# Patient Record
Sex: FEMALE | Race: BLACK OR AFRICAN AMERICAN | NOT HISPANIC OR LATINO | Employment: OTHER | URBAN - METROPOLITAN AREA
[De-identification: names, ages, dates, MRNs, and addresses within clinical notes are randomized per-mention and may not be internally consistent; named-entity substitution may affect disease eponyms.]

---

## 2017-01-04 ENCOUNTER — CLINICAL SUPPORT (OUTPATIENT)
Dept: ELECTROPHYSIOLOGY | Facility: CLINIC | Age: 77
End: 2017-01-04
Payer: MEDICARE

## 2017-01-04 DIAGNOSIS — Z95.9 UNSPECIFIED CARDIAC DEVICE IN SITU: ICD-10-CM

## 2017-01-04 DIAGNOSIS — Z95.818 STATUS POST PLACEMENT OF IMPLANTABLE LOOP RECORDER: Primary | ICD-10-CM

## 2017-01-04 DIAGNOSIS — I63.9 CRYPTOGENIC STROKE: ICD-10-CM

## 2017-01-04 PROCEDURE — 93299 LOOP RECORDER REMOTE: CPT | Mod: PBBFAC | Performed by: INTERNAL MEDICINE

## 2017-01-04 PROCEDURE — 93297 REM INTERROG DEV EVAL ICPMS: CPT | Mod: ,,, | Performed by: INTERNAL MEDICINE

## 2017-01-09 ENCOUNTER — TELEPHONE (OUTPATIENT)
Dept: NEUROLOGY | Facility: CLINIC | Age: 77
End: 2017-01-09

## 2017-01-09 NOTE — TELEPHONE ENCOUNTER
Left detailed message on pt's voicemail along with both numbers listed as next of kin, asking that they contact our office in reference to rescheduling Ms. Llamas appt scheduled 01/10/2017 with Jasmyne Oro

## 2017-01-15 ENCOUNTER — HOSPITAL ENCOUNTER (INPATIENT)
Facility: HOSPITAL | Age: 77
LOS: 2 days | Discharge: HOME HEALTH | DRG: 190 | End: 2017-01-17
Attending: EMERGENCY MEDICINE | Admitting: INTERNAL MEDICINE
Payer: MEDICARE

## 2017-01-15 DIAGNOSIS — J81.1 CHRONIC PULMONARY EDEMA: ICD-10-CM

## 2017-01-15 DIAGNOSIS — J44.1 COPD EXACERBATION: ICD-10-CM

## 2017-01-15 DIAGNOSIS — N18.6 ESRD (END STAGE RENAL DISEASE): Chronic | ICD-10-CM

## 2017-01-15 DIAGNOSIS — J45.901 ASTHMA EXACERBATION: Primary | ICD-10-CM

## 2017-01-15 DIAGNOSIS — I50.33 ACUTE ON CHRONIC DIASTOLIC HEART FAILURE: ICD-10-CM

## 2017-01-15 PROBLEM — I36.1 NON-RHEUMATIC TRICUSPID VALVE INSUFFICIENCY: Status: ACTIVE | Noted: 2017-01-15

## 2017-01-15 PROBLEM — I73.9 PERIPHERAL ARTERY DISEASE: Status: ACTIVE | Noted: 2017-01-15

## 2017-01-15 PROBLEM — R73.9 STEROID-INDUCED HYPERGLYCEMIA: Status: ACTIVE | Noted: 2017-01-15

## 2017-01-15 PROBLEM — I35.0 AORTIC STENOSIS: Status: ACTIVE | Noted: 2017-01-15

## 2017-01-15 PROBLEM — T38.0X5A STEROID-INDUCED HYPERGLYCEMIA: Status: ACTIVE | Noted: 2017-01-15

## 2017-01-15 PROBLEM — J81.0 ACUTE PULMONARY EDEMA: Status: ACTIVE | Noted: 2017-01-15

## 2017-01-15 PROBLEM — D69.6 THROMBOCYTOPENIA, UNSPECIFIED: Status: ACTIVE | Noted: 2017-01-15

## 2017-01-15 LAB
ALBUMIN SERPL BCP-MCNC: 3.3 G/DL
ALP SERPL-CCNC: 94 U/L
ALT SERPL W/O P-5'-P-CCNC: 8 U/L
ANION GAP SERPL CALC-SCNC: 12 MMOL/L
ANISOCYTOSIS BLD QL SMEAR: SLIGHT
AST SERPL-CCNC: 14 U/L
BASOPHILS # BLD AUTO: 0.01 K/UL
BASOPHILS NFR BLD: 0.2 %
BILIRUB SERPL-MCNC: 0.5 MG/DL
BNP SERPL-MCNC: 2576 PG/ML
BUN SERPL-MCNC: 26 MG/DL
CALCIUM SERPL-MCNC: 9.2 MG/DL
CHLORIDE SERPL-SCNC: 100 MMOL/L
CO2 SERPL-SCNC: 29 MMOL/L
CREAT SERPL-MCNC: 3.7 MG/DL
DIFFERENTIAL METHOD: ABNORMAL
EOSINOPHIL # BLD AUTO: 0 K/UL
EOSINOPHIL NFR BLD: 0.5 %
ERYTHROCYTE [DISTWIDTH] IN BLOOD BY AUTOMATED COUNT: 17.2 %
EST. GFR  (AFRICAN AMERICAN): 13.1 ML/MIN/1.73 M^2
EST. GFR  (NON AFRICAN AMERICAN): 11.4 ML/MIN/1.73 M^2
GLUCOSE SERPL-MCNC: 173 MG/DL
HCT VFR BLD AUTO: 37.7 %
HGB BLD-MCNC: 11.4 G/DL
HYPOCHROMIA BLD QL SMEAR: ABNORMAL
LYMPHOCYTES # BLD AUTO: 0.6 K/UL
LYMPHOCYTES NFR BLD: 11.3 %
MCH RBC QN AUTO: 29.2 PG
MCHC RBC AUTO-ENTMCNC: 30.2 %
MCV RBC AUTO: 97 FL
MONOCYTES # BLD AUTO: 0.6 K/UL
MONOCYTES NFR BLD: 9.9 %
NEUTROPHILS # BLD AUTO: 4.4 K/UL
NEUTROPHILS NFR BLD: 78.1 %
OVALOCYTES BLD QL SMEAR: ABNORMAL
PLATELET # BLD AUTO: 77 K/UL
PMV BLD AUTO: 11.3 FL
POCT GLUCOSE: 181 MG/DL (ref 70–110)
POCT GLUCOSE: 189 MG/DL (ref 70–110)
POCT GLUCOSE: 217 MG/DL (ref 70–110)
POCT GLUCOSE: 265 MG/DL (ref 70–110)
POIKILOCYTOSIS BLD QL SMEAR: SLIGHT
POLYCHROMASIA BLD QL SMEAR: ABNORMAL
POTASSIUM SERPL-SCNC: 4.1 MMOL/L
PROT SERPL-MCNC: 7 G/DL
RBC # BLD AUTO: 3.9 M/UL
SODIUM SERPL-SCNC: 141 MMOL/L
TROPONIN I SERPL DL<=0.01 NG/ML-MCNC: <0.006 NG/ML
WBC # BLD AUTO: 5.67 K/UL

## 2017-01-15 PROCEDURE — 99285 EMERGENCY DEPT VISIT HI MDM: CPT | Mod: GC,,, | Performed by: EMERGENCY MEDICINE

## 2017-01-15 PROCEDURE — 63600175 PHARM REV CODE 636 W HCPCS: Performed by: EMERGENCY MEDICINE

## 2017-01-15 PROCEDURE — 11000001 HC ACUTE MED/SURG PRIVATE ROOM

## 2017-01-15 PROCEDURE — 93010 ELECTROCARDIOGRAM REPORT: CPT | Mod: ,,, | Performed by: INTERNAL MEDICINE

## 2017-01-15 PROCEDURE — 99285 EMERGENCY DEPT VISIT HI MDM: CPT | Mod: 25

## 2017-01-15 PROCEDURE — 94760 N-INVAS EAR/PLS OXIMETRY 1: CPT

## 2017-01-15 PROCEDURE — 84484 ASSAY OF TROPONIN QUANT: CPT

## 2017-01-15 PROCEDURE — 96374 THER/PROPH/DIAG INJ IV PUSH: CPT

## 2017-01-15 PROCEDURE — 25000242 PHARM REV CODE 250 ALT 637 W/ HCPCS: Performed by: EMERGENCY MEDICINE

## 2017-01-15 PROCEDURE — 94640 AIRWAY INHALATION TREATMENT: CPT

## 2017-01-15 PROCEDURE — 25000003 PHARM REV CODE 250: Performed by: INTERNAL MEDICINE

## 2017-01-15 PROCEDURE — 63600175 PHARM REV CODE 636 W HCPCS: Performed by: INTERNAL MEDICINE

## 2017-01-15 PROCEDURE — 82962 GLUCOSE BLOOD TEST: CPT

## 2017-01-15 PROCEDURE — 99223 1ST HOSP IP/OBS HIGH 75: CPT | Mod: ,,, | Performed by: INTERNAL MEDICINE

## 2017-01-15 PROCEDURE — 83880 ASSAY OF NATRIURETIC PEPTIDE: CPT

## 2017-01-15 PROCEDURE — 85025 COMPLETE CBC W/AUTO DIFF WBC: CPT

## 2017-01-15 PROCEDURE — 93005 ELECTROCARDIOGRAM TRACING: CPT

## 2017-01-15 PROCEDURE — 25000003 PHARM REV CODE 250: Performed by: EMERGENCY MEDICINE

## 2017-01-15 PROCEDURE — 80053 COMPREHEN METABOLIC PANEL: CPT

## 2017-01-15 PROCEDURE — 25000242 PHARM REV CODE 250 ALT 637 W/ HCPCS: Performed by: INTERNAL MEDICINE

## 2017-01-15 RX ORDER — INSULIN ASPART 100 [IU]/ML
0-5 INJECTION, SOLUTION INTRAVENOUS; SUBCUTANEOUS
Status: DISCONTINUED | OUTPATIENT
Start: 2017-01-15 | End: 2017-01-17 | Stop reason: HOSPADM

## 2017-01-15 RX ORDER — PREDNISONE 20 MG/1
40 TABLET ORAL DAILY
Status: DISCONTINUED | OUTPATIENT
Start: 2017-01-15 | End: 2017-01-16

## 2017-01-15 RX ORDER — FLUTICASONE FUROATE AND VILANTEROL 200; 25 UG/1; UG/1
1 POWDER RESPIRATORY (INHALATION) DAILY
Status: DISCONTINUED | OUTPATIENT
Start: 2017-01-15 | End: 2017-01-17 | Stop reason: HOSPADM

## 2017-01-15 RX ORDER — ATORVASTATIN CALCIUM 20 MG/1
40 TABLET, FILM COATED ORAL DAILY
Status: DISCONTINUED | OUTPATIENT
Start: 2017-01-15 | End: 2017-01-17 | Stop reason: HOSPADM

## 2017-01-15 RX ORDER — ERGOCALCIFEROL 1.25 MG/1
50000 CAPSULE ORAL
Status: DISCONTINUED | OUTPATIENT
Start: 2017-01-15 | End: 2017-01-17 | Stop reason: HOSPADM

## 2017-01-15 RX ORDER — IBUPROFEN 200 MG
16 TABLET ORAL
Status: DISCONTINUED | OUTPATIENT
Start: 2017-01-15 | End: 2017-01-17 | Stop reason: HOSPADM

## 2017-01-15 RX ORDER — IPRATROPIUM BROMIDE AND ALBUTEROL SULFATE 2.5; .5 MG/3ML; MG/3ML
3 SOLUTION RESPIRATORY (INHALATION)
Status: COMPLETED | OUTPATIENT
Start: 2017-01-15 | End: 2017-01-15

## 2017-01-15 RX ORDER — GLUCAGON 1 MG
1 KIT INJECTION
Status: DISCONTINUED | OUTPATIENT
Start: 2017-01-15 | End: 2017-01-17 | Stop reason: HOSPADM

## 2017-01-15 RX ORDER — ALBUTEROL SULFATE 2.5 MG/.5ML
2.5 SOLUTION RESPIRATORY (INHALATION) EVERY 4 HOURS
Status: DISCONTINUED | OUTPATIENT
Start: 2017-01-15 | End: 2017-01-15

## 2017-01-15 RX ORDER — DOCUSATE SODIUM 100 MG/1
100 CAPSULE, LIQUID FILLED ORAL 2 TIMES DAILY
Status: DISCONTINUED | OUTPATIENT
Start: 2017-01-15 | End: 2017-01-17 | Stop reason: HOSPADM

## 2017-01-15 RX ORDER — AMLODIPINE BESYLATE 10 MG/1
10 TABLET ORAL DAILY
Status: DISCONTINUED | OUTPATIENT
Start: 2017-01-15 | End: 2017-01-17 | Stop reason: HOSPADM

## 2017-01-15 RX ORDER — NAPROXEN SODIUM 220 MG/1
81 TABLET, FILM COATED ORAL DAILY
Status: DISCONTINUED | OUTPATIENT
Start: 2017-01-15 | End: 2017-01-17 | Stop reason: HOSPADM

## 2017-01-15 RX ORDER — SODIUM CHLORIDE 9 MG/ML
INJECTION, SOLUTION INTRAVENOUS
Status: DISCONTINUED | OUTPATIENT
Start: 2017-01-16 | End: 2017-01-17 | Stop reason: HOSPADM

## 2017-01-15 RX ORDER — FUROSEMIDE 10 MG/ML
20 INJECTION INTRAMUSCULAR; INTRAVENOUS ONCE
Status: COMPLETED | OUTPATIENT
Start: 2017-01-15 | End: 2017-01-15

## 2017-01-15 RX ORDER — CLONIDINE 0.3 MG/24H
1 PATCH, EXTENDED RELEASE TRANSDERMAL
Status: DISCONTINUED | OUTPATIENT
Start: 2017-01-21 | End: 2017-01-17 | Stop reason: HOSPADM

## 2017-01-15 RX ORDER — CLOPIDOGREL BISULFATE 75 MG/1
75 TABLET ORAL DAILY
Status: DISCONTINUED | OUTPATIENT
Start: 2017-01-15 | End: 2017-01-17 | Stop reason: HOSPADM

## 2017-01-15 RX ORDER — HEPARIN SODIUM 5000 [USP'U]/ML
5000 INJECTION, SOLUTION INTRAVENOUS; SUBCUTANEOUS EVERY 12 HOURS
Status: DISCONTINUED | OUTPATIENT
Start: 2017-01-15 | End: 2017-01-17 | Stop reason: HOSPADM

## 2017-01-15 RX ORDER — IBUPROFEN 200 MG
24 TABLET ORAL
Status: DISCONTINUED | OUTPATIENT
Start: 2017-01-15 | End: 2017-01-17 | Stop reason: HOSPADM

## 2017-01-15 RX ORDER — CARVEDILOL 25 MG/1
25 TABLET ORAL 2 TIMES DAILY
Status: DISCONTINUED | OUTPATIENT
Start: 2017-01-15 | End: 2017-01-17 | Stop reason: HOSPADM

## 2017-01-15 RX ORDER — NIFEDIPINE 30 MG/1
90 TABLET, EXTENDED RELEASE ORAL DAILY
Status: DISCONTINUED | OUTPATIENT
Start: 2017-01-15 | End: 2017-01-17 | Stop reason: HOSPADM

## 2017-01-15 RX ORDER — METHYLPREDNISOLONE SOD SUCC 125 MG
125 VIAL (EA) INJECTION
Status: COMPLETED | OUTPATIENT
Start: 2017-01-15 | End: 2017-01-15

## 2017-01-15 RX ORDER — IPRATROPIUM BROMIDE AND ALBUTEROL SULFATE 2.5; .5 MG/3ML; MG/3ML
3 SOLUTION RESPIRATORY (INHALATION) EVERY 4 HOURS
Status: DISCONTINUED | OUTPATIENT
Start: 2017-01-15 | End: 2017-01-16

## 2017-01-15 RX ORDER — LOSARTAN POTASSIUM 50 MG/1
100 TABLET ORAL DAILY
Status: DISCONTINUED | OUTPATIENT
Start: 2017-01-15 | End: 2017-01-17 | Stop reason: HOSPADM

## 2017-01-15 RX ORDER — TIOTROPIUM BROMIDE 18 UG/1
1 CAPSULE ORAL; RESPIRATORY (INHALATION) DAILY
Status: DISCONTINUED | OUTPATIENT
Start: 2017-01-15 | End: 2017-01-16

## 2017-01-15 RX ADMIN — ISOSORBIDE DINITRATE 40 MG: 40 TABLET, EXTENDED RELEASE ORAL at 12:01

## 2017-01-15 RX ADMIN — DOCUSATE SODIUM 100 MG: 100 CAPSULE, LIQUID FILLED ORAL at 10:01

## 2017-01-15 RX ADMIN — CLOPIDOGREL 75 MG: 75 TABLET, FILM COATED ORAL at 10:01

## 2017-01-15 RX ADMIN — ISOSORBIDE DINITRATE 40 MG: 40 TABLET, EXTENDED RELEASE ORAL at 10:01

## 2017-01-15 RX ADMIN — AMLODIPINE BESYLATE 10 MG: 10 TABLET ORAL at 10:01

## 2017-01-15 RX ADMIN — CARVEDILOL 25 MG: 25 TABLET, FILM COATED ORAL at 10:01

## 2017-01-15 RX ADMIN — FLUTICASONE FUROATE AND VILANTEROL TRIFENATATE 1 PUFF: 200; 25 POWDER RESPIRATORY (INHALATION) at 12:01

## 2017-01-15 RX ADMIN — INSULIN ASPART 3 UNITS: 100 INJECTION, SOLUTION INTRAVENOUS; SUBCUTANEOUS at 06:01

## 2017-01-15 RX ADMIN — INSULIN ASPART 1 UNITS: 100 INJECTION, SOLUTION INTRAVENOUS; SUBCUTANEOUS at 10:01

## 2017-01-15 RX ADMIN — IPRATROPIUM BROMIDE AND ALBUTEROL SULFATE 3 ML: .5; 3 SOLUTION RESPIRATORY (INHALATION) at 06:01

## 2017-01-15 RX ADMIN — ASPIRIN 81 MG CHEWABLE TABLET 81 MG: 81 TABLET CHEWABLE at 10:01

## 2017-01-15 RX ADMIN — FUROSEMIDE 20 MG: 10 INJECTION, SOLUTION INTRAVENOUS at 10:01

## 2017-01-15 RX ADMIN — IPRATROPIUM BROMIDE AND ALBUTEROL SULFATE 3 ML: .5; 3 SOLUTION RESPIRATORY (INHALATION) at 09:01

## 2017-01-15 RX ADMIN — HYDRALAZINE HYDROCHLORIDE 75 MG: 50 TABLET ORAL at 06:01

## 2017-01-15 RX ADMIN — ERGOCALCIFEROL 50000 UNITS: 1.25 CAPSULE ORAL at 10:01

## 2017-01-15 RX ADMIN — CARVEDILOL 25 MG: 25 TABLET, FILM COATED ORAL at 09:01

## 2017-01-15 RX ADMIN — HEPARIN SODIUM 5000 UNITS: 5000 INJECTION, SOLUTION INTRAVENOUS; SUBCUTANEOUS at 09:01

## 2017-01-15 RX ADMIN — TIOTROPIUM BROMIDE 18 MCG: 18 CAPSULE ORAL; RESPIRATORY (INHALATION) at 12:01

## 2017-01-15 RX ADMIN — LOSARTAN POTASSIUM 100 MG: 50 TABLET, FILM COATED ORAL at 10:01

## 2017-01-15 RX ADMIN — IPRATROPIUM BROMIDE AND ALBUTEROL SULFATE 3 ML: .5; 3 SOLUTION RESPIRATORY (INHALATION) at 04:01

## 2017-01-15 RX ADMIN — HYDRALAZINE HYDROCHLORIDE 75 MG: 50 TABLET ORAL at 10:01

## 2017-01-15 RX ADMIN — HEPARIN SODIUM 5000 UNITS: 5000 INJECTION, SOLUTION INTRAVENOUS; SUBCUTANEOUS at 10:01

## 2017-01-15 RX ADMIN — HYDRALAZINE HYDROCHLORIDE 75 MG: 50 TABLET ORAL at 12:01

## 2017-01-15 RX ADMIN — ATORVASTATIN CALCIUM 40 MG: 20 TABLET, FILM COATED ORAL at 10:01

## 2017-01-15 RX ADMIN — PREDNISONE 40 MG: 20 TABLET ORAL at 10:01

## 2017-01-15 RX ADMIN — NIFEDIPINE 90 MG: 30 TABLET, FILM COATED, EXTENDED RELEASE ORAL at 10:01

## 2017-01-15 RX ADMIN — METHYLPREDNISOLONE SODIUM SUCCINATE 125 MG: 125 INJECTION, POWDER, FOR SOLUTION INTRAMUSCULAR; INTRAVENOUS at 04:01

## 2017-01-15 NOTE — IP AVS SNAPSHOT
[facility]  [address]  [facility] [address]  Phone: [phone]           Patient Discharge Instructions     Our goal is to set you up for success. This packet includes information on your condition, medications, and your home care. It will help you to care for yourself so you don't get sicker and need to go back to the hospital.     Please ask your nurse if you have any questions.        There are many details to remember when preparing to leave the hospital. Here is what you will need to do:    1. Take your medicine. If you are prescribed medications, review your Medication List in the following pages. You may have new medications to  at the pharmacy and others that you'll need to stop taking. Review the instructions for how and when to take your medications. Talk with your doctor or nurses if you are unsure of what to do.     2. Go to your follow-up appointments. Specific follow-up information is listed in the following pages. Your may be contacted by a transition nurse or clinical provider about future appointments. Be sure we have all of the phone numbers to reach you, if needed. Please contact your provider's office if you are unable to make an appointment.     3. Watch for warning signs. Your doctor or nurse will give you detailed warning signs to watch for and when to call for assistance. These instructions may also include educational information about your condition. If you experience any of warning signs to your health, call your doctor.               [facility]  Unless otherwise directed by your provider, please contact [facility], our nurse care line that is available for 24/7 assistance.     [phone] (toll-free)    Registered nurses in the [facility] provide clinical advisement, health education, appointment booking, and other advisory services.                    ** Verify the list of medication(s) below is accurate and up  to date. Carry this with you in case of emergency. If your medications have changed, please notify your healthcare provider.             Medication List      CHANGE how you take these medications        Additional Info                      albuterol 90 mcg/actuation inhaler   Quantity:  1 Inhaler   Refills:  0   Dose:  1-2 puff   What changed:  Another medication with the same name was removed. Continue taking this medication, and follow the directions you see here.    Instructions:  Inhale 1-2 puffs into the lungs every 6 (six) hours as needed for Wheezing.     Begin Date    AM    Noon    PM    Bedtime       cloNIDine 0.3 mg/24 hr td ptwk 0.3 mg/24 hr   Commonly known as:  CATAPRES   Quantity:  4 patch   Refills:  3   Dose:  1 patch   What changed:  when to take this    Instructions:  Place 1 patch onto the skin every 7 days.     Begin Date    AM    Noon    PM    Bedtime         CONTINUE taking these medications        Additional Info                      amlodipine 10 MG tablet   Commonly known as:  NORVASC   Quantity:  30 tablet   Refills:  3   Dose:  10 mg    Last time this was given:  10 mg on 1/16/2017  8:55 AM   Instructions:  Take 1 tablet (10 mg total) by mouth once daily.     Begin Date    AM    Noon    PM    Bedtime       artificial tears 0.5 % ophthalmic solution   Commonly known as:  ISOPTO TEARS   Refills:  0   Dose:  1 drop    Instructions:  Place 1 drop into both eyes 3 (three) times daily.     Begin Date    AM    Noon    PM    Bedtime       aspirin 81 MG Chew   Quantity:  30 tablet   Refills:  1   Dose:  81 mg    Last time this was given:  81 mg on 1/16/2017  8:54 AM   Instructions:  Take 1 tablet (81 mg total) by mouth once daily.     Begin Date    AM    Noon    PM    Bedtime       atorvastatin 40 MG tablet   Commonly known as:  LIPITOR   Quantity:  30 tablet   Refills:  3   Dose:  40 mg    Last time this was given:  40 mg on 1/16/2017  8:54 AM   Instructions:  Take 1 tablet (40 mg total) by mouth  once daily.     Begin Date    AM    Noon    PM    Bedtime       carvedilol 25 MG tablet   Commonly known as:  COREG   Quantity:  60 tablet   Refills:  3   Dose:  25 mg    Last time this was given:  25 mg on 1/16/2017  8:54 AM   Instructions:  Take 1 tablet (25 mg total) by mouth 2 (two) times daily.     Begin Date    AM    Noon    PM    Bedtime       clopidogrel 75 mg tablet   Commonly known as:  PLAVIX   Quantity:  30 tablet   Refills:  2   Dose:  75 mg    Last time this was given:  75 mg on 1/16/2017  8:55 AM   Instructions:  Take 1 tablet (75 mg total) by mouth once daily.     Begin Date    AM    Noon    PM    Bedtime       clotrimazole 1 % cream   Commonly known as:  LOTRIMIN   Quantity:  28 g   Refills:  0    Instructions:  Apply topically 2 (two) times daily.     Begin Date    AM    Noon    PM    Bedtime       docusate sodium 100 MG capsule   Commonly known as:  COLACE   Refills:  0   Dose:  100 mg    Last time this was given:  100 mg on 1/16/2017  8:55 AM   Instructions:  Take 1 capsule (100 mg total) by mouth 2 (two) times daily.     Begin Date    AM    Noon    PM    Bedtime       epoetin shon 10,000 unit/mL injection   Commonly known as:  PROCRIT   Refills:  0   Dose:  5000 Units   Indications:  Anemia due to Renal Failure    Instructions:  Inject 0.5 mLs (5,000 Units total) into the skin every Mon, Wed, Fri.     Begin Date    AM    Noon    PM    Bedtime       ergocalciferol 50,000 unit Cap   Commonly known as:  ERGOCALCIFEROL   Quantity:  5 capsule   Refills:  1   Dose:  73517 Units    Last time this was given:  50,000 Units on 1/15/2017 10:23 AM   Instructions:  Take 1 capsule (50,000 Units total) by mouth every 7 days.     Begin Date    AM    Noon    PM    Bedtime       fluticasone-vilanterol 200-25 mcg/dose Dsdv diskus inhaler   Commonly known as:  BREO ELLIPTA   Quantity:  28 each   Refills:  1   Dose:  1 puff    Last time this was given:  1 puff on 1/16/2017  8:55 AM   Instructions:  Inhale 1 puff  into the lungs 2 (two) times daily.     Begin Date    AM    Noon    PM    Bedtime       hydrALAZINE 50 MG tablet   Commonly known as:  APRESOLINE   Quantity:  180 tablet   Refills:  3   Dose:  75 mg    Last time this was given:  75 mg on 1/16/2017 11:51 AM   Instructions:  Take 1.5 tablets (75 mg total) by mouth every 6 (six) hours.     Begin Date    AM    Noon    PM    Bedtime       isosorbide dinitrate 40 mg Tbsr   Commonly known as:  ISOCHRON   Quantity:  60 tablet   Refills:  11   Dose:  40 mg    Last time this was given:  40 mg on 1/16/2017  8:55 AM   Instructions:  Take 1 tablet (40 mg total) by mouth every 12 (twelve) hours.     Begin Date    AM    Noon    PM    Bedtime       lidocaine-prilocaine cream   Commonly known as:  EMLA   Quantity:  25 g   Refills:  1    Instructions:  Apply topically as needed (to apply over access 1/2 over prior to dialysis).     Begin Date    AM    Noon    PM    Bedtime       losartan 100 MG tablet   Commonly known as:  COZAAR   Quantity:  30 tablet   Refills:  3   Dose:  100 mg    Last time this was given:  100 mg on 1/16/2017  8:55 AM   Instructions:  Take 1 tablet (100 mg total) by mouth once daily.     Begin Date    AM    Noon    PM    Bedtime       multivitamin per tablet   Commonly known as:  THERAGRAN   Refills:  0   Dose:  1 tablet    Instructions:  Take 1 tablet by mouth once daily.     Begin Date    AM    Noon    PM    Bedtime       nifedipine 90 MG (OSM) Tr24   Commonly known as:  PROCARDIA-XL   Refills:  0    Last time this was given:  90 mg on 1/16/2017  8:55 AM     Begin Date    AM    Noon    PM    Bedtime       sodium chloride 0.9% 0.9 % SolP 100 mL with ferric gluconate 62.5 mg/5 mL Soln 125 mg   Quantity:  1 Bottle   Refills:  7   Dose:  125 mg    Instructions:  Inject 125 mg into the vein daily as needed (on dialysis).     Begin Date    AM    Noon    PM    Bedtime         STOP taking these medications     guaifenesin 600 mg 12 hr tablet   Commonly known as:   "MUCINEX                  Please bring to all follow up appointments:    1. A copy of your discharge instructions.  2. All medicines you are currently taking in their original bottles.  3. Identification and insurance card.    Please arrive 15 minutes ahead of scheduled appointment time.    Please call 24 hours in advance if you must reschedule your appointment and/or time.        Your Scheduled Appointments     Jan 23, 2017  3:00 PM CST   Established Patient Visit with MD Carroll [facility] II [facility] - Internal Medicine ([facility])    [address]  [facility] [id number] [phone]            Feb 06, 2017  8:00 AM CST   Remote Interrogation with HOME MONITOR DEVICE CHECK, [facility]   [facility] ([facility] )    [address]  [place] [address] [phone]              Follow-up Information     Schedule an appointment as soon as possible for a visit with Parth Posadas Ii, MD.    Specialty:  Internal Medicine    Why:  blood pressure medication adjustment.    Contact information:    [address]  [facility] [id number] [phone]            Primary Diagnosis     Your primary diagnosis was:  Heart Failure      Admission Information     Date & Time Provider Department CSN    1/15/2017  3:34 AM Angi Mendez MD [facility] [id number]      Care Providers     Provider Role Specialty Primary office phone    Angi Mendez MD Attending Provider Hospitalist [phone]    Adrian Lebron MD Team Attending  Hospitalist [phone]    Angi Mendez MD Team Attending  Hospitalist [phone]    Marshall Almanza MD Consulting Physician  Nephrology [phone]      Your Vitals Were     BP Pulse Temp Resp Height Weight    156/47 66 98.1 °F (36.7 °C) (Oral) 20 5' 2" (1.575 m) 46.7 kg (103 lb)    Last Period SpO2 BMI          (LMP Unknown) 81% 18.84 kg/m2        Recent Lab Values        5/24/2012 7/4/2014 " 10/1/2015 2/18/2016 5/1/2016 6/28/2016 9/29/2016 1/16/2017      7:30 AM  2:45 AM  4:21 PM 10:58 AM  5:20 PM  4:05 AM  3:58 AM  4:19 AM    A1C 5.6 4.6 4.9 4.4 (L) 4.6 &lt;4.0 (L) 5.0 4.4 (L)    Comment for A1C at  3:58 AM on 9/29/2016:  According to ADA guidelines, hemoglobin A1C <7.0% represents  optimal control in non-pregnant diabetic patients.  Different  metrics may apply to specific populations.   Standards of Medical Care in Diabetes - 2016.  For the purpose of screening for the presence of diabetes:  <5.7%     Consistent with the absence of diabetes  5.7-6.4%  Consistent with increasing risk for diabetes   (prediabetes)  >or=6.5%  Consistent with diabetes  Currently no consensus exists for use of hemoglobin A1C  for diagnosis of diabetes for children.      Comment for A1C at  4:19 AM on 1/16/2017:  According to ADA guidelines, hemoglobin A1C <7.0% represents  optimal control in non-pregnant diabetic patients.  Different  metrics may apply to specific populations.   Standards of Medical Care in Diabetes - 2016.  For the purpose of screening for the presence of diabetes:  <5.7%     Consistent with the absence of diabetes  5.7-6.4%  Consistent with increasing risk for diabetes   (prediabetes)  >or=6.5%  Consistent with diabetes  Currently no consensus exists for use of hemoglobin A1C  for diagnosis of diabetes for children.        Allergies as of 1/16/2017     No Known Allergies      Advance Directives     An advance directive is a document which, in the event you are no longer able to make decisions for yourself, tells your healthcare team what kind of treatment you do or do not want to receive, or who you would like to make those decisions for you.  If you do not currently have an advance directive, [facility] encourages you to create one.  For more information call:  [phone]), [phone] ([phone]),  or log on to [email or web address].        Language Assistance Services      ATTENTION: Language assistance services are available, free of charge. Please call [phone].      ATENCIÓN: Si cristhian coker, tiene a barajas disposición servicios gratuitos de asistencia lingüística. Llame al [phone].     CHÚ Ý: N?u b?n nói Ti?ng Vi?t, có các d?ch v? h? tr? ngôn ng? mi?n phí dành cho b?n. G?i s? [phone].        Stroke Education              Chronic Kindey Disease Education             Diabetes Discharge Instructions                                   [facility] Sign-Up     Activating your [facility] account is as easy as 1-2-3!     1) Visit [email or web address], select Sign Up Now, enter this activation code and your date of birth, then select Next.  [email or web address]  Expires: 3/1/2017  8:00 AM      2) Create a username and password to use when you visit [facility] in the future and select a security question in case you lose your password and select Next.    3) Enter your e-mail address and click Sign Up!    Additional Information  If you have questions, please e-mail [email or web address] or call [phone] to talk to our [facility] staff. Remember, [facility] is NOT to be used for urgent needs. For medical emergencies, dial 911.          [facility] complies with applicable Federal civil rights laws and does not discriminate on the basis of race, color, national origin, age, disability, or sex.

## 2017-01-15 NOTE — ASSESSMENT & PLAN NOTE
Patient with hyperglycemia secondary to steroid therapy.  1. Diabetic diet as above.  2. Glucose AC and HS.  3. Low dose correction SSI.

## 2017-01-15 NOTE — SUBJECTIVE & OBJECTIVE
Past Medical History   Diagnosis Date    Anemia in ESRD (end-stage renal disease) 5/29/2016    Anticoagulant long-term use     Aortic atherosclerosis 11/22/2016    Aortic stenosis, moderate 2/18/2016    Asthma in adult without complication 1/8/2016    Bilateral low back pain without sciatica 11/17/2015    Blindness of right eye 11/12/2016    Cataract     Central retinal vein occlusion, right eye 6/3/2014    CHF (congestive heart failure)     Chronic diastolic heart failure 1/8/2016    Chronic respiratory failure with hypoxia 5/29/2016    Dependence on hemodialysis      Mon-Wed-Fri    Diverticulosis     Embolic stroke involving right middle cerebral artery     Enlarged LA (left atrium) 10/7/2016    Epiretinal membrane 7/17/2012    ESRD (end stage renal disease)     Essential hypertension 1/8/2016    History of GI diverticular bleed      5/22/16    Left flaccid hemiparesis 10/1/2016    Peripheral vascular disease, unspecified 11/22/2016    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 11/13/2016    Type 2 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, without long-term current use of insulin 3/26/2013    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, without long-term current use of insulin 3/26/2013    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       Past Surgical History   Procedure Laterality Date    Cataract extraction      Cholecystectomy      Colonoscopy N/A 5/23/2016     Procedure: COLONOSCOPY;  Surgeon: WILLIAM Colvin MD;  Location: [facility] ([address] [place] [address]);  Service: Endoscopy;  Laterality: N/A;    Colonoscopy N/A 5/30/2016     Procedure: COLONOSCOPY;  Surgeon: Sam Davis MD;  Location: [facility] ([address] [place] [address]);  Service: Endoscopy;  Laterality: N/A;    Upper gastrointestinal endoscopy      Breast surgery       tumor removal x 2       Review of patient's allergies indicates:  No Known  Allergies    No current facility-administered medications on file prior to encounter.      Current Outpatient Prescriptions on File Prior to Encounter   Medication Sig    albuterol (PROVENTIL) 2.5 mg /3 mL (0.083 %) nebulizer solution 3 mLs every 6 (six) hours as needed.    albuterol 90 mcg/actuation inhaler Inhale 1-2 puffs into the lungs every 6 (six) hours as needed for Wheezing.    amlodipine (NORVASC) 10 MG tablet Take 1 tablet (10 mg total) by mouth once daily.    artificial tears (ISOPTO TEARS) 0.5 % ophthalmic solution Place 1 drop into both eyes 3 (three) times daily.    aspirin 81 MG Chew Take 1 tablet (81 mg total) by mouth once daily.    atorvastatin (LIPITOR) 40 MG tablet Take 1 tablet (40 mg total) by mouth once daily.    carvedilol (COREG) 25 MG tablet Take 1 tablet (25 mg total) by mouth 2 (two) times daily.    cloNIDine 0.3 mg/24 hr td ptwk (CATAPRES) 0.3 mg/24 hr Place 1 patch onto the skin every 7 days. (Patient taking differently: Place 1 patch onto the skin every Saturday. )    clopidogrel (PLAVIX) 75 mg tablet Take 1 tablet (75 mg total) by mouth once daily.    clotrimazole (LOTRIMIN) 1 % cream Apply topically 2 (two) times daily.    docusate sodium (COLACE) 100 MG capsule Take 1 capsule (100 mg total) by mouth 2 (two) times daily.    epoetin shon (PROCRIT) 10,000 unit/mL injection Inject 0.5 mLs (5,000 Units total) into the skin every Mon, Wed, Fri. (Patient taking differently: Inject 5,000 Units into the skin every Mon, Wed, Fri. )    ergocalciferol (ERGOCALCIFEROL) 50,000 unit Cap Take 1 capsule (50,000 Units total) by mouth every 7 days.    fluticasone-vilanterol (BREO ELLIPTA) 200-25 mcg/dose DsDv diskus inhaler Inhale 1 puff into the lungs 2 (two) times daily.    guaifenesin (MUCINEX) 600 mg 12 hr tablet Take 1,200 mg by mouth 2 (two) times daily.    hydrALAZINE (APRESOLINE) 50 MG tablet Take 1.5 tablets (75 mg total) by mouth every 6 (six) hours.    isosorbide dinitrate  (ISOCHRON) 40 mg TbSR Take 1 tablet (40 mg total) by mouth every 12 (twelve) hours.    lidocaine-prilocaine (EMLA) cream Apply topically as needed (to apply over access 1/2 over prior to dialysis).    losartan (COZAAR) 100 MG tablet Take 1 tablet (100 mg total) by mouth once daily.    multivitamin (THERAGRAN) per tablet Take 1 tablet by mouth once daily.    nifedipine (PROCARDIA-XL) 90 MG (OSM) TR24     sodium chloride 0.9% 0.9 % SolP 100 mL with ferric gluconate 62.5 mg/5 mL Soln 125 mg Inject 125 mg into the vein daily as needed (on dialysis).     Family History     Problem Relation (Age of Onset)    Cancer Sister    Cataracts Mother    Esophageal cancer Sister    Glaucoma Brother, Maternal Aunt    Heart disease Brother    Heart failure Sister    Hypertension Mother, Sister, Brother, Father    No Known Problems Maternal Uncle, Paternal Aunt, Paternal Uncle, Maternal Grandmother, Maternal Grandfather, Paternal Grandmother, Paternal Grandfather    Stroke Mother        Social History Main Topics    Smoking status: Never Smoker    Smokeless tobacco: Never Used    Alcohol use No      Comment: Reports occasional 1-2 drinks     Drug use: No    Sexual activity: No     Review of Systems   Constitutional: Negative for chills, fatigue, fever and unexpected weight change.   HENT: Negative for ear pain, facial swelling, hearing loss, mouth sores, nosebleeds, rhinorrhea, sinus pressure, sore throat, tinnitus, trouble swallowing and voice change.    Eyes: Negative for photophobia, pain, redness and visual disturbance.   Respiratory: Positive for shortness of breath and wheezing. Negative for cough and chest tightness.    Cardiovascular: Negative for chest pain, palpitations and leg swelling.   Gastrointestinal: Negative for abdominal pain, blood in stool, constipation, diarrhea, nausea and vomiting.   Endocrine: Negative for cold intolerance, heat intolerance, polydipsia, polyphagia and polyuria.   Genitourinary:  Negative for decreased urine volume, dysuria, flank pain, frequency, hematuria, menstrual problem, urgency, vaginal bleeding, vaginal discharge and vaginal pain.   Musculoskeletal: Negative for arthralgias, back pain, joint swelling, myalgias and neck pain.   Skin: Negative for rash.   Allergic/Immunologic: Negative for environmental allergies, food allergies and immunocompromised state.   Neurological: Negative for dizziness, seizures, syncope, weakness, light-headedness, numbness and headaches.   Hematological: Negative for adenopathy. Does not bruise/bleed easily.   Psychiatric/Behavioral: Negative for confusion, hallucinations, self-injury, sleep disturbance and suicidal ideas. The patient is not nervous/anxious.      Objective:     Vital Signs (Most Recent):  Temp: 97.7 °F (36.5 °C) (01/15/17 1545)  Pulse: 68 (01/15/17 1700)  Resp: 12 (01/15/17 1657)  BP: (!) 181/79 (01/15/17 1545)  SpO2: 98 % (01/15/17 1657) Vital Signs (24h Range):  Temp:  [97.7 °F (36.5 °C)-98.5 °F (36.9 °C)] 97.7 °F (36.5 °C)  Pulse:  [65-80] 68  Resp:  [] 12  SpO2:  [96 %-100 %] 98 %  BP: (179-191)/(54-83) 181/79     Weight: 46.7 kg (103 lb)  Body mass index is 18.84 kg/(m^2).    Physical Exam   Constitutional: She is oriented to person, place, and time. She appears well-developed and well-nourished. No distress.   HENT:   Head: Normocephalic and atraumatic.   Right Ear: Hearing, tympanic membrane, external ear and ear canal normal.   Left Ear: Hearing, tympanic membrane, external ear and ear canal normal.   Nose: Nose normal.   Mouth/Throat: Uvula is midline, oropharynx is clear and moist and mucous membranes are normal. No oropharyngeal exudate.   Eyes: Conjunctivae and EOM are normal. Right eye exhibits no discharge. Left eye exhibits no discharge. No scleral icterus.   Right eye gray pupil.    Neck: Normal range of motion. Neck supple. No tracheal deviation present. No thyromegaly present.   Cardiovascular: Normal rate, regular  rhythm, normal heart sounds and intact distal pulses.  Exam reveals no gallop and no friction rub.    No murmur heard.  Pulmonary/Chest: Effort normal. No accessory muscle usage or stridor. Tachypnea noted. No respiratory distress. She has decreased breath sounds in the right lower field. She has no wheezes. She has rales in the right middle field, the right lower field, the left middle field and the left lower field. She exhibits no tenderness.   Abdominal: Soft. Bowel sounds are normal. She exhibits no distension. There is no tenderness. There is no rebound and no guarding.   Musculoskeletal: Normal range of motion. She exhibits no edema or tenderness.   Lymphadenopathy:     She has no cervical adenopathy.   Neurological: She is alert and oriented to person, place, and time. No cranial nerve deficit. Coordination normal.   Skin: Skin is warm and dry. No rash noted. She is not diaphoretic. No erythema. No pallor.   Psychiatric: She has a normal mood and affect. Her behavior is normal. Judgment and thought content normal.   Nursing note and vitals reviewed.       Significant Labs:   BMP:   Recent Labs  Lab 01/15/17  0358   *      K 4.1      CO2 29   BUN 26*   CREATININE 3.7*   CALCIUM 9.2     CBC:   Recent Labs  Lab 01/15/17  0358   WBC 5.67   HGB 11.4*   HCT 37.7   PLT 77*       Significant Imaging: I have reviewed all pertinent imaging results/findings within the past 24 hours.

## 2017-01-15 NOTE — ASSESSMENT & PLAN NOTE
Patient with malignant hypertension. Unclear as to last time her home medications were taken.  1. amlodipine 10 mg daily.  2. Coreg 25 mg BID.  3. Clonidine 0.3 mg patch every 7 days.  4. Hydralazine 75 mg every 6 hours.  5. Isosorbide dinitrate 40 mg daily.  6. Losartan 100 mg daily  7. Monitor blood pressure and titrate therapy as needed.

## 2017-01-15 NOTE — ED PROVIDER NOTES
Encounter Date: 1/15/2017       History     Chief Complaint   Patient presents with    Shortness of Breath     x1 hour. Hx CHF. Wears O2 2 l/min at home. On 4 l/min per EMS.      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  Pt states she just finished a course of steroids two days ago for her last asthma exacerbation.  She states this shortness of breath feels like her asthma more than her heart failure.  Pt completed dialysis yesterday with no difficulty.  She has had a runny nose for about a month, baseline cough that is unchanged.      The history is provided by the patient.     Past Medical History   Diagnosis Date    Anemia in ESRD (end-stage renal disease) 5/29/2016    Anticoagulant long-term use     Aortic atherosclerosis 11/22/2016    Aortic stenosis, moderate 2/18/2016    Asthma in adult without complication 1/8/2016    Bilateral low back pain without sciatica 11/17/2015    Blindness of right eye 11/12/2016    Cataract     Central retinal vein occlusion, right eye 6/3/2014    CHF (congestive heart failure)     Chronic diastolic heart failure 1/8/2016    Chronic respiratory failure with hypoxia 5/29/2016    Dependence on hemodialysis      Mon-Wed-Fri    Diverticulosis     Embolic stroke involving right middle cerebral artery     Enlarged LA (left atrium) 10/7/2016    Epiretinal membrane 7/17/2012    ESRD (end stage renal disease)     Essential hypertension 1/8/2016    History of GI diverticular bleed      5/22/16    Left flaccid hemiparesis 10/1/2016    Peripheral vascular disease, unspecified 11/22/2016    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 11/13/2016    Type 2 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,  without long-term current use of insulin 3/26/2013    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  Past Surgical History   Procedure Laterality Date    Cataract extraction      Cholecystectomy      Colonoscopy N/A 5/23/2016     Procedure: COLONOSCOPY;  Surgeon: WILLIAM Colvin MD;  Location: [facility] ENDO ([facility]);  Service: Endoscopy;  Laterality: N/A;    Colonoscopy N/A 5/30/2016     Procedure: COLONOSCOPY;  Surgeon: Sam Davis MD;  Location: [facility] ENDO ([facility]);  Service: Endoscopy;  Laterality: N/A;    Upper gastrointestinal endoscopy      Breast surgery       tumor removal x 2     Family History   Problem Relation Age of Onset    Cataracts Mother     Stroke Mother     Hypertension Mother     Cancer Sister     Hypertension Sister     Heart failure Sister     Glaucoma Brother     Hypertension Brother     Heart disease Brother     Hypertension Father     Glaucoma Maternal Aunt     Esophageal cancer Sister     No Known Problems Maternal Uncle     No Known Problems Paternal Aunt     No Known Problems Paternal Uncle     No Known Problems Maternal Grandmother     No Known Problems Maternal Grandfather     No Known Problems Paternal Grandmother     No Known Problems Paternal Grandfather     Heart attack Neg Hx     Colon cancer Neg Hx     Stomach cancer Neg Hx     Anemia Neg Hx     Arrhythmia Neg Hx     Asthma Neg Hx     Clotting disorder Neg Hx     Fainting Neg Hx     Hyperlipidemia Neg Hx     Atrial Septal Defect Neg Hx      Social History   Substance Use Topics    Smoking status: Never Smoker    Smokeless tobacco: Never Used    Alcohol use No      Comment: Reports occasional 1-2 drinks      Review of Systems   Constitutional: Negative for chills, diaphoresis and fever.   HENT: Positive for  rhinorrhea. Negative for nosebleeds and sore throat.    Eyes: Negative for photophobia, pain and discharge.   Respiratory: Positive for cough, shortness of breath and wheezing.    Cardiovascular: Negative for chest pain and leg swelling.   Gastrointestinal: Negative for abdominal pain, constipation, diarrhea, nausea and vomiting.   Genitourinary: Negative for dysuria and flank pain.   Musculoskeletal: Negative for back pain, gait problem and joint swelling.   Skin: Negative for color change, rash and wound.   Neurological: Negative for dizziness, syncope and weakness.   Hematological: Does not bruise/bleed easily.       Physical Exam   Initial Vitals   BP Pulse Resp Temp SpO2   01/15/17 0148 01/15/17 0148 01/15/17 0148 01/15/17 0148 01/15/17 0148   184/54 77 20 98.5 °F (36.9 °C) 97 %     Physical Exam    Nursing note and vitals reviewed.  Constitutional: She appears well-developed and well-nourished. She is not diaphoretic. No distress.   HENT:   Head: Normocephalic and atraumatic.   Right Ear: External ear normal.   Left Ear: External ear normal.   Mouth/Throat: Oropharynx is clear and moist.   Eyes: EOM are normal. Pupils are equal, round, and reactive to light.   Neck: Normal range of motion. Neck supple.   Cardiovascular: Normal rate, regular rhythm and intact distal pulses. Exam reveals no gallop and no friction rub.    Murmur (heard throughout the precordium) heard.   Systolic murmur is present with a grade of 3/6   Pulmonary/Chest: No respiratory distress. She has decreased breath sounds (throughout). She has wheezes (faint, throughout). She has no rales.   Abdominal: Soft. Bowel sounds are normal. She exhibits no distension. There is no tenderness.   Musculoskeletal: Normal range of motion. She exhibits no edema or tenderness.   Neurological: She is alert and oriented to person, place, and time. She has normal strength.   Skin: Skin is warm and dry.   Psychiatric: She has a normal mood and affect.         ED  Course   Procedures  Labs Reviewed   CBC W/ AUTO DIFFERENTIAL - Abnormal; Notable for the following:        Result Value    RBC 3.90 (*)     Hemoglobin 11.4 (*)     MCHC 30.2 (*)     RDW 17.2 (*)     Platelets 77 (*)     Lymph # 0.6 (*)     Gran% 78.1 (*)     Lymph% 11.3 (*)     All other components within normal limits   COMPREHENSIVE METABOLIC PANEL - Abnormal; Notable for the following:     Glucose 173 (*)     BUN, Bld 26 (*)     Creatinine 3.7 (*)     Albumin 3.3 (*)     ALT 8 (*)     eGFR if  13.1 (*)     eGFR if non  11.4 (*)     All other components within normal limits   B-TYPE NATRIURETIC PEPTIDE - Abnormal; Notable for the following:     BNP 2576 (*)     All other components within normal limits   POCT GLUCOSE - Abnormal; Notable for the following:     POCT Glucose 189 (*)     All other components within normal limits   TROPONIN I   PROCALCITONIN   POCT GLUCOSE MONITORING CONTINUOUS     EKG Readings: (Independently Interpreted)   Initial Reading: No STEMI.       X-Rays:   Independently Interpreted Readings:   Chest X-Ray: No infiltrates. Increased vascular markings consistent with CHF are present.     Medical Decision Making:   Initial Assessment:   Ho4: 77yoF with shortness of breath.  Clinical symptoms are more consistent with asthma than HF, but does have a very mild increase in fluid compared to last xray.  Pt not feeling much better after two rounds of nebs, still requiring 3L O2.  Will admit to medicine for asthma exacerbation and diuresis.               Attending Attestation:   Physician Attestation Statement for Resident:  As the supervising MD   Physician Attestation Statement: I have personally seen and examined this patient.   I agree with the above history. -: Pt states usually can tell if her dyspnea is asthma vs chf, this feels more like asthma to her.  Scant sputum, no fevers, no hemoptysis.  No cp, no increased edema.    As the supervising MD I agree with the  above PE.    As the supervising MD I agree with the above treatment, course, plan, and disposition.   -: R/o chf vs asthma vs pna, bronchitis, anemia, less likely acs, pe, ptx, effusion.  Ekg, cxr, labs, nebs, monitor, re-eval.    I have reviewed and agree with the residents interpretation of the following: lab data, x-rays and EKG.  I have reviewed the following: old records at this facility.                    ED Course     Clinical Impression:   The primary encounter diagnosis was Asthma exacerbation. Diagnoses of Chronic pulmonary edema, COPD exacerbation, and ESRD (end stage renal disease) were also pertinent to this visit.          Montserrat Mercer MD  Resident  01/15/17 1657       Juan Andrade MD  01/15/17 9031

## 2017-01-15 NOTE — ASSESSMENT & PLAN NOTE
Patient with a chronic small pleural effusion which I suspect is contributing to her SOB. Not amenable to drainage during prior hospitalization.   1. Monitor.

## 2017-01-15 NOTE — ASSESSMENT & PLAN NOTE
Patient with signs and symptoms concerning for acute COPD/asthma exacerbation. She is afebrile and without leukocytosis. Recently completed a course of oral steroid therapy. Suspect there may also be a component of acute pulmonary edema as below.  1. COPD pathway initiated.  2. Oral glucocorticoid, inhaled bronchodilator, inhaled steroid.   3. Oxygen supplementation for target O2 saturation of >92%.

## 2017-01-15 NOTE — IP AVS SNAPSHOT
[address]  [place] [address]  Phone: [phone]           I have received a copy of my After Visit Summary and discharge instructions from [facility].    INSTRUCTIONS RECEIVED AND UNDERSTOOD BY:                     Patient/Patient Representative: ________________________________________________________________     Date/Time: ________________________________________________________________                     Instructions Given By: ________________________________________________________________     Date/Time: ________________________________________________________________

## 2017-01-15 NOTE — ASSESSMENT & PLAN NOTE
Patient with acute pulmonary edema secondary to acute on chronic diastolic heart failure. Although patient is HD dependent she still produces urine output three times daily.   1. Furosemide 20 mg x 1 dose.  2. HD as per nephrology service.

## 2017-01-15 NOTE — H&P
[facility] Hospital Medicine  History & Physical    Patient Name: Tea Georges  MRN: [id number]  Admission Date: 1/15/2017  Attending Physician: Angi Mendez MD   Primary Care Provider: Parth Posadas Ii, MD    [facility] Medicine Team: [facility] MED L Angi Mendez MD     Patient information was obtained from patient, past medical records and ER records.     Subjective:     Principal Problem:COPD exacerbation    Chief Complaint:  SOB since 7 pm on the day prior to admission.     HPI: A 77-year-old woman with diet controlled DM type 2, hypertension, ESRD on HD (M,W,F), chronic diastolic heart failure, pulmonary hypertension, COPD due to asthma, macular degeneration with right eye blindness, prior stroke, chronic right pleural effusion, and with recurrent admissions for COPD exacerbations whom on the day prior to admission, at around 7 pm, she developed progressively worsening SOB without cough or increased sputum production. She denied fever, chills, sick contacts or recent travel. She completed steroid therapy 2 days prior to admission for a recent hospitalization due to COPD exacerbation.     Mrs. Georges's HD Unit is at [facility]. She had removal of 2 liters at her last session. She is unaware of her dry weight.         Past Medical History   Diagnosis Date    Anemia in ESRD (end-stage renal disease) 5/29/2016    Anticoagulant long-term use     Aortic atherosclerosis 11/22/2016    Aortic stenosis, moderate 2/18/2016    Asthma in adult without complication 1/8/2016    Bilateral low back pain without sciatica 11/17/2015    Blindness of right eye 11/12/2016    Cataract     Central retinal vein occlusion, right eye 6/3/2014    CHF (congestive heart failure)     Chronic diastolic heart failure 1/8/2016    Chronic respiratory failure with hypoxia 5/29/2016    Dependence on hemodialysis      Mon-Wed-Fri    Diverticulosis     Embolic stroke involving right middle cerebral artery      Enlarged LA (left atrium) 10/7/2016    Epiretinal membrane 7/17/2012    ESRD (end stage renal disease)     Essential hypertension 1/8/2016    History of GI diverticular bleed      5/22/16    Left flaccid hemiparesis 10/1/2016    Peripheral vascular disease, unspecified 11/22/2016    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 11/13/2016    Type 2 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, without long-term current use of insulin 3/26/2013    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, without long-term current use of insulin 3/26/2013    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       Past Surgical History   Procedure Laterality Date    Cataract extraction      Cholecystectomy      Colonoscopy N/A 5/23/2016     Procedure: COLONOSCOPY;  Surgeon: WILLIAM Colvin MD;  Location: [facility] ([address] [place] [address]);  Service: Endoscopy;  Laterality: N/A;    Colonoscopy N/A 5/30/2016     Procedure: COLONOSCOPY;  Surgeon: Sam Davis MD;  Location: [facility] ([address] [place] [address]);  Service: Endoscopy;  Laterality: N/A;    Upper gastrointestinal endoscopy      Breast surgery       tumor removal x 2       Review of patient's allergies indicates:  No Known Allergies    No current facility-administered medications on file prior to encounter.      Current Outpatient Prescriptions on File Prior to Encounter   Medication Sig    albuterol (PROVENTIL) 2.5 mg /3 mL (0.083 %) nebulizer solution 3 mLs every 6 (six) hours as needed.    albuterol 90 mcg/actuation inhaler Inhale 1-2 puffs into the lungs every 6 (six) hours as needed for Wheezing.    amlodipine (NORVASC) 10 MG tablet Take 1 tablet (10 mg total) by mouth once daily.    artificial tears (ISOPTO TEARS) 0.5 % ophthalmic solution Place 1 drop into both eyes 3 (three) times daily.    aspirin 81 MG Chew Take 1 tablet (81 mg total) by mouth once daily.    atorvastatin  (LIPITOR) 40 MG tablet Take 1 tablet (40 mg total) by mouth once daily.    carvedilol (COREG) 25 MG tablet Take 1 tablet (25 mg total) by mouth 2 (two) times daily.    cloNIDine 0.3 mg/24 hr td ptwk (CATAPRES) 0.3 mg/24 hr Place 1 patch onto the skin every 7 days. (Patient taking differently: Place 1 patch onto the skin every Saturday. )    clopidogrel (PLAVIX) 75 mg tablet Take 1 tablet (75 mg total) by mouth once daily.    clotrimazole (LOTRIMIN) 1 % cream Apply topically 2 (two) times daily.    docusate sodium (COLACE) 100 MG capsule Take 1 capsule (100 mg total) by mouth 2 (two) times daily.    epoetin shon (PROCRIT) 10,000 unit/mL injection Inject 0.5 mLs (5,000 Units total) into the skin every Mon, Wed, Fri. (Patient taking differently: Inject 5,000 Units into the skin every Mon, Wed, Fri. )    ergocalciferol (ERGOCALCIFEROL) 50,000 unit Cap Take 1 capsule (50,000 Units total) by mouth every 7 days.    fluticasone-vilanterol (BREO ELLIPTA) 200-25 mcg/dose DsDv diskus inhaler Inhale 1 puff into the lungs 2 (two) times daily.    guaifenesin (MUCINEX) 600 mg 12 hr tablet Take 1,200 mg by mouth 2 (two) times daily.    hydrALAZINE (APRESOLINE) 50 MG tablet Take 1.5 tablets (75 mg total) by mouth every 6 (six) hours.    isosorbide dinitrate (ISOCHRON) 40 mg TbSR Take 1 tablet (40 mg total) by mouth every 12 (twelve) hours.    lidocaine-prilocaine (EMLA) cream Apply topically as needed (to apply over access 1/2 over prior to dialysis).    losartan (COZAAR) 100 MG tablet Take 1 tablet (100 mg total) by mouth once daily.    multivitamin (THERAGRAN) per tablet Take 1 tablet by mouth once daily.    nifedipine (PROCARDIA-XL) 90 MG (OSM) TR24     sodium chloride 0.9% 0.9 % SolP 100 mL with ferric gluconate 62.5 mg/5 mL Soln 125 mg Inject 125 mg into the vein daily as needed (on dialysis).     Family History     Problem Relation (Age of Onset)    Cancer Sister    Cataracts Mother    Esophageal cancer Sister     Glaucoma Brother, Maternal Aunt    Heart disease Brother    Heart failure Sister    Hypertension Mother, Sister, Brother, Father    No Known Problems Maternal Uncle, Paternal Aunt, Paternal Uncle, Maternal Grandmother, Maternal Grandfather, Paternal Grandmother, Paternal Grandfather    Stroke Mother        Social History Main Topics    Smoking status: Never Smoker    Smokeless tobacco: Never Used    Alcohol use No      Comment: Reports occasional 1-2 drinks     Drug use: No    Sexual activity: No     Review of Systems   Constitutional: Negative for chills, fatigue, fever and unexpected weight change.   HENT: Negative for ear pain, facial swelling, hearing loss, mouth sores, nosebleeds, rhinorrhea, sinus pressure, sore throat, tinnitus, trouble swallowing and voice change.    Eyes: Negative for photophobia, pain, redness and visual disturbance.   Respiratory: Positive for shortness of breath and wheezing. Negative for cough and chest tightness.    Cardiovascular: Negative for chest pain, palpitations and leg swelling.   Gastrointestinal: Negative for abdominal pain, blood in stool, constipation, diarrhea, nausea and vomiting.   Endocrine: Negative for cold intolerance, heat intolerance, polydipsia, polyphagia and polyuria.   Genitourinary: Negative for decreased urine volume, dysuria, flank pain, frequency, hematuria, menstrual problem, urgency, vaginal bleeding, vaginal discharge and vaginal pain.   Musculoskeletal: Negative for arthralgias, back pain, joint swelling, myalgias and neck pain.   Skin: Negative for rash.   Allergic/Immunologic: Negative for environmental allergies, food allergies and immunocompromised state.   Neurological: Negative for dizziness, seizures, syncope, weakness, light-headedness, numbness and headaches.   Hematological: Negative for adenopathy. Does not bruise/bleed easily.   Psychiatric/Behavioral: Negative for confusion, hallucinations, self-injury, sleep disturbance and  suicidal ideas. The patient is not nervous/anxious.      Objective:     Vital Signs (Most Recent):  Temp: 97.7 °F (36.5 °C) (01/15/17 1545)  Pulse: 68 (01/15/17 1700)  Resp: 12 (01/15/17 1657)  BP: (!) 181/79 (01/15/17 1545)  SpO2: 98 % (01/15/17 1657) Vital Signs (24h Range):  Temp:  [97.7 °F (36.5 °C)-98.5 °F (36.9 °C)] 97.7 °F (36.5 °C)  Pulse:  [65-80] 68  Resp:  [] 12  SpO2:  [96 %-100 %] 98 %  BP: (179-191)/(54-83) 181/79     Weight: 46.7 kg (103 lb)  Body mass index is 18.84 kg/(m^2).    Physical Exam   Constitutional: She is oriented to person, place, and time. She appears well-developed and well-nourished. No distress.   HENT:   Head: Normocephalic and atraumatic.   Right Ear: Hearing, tympanic membrane, external ear and ear canal normal.   Left Ear: Hearing, tympanic membrane, external ear and ear canal normal.   Nose: Nose normal.   Mouth/Throat: Uvula is midline, oropharynx is clear and moist and mucous membranes are normal. No oropharyngeal exudate.   Eyes: Conjunctivae and EOM are normal. Right eye exhibits no discharge. Left eye exhibits no discharge. No scleral icterus.   Right eye gray pupil.    Neck: Normal range of motion. Neck supple. No tracheal deviation present. No thyromegaly present.   Cardiovascular: Normal rate, regular rhythm, normal heart sounds and intact distal pulses.  Exam reveals no gallop and no friction rub.    No murmur heard.  Pulmonary/Chest: Effort normal. No accessory muscle usage or stridor. Tachypnea noted. No respiratory distress. She has decreased breath sounds in the right lower field. She has no wheezes. She has rales in the right middle field, the right lower field, the left middle field and the left lower field. She exhibits no tenderness.   Abdominal: Soft. Bowel sounds are normal. She exhibits no distension. There is no tenderness. There is no rebound and no guarding.   Musculoskeletal: Normal range of motion. She exhibits no edema or tenderness.    Lymphadenopathy:     She has no cervical adenopathy.   Neurological: She is alert and oriented to person, place, and time. No cranial nerve deficit. Coordination normal.   Skin: Skin is warm and dry. No rash noted. She is not diaphoretic. No erythema. No pallor.   Psychiatric: She has a normal mood and affect. Her behavior is normal. Judgment and thought content normal.   Nursing note and vitals reviewed.       Significant Labs:   BMP:   Recent Labs  Lab 01/15/17  0358   *      K 4.1      CO2 29   BUN 26*   CREATININE 3.7*   CALCIUM 9.2     CBC:   Recent Labs  Lab 01/15/17  0358   WBC 5.67   HGB 11.4*   HCT 37.7   PLT 77*       Significant Imaging: I have reviewed all pertinent imaging results/findings within the past 24 hours.    Assessment/Plan:     * COPD exacerbation  Patient with signs and symptoms concerning for acute COPD/asthma exacerbation. She is afebrile and without leukocytosis. Recently completed a course of oral steroid therapy. Suspect there may also be a component of acute pulmonary edema as below.  1. COPD pathway initiated.  2. Oral glucocorticoid, inhaled bronchodilator, inhaled steroid.   3. Oxygen supplementation for target O2 saturation of >92%.      Asthma exacerbation  As above      Pleural effusion  Patient with a chronic small pleural effusion which I suspect is contributing to her SOB. Not amenable to drainage during prior hospitalization.   1. Monitor.      Acute pulmonary edema  Patient with acute pulmonary edema secondary to acute on chronic diastolic heart failure. Although patient is HD dependent she still produces urine output three times daily.   1. Furosemide 20 mg x 1 dose.  2. HD as per nephrology service.      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 Now uncontrolled due to below.  1. Diabetic renal cardiac diet.      Steroid-induced  hyperglycemia  Patient with hyperglycemia secondary to steroid therapy.  1. Diabetic diet as above.  2. Glucose AC and HS.  3. Low dose correction SSI.       Essential hypertension  As below      Malignant hypertension  Patient with malignant hypertension. Unclear as to last time her home medications were taken.  1. amlodipine 10 mg daily.  2. Coreg 25 mg BID.  3. Clonidine 0.3 mg patch every 7 days.  4. Hydralazine 75 mg every 6 hours.  5. Isosorbide dinitrate 40 mg daily.  6. Losartan 100 mg daily  7. Monitor blood pressure and titrate therapy as needed.       ESRD (end stage renal disease)  On HD M,W,F.  1. Inpatient consult to nephrology for HD.      Blindness of right eye  Stable.  1. Continue artificial tears.      Aortic stenosis, moderate  Stable valvular disease.  1. Outpatient management.      Anemia in ESRD (end-stage renal disease)  Stable and asymptomatic.  1. No indication for RBC transfusion.  2. Monitor.      CAD (coronary artery disease)  Stable and asymptomatic.  1.Continue aspirin and clopidogrel.      Thrombocytopenia, unspecified  Chronic in nature and without signs of overt bleeding.  1. Outpatient management.      Peripheral artery disease  Stable and asymptomatic. S/P stent.  1.Continue aspirin and clopidogrel.      Non-rheumatic tricuspid valve insufficiency  Stable valvular disease.  1. Outpatient management.        VTE Risk Mitigation         Ordered     heparin (porcine) injection 5,000 Units  Every 12 hours     Route:  Subcutaneous        01/15/17 0906     Medium Risk of VTE  Once      01/15/17 0906        Angi Mendez MD  Department of Hospital Medicine   [facility]

## 2017-01-16 PROBLEM — R09.02 HYPOXEMIA: Status: ACTIVE | Noted: 2017-01-16

## 2017-01-16 PROBLEM — R09.02 HYPOXEMIA: Status: RESOLVED | Noted: 2017-01-16 | Resolved: 2017-01-16

## 2017-01-16 PROBLEM — R73.9 STEROID-INDUCED HYPERGLYCEMIA: Status: RESOLVED | Noted: 2017-01-15 | Resolved: 2017-01-16

## 2017-01-16 PROBLEM — J44.9 COPD (CHRONIC OBSTRUCTIVE PULMONARY DISEASE): Status: ACTIVE | Noted: 2017-01-15

## 2017-01-16 PROBLEM — J41.0 SIMPLE CHRONIC BRONCHITIS: Status: ACTIVE | Noted: 2017-01-15

## 2017-01-16 PROBLEM — T38.0X5A STEROID-INDUCED HYPERGLYCEMIA: Status: RESOLVED | Noted: 2017-01-15 | Resolved: 2017-01-16

## 2017-01-16 LAB
ANION GAP SERPL CALC-SCNC: 10 MMOL/L
BUN SERPL-MCNC: 38 MG/DL
CALCIUM SERPL-MCNC: 9 MG/DL
CHLORIDE SERPL-SCNC: 98 MMOL/L
CO2 SERPL-SCNC: 30 MMOL/L
CREAT SERPL-MCNC: 4.8 MG/DL
EST. GFR  (AFRICAN AMERICAN): 9.6 ML/MIN/1.73 M^2
EST. GFR  (NON AFRICAN AMERICAN): 8.3 ML/MIN/1.73 M^2
ESTIMATED AVG GLUCOSE: 80 MG/DL
GLUCOSE SERPL-MCNC: 159 MG/DL
HBA1C MFR BLD HPLC: 4.4 %
PHOSPHATE SERPL-MCNC: 3.6 MG/DL
POCT GLUCOSE: 128 MG/DL (ref 70–110)
POCT GLUCOSE: 149 MG/DL (ref 70–110)
POCT GLUCOSE: 155 MG/DL (ref 70–110)
POCT GLUCOSE: 71 MG/DL (ref 70–110)
POTASSIUM SERPL-SCNC: 4.4 MMOL/L
SODIUM SERPL-SCNC: 138 MMOL/L

## 2017-01-16 PROCEDURE — 94640 AIRWAY INHALATION TREATMENT: CPT

## 2017-01-16 PROCEDURE — G8987 SELF CARE CURRENT STATUS: HCPCS | Mod: CJ

## 2017-01-16 PROCEDURE — 97165 OT EVAL LOW COMPLEX 30 MIN: CPT

## 2017-01-16 PROCEDURE — 97161 PT EVAL LOW COMPLEX 20 MIN: CPT

## 2017-01-16 PROCEDURE — 80048 BASIC METABOLIC PNL TOTAL CA: CPT

## 2017-01-16 PROCEDURE — 36415 COLL VENOUS BLD VENIPUNCTURE: CPT

## 2017-01-16 PROCEDURE — 63600175 PHARM REV CODE 636 W HCPCS: Performed by: INTERNAL MEDICINE

## 2017-01-16 PROCEDURE — 27000221 HC OXYGEN, UP TO 24 HOURS

## 2017-01-16 PROCEDURE — 90935 HEMODIALYSIS ONE EVALUATION: CPT | Mod: GC,,, | Performed by: INTERNAL MEDICINE

## 2017-01-16 PROCEDURE — 83036 HEMOGLOBIN GLYCOSYLATED A1C: CPT

## 2017-01-16 PROCEDURE — G8988 SELF CARE GOAL STATUS: HCPCS | Mod: CI

## 2017-01-16 PROCEDURE — 25000242 PHARM REV CODE 250 ALT 637 W/ HCPCS: Performed by: INTERNAL MEDICINE

## 2017-01-16 PROCEDURE — 99239 HOSP IP/OBS DSCHRG MGMT >30: CPT | Mod: ,,, | Performed by: INTERNAL MEDICINE

## 2017-01-16 PROCEDURE — 94760 N-INVAS EAR/PLS OXIMETRY 1: CPT

## 2017-01-16 PROCEDURE — 84100 ASSAY OF PHOSPHORUS: CPT

## 2017-01-16 PROCEDURE — 12000002 HC ACUTE/MED SURGE SEMI-PRIVATE ROOM

## 2017-01-16 PROCEDURE — 25000003 PHARM REV CODE 250: Performed by: EMERGENCY MEDICINE

## 2017-01-16 PROCEDURE — 25000003 PHARM REV CODE 250: Performed by: INTERNAL MEDICINE

## 2017-01-16 PROCEDURE — 80100016 HC MAINTENANCE HEMODIALYSIS

## 2017-01-16 RX ADMIN — LOSARTAN POTASSIUM 100 MG: 50 TABLET, FILM COATED ORAL at 08:01

## 2017-01-16 RX ADMIN — ATORVASTATIN CALCIUM 40 MG: 20 TABLET, FILM COATED ORAL at 08:01

## 2017-01-16 RX ADMIN — HYDRALAZINE HYDROCHLORIDE 75 MG: 50 TABLET ORAL at 11:01

## 2017-01-16 RX ADMIN — NIFEDIPINE 90 MG: 30 TABLET, FILM COATED, EXTENDED RELEASE ORAL at 08:01

## 2017-01-16 RX ADMIN — CARVEDILOL 25 MG: 25 TABLET, FILM COATED ORAL at 08:01

## 2017-01-16 RX ADMIN — IPRATROPIUM BROMIDE AND ALBUTEROL SULFATE 3 ML: .5; 3 SOLUTION RESPIRATORY (INHALATION) at 03:01

## 2017-01-16 RX ADMIN — DOCUSATE SODIUM 100 MG: 100 CAPSULE, LIQUID FILLED ORAL at 08:01

## 2017-01-16 RX ADMIN — CLOPIDOGREL 75 MG: 75 TABLET, FILM COATED ORAL at 08:01

## 2017-01-16 RX ADMIN — AMLODIPINE BESYLATE 10 MG: 10 TABLET ORAL at 08:01

## 2017-01-16 RX ADMIN — TIOTROPIUM BROMIDE 18 MCG: 18 CAPSULE ORAL; RESPIRATORY (INHALATION) at 08:01

## 2017-01-16 RX ADMIN — HEPARIN SODIUM 5000 UNITS: 5000 INJECTION, SOLUTION INTRAVENOUS; SUBCUTANEOUS at 08:01

## 2017-01-16 RX ADMIN — ISOSORBIDE DINITRATE 40 MG: 40 TABLET, EXTENDED RELEASE ORAL at 08:01

## 2017-01-16 RX ADMIN — IPRATROPIUM BROMIDE AND ALBUTEROL SULFATE 3 ML: .5; 3 SOLUTION RESPIRATORY (INHALATION) at 12:01

## 2017-01-16 RX ADMIN — PREDNISONE 40 MG: 20 TABLET ORAL at 08:01

## 2017-01-16 RX ADMIN — IPRATROPIUM BROMIDE AND ALBUTEROL SULFATE 3 ML: .5; 3 SOLUTION RESPIRATORY (INHALATION) at 07:01

## 2017-01-16 RX ADMIN — ASPIRIN 81 MG CHEWABLE TABLET 81 MG: 81 TABLET CHEWABLE at 08:01

## 2017-01-16 RX ADMIN — HYDRALAZINE HYDROCHLORIDE 75 MG: 50 TABLET ORAL at 05:01

## 2017-01-16 RX ADMIN — FLUTICASONE FUROATE AND VILANTEROL TRIFENATATE 1 PUFF: 200; 25 POWDER RESPIRATORY (INHALATION) at 08:01

## 2017-01-16 NOTE — PROGRESS NOTES
[place] with Dr. Mendez regarding discharge orders.  Pt will d/c pending dialysis here in the hospital.  MD stated she would touch base with nephrology as she has not been dialyzed yet

## 2017-01-16 NOTE — PLAN OF CARE
Problem: Occupational Therapy Goal  Goal: Occupational Therapy Goal  Goals to be met by: 1/26/2017     Patient will increase functional independence with ADLs by performing:    Grooming while standing at sink with Supervision.  Rolling to Bilateral with Modified [place].   Supine to sit with Modified [place].  Stand pivot transfers with Modified [place].  Toilet transfer to toilet with Modified [place].  Upper extremity exercise program x15 reps per handout, with independence.  Outcome: Ongoing (interventions implemented as appropriate)  OT evaluation completed. OT goals and POC established.     SHERI Gibbs  1/16/2017

## 2017-01-16 NOTE — ASSESSMENT & PLAN NOTE
Patient with a chronic small pleural effusion which I suspect is contributing to her SOB. Not amenable to drainage during prior hospitalization.   1. Monitor.  2. Outpatient follow up.

## 2017-01-16 NOTE — PT/OT/SLP EVAL
Physical Therapy  Evaluation    Tea Georges   MRN: [id number]   Admitting Diagnosis: Acute on chronic diastolic heart failure    PT Received On: 01/16/17  PT Start Time: 1046     PT Stop Time: 1058    PT Total Time (min): 12 min       Billable Minutes:  Evaluation 12    Diagnosis: Acute on chronic diastolic heart failure    Past Medical History   Diagnosis Date    Anemia in ESRD (end-stage renal disease) 5/29/2016    Anticoagulant long-term use     Aortic atherosclerosis 11/22/2016    Aortic stenosis, moderate 2/18/2016    Asthma in adult without complication 1/8/2016    Bilateral low back pain without sciatica 11/17/2015    Blindness of right eye 11/12/2016    Cataract     Central retinal vein occlusion, right eye 6/3/2014    CHF (congestive heart failure)     Chronic diastolic heart failure 1/8/2016    Chronic respiratory failure with hypoxia 5/29/2016    Dependence on hemodialysis      Mon-Wed-Fri    Diverticulosis     Embolic stroke involving right middle cerebral artery     Enlarged LA (left atrium) 10/7/2016    Epiretinal membrane 7/17/2012    ESRD (end stage renal disease)     Essential hypertension 1/8/2016    History of GI diverticular bleed      5/22/16    Left flaccid hemiparesis 10/1/2016    Peripheral vascular disease, unspecified 11/22/2016    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 11/13/2016    Type 2 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, without long-term current use of insulin 3/26/2013    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, without long-term current use of insulin 3/26/2013    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      Past Surgical History   Procedure Laterality Date    Cataract extraction      Cholecystectomy      Colonoscopy N/A 5/23/2016     Procedure: COLONOSCOPY;  Surgeon: WILLIAM Colvin MD;  Location: [facility] ([address] [place] [address]);  Service: Endoscopy;   Laterality: N/A;    Colonoscopy N/A 2016     Procedure: COLONOSCOPY;  Surgeon: Sam Davis MD;  Location: [facility] (2ND FLR);  Service: Endoscopy;  Laterality: N/A;    Upper gastrointestinal endoscopy      Breast surgery       tumor removal x 2       Referring physician: JAMES Oneill  Date referred to PT: 1/15/2017    General Precautions: Standard, respiratory  Orthopedic Precautions: N/A   Braces: N/A            Patient History:  Lives With: grandchild(li), child(li), dependent  Living Arrangements: house  Home Accessibility: stairs to enter home  Number of Stairs to Enter Home: 5  Stair Railings at Home: outside, present on right side  Living Environment Comment: Pt reportes living with daughter and two granddaughters in 1-story home with 5 MARY and HR on the R. Prior to arrival, pt performed all ADLs independently. She reports occasionally using a rollator for ambulation and a WC for long distances.  Equipment Currently Used at Home: bedside commode, rollator, wheelchair, oxygen, shower chair  DME owned (not currently used): none    Previous Level of Function:  Ambulation Skills: independent  Transfer Skills: independent  ADL Skills: needs assist    Subjective:  Communicated with RN prior to session.  Pt agreeable to PT eval.  Chief Complaint: none noted  Patient goals: to return home    Pain Ratin/10               Pain Rating Post-Intervention: 0/10    Objective:   Patient found with: oxygen     Cognitive Exam:  Oriented to: Person, Place, Time and Situation    Follows Commands/attention: Follows multistep  commands  Communication: clear/fluent  Safety awareness/insight to disability: intact    Physical Exam:  Postural examination/scapula alignment: Rounded shoulder and Head forward    Skin integrity: Visible skin intact  Edema: None noted BLE    Sensation:   Intact    Lower Extremity Range of Motion:  Right Lower Extremity: WFL  Left Lower Extremity: WFL    Lower Extremity  Strength:  Right Lower Extremity: WFL  Left Lower Extremity: WFL    Gross motor coordination: WFL    Functional Mobility:  Bed Mobility:  Supine to Sit: Supervision    Transfers:  Sit <> Stand Assistance: Supervision  Sit <> Stand Assistive Device: No Assistive Device    Gait:   Gait Distance: Pt ambulated 3 steps from bed to chair, and 3 steps from chair back to bed with supervision and no AD. Pt refused further ambulation.  Assistance 1: Supervision  Gait Assistive Device: No device  Gait Pattern: reciprocal  Gait Deviation(s): decreased velocity of limb motion, decreased stride length, increased stride width    Stairs:  Not assessed at this time 2* pt refusal.    Balance:   Static Sit: GOOD-: Takes MODERATE challenges from all directions but inconsistently  Dynamic Sit: GOOD-: Maintains balance through MODERATE excursions of active trunk movement,     Static Stand: GOOD-: Takes MODERATE challenges from all directions inconsistently  Dynamic stand: GOOD: Needs SUPERVISION only during gait and able to self right with moderate     Therapeutic Activities and Exercises:  Pt educated on role of PT/POC.  Pt educated on importance of OOB activities and energy conservation strategies.  Pt safe to ambulate in hallway with RN staff.    AM-PAC 6 CLICK MOBILITY  How much help from another person does this patient currently need?   1 = Unable, Total/Dependent Assistance  2 = A lot, Maximum/Moderate Assistance  3 = A little, Minimum/Contact Guard/Supervision  4 = None, Modified [place]/Independent    Turning over in bed (including adjusting bedclothes, sheets and blankets)?: 4  Sitting down on and standing up from a chair with arms (e.g., wheelchair, bedside commode, etc.): 4  Moving from lying on back to sitting on the side of the bed?: 4  Moving to and from a bed to a chair (including a wheelchair)?: 4  Need to walk in hospital room?: 4  Climbing 3-5 steps with a railing?: 4  Total Score: 24     AM-PAC Raw Score CMS  G-Code Modifier Level of Impairment Assistance   6 % Total / Unable   7 - 9 CM 80 - 100% Maximal Assist   10 - 14 CL 60 - 80% Moderate Assist   15 - 19 CK 40 - 60% Moderate Assist   20 - 22 CJ 20 - 40% Minimal Assist   23 CI 1-20% SBA / CGA   24 CH 0% Independent/ Mod I     Patient left supine with all lines intact, call button in reach and RN notified.    Assessment:   Tea Georges is a 77 y.o. female with a medical diagnosis of Acute on chronic diastolic heart failure. Pt presents with decreased endurance and decreased functional mobility. Pt performed bed mobility with supervision and required increased time. Pt performed sit to stand from EOB and chair with supervision. Pt ambulated 3 steps from bed to chair and 3 steps from chair to bed with supervision and increased time. Pt limited secondary to refusal to walk further. Stair negotiation not assessed due to pt refusal. Pt to benefit from skilled physical therapy to address these deficits and restore functional mobility.    Rehab identified problem list/impairments: Rehab identified problem list/impairments: impaired endurance, impaired functional mobilty, decreased lower extremity function    Rehab potential is good.    Activity tolerance: Good    Discharge recommendations: Discharge Facility/Level Of Care Needs: home with home health     Barriers to discharge: Barriers to Discharge: None    Equipment recommendations: Equipment Needed After Discharge: other (see comments) (TBD)     GOALS:   Physical Therapy Goals        Problem: Physical Therapy Goal    Goal Priority Disciplines Outcome Goal Variances Interventions   Physical Therapy Goal     PT/OT, PT Ongoing (interventions implemented as appropriate)     Description:  Goals to be met by: 2017     Patient will increase functional independence with mobility by performin. Supine to sit with Modified [place]  2. Sit to supine with Modified [place]  3. Sit to stand transfer with  Modified [place]  4. Gait  x 50 feet with Supervision with or without appropriate AD.   5. Ascend/descend 5 stair with right Handrails Supervision.                 PLAN:    Patient to be seen 3 x/week to address the above listed problems via gait training, therapeutic activities, therapeutic exercises  Plan of Care expires:  02/16/2017  Plan of Care reviewed with: patient          Lisa Rajput, SPT  01/16/2017

## 2017-01-16 NOTE — PLAN OF CARE
01/16/17 0745   Discharge Assessment   Assessment Type Discharge Planning Assessment   Confirmed/corrected address and phone number on facesheet? Yes   Assessment information obtained from? Patient   Prior to hospitilization cognitive status: Alert/Oriented   Prior to hospitalization functional status: Independent;Assistive Equipment   Current cognitive status: Alert/Oriented   Current Functional Status: Independent;Assistive Equipment   Arrived From home or self-care;home health   Lives With child(li), adult   Able to Return to Prior Arrangements yes   Is patient able to care for self after discharge? Yes   How many people do you have in your home that can help with your care after discharge? 1  (Cruz [phone])   Patient's perception of discharge disposition home health   Readmission Within The Last 30 Days unable to assess   Patient currently being followed by outpatient case management? No   Patient currently receives home health services? Yes   Patient previously received home health services and would like to resume services if necessary? Yes   If yes, name of home health provider: ([facility])   Does the patient currently use HME? Yes   Patient currently receives private duty nursing? No   Patient currently receives any other outside agency services? No   Equipment Currently Used at Home cane, straight;other (see comments);wheelchair;bedside commode;oxygen  (Rollator)   Do you have any problems affording any of your prescribed medications? No   Is the patient taking medications as prescribed? yes   Do you have any financial concerns preventing you from receiving the healthcare you need? No   Does the patient have transportation to healthcare appointments? Yes   Transportation Available family or friend will provide   On Dialysis? Yes   If yes, what is the name of the dialysis unit? ([facility] [place] [place] [facility] MWF [address] chair and her neighbor brings her)   Does the patient receive services at the Coumadin Clinic? (Pt is unsure  if she is on coumadin, no family at bedside.)   Are there any open cases? No   Discharge Plan A Home;Home with family;Home Health   Discharge Plan B Home;Home with family;Home Health   Patient/Family In Agreement With Plan yes

## 2017-01-16 NOTE — PLAN OF CARE
Problem: Physical Therapy Goal  Goal: Physical Therapy Goal  Goals to be met by: 2017     Patient will increase functional independence with mobility by performin. Supine to sit with Modified [place]  2. Sit to supine with Modified [place]  3. Sit to stand transfer with Modified [place]  4. Gait x 50 feet with Supervision with or without appropriate AD.   5. Ascend/descend 5 stair with right Handrails Supervision.   Outcome: Ongoing (interventions implemented as appropriate)  PT evaluation complete. POC in place.     Lisa Rajput, JOANN  2017

## 2017-01-16 NOTE — PROGRESS NOTES
Pt arrived VIA Pt. Escort and was transferred to bed without incident.AV Fistula was then prepped and cannulated per protocol.  Both lines aspirate and flush without resistance or infiltration.  Patient denies any pain.  Maintenance dialysis was then initiated.

## 2017-01-16 NOTE — PLAN OF CARE
01/16/17 1441   Final Note   Assessment Type Final Discharge Note   Discharge Disposition Home-[facility]   Hospital Follow Up  Appt(s) scheduled? Yes   Offered [facility] -- Bedside Delivery? n/a   Discharge/Hospital Encounter Summary to (non-[facility]) PCP n/a   Referral to Outpatient Case Management complete? n/a   Referral to / orders for Home Health Complete? Yes   30 day supply of medicines given at discharge, if documented non-compliance / non-adherence? n/a   Any social issues identified prior to discharge? n/a   Did you assess the readiness or willingness of the family or caregiver to support self management of care? n/a   Right Care Referral Info   Post Acute Recommendation Home-care   Facility Name ([facility])

## 2017-01-16 NOTE — PLAN OF CARE
[facility]    HOME HEALTH ORDERS  FACE TO FACE ENCOUNTER    Patient Name: Tea Georges  YOB: 1940    PCP: Parth Posadas Ii, MD   PCP Address: [address] / [place] [address]  PCP Phone Number: [phone]  PCP Fax: [phone]    Encounter Date: 01/16/2017    Admit to Home Health    Diagnoses:  Active Hospital Problems    Diagnosis  POA    Pleural effusion [J90]  Yes     Priority: 1 - High    Acute pulmonary edema [J81.0]  Yes     Priority: 2     COPD (chronic obstructive pulmonary disease) [J44.9]  Yes     Priority: 4     Steroid-induced hyperglycemia [R73.9]  Yes     Priority: 5     Type 2 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, without long-term current use of insulin [E11.3592]  Yes     Priority: 5     Malignant hypertension [I10]  Yes     Priority: 6     Essential hypertension [I10]  Yes     Priority: 6      Chronic    ESRD (end stage renal disease) [N18.6]  Yes     Priority: 7      Chronic    Blindness of right eye [H54.41]  Yes     Priority: 8      Chronic    Thrombocytopenia, unspecified [D69.6]  Yes    Peripheral artery disease [I73.9]  Yes    Non-rheumatic tricuspid valve insufficiency [I36.1]  Yes    CAD (coronary artery disease) [I25.10]  Yes    Anemia in ESRD (end-stage renal disease) [N18.6, D63.1]  Yes     Chronic    Aortic stenosis, moderate [I35.0]  Yes      Resolved Hospital Problems    Diagnosis Date Resolved POA    *Acute on chronic diastolic heart failure [I50.33] 12/27/2016 Yes     Priority: 1 - High    Shortness of breath [R06.02] 01/19/2016 Yes     Priority: 3     Diabetic macular edema of both eyes [E11.311] 08/31/2016 Yes     Priority: 8        Future Appointments  Date Time Provider Department Center   1/16/2017 4:00 PM Parth Posadas II, MD [facility] [place] [facility]   2/6/2017 8:00 AM HOME MONITOR DEVICE CHECK, [facility]     Follow-up Information      Follow up with Parth Posadas Ii, MD. Schedule an appointment as soon as possible for a visit in 1 week.    Specialty:  Internal Medicine    Contact information:    [address]  [facility] [id number] [phone]              I have seen and examined this patient face to face today. My clinical findings that support the need for the home health skilled services and home bound status are the following:  Weakness/numbness causing balance and gait disturbance due to Heart Failure and Weakness/Debility making it taxing to leave home.  Requiring assistive device to leave home due to unsteady gait caused by  Heart Failure and Weakness/Debility.  Medical restrictions requiring assistance of another human to leave home due to  Dyspnea on exertion (SOB) and Fluid/volume overload.    Allergies:Review of patient's allergies indicates:  No Known Allergies    Diet: cardiac diet, diabetic diet: 1800 calorie and renal diet    Activities: activity as tolerated    Nursing:   SN to complete comprehensive assessment including routine vital signs. Instruct on disease process and s/s of complications to report to MD. Review/verify medication list sent home with the patient at time of discharge  and instruct patient/caregiver as needed. Frequency may be adjusted depending on start of care date.    Notify MD if SBP > 160 or < 90; DBP > 90 or < 50; HR > 120 or < 50; Temp > 101; Other:   N/A      CONSULTS:    Physical Therapy to evaluate and treat. Evaluate for home safety and equipment needs; Establish/upgrade home exercise program. Perform / instruct on therapeutic exercises, gait training, transfer training, and Range of Motion.  Occupational Therapy to evaluate and treat. Evaluate home environment for safety and equipment needs. Perform/Instruct on transfers, ADL training, ROM, and therapeutic exercises.  Aide to provide assistance with personal care, ADLs, and vital signs.    MISCELLANEOUS CARE:  Heart Failure:      SN to  instruct on the following:    Instruct on the definition of CHF.   Instruct on the signs/sympoms of CHF to be reported.   Instruct on and monitor daily weights.   Instruct on factors that cause exacerbation.   Instruct on action, dose, schedule, and side effects of medications.   Instruct on diet as prescribed.   Instruct on activity allowed.   Instruct on life-style modifications for life long management of CHF   SN to assess compliance with daily weights, diet, medications, fluid retention,    safety precautions, activities permitted and life-style modifications.   Additional 1-2 SN visits per week as needed for signs and symptoms     of CHF exacerbation.      For Weight Gain > 2-3 lbs in 1 day or 4-6 lbs over 1 week notify PCP:      WOUND CARE ORDERS  n/a      Medications: Review discharge medications with patient and family and provide education.      Current Discharge Medication List      CONTINUE these medications which have NOT CHANGED    Details   albuterol 90 mcg/actuation inhaler Inhale 1-2 puffs into the lungs every 6 (six) hours as needed for Wheezing.  Qty: 1 Inhaler, Refills: 0      amlodipine (NORVASC) 10 MG tablet Take 1 tablet (10 mg total) by mouth once daily.  Qty: 30 tablet, Refills: 3      artificial tears (ISOPTO TEARS) 0.5 % ophthalmic solution Place 1 drop into both eyes 3 (three) times daily.      aspirin 81 MG Chew Take 1 tablet (81 mg total) by mouth once daily.  Qty: 30 tablet, Refills: 1      atorvastatin (LIPITOR) 40 MG tablet Take 1 tablet (40 mg total) by mouth once daily.  Qty: 30 tablet, Refills: 3      carvedilol (COREG) 25 MG tablet Take 1 tablet (25 mg total) by mouth 2 (two) times daily.  Qty: 60 tablet, Refills: 3      cloNIDine 0.3 mg/24 hr td ptwk (CATAPRES) 0.3 mg/24 hr Place 1 patch onto the skin every 7 days.  Qty: 4 patch, Refills: 3      clopidogrel (PLAVIX) 75 mg tablet Take 1 tablet (75 mg total) by mouth once daily.  Qty: 30 tablet, Refills: 2      clotrimazole  (LOTRIMIN) 1 % cream Apply topically 2 (two) times daily.  Qty: 28 g, Refills: 0    Associated Diagnoses: Tinea pedis of left foot      docusate sodium (COLACE) 100 MG capsule Take 1 capsule (100 mg total) by mouth 2 (two) times daily.  Refills: 0      epoetin shon (PROCRIT) 10,000 unit/mL injection Inject 0.5 mLs (5,000 Units total) into the skin every Mon, Wed, Fri.      ergocalciferol (ERGOCALCIFEROL) 50,000 unit Cap Take 1 capsule (50,000 Units total) by mouth every 7 days.  Qty: 5 capsule, Refills: 1      fluticasone-vilanterol (BREO ELLIPTA) 200-25 mcg/dose DsDv diskus inhaler Inhale 1 puff into the lungs 2 (two) times daily.  Qty: 28 each, Refills: 1      hydrALAZINE (APRESOLINE) 50 MG tablet Take 1.5 tablets (75 mg total) by mouth every 6 (six) hours.  Qty: 180 tablet, Refills: 3      isosorbide dinitrate (ISOCHRON) 40 mg TbSR Take 1 tablet (40 mg total) by mouth every 12 (twelve) hours.  Qty: 60 tablet, Refills: 11      lidocaine-prilocaine (EMLA) cream Apply topically as needed (to apply over access 1/2 over prior to dialysis).  Qty: 25 g, Refills: 1      losartan (COZAAR) 100 MG tablet Take 1 tablet (100 mg total) by mouth once daily.  Qty: 30 tablet, Refills: 3      multivitamin (THERAGRAN) per tablet Take 1 tablet by mouth once daily.      nifedipine (PROCARDIA-XL) 90 MG (OSM) TR24 Refills: 0      sodium chloride 0.9% 0.9 % SolP 100 mL with ferric gluconate 62.5 mg/5 mL Soln 125 mg Inject 125 mg into the vein daily as needed (on dialysis).  Qty: 1 Bottle, Refills: 7         STOP taking these medications       albuterol (PROVENTIL) 2.5 mg /3 mL (0.083 %) nebulizer solution Comments:   Reason for Stopping:         guaifenesin (MUCINEX) 600 mg 12 hr tablet Comments:   Reason for Stopping:               I certify that this patient is confined to her home and needs intermittent skilled nursing care, physical therapy and occupational therapy.

## 2017-01-16 NOTE — PROGRESS NOTES
Progress Note  Nephrology    Admit Date: 1/15/2017   LOS: 1 day     SUBJECTIVE:     Follow-up For:  ESRD    Interval Hx: no events overnight. Reports SOB is improved. Denies any complaints. Likely being discharged after HD today.     OBJECTIVE:     Vital Signs (Most Recent)  Temp: 98.1 °F (36.7 °C) (01/16/17 0847)  Pulse: 63 (01/16/17 1000)  Resp: 20 (01/16/17 0847)  BP: (!) 175/77 (01/16/17 0847)  SpO2: (!) 81 % (01/16/17 0847)    Vital Signs Range (Last 24H):  Temp:  [97.7 °F (36.5 °C)-98.1 °F (36.7 °C)]   Pulse:  [63-77]   Resp:  [12-22]   BP: (161-181)/(70-79)   SpO2:  [81 %-98 %]     No intake or output data in the 24 hours ending 01/16/17 1435      Physical Exam:   Gen: WDWN AAF in NAD  HENT: NC/AT. MMM  CV: RRR, no peripheral edema  Resp: normal effort on RA. Wheezing resolved. Mild crackles  Abd: Soft, NTND  Skin: warm, normal turgor    Laboratory:  CBC:   Recent Labs  Lab 01/15/17  0358   WBC 5.67   RBC 3.90*   HGB 11.4*   HCT 37.7   PLT 77*   MCV 97   MCH 29.2   MCHC 30.2*     CMP:   Recent Labs  Lab 01/15/17  0358 01/16/17  0419   * 159*   CALCIUM 9.2 9.0   ALBUMIN 3.3*  --    PROT 7.0  --     138   K 4.1 4.4   CO2 29 30*    98   BUN 26* 38*   CREATININE 3.7* 4.8*   ALKPHOS 94  --    ALT 8*  --    AST 14  --    BILITOT 0.5  --        Diagnostic Results:  Labs: Reviewed    ASSESSMENT/PLAN:     Active Hospital Problems    Diagnosis  POA    Acute pulmonary edema [J81.0]  Yes    Steroid-induced hyperglycemia [R73.9]  Yes    COPD (chronic obstructive pulmonary disease) [J44.9]  Yes    Thrombocytopenia, unspecified [D69.6]  Yes    Peripheral artery disease [I73.9]  Yes    Non-rheumatic tricuspid valve insufficiency [I36.1]  Yes    CAD (coronary artery disease) [I25.10]  Yes    Malignant hypertension [I10]  Yes    Blindness of right eye [H54.41]  Yes     Chronic    Anemia in ESRD (end-stage renal disease) [N18.6, D63.1]  Yes     Chronic    Aortic stenosis, moderate [I35.0]  Yes     ESRD (end stage renal disease) [N18.6]  Yes     Chronic    Essential hypertension [I10]  Yes     Chronic    Pleural effusion [J90]  Yes    Type 2 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, without long-term current use of insulin [E11.3592]  Yes      Resolved Hospital Problems    Diagnosis Date Resolved POA    *Acute on chronic diastolic heart failure [I50.33] 12/27/2016 Yes    Shortness of breath [R06.02] 01/19/2016 Yes    Diabetic macular edema of both eyes [E11.311] 08/31/2016 Yes       Ms. Georges is a 78 yo AAF [id number], HTN, CHF, chronic bronchitis, home O2, recent CVA, and ESRD MWF admitted with COPD exacerbation. Nephrology consulted for ESRD management and volume removal.      ESRD  - receives iHD MWF via LUE AVF at [facility]. Nephrologist: Dr. Coleman. Treatment duration: 4 hours. EDW 43kg. She continues to make urine.   - last HD on Friday; UF 2L  - iHD today x4 hours; UF goal 3-4L as tolerated by BP  - planning for discharge after HD today     Anemia of CKD  - hgb at goal for ESRD  - epo contraindicated with recent CVA     CKD-MBD  - , vitamin D 15  - corrected Ca 9.5  - continue home ergocalciferol   - phos at goal; no need for binders           Sophia Jarvis, PGY-4  Nephrology Fellow  Pager: [phone]   I have reviewed and concur with the fellow's history, physical, assessment, and plan. I have personally interviewed and examined the patient at bedside.

## 2017-01-16 NOTE — PLAN OF CARE
SW send HH orders to [facility] via [facility]. Evita with [facility] is informed of discharge today.    Geno Arellano LMSW  [phone]

## 2017-01-16 NOTE — ASSESSMENT & PLAN NOTE
Patient with malignant hypertension on admission. Blood pressure control improved with HD and antihypertensive therapy.   1. Amlodipine 10 mg daily.  2. Coreg 25 mg BID.  3. Clonidine 0.3 mg patch every 7 days.  4. Hydralazine 75 mg every 6 hours.  5. Isosorbide dinitrate 40 mg daily.  6. Losartan 100 mg daily  7. Monitor blood pressure and titrate therapy as needed in the outpatient clinic with her PCP.

## 2017-01-16 NOTE — ASSESSMENT & PLAN NOTE
Patient with COPD. Symptoms significantly improved with treatment for pulmonary edema and CHF. Less likely that this is a COPD exacerbation at this time.   1. Discontinue COPD pathway.  2. Continue inhaled bronchodilator and steroid therapy.

## 2017-01-16 NOTE — DISCHARGE SUMMARY
[facility] Hospital Medicine  Discharge Summary      Patient Name: Tea Georges  MRN: [id number]  Admission Date: 1/15/2017  Hospital Length of Stay: 1 days  Discharge Date and Time: 1/16/2017 4:43 PM  Attending Physician: Brittany Mendez MD   Discharging Provider: Brittany Mendez MD  Primary Care Provider: Parth Posadas Ii, MD    [facility] Medicine Team: [facility]  Brittany Mendez MD    HPI: A 77-year-old woman with diet controlled DM type 2, hypertension, ESRD on HD (M,W,F), chronic diastolic heart failure, pulmonary hypertension, COPD due to asthma, macular degeneration with right eye blindness, prior stroke, chronic right pleural effusion, and with recurrent admissions for COPD exacerbations whom on the day prior to admission, at around 7 pm, she developed progressively worsening SOB without cough or increased sputum production. She denied fever, chills, sick contacts or recent travel. She completed steroid therapy 2 days prior to admission for a recent hospitalization due to COPD exacerbation.      Mrs. Georges's HD Unit is at [facility]. She had removal of 2 liters at her last session. She is unaware of her dry weight.     * No surgery found *      Indwelling Lines/Drains at time of discharge:   Lines/Drains/Airways     Drain                 Hemodialysis AV Fistula 05/23/16 0700 Left forearm 238 days              Hospital Course: Patient received inhaled bronchodilators in the ED with mild improvement of her SOB. On hospital day one her SOB fully resolved with diuresis. Blood pressure remained elevated yet improved from admission. She requested discharge. She was medically stable for discharge with close follow up with her PCP.    Consults:   Consults         Status Ordering Provider     Inpatient consult to Nephrology  Once     Provider:  (Not yet assigned)    Final result BRITTANY FLORENTINO     Inpatient consult to Social Work/Case Management  Once     Provider:  (Not yet  assigned)    Acknowledged BRITTANY FLORENTINO          Significant Diagnostic Studies: Labs:   BMP:   Recent Labs  Lab 01/15/17  0358 01/16/17  0419   * 159*    138   K 4.1 4.4    98   CO2 29 30*   BUN 26* 38*   CREATININE 3.7* 4.8*   CALCIUM 9.2 9.0    and CMP   Recent Labs  Lab 01/15/17  0358 01/16/17  0419    138   K 4.1 4.4    98   CO2 29 30*   * 159*   BUN 26* 38*   CREATININE 3.7* 4.8*   CALCIUM 9.2 9.0   PROT 7.0  --    ALBUMIN 3.3*  --    BILITOT 0.5  --    ALKPHOS 94  --    AST 14  --    ALT 8*  --    ANIONGAP 12 10   ESTGFRAFRICA 13.1* 9.6*   EGFRNONAA 11.4* 8.3*       Pending Diagnostic Studies:     None        Final Active Diagnoses:    Diagnosis Date Noted POA    Pleural effusion [J90]  Yes    Hypoxemia [R09.02] 01/16/2017 Yes    Acute pulmonary edema [J81.0] 01/15/2017 Yes    COPD (chronic obstructive pulmonary disease) [J44.9] 01/15/2017 Yes    Steroid-induced hyperglycemia [R73.9] 01/15/2017 Yes    Type 2 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, without long-term current use of insulin [E11.3592] 03/26/2013 Yes    Malignant hypertension [I10] 11/14/2016 Yes    Essential hypertension [I10] 01/08/2016 Yes     Chronic    ESRD (end stage renal disease) [N18.6]  Yes     Chronic    Blindness of right eye [H54.41] 11/12/2016 Yes     Chronic    Thrombocytopenia, unspecified [D69.6] 01/15/2017 Yes    Peripheral artery disease [I73.9] 01/15/2017 Yes    Non-rheumatic tricuspid valve insufficiency [I36.1] 01/15/2017 Yes    CAD (coronary artery disease) [I25.10] 12/12/2016 Yes    Anemia in ESRD (end-stage renal disease) [N18.6, D63.1] 05/29/2016 Yes     Chronic    Aortic stenosis, moderate [I35.0] 02/18/2016 Yes      Problems Resolved During this Admission:    Diagnosis Date Noted Date Resolved POA    PRINCIPAL PROBLEM:  Acute on chronic diastolic heart failure [I50.33] 01/08/2016 12/27/2016 Yes     Shortness of breath [R06.02]  01/19/2016 Yes    Diabetic macular edema of both eyes [E11.311] 07/17/2012 08/31/2016 Yes      Discharged Condition: fair    Disposition: Home-Health Care [facility]    Follow Up:  Follow-up Information     Schedule an appointment as soon as possible for a visit with Parth Posadas Ii, MD.    Specialty:  Internal Medicine    Why:  blood pressure medication adjustment.    Contact information:    [address]  [facility] [id number] [phone]          Patient Instructions:   No discharge procedures on file.  Medications:  Reconciled Home Medications:   Current Discharge Medication List      CONTINUE these medications which have NOT CHANGED    Details   albuterol 90 mcg/actuation inhaler Inhale 1-2 puffs into the lungs every 6 (six) hours as needed for Wheezing.  Qty: 1 Inhaler, Refills: 0      amlodipine (NORVASC) 10 MG tablet Take 1 tablet (10 mg total) by mouth once daily.  Qty: 30 tablet, Refills: 3      artificial tears (ISOPTO TEARS) 0.5 % ophthalmic solution Place 1 drop into both eyes 3 (three) times daily.      aspirin 81 MG Chew Take 1 tablet (81 mg total) by mouth once daily.  Qty: 30 tablet, Refills: 1      atorvastatin (LIPITOR) 40 MG tablet Take 1 tablet (40 mg total) by mouth once daily.  Qty: 30 tablet, Refills: 3      carvedilol (COREG) 25 MG tablet Take 1 tablet (25 mg total) by mouth 2 (two) times daily.  Qty: 60 tablet, Refills: 3      cloNIDine 0.3 mg/24 hr td ptwk (CATAPRES) 0.3 mg/24 hr Place 1 patch onto the skin every 7 days.  Qty: 4 patch, Refills: 3      clopidogrel (PLAVIX) 75 mg tablet Take 1 tablet (75 mg total) by mouth once daily.  Qty: 30 tablet, Refills: 2      clotrimazole (LOTRIMIN) 1 % cream Apply topically 2 (two) times daily.  Qty: 28 g, Refills: 0    Associated Diagnoses: Tinea pedis of left foot      docusate sodium (COLACE) 100 MG capsule Take 1 capsule (100 mg total) by mouth 2 (two) times daily.  Refills: 0      epoetin shon (PROCRIT) 10,000  unit/mL injection Inject 0.5 mLs (5,000 Units total) into the skin every Mon, Wed, Fri.      ergocalciferol (ERGOCALCIFEROL) 50,000 unit Cap Take 1 capsule (50,000 Units total) by mouth every 7 days.  Qty: 5 capsule, Refills: 1      fluticasone-vilanterol (BREO ELLIPTA) 200-25 mcg/dose DsDv diskus inhaler Inhale 1 puff into the lungs 2 (two) times daily.  Qty: 28 each, Refills: 1      hydrALAZINE (APRESOLINE) 50 MG tablet Take 1.5 tablets (75 mg total) by mouth every 6 (six) hours.  Qty: 180 tablet, Refills: 3      isosorbide dinitrate (ISOCHRON) 40 mg TbSR Take 1 tablet (40 mg total) by mouth every 12 (twelve) hours.  Qty: 60 tablet, Refills: 11      lidocaine-prilocaine (EMLA) cream Apply topically as needed (to apply over access 1/2 over prior to dialysis).  Qty: 25 g, Refills: 1      losartan (COZAAR) 100 MG tablet Take 1 tablet (100 mg total) by mouth once daily.  Qty: 30 tablet, Refills: 3      multivitamin (THERAGRAN) per tablet Take 1 tablet by mouth once daily.      nifedipine (PROCARDIA-XL) 90 MG (OSM) TR24 Refills: 0      sodium chloride 0.9% 0.9 % SolP 100 mL with ferric gluconate 62.5 mg/5 mL Soln 125 mg Inject 125 mg into the vein daily as needed (on dialysis).  Qty: 1 Bottle, Refills: 7         STOP taking these medications       albuterol (PROVENTIL) 2.5 mg /3 mL (0.083 %) nebulizer solution Comments:   Reason for Stopping:         guaifenesin (MUCINEX) 600 mg 12 hr tablet Comments:   Reason for Stopping:             Time spent on the discharge of patient: 45 minutes    Angi Mendez MD  Department of Hospital Medicine  [facility]

## 2017-01-16 NOTE — SUBJECTIVE & OBJECTIVE
"Interval History: "I want to go home after dialysis today". No acute overnight events. SOB resolved with diuresis. She is approximately 3 kg above her estimated dry weight.    Review of Systems   Constitutional: Negative for chills, fatigue, fever and unexpected weight change.   HENT: Negative for ear pain, facial swelling, hearing loss, mouth sores, nosebleeds, rhinorrhea, sinus pressure, sore throat, tinnitus, trouble swallowing and voice change.    Eyes: Negative for photophobia, pain, redness and visual disturbance.   Respiratory: Negative for cough, chest tightness, shortness of breath and wheezing.    Cardiovascular: Negative for chest pain, palpitations and leg swelling.   Gastrointestinal: Negative for abdominal pain, blood in stool, constipation, diarrhea, nausea and vomiting.   Endocrine: Negative for cold intolerance, heat intolerance, polydipsia, polyphagia and polyuria.   Genitourinary: Negative for decreased urine volume, dysuria, flank pain, frequency, hematuria, menstrual problem, urgency, vaginal bleeding, vaginal discharge and vaginal pain.   Musculoskeletal: Negative for arthralgias, back pain, joint swelling, myalgias and neck pain.   Skin: Negative for rash.   Allergic/Immunologic: Negative for environmental allergies, food allergies and immunocompromised state.   Neurological: Negative for dizziness, seizures, syncope, weakness, light-headedness, numbness and headaches.   Hematological: Negative for adenopathy. Does not bruise/bleed easily.   Psychiatric/Behavioral: Negative for confusion, hallucinations, self-injury, sleep disturbance and suicidal ideas. The patient is not nervous/anxious.      Objective:     Vital Signs (Most Recent):  Temp: 98.1 °F (36.7 °C) (01/16/17 0847)  Pulse: 64 (01/16/17 1615)  Resp: 20 (01/16/17 1457)  BP: (!) 148/45 (01/16/17 1615)  SpO2: (!) 81 % (01/16/17 0847) Vital Signs (24h Range):  Temp:  [97.9 °F (36.6 °C)-98.1 °F (36.7 °C)] 98.1 °F (36.7 °C)  Pulse:  " [63-74] 64  Resp:  [12-22] 20  SpO2:  [81 %-98 %] 81 %  BP: (134-180)/(43-77) 148/45     Weight: 46.7 kg (103 lb)  Body mass index is 18.84 kg/(m^2).  No intake or output data in the 24 hours ending 01/16/17 1633   Physical Exam   Constitutional: She is oriented to person, place, and time. She appears well-developed and well-nourished. No distress.   HENT:   Head: Normocephalic and atraumatic.   Right Ear: Hearing, tympanic membrane, external ear and ear canal normal.   Left Ear: Hearing, tympanic membrane, external ear and ear canal normal.   Nose: Nose normal.   Mouth/Throat: Uvula is midline, oropharynx is clear and moist and mucous membranes are normal. No oropharyngeal exudate.   Eyes: Conjunctivae and EOM are normal. Right eye exhibits no discharge. Left eye exhibits no discharge. No scleral icterus.   Right eye gray pupil.    Neck: Normal range of motion. Neck supple. No tracheal deviation present. No thyromegaly present.   Cardiovascular: Normal rate, regular rhythm, normal heart sounds and intact distal pulses.  Exam reveals no gallop and no friction rub.    No murmur heard.  Pulmonary/Chest: Effort normal. No accessory muscle usage or stridor. No tachypnea. No respiratory distress. She has no wheezes. She has rales in the right middle field and the right lower field. She exhibits no tenderness.   Abdominal: Soft. Bowel sounds are normal. She exhibits no distension. There is no tenderness. There is no rebound and no guarding.   Musculoskeletal: Normal range of motion. She exhibits no edema or tenderness.   Lymphadenopathy:     She has no cervical adenopathy.   Neurological: She is alert and oriented to person, place, and time. No cranial nerve deficit. Coordination normal.   Skin: Skin is warm and dry. No rash noted. She is not diaphoretic. No erythema. No pallor.   Psychiatric: She has a normal mood and affect. Her behavior is normal. Judgment and thought content normal.   Nursing note and vitals  reviewed.      Significant Labs:   BMP:   Recent Labs  Lab 01/16/17  0419   *      K 4.4   CL 98   CO2 30*   BUN 38*   CREATININE 4.8*   CALCIUM 9.0     CBC:   Recent Labs  Lab 01/15/17  0358   WBC 5.67   HGB 11.4*   HCT 37.7   PLT 77*       Significant Imaging: I have reviewed all pertinent imaging results/findings within the past 24 hours.

## 2017-01-16 NOTE — PHYSICIAN QUERY
"PT Name: Tea Georges  MR #: [id number]    Physician Query Form -Respiratory Condition Clarification    Reviewer  Ext [id number] Kishore Walker RN CDS    This form is a permanent document in the medical record.    Query Date: January 16, 2017    By submitting this query, we are merely seeking further clarification of documentation. Please utilize your independent clinical judgment when addressing the question(s) below.  (The Medical record reflects the following:)   Indicators   Supporting Clinical Findings Location in Medical Record   x "Wheezing", "Productive cough", "SOB", "LOERA", "Use of accessory muscles" documented presents to ED c/o SOB x 2 days, more acutely worsening tonight about an hour prior  to discharge.  Pt states she just finished a course of steroids two days ago for her last asthma exacerbation.  She states this shortness of breath feels like her asthma more than her heart failure   Ed Note 1/15    Chest X-Ray =       "Hypoxia" documented      Respiratory Distress or Failure documented     x RR=        PaO2=      PaCO2=      O2 sat= Sat=81...94%   Flow sheet    Treatment:      BiPAP/Intubation     x Supplemental O2/Home O2: AAF [id number], HTN, CHF, chronic bronchitis, home O2, recent CVA, and ESRD MWF admitted with COPD exacerbation   consult 1/15    Oxygen dependence     x Other: Patient placed back on 2 lpm following treatment for sat of 83%. This is patient's normal oxygen requirement at home   Respiratory note 1/16   Provider, please specify diagnosis or diagnoses associated with above clinical findings.    [  ] Acute Respiratory Failure  [  ] Chronic Respiratory Failure  [  ] Acute on Chronic Respiratory Failure  [X] Other Respiratory Diagnosis (Specify)   hypoxemia     [  ] Clinically Undetermined    Please document in your progress notes daily for the duration of treatment, until resolved, and include in your discharge summary.                                                                            "

## 2017-01-16 NOTE — PLAN OF CARE
PATH[facility]AY - IP COPD Exacerbation - [facility]      COPD Step 1       RT: Education on rescue Albuterol vs. preventative Albuterol completed once at time of medication administration during the morning shift Met       RT: Oxygen Education completed with aerosol treatments or every shift if no therapy scheduled Met       RT: Education on COPD disease provided each shift Met       RT: Clinical Outcomes: Reduced wheezing with adequate air movement at time of aerosol treatment Met        Patient placed back on 2 lpm following treatment for sat of 83%. This is patient's normal oxygen requirement at home.

## 2017-01-16 NOTE — PROGRESS NOTES
"[facility] Hospital Medicine  Progress Note    Patient Name: Tea Georges  MRN: [id number]  Patient Class: IP- Inpatient   Admission Date: 1/15/2017  Length of Stay: 1 days  Attending Physician: Angi Mendez MD  Primary Care Provider: Parth Posadas Ii, MD    [facility] Medicine Team: [facility] [place] Angi Mendez MD    Subjective:     Principal Problem:Acute on chronic diastolic heart failure    HPI:  A 77-year-old woman with diet controlled DM type 2, hypertension, ESRD on HD (M,W,F), chronic diastolic heart failure, pulmonary hypertension, COPD due to asthma, macular degeneration with right eye blindness, prior stroke, chronic right pleural effusion, and with recurrent admissions for COPD exacerbations whom on the day prior to admission, at around 7 pm, she developed progressively worsening SOB without cough or increased sputum production. She denied fever, chills, sick contacts or recent travel. She completed steroid therapy 2 days prior to admission for a recent hospitalization due to COPD exacerbation.     Mrs. Georges's HD Unit is at [facility]. She had removal of 2 liters at her last session. She is unaware of her dry weight.         Hospital Course:  Patient received inhaled bronchodilators in the ED with mild improvement of her SOB. On hospital day one her SOB fully resolved with diuresis. Blood pressure remained elevated yet improved from admission. She requested discharge. She was medically stable for discharge with close follow up with her PCP.    Interval History: "I want to go home after dialysis today". No acute overnight events. SOB resolved with diuresis. She is approximately 3 kg above her estimated dry weight.    Review of Systems   Constitutional: Negative for chills, fatigue, fever and unexpected weight change.   HENT: Negative for ear pain, facial swelling, hearing loss, mouth sores, nosebleeds, rhinorrhea, sinus pressure, sore throat, tinnitus, trouble swallowing " and voice change.    Eyes: Negative for photophobia, pain, redness and visual disturbance.   Respiratory: Negative for cough, chest tightness, shortness of breath and wheezing.    Cardiovascular: Negative for chest pain, palpitations and leg swelling.   Gastrointestinal: Negative for abdominal pain, blood in stool, constipation, diarrhea, nausea and vomiting.   Endocrine: Negative for cold intolerance, heat intolerance, polydipsia, polyphagia and polyuria.   Genitourinary: Negative for decreased urine volume, dysuria, flank pain, frequency, hematuria, menstrual problem, urgency, vaginal bleeding, vaginal discharge and vaginal pain.   Musculoskeletal: Negative for arthralgias, back pain, joint swelling, myalgias and neck pain.   Skin: Negative for rash.   Allergic/Immunologic: Negative for environmental allergies, food allergies and immunocompromised state.   Neurological: Negative for dizziness, seizures, syncope, weakness, light-headedness, numbness and headaches.   Hematological: Negative for adenopathy. Does not bruise/bleed easily.   Psychiatric/Behavioral: Negative for confusion, hallucinations, self-injury, sleep disturbance and suicidal ideas. The patient is not nervous/anxious.      Objective:     Vital Signs (Most Recent):  Temp: 98.1 °F (36.7 °C) (01/16/17 0847)  Pulse: 64 (01/16/17 1615)  Resp: 20 (01/16/17 1457)  BP: (!) 148/45 (01/16/17 1615)  SpO2: (!) 81 % (01/16/17 0847) Vital Signs (24h Range):  Temp:  [97.9 °F (36.6 °C)-98.1 °F (36.7 °C)] 98.1 °F (36.7 °C)  Pulse:  [63-74] 64  Resp:  [12-22] 20  SpO2:  [81 %-98 %] 81 %  BP: (134-180)/(43-77) 148/45     Weight: 46.7 kg (103 lb)  Body mass index is 18.84 kg/(m^2).  No intake or output data in the 24 hours ending 01/16/17 1633   Physical Exam   Constitutional: She is oriented to person, place, and time. She appears well-developed and well-nourished. No distress.   HENT:   Head: Normocephalic and atraumatic.   Right Ear: Hearing, tympanic membrane,  external ear and ear canal normal.   Left Ear: Hearing, tympanic membrane, external ear and ear canal normal.   Nose: Nose normal.   Mouth/Throat: Uvula is midline, oropharynx is clear and moist and mucous membranes are normal. No oropharyngeal exudate.   Eyes: Conjunctivae and EOM are normal. Right eye exhibits no discharge. Left eye exhibits no discharge. No scleral icterus.   Right eye gray pupil.    Neck: Normal range of motion. Neck supple. No tracheal deviation present. No thyromegaly present.   Cardiovascular: Normal rate, regular rhythm, normal heart sounds and intact distal pulses.  Exam reveals no gallop and no friction rub.    No murmur heard.  Pulmonary/Chest: Effort normal. No accessory muscle usage or stridor. No tachypnea. No respiratory distress. She has no wheezes. She has rales in the right middle field and the right lower field. She exhibits no tenderness.   Abdominal: Soft. Bowel sounds are normal. She exhibits no distension. There is no tenderness. There is no rebound and no guarding.   Musculoskeletal: Normal range of motion. She exhibits no edema or tenderness.   Lymphadenopathy:     She has no cervical adenopathy.   Neurological: She is alert and oriented to person, place, and time. No cranial nerve deficit. Coordination normal.   Skin: Skin is warm and dry. No rash noted. She is not diaphoretic. No erythema. No pallor.   Psychiatric: She has a normal mood and affect. Her behavior is normal. Judgment and thought content normal.   Nursing note and vitals reviewed.      Significant Labs:   BMP:   Recent Labs  Lab 01/16/17  0419   *      K 4.4   CL 98   CO2 30*   BUN 38*   CREATININE 4.8*   CALCIUM 9.0     CBC:   Recent Labs  Lab 01/15/17  0358   WBC 5.67   HGB 11.4*   HCT 37.7   PLT 77*       Significant Imaging: I have reviewed all pertinent imaging results/findings within the past 24 hours.    Assessment/Plan:      Pleural effusion  Patient with a chronic small pleural effusion  which I suspect is contributing to her SOB. Not amenable to drainage during prior hospitalization.   1. Monitor.  2. Outpatient follow up.      Acute pulmonary edema  Resolving after dose of furosemide. Patient with an estimated 3 kg above estimated dry weight highly concerning for incomplete volume removal to be the underlying etiology. Is scheduled for inpatient HD session today.  1. HD today.  2. Outpatient follow up with PCP.    Hypoxemia  Secondary to pulmonary edema.  1. Wean oxygen to discontinue.      COPD (chronic obstructive pulmonary disease)  Patient with COPD. Symptoms significantly improved with treatment for pulmonary edema and CHF. Less likely that this is a COPD exacerbation at this time.   1. Discontinue COPD pathway.  2. Continue inhaled bronchodilator and steroid therapy.      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 Within target goals with SSI.  1. Diabetic renal cardiac diet.      Steroid-induced hyperglycemia  Now resolved.  1. Monitor.      Essential hypertension  As below      Malignant hypertension  Patient with malignant hypertension on admission. Blood pressure control improved with HD and antihypertensive therapy.   1. Amlodipine 10 mg daily.  2. Coreg 25 mg BID.  3. Clonidine 0.3 mg patch every 7 days.  4. Hydralazine 75 mg every 6 hours.  5. Isosorbide dinitrate 40 mg daily.  6. Losartan 100 mg daily  7. Monitor blood pressure and titrate therapy as needed in the outpatient clinic with her PCP.      ESRD (end stage renal disease)  On HD M,W,F.  1. Continue HD.      Blindness of right eye  Stable.  1. Continue artificial tears.      Aortic stenosis, moderate  Stable valvular disease.  1. Outpatient management.      Anemia in ESRD (end-stage renal disease)  Stable and asymptomatic.  1. No indication for RBC transfusion.  2. Monitor.      CAD (coronary artery  disease)  Stable and asymptomatic.  1.Continue aspirin and clopidogrel.      Thrombocytopenia, unspecified  Chronic in nature and without signs of overt bleeding.  1. Outpatient management.      Peripheral artery disease  Stable and asymptomatic. S/P stent.  1.Continue aspirin and clopidogrel.      Non-rheumatic tricuspid valve insufficiency  Stable valvular disease.  1. Outpatient management.        VTE Risk Mitigation         Ordered     Place MADDY hose  Until discontinued      01/16/17 1351     heparin (porcine) injection 5,000 Units  Every 12 hours     Route:  Subcutaneous        01/15/17 0906     Medium Risk of VTE  Once      01/15/17 0906          Angi Mendez MD  Department of Hospital Medicine   [facility]

## 2017-01-16 NOTE — CONSULTS
Consult Note  Nephrology    Consult Requested By: Angi Mendez MD  Reason for Consult: ESRD    SUBJECTIVE:     History of Present Illness:  Patient is a 77 y.o. female with T2DM, HTN, CHF, chronic bronchitis, home O2, recent CVA, and ESRD MWF admitted with COPD exacerbation. Nephrology consulted for ESRD management and volume removal. Ms. Georges receives iHD MWF via LUE AVF at [facility]. Nephrologist: Dr. Coleman. Treatment duration: 4 hours. EDW 43kg. She continues to make urine. She last received iHD on Wednesday. She reports UF of 2L. She currently reports her SOB is improved. Upset that her lunch is cold.     Past Medical History   Diagnosis Date    Anemia in ESRD (end-stage renal disease) 5/29/2016    Anticoagulant long-term use     Aortic atherosclerosis 11/22/2016    Aortic stenosis, moderate 2/18/2016    Asthma in adult without complication 1/8/2016    Bilateral low back pain without sciatica 11/17/2015    Blindness of right eye 11/12/2016    Cataract     Central retinal vein occlusion, right eye 6/3/2014    CHF (congestive heart failure)     Chronic diastolic heart failure 1/8/2016    Chronic respiratory failure with hypoxia 5/29/2016    Dependence on hemodialysis      Mon-Wed-Fri    Diverticulosis     Embolic stroke involving right middle cerebral artery     Enlarged LA (left atrium) 10/7/2016    Epiretinal membrane 7/17/2012    ESRD (end stage renal disease)     Essential hypertension 1/8/2016    History of GI diverticular bleed      5/22/16    Left flaccid hemiparesis 10/1/2016    Peripheral vascular disease, unspecified 11/22/2016    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 11/13/2016    Type 2 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, without long-term current use of insulin 3/26/2013    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, without long-term current use of  insulin 3/26/2013    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     Past Surgical History   Procedure Laterality Date    Cataract extraction      Cholecystectomy      Colonoscopy N/A 5/23/2016     Procedure: COLONOSCOPY;  Surgeon: WILLIAM Colvin MD;  Location: [facility] ENDO (2ND FLR);  Service: Endoscopy;  Laterality: N/A;    Colonoscopy N/A 5/30/2016     Procedure: COLONOSCOPY;  Surgeon: Sam Davis MD;  Location: [facility] ENDO (2ND FLR);  Service: Endoscopy;  Laterality: N/A;    Upper gastrointestinal endoscopy      Breast surgery       tumor removal x 2     Family History   Problem Relation Age of Onset    Cataracts Mother     Stroke Mother     Hypertension Mother     Cancer Sister     Hypertension Sister     Heart failure Sister     Glaucoma Brother     Hypertension Brother     Heart disease Brother     Hypertension Father     Glaucoma Maternal Aunt     Esophageal cancer Sister     No Known Problems Maternal Uncle     No Known Problems Paternal Aunt     No Known Problems Paternal Uncle     No Known Problems Maternal Grandmother     No Known Problems Maternal Grandfather     No Known Problems Paternal Grandmother     No Known Problems Paternal Grandfather     Heart attack Neg Hx     Colon cancer Neg Hx     Stomach cancer Neg Hx     Anemia Neg Hx     Arrhythmia Neg Hx     Asthma Neg Hx     Clotting disorder Neg Hx     Fainting Neg Hx     Hyperlipidemia Neg Hx     Atrial Septal Defect Neg Hx      Social History   Substance Use Topics    Smoking status: Never Smoker    Smokeless tobacco: Never Used    Alcohol use No      Comment: Reports occasional 1-2 drinks        Review of patient's allergies indicates:  No Known Allergies     Review of Systems:  Complete ROS non-contributory except as indicated above.       OBJECTIVE:     Vital Signs (Most Recent)  Temp: 97.7 °F (36.5 °C) (01/15/17 1545)  Pulse: 68 (01/15/17 1700)  Resp: 12 (01/15/17 1657)  BP: (!) 181/79 (01/15/17  1545)  SpO2: 98 % (01/15/17 1657)    Vital Signs Range (Last 24H):  Temp:  [97.7 °F (36.5 °C)-98.5 °F (36.9 °C)]   Pulse:  [65-80]   Resp:  []   BP: (179-191)/(54-83)   SpO2:  [96 %-100 %]     Physical Exam:  Gen: Elderly AAF in NAD  Eyes: R eye s/p macular degeneration. L eye clear conjunctivae.   HENT: NC/AT. MMM. NC in place  Neck: supple, no thyromegaly  Lymph: no cervical or supraclavicular LAD  CV: RRR, no peripheral edema  Resp: normal effort on NC. +wheeze. +crackles  Abd: soft, NTND  Skin: wamr, normal turgor    Laboratory:  CBC:   Recent Labs  Lab 01/15/17  0358   WBC 5.67   RBC 3.90*   HGB 11.4*   HCT 37.7   PLT 77*   MCV 97   MCH 29.2   MCHC 30.2*     CMP:   Recent Labs  Lab 01/15/17  0358   *   CALCIUM 9.2   ALBUMIN 3.3*   PROT 7.0      K 4.1   CO2 29      BUN 26*   CREATININE 3.7*   ALKPHOS 94   ALT 8*   AST 14   BILITOT 0.5       Diagnostic Results:  Labs: Reviewed  X-Ray: Reviewed    ASSESSMENT/PLAN:     Active Hospital Problems    Diagnosis  POA    *COPD exacerbation [J44.1]  Yes    Asthma exacerbation [J45.901]  Yes    Acute pulmonary edema [J81.0]  Yes    Steroid-induced hyperglycemia [R73.9]  Yes    Thrombocytopenia, unspecified [D69.6]  Yes    Peripheral artery disease [I73.9]  Yes    Non-rheumatic tricuspid valve insufficiency [I36.1]  Yes    CAD (coronary artery disease) [I25.10]  Yes    Malignant hypertension [I10]  Yes    Blindness of right eye [H54.41]  Yes     Chronic    Anemia in ESRD (end-stage renal disease) [N18.6, D63.1]  Yes     Chronic    Aortic stenosis, moderate [I35.0]  Yes    ESRD (end stage renal disease) [N18.6]  Yes     Chronic    Essential hypertension [I10]  Yes     Chronic    Pleural effusion [J90]  Yes    Type 2 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 Georges is a 78 yo AAF [id number], HTN, CHF, chronic bronchitis, home O2, recent CVA, and ESRD MWF admitted with COPD exacerbation. Nephrology consulted for ESRD management and volume removal.     ESRD  - receives iHD MWF via LUE AVF at [facility]. Nephrologist: Dr. Coleman. Treatment duration: 4 hours. EDW 43kg. She continues to make urine.   - last HD on Friday; UF 2L  - will plan for HD tomorrow. UF goal 3-4L as tolerated by BP    Anemia of CKD  - hgb at goal for ESRD  - epo contraindicated with recent CVA    CKD-MBD  - , vitamin D 15  - corrected Ca 9.7  - continue home ergocalciferol   - will check phos with AM labs      Sophia Jarvis, PGY-4  Nephrology Fellow  Pager: [phone]

## 2017-01-16 NOTE — PT/OT/SLP EVAL
Occupational Therapy  Evaluation    Tea Georges   MRN: [id number]   Admitting Diagnosis: Acute on chronic diastolic heart failure    OT Date of Treatment: 01/16/17   OT Start Time: 1045  OT Stop Time: 1057  OT Total Time (min): 12 min    Billable Minutes:  Evaluation 12    Diagnosis: Acute on chronic diastolic heart failure       Past Medical History   Diagnosis Date    Anemia in ESRD (end-stage renal disease) 5/29/2016    Anticoagulant long-term use     Aortic atherosclerosis 11/22/2016    Aortic stenosis, moderate 2/18/2016    Asthma in adult without complication 1/8/2016    Bilateral low back pain without sciatica 11/17/2015    Blindness of right eye 11/12/2016    Cataract     Central retinal vein occlusion, right eye 6/3/2014    CHF (congestive heart failure)     Chronic diastolic heart failure 1/8/2016    Chronic respiratory failure with hypoxia 5/29/2016    Dependence on hemodialysis      Mon-Wed-Fri    Diverticulosis     Embolic stroke involving right middle cerebral artery     Enlarged LA (left atrium) 10/7/2016    Epiretinal membrane 7/17/2012    ESRD (end stage renal disease)     Essential hypertension 1/8/2016    History of GI diverticular bleed      5/22/16    Left flaccid hemiparesis 10/1/2016    Peripheral vascular disease, unspecified 11/22/2016    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 11/13/2016    Type 2 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, without long-term current use of insulin 3/26/2013    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, without long-term current use of insulin 3/26/2013    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      Past Surgical History   Procedure Laterality Date    Cataract extraction      Cholecystectomy      Colonoscopy N/A 5/23/2016     Procedure: COLONOSCOPY;  Surgeon: WILLIAM Colvin MD;  Location: [facility] ([address] [place] [address]);  Service:  Endoscopy;  Laterality: N/A;    Colonoscopy N/A 5/30/2016     Procedure: COLONOSCOPY;  Surgeon: Sam Davis MD;  Location: [facility] ([address] [place] [address]);  Service: Endoscopy;  Laterality: N/A;    Upper gastrointestinal endoscopy      Breast surgery       tumor removal x 2     Comorbidities/personal factors that affect OT eval/treatment:  · Blind in R eye  · CHF  · Previous stroke  · Steps to enter home      Referring physician: Angi Mendez MD   Date referred to OT: 1/16/2017    General Precautions: Standard, fall (cardiac)  Orthopedic Precautions: N/A  Braces: N/A    Do you have any cultural, spiritual, Orthodoxy conflicts, given your current situation?: none stated     Patient History:  Living Environment  Lives With: child(li), adult, grandchild(li) (daughter and 2 granddaughters)  Living Arrangements: house  Home Accessibility: stairs to enter home  Home Layout: Able to live on 1st floor  Number of Stairs to Enter Home: 5  Stair Railings at Home: outside, present on right side  Transportation Available: family or friend will provide  Living Environment Comment: Pt reported living with daughter and 2 granddaughters in a H with 5 MARY; bathroom has a tub/shower combo. PTA, pt required assistance for bathing and used shower chair. She has assistance from daughter upon d/c.   Equipment Currently Used at Home: bedside commode, shower chair, oxygen, wheelchair, rollator    Prior level of function:   Bed Mobility/Transfers: needs device  Grooming: independent  Bathing: needs device and assist  Upper Body Dressing: independent  Lower Body Dressing: independent  Toileting: independent  Driving License: Yes  Mode of Transportation: Family, Friends     Dominant hand: right    Subjective:  Communicated with RN prior to session.    OT/PT attempted pt in the morning but she was eating breakfast and requested that therapy return later. Pt agreeable to OT/PT evaluation     Chief Complaint: pain  Patient/Family stated  "goals: return home    Pain Ratin/10  Pain Rating Post-Intervention: 0/10    Objective:  Patient found with: oxygen, telemetry    Cognitive Exam:  Oriented to: Person, Place, Time and Situation  Follows Commands/attention: Follows multistep  commands  Communication: clear/fluent  Memory:  No Deficits noted  Safety awareness/insight to disability: intact  Coping skills/emotional control: Appropriate to situation    Visual/perceptual:  Blind in R eye    Physical Exam:  Postural examination/scapula alignment: Rounded shoulder and Head forward  Skin integrity: Thin and Dry  Edema: None noted in BUE    Sensation:   Intact in BUE    Upper Extremity Range of Motion:  Right Upper Extremity: WFL  Left Upper Extremity: WFL    Upper Extremity Strength:  Right Upper Extremity: WFL  Left Upper Extremity: WFL   Strength: good    Fine motor coordination:   Intact    Gross motor coordination: WFL    Functional Mobility:  Bed Mobility: HOB slightly elevated  Rolling/Turning Right: Supervision  Scooting/Bridging: Supervision  Supine to Sit: Supervision  Sit to Supine: Supervision    Transfers:  Sit <> Stand Assistance: Supervision (1 trial from EOB)  Sit <> Stand Assistive Device: No Assistive Device  Bed <> Chair Technique: Stand Pivot  Bed <> Chair Transfer Assistance: Supervision  Bed <> Chair Assistive Device: No Assistive Device  Toilet Transfer Technique:  (refused toilet transfer)    Functional Ambulation: Ambulated ~3 steps to bed, sitting RB, then 3 steps towards bed with no AD. Refused further ambulation. After mobility, pt stated "I can walk you towards the door".    Activities of Daily Living:  UE Dressing Level of Assistance: Supervision (for donning gown like jacket from EOB)    LE Dressing Level of Assistance: Supervision (for donning/doffing socks from chair)      Balance:   Static Sit: FAIR+: Able to take MINIMAL challenges from all directions  Dynamic Sit: FAIR+: Maintains balance through MINIMAL excursions " "of active trunk motion  Static Stand: FAIR+: Takes MINIMAL challenges from all directions  Dynamic stand: FAIR+: Needs CLOSE SUPERVISION during gait and is able to right self with minor LOB    Therapeutic Activities and Exercises:  Pt refused to remain in chair, she was educated on the importance of OOB activity. Still refused and returned to bed.    Pt educated on OT role/POC, bathroom safety, safety in home, and fatigue management/energy conservation in COPD packet. Verbalized understanding.     AM-PAC 6 CLICK ADL  How much help from another person does this patient currently need?  1 = Unable, Total/Dependent Assistance  2 = A lot, Maximum/Moderate Assistance  3 = A little, Minimum/Contact Guard/Supervision  4 = None, Modified [place]/Independent    Putting on and taking off regular lower body clothing? : 3  Bathing (including washing, rinsing, drying)?: 2  Toileting, which includes using toilet, bedpan, or urinal? : 3  Putting on and taking off regular upper body clothing?: 4  Taking care of personal grooming such as brushing teeth?: 4  Eating meals?: 4  Total Score: 20    AM-PAC Raw Score CMS "G-Code Modifier Level of Impairment Assistance   6 % Total / Unable   7 - 9 CM 80 - 100% Maximal Assist   10 - 14 CL 60 - 80% Moderate Assist   15 - 19 CK 40 - 60% Moderate Assist   20 - 22 CJ 20 - 40% Minimal Assist   23 CI 1-20% SBA / CGA   24 CH 0% Independent/ Mod I       Patient left supine with all lines intact, call button in reach and RN notified     Clinical presentation: Stable from OT's standpoint. Currently, pt's COPD and shortness of breath  is not affecting her functional status. She is able to perform dressing ADLs and mobility with supervision. She is presenting close to her PLOF baseline reported during history.      Assessment:  Tea Georges is a 77 y.o. female with a medical diagnosis of Acute on chronic diastolic heart failure presents with impaired endurance and decreased safety awareness " impacting performance on ADLs. Session tolerated well but limited due to pt's refusal of bathroom ADLs. PTA, pt was (I) with ADLs but currently requires supervision with verbal cues for safety when performing dressing ADLs. Demo'd good functional strength overall evident by completing bed mobility with Supervision. OT is recommending HHOT for safe transition, increase endurance and strength in home environment, and address safety concerns in home. Pt will continue to benefit from skilled OT services to maximize safety and increase independence in ADLs.       Rehab identified problem list/impairments: Rehab identified problem list/impairments: weakness, impaired endurance, impaired self care skills, pain, decreased safety awareness, impaired balance    Rehab potential is good.    Activity tolerance: Fair    Discharge recommendations: Discharge Facility/Level Of Care Needs: home health OT, home with home health     Barriers to discharge: Barriers to Discharge: Inaccessible home environment    Equipment recommendations: none     GOALS:   Occupational Therapy Goals        Problem: Occupational Therapy Goal    Goal Priority Disciplines Outcome Interventions   Occupational Therapy Goal     OT, PT/OT Ongoing (interventions implemented as appropriate)    Description:  Goals to be met by: 1/26/2017     Patient will increase functional independence with ADLs by performing:    Grooming while standing at sink with Supervision.  Rolling to Bilateral with Modified [place].   Supine to sit with Modified [place].  Stand pivot transfers with Modified [place].  Toilet transfer to toilet with Modified [place].  Upper extremity exercise program x15 reps per handout, with independence.                PLAN:  Patient to be seen 3 x/week to address the above listed problems via self-care/home management, therapeutic activities, therapeutic exercises  Plan of Care expires: 02/15/17  Plan of Care reviewed with:  patient    OT G-codes  Functional Assessment Tool Used: [facility]  Score: 20  Functional Limitation: Self care  Self Care Current Status ():   Self Care Goal Status (): SHERI Vincent  01/16/2017

## 2017-01-16 NOTE — PROGRESS NOTES
Pt in ESRD, dialysis (M/W/F). Not appropriate for [facility] HF program.     Removed from suspect list.

## 2017-01-16 NOTE — ASSESSMENT & PLAN NOTE
Resolving after dose of furosemide. Patient with an estimated 3 kg above estimated dry weight highly concerning for incomplete volume removal to be the underlying etiology. Is scheduled for inpatient HD session today.  1. HD today.  2. Outpatient follow up with PCP.

## 2017-01-17 VITALS
SYSTOLIC BLOOD PRESSURE: 174 MMHG | DIASTOLIC BLOOD PRESSURE: 77 MMHG | BODY MASS INDEX: 18.95 KG/M2 | WEIGHT: 103 LBS | TEMPERATURE: 98 F | OXYGEN SATURATION: 98 % | RESPIRATION RATE: 18 BRPM | HEART RATE: 64 BPM | HEIGHT: 62 IN

## 2017-01-17 LAB — POCT GLUCOSE: 271 MG/DL (ref 70–110)

## 2017-01-17 PROCEDURE — 25000003 PHARM REV CODE 250: Performed by: INTERNAL MEDICINE

## 2017-01-17 RX ADMIN — HYDRALAZINE HYDROCHLORIDE 75 MG: 50 TABLET ORAL at 05:01

## 2017-01-17 RX ADMIN — HYPROMELLOSE 2910 1 DROP: 5 SOLUTION OPHTHALMIC at 05:01

## 2017-01-17 NOTE — PLAN OF CARE
Problem: Patient Care Overview  Goal: Plan of Care Review  Outcome: Outcome(s) achieved Date Met:  01/16/17  Patient remained in stable condition today.  Left unit for HD at 1415 and may be discharged after HD complete and she tolerated well. Patient aware that she can go home after HD.

## 2017-01-17 NOTE — PROGRESS NOTES
Hemodialysis is complete.  Blood returned.  AV Fistula to [place] was then de accessed and needles removed.  Hemostasis attained.  No bleed or hematoma.  Fistula has Bruit and Thrill.  HD time = 240 min.  UF = 2500 ml.    Patient was transferred to [facility] without complication.  The patient was then transported back to room VIA patient escort.  Status is unchanged.

## 2017-01-17 NOTE — PLAN OF CARE
Discharge Update: Pt is current with [facility]. Family would like to resumed services.    Geno Arellano LMSW  [id number]

## 2017-01-17 NOTE — PLAN OF CARE
Discharge teaching reviewed with Pt. All questions answers, tele box & IV's removed.  Pt to be discharged home by son via wheelchair.  All personal belongings returned to Pt, VSS, Pt NAD.

## 2017-01-17 NOTE — PLAN OF CARE
Problem: Patient Care Overview  Goal: Plan of Care Review  Outcome: Ongoing (interventions implemented as appropriate)  Plan of care was reviewed with the pt and verbalized understanding. VSS, side rails up x3, oriented to room and to call bell, asked to call nurse for anything. Neuro checks performed no neuro changes noted throughout shift. Pt denied pain throughout shift. Checked on pt at least every 2 hours throughout shift. Pt remained free from falls/injuries or skin breakdown throughout shift. Pt given discharge instruction and anticipated discharge but was unable to secure transportation. RN will continue to monitor pt.

## 2017-01-18 ENCOUNTER — PATIENT OUTREACH (OUTPATIENT)
Dept: ADMINISTRATIVE | Facility: CLINIC | Age: 77
End: 2017-01-18
Payer: MEDICARE

## 2017-01-18 ENCOUNTER — TELEPHONE (OUTPATIENT)
Dept: INTERNAL MEDICINE | Facility: CLINIC | Age: 77
End: 2017-01-18

## 2017-01-18 NOTE — TELEPHONE ENCOUNTER
----- Message from Jayleen Wolf sent at 1/17/2017 11:16 AM CST -----  Contact: Self/[phone]  RX request - refill or new RX.  Is this a refill or new RX:  Refill   RX name and strength: predniSONE tablet 40 mg     Directions: Daily  Is this a 30 day or 90 day RX:    Pharmacy name and phone #: [id number] [phone]   Comments:  Please call and advise      Thank you

## 2017-01-19 NOTE — PATIENT INSTRUCTIONS
Right-Sided Congestive Heart Failure    The heart is a large muscle that pumps blood throughout the body. Blood carries oxygen to all the organs (including the brain), muscles, and skin. After the body takes the oxygen out of the blood, the blood returns to the heart. The right side of the heart collects that blood and pumps it to the lungs to get fresh oxygen. This oxygen-rich blood from the lungs then returns to the left side of the heart, where it is pumped back out to the rest of the body and the brain, starting the process all over.  Heart failure (HF) occurs when the heart muscle is weakened. This can occur after a heart attack or with disease of the coronary arteries that affects the heart over time, and in turn affects the pumping action of the heart.  When the right side of the heart is weakened, it cant handle the blood it is getting from the rest of the body. This blood returns to the heart through veins. When too much pressure builds up in the veins, fluid leaks out into the tissues. Gravity then causes that fluid to spread to those parts of the body that are the lowest. So one of the first symptoms of heart failure is swelling in the feet and ankles. If the condition worsens, the swelling can even go up past the knees. In more severe cases, the liver may also become congested with extra fluid.  Causes of right-sided heart failure  · Coronary artery disease  · Heart attack in the past (heart attack is also known as acute myocardial infarction, or AMI)  · High blood pressure, particularly in the pulmonary circulation  · Damaged heart valve  · Diabetes  · Obesity  · Cigarette smoking  · Alcohol abuse  · Increased pressure of the lungs weakening the right side of the heart  Treatment  Heart failure is a chronic condition. There is no cure. The purpose of medical treatment is to improve the pumping action of the heart, and remove excess water and fluid from the body. A number of medicines can help reach  this goal, improve symptoms, and prevent the heart from becoming weaker. In some cases of severe heart failure, mechanical devices can be implanted to help with the heart's pumping function. Another major goal is to better treat the causes of heart failure, such as diabetes, high blood pressure, and your lifestyle.  Home care  · Check your weight every day. A sudden increase in weight gain could mean worsening heart failure.  ¨ Use the same scale every day.  ¨ Weigh yourself at the same time every day.  ¨ Make sure the scale is on the floor, not on a rug.  ¨ Keep a record of your weight every day so your healthcare provider can see it. If you are not given a log sheet for this, keep a separate journal for this purpose.   · Cut back on how much salt (sodium) you eat:  ¨ Your healthcare provider will tell you  what level of salt you can have daily, usually 2,000 mg or less.  ¨ Avoid high-salt foods. These include olives, pickles, smoked meats, processed foods, and salted potato chips.  ¨ Don't add salt to your food at the table. Use only small amounts of salt when cooking.  · Follow your healthcare provider's recommendations about how much fluid you should have.  · Stop smoking.  · Cut back on the amount of alcohol you drink.  · Lose weight if you are overweight. The excess weight adds a lot of stress on the workload of the heart.  · Stay active. Talk with your provider about an exercise program that is safe for your heart.  · Keep your feet elevated to reduce swelling. Ask your provider about support hose as a preventive treatment for daytime leg swelling.  · Follow your healthcare provider's instructions closely.  Besides taking your medicine as instructed, an important part of treatment is lifestyle changes. These include diet, physical activity, stopping smoking, and weight control.  Improve your diet. Often in the hospital, people are given a heart healthy diet. This includes more fresh foods, lower fat, less  processed foods, and lower salt.  Follow-up care  Follow up with your healthcare provider, or as advised. Make sure to keep any appointments that were made for you. This can help better control heart failure.  If an X-ray was done, you will be told of any new findings that may affect your care.  Call 911  Call 911 if you:  · Become severely short of breath  · Feel lightheaded, or feel like you might pass out or faint  · Have chest pain or discomfort that's different than usual, the medicines your provider told you to use for this don't help, or the pain lasts longer than 10 to 15 minutes  · You suddenly develop a rapid heart rate  When to seek medical advice  Call your healthcare provider right away if you have any of these signs. They may mean your heart failure is getting worse:  · Sudden weight gain. This means more than 2 or more pounds in 1 day or 5 pounds in 1 week, or whatever weight gain you were told to report by your provider.  · Trouble breathing not related to being active  · New or increased swelling of your legs or ankles  · Swelling or pain in your abdomen  · Breathing trouble at night. This means waking up short of breath or needing more pillows to elevate your upper body to breathe.  · Frequent coughing that doesnt go away  · Feeling much more tired than usual  © 4611-6165 [employer]. [address] [place] [address], [place] [address]. All rights reserved. This information is not intended as a substitute for professional medical care. Always follow your healthcare professional's instructions.

## 2017-02-02 ENCOUNTER — TELEPHONE (OUTPATIENT)
Dept: INTERNAL MEDICINE | Facility: CLINIC | Age: 77
End: 2017-02-02

## 2017-02-02 NOTE — TELEPHONE ENCOUNTER
----- Message from Marco Queen sent at 1/31/2017 11:39 AM CST -----  Contact: Karina/ [email or web address]/ [phone]   Type: Rx    Name of medication(s):  Diabetic testing supplies    Is this a refill? New rx? new    Who prescribed medication? Dr. Posadas    Pharmacy Name, Phone, & Location: [email or web address]/ [phone] fax    Comments: Company is calling to request diabetic testing supplies for the pt above.  Please call and advise.    Thank you

## 2017-02-03 RX ORDER — NIFEDIPINE 90 MG/1
TABLET, EXTENDED RELEASE ORAL
Qty: 60 TABLET | Refills: 6 | Status: SHIPPED | OUTPATIENT
Start: 2017-02-03 | End: 2017-06-20

## 2017-02-06 ENCOUNTER — CLINICAL SUPPORT (OUTPATIENT)
Dept: ELECTROPHYSIOLOGY | Facility: CLINIC | Age: 77
End: 2017-02-06
Payer: MEDICARE

## 2017-02-06 DIAGNOSIS — I63.9 CRYPTOGENIC STROKE: ICD-10-CM

## 2017-02-06 DIAGNOSIS — Z95.818 STATUS POST PLACEMENT OF IMPLANTABLE LOOP RECORDER: ICD-10-CM

## 2017-02-06 PROCEDURE — 93297 REM INTERROG DEV EVAL ICPMS: CPT | Mod: ,,, | Performed by: INTERNAL MEDICINE

## 2017-02-06 PROCEDURE — 93299 LOOP RECORDER REMOTE: CPT | Mod: PBBFAC | Performed by: INTERNAL MEDICINE

## 2017-02-15 ENCOUNTER — TELEPHONE (OUTPATIENT)
Dept: INTERNAL MEDICINE | Facility: CLINIC | Age: 77
End: 2017-02-15

## 2017-02-15 NOTE — TELEPHONE ENCOUNTER
----- Message from Gracie Campos sent at 2/15/2017  9:06 AM CST -----  Contact: [facility] did receive the fax back for the diabetic supplies, but they need to know who signed the form.  Please call [phone].  Reference #[id number].    Thanks!

## 2017-02-17 ENCOUNTER — TELEPHONE (OUTPATIENT)
Dept: INTERNAL MEDICINE | Facility: CLINIC | Age: 77
End: 2017-02-17

## 2017-02-17 NOTE — TELEPHONE ENCOUNTER
----- Message from Kelli Carpio sent at 2/16/2017  4:15 PM CST -----  Contact: [phone] home health nurse   Home health nurse would like a call back regarding miss shahriar thomson

## 2017-03-03 RX ORDER — NIFEDIPINE 90 MG/1
TABLET, EXTENDED RELEASE ORAL
Qty: 60 TABLET | Refills: 2 | Status: SHIPPED | OUTPATIENT
Start: 2017-03-03 | End: 2017-03-14

## 2017-03-08 ENCOUNTER — CLINICAL SUPPORT (OUTPATIENT)
Dept: ELECTROPHYSIOLOGY | Facility: CLINIC | Age: 77
End: 2017-03-08
Payer: MEDICARE

## 2017-03-08 DIAGNOSIS — Z95.818 STATUS POST PLACEMENT OF IMPLANTABLE LOOP RECORDER: ICD-10-CM

## 2017-03-08 DIAGNOSIS — I63.9 CRYPTOGENIC STROKE: ICD-10-CM

## 2017-03-08 PROCEDURE — 93299 LOOP RECORDER REMOTE: CPT | Mod: PBBFAC | Performed by: INTERNAL MEDICINE

## 2017-03-08 PROCEDURE — 93297 REM INTERROG DEV EVAL ICPMS: CPT | Mod: ,,, | Performed by: INTERNAL MEDICINE

## 2017-03-14 ENCOUNTER — OFFICE VISIT (OUTPATIENT)
Dept: INTERNAL MEDICINE | Facility: CLINIC | Age: 77
End: 2017-03-14
Payer: MEDICARE

## 2017-03-14 VITALS
SYSTOLIC BLOOD PRESSURE: 145 MMHG | DIASTOLIC BLOOD PRESSURE: 56 MMHG | HEART RATE: 59 BPM | TEMPERATURE: 98 F | WEIGHT: 101 LBS | BODY MASS INDEX: 18.58 KG/M2 | HEIGHT: 62 IN

## 2017-03-14 DIAGNOSIS — I10 ESSENTIAL HYPERTENSION: Chronic | ICD-10-CM

## 2017-03-14 DIAGNOSIS — N18.6 ESRD (END STAGE RENAL DISEASE): Chronic | ICD-10-CM

## 2017-03-14 DIAGNOSIS — E55.9 VITAMIN D DEFICIENCY: ICD-10-CM

## 2017-03-14 DIAGNOSIS — I70.0 AORTIC ATHEROSCLEROSIS: ICD-10-CM

## 2017-03-14 DIAGNOSIS — R63.6 UNDERWEIGHT: ICD-10-CM

## 2017-03-14 DIAGNOSIS — J44.9 CHRONIC OBSTRUCTIVE PULMONARY DISEASE, UNSPECIFIED COPD TYPE: Primary | ICD-10-CM

## 2017-03-14 DIAGNOSIS — Z78.0 ASYMPTOMATIC MENOPAUSAL STATE: ICD-10-CM

## 2017-03-14 PROBLEM — I73.9 PERIPHERAL ARTERY DISEASE: Status: RESOLVED | Noted: 2017-01-15 | Resolved: 2017-03-14

## 2017-03-14 PROBLEM — J81.0 ACUTE PULMONARY EDEMA: Status: RESOLVED | Noted: 2017-01-15 | Resolved: 2017-03-14

## 2017-03-14 PROCEDURE — 99999 PR PBB SHADOW E&M-EST. PATIENT-LVL III: CPT | Mod: PBBFAC,,, | Performed by: INTERNAL MEDICINE

## 2017-03-14 PROCEDURE — 99213 OFFICE O/P EST LOW 20 MIN: CPT | Mod: PBBFAC | Performed by: INTERNAL MEDICINE

## 2017-03-14 PROCEDURE — 99214 OFFICE O/P EST MOD 30 MIN: CPT | Mod: S$PBB,,, | Performed by: INTERNAL MEDICINE

## 2017-03-14 NOTE — PROGRESS NOTES
Name and  verified client was instructed to stay in clinic for 15 min and report any difficulties

## 2017-03-14 NOTE — PROGRESS NOTES
Subjective:       Patient ID: Tea Georges is a 77 y.o. female.    Chief Complaint: Follow-up    HPI - Since last visit, admitted 1/15 for dyspnea again; labeled COPD exacerbation.  She feels fine today, though.  Brought in 'all' her meds, but several missing that she says she's taking.  She says she's taking an ensure daily to help gain weight; daughter with her disagreed.  She goes to dialysis 3x per week.  Due for dexa scan.  Nonsmoker, nondrinker.  Family history unchanged.    PMH:  DM2, in remission d/t dialysis.  off meds with controlled a1c  Diabetic eye complications  ESRD on dialysis  HTN - multiple medications  HLD  Aortic Stenosis, moderate to severe on echo 2016  Recurrent ER visits for multifactorial dyspnea, nov/dec 2016     Meds:  Discussed with patient during visit today, but unsure what she's actually taking.    Review of Systems   Constitutional: Negative for fever.   HENT: Negative for congestion.    Respiratory: Negative for shortness of breath.    Cardiovascular: Negative for chest pain.   Gastrointestinal: Negative for abdominal pain.   Genitourinary: Negative for difficulty urinating.   Musculoskeletal: Negative for arthralgias.   Skin: Negative for rash.   Neurological: Negative for headaches.   Psychiatric/Behavioral: Negative for sleep disturbance.       Objective:      Physical Exam   Constitutional: She is oriented to person, place, and time. She appears well-developed and well-nourished.   Frail elderly BF examined in wheelchair d/t debility   HENT:   Head: Normocephalic and atraumatic.   Cardiovascular: Normal rate and regular rhythm.  Exam reveals no gallop and no friction rub.    Murmur heard.  Pulmonary/Chest: Effort normal and breath sounds normal. No respiratory distress. She has no wheezes. She has no rales. She exhibits no tenderness.   Neurological: She is alert and oriented to person, place, and time.   Skin: Skin is warm and dry. No erythema.   Psychiatric: She has a normal  mood and affect.   Nursing note and vitals reviewed.      Assessment:       1. Chronic obstructive pulmonary disease, unspecified COPD type    2. Essential hypertension    3. Underweight    4. ESRD (end stage renal disease)    5. Aortic atherosclerosis    6. Asymptomatic menopausal state     7. Vitamin D deficiency        Plan:       Tea was seen today for follow-up.    Diagnoses and all orders for this visit:    Chronic obstructive pulmonary disease, unspecified COPD type - stable, stay the course  -     Pneumococcal Polysaccharide Vaccine (23 Valent) (SQ/IM)    Essential hypertension - elevated today, but she's due for dialysis and is frail.  Will not accelerate regimen d/t risk of falls    Underweight - due for dexa scan  -     DXA Bone Density Spine And Hip; Future    ESRD (end stage renal disease)  -     DXA Bone Density Spine And Hip; Future    Aortic atherosclerosis - noted on imaging    Asymptomatic menopausal state   -     DXA Bone Density Spine And Hip; Future    Vitamin D deficiency  -     DXA Bone Density Spine And Hip; Future    rtc prn, or 3 months.  Please bring ALL medications, inhalers and creams to next visit.    ARCELIA Posadas MD MPH  Staff Internist

## 2017-03-14 NOTE — MR AVS SNAPSHOT
[facility] - Internal Medicine  [address]  [facility] [address]  Phone: [phone]  Fax: [phone]                  Tea Georges   3/14/2017 8:20 AM   Office Visit    Description:  Female : 1940   Provider:  Parth Posadas II, MD   Department:  [facility] - Internal Medicine           Reason for Visit     Follow-up           Diagnoses this Visit        Comments    Chronic obstructive pulmonary disease, unspecified COPD type    -  Primary     Essential hypertension         Underweight         ESRD (end stage renal disease)         Aortic atherosclerosis         Asymptomatic menopausal state         Vitamin D deficiency                To Do List           Goals (5 Years of Data)     None      Follow-Up and Disposition     Return in about 3 months (around 2017).      [facility]     [facility] -  Assistance  Registered nurses in the [facility] provide clinical advisement, health education, appointment booking, and other advisory services.  Call for this free service at [phone].             Medications           Message regarding Medications     Verify the changes and/or additions to your medication regime listed below are the same as discussed with your clinician today.  If any of these changes or additions are incorrect, please notify your healthcare provider.        STOP taking these medications     artificial tears (ISOPTO TEARS) 0.5 % ophthalmic solution Place 1 drop into both eyes 3 (three) times daily.    clotrimazole (LOTRIMIN) 1 % cream Apply topically 2 (two) times daily.           Verify that the below list of medications is an accurate representation of the medications you are currently taking.  If none reported, the list may be blank. If incorrect, please contact your healthcare provider. Carry this list with you in case of emergency.           Current Medications     albuterol 90 mcg/actuation inhaler Inhale 1-2 puffs into  "the lungs every 6 (six) hours as needed for Wheezing.    amlodipine (NORVASC) 10 MG tablet Take 1 tablet (10 mg total) by mouth once daily.    aspirin 81 MG Chew Take 1 tablet (81 mg total) by mouth once daily.    atorvastatin (LIPITOR) 40 MG tablet Take 1 tablet (40 mg total) by mouth once daily.    carvedilol (COREG) 25 MG tablet Take 1 tablet (25 mg total) by mouth 2 (two) times daily.    cloNIDine 0.3 mg/24 hr td ptwk (CATAPRES) 0.3 mg/24 hr Place 1 patch onto the skin every 7 days.    clopidogrel (PLAVIX) 75 mg tablet Take 1 tablet (75 mg total) by mouth once daily.    docusate sodium (COLACE) 100 MG capsule Take 1 capsule (100 mg total) by mouth 2 (two) times daily.    epoetin shon (PROCRIT) 10,000 unit/mL injection Inject 0.5 mLs (5,000 Units total) into the skin every Mon, Wed, Fri.    ergocalciferol (ERGOCALCIFEROL) 50,000 unit Cap Take 1 capsule (50,000 Units total) by mouth every 7 days.    fluticasone-vilanterol (BREO ELLIPTA) 200-25 mcg/dose DsDv diskus inhaler Inhale 1 puff into the lungs 2 (two) times daily.    hydrALAZINE (APRESOLINE) 50 MG tablet Take 1.5 tablets (75 mg total) by mouth every 6 (six) hours.    isosorbide dinitrate (ISOCHRON) 40 mg TbSR Take 1 tablet (40 mg total) by mouth every 12 (twelve) hours.    lidocaine-prilocaine (EMLA) cream Apply topically as needed (to apply over access 1/2 over prior to dialysis).    losartan (COZAAR) 100 MG tablet Take 1 tablet (100 mg total) by mouth once daily.    multivitamin (THERAGRAN) per tablet Take 1 tablet by mouth once daily.    nifedipine (PROCARDIA-XL) 90 MG (OSM) TR24 take 2 tablets by mouth every morning    sodium chloride 0.9% 0.9 % SolP 100 mL with ferric gluconate 62.5 mg/5 mL Soln 125 mg Inject 125 mg into the vein daily as needed (on dialysis).           Clinical Reference Information           Your Vitals Were     BP Pulse Temp Height Weight Last Period    145/56 59 97.6 °F (36.4 °C) 5' 2" (1.575 m) 45.8 kg (100 lb 15.5 oz) (LMP " Unknown)    BMI                18.47 kg/m2          Blood Pressure          Most Recent Value    BP  (!)  145/56      Allergies as of 3/14/2017     No Known Allergies      Immunizations Administered on Date of Encounter - 3/14/2017     Name Date Dose VIS Date Route    Pneumococcal Polysaccharide - 23 Valent  Incomplete 0.5 mL 4/24/2015 Intramuscular      Orders Placed During Today's Visit      Normal Orders This Visit    Pneumococcal Polysaccharide Vaccine (23 Valent) (SQ/IM)     Future Labs/Procedures Expected by Expires    DXA Bone Density Spine And Hip  3/14/2017 3/14/2018      Maintenance Dialysis History     Start End Type Comments Center    1/1/1888    [facility]            Current Dialysis Center Information     [facility] [address], [place] Phone #:  [phone]    Contact:  N/A JOJO DHALIWAL  [address] Fax #:  [phone]            [facility] Sign-Up     Activating your [facility] account is as easy as 1-2-3!     1) Visit [email or web address], select Sign Up Now, enter this activation code and your date of birth, then select Next.  [email or web address]  Expires: 4/28/2017  9:20 AM      2) Create a username and password to use when you visit [facility] in the future and select a security question in case you lose your password and select Next.    3) Enter your e-mail address and click Sign Up!    Additional Information  If you have questions, please e-mail [email or web address] or call [phone] to talk to our [facility] staff. Remember, [facility] is NOT to be used for urgent needs. For medical emergencies, dial 911.         Language Assistance Services     ATTENTION: Language assistance services are available, free of charge. Please call [phone].      ATENCIÓN: Si habla sheela, tiene a barajas disposición servicios gratuitos de asistencia lingüística. Llame al [phone].     CHÚ Ý: N?u b?n nói Ti?ng Vi?t, có các d?ch v? h? tr? ngôn ng? mi?n phí dành cho b?n. G?i s?  [phone].         [facility] complies with applicable Federal civil rights laws and does not discriminate on the basis of race, color, national origin, age, disability, or sex.

## 2017-03-20 DIAGNOSIS — I35.0 AORTIC STENOSIS, MODERATE: Primary | ICD-10-CM

## 2017-04-07 RX ORDER — DOXAZOSIN 4 MG/1
TABLET ORAL
Qty: 30 TABLET | Refills: 6 | OUTPATIENT
Start: 2017-04-07

## 2017-04-10 ENCOUNTER — CLINICAL SUPPORT (OUTPATIENT)
Dept: ELECTROPHYSIOLOGY | Facility: CLINIC | Age: 77
End: 2017-04-10
Payer: MEDICARE

## 2017-04-10 DIAGNOSIS — Z95.818 STATUS POST PLACEMENT OF IMPLANTABLE LOOP RECORDER: ICD-10-CM

## 2017-04-10 DIAGNOSIS — I63.9 CRYPTOGENIC STROKE: ICD-10-CM

## 2017-04-10 PROCEDURE — 93299 LOOP RECORDER REMOTE: CPT | Mod: PBBFAC | Performed by: INTERNAL MEDICINE

## 2017-05-01 ENCOUNTER — TELEPHONE (OUTPATIENT)
Dept: INTERNAL MEDICINE | Facility: CLINIC | Age: 77
End: 2017-05-01

## 2017-05-01 NOTE — TELEPHONE ENCOUNTER
----- Message from Bren Saini MA sent at 5/1/2017  3:56 PM CDT -----  Contact: Janee izaguirre/[facility] - [phone] Fax # [phone]   Requesting to have a currently medication list faxed to her so she can re-certify the patient

## 2017-05-02 RX ORDER — CLOPIDOGREL BISULFATE 75 MG/1
TABLET ORAL
Qty: 90 TABLET | Refills: 3 | Status: SHIPPED | OUTPATIENT
Start: 2017-05-02 | End: 2018-05-01 | Stop reason: SDUPTHER

## 2017-05-05 ENCOUNTER — TELEPHONE (OUTPATIENT)
Dept: INTERNAL MEDICINE | Facility: CLINIC | Age: 77
End: 2017-05-05

## 2017-05-05 NOTE — TELEPHONE ENCOUNTER
----- Message from Gracie Campos sent at 5/5/2017  1:09 PM CDT -----  Contact: [facility]   Type: Home Health (orders, updates, clarifications, etc.)    Home Health Agency/Nurse: [facility]     Phone number: [phone]    Reason for call: [facility] is calling to see if they can get an updated A1C test result on the patient.     Thanks!

## 2017-05-05 NOTE — TELEPHONE ENCOUNTER
No, I will not.  A1C done in January was 4.4.  Her diabetes is in remission d/t her dialysis status.  I will discuss this with the patient at her next clinic visit.    Please call and inform home health.  Dr. PEDRO

## 2017-05-10 PROCEDURE — 93297 REM INTERROG DEV EVAL ICPMS: CPT | Mod: ,,, | Performed by: INTERNAL MEDICINE

## 2017-05-11 NOTE — PT/OT/SLP DISCHARGE
Occupational Therapy Discharge Summary    Tea Georges  MRN: [id number]   Acute on chronic diastolic heart failure   Patient Discharged from acute Occupational Therapy on 1/17/2017.  Please refer to prior OT note dated on 1/16/2017 for functional status.     Assessment:   Patient appropriate for care in another setting. Patient has not met goals.  GOALS:   Occupational Therapy Goals     Not on file      Multidisciplinary Problems (Resolved)        Problem: Occupational Therapy Goal    Goal Priority Disciplines Outcome Interventions   Occupational Therapy Goal   (Resolved)     OT, PT/OT Outcome(s) achieved    Description:  Goals to be met by: 1/26/2017     Patient will increase functional independence with ADLs by performing:    Grooming while standing at sink with Supervision.  Rolling to Bilateral with Modified [place].   Supine to sit with Modified [place].  Stand pivot transfers with Modified [place].  Toilet transfer to toilet with Modified [place].  Upper extremity exercise program x15 reps per handout, with independence.              Reasons for Discontinuation of Therapy Services  Transfer to alternate level of care.      Plan:  Patient Discharged to: Home with Home Health Service.    SHERI Gibbs  5/11/2017

## 2017-05-22 ENCOUNTER — HOSPITAL ENCOUNTER (OUTPATIENT)
Facility: HOSPITAL | Age: 77
Discharge: HOME | End: 2017-05-22
Attending: INTERNAL MEDICINE | Admitting: INTERNAL MEDICINE
Payer: MEDICARE

## 2017-05-22 VITALS
BODY MASS INDEX: 17.36 KG/M2 | TEMPERATURE: 98 F | OXYGEN SATURATION: 100 % | HEIGHT: 63 IN | SYSTOLIC BLOOD PRESSURE: 160 MMHG | RESPIRATION RATE: 20 BRPM | WEIGHT: 98 LBS | HEART RATE: 62 BPM | DIASTOLIC BLOOD PRESSURE: 72 MMHG

## 2017-05-22 DIAGNOSIS — N18.6 ESRD (END STAGE RENAL DISEASE): Primary | Chronic | ICD-10-CM

## 2017-05-22 DIAGNOSIS — T82.858A STENOSIS OF ARTERIOVENOUS DIALYSIS FISTULA, INITIAL ENCOUNTER: ICD-10-CM

## 2017-05-22 DIAGNOSIS — T82.858D STENOSIS OF ARTERIOVENOUS DIALYSIS FISTULA, SUBSEQUENT ENCOUNTER: ICD-10-CM

## 2017-05-22 LAB — POCT GLUCOSE: 159 MG/DL (ref 70–110)

## 2017-05-22 PROCEDURE — 25000003 PHARM REV CODE 250

## 2017-05-22 PROCEDURE — 36902 INTRO CATH DIALYSIS CIRCUIT: CPT | Mod: ,,, | Performed by: INTERNAL MEDICINE

## 2017-05-22 PROCEDURE — C1769 GUIDE WIRE: HCPCS

## 2017-05-22 PROCEDURE — 63600175 PHARM REV CODE 636 W HCPCS

## 2017-05-22 NOTE — PROGRESS NOTES
Pt DC'd per MD order. Discharge instructions given including activity, wound care, S&S of infections, future appointments, and when to call MD. Medications reviewed including when to take next dose. PIV DC'd, catheter tip intact. Pt transported via wheelchair to front entrance to meet cab.

## 2017-05-22 NOTE — PLAN OF CARE
Problem: Patient Care Overview  Goal: Plan of Care Review  Outcome: Ongoing (interventions implemented as appropriate)  Received report from Sita. Patient s/p fistulogram, AAOx3. VSS, no c/o pain or discomfort at this time, resp even and unlabored. Gauze/tegaderm dressing to L upper arm is CDI. No active bleeding. No hematoma noted. Post procedure protocol reviewed with patient. Understanding verbalized. Nurse call bell within reach. Will continue to monitor per post procedure protocol.

## 2017-05-22 NOTE — BRIEF OP NOTE
[facility]  Brief Operative Note     SUMMARY     Surgery Date: 5/22/2017     Surgeon(s) and Role:     * Marshall Almanza MD - Primary    Assisting Surgeon: None    Pre-op Diagnosis:  Malfunction of arteriovenous dialysis fistula, subsequent encounter [T85.560D]    Post-op Diagnosis:  PTA of the cephalic arch stenosis   8 mm x 4 cm dorado balloon.    Procedure(s) (LRB):  FISTULOGRAM (Left)    Anesthesia: RN IV Sedation  Estimated Blood Loss: 5 mL         Specimens:   Specimen (12h ago through future)    None

## 2017-05-22 NOTE — DISCHARGE SUMMARY
[facility]  Discharge Note  Short Stay    Admit Date: 5/22/2017    Discharge Date and Time: No discharge date for patient encounter.     Attending Physician: Marshall Almanza MD     Discharge Provider: Marshall Almanza    Diagnoses:PTA of the cephalic arch stenosis   8 mm x 4 cm dorado balloon.  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       Discharged Condition: good    Hospital Course: Patient was admitted for an outpatient procedure and tolerated the procedure well with no complications.    Final Diagnoses: Same as principal problem.    Disposition: Home or Self Care    Follow up/Patient Instructions:    Medications:  Reconciled Home Medications:   Current Discharge Medication List      CONTINUE these medications which have NOT CHANGED    Details   albuterol 90 mcg/actuation inhaler Inhale 1-2 puffs into the lungs every 6 (six) hours as needed for Wheezing.  Qty: 1 Inhaler, Refills: 0      amlodipine (NORVASC) 10 MG tablet Take 1 tablet (10 mg total) by mouth once daily.  Qty: 30 tablet, Refills: 3      aspirin 81 MG Chew Take 1 tablet (81 mg total) by mouth once daily.  Qty: 30 tablet, Refills: 1      carvedilol (COREG) 25 MG tablet Take 1 tablet (25 mg total) by mouth 2 (two) times daily.  Qty: 60 tablet, Refills: 3      clopidogrel (PLAVIX) 75 mg tablet take 1 tablet by mouth once daily  Qty: 90 tablet, Refills: 3      docusate sodium (COLACE) 100 MG capsule Take 1 capsule (100 mg total) by mouth 2 (two) times daily.  Refills: 0      ergocalciferol (ERGOCALCIFEROL) 50,000 unit Cap Take 1 capsule (50,000 Units total) by mouth every 7 days.  Qty: 5 capsule, Refills: 1      fluticasone-vilanterol (BREO ELLIPTA) 200-25 mcg/dose DsDv diskus inhaler Inhale 1 puff into the lungs 2 (two) times daily.  Qty: 28 each, Refills: 1      hydrALAZINE  (APRESOLINE) 50 MG tablet Take 1.5 tablets (75 mg total) by mouth every 6 (six) hours.  Qty: 180 tablet, Refills: 3      isosorbide dinitrate (ISOCHRON) 40 mg TbSR Take 1 tablet (40 mg total) by mouth every 12 (twelve) hours.  Qty: 60 tablet, Refills: 11      losartan (COZAAR) 100 MG tablet Take 1 tablet (100 mg total) by mouth once daily.  Qty: 30 tablet, Refills: 3      multivitamin (THERAGRAN) per tablet Take 1 tablet by mouth once daily.      nifedipine (PROCARDIA-XL) 90 MG (OSM) TR24 take 2 tablets by mouth every morning  Qty: 60 tablet, Refills: 6      atorvastatin (LIPITOR) 40 MG tablet Take 1 tablet (40 mg total) by mouth once daily.  Qty: 30 tablet, Refills: 3      cloNIDine 0.3 mg/24 hr td ptwk (CATAPRES) 0.3 mg/24 hr Place 1 patch onto the skin every 7 days.  Qty: 4 patch, Refills: 3      epoetin shon (PROCRIT) 10,000 unit/mL injection Inject 0.5 mLs (5,000 Units total) into the skin every Mon, Wed, Fri.      lidocaine-prilocaine (EMLA) cream Apply topically as needed (to apply over access 1/2 over prior to dialysis).  Qty: 25 g, Refills: 1      sodium chloride 0.9% 0.9 % SolP 100 mL with ferric gluconate 62.5 mg/5 mL Soln 125 mg Inject 125 mg into the vein daily as needed (on dialysis).  Qty: 1 Bottle, Refills: 7           No discharge procedures on file.      Discharge Procedure Orders ; resume previous diet and activity.  Follow up in 3-4 months.

## 2017-05-22 NOTE — H&P
[facility]  History & Physical    Subjective:      Chief Complaint/Reason for Admission:  Prolonged bleeding after HD needle withdrawl    Tea Georges is a 77 y.o. female with left BC AVF presents here with Prolonged bleeding after HD needle withdrawl      Past Medical History:   Diagnosis Date    Anemia in ESRD (end-stage renal disease) 5/29/2016    Anticoagulant long-term use     Aortic atherosclerosis 11/22/2016    Aortic stenosis, moderate 2/18/2016    Asthma in adult without complication 1/8/2016    Bilateral low back pain without sciatica 11/17/2015    Blindness of right eye 11/12/2016    Cataract     Central retinal vein occlusion, right eye 6/3/2014    CHF (congestive heart failure)     Chronic diastolic heart failure 1/8/2016    Chronic respiratory failure with hypoxia 5/29/2016    Dependence on hemodialysis     Mon-Wed-Fri    Diverticulosis     Embolic stroke involving right middle cerebral artery     Enlarged LA (left atrium) 10/7/2016    Epiretinal membrane 7/17/2012    ESRD (end stage renal disease)     Essential hypertension 1/8/2016    History of GI diverticular bleed     5/22/16    Left flaccid hemiparesis 10/1/2016    Peripheral vascular disease, unspecified 11/22/2016    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 11/13/2016    Type 2 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, without long-term current use of insulin 3/26/2013    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, without long-term current use of insulin 3/26/2013    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     Past Surgical History:   Procedure Laterality Date    BREAST SURGERY      tumor removal x 2    CATARACT EXTRACTION      CHOLECYSTECTOMY      COLONOSCOPY N/A 5/23/2016    Procedure: COLONOSCOPY;  Surgeon: WILLIAM Colvin MD;  Location: [facility] ([address] [place] [address]);  Service: Endoscopy;   Laterality: N/A;    COLONOSCOPY N/A 5/30/2016    Procedure: COLONOSCOPY;  Surgeon: Sam Davis MD;  Location: [facility] ([address] [place] [address]);  Service: Endoscopy;  Laterality: N/A;    UPPER GASTROINTESTINAL ENDOSCOPY       Family History   Problem Relation Age of Onset    Cataracts Mother     Stroke Mother     Hypertension Mother     Cancer Sister     Hypertension Sister     Heart failure Sister     Glaucoma Brother     Hypertension Brother     Heart disease Brother     Hypertension Father     Glaucoma Maternal Aunt     Esophageal cancer Sister     No Known Problems Maternal Uncle     No Known Problems Paternal Aunt     No Known Problems Paternal Uncle     No Known Problems Maternal Grandmother     No Known Problems Maternal Grandfather     No Known Problems Paternal Grandmother     No Known Problems Paternal Grandfather     Heart attack Neg Hx     Colon cancer Neg Hx     Stomach cancer Neg Hx     Anemia Neg Hx     Arrhythmia Neg Hx     Asthma Neg Hx     Clotting disorder Neg Hx     Fainting Neg Hx     Hyperlipidemia Neg Hx     Atrial Septal Defect Neg Hx      Social History   Substance Use Topics    Smoking status: Never Smoker    Smokeless tobacco: Never Used    Alcohol use No      Comment: Reports occasional 1-2 drinks        PTA Medications   Medication Sig    albuterol 90 mcg/actuation inhaler Inhale 1-2 puffs into the lungs every 6 (six) hours as needed for Wheezing.    amlodipine (NORVASC) 10 MG tablet Take 1 tablet (10 mg total) by mouth once daily.    aspirin 81 MG Chew Take 1 tablet (81 mg total) by mouth once daily.    atorvastatin (LIPITOR) 40 MG tablet Take 1 tablet (40 mg total) by mouth once daily.    carvedilol (COREG) 25 MG tablet Take 1 tablet (25 mg total) by mouth 2 (two) times daily.    cloNIDine 0.3 mg/24 hr td ptwk (CATAPRES) 0.3 mg/24 hr Place 1 patch onto the skin every 7 days. (Patient taking differently: Place 1 patch onto the skin every Saturday. )     clopidogrel (PLAVIX) 75 mg tablet take 1 tablet by mouth once daily    docusate sodium (COLACE) 100 MG capsule Take 1 capsule (100 mg total) by mouth 2 (two) times daily.    epoetin shon (PROCRIT) 10,000 unit/mL injection Inject 0.5 mLs (5,000 Units total) into the skin every Mon, Wed, Fri. (Patient taking differently: Inject 5,000 Units into the skin every Mon, Wed, Fri. )    ergocalciferol (ERGOCALCIFEROL) 50,000 unit Cap Take 1 capsule (50,000 Units total) by mouth every 7 days.    fluticasone-vilanterol (BREO ELLIPTA) 200-25 mcg/dose DsDv diskus inhaler Inhale 1 puff into the lungs 2 (two) times daily.    hydrALAZINE (APRESOLINE) 50 MG tablet Take 1.5 tablets (75 mg total) by mouth every 6 (six) hours.    isosorbide dinitrate (ISOCHRON) 40 mg TbSR Take 1 tablet (40 mg total) by mouth every 12 (twelve) hours.    lidocaine-prilocaine (EMLA) cream Apply topically as needed (to apply over access 1/2 over prior to dialysis).    losartan (COZAAR) 100 MG tablet Take 1 tablet (100 mg total) by mouth once daily.    multivitamin (THERAGRAN) per tablet Take 1 tablet by mouth once daily.    nifedipine (PROCARDIA-XL) 90 MG (OSM) TR24 take 2 tablets by mouth every morning    sodium chloride 0.9% 0.9 % SolP 100 mL with ferric gluconate 62.5 mg/5 mL Soln 125 mg Inject 125 mg into the vein daily as needed (on dialysis).     Review of patient's allergies indicates:  No Known Allergies     ROS  Constitutional: No fever or chills, no weight changes.  Eyes: No visual changes or photophobia  HEENT: No nasal congestion or sore throat  Respiratory: No cough or shortness of breath  Cardiovascular: No chest pain or palpitations  Gastrointestinal: Good appetite, no nausea or vomiting, no change in bowel habits  Genitourinary: No hematuria or dysuria  Skin: No rash or pruritis  Hematologic/lymphatic: No easy bruising, bleeding or lymphadenopathy  Musculoskeletal: No arthralgias or myalgias  Neurological: No seizures or  tremors  Endocrine: No heat/cold intolerance.  No polyuria/polydipsia.  Psychiatric:  No depression or anxiety.  Objective:      Vital Signs (Most Recent)       Vital Signs Range (Last 24H):       Physical Exam   Constitutional: No fever or chills, no weight changes.  Eyes: No visual changes or photophobia  HEENT: No nasal congestion or sore throat  Respiratory: No cough or shortness of breath  Cardiovascular: No chest pain or palpitations  Gastrointestinal: Good appetite, no nausea or vomiting, no change in bowel habits  Genitourinary: No hematuria or dysuria  Skin: No rash or pruritis  Hematologic/lymphatic: No easy bruising, bleeding or lymphadenopathy  Musculoskeletal: No arthralgias or myalgias  Neurological: No seizures or tremors  Endocrine: No heat/cold intolerance.  No polyuria/polydipsia.  Psychiatric:  No depression or anxiety.  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       Plan:    1. Fistulagram with PTA.

## 2017-05-23 ENCOUNTER — TELEPHONE (OUTPATIENT)
Dept: INTERNAL MEDICINE | Facility: CLINIC | Age: 77
End: 2017-05-23

## 2017-05-23 NOTE — OP NOTE
DATE OF PROCEDURE:  05/22/2017.    PROCEDURE TYPE:  Fistulogram of left upper extremity brachiocephalic AV fistula.    INDICATION:  Prolonged bleeding after dialysis needle withdrawal.    :  MK Headley MD ( Assisting the case as there was no qualified resident)        POSTPROCEDURE DIAGNOSES:  1.  Superior venacavogram.  2.  Subclavian venogram.  3.  Axillary venogram, cephalic arch in-stent stenosis 70%, balloon   angioplastied with a 9 mm x 4 cm balloon, prior to that 8 mm x 4 cm balloon,   post-angioplasty angiogram revealed excellent results.  Followed by that there   was intrafistula stenosis, balloon angioplastied with a 9 mm x 4 cm Stratford   balloon, prior to that with 8 mm x 6 cm Stratford balloon.  Reflex arteriogram   revealed intrafistula stenosis.    PROCEDURE NOTE IN DETAIL:  After informed consent was obtained from Ms. Georges,   she was brought into the Access Suite and placed in a supine position.  The area   of her left brachiocephalic AV fistula was cleaned and draped in the usual   sterile fashion.  After achieving local anesthesia with lidocaine and   epinephrine, access was cannulated with a micropuncture needle in antegrade   direction  5 Tanzanian sheath was established.  Multiple angiographic runs revealed there was   patent superior vena cava, subclavian vein, axillary vein and the cephalic arch   in-stent stenosis of 70%, followed by intrafistula stenosis in the cephalic vein   70%.  Given significant bleeding, attention was turned towards intervening this   lesion.  Initially, an angled Glidewire was advanced under fluoroscopy guidance   into the SVC, followed by a 5-Czech, and exchanged for a 7-Czech sheath.    Initially, a cephalic arch in-stent stenosis was balloon angioplastied with 8 mm   x 4 cm Stratford balloon and intrafistula stenosis was balloon angioplastied with   8 mm x 4 cm Stratford balloon, and given residual stenosis a 9 mm x 4 cm Stratford   balloon  was used to treat the in-stent stenosis of the cephalic arch and also   the intrafistula stenosis.  Post-angioplasty angiogram revealed excellent   results with less than 10% residual stenosis.  The patient tolerated the   procedure well.  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  Given the significant   improvement, no stent was placed.  In any case, the balloon and wire was removed   after deflating it, and 7-Bruneian sheath was removed after securing with 3-0   Prolene suture.  No immediate complication.  Estimated blood loss was 5 mL.  The   patient was discharged from [facility] in a stable condition.            /jacky [id number] blank(s)        DRK/VINCE  dd: 05/22/2017 18:26:29 (CDT)  td: 05/22/2017 23:25:22 (CDT)  Doc ID   #[id number]  Job ID #[id number]    CC:       Certification of Assistant at Surgery       Surgery Date: 5/22/2017     Participating Surgeons:  Surgeon(s) and Role:     * Marshall Almanza MD - Primary    Procedures:  Procedure(s) (LRB):  FISTULOGRAM (Left)    Assistant Surgeon's Certification of Necessity:  I understand that section 1842 (b) (6) (d) of the Social Security Act generally prohibits Medicare Part B reasonable charge payment for the services of assistants at surgery in teaching hospitals when qualified residents are available to furnish such services. I certify that the services for which payment is claimed were medically necessary, and that no qualified resident was available to perform the services. I further understand that these services are subject to post-payment review by the Medicare carrier.      Maurilio Spears MD    05/25/2017  2:34 PM

## 2017-05-23 NOTE — TELEPHONE ENCOUNTER
Spoke to Edwina. Wanted to let Dr. Posadas know she is faxing some paperwork for him to fill out.

## 2017-05-23 NOTE — TELEPHONE ENCOUNTER
----- Message from Nakita Coreas sent at 5/23/2017 11:55 AM CDT -----  Contact: [facility]/[phone]  Would like information on pt's last visit.Please advise.

## 2017-06-08 ENCOUNTER — TELEPHONE (OUTPATIENT)
Dept: INTERNAL MEDICINE | Facility: CLINIC | Age: 77
End: 2017-06-08

## 2017-06-08 NOTE — TELEPHONE ENCOUNTER
----- Message from Gracie Campos sent at 6/8/2017 11:12 AM CDT -----  Contact: Iain with [employer], phone [phone]  Iain is faxing an order to renew Oxygen.  They will need the doctor to sign annual re-certification, send most recent visit note.  Please include in note that she still needs oxygen.  Fax [phone].    Thanks!

## 2017-06-12 ENCOUNTER — CLINICAL SUPPORT (OUTPATIENT)
Dept: ELECTROPHYSIOLOGY | Facility: CLINIC | Age: 77
End: 2017-06-12
Payer: MEDICARE

## 2017-06-12 DIAGNOSIS — Z95.818 STATUS POST PLACEMENT OF IMPLANTABLE LOOP RECORDER: ICD-10-CM

## 2017-06-12 DIAGNOSIS — I63.9 CRYPTOGENIC STROKE: ICD-10-CM

## 2017-06-12 PROCEDURE — 93299 LOOP RECORDER REMOTE: CPT | Mod: PBBFAC | Performed by: INTERNAL MEDICINE

## 2017-06-15 ENCOUNTER — TELEPHONE (OUTPATIENT)
Dept: INTERNAL MEDICINE | Facility: CLINIC | Age: 77
End: 2017-06-15

## 2017-06-15 NOTE — TELEPHONE ENCOUNTER
Pt will like something that can help with runny nose but will not raise her blood pressure please advise will call pt with advice

## 2017-06-15 NOTE — TELEPHONE ENCOUNTER
----- Message from Keke Payne sent at 6/15/2017  3:28 PM CDT -----  Contact: Janee/ [facility]/ [phone]   Janee wanted to know if the doctor can send some medication for the pt for Allergy. Pt has a running nose and eyes. Pt want to have something that want raise her blood pressure. Pt blood pressure was 187/62 and the pt did not have the patch on. Please call and advise     Thank you

## 2017-06-20 ENCOUNTER — OFFICE VISIT (OUTPATIENT)
Dept: INTERNAL MEDICINE | Facility: CLINIC | Age: 77
End: 2017-06-20
Payer: MEDICARE

## 2017-06-20 VITALS
BODY MASS INDEX: 17.93 KG/M2 | SYSTOLIC BLOOD PRESSURE: 182 MMHG | HEIGHT: 63 IN | HEART RATE: 69 BPM | WEIGHT: 101.19 LBS | TEMPERATURE: 98 F | DIASTOLIC BLOOD PRESSURE: 64 MMHG

## 2017-06-20 DIAGNOSIS — F32.1 MODERATE SINGLE CURRENT EPISODE OF MAJOR DEPRESSIVE DISORDER: ICD-10-CM

## 2017-06-20 DIAGNOSIS — N18.6 ANEMIA IN ESRD (END-STAGE RENAL DISEASE): Chronic | ICD-10-CM

## 2017-06-20 DIAGNOSIS — Z78.0 ASYMPTOMATIC MENOPAUSAL STATE: ICD-10-CM

## 2017-06-20 DIAGNOSIS — N18.6 ESRD (END STAGE RENAL DISEASE): Chronic | ICD-10-CM

## 2017-06-20 DIAGNOSIS — E55.9 VITAMIN D DEFICIENCY: ICD-10-CM

## 2017-06-20 DIAGNOSIS — D63.1 ANEMIA IN ESRD (END-STAGE RENAL DISEASE): Chronic | ICD-10-CM

## 2017-06-20 DIAGNOSIS — J44.9 CHRONIC OBSTRUCTIVE PULMONARY DISEASE, UNSPECIFIED COPD TYPE: ICD-10-CM

## 2017-06-20 DIAGNOSIS — I10 ESSENTIAL HYPERTENSION: Primary | Chronic | ICD-10-CM

## 2017-06-20 PROCEDURE — 99214 OFFICE O/P EST MOD 30 MIN: CPT | Mod: S$PBB,,, | Performed by: INTERNAL MEDICINE

## 2017-06-20 PROCEDURE — 1126F AMNT PAIN NOTED NONE PRSNT: CPT | Mod: ,,, | Performed by: INTERNAL MEDICINE

## 2017-06-20 PROCEDURE — 99999 PR PBB SHADOW E&M-EST. PATIENT-LVL III: CPT | Mod: PBBFAC,,, | Performed by: INTERNAL MEDICINE

## 2017-06-20 PROCEDURE — 99213 OFFICE O/P EST LOW 20 MIN: CPT | Mod: PBBFAC | Performed by: INTERNAL MEDICINE

## 2017-06-20 PROCEDURE — 1159F MED LIST DOCD IN RCRD: CPT | Mod: ,,, | Performed by: INTERNAL MEDICINE

## 2017-06-20 RX ORDER — B COMPLEX, C NO.20/FOLIC ACID 1 MG
CAPSULE ORAL
Refills: 0 | COMMUNITY
Start: 2017-04-20 | End: 2018-05-01

## 2017-06-20 RX ORDER — ALBUTEROL SULFATE 0.83 MG/ML
SOLUTION RESPIRATORY (INHALATION)
Refills: 0 | Status: ON HOLD | COMMUNITY
Start: 2017-04-24 | End: 2017-09-16 | Stop reason: HOSPADM

## 2017-06-20 RX ORDER — CITALOPRAM 10 MG/1
10 TABLET ORAL DAILY
Qty: 90 TABLET | Refills: 3 | Status: SHIPPED | OUTPATIENT
Start: 2017-06-20 | End: 2019-05-21 | Stop reason: CLARIF

## 2017-06-20 RX ORDER — FLUTICASONE FUROATE AND VILANTEROL 200; 25 UG/1; UG/1
1 POWDER RESPIRATORY (INHALATION) DAILY
Qty: 90 EACH | Refills: 3 | Status: SHIPPED | OUTPATIENT
Start: 2017-06-20 | End: 2018-02-22 | Stop reason: SDUPTHER

## 2017-06-20 RX ORDER — AMLODIPINE BESYLATE 10 MG/1
10 TABLET ORAL DAILY
Qty: 90 TABLET | Refills: 3 | Status: SHIPPED | OUTPATIENT
Start: 2017-06-20 | End: 2018-05-01 | Stop reason: SDUPTHER

## 2017-06-20 RX ORDER — ERGOCALCIFEROL 1.25 MG/1
50000 CAPSULE ORAL
Qty: 12 CAPSULE | Refills: 3 | Status: ON HOLD | OUTPATIENT
Start: 2017-06-20 | End: 2021-03-10 | Stop reason: HOSPADM

## 2017-06-20 RX ORDER — CALCIUM ACETATE 667 MG/1
CAPSULE ORAL
Refills: 6 | COMMUNITY
Start: 2017-06-01 | End: 2018-10-30

## 2017-06-20 RX ORDER — FLUTICASONE PROPIONATE 44 UG/1
1 AEROSOL, METERED RESPIRATORY (INHALATION) 2 TIMES DAILY
COMMUNITY
End: 2018-02-22

## 2017-06-20 RX ORDER — VALSARTAN 160 MG/1
160 TABLET ORAL DAILY
Qty: 90 TABLET | Refills: 3 | Status: SHIPPED | OUTPATIENT
Start: 2017-06-20 | End: 2017-09-21 | Stop reason: SDUPTHER

## 2017-06-20 NOTE — PROGRESS NOTES
"Subjective:       Patient ID: Tea Georges is a 77 y.o. female.    Chief Complaint: Hypertension; Depression; and Diabetes    HPI - Mrs. Georges was dysthymic today, talking about trouble with her children and grandchildren.  Unhappy that she can't get them to clean house and that she can no longer do things that she used to be able to do.  She stated she could just "kill them", but had no plan and promised me she would not do that.  No real hobbies other than talking on the phone and watching television.  No other options as to where to live.    Says her BP has been elevated for the past week.  Ran out of two antihypertensives a couple of days ago.  She brought her pill bottles in, and she's on two CAB's (nifedipine and amlodipine) and relatively weak losartan.  No dyspnea or chest pain today.    She declined pneumovax and shingles vaccine.  Requested a letter from me attesting that her power should not be shut off b/c of her home oxygen.  She's going to dialysis regularly as scheduled.    She needs refills on a few medications.  Due for a dexa scan, which she will complete.  She's a nonsmoker, nondrinker.    PMH:  DM2, in remission d/t dialysis.  off meds  Diabetic eye complications  ESRD on dialysis  HTN - poor control despite multiple medications  HLD  Aortic Stenosis, moderate to severe on echo 2016  Recurrent ER visits for multifactorial dyspnea, nov/dec 2016     Meds:  Discussed with patient during visit today, but unsure what she's actually taking.    Review of Systems   Constitutional: Negative for fever.   HENT: Negative for congestion.    Respiratory: Negative for shortness of breath.    Cardiovascular: Negative for chest pain.   Gastrointestinal: Negative for abdominal pain.   Genitourinary: Negative for difficulty urinating.   Musculoskeletal: Negative for arthralgias.   Skin: Negative for rash.   Neurological: Negative for headaches.   Psychiatric/Behavioral: Positive for dysphoric mood. Negative for " suicidal ideas.       Objective:      Physical Exam   Constitutional: She is oriented to person, place, and time. She appears well-developed and well-nourished.   HENT:   Head: Normocephalic and atraumatic.   Cardiovascular: Normal rate, regular rhythm and normal heart sounds.  Exam reveals no gallop and no friction rub.    No murmur heard.  Pulmonary/Chest: Effort normal and breath sounds normal. No respiratory distress. She has no wheezes. She has no rales. She exhibits no tenderness.   Neurological: She is alert and oriented to person, place, and time.   Skin: Skin is warm and dry. No erythema.   Psychiatric: She has a normal mood and affect.   Nursing note and vitals reviewed.      Assessment:       1. Essential hypertension    2. ESRD (end stage renal disease)    3. Chronic obstructive pulmonary disease, unspecified COPD type    4. Anemia in ESRD (end-stage renal disease)    5. Asymptomatic menopausal state     6. Vitamin D deficiency    7. Moderate single current episode of major depressive disorder        Plan:       Tea was seen today for hypertension, depression and diabetes.    Diagnoses and all orders for this visit:    Essential hypertension - not at goal.  Stopping duplicate CAB today; shifting from losartan to valsartan.  Refilling what she needs.  Will look again next time  -     amlodipine (NORVASC) 10 MG tablet; Take 1 tablet (10 mg total) by mouth once daily.  -     valsartan (DIOVAN) 160 MG tablet; Take 1 tablet (160 mg total) by mouth once daily.    ESRD (end stage renal disease) -s table, on dialysis    Chronic obstructive pulmonary disease, unspecified COPD type - stable, refilling medications  -     fluticasone-vilanterol (BREO ELLIPTA) 200-25 mcg/dose DsDv diskus inhaler; Inhale 1 puff into the lungs once daily.    Anemia in ESRD (end-stage renal disease) - stable    Asymptomatic menopausal state - she's well overdue for a dxa.  Will order this  -     DXA Bone Density Spine And Hip;  Future    Vitamin D deficiency - stable, reordering vitamin D  -     DXA Bone Density Spine And Hip; Future  -     ergocalciferol (ERGOCALCIFEROL) 50,000 unit Cap; Take 1 capsule (50,000 Units total) by mouth every 7 days.    Moderate single current episode of major depressive disorder - biggest issue, actually. Unstable, but she contracted for safety.  Offered psychotherapy vs meds vs both; she decided to use medication b/c of transportation difficulty.  -     citalopram (CELEXA) 10 MG tablet; Take 1 tablet (10 mg total) by mouth once daily.    rtc 3 months    ARCELIA Posadas MD MPH  Staff Internist

## 2017-06-26 ENCOUNTER — TELEPHONE (OUTPATIENT)
Dept: INTERNAL MEDICINE | Facility: CLINIC | Age: 77
End: 2017-06-26

## 2017-06-26 NOTE — TELEPHONE ENCOUNTER
----- Message from Zabrina Fung sent at 6/26/2017  9:21 AM CDT -----  Contact: Courtney with [facility] asked that the nurse please call the patient's daughter  to see if Dr Posadas can see the patient  tomorrow orThursday as her right  Eye is leaking.  Courtney did schedule an appt for 7/11/17 as she can only come Tuesday's, Thursdays or Saturday's  Daughter's # is [phone]

## 2017-06-26 NOTE — TELEPHONE ENCOUNTER
Called pt left Vm requesting return call in regards to scheduling sooner appt. Attempted to call nurse but phone message says no calls can be accepted at this time.

## 2017-08-10 PROCEDURE — 93297 REM INTERROG DEV EVAL ICPMS: CPT | Mod: ,,, | Performed by: INTERNAL MEDICINE

## 2017-09-11 ENCOUNTER — CLINICAL SUPPORT (OUTPATIENT)
Dept: ELECTROPHYSIOLOGY | Facility: CLINIC | Age: 77
End: 2017-09-11
Payer: MEDICARE

## 2017-09-11 ENCOUNTER — TELEPHONE (OUTPATIENT)
Dept: INTERNAL MEDICINE | Facility: CLINIC | Age: 77
End: 2017-09-11

## 2017-09-11 DIAGNOSIS — I63.9 CRYPTOGENIC STROKE: ICD-10-CM

## 2017-09-11 DIAGNOSIS — Z95.818 STATUS POST PLACEMENT OF IMPLANTABLE LOOP RECORDER: ICD-10-CM

## 2017-09-11 NOTE — TELEPHONE ENCOUNTER
----- Message from Mounika Nunez sent at 9/8/2017  2:51 PM CDT -----  Contact: Janee Bedolla with with [facility]  [phone]  She has requested a home health recertification since yesterday and she has not head form anyone. Please call her today. Call her at [phone].

## 2017-09-14 ENCOUNTER — HOSPITAL ENCOUNTER (OUTPATIENT)
Facility: HOSPITAL | Age: 77
Discharge: HOME | End: 2017-09-16
Attending: EMERGENCY MEDICINE | Admitting: EMERGENCY MEDICINE
Payer: MEDICARE

## 2017-09-14 DIAGNOSIS — R10.33 PERIUMBILICAL ABDOMINAL PAIN: ICD-10-CM

## 2017-09-14 DIAGNOSIS — R06.02 SHORTNESS OF BREATH: ICD-10-CM

## 2017-09-14 DIAGNOSIS — Z99.2 NEED FOR ACUTE HEMODIALYSIS: Primary | ICD-10-CM

## 2017-09-14 DIAGNOSIS — N18.6 ESRD (END STAGE RENAL DISEASE): Chronic | ICD-10-CM

## 2017-09-14 LAB
ALBUMIN SERPL BCP-MCNC: 3.3 G/DL
ALP SERPL-CCNC: 88 U/L
ALT SERPL W/O P-5'-P-CCNC: 7 U/L
ANION GAP SERPL CALC-SCNC: 12 MMOL/L
AST SERPL-CCNC: 10 U/L
BASOPHILS # BLD AUTO: 0.02 K/UL
BASOPHILS NFR BLD: 0.5 %
BILIRUB SERPL-MCNC: 0.7 MG/DL
BUN SERPL-MCNC: 60 MG/DL (ref 6–30)
BUN SERPL-MCNC: 68 MG/DL
CALCIUM SERPL-MCNC: 8.7 MG/DL
CHLORIDE SERPL-SCNC: 99 MMOL/L
CHLORIDE SERPL-SCNC: 99 MMOL/L (ref 95–110)
CO2 SERPL-SCNC: 27 MMOL/L
CREAT SERPL-MCNC: 7 MG/DL
CREAT SERPL-MCNC: 7.2 MG/DL (ref 0.5–1.4)
DIFFERENTIAL METHOD: ABNORMAL
EOSINOPHIL # BLD AUTO: 0.1 K/UL
EOSINOPHIL NFR BLD: 2.1 %
ERYTHROCYTE [DISTWIDTH] IN BLOOD BY AUTOMATED COUNT: 17.5 %
EST. GFR  (AFRICAN AMERICAN): 6.1 ML/MIN/1.73 M^2
EST. GFR  (NON AFRICAN AMERICAN): 5.3 ML/MIN/1.73 M^2
GLUCOSE SERPL-MCNC: 134 MG/DL (ref 70–110)
GLUCOSE SERPL-MCNC: 137 MG/DL
HCT VFR BLD AUTO: 34.7 %
HCT VFR BLD CALC: 38 %PCV (ref 36–54)
HGB BLD-MCNC: 11 G/DL
LACTATE SERPL-SCNC: 0.5 MMOL/L
LIPASE SERPL-CCNC: 31 U/L
LYMPHOCYTES # BLD AUTO: 0.6 K/UL
LYMPHOCYTES NFR BLD: 14.7 %
MAGNESIUM SERPL-MCNC: 2.3 MG/DL
MCH RBC QN AUTO: 27.8 PG
MCHC RBC AUTO-ENTMCNC: 31.7 G/DL
MCV RBC AUTO: 88 FL
MONOCYTES # BLD AUTO: 0.5 K/UL
MONOCYTES NFR BLD: 11 %
NEUTROPHILS # BLD AUTO: 3.1 K/UL
NEUTROPHILS NFR BLD: 71.7 %
PHOSPHATE SERPL-MCNC: 5.8 MG/DL
PLATELET # BLD AUTO: 108 K/UL
PMV BLD AUTO: ABNORMAL FL
POC IONIZED CALCIUM: 1.04 MMOL/L (ref 1.06–1.42)
POC TCO2 (MEASURED): 24 MMOL/L (ref 23–29)
POTASSIUM BLD-SCNC: 4.4 MMOL/L (ref 3.5–5.1)
POTASSIUM SERPL-SCNC: 4.4 MMOL/L
PROT SERPL-MCNC: 7.1 G/DL
RBC # BLD AUTO: 3.96 M/UL
SAMPLE: ABNORMAL
SODIUM BLD-SCNC: 136 MMOL/L (ref 136–145)
SODIUM SERPL-SCNC: 138 MMOL/L
WBC # BLD AUTO: 4.28 K/UL

## 2017-09-14 PROCEDURE — 86900 BLOOD TYPING SEROLOGIC ABO: CPT

## 2017-09-14 PROCEDURE — 36415 COLL VENOUS BLD VENIPUNCTURE: CPT

## 2017-09-14 PROCEDURE — 83690 ASSAY OF LIPASE: CPT

## 2017-09-14 PROCEDURE — 86920 COMPATIBILITY TEST SPIN: CPT

## 2017-09-14 PROCEDURE — 83735 ASSAY OF MAGNESIUM: CPT

## 2017-09-14 PROCEDURE — 84100 ASSAY OF PHOSPHORUS: CPT

## 2017-09-14 PROCEDURE — 25000003 PHARM REV CODE 250: Performed by: HOSPITALIST

## 2017-09-14 PROCEDURE — 83605 ASSAY OF LACTIC ACID: CPT

## 2017-09-14 PROCEDURE — G0378 HOSPITAL OBSERVATION PER HR: HCPCS

## 2017-09-14 PROCEDURE — 99285 EMERGENCY DEPT VISIT HI MDM: CPT | Mod: ,,,

## 2017-09-14 PROCEDURE — 99220 PR INITIAL OBSERVATION CARE,LEVL III: CPT | Mod: ,,, | Performed by: HOSPITALIST

## 2017-09-14 PROCEDURE — 85025 COMPLETE CBC W/AUTO DIFF WBC: CPT

## 2017-09-14 PROCEDURE — 80053 COMPREHEN METABOLIC PANEL: CPT

## 2017-09-14 PROCEDURE — 87209 SMEAR COMPLEX STAIN: CPT

## 2017-09-14 PROCEDURE — 99285 EMERGENCY DEPT VISIT HI MDM: CPT

## 2017-09-14 PROCEDURE — 25500020 PHARM REV CODE 255: Performed by: EMERGENCY MEDICINE

## 2017-09-14 PROCEDURE — 87449 NOS EACH ORGANISM AG IA: CPT

## 2017-09-14 PROCEDURE — 87045 FECES CULTURE AEROBIC BACT: CPT

## 2017-09-14 PROCEDURE — 87046 STOOL CULTR AEROBIC BACT EA: CPT | Mod: 59

## 2017-09-14 PROCEDURE — 87427 SHIGA-LIKE TOXIN AG IA: CPT

## 2017-09-14 PROCEDURE — 86850 RBC ANTIBODY SCREEN: CPT

## 2017-09-14 RX ORDER — HEPARIN SODIUM 5000 [USP'U]/ML
5000 INJECTION, SOLUTION INTRAVENOUS; SUBCUTANEOUS EVERY 8 HOURS
Status: DISCONTINUED | OUTPATIENT
Start: 2017-09-14 | End: 2017-09-16 | Stop reason: HOSPADM

## 2017-09-14 RX ORDER — CALCIUM ACETATE 667 MG/1
2001 CAPSULE ORAL
Status: DISCONTINUED | OUTPATIENT
Start: 2017-09-15 | End: 2017-09-16 | Stop reason: HOSPADM

## 2017-09-14 RX ORDER — ALBUTEROL SULFATE 90 UG/1
1 AEROSOL, METERED RESPIRATORY (INHALATION) EVERY 6 HOURS PRN
Status: DISCONTINUED | OUTPATIENT
Start: 2017-09-14 | End: 2017-09-16 | Stop reason: HOSPADM

## 2017-09-14 RX ORDER — NAPROXEN SODIUM 220 MG/1
81 TABLET, FILM COATED ORAL DAILY
Status: DISCONTINUED | OUTPATIENT
Start: 2017-09-15 | End: 2017-09-16 | Stop reason: HOSPADM

## 2017-09-14 RX ORDER — ATORVASTATIN CALCIUM 20 MG/1
40 TABLET, FILM COATED ORAL DAILY
Status: DISCONTINUED | OUTPATIENT
Start: 2017-09-15 | End: 2017-09-16 | Stop reason: HOSPADM

## 2017-09-14 RX ORDER — AMLODIPINE BESYLATE 10 MG/1
10 TABLET ORAL DAILY
Status: DISCONTINUED | OUTPATIENT
Start: 2017-09-15 | End: 2017-09-16 | Stop reason: HOSPADM

## 2017-09-14 RX ORDER — CLONIDINE 0.3 MG/24H
1 PATCH, EXTENDED RELEASE TRANSDERMAL
Status: DISCONTINUED | OUTPATIENT
Start: 2017-09-16 | End: 2017-09-16 | Stop reason: HOSPADM

## 2017-09-14 RX ORDER — DOCUSATE SODIUM 100 MG/1
100 CAPSULE, LIQUID FILLED ORAL 2 TIMES DAILY
Status: DISCONTINUED | OUTPATIENT
Start: 2017-09-14 | End: 2017-09-16 | Stop reason: HOSPADM

## 2017-09-14 RX ORDER — CITALOPRAM 10 MG/1
10 TABLET ORAL DAILY
Status: DISCONTINUED | OUTPATIENT
Start: 2017-09-15 | End: 2017-09-16 | Stop reason: HOSPADM

## 2017-09-14 RX ORDER — ONDANSETRON 2 MG/ML
4 INJECTION INTRAMUSCULAR; INTRAVENOUS
Status: ACTIVE | OUTPATIENT
Start: 2017-09-14 | End: 2017-09-15

## 2017-09-14 RX ORDER — ACETAMINOPHEN 325 MG/1
650 TABLET ORAL EVERY 8 HOURS PRN
Status: DISCONTINUED | OUTPATIENT
Start: 2017-09-14 | End: 2017-09-16 | Stop reason: HOSPADM

## 2017-09-14 RX ORDER — CARVEDILOL 25 MG/1
25 TABLET ORAL 2 TIMES DAILY
Status: DISCONTINUED | OUTPATIENT
Start: 2017-09-14 | End: 2017-09-16 | Stop reason: HOSPADM

## 2017-09-14 RX ORDER — FLUTICASONE FUROATE AND VILANTEROL 200; 25 UG/1; UG/1
1 POWDER RESPIRATORY (INHALATION) DAILY
Status: DISCONTINUED | OUTPATIENT
Start: 2017-09-15 | End: 2017-09-16 | Stop reason: HOSPADM

## 2017-09-14 RX ORDER — VALSARTAN 160 MG/1
160 TABLET ORAL DAILY
Status: DISCONTINUED | OUTPATIENT
Start: 2017-09-15 | End: 2017-09-16 | Stop reason: HOSPADM

## 2017-09-14 RX ORDER — CLOPIDOGREL BISULFATE 75 MG/1
75 TABLET ORAL DAILY
Status: DISCONTINUED | OUTPATIENT
Start: 2017-09-15 | End: 2017-09-16 | Stop reason: HOSPADM

## 2017-09-14 RX ORDER — HYDRALAZINE HYDROCHLORIDE 25 MG/1
75 TABLET, FILM COATED ORAL EVERY 6 HOURS
Status: DISCONTINUED | OUTPATIENT
Start: 2017-09-14 | End: 2017-09-16 | Stop reason: HOSPADM

## 2017-09-14 RX ADMIN — CARVEDILOL 25 MG: 25 TABLET, FILM COATED ORAL at 09:09

## 2017-09-14 RX ADMIN — IOHEXOL 75 ML: 350 INJECTION, SOLUTION INTRAVENOUS at 05:09

## 2017-09-14 RX ADMIN — HYDRALAZINE HYDROCHLORIDE 75 MG: 25 TABLET, FILM COATED ORAL at 09:09

## 2017-09-14 NOTE — ED NOTES
Pt back from ct continues to await admission and nephrology consult, pt denies abd pain, sob, cp,

## 2017-09-14 NOTE — ED NOTES
"Pt c/o abd pain and diarrhea since Monday also missed dialysis yesterday cause didn't feel good".   "

## 2017-09-14 NOTE — ED TRIAGE NOTES
Tea Georges, a 77 y.o. female presents to the ED c/o abdominal pain since Monday. Pt on dialysis but missed yesterday.       Chief Complaint   Patient presents with    Abdominal Pain     and shortness of breath     Review of patient's allergies indicates:  No Known Allergies  Past Medical History:   Diagnosis Date    Anemia in ESRD (end-stage renal disease) 5/29/2016    Anticoagulant long-term use     Aortic atherosclerosis 11/22/2016    Aortic stenosis, moderate 2/18/2016    Asthma in adult without complication 1/8/2016    Bilateral low back pain without sciatica 11/17/2015    Blindness of right eye 11/12/2016    Cataract     Central retinal vein occlusion, right eye 6/3/2014    CHF (congestive heart failure)     Chronic diastolic heart failure 1/8/2016    Chronic respiratory failure with hypoxia 5/29/2016    Dependence on hemodialysis     Mon-Wed-Fri    Diverticulosis     Embolic stroke involving right middle cerebral artery     Enlarged LA (left atrium) 10/7/2016    Epiretinal membrane 7/17/2012    ESRD (end stage renal disease)     Essential hypertension 1/8/2016    History of GI diverticular bleed     5/22/16    Left flaccid hemiparesis 10/1/2016    Peripheral vascular disease, unspecified 11/22/2016    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 11/13/2016    Type 2 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, without long-term current use of insulin 3/26/2013    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, without long-term current use of insulin 3/26/2013    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 The patient is awake, alert and aware of environment with an appropriate affect, the patient is oriented x 3 and speaking appropriately.   APPEARANCE: Patient resting comfortably, patient is clean and well groomed, patient's clothing is  properly fastened.  SKIN: The skin is warm and dry, color consistent with ethnicity, patient has normal skin turgor and moist mucus membranes, skin intact, no breakdown or bruising noted.  MUSCULOSKELETAL: Patient moving all extremities well, no obvious swelling or deformities noted.   RESPIRATORY: Respirations are spontaneous, patient has a normal effort and rate, no accessory muscle use noted.  CARDIAC: Patient has a normal rate and rhythm, no periphreal edema noted, capillary refill < 3 seconds.  ABDOMEN: Soft and tender to palpation, no distention noted. Bowel sounds present in all four quadrants. Diarrhea since Monday and abdominal pains worsened last night. Pt c/o Nausea but denies vomiting.   NEUROLOGIC: Eyes open spontaneously, behavior appropriate to situation, follows commands, facial expression symmetrical, bilateral hand grasp equal and even, purposeful motor response noted, normal sensation in all extremities when touched with a finger.

## 2017-09-14 NOTE — ED NOTES
Pt continues to rest with eyes closed in nad. Awaiting admission and nephrology consult for dialysis, wctm

## 2017-09-15 LAB
ABO + RH BLD: NORMAL
ANION GAP SERPL CALC-SCNC: 16 MMOL/L
ANISOCYTOSIS BLD QL SMEAR: SLIGHT
BASOPHILS # BLD AUTO: 0.02 K/UL
BASOPHILS NFR BLD: 0.5 %
BLD GP AB SCN CELLS X3 SERPL QL: NORMAL
BUN SERPL-MCNC: 70 MG/DL
C DIFF GDH STL QL: NEGATIVE
C DIFF TOX A+B STL QL IA: NEGATIVE
CALCIUM SERPL-MCNC: 8.5 MG/DL
CHLORIDE SERPL-SCNC: 101 MMOL/L
CO2 SERPL-SCNC: 21 MMOL/L
CREAT SERPL-MCNC: 7.5 MG/DL
DIFFERENTIAL METHOD: ABNORMAL
EOSINOPHIL # BLD AUTO: 0 K/UL
EOSINOPHIL NFR BLD: 1 %
ERYTHROCYTE [DISTWIDTH] IN BLOOD BY AUTOMATED COUNT: 17.8 %
EST. GFR  (AFRICAN AMERICAN): 5.6 ML/MIN/1.73 M^2
EST. GFR  (NON AFRICAN AMERICAN): 4.8 ML/MIN/1.73 M^2
GIANT PLATELETS BLD QL SMEAR: PRESENT
GLUCOSE SERPL-MCNC: 87 MG/DL
HCT VFR BLD AUTO: 34.2 %
HGB BLD-MCNC: 10.9 G/DL
HYPOCHROMIA BLD QL SMEAR: ABNORMAL
LYMPHOCYTES # BLD AUTO: 0.7 K/UL
LYMPHOCYTES NFR BLD: 17.8 %
MAGNESIUM SERPL-MCNC: 2.2 MG/DL
MCH RBC QN AUTO: 27.5 PG
MCHC RBC AUTO-ENTMCNC: 31.9 G/DL
MCV RBC AUTO: 86 FL
MONOCYTES # BLD AUTO: 0.3 K/UL
MONOCYTES NFR BLD: 7.8 %
NEUTROPHILS # BLD AUTO: 2.8 K/UL
NEUTROPHILS NFR BLD: 72.9 %
O+P STL TRI STN: NORMAL
OVALOCYTES BLD QL SMEAR: ABNORMAL
PHOSPHATE SERPL-MCNC: 6.5 MG/DL
PLATELET # BLD AUTO: 103 K/UL
PMV BLD AUTO: ABNORMAL FL
POCT GLUCOSE: 83 MG/DL (ref 70–110)
POIKILOCYTOSIS BLD QL SMEAR: SLIGHT
POLYCHROMASIA BLD QL SMEAR: ABNORMAL
POTASSIUM SERPL-SCNC: 4.6 MMOL/L
RBC # BLD AUTO: 3.96 M/UL
SODIUM SERPL-SCNC: 138 MMOL/L
WBC # BLD AUTO: 3.87 K/UL

## 2017-09-15 PROCEDURE — 36415 COLL VENOUS BLD VENIPUNCTURE: CPT

## 2017-09-15 PROCEDURE — 25000242 PHARM REV CODE 250 ALT 637 W/ HCPCS: Performed by: HOSPITALIST

## 2017-09-15 PROCEDURE — 85025 COMPLETE CBC W/AUTO DIFF WBC: CPT

## 2017-09-15 PROCEDURE — 80048 BASIC METABOLIC PNL TOTAL CA: CPT

## 2017-09-15 PROCEDURE — G0378 HOSPITAL OBSERVATION PER HR: HCPCS

## 2017-09-15 PROCEDURE — G0257 UNSCHED DIALYSIS ESRD PT HOS: HCPCS

## 2017-09-15 PROCEDURE — 25000003 PHARM REV CODE 250: Performed by: PHYSICIAN ASSISTANT

## 2017-09-15 PROCEDURE — 80100016 HC MAINTENANCE HEMODIALYSIS

## 2017-09-15 PROCEDURE — 99226 PR SUBSEQUENT OBSERVATION CARE,LEVEL III: CPT | Mod: ,,, | Performed by: PHYSICIAN ASSISTANT

## 2017-09-15 PROCEDURE — 84100 ASSAY OF PHOSPHORUS: CPT

## 2017-09-15 PROCEDURE — 83735 ASSAY OF MAGNESIUM: CPT

## 2017-09-15 PROCEDURE — 94761 N-INVAS EAR/PLS OXIMETRY MLT: CPT

## 2017-09-15 PROCEDURE — 25000003 PHARM REV CODE 250: Performed by: HOSPITALIST

## 2017-09-15 PROCEDURE — 63600175 PHARM REV CODE 636 W HCPCS: Performed by: HOSPITALIST

## 2017-09-15 PROCEDURE — 99214 OFFICE O/P EST MOD 30 MIN: CPT | Mod: ,,, | Performed by: INTERNAL MEDICINE

## 2017-09-15 RX ORDER — ISOSORBIDE DINITRATE 30 MG/1
30 TABLET ORAL 3 TIMES DAILY
Status: DISCONTINUED | OUTPATIENT
Start: 2017-09-15 | End: 2017-09-16 | Stop reason: HOSPADM

## 2017-09-15 RX ORDER — ONDANSETRON 4 MG/1
4 TABLET, ORALLY DISINTEGRATING ORAL ONCE
Status: COMPLETED | OUTPATIENT
Start: 2017-09-15 | End: 2017-09-15

## 2017-09-15 RX ORDER — ONDANSETRON 4 MG/1
4 TABLET, FILM COATED ORAL EVERY 6 HOURS PRN
Status: DISCONTINUED | OUTPATIENT
Start: 2017-09-15 | End: 2017-09-16 | Stop reason: HOSPADM

## 2017-09-15 RX ORDER — SODIUM CHLORIDE 9 MG/ML
INJECTION, SOLUTION INTRAVENOUS ONCE
Status: DISCONTINUED | OUTPATIENT
Start: 2017-09-15 | End: 2017-09-16 | Stop reason: HOSPADM

## 2017-09-15 RX ORDER — SODIUM CHLORIDE 9 MG/ML
INJECTION, SOLUTION INTRAVENOUS
Status: DISCONTINUED | OUTPATIENT
Start: 2017-09-15 | End: 2017-09-16 | Stop reason: HOSPADM

## 2017-09-15 RX ORDER — MORPHINE SULFATE 2 MG/ML
4 INJECTION, SOLUTION INTRAMUSCULAR; INTRAVENOUS EVERY 4 HOURS PRN
Status: DISCONTINUED | OUTPATIENT
Start: 2017-09-15 | End: 2017-09-16 | Stop reason: HOSPADM

## 2017-09-15 RX ORDER — HYDROCODONE BITARTRATE AND ACETAMINOPHEN 5; 325 MG/1; MG/1
1 TABLET ORAL EVERY 4 HOURS PRN
Status: DISCONTINUED | OUTPATIENT
Start: 2017-09-15 | End: 2017-09-16 | Stop reason: HOSPADM

## 2017-09-15 RX ORDER — ONDANSETRON 2 MG/ML
4 INJECTION INTRAMUSCULAR; INTRAVENOUS EVERY 12 HOURS PRN
Status: DISCONTINUED | OUTPATIENT
Start: 2017-09-15 | End: 2017-09-16 | Stop reason: HOSPADM

## 2017-09-15 RX ADMIN — FLUTICASONE FUROATE AND VILANTEROL TRIFENATATE 1 PUFF: 200; 25 POWDER RESPIRATORY (INHALATION) at 09:09

## 2017-09-15 RX ADMIN — AMLODIPINE BESYLATE 10 MG: 10 TABLET ORAL at 08:09

## 2017-09-15 RX ADMIN — ISOSORBIDE DINITRATE 30 MG: 30 TABLET ORAL at 10:09

## 2017-09-15 RX ADMIN — ALBUTEROL SULFATE 1 PUFF: 90 AEROSOL, METERED RESPIRATORY (INHALATION) at 08:09

## 2017-09-15 RX ADMIN — VALSARTAN 160 MG: 160 TABLET, FILM COATED ORAL at 08:09

## 2017-09-15 RX ADMIN — HYDRALAZINE HYDROCHLORIDE 75 MG: 25 TABLET, FILM COATED ORAL at 05:09

## 2017-09-15 RX ADMIN — ONDANSETRON 4 MG: 4 TABLET, ORALLY DISINTEGRATING ORAL at 09:09

## 2017-09-15 RX ADMIN — ATORVASTATIN CALCIUM 40 MG: 20 TABLET, FILM COATED ORAL at 08:09

## 2017-09-15 RX ADMIN — ISOSORBIDE DINITRATE 40 MG: 40 TABLET, EXTENDED RELEASE ORAL at 01:09

## 2017-09-15 RX ADMIN — HEPARIN SODIUM 5000 UNITS: 5000 INJECTION, SOLUTION INTRAVENOUS; SUBCUTANEOUS at 01:09

## 2017-09-15 RX ADMIN — CALCIUM ACETATE 2001 MG: 667 CAPSULE ORAL at 06:09

## 2017-09-15 RX ADMIN — CARVEDILOL 25 MG: 25 TABLET, FILM COATED ORAL at 08:09

## 2017-09-15 RX ADMIN — ASPIRIN 81 MG CHEWABLE TABLET 81 MG: 81 TABLET CHEWABLE at 08:09

## 2017-09-15 RX ADMIN — HYDRALAZINE HYDROCHLORIDE 75 MG: 25 TABLET, FILM COATED ORAL at 01:09

## 2017-09-15 RX ADMIN — HYDRALAZINE HYDROCHLORIDE 75 MG: 25 TABLET, FILM COATED ORAL at 06:09

## 2017-09-15 RX ADMIN — CALCIUM ACETATE 2001 MG: 667 CAPSULE ORAL at 08:09

## 2017-09-15 RX ADMIN — CLOPIDOGREL 75 MG: 75 TABLET, FILM COATED ORAL at 08:09

## 2017-09-15 RX ADMIN — CARVEDILOL 25 MG: 25 TABLET, FILM COATED ORAL at 10:09

## 2017-09-15 RX ADMIN — CITALOPRAM HYDROBROMIDE 10 MG: 10 TABLET ORAL at 08:09

## 2017-09-15 NOTE — PROGRESS NOTES
pt c/o increased SOB, RR 26, pulse ox 96% on 2L nc. IMF notified. called dialysis, states that pt will be dialyzed in the afternoon. nephrology came to see pt, placed on 1500 cc fluid restriction, pt given prn albuterol inhaler. will continue to monitor.

## 2017-09-15 NOTE — PLAN OF CARE
Problem: Patient Care Overview  Goal: Plan of Care Review  Outcome: Ongoing (interventions implemented as appropriate)  Plan of care reviewed with patient. No distress noted at this time. Cardiac monitoring maintained without ectopy. Peripheral IV saline locked. Perineum skin care provided. Fall precautions in place with yellow socks and arm bands. Will continue to monitor closely.

## 2017-09-15 NOTE — ASSESSMENT & PLAN NOTE
Stable. Continue albuterol prn, Advair.   Duo nebs PRN for wheezing.  CXR sig/f pulmonary edema; needs HD for volume removal.   9/15 will repeat CXR after HD

## 2017-09-15 NOTE — ASSESSMENT & PLAN NOTE
Nephrology consulted.   No emergent need currently, session scheduled for early afternoon 9/15

## 2017-09-15 NOTE — ASSESSMENT & PLAN NOTE
ESRD on IHD MWF   [facility] [place] [facility]/ Dr. Almanza  On HD for:  ~ 7 years  Duration of outpatient dialysis session - 3.5 hrs  EDW - 43 kg  Residual Renal Function - no  - Will provide dialysis for metabolic clearance and volume management x 3.5 hrs  - Seen and examined today during hemodialysis; tolerating treatment well without issues. Denied headaches, chest pain, abdominal pain, or muscle cramps   -  ml/hr  - Target ultrafiltration1-2 lts as tolerated keep MAP > 65   - Dialysate adjusted to current labs   Access: [facility]-AVF with good thrill

## 2017-09-15 NOTE — NURSING
Pt arrived to OBS 3 via stretcher with patient escort. Pt appeared to be tachypnic. O2 2L NC applied. Peripheral IV saline locked. Pt states that she had an accident will change. Pt states that she is feeling weak. Pt oriented to room and instructed to call for assistance before ambulating. Will continue to monitor closely.

## 2017-09-15 NOTE — PROGRESS NOTES
pt's scheduled isosorbide not available, pharmacy called and asked for med to be changed to something comparable and available. Nikia Alexander notified, instructed to change med per pharmacy request. will continue to  monitor.

## 2017-09-15 NOTE — [facility] SECONDARY REVIEW
Physician Advisor External    Level of Care Issue    Approved Observation- 09/15/2017 @ 0830- per Dr. Twan Roth, approves Outpatient.  LMD, RN

## 2017-09-15 NOTE — ASSESSMENT & PLAN NOTE
Infectious gastroenteritis (no evidence of colitis on CT) vs diverticulitis vs PUD vs early ischemic colitis  Etiology not clear. CT benign.  Lactic acid ordered. Serial abdo checks. Stool culture, OCP, CDiff ordered.   Typed and screened  CRS consulted  Keep NPO

## 2017-09-15 NOTE — ASSESSMENT & PLAN NOTE
·  BP, will continue to monitor. Goal for BP is <130 mmHg SBP and BDP <85 mmHg.   · Continue current regiment  · Volume overloaded and should improve with UF

## 2017-09-15 NOTE — PROGRESS NOTES
"[facility] Hospital Medicine  Progress Note    Patient Name: Tea Georges  MRN: [id number]  Patient Class: OP- Observation   Admission Date: 9/14/2017  Length of Stay: 0 days  Attending Physician: Maury Gonzales MD  Primary Care Provider: Parth Posadas Ii, MD    [facility] Medicine Team: [facility] MED F Nikia Alexander PA-C    Subjective:     Principal Problem:Need for acute hemodialysis    HPI:  Ms. Georges is a 76 yo AAF w/ COPD on home O2, ESRD on HD (MWF), HTN, MDD, DM2 who presents today after missing dialysis. She also complained of abdominal pain and diarrhea which has been going on for 3-4 days. She describes the abdominal pain as "crampy, all over". It is not exacerbated by food. It is not improved by defecation. Patient states pain has been present since Monday but worsened acutely yesterday. She denies nausea, vomiting, BRBPR. Diarrhea is watery, non-bloody. No fevers. No sick contacts.     Patient also reports shortness of breath after missing dialysis yesterday; "I didn't feel good". Last dialysis was Monday.     On evaluation in the ED, patient was found to have SBP >200. She was acutely tender to palpation with voluntary guarding in the periumbilical and R upper/lower quadrants. No rebound tenderness. No hernia. A CT of her abdomen was benign. On transfer to OBS unit, patient experienced 1 episode of fecal incontinence w/ BRBPR.     Hospital Course:  Pt admitted to observation for SOB after missed HD session, abdominal pain and diarrhea x 4 days.  CXR revealed findings consistent with CHF.  CT of her abdomen was benign. On transfer to OBS unit, patient experienced 1 episode of fecal incontinence w/ BRBPR.  Lactic acid WNL.  C Diff and Ecoli negative.  Stool OCP, rotavirus in process.  In review of previous hospitalizations, last EGD and colonoscopy 05/2016 (polyp biopsied showing chronic reactive changes, diverticulosis with no bleeding, tortuous colon).  Pt saw GI following " hospitalization and was to follow up 09/2016 but lost to follow up.  Nephrology consulted for HD completed 9/15.      Interval History: no acute events overnight; no new complaints.  Pt reports persistent watery diarrhea since Monday, only episode of blood in stool day of admission.  Pt c/o nausea and LLQ pain.    Review of Systems   Constitutional: Positive for appetite change. Negative for activity change, chills, diaphoresis, fatigue, fever and unexpected weight change.   Respiratory: Positive for shortness of breath. Negative for apnea, cough, choking, wheezing and stridor.    Cardiovascular: Positive for leg swelling. Negative for chest pain and palpitations.   Gastrointestinal: Positive for abdominal pain, blood in stool, diarrhea and nausea. Negative for constipation and vomiting.   Genitourinary: Negative for difficulty urinating, dysuria, flank pain and vaginal pain.   Musculoskeletal: Negative for neck pain and neck stiffness.   Skin: Negative for rash and wound.   Allergic/Immunologic: Negative for immunocompromised state.   Neurological: Negative for dizziness, seizures, syncope, weakness, light-headedness and headaches.   Psychiatric/Behavioral: Negative for agitation and confusion.     Objective:     Vital Signs (Most Recent):  Temp: 98.7 °F (37.1 °C) (09/15/17 0736)  Pulse: 75 (09/15/17 0953)  Resp: (!) 24 (09/15/17 0953)  BP: (!) 185/79 (09/15/17 0736)  SpO2: 96 % (09/15/17 0812) Vital Signs (24h Range):  Temp:  [97.9 °F (36.6 °C)-98.7 °F (37.1 °C)] 98.7 °F (37.1 °C)  Pulse:  [59-75] 75  Resp:  [16-26] 24  SpO2:  [96 %-100 %] 96 %  BP: (165-207)/(74-91) 185/79     Weight: 46.3 kg (102 lb)  Body mass index is 18.07 kg/m².  No intake or output data in the 24 hours ending 09/15/17 1055   Physical Exam   Constitutional: She is oriented to person, place, and time. She appears well-developed and well-nourished. No distress.   HENT:   Head: Normocephalic and atraumatic.   Eyes: EOM are normal. Pupils are  equal, round, and reactive to light.   Blind R eye   Neck: Normal range of motion. Neck supple.   Cardiovascular: Normal rate and regular rhythm.    Murmur (suspect hearing thrill from graft) heard.  Pulmonary/Chest: Effort normal. She has rales.   Abdominal: Normal appearance. Bowel sounds are absent. There is tenderness in the periumbilical area, left upper quadrant and left lower quadrant. There is rigidity and guarding. There is no tenderness at McBurney's point and negative Amaya's sign.   Musculoskeletal: She exhibits edema.   Lymphadenopathy:     She has no cervical adenopathy.   Neurological: She is alert and oriented to person, place, and time. No cranial nerve deficit.   Skin: Skin is warm and dry. She is not diaphoretic.   Psychiatric: She has a normal mood and affect. Her behavior is normal.       Significant Labs: All pertinent labs within the past 24 hours have been reviewed.    Significant Imaging: I have reviewed all pertinent imaging results/findings within the past 24 hours.    Assessment/Plan:      * Need for acute hemodialysis    Nephrology consulted.   No emergent need currently, session scheduled for early afternoon 9/15            Periumbilical abdominal pain    LLQ pain  Infectious gastroenteritis (no evidence of colitis on CT) vs diverticulitis vs PUD vs early ischemic colitis  Etiology not clear. CT benign.  Afebrile without leukocytosis.  Lactic acid WNL. Stool culture negative for E Coli, OCP in process, CDiff negative, rotavirus antigen ordered pending collection.  Typed and screened.    9/15 hemodynamically stable with stable H/H  -In review of chart, pt hospitalized for GI Bleed 05/2016 (BRBPR).  Initial EGD aborted due to decreasing sats.  Repeat EGD and C scope showed polyp (biopsied chronic reactive changes); diverticulosis no bleeding, incomplete due to tortuous and restricted mobility.  Pt followed up with GI 06/2016 (noted pt c/o LLQ pain) and occult stools x 3 to rule out  further bleeding since no active bleeding seen on endoscopies. Next step was to consider VCE for heme + stool, however pt has a tortuous colon (alternative imaging?); start fiber supplement.  Pt was to return on 09/2016 but lost to follow up.            ESRD (end stage renal disease)    Nephrology consulted for resumption of HD.   Continue Phoslo TID  Renal diet          COPD (chronic obstructive pulmonary disease)    Stable. Continue albuterol prn, Advair.   Duo nebs PRN for wheezing.  CXR sig/f pulmonary edema; needs HD for volume removal.   9/15 will repeat CXR after HD          Anemia in ESRD (end-stage renal disease)    Above baseline currently. Will trend given new incidence of bloody BM.   Typed and crossed.   Consented.        Essential hypertension    Patient is on multiple antihypertensives:  · Amlodipine 10  · Coreg 25 BID  · Hydralazine 50 q8h  · Isosorbide dinitrate 40 BID  · Valsartan 160   Resumed.  Will need HD for volume removal.          VTE Risk Mitigation         Ordered     heparin (porcine) injection 5,000 Units  Every 8 hours     Route:  Subcutaneous        09/14/17 2051     High Risk of VTE  Once      09/14/17 2051              Nikia Alexander PA-C  Department of Hospital Medicine   [facility]

## 2017-09-15 NOTE — SUBJECTIVE & OBJECTIVE
Past Medical History:   Diagnosis Date    Anemia in ESRD (end-stage renal disease) 5/29/2016    Anticoagulant long-term use     Aortic atherosclerosis 11/22/2016    Aortic stenosis, moderate 2/18/2016    Asthma in adult without complication 1/8/2016    Bilateral low back pain without sciatica 11/17/2015    Blindness of right eye 11/12/2016    Cataract     Central retinal vein occlusion, right eye 6/3/2014    CHF (congestive heart failure)     Chronic diastolic heart failure 1/8/2016    Chronic respiratory failure with hypoxia 5/29/2016    Dependence on hemodialysis     Mon-Wed-Fri    Diverticulosis     Embolic stroke involving right middle cerebral artery     Enlarged LA (left atrium) 10/7/2016    Epiretinal membrane 7/17/2012    ESRD (end stage renal disease)     Essential hypertension 1/8/2016    History of GI diverticular bleed     5/22/16    Left flaccid hemiparesis 10/1/2016    Peripheral vascular disease, unspecified 11/22/2016    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 11/13/2016    Type 2 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, without long-term current use of insulin 3/26/2013    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, without long-term current use of insulin 3/26/2013    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       Past Surgical History:   Procedure Laterality Date    BREAST SURGERY      tumor removal x 2    CATARACT EXTRACTION      CHOLECYSTECTOMY      COLONOSCOPY N/A 5/23/2016    Procedure: COLONOSCOPY;  Surgeon: WILLIAM Colvin MD;  Location: [facility] ([address] [place] [address]);  Service: Endoscopy;  Laterality: N/A;    COLONOSCOPY N/A 5/30/2016    Procedure: COLONOSCOPY;  Surgeon: Sam Davis MD;  Location: [facility] ([address] [place] [address]);  Service: Endoscopy;  Laterality: N/A;    UPPER GASTROINTESTINAL ENDOSCOPY         Review of patient's allergies indicates:  No Known  Allergies  Current Facility-Administered Medications   Medication Frequency    0.9%  NaCl infusion PRN    0.9%  NaCl infusion Once    acetaminophen tablet 650 mg Q8H PRN    albuterol inhaler 1 puff Q6H PRN    amlodipine tablet 10 mg Daily    aspirin chewable tablet 81 mg Daily    atorvastatin tablet 40 mg Daily    calcium acetate capsule 2,001 mg TID WM    carvedilol tablet 25 mg BID    citalopram tablet 10 mg Daily    [START ON 9/16/2017] cloNIDine 0.3 mg/24 hr td ptwk 1 patch Every Sat    clopidogrel tablet 75 mg Daily    docusate sodium capsule 100 mg BID    fluticasone-vilanterol 200-25 mcg/dose diskus inhaler 1 puff Daily    heparin (porcine) injection 5,000 Units Q8H    hydrALAZINE tablet 75 mg Q6H    isosorbide dinitrate tablet 30 mg TID    ondansetron tablet 4 mg Q6H PRN    valsartan tablet 160 mg Daily     Family History     Problem Relation (Age of Onset)    Cancer Sister    Cataracts Mother    Esophageal cancer Sister    Glaucoma Brother, Maternal Aunt    Heart disease Brother    Heart failure Sister    Hypertension Mother, Sister, Brother, Father    No Known Problems Maternal Uncle, Paternal Aunt, Paternal Uncle, Maternal Grandmother, Maternal Grandfather, Paternal Grandmother, Paternal Grandfather    Stroke Mother        Social History Main Topics    Smoking status: Never Smoker    Smokeless tobacco: Never Used    Alcohol use No      Comment: Reports occasional 1-2 drinks     Drug use: No    Sexual activity: No     Review of Systems   Constitutional: Positive for activity change, appetite change and fatigue. Negative for fever and unexpected weight change.   HENT: Negative for facial swelling, hearing loss and tinnitus.    Eyes: Negative for visual disturbance.   Respiratory: Positive for chest tightness and shortness of breath.    Cardiovascular: Negative for chest pain and leg swelling.   Gastrointestinal: Positive for abdominal pain and diarrhea. Negative for nausea.    Genitourinary: Negative for difficulty urinating, dysuria and flank pain.   Musculoskeletal: Negative for arthralgias and myalgias.   Skin: Negative for color change.   Neurological: Negative for dizziness, syncope, weakness, light-headedness and headaches.   Psychiatric/Behavioral: Negative for behavioral problems and confusion.     Objective:     Vital Signs (Most Recent):  Temp: 98.3 °F (36.8 °C) (09/15/17 1213)  Pulse: 61 (09/15/17 1545)  Resp: 20 (09/15/17 1445)  BP: (!) 168/66 (09/15/17 1545)  SpO2: 99 % (09/15/17 1213)  O2 Device (Oxygen Therapy): nasal cannula (09/15/17 1515) Vital Signs (24h Range):  Temp:  [97.9 °F (36.6 °C)-98.7 °F (37.1 °C)] 98.3 °F (36.8 °C)  Pulse:  [59-75] 61  Resp:  [16-26] 20  SpO2:  [96 %-100 %] 99 %  BP: (163-207)/(65-91) 168/66     Weight: 46.3 kg (102 lb) (09/14/17 2230)  Body mass index is 18.07 kg/m².  Body surface area is 1.43 meters squared.    No intake/output data recorded.    Physical Exam   Constitutional: She is oriented to person, place, and time. No distress.   Thin and emanciated   HENT:   Head: Normocephalic and atraumatic.   Eyes: Conjunctivae and EOM are normal. Pupils are equal, round, and reactive to light.   Neck: Trachea normal and normal range of motion. Neck supple. No JVD present.   Cardiovascular: Normal rate, regular rhythm, S1 normal, S2 normal, normal heart sounds, intact distal pulses and normal pulses.  Exam reveals no gallop and no friction rub.    No murmur heard.  Pulmonary/Chest: Effort normal. She has no wheezes. She has rales.   Abdominal: Soft. She exhibits no distension. There is tenderness. There is no guarding.   Hyperactive BS   Musculoskeletal: Normal range of motion.   Neurological: She is alert and oriented to person, place, and time.   Skin: Skin is warm and dry. Capillary refill takes 2 to 3 seconds.   Psychiatric: She has a normal mood and affect. Her behavior is normal.       Significant Labs:  BMP:   Recent Labs  Lab  09/15/17  0557   GLU 87      CO2 21*   BUN 70*   CREATININE 7.5*   CALCIUM 8.5*   MG 2.2     Cardiac Markers: No results for input(s): CKMB, TROPONINT, MYOGLOBIN in the last 168 hours.  CBC:   Recent Labs  Lab 09/15/17  0557   WBC 3.87*   RBC 3.96*   HGB 10.9*   HCT 34.2*   *   MCV 86   MCH 27.5   MCHC 31.9*     CMP:   Recent Labs  Lab 09/14/17  1553 09/15/17  0557   * 87   CALCIUM 8.7 8.5*   ALBUMIN 3.3*  --    PROT 7.1  --     138   K 4.4 4.6   CO2 27 21*   CL 99 101   BUN 68* 70*   CREATININE 7.0* 7.5*   ALKPHOS 88  --    ALT 7*  --    AST 10  --    BILITOT 0.7  --      All labs within the past 24 hours have been reviewed.    Significant Imaging:  Labs: Reviewed  ECG: Reviewed  X-Ray: Reviewed

## 2017-09-15 NOTE — HPI
"Ms. Georges is a 78 yo AAF w/ COPD on home O2, ESRD on HD (MWF), HTN, MDD, DM2 who presents today after missing dialysis. She also complained of abdominal pain and diarrhea which has been going on for 3-4 days. She describes the abdominal pain as "crampy, all over". It is not exacerbated by food. It is not improved by defecation. Patient states pain has been present since Monday but worsened acutely yesterday. She denies nausea, vomiting, BRBPR. Diarrhea is watery, non-bloody. No fevers. No sick contacts.     Patient also reports shortness of breath after missing dialysis yesterday; "I didn't feel good". Last dialysis was Monday.     On evaluation in the ED, patient was found to have SBP >200. She was acutely tender to palpation with voluntary guarding in the periumbilical and R upper/lower quadrants. No rebound tenderness. No hernia. A CT of her abdomen was benign. On transfer to OBS unit, patient experienced 1 episode of fecal incontinence w/ BRBPR.   "

## 2017-09-15 NOTE — ASSESSMENT & PLAN NOTE
Stable. Continue albuterol prn, Advair.   Duo nebs PRN for wheezing.  CXR sig/f pulmonary edema; needs HD for volume removal.

## 2017-09-15 NOTE — ASSESSMENT & PLAN NOTE
LLQ pain  Infectious gastroenteritis (no evidence of colitis on CT) vs diverticulitis vs PUD vs early ischemic colitis  Etiology not clear. CT benign.  Afebrile without leukocytosis.  Lactic acid WNL. Stool culture negative for E Coli, OCP in process, CDiff negative, rotavirus antigen ordered pending collection.  Typed and screened.    9/15 hemodynamically stable with stable H/H  -In review of chart, pt hospitalized for GI Bleed 05/2016 (BRBPR).  Initial EGD aborted due to decreasing sats.  Repeat EGD and C scope showed polyp (biopsied chronic reactive changes); diverticulosis no bleeding, incomplete due to tortuous and restricted mobility.  Pt followed up with GI 06/2016 (noted pt c/o LLQ pain) and occult stools x 3 to rule out further bleeding since no active bleeding seen on endoscopies. Next step was to consider VCE for heme + stool, however pt has a tortuous colon (alternative imaging?); start fiber supplement.  Pt was to return on 09/2016 but lost to follow up.

## 2017-09-15 NOTE — SUBJECTIVE & OBJECTIVE
Review of Systems   Constitutional: Positive for appetite change. Negative for activity change, chills, diaphoresis, fatigue, fever and unexpected weight change.   Eyes: Negative for photophobia and visual disturbance.   Respiratory: Positive for shortness of breath. Negative for apnea, cough, choking, wheezing and stridor.    Cardiovascular: Positive for leg swelling. Negative for chest pain and palpitations.   Gastrointestinal: Positive for abdominal pain, blood in stool and diarrhea. Negative for constipation, nausea and vomiting.   Genitourinary: Negative for difficulty urinating.   Musculoskeletal: Negative for neck pain and neck stiffness.   Skin: Negative for rash and wound.   Allergic/Immunologic: Negative for immunocompromised state.   Neurological: Negative for dizziness, seizures, syncope, weakness, light-headedness and headaches.   Psychiatric/Behavioral: Negative for agitation and confusion.     Objective:     Vital Signs (Most Recent):  Temp: 98.1 °F (36.7 °C) (09/14/17 1242)  Pulse: (!) 59 (09/14/17 2002)  Resp: 17 (09/14/17 2002)  BP: (!) 180/77 (09/14/17 2002)  SpO2: 99 % (09/14/17 2002) Vital Signs (24h Range):  Temp:  [98.1 °F (36.7 °C)] 98.1 °F (36.7 °C)  Pulse:  [59-69] 59  Resp:  [16-17] 17  SpO2:  [96 %-100 %] 99 %  BP: (165-207)/(74-91) 180/77     Weight: 44 kg (97 lb)  Body mass index is 17.18 kg/m².  No intake or output data in the 24 hours ending 09/14/17 4901   Physical Exam   Constitutional: She is oriented to person, place, and time. She appears well-developed and well-nourished. No distress.   HENT:   Head: Normocephalic and atraumatic.   Eyes: EOM are normal. Pupils are equal, round, and reactive to light.   Blind R eye   Neck: Normal range of motion. Neck supple.   Cardiovascular: Normal rate and regular rhythm.    Murmur heard.   Systolic murmur is present with a grade of 4/6   Pulmonary/Chest: Effort normal. She has rales.   Abdominal: Normal appearance. Bowel sounds are absent.  There is tenderness in the periumbilical area, left upper quadrant and left lower quadrant. There is rigidity and guarding. There is no rebound, no tenderness at McBurney's point and negative Amaya's sign. No hernia.   Musculoskeletal: She exhibits edema.   Lymphadenopathy:     She has no cervical adenopathy.   Neurological: She is alert and oriented to person, place, and time. No cranial nerve deficit.   Skin: Skin is warm and dry. She is not diaphoretic.   Psychiatric: She has a normal mood and affect. Her behavior is normal.       Significant Labs:   CBC:   Recent Labs  Lab 09/14/17  1553 09/14/17  1600   WBC 4.28  --    HGB 11.0*  --    HCT 34.7* 38   *  --      CMP:   Recent Labs  Lab 09/14/17  1553      K 4.4   CL 99   CO2 27   *   BUN 68*   CREATININE 7.0*   CALCIUM 8.7   PROT 7.1   ALBUMIN 3.3*   BILITOT 0.7   ALKPHOS 88   AST 10   ALT 7*   ANIONGAP 12   EGFRNONAA 5.3*       Significant Imaging: CT: I have reviewed all pertinent results/findings within the past 24 hours and my personal findings are:  no acute intraabdominal pathology identified

## 2017-09-15 NOTE — SUBJECTIVE & OBJECTIVE
Interval History: no acute events overnight; no new complaints.  Pt reports persistent watery diarrhea since Monday, only episode of blood in stool day of admission.  Pt c/o nausea and LLQ pain.    Review of Systems   Constitutional: Positive for appetite change. Negative for activity change, chills, diaphoresis, fatigue, fever and unexpected weight change.   Respiratory: Positive for shortness of breath. Negative for apnea, cough, choking, wheezing and stridor.    Cardiovascular: Positive for leg swelling. Negative for chest pain and palpitations.   Gastrointestinal: Positive for abdominal pain, blood in stool, diarrhea and nausea. Negative for constipation and vomiting.   Genitourinary: Negative for difficulty urinating, dysuria, flank pain and vaginal pain.   Musculoskeletal: Negative for neck pain and neck stiffness.   Skin: Negative for rash and wound.   Allergic/Immunologic: Negative for immunocompromised state.   Neurological: Negative for dizziness, seizures, syncope, weakness, light-headedness and headaches.   Psychiatric/Behavioral: Negative for agitation and confusion.     Objective:     Vital Signs (Most Recent):  Temp: 98.7 °F (37.1 °C) (09/15/17 0736)  Pulse: 75 (09/15/17 0953)  Resp: (!) 24 (09/15/17 0953)  BP: (!) 185/79 (09/15/17 0736)  SpO2: 96 % (09/15/17 0812) Vital Signs (24h Range):  Temp:  [97.9 °F (36.6 °C)-98.7 °F (37.1 °C)] 98.7 °F (37.1 °C)  Pulse:  [59-75] 75  Resp:  [16-26] 24  SpO2:  [96 %-100 %] 96 %  BP: (165-207)/(74-91) 185/79     Weight: 46.3 kg (102 lb)  Body mass index is 18.07 kg/m².  No intake or output data in the 24 hours ending 09/15/17 1055   Physical Exam   Constitutional: She is oriented to person, place, and time. She appears well-developed and well-nourished. No distress.   HENT:   Head: Normocephalic and atraumatic.   Eyes: EOM are normal. Pupils are equal, round, and reactive to light.   Blind R eye   Neck: Normal range of motion. Neck supple.   Cardiovascular: Normal  rate and regular rhythm.    Murmur (suspect hearing thrill from graft) heard.  Pulmonary/Chest: Effort normal. She has rales.   Abdominal: Normal appearance. Bowel sounds are absent. There is tenderness in the periumbilical area, left upper quadrant and left lower quadrant. There is rigidity and guarding. There is no tenderness at McBurney's point and negative Amaya's sign.   Musculoskeletal: She exhibits edema.   Lymphadenopathy:     She has no cervical adenopathy.   Neurological: She is alert and oriented to person, place, and time. No cranial nerve deficit.   Skin: Skin is warm and dry. She is not diaphoretic.   Psychiatric: She has a normal mood and affect. Her behavior is normal.       Significant Labs: All pertinent labs within the past 24 hours have been reviewed.    Significant Imaging: I have reviewed all pertinent imaging results/findings within the past 24 hours.

## 2017-09-15 NOTE — PROGRESS NOTES
Patient arrived VIA stretcher and was transferred to bed without complication.  AV Fistula to [place] was then prepped and cannulated with 15 gauge needles as per protocol.  Both lines aspirate and flush without resistance or infiltration.  Patient denies any pain.  Maintenance dialysis was then initiated.

## 2017-09-15 NOTE — PLAN OF CARE
09/15/17 1040   Discharge Assessment   Assessment Type Discharge Planning Assessment   Confirmed/corrected address and phone number on facesheet? Yes   Assessment information obtained from? Patient   Expected Length of Stay (days) 2   Communicated expected length of stay with patient/caregiver yes   Prior to hospitilization cognitive status: Alert/Oriented   Prior to hospitalization functional status: Assistive Equipment   Current cognitive status: Alert/Oriented   Current Functional Status: Needs Assistance   Lives With child(li), adult;grandchild(li)  (adult daughter, Cruz Azar ([phone]), & granddaugtger, Alesha Traore ([phone]))   Able to Return to Prior Arrangements yes   Is patient able to care for self after discharge? Yes   Patient's perception of discharge disposition home or selfcare   Readmission Within The Last 30 Days no previous admission in last 30 days   Patient currently being followed by outpatient case management? No   Patient currently receives any other outside agency services? No   Equipment Currently Used at Home bedside commode;rollator;wheelchair;oxygen   Do you have any problems affording any of your prescribed medications? No   Is the patient taking medications as prescribed? yes   Does the patient have transportation home? Yes   Transportation Available taxi   Does the patient receive services at the [facility]? No   Discharge Plan A Home with family   Discharge Plan B Home Health   Patient/Family In Agreement With Plan yes     Patient resting quietly in bed when CM rounded. No family at the bedside. Patient was admitted with abd pain. Patient receives HD treatments at [facility] ([facility]) via LUE fistula & missed HD due to abd pain. Neph consult noted. Patient scheduled to received her HD treatment today. Patient lives with her daughter, Cruz Azar ([phone]) & granddaughter, Alesha Traore ([phone]), & has equipment to assist with ambulation. Patient  uses 2L O2 prn. Plan to discharge patient home with support or home with home health when medically stable. Patient stated that she will take a taxi home when medically stable. Previously scheduled appointment with Dr. Parth Posadas (PCP) on 9/21/17 at 0820 noted. Will continue to follow.

## 2017-09-15 NOTE — HPI
Tea Georges is a 76 yo AA Female with a PMHx relevant for COPD on home O2, ESRD on iHD MWF, HTN, MDD, DMT2. She is admitted to hospital Medicine with c/o SOB and difficulty breathing.  She missed her Wednesday dialysis because she was not feeling well. At her HD unit she has been having significant diarrhea. Here she  complained of abdominal pain and watery diarrhea non bloody which has been going on for 3-4 days but she has a hx of intermittent diarrhea. She denies nausea, vomiting, No fevers. No sick contacts. She dialyses at [facility] under the care of Dr. Almanza on MWF via [facility].

## 2017-09-15 NOTE — CONSULTS
[facility]  Nephrology  Consult Note    Patient Name: Tea Georges  MRN: [id number]  Admission Date: 9/14/2017  Hospital Length of Stay: 0 days  Attending Provider: Maury Gonzales MD   Primary Care Physician: Parth Posadas Ii, MD  Principal Problem:Need for acute hemodialysis    Inpatient consult to Nephrology  Consult performed by: ZOILA JOHANSEN  Consult ordered by: CIERRA CONTRERAS  Reason for consult: ESRD        Subjective:     HPI: Tea Georges is a 76 yo AA Female with a PMHx relevant for COPD on home O2, ESRD on iHD MWF, HTN, MDD, DMT2. She is admitted to hospital Medicine with c/o SOB and difficulty breathing.  She missed her Wednesday dialysis because she was not feeling well. At her HD unit she has been having significant diarrhea. Here she  complained of abdominal pain and watery diarrhea non bloody which has been going on for 3-4 days but she has a hx of intermittent diarrhea. She denies nausea, vomiting, No fevers. No sick contacts. She dialyses at [facility] under the care of Dr. Almanza on MWF via [facility].      Past Medical History:   Diagnosis Date    Anemia in ESRD (end-stage renal disease) 5/29/2016    Anticoagulant long-term use     Aortic atherosclerosis 11/22/2016    Aortic stenosis, moderate 2/18/2016    Asthma in adult without complication 1/8/2016    Bilateral low back pain without sciatica 11/17/2015    Blindness of right eye 11/12/2016    Cataract     Central retinal vein occlusion, right eye 6/3/2014    CHF (congestive heart failure)     Chronic diastolic heart failure 1/8/2016    Chronic respiratory failure with hypoxia 5/29/2016    Dependence on hemodialysis     Mon-Wed-Fri    Diverticulosis     Embolic stroke involving right middle cerebral artery     Enlarged LA (left atrium) 10/7/2016    Epiretinal membrane 7/17/2012    ESRD (end stage renal disease)     Essential hypertension 1/8/2016    History of GI diverticular bleed      5/22/16    Left flaccid hemiparesis 10/1/2016    Peripheral vascular disease, unspecified 11/22/2016    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 11/13/2016    Type 2 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, without long-term current use of insulin 3/26/2013    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, without long-term current use of insulin 3/26/2013    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       Past Surgical History:   Procedure Laterality Date    BREAST SURGERY      tumor removal x 2    CATARACT EXTRACTION      CHOLECYSTECTOMY      COLONOSCOPY N/A 5/23/2016    Procedure: COLONOSCOPY;  Surgeon: WILLIAM Colvin MD;  Location: [facility] ENDO ([address] [place] [address]);  Service: Endoscopy;  Laterality: N/A;    COLONOSCOPY N/A 5/30/2016    Procedure: COLONOSCOPY;  Surgeon: Sam Davis MD;  Location: [facility] ENDO ([address] [place] [address]);  Service: Endoscopy;  Laterality: N/A;    UPPER GASTROINTESTINAL ENDOSCOPY         Review of patient's allergies indicates:  No Known Allergies  Current Facility-Administered Medications   Medication Frequency    0.9%  NaCl infusion PRN    0.9%  NaCl infusion Once    acetaminophen tablet 650 mg Q8H PRN    albuterol inhaler 1 puff Q6H PRN    amlodipine tablet 10 mg Daily    aspirin chewable tablet 81 mg Daily    atorvastatin tablet 40 mg Daily    calcium acetate capsule 2,001 mg TID WM    carvedilol tablet 25 mg BID    citalopram tablet 10 mg Daily    [START ON 9/16/2017] cloNIDine 0.3 mg/24 hr td ptwk 1 patch Every Sat    clopidogrel tablet 75 mg Daily    docusate sodium capsule 100 mg BID    fluticasone-vilanterol 200-25 mcg/dose diskus inhaler 1 puff Daily    heparin (porcine) injection 5,000 Units Q8H    hydrALAZINE tablet 75 mg Q6H    isosorbide dinitrate tablet 30 mg TID    ondansetron tablet 4 mg Q6H PRN    valsartan tablet 160 mg Daily     Family History     Problem  Relation (Age of Onset)    Cancer Sister    Cataracts Mother    Esophageal cancer Sister    Glaucoma Brother, Maternal Aunt    Heart disease Brother    Heart failure Sister    Hypertension Mother, Sister, Brother, Father    No Known Problems Maternal Uncle, Paternal Aunt, Paternal Uncle, Maternal Grandmother, Maternal Grandfather, Paternal Grandmother, Paternal Grandfather    Stroke Mother        Social History Main Topics    Smoking status: Never Smoker    Smokeless tobacco: Never Used    Alcohol use No      Comment: Reports occasional 1-2 drinks     Drug use: No    Sexual activity: No     Review of Systems   Constitutional: Positive for activity change, appetite change and fatigue. Negative for fever and unexpected weight change.   HENT: Negative for facial swelling, hearing loss and tinnitus.    Eyes: Negative for visual disturbance.   Respiratory: Positive for chest tightness and shortness of breath.    Cardiovascular: Negative for chest pain and leg swelling.   Gastrointestinal: Positive for abdominal pain and diarrhea. Negative for nausea.   Genitourinary: Negative for difficulty urinating, dysuria and flank pain.   Musculoskeletal: Negative for arthralgias and myalgias.   Skin: Negative for color change.   Neurological: Negative for dizziness, syncope, weakness, light-headedness and headaches.   Psychiatric/Behavioral: Negative for behavioral problems and confusion.     Objective:     Vital Signs (Most Recent):  Temp: 98.3 °F (36.8 °C) (09/15/17 1213)  Pulse: 61 (09/15/17 1545)  Resp: 20 (09/15/17 1445)  BP: (!) 168/66 (09/15/17 1545)  SpO2: 99 % (09/15/17 1213)  O2 Device (Oxygen Therapy): nasal cannula (09/15/17 1515) Vital Signs (24h Range):  Temp:  [97.9 °F (36.6 °C)-98.7 °F (37.1 °C)] 98.3 °F (36.8 °C)  Pulse:  [59-75] 61  Resp:  [16-26] 20  SpO2:  [96 %-100 %] 99 %  BP: (163-207)/(65-91) 168/66     Weight: 46.3 kg (102 lb) (09/14/17 2230)  Body mass index is 18.07 kg/m².  Body surface area is 1.43  meters squared.    No intake/output data recorded.    Physical Exam   Constitutional: She is oriented to person, place, and time. No distress.   Thin and emanciated   HENT:   Head: Normocephalic and atraumatic.   Eyes: Conjunctivae and EOM are normal. Pupils are equal, round, and reactive to light.   Neck: Trachea normal and normal range of motion. Neck supple. No JVD present.   Cardiovascular: Normal rate, regular rhythm, S1 normal, S2 normal, normal heart sounds, intact distal pulses and normal pulses.  Exam reveals no gallop and no friction rub.    No murmur heard.  Pulmonary/Chest: Effort normal. She has no wheezes. She has rales.   Abdominal: Soft. She exhibits no distension. There is tenderness. There is no guarding.   Hyperactive BS   Musculoskeletal: Normal range of motion.   Neurological: She is alert and oriented to person, place, and time.   Skin: Skin is warm and dry. Capillary refill takes 2 to 3 seconds.   Psychiatric: She has a normal mood and affect. Her behavior is normal.       Significant Labs:  BMP:   Recent Labs  Lab 09/15/17  0557   GLU 87      CO2 21*   BUN 70*   CREATININE 7.5*   CALCIUM 8.5*   MG 2.2     Cardiac Markers: No results for input(s): CKMB, TROPONINT, MYOGLOBIN in the last 168 hours.  CBC:   Recent Labs  Lab 09/15/17  0557   WBC 3.87*   RBC 3.96*   HGB 10.9*   HCT 34.2*   *   MCV 86   MCH 27.5   MCHC 31.9*     CMP:   Recent Labs  Lab 09/14/17  1553 09/15/17  0557   * 87   CALCIUM 8.7 8.5*   ALBUMIN 3.3*  --    PROT 7.1  --     138   K 4.4 4.6   CO2 27 21*   CL 99 101   BUN 68* 70*   CREATININE 7.0* 7.5*   ALKPHOS 88  --    ALT 7*  --    AST 10  --    BILITOT 0.7  --      All labs within the past 24 hours have been reviewed.    Significant Imaging:  Labs: Reviewed  ECG: Reviewed  X-Ray: Reviewed    Assessment/Plan:     ESRD (end stage renal disease)    ESRD on IHD [facility]   Out patient HD Center - [facility]/ Dr. Almanza  On HD for:  ~ 7 years  Duration of  outpatient dialysis session - 3.5 hrs  EDW - 43 kg  Residual Renal Function - no  - Will provide dialysis for metabolic clearance and volume management x 3.5 hrs  - Seen and examined today during hemodialysis; tolerating treatment well without issues. Denied headaches, chest pain, abdominal pain, or muscle cramps   -  ml/hr  - Target ultrafiltration1-2 lts as tolerated keep MAP > 65   - Dialysate adjusted to current labs   Access: [facility] with good thrill        Anemia in ESRD (end-stage renal disease)    - Will resume SATNAM as outpatient   - HG at goal   - Adequate iron stores as per most recent profile         Essential hypertension    ·  BP, will continue to monitor. Goal for BP is <130 mmHg SBP and BDP <85 mmHg.   · Continue current regiment  · Volume overloaded and should improve with UF           COPD (chronic obstructive pulmonary disease)    - on Home O2            Thank you for your consult. I will follow-up with patient. Please contact us if you have any additional questions.    De Leo MD  Nephrology  [facility]        I have reviewed and concur with the fellow's history, physical, assessment, and plan. I have personally interviewed and examined the patient at bedside.

## 2017-09-15 NOTE — PROGRESS NOTES
pt arrived to unit, AAOx4, VSS, no acute distress noted. pt eating dinner, instructed to call for assistance if needed, call light in reach. will continue to monitor.

## 2017-09-15 NOTE — ASSESSMENT & PLAN NOTE
Above baseline currently. Will trend given new incidence of bloody BM.   Typed and crossed.   Consented.

## 2017-09-15 NOTE — HOSPITAL COURSE
Pt admitted to observation for SOB after missed HD session, abdominal pain and diarrhea x 4 days.  CXR revealed findings consistent with CHF.  CT of her abdomen was benign. On transfer to OBS unit, patient experienced 1 episode of fecal incontinence w/ BRBPR with no further episodes throughout hospital course.  Lactic acid WNL.  C Diff negative.  Stool OCP negative.  Rotavirus antigen stool unable to be collected prior to discharge.  Diarrhea resolved at discharge.  In review of previous hospitalizations, last EGD and colonoscopy 05/2016 (polyp biopsied showing chronic reactive changes, diverticulosis with no bleeding, tortuous colon).  Pt saw GI following hospitalization and was to follow up 09/2016 but lost to follow up.  Nephrology consulted for HD completed 9/15.  Repeat CXR following HD showed better aeration of the right lung base with minimal pleural fluid.  Pt remained hemodynamically stable throughout hospital course.  Referral placed to follow up in GI due to long history of intermittent diarrhea and abdominal pain.

## 2017-09-15 NOTE — ASSESSMENT & PLAN NOTE
- Will resume SATNAM as outpatient   - HG at goal   - Adequate iron stores as per most recent profile

## 2017-09-15 NOTE — H&P
"[facility] Hospital Medicine  History & Physical    Patient Name: Tea Georges  MRN: [id number]  Admission Date: 9/14/2017  Attending Physician: Maury Gonzales MD   Primary Care Provider: Parth Posadas Ii, MD    [facility] Medicine Team: [facility] Iveth Mcneal MD     Patient information was obtained from patient and ER records.     Subjective:     Principal Problem:Need for acute hemodialysis    Chief Complaint:   Chief Complaint   Patient presents with    Abdominal Pain     and shortness of breath        HPI: Ms. Georges is a 78 yo AAF w/ COPD on home O2, ESRD on HD (MWF), HTN, MDD, DM2 who presents today after missing dialysis. She also complained of abdominal pain and diarrhea which has been going on for 3-4 days. She describes the abdominal pain as "crampy, all over". It is not exacerbated by food. It is not improved by defecation. Patient states pain has been present since Monday but worsened acutely yesterday. She denies nausea, vomiting, BRBPR. Diarrhea is watery, non-bloody. No fevers. No sick contacts.     Patient also reports shortness of breath after missing dialysis yesterday; "I didn't feel good". Last dialysis was Monday.     On evaluation in the ED, patient was found to have SBP >200. She was acutely tender to palpation with voluntary guarding in the periumbilical and R upper/lower quadrants. No rebound tenderness. No hernia. A CT of her abdomen was benign. On transfer to OBS unit, patient experienced 1 episode of fecal incontinence w/ BRBPR.       Review of Systems   Constitutional: Positive for appetite change. Negative for activity change, chills, diaphoresis, fatigue, fever and unexpected weight change.   Eyes: Negative for photophobia and visual disturbance.   Respiratory: Positive for shortness of breath. Negative for apnea, cough, choking, wheezing and stridor.    Cardiovascular: Positive for leg swelling. Negative for chest pain and palpitations. "   Gastrointestinal: Positive for abdominal pain, blood in stool and diarrhea. Negative for constipation, nausea and vomiting.   Genitourinary: Negative for difficulty urinating.   Musculoskeletal: Negative for neck pain and neck stiffness.   Skin: Negative for rash and wound.   Allergic/Immunologic: Negative for immunocompromised state.   Neurological: Negative for dizziness, seizures, syncope, weakness, light-headedness and headaches.   Psychiatric/Behavioral: Negative for agitation and confusion.     Objective:     Vital Signs (Most Recent):  Temp: 98.1 °F (36.7 °C) (09/14/17 1242)  Pulse: (!) 59 (09/14/17 2002)  Resp: 17 (09/14/17 2002)  BP: (!) 180/77 (09/14/17 2002)  SpO2: 99 % (09/14/17 2002) Vital Signs (24h Range):  Temp:  [98.1 °F (36.7 °C)] 98.1 °F (36.7 °C)  Pulse:  [59-69] 59  Resp:  [16-17] 17  SpO2:  [96 %-100 %] 99 %  BP: (165-207)/(74-91) 180/77     Weight: 44 kg (97 lb)  Body mass index is 17.18 kg/m².  No intake or output data in the 24 hours ending 09/14/17 2238   Physical Exam   Constitutional: She is oriented to person, place, and time. She appears well-developed and well-nourished. No distress.   HENT:   Head: Normocephalic and atraumatic.   Eyes: EOM are normal. Pupils are equal, round, and reactive to light.   Blind R eye   Neck: Normal range of motion. Neck supple.   Cardiovascular: Normal rate and regular rhythm.    Murmur heard.   Systolic murmur is present with a grade of 4/6   Pulmonary/Chest: Effort normal. She has rales.   Abdominal: Normal appearance. Bowel sounds are absent. There is tenderness in the periumbilical area, left upper quadrant and left lower quadrant. There is rigidity and guarding. There is no rebound, no tenderness at McBurney's point and negative Amaya's sign. No hernia.   Musculoskeletal: She exhibits edema.   Lymphadenopathy:     She has no cervical adenopathy.   Neurological: She is alert and oriented to person, place, and time. No cranial nerve deficit.   Skin:  Skin is warm and dry. She is not diaphoretic.   Psychiatric: She has a normal mood and affect. Her behavior is normal.       Significant Labs:   CBC:   Recent Labs  Lab 09/14/17  1553 09/14/17  1600   WBC 4.28  --    HGB 11.0*  --    HCT 34.7* 38   *  --      CMP:   Recent Labs  Lab 09/14/17  1553      K 4.4   CL 99   CO2 27   *   BUN 68*   CREATININE 7.0*   CALCIUM 8.7   PROT 7.1   ALBUMIN 3.3*   BILITOT 0.7   ALKPHOS 88   AST 10   ALT 7*   ANIONGAP 12   EGFRNONAA 5.3*       Significant Imaging: CT: I have reviewed all pertinent results/findings within the past 24 hours and my personal findings are:  no acute intraabdominal pathology identified    Assessment/Plan:     * Need for acute hemodialysis    Nephrology consulted.   No emergent need currently.           Periumbilical abdominal pain    Infectious gastroenteritis (no evidence of colitis on CT) vs diverticulitis vs PUD vs early ischemic colitis  Etiology not clear. CT benign.  Lactic acid ordered. Serial abdo checks. Stool culture, OCP, CDiff ordered.   Typed and screened  CRS consulted  Keep NPO          COPD (chronic obstructive pulmonary disease)    Stable. Continue albuterol prn, Advair.   Duo nebs PRN for wheezing.  CXR sig/f pulmonary edema; needs HD for volume removal.           Anemia in ESRD (end-stage renal disease)    Above baseline currently. Will trend given new incidence of bloody BM.   Typed and crossed.   Consented.        ESRD (end stage renal disease)    Nephrology consulted for resumption of HD.   Continue Phoslo TID  Renal diet          Essential hypertension    Patient is on multiple antihypertensives:  · Amlodipine 10  · Coreg 25 BID  · Hydralazine 50 q8h  · Isosorbide dinitrate 40 BID  · Valsartan 160   Resumed.  Will need HD for volume removal.          VTE Risk Mitigation         Ordered     heparin (porcine) injection 5,000 Units  Every 8 hours     Route:  Subcutaneous        09/14/17 2051     High Risk of VTE   Once      09/14/17 2051             Iveth Mcneal MD  Department of Hospital Medicine   [facility]

## 2017-09-15 NOTE — PROGRESS NOTES
Pt reports having an accident. After further investigation patient noted to have bright red blood and stool coming from rectum. A small sample was collected and MD paged. Will continue to monitor closely.

## 2017-09-15 NOTE — ASSESSMENT & PLAN NOTE
Patient is on multiple antihypertensives:  · Amlodipine 10  · Coreg 25 BID  · Hydralazine 50 q8h  · Isosorbide dinitrate 40 BID  · Valsartan 160   Resumed.  Will need HD for volume removal.

## 2017-09-16 VITALS
SYSTOLIC BLOOD PRESSURE: 184 MMHG | OXYGEN SATURATION: 99 % | DIASTOLIC BLOOD PRESSURE: 74 MMHG | TEMPERATURE: 98 F | RESPIRATION RATE: 16 BRPM | HEIGHT: 63 IN | WEIGHT: 102 LBS | HEART RATE: 59 BPM | BODY MASS INDEX: 18.07 KG/M2

## 2017-09-16 PROBLEM — R10.33 PERIUMBILICAL ABDOMINAL PAIN: Status: RESOLVED | Noted: 2017-09-14 | Resolved: 2017-09-16

## 2017-09-16 PROBLEM — Z99.2 NEED FOR ACUTE HEMODIALYSIS: Status: RESOLVED | Noted: 2017-09-14 | Resolved: 2017-09-16

## 2017-09-16 LAB
ALBUMIN SERPL BCP-MCNC: 3 G/DL
ANION GAP SERPL CALC-SCNC: 13 MMOL/L
ANISOCYTOSIS BLD QL SMEAR: SLIGHT
BASOPHILS # BLD AUTO: 0.02 K/UL
BASOPHILS NFR BLD: 0.4 %
BUN SERPL-MCNC: 27 MG/DL
CALCIUM SERPL-MCNC: 8.8 MG/DL
CHLORIDE SERPL-SCNC: 101 MMOL/L
CO2 SERPL-SCNC: 28 MMOL/L
CREAT SERPL-MCNC: 3.9 MG/DL
DIFFERENTIAL METHOD: ABNORMAL
EOSINOPHIL # BLD AUTO: 0.1 K/UL
EOSINOPHIL NFR BLD: 1.1 %
ERYTHROCYTE [DISTWIDTH] IN BLOOD BY AUTOMATED COUNT: 17.6 %
EST. GFR  (AFRICAN AMERICAN): 12.3 ML/MIN/1.73 M^2
EST. GFR  (NON AFRICAN AMERICAN): 10.7 ML/MIN/1.73 M^2
GLUCOSE SERPL-MCNC: 91 MG/DL
HCT VFR BLD AUTO: 33 %
HGB BLD-MCNC: 10.4 G/DL
LYMPHOCYTES # BLD AUTO: 0.7 K/UL
LYMPHOCYTES NFR BLD: 11.8 %
MAGNESIUM SERPL-MCNC: 2 MG/DL
MCH RBC QN AUTO: 27.7 PG
MCHC RBC AUTO-ENTMCNC: 31.5 G/DL
MCV RBC AUTO: 88 FL
MONOCYTES # BLD AUTO: 0.5 K/UL
MONOCYTES NFR BLD: 8.2 %
NEUTROPHILS # BLD AUTO: 4.3 K/UL
NEUTROPHILS NFR BLD: 78.5 %
PHOSPHATE SERPL-MCNC: 4.3 MG/DL
PLATELET # BLD AUTO: 102 K/UL
PLATELET BLD QL SMEAR: ABNORMAL
PMV BLD AUTO: ABNORMAL FL
POTASSIUM SERPL-SCNC: 4 MMOL/L
RBC # BLD AUTO: 3.76 M/UL
SODIUM SERPL-SCNC: 142 MMOL/L
WBC # BLD AUTO: 5.52 K/UL

## 2017-09-16 PROCEDURE — 85025 COMPLETE CBC W/AUTO DIFF WBC: CPT

## 2017-09-16 PROCEDURE — 25000003 PHARM REV CODE 250: Performed by: PHYSICIAN ASSISTANT

## 2017-09-16 PROCEDURE — 36415 COLL VENOUS BLD VENIPUNCTURE: CPT

## 2017-09-16 PROCEDURE — G0378 HOSPITAL OBSERVATION PER HR: HCPCS

## 2017-09-16 PROCEDURE — 99217 PR OBSERVATION CARE DISCHARGE: CPT | Mod: ,,, | Performed by: PHYSICIAN ASSISTANT

## 2017-09-16 PROCEDURE — 83735 ASSAY OF MAGNESIUM: CPT

## 2017-09-16 PROCEDURE — 80069 RENAL FUNCTION PANEL: CPT

## 2017-09-16 PROCEDURE — 25000003 PHARM REV CODE 250: Performed by: HOSPITALIST

## 2017-09-16 RX ADMIN — AMLODIPINE BESYLATE 10 MG: 10 TABLET ORAL at 08:09

## 2017-09-16 RX ADMIN — ATORVASTATIN CALCIUM 40 MG: 20 TABLET, FILM COATED ORAL at 08:09

## 2017-09-16 RX ADMIN — ISOSORBIDE DINITRATE 30 MG: 30 TABLET ORAL at 05:09

## 2017-09-16 RX ADMIN — HYDRALAZINE HYDROCHLORIDE 75 MG: 25 TABLET, FILM COATED ORAL at 12:09

## 2017-09-16 RX ADMIN — VALSARTAN 160 MG: 160 TABLET, FILM COATED ORAL at 08:09

## 2017-09-16 RX ADMIN — HYDRALAZINE HYDROCHLORIDE 75 MG: 25 TABLET, FILM COATED ORAL at 05:09

## 2017-09-16 RX ADMIN — FLUTICASONE FUROATE AND VILANTEROL TRIFENATATE 1 PUFF: 200; 25 POWDER RESPIRATORY (INHALATION) at 07:09

## 2017-09-16 RX ADMIN — ASPIRIN 81 MG CHEWABLE TABLET 81 MG: 81 TABLET CHEWABLE at 07:09

## 2017-09-16 RX ADMIN — CITALOPRAM HYDROBROMIDE 10 MG: 10 TABLET ORAL at 08:09

## 2017-09-16 RX ADMIN — CLOPIDOGREL 75 MG: 75 TABLET, FILM COATED ORAL at 08:09

## 2017-09-16 RX ADMIN — CALCIUM ACETATE 2001 MG: 667 CAPSULE ORAL at 08:09

## 2017-09-16 RX ADMIN — CARVEDILOL 25 MG: 25 TABLET, FILM COATED ORAL at 08:09

## 2017-09-16 RX ADMIN — DOCUSATE SODIUM 100 MG: 100 CAPSULE, LIQUID FILLED ORAL at 07:09

## 2017-09-16 NOTE — ASSESSMENT & PLAN NOTE
Patient is on multiple antihypertensives:  · Amlodipine 10  · Coreg 25 BID  · Hydralazine 50 q8h  · Isosorbide dinitrate 40 BID  · Valsartan 160   Resumed.

## 2017-09-16 NOTE — ASSESSMENT & PLAN NOTE
Above baseline currently. Will trend given new incidence of bloody BM.   Typed and crossed.   No need for PRBC during hospital course.

## 2017-09-16 NOTE — ASSESSMENT & PLAN NOTE
Stable. Continue albuterol prn, Advair.   Duo nebs PRN for wheezing.  CXR sig/f pulmonary edema; needs HD for volume removal.   9/15 will repeat CXR after HD-repeat improved

## 2017-09-16 NOTE — DISCHARGE SUMMARY
"[facility] Hospital Medicine  Discharge Summary      Patient Name: Tea Georges  MRN: [id number]  Admission Date: 9/14/2017  Hospital Length of Stay: 0 days  Discharge Date and Time:  09/16/2017 10:12 AM  Attending Physician: Maury Gonzales MD   Discharging Provider: Nikai Alexander PA-C  Primary Care Provider: Parth Posadas Ii, MD  [facility] Medicine Team: [facility] MED F Nikia Alexander PA-C    HPI:   Ms. Georges is a 76 yo AAF w/ COPD on home O2, ESRD on HD (MWF), HTN, MDD, DM2 who presents today after missing dialysis. She also complained of abdominal pain and diarrhea which has been going on for 3-4 days. She describes the abdominal pain as "crampy, all over". It is not exacerbated by food. It is not improved by defecation. Patient states pain has been present since Monday but worsened acutely yesterday. She denies nausea, vomiting, BRBPR. Diarrhea is watery, non-bloody. No fevers. No sick contacts.     Patient also reports shortness of breath after missing dialysis yesterday; "I didn't feel good". Last dialysis was Monday.     On evaluation in the ED, patient was found to have SBP >200. She was acutely tender to palpation with voluntary guarding in the periumbilical and R upper/lower quadrants. No rebound tenderness. No hernia. A CT of her abdomen was benign. On transfer to OBS unit, patient experienced 1 episode of fecal incontinence w/ BRBPR.     * No surgery found *      Indwelling Lines/Drains at time of discharge:   Lines/Drains/Airways     Drain                 Hemodialysis AV Fistula Left upper arm -- days         Hemodialysis AV Fistula 05/23/16 0700 Left forearm 481 days              Hospital Course:   Pt admitted to observation for SOB after missed HD session, abdominal pain and diarrhea x 4 days.  CXR revealed findings consistent with CHF.  CT of her abdomen was benign. On transfer to OBS unit, patient experienced 1 episode of fecal incontinence w/ BRBPR with no further " episodes throughout hospital course.  Lactic acid WNL.  C Diff negative.  Stool OCP negative.  Rotavirus antigen stool unable to be collected prior to discharge.  Diarrhea resolved at discharge.  In review of previous hospitalizations, last EGD and colonoscopy 05/2016 (polyp biopsied showing chronic reactive changes, diverticulosis with no bleeding, tortuous colon).  Pt saw GI following hospitalization and was to follow up 09/2016 but lost to follow up.  Nephrology consulted for HD completed 9/15.  Repeat CXR following HD showed better aeration of the right lung base with minimal pleural fluid.  Pt remained hemodynamically stable throughout hospital course.  Referral placed to follow up in GI due to long history of intermittent diarrhea and abdominal pain.     Consults:   Consults         Status Ordering Provider     Inpatient consult to Nephrology  Once     Provider:  (Not yet assigned)    Completed CIERRA CONTRERAS          Significant Diagnostic Studies: Labs: All labs within the past 24 hours have been reviewed    Pending Diagnostic Studies:     None        Final Active Diagnoses:    Diagnosis Date Noted POA    ESRD (end stage renal disease) [N18.6]  Yes     Chronic    COPD (chronic obstructive pulmonary disease) [J44.9] 01/15/2017 Yes    Anemia in ESRD (end-stage renal disease) [N18.6, D63.1] 05/29/2016 Yes     Chronic    Essential hypertension [I10] 01/08/2016 Yes     Chronic      Problems Resolved During this Admission:    Diagnosis Date Noted Date Resolved POA    PRINCIPAL PROBLEM:  Need for acute hemodialysis [Z99.2] 09/14/2017 09/16/2017 Not Applicable    Periumbilical abdominal pain [R10.33] 09/14/2017 09/16/2017 Yes      * Need for acute hemodialysis-resolved as of 9/16/2017    Nephrology consulted, HD completed 9/15  Pt to resume HD outpatient schedule            ESRD (end stage renal disease)    Nephrology consulted for resumption of HD.   Continue Phoslo TID  Renal diet           Periumbilical abdominal pain-resolved as of 9/16/2017    LLQ pain  Infectious gastroenteritis (no evidence of colitis on CT) vs diverticulitis vs PUD vs early ischemic colitis  Etiology not clear. CT benign.  Afebrile without leukocytosis.  Lactic acid WNL. Stool culture negative for E Coli, OCP in process, CDiff negative, rotavirus antigen ordered pending collection.  Typed and screened.    9/15 hemodynamically stable with stable H/H  -In review of chart, pt hospitalized for GI Bleed 05/2016 (BRBPR).  Initial EGD aborted due to decreasing sats.  Repeat EGD and C scope showed polyp (biopsied chronic reactive changes); diverticulosis no bleeding, incomplete due to tortuous and restricted mobility.  Pt followed up with GI 06/2016 (noted pt c/o LLQ pain) and occult stools x 3 to rule out further bleeding since no active bleeding seen on endoscopies. Next step was to consider VCE for heme + stool, however pt has a tortuous colon (alternative imaging?); start fiber supplement.  Pt was to return on 09/2016 but lost to follow up.    Referral placed to follow up with GI.  Diarrhea and pain resolved at discharge.          COPD (chronic obstructive pulmonary disease)    Stable. Continue albuterol prn, Advair.   Duo nebs PRN for wheezing.  CXR sig/f pulmonary edema; needs HD for volume removal.   9/15 will repeat CXR after HD-repeat improved          Anemia in ESRD (end-stage renal disease)    Above baseline currently. Will trend given new incidence of bloody BM.   Typed and crossed.   No need for PRBC during hospital course.        Essential hypertension    Patient is on multiple antihypertensives:  · Amlodipine 10  · Coreg 25 BID  · Hydralazine 50 q8h  · Isosorbide dinitrate 40 BID  · Valsartan 160   Resumed.              Discharged Condition: good    Disposition: Home or Self Care    Follow Up:  Follow-up Information     Parth Posadas Ii, MD On 9/21/2017.    Specialty:  Internal Medicine  Why:  at 8:20 AM  Contact  information:  [address]  [facility] [address]  [phone]             Call [facility] G[facility].    Specialty:  Gastroenterology  Contact information:  [address]  [facility] [address] [phone]  Additional information:  [facility] - 4th Floor               Patient Instructions:     Ambulatory referral to Gastroenterology   Referral Priority: Urgent Referral Type: Consultation   Referral Reason: Specialty Services Required    Requested Specialty: Gastroenterology    Number of Visits Requested: 1      Diet general   Order Specific Question Answer Comments   Additional restrictions: Renal      Activity as tolerated       Medications:  Reconciled Home Medications:   Current Discharge Medication List      CONTINUE these medications which have NOT CHANGED    Details   amlodipine (NORVASC) 10 MG tablet Take 1 tablet (10 mg total) by mouth once daily.  Qty: 90 tablet, Refills: 3      aspirin 81 MG Chew Take 1 tablet (81 mg total) by mouth once daily.  Qty: 30 tablet, Refills: 1      atorvastatin (LIPITOR) 40 MG tablet Take 1 tablet (40 mg total) by mouth once daily.  Qty: 30 tablet, Refills: 3      calcium acetate (PHOSLO) 667 mg capsule TAKE 3 CAPSULES BY MOUTH THREE TIMES A DAY WITH MEALS  Refills: 6      carvedilol (COREG) 25 MG tablet Take 1 tablet (25 mg total) by mouth 2 (two) times daily.  Qty: 60 tablet, Refills: 3      citalopram (CELEXA) 10 MG tablet Take 1 tablet (10 mg total) by mouth once daily.  Qty: 90 tablet, Refills: 3    Associated Diagnoses: Moderate single current episode of major depressive disorder      cloNIDine 0.3 mg/24 hr td ptwk (CATAPRES) 0.3 mg/24 hr Place 1 patch onto the skin every 7 days.  Qty: 4 patch, Refills: 3      clopidogrel (PLAVIX) 75 mg tablet take 1 tablet by mouth once daily  Qty: 90 tablet, Refills: 3      hydrALAZINE (APRESOLINE) 50 MG tablet Take 1.5 tablets (75 mg total) by mouth every 6 (six) hours.  Qty: 180 tablet, Refills: 3       isosorbide dinitrate (ISOCHRON) 40 mg TbSR Take 1 tablet (40 mg total) by mouth every 12 (twelve) hours.  Qty: 60 tablet, Refills: 11      multivitamin (THERAGRAN) per tablet Take 1 tablet by mouth once daily.      TRIPHROCAPS 1 mg Cap Refills: 0      valsartan (DIOVAN) 160 MG tablet Take 1 tablet (160 mg total) by mouth once daily.  Qty: 90 tablet, Refills: 3      albuterol 90 mcg/actuation inhaler Inhale 1-2 puffs into the lungs every 6 (six) hours as needed for Wheezing.  Qty: 1 Inhaler, Refills: 0      docusate sodium (COLACE) 100 MG capsule Take 1 capsule (100 mg total) by mouth 2 (two) times daily.  Refills: 0      epoetin shon (PROCRIT) 10,000 unit/mL injection Inject 0.5 mLs (5,000 Units total) into the skin every Mon, Wed, Fri.      ergocalciferol (ERGOCALCIFEROL) 50,000 unit Cap Take 1 capsule (50,000 Units total) by mouth every 7 days.  Qty: 12 capsule, Refills: 3    Associated Diagnoses: Vitamin D deficiency      fluticasone (FLOVENT HFA) 44 mcg/actuation inhaler Inhale 1 puff into the lungs 2 (two) times daily. Controller      fluticasone-vilanterol (BREO ELLIPTA) 200-25 mcg/dose DsDv diskus inhaler Inhale 1 puff into the lungs once daily.  Qty: 90 each, Refills: 3      lidocaine-prilocaine (EMLA) cream Apply topically as needed (to apply over access 1/2 over prior to dialysis).  Qty: 25 g, Refills: 1      sodium chloride 0.9% 0.9 % SolP 100 mL with ferric gluconate 62.5 mg/5 mL Soln 125 mg Inject 125 mg into the vein daily as needed (on dialysis).  Qty: 1 Bottle, Refills: 7         STOP taking these medications       albuterol (PROVENTIL) 2.5 mg /3 mL (0.083 %) nebulizer solution Comments:   Reason for Stopping:             Time spent on the discharge of patient: >30 minutes    Pt discharged home    HOS POC IP DISCHARGE SUMMARY    Nikia Alexander PA-C  Department of Hospital Medicine  [facility]

## 2017-09-16 NOTE — ASSESSMENT & PLAN NOTE
LLQ pain  Infectious gastroenteritis (no evidence of colitis on CT) vs diverticulitis vs PUD vs early ischemic colitis  Etiology not clear. CT benign.  Afebrile without leukocytosis.  Lactic acid WNL. Stool culture negative for E Coli, OCP in process, CDiff negative, rotavirus antigen ordered pending collection.  Typed and screened.    9/15 hemodynamically stable with stable H/H  -In review of chart, pt hospitalized for GI Bleed 05/2016 (BRBPR).  Initial EGD aborted due to decreasing sats.  Repeat EGD and C scope showed polyp (biopsied chronic reactive changes); diverticulosis no bleeding, incomplete due to tortuous and restricted mobility.  Pt followed up with GI 06/2016 (noted pt c/o LLQ pain) and occult stools x 3 to rule out further bleeding since no active bleeding seen on endoscopies. Next step was to consider VCE for heme + stool, however pt has a tortuous colon (alternative imaging?); start fiber supplement.  Pt was to return on 09/2016 but lost to follow up.    Referral placed to follow up with GI.  Diarrhea and pain resolved at discharge.

## 2017-09-16 NOTE — PLAN OF CARE
Problem: Patient Care Overview  Goal: Plan of Care Review  Outcome: Outcome(s) achieved Date Met: 09/16/17  Fall precaution maintained thru out hospital stay; pt denies any pain or discomfort; no sign of distress noted; skin dry and intact

## 2017-09-16 NOTE — PLAN OF CARE
Problem: Patient Care Overview  Goal: Plan of Care Review  Hemodialysis is complete.  Blood returned.  AV Fistula to [place] was then de accessed and needles removed.  Hemostasis attained.  No bleed or hematoma.  Fistula has Bruit and Thrill.  Hd time = 210 min.  UF = 3000 ml.

## 2017-09-18 LAB
BACTERIA STL CULT: NORMAL
BLD PROD TYP BPU: NORMAL
BLOOD UNIT EXPIRATION DATE: NORMAL
BLOOD UNIT TYPE CODE: 6200
BLOOD UNIT TYPE: NORMAL
CODING SYSTEM: NORMAL
DISPENSE STATUS: NORMAL
E COLI SXT1 STL QL IA: NEGATIVE
E COLI SXT2 STL QL IA: NEGATIVE
NUM UNITS TRANS PACKED RBC: NORMAL

## 2017-09-21 ENCOUNTER — OFFICE VISIT (OUTPATIENT)
Dept: INTERNAL MEDICINE | Facility: CLINIC | Age: 77
End: 2017-09-21
Payer: MEDICARE

## 2017-09-21 VITALS
HEIGHT: 63 IN | WEIGHT: 98.13 LBS | DIASTOLIC BLOOD PRESSURE: 57 MMHG | SYSTOLIC BLOOD PRESSURE: 159 MMHG | HEART RATE: 65 BPM | BODY MASS INDEX: 17.39 KG/M2

## 2017-09-21 DIAGNOSIS — R63.6 UNDERWEIGHT: ICD-10-CM

## 2017-09-21 DIAGNOSIS — Z78.0 ASYMPTOMATIC MENOPAUSAL STATE: ICD-10-CM
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
## 2017-09-21 DIAGNOSIS — I10 ESSENTIAL HYPERTENSION: Primary | Chronic | ICD-10-CM

## 2017-09-21 DIAGNOSIS — R19.7 DIARRHEA, UNSPECIFIED TYPE: ICD-10-CM

## 2017-09-21 DIAGNOSIS — R10.9 ABDOMINAL PAIN, UNSPECIFIED LOCATION: ICD-10-CM

## 2017-09-21 DIAGNOSIS — R53.81 PHYSICAL DEBILITY: ICD-10-CM

## 2017-09-21 PROCEDURE — 99215 OFFICE O/P EST HI 40 MIN: CPT | Mod: S$PBB,,, | Performed by: INTERNAL MEDICINE

## 2017-09-21 PROCEDURE — 1159F MED LIST DOCD IN RCRD: CPT | Mod: ,,, | Performed by: INTERNAL MEDICINE

## 2017-09-21 PROCEDURE — 99999 PR PBB SHADOW E&M-EST. PATIENT-LVL III: CPT | Mod: PBBFAC,,, | Performed by: INTERNAL MEDICINE

## 2017-09-21 PROCEDURE — 99213 OFFICE O/P EST LOW 20 MIN: CPT | Mod: PBBFAC | Performed by: INTERNAL MEDICINE

## 2017-09-21 PROCEDURE — 1125F AMNT PAIN NOTED PAIN PRSNT: CPT | Mod: ,,, | Performed by: INTERNAL MEDICINE

## 2017-09-21 RX ORDER — LOPERAMIDE HYDROCHLORIDE 2 MG/1
CAPSULE ORAL
Qty: 30 CAPSULE | Refills: 3 | Status: SHIPPED | OUTPATIENT
Start: 2017-09-21 | End: 2019-02-08 | Stop reason: SDUPTHER

## 2017-09-21 RX ORDER — VALSARTAN 160 MG/1
160 TABLET ORAL DAILY
Qty: 90 TABLET | Refills: 3 | Status: SHIPPED | OUTPATIENT
Start: 2017-09-21 | End: 2018-05-01 | Stop reason: SDUPTHER
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
## 2017-10-05 ENCOUNTER — TELEPHONE (OUTPATIENT)
Dept: ELECTROPHYSIOLOGY | Facility: CLINIC | Age: 77
End: 2017-10-05
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
## 2017-10-26 ENCOUNTER — TELEPHONE (OUTPATIENT)
Dept: INTERNAL MEDICINE | Facility: CLINIC | Age: 77
End: 2017-10-26

## 2017-10-26 DIAGNOSIS — R53.81 DEBILITY: Primary | ICD-10-CM

## 2017-10-26 NOTE — TELEPHONE ENCOUNTER
----- Message from Lucy Thomson sent at 10/26/2017  2:27 PM CDT -----  Contact: [facility]/Janee Bedolla [phone]  Needs permission to re-certify for home health faxed to [phone].    Please call and advise.    Thank You

## 2017-11-07 ENCOUNTER — TELEPHONE (OUTPATIENT)
Dept: ELECTROPHYSIOLOGY | Facility: CLINIC | Age: 77
End: 2017-11-07
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
## 2017-12-15 ENCOUNTER — CLINICAL SUPPORT (OUTPATIENT)
Dept: ELECTROPHYSIOLOGY | Facility: CLINIC | Age: 77
End: 2017-12-15
Attending: INTERNAL MEDICINE
Payer: MEDICARE

## 2017-12-15 DIAGNOSIS — Z95.818 STATUS POST PLACEMENT OF IMPLANTABLE LOOP RECORDER: ICD-10-CM

## 2017-12-15 DIAGNOSIS — I63.9 CRYPTOGENIC STROKE: ICD-10-CM

## 2018-01-05 ENCOUNTER — TELEPHONE (OUTPATIENT)
Dept: INTERNAL MEDICINE | Facility: CLINIC | Age: 78
End: 2018-01-05

## 2018-01-05 NOTE — TELEPHONE ENCOUNTER
----- Message from Liz Juarez sent at 1/4/2018  4:13 PM CST -----  Contact: [facility]   Type: Home Health (orders, updates, clarifications, etc.)    Home Health Agency/Nurse: [facility]e     Phone number: [phone]    Reason for call: requesting orders to continue home health     Comments:  Please advise , Thanks

## 2018-01-15 ENCOUNTER — TELEPHONE (OUTPATIENT)
Dept: INTERNAL MEDICINE | Facility: CLINIC | Age: 78
End: 2018-01-15

## 2018-01-15 DIAGNOSIS — Z95.818 STATUS POST PLACEMENT OF IMPLANTABLE LOOP RECORDER: Primary | ICD-10-CM

## 2018-01-15 DIAGNOSIS — I63.9 CRYPTOGENIC STROKE: ICD-10-CM

## 2018-01-15 NOTE — TELEPHONE ENCOUNTER
----- Message from Carlota Bone sent at 1/8/2018  4:48 PM CST -----  Contact: [facility] nurse [phone]  Pt requesting a call back to re certify the Pt. Please leave ok on message. Please advise

## 2018-01-24 ENCOUNTER — HOSPITAL ENCOUNTER (EMERGENCY)
Facility: HOSPITAL | Age: 78
Discharge: HOME | End: 2018-01-24
Attending: EMERGENCY MEDICINE
Payer: MEDICARE

## 2018-01-24 VITALS
DIASTOLIC BLOOD PRESSURE: 88 MMHG | RESPIRATION RATE: 17 BRPM | OXYGEN SATURATION: 98 % | HEART RATE: 68 BPM | SYSTOLIC BLOOD PRESSURE: 187 MMHG | TEMPERATURE: 100 F

## 2018-01-24 DIAGNOSIS — R31.9 URINARY TRACT INFECTION WITH HEMATURIA, SITE UNSPECIFIED: ICD-10-CM

## 2018-01-24 DIAGNOSIS — N18.6 ESRD (END STAGE RENAL DISEASE): Chronic | ICD-10-CM

## 2018-01-24 DIAGNOSIS — I73.9 PERIPHERAL VASCULAR DISEASE, UNSPECIFIED: ICD-10-CM

## 2018-01-24 DIAGNOSIS — R05.9 COUGH: ICD-10-CM

## 2018-01-24 DIAGNOSIS — R53.81 PHYSICAL DEBILITY: ICD-10-CM

## 2018-01-24 DIAGNOSIS — I10 ESSENTIAL HYPERTENSION: Chronic | ICD-10-CM

## 2018-01-24 DIAGNOSIS — J90 PLEURAL EFFUSION ON RIGHT: ICD-10-CM

## 2018-01-24 DIAGNOSIS — N39.0 URINARY TRACT INFECTION WITH HEMATURIA, SITE UNSPECIFIED: ICD-10-CM

## 2018-01-24 DIAGNOSIS — K52.9 GASTROENTERITIS: Primary | ICD-10-CM

## 2018-01-24 LAB
ALBUMIN SERPL BCP-MCNC: 3.1 G/DL
ALP SERPL-CCNC: 120 U/L
ALT SERPL W/O P-5'-P-CCNC: 13 U/L
ANION GAP SERPL CALC-SCNC: 12 MMOL/L
AST SERPL-CCNC: 15 U/L
BACTERIA #/AREA URNS AUTO: ABNORMAL /HPF
BASOPHILS # BLD AUTO: 0.03 K/UL
BASOPHILS NFR BLD: 0.6 %
BILIRUB SERPL-MCNC: 0.6 MG/DL
BILIRUB UR QL STRIP: NEGATIVE
BUN SERPL-MCNC: 50 MG/DL
CALCIUM SERPL-MCNC: 9 MG/DL
CHLORIDE SERPL-SCNC: 102 MMOL/L
CLARITY UR REFRACT.AUTO: ABNORMAL
CO2 SERPL-SCNC: 26 MMOL/L
COLOR UR AUTO: ABNORMAL
CREAT SERPL-MCNC: 6.1 MG/DL
DIFFERENTIAL METHOD: ABNORMAL
EOSINOPHIL # BLD AUTO: 0 K/UL
EOSINOPHIL NFR BLD: 0.8 %
ERYTHROCYTE [DISTWIDTH] IN BLOOD BY AUTOMATED COUNT: 17.1 %
EST. GFR  (AFRICAN AMERICAN): 7.1 ML/MIN/1.73 M^2
EST. GFR  (NON AFRICAN AMERICAN): 6.2 ML/MIN/1.73 M^2
FLUAV AG SPEC QL IA: NEGATIVE
FLUBV AG SPEC QL IA: NEGATIVE
GLUCOSE SERPL-MCNC: 135 MG/DL
GLUCOSE UR QL STRIP: NEGATIVE
HCT VFR BLD AUTO: 29.7 %
HGB BLD-MCNC: 9.2 G/DL
HGB UR QL STRIP: NEGATIVE
HYALINE CASTS UR QL AUTO: 0 /LPF
IMM GRANULOCYTES # BLD AUTO: 0.03 K/UL
IMM GRANULOCYTES NFR BLD AUTO: 0.6 %
KETONES UR QL STRIP: NEGATIVE
LEUKOCYTE ESTERASE UR QL STRIP: ABNORMAL
LIPASE SERPL-CCNC: 8 U/L
LYMPHOCYTES # BLD AUTO: 0.5 K/UL
LYMPHOCYTES NFR BLD: 9.7 %
MCH RBC QN AUTO: 30.1 PG
MCHC RBC AUTO-ENTMCNC: 31 G/DL
MCV RBC AUTO: 97 FL
MICROSCOPIC COMMENT: ABNORMAL
MONOCYTES # BLD AUTO: 0.5 K/UL
MONOCYTES NFR BLD: 9.3 %
NEUTROPHILS # BLD AUTO: 4.2 K/UL
NEUTROPHILS NFR BLD: 79 %
NITRITE UR QL STRIP: NEGATIVE
NRBC BLD-RTO: 0 /100 WBC
PH UR STRIP: 7 [PH] (ref 5–8)
PLATELET # BLD AUTO: 126 K/UL
PMV BLD AUTO: 13.2 FL
POTASSIUM SERPL-SCNC: 4.6 MMOL/L
PROT SERPL-MCNC: 7.1 G/DL
PROT UR QL STRIP: ABNORMAL
RBC # BLD AUTO: 3.06 M/UL
RBC #/AREA URNS AUTO: 9 /HPF (ref 0–4)
SODIUM SERPL-SCNC: 140 MMOL/L
SP GR UR STRIP: 1.01 (ref 1–1.03)
SPECIMEN SOURCE: NORMAL
SQUAMOUS #/AREA URNS AUTO: 30 /HPF
URN SPEC COLLECT METH UR: ABNORMAL
UROBILINOGEN UR STRIP-ACNC: NEGATIVE EU/DL
WBC # BLD AUTO: 5.25 K/UL
WBC #/AREA URNS AUTO: 53 /HPF (ref 0–5)

## 2018-01-24 PROCEDURE — 80053 COMPREHEN METABOLIC PANEL: CPT

## 2018-01-24 PROCEDURE — 96374 THER/PROPH/DIAG INJ IV PUSH: CPT

## 2018-01-24 PROCEDURE — 99285 EMERGENCY DEPT VISIT HI MDM: CPT | Mod: 25

## 2018-01-24 PROCEDURE — 81001 URINALYSIS AUTO W/SCOPE: CPT

## 2018-01-24 PROCEDURE — 83690 ASSAY OF LIPASE: CPT

## 2018-01-24 PROCEDURE — 96375 TX/PRO/DX INJ NEW DRUG ADDON: CPT

## 2018-01-24 PROCEDURE — 87400 INFLUENZA A/B EACH AG IA: CPT

## 2018-01-24 PROCEDURE — 63600175 PHARM REV CODE 636 W HCPCS: Performed by: EMERGENCY MEDICINE

## 2018-01-24 PROCEDURE — 85025 COMPLETE CBC W/AUTO DIFF WBC: CPT

## 2018-01-24 PROCEDURE — 25000003 PHARM REV CODE 250: Performed by: EMERGENCY MEDICINE

## 2018-01-24 RX ORDER — ONDANSETRON 2 MG/ML
4 INJECTION INTRAMUSCULAR; INTRAVENOUS
Status: COMPLETED | OUTPATIENT
Start: 2018-01-24 | End: 2018-01-24

## 2018-01-24 RX ORDER — CEPHALEXIN 500 MG/1
500 CAPSULE ORAL 4 TIMES DAILY
Qty: 40 CAPSULE | Refills: 0 | Status: SHIPPED | OUTPATIENT
Start: 2018-01-24 | End: 2018-02-03

## 2018-01-24 RX ORDER — CEFTRIAXONE 1 G/1
1 INJECTION, POWDER, FOR SOLUTION INTRAMUSCULAR; INTRAVENOUS
Status: DISCONTINUED | OUTPATIENT
Start: 2018-01-24 | End: 2018-01-24

## 2018-01-24 RX ORDER — ONDANSETRON 4 MG/1
8 TABLET, FILM COATED ORAL EVERY 6 HOURS
Qty: 12 TABLET | Refills: 0 | Status: SHIPPED | OUTPATIENT
Start: 2018-01-24 | End: 2019-05-21 | Stop reason: CLARIF

## 2018-01-24 RX ORDER — CLONIDINE HYDROCHLORIDE 0.1 MG/1
0.1 TABLET ORAL
Status: COMPLETED | OUTPATIENT
Start: 2018-01-24 | End: 2018-01-24

## 2018-01-24 RX ORDER — CEFTRIAXONE 1 G/1
1 INJECTION, POWDER, FOR SOLUTION INTRAMUSCULAR; INTRAVENOUS
Status: COMPLETED | OUTPATIENT
Start: 2018-01-24 | End: 2018-01-24

## 2018-01-24 RX ADMIN — CLONIDINE HYDROCHLORIDE 0.1 MG: 0.1 TABLET ORAL at 07:01

## 2018-01-24 RX ADMIN — ONDANSETRON 4 MG: 2 INJECTION INTRAMUSCULAR; INTRAVENOUS at 07:01

## 2018-01-24 RX ADMIN — CEFTRIAXONE SODIUM 1 G: 1 INJECTION, POWDER, FOR SOLUTION INTRAMUSCULAR; INTRAVENOUS at 07:01

## 2018-01-24 NOTE — ED PROVIDER NOTES
Encounter Date: 1/24/2018          History     Chief Complaint   Patient presents with    Influenza     Nausea/vomiting/diahhrea. SOB that started today. pt didnt go to dialysis today.          Review of patient's allergies indicates:  No Known Allergies  Past Medical History:   Diagnosis Date    Anemia in ESRD (end-stage renal disease) 5/29/2016    Anticoagulant long-term use     Aortic atherosclerosis 11/22/2016    Aortic stenosis, moderate 2/18/2016    Asthma in adult without complication 1/8/2016    Bilateral low back pain without sciatica 11/17/2015    Blindness of right eye 11/12/2016    Cataract     Central retinal vein occlusion, right eye 6/3/2014    CHF (congestive heart failure)     Chronic diastolic heart failure 1/8/2016    Chronic respiratory failure with hypoxia 5/29/2016    Dependence on hemodialysis     Mon-Wed-Fri    Diverticulosis     Embolic stroke involving right middle cerebral artery     Enlarged LA (left atrium) 10/7/2016    Epiretinal membrane 7/17/2012    ESRD (end stage renal disease)     Essential hypertension 1/8/2016    History of GI diverticular bleed     5/22/16    Left flaccid hemiparesis 10/1/2016    Peripheral vascular disease, unspecified 11/22/2016    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 11/13/2016    Type 2 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, without long-term current use of insulin 3/26/2013    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, without long-term current use of insulin 3/26/2013    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     Past Surgical History:   Procedure Laterality Date    BREAST SURGERY      tumor removal x 2    CATARACT EXTRACTION      CHOLECYSTECTOMY      COLONOSCOPY N/A 5/23/2016    Procedure: COLONOSCOPY;  Surgeon: WILLIAM Colvin MD;  Location: [facility] ([address] [place] [address]);  Service: Endoscopy;  Laterality: N/A;     COLONOSCOPY N/A 5/30/2016    Procedure: COLONOSCOPY;  Surgeon: Sam Davis MD;  Location: [facility] ([address] [place] [address]);  Service: Endoscopy;  Laterality: N/A;    UPPER GASTROINTESTINAL ENDOSCOPY       Family History   Problem Relation Age of Onset    Cataracts Mother     Stroke Mother     Hypertension Mother     Cancer Sister     Hypertension Sister     Heart failure Sister     Glaucoma Brother     Hypertension Brother     Heart disease Brother     Hypertension Father     Glaucoma Maternal Aunt     Esophageal cancer Sister     No Known Problems Maternal Uncle     No Known Problems Paternal Aunt     No Known Problems Paternal Uncle     No Known Problems Maternal Grandmother     No Known Problems Maternal Grandfather     No Known Problems Paternal Grandmother     No Known Problems Paternal Grandfather     Heart attack Neg Hx     Colon cancer Neg Hx     Stomach cancer Neg Hx     Anemia Neg Hx     Arrhythmia Neg Hx     Asthma Neg Hx     Clotting disorder Neg Hx     Fainting Neg Hx     Hyperlipidemia Neg Hx     Atrial Septal Defect Neg Hx      Social History   Substance Use Topics    Smoking status: Never Smoker    Smokeless tobacco: Never Used    Alcohol use No      Comment: Reports occasional 1-2 drinks      Review of Systems       Physical Exam     Initial Vitals [01/24/18 1618]   BP Pulse Resp Temp SpO2   (!) 230/77 65 17 99.7 °F (37.6 °C) 99 %      MAP       128         Physical Exam    ED Course   Procedures  Labs Reviewed - No data to display                               ED Course      Clinical Impression:   There were no encounter diagnoses.

## 2018-01-24 NOTE — ED PROVIDER NOTES
Encounter Date: 1/24/2018    SCRIBE #1 NOTE: I, Myles Gonzalez, am scribing for, and in the presence of,  Dr. Mena. I have scribed the entire note.       History     Chief Complaint   Patient presents with    Influenza     Nausea/vomiting/diahhrea. SOB that started today. pt didnt go to dialysis today.      Time patient was seen by the provider: 4:46 PM      The patient is a 78 y.o. female with co-morbidities including:CHF, ESRD, DM, and asthma who presents to the ED with a complaint of nausea, vomiting, diarrhea, which began this morning. She states she vomited three times at home and twice in the ED. Pt notes an associated cough productive of yellow sputum and left posterior chest pain. She endorses recent sick contact with her granddaughter. No fever, chills, sore throat, or ear pain. Pt last received dialysis on Monday. She was scheduled for dialysis today but did not receive dialysis due to current symptoms.      The history is provided by the patient and medical records.     Review of patient's allergies indicates:  No Known Allergies  Past Medical History:   Diagnosis Date    Anemia in ESRD (end-stage renal disease) 5/29/2016    Anticoagulant long-term use     Aortic atherosclerosis 11/22/2016    Aortic stenosis, moderate 2/18/2016    Asthma in adult without complication 1/8/2016    Bilateral low back pain without sciatica 11/17/2015    Blindness of right eye 11/12/2016    Cataract     Central retinal vein occlusion, right eye 6/3/2014    CHF (congestive heart failure)     Chronic diastolic heart failure 1/8/2016    Chronic respiratory failure with hypoxia 5/29/2016    Dependence on hemodialysis     Mon-Wed-Fri    Diverticulosis     Embolic stroke involving right middle cerebral artery     Enlarged LA (left atrium) 10/7/2016    Epiretinal membrane 7/17/2012    ESRD (end stage renal disease)     Essential hypertension 1/8/2016    History of GI diverticular bleed     5/22/16    Left flaccid  hemiparesis 10/1/2016    Peripheral vascular disease, unspecified 11/22/2016    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 11/13/2016    Type 2 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, without long-term current use of insulin 3/26/2013    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, without long-term current use of insulin 3/26/2013    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     Past Surgical History:   Procedure Laterality Date    BREAST SURGERY      tumor removal x 2    CATARACT EXTRACTION      CHOLECYSTECTOMY      COLONOSCOPY N/A 5/23/2016    Procedure: COLONOSCOPY;  Surgeon: WILLIAM Colvin MD;  [facility]  Service: Endoscopy;  Laterality: N/A;    COLONOSCOPY N/A 5/30/2016    Procedure: COLONOSCOPY;  Surgeon: Sam Davis MD;  [facility]  Service: Endoscopy;  Laterality: N/A;    UPPER GASTROINTESTINAL ENDOSCOPY       Family History   Problem Relation Age of Onset    Cataracts Mother     Stroke Mother     Hypertension Mother     Cancer Sister     Hypertension Sister     Heart failure Sister     Glaucoma Brother     Hypertension Brother     Heart disease Brother     Hypertension Father     Glaucoma Maternal Aunt     Esophageal cancer Sister     No Known Problems Maternal Uncle     No Known Problems Paternal Aunt     No Known Problems Paternal Uncle     No Known Problems Maternal Grandmother     No Known Problems Maternal Grandfather     No Known Problems Paternal Grandmother     No Known Problems Paternal Grandfather     Heart attack Neg Hx     Colon cancer Neg Hx     Stomach cancer Neg Hx     Anemia Neg Hx     Arrhythmia Neg Hx     Asthma Neg Hx     Clotting disorder Neg Hx     Fainting Neg Hx     Hyperlipidemia Neg Hx     Atrial Septal Defect Neg Hx      Social History   Substance Use Topics    Smoking status: Never Smoker     Smokeless tobacco: Never Used    Alcohol use No      Comment: Reports occasional 1-2 drinks      Review of Systems   Constitutional: Negative for chills and fever.   HENT: Negative for ear pain and sore throat.    Respiratory: Positive for cough.    Cardiovascular: Positive for chest pain.   Gastrointestinal: Positive for diarrhea, nausea and vomiting.   Genitourinary: Negative for dysuria.   Musculoskeletal: Negative for back pain.   Skin: Negative for rash.   Neurological: Negative for weakness.   Hematological: Does not bruise/bleed easily.       Physical Exam     Initial Vitals [01/24/18 1618]   BP Pulse Resp Temp SpO2   (!) 230/77 65 17 99.7 °F (37.6 °C) 99 %      MAP       128         Physical Exam    Nursing note and vitals reviewed.  Constitutional:   Elderly debilitated thin black female in mild distress    Eyes:   Opaque right cornea. Left pupil is reactive. Left eye goes through full range of motion    Neck: Neck supple. JVD (+1) present.   Cardiovascular:   2/6 systolic ejection murmur    Pulmonary/Chest:   Scattered rhonchi with normal chest wall movement    Abdominal: Soft. There is no tenderness. There is no rebound.   Tenderness to left posterior chest and left posterior flank. No rigidity    Neurological: She is alert and oriented to person, place, and time.   Skin: Skin is dry. There is pallor.   Psychiatric: She has a normal mood and affect.         ED Course   Procedures  Labs Reviewed   CBC W/ AUTO DIFFERENTIAL - Abnormal; Notable for the following:        Result Value    RBC 3.06 (*)     Hemoglobin 9.2 (*)     Hematocrit 29.7 (*)     MCHC 31.0 (*)     RDW 17.1 (*)     Platelets 126 (*)     MPV 13.2 (*)     Immature Granulocytes 0.6 (*)     Lymph # 0.5 (*)     Gran% 79.0 (*)     Lymph% 9.7 (*)     All other components within normal limits   COMPREHENSIVE METABOLIC PANEL - Abnormal; Notable for the following:     Glucose 135 (*)     BUN, Bld 50 (*)     Creatinine 6.1 (*)     Albumin 3.1 (*)      eGFR if  7.1 (*)     eGFR if non  6.2 (*)     All other components within normal limits   URINALYSIS - Abnormal; Notable for the following:     Appearance, UA Cloudy (*)     Protein, UA 2+ (*)     Leukocytes, UA 3+ (*)     All other components within normal limits   URINALYSIS MICROSCOPIC - Abnormal; Notable for the following:     RBC, UA 9 (*)     WBC, UA 53 (*)     Bacteria, UA Many (*)     All other components within normal limits   LIPASE   INFLUENZA A AND B ANTIGEN          X-Rays:   Independently Interpreted Readings:   Chest X-Ray: Cardiomegaly with interstitial vesicle prominence, bilateral pleural effusion right worse than left, haziness of right lower lung      Medical Decision Making:   History:   Old Medical Records: I decided to obtain old medical records.  Independently Interpreted Test(s):   I have ordered and independently interpreted X-rays - see prior notes.  Clinical Tests:   Lab Tests: Ordered and Reviewed  Radiological Study: Ordered and Reviewed     Cheonically ill dialysis patient presents after missing dialysis due several episodes of nausea and vomiting(5). The patient denied diarrhea or abdominal pain. She did note lt post chest soreness. Workup revealed a nl wbc, electrolyes were acceptable fora dialysis patient. Urine revealed leukoctes. CXR revealed an eccusion , but the xray wa sclear on her side of back pain. No cva tenderness. Will treat as uti, and refer back to dialysis       Scribe Attestation:   Scribe #1: I performed the above scribed service and the documentation accurately describes the services I performed. I attest to the accuracy of the note.            ED Course      Clinical Impression:   The primary encounter diagnosis was Gastroenteritis. Diagnoses of Cough, Pleural effusion on right, Urinary tract infection with hematuria, site unspecified, ESRD (end stage renal disease), Physical debility, Peripheral vascular disease, unspecified, and  Essential hypertension were also pertinent to this visit.                           Neeraj Mena MD  01/24/18 1917       Neeraj Mena MD  01/24/18 1957

## 2018-01-24 NOTE — ED NOTES
Pt present with N/V/D since this morning. . Pt reports 5 x emesis and 2 x diarrhea. Denies burning on urination or fever, denies sore throat or chills. Reports left back pain, denies fall or trauma. Pt has dialysis MWF, states she missed it today b/c she was feeling ill.

## 2018-01-24 NOTE — ED NOTES
LOC: The patient is awake, alert and aware of environment with an appropriate affect, the patient is oriented x 3 and speaking appropriately.  APPEARANCE: Patient resting comfortably and in no acute distress, patient is clean and well groomed. Pt right eye is milky, pt blind in right eye.  SKIN: The skin is warm and dry, skin very flaky, color consistent with ethnicity, patient has normal skin turgor and moist mucus membranes, skin intact, no breakdown or brusing noted.  MUSCULOSKELETAL: Patient moving all extremities well, no obvious swelling or deformities noted.   RESPIRATORY:Airway diminished throughout, more so to right side. Breath sounds are tight. Pt reports being on 2L O2 at home, last breathing tx was 1 month ago.   CARDIAC: Patient has no peripheral edema noted, capillary refill < 3 seconds. No complaints of chest pain. Pt has heart murmur.  ABDOMEN: Soft and non tender to palpation, no distention noted. Bowel sounds present x 4  NEUROLOGIC: Left pupil round, regular and spontaneous, 3mm, eyes open spontaneously, blind in right eye behavior appropriate to situation, follows commands, facial expression symmetrical, bilateral hand grasp equal and even, purposeful motor response noted, normal sensation in all extremities when touched with a finger.

## 2018-01-26 ENCOUNTER — TELEPHONE (OUTPATIENT)
Dept: INTERNAL MEDICINE | Facility: CLINIC | Age: 78
End: 2018-01-26

## 2018-01-26 NOTE — TELEPHONE ENCOUNTER
----- Message from Yumi Saravia sent at 1/25/2018  4:31 PM CST -----  Contact: Nurs[facility] /Janee [phone]  She would like to talk to a nurse about the patient's recent visit to the E [facility]. And her uncontrollable high blood pressure.    Thank you

## 2018-02-22 RX ORDER — FLUTICASONE FUROATE AND VILANTEROL TRIFENATATE 200; 25 UG/1; UG/1
1 POWDER RESPIRATORY (INHALATION) DAILY
Qty: 60 EACH | Refills: 4 | Status: SHIPPED | OUTPATIENT
Start: 2018-02-22 | End: 2018-05-01 | Stop reason: SDUPTHER

## 2018-05-01 ENCOUNTER — OFFICE VISIT (OUTPATIENT)
Dept: INTERNAL MEDICINE | Facility: CLINIC | Age: 78
End: 2018-05-01
Payer: MEDICARE

## 2018-05-01 VITALS
SYSTOLIC BLOOD PRESSURE: 114 MMHG | DIASTOLIC BLOOD PRESSURE: 48 MMHG | WEIGHT: 112 LBS | HEART RATE: 67 BPM | BODY MASS INDEX: 19.84 KG/M2 | HEIGHT: 63 IN

## 2018-05-01 DIAGNOSIS — N18.6 TYPE 2 DIABETES MELLITUS WITH CHRONIC KIDNEY DISEASE ON CHRONIC DIALYSIS, WITHOUT LONG-TERM CURRENT USE OF INSULIN: ICD-10-CM

## 2018-05-01 DIAGNOSIS — N25.81 HYPERPARATHYROIDISM, SECONDARY RENAL: Chronic | ICD-10-CM

## 2018-05-01 DIAGNOSIS — N18.6 ESRD (END STAGE RENAL DISEASE): Chronic | ICD-10-CM

## 2018-05-01 DIAGNOSIS — I10 ESSENTIAL HYPERTENSION: Chronic | ICD-10-CM

## 2018-05-01 DIAGNOSIS — D69.6 THROMBOCYTOPENIA, UNSPECIFIED: ICD-10-CM

## 2018-05-01 DIAGNOSIS — Z78.0 ASYMPTOMATIC MENOPAUSAL STATE: ICD-10-CM

## 2018-05-01 DIAGNOSIS — J44.9 CHRONIC OBSTRUCTIVE PULMONARY DISEASE, UNSPECIFIED COPD TYPE: Primary | ICD-10-CM

## 2018-05-01 DIAGNOSIS — I63.9 RIGHT-SIDED CEREBROVASCULAR ACCIDENT (CVA): ICD-10-CM

## 2018-05-01 DIAGNOSIS — I73.9 PERIPHERAL VASCULAR DISEASE, UNSPECIFIED: ICD-10-CM

## 2018-05-01 DIAGNOSIS — I25.10 CORONARY ARTERY DISEASE WITHOUT ANGINA PECTORIS, UNSPECIFIED VESSEL OR LESION TYPE, UNSPECIFIED WHETHER NATIVE OR TRANSPLANTED HEART: ICD-10-CM

## 2018-05-01 DIAGNOSIS — E11.22 TYPE 2 DIABETES MELLITUS WITH CHRONIC KIDNEY DISEASE ON CHRONIC DIALYSIS, WITHOUT LONG-TERM CURRENT USE OF INSULIN: ICD-10-CM

## 2018-05-01 DIAGNOSIS — Z23 IMMUNIZATION DUE: ICD-10-CM

## 2018-05-01 DIAGNOSIS — N95.0 POSTMENOPAUSAL BLEEDING: ICD-10-CM

## 2018-05-01 DIAGNOSIS — Z99.2 TYPE 2 DIABETES MELLITUS WITH CHRONIC KIDNEY DISEASE ON CHRONIC DIALYSIS, WITHOUT LONG-TERM CURRENT USE OF INSULIN: ICD-10-CM

## 2018-05-01 PROBLEM — T82.858A STENOSIS OF ARTERIOVENOUS DIALYSIS FISTULA: Status: RESOLVED | Noted: 2017-05-22 | Resolved: 2018-05-01

## 2018-05-01 PROBLEM — E11.29 TYPE 2 DIABETES MELLITUS WITH KIDNEY COMPLICATION, WITHOUT LONG-TERM CURRENT USE OF INSULIN: Status: ACTIVE | Noted: 2018-05-01

## 2018-05-01 PROCEDURE — G0009 ADMIN PNEUMOCOCCAL VACCINE: HCPCS | Mod: PBBFAC

## 2018-05-01 PROCEDURE — 99999 PR PBB SHADOW E&M-EST. PATIENT-LVL III: CPT | Mod: PBBFAC,,, | Performed by: INTERNAL MEDICINE

## 2018-05-01 PROCEDURE — 99213 OFFICE O/P EST LOW 20 MIN: CPT | Mod: PBBFAC | Performed by: INTERNAL MEDICINE

## 2018-05-01 PROCEDURE — 99214 OFFICE O/P EST MOD 30 MIN: CPT | Mod: S$PBB,,, | Performed by: INTERNAL MEDICINE

## 2018-05-01 RX ORDER — CLONIDINE 0.3 MG/24H
1 PATCH, EXTENDED RELEASE TRANSDERMAL
Qty: 12 PATCH | Refills: 3 | Status: SHIPPED | OUTPATIENT
Start: 2018-05-05 | End: 2018-11-26

## 2018-05-01 RX ORDER — ATORVASTATIN CALCIUM 40 MG/1
40 TABLET, FILM COATED ORAL DAILY
Qty: 90 TABLET | Refills: 3 | Status: SHIPPED | OUTPATIENT
Start: 2018-05-01 | End: 2019-05-21 | Stop reason: CLARIF

## 2018-05-01 RX ORDER — AMLODIPINE BESYLATE 10 MG/1
10 TABLET ORAL DAILY
Qty: 90 TABLET | Refills: 3 | Status: SHIPPED | OUTPATIENT
Start: 2018-05-01 | End: 2019-02-08 | Stop reason: SDUPTHER

## 2018-05-01 RX ORDER — CLOPIDOGREL BISULFATE 75 MG/1
75 TABLET ORAL DAILY
Qty: 90 TABLET | Refills: 3 | Status: SHIPPED | OUTPATIENT
Start: 2018-05-01 | End: 2019-02-08 | Stop reason: SDUPTHER

## 2018-05-01 RX ORDER — VALSARTAN 160 MG/1
160 TABLET ORAL DAILY
Qty: 90 TABLET | Refills: 3 | Status: ON HOLD | OUTPATIENT
Start: 2018-05-01 | End: 2018-11-30 | Stop reason: HOSPADM

## 2018-05-01 RX ORDER — CARVEDILOL 25 MG/1
25 TABLET ORAL 2 TIMES DAILY
Qty: 180 TABLET | Refills: 3 | Status: ON HOLD | OUTPATIENT
Start: 2018-05-01 | End: 2018-11-30 | Stop reason: HOSPADM

## 2018-05-01 RX ORDER — HYDRALAZINE HYDROCHLORIDE 50 MG/1
50 TABLET, FILM COATED ORAL 3 TIMES DAILY
Qty: 270 TABLET | Refills: 3 | Status: ON HOLD | OUTPATIENT
Start: 2018-05-01 | End: 2018-11-30 | Stop reason: HOSPADM

## 2018-05-01 RX ORDER — SEVELAMER CARBONATE 800 MG/1
TABLET, FILM COATED ORAL
Status: ON HOLD | COMMUNITY
Start: 2018-04-20 | End: 2018-10-31 | Stop reason: SDUPTHER

## 2018-05-01 RX ORDER — FLUTICASONE FUROATE AND VILANTEROL 200; 25 UG/1; UG/1
1 POWDER RESPIRATORY (INHALATION) DAILY
Qty: 90 EACH | Refills: 3 | Status: SHIPPED | OUTPATIENT
Start: 2018-05-01 | End: 2019-09-26 | Stop reason: SDUPTHER

## 2018-05-01 NOTE — PROGRESS NOTES
"Subjective:       Patient ID: Tea Georges is a 78 y.o. female.    Chief Complaint: Annual Exam    HPI - Ms Metcalf feels OK today.  She's under a lot of stress b/c her daughter and son in law have moved into her house.  She's unhappy with this b/c FRANSISCO wears a gun and has brought in a pit bull that scares her.  She feels like they're not helping her.  She also has two grandchildren in the house who don't help her much.    She had a week of vaginal bleeding last week that has cleared.  Has not had hysterectomy and no recent gyn evaluation.  She requested refills on "all medications" b/c daughter accidentally threw them away.  Due for dexa and immunizations.  She is not a smoker    PMH:  DM2, in remission d/t dialysis.  off meds  Diabetic eye complications  ESRD on dialysis  HTN - at goal today  HLD  Aortic Stenosis, moderate to severe on echo 2016     Meds:  Discussed with patient during visit today, but unsure what she's actually taking.    Review of Systems   Constitutional: Negative for fever.   HENT: Negative for congestion.    Respiratory: Negative for cough.    Cardiovascular: Negative for chest pain.   Gastrointestinal: Negative for abdominal pain.   Genitourinary: Negative for difficulty urinating.   Musculoskeletal: Negative for arthralgias.   Skin: Negative for rash.   Neurological: Negative for headaches.   Psychiatric/Behavioral: Positive for dysphoric mood. Negative for sleep disturbance.       Objective:      Physical Exam   Constitutional: She is oriented to person, place, and time. She appears well-developed and well-nourished.   Dysphoric, elderly woman in NAD today.   HENT:   Head: Normocephalic and atraumatic.   Cardiovascular: Normal rate, regular rhythm and normal heart sounds.  Exam reveals no gallop and no friction rub.    No murmur heard.  Pulmonary/Chest: Effort normal and breath sounds normal. No respiratory distress. She has no wheezes. She has no rales. She exhibits no tenderness. "   Neurological: She is alert and oriented to person, place, and time.   Skin: Skin is warm and dry. No erythema.   Psychiatric: She has a normal mood and affect.   Nursing note and vitals reviewed.      Assessment:       1. Chronic obstructive pulmonary disease, unspecified COPD type    2. Coronary artery disease without angina pectoris, unspecified vessel or lesion type, unspecified whether native or transplanted heart    3. ESRD (end stage renal disease)    4. Essential hypertension    5. Thrombocytopenia, unspecified    6. Peripheral vascular disease, unspecified    7. Hyperparathyroidism, secondary renal    8. Type 2 diabetes mellitus with chronic kidney disease on chronic dialysis, without long-term current use of insulin    9. Right-sided cerebrovascular accident (CVA)    10. Asymptomatic menopausal state     11. Amenorrhea    12. Postmenopausal bleeding    13. Immunization due        Plan:       Tea was seen today for annual exam.    Diagnoses and all orders for this visit:    Chronic obstructive pulmonary disease, unspecified COPD type - stable.  Refilling breo  -     fluticasone-vilanterol (BREO ELLIPTA) 200-25 mcg/dose DsDv diskus inhaler; Inhale 1 puff into the lungs once daily.    Coronary artery disease without angina pectoris, unspecified vessel or lesion type, unspecified whether native or transplanted heart - stable, refilling meds  -     isosorbide dinitrate (ISOCHRON) 40 mg TbSR; Take 1 tablet (40 mg total) by mouth every 12 (twelve) hours.    ESRD (end stage renal disease)    Essential hypertension - stable, at goal finally.  Refilling meds  -     amLODIPine (NORVASC) 10 MG tablet; Take 1 tablet (10 mg total) by mouth once daily.  -     carvedilol (COREG) 25 MG tablet; Take 1 tablet (25 mg total) by mouth 2 (two) times daily.  -     cloNIDine 0.3 mg/24 hr td ptwk (CATAPRES) 0.3 mg/24 hr; Place 1 patch onto the skin every Saturday.  -     hydrALAZINE (APRESOLINE) 50 MG tablet; Take 1 tablet (50 mg  total) by mouth 3 (three) times daily.  -     valsartan (DIOVAN) 160 MG tablet; Take 1 tablet (160 mg total) by mouth once daily.    Thrombocytopenia, unspecified    Peripheral vascular disease, unspecified  -     clopidogrel (PLAVIX) 75 mg tablet; Take 1 tablet (75 mg total) by mouth once daily.  -     isosorbide dinitrate (ISOCHRON) 40 mg TbSR; Take 1 tablet (40 mg total) by mouth every 12 (twelve) hours.    Hyperparathyroidism, secondary renal    Type 2 diabetes mellitus with chronic kidney disease on chronic dialysis, without long-term current use of insulin - stable, at goal.  No need for lab work.  -     atorvastatin (LIPITOR) 40 MG tablet; Take 1 tablet (40 mg total) by mouth once daily.    Right-sided cerebrovascular accident (CVA)  -     atorvastatin (LIPITOR) 40 MG tablet; Take 1 tablet (40 mg total) by mouth once daily.  -     clopidogrel (PLAVIX) 75 mg tablet; Take 1 tablet (75 mg total) by mouth once daily.  -     isosorbide dinitrate (ISOCHRON) 40 mg TbSR; Take 1 tablet (40 mg total) by mouth every 12 (twelve) hours.    Asymptomatic menopausal state - overdue for dexa.  ordering  -     DXA Bone Density Spine And Hip; Future    Postmenopausal bleeding - new complaint.  She needs to see gynecology; may need a biopsy.  -     Ambulatory consult to Gynecology    rtc prn, or in 3 months    ARCELIA Posadas MD MPH  Staff Internist

## 2018-05-21 ENCOUNTER — HOSPITAL ENCOUNTER (EMERGENCY)
Facility: HOSPITAL | Age: 78
Discharge: HOME | End: 2018-05-22
Attending: EMERGENCY MEDICINE
Payer: MEDICARE

## 2018-05-21 DIAGNOSIS — D64.9 ANEMIA, UNSPECIFIED TYPE: Primary | ICD-10-CM

## 2018-05-21 LAB
ABO + RH BLD: NORMAL
ALBUMIN SERPL BCP-MCNC: 2.9 G/DL
ALP SERPL-CCNC: 99 U/L
ALT SERPL W/O P-5'-P-CCNC: 5 U/L
ANION GAP SERPL CALC-SCNC: 10 MMOL/L
AST SERPL-CCNC: 20 U/L
BASOPHILS # BLD AUTO: 0.03 K/UL
BASOPHILS NFR BLD: 0.3 %
BILIRUB SERPL-MCNC: 0.5 MG/DL
BLD GP AB SCN CELLS X3 SERPL QL: NORMAL
BLD PROD TYP BPU: NORMAL
BLOOD UNIT EXPIRATION DATE: NORMAL
BLOOD UNIT TYPE CODE: 6200
BLOOD UNIT TYPE: NORMAL
BUN SERPL-MCNC: 16 MG/DL
CALCIUM SERPL-MCNC: 9.7 MG/DL
CHLORIDE SERPL-SCNC: 105 MMOL/L
CO2 SERPL-SCNC: 24 MMOL/L
CODING SYSTEM: NORMAL
CREAT SERPL-MCNC: 2.4 MG/DL
DIFFERENTIAL METHOD: ABNORMAL
DISPENSE STATUS: NORMAL
EOSINOPHIL # BLD AUTO: 0.5 K/UL
EOSINOPHIL NFR BLD: 5.2 %
ERYTHROCYTE [DISTWIDTH] IN BLOOD BY AUTOMATED COUNT: 18 %
EST. GFR  (AFRICAN AMERICAN): 21.9 ML/MIN/1.73 M^2
EST. GFR  (NON AFRICAN AMERICAN): 19 ML/MIN/1.73 M^2
GLUCOSE SERPL-MCNC: 92 MG/DL
HCT VFR BLD AUTO: 25.4 %
HGB BLD-MCNC: 7.6 G/DL
IMM GRANULOCYTES # BLD AUTO: 0.16 K/UL
IMM GRANULOCYTES NFR BLD AUTO: 1.7 %
LYMPHOCYTES # BLD AUTO: 1 K/UL
LYMPHOCYTES NFR BLD: 10.8 %
MCH RBC QN AUTO: 28.7 PG
MCHC RBC AUTO-ENTMCNC: 29.9 G/DL
MCV RBC AUTO: 96 FL
MONOCYTES # BLD AUTO: 0.8 K/UL
MONOCYTES NFR BLD: 8.2 %
NEUTROPHILS # BLD AUTO: 6.8 K/UL
NEUTROPHILS NFR BLD: 73.8 %
NRBC BLD-RTO: 0 /100 WBC
PLATELET # BLD AUTO: 157 K/UL
PMV BLD AUTO: 12 FL
POTASSIUM SERPL-SCNC: 3.8 MMOL/L
PROT SERPL-MCNC: 8.9 G/DL
RBC # BLD AUTO: 2.65 M/UL
SODIUM SERPL-SCNC: 139 MMOL/L
TRANS ERYTHROCYTES VOL PATIENT: NORMAL ML
WBC # BLD AUTO: 9.24 K/UL

## 2018-05-21 PROCEDURE — 27201040 HC RC 50 FILTER

## 2018-05-21 PROCEDURE — P9021 RED BLOOD CELLS UNIT: HCPCS

## 2018-05-21 PROCEDURE — 86920 COMPATIBILITY TEST SPIN: CPT

## 2018-05-21 PROCEDURE — 85025 COMPLETE CBC W/AUTO DIFF WBC: CPT

## 2018-05-21 PROCEDURE — 86850 RBC ANTIBODY SCREEN: CPT

## 2018-05-21 PROCEDURE — 99284 EMERGENCY DEPT VISIT MOD MDM: CPT | Mod: 25

## 2018-05-21 PROCEDURE — 25000003 PHARM REV CODE 250: Performed by: EMERGENCY MEDICINE

## 2018-05-21 PROCEDURE — 80053 COMPREHEN METABOLIC PANEL: CPT

## 2018-05-21 PROCEDURE — 36430 TRANSFUSION BLD/BLD COMPNT: CPT

## 2018-05-21 RX ORDER — HYDROCODONE BITARTRATE AND ACETAMINOPHEN 500; 5 MG/1; MG/1
TABLET ORAL
Status: DISCONTINUED | OUTPATIENT
Start: 2018-05-21 | End: 2018-05-22 | Stop reason: HOSPADM

## 2018-05-21 RX ADMIN — SODIUM CHLORIDE: 9 INJECTION, SOLUTION INTRAVENOUS at 10:05

## 2018-05-22 VITALS
RESPIRATION RATE: 17 BRPM | HEART RATE: 60 BPM | TEMPERATURE: 98 F | DIASTOLIC BLOOD PRESSURE: 61 MMHG | SYSTOLIC BLOOD PRESSURE: 156 MMHG | OXYGEN SATURATION: 93 %

## 2018-05-22 NOTE — ED NOTES
Patient came from dialysis she was told she had a low Hemaglobin.  She does report some dark colored stools. History of CVA x 2 reported by patient.  Patient has cataract to right eye with no sight.  Dialysis graft to left upper arm ++ for thrill and bruit.     LOC: The patient is awake, alert and aware of environment with an appropriate affect, the patient is oriented x 3 and speaking appropriately.    SKIN: The skin is warm and dry, patient has normal skin turgor and moist mucus membranes, skin intact, no breakdown or brusing noted.    MUSKULOSKELETAL: Patient has generalized weakness.  Patient has personal wheelchair at bedside.      RESPIRATORY: Airway is open and patent, respirations are spontaneous, patient has a normal effort and rate. Breath sounds are clear and equal bilaterally.    CARDIAC: Normal heart sounds. No peripheral edema.    ABDOMEN: Soft and non tender to palpation, no distention noted. Bowel sounds present.    NEURO: No neuro deficits, hand grasp equal, no drift noted, no facial droop noted. Speech is clear.

## 2018-05-22 NOTE — ED NOTES
Blood continues to infuse at this time with no complaints from patient. Patient remains on stretcher locked in lowest position, side rails up x2, call bell in reach. No needs at this time, will continue to monitor.

## 2018-05-22 NOTE — ED NOTES
Waiting for blood to be sent from blood bank at this time. Patient in NAD at offers no complaints at this time. Bed remains locked in lowest position, side rails up x2, call bell in reach. Will continue to monitor.

## 2018-05-22 NOTE — ED PROVIDER NOTES
Encounter Date: 5/21/2018    SCRIBE #1 NOTE: I, Vilma Schumacher, am scribing for, and in the presence of,  Dr. Davis. I have scribed the entire note.       History     Chief Complaint   Patient presents with    Abnormal Lab     6.3 hemoglobin at dialysis today. Reports recent nose bleeds and black colored stools. On plavix and ASA.      Time seen by provider: 7:22 PM    This is a 78 y.o. female with medical conditions including GI bleed, diverticulitis, enlarged LA, stroke, chronic diastolic heart failure, ESRD, HTN, anemia in ESRD, renal vein occlusion, chronic respiratory failure with hypoxia, DM, CAD, COPD, CHF, and aortic atherosclerosis, who presents with complaint of an abnormal lab. Pt had 6.3 hemoglobin at dialysis today. Pt received her full dialysis today with no complications. Pt cannot recall normal blood count level. Pt endorses gradual generalized weakness for the past 2 weeks. Pt also endorses recent nose bleeds and dark colored stools with traces of blood. Pt states blood was last seen in stool yesterday. Pt denies gait problems, falls, dizziness, or any other acute sx at this time. Pt also noted clear discharge from the right eye from cataract. Pt's last blood transfusion was 3 weeks ago. Pt is currently on plavix and ASA.           The history is provided by the patient.     Review of patient's allergies indicates:  Not on File  Past Medical History:   Diagnosis Date    Anemia in ESRD (end-stage renal disease) 5/29/2016    Anticoagulant long-term use     Aortic atherosclerosis 11/22/2016    Aortic stenosis, moderate 2/18/2016    Asthma in adult without complication 1/8/2016    Bilateral low back pain without sciatica 11/17/2015    Blindness of right eye 11/12/2016    CAD (coronary artery disease) 12/12/2016    Cataract     Central retinal vein occlusion, right eye 6/3/2014    CHF (congestive heart failure)     Chronic diastolic heart failure 1/8/2016    Chronic respiratory failure  with hypoxia 5/29/2016    COPD (chronic obstructive pulmonary disease) 1/15/2017    Dependence on hemodialysis     Mon-Wed-Fri    Diverticulosis     Embolic stroke involving right middle cerebral artery     Enlarged LA (left atrium) 10/7/2016    Epiretinal membrane 7/17/2012    ESRD (end stage renal disease)     Essential hypertension 1/8/2016    History of GI diverticular bleed     5/22/16    Left flaccid hemiparesis 10/1/2016    Peripheral vascular disease, unspecified 11/22/2016    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 11/13/2016    Type 2 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, without long-term current use of insulin 3/26/2013    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  Service: Endoscopy;  Laterality: N/A;    UPPER GASTROINTESTINAL ENDOSCOPY       Family History   Problem Relation Age of Onset    Cataracts Mother     Stroke Mother     Hypertension Mother     Cancer Sister     Hypertension Sister     Heart failure Sister     Glaucoma Brother     Hypertension Brother     Heart disease Brother     Hypertension Father     Glaucoma Maternal Aunt     Esophageal cancer Sister     No Known Problems Maternal Uncle     No Known Problems Paternal Aunt     No Known Problems Paternal Uncle     No Known Problems Maternal Grandmother     No Known Problems Maternal  Grandfather     No Known Problems Paternal Grandmother     No Known Problems Paternal Grandfather     Heart attack Neg Hx     Colon cancer Neg Hx     Stomach cancer Neg Hx     Anemia Neg Hx     Arrhythmia Neg Hx     Asthma Neg Hx     Clotting disorder Neg Hx     Fainting Neg Hx     Hyperlipidemia Neg Hx     Atrial Septal Defect Neg Hx      Social History   Substance Use Topics    Smoking status: Never Smoker    Smokeless tobacco: Never Used    Alcohol use No      Comment: Reports occasional 1-2 drinks      Review of Systems   Constitutional: Negative for chills and fever.   HENT: Negative for facial swelling and rhinorrhea.    Eyes: Positive for discharge (right eye from cataract).   Respiratory: Negative for cough and shortness of breath.    Cardiovascular: Negative for chest pain.   Gastrointestinal: Positive for blood in stool (dark in color). Negative for abdominal pain, nausea and vomiting.   Genitourinary: Negative for dysuria.   Musculoskeletal: Negative for gait problem.   Neurological: Negative for light-headedness and headaches.   Psychiatric/Behavioral: Negative for behavioral problems and confusion.       Physical Exam     Initial Vitals [05/21/18 1805]   BP Pulse Resp Temp SpO2   (!) 168/72 70 18 98.3 °F (36.8 °C) 98 %      MAP       104         Physical Exam    Nursing note and vitals reviewed.  HENT:   Head: Normocephalic and atraumatic.   Mouth/Throat: Oropharynx is clear and moist.   Eyes: EOM are normal. Pupils are equal, round, and reactive to light.   Neck: Normal range of motion. Neck supple. No JVD present.   Cardiovascular:   systolic murmur   Pulmonary/Chest: Breath sounds normal. No stridor. No respiratory distress.   Abdominal: Soft. There is no tenderness.   Genitourinary:   Genitourinary Comments: Hemoccult negative   Musculoskeletal:   Left sided upper extremity fistula with good thrill   Neurological:   4/5 left sided strength. 5/5 right sided strength.  Normal sensation  in all four extremities.  V1-V3 sensation intact bilaterally.  Able to raise both eyebrows equally, close both eyes equally tight, and smile symmetrically.  Finger to nose intact with both arms. Normal ambulation, neg romberg               ED Course   Procedures  Labs Reviewed   CBC W/ AUTO DIFFERENTIAL - Abnormal; Notable for the following:        Result Value    RBC 2.65 (*)     Hemoglobin 7.6 (*)     Hematocrit 25.4 (*)     MCHC 29.9 (*)     RDW 18.0 (*)     Immature Granulocytes 1.7 (*)     Immature Grans (Abs) 0.16 (*)     Gran% 73.8 (*)     Lymph% 10.8 (*)     All other components within normal limits   COMPREHENSIVE METABOLIC PANEL - Abnormal; Notable for the following:     Creatinine 2.4 (*)     Total Protein 8.9 (*)     Albumin 2.9 (*)     ALT 5 (*)     eGFR if  21.9 (*)     eGFR if non  19.0 (*)     All other components within normal limits   TYPE & SCREEN   PREPARE RBC SOFT             Medical Decision Making:   History:   Old Medical Records: I decided to obtain old medical records.  Initial Assessment:   Pt here with symptomatic anemia. Heme count 7.6 with vitals stable. Pt's creatinine at baseline. Because sx and heme is worse from months ago pt will be transfused one minute in the ED. Will be discharging pt to follow up with PCP after this. Tolerated with no complications.    Advised pt to follow up with PCP or return if concerning symptoms arise. Pt understands and agrees with plan. Will d/c home.      Clinical Tests:   Lab Tests: Ordered and Reviewed  ED Management:  Advised pt to follow up with PCP or return if concerning symptoms arise. Pt understands and agrees with plan. Will d/c home.            Scribe Attestation:   Scribe #1: I performed the above scribed service and the documentation accurately describes the services I performed. I attest to the accuracy of the note.               Clinical Impression:   There were no encounter diagnoses.    Disposition:    Disposition: Discharged                        Monroe Davis MD  05/22/18 0131

## 2018-05-22 NOTE — ED NOTES
Bed: 06  Expected date: 5/21/18  Expected time: 7:17 PM  Means of arrival:   Comments:  José Manuel

## 2018-05-23 ENCOUNTER — PES CALL (OUTPATIENT)
Dept: ADMINISTRATIVE | Facility: CLINIC | Age: 78
End: 2018-05-23

## 2018-05-25 ENCOUNTER — OUTPATIENT CASE MANAGEMENT (OUTPATIENT)
Dept: ADMINISTRATIVE | Facility: OTHER | Age: 78
End: 2018-05-25

## 2018-05-25 NOTE — PROGRESS NOTES
The following patient has been assigned to Senait Shannon, RN with Outpatient Complex Care Management for high risk screening.    Reason: High Risk : Recently discharged      Please contact OPCM at [phone] with any questions.    Thank you,    Lindsey Goodson, [facility]  Ext [phone]

## 2018-05-28 ENCOUNTER — OUTPATIENT CASE MANAGEMENT (OUTPATIENT)
Dept: ADMINISTRATIVE | Facility: OTHER | Age: 78
End: 2018-05-28

## 2018-05-30 ENCOUNTER — OUTPATIENT CASE MANAGEMENT (OUTPATIENT)
Dept: ADMINISTRATIVE | Facility: OTHER | Age: 78
End: 2018-05-30

## 2018-05-30 NOTE — PROGRESS NOTES
Received referral for High Risk screen and discharge risk with recent ed visit for co gradual increase in weakness over past 2 weeks.  Pt hgb 7.6/25.4  Previous was 9.2/29.7.  Pt was given 1 unit of packed cells and sent home.  She states she is feeling a little better  Verified pt identity and discussed outpatient case management services.  Advised on services offered and advantages and patient consented to these services. Patient verbalized knowledge that this is a free service and would last approximately 30 to 60 days.    77 yo female with history of chronic renal failure, gi bleed and most recent reported vaginal bleeding to dr. Posadas.  She states it resolved on own but he recommended a eval by gyn.  Chart reviewed in [facility].  Pt is currently on dialysis but she is interested in program and consented to admission.  She requests that we complete the assessment tomorrow morning 9 am  Notes from dr. Posadas about home life stress with recent move in of pt daughter and her family.  Will discuss tomorrow and advise on lcsw assistance if indicated    Medications reconciliation at next encounter    Nursing screen and assessment partially completed today telephonically.    Plan of care developed with pt input.  Short and long term goals reviewed with patient and patient verbalized understanding and agreement to these goals    Welcome statement and contact information as well as brendon information sent via mail.  Pt advised to look in mail for communication    Plan  Notes from dr. Posadas about home life stress with recent move in of pt daughter and her family.  Will discuss tomorrow and advise on lcsw assistance if indicated  Complete this assessment and plan of care  Follow up on receipt of education material

## 2018-05-30 NOTE — PROGRESS NOTES
1st. Attempt to complete initial assessment for Outpatient Care Management.,  Left message requesting return call.

## 2018-05-31 ENCOUNTER — OUTPATIENT CASE MANAGEMENT (OUTPATIENT)
Dept: ADMINISTRATIVE | Facility: OTHER | Age: 78
End: 2018-05-31

## 2018-06-05 ENCOUNTER — OUTPATIENT CASE MANAGEMENT (OUTPATIENT)
Dept: ADMINISTRATIVE | Facility: OTHER | Age: 78
End: 2018-06-05

## 2018-06-05 NOTE — PATIENT INSTRUCTIONS
Vitamin D  Does this test have other names?  25-hydroxyvitamin D (25-high-DROX-ee-VIE-tuh-min D), 25(OH)D  What is this test?  Vitamin D is mainly found in fortified dairy foods, juice, breakfast cereal, and certain fish. This vitamin plays many roles in the body. But because it helps the body absorb calcium from foods and supplements, it's particularly important for bone health. Vitamin D has many additional roles in the body.  Vitamin D comes in several forms. When ultraviolet light, such as sunlight, hits your skin, it creates vitamin D3. D2 is used to fortify dairy foods. Both of these are further processed by your liver and kidneys into a form your body can use. Most tests for vitamin D check the level of a form circulating in the body called 25-hydroxyvitamin D, also called 25(OH)D.   Why do I need this test?  Vitamin D testing has become much more popular in recent years. Your healthcare provider may check your vitamin D levels to find out if you have any risks to bone health. These might be:  · Low calcium  · Soft bones caused by low vitamin D or problems using it (osteomalacia)  · Osteopenia  · Osteoporosis  · Rickets, in children  You may also need this test if you are at risk for low vitamin D levels. Risks include:  · Being an older adult  · Having difficulty absorbing fat from your diet  · Having chronic kidney disease  · Have dark skin pigmentation  · Being a  baby  Vitamin D has many effects in the body. You may need this test to help your provider diagnose or treat:  · Problems with the parathyroid gland  · Cancer  · Autoimmune diseases such as multiple sclerosis and Crohn's disease  · Psoriasis  · Asthma  · Weakness or falls    What other tests might I have along with this test?  A healthcare provider may also want to check your parathyroid hormone levels and your calcium levels.   What do my test results mean?  A result for a lab test may be affected by many things, including the method  the laboratory uses to do the test. If your test results are different from the normal value, you may not have a problem. To learn what the results mean for you, talk with your healthcare provider.  Children and adults need more than 30 nanograms per milliliter (ng/ml) of vitamin D. The optimal level of 25(OH)D is usually between 30 and 60 ng/mL. Recommended daily amounts range from 400 to 800 international units (IU) per day based on your age.  Levels lower than normal can mean you are:  · Not making enough vitamin D on your own  · Not getting enough vitamin D in your diet  · Not absorbing vitamin D from your food as you should  Lower levels may also mean that your body is not converting the vitamin as it should. This might be because of kidney or liver disease.  Above-normal levels may be a sign that you're taking too much in supplement form.   How is this test done?  The test requires a blood sample, which is drawn through a needle from a vein in your arm.  Does this test pose any risks?  Taking a blood sample with a needle carries risks that include bleeding, infection, bruising, and a sense of lightheadedness. When the needle pricks your arm, you may feel a slight stinging sensation or pain. Afterward, the site may be slightly sore.   What might affect my test results?  The amount of time you spend in the sunlight, your diet, and whether you take vitamin D in supplement form can affect your vitamin D levels. Ask your healthcare provider if any health conditions you have or medicines you take could affect your results.  How do I get ready for this test?  Tell your healthcare provider if you take vitamin D supplements. Also be sure your provider knows about all medicines, herbs, vitamins, and supplements you are taking. This includes medicines that don't need a prescription and any illicit drugs you may use.   © 4883-0086 [employer]. [address] [place] [address], [place] [address]. All rights reserved.  This information is not intended as a substitute for professional medical care. Always follow your healthcare professional's instructions.        Vitamin D  Does this test have other names?  25-hydroxyvitamin D (25-high-DROX-ee-VIE-tuh-min D), 25(OH)D  What is this test?  Vitamin D is mainly found in fortified dairy foods, juice, breakfast cereal, and certain fish. This vitamin plays many roles in the body. But because it helps the body absorb calcium from foods and supplements, it's particularly important for bone health. Vitamin D has many additional roles in the body.  Vitamin D comes in several forms. When ultraviolet light, such as sunlight, hits your skin, it creates vitamin D3. D2 is used to fortify dairy foods. Both of these are further processed by your liver and kidneys into a form your body can use. Most tests for vitamin D check the level of a form circulating in the body called 25-hydroxyvitamin D, also called 25(OH)D.   Why do I need this test?  Vitamin D testing has become much more popular in recent years. Your healthcare provider may check your vitamin D levels to find out if you have any risks to bone health. These might be:  · Low calcium  · Soft bones caused by low vitamin D or problems using it (osteomalacia)  · Osteopenia  · Osteoporosis  · Rickets, in children  You may also need this test if you are at risk for low vitamin D levels. Risks include:  · Being an older adult  · Having difficulty absorbing fat from your diet  · Having chronic kidney disease  · Have dark skin pigmentation  · Being a  baby  Vitamin D has many effects in the body. You may need this test to help your provider diagnose or treat:  · Problems with the parathyroid gland  · Cancer  · Autoimmune diseases such as multiple sclerosis and Crohn's disease  · Psoriasis  · Asthma  · Weakness or falls    What other tests might I have along with this test?  A healthcare provider may also want to check your parathyroid  hormone levels and your calcium levels.   What do my test results mean?  A result for a lab test may be affected by many things, including the method the laboratory uses to do the test. If your test results are different from the normal value, you may not have a problem. To learn what the results mean for you, talk with your healthcare provider.  Children and adults need more than 30 nanograms per milliliter (ng/ml) of vitamin D. The optimal level of 25(OH)D is usually between 30 and 60 ng/mL. Recommended daily amounts range from 400 to 800 international units (IU) per day based on your age.  Levels lower than normal can mean you are:  · Not making enough vitamin D on your own  · Not getting enough vitamin D in your diet  · Not absorbing vitamin D from your food as you should  Lower levels may also mean that your body is not converting the vitamin as it should. This might be because of kidney or liver disease.  Above-normal levels may be a sign that you're taking too much in supplement form.   How is this test done?  The test requires a blood sample, which is drawn through a needle from a vein in your arm.  Does this test pose any risks?  Taking a blood sample with a needle carries risks that include bleeding, infection, bruising, and a sense of lightheadedness. When the needle pricks your arm, you may feel a slight stinging sensation or pain. Afterward, the site may be slightly sore.   What might affect my test results?  The amount of time you spend in the sunlight, your diet, and whether you take vitamin D in supplement form can affect your vitamin D levels. Ask your healthcare provider if any health conditions you have or medicines you take could affect your results.  How do I get ready for this test?  Tell your healthcare provider if you take vitamin D supplements. Also be sure your provider knows about all medicines, herbs, vitamins, and supplements you are taking. This includes medicines that don't need a  prescription and any illicit drugs you may use.   © 5257-6513 [employer]. [address] [place] [address], [place] [address]. All rights reserved. This information is not intended as a substitute for professional medical care. Always follow your healthcare professional's instructions.

## 2018-06-05 NOTE — PROGRESS NOTES
6/5/18    Summary:  Call to pt today and completed medication reconciliation and assessment.  Attempted to ask about living situation and pt denies any stress with daughter being there at this time.  Will attempt to explore at future encounters.      Interventions  Anemia- reviewed renal diet as well as foods high in iron that are on the renal diet.  Vitamin d-  Advised pt she has orders for vit d level and she states she will get at next lab visit      Plan  Notes from dr. Posadas about home life stress with recent move in of pt daughter and her family.  Will discuss tomorrow and advise on lcsw assistance if indicated  Complete this assessment and plan of care  Follow up on receipt of education material

## 2018-06-05 NOTE — PROGRESS NOTES
6/5/18    Summary:  Call to pt today and completed medication reconciliation and assessment.  Attempted to ask about living situation and pt denies any stress with daughter being there at this time.  Will attempt to explore at future encounters.  Pt denies any stress and states she is dong well      Interventions  Anemia- pt did not receive the education material in mail yet.  I reviewed renal diet as well as foods high in iron that are on the renal diet. Pt verbalized understanding and indicated that I was sending printed material for her and she states that would be a big help  Vitamin d-  Advised pt she has orders for vit d level and she states she will get at next lab visit  Pt states she is a little busy to continue conversation could I call her back.  Advised I will call her next week and she verbalized understanding.      Plan  Discuss o2 safety and fall prevention with the long tubing  Follow up on receipt of education material

## 2018-06-12 ENCOUNTER — OUTPATIENT CASE MANAGEMENT (OUTPATIENT)
Dept: ADMINISTRATIVE | Facility: OTHER | Age: 78
End: 2018-06-12

## 2018-07-02 ENCOUNTER — OUTPATIENT CASE MANAGEMENT (OUTPATIENT)
Dept: ADMINISTRATIVE | Facility: OTHER | Age: 78
End: 2018-07-02

## 2018-07-02 NOTE — PROGRESS NOTES
7/2/18    Summary:  Chart reviewed in [facility].  No new ed or hospitalizations noted.        Interventions  Anemia- Reviewed diet information.  Foods high in protein and vitamins.  Pt verbalized understanding.  Pt states she maintains compliance with dialysis visits.  Pt states she is a little nauseated today.  Reviewed what she had for dinner nothing out of ordinary or spicy.  She states this happens occasionally she does not have any meds at home but states when she gets to dialysis they will give her something to calm her stomach.  She states this happens to her occasionally and is not a new symptom.  Advised on items that will also naturally relieve nausea such as ginger and advised that ginger ale may help as well.  Vitamin d-  Advised pt on the need for labs she states she will discuss with dr. crain  Safety discussed handout on fall safety.   Pt states she is without falls aware of oxygen tubing and safety use in the home.      Plan  Continue to Discuss o2 safety and fall prevention with the long tubing

## 2018-07-17 ENCOUNTER — TELEPHONE (OUTPATIENT)
Dept: ADMINISTRATIVE | Facility: CLINIC | Age: 78
End: 2018-07-17

## 2018-07-23 ENCOUNTER — OUTPATIENT CASE MANAGEMENT (OUTPATIENT)
Dept: ADMINISTRATIVE | Facility: OTHER | Age: 78
End: 2018-07-23

## 2018-07-23 NOTE — PROGRESS NOTES
7/23/18    Summary:  Chart reviewed in [facility].  No new ed or hospitalizations noted.        Interventions  Anemia- defered  Vitamin d-  Discussed importance of vit d.  Foods high in vit d.  Advised on need for repeat blood work.  Pt is aware she is taking supplementation but the last vit d level in labs here at [facility] was in 2010.  Pt states she thought she had a new level done.  Asked pt to check with dialysis nurse to see if they may have drawn labs.  Advised pt on the signs of vit d deficiency and pt verbalized understanding.    Safety -defered  Pt states she is without falls aware of oxygen tubing and safety use in the home.      Plan  Anemia-defered  Vit d- follow up with pt or dialysis nurse to see if there is more recent vit d level done  Safety-Continue to Discuss o2 safety and fall prevention with the long tubing

## 2018-08-13 ENCOUNTER — OUTPATIENT CASE MANAGEMENT (OUTPATIENT)
Dept: ADMINISTRATIVE | Facility: OTHER | Age: 78
End: 2018-08-13

## 2018-08-13 ENCOUNTER — CLINICAL SUPPORT (OUTPATIENT)
Dept: ELECTROPHYSIOLOGY | Facility: CLINIC | Age: 78
End: 2018-08-13
Attending: INTERNAL MEDICINE
Payer: MEDICARE

## 2018-08-13 DIAGNOSIS — I63.9 CRYPTOGENIC STROKE: ICD-10-CM

## 2018-08-13 DIAGNOSIS — Z95.818 STATUS POST PLACEMENT OF IMPLANTABLE LOOP RECORDER: ICD-10-CM

## 2018-08-13 NOTE — PROGRESS NOTES
8/13/18    Summary  Chart reviewed in [facility].  No new ed or hospitalizations noted.  Pt was no show for pcp visit with dr. Posadas on 8/2/18 and has not rescheduled bone density test from may.  Pt states she missed dr. Posadas appointment because her ride fell through.  She is aware of the need to reschedule but declines to do it now states she will call and make it her self.  She also states she did not want to reschedule her appointment at this time.    She states she is at dialysis at present and will ask about her vit d level when the nurse comes back    Interventions  Anemia- defered  Vitamin d-  Reminded to check with dialysis staff about vit d level.  Advised on symptoms of vit d defiency   Pt verbalized understanding and states she is fatigued occasionally but not often.    Attempted to reschedule pt pcp appointment but she declined   Reminded of need for bone density test.   Safety -defered  Pt states she is without falls aware of oxygen tubing and safety use in the home.      Plan  Anemia-defered  Vit d- follow up with pt or dialysis nurse to see if there is more recent vit d level done  Safety-Continue to Discuss o2 safety and fall prevention with the long tubing

## 2018-09-04 ENCOUNTER — TELEPHONE (OUTPATIENT)
Dept: ADMINISTRATIVE | Facility: CLINIC | Age: 78
End: 2018-09-04

## 2018-09-04 ENCOUNTER — OUTPATIENT CASE MANAGEMENT (OUTPATIENT)
Dept: ADMINISTRATIVE | Facility: OTHER | Age: 78
End: 2018-09-04

## 2018-09-04 NOTE — PROGRESS NOTES
[] Called and reviewed disaster plan with patient/caregiver.  Provided emergency phone number for patient's [facility].   [x] Attempted to reach patient/caregiver to review disaster plan.  Left a voice message with the phone number for patient's [facility] Office of Emergency Preparedness.     Reviewed with Patient/Caregiver:  [] Patient to have a supply of water, non-perishable food items, flashlights and batteries  [] Patient to bring all medications if evacuates:  at least a five day supply preferably in the medication bottles, in case displaced for longer than 5 days  [] Patient to bring any DME needed (walkers, wheelchairs, shower chairs, nebulizer machine, etc.)   [] If Diabetic, patient to bring glucometer and monitoring supplies.   [] If Hypertension, patient to bring blood pressure cuff  [] If CHF, patient to bring scale  [] If tube feeds, patient to bring tube feedings and feeding supplies.  [] If on oxygen,  patient to bring all oxygen supplies (Cannula, portable tanks, concentrator).  Patient will contact oxygen supply company to find out the nearest location of a supply company near evacuated location. ([employer]: [phone], [facility]: [phone], [facility]:[phone], [employer]: [phone]), [facility] -[phone].  [] If on hemodialysis, patient should already have a plan in place with dialysis center. If patient does not know where to evacuate to in order to be close to a dialysis center, patient/caregiver to reach out to regular dialysis center for further direction. ([facility]  service line: [phone], [facility]  service line [phone])  [] If receiving treatment at an infusion center, patient/caregiver to contact the infusion center to find out which infusion center they have a contract with where patient plans to evacuate.      Office of Emergency Preparedness Phone number:  [] [facility]: [phone]  [] [facility]: [phone]  [] [facility]  [place]: [phone]  [] [facility]: [phone]  [] [facility]: [phone]  [] [facility]: [phone]  [] [facility]: [phone]  [] [facility]: [phone]  [] [facility]: [phone]  [] [facility]: [phone]  [] [facility]: [phone]  [] [facility]: [phone]  [] [facility]: [phone]    [] [facility]:  [phone]   [] [facility]:  [phone]   [] [facility]:  [phone]   [] [facility]:  [phone]   [] [facility]:  [phone]   [] [facility]: [phone]   [] [facility]:  [phone]   [] [facility]:  [phone]

## 2018-09-06 ENCOUNTER — OUTPATIENT CASE MANAGEMENT (OUTPATIENT)
Dept: ADMINISTRATIVE | Facility: OTHER | Age: 78
End: 2018-09-06

## 2018-09-06 NOTE — PROGRESS NOTES
9/6/18    Summary  Chart reviewed in [facility].  No new ed or hospitalizations noted.    Pt states she is doing well.  Denies any falls or new problems.  Discussed goals met and pt agreed.  No new needs at this time.  Notified pt of case closure and she verbalized understanding.  Interventions  Patient/caregiver will identify 1 deficits resulting from Anemia and list 1 ways to overcome those deficits within 1 week.  · Met  Pt is knowledgeable of symptoms of anemia .      Patient/caregiver will verbalize 1 food items Renal within 1 month.  · Met pt verbalized foods on renal diet and foods to limit.     Patient/caregiver will identify 1 deficits resulting from vit d defiency and list 1 ways to overcome those deficits within 2 weeks.  · Met  Pt is taking weekly vit d supplement      Patient/caregiver will verbalize current Vit D level value and vit D goal within 1 month.    · Partially met pt is aware of vit d level goal but was not able to get any current results from dialysis

## 2018-09-11 ENCOUNTER — CLINICAL SUPPORT (OUTPATIENT)
Dept: ELECTROPHYSIOLOGY | Facility: CLINIC | Age: 78
End: 2018-09-11
Attending: INTERNAL MEDICINE
Payer: MEDICARE

## 2018-09-11 DIAGNOSIS — Z95.818 STATUS POST PLACEMENT OF IMPLANTABLE LOOP RECORDER: ICD-10-CM

## 2018-09-11 DIAGNOSIS — I63.9 CRYPTOGENIC STROKE: ICD-10-CM

## 2018-09-13 ENCOUNTER — CLINICAL SUPPORT (OUTPATIENT)
Dept: ELECTROPHYSIOLOGY | Facility: CLINIC | Age: 78
End: 2018-09-13
Attending: INTERNAL MEDICINE
Payer: MEDICARE

## 2018-09-13 DIAGNOSIS — Z95.818 STATUS POST PLACEMENT OF IMPLANTABLE LOOP RECORDER: ICD-10-CM

## 2018-09-13 DIAGNOSIS — I63.9 CRYPTOGENIC STROKE: ICD-10-CM

## 2018-09-13 PROCEDURE — 93299 LOOP RECORDER REMOTE: CPT | Mod: PBBFAC | Performed by: INTERNAL MEDICINE

## 2018-09-13 PROCEDURE — 93298 REM INTERROG DEV EVAL SCRMS: CPT | Mod: ,,, | Performed by: INTERNAL MEDICINE

## 2018-09-17 ENCOUNTER — OUTPATIENT CASE MANAGEMENT (OUTPATIENT)
Dept: ADMINISTRATIVE | Facility: OTHER | Age: 78
End: 2018-09-17

## 2018-10-15 ENCOUNTER — CLINICAL SUPPORT (OUTPATIENT)
Dept: ELECTROPHYSIOLOGY | Facility: CLINIC | Age: 78
End: 2018-10-15
Attending: INTERNAL MEDICINE
Payer: MEDICARE

## 2018-10-15 DIAGNOSIS — Z95.818 STATUS POST PLACEMENT OF IMPLANTABLE LOOP RECORDER: ICD-10-CM

## 2018-10-15 DIAGNOSIS — I63.9 CRYPTOGENIC STROKE: ICD-10-CM

## 2018-10-29 ENCOUNTER — HOSPITAL ENCOUNTER (INPATIENT)
Facility: HOSPITAL | Age: 78
LOS: 3 days | Discharge: HOME | DRG: 871 | End: 2018-11-01
Attending: EMERGENCY MEDICINE | Admitting: EMERGENCY MEDICINE
Payer: MEDICARE

## 2018-10-29 DIAGNOSIS — R11.10 VOMITING, INTRACTABILITY OF VOMITING NOT SPECIFIED, PRESENCE OF NAUSEA NOT SPECIFIED, UNSPECIFIED VOMITING TYPE: ICD-10-CM

## 2018-10-29 DIAGNOSIS — R06.2 WHEEZING: ICD-10-CM

## 2018-10-29 DIAGNOSIS — N30.00 ACUTE CYSTITIS WITHOUT HEMATURIA: ICD-10-CM

## 2018-10-29 DIAGNOSIS — R11.0 NAUSEA: Primary | ICD-10-CM

## 2018-10-29 DIAGNOSIS — N18.6 ANEMIA IN ESRD (END-STAGE RENAL DISEASE): Chronic | ICD-10-CM

## 2018-10-29 DIAGNOSIS — N39.0 URINARY TRACT INFECTION WITHOUT HEMATURIA, SITE UNSPECIFIED: ICD-10-CM

## 2018-10-29 DIAGNOSIS — A41.9 SEPSIS DUE TO UNDETERMINED ORGANISM: ICD-10-CM

## 2018-10-29 DIAGNOSIS — N18.6 ESRD (END STAGE RENAL DISEASE): Chronic | ICD-10-CM

## 2018-10-29 DIAGNOSIS — I10 ESSENTIAL HYPERTENSION: Chronic | ICD-10-CM

## 2018-10-29 DIAGNOSIS — A41.9 SEPSIS, DUE TO UNSPECIFIED ORGANISM: ICD-10-CM

## 2018-10-29 DIAGNOSIS — D63.1 ANEMIA IN ESRD (END-STAGE RENAL DISEASE): Chronic | ICD-10-CM

## 2018-10-29 LAB
ALBUMIN SERPL BCP-MCNC: 3.4 G/DL
ALP SERPL-CCNC: 137 U/L
ALT SERPL W/O P-5'-P-CCNC: 9 U/L
ANION GAP SERPL CALC-SCNC: 10 MMOL/L
ANISOCYTOSIS BLD QL SMEAR: SLIGHT
AST SERPL-CCNC: 19 U/L
BASOPHILS # BLD AUTO: 0.05 K/UL
BASOPHILS NFR BLD: 0.3 %
BILIRUB SERPL-MCNC: 0.6 MG/DL
BUN SERPL-MCNC: 10 MG/DL
CALCIUM SERPL-MCNC: 9 MG/DL
CHLORIDE SERPL-SCNC: 108 MMOL/L
CO2 SERPL-SCNC: 21 MMOL/L
CREAT SERPL-MCNC: 2.9 MG/DL
DIFFERENTIAL METHOD: ABNORMAL
EOSINOPHIL # BLD AUTO: 0.1 K/UL
EOSINOPHIL NFR BLD: 1 %
ERYTHROCYTE [DISTWIDTH] IN BLOOD BY AUTOMATED COUNT: 17.6 %
EST. GFR  (AFRICAN AMERICAN): 17.5 ML/MIN/1.73 M^2
EST. GFR  (NON AFRICAN AMERICAN): 15.1 ML/MIN/1.73 M^2
GLUCOSE SERPL-MCNC: 101 MG/DL
HCT VFR BLD AUTO: 39.2 %
HGB BLD-MCNC: 11.8 G/DL
IMM GRANULOCYTES # BLD AUTO: 0.06 K/UL
IMM GRANULOCYTES NFR BLD AUTO: 0.4 %
LACTATE SERPL-SCNC: 1.2 MMOL/L
LYMPHOCYTES # BLD AUTO: 0.7 K/UL
LYMPHOCYTES NFR BLD: 5.1 %
MAGNESIUM SERPL-MCNC: 2.4 MG/DL
MCH RBC QN AUTO: 29.1 PG
MCHC RBC AUTO-ENTMCNC: 30.1 G/DL
MCV RBC AUTO: 97 FL
MONOCYTES # BLD AUTO: 0.5 K/UL
MONOCYTES NFR BLD: 3.6 %
NEUTROPHILS # BLD AUTO: 12.9 K/UL
NEUTROPHILS NFR BLD: 89.6 %
NRBC BLD-RTO: 0 /100 WBC
PLATELET # BLD AUTO: 147 K/UL
PMV BLD AUTO: 13.4 FL
POIKILOCYTOSIS BLD QL SMEAR: SLIGHT
POLYCHROMASIA BLD QL SMEAR: ABNORMAL
POTASSIUM SERPL-SCNC: 4 MMOL/L
PROT SERPL-MCNC: 7.4 G/DL
RBC # BLD AUTO: 4.05 M/UL
SODIUM SERPL-SCNC: 139 MMOL/L
WBC # BLD AUTO: 14.4 K/UL

## 2018-10-29 PROCEDURE — 81001 URINALYSIS AUTO W/SCOPE: CPT

## 2018-10-29 PROCEDURE — 93005 ELECTROCARDIOGRAM TRACING: CPT | Mod: 59

## 2018-10-29 PROCEDURE — 83735 ASSAY OF MAGNESIUM: CPT

## 2018-10-29 PROCEDURE — 87086 URINE CULTURE/COLONY COUNT: CPT

## 2018-10-29 PROCEDURE — 93010 ELECTROCARDIOGRAM REPORT: CPT | Mod: ,,, | Performed by: INTERNAL MEDICINE

## 2018-10-29 PROCEDURE — 96375 TX/PRO/DX INJ NEW DRUG ADDON: CPT

## 2018-10-29 PROCEDURE — 83605 ASSAY OF LACTIC ACID: CPT

## 2018-10-29 PROCEDURE — 80053 COMPREHEN METABOLIC PANEL: CPT

## 2018-10-29 PROCEDURE — 87077 CULTURE AEROBIC IDENTIFY: CPT

## 2018-10-29 PROCEDURE — 87205 SMEAR GRAM STAIN: CPT

## 2018-10-29 PROCEDURE — 85025 COMPLETE CBC W/AUTO DIFF WBC: CPT

## 2018-10-29 PROCEDURE — 96365 THER/PROPH/DIAG IV INF INIT: CPT

## 2018-10-29 PROCEDURE — 87186 SC STD MICRODIL/AGAR DIL: CPT

## 2018-10-29 PROCEDURE — 25000242 PHARM REV CODE 250 ALT 637 W/ HCPCS: Performed by: EMERGENCY MEDICINE

## 2018-10-29 PROCEDURE — 99285 EMERGENCY DEPT VISIT HI MDM: CPT | Mod: 25

## 2018-10-29 PROCEDURE — 63600175 PHARM REV CODE 636 W HCPCS: Performed by: EMERGENCY MEDICINE

## 2018-10-29 PROCEDURE — 94640 AIRWAY INHALATION TREATMENT: CPT

## 2018-10-29 PROCEDURE — 87088 URINE BACTERIA CULTURE: CPT

## 2018-10-29 PROCEDURE — P9612 CATHETERIZE FOR URINE SPEC: HCPCS | Mod: 59

## 2018-10-29 PROCEDURE — 12000002 HC ACUTE/MED SURGE SEMI-PRIVATE ROOM

## 2018-10-29 PROCEDURE — 25000003 PHARM REV CODE 250: Performed by: STUDENT IN AN ORGANIZED HEALTH CARE EDUCATION/TRAINING PROGRAM

## 2018-10-29 PROCEDURE — 63600175 PHARM REV CODE 636 W HCPCS: Performed by: STUDENT IN AN ORGANIZED HEALTH CARE EDUCATION/TRAINING PROGRAM

## 2018-10-29 PROCEDURE — 96366 THER/PROPH/DIAG IV INF ADDON: CPT

## 2018-10-29 PROCEDURE — 99285 EMERGENCY DEPT VISIT HI MDM: CPT | Mod: GC,,, | Performed by: EMERGENCY MEDICINE

## 2018-10-29 PROCEDURE — 87040 BLOOD CULTURE FOR BACTERIA: CPT | Mod: 59

## 2018-10-29 RX ORDER — VANCOMYCIN HCL IN 5 % DEXTROSE 1G/250ML
1000 PLASTIC BAG, INJECTION (ML) INTRAVENOUS
Status: COMPLETED | OUTPATIENT
Start: 2018-10-29 | End: 2018-10-30

## 2018-10-29 RX ORDER — CEFTRIAXONE 1 G/1
1 INJECTION, POWDER, FOR SOLUTION INTRAMUSCULAR; INTRAVENOUS
Status: COMPLETED | OUTPATIENT
Start: 2018-10-29 | End: 2018-10-29

## 2018-10-29 RX ORDER — IPRATROPIUM BROMIDE AND ALBUTEROL SULFATE 2.5; .5 MG/3ML; MG/3ML
3 SOLUTION RESPIRATORY (INHALATION)
Status: COMPLETED | OUTPATIENT
Start: 2018-10-29 | End: 2018-10-29

## 2018-10-29 RX ORDER — CEFTRIAXONE 1 G/1
1 INJECTION, POWDER, FOR SOLUTION INTRAMUSCULAR; INTRAVENOUS
Status: DISCONTINUED | OUTPATIENT
Start: 2018-10-29 | End: 2018-10-29

## 2018-10-29 RX ORDER — ONDANSETRON 2 MG/ML
4 INJECTION INTRAMUSCULAR; INTRAVENOUS
Status: COMPLETED | OUTPATIENT
Start: 2018-10-29 | End: 2018-10-29

## 2018-10-29 RX ADMIN — ONDANSETRON 4 MG: 2 INJECTION INTRAMUSCULAR; INTRAVENOUS at 08:10

## 2018-10-29 RX ADMIN — CEFTRIAXONE SODIUM 1 G: 1 INJECTION, POWDER, FOR SOLUTION INTRAMUSCULAR; INTRAVENOUS at 10:10

## 2018-10-29 RX ADMIN — VANCOMYCIN HYDROCHLORIDE 1000 MG: 1 INJECTION, POWDER, LYOPHILIZED, FOR SOLUTION INTRAVENOUS at 10:10

## 2018-10-29 RX ADMIN — IPRATROPIUM BROMIDE AND ALBUTEROL SULFATE 3 ML: .5; 3 SOLUTION RESPIRATORY (INHALATION) at 08:10

## 2018-10-30 PROBLEM — N30.90 CYSTITIS: Status: ACTIVE | Noted: 2018-10-30

## 2018-10-30 PROBLEM — E11.29 TYPE 2 DIABETES MELLITUS WITH KIDNEY COMPLICATION, WITHOUT LONG-TERM CURRENT USE OF INSULIN: Status: RESOLVED | Noted: 2018-05-01 | Resolved: 2018-10-30

## 2018-10-30 LAB
ALBUMIN SERPL BCP-MCNC: 3.1 G/DL
ANION GAP SERPL CALC-SCNC: 8 MMOL/L
BACTERIA #/AREA URNS AUTO: ABNORMAL /HPF
BILIRUB UR QL STRIP: NEGATIVE
BUN SERPL-MCNC: 14 MG/DL
CALCIUM SERPL-MCNC: 8.6 MG/DL
CHLORIDE SERPL-SCNC: 107 MMOL/L
CLARITY UR REFRACT.AUTO: ABNORMAL
CO2 SERPL-SCNC: 22 MMOL/L
COLOR UR AUTO: ABNORMAL
CREAT SERPL-MCNC: 3.8 MG/DL
EST. GFR  (AFRICAN AMERICAN): 12.6 ML/MIN/1.73 M^2
EST. GFR  (NON AFRICAN AMERICAN): 10.9 ML/MIN/1.73 M^2
GLUCOSE SERPL-MCNC: 101 MG/DL
GLUCOSE UR QL STRIP: NEGATIVE
GRAM STN SPEC: NORMAL
HGB UR QL STRIP: ABNORMAL
HYALINE CASTS UR QL AUTO: 1 /LPF
KETONES UR QL STRIP: NEGATIVE
LACTATE SERPL-SCNC: 0.7 MMOL/L
LEUKOCYTE ESTERASE UR QL STRIP: ABNORMAL
MAGNESIUM SERPL-MCNC: 2 MG/DL
MICROSCOPIC COMMENT: ABNORMAL
NITRITE UR QL STRIP: NEGATIVE
NON-SQ EPI CELLS #/AREA URNS AUTO: <1 /HPF
PH UR STRIP: 6 [PH] (ref 5–8)
PHOSPHATE SERPL-MCNC: 4.4 MG/DL
PHOSPHATE SERPL-MCNC: 4.4 MG/DL
POCT GLUCOSE: 128 MG/DL (ref 70–110)
POTASSIUM SERPL-SCNC: 3.8 MMOL/L
PROCALCITONIN SERPL IA-MCNC: 12.46 NG/ML
PROT UR QL STRIP: ABNORMAL
RBC #/AREA URNS AUTO: 16 /HPF (ref 0–4)
SODIUM SERPL-SCNC: 137 MMOL/L
SP GR UR STRIP: 1.01 (ref 1–1.03)
SQUAMOUS #/AREA URNS AUTO: 1 /HPF
URN SPEC COLLECT METH UR: ABNORMAL
WBC #/AREA URNS AUTO: >100 /HPF (ref 0–5)
WBC CLUMPS UR QL AUTO: ABNORMAL

## 2018-10-30 PROCEDURE — 36415 COLL VENOUS BLD VENIPUNCTURE: CPT

## 2018-10-30 PROCEDURE — 97165 OT EVAL LOW COMPLEX 30 MIN: CPT

## 2018-10-30 PROCEDURE — 94761 N-INVAS EAR/PLS OXIMETRY MLT: CPT

## 2018-10-30 PROCEDURE — 99223 1ST HOSP IP/OBS HIGH 75: CPT | Mod: AI,,, | Performed by: HOSPITALIST

## 2018-10-30 PROCEDURE — G8978 MOBILITY CURRENT STATUS: HCPCS | Mod: CJ

## 2018-10-30 PROCEDURE — 11000001 HC ACUTE MED/SURG PRIVATE ROOM

## 2018-10-30 PROCEDURE — 25000003 PHARM REV CODE 250: Performed by: HOSPITALIST

## 2018-10-30 PROCEDURE — G8987 SELF CARE CURRENT STATUS: HCPCS | Mod: CI

## 2018-10-30 PROCEDURE — 97161 PT EVAL LOW COMPLEX 20 MIN: CPT

## 2018-10-30 PROCEDURE — G8989 SELF CARE D/C STATUS: HCPCS | Mod: CI

## 2018-10-30 PROCEDURE — 25000242 PHARM REV CODE 250 ALT 637 W/ HCPCS: Performed by: HOSPITALIST

## 2018-10-30 PROCEDURE — G8988 SELF CARE GOAL STATUS: HCPCS | Mod: CI

## 2018-10-30 PROCEDURE — G8979 MOBILITY GOAL STATUS: HCPCS | Mod: CJ

## 2018-10-30 PROCEDURE — G8980 MOBILITY D/C STATUS: HCPCS | Mod: CJ

## 2018-10-30 PROCEDURE — 99222 1ST HOSP IP/OBS MODERATE 55: CPT | Mod: ,,, | Performed by: NURSE PRACTITIONER

## 2018-10-30 PROCEDURE — 83735 ASSAY OF MAGNESIUM: CPT

## 2018-10-30 PROCEDURE — 80069 RENAL FUNCTION PANEL: CPT

## 2018-10-30 PROCEDURE — 63600175 PHARM REV CODE 636 W HCPCS: Performed by: HOSPITALIST

## 2018-10-30 PROCEDURE — 84145 PROCALCITONIN (PCT): CPT

## 2018-10-30 PROCEDURE — 83605 ASSAY OF LACTIC ACID: CPT

## 2018-10-30 RX ORDER — ATORVASTATIN CALCIUM 20 MG/1
40 TABLET, FILM COATED ORAL DAILY
Status: DISCONTINUED | OUTPATIENT
Start: 2018-10-30 | End: 2018-11-01 | Stop reason: HOSPADM

## 2018-10-30 RX ORDER — NAPROXEN SODIUM 220 MG/1
81 TABLET, FILM COATED ORAL DAILY
Status: DISCONTINUED | OUTPATIENT
Start: 2018-10-30 | End: 2018-11-01 | Stop reason: HOSPADM

## 2018-10-30 RX ORDER — FLUTICASONE FUROATE AND VILANTEROL 200; 25 UG/1; UG/1
1 POWDER RESPIRATORY (INHALATION) DAILY
Status: DISCONTINUED | OUTPATIENT
Start: 2018-10-30 | End: 2018-11-01 | Stop reason: HOSPADM

## 2018-10-30 RX ORDER — VALSARTAN 160 MG/1
160 TABLET ORAL DAILY
Status: DISCONTINUED | OUTPATIENT
Start: 2018-10-30 | End: 2018-11-01 | Stop reason: HOSPADM

## 2018-10-30 RX ORDER — CARVEDILOL 25 MG/1
25 TABLET ORAL 2 TIMES DAILY
Status: DISCONTINUED | OUTPATIENT
Start: 2018-10-30 | End: 2018-11-01 | Stop reason: HOSPADM

## 2018-10-30 RX ORDER — CEFTRIAXONE 1 G/1
1 INJECTION, POWDER, FOR SOLUTION INTRAMUSCULAR; INTRAVENOUS
Status: DISCONTINUED | OUTPATIENT
Start: 2018-10-30 | End: 2018-10-30

## 2018-10-30 RX ORDER — IPRATROPIUM BROMIDE AND ALBUTEROL SULFATE 2.5; .5 MG/3ML; MG/3ML
3 SOLUTION RESPIRATORY (INHALATION) EVERY 6 HOURS PRN
Status: DISCONTINUED | OUTPATIENT
Start: 2018-10-30 | End: 2018-11-01 | Stop reason: HOSPADM

## 2018-10-30 RX ORDER — CLONIDINE 0.2 MG/24H
1 PATCH, EXTENDED RELEASE TRANSDERMAL
Status: DISCONTINUED | OUTPATIENT
Start: 2018-10-30 | End: 2018-11-01 | Stop reason: HOSPADM

## 2018-10-30 RX ORDER — CEFTRIAXONE 1 G/1
1 INJECTION, POWDER, FOR SOLUTION INTRAMUSCULAR; INTRAVENOUS
Status: DISCONTINUED | OUTPATIENT
Start: 2018-10-30 | End: 2018-10-31

## 2018-10-30 RX ORDER — CITALOPRAM 10 MG/1
10 TABLET ORAL DAILY
Status: DISCONTINUED | OUTPATIENT
Start: 2018-10-30 | End: 2018-11-01 | Stop reason: HOSPADM

## 2018-10-30 RX ORDER — AMLODIPINE BESYLATE 10 MG/1
10 TABLET ORAL DAILY
Status: DISCONTINUED | OUTPATIENT
Start: 2018-10-30 | End: 2018-10-30

## 2018-10-30 RX ORDER — HYDRALAZINE HYDROCHLORIDE 50 MG/1
50 TABLET, FILM COATED ORAL EVERY 8 HOURS PRN
Status: DISCONTINUED | OUTPATIENT
Start: 2018-10-30 | End: 2018-11-01 | Stop reason: HOSPADM

## 2018-10-30 RX ORDER — SEVELAMER CARBONATE 800 MG/1
800 TABLET, FILM COATED ORAL
Status: DISCONTINUED | OUTPATIENT
Start: 2018-10-30 | End: 2018-11-01 | Stop reason: HOSPADM

## 2018-10-30 RX ORDER — HEPARIN SODIUM 5000 [USP'U]/ML
5000 INJECTION, SOLUTION INTRAVENOUS; SUBCUTANEOUS EVERY 8 HOURS
Status: DISCONTINUED | OUTPATIENT
Start: 2018-10-30 | End: 2018-11-01 | Stop reason: HOSPADM

## 2018-10-30 RX ORDER — ONDANSETRON 2 MG/ML
4 INJECTION INTRAMUSCULAR; INTRAVENOUS EVERY 8 HOURS PRN
Status: DISCONTINUED | OUTPATIENT
Start: 2018-10-30 | End: 2018-11-01 | Stop reason: HOSPADM

## 2018-10-30 RX ORDER — CLOPIDOGREL BISULFATE 75 MG/1
75 TABLET ORAL DAILY
Status: DISCONTINUED | OUTPATIENT
Start: 2018-10-30 | End: 2018-11-01 | Stop reason: HOSPADM

## 2018-10-30 RX ADMIN — SEVELAMER CARBONATE 800 MG: 800 TABLET, FILM COATED ORAL at 12:10

## 2018-10-30 RX ADMIN — SEVELAMER CARBONATE 800 MG: 800 TABLET, FILM COATED ORAL at 05:10

## 2018-10-30 RX ADMIN — FLUTICASONE FUROATE AND VILANTEROL TRIFENATATE 1 PUFF: 200; 25 POWDER RESPIRATORY (INHALATION) at 10:10

## 2018-10-30 RX ADMIN — HEPARIN SODIUM 5000 UNITS: 5000 INJECTION, SOLUTION INTRAVENOUS; SUBCUTANEOUS at 09:10

## 2018-10-30 RX ADMIN — HEPARIN SODIUM 5000 UNITS: 5000 INJECTION, SOLUTION INTRAVENOUS; SUBCUTANEOUS at 05:10

## 2018-10-30 RX ADMIN — ASPIRIN 81 MG CHEWABLE TABLET 81 MG: 81 TABLET CHEWABLE at 08:10

## 2018-10-30 RX ADMIN — SEVELAMER CARBONATE 800 MG: 800 TABLET, FILM COATED ORAL at 08:10

## 2018-10-30 RX ADMIN — CARVEDILOL 25 MG: 25 TABLET, FILM COATED ORAL at 08:10

## 2018-10-30 RX ADMIN — CITALOPRAM HYDROBROMIDE 10 MG: 10 TABLET ORAL at 08:10

## 2018-10-30 RX ADMIN — CEFTRIAXONE SODIUM 1 G: 1 INJECTION, POWDER, FOR SOLUTION INTRAMUSCULAR; INTRAVENOUS at 09:10

## 2018-10-30 RX ADMIN — VALSARTAN 160 MG: 160 TABLET, FILM COATED ORAL at 08:10

## 2018-10-30 RX ADMIN — ATORVASTATIN CALCIUM 40 MG: 20 TABLET, FILM COATED ORAL at 08:10

## 2018-10-30 RX ADMIN — CARVEDILOL 25 MG: 25 TABLET, FILM COATED ORAL at 09:10

## 2018-10-30 RX ADMIN — HEPARIN SODIUM 5000 UNITS: 5000 INJECTION, SOLUTION INTRAVENOUS; SUBCUTANEOUS at 03:10

## 2018-10-30 RX ADMIN — CLONIDINE 1 PATCH: 0.2 PATCH TRANSDERMAL at 10:10

## 2018-10-30 RX ADMIN — CLOPIDOGREL 75 MG: 75 TABLET, FILM COATED ORAL at 08:10

## 2018-10-30 NOTE — SUBJECTIVE & OBJECTIVE
Past Medical History:   Diagnosis Date    Anemia in ESRD (end-stage renal disease) 5/29/2016    Anticoagulant long-term use     Aortic atherosclerosis 11/22/2016    Aortic stenosis, moderate 2/18/2016    Asthma in adult without complication 1/8/2016    Bilateral low back pain without sciatica 11/17/2015    Blindness of right eye 11/12/2016    CAD (coronary artery disease) 12/12/2016    Cataract     Central retinal vein occlusion, right eye 6/3/2014    CHF (congestive heart failure)     Chronic diastolic heart failure 1/8/2016    Chronic respiratory failure with hypoxia 5/29/2016    COPD (chronic obstructive pulmonary disease) 1/15/2017    Dependence on hemodialysis     Mon-Wed-Fri    Diverticulosis     Embolic stroke involving right middle cerebral artery     Enlarged LA (left atrium) 10/7/2016    Epiretinal membrane 7/17/2012    ESRD (end stage renal disease)     Essential hypertension 1/8/2016    History of GI diverticular bleed     5/22/16    Left flaccid hemiparesis 10/1/2016    Peripheral vascular disease, unspecified 11/22/2016    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 11/13/2016    Type 2 diabetes mellitus with kidney complication, without long-term current use of insulin 5/1/2018    Type 2 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, without long-term current use of insulin 3/26/2013    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, without long-term current use of insulin 3/26/2013    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       Past Surgical History:   Procedure Laterality Date    BREAST SURGERY      tumor removal x 2    CATARACT EXTRACTION      CHOLECYSTECTOMY      COLONOSCOPY N/A 5/23/2016    Procedure: COLONOSCOPY;  Surgeon: WILLIAM Colvin MD;  Location: [facility] ([address] [place] [address]);  Service: Endoscopy;  Laterality: N/A;    COLONOSCOPY N/A 5/30/2016    Procedure:  COLONOSCOPY;  Surgeon: Sam Davis MD;  Location: [facility] (2ND FLR);  Service: Endoscopy;  Laterality: N/A;    COLONOSCOPY N/A 5/30/2016    Performed by Sam Davis MD at [facility] (2ND FLR)    COLONOSCOPY N/A 5/23/2016    Performed by WILLIAM Colvin MD at [facility] (2ND FLR)    ESOPHAGOGASTRODUODENOSCOPY (EGD) N/A 5/30/2016    Performed by Sam Davis MD at [facility] (2ND FLR)    ESOPHAGOGASTRODUODENOSCOPY (EGD) N/A 5/27/2016    Performed by Cesario Rubio MD at [facility] (2ND FLR)    UPPER GASTROINTESTINAL ENDOSCOPY         Review of patient's allergies indicates:  No Known Allergies    No current facility-administered medications on file prior to encounter.      Current Outpatient Medications on File Prior to Encounter   Medication Sig    albuterol 90 mcg/actuation inhaler Inhale 1-2 puffs into the lungs every 6 (six) hours as needed for Wheezing.    amLODIPine (NORVASC) 10 MG tablet Take 1 tablet (10 mg total) by mouth once daily.    aspirin 81 MG Chew Take 1 tablet (81 mg total) by mouth once daily.    atorvastatin (LIPITOR) 40 MG tablet Take 1 tablet (40 mg total) by mouth once daily.    carvedilol (COREG) 25 MG tablet Take 1 tablet (25 mg total) by mouth 2 (two) times daily.    citalopram (CELEXA) 10 MG tablet Take 1 tablet (10 mg total) by mouth once daily.    cloNIDine 0.3 mg/24 hr td ptwk (CATAPRES) 0.3 mg/24 hr Place 1 patch onto the skin every Saturday.    clopidogrel (PLAVIX) 75 mg tablet Take 1 tablet (75 mg total) by mouth once daily.    ergocalciferol (ERGOCALCIFEROL) 50,000 unit Cap Take 1 capsule (50,000 Units total) by mouth every 7 days.    fluticasone-vilanterol (BREO ELLIPTA) 200-25 mcg/dose DsDv diskus inhaler Inhale 1 puff into the lungs once daily.    hydrALAZINE (APRESOLINE) 50 MG tablet Take 1 tablet (50 mg total) by mouth 3 (three) times daily.    isosorbide dinitrate (ISOCHRON) 40 mg TbSR Take 1 tablet (40 mg total) by mouth every 12 (twelve)  hours.    lidocaine-prilocaine (EMLA) cream Apply topically as needed (to apply over access 1/2 over prior to dialysis).    loperamide (IMODIUM) 2 mg capsule Take one capsule at onset of diarrhea.  No more than three capsules daily    multivitamin (THERAGRAN) per tablet Take 1 tablet by mouth once daily.    ondansetron (ZOFRAN) 4 MG tablet Take 2 tablets (8 mg total) by mouth every 6 (six) hours.    RENVELA 800 mg Tab     valsartan (DIOVAN) 160 MG tablet Take 1 tablet (160 mg total) by mouth once daily.    [DISCONTINUED] calcium acetate (PHOSLO) 667 mg capsule TAKE 3 CAPSULES BY MOUTH THREE TIMES A DAY WITH MEALS    [DISCONTINUED] carvedilol (COREG) 25 MG tablet      Family History     Problem Relation (Age of Onset)    Cancer Sister    Cataracts Mother    Esophageal cancer Sister    Glaucoma Brother, Maternal Aunt    Heart disease Brother    Heart failure Sister    Hypertension Mother, Sister, Brother, Father    No Known Problems Maternal Uncle, Paternal Aunt, Paternal Uncle, Maternal Grandmother, Maternal Grandfather, Paternal Grandmother, Paternal Grandfather    Stroke Mother        Tobacco Use    Smoking status: Never Smoker    Smokeless tobacco: Never Used   Substance and Sexual Activity    Alcohol use: No     Comment: Reports occasional 1-2 drinks     Drug use: No    Sexual activity: No     Review of Systems   Constitutional: Negative for activity change, appetite change, chills, diaphoresis, fatigue, fever and unexpected weight change.   Eyes: Positive for visual disturbance (R eye blindness, nonacute). Negative for photophobia.   Respiratory: Positive for cough and shortness of breath (chronic, at baseline). Negative for apnea, choking, wheezing and stridor.    Cardiovascular: Negative for chest pain, palpitations and leg swelling.   Gastrointestinal: Positive for nausea and vomiting. Negative for abdominal pain, blood in stool, constipation and diarrhea.   Endocrine: Negative for cold  intolerance and heat intolerance.   Genitourinary: Negative for decreased urine volume, difficulty urinating and dysuria.   Musculoskeletal: Negative for arthralgias.   Skin: Negative for rash and wound.   Allergic/Immunologic: Negative for immunocompromised state.   Neurological: Negative for dizziness, weakness, light-headedness and headaches.   Psychiatric/Behavioral: Negative for agitation, behavioral problems and confusion.     Objective:     Vital Signs (Most Recent):  Temp: 99.6 °F (37.6 °C) (10/29/18 2318)  Pulse: 62 (10/29/18 2318)  Resp: 18 (10/29/18 2318)  BP: (!) 144/66 (10/29/18 2318)  SpO2: 100 % (10/29/18 2318) Vital Signs (24h Range):  Temp:  [98.3 °F (36.8 °C)-100.1 °F (37.8 °C)] 99.6 °F (37.6 °C)  Pulse:  [62-97] 62  Resp:  [16-18] 18  SpO2:  [70 %-100 %] 100 %  BP: (144-197)/(62-75) 144/66     Weight: 50.8 kg (112 lb)  Body mass index is 19.84 kg/m².    Physical Exam   Constitutional: She is oriented to person, place, and time. She appears well-developed. She is sleeping and cooperative. She has a sickly appearance. No distress.   HENT:   Head: Normocephalic and atraumatic.   Mouth/Throat: No oropharyngeal exudate.   Eyes: Conjunctivae and EOM are normal. Pupils are equal, round, and reactive to light. No scleral icterus.   Neck: Normal range of motion. Neck supple. No tracheal deviation present. No thyromegaly present.   Cardiovascular: Normal rate, regular rhythm and intact distal pulses.  Extrasystoles are present.   Murmur heard.   Systolic murmur is present with a grade of 4/6.  Pulmonary/Chest: Effort normal. No respiratory distress. She has no wheezes. She has rhonchi. She has rales (diffuse). She exhibits no tenderness.   Abdominal: Soft. Bowel sounds are normal. She exhibits no distension. There is no tenderness. There is no rebound and no guarding.   Musculoskeletal: Normal range of motion. She exhibits no edema or tenderness.   Lymphadenopathy:     She has no cervical adenopathy.    Neurological: She is oriented to person, place, and time.   Facial droop   Skin: Skin is warm and dry. No rash noted. She is not diaphoretic. No erythema. No pallor.   Psychiatric: She has a normal mood and affect. Her behavior is normal. Judgment and thought content normal.         CRANIAL NERVES     CN III, IV, VI   Pupils are equal, round, and reactive to light.  Extraocular motions are normal.        Significant Labs:   CBC:   Recent Labs   Lab 10/29/18  2000   WBC 14.40*   HGB 11.8*   HCT 39.2   *     CMP:   Recent Labs   Lab 10/29/18  2128      K 4.0      CO2 21*      BUN 10   CREATININE 2.9*   CALCIUM 9.0   PROT 7.4   ALBUMIN 3.4*   BILITOT 0.6   ALKPHOS 137*   AST 19   ALT 9*   ANIONGAP 10   EGFRNONAA 15.1*     Lactic Acid:   Recent Labs   Lab 10/29/18  2128   LACTATE 1.2     Urine Studies:   Recent Labs   Lab 10/29/18  2230   COLORU Brittni   APPEARANCEUA Cloudy*   PHUR 6.0   SPECGRAV 1.010   PROTEINUA 2+*   GLUCUA Negative   KETONESU Negative   BILIRUBINUA Negative   OCCULTUA 1+*   NITRITE Negative   LEUKOCYTESUR 3+*   RBCUA 16*   WBCUA >100*   BACTERIA Many*   SQUAMEPITHEL 1   HYALINECASTS 1       Significant Imaging: I have reviewed and interpreted all pertinent imaging results/findings within the past 24 hours.

## 2018-10-30 NOTE — H&P
"[facility] Medicine  History & Physical    Patient Name: Tea Georges  MRN: [id number]  Admission Date: 10/29/2018  Attending Physician: Juan Andrade MD   Primary Care Provider: Parth Posadas Ii, MD    [facility] Medicine Team: Networked reference to record PCT  Iveth Mcneal MD     Patient information was obtained from ER records, patient.    Subjective:     Principal Problem:Sepsis due to undetermined organism    Chief Complaint:   Chief Complaint   Patient presents with    Nausea     Pt c/o nausea x few days.  Sent from dialysis-received all but 20 min of treatment.  Pt was given Phenergan at dialysis.        HPI: Ms. Georges is a 77 yo AAF with HTN, DM2, CAD, COPD, chronic resp failure w/ hypoxia on 2L oxygen at home, chronic diastolic CHF, h/o stroke w/ mild L residual paresis, ESRD receiving dialysis MWF (last dialyzed 10/28) c/b by anemia of chronic diease and hyperparathyroidism, aortic stenosis, MVR, cataracts and blindness of R eye, and history of GI bleeding who presents for 2 days of nausea. Pt reported 2 episodes of vomiting "yellow stuff" yesterday night and this morning. She was given phenergan during HD with relief. Pt tried Zofran at home with some fleeting relief of her nausea. She denied hematemesis, abdominal discomfort, bowel irregularities, chest pain, SOB (above baseline), fever/chills, sick contacts.       Of note, patient complained of multiple additional symptoms per ED note, but denied these symptoms on my evaluation, stating she was here only because of "nausea". While she was cooperative and answered questions appropriately, she maintained her eyes closed and provided only limited answers to questioning. She stated she was fully independent and lived at home with her daughter, who helps her out. Call placed to daughter for collateral and med rec went straight to  ("Caller does not accept calls at this hour").    ED workup was notable for " cardiomegaly and pulmonary edema on CXR, mild leukocytosis of 14, stable Hg/Hct, and UA +ve for bacteria, leuk. She was started on broad spectrum antibiotics.         Past Medical History:   Diagnosis Date    Anemia in ESRD (end-stage renal disease) 5/29/2016    Anticoagulant long-term use     Aortic atherosclerosis 11/22/2016    Aortic stenosis, moderate 2/18/2016    Asthma in adult without complication 1/8/2016    Bilateral low back pain without sciatica 11/17/2015    Blindness of right eye 11/12/2016    CAD (coronary artery disease) 12/12/2016    Cataract     Central retinal vein occlusion, right eye 6/3/2014    CHF (congestive heart failure)     Chronic diastolic heart failure 1/8/2016    Chronic respiratory failure with hypoxia 5/29/2016    COPD (chronic obstructive pulmonary disease) 1/15/2017    Dependence on hemodialysis     Mon-Wed-Fri    Diverticulosis     Embolic stroke involving right middle cerebral artery     Enlarged LA (left atrium) 10/7/2016    Epiretinal membrane 7/17/2012    ESRD (end stage renal disease)     Essential hypertension 1/8/2016    History of GI diverticular bleed     5/22/16    Left flaccid hemiparesis 10/1/2016    Peripheral vascular disease, unspecified 11/22/2016    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 11/13/2016    Type 2 diabetes mellitus with kidney complication, without long-term current use of insulin 5/1/2018    Type 2 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, without long-term current use of insulin 3/26/2013    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, without long-term current use of insulin 3/26/2013    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       Past Surgical History:   Procedure Laterality Date    BREAST SURGERY      tumor removal x 2    CATARACT EXTRACTION      CHOLECYSTECTOMY      COLONOSCOPY N/A 5/23/2016    Procedure:  COLONOSCOPY;  Surgeon: WILLIAM Colvin MD;  Location: [facility] (2ND FLR);  Service: Endoscopy;  Laterality: N/A;    COLONOSCOPY N/A 5/30/2016    Procedure: COLONOSCOPY;  Surgeon: Sam Davis MD;  Location: [facility] (2ND FLR);  Service: Endoscopy;  Laterality: N/A;    COLONOSCOPY N/A 5/30/2016    Performed by Sam Davis MD at [facility] (2ND FLR)    COLONOSCOPY N/A 5/23/2016    Performed by WILLIAM Colvin MD at [facility] (2ND FLR)    ESOPHAGOGASTRODUODENOSCOPY (EGD) N/A 5/30/2016    Performed by Sam Davis MD at [facility] (2ND FLR)    ESOPHAGOGASTRODUODENOSCOPY (EGD) N/A 5/27/2016    Performed by Cesario Rubio MD at [facility] (2ND FLR)    UPPER GASTROINTESTINAL ENDOSCOPY         Review of patient's allergies indicates:  No Known Allergies    No current facility-administered medications on file prior to encounter.      Current Outpatient Medications on File Prior to Encounter   Medication Sig    albuterol 90 mcg/actuation inhaler Inhale 1-2 puffs into the lungs every 6 (six) hours as needed for Wheezing.    amLODIPine (NORVASC) 10 MG tablet Take 1 tablet (10 mg total) by mouth once daily.    aspirin 81 MG Chew Take 1 tablet (81 mg total) by mouth once daily.    atorvastatin (LIPITOR) 40 MG tablet Take 1 tablet (40 mg total) by mouth once daily.    carvedilol (COREG) 25 MG tablet Take 1 tablet (25 mg total) by mouth 2 (two) times daily.    citalopram (CELEXA) 10 MG tablet Take 1 tablet (10 mg total) by mouth once daily.    cloNIDine 0.3 mg/24 hr td ptwk (CATAPRES) 0.3 mg/24 hr Place 1 patch onto the skin every Saturday.    clopidogrel (PLAVIX) 75 mg tablet Take 1 tablet (75 mg total) by mouth once daily.    ergocalciferol (ERGOCALCIFEROL) 50,000 unit Cap Take 1 capsule (50,000 Units total) by mouth every 7 days.    fluticasone-vilanterol (BREO ELLIPTA) 200-25 mcg/dose DsDv diskus inhaler Inhale 1 puff into the lungs once daily.    hydrALAZINE (APRESOLINE) 50 MG tablet Take 1  tablet (50 mg total) by mouth 3 (three) times daily.    isosorbide dinitrate (ISOCHRON) 40 mg TbSR Take 1 tablet (40 mg total) by mouth every 12 (twelve) hours.    lidocaine-prilocaine (EMLA) cream Apply topically as needed (to apply over access 1/2 over prior to dialysis).    loperamide (IMODIUM) 2 mg capsule Take one capsule at onset of diarrhea.  No more than three capsules daily    multivitamin (THERAGRAN) per tablet Take 1 tablet by mouth once daily.    ondansetron (ZOFRAN) 4 MG tablet Take 2 tablets (8 mg total) by mouth every 6 (six) hours.    RENVELA 800 mg Tab     valsartan (DIOVAN) 160 MG tablet Take 1 tablet (160 mg total) by mouth once daily.    [DISCONTINUED] calcium acetate (PHOSLO) 667 mg capsule TAKE 3 CAPSULES BY MOUTH THREE TIMES A DAY WITH MEALS    [DISCONTINUED] carvedilol (COREG) 25 MG tablet      Family History     Problem Relation (Age of Onset)    Cancer Sister    Cataracts Mother    Esophageal cancer Sister    Glaucoma Brother, Maternal Aunt    Heart disease Brother    Heart failure Sister    Hypertension Mother, Sister, Brother, Father    No Known Problems Maternal Uncle, Paternal Aunt, Paternal Uncle, Maternal Grandmother, Maternal Grandfather, Paternal Grandmother, Paternal Grandfather    Stroke Mother        Tobacco Use    Smoking status: Never Smoker    Smokeless tobacco: Never Used   Substance and Sexual Activity    Alcohol use: No     Comment: Reports occasional 1-2 drinks     Drug use: No    Sexual activity: No     Review of Systems   Constitutional: Negative for activity change, appetite change, chills, diaphoresis, fatigue, fever and unexpected weight change.   Eyes: Positive for visual disturbance (R eye blindness, nonacute). Negative for photophobia.   Respiratory: Positive for cough and shortness of breath (chronic, at baseline). Negative for apnea, choking, wheezing and stridor.    Cardiovascular: Negative for chest pain, palpitations and leg swelling.    Gastrointestinal: Positive for nausea and vomiting. Negative for abdominal pain, blood in stool, constipation and diarrhea.   Endocrine: Negative for cold intolerance and heat intolerance.   Genitourinary: Negative for decreased urine volume, difficulty urinating and dysuria.   Musculoskeletal: Negative for arthralgias.   Skin: Negative for rash and wound.   Allergic/Immunologic: Negative for immunocompromised state.   Neurological: Negative for dizziness, weakness, light-headedness and headaches.   Psychiatric/Behavioral: Negative for agitation, behavioral problems and confusion.     Objective:     Vital Signs (Most Recent):  Temp: 99.6 °F (37.6 °C) (10/29/18 2318)  Pulse: 62 (10/29/18 2318)  Resp: 18 (10/29/18 2318)  BP: (!) 144/66 (10/29/18 2318)  SpO2: 100 % (10/29/18 2318) Vital Signs (24h Range):  Temp:  [98.3 °F (36.8 °C)-100.1 °F (37.8 °C)] 99.6 °F (37.6 °C)  Pulse:  [62-97] 62  Resp:  [16-18] 18  SpO2:  [70 %-100 %] 100 %  BP: (144-197)/(62-75) 144/66     Weight: 50.8 kg (112 lb)  Body mass index is 19.84 kg/m².    Physical Exam   Constitutional: She is oriented to person, place, and time. She appears well-developed. She is sleeping and cooperative. She has a sickly appearance. No distress.   HENT:   Head: Normocephalic and atraumatic.   Mouth/Throat: No oropharyngeal exudate.   Eyes: Conjunctivae and EOM are normal. Pupils are equal, round, and reactive to light. No scleral icterus.   Neck: Normal range of motion. Neck supple. No tracheal deviation present. No thyromegaly present.   Cardiovascular: Normal rate, regular rhythm and intact distal pulses.  Extrasystoles are present.   Murmur heard.   Systolic murmur is present with a grade of 4/6.  Pulmonary/Chest: Effort normal. No respiratory distress. She has no wheezes. She has rhonchi. She has rales (diffuse). She exhibits no tenderness.   Abdominal: Soft. Bowel sounds are normal. She exhibits no distension. There is no tenderness. There is no rebound  and no guarding.   Musculoskeletal: Normal range of motion. She exhibits no edema or tenderness.   Lymphadenopathy:     She has no cervical adenopathy.   Neurological: She is oriented to person, place, and time.   Facial droop   Skin: Skin is warm and dry. No rash noted. She is not diaphoretic. No erythema. No pallor.   Psychiatric: She has a normal mood and affect. Her behavior is normal. Judgment and thought content normal.         CRANIAL NERVES     CN III, IV, VI   Pupils are equal, round, and reactive to light.  Extraocular motions are normal.        Significant Labs:   CBC:   Recent Labs   Lab 10/29/18  2000   WBC 14.40*   HGB 11.8*   HCT 39.2   *     CMP:   Recent Labs   Lab 10/29/18  2128      K 4.0      CO2 21*      BUN 10   CREATININE 2.9*   CALCIUM 9.0   PROT 7.4   ALBUMIN 3.4*   BILITOT 0.6   ALKPHOS 137*   AST 19   ALT 9*   ANIONGAP 10   EGFRNONAA 15.1*     Lactic Acid:   Recent Labs   Lab 10/29/18  2128   LACTATE 1.2     Urine Studies:   Recent Labs   Lab 10/29/18  2230   COLORU Brittni   APPEARANCEUA Cloudy*   PHUR 6.0   SPECGRAV 1.010   PROTEINUA 2+*   GLUCUA Negative   KETONESU Negative   BILIRUBINUA Negative   OCCULTUA 1+*   NITRITE Negative   LEUKOCYTESUR 3+*   RBCUA 16*   WBCUA >100*   BACTERIA Many*   SQUAMEPITHEL 1   HYALINECASTS 1       Significant Imaging: I have reviewed and interpreted all pertinent imaging results/findings within the past 24 hours.    Assessment/Plan:     * Sepsis due to undetermined organism    Presenting with malaise, nausea, low grade temps (Tm 100.1°), and leukocytosis (14.4) w/ L shift.   Follow up UA, BCx, procal, lactic acid, CXR.   BSABx w/ CTX, vancomycin initiated in ED. De-escalation pending above workup.   Hemodynamically stable.      Cystitis    UA +ve many WBCs, Bacteria, RBCs.   UCx, BCx ordered.   Previous UCx growing Klebsiella, E coli (sensitive to CTX).   Continue CTX, f/u Cx, procal, LA.       Nausea    Treat infection, keep  hydrated.  Zofran/phenergan prn for symptom management.        Thrombocytopenia, unspecified    At baseline, w/o overt bleeding. No indication for transfusion.   Continue to monitor.      COPD (chronic obstructive pulmonary disease)    Stable. On baseline O2 supplementation.   CXR sig/f cardiomegaly and bilateral edema.   Continue Breo for maintanence, duo nebs prn for rescue.        CAD (coronary artery disease)    Stable. No complaint of chest pain currently.   Continue ASA, statin.        Left spastic hemiparesis    Chronic, mild, stable.   Fall precautions.   PT/OT.     Anemia in ESRD (end-stage renal disease)    Hg/Hct above baseline, w/o overt bleeding.   Trend labs.       ESRD (end stage renal disease)    Last dialyzed 10/29  Nephrology consulted for IP HD, appreciate assistance.   Continue Renvela.       Essential hypertension    Elevated on admission in 190s systolic, now trending down into 140s.   On multiple medications, per MR, but could not verify as patient does not remember and her daughter did not answer phone: Valsartan 160mg, Hydralazine 50mg, Clonidine .3mg, Coreg 25mg, Amlodipine 10mg   Admitted for urosepsis, but pressure is currently elevated and so will resume regimen cautiously, continuing Coreg, Valsartan. Monitor BP overnight and defer re-starting/titrating additional medications to 1° team. Will order hydralazine prn for SBP > 180.   Renal/cardiac diet.       VTE Risk Mitigation (From admission, onward)        Ordered     heparin (porcine) injection 5,000 Units  Every 8 hours      10/30/18 0107     IP VTE HIGH RISK PATIENT  Once      10/30/18 0107             Iveth Mcneal MD  Department of Hospital Medicine   [facility]

## 2018-10-30 NOTE — PLAN OF CARE
Problem: Occupational Therapy Goal  Goal: Occupational Therapy Goal  No acute OT needs, no goals set.  Outcome: Outcome(s) achieved Date Met: 10/30/18  Evaluation completed.  No acute needs. D/C OT.   SHERI Styles  10/30/2018  Rehab Services

## 2018-10-30 NOTE — ASSESSMENT & PLAN NOTE
Presenting with malaise, nausea, low grade temps (Tm 100.1°), and leukocytosis (14.4) w/ L shift.   Follow up UA, BCx, procal, lactic acid, CXR.   BSABx w/ CTX, vancomycin initiated in ED. De-escalation pending above workup.   Hemodynamically stable.

## 2018-10-30 NOTE — PT/OT/SLP EVAL
Physical Therapy Evaluation and Discharge Note    Patient Name:  Tea Georges   MRN:  [id number]    Recommendations:     Discharge Recommendations:  home   Discharge Equipment Recommendations: none   Barriers to discharge: None    Assessment:     Tea Georges is a 78 y.o. female admitted with a medical diagnosis of Sepsis due to undetermined organism. .  At this time, patient is functioning at their prior level of function and does not require further acute PT services. Pt edu on  PT but declined politely.     Recent Surgery: * No surgery found *      Plan:     During this hospitalization, patient does not require further acute PT services.  Please re-consult if situation changes.      Subjective     Chief Complaint: none; pt very pleasant  Patient/Family Comments/goals: to return home  Pain/Comfort:  · Pain Rating 1: 0/10  · Pain Rating Post-Intervention 1: 0/10    Patients cultural, spiritual, Jainism conflicts given the current situation: none stated    Living Environment:  Pt lives in a [facility] with 3 MARY with her daughter who is able to assist 24/7.   Prior to admission, patients level of function was mod ( I) with rollator when ambulation community distances, no AD when in home. ( I) with ADLs, cooking and cleaning.  Equipment used at home: 3-in-1 commode, rollator, oxygen.  DME owned (not currently used): none.  Upon discharge, patient will have assistance from family.    Objective:     Communicated with RN prior to session.  Patient found supine upon PT entry to room found with: telemetry, pulse ox (continuous), peripheral IV     General Precautions: Standard, fall   Orthopedic Precautions:N/A   Braces: N/A     Exams:  · Cognitive Exam:  Patient is oriented to Person, Place, Time and Situation  · Fine Motor Coordination: -       Intact  · Gross Motor Coordination:  WFL  · Postural Exam:  Patient presented with the following abnormalities: -       Rounded shoulders  · -       Forward head  · Sensation: -        Intact  · Skin Integrity/Edema:  -       Skin integrity: Thin and Dry  · RLE ROM: WFL  · RLE Strength: WFL  · LLE ROM: WFL  · LLE Strength: WFL    Functional Mobility:  · Bed Mobility:  Rolling Left:  supervision  · Supine to Sit: supervision  · Transfers:  Sit to Stand:  stand by assistance with no AD  · Bed to Chair: stand by assistance with  rollator  using  Step Transfer  · Toilet Transfer: modified independence with  no AD  using  Step Transfer  · Balance: SBA for gait with rollator for BUE; no increase in sway or LOB  · Stairs:  Pt ascended/descended 3 stair(s) with No Assistive Device with right handrail with Contact Guard Assistance.    · Gait  · Patient ambulated FWB/WBAT: bilateral lower extremity 85  feet on level tile with Rollator with Stand-by Assistance.  Pt with demonstarting a  2-point gait with decreased maria l.Impairments contributing to gait deviations include decreased strength      AM-PAC 6 CLICK MOBILITY  Total Score:21       Therapeutic Activities and Exercises:  · All of patients questions were answered within the scope of PT  ·     Patient education  · Patient educated on the role of PT and POC  · Patient educated on importance  activity while in the hosptial per tolerance for improved endurance and to limit deconditioning   · Patient educated on safe transfers with nursing as appropriate  · Patient educated on energy conservation, pursed lip breathing  · Patient educated on proper transfer mechanics and safety  · Pt edu on benefits of  PT but declined politely stating she has no needs      AM-PAC 6 CLICK MOBILITY  Total Score:21     Patient left up in chair with all lines intact and call button in reach.    GOALS:   Multidisciplinary Problems     Physical Therapy Goals     Not on file          Multidisciplinary Problems (Resolved)        Problem: Physical Therapy Goal    Goal Priority Disciplines Outcome Goal Variances Interventions   Physical Therapy Goal   (Resolved)     PT, PT/OT  Outcome(s) achieved                     History:     Past Medical History:   Diagnosis Date    Anemia in ESRD (end-stage renal disease) 5/29/2016    Anticoagulant long-term use     Aortic atherosclerosis 11/22/2016    Aortic stenosis, moderate 2/18/2016    Asthma in adult without complication 1/8/2016    Bilateral low back pain without sciatica 11/17/2015    Blindness of right eye 11/12/2016    CAD (coronary artery disease) 12/12/2016    Cataract     Central retinal vein occlusion, right eye 6/3/2014    CHF (congestive heart failure)     Chronic diastolic heart failure 1/8/2016    Chronic respiratory failure with hypoxia 5/29/2016    COPD (chronic obstructive pulmonary disease) 1/15/2017    Dependence on hemodialysis     Mon-Wed-Fri    Diverticulosis     Embolic stroke involving right middle cerebral artery     Enlarged LA (left atrium) 10/7/2016    Epiretinal membrane 7/17/2012    ESRD (end stage renal disease)     Essential hypertension 1/8/2016    History of GI diverticular bleed     5/22/16    Left flaccid hemiparesis 10/1/2016    Peripheral vascular disease, unspecified 11/22/2016    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 11/13/2016    Type 2 diabetes mellitus with kidney complication, without long-term current use of insulin 5/1/2018    Type 2 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, without long-term current use of insulin 3/26/2013    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, without long-term current use of insulin 3/26/2013    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       Past Surgical History:   Procedure Laterality Date    BREAST SURGERY      tumor removal x 2    CATARACT EXTRACTION      CHOLECYSTECTOMY      COLONOSCOPY N/A 5/23/2016    Procedure: COLONOSCOPY;  Surgeon: WILLIAM Colvin MD;  Location: [facility] ([address] [place] [address]);  Service: Endoscopy;  Laterality: N/A;     COLONOSCOPY N/A 5/30/2016    Procedure: COLONOSCOPY;  Surgeon: Sam Davis MD;  Location: [facility] (2ND FLR);  Service: Endoscopy;  Laterality: N/A;    COLONOSCOPY N/A 5/30/2016    Performed by Sam Davis MD at [facility] ENDO (2ND FLR)    COLONOSCOPY N/A 5/23/2016    Performed by WILLIAM Colvin MD at [facility] END[facility] (2ND FLR)    ESOPHAGOGASTRODUODENOSCOPY (EGD) N/A 5/30/2016    Performed by Sam Davis MD at [facility] (2ND FLR)    ESOPHAGOGASTRODUODENOSCOPY (EGD) N/A 5/27/2016    Performed by Cesario Rubio MD at [facility] (2ND FLR)    UPPER GASTROINTESTINAL ENDOSCOPY         Clinical Decision Making:     History  Co-morbidities and personal factors that may impact the plan of care Examination  Body Structures and Functions, activity limitations and participation restrictions that may impact the plan of care Clinical Presentation   Decision Making/ Complexity Score   Co-morbidities:   [] Time since onset of injury / illness / exacerbation  [] Status of current condition  []Patient's cognitive status and safety concerns    [x] Multiple Medical Problems (see med hx)  Personal Factors:   [x] Patient's age  [] Prior Level of function   [] Patient's home situation (environment and family support)  [] Patient's level of motivation  [] Expected progression of patient      HISTORY:(criteria)    [] 73044 - no personal factors/history    [x] 30203 - has 1-2 personal factor/comorbidity     [] 58123 - has >3 personal factor/comorbidity     Body Regions:  [] Objective examination findings  [] Head     []  Neck  [x] Trunk   [] Upper Extremity  [] Lower Extremity    Body Systems:  [] For communication ability, affect, cognition, language, and learning style: the assessment of the ability to make needs known, consciousness, orientation (person, place, and time), expected emotional /behavioral responses, and learning preferences (eg, learning barriers, education  needs)  [] For the neuromuscular system: a  general assessment of gross coordinated movement (eg, balance, gait, locomotion, transfers, and transitions) and motor function  (motor control and motor learning)  [] For the musculoskeletal system: the assessment of gross symmetry, gross range of motion, gross strength, height, and weight  [] For the integumentary system: the assessment of pliability(texture), presence of scar formation, skin color, and skin integrity  [x] For cardiovascular/pulmonary system: the assessment of heart rate, respiratory rate, blood pressure, and edema     Activity limitations:    [] Patient's cognitive status and saf ety concerns          [] Status of current condition      [] Weight bearing restriction  [] Cardiopulmunary Restriction    Participation Restrictions:   [] Goals and goal agreement with the patient     [] Rehab potential (prognosis) and probable outcome      Examination of Body System: (criteria)    [x] 61801 - addressing 1-2 elements    [] 52259 - addressing a total of 3 or more elements     [] 08194 -  Addressing a total of 4 or more elements         Clinical Presentation: (criteria)  Stable - 43630     On examination of body system using standardized tests and measures patient presents with 1-2 elements from any of the following: body structures and functions, activity limitations, and/or participation restrictions.  Leading to a clinical presentation that is considered stable and/or uncomplicated                              Clinical Decision Making  (Eval Complexity):  Low- 23476     Time Tracking:     PT Received On: 10/30/18  PT Start Time: 1030     PT Stop Time: 1051  PT Total Time (min): 21 min     Billable Minutes: Evaluation 21 min      Sony Aldridge, PT  10/30/2018

## 2018-10-30 NOTE — ED NOTES
Pt resting comfortably and independently repositioned in stretcher with bed locked in lowest position for safety. NAD noted at this time. Respirations even and unlabored and visible chest rise noted.  Patient offered bathroom assistance and denies need at this time. Pt instructed to call if assistance is needed. Pt on continuous cardiac, BP, and O2 monitoring. Call light within reach.. No needs at this time. Will continue to monitor.

## 2018-10-30 NOTE — SUBJECTIVE & OBJECTIVE
Past Medical History:   Diagnosis Date    Anemia in ESRD (end-stage renal disease) 5/29/2016    Anticoagulant long-term use     Aortic atherosclerosis 11/22/2016    Aortic stenosis, moderate 2/18/2016    Asthma in adult without complication 1/8/2016    Bilateral low back pain without sciatica 11/17/2015    Blindness of right eye 11/12/2016    CAD (coronary artery disease) 12/12/2016    Cataract     Central retinal vein occlusion, right eye 6/3/2014    CHF (congestive heart failure)     Chronic diastolic heart failure 1/8/2016    Chronic respiratory failure with hypoxia 5/29/2016    COPD (chronic obstructive pulmonary disease) 1/15/2017    Dependence on hemodialysis     Mon-Wed-Fri    Diverticulosis     Embolic stroke involving right middle cerebral artery     Enlarged LA (left atrium) 10/7/2016    Epiretinal membrane 7/17/2012    ESRD (end stage renal disease)     Essential hypertension 1/8/2016    History of GI diverticular bleed     5/22/16    Left flaccid hemiparesis 10/1/2016    Peripheral vascular disease, unspecified 11/22/2016    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 11/13/2016    Type 2 diabetes mellitus with kidney complication, without long-term current use of insulin 5/1/2018    Type 2 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, without long-term current use of insulin 3/26/2013    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, without long-term current use of insulin 3/26/2013    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       Past Surgical History:   Procedure Laterality Date    BREAST SURGERY      tumor removal x 2    CATARACT EXTRACTION      CHOLECYSTECTOMY      COLONOSCOPY N/A 5/23/2016    Procedure: COLONOSCOPY;  Surgeon: WILLIAM Colvin MD;  Location: [facility] ([address] [place] [address]);  Service: Endoscopy;  Laterality: N/A;    COLONOSCOPY N/A 5/30/2016    Procedure:  COLONOSCOPY;  Surgeon: Sam Davis MD;  Location: [facility] (2ND FLR);  Service: Endoscopy;  Laterality: N/A;    COLONOSCOPY N/A 5/30/2016    [facility] (2ND FLR)    COLONOSCOPY N/A 5/23/2016    Performed by WILLIAM Colvin MD at [facility] (2ND FLR)    ESOPHAGOGASTRODUODENOSCOPY (EGD) N/A 5/30/2016    Performed by Sam Davis MD at [facility] (2ND FLR)    ESOPHAGOGASTRODUODENOSCOPY (EGD) N/A 5/27/2016    Performed by Cesario Rubio MD at [facility] (2ND FLR)    UPPER GASTROINTESTINAL ENDOSCOPY         Review of patient's allergies indicates:  No Known Allergies  Current Facility-Administered Medications   Medication Frequency    albuterol-ipratropium 2.5 mg-0.5 mg/3 mL nebulizer solution 3 mL Q6H PRN    aspirin chewable tablet 81 mg Daily    atorvastatin tablet 40 mg Daily    carvedilol tablet 25 mg BID    cefTRIAXone injection 1 g ED 1 Time    citalopram tablet 10 mg Daily    cloNIDine 0.2 mg/24 hr td ptwk 1 patch Q7 Days    clopidogrel tablet 75 mg Daily    fluticasone-vilanterol 200-25 mcg/dose diskus inhaler 1 puff Daily    heparin (porcine) injection 5,000 Units Q8H    hydrALAZINE tablet 50 mg Q8H PRN    ondansetron injection 4 mg Q8H PRN    promethazine (PHENERGAN) 6.25 mg in dextrose 5 % 50 mL IVPB Q6H PRN    sevelamer carbonate tablet 800 mg TID WM    valsartan tablet 160 mg Daily     Family History     Problem Relation (Age of Onset)    Cancer Sister    Cataracts Mother    Esophageal cancer Sister    Glaucoma Brother, Maternal Aunt    Heart disease Brother    Heart failure Sister    Hypertension Mother, Sister, Brother, Father    No Known Problems Maternal Uncle, Paternal Aunt, Paternal Uncle, Maternal Grandmother, Maternal Grandfather, Paternal Grandmother, Paternal Grandfather    Stroke Mother        Tobacco Use    Smoking status: Never Smoker    Smokeless tobacco: Never Used   Substance and Sexual Activity    Alcohol use: No      Comment: Reports occasional 1-2 drinks     Drug use: No    Sexual activity: No     Review of Systems   Constitutional: Negative for activity change, appetite change, chills, diaphoresis, fatigue, fever and unexpected weight change.   Eyes: Positive for visual disturbance (R eye blindness). Negative for photophobia.   Respiratory: Positive for cough and shortness of breath (chronic, at baseline). Negative for apnea, choking, wheezing and stridor.    Cardiovascular: Negative for chest pain, palpitations and leg swelling.   Gastrointestinal: Positive for nausea and vomiting. Negative for abdominal pain, blood in stool, constipation and diarrhea.   Endocrine: Negative for cold intolerance and heat intolerance.   Genitourinary: Negative for decreased urine volume, difficulty urinating and dysuria.   Musculoskeletal: Negative for arthralgias.   Skin: Negative for rash and wound.   Allergic/Immunologic: Negative for immunocompromised state.   Neurological: Negative for dizziness, weakness, light-headedness and headaches.   Psychiatric/Behavioral: Negative for agitation, behavioral problems and confusion.     Objective:     Vital Signs (Most Recent):  Temp: 98 °F (36.7 °C) (10/30/18 0920)  Pulse: (!) 55 (10/30/18 1053)  Resp: 20 (10/30/18 1053)  BP: (!) 144/66 (10/30/18 0753)  SpO2: (!) 92 % (10/30/18 1057)  O2 Device (Oxygen Therapy): nasal cannula (10/30/18 0852) Vital Signs (24h Range):  Temp:  [97.8 °F (36.6 °C)-100.1 °F (37.8 °C)] 98 °F (36.7 °C)  Pulse:  [55-97] 55  Resp:  [16-23] 20  SpO2:  [70 %-100 %] 92 %  BP: (140-197)/(62-75) 144/66     Weight: 50.5 kg (111 lb 5.3 oz) (10/30/18 0300)  Body mass index is 19.72 kg/m².  Body surface area is 1.5 meters squared.    No intake/output data recorded.    Physical Exam   Constitutional: She is oriented to person, place, and time. She appears well-developed. No distress.   HENT:   Head: Normocephalic and atraumatic.   Right Ear: External ear normal.   Left Ear: External  ear normal.   Eyes: Conjunctivae and EOM are normal. Right eye exhibits no discharge. Left eye exhibits no discharge.   R eye, opacification of pupil   Neck: Normal range of motion. Neck supple.   Cardiovascular: Normal rate and regular rhythm. Exam reveals no gallop and no friction rub.   Murmur heard.  Pulmonary/Chest: Effort normal. No respiratory distress. She has wheezes. She has rales.   Abdominal: Soft. Bowel sounds are normal. She exhibits no distension. There is no tenderness.   Musculoskeletal: Normal range of motion. She exhibits no edema or deformity.   Neurological: She is alert and oriented to person, place, and time.   Skin: Skin is warm and dry. She is not diaphoretic.   Psychiatric: She has a normal mood and affect. Her behavior is normal.       Significant Labs:  CBC:   Recent Labs   Lab 10/29/18  2000   WBC 14.40*   RBC 4.05   HGB 11.8*   HCT 39.2   *   MCV 97   MCH 29.1   MCHC 30.1*     CMP:   Recent Labs   Lab 10/29/18  2128      CALCIUM 9.0   ALBUMIN 3.4*   PROT 7.4      K 4.0   CO2 21*      BUN 10   CREATININE 2.9*   ALKPHOS 137*   ALT 9*   AST 19   BILITOT 0.6

## 2018-10-30 NOTE — PLAN OF CARE
Problem: Patient Care Overview  Goal: Plan of Care Review  Outcome: Ongoing (interventions implemented as appropriate)  Pt free of any injury or falls, VSS, skin intact. Scheduled and PRN meds given as ordered or when requested. Demonstrated ability to use call light when needed. Bed locked in lowest position. Care plan reviewed w/ patient and education given. Pt is not demonstrating any overt S/S of pain or distress at this time. Will continue to monitor.

## 2018-10-30 NOTE — PLAN OF CARE
Problem: Patient Care Overview  Goal: Plan of Care Review  Outcome: Ongoing (interventions implemented as appropriate)  Pt is free from falls trauma and injuries. Bed in low position with call light in reach. Skin is warm and dry. VS are stable. No noted fever or pain. Patient out of bed during lunch with front wheel walker. Resting at this time

## 2018-10-30 NOTE — ASSESSMENT & PLAN NOTE
[facility]  Dr. [facility]  4 hours  EDW ~ 50 kg  [facility] AVF    Plan:  No urgent need for RRT today  Plan for HD tomorrow in the [facility].  For clearance/ultrafiltration  Recommend adding Novasource renal with each meal for protein supplementation  Will obtain OP dialysis records for treatment verification.  Phos level in AM  Renal diet

## 2018-10-30 NOTE — ED TRIAGE NOTES
78 yr old pt presents to the ed with complaints of nausea x couple days. Pt was sent from dialysis but only received 20 mins of treatment. Pt is a tthsat dialysis pt..  Pt is aox 3 and denies chest pain sob.

## 2018-10-30 NOTE — CONSULTS
[facility]  Nephrology  Consult Note    Patient Name: Tea Georges  MRN: [id number]  Admission Date: 10/29/2018  Hospital Length of Stay: 1 days  Attending Provider: Elijah Doherty MD   Primary Care Physician: Parth Posadas Ii, MD  Principal Problem:Sepsis due to undetermined organism    Inpatient consult to Nephrology  Consult performed by: Bertin Hanson NP  Consult ordered by: Iveth Mcneal MD  Reason for consult: ESRD MWF        Subjective:     HPI: Ms. Tea Georges is a 77 yo AAF with HTN, DM2, CAD, COPD, chronic resp failure on home O2, HFpEF, h/o stroke w/ mild L residual paresis, AS, and ESRD on iHD MWF who presented to the hospital from her outpatient dialysis unit for evaluation of nausea and vomiting.  She reports feeling generalized malaise and fatigue for the past week, but was able to maintaining adequate PO intake/hydration with problems.  She woke up on Monday morning with nausea and vomiting which continued through her dialysis treatment, relieved with phenergan.  She denies any diarrhea, fever, or chills.  She denies any recent changes in her diet, denies any sick contacts.  She dialyzes on a MWF schedule at [facility] under the care of Dr. Coleman.  She dialyzes for 4 hour durations using a [facility] AVF.  She denies any complications with dialysis, sometimes having cramps after treatment dependent on how much fluid is removed.  She typically removes around 3L per session with an EDW of 50 kg, but she does state that she usually is above this after her dialysis treatments.        Past Medical History:   Diagnosis Date    Anemia in ESRD (end-stage renal disease) 5/29/2016    Anticoagulant long-term use     Aortic atherosclerosis 11/22/2016    Aortic stenosis, moderate 2/18/2016    Asthma in adult without complication 1/8/2016    Bilateral low back pain without sciatica 11/17/2015    Blindness of right eye 11/12/2016    CAD (coronary artery disease) 12/12/2016     Cataract     Central retinal vein occlusion, right eye 6/3/2014    CHF (congestive heart failure)     Chronic diastolic heart failure 1/8/2016    Chronic respiratory failure with hypoxia 5/29/2016    COPD (chronic obstructive pulmonary disease) 1/15/2017    Dependence on hemodialysis     Mon-Wed-Fri    Diverticulosis     Embolic stroke involving right middle cerebral artery     Enlarged LA (left atrium) 10/7/2016    Epiretinal membrane 7/17/2012    ESRD (end stage renal disease)     Essential hypertension 1/8/2016    History of GI diverticular bleed     5/22/16    Left flaccid hemiparesis 10/1/2016    Peripheral vascular disease, unspecified 11/22/2016    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 11/13/2016    Type 2 diabetes mellitus with kidney complication, without long-term current use of insulin 5/1/2018    Type 2 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, without long-term current use of insulin 3/26/2013    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, without long-term current use of insulin 3/26/2013    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       Past Surgical History:   Procedure Laterality Date    BREAST SURGERY      tumor removal x 2    CATARACT EXTRACTION      CHOLECYSTECTOMY      COLONOSCOPY N/A 5/23/2016    Procedure: COLONOSCOPY;  Surgeon: WILLIAM Colvin MD;  Location: [facility] ([address] [place] [address]);  Service: Endoscopy;  Laterality: N/A;    COLONOSCOPY N/A 5/30/2016    Procedure: COLONOSCOPY;  Surgeon: Sam Davis MD;  [facility]  Service: Endoscopy;  Laterality: N/A;    COLONOSCOPY N/A 5/30/2016    Performed by Sam Davis MD at [facility] (2ND FLR)    COLONOSCOPY N/A 5/23/2016    Performed by WILLIAM Colvin MD at [facility] (2ND FLR)    ESOPHAGOGASTRODUODENOSCOPY (EGD) N/A 5/30/2016    Performed by Sam [facility] MD at [facility] ([facility])    ESOPHAGOGASTRODUODENOSCOPY (EGD) N/A 5/27/2016    [facility] at [facility] (2ND FLR)    UPPER GASTROINTESTINAL ENDOSCOPY         Review of patient's allergies indicates:  No Known Allergies  Current Facility-Administered Medications   Medication Frequency    albuterol-ipratropium 2.5 mg-0.5 mg/3 mL nebulizer solution 3 mL Q6H PRN    aspirin chewable tablet 81 mg Daily    atorvastatin tablet 40 mg Daily    carvedilol tablet 25 mg BID    cefTRIAXone injection 1 g ED 1 Time    citalopram tablet 10 mg Daily    cloNIDine 0.2 mg/24 hr td ptwk 1 patch Q7 Days    clopidogrel tablet 75 mg Daily    fluticasone-vilanterol 200-25 mcg/dose diskus inhaler 1 puff Daily    heparin (porcine) injection 5,000 Units Q8H    hydrALAZINE tablet 50 mg Q8H PRN    ondansetron injection 4 mg Q8H PRN    promethazine (PHENERGAN) 6.25 mg in dextrose 5 % 50 mL IVPB Q6H PRN    sevelamer carbonate tablet 800 mg TID WM    valsartan tablet 160 mg Daily     Family History     Problem Relation (Age of Onset)    Cancer Sister    Cataracts Mother    Esophageal cancer Sister    Glaucoma Brother, Maternal Aunt    Heart disease Brother    Heart failure Sister    Hypertension Mother, Sister, Brother, Father    No Known Problems Maternal Uncle, Paternal Aunt, Paternal Uncle, Maternal Grandmother, Maternal Grandfather, Paternal Grandmother, Paternal Grandfather    Stroke Mother        Tobacco Use    Smoking status: Never Smoker    Smokeless tobacco: Never Used   Substance and Sexual Activity    Alcohol use: No     Comment: Reports occasional 1-2 drinks     Drug use: No    Sexual activity: No     Review of Systems   Constitutional: Negative for activity change, appetite change, chills, diaphoresis, fatigue, fever and unexpected weight change.   Eyes: Positive for visual disturbance (R eye blindness). Negative for photophobia.   Respiratory: Positive for cough and shortness of breath (chronic, at baseline).  Negative for apnea, choking, wheezing and stridor.    Cardiovascular: Negative for chest pain, palpitations and leg swelling.   Gastrointestinal: Positive for nausea and vomiting. Negative for abdominal pain, blood in stool, constipation and diarrhea.   Endocrine: Negative for cold intolerance and heat intolerance.   Genitourinary: Negative for decreased urine volume, difficulty urinating and dysuria.   Musculoskeletal: Negative for arthralgias.   Skin: Negative for rash and wound.   Allergic/Immunologic: Negative for immunocompromised state.   Neurological: Negative for dizziness, weakness, light-headedness and headaches.   Psychiatric/Behavioral: Negative for agitation, behavioral problems and confusion.     Objective:     Vital Signs (Most Recent):  Temp: 98 °F (36.7 °C) (10/30/18 0920)  Pulse: (!) 55 (10/30/18 1053)  Resp: 20 (10/30/18 1053)  BP: (!) 144/66 (10/30/18 0753)  SpO2: (!) 92 % (10/30/18 1057)  O2 Device (Oxygen Therapy): nasal cannula (10/30/18 0819) Vital Signs (24h Range):  Temp:  [97.8 °F (36.6 °C)-100.1 °F (37.8 °C)] 98 °F (36.7 °C)  Pulse:  [55-97] 55  Resp:  [16-23] 20  SpO2:  [70 %-100 %] 92 %  BP: (140-197)/(62-75) 144/66     Weight: 50.5 kg (111 lb 5.3 oz) (10/30/18 0300)  Body mass index is 19.72 kg/m².  Body surface area is 1.5 meters squared.    No intake/output data recorded.    Physical Exam   Constitutional: She is oriented to person, place, and time. She appears well-developed. No distress.   HENT:   Head: Normocephalic and atraumatic.   Right Ear: External ear normal.   Left Ear: External ear normal.   Eyes: Conjunctivae and EOM are normal. Right eye exhibits no discharge. Left eye exhibits no discharge.   R eye, opacification of pupil   Neck: Normal range of motion. Neck supple.   Cardiovascular: Normal rate and regular rhythm. Exam reveals no gallop and no friction rub.   Murmur heard.  Pulmonary/Chest: Effort normal. No respiratory distress. She has wheezes. She has rales.    Abdominal: Soft. Bowel sounds are normal. She exhibits no distension. There is no tenderness.   Musculoskeletal: Normal range of motion. She exhibits no edema or deformity.   Neurological: She is alert and oriented to person, place, and time.   Skin: Skin is warm and dry. She is not diaphoretic.   Psychiatric: She has a normal mood and affect. Her behavior is normal.       Significant Labs:  CBC:   Recent Labs   Lab 10/29/18  2000   WBC 14.40*   RBC 4.05   HGB 11.8*   HCT 39.2   *   MCV 97   MCH 29.1   MCHC 30.1*     CMP:   Recent Labs   Lab 10/29/18  2128      CALCIUM 9.0   ALBUMIN 3.4*   PROT 7.4      K 4.0   CO2 21*      BUN 10   CREATININE 2.9*   ALKPHOS 137*   ALT 9*   AST 19   BILITOT 0.6         Assessment/Plan:     ESRD (end stage renal disease)    [facility]  [facility]  EDW ~ 50 kg  [facility] AVF    Plan:  No urgent need for RRT today  Plan for HD tomorrow in the [facility] for clearance/ultrafiltration  Recommend adding Novasource renal with each meal for protein supplementation  Will obtain OP dialysis records for treatment verification.  Phos level in AM  Renal diet         Bertin Valencia, JEFF  Nephrology  [facility]

## 2018-10-30 NOTE — PLAN OF CARE
Patient is a 78 year old female admitted 10/29/2018 through the ER from [facility] (finished all HD run except the last 20 minutes), with N&V, SOB, Wheezing, Dizziness.  Patient is expected to discharge home with no needs +/- 11/1/2018 (seen and released for home by PT/OT).    Patient lives with her daughter and granddaughter, uses home oxygen, has BSC, Rollator, wheelchair.  HD at [facility] M[facility].  Assigned CM will continue to follow for needs.    PCP  Parth Posadas Ii, MD  [address] [facility] [id number] [phone]      [facility] - [place], [place] - [address]  [address]  [facility] [address]  Phone: [phone] Fax: [phone]    [id number] - [place], [place] - [address]  [address]  [place] [address]  Phone: [phone] Fax: [phone]      Extended Emergency Contact Information  Primary Emergency Contact: Cruz Azar   [place]  Mobile Phone: [phone]  Relation: Daughter  Secondary Emergency Contact: Alesha Traore  Address: [address]  Mobile Phone: [phone]  Relation: Grandchild       10/30/18 1512   Discharge Assessment   Assessment Type Discharge Planning Reassessment   Confirmed/corrected address and phone number on facesheet? Yes   Assessment information obtained from? Patient;Medical Record   Expected Length of Stay (days) 3   Prior to hospitilization cognitive status: Alert/Oriented   Prior to hospitalization functional status: Independent;Assistive Equipment;Needs Assistance   Current cognitive status: Alert/Oriented   Current Functional Status: Assistive Equipment;Independent;Needs Assistance   Lives With child(li), adult;grandchild(il)   Able to Return to Prior Arrangements yes   Is patient able to care for self after discharge? Yes   Who are your caregiver(s) and their phone  number(s)? [email or web address]   Patient's perception of discharge disposition home or selfcare   Equipment Currently Used at Home bedside commode;rollator;wheelchair;oxygen   Do you have any problems affording any of your prescribed medications? No   Is the patient taking medications as prescribed? yes   Does the patient have transportation home? Yes   Transportation Available family or friend will provide   Discharge Plan A Home with family   Discharge Plan B Home with family;Home Health

## 2018-10-30 NOTE — ASSESSMENT & PLAN NOTE
Stable. On baseline O2 supplementation.   CXR sig/f cardiomegaly and bilateral edema.   Continue Breo for maintanence, duo nebs prn for rescue.

## 2018-10-30 NOTE — INTERVAL H&P NOTE
The patient has been examined and the H&P has been reviewed:    I concur with the findings and changes have been noted since the H&P was written: This evening notified that 10/29 blood cultures with gram positive cocci resembling strep in 1 of 2 bottles.  Nightly dose of Ceftriaxone q24hr schedule placed to continue.  Will obtain surveillance blood cultures if this is a non-contaminant pathogen.         Active Hospital Problems    Diagnosis  POA    *Sepsis due to undetermined organism [A41.9]  Yes    Cystitis [N30.90]  Yes    Nausea [R11.0]  Yes    COPD (chronic obstructive pulmonary disease) [J44.9]  Yes    Thrombocytopenia, unspecified [D69.6]  Yes    CAD (coronary artery disease) [I25.10]  Yes    Left spastic hemiparesis [G81.14]  Yes    Anemia in ESRD (end-stage renal disease) [N18.6, D63.1]  Yes     Chronic    ESRD (end stage renal disease) [N18.6]  Yes     Chronic    Essential hypertension [I10]  Yes     Chronic      Resolved Hospital Problems   No resolved problems to display.

## 2018-10-30 NOTE — ASSESSMENT & PLAN NOTE
Elevated on admission in 190s systolic, now trending down into 140s.   On multiple medications, per MR, but could not verify as patient does not remember and her daughter did not answer phone: Valsartan 160mg, Hydralazine 50mg, Clonidine .3mg, Coreg 25mg, Amlodipine 10mg   Admitted for urosepsis, but pressure is currently elevated and so will resume regimen cautiously, continuing Coreg, Valsartan. Monitor BP overnight and defer re-starting/titrating additional medications to 1° team. Will order hydralazine prn for SBP > 180.   Renal/cardiac diet.

## 2018-10-30 NOTE — HPI
Ms. Tea Georges is a 79 yo AAF with HTN, DM2, CAD, COPD, chronic resp failure on home O2, HFpEF, h/o stroke w/ mild L residual paresis, AS, and ESRD on iHD MWF who presented to the hospital from her outpatient dialysis unit for evaluation of nausea and vomiting.  She reports feeling generalized malaise and fatigue for the past week, but was able to maintaining adequate PO intake/hydration with problems.  She woke up on Monday morning with nausea and vomiting which continued through her dialysis treatment, relieved with phenergan.  She denies any diarrhea, fever, or chills.  She denies any recent changes in her diet, denies any sick contacts.  She dialyzes on a MWF schedule at [facility] under the care of Dr. Coleman.  She dialyzes for 4 hour durations using a [place] AVF.  She denies any complications with dialysis, sometimes having cramps after treatment dependent on how much fluid is removed.  She typically removes around 3L per session with an EDW of 50 kg, but she does state that she usually is above this after her dialysis treatments.

## 2018-10-30 NOTE — PLAN OF CARE
Problem: Physical Therapy Goal  Goal: Physical Therapy Goal  Outcome: Outcome(s) achieved Date Met: 10/30/18  Patient at this time is at their functional baseline and does not require skilled acute PT services at this time. Please re consult PT if pt has change in functional status.   Sony Aldridge PT, DPT  10/30/2018  Pager: [phone]

## 2018-10-30 NOTE — ED PROVIDER NOTES
"Encounter Date: 10/29/2018       History     Chief Complaint   Patient presents with    Nausea     Pt c/o nausea x few days.  Sent from dialysis-received all but 20 min of treatment.  Pt was given Phenergan at dialysis.     Ms. Georges is a 79yo F with a PMHx significant for HTN, T2DM, CAD, COPD, chronic resp failure w/ hypoxia on 2L oxygen at home, CHF, stroke, ESRD receiving dialysis MWF c/b by anemia of chronic diease and hyperparathyroidism, aortic stenosis, MVR blindness of R eye, and history of GI bleeding who presents with 2 weeks of "feeling bad" followed by onset of nausea yesterday and vomiting this AM. Pt reports about 6 episodes during which she brought up about "handfuls" of "yellow stuff." She noted no blood. She had dialysis today; she completed most of treatment, except the last 20 min and was given phenergan. Pt has also tried Zofran at home with some fleeting relief of her nausea. Pt reports no F/C, however does endorse taking acetaminophen this AM for some shoulder pain which has resolved. She also has noted the onset of loose stools this AM with some concurrent abdominal pain while passing stool. However, she denies dysuria or blood in her urine or with her bowels. She does endorse some SOB, wheezing, lightheadedness, dizziness, and palpitations. No CP.           Review of patient's allergies indicates:  No Known Allergies  Past Medical History:   Diagnosis Date    Anemia in ESRD (end-stage renal disease) 5/29/2016    Anticoagulant long-term use     Aortic atherosclerosis 11/22/2016    Aortic stenosis, moderate 2/18/2016    Asthma in adult without complication 1/8/2016    Bilateral low back pain without sciatica 11/17/2015    Blindness of right eye 11/12/2016    CAD (coronary artery disease) 12/12/2016    Cataract     Central retinal vein occlusion, right eye 6/3/2014    CHF (congestive heart failure)     Chronic diastolic heart failure 1/8/2016    Chronic respiratory failure with " hypoxia 5/29/2016    COPD (chronic obstructive pulmonary disease) 1/15/2017    Dependence on hemodialysis     Mon-Wed-Fri    Diverticulosis     Embolic stroke involving right middle cerebral artery     Enlarged LA (left atrium) 10/7/2016    Epiretinal membrane 7/17/2012    ESRD (end stage renal disease)     Essential hypertension 1/8/2016    History of GI diverticular bleed     5/22/16    Left flaccid hemiparesis 10/1/2016    Peripheral vascular disease, unspecified 11/22/2016    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 11/13/2016    Type 2 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, without long-term current use of insulin 3/26/2013    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, without long-term current use of insulin 3/26/2013    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     Past Surgical History:   Procedure Laterality Date    BREAST SURGERY      tumor removal x 2    CATARACT EXTRACTION      CHOLECYSTECTOMY      COLONOSCOPY N/A 5/23/2016    Procedure: COLONOSCOPY;  Surgeon: WILLIAM Colvin MD;  Location: [facility] ([address] [place] [address]);  Service: Endoscopy;  Laterality: N/A;    COLONOSCOPY N/A 5/30/2016    Procedure: COLONOSCOPY;  Surgeon: Sam Davis MD;  [facility]  Service: Endoscopy;  Laterality: N/A;    COLONOSCOPY N/A 5/30/2016    Performed by Sam Davis MD at [facility] (2ND FLR)    COLONOSCOPY N/A 5/23/2016    Performed by WILLIAM Colvin MD at [facility] (2ND FLR)    ESOPHAGOGASTRODUODENOSCOPY (EGD) N/A 5/30/2016    [facility] (2ND F[facility])    ESOPHAGOGASTRODUODENOSCOPY (EGD) N/A 5/27/2016    Performed by Cesario Rubio MD at [facility] (2ND FLR)    UPPER GASTROINTESTINAL ENDOSCOPY       Family History   Problem Relation Age of Onset    Cataracts Mother     Stroke Mother     Hypertension Mother     Cancer Sister      Hypertension Sister     Heart failure Sister     Glaucoma Brother     Hypertension Brother     Heart disease Brother     Hypertension Father     Glaucoma Maternal Aunt     Esophageal cancer Sister     No Known Problems Maternal Uncle     No Known Problems Paternal Aunt     No Known Problems Paternal Uncle     No Known Problems Maternal Grandmother     No Known Problems Maternal Grandfather     No Known Problems Paternal Grandmother     No Known Problems Paternal Grandfather     Heart attack Neg Hx     Colon cancer Neg Hx     Stomach cancer Neg Hx     Anemia Neg Hx     Arrhythmia Neg Hx     Asthma Neg Hx     Clotting disorder Neg Hx     Fainting Neg Hx     Hyperlipidemia Neg Hx     Atrial Septal Defect Neg Hx      Social History     Tobacco Use    Smoking status: Never Smoker    Smokeless tobacco: Never Used   Substance Use Topics    Alcohol use: No     Comment: Reports occasional 1-2 drinks     Drug use: No     Review of Systems   Constitutional: Positive for appetite change (decreased over past several days) and fatigue. Negative for chills, diaphoresis and fever.   HENT: Positive for congestion and rhinorrhea. Negative for sneezing, sore throat and trouble swallowing.    Eyes: Negative for visual disturbance (no new).   Respiratory: Positive for cough, shortness of breath and wheezing. Negative for choking.    Cardiovascular: Positive for palpitations. Negative for chest pain and leg swelling.   Gastrointestinal: Positive for abdominal pain (not continuous, just w/ BM couple hours ago). Negative for blood in stool, nausea and vomiting.   Genitourinary: Negative for dysuria and hematuria.   Musculoskeletal: Positive for neck pain (since being in ED bed).   Skin: Negative for rash.   Neurological: Positive for dizziness, weakness, light-headedness and headaches. Negative for numbness.       Physical Exam     Initial Vitals [10/29/18 1717]   BP Pulse Resp Temp SpO2   (!) 174/62 70 16 98.3  °F (36.8 °C) 97 %      MAP       --         Physical Exam    Nursing note and vitals reviewed.  Constitutional:   Frail appearing elderly lady shivering in ER bed under blankets   HENT:   Head: Normocephalic and atraumatic.   Right Ear: External ear normal.   Left Ear: External ear normal.   Nose: Nose normal.   Mouth/Throat: Oropharynx is clear and moist. No oropharyngeal exudate.   Eyes: Conjunctivae are normal.   Cardiovascular: Normal rate and regular rhythm.   Difficult to appreciate if murmurs or added sounds 2/2 diffuse inspiratory and expiratory wheezing   Pulmonary/Chest: She has wheezes (diffusely, inspiratory and expiratory). She exhibits no tenderness.   Abdominal: Soft. Bowel sounds are normal. She exhibits no distension and no mass. There is tenderness (suprapubic).   Musculoskeletal: She exhibits no edema.   Lymphadenopathy:     She has no cervical adenopathy.   Neurological: She is alert and oriented to person, place, and time.   Skin: Skin is warm and dry.         ED Course   Procedures  Labs Reviewed   CBC W/ AUTO DIFFERENTIAL - Abnormal; Notable for the following components:       Result Value    WBC 14.40 (*)     Hemoglobin 11.8 (*)     MCHC 30.1 (*)     RDW 17.6 (*)     Platelets 147 (*)     MPV 13.4 (*)     Gran # (ANC) 12.9 (*)     Immature Grans (Abs) 0.06 (*)     Lymph # 0.7 (*)     Gran% 89.6 (*)     Lymph% 5.1 (*)     Mono% 3.6 (*)     All other components within normal limits   COMPREHENSIVE METABOLIC PANEL - Abnormal; Notable for the following components:    CO2 21 (*)     Creatinine 2.9 (*)     Albumin 3.4 (*)     Alkaline Phosphatase 137 (*)     ALT 9 (*)     eGFR if  17.5 (*)     eGFR if non  15.1 (*)     All other components within normal limits   CULTURE, BLOOD   CULTURE, BLOOD   MAGNESIUM   LACTIC ACID, PLASMA   URINALYSIS, REFLEX TO URINE CULTURE     EKG Readings: (Independently Interpreted)   Previous EKG Date: 1/24/0218.   Sinus rhythm w/ 1st  "degree AV block unchanged from previous  Artifact throughout       Imaging Results          X-Ray Chest AP Portable (Final result)  Result time 10/29/18 21:15:09    Final result by James Ramirez MD (10/29/18 21:15:09)                 Impression:      Cardiomegaly with bilateral edema.  Small right effusion.      Electronically signed by: James Ramirez MD  Date:    10/29/2018  Time:    21:15             Narrative:    EXAMINATION:  XR CHEST AP PORTABLE    CLINICAL HISTORY:  cough, SOB, wheezing. ?CAP;    TECHNIQUE:  Single frontal view of the chest was performed.    COMPARISON:  January 24, 2018.    FINDINGS:  Cardiomegaly with bilateral edema.  Small right effusion.  Loop recorder device projects over the left side of the heart.    Vascular stent in the left axillary region.    Heart and lungs otherwise appear unchanged when allowing for differences in technique and positioning.                                 Medical Decision Making:   Initial Assessment:   Ms. Georges is a 77yo F with multiple co-morbid conditions including COPD, chronic resp failure w/ hypoxia on 2L at home, CHF, ESRD receiving dialysis MWF who presents with history including fatigue, decreased appetite, N/V, recent loose stools with concurrent abdominal pain, wheezing who is shivering on exam, tender to palpation suprapubically, and has wheezing throughout lung fields. Differential diagnosis includes but is not limited to sepsis, CAP, gastroenteritis, UTI, urosepsis, CHF exacerbation, COPD exacerbation, metabolic derangement. Initial management to include zofran for ongoing nausea and duo-nebs for wheezing. Initial work-up to include CBC, CMP, CXR, blood cultures, UA and microscopy reflex to culture, lactic acid.   ED Management:  8:35 PM  WCC elevated w/ left shift. Pulse 97. 2/4 SIRS.  9:48 PM  CXR: "Cardiomegaly with bilateral edema.  Small right effusion."  Temp 101.1F  Concern presently for sepsis with as yet unidentified source " pending work-up.  Will give vanc and ceftriaxone to cover for UTI/urospesis.   11:55 PM  ESRD Pt w/ concern for sepsis. Urine studies pending; possible source.  To be admitted to  for further work-up and management.               Attending Attestation:   Physician Attestation Statement for Resident:  As the supervising MD   Physician Attestation Statement: I have personally seen and examined this patient.   I agree with the above history. -: sxs started 48hrs ago  N/v/d, cough/productive, suprapubic pain  General malaise  Tolerated most of HD today  No recent abx  No pain at HD access.   As the supervising MD I agree with the above PE.   -: Nad, wdwn,supine in bed  Mmm  Anicteric  Op clear  Lungs w/ few scattered wheezes, o/w ctab  abd soft, mild suprapubic ttp, no peritoneal signs, no masses  No edema  No rash  [id number]   As the supervising MD I agree with the above treatment, course, plan, and disposition.   -: Malaise, multiple systemic sxs, fever - r/o viral process, uti, pna, electrolyte disorder, other.  Labs, cultures, cxr, empiric abx.  Appears euvolemic and not hypotensive - no aggressive fluid resusc in light of HD status.    I have reviewed and agree with the residents interpretation of the following: lab data and x-rays.  I have reviewed the following: old records at this facility.                       Clinical Impression:   The primary encounter diagnosis was Nausea. Diagnoses of Vomiting, intractability of vomiting not specified, presence of nausea not specified, unspecified vomiting type, Wheezing, Sepsis, due to unspecified organism, and Urinary tract infection without hematuria, site unspecified were also pertinent to this visit.      Disposition:   Disposition: Admitted  Condition: Eva Ha MD  Resident  10/29/18 [id number]       Juan Andrade MD  10/30/18 1187

## 2018-10-30 NOTE — PT/OT/SLP EVAL
Occupational Therapy   Evaluation and Discharge Note    Name: Tea Georges  MRN: [id number]  Admitting Diagnosis:  Sepsis due to undetermined organism      Recommendations:     Discharge Recommendations: home  Discharge Equipment Recommendations:  none  Barriers to discharge:  None    History:     Occupational Profile:  Living Environment: Pt lives with daughter in a H with 3 MARY. Daughter is home during the day.  Previous level of function: fully independent with ADLs, uses chair in tub shower, on oxygen at night but not during the day with household mobility. Does not use rollator in home just for community outings.   Roles and Routines: mother, grandmother. Enjoys cooking and cleaning. Pt does not drive due to vision.  Equipment Owned:  3-in-1 commode, rollator, oxygen  Assistance upon Discharge: daughter can assist as needed, grand daughter can assist as well.     Past Medical History:   Diagnosis Date    Anemia in ESRD (end-stage renal disease) 5/29/2016    Anticoagulant long-term use     Aortic atherosclerosis 11/22/2016    Aortic stenosis, moderate 2/18/2016    Asthma in adult without complication 1/8/2016    Bilateral low back pain without sciatica 11/17/2015    Blindness of right eye 11/12/2016    CAD (coronary artery disease) 12/12/2016    Cataract     Central retinal vein occlusion, right eye 6/3/2014    CHF (congestive heart failure)     Chronic diastolic heart failure 1/8/2016    Chronic respiratory failure with hypoxia 5/29/2016    COPD (chronic obstructive pulmonary disease) 1/15/2017    Dependence on hemodialysis     Mon-Wed-Fri    Diverticulosis     Embolic stroke involving right middle cerebral artery     Enlarged LA (left atrium) 10/7/2016    Epiretinal membrane 7/17/2012    ESRD (end stage renal disease)     Essential hypertension 1/8/2016    History of GI diverticular bleed     5/22/16    Left flaccid hemiparesis 10/1/2016    Peripheral vascular disease, unspecified  11/22/2016    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 11/13/2016    Type 2 diabetes mellitus with kidney complication, without long-term current use of insulin 5/1/2018    Type 2 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, without long-term current use of insulin 3/26/2013    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, without long-term current use of insulin 3/26/2013    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       Past Surgical History:   Procedure Laterality Date    BREAST SURGERY      tumor removal x 2    CATARACT EXTRACTION      CHOLECYSTECTOMY      COLONOSCOPY N/A 5/23/2016    Procedure: COLONOSCOPY;  Surgeon: WILLIAM Colvin MD;  [facility]  Service: Endoscopy;  Laterality: N/A;    COLONOSCOPY N/A 5/30/2016    Procedure: COLONOSCOPY;  Surgeon: Sam Davis MD;  [facility]  Service: Endoscopy;  Laterality: N/A;    COLONOSCOPY N/A 5/30/2016    Performed by [facility] at [facility] ENDO (2ND FLR)    COLONOSCOPY N/A 5/23/2016    Performed by WILLIAM [facility] at [facility] (2ND FLR)    ESOPHAGOGASTRODUODENOSCOPY (EGD) N/A 5/30/2016    Performed by Sam [facility] at [facility] (2ND FLR)    ESOPHAGOGASTRODUODENOSCOPY (EGD) N/A 5/27/2016    Performed by Cesario Rubio MD at [facility] (2ND FLR)    UPPER GASTROINTESTINAL ENDOSCOPY         Subjective     Chief Complaint: No complaints, pt did mention LE weakness.  Patient/Family stated goals: Pt is functioning at [facility] with ADLs.   Communicated with: RN prior to session.  Pain/Comfort:  · Pain Rating 1: 0/10  · Pain Rating Post-Intervention 1: 0/10    Patients cultural, spiritual, Jew conflicts given the current situation: none    Objective:     Patient found with: telemetry, pulse ox (continuous), oxygen, peripheral IV    General Precautions: Standard, fall   Orthopedic Precautions:     Braces: N/A     Occupational Performance:    Bed Mobility:    · Patient completed Rolling/Turning to Left with  independence  · Patient completed Rolling/Turning to Right with independence  · Patient completed Scooting/Bridging with independence  · Patient completed Supine to Sit with modified independence (pt used bed rail)    Functional Mobility/Transfers:  · Patient completed Sit <> Stand Transfer with independence  with  no assistive device   · Patient completed Toilet Transfer Step Transfer technique with stand by assistance with  no AD  · Functional Mobility: Pt ambulated to restroom with SBA. Pt did have one minor LOB which she self corrected. She used standard toilet.     Activities of Daily Living:  · Feeding:  independence    · Grooming: supervision    · Upper Body Dressing: minimum assistance (assist only required with R UE which had IV and telemetry box on her wrist.   · Toileting: supervision due to slight LOB on the way to the restroom. Pt uses toilet and performed grooming with door closed.      Cognitive/Visual Perceptual:  Cognitive/Psychosocial Skills:     -       Oriented to: Person, Place, Time and Situation   -       Follows Commands/attention:Follows two-step commands  Visual/Perceptual:      -Intact      Physical Exam:  Balance: -       needs SBA-Supervision  Upper Extremity Range of Motion:     -       Right Upper Extremity: WFL    -       Left Upper Extremity: WFL      Fine Motor Coordination:    -       Intact  Gross motor coordination:   WFL  Neurological:      Patient left walking in wagner with PT. with all lines intact    AMPAC 6 Click:  AMPAC Total Score: 23    Treatment & Education:  Pt educated on role of OT in acute care setting  Pt has not had significant change in function with ADLs. She does exhibit some initial unsteadiness but resolved after first 10 feet of walking. Will order 3:1 commode to be used over toilet to prevent falls while here in the hospital(toilet in room is  "quite low). Pt is safe for going to restroom with Supervision for safety (line management, clear pathway)  Education:    Assessment:     Tea Georges is a 78 y.o. female with a medical diagnosis of Sepsis due to undetermined organism. At this time, patient is functioning at their prior level of function and does not require further acute OT services.     Clinical Decision Makin.  OT Low:  "Pt evaluation falls under low complexity for evaluation coding due to performance deficits noted in 1-3 areas as stated above and 0 co-morbities affecting current functional status. Data obtained from problem focused assessments. No modifications or assistance was required for completion of evaluation. Only brief occupational profile and history review completed."     Plan:     During this hospitalization, patient does not require further acute OT services.  Please re-consult if situation changes.    · Plan of Care Reviewed with: patient    This Plan of care has been discussed with the patient who was involved in its development and understands and is in agreement with the identified goals and treatment plan    GOALS:   Multidisciplinary Problems     Occupational Therapy Goals     Not on file          Multidisciplinary Problems (Resolved)        Problem: Occupational Therapy Goal    Goal Priority Disciplines Outcome Interventions   Occupational Therapy Goal   (Resolved)     OT, PT/OT Outcome(s) achieved    Description:  No acute OT needs, no goals set.                    Time Tracking:     OT Date of Treatment: 10/30/18  OT Start Time: 1023  OT Stop Time: 1045  OT Total Time (min): 22 min    Billable Minutes:Evaluation 22    SHERI Youngblood  10/30/2018    "

## 2018-10-30 NOTE — UM SECONDARY REVIEW
Physician Advisor External    Level of Care Issue    Approved Observation- 10/30/2018 @ 0748- per Dr. Bhavin Najera, EHR, determination is OBS appropriate.

## 2018-10-30 NOTE — HPI
"Ms. Georges is a 77 yo AAF with HTN, DM2, CAD, COPD, chronic resp failure w/ hypoxia on 2L oxygen at home, chronic diastolic CHF, h/o stroke w/ mild L residual paresis, ESRD receiving dialysis MWF (last dialyzed 10/28) c/b by anemia of chronic diease and hyperparathyroidism, aortic stenosis, MVR, cataracts and blindness of R eye, and history of GI bleeding who presents for 2 days of nausea. Pt reported 2 episodes of vomiting "yellow stuff" yesterday night and this morning. She was given phenergan during HD with relief. Pt tried Zofran at home with some fleeting relief of her nausea. She denied hematemesis, abdominal discomfort, bowel irregularities, chest pain, SOB (above baseline), fever/chills, sick contacts.       Of note, patient complained of multiple additional symptoms per ED note, but denied these symptoms on my evaluation, stating she was here only because of "nausea". While she was cooperative and answered questions appropriately, she maintained her eyes closed and provided only limited answers to questioning. She stated she was fully independent and lived at home with her daughter, who helps her out. Call placed to daughter for collateral and med rec went straight to  ("Caller does not accept calls at this hour").    ED workup was notable for cardiomegaly and pulmonary edema on CXR, mild leukocytosis of 14, stable Hg/Hct, and UA +ve for bacteria, leuk. She was started on broad spectrum antibiotics.       "

## 2018-10-30 NOTE — ASSESSMENT & PLAN NOTE
UA +ve many WBCs, Bacteria, RBCs.   UCx, BCx ordered.   Previous UCx growing Klebsiella, E coli (sensitive to CTX).   Continue CTX, f/u Cx, procal, LA.

## 2018-10-31 PROBLEM — N30.00 ACUTE CYSTITIS WITHOUT HEMATURIA: Status: ACTIVE | Noted: 2018-10-30

## 2018-10-31 PROBLEM — A41.9 SEPSIS DUE TO UNDETERMINED ORGANISM: Status: RESOLVED | Noted: 2018-10-29 | Resolved: 2018-10-31

## 2018-10-31 PROBLEM — R11.0 NAUSEA: Status: RESOLVED | Noted: 2018-10-29 | Resolved: 2018-10-31

## 2018-10-31 LAB
ALBUMIN SERPL BCP-MCNC: 3 G/DL
ANION GAP SERPL CALC-SCNC: 10 MMOL/L
BASOPHILS # BLD AUTO: 0.03 K/UL
BASOPHILS NFR BLD: 0.3 %
BUN SERPL-MCNC: 37 MG/DL
CALCIUM SERPL-MCNC: 8.8 MG/DL
CHLORIDE SERPL-SCNC: 104 MMOL/L
CO2 SERPL-SCNC: 20 MMOL/L
CREAT SERPL-MCNC: 5.8 MG/DL
DIFFERENTIAL METHOD: ABNORMAL
EOSINOPHIL # BLD AUTO: 0.1 K/UL
EOSINOPHIL NFR BLD: 1.5 %
ERYTHROCYTE [DISTWIDTH] IN BLOOD BY AUTOMATED COUNT: 17.2 %
EST. GFR  (AFRICAN AMERICAN): 7.6 ML/MIN/1.73 M^2
EST. GFR  (NON AFRICAN AMERICAN): 6.6 ML/MIN/1.73 M^2
GLUCOSE SERPL-MCNC: 108 MG/DL
HCT VFR BLD AUTO: 32.1 %
HGB BLD-MCNC: 9.5 G/DL
IMM GRANULOCYTES # BLD AUTO: 0.04 K/UL
IMM GRANULOCYTES NFR BLD AUTO: 0.4 %
LYMPHOCYTES # BLD AUTO: 0.6 K/UL
LYMPHOCYTES NFR BLD: 6.2 %
MAGNESIUM SERPL-MCNC: 2.1 MG/DL
MCH RBC QN AUTO: 28.4 PG
MCHC RBC AUTO-ENTMCNC: 29.6 G/DL
MCV RBC AUTO: 96 FL
MONOCYTES # BLD AUTO: 0.8 K/UL
MONOCYTES NFR BLD: 8.7 %
NEUTROPHILS # BLD AUTO: 7.8 K/UL
NEUTROPHILS NFR BLD: 82.9 %
NRBC BLD-RTO: 0 /100 WBC
PHOSPHATE SERPL-MCNC: 4.5 MG/DL
PLATELET # BLD AUTO: 113 K/UL
PLATELET BLD QL SMEAR: ABNORMAL
PMV BLD AUTO: 13.5 FL
POCT GLUCOSE: 80 MG/DL (ref 70–110)
POTASSIUM SERPL-SCNC: 4.1 MMOL/L
RBC # BLD AUTO: 3.34 M/UL
SODIUM SERPL-SCNC: 134 MMOL/L
WBC # BLD AUTO: 9.43 K/UL

## 2018-10-31 PROCEDURE — 80069 RENAL FUNCTION PANEL: CPT

## 2018-10-31 PROCEDURE — 25000242 PHARM REV CODE 250 ALT 637 W/ HCPCS: Performed by: HOSPITALIST

## 2018-10-31 PROCEDURE — 83735 ASSAY OF MAGNESIUM: CPT

## 2018-10-31 PROCEDURE — 11000001 HC ACUTE MED/SURG PRIVATE ROOM

## 2018-10-31 PROCEDURE — 63600175 PHARM REV CODE 636 W HCPCS: Performed by: HOSPITALIST

## 2018-10-31 PROCEDURE — 85025 COMPLETE CBC W/AUTO DIFF WBC: CPT

## 2018-10-31 PROCEDURE — 99231 SBSQ HOSP IP/OBS SF/LOW 25: CPT | Mod: GC,,, | Performed by: HOSPITALIST

## 2018-10-31 PROCEDURE — 25000003 PHARM REV CODE 250: Performed by: HOSPITALIST

## 2018-10-31 PROCEDURE — 36415 COLL VENOUS BLD VENIPUNCTURE: CPT

## 2018-10-31 PROCEDURE — 90935 HEMODIALYSIS ONE EVALUATION: CPT | Mod: ,,, | Performed by: INTERNAL MEDICINE

## 2018-10-31 PROCEDURE — 90935 HEMODIALYSIS ONE EVALUATION: CPT

## 2018-10-31 PROCEDURE — 25000003 PHARM REV CODE 250: Performed by: INTERNAL MEDICINE

## 2018-10-31 RX ORDER — CEFTRIAXONE 1 G/1
1 INJECTION, POWDER, FOR SOLUTION INTRAMUSCULAR; INTRAVENOUS ONCE
Status: COMPLETED | OUTPATIENT
Start: 2018-10-31 | End: 2018-10-31

## 2018-10-31 RX ORDER — SODIUM CHLORIDE 9 MG/ML
INJECTION, SOLUTION INTRAVENOUS ONCE
Status: COMPLETED | OUTPATIENT
Start: 2018-10-31 | End: 2018-10-31

## 2018-10-31 RX ORDER — SEVELAMER CARBONATE 800 MG/1
800 TABLET, FILM COATED ORAL
Start: 2018-10-31 | End: 2019-02-08 | Stop reason: SDUPTHER

## 2018-10-31 RX ADMIN — FLUTICASONE FUROATE AND VILANTEROL TRIFENATATE 1 PUFF: 200; 25 POWDER RESPIRATORY (INHALATION) at 12:10

## 2018-10-31 RX ADMIN — CEFTRIAXONE SODIUM 1 G: 1 INJECTION, POWDER, FOR SOLUTION INTRAMUSCULAR; INTRAVENOUS at 05:10

## 2018-10-31 RX ADMIN — SEVELAMER CARBONATE 800 MG: 800 TABLET, FILM COATED ORAL at 05:10

## 2018-10-31 RX ADMIN — ASPIRIN 81 MG CHEWABLE TABLET 81 MG: 81 TABLET CHEWABLE at 12:10

## 2018-10-31 RX ADMIN — CLOPIDOGREL 75 MG: 75 TABLET, FILM COATED ORAL at 12:10

## 2018-10-31 RX ADMIN — CARVEDILOL 25 MG: 25 TABLET, FILM COATED ORAL at 12:10

## 2018-10-31 RX ADMIN — CITALOPRAM HYDROBROMIDE 10 MG: 10 TABLET ORAL at 12:10

## 2018-10-31 RX ADMIN — SEVELAMER CARBONATE 800 MG: 800 TABLET, FILM COATED ORAL at 12:10

## 2018-10-31 RX ADMIN — ATORVASTATIN CALCIUM 40 MG: 20 TABLET, FILM COATED ORAL at 12:10

## 2018-10-31 RX ADMIN — SODIUM CHLORIDE 300 ML: 0.9 INJECTION, SOLUTION INTRAVENOUS at 07:10

## 2018-10-31 RX ADMIN — VALSARTAN 160 MG: 160 TABLET, FILM COATED ORAL at 12:10

## 2018-10-31 NOTE — PLAN OF CARE
Problem: Patient Care Overview  Goal: Plan of Care Review  Outcome: Ongoing (interventions implemented as appropriate)  Patient remained in stable condition this shift.  Came back from HD today stable. MD was going to discharge today but patient told MD that her lights/power were shut off the other day.  HD SW was aware and working on it.  Dr. Doherty was going to call SW here as well to update.  She will be discharging tomorrow morning most likely.  Patient in stable condition. Poor appetite, but patient has snacks at the bedside if she gets hungry. Able to make needs known to staff, bed in lowest and locked position, call light in easy reach. VSS

## 2018-10-31 NOTE — PLAN OF CARE
Problem: Patient Care Overview  Goal: Plan of Care Review  Outcome: Ongoing (interventions implemented as appropriate)  Pt AAO X 4; able to express needs.  VSS. Denies pain or dicomfort.  Cont of B&B. Amb independently to BR with beckyator. Accu checks AC/HS; insulin per orders. O2 @  L, PRN. Dialysis access to Left arm; dialysis MWF. Call light within reach, bed in lowest position with wheels locked. Will monitor.

## 2018-10-31 NOTE — PROGRESS NOTES
[facility]    Tea Georges is a 78 y.o. female seen on hemodialysis for removal of uremic toxins and volume.    Pt was seen during hemodialysis today, dialysis flowsheet, labs, vitals were reviewed and d/w staff.      There are no symptoms of hypotension, chest pain, dyspnea, nausea or vomiting.    On exam  Alert and awake  Respiration unlabored  Vitals reviewed  Dialysis flow sheet reviewed     Labs:      Recent Labs   Lab 10/29/18  2128 10/30/18  0652 10/31/18  0400    137 134*   K 4.0 3.8 4.1    107 104   CO2 21* 22* 20*   BUN 10 14 37*   CREATININE 2.9* 3.8* 5.8*   CALCIUM 9.0 8.6* 8.8   PHOS  --  4.4  4.4 4.5       Recent Labs   Lab 10/29/18  2000 10/31/18  0400   WBC 14.40* 9.43   HGB 11.8* 9.5*   HCT 39.2 32.1*   * 113*       Assessment/Plan:    ESRD  HD today for removal of uremic toxins and volume.

## 2018-10-31 NOTE — PROGRESS NOTES
Accessed LT AVF x2 15g needles without difficulty. Qb 350-400ml/min, Qd 800ml/min, planned UFG 2.0L, orders reviewed.

## 2018-10-31 NOTE — PROGRESS NOTES
IHD completed, blood returned. Pt alert & stable. Hemostasis 10 minutes, dry sterile dressing with bandaids applied. Qb 350ml/min, Qd 800ml/min, UFG 2.1L. Report called. Pt transported to [facility] via stretcher.

## 2018-10-31 NOTE — PLAN OF CARE
Roberto  arranged transportation with [facility] [id number] for 5:45 PM per Dr. Doherty.    Transportation cancelled at 3:50 PM per Dr. Santos.    Lanette Briscoe LMSW

## 2018-11-01 VITALS
RESPIRATION RATE: 17 BRPM | WEIGHT: 113.56 LBS | SYSTOLIC BLOOD PRESSURE: 148 MMHG | HEIGHT: 63 IN | OXYGEN SATURATION: 100 % | DIASTOLIC BLOOD PRESSURE: 69 MMHG | BODY MASS INDEX: 20.12 KG/M2 | TEMPERATURE: 99 F | HEART RATE: 58 BPM

## 2018-11-01 LAB
ALBUMIN SERPL BCP-MCNC: 3 G/DL
ANION GAP SERPL CALC-SCNC: 10 MMOL/L
BACTERIA BLD CULT: NORMAL
BACTERIA UR CULT: NORMAL
BASOPHILS # BLD AUTO: 0.03 K/UL
BASOPHILS NFR BLD: 0.4 %
BUN SERPL-MCNC: 22 MG/DL
CALCIUM SERPL-MCNC: 9.2 MG/DL
CHLORIDE SERPL-SCNC: 105 MMOL/L
CO2 SERPL-SCNC: 21 MMOL/L
CREAT SERPL-MCNC: 4.2 MG/DL
DIFFERENTIAL METHOD: ABNORMAL
EOSINOPHIL # BLD AUTO: 0.2 K/UL
EOSINOPHIL NFR BLD: 2.6 %
ERYTHROCYTE [DISTWIDTH] IN BLOOD BY AUTOMATED COUNT: 17.1 %
EST. GFR  (AFRICAN AMERICAN): 11.2 ML/MIN/1.73 M^2
EST. GFR  (NON AFRICAN AMERICAN): 9.7 ML/MIN/1.73 M^2
GLUCOSE SERPL-MCNC: 133 MG/DL
HCT VFR BLD AUTO: 34 %
HGB BLD-MCNC: 10.1 G/DL
IMM GRANULOCYTES # BLD AUTO: 0.03 K/UL
IMM GRANULOCYTES NFR BLD AUTO: 0.4 %
LYMPHOCYTES # BLD AUTO: 0.7 K/UL
LYMPHOCYTES NFR BLD: 9.6 %
MAGNESIUM SERPL-MCNC: 2.2 MG/DL
MCH RBC QN AUTO: 28.9 PG
MCHC RBC AUTO-ENTMCNC: 29.7 G/DL
MCV RBC AUTO: 97 FL
MONOCYTES # BLD AUTO: 0.8 K/UL
MONOCYTES NFR BLD: 10.6 %
NEUTROPHILS # BLD AUTO: 5.9 K/UL
NEUTROPHILS NFR BLD: 76.4 %
NRBC BLD-RTO: 0 /100 WBC
PHOSPHATE SERPL-MCNC: 4.1 MG/DL
PLATELET # BLD AUTO: 104 K/UL
PMV BLD AUTO: 12.4 FL
POTASSIUM SERPL-SCNC: 4.2 MMOL/L
RBC # BLD AUTO: 3.5 M/UL
SODIUM SERPL-SCNC: 136 MMOL/L
WBC # BLD AUTO: 7.73 K/UL

## 2018-11-01 PROCEDURE — 25000003 PHARM REV CODE 250: Performed by: HOSPITALIST

## 2018-11-01 PROCEDURE — 36415 COLL VENOUS BLD VENIPUNCTURE: CPT

## 2018-11-01 PROCEDURE — 85025 COMPLETE CBC W/AUTO DIFF WBC: CPT

## 2018-11-01 PROCEDURE — 80069 RENAL FUNCTION PANEL: CPT

## 2018-11-01 PROCEDURE — 99239 HOSP IP/OBS DSCHRG MGMT >30: CPT | Mod: ,,, | Performed by: HOSPITALIST

## 2018-11-01 PROCEDURE — 83735 ASSAY OF MAGNESIUM: CPT

## 2018-11-01 RX ADMIN — CLOPIDOGREL 75 MG: 75 TABLET, FILM COATED ORAL at 09:11

## 2018-11-01 RX ADMIN — SEVELAMER CARBONATE 800 MG: 800 TABLET, FILM COATED ORAL at 09:11

## 2018-11-01 RX ADMIN — CARVEDILOL 25 MG: 25 TABLET, FILM COATED ORAL at 09:11

## 2018-11-01 RX ADMIN — ASPIRIN 81 MG CHEWABLE TABLET 81 MG: 81 TABLET CHEWABLE at 09:11

## 2018-11-01 RX ADMIN — VALSARTAN 160 MG: 160 TABLET, FILM COATED ORAL at 09:11

## 2018-11-01 RX ADMIN — CITALOPRAM HYDROBROMIDE 10 MG: 10 TABLET ORAL at 09:11

## 2018-11-01 RX ADMIN — FLUTICASONE FUROATE AND VILANTEROL TRIFENATATE 1 PUFF: 200; 25 POWDER RESPIRATORY (INHALATION) at 09:11

## 2018-11-01 RX ADMIN — ATORVASTATIN CALCIUM 40 MG: 20 TABLET, FILM COATED ORAL at 09:11

## 2018-11-01 NOTE — PLAN OF CARE
11/01/18 1031   Discharge Reassessment   Assessment Type Final Discharge Note   Anticipated Discharge Disposition Home     Future Appointments   Date Time Provider Department Center   11/9/2018  9:20 AM Parth Posadas II, MD [facility] [place]

## 2018-11-01 NOTE — PLAN OF CARE
Problem: Patient Care Overview  Goal: Plan of Care Review  Outcome: Outcome(s) achieved Date Met: 11/01/18  Patient being discharged in stable condition.  Patient verbalized understanding of discharge instructions and follow up appointments. Granddaughter at bedside and going home with patient.  IV removed, VISI removed.  All questions answered. [facility] bringing pt home

## 2018-11-01 NOTE — NURSING
NOTICE HEART LOW  50 'S, INFORMED TEAM A. INSTRUCTED  TO HOLD  CARVEDILOL 25 MG  AND MONITOR PT. THUR OUT THE NIGHT.

## 2018-11-01 NOTE — PLAN OF CARE
9:58 AM Pt D/C'd home today per Attending Dr. Doherty. Covering SW arranged transportation with [facility] ([id number]), Pt scheduled for transport within the hour. No additional needs identified.    Lanette Briscoe, MONICASW

## 2018-11-01 NOTE — DISCHARGE SUMMARY
"[facility] Hospital Medicine  Discharge Summary      Patient Name: Tea Georges  MRN: [id number]  Admission Date: 10/29/2018  Hospital Length of Stay: 3 days  Discharge Date and Time: No discharge date for patient encounter.  Attending Physician: Elijah Doherty MD   Discharging Provider: Elijah Doherty MD  Primary Care Provider: Parth Posadas Ii, MD    [facility] Medicine Team: [facility] HOSP MED A Elijah Doherty MD    HPI: Ms. Georges is a 79 yo AAF with HTN, DM2, CAD, COPD, chronic resp failure w/ hypoxia on 2L oxygen at home, chronic diastolic CHF, h/o stroke w/ mild L residual paresis, ESRD receiving dialysis MWF (last dialyzed 10/28) c/b by anemia of chronic diease and hyperparathyroidism, aortic stenosis, MVR, cataracts and blindness of R eye, and history of GI bleeding who presents for 2 days of nausea. Pt reported 2 episodes of vomiting "yellow stuff" yesterday night and this morning. She was given phenergan during HD with relief. Pt tried Zofran at home with some fleeting relief of her nausea. She denied hematemesis, abdominal discomfort, bowel irregularities, chest pain, SOB (above baseline), fever/chills, sick contacts.        Of note, patient complained of multiple additional symptoms per ED note, but denied these symptoms on my evaluation, stating she was here only because of "nausea". While she was cooperative and answered questions appropriately, she maintained her eyes closed and provided only limited answers to questioning. She stated she was fully independent and lived at home with her daughter, who helps her out. Call placed to daughter for collateral and med rec went straight to  ("Caller does not accept calls at this hour").     ED workup was notable for cardiomegaly and pulmonary edema on CXR, mild leukocytosis of 14, stable Hg/Hct, and UA +ve for bacteria, leuk. She was started on broad spectrum antibiotics.         * No surgery found *      Hospital Course:   Admitted " with concerns of possible sepsis.  Patient with UA positive for UTI and Urine culture is growing >100k cfu of E.Coli, sensitivities pending, but with empiric treatment of ceftriaxone patient felt better within one day, she still makes urine as an ESRD patient but denied any dysuria.  Her presenting symptoms and maybe some urinary frequency were main manifestations. She reported some urinary frequency on admission but no overt dysuria or other urinary complaints from patient.      Her initial blood cultures had turned positive 1 of 4 bottles, and hospitalization continued while final results returned which speciated strep viridans and considered a contaminant.     She completed routine dialysis as an inpatient without any acute issues, 2L of volume pulled on 10/31.      For hypertension management her initial anti-hypertensive regimen was started back on 2 agents only, but a clonidine patch at a 0.2mg dosing was added back and her coreg/ARB, her blood pressures are in the 140 range. She can return to home dose of clonidine patch 0.3mg.     For chronic respiratory failure, patient's o2 sat on 1L of oxygen are 100% she primarily uses this for sleep at home.         Consults:   Consults (From admission, onward)        Status Ordering Provider     Inpatient consult to Nephrology  Once     Provider:  (Not yet assigned)    Completed IDALMIS LEON     IP consult to case management  Once     Provider:  (Not yet assigned)    Acknowledged SHILPA COLEY     IP consult to case management  Once     Provider:  (Not yet assigned)    Acknowledged SHILPA COLEY          Final Active Diagnoses:    Diagnosis Date Noted POA    PRINCIPAL PROBLEM:  Acute cystitis without hematuria, E.Coli [N30.00] 10/30/2018 Yes    COPD (chronic obstructive pulmonary disease) [J44.9] 01/15/2017 Yes    Thrombocytopenia, unspecified [D69.6] 01/15/2017 Yes    CAD (coronary artery disease) [I25.10] 12/12/2016 Yes    Left  spastic hemiparesis [G81.14] 11/13/2016 Yes    Anemia in ESRD (end-stage renal disease) [N18.6, D63.1] 05/29/2016 Yes     Chronic    ESRD (end stage renal disease) [N18.6]  Yes     Chronic    Essential hypertension [I10] 01/08/2016 Yes     Chronic      Problems Resolved During this Admission:    Diagnosis Date Noted Date Resolved POA    Nausea [R11.0] 10/29/2018 10/31/2018 Yes    Sepsis due to undetermined organism [A41.9] 10/29/2018 10/31/2018 Yes      Discharged Condition: stable    Disposition: Home or Self Care    Follow Up:    Patient Instructions:      Notify your health care provider if you experience any of the following:  temperature >100.4     Notify your health care provider if you experience any of the following:  persistent nausea and vomiting or diarrhea     Notify your health care provider if you experience any of the following:  severe uncontrolled pain     Notify your health care provider if you experience any of the following:  difficulty breathing or increased cough     Notify your health care provider if you experience any of the following:  persistent dizziness, light-headedness, or visual disturbances     Notify your health care provider if you experience any of the following:  increased confusion or weakness     Activity as tolerated     Medications:  Reconciled Home Medications:      Medication List      CHANGE how you take these medications    carvedilol 25 MG tablet  Commonly known as:  COREG  Take 1 tablet (25 mg total) by mouth 2 (two) times daily.  What changed:  Another medication with the same name was removed. Continue taking this medication, and follow the directions you see here.     RENVELA 800 mg Tab  Generic drug:  sevelamer carbonate  Take 1 tablet (800 mg total) by mouth 3 (three) times daily with meals.  What changed:    · how much to take  · how to take this  · when to take this        CONTINUE taking these medications    albuterol 90 mcg/actuation inhaler  Commonly  known as:  PROVENTIL/VENTOLIN HFA  Inhale 1-2 puffs into the lungs every 6 (six) hours as needed for Wheezing.     amLODIPine 10 MG tablet  Commonly known as:  NORVASC  Take 1 tablet (10 mg total) by mouth once daily.     aspirin 81 MG Chew  Take 1 tablet (81 mg total) by mouth once daily.     atorvastatin 40 MG tablet  Commonly known as:  LIPITOR  Take 1 tablet (40 mg total) by mouth once daily.     citalopram 10 MG tablet  Commonly known as:  CELEXA  Take 1 tablet (10 mg total) by mouth once daily.     cloNIDine 0.3 mg/24 hr td ptwk 0.3 mg/24 hr  Commonly known as:  CATAPRES  Place 1 patch onto the skin every Saturday.     clopidogrel 75 mg tablet  Commonly known as:  PLAVIX  Take 1 tablet (75 mg total) by mouth once daily.     ergocalciferol 50,000 unit Cap  Commonly known as:  ERGOCALCIFEROL  Take 1 capsule (50,000 Units total) by mouth every 7 days.     fluticasone-vilanterol 200-25 mcg/dose Dsdv diskus inhaler  Commonly known as:  BREO ELLIPTA  Inhale 1 puff into the lungs once daily.     hydrALAZINE 50 MG tablet  Commonly known as:  APRESOLINE  Take 1 tablet (50 mg total) by mouth 3 (three) times daily.     isosorbide dinitrate 40 mg Tbsr  Commonly known as:  ISOCHRON  Take 1 tablet (40 mg total) by mouth every 12 (twelve) hours.     lidocaine-prilocaine cream  Commonly known as:  EMLA  Apply topically as needed (to apply over access 1/2 over prior to dialysis).     loperamide 2 mg capsule  Commonly known as:  IMODIUM  Take one capsule at onset of diarrhea.  No more than three capsules daily     multivitamin per tablet  Commonly known as:  THERAGRAN  Take 1 tablet by mouth once daily.     ondansetron 4 MG tablet  Commonly known as:  ZOFRAN  Take 2 tablets (8 mg total) by mouth every 6 (six) hours.     valsartan 160 MG tablet  Commonly known as:  DIOVAN  Take 1 tablet (160 mg total) by mouth once daily.        STOP taking these medications    calcium acetate 667 mg capsule  Commonly known as:  PHOSLO             Significant Diagnostic Studies: Labs:   BMP:   Recent Labs   Lab 10/31/18  0400 11/01/18  0722    133*   * 136   K 4.1 4.2    105   CO2 20* 21*   BUN 37* 22   CREATININE 5.8* 4.2*   CALCIUM 8.8 9.2   MG 2.1 2.2    and CBC   Recent Labs   Lab 10/31/18  0400 11/01/18  0722   WBC 9.43 7.73   HGB 9.5* 10.1*   HCT 32.1* 34.0*   * 104*     Microbiology:   Blood Culture   Lab Results   Component Value Date    LABBLOO  10/29/2018     Gram stain aer bottle: Gram positive cocci in chains resembling Strep    LABBLOO  10/29/2018     Results called to and read back by: APRIL Barnes RN  10/30/2018  14:13    LABBLOO  10/29/2018     VIRIDANS STREPTOCOCCUS GROUP  Organism is a probable contaminant      and Urine Culture    Lab Results   Component Value Date    LABURIN  10/29/2018     ESCHERICHIA COLI  > 100,000 cfu/ml  Susceptibility pending       Radiology:       Pending Diagnostic Studies:     None        Indwelling Lines/Drains at time of discharge:   Lines/Drains/Airways     Drain                 Hemodialysis AV Fistula Left upper arm -- days         Hemodialysis AV Fistula 05/23/16 0700 Left forearm 892 days                Time spent on the discharge of patient: 35 minutes  Patient was seen and examined on the date of discharge and determined to be suitable for discharge.         Elijah Doherty MD  Department of Hospital Medicine  [facility]

## 2018-11-02 ENCOUNTER — PATIENT OUTREACH (OUTPATIENT)
Dept: ADMINISTRATIVE | Facility: CLINIC | Age: 78
End: 2018-11-02

## 2018-11-02 ENCOUNTER — TELEPHONE (OUTPATIENT)
Dept: INTERNAL MEDICINE | Facility: CLINIC | Age: 78
End: 2018-11-02

## 2018-11-02 DIAGNOSIS — N30.00 ACUTE CYSTITIS WITHOUT HEMATURIA: Primary | ICD-10-CM

## 2018-11-02 NOTE — TELEPHONE ENCOUNTER
Him, please explain that I am covering for Parth Posadas Ii, MD. I agree with home health, but please make sure they have Dr. Parth Posadas Ii, MD's listed as the physician to sign orders for home health.  Thank you, Rm Lemon    Future Appointments   Date Time Provider Department Center   11/9/2018  9:20 AM Parth Posadas II, MD [facility]

## 2018-11-02 NOTE — PATIENT INSTRUCTIONS

## 2018-11-02 NOTE — TELEPHONE ENCOUNTER
Please advise     ----- Message from Ira Witt sent at 11/2/2018 11:47 AM CDT -----  Contact: Michelle (Nursing) [phone]  Michelle is requesting an order for home health.

## 2018-11-02 NOTE — TELEPHONE ENCOUNTER
Please advise     ----- Message from Fadumo Luna sent at 11/2/2018  9:49 AM CDT -----  Contact: [facility]/jalyn/[phone]  Home health called in regards to getting order to readmit  her to home health due to she just was released from the hospital. Fax [phone]       Please advise

## 2018-11-03 LAB — BACTERIA BLD CULT: NORMAL

## 2018-11-06 NOTE — PHYSICIAN QUERY
PT Name: Tea Georges  MR #: [id number]    Physician Query Form - Cause and Effect Relationship Clarification      CDS/: Lisa Mena RN            Contact information: [phone]    This form is a permanent document in the medical record.     Query Date: November 6, 2018    By submitting this query, we are merely seeking further clarification of documentation. Please utilize your independent clinical judgment when addressing the question(s) below.    The Medical record contains the following:  Supporting Clinical Findings   Location in record                                                                        Admitted with concerns of possible sepsis.  Patient with UA positive for UTI and Urine culture is growing >100k cfu of E.Coli,                                                                                                                         DC Summary 11/1                                                                                                                                                                                                       Provider, please clarify if there is any correlation between __sepsis_________ and ____e coli______________.           Are the conditions:      X Due to or associated with each other    Unrelated to each other    Other (Please Specify): _________________________    Clinically Undetermined

## 2018-11-12 ENCOUNTER — CLINICAL SUPPORT (OUTPATIENT)
Dept: CARDIOLOGY | Facility: HOSPITAL | Age: 78
End: 2018-11-12
Attending: INTERNAL MEDICINE
Payer: MEDICARE

## 2018-11-12 DIAGNOSIS — Z95.818 STATUS POST PLACEMENT OF IMPLANTABLE LOOP RECORDER: ICD-10-CM

## 2018-11-12 PROCEDURE — 93298 CARDIAC DEVICE CHECK - REMOTE: ICD-10-PCS | Mod: ,,, | Performed by: INTERNAL MEDICINE

## 2018-11-12 PROCEDURE — 93298 REM INTERROG DEV EVAL SCRMS: CPT | Mod: ,,, | Performed by: INTERNAL MEDICINE

## 2018-11-26 ENCOUNTER — HOSPITAL ENCOUNTER (INPATIENT)
Facility: HOSPITAL | Age: 78
LOS: 4 days | Discharge: HOME HEALTH | DRG: 252 | End: 2018-11-30
Attending: EMERGENCY MEDICINE | Admitting: INTERNAL MEDICINE
Payer: MEDICARE

## 2018-11-26 DIAGNOSIS — I35.0 AORTIC STENOSIS, MODERATE: ICD-10-CM

## 2018-11-26 DIAGNOSIS — N18.6 ESRD (END STAGE RENAL DISEASE): Chronic | ICD-10-CM

## 2018-11-26 DIAGNOSIS — J81.0 ACUTE PULMONARY EDEMA: Primary | ICD-10-CM

## 2018-11-26 DIAGNOSIS — R06.02 SHORTNESS OF BREATH: ICD-10-CM

## 2018-11-26 DIAGNOSIS — I16.1 HYPERTENSIVE EMERGENCY: ICD-10-CM

## 2018-11-26 DIAGNOSIS — T82.898A ANEURYSM OF ARTERIOVENOUS DIALYSIS FISTULA, INITIAL ENCOUNTER: ICD-10-CM

## 2018-11-26 PROBLEM — J96.21 ACUTE ON CHRONIC RESPIRATORY FAILURE WITH HYPOXIA AND HYPERCAPNIA: Status: ACTIVE | Noted: 2018-11-26

## 2018-11-26 PROBLEM — J96.22 ACUTE ON CHRONIC RESPIRATORY FAILURE WITH HYPOXIA AND HYPERCAPNIA: Status: ACTIVE | Noted: 2018-11-26

## 2018-11-26 PROBLEM — N30.00 ACUTE CYSTITIS WITHOUT HEMATURIA: Status: RESOLVED | Noted: 2018-10-30 | Resolved: 2018-11-26

## 2018-11-26 LAB
ALBUMIN SERPL BCP-MCNC: 3.6 G/DL
ALLENS TEST: ABNORMAL
ALP SERPL-CCNC: 135 U/L
ALT SERPL W/O P-5'-P-CCNC: 10 U/L
ANION GAP SERPL CALC-SCNC: 12 MMOL/L
ANION GAP SERPL CALC-SCNC: 15 MMOL/L
ANION GAP SERPL CALC-SCNC: 17 MMOL/L
ASCENDING AORTA: 2.69 CM
AST SERPL-CCNC: 26 U/L
AV INDEX (PROSTH): 0.21
AV MEAN GRADIENT: 46.39 MMHG
AV PEAK GRADIENT: 69.22 MMHG
AV VALVE AREA: 0.66 CM2
BASOPHILS # BLD AUTO: 0.03 K/UL
BASOPHILS NFR BLD: 0.5 %
BILIRUB SERPL-MCNC: 0.6 MG/DL
BNP SERPL-MCNC: >4900 PG/ML
BSA FOR ECHO PROCEDURE: 1.52 M2
BUN SERPL-MCNC: 74 MG/DL
BUN SERPL-MCNC: 77 MG/DL
BUN SERPL-MCNC: 78 MG/DL (ref 6–30)
BUN SERPL-MCNC: 82 MG/DL
CALCIUM SERPL-MCNC: 8.7 MG/DL
CALCIUM SERPL-MCNC: 9 MG/DL
CALCIUM SERPL-MCNC: 9.4 MG/DL
CHLORIDE SERPL-SCNC: 107 MMOL/L
CHLORIDE SERPL-SCNC: 108 MMOL/L
CHLORIDE SERPL-SCNC: 108 MMOL/L
CHLORIDE SERPL-SCNC: 110 MMOL/L (ref 95–110)
CO2 SERPL-SCNC: 16 MMOL/L
CO2 SERPL-SCNC: 22 MMOL/L
CO2 SERPL-SCNC: 23 MMOL/L
CREAT SERPL-MCNC: 7.3 MG/DL
CREAT SERPL-MCNC: 7.4 MG/DL
CREAT SERPL-MCNC: 7.4 MG/DL (ref 0.5–1.4)
CREAT SERPL-MCNC: 7.8 MG/DL
CV ECHO LV RWT: 0.71 CM
DELSYS: ABNORMAL
DIFFERENTIAL METHOD: ABNORMAL
DOP CALC AO PEAK VEL: 4.16 M/S
DOP CALC AO VTI: 115.9 CM
DOP CALC LVOT AREA: 3.14 CM2
DOP CALC LVOT DIAMETER: 2 CM
DOP CALC LVOT STROKE VOLUME: 76.87 CM3
DOP CALCLVOT PEAK VEL VTI: 24.48 CM
E WAVE DECELERATION TIME: 330.47 MSEC
E/A RATIO: 1.06
E/E' RATIO: 24.17
ECHO LV POSTERIOR WALL: 1.4 CM (ref 0.6–1.1)
EOSINOPHIL # BLD AUTO: 0 K/UL
EOSINOPHIL NFR BLD: 0.6 %
ERYTHROCYTE [DISTWIDTH] IN BLOOD BY AUTOMATED COUNT: 17.4 %
EST. GFR  (AFRICAN AMERICAN): 5.3 ML/MIN/1.73 M^2
EST. GFR  (AFRICAN AMERICAN): 5.6 ML/MIN/1.73 M^2
EST. GFR  (AFRICAN AMERICAN): 5.7 ML/MIN/1.73 M^2
EST. GFR  (NON AFRICAN AMERICAN): 4.6 ML/MIN/1.73 M^2
EST. GFR  (NON AFRICAN AMERICAN): 4.9 ML/MIN/1.73 M^2
EST. GFR  (NON AFRICAN AMERICAN): 5 ML/MIN/1.73 M^2
FLOW: 4
FRACTIONAL SHORTENING: 8 % (ref 28–44)
GLUCOSE SERPL-MCNC: 163 MG/DL (ref 70–110)
GLUCOSE SERPL-MCNC: 164 MG/DL
GLUCOSE SERPL-MCNC: 59 MG/DL
GLUCOSE SERPL-MCNC: 82 MG/DL
HCO3 UR-SCNC: 22.9 MMOL/L (ref 24–28)
HCT VFR BLD AUTO: 40.8 %
HCT VFR BLD CALC: 41 %PCV (ref 36–54)
HGB BLD-MCNC: 12 G/DL
IMM GRANULOCYTES # BLD AUTO: 0.02 K/UL
IMM GRANULOCYTES NFR BLD AUTO: 0.3 %
INR PPP: 1
INTERVENTRICULAR SEPTUM: 1.57 CM (ref 0.6–1.1)
LA MAJOR: 6.19 CM
LA MINOR: 5.71 CM
LA WIDTH: 4.37 CM
LEFT ATRIUM SIZE: 4.53 CM
LEFT ATRIUM VOLUME INDEX: 65.8 ML/M2
LEFT ATRIUM VOLUME: 99.96 CM3
LEFT INTERNAL DIMENSION IN SYSTOLE: 3.64 CM (ref 2.1–4)
LEFT VENTRICLE DIASTOLIC VOLUME INDEX: 45.2 ML/M2
LEFT VENTRICLE DIASTOLIC VOLUME: 68.7 ML
LEFT VENTRICLE MASS INDEX: 149.1 G/M2
LEFT VENTRICLE SYSTOLIC VOLUME INDEX: 36.7 ML/M2
LEFT VENTRICLE SYSTOLIC VOLUME: 55.79 ML
LEFT VENTRICULAR INTERNAL DIMENSION IN DIASTOLE: 3.97 CM (ref 3.5–6)
LEFT VENTRICULAR MASS: 226.64 G
LIPASE SERPL-CCNC: 13 U/L
LV LATERAL E/E' RATIO: 20.71
LV SEPTAL E/E' RATIO: 29
LYMPHOCYTES # BLD AUTO: 0.4 K/UL
LYMPHOCYTES NFR BLD: 5.7 %
MCH RBC QN AUTO: 29.5 PG
MCHC RBC AUTO-ENTMCNC: 29.4 G/DL
MCV RBC AUTO: 100 FL
MODE: ABNORMAL
MONOCYTES # BLD AUTO: 0.5 K/UL
MONOCYTES NFR BLD: 7.4 %
MV PEAK A VEL: 1.37 M/S
MV PEAK E VEL: 1.45 M/S
NEUTROPHILS # BLD AUTO: 5.4 K/UL
NEUTROPHILS NFR BLD: 85.5 %
NRBC BLD-RTO: 0 /100 WBC
PCO2 BLDA: 53 MMHG (ref 35–45)
PH SMN: 7.24 [PH] (ref 7.35–7.45)
PISA TR MAX VEL: 3.8 M/S
PLATELET # BLD AUTO: 110 K/UL
PMV BLD AUTO: 13.4 FL
PO2 BLDA: 30 MMHG (ref 40–60)
POC BE: -4 MMOL/L
POC IONIZED CALCIUM: 1.03 MMOL/L (ref 1.06–1.42)
POC SATURATED O2: 45 % (ref 95–100)
POC TCO2 (MEASURED): 20 MMOL/L (ref 23–29)
POC TCO2: 24 MMOL/L (ref 24–29)
POCT GLUCOSE: 124 MG/DL (ref 70–110)
POCT GLUCOSE: 173 MG/DL (ref 70–110)
POCT GLUCOSE: 86 MG/DL (ref 70–110)
POTASSIUM BLD-SCNC: 5.6 MMOL/L (ref 3.5–5.1)
POTASSIUM SERPL-SCNC: 5 MMOL/L
POTASSIUM SERPL-SCNC: 5.4 MMOL/L
POTASSIUM SERPL-SCNC: 6 MMOL/L
PROT SERPL-MCNC: 8 G/DL
PROTHROMBIN TIME: 10.8 SEC
RA MAJOR: 5.48 CM
RA PRESSURE: 15 MMHG
RA WIDTH: 3.78 CM
RBC # BLD AUTO: 4.07 M/UL
RIGHT VENTRICULAR END-DIASTOLIC DIMENSION: 5.8 CM
SAMPLE: ABNORMAL
SAMPLE: ABNORMAL
SINUS: 3.1 CM
SITE: ABNORMAL
SODIUM BLD-SCNC: 139 MMOL/L (ref 136–145)
SODIUM SERPL-SCNC: 140 MMOL/L
SODIUM SERPL-SCNC: 143 MMOL/L
SODIUM SERPL-SCNC: 145 MMOL/L
STJ: 3.07 CM
TDI LATERAL: 0.07
TDI SEPTAL: 0.05
TDI: 0.06
TR MAX PG: 57.76 MMHG
TRICUSPID ANNULAR PLANE SYSTOLIC EXCURSION: 2.14 CM
TROPONIN I SERPL DL<=0.01 NG/ML-MCNC: 0.01 NG/ML
TV REST PULMONARY ARTERY PRESSURE: 72.76 MMHG
WBC # BLD AUTO: 6.31 K/UL

## 2018-11-26 PROCEDURE — 99291 CRITICAL CARE FIRST HOUR: CPT | Mod: 25

## 2018-11-26 PROCEDURE — 96375 TX/PRO/DX INJ NEW DRUG ADDON: CPT

## 2018-11-26 PROCEDURE — 83690 ASSAY OF LIPASE: CPT

## 2018-11-26 PROCEDURE — 80100014 HC HEMODIALYSIS 1:1

## 2018-11-26 PROCEDURE — 94640 AIRWAY INHALATION TREATMENT: CPT

## 2018-11-26 PROCEDURE — 80053 COMPREHEN METABOLIC PANEL: CPT

## 2018-11-26 PROCEDURE — 85610 PROTHROMBIN TIME: CPT

## 2018-11-26 PROCEDURE — 99291 CRITICAL CARE FIRST HOUR: CPT | Mod: ,,, | Performed by: NURSE PRACTITIONER

## 2018-11-26 PROCEDURE — 96376 TX/PRO/DX INJ SAME DRUG ADON: CPT

## 2018-11-26 PROCEDURE — 99222 1ST HOSP IP/OBS MODERATE 55: CPT | Mod: ,,, | Performed by: NURSE PRACTITIONER

## 2018-11-26 PROCEDURE — 25000003 PHARM REV CODE 250: Performed by: NURSE PRACTITIONER

## 2018-11-26 PROCEDURE — 99291 CRITICAL CARE FIRST HOUR: CPT | Mod: ,,, | Performed by: EMERGENCY MEDICINE

## 2018-11-26 PROCEDURE — 96374 THER/PROPH/DIAG INJ IV PUSH: CPT

## 2018-11-26 PROCEDURE — 25000242 PHARM REV CODE 250 ALT 637 W/ HCPCS: Performed by: NURSE PRACTITIONER

## 2018-11-26 PROCEDURE — 63600175 PHARM REV CODE 636 W HCPCS: Performed by: EMERGENCY MEDICINE

## 2018-11-26 PROCEDURE — 93005 ELECTROCARDIOGRAM TRACING: CPT

## 2018-11-26 PROCEDURE — 82962 GLUCOSE BLOOD TEST: CPT

## 2018-11-26 PROCEDURE — 27000190 HC CPAP FULL FACE MASK W/VALVE

## 2018-11-26 PROCEDURE — 20000000 HC ICU ROOM

## 2018-11-26 PROCEDURE — 85025 COMPLETE CBC W/AUTO DIFF WBC: CPT

## 2018-11-26 PROCEDURE — 63600175 PHARM REV CODE 636 W HCPCS: Performed by: NURSE PRACTITIONER

## 2018-11-26 PROCEDURE — 94761 N-INVAS EAR/PLS OXIMETRY MLT: CPT

## 2018-11-26 PROCEDURE — 80048 BASIC METABOLIC PNL TOTAL CA: CPT | Mod: 91

## 2018-11-26 PROCEDURE — 82803 BLOOD GASES ANY COMBINATION: CPT

## 2018-11-26 PROCEDURE — 27000221 HC OXYGEN, UP TO 24 HOURS

## 2018-11-26 PROCEDURE — 99900035 HC TECH TIME PER 15 MIN (STAT)

## 2018-11-26 PROCEDURE — 93010 ELECTROCARDIOGRAM REPORT: CPT | Mod: ,,, | Performed by: INTERNAL MEDICINE

## 2018-11-26 PROCEDURE — 25000242 PHARM REV CODE 250 ALT 637 W/ HCPCS: Performed by: EMERGENCY MEDICINE

## 2018-11-26 PROCEDURE — 84484 ASSAY OF TROPONIN QUANT: CPT

## 2018-11-26 PROCEDURE — 25000003 PHARM REV CODE 250: Performed by: EMERGENCY MEDICINE

## 2018-11-26 PROCEDURE — 83880 ASSAY OF NATRIURETIC PEPTIDE: CPT

## 2018-11-26 PROCEDURE — 96372 THER/PROPH/DIAG INJ SC/IM: CPT | Mod: 59

## 2018-11-26 PROCEDURE — 94660 CPAP INITIATION&MGMT: CPT

## 2018-11-26 RX ORDER — NAPROXEN SODIUM 220 MG/1
81 TABLET, FILM COATED ORAL DAILY
Status: DISCONTINUED | OUTPATIENT
Start: 2018-11-26 | End: 2018-11-30 | Stop reason: HOSPADM

## 2018-11-26 RX ORDER — AMLODIPINE BESYLATE 10 MG/1
10 TABLET ORAL
Status: COMPLETED | OUTPATIENT
Start: 2018-11-26 | End: 2018-11-26

## 2018-11-26 RX ORDER — CARVEDILOL 12.5 MG/1
12.5 TABLET ORAL 2 TIMES DAILY
Status: DISCONTINUED | OUTPATIENT
Start: 2018-11-27 | End: 2018-11-28

## 2018-11-26 RX ORDER — ISOSORBIDE MONONITRATE 30 MG/1
30 TABLET, EXTENDED RELEASE ORAL DAILY
Status: DISCONTINUED | OUTPATIENT
Start: 2018-11-27 | End: 2018-11-30 | Stop reason: HOSPADM

## 2018-11-26 RX ORDER — SEVELAMER CARBONATE 800 MG/1
800 TABLET, FILM COATED ORAL
Status: DISCONTINUED | OUTPATIENT
Start: 2018-11-26 | End: 2018-11-27

## 2018-11-26 RX ORDER — ONDANSETRON 2 MG/ML
4 INJECTION INTRAMUSCULAR; INTRAVENOUS EVERY 8 HOURS PRN
Status: DISCONTINUED | OUTPATIENT
Start: 2018-11-26 | End: 2018-11-30 | Stop reason: HOSPADM

## 2018-11-26 RX ORDER — CLOPIDOGREL BISULFATE 75 MG/1
75 TABLET ORAL DAILY
Status: DISCONTINUED | OUTPATIENT
Start: 2018-11-26 | End: 2018-11-30 | Stop reason: HOSPADM

## 2018-11-26 RX ORDER — HYDRALAZINE HYDROCHLORIDE 25 MG/1
50 TABLET, FILM COATED ORAL
Status: COMPLETED | OUTPATIENT
Start: 2018-11-26 | End: 2018-11-26

## 2018-11-26 RX ORDER — CARVEDILOL 12.5 MG/1
25 TABLET ORAL ONCE
Status: COMPLETED | OUTPATIENT
Start: 2018-11-26 | End: 2018-11-26

## 2018-11-26 RX ORDER — AMLODIPINE BESYLATE 10 MG/1
10 TABLET ORAL DAILY
Status: DISCONTINUED | OUTPATIENT
Start: 2018-11-27 | End: 2018-11-30 | Stop reason: HOSPADM

## 2018-11-26 RX ORDER — IPRATROPIUM BROMIDE AND ALBUTEROL SULFATE 2.5; .5 MG/3ML; MG/3ML
3 SOLUTION RESPIRATORY (INHALATION)
Status: COMPLETED | OUTPATIENT
Start: 2018-11-26 | End: 2018-11-26

## 2018-11-26 RX ORDER — SODIUM BICARBONATE 1 MEQ/ML
50 SYRINGE (ML) INTRAVENOUS
Status: COMPLETED | OUTPATIENT
Start: 2018-11-26 | End: 2018-11-26

## 2018-11-26 RX ORDER — ATORVASTATIN CALCIUM 20 MG/1
40 TABLET, FILM COATED ORAL DAILY
Status: DISCONTINUED | OUTPATIENT
Start: 2018-11-26 | End: 2018-11-30 | Stop reason: HOSPADM

## 2018-11-26 RX ORDER — IPRATROPIUM BROMIDE AND ALBUTEROL SULFATE 2.5; .5 MG/3ML; MG/3ML
3 SOLUTION RESPIRATORY (INHALATION) EVERY 4 HOURS PRN
Status: DISCONTINUED | OUTPATIENT
Start: 2018-11-26 | End: 2018-11-30 | Stop reason: HOSPADM

## 2018-11-26 RX ORDER — FLUTICASONE FUROATE AND VILANTEROL 200; 25 UG/1; UG/1
1 POWDER RESPIRATORY (INHALATION) DAILY
Status: DISCONTINUED | OUTPATIENT
Start: 2018-11-26 | End: 2018-11-30 | Stop reason: HOSPADM

## 2018-11-26 RX ORDER — CARVEDILOL 12.5 MG/1
25 TABLET ORAL 2 TIMES DAILY
Status: DISCONTINUED | OUTPATIENT
Start: 2018-11-26 | End: 2018-11-26

## 2018-11-26 RX ORDER — FUROSEMIDE 10 MG/ML
80 INJECTION INTRAMUSCULAR; INTRAVENOUS
Status: COMPLETED | OUTPATIENT
Start: 2018-11-26 | End: 2018-11-26

## 2018-11-26 RX ORDER — ONDANSETRON 2 MG/ML
8 INJECTION INTRAMUSCULAR; INTRAVENOUS
Status: COMPLETED | OUTPATIENT
Start: 2018-11-26 | End: 2018-11-26

## 2018-11-26 RX ORDER — HEPARIN SODIUM 5000 [USP'U]/ML
5000 INJECTION, SOLUTION INTRAVENOUS; SUBCUTANEOUS EVERY 8 HOURS
Status: DISPENSED | OUTPATIENT
Start: 2018-11-26 | End: 2018-11-29

## 2018-11-26 RX ORDER — ALBUTEROL SULFATE 0.83 MG/ML
10 SOLUTION RESPIRATORY (INHALATION)
Status: COMPLETED | OUTPATIENT
Start: 2018-11-26 | End: 2018-11-26

## 2018-11-26 RX ADMIN — ASPIRIN 81 MG CHEWABLE TABLET 81 MG: 81 TABLET CHEWABLE at 12:11

## 2018-11-26 RX ADMIN — INSULIN HUMAN 10 UNITS: 100 INJECTION, SOLUTION PARENTERAL at 10:11

## 2018-11-26 RX ADMIN — HEPARIN SODIUM 5000 UNITS: 5000 INJECTION, SOLUTION INTRAVENOUS; SUBCUTANEOUS at 09:11

## 2018-11-26 RX ADMIN — ONDANSETRON 4 MG: 2 INJECTION INTRAMUSCULAR; INTRAVENOUS at 02:11

## 2018-11-26 RX ADMIN — FLUTICASONE FUROATE AND VILANTEROL TRIFENATATE 1 PUFF: 200; 25 POWDER RESPIRATORY (INHALATION) at 05:11

## 2018-11-26 RX ADMIN — HYDRALAZINE HYDROCHLORIDE 50 MG: 25 TABLET ORAL at 09:11

## 2018-11-26 RX ADMIN — DEXTROSE MONOHYDRATE 25 G: 25 INJECTION, SOLUTION INTRAVENOUS at 10:11

## 2018-11-26 RX ADMIN — IPRATROPIUM BROMIDE AND ALBUTEROL SULFATE 3 ML: .5; 3 SOLUTION RESPIRATORY (INHALATION) at 09:11

## 2018-11-26 RX ADMIN — ATORVASTATIN CALCIUM 40 MG: 20 TABLET, FILM COATED ORAL at 12:11

## 2018-11-26 RX ADMIN — HEPARIN SODIUM 5000 UNITS: 5000 INJECTION, SOLUTION INTRAVENOUS; SUBCUTANEOUS at 02:11

## 2018-11-26 RX ADMIN — ALBUTEROL SULFATE 10 MG: 2.5 SOLUTION RESPIRATORY (INHALATION) at 10:11

## 2018-11-26 RX ADMIN — SODIUM BICARBONATE 50 MEQ: 84 INJECTION INTRAVENOUS at 10:11

## 2018-11-26 RX ADMIN — FUROSEMIDE 80 MG: 10 INJECTION, SOLUTION INTRAMUSCULAR; INTRAVENOUS at 09:11

## 2018-11-26 RX ADMIN — ONDANSETRON 8 MG: 2 INJECTION INTRAMUSCULAR; INTRAVENOUS at 09:11

## 2018-11-26 RX ADMIN — CLOPIDOGREL 75 MG: 75 TABLET, FILM COATED ORAL at 12:11

## 2018-11-26 RX ADMIN — CARVEDILOL 25 MG: 12.5 TABLET, FILM COATED ORAL at 09:11

## 2018-11-26 RX ADMIN — AMLODIPINE BESYLATE 10 MG: 10 TABLET ORAL at 09:11

## 2018-11-26 NOTE — Clinical Note
Angiography performed post intervention of the middle Left AV Fistula. Angiography performed via hand injection with .

## 2018-11-26 NOTE — HPI
Ms. Georges is a 77 yo AAF with ESRD on HD MWF, HTN, and CHF who presented to the hospital on 11/26 with chief complaint of SOB x 1 day.  She reports her last HD treatment was on Friday, in which she tolerated well achieving her EDW to 50 kg.  On Sunday (11/25) patient reports that she noticed she was having worsening of SOB on exertion with non-productive cough and wheezing.  She denies any changes in her oral fluid intake, denies any recent sick contacts.  She denies any nausea, vomiting, fever, chills or diarrhea.  CXR on admission revealed R pleural effusion bilateral vascular congestion, BNP >4900.  She was treated medically for hyperkalemia while in the ED and was started on her home BP medications for hypertensive emergency.  Nephrology was consulted for urgent HD.    She dialyzes at [facility] under the care of Dr. Coleman on a MWF schedule.  She reports an EDW of 50 kg and has a FLORENCE AVF ++ bruit thrill.  She maintains residual renal function and dialyzes for a 4 hour duration.  She reports now problems with her dialysis, no recent episodes of cramping with ultrafiltration.

## 2018-11-26 NOTE — Clinical Note
Angiography performed of the middle Left AV Fistula. Angiography performed via hand injection with .

## 2018-11-26 NOTE — ASSESSMENT & PLAN NOTE
--presented with , pulmonary edema, acute on chronic hypoxemic respiratory failure  --continue PO medications for now with goal to lower SBP from 220 to 160-170 until afternoon of 11/26. May need IV antihypertensives if emesis continues  --no neurologic complaints, no chest pain, troponin negative

## 2018-11-26 NOTE — Clinical Note
20 ml injected throughout the case. 80 mL total wasted during the case. 100 mL total used in the case.

## 2018-11-26 NOTE — ED NOTES
LOC: The patient is awake, alert, and oriented to place, time, situation. Affect is appropriate.  Speech is appropriate and clear.     APPEARANCE: Patient resting uncomfortably, reporting SOB, productive cough, in no acute distress.  Patient is clean and well groomed.    SKIN: The skin is warm and dry; color consistent with ethnicity.  Patient has normal skin turgor and moist mucus membranes.  Skin intact; no breakdown or bruising noted. Fistula , upper left arm.    MUSCULOSKELETAL: Patient moving upper and lower extremities without difficulty.  Denies weakness.     RESPIRATORY: Airway is open and patent. Respirations spontaneous,-labored.  Patient has an increased effort and rate.  No accessory muscle use noted. Productive  cough.  BS coarse     CARDIAC: No peripheral edema noted. No complaints of chest pain.      ABDOMEN: Soft and non tender to palpation.  No distention noted.     NEUROLOGIC: Eyes open spontaneously.  Behavior appropriate to situation.  Follows commands; facial expression symmetrical.  Purposeful motor response noted; normal sensation in all extremities.

## 2018-11-26 NOTE — SUBJECTIVE & OBJECTIVE
Past Medical History:   Diagnosis Date    Anemia in ESRD (end-stage renal disease) 5/29/2016    Anticoagulant long-term use     Aortic atherosclerosis 11/22/2016    Aortic stenosis, moderate 2/18/2016    Asthma in adult without complication 1/8/2016    Bilateral low back pain without sciatica 11/17/2015    Blindness of right eye 11/12/2016    CAD (coronary artery disease) 12/12/2016    Cataract     Central retinal vein occlusion, right eye 6/3/2014    CHF (congestive heart failure)     Chronic diastolic heart failure 1/8/2016    Chronic respiratory failure with hypoxia 5/29/2016    COPD (chronic obstructive pulmonary disease) 1/15/2017    Dependence on hemodialysis     Mon-Wed-Fri    Diverticulosis     Embolic stroke involving right middle cerebral artery     Enlarged LA (left atrium) 10/7/2016    Epiretinal membrane 7/17/2012    ESRD (end stage renal disease)     Essential hypertension 1/8/2016    History of GI diverticular bleed     5/22/16    Left flaccid hemiparesis 10/1/2016    Peripheral vascular disease, unspecified 11/22/2016    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 11/13/2016    Type 2 diabetes mellitus with kidney complication, without long-term current use of insulin 5/1/2018    Type 2 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, without long-term current use of insulin 3/26/2013    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, without long-term current use of insulin 3/26/2013    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       Past Surgical History:   Procedure Laterality Date    BREAST SURGERY      tumor removal x 2    CATARACT EXTRACTION      CHOLECYSTECTOMY      COLONOSCOPY N/A 5/23/2016    Procedure: COLONOSCOPY;  Surgeon: WILLIAM Colvin MD;  Location: [facility] ([address] [place] [address]);  Service: Endoscopy;  Laterality: N/A;    COLONOSCOPY N/A 5/30/2016    Procedure:  COLONOSCOPY;  Surgeon: Sam Davis MD;  Location: [facility] (2ND FLR);  Service: Endoscopy;  Laterality: N/A;    COLONOSCOPY N/A 5/30/2016    Performed by Sam Davis [facility] at [facility] ENDO (2ND FLR)    COLONOSCOPY N/A 5/23/2016    Performed by WILLIAM Colvin MD at [facility] (2ND FLR)    ESOPHAGOGASTRODUODENOSCOPY (EGD) N/A 5/30/2016    Performed by Sam Davis MD at [facility] (2ND FLR)    ESOPHAGOGASTRODUODENOSCOPY (EGD) N/A 5/27/2016    Performed by Cesario Rubio MD at [facility] (2ND FLR)    UPPER GASTROINTESTINAL ENDOSCOPY         Review of patient's allergies indicates:  No Known Allergies  Current Facility-Administered Medications   Medication Frequency    albuterol-ipratropium 2.5 mg-0.5 mg/3 mL nebulizer solution 3 mL Q4H PRN    [START ON 11/27/2018] amLODIPine tablet 10 mg Daily    aspirin chewable tablet 81 mg Daily    atorvastatin tablet 40 mg Daily    carvedilol tablet 25 mg BID    clopidogrel tablet 75 mg Daily    fluticasone-vilanterol 200-25 mcg/dose diskus inhaler 1 puff Daily    heparin (porcine) injection 5,000 Units Q8H    ondansetron injection 4 mg Q8H PRN    sevelamer carbonate tablet 800 mg TID WM     Current Outpatient Medications   Medication    albuterol 90 mcg/actuation inhaler    amLODIPine (NORVASC) 10 MG tablet    atorvastatin (LIPITOR) 40 MG tablet    carvedilol (COREG) 25 MG tablet    clopidogrel (PLAVIX) 75 mg tablet    ergocalciferol (ERGOCALCIFEROL) 50,000 unit Cap    fluticasone-vilanterol (BREO ELLIPTA) 200-25 mcg/dose DsDv diskus inhaler    hydrALAZINE (APRESOLINE) 50 MG tablet    isosorbide dinitrate (ISOCHRON) 40 mg TbSR    loperamide (IMODIUM) 2 mg capsule    multivitamin (THERAGRAN) per tablet    ondansetron (ZOFRAN) 4 MG tablet    RENVELA 800 mg Tab    valsartan (DIOVAN) 160 MG tablet    aspirin 81 MG Chew    citalopram (CELEXA) 10 MG tablet    lidocaine-prilocaine (EMLA) cream     Family History     Problem Relation (Age of  Onset)    Cancer Sister    Cataracts Mother    Esophageal cancer Sister    Glaucoma Brother, Maternal Aunt    Heart disease Brother    Heart failure Sister    Hypertension Mother, Sister, Brother, Father    No Known Problems Maternal Uncle, Paternal Aunt, Paternal Uncle, Maternal Grandmother, Maternal Grandfather, Paternal Grandmother, Paternal Grandfather    Stroke Mother        Tobacco Use    Smoking status: Never Smoker    Smokeless tobacco: Never Used   Substance and Sexual Activity    Alcohol use: No     Comment: Reports occasional 1-2 drinks     Drug use: No    Sexual activity: No     Review of Systems   Constitutional: Negative for chills, fatigue and fever.   HENT: Negative for sore throat and trouble swallowing.    Respiratory: Positive for cough and shortness of breath. Negative for chest tightness and wheezing.    Cardiovascular: Negative for chest pain and palpitations.   Gastrointestinal: Negative for abdominal distention, abdominal pain, blood in stool, constipation, diarrhea, nausea and vomiting.   Genitourinary: Negative for dysuria, frequency and urgency.   Musculoskeletal: Negative for neck pain and neck stiffness.   Neurological: Negative for dizziness, syncope, speech difficulty, weakness, light-headedness, numbness and headaches.     Objective:     Vital Signs (Most Recent):  Temp: 98.2 °F (36.8 °C) (11/26/18 0841)  Pulse: (!) 59 (11/26/18 1402)  Resp: (!) 30 (11/26/18 1402)  BP: (!) 142/66 (11/26/18 1402)  SpO2: 98 % (11/26/18 1402)  O2 Device (Oxygen Therapy): BiPAP (11/26/18 0940) Vital Signs (24h Range):  Temp:  [98.2 °F (36.8 °C)] 98.2 °F (36.8 °C)  Pulse:  [59-78] 59  Resp:  [24-44] 30  SpO2:  [96 %-100 %] 98 %  BP: (142-221)/(66-91) 142/66     Weight: 52.6 kg (116 lb) (11/26/18 1302)  Body mass index is 21.22 kg/m².  Body surface area is 1.52 meters squared.    No intake/output data recorded.    Physical Exam   Constitutional: She is oriented to person, place, and time. She appears  well-developed. She has a sickly appearance. She appears ill. No distress. Nasal cannula in place.   HENT:   Head: Normocephalic and atraumatic.   Right Ear: External ear normal.   Left Ear: External ear normal.   Eyes: Conjunctivae and EOM are normal. Right eye exhibits no discharge. Left eye exhibits no discharge. No scleral icterus.   Neck: Normal range of motion. Neck supple.   Cardiovascular: Regular rhythm. Bradycardia present. Exam reveals no gallop and no friction rub.   No murmur heard.  Pulmonary/Chest: She is in respiratory distress (exertional). She has wheezes. She has rales.   Abdominal: Soft. Bowel sounds are normal. She exhibits no distension. There is no tenderness.   Musculoskeletal: She exhibits no edema or deformity.   Neurological: She is alert and oriented to person, place, and time.   Skin: Skin is dry. She is not diaphoretic.   Psychiatric: She has a normal mood and affect. Her behavior is normal.       Significant Labs:  CBC:   Recent Labs   Lab 11/26/18  0911   WBC 6.31   RBC 4.07   HGB 12.0   HCT 40.8   *   *   MCH 29.5   MCHC 29.4*     CMP:   Recent Labs   Lab 11/26/18  0911 11/26/18  1310   * 59*   CALCIUM 9.4 9.0   ALBUMIN 3.6  --    PROT 8.0  --     145   K 6.0* 5.0   CO2 16* 22*    108   BUN 74* 77*   CREATININE 7.3* 7.4*   ALKPHOS 135  --    ALT 10  --    AST 26  --    BILITOT 0.6  --

## 2018-11-26 NOTE — CONSULTS
[facility]  Nephrology  Consult Note    Patient Name: Tea Georges  MRN: [id number]  Admission Date: 11/26/2018  Hospital Length of Stay: 0 days  Attending Provider: Mack Melendez MD   Primary Care Physician: Parth Posadas Ii, MD  Principal Problem:Hypertensive emergency    Inpatient consult to Nephrology  Consult performed by: Bertin Hanson NP  Consult ordered by: Muna Shah NP  Reason for consult: ESRD        Subjective:     HPI: Ms. Georges is a 77 yo AAF with ESRD on HD MWF, HTN, and CHF who presented to the hospital on 11/26 with chief complaint of SOB x 1 day.  She reports her last HD treatment was on Friday, in which she tolerated well achieving her EDW to 50 kg.  On Sunday (11/25) patient reports that she noticed she was having worsening of SOB on exertion with non-productive cough and wheezing.  She denies any changes in her oral fluid intake, denies any recent sick contacts.  She denies any nausea, vomiting, fever, chills or diarrhea.  CXR on admission revealed R pleural effusion bilateral vascular congestion, BNP >4900.  She was treated medically for hyperkalemia while in the ED and was started on her home BP medications for hypertensive emergency.  Nephrology was consulted for urgent HD.    She dialyzes at [facility] under the care of Dr. Coleman on a MWF schedule.  She reports an EDW of 50 kg and has a FLORENCE AVF ++ bruit thrill.  She maintains residual renal function and dialyzes for a 4 hour duration.  She reports now problems with her dialysis, no recent episodes of cramping with ultrafiltration.      Past Medical History:   Diagnosis Date    Anemia in ESRD (end-stage renal disease) 5/29/2016    Anticoagulant long-term use     Aortic atherosclerosis 11/22/2016    Aortic stenosis, moderate 2/18/2016    Asthma in adult without complication 1/8/2016    Bilateral low back pain without sciatica 11/17/2015    Blindness of right eye 11/12/2016    CAD (coronary  artery disease) 12/12/2016    Cataract     Central retinal vein occlusion, right eye 6/3/2014    CHF (congestive heart failure)     Chronic diastolic heart failure 1/8/2016    Chronic respiratory failure with hypoxia 5/29/2016    COPD (chronic obstructive pulmonary disease) 1/15/2017    Dependence on hemodialysis     Mon-Wed-Fri    Diverticulosis     Embolic stroke involving right middle cerebral artery     Enlarged LA (left atrium) 10/7/2016    Epiretinal membrane 7/17/2012    ESRD (end stage renal disease)     Essential hypertension 1/8/2016    History of GI diverticular bleed     5/22/16    Left flaccid hemiparesis 10/1/2016    Peripheral vascular disease, unspecified 11/22/2016    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 11/13/2016    Type 2 diabetes mellitus with kidney complication, without long-term current use of insulin 5/1/2018    Type 2 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, without long-term current use of insulin 3/26/2013    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, without long-term current use of insulin 3/26/2013    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       Past Surgical History:   Procedure Laterality Date    BREAST SURGERY      tumor removal x 2    CATARACT EXTRACTION      CHOLECYSTECTOMY      COLONOSCOPY N/A 5/23/2016    Procedure: COLONOSCOPY;  Surgeon: WILLIAM Colvin MD;  Location: [facility] ([address] [place] [address]);  Service: Endoscopy;  Laterality: N/A;    COLONOSCOPY N/A 5/30/2016    Procedure: COLONOSCOPY;  Surgeon: Sam Davis MD;  Location: [facility] ([address] [place] [address]);  Service: Endoscopy;  Laterality: N/A;    COLONOSCOPY N/A 5/30/2016    Performed by Sam Davis MD at [facility] ([facility])    COLONOSCOPY N/A 5/23/2016    Performed by WILLIAM Colvin MD at [facility] ([facility])    ESOPHAGOGASTRODUODENOSCOPY (EGD) N/A 5/30/2016    Performed by Sam MOSELEY  MD Ryan at [facility] (2ND [facility])    ESOPHAGOGASTRODUODENOSCOPY (EGD) N/A 5/27/2016    Performed by Cesario Rubio MD at [facility] (2ND FLR)    UPPER GASTROINTESTINAL ENDOSCOPY         Review of patient's allergies indicates:  No Known Allergies  Current Facility-Administered Medications   Medication Frequency    albuterol-ipratropium 2.5 mg-0.5 mg/3 mL nebulizer solution 3 mL Q4H PRN    [START ON 11/27/2018] amLODIPine tablet 10 mg Daily    aspirin chewable tablet 81 mg Daily    atorvastatin tablet 40 mg Daily    carvedilol tablet 25 mg BID    clopidogrel tablet 75 mg Daily    fluticasone-vilanterol 200-25 mcg/dose diskus inhaler 1 puff Daily    heparin (porcine) injection 5,000 Units Q8H    ondansetron injection 4 mg Q8H PRN    sevelamer carbonate tablet 800 mg TID WM     Current Outpatient Medications   Medication    albuterol 90 mcg/actuation inhaler    amLODIPine (NORVASC) 10 MG tablet    atorvastatin (LIPITOR) 40 MG tablet    carvedilol (COREG) 25 MG tablet    clopidogrel (PLAVIX) 75 mg tablet    ergocalciferol (ERGOCALCIFEROL) 50,000 unit Cap    fluticasone-vilanterol (BREO ELLIPTA) 200-25 mcg/dose DsDv diskus inhaler    hydrALAZINE (APRESOLINE) 50 MG tablet    isosorbide dinitrate (ISOCHRON) 40 mg TbSR    loperamide (IMODIUM) 2 mg capsule    multivitamin (THERAGRAN) per tablet    ondansetron (ZOFRAN) 4 MG tablet    RENVELA 800 mg Tab    valsartan (DIOVAN) 160 MG tablet    aspirin 81 MG Chew    citalopram (CELEXA) 10 MG tablet    lidocaine-prilocaine (EMLA) cream     Family History     Problem Relation (Age of Onset)    Cancer Sister    Cataracts Mother    Esophageal cancer Sister    Glaucoma Brother, Maternal Aunt    Heart disease Brother    Heart failure Sister    Hypertension Mother, Sister, Brother, Father    No Known Problems Maternal Uncle, Paternal Aunt, Paternal Uncle, Maternal Grandmother, Maternal Grandfather, Paternal Grandmother, Paternal Grandfather    Stroke  Mother        Tobacco Use    Smoking status: Never Smoker    Smokeless tobacco: Never Used   Substance and Sexual Activity    Alcohol use: No     Comment: Reports occasional 1-2 drinks     Drug use: No    Sexual activity: No     Review of Systems   Constitutional: Negative for chills, fatigue and fever.   HENT: Negative for sore throat and trouble swallowing.    Respiratory: Positive for cough and shortness of breath. Negative for chest tightness and wheezing.    Cardiovascular: Negative for chest pain and palpitations.   Gastrointestinal: Negative for abdominal distention, abdominal pain, blood in stool, constipation, diarrhea, nausea and vomiting.   Genitourinary: Negative for dysuria, frequency and urgency.   Musculoskeletal: Negative for neck pain and neck stiffness.   Neurological: Negative for dizziness, syncope, speech difficulty, weakness, light-headedness, numbness and headaches.     Objective:     Vital Signs (Most Recent):  Temp: 98.2 °F (36.8 °C) (11/26/18 0841)  Pulse: (!) 59 (11/26/18 1402)  Resp: (!) 30 (11/26/18 1402)  BP: (!) 142/66 (11/26/18 1402)  SpO2: 98 % (11/26/18 1402)  O2 Device (Oxygen Therapy): BiPAP (11/26/18 0940) Vital Signs (24h Range):  Temp:  [98.2 °F (36.8 °C)] 98.2 °F (36.8 °C)  Pulse:  [59-78] 59  Resp:  [24-44] 30  SpO2:  [96 %-100 %] 98 %  BP: (142-221)/(66-91) 142/66     Weight: 52.6 kg (116 lb) (11/26/18 1302)  Body mass index is 21.22 kg/m².  Body surface area is 1.52 meters squared.    No intake/output data recorded.    Physical Exam   Constitutional: She is oriented to person, place, and time. She appears well-developed. She has a sickly appearance. She appears ill. No distress. Nasal cannula in place.   HENT:   Head: Normocephalic and atraumatic.   Right Ear: External ear normal.   Left Ear: External ear normal.   Eyes: Conjunctivae and EOM are normal. Right eye exhibits no discharge. Left eye exhibits no discharge. No scleral icterus.   Neck: Normal range of motion.  Neck supple.   Cardiovascular: Regular rhythm. Bradycardia present. Exam reveals no gallop and no friction rub.   No murmur heard.  Pulmonary/Chest: She is in respiratory distress (exertional). She has wheezes. She has rales.   Abdominal: Soft. Bowel sounds are normal. She exhibits no distension. There is no tenderness.   Musculoskeletal: She exhibits no edema or deformity.   Neurological: She is alert and oriented to person, place, and time.   Skin: Skin is dry. She is not diaphoretic.   Psychiatric: She has a normal mood and affect. Her behavior is normal.       Significant Labs:  CBC:   Recent Labs   Lab 11/26/18 0911   WBC 6.31   RBC 4.07   HGB 12.0   HCT 40.8   *   *   MCH 29.5   MCHC 29.4*     CMP:   Recent Labs   Lab 11/26/18 0911 11/26/18  1310   * 59*   CALCIUM 9.4 9.0   ALBUMIN 3.6  --    PROT 8.0  --     145   K 6.0* 5.0   CO2 16* 22*    108   BUN 74* 77*   CREATININE 7.3* 7.4*   ALKPHOS 135  --    ALT 10  --    AST 26  --    BILITOT 0.6  --          Assessment/Plan:     ESRD (end stage renal disease)    ESRD on iHD F  [facility]  4 hours  FLORENCE AVF  + residual renal function    Plan:  HD today for metabolic clearance/volume management  UF 3L as tolerated.  Will attempt to challenge her EDW  Will have dialysis nurses obtain OP dialysis records for EDW verification.  -hgb WNL.  No need for SATNAM  -please consult vascular surgery for evaluation of ulceration to her FLORENCE AVF      BMM  Renal diet  Phos levels daily  If elevated, can start on Renvela 800 mg po TID with meals.       Bertin Valencia, JEFF  Nephrology  [facility]

## 2018-11-26 NOTE — ASSESSMENT & PLAN NOTE
--reportedly wears 2 L NC at home, no PFT's on file  --upon presentation, was tachypneic, in respiratory distress requiring NIPPV. However this now discontinued given emesis.   --requiring 5-6 L NC currently. Suspect related to pulmonary edema in setting of HTN emergency and need for HD. Needs close monitoring for possible need for comfort flow if hypoxemia worsening  --see below for HTN emergency management.  --nephrology consulted for emergent HD for metabolic and volume management  --right sided effusion noted on CXR. Will evaluate with bedside US for size of pleural effusion and possible thoracentesis if needed. Suspect this is transudate given ESRD, diastolic HF but could consider thoracentesis if sepsis suspected.

## 2018-11-26 NOTE — SUBJECTIVE & OBJECTIVE
Past Medical History:   Diagnosis Date    Anemia in ESRD (end-stage renal disease) 5/29/2016    Anticoagulant long-term use     Aortic atherosclerosis 11/22/2016    Aortic stenosis, moderate 2/18/2016    Asthma in adult without complication 1/8/2016    Bilateral low back pain without sciatica 11/17/2015    Blindness of right eye 11/12/2016    CAD (coronary artery disease) 12/12/2016    Cataract     Central retinal vein occlusion, right eye 6/3/2014    CHF (congestive heart failure)     Chronic diastolic heart failure 1/8/2016    Chronic respiratory failure with hypoxia 5/29/2016    COPD (chronic obstructive pulmonary disease) 1/15/2017    Dependence on hemodialysis     Mon-Wed-Fri    Diverticulosis     Embolic stroke involving right middle cerebral artery     Enlarged LA (left atrium) 10/7/2016    Epiretinal membrane 7/17/2012    ESRD (end stage renal disease)     Essential hypertension 1/8/2016    History of GI diverticular bleed     5/22/16    Left flaccid hemiparesis 10/1/2016    Peripheral vascular disease, unspecified 11/22/2016    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 11/13/2016    Type 2 diabetes mellitus with kidney complication, without long-term current use of insulin 5/1/2018    Type 2 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, without long-term current use of insulin 3/26/2013    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, without long-term current use of insulin 3/26/2013    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       Past Surgical History:   Procedure Laterality Date    BREAST SURGERY      tumor removal x 2    CATARACT EXTRACTION      CHOLECYSTECTOMY      COLONOSCOPY N/A 5/23/2016    Procedure: COLONOSCOPY;  Surgeon: WILLIAM Colvin MD;  Location: [facility] ([address] [place] [address]);  Service: Endoscopy;  Laterality: N/A;    COLONOSCOPY N/A 5/30/2016    Procedure:  COLONOSCOPY;  Surgeon: Sam Davis MD;  Location: [facility] (2ND FLR);  Service: Endoscopy;  Laterality: N/A;    COLONOSCOPY N/A 5/30/2016    [facility] by Sam [facility] (2ND [facility])    COLONOSCOPY N/A 5/23/2016    Performed by WILLIAM Colvin MD at [facility] ([facility]ND [facility])    ESOPHAGOGASTRODUODENOSCOPY (EGD) N/A 5/30/2016    Performed by Sam Davis MD at [facility] (2[facility])    ESOPHAGOGASTRODUODENOSCOPY (EGD) N/A 5/27/2016    Performed by Cesario Rubio MD at [facility] (2ND FLR)    UPPER GASTROINTESTINAL ENDOSCOPY         Review of patient's allergies indicates:  No Known Allergies    Family History     Problem Relation (Age of Onset)    Cancer Sister    Cataracts Mother    Esophageal cancer Sister    Glaucoma Brother, Maternal Aunt    Heart disease Brother    Heart failure Sister    Hypertension Mother, Sister, Brother, Father    No Known Problems Maternal Uncle, Paternal Aunt, Paternal Uncle, Maternal Grandmother, Maternal Grandfather, Paternal Grandmother, Paternal Grandfather    Stroke Mother        Tobacco Use    Smoking status: Never Smoker    Smokeless tobacco: Never Used   Substance and Sexual Activity    Alcohol use: No     Comment: Reports occasional 1-2 drinks     Drug use: No    Sexual activity: No      Review of Systems   Constitutional: Negative for chills and fever.   HENT: Negative for sore throat and trouble swallowing.    Respiratory: Positive for cough and shortness of breath. Negative for chest tightness and wheezing.    Cardiovascular: Negative for chest pain and palpitations.   Gastrointestinal: Positive for nausea and vomiting. Negative for abdominal distention, abdominal pain, blood in stool, constipation and diarrhea.   Genitourinary: Negative for dysuria, frequency and urgency.   Musculoskeletal: Negative for neck pain and neck stiffness.   Neurological: Negative for dizziness, syncope, speech difficulty, weakness, light-headedness, numbness and  headaches.     Objective:     Vital Signs (Most Recent):  Temp: 98.2 °F (36.8 °C) (11/26/18 0841)  Pulse: 66 (11/26/18 1102)  Resp: (!) 32 (11/26/18 1047)  BP: (!) 177/72 (11/26/18 1102)  SpO2: 98 % (11/26/18 1102) Vital Signs (24h Range):  Temp:  [98.2 °F (36.8 °C)] 98.2 °F (36.8 °C)  Pulse:  [66-78] 66  Resp:  [24-44] 32  SpO2:  [96 %-100 %] 98 %  BP: (171-221)/(72-91) 177/72   Weight: 52.9 kg (116 lb 10 oz)  Body mass index is 20.66 kg/m².    No intake or output data in the 24 hours ending 11/26/18 1129    Physical Exam   Constitutional: She is oriented to person, place, and time. She appears well-developed. She has a sickly appearance. She appears ill. No distress.   HENT:   Head: Normocephalic and atraumatic.   Eyes: Conjunctivae are normal. Right eye exhibits no discharge. Left eye exhibits no discharge. No scleral icterus.   Neck: Neck supple. No JVD present. No tracheal deviation present. No thyromegaly present.   Cardiovascular: Normal rate, regular rhythm, S1 normal and S2 normal.   Pulses:       Radial pulses are 2+ on the right side, and 2+ on the left side.   Pulmonary/Chest: No accessory muscle usage. Tachypnea noted. No respiratory distress. She has decreased breath sounds in the left lower field. She has wheezes in the right middle field and the left middle field. She has rales in the right middle field and the left middle field.   Abdominal: Soft. Bowel sounds are normal. She exhibits no distension. There is no tenderness. There is no rebound and no guarding.   Lymphadenopathy:     She has no cervical adenopathy.   Neurological: She is alert and oriented to person, place, and time. GCS eye subscore is 4. GCS verbal subscore is 5. GCS motor subscore is 6.   Alert, oriented x 4. Conversant. As mild LUE weakness, otherwise moves all extremities spontaneously with good strength.    Skin: Skin is dry. She is not diaphoretic. There is pallor.       Vents:     Lines/Drains/Airways     Drain                  Hemodialysis AV Fistula Left upper arm -- days         Hemodialysis AV Fistula 05/23/16 0700 Left forearm 917 days          Peripheral Intravenous Line                 Peripheral IV - Single Lumen 11/26/18 0856 less than 1 day              Significant Labs:    CBC/Anemia Profile:  Recent Labs   Lab 11/26/18  0911   WBC 6.31   HGB 12.0   HCT 40.8   *   *   RDW 17.4*        Chemistries:  Recent Labs   Lab 11/26/18  0911      K 6.0*      CO2 16*   BUN 74*   CREATININE 7.3*   CALCIUM 9.4   ALBUMIN 3.6   PROT 8.0   BILITOT 0.6   ALKPHOS 135   ALT 10   AST 26     Significant Imaging: I have reviewed all pertinent imaging results/findings within the past 24 hours.

## 2018-11-26 NOTE — ED NOTES
Pt placed on continuous cardiac and pulse ox monitoring with blood pressure to cycle every 30 minutes.  Bed locked in lowest position; side rails up and locked x 2; call light, bedside table, and personal belongings within reach.  Pt instructed to alert nurse for assistance before attempting to get out of bed; verbalizes understanding.  Pt denies needs or complaints at this time; will continue to monitor pt.

## 2018-11-26 NOTE — ASSESSMENT & PLAN NOTE
ESRD on iHD MWF  [facility]  4 hours  FLORENCE AVF  + residual renal function    Plan:  HD today for metabolic clearance/volume management  UF 3L as tolerated.  Will attempt to challenge her EDW  Will have dialysis nurses obtain OP dialysis records for EDW verification.  -hgb WNL.  No need for SATNAM      BMM  Renal diet  Phos levels daily  If elevated, can start on Renvela 800 mg po TID with meals.

## 2018-11-26 NOTE — ED PROVIDER NOTES
Encounter Date: 11/26/2018       History     Chief Complaint   Patient presents with    Shortness of Breath     Pt scheduled to have dialysis today but felt to bad to go, pt went to last dialysis appointment as scheduled on Friday. Pt reports SOB     Pt c/o SOB, severe, gradual onset yesterday.  Has home O2 but not relieving SOB.  + cough but denies fever/chills.  N/v onset this am.  No abd pain.  No CP    Last HD on Friday.  Did not take BP meds today.      The history is provided by the patient. The history is limited by the condition of the patient.     Review of patient's allergies indicates:  No Known Allergies  Past Medical History:   Diagnosis Date    Anemia in ESRD (end-stage renal disease) 5/29/2016    Anticoagulant long-term use     Aortic atherosclerosis 11/22/2016    Aortic stenosis, moderate 2/18/2016    Asthma in adult without complication 1/8/2016    Bilateral low back pain without sciatica 11/17/2015    Blindness of right eye 11/12/2016    CAD (coronary artery disease) 12/12/2016    Cataract     Central retinal vein occlusion, right eye 6/3/2014    CHF (congestive heart failure)     Chronic diastolic heart failure 1/8/2016    Chronic respiratory failure with hypoxia 5/29/2016    COPD (chronic obstructive pulmonary disease) 1/15/2017    Dependence on hemodialysis     Mon-Wed-Fri    Diverticulosis     Embolic stroke involving right middle cerebral artery     Enlarged LA (left atrium) 10/7/2016    Epiretinal membrane 7/17/2012    ESRD (end stage renal disease)     Essential hypertension 1/8/2016    History of GI diverticular bleed     5/22/16    Left flaccid hemiparesis 10/1/2016    Peripheral vascular disease, unspecified 11/22/2016    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 11/13/2016    Type 2 diabetes mellitus with kidney complication, without long-term current use of insulin 5/1/2018    Type 2 diabetes mellitus with left eye affected by proliferative  retinopathy without macular edema, without long-term current use of insulin 3/26/2013    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, without long-term current use of insulin 3/26/2013    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     Past Surgical History:   Procedure Laterality Date    BREAST SURGERY      tumor removal x 2    CATARACT EXTRACTION      CHOLECYSTECTOMY      COLONOSCOPY N/A 5/23/2016    Procedure: COLONOSCOPY;  Surgeon: WILLIAM Colvin MD;  Location: [facility] (2ND FLR);  Service: Endoscopy;  Laterality: N/A;    COLONOSCOPY N/A 5/30/2016    Procedure: COLONOSCOPY;  Surgeon: Sam Davis MD;  Location: [facility] ENDO (2ND FLR);  Service: Endoscopy;  Laterality: N/A;    COLONOSCOPY N/A 5/30/2016    Performed by Sam Davis MD at [facility] ENDO (2ND FLR)    COLONOSCOPY N/A 5/23/2016    Performed by WILLIAM Colvin MD at [facility] (2ND FLR)    ESOPHAGOGASTRODUODENOSCOPY (EGD) N/A 5/30/2016    Performed by Sam Davis MD at [facility] ENDO (2ND FLR)    ESOPHAGOGASTRODUODENOSCOPY (EGD) N/A 5/27/2016    Performed by Cesario Rubio MD at [facility] (2ND FLR)    UPPER GASTROINTESTINAL ENDOSCOPY       Family History   Problem Relation Age of Onset    Cataracts Mother     Stroke Mother     Hypertension Mother     Cancer Sister     Hypertension Sister     Heart failure Sister     Glaucoma Brother     Hypertension Brother     Heart disease Brother     Hypertension Father     Glaucoma Maternal Aunt     Esophageal cancer Sister     No Known Problems Maternal Uncle     No Known Problems Paternal Aunt     No Known Problems Paternal Uncle     No Known Problems Maternal Grandmother     No Known Problems Maternal Grandfather     No Known Problems Paternal Grandmother     No Known Problems Paternal Grandfather     Heart attack Neg Hx     Colon cancer Neg Hx     Stomach cancer Neg Hx     Anemia Neg Hx     Arrhythmia Neg Hx     Asthma Neg Hx      Clotting disorder Neg Hx     Fainting Neg Hx     Hyperlipidemia Neg Hx     Atrial Septal Defect Neg Hx      Social History     Tobacco Use    Smoking status: Never Smoker    Smokeless tobacco: Never Used   Substance Use Topics    Alcohol use: No     Comment: Reports occasional 1-2 drinks     Drug use: No     Review of Systems   Unable to perform ROS: Severe respiratory distress       Physical Exam     Initial Vitals [11/26/18 0841]   BP Pulse Resp Temp SpO2   (!) 221/82 78 (!) 24 98.2 °F (36.8 °C) 96 %      MAP       --         Physical Exam    Nursing note and vitals reviewed.  Constitutional: She is not diaphoretic. She appears cachectic. She is cooperative. She appears ill. She appears distressed.   HENT:   Mouth/Throat: Oropharynx is clear and moist.   Eyes: Conjunctivae are normal.   Neck: Neck supple.   Cardiovascular: Normal rate and regular rhythm.   Pulmonary/Chest: She is in respiratory distress. She has rhonchi. She has rales.   Abdominal: She exhibits no distension.   Musculoskeletal: She exhibits no edema.   Neurological: She is alert.   Answering questions appropriately, following commands, no focal weakness   Skin: Skin is warm and dry. No pallor.   Psychiatric: She has a normal mood and affect.         ED Course   Procedures  Labs Reviewed   CBC W/ AUTO DIFFERENTIAL - Abnormal; Notable for the following components:       Result Value     (*)     MCHC 29.4 (*)     RDW 17.4 (*)     Platelets 110 (*)     MPV 13.4 (*)     Lymph # 0.4 (*)     Gran% 85.5 (*)     Lymph% 5.7 (*)     All other components within normal limits   COMPREHENSIVE METABOLIC PANEL - Abnormal; Notable for the following components:    Potassium 6.0 (*)     CO2 16 (*)     Glucose 164 (*)     BUN, Bld 74 (*)     Creatinine 7.3 (*)     Anion Gap 17 (*)     eGFR if  5.7 (*)     eGFR if non  5.0 (*)     All other components within normal limits   B-TYPE NATRIURETIC PEPTIDE - Abnormal;  Notable for the following components:    BNP >4,900 (*)     All other components within normal limits   BASIC METABOLIC PANEL - Abnormal; Notable for the following components:    CO2 22 (*)     Glucose 59 (*)     BUN, Bld 77 (*)     Creatinine 7.4 (*)     eGFR if  5.6 (*)     eGFR if non  4.9 (*)     All other components within normal limits   ISTAT PROCEDURE - Abnormal; Notable for the following components:    POC PH 7.243 (*)     POC PCO2 53.0 (*)     POC PO2 30 (*)     POC HCO3 22.9 (*)     POC SATURATED O2 45 (*)     All other components within normal limits   ISTAT PROCEDURE - Abnormal; Notable for the following components:    POC Glucose 163 (*)     POC BUN 78 (*)     POC Creatinine 7.4 (*)     POC Potassium 5.6 (*)     POC TCO2 (MEASURED) 20 (*)     POC Ionized Calcium 1.03 (*)     All other components within normal limits   POCT GLUCOSE - Abnormal; Notable for the following components:    POCT Glucose 124 (*)     All other components within normal limits   POCT GLUCOSE - Abnormal; Notable for the following components:    POCT Glucose 173 (*)     All other components within normal limits   TROPONIN I   PROTIME-INR   LIPASE   POCT GLUCOSE   POCT GLUCOSE MONITORING CONTINUOUS          Imaging Results          X-Ray Chest AP Portable (Final result)  Result time 11/26/18 10:33:25    Final result by Scar Tse MD (11/26/18 10:33:25)                 Impression:      Increasing opacity in the inferior hemothorax on the right side since 10/28/2018, consistent with a greater volume of right pleural fluid, likely in conjunction with worsening right basilar airspace consolidation/volume loss.  Appearance of the chest is otherwise stable since that time, with continued demonstration of significant cardiomegaly and pulmonary vascular engorgement.      Electronically signed by: Scar Tse MD  Date:    11/26/2018  Time:    10:33             Narrative:    EXAMINATION:  XR CHEST AP  PORTABLE    CLINICAL HISTORY:  CHF;    COMPARISON:  Comparison is made to 10/29/2018.    FINDINGS:  Cardiomediastinal silhouette is again noted to be markedly prominent, representing significant cardiomegaly and/or pericardial fluid, and the pulmonary vascularity is increased, though the appearance of the cardiomediastinal silhouette and pulmonary vascularity demonstrate no detrimental change since the examination referenced above.  Opacity in the inferior hemothorax on the right side, however, is considerably more pronounced on the current exam than on the previous study, felt to be most consistent with increasing pleural fluid on this side, likely in combination with worsening basilar airspace consolidation/volume loss.  The left lung and the upper lung zone on the right appear stable, without significant superimposed airspace consolidation.  No pleural fluid of any substantial volume is noted on the left.  No pneumothorax.  A vascular stent is again seen overlying the left scapula, as is a loop recorder overlying the inferior hemothorax on the left side.                                 Medical Decision Making:   Initial Assessment:   79 yo f, ESRD, CHF, COPD on home O2, here with SOB, moderate respiratory distress, n/v    On exam, significant hypertension, O2 sat 88% on RA.  Diffuse rales on lung exam  Differential Diagnosis:   Suspect pulmonary edema, CHF vs ESRD - needs HD    Other considerations are pneumonia, COPD exacerbation, influenza  ED Management:  Labs  VBG/ISTAT  BIPAP  Nephrology consult for HD    Will give BP meds - did not take any today  May need NTG drip    10:10 AM  On BIPAP, breathing improved  CXR shows large R pleural effusion, pulmonary edema  Hyperkalemia and acidosis  Nephrology consulted for emergent HD    11:24 AM  ICU team has admitted pt         I, Dr. Amrita Grajeda, personally performed the services described in this documentation. All medical record entries made by the scribe were  at my direction and in my presence.  I have reviewed the chart and agree that the record reflects my personal performance and is accurate and complete. Amrita Grajeda MD.  9:38 AM 11/27/2018        Attending Attestation:         Attending Critical Care:   Critical Care Times:   ==============================================================  · Total Critical Care Time - exclusive of procedural time: 40 minutes.  ==============================================================  Critical care was necessary to treat or prevent imminent or life-threatening deterioration of the following conditions: respiratory failure, metabolic crisis and congestive heart failure.   Critical care was time spent personally by me on the following activities: obtaining history from patient or relative, examination of patient, ordering lab, x-rays, and/or EKG, review of x-rays / CT sent with the patient, review of old charts, re-evaluation of patient's conition, discussion with consultants, discussions with primary provider, evaluation of patient's response to treatment, ordering and performing treatments and interventions and development of treatment plan with patient or relative.                  Clinical Impression:   The primary encounter diagnosis was Acute pulmonary edema. Diagnoses of Shortness of breath, Hypertensive emergency, and ESRD (end stage renal disease) were also pertinent to this visit.                             Amrita Grajeda MD  11/27/18 0820

## 2018-11-26 NOTE — CONSULTS
Please see H&P dated 11/26. Patient evaluated and admitted to MICU.    SHANEKA Porter,  [facility]  Critical Care Medicine  Pager [phone]

## 2018-11-26 NOTE — ED TRIAGE NOTES
Comes to the ED with SOB that started yesterday.  Productive cough, white.   Last dialysis Friday.

## 2018-11-26 NOTE — HOSPITAL COURSE
Blood pressure improved with administration of patient's PO anti-hypertensive medications. Patient received HD overnight, tolerated well with improved BP & O2 requirements.

## 2018-11-26 NOTE — HPI
Mrs. Georges is a 79 yo female with history significant for HTN, ESRD on HD, diastolic HF, right MCA CVA s/p thrombectomy (2016) who presented to the ED on 11/26 for worsening shortness of breath, cough that began on 11/25 and nausea with one episode of emesis that was non bloody, non bilious. She denies any fever, chills, headache, syncope, recent falls, chest pain, palpitations, ABD pain, hematemesis, melena, BRBPR, dysuria, or urinary frequency (does urinate about 2 times daily). She last reports receiving HD on Friday per her usual schedule. Upon ED presentation, she was noted to have HTN emergency with 's with tachypnea, acute on chronic hypoxemia. Labwork revealed hyperkalemia. She was shifted with D50, insulin and given bicarbonate. Bipap attempted due to work of breathing, but was discontinued after patient became nauseated with small amount emesis in the ED. MICU consulted for further management of hypertensive emergency and need for urgent HD.

## 2018-11-26 NOTE — H&P
[facility]  Critical Care Medicine  History & Physical    Patient Name: Tea Georges  MRN: [id number]  Admission Date: 11/26/2018  Hospital Length of Stay: 0 days  Code Status: Full Code  Attending Physician: Mack Melendez MD   Primary Care Provider: Parth Posadas Ii, MD   Principal Problem: Hypertensive emergency    Subjective:     HPI:  Mrs. Georges is a 77 yo female with history significant for HTN, ESRD on HD, diastolic HF, right MCA CVA s/p thrombectomy (2016) who presented to the ED on 11/26 for worsening shortness of breath, cough that began on 11/25 and nausea with one episode of emesis that was non bloody, non bilious. She denies any fever, chills, headache, syncope, recent falls, chest pain, palpitations, ABD pain, hematemesis, melena, BRBPR, dysuria, or urinary frequency (does urinate about 2 times daily). She last reports receiving HD on Friday per her usual schedule. Upon ED presentation, she was noted to have HTN emergency with 's with tachypnea, acute on chronic hypoxemia. Labwork revealed hyperkalemia. She was shifted with D50, insulin and given bicarbonate. Bipap attempted due to work of breathing, but was discontinued after patient became nauseated with small amount emesis in the ED. MICU consulted for further management of hypertensive emergency and need for urgent HD.       Hospital/ICU Course:  Blood pressure improved with administration of patient's PO anti-hypertensive medications. Nephrology consulted for HD.      Past Medical History:   Diagnosis Date    Anemia in ESRD (end-stage renal disease) 5/29/2016    Anticoagulant long-term use     Aortic atherosclerosis 11/22/2016    Aortic stenosis, moderate 2/18/2016    Asthma in adult without complication 1/8/2016    Bilateral low back pain without sciatica 11/17/2015    Blindness of right eye 11/12/2016    CAD (coronary artery disease) 12/12/2016    Cataract     Central retinal vein occlusion, right eye 6/3/2014     CHF (congestive heart failure)     Chronic diastolic heart failure 1/8/2016    Chronic respiratory failure with hypoxia 5/29/2016    COPD (chronic obstructive pulmonary disease) 1/15/2017    Dependence on hemodialysis     Mon-Wed-Fri    Diverticulosis     Embolic stroke involving right middle cerebral artery     Enlarged LA (left atrium) 10/7/2016    Epiretinal membrane 7/17/2012    ESRD (end stage renal disease)     Essential hypertension 1/8/2016    History of GI diverticular bleed     5/22/16    Left flaccid hemiparesis 10/1/2016    Peripheral vascular disease, unspecified 11/22/2016    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 11/13/2016    Type 2 diabetes mellitus with kidney complication, without long-term current use of insulin 5/1/2018    Type 2 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, without long-term current use of insulin 3/26/2013    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, without long-term current use of insulin 3/26/2013    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       Past Surgical History:   Procedure Laterality Date    BREAST SURGERY      tumor removal x 2    CATARACT EXTRACTION      CHOLECYSTECTOMY      COLONOSCOPY N/A 5/23/2016    Procedure: COLONOSCOPY;  Surgeon: WILLIAM Colvin MD;  Location: [facility] ([address] [place] [address]);  Service: Endoscopy;  Laterality: N/A;    COLONOSCOPY N/A 5/30/2016    Procedure: COLONOSCOPY;  Surgeon: Sam Davis MD;  Location: [facility] ([address] [place] [address]);  Service: Endoscopy;  Laterality: N/A;    COLONOSCOPY N/A 5/30/2016    Performed by Sam Davis MD at [facility] (2ND FLR)    COLONOSCOPY N/A 5/23/2016    Performed by WILLIAM Colvin MD at [facility] (2ND FLR)    ESOPHAGOGASTRODUODENOSCOPY (EGD) N/A 5/30/2016    Performed by Sam Davis MD at [facility] (2ND FLR)    ESOPHAGOGASTRODUODENOSCOPY (EGD) N/A 5/27/2016    Performed by  Cesario Rubio MD at [facility] (2ND FLR)    UPPER GASTROINTESTINAL ENDOSCOPY         Review of patient's allergies indicates:  No Known Allergies    Family History     Problem Relation (Age of Onset)    Cancer Sister    Cataracts Mother    Esophageal cancer Sister    Glaucoma Brother, Maternal Aunt    Heart disease Brother    Heart failure Sister    Hypertension Mother, Sister, Brother, Father    No Known Problems Maternal Uncle, Paternal Aunt, Paternal Uncle, Maternal Grandmother, Maternal Grandfather, Paternal Grandmother, Paternal Grandfather    Stroke Mother        Tobacco Use    Smoking status: Never Smoker    Smokeless tobacco: Never Used   Substance and Sexual Activity    Alcohol use: No     Comment: Reports occasional 1-2 drinks     Drug use: No    Sexual activity: No      Review of Systems   Constitutional: Negative for chills and fever.   HENT: Negative for sore throat and trouble swallowing.    Respiratory: Positive for cough and shortness of breath. Negative for chest tightness and wheezing.    Cardiovascular: Negative for chest pain and palpitations.   Gastrointestinal: Positive for nausea and vomiting. Negative for abdominal distention, abdominal pain, blood in stool, constipation and diarrhea.   Genitourinary: Negative for dysuria, frequency and urgency.   Musculoskeletal: Negative for neck pain and neck stiffness.   Neurological: Negative for dizziness, syncope, speech difficulty, weakness, light-headedness, numbness and headaches.     Objective:     Vital Signs (Most Recent):  Temp: 98.2 °F (36.8 °C) (11/26/18 0841)  Pulse: 66 (11/26/18 1102)  Resp: (!) 32 (11/26/18 1047)  BP: (!) 177/72 (11/26/18 1102)  SpO2: 98 % (11/26/18 1102) Vital Signs (24h Range):  Temp:  [98.2 °F (36.8 °C)] 98.2 °F (36.8 °C)  Pulse:  [66-78] 66  Resp:  [24-44] 32  SpO2:  [96 %-100 %] 98 %  BP: (171-221)/(72-91) 177/72   Weight: 52.9 kg (116 lb 10 oz)  Body mass index is 20.66 kg/m².    No intake or output data in  the 24 hours ending 11/26/18 1129    Physical Exam   Constitutional: She is oriented to person, place, and time. She appears well-developed. She has a sickly appearance. She appears ill. No distress.   HENT:   Head: Normocephalic and atraumatic.   Eyes: Conjunctivae are normal. Right eye exhibits no discharge. Left eye exhibits no discharge. No scleral icterus.   Neck: Neck supple. No JVD present. No tracheal deviation present. No thyromegaly present.   Cardiovascular: Normal rate, regular rhythm, S1 normal and S2 normal.   Pulses:       Radial pulses are 2+ on the right side, and 2+ on the left side.   Pulmonary/Chest: No accessory muscle usage. Tachypnea noted. No respiratory distress. She has decreased breath sounds in the left lower field. She has wheezes in the right middle field and the left middle field. She has rales in the right middle field and the left middle field.   Abdominal: Soft. Bowel sounds are normal. She exhibits no distension. There is no tenderness. There is no rebound and no guarding.   Lymphadenopathy:     She has no cervical adenopathy.   Neurological: She is alert and oriented to person, place, and time. GCS eye subscore is 4. GCS verbal subscore is 5. GCS motor subscore is 6.   Alert, oriented x 4. Conversant. As mild LUE weakness, otherwise moves all extremities spontaneously with good strength.    Skin: Skin is dry. She is not diaphoretic. There is pallor.       Vents:     Lines/Drains/Airways     Drain                 Hemodialysis AV Fistula Left upper arm -- days         Hemodialysis AV Fistula 05/23/16 0700 Left forearm 917 days          Peripheral Intravenous Line                 Peripheral IV - Single Lumen 11/26/18 0856 less than 1 day              Significant Labs:    CBC/Anemia Profile:  Recent Labs   Lab 11/26/18  0911   WBC 6.31   HGB 12.0   HCT 40.8   *   *   RDW 17.4*        Chemistries:  Recent Labs   Lab 11/26/18  0911      K 6.0*      CO2 16*    BUN 74*   CREATININE 7.3*   CALCIUM 9.4   ALBUMIN 3.6   PROT 8.0   BILITOT 0.6   ALKPHOS 135   ALT 10   AST 26     Significant Imaging: I have reviewed all pertinent imaging results/findings within the past 24 hours.    Assessment/Plan:     Neuro   Right-sided cerebrovascular accident (CVA)    --s/p thrombectomy 2016. Continue ASA, plavix, statin      Pulmonary   Pleural effusion on right    --will evaluate  With bedside US      Acute on chronic respiratory failure with hypoxia and hypercapnia    --reportedly wears 2 L NC at home, no PFT's on file  --upon presentation, was tachypneic, in respiratory distress requiring NIPPV. However this now discontinued given emesis.   --requiring 5-6 L NC currently. Suspect related to pulmonary edema in setting of HTN emergency and need for HD. Needs close monitoring for possible need for comfort flow if hypoxemia worsening  --see below for HTN emergency management.  --nephrology consulted for emergent HD for metabolic and volume management  --right sided effusion noted on CXR. Will evaluate with bedside US for size of pleural effusion and possible thoracentesis if needed. Suspect this is transudate given ESRD, diastolic HF but could consider thoracentesis if sepsis suspected.      Cardiac/Vascular   * Hypertensive emergency    --presented with , pulmonary edema, acute on chronic hypoxemic respiratory failure  --continue PO medications for now with goal to lower SBP from 220 to 160-170 until afternoon of 11/26. May need IV antihypertensives if emesis continues  --no neurologic complaints, no chest pain, troponin negative      Acute on chronic combined systolic and diastolic heart failure    --known diastolic HF. Repeat 2D echo today  --continue antihypertensive medications as above  --HD for volume removal   --no complaints of chest pain, troponin negative      Renal/   ESRD (end stage renal disease)    --consult nephrology for HD today           Critical Care Time: 55  minutes  Critical secondary to Patient has a condition that poses threat to life and bodily function: acute on chronic hypoxemic respiratory failure, HTN emergency      Critical care was time spent personally by me on the following activities: development of treatment plan with patient or surrogate and bedside caregivers, discussions with consultants, evaluation of patient's response to treatment, examination of patient, ordering and performing treatments and interventions, ordering and review of laboratory studies, ordering and review of radiographic studies, pulse oximetry, re-evaluation of patient's condition. This critical care time did not overlap with that of any other provider or involve time for any procedures.     Muna Shah, NP  Critical Care Medicine  [facility]

## 2018-11-26 NOTE — ASSESSMENT & PLAN NOTE
--known diastolic HF. Repeat 2D echo today  --continue antihypertensive medications as above  --HD for volume removal   --no complaints of chest pain, troponin negative

## 2018-11-27 PROBLEM — R10.33: Status: ACTIVE | Noted: 2018-11-27

## 2018-11-27 LAB
ALBUMIN SERPL BCP-MCNC: 3.6 G/DL
ALP SERPL-CCNC: 137 U/L
ALT SERPL W/O P-5'-P-CCNC: 9 U/L
ANION GAP SERPL CALC-SCNC: 12 MMOL/L
AST SERPL-CCNC: 15 U/L
BASOPHILS # BLD AUTO: 0.04 K/UL
BASOPHILS NFR BLD: 0.5 %
BILIRUB DIRECT SERPL-MCNC: 0.3 MG/DL
BILIRUB SERPL-MCNC: 0.8 MG/DL
BUN SERPL-MCNC: 16 MG/DL
CALCIUM SERPL-MCNC: 9.4 MG/DL
CHLORIDE SERPL-SCNC: 102 MMOL/L
CO2 SERPL-SCNC: 23 MMOL/L
CREAT SERPL-MCNC: 2.3 MG/DL
DIFFERENTIAL METHOD: ABNORMAL
EOSINOPHIL # BLD AUTO: 0.1 K/UL
EOSINOPHIL NFR BLD: 1.1 %
ERYTHROCYTE [DISTWIDTH] IN BLOOD BY AUTOMATED COUNT: 17.2 %
EST. GFR  (AFRICAN AMERICAN): 23.1 ML/MIN/1.73 M^2
EST. GFR  (NON AFRICAN AMERICAN): 20 ML/MIN/1.73 M^2
GLUCOSE SERPL-MCNC: 80 MG/DL
HCT VFR BLD AUTO: 35.8 %
HGB BLD-MCNC: 11.1 G/DL
IMM GRANULOCYTES # BLD AUTO: 0.03 K/UL
IMM GRANULOCYTES NFR BLD AUTO: 0.4 %
LACTATE SERPL-SCNC: 0.6 MMOL/L
LYMPHOCYTES # BLD AUTO: 0.2 K/UL
LYMPHOCYTES NFR BLD: 2.8 %
MAGNESIUM SERPL-MCNC: 2 MG/DL
MCH RBC QN AUTO: 29.7 PG
MCHC RBC AUTO-ENTMCNC: 31 G/DL
MCV RBC AUTO: 96 FL
MONOCYTES # BLD AUTO: 0.7 K/UL
MONOCYTES NFR BLD: 8.5 %
NEUTROPHILS # BLD AUTO: 7 K/UL
NEUTROPHILS NFR BLD: 86.7 %
NRBC BLD-RTO: 0 /100 WBC
PHOSPHATE SERPL-MCNC: 2 MG/DL
PLATELET # BLD AUTO: 105 K/UL
PMV BLD AUTO: 12.3 FL
POCT GLUCOSE: 106 MG/DL (ref 70–110)
POCT GLUCOSE: 109 MG/DL (ref 70–110)
POCT GLUCOSE: 148 MG/DL (ref 70–110)
POCT GLUCOSE: 80 MG/DL (ref 70–110)
POCT GLUCOSE: 86 MG/DL (ref 70–110)
POTASSIUM SERPL-SCNC: 3.2 MMOL/L
PROT SERPL-MCNC: 7.9 G/DL
RBC # BLD AUTO: 3.74 M/UL
SODIUM SERPL-SCNC: 137 MMOL/L
WBC # BLD AUTO: 8.12 K/UL

## 2018-11-27 PROCEDURE — 99232 SBSQ HOSP IP/OBS MODERATE 35: CPT | Mod: ,,, | Performed by: NURSE PRACTITIONER

## 2018-11-27 PROCEDURE — 99233 SBSQ HOSP IP/OBS HIGH 50: CPT | Mod: ,,, | Performed by: NURSE PRACTITIONER

## 2018-11-27 PROCEDURE — 84100 ASSAY OF PHOSPHORUS: CPT

## 2018-11-27 PROCEDURE — 25000003 PHARM REV CODE 250: Performed by: NURSE PRACTITIONER

## 2018-11-27 PROCEDURE — 83605 ASSAY OF LACTIC ACID: CPT

## 2018-11-27 PROCEDURE — 63600175 PHARM REV CODE 636 W HCPCS: Performed by: NURSE PRACTITIONER

## 2018-11-27 PROCEDURE — 80076 HEPATIC FUNCTION PANEL: CPT

## 2018-11-27 PROCEDURE — 11000001 HC ACUTE MED/SURG PRIVATE ROOM

## 2018-11-27 PROCEDURE — 80048 BASIC METABOLIC PNL TOTAL CA: CPT

## 2018-11-27 PROCEDURE — 94761 N-INVAS EAR/PLS OXIMETRY MLT: CPT

## 2018-11-27 PROCEDURE — 83735 ASSAY OF MAGNESIUM: CPT

## 2018-11-27 PROCEDURE — 85025 COMPLETE CBC W/AUTO DIFF WBC: CPT

## 2018-11-27 PROCEDURE — 99900035 HC TECH TIME PER 15 MIN (STAT)

## 2018-11-27 PROCEDURE — 25000003 PHARM REV CODE 250: Performed by: STUDENT IN AN ORGANIZED HEALTH CARE EDUCATION/TRAINING PROGRAM

## 2018-11-27 PROCEDURE — 25000242 PHARM REV CODE 250 ALT 637 W/ HCPCS: Performed by: NURSE PRACTITIONER

## 2018-11-27 RX ORDER — HYDRALAZINE HYDROCHLORIDE 25 MG/1
25 TABLET, FILM COATED ORAL EVERY 8 HOURS
Status: DISCONTINUED | OUTPATIENT
Start: 2018-11-27 | End: 2018-11-30 | Stop reason: HOSPADM

## 2018-11-27 RX ORDER — ACETAMINOPHEN 500 MG
1000 TABLET ORAL EVERY 6 HOURS PRN
Status: DISCONTINUED | OUTPATIENT
Start: 2018-11-27 | End: 2018-11-27

## 2018-11-27 RX ORDER — ERYTHROMYCIN 5 MG/G
OINTMENT OPHTHALMIC EVERY 8 HOURS
Status: DISCONTINUED | OUTPATIENT
Start: 2018-11-27 | End: 2018-11-30 | Stop reason: HOSPADM

## 2018-11-27 RX ORDER — ACETAMINOPHEN 325 MG/1
650 TABLET ORAL EVERY 6 HOURS PRN
Status: DISCONTINUED | OUTPATIENT
Start: 2018-11-27 | End: 2018-11-30 | Stop reason: HOSPADM

## 2018-11-27 RX ADMIN — HYDRALAZINE HYDROCHLORIDE 25 MG: 25 TABLET ORAL at 01:11

## 2018-11-27 RX ADMIN — ASPIRIN 81 MG CHEWABLE TABLET 81 MG: 81 TABLET CHEWABLE at 08:11

## 2018-11-27 RX ADMIN — CARVEDILOL 12.5 MG: 12.5 TABLET, FILM COATED ORAL at 09:11

## 2018-11-27 RX ADMIN — ACETAMINOPHEN 1000 MG: 500 TABLET, FILM COATED ORAL at 02:11

## 2018-11-27 RX ADMIN — ACETAMINOPHEN 650 MG: 325 TABLET, FILM COATED ORAL at 07:11

## 2018-11-27 RX ADMIN — ERYTHROMYCIN 1 INCH: 5 OINTMENT OPHTHALMIC at 09:11

## 2018-11-27 RX ADMIN — ERYTHROMYCIN 1 INCH: 5 OINTMENT OPHTHALMIC at 10:11

## 2018-11-27 RX ADMIN — ACETAMINOPHEN 650 MG: 325 TABLET, FILM COATED ORAL at 09:11

## 2018-11-27 RX ADMIN — FLUTICASONE FUROATE AND VILANTEROL TRIFENATATE 1 PUFF: 200; 25 POWDER RESPIRATORY (INHALATION) at 09:11

## 2018-11-27 RX ADMIN — CLOPIDOGREL 75 MG: 75 TABLET, FILM COATED ORAL at 08:11

## 2018-11-27 RX ADMIN — ONDANSETRON 4 MG: 2 INJECTION INTRAMUSCULAR; INTRAVENOUS at 01:11

## 2018-11-27 RX ADMIN — CARVEDILOL 12.5 MG: 12.5 TABLET, FILM COATED ORAL at 08:11

## 2018-11-27 RX ADMIN — HEPARIN SODIUM 5000 UNITS: 5000 INJECTION, SOLUTION INTRAVENOUS; SUBCUTANEOUS at 05:11

## 2018-11-27 RX ADMIN — AMLODIPINE BESYLATE 10 MG: 10 TABLET ORAL at 08:11

## 2018-11-27 RX ADMIN — ATORVASTATIN CALCIUM 40 MG: 20 TABLET, FILM COATED ORAL at 08:11

## 2018-11-27 RX ADMIN — ISOSORBIDE MONONITRATE 30 MG: 30 TABLET, EXTENDED RELEASE ORAL at 08:11

## 2018-11-27 RX ADMIN — SEVELAMER CARBONATE 800 MG: 800 TABLET, FILM COATED ORAL at 08:11

## 2018-11-27 RX ADMIN — SEVELAMER CARBONATE 800 MG: 800 TABLET, FILM COATED ORAL at 12:11

## 2018-11-27 RX ADMIN — HYDRALAZINE HYDROCHLORIDE 25 MG: 25 TABLET ORAL at 09:11

## 2018-11-27 RX ADMIN — HEPARIN SODIUM 5000 UNITS: 5000 INJECTION, SOLUTION INTRAVENOUS; SUBCUTANEOUS at 01:11

## 2018-11-27 NOTE — NURSING
"Notified team patient states she feels "much better" after lying down. Denies feeling weak. Notified of HR and BP, stated ok to transfer to new room. Suggested possible cards consult due to loud murmur heard throughout. Accucheks ordered d/t DM2.       1025: Patient transferred to 1156A. Belongings brought with patient, no other belongings in this room. Cell phone and purse with patient. Transferred on tele monitor, with 2L nasal cannula on O2 tank. Chart and meds sent with patient. Sunshine BENJAMIN for 1156a informed of patients arrival. Ms. Georges denied needing me to notify family of transfer, stated she called her daughter and told her new room number.   "

## 2018-11-27 NOTE — ASSESSMENT & PLAN NOTE
--will evaluate  With bedside US   --afebrile, no leukocytosis, do not suspect as source of infection

## 2018-11-27 NOTE — PLAN OF CARE
Parth Posadas Ii, MD  [address] / [place] [address]    [facility] - [place] - [address]  [address]  [place] [address]  Phone: [phone] Fax: [phone]    [id number] - [place], [place] - [address]  [address]  [place] [address]  Phone: [phone] Fax: [phone]    Payor: MEDICARE / Plan: MEDICARE PART A & B / Product Type: Government /     No future appointments.    Extended Emergency Contact Information  Primary Emergency Contact: Cruz Azar   [place]  Mobile Phone: [phone]  Relation: Daughter  Secondary Emergency Contact: Alesha Traore  Address: [address] [place] [address]  Mobile Phone: [phone]  Relation: Grandchild       11/27/18 1143   Discharge Assessment   Assessment Type Discharge Planning Assessment   Confirmed/corrected address and phone number on facesheet? Yes   Assessment information obtained from? Patient;Medical Record   Expected Length of Stay (days) 3   Communicated expected length of stay with patient/caregiver no   Prior to hospitilization cognitive status: Alert/Oriented   Prior to hospitalization functional status: Assistive Equipment;Needs Assistance   Current cognitive status: Alert/Oriented   Current Functional Status: Assistive Equipment;Needs Assistance   Facility Arrived From: ED admit   Lives With child(li), adult;grandchild(li)   Able to Return to Prior Arrangements yes   Is patient able to care for self after discharge? Yes  (with assist)   Who are your caregiver(s) and their phone number(s)? Cruz Azar (Daughter) [phone]      Patient's perception of discharge disposition home health   Readmission Within The Last 30 Days current reason for admission unrelated to previous admission   If yes, most recent facility name: [facility]   Patient currently being followed by  outpatient case management? No  (Prior case closed as goals were met)   Patient currently receives any other outside agency services? Yes   Name and contact number of agency or person providing outside services [facility]    Is it the patient/care giver preference to resume care with the current outside agency? Yes   Equipment Currently Used at Home bedside commode;rollator;wheelchair;oxygen   Do you have any problems affording any of your prescribed medications? No   Is the patient taking medications as prescribed? yes   Does the patient have transportation home? Yes   Transportation Available family or friend will provide   Dialysis Name and Scheduled days [facility]   Does the patient receive services at the [facility]? No   Discharge Plan A Home with family;Home Health   Discharge Plan B Skilled Nursing Facility   Patient/Family In Agreement With Plan yes   Chelsie Jasmine RN, BSN, CCM  Case Management  [facility]  Ext. [phone]

## 2018-11-27 NOTE — PROGRESS NOTES
"[facility]  Nephrology  Progress Note    Patient Name: Tea Georges  MRN: [id number]  Admission Date: 11/26/2018  Hospital Length of Stay: 1 days  Attending Provider: Rosendo Beltran, *   Primary Care Physician: Parth Posadas Ii, MD  Principal Problem:Hypertensive emergency    Subjective:     HPI: Ms. Georges is a 79 yo AAF with ESRD on HD MWF, HTN, and CHF who presented to the hospital on 11/26 with chief complaint of SOB x 1 day.  She reports her last HD treatment was on Friday, in which she tolerated well achieving her EDW to 50 kg.  On Sunday (11/25) patient reports that she noticed she was having worsening of SOB on exertion with non-productive cough and wheezing.  She denies any changes in her oral fluid intake, denies any recent sick contacts.  She denies any nausea, vomiting, fever, chills or diarrhea.  CXR on admission revealed R pleural effusion bilateral vascular congestion, BNP >4900.  She was treated medically for hyperkalemia while in the ED and was started on her home BP medications for hypertensive emergency.  Nephrology was consulted for urgent HD.    She dialyzes at [facility] under the care of Dr. Coleman on a MWF schedule.  She reports an EDW of 50 kg and has a FLORENCE AVF ++ bruit thrill.  She maintains residual renal function and dialyzes for a 4 hour duration.  She reports now problems with her dialysis, no recent episodes of cramping with ultrafiltration.      Interval History:   HD treatment completed last night, net UF of 2.5L obtained.  Patient c/o cramping near end of treatment.  She voiced improvement in her SOB, but remains SOB at baseline.    C/o feeling "weak" this morning.    Review of patient's allergies indicates:  No Known Allergies  Current Facility-Administered Medications   Medication Frequency    acetaminophen tablet 650 mg Q6H PRN    albuterol-ipratropium 2.5 mg-0.5 mg/3 mL nebulizer solution 3 mL Q4H PRN    amLODIPine tablet 10 mg Daily    aspirin " chewable tablet 81 mg Daily    atorvastatin tablet 40 mg Daily    carvedilol tablet 12.5 mg BID    clopidogrel tablet 75 mg Daily    dextrose 50% injection 25 g PRN    erythromycin 5 mg/gram (0.5 %) ophthalmic ointment Q8H    fluticasone-vilanterol 200-25 mcg/dose diskus inhaler 1 puff Daily    heparin (porcine) injection 5,000 Units Q8H    hydrALAZINE tablet 25 mg Q8H    isosorbide mononitrate 24 hr tablet 30 mg Daily    ondansetron injection 4 mg Q8H PRN    sevelamer carbonate tablet 800 mg TID WM       Objective:     Vital Signs (Most Recent):  Temp: 98.7 °F (37.1 °C) (11/27/18 1100)  Pulse: (!) 52 (11/27/18 1100)  Resp: 20 (11/27/18 1100)  BP: (!) 110/55 (11/27/18 1100)  SpO2: 95 % (11/27/18 1100)  O2 Device (Oxygen Therapy): nasal cannula (11/27/18 1100) Vital Signs (24h Range):  Temp:  [97.5 °F (36.4 °C)-98.9 °F (37.2 °C)] 98.7 °F (37.1 °C)  Pulse:  [52-66] 52  Resp:  [18-34] 20  SpO2:  [90 %-100 %] 95 %  BP: (110-176)/(55-74) 110/55     Weight: 49.9 kg (110 lb 0.2 oz) (11/27/18 0400)  Body mass index is 19.49 kg/m².  Body surface area is 1.49 meters squared.    I/O last 3 completed shifts:  In: 240 [P.O.:240]  Out: 50 [Urine:50]    Physical Exam   Constitutional: She is oriented to person, place, and time. She appears well-developed. She has a sickly appearance. She appears ill. No distress. Nasal cannula in place.   HENT:   Head: Normocephalic and atraumatic.   Right Ear: External ear normal.   Left Ear: External ear normal.   Eyes: Conjunctivae and EOM are normal. Right eye exhibits no discharge. Left eye exhibits no discharge. No scleral icterus.   Neck: Normal range of motion. Neck supple.   Cardiovascular: Regular rhythm. Bradycardia present. Exam reveals no gallop and no friction rub.   No murmur heard.  Pulmonary/Chest: No respiratory distress. She has no wheezes. She has rales.   Abdominal: Soft. Bowel sounds are normal. She exhibits no distension. There is no tenderness.   Musculoskeletal:  She exhibits no edema or deformity.   Neurological: She is alert and oriented to person, place, and time.   Skin: Skin is dry. She is not diaphoretic.   Psychiatric: She has a normal mood and affect. Her behavior is normal.       Significant Labs:  CBC:   Recent Labs   Lab 11/27/18  0236   WBC 8.12   RBC 3.74*   HGB 11.1*   HCT 35.8*   *   MCV 96   MCH 29.7   MCHC 31.0*     CMP:   Recent Labs   Lab 11/27/18 0236   GLU 80   CALCIUM 9.4   ALBUMIN 3.6   PROT 7.9      K 3.2*   CO2 23      BUN 16   CREATININE 2.3*   ALKPHOS 137*   ALT 9*   AST 15   BILITOT 0.8            Assessment/Plan:     ESRD (end stage renal disease)    ESRD on iHD MWF  [facility]  4 hours  FLORENCE AVF  + residual renal function    Plan:  -No need for RRT today.  If remains in-house, plan for HD tomorrow.  -appears that has she has not followed up with cardiology since 2016 and her AS has worsened, likely contributing to her worsening symptoms.  -recommend setting up OP follow-up with cardiology for AS evaluation.    -hgb WNL.  No need for SATNAM  -recommend vascular surgery consult for ulceration on AVF      BMM  Renal diet  Phos levels daily  If elevated, can start on Renvela 800 mg po TID with meals.       Bertin Valencia, JEFF  Nephrology  [facility]

## 2018-11-27 NOTE — PLAN OF CARE
Mrs. Georges is a 79 yo female with history significant for HTN, ESRD on HD, diastolic HF, right MCA CVA s/p thrombectomy (2016) who presented to the ED on 11/26 for worsening shortness of breath, cough that began on 11/25 and nausea with one episode of emesis that was non bloody, non bilious. She denies any fever, chills, headache, syncope, recent falls, chest pain, palpitations, ABD pain, hematemesis, melena, BRBPR, dysuria, or urinary frequency (does urinate about 2 times daily). She last reports receiving HD on Friday per her usual schedule. Upon ED presentation, she was noted to have HTN emergency with 's with tachypnea, acute on chronic hypoxemia. Labwork revealed hyperkalemia. She was shifted with D50, insulin and given bicarbonate. Bipap attempted due to work of breathing, but was discontinued after patient became nauseated with small amount emesis in the ED. MICU consulted for further management of hypertensive emergency and need for urgent HD.      Blood pressure improved with administration of patient's PO anti-hypertensive medications. Patient received HD overnight, tolerated well with improved BP & O2 requirements. She is now on her home 2L O2 and BP is well controlled. Notably, she has progression of her aortic stenosis and will need f/u with Cardiology. Also, will need Vascular f/u for ulceration of left arm AVF. Per ICU, plan for BP monitoring for one more day, likely discharge home tomorrow.    Chart reviewed. DEBBIE to assume care of the patient.    Rosendo Beltran MD  Hospital Medicine  Pager: [phone]  Spectra: [phone]

## 2018-11-27 NOTE — ASSESSMENT & PLAN NOTE
--presented with , pulmonary edema, acute on chronic hypoxemic respiratory failure  --BP controlled after HD & po meds administered; continue PO medications   --no neurologic complaints, no chest pain, troponin negative

## 2018-11-27 NOTE — ED NOTES
Per Mabel with Dialysis, there are three people ahead of this pt in need of dialysis and pt should receive dialysis around 2AM.

## 2018-11-27 NOTE — ASSESSMENT & PLAN NOTE
--transaminases & lipase WNL, no leukocytosis, pt denies constipation  --pt states has had similar pain previously, but does not know what she was diagnosed with nor what tx she received  --CT from 9/2017 for same complaint unremarkable, gallbladder surgically absent  --pt later reported to nursing that complaint is less pain, more nausea, and occurs frequently with HD  --will monitor for now

## 2018-11-27 NOTE — ASSESSMENT & PLAN NOTE
ESRD on iHD MWF  [facility]  4 hours  FLORENCE AVF  + residual renal function    Plan:  -No need for RRT today.  If remains in-house, plan for HD tomorrow.  -appears that has she has not followed up with cardiology since 2016 and her AS has worsened, likely contributing to her worsening symptoms.  -recommend setting up OP follow-up with cardiology for AS evaluation.    -hgb WNL.  No need for SATNAM  -recommend vascular surgery consult for ulceration on AVF      BMM  Renal diet  Phos levels daily  If elevated, can start on Renvela 800 mg po TID with meals.

## 2018-11-27 NOTE — RESIDENT HANDOFF
ICU Transfer of Care Note  Critical Care Medicine    Admit Date: 11/26/2018  LOS: 1    CC: Hypertensive emergency    Code Status: Full Code     Transfer to Hospital Medicine to be discussed by day team.      HPI and Hospital Course:     HPI:  Mrs. Georges is a 77 yo female with history significant for HTN, ESRD on HD, diastolic HF, right MCA CVA s/p thrombectomy (2016) who presented to the ED on 11/26 for worsening shortness of breath, cough that began on 11/25 and nausea with one episode of emesis that was non bloody, non bilious. She denies any fever, chills, headache, syncope, recent falls, chest pain, palpitations, ABD pain, hematemesis, melena, BRBPR, dysuria, or urinary frequency (does urinate about 2 times daily). She last reports receiving HD on Friday per her usual schedule. Upon ED presentation, she was noted to have HTN emergency with 's with tachypnea, acute on chronic hypoxemia. Labwork revealed hyperkalemia. She was shifted with D50, insulin and given bicarbonate. Bipap attempted due to work of breathing, but was discontinued after patient became nauseated with small amount emesis in the ED. MICU consulted for further management of hypertensive emergency and need for urgent HD.       Hospital/ICU Course:  Blood pressure improved with administration of patient's PO anti-hypertensive medications. Patient received HD overnight, tolerated well with improved BP & O2 requirements.       To Follow Up:     1) hypoxemic respiratory failure - secondary to pulmonary edema. Improved after volume removal with HD. Satting 97% on home O2 2L currently.    2) Hypertensive emergency - resolved. BP currently controlled on oral meds.     3) HFpEF - repeat echo with EF 55%, grade 2 DD, severe AS & PHTN. Volume removal per HD       Discharge Plan:     Likely able to return back home    Call [phone] with questions.     Floridalma Dick NP  Critical Care Medicine

## 2018-11-27 NOTE — PROGRESS NOTES
[facility]  Critical Care Medicine  Progress Note    Patient Name: Tea Georges  MRN: [id number]  Admission Date: 11/26/2018  Hospital Length of Stay: 1 days  Code Status: Full Code  Attending Provider: Mack Melendez MD  Primary Care Provider: Parth Posadas Ii, MD   Principal Problem: Hypertensive emergency    Subjective:     HPI:  Mrs. Georges is a 79 yo female with history significant for HTN, ESRD on HD, diastolic HF, right MCA CVA s/p thrombectomy (2016) who presented to the ED on 11/26 for worsening shortness of breath, cough that began on 11/25 and nausea with one episode of emesis that was non bloody, non bilious. She denies any fever, chills, headache, syncope, recent falls, chest pain, palpitations, ABD pain, hematemesis, melena, BRBPR, dysuria, or urinary frequency (does urinate about 2 times daily). She last reports receiving HD on Friday per her usual schedule. Upon ED presentation, she was noted to have HTN emergency with 's with tachypnea, acute on chronic hypoxemia. Labwork revealed hyperkalemia. She was shifted with D50, insulin and given bicarbonate. Bipap attempted due to work of breathing, but was discontinued after patient became nauseated with small amount emesis in the ED. MICU consulted for further management of hypertensive emergency and need for urgent HD.       Hospital/ICU Course:  Blood pressure improved with administration of patient's PO anti-hypertensive medications. Patient received HD overnight, tolerated well with improved BP & O2 requirements.     Interval History/Significant Events: Tolerated HD overnight. Complaining of wilian-umbilical pain this am.     Review of Systems   Constitutional: Negative for chills, diaphoresis and fever.   HENT: Negative for sinus pressure, sneezing and trouble swallowing.    Eyes: Negative for photophobia and visual disturbance.   Respiratory: Negative for cough, chest tightness and shortness of breath.     Cardiovascular: Negative for chest pain, palpitations and leg swelling.   Gastrointestinal: Positive for abdominal pain and nausea. Negative for abdominal distention, constipation, diarrhea and vomiting.   Endocrine: Negative.    Genitourinary: Negative for flank pain and pelvic pain.   Musculoskeletal: Negative for back pain, myalgias and neck pain.   Skin: Negative.    Allergic/Immunologic: Negative.    Neurological: Negative for dizziness, seizures and weakness.   Hematological: Negative.    Psychiatric/Behavioral: Negative.      Objective:     Vital Signs (Most Recent):  Temp: 97.9 °F (36.6 °C) (11/26/18 2330)  Pulse: (!) 59 (11/27/18 0000)  Resp: (!) 29 (11/27/18 0000)  BP: (!) 162/65 (11/27/18 0000)  SpO2: 98 % (11/27/18 0000) Vital Signs (24h Range):  Temp:  [97.9 °F (36.6 °C)-98.9 °F (37.2 °C)] 97.9 °F (36.6 °C)  Pulse:  [59-78] 59  Resp:  [18-44] 29  SpO2:  [90 %-100 %] 98 %  BP: (129-221)/(57-91) 162/65   Weight: 52.6 kg (115 lb 15.4 oz)  Body mass index is 20.54 kg/m².      Intake/Output Summary (Last 24 hours) at 11/27/2018 0252  Last data filed at 11/26/2018 2130  Gross per 24 hour   Intake 240 ml   Output 50 ml   Net 190 ml       Physical Exam   Constitutional: She is oriented to person, place, and time. She appears well-developed and well-nourished. No distress.   HENT:   Head: Normocephalic and atraumatic.   Right Ear: External ear normal.   Left Ear: External ear normal.   Nose: Nose normal.   Mouth/Throat: Oropharynx is clear and moist.   Eyes: Conjunctivae and EOM are normal. Pupils are equal, round, and reactive to light.   Neck: Normal range of motion. Neck supple.   Cardiovascular: Normal rate, regular rhythm and intact distal pulses.   Murmur heard.  Pulmonary/Chest: Effort normal and breath sounds normal. No respiratory distress. She has no wheezes. She has no rales.   Abdominal: Soft. Bowel sounds are normal. She exhibits no distension. There is no tenderness.   Musculoskeletal: Normal  range of motion. She exhibits no edema, tenderness or deformity.   Neurological: She is alert and oriented to person, place, and time.   Skin: Skin is warm and dry. Capillary refill takes 2 to 3 seconds. She is not diaphoretic.   Psychiatric: She has a normal mood and affect. Her behavior is normal. Judgment and thought content normal.   Nursing note and vitals reviewed.      Vents:     Lines/Drains/Airways     Drain                 Hemodialysis AV Fistula -- days         Hemodialysis AV Fistula Left upper arm -- days         Hemodialysis AV Fistula 05/23/16 0700 Left forearm 917 days          Peripheral Intravenous Line                 Peripheral IV - Single Lumen 11/26/18 0856 less than 1 day         Peripheral IV - Single Lumen 11/26/18 2017 Right Antecubital less than 1 day              Significant Labs:    CBC/Anemia Profile:  Recent Labs   Lab 11/26/18  0911 11/26/18  0916   WBC 6.31  --    HGB 12.0  --    HCT 40.8 41   *  --    *  --    RDW 17.4*  --         Chemistries:  Recent Labs   Lab 11/26/18  0911 11/26/18  1310 11/26/18 2001    145 143   K 6.0* 5.0 5.4*    108 108   CO2 16* 22* 23   BUN 74* 77* 82*   CREATININE 7.3* 7.4* 7.8*   CALCIUM 9.4 9.0 8.7   ALBUMIN 3.6  --   --    PROT 8.0  --   --    BILITOT 0.6  --   --    ALKPHOS 135  --   --    ALT 10  --   --    AST 26  --   --        All pertinent labs within the past 24 hours have been reviewed.    Significant Imaging:  I have reviewed and interpreted all pertinent imaging results/findings within the past 24 hours.    Assessment/Plan:     Neuro   Right-sided cerebrovascular accident (CVA)    --s/p thrombectomy 2016. Continue ASA, plavix, statin      Pulmonary   Acute on chronic respiratory failure with hypoxia and hypercapnia    --reportedly wears 2 L NC at home, no PFT's on file  --upon presentation, was tachypneic, in respiratory distress requiring NIPPV, then 5-6L NC.   --see below for HTN emergency  management.  --nephrology consulted for emergent HD for metabolic and volume management  --O2 requirements decreasing as volume removed with CRRT  --right sided effusion noted on CXR. Will evaluate with bedside US for size of pleural effusion and possible thoracentesis if needed. Suspect this is transudate given ESRD, diastolic HF but could consider thoracentesis if sepsis suspected.      Pleural effusion on right    --will evaluate  With bedside US   --afebrile, no leukocytosis, do not suspect as source of infection     Cardiac/Vascular   * Hypertensive emergency    --presented with , pulmonary edema, acute on chronic hypoxemic respiratory failure  --BP controlled after HD & po meds administered; continue PO medications   --no neurologic complaints, no chest pain, troponin negative      Acute on chronic combined systolic and diastolic heart failure    --known diastolic HF. Repeat 2D echo shows EF 55% with grade 2 diastolic dysfunction, PA pressure 73 and severe AS  --continue antihypertensive medications as above  --volume removal per HD  --no complaints of chest pain, troponin negative            Renal/   ESRD (end stage renal disease)    --HD per nephrology     GI   Recurrent periumbilical abdominal pain    --transaminases & lipase WNL, no leukocytosis, pt denies constipation  --pt states has had similar pain previously, but does not know what she was diagnosed with nor what tx she received  --CT from 9/2017 for same complaint unremarkable, gallbladder surgically absent  --pt later reported to nursing that complaint is less pain, more nausea, and occurs frequently with HD  --will monitor for now        Patient to be evaluated by and further recommendations per Dr. Melendez.    Likely able to stepdown to Hospital Medicine today. Will defer to Dr. Melendez.      I spent >70 minutes reviewing patient records, examining, and counseling the patient with greater than 50% of the time spent with direct patient care  and coordination.          Floridalma Dick NP  Critical Care Medicine  [facility]

## 2018-11-27 NOTE — PLAN OF CARE
Problem: Patient Care Overview  Goal: Plan of Care Review  Outcome: Ongoing (interventions implemented as appropriate)  Pt admitted to unit from ED for hypertensive emergency in need of dialysis. Pt alert, oriented X4 and able to move all extremities. Lung sounds initially coarse with cough noted. Post dialysis lung sounds diminshed. Total of 2.5L removed with HD. Pt HR 57-60s in SB/SR. BP 120s-170s systolic. Loud murmur heard with ascultation. Team aware. Afebrile. Pt also complaining of R eye pain and burning. Attempted to flush eye with no relief of symptoms. Team notified with no new orders given. PRN tylenol given for headache with stated relief. Zofran managed nausea post dialysis. POC reviewed with pt, all needs and concerns addressed. Plan to step down today.

## 2018-11-27 NOTE — SUBJECTIVE & OBJECTIVE
Interval History/Significant Events: Tolerated HD overnight. Complaining of wilian-umbilical pain this am.     Review of Systems   Constitutional: Negative for chills, diaphoresis and fever.   HENT: Negative for sinus pressure, sneezing and trouble swallowing.    Eyes: Negative for photophobia and visual disturbance.   Respiratory: Negative for cough, chest tightness and shortness of breath.    Cardiovascular: Negative for chest pain, palpitations and leg swelling.   Gastrointestinal: Positive for abdominal pain and nausea. Negative for abdominal distention, constipation, diarrhea and vomiting.   Endocrine: Negative.    Genitourinary: Negative for flank pain and pelvic pain.   Musculoskeletal: Negative for back pain, myalgias and neck pain.   Skin: Negative.    Allergic/Immunologic: Negative.    Neurological: Negative for dizziness, seizures and weakness.   Hematological: Negative.    Psychiatric/Behavioral: Negative.      Objective:     Vital Signs (Most Recent):  Temp: 97.9 °F (36.6 °C) (11/26/18 2330)  Pulse: (!) 59 (11/27/18 0000)  Resp: (!) 29 (11/27/18 0000)  BP: (!) 162/65 (11/27/18 0000)  SpO2: 98 % (11/27/18 0000) Vital Signs (24h Range):  Temp:  [97.9 °F (36.6 °C)-98.9 °F (37.2 °C)] 97.9 °F (36.6 °C)  Pulse:  [59-78] 59  Resp:  [18-44] 29  SpO2:  [90 %-100 %] 98 %  BP: (129-221)/(57-91) 162/65   Weight: 52.6 kg (115 lb 15.4 oz)  Body mass index is 20.54 kg/m².      Intake/Output Summary (Last 24 hours) at 11/27/2018 0252  Last data filed at 11/26/2018 2130  Gross per 24 hour   Intake 240 ml   Output 50 ml   Net 190 ml       Physical Exam   Constitutional: She is oriented to person, place, and time. She appears well-developed and well-nourished. No distress.   HENT:   Head: Normocephalic and atraumatic.   Right Ear: External ear normal.   Left Ear: External ear normal.   Nose: Nose normal.   Mouth/Throat: Oropharynx is clear and moist.   Eyes: Conjunctivae and EOM are normal. Pupils are equal, round, and  reactive to light.   Neck: Normal range of motion. Neck supple.   Cardiovascular: Normal rate, regular rhythm and intact distal pulses.   Murmur heard.  Pulmonary/Chest: Effort normal and breath sounds normal. No respiratory distress. She has no wheezes. She has no rales.   Abdominal: Soft. Bowel sounds are normal. She exhibits no distension. There is no tenderness.   Musculoskeletal: Normal range of motion. She exhibits no edema, tenderness or deformity.   Neurological: She is alert and oriented to person, place, and time.   Skin: Skin is warm and dry. Capillary refill takes 2 to 3 seconds. She is not diaphoretic.   Psychiatric: She has a normal mood and affect. Her behavior is normal. Judgment and thought content normal.   Nursing note and vitals reviewed.      Vents:     Lines/Drains/Airways     Drain                 Hemodialysis AV Fistula -- days         Hemodialysis AV Fistula Left upper arm -- days         Hemodialysis AV Fistula 05/23/16 0700 Left forearm 917 days          Peripheral Intravenous Line                 Peripheral IV - Single Lumen 11/26/18 0856 less than 1 day         Peripheral IV - Single Lumen 11/26/18 2017 Right Antecubital less than 1 day              Significant Labs:    CBC/Anemia Profile:  Recent Labs   Lab 11/26/18  0911 11/26/18  0916   WBC 6.31  --    HGB 12.0  --    HCT 40.8 41   *  --    *  --    RDW 17.4*  --         Chemistries:  Recent Labs   Lab 11/26/18  0911 11/26/18  1310 11/26/18 2001    145 143   K 6.0* 5.0 5.4*    108 108   CO2 16* 22* 23   BUN 74* 77* 82*   CREATININE 7.3* 7.4* 7.8*   CALCIUM 9.4 9.0 8.7   ALBUMIN 3.6  --   --    PROT 8.0  --   --    BILITOT 0.6  --   --    ALKPHOS 135  --   --    ALT 10  --   --    AST 26  --   --        All pertinent labs within the past 24 hours have been reviewed.    Significant Imaging:  I have reviewed and interpreted all pertinent imaging results/findings within the past 24 hours.

## 2018-11-27 NOTE — NURSING
"Ambulated with patient to bathroom. Liquid stool. Pt states "this happens when I drink apple juice and jello." which is what patient had this am. On return to bed, pt stated she felt weak. Denies SOB, dizzy, light headed, nausea. Had to increase o2 to 4L sats stating 84-85 on 2L. Sats currently 92 on 4L nasal cannula. Team called and asked to come to bedside to visualize patient before transfer. Currently lying in bed. HR beba 54, /57. Team stated they will be down shortly to see her. WCTM  "

## 2018-11-27 NOTE — ASSESSMENT & PLAN NOTE
--known diastolic HF. Repeat 2D echo shows EF 55% with grade 2 diastolic dysfunction, PA pressure 73 and severe AS  --continue antihypertensive medications as above  --volume removal per HD  --no complaints of chest pain, troponin negative

## 2018-11-27 NOTE — SUBJECTIVE & OBJECTIVE
"Interval History:   HD treatment completed last night, net UF of 2.5L obtained.  Patient c/o cramping near end of treatment.  She voiced improvement in her SOB, but remains SOB at baseline.    C/o feeling "weak" this morning.    Review of patient's allergies indicates:  No Known Allergies  Current Facility-Administered Medications   Medication Frequency    acetaminophen tablet 650 mg Q6H PRN    albuterol-ipratropium 2.5 mg-0.5 mg/3 mL nebulizer solution 3 mL Q4H PRN    amLODIPine tablet 10 mg Daily    aspirin chewable tablet 81 mg Daily    atorvastatin tablet 40 mg Daily    carvedilol tablet 12.5 mg BID    clopidogrel tablet 75 mg Daily    dextrose 50% injection 25 g PRN    erythromycin 5 mg/gram (0.5 %) ophthalmic ointment Q8H    fluticasone-vilanterol 200-25 mcg/dose diskus inhaler 1 puff Daily    heparin (porcine) injection 5,000 Units Q8H    hydrALAZINE tablet 25 mg Q8H    isosorbide mononitrate 24 hr tablet 30 mg Daily    ondansetron injection 4 mg Q8H PRN    sevelamer carbonate tablet 800 mg TID WM       Objective:     Vital Signs (Most Recent):  Temp: 98.7 °F (37.1 °C) (11/27/18 1100)  Pulse: (!) 52 (11/27/18 1100)  Resp: 20 (11/27/18 1100)  BP: (!) 110/55 (11/27/18 1100)  SpO2: 95 % (11/27/18 1100)  O2 Device (Oxygen Therapy): nasal cannula (11/27/18 1100) Vital Signs (24h Range):  Temp:  [97.5 °F (36.4 °C)-98.9 °F (37.2 °C)] 98.7 °F (37.1 °C)  Pulse:  [52-66] 52  Resp:  [18-34] 20  SpO2:  [90 %-100 %] 95 %  BP: (110-176)/(55-74) 110/55     Weight: 49.9 kg (110 lb 0.2 oz) (11/27/18 0400)  Body mass index is 19.49 kg/m².  Body surface area is 1.49 meters squared.    I/O last 3 completed shifts:  In: 240 [P.O.:240]  Out: 50 [Urine:50]    Physical Exam   Constitutional: She is oriented to person, place, and time. She appears well-developed. She has a sickly appearance. She appears ill. No distress. Nasal cannula in place.   HENT:   Head: Normocephalic and atraumatic.   Right Ear: External ear " normal.   Left Ear: External ear normal.   Eyes: Conjunctivae and EOM are normal. Right eye exhibits no discharge. Left eye exhibits no discharge. No scleral icterus.   Neck: Normal range of motion. Neck supple.   Cardiovascular: Regular rhythm. Bradycardia present. Exam reveals no gallop and no friction rub.   No murmur heard.  Pulmonary/Chest: No respiratory distress. She has no wheezes. She has rales.   Abdominal: Soft. Bowel sounds are normal. She exhibits no distension. There is no tenderness.   Musculoskeletal: She exhibits no edema or deformity.   Neurological: She is alert and oriented to person, place, and time.   Skin: Skin is dry. She is not diaphoretic.   Psychiatric: She has a normal mood and affect. Her behavior is normal.       Significant Labs:  CBC:   Recent Labs   Lab 11/27/18 0236   WBC 8.12   RBC 3.74*   HGB 11.1*   HCT 35.8*   *   MCV 96   MCH 29.7   MCHC 31.0*     CMP:   Recent Labs   Lab 11/27/18 0236   GLU 80   CALCIUM 9.4   ALBUMIN 3.6   PROT 7.9      K 3.2*   CO2 23      BUN 16   CREATININE 2.3*   ALKPHOS 137*   ALT 9*   AST 15   BILITOT 0.8

## 2018-11-27 NOTE — ASSESSMENT & PLAN NOTE
--reportedly wears 2 L NC at home, no PFT's on file  --upon presentation, was tachypneic, in respiratory distress requiring NIPPV, then 5-6L NC.   --see below for HTN emergency management.  --nephrology consulted for emergent HD for metabolic and volume management  --O2 requirements decreasing as volume removed with CRRT  --right sided effusion noted on CXR. Will evaluate with bedside US for size of pleural effusion and possible thoracentesis if needed. Suspect this is transudate given ESRD, diastolic HF but could consider thoracentesis if sepsis suspected.

## 2018-11-27 NOTE — PROGRESS NOTES
"3-hr dialysis tx completed with net UF of 2.5L. Pt verbalized "feeling sick",  complained of stomach pain and headache.  Tylenol given by bedside RN. Rinsed back blood. Pulled out needles. Pressure applied and hemostasis achieved. Positive thrill and bruit. Pt reported mild cramps on left leg post HD. Report given to primaryRN.  "

## 2018-11-28 ENCOUNTER — ANESTHESIA EVENT (OUTPATIENT)
Dept: SURGERY | Facility: HOSPITAL | Age: 78
DRG: 252 | End: 2018-11-28
Payer: MEDICARE

## 2018-11-28 PROBLEM — R10.33: Status: RESOLVED | Noted: 2018-11-27 | Resolved: 2018-11-28

## 2018-11-28 LAB
ALBUMIN SERPL BCP-MCNC: 2.9 G/DL
ALP SERPL-CCNC: 107 U/L
ALT SERPL W/O P-5'-P-CCNC: 8 U/L
ANION GAP SERPL CALC-SCNC: 11 MMOL/L
AST SERPL-CCNC: 11 U/L
BASOPHILS # BLD AUTO: 0.01 K/UL
BASOPHILS NFR BLD: 0.2 %
BILIRUB DIRECT SERPL-MCNC: 0.2 MG/DL
BILIRUB SERPL-MCNC: 0.4 MG/DL
BUN SERPL-MCNC: 49 MG/DL
CALCIUM SERPL-MCNC: 8 MG/DL
CHLORIDE SERPL-SCNC: 101 MMOL/L
CO2 SERPL-SCNC: 21 MMOL/L
CREAT SERPL-MCNC: 5.9 MG/DL
DIFFERENTIAL METHOD: ABNORMAL
EOSINOPHIL # BLD AUTO: 0.1 K/UL
EOSINOPHIL NFR BLD: 1.9 %
ERYTHROCYTE [DISTWIDTH] IN BLOOD BY AUTOMATED COUNT: 16.9 %
EST. GFR  (AFRICAN AMERICAN): 7.4 ML/MIN/1.73 M^2
EST. GFR  (NON AFRICAN AMERICAN): 6.4 ML/MIN/1.73 M^2
GLUCOSE SERPL-MCNC: 87 MG/DL
HBV SURFACE AG SERPL QL IA: NEGATIVE
HCT VFR BLD AUTO: 31.8 %
HGB BLD-MCNC: 9.7 G/DL
IMM GRANULOCYTES # BLD AUTO: 0.01 K/UL
IMM GRANULOCYTES NFR BLD AUTO: 0.2 %
LYMPHOCYTES # BLD AUTO: 0.3 K/UL
LYMPHOCYTES NFR BLD: 7.2 %
MAGNESIUM SERPL-MCNC: 2.1 MG/DL
MCH RBC QN AUTO: 29.2 PG
MCHC RBC AUTO-ENTMCNC: 30.5 G/DL
MCV RBC AUTO: 96 FL
MONOCYTES # BLD AUTO: 0.9 K/UL
MONOCYTES NFR BLD: 20.6 %
NEUTROPHILS # BLD AUTO: 2.9 K/UL
NEUTROPHILS NFR BLD: 69.9 %
NRBC BLD-RTO: 0 /100 WBC
PHOSPHATE SERPL-MCNC: 6.9 MG/DL
PLATELET # BLD AUTO: 86 K/UL
PMV BLD AUTO: 12.8 FL
POCT GLUCOSE: 79 MG/DL (ref 70–110)
POCT GLUCOSE: 97 MG/DL (ref 70–110)
POTASSIUM SERPL-SCNC: 4.6 MMOL/L
PROT SERPL-MCNC: 6.4 G/DL
RBC # BLD AUTO: 3.32 M/UL
SODIUM SERPL-SCNC: 133 MMOL/L
WBC # BLD AUTO: 4.18 K/UL

## 2018-11-28 PROCEDURE — 99152 MOD SED SAME PHYS/QHP 5/>YRS: CPT | Performed by: SURGERY

## 2018-11-28 PROCEDURE — 83735 ASSAY OF MAGNESIUM: CPT

## 2018-11-28 PROCEDURE — 99223 1ST HOSP IP/OBS HIGH 75: CPT | Mod: ,,, | Performed by: SURGERY

## 2018-11-28 PROCEDURE — 90935 HEMODIALYSIS ONE EVALUATION: CPT | Mod: ,,, | Performed by: INTERNAL MEDICINE

## 2018-11-28 PROCEDURE — 27201423 OPTIME MED/SURG SUP & DEVICES STERILE SUPPLY: Performed by: SURGERY

## 2018-11-28 PROCEDURE — 25500020 PHARM REV CODE 255: Performed by: SURGERY

## 2018-11-28 PROCEDURE — 99233 SBSQ HOSP IP/OBS HIGH 50: CPT | Mod: ,,, | Performed by: HOSPITALIST

## 2018-11-28 PROCEDURE — 87340 HEPATITIS B SURFACE AG IA: CPT

## 2018-11-28 PROCEDURE — 36902 INTRO CATH DIALYSIS CIRCUIT: CPT | Mod: GC,,, | Performed by: SURGERY

## 2018-11-28 PROCEDURE — 25000242 PHARM REV CODE 250 ALT 637 W/ HCPCS: Performed by: NURSE PRACTITIONER

## 2018-11-28 PROCEDURE — 84100 ASSAY OF PHOSPHORUS: CPT

## 2018-11-28 PROCEDURE — 36902 INTRO CATH DIALYSIS CIRCUIT: CPT | Performed by: SURGERY

## 2018-11-28 PROCEDURE — 25000003 PHARM REV CODE 250: Performed by: HOSPITALIST

## 2018-11-28 PROCEDURE — 85025 COMPLETE CBC W/AUTO DIFF WBC: CPT

## 2018-11-28 PROCEDURE — 80076 HEPATIC FUNCTION PANEL: CPT

## 2018-11-28 PROCEDURE — 63600175 PHARM REV CODE 636 W HCPCS: Performed by: NURSE PRACTITIONER

## 2018-11-28 PROCEDURE — 99152 MOD SED SAME PHYS/QHP 5/>YRS: CPT | Mod: ,,, | Performed by: SURGERY

## 2018-11-28 PROCEDURE — 05773ZZ DILATION OF RIGHT AXILLARY VEIN, PERCUTANEOUS APPROACH: ICD-10-PCS | Performed by: SURGERY

## 2018-11-28 PROCEDURE — 11000001 HC ACUTE MED/SURG PRIVATE ROOM

## 2018-11-28 PROCEDURE — 90935 HEMODIALYSIS ONE EVALUATION: CPT

## 2018-11-28 PROCEDURE — C1894 INTRO/SHEATH, NON-LASER: HCPCS | Performed by: SURGERY

## 2018-11-28 PROCEDURE — C1769 GUIDE WIRE: HCPCS | Performed by: SURGERY

## 2018-11-28 PROCEDURE — 80048 BASIC METABOLIC PNL TOTAL CA: CPT

## 2018-11-28 PROCEDURE — 25000003 PHARM REV CODE 250: Performed by: SURGERY

## 2018-11-28 PROCEDURE — C1725 CATH, TRANSLUMIN NON-LASER: HCPCS | Performed by: SURGERY

## 2018-11-28 PROCEDURE — 36415 COLL VENOUS BLD VENIPUNCTURE: CPT

## 2018-11-28 PROCEDURE — 63600175 PHARM REV CODE 636 W HCPCS: Performed by: SURGERY

## 2018-11-28 PROCEDURE — B51W1ZZ FLUOROSCOPY OF DIALYSIS SHUNT/FISTULA USING LOW OSMOLAR CONTRAST: ICD-10-PCS | Performed by: SURGERY

## 2018-11-28 PROCEDURE — 25000003 PHARM REV CODE 250: Performed by: NURSE PRACTITIONER

## 2018-11-28 RX ORDER — SODIUM CHLORIDE 0.9 % (FLUSH) 0.9 %
5 SYRINGE (ML) INJECTION
Status: DISCONTINUED | OUTPATIENT
Start: 2018-11-28 | End: 2018-11-28 | Stop reason: HOSPADM

## 2018-11-28 RX ORDER — CARVEDILOL 25 MG/1
25 TABLET ORAL 2 TIMES DAILY
Status: DISCONTINUED | OUTPATIENT
Start: 2018-11-28 | End: 2018-11-29

## 2018-11-28 RX ORDER — LIDOCAINE HYDROCHLORIDE 10 MG/ML
1 INJECTION, SOLUTION EPIDURAL; INFILTRATION; INTRACAUDAL; PERINEURAL ONCE
Status: DISCONTINUED | OUTPATIENT
Start: 2018-11-28 | End: 2018-11-28 | Stop reason: HOSPADM

## 2018-11-28 RX ORDER — MIDAZOLAM HYDROCHLORIDE 1 MG/ML
INJECTION, SOLUTION INTRAMUSCULAR; INTRAVENOUS
Status: DISCONTINUED | OUTPATIENT
Start: 2018-11-28 | End: 2018-11-28 | Stop reason: HOSPADM

## 2018-11-28 RX ORDER — MUPIROCIN 20 MG/G
OINTMENT TOPICAL
Status: DISCONTINUED | OUTPATIENT
Start: 2018-11-28 | End: 2018-11-28 | Stop reason: HOSPADM

## 2018-11-28 RX ORDER — SODIUM CHLORIDE 9 MG/ML
INJECTION, SOLUTION INTRAVENOUS ONCE
Status: COMPLETED | OUTPATIENT
Start: 2018-11-28 | End: 2018-11-28

## 2018-11-28 RX ORDER — LIDOCAINE HYDROCHLORIDE 20 MG/ML
INJECTION, SOLUTION EPIDURAL; INFILTRATION; INTRACAUDAL; PERINEURAL
Status: DISCONTINUED | OUTPATIENT
Start: 2018-11-28 | End: 2018-11-28 | Stop reason: HOSPADM

## 2018-11-28 RX ORDER — FENTANYL CITRATE 50 UG/ML
INJECTION, SOLUTION INTRAMUSCULAR; INTRAVENOUS
Status: DISCONTINUED | OUTPATIENT
Start: 2018-11-28 | End: 2018-11-28 | Stop reason: HOSPADM

## 2018-11-28 RX ORDER — SODIUM CHLORIDE 9 MG/ML
INJECTION, SOLUTION INTRAVENOUS
Status: DISCONTINUED | OUTPATIENT
Start: 2018-11-28 | End: 2018-11-30 | Stop reason: HOSPADM

## 2018-11-28 RX ADMIN — CLOPIDOGREL 75 MG: 75 TABLET, FILM COATED ORAL at 03:11

## 2018-11-28 RX ADMIN — ERYTHROMYCIN 1 INCH: 5 OINTMENT OPHTHALMIC at 05:11

## 2018-11-28 RX ADMIN — FLUTICASONE FUROATE AND VILANTEROL TRIFENATATE 1 PUFF: 200; 25 POWDER RESPIRATORY (INHALATION) at 03:11

## 2018-11-28 RX ADMIN — HYDRALAZINE HYDROCHLORIDE 25 MG: 25 TABLET ORAL at 09:11

## 2018-11-28 RX ADMIN — ERYTHROMYCIN 1 INCH: 5 OINTMENT OPHTHALMIC at 02:11

## 2018-11-28 RX ADMIN — AMLODIPINE BESYLATE 10 MG: 10 TABLET ORAL at 03:11

## 2018-11-28 RX ADMIN — ONDANSETRON 4 MG: 2 INJECTION INTRAMUSCULAR; INTRAVENOUS at 01:11

## 2018-11-28 RX ADMIN — SODIUM CHLORIDE: 0.9 INJECTION, SOLUTION INTRAVENOUS at 07:11

## 2018-11-28 RX ADMIN — CARVEDILOL 12.5 MG: 12.5 TABLET, FILM COATED ORAL at 02:11

## 2018-11-28 RX ADMIN — ISOSORBIDE MONONITRATE 30 MG: 30 TABLET, EXTENDED RELEASE ORAL at 03:11

## 2018-11-28 RX ADMIN — CARVEDILOL 25 MG: 25 TABLET, FILM COATED ORAL at 09:11

## 2018-11-28 RX ADMIN — HEPARIN SODIUM 5000 UNITS: 5000 INJECTION, SOLUTION INTRAVENOUS; SUBCUTANEOUS at 02:11

## 2018-11-28 RX ADMIN — ATORVASTATIN CALCIUM 40 MG: 20 TABLET, FILM COATED ORAL at 03:11

## 2018-11-28 RX ADMIN — HYDRALAZINE HYDROCHLORIDE 25 MG: 25 TABLET ORAL at 02:11

## 2018-11-28 RX ADMIN — ERYTHROMYCIN 1 INCH: 5 OINTMENT OPHTHALMIC at 09:11

## 2018-11-28 RX ADMIN — ASPIRIN 81 MG CHEWABLE TABLET 81 MG: 81 TABLET CHEWABLE at 03:11

## 2018-11-28 NOTE — PLAN OF CARE
Problem: Patient Care Overview  Goal: Plan of Care Review  Outcome: Ongoing (interventions implemented as appropriate)  Pt free of falls/injuries throughout the shift. Bed locked, in lowest position, call bell within reach. Pt afebrile, pain assessed & denied. VSS, pt in no distress, will continue to monitor. NPO at 0000, nausea treated.

## 2018-11-28 NOTE — PROGRESS NOTES
[facility] HEMODIALYSIS NOTE     Patient currently on hemodialysis for removal of uremic toxins and volume.    Patient seen and evaluated on hemodialysis, tolerating treatment, see HD flowsheet for vitals and assessments.      Ultrafiltration goal is 2-3 L as tolerated      Labs have been reviewed and the dialysate bath has been adjusted.     Assessment/Plan:     HD today for removal of uremic toxins and volume.

## 2018-11-28 NOTE — HOSPITAL COURSE
Mrs. Georges is a 79 yo female with history significant for HTN, ESRD on HD, diastolic HF, right MCA CVA s/p thrombectomy (2016) who presented to the ED on 11/26 for worsening shortness of breath, cough that began on 11/25 and nausea with one episode of emesis that was non bloody, non bilious. She denies any fever, chills, headache, syncope, recent falls, chest pain, palpitations, ABD pain, hematemesis, melena, BRBPR, dysuria, or urinary frequency (does urinate about 2 times daily). She last reports receiving HD on Friday per her usual schedule. Upon ED presentation, she was noted to have HTN emergency with 's with tachypnea, acute on chronic hypoxemia. Labwork revealed hyperkalemia. She was shifted with D50, insulin and given bicarbonate. Bipap attempted due to work of breathing, but was discontinued after patient became nauseated with small amount emesis in the ED. MICU consulted for further management of hypertensive emergency and need for urgent HD.      Blood pressure improved with administration of patient's PO anti-hypertensive medications. Patient received HD overnight, tolerated well with improved BP & O2 requirements. She is now on her home 2L O2 and BP is well controlled. Notably, she has progression of her aortic stenosis and will need f/u with Cardiology.    Notably, she was seen to have ulceration of AVF while in house. Vascular Surgery consulted and performed fistulogram on 11/28 with revision and permacath placement on 11/29.    Vitals:    11/30/18 1130 11/30/18 1145 11/30/18 1200 11/30/18 1205   BP: (!) 172/54 (!) 169/56 (!) 184/52 (!) 175/54   BP Location: Right arm Right arm Right arm Right arm   Patient Position:    Lying   Pulse:  (!) 59  (!) 55   Resp:    20   Temp:    98.3 °F (36.8 °C)   TempSrc:    Oral   SpO2:       Weight:       Height:         Physical Exam   Constitutional: She is oriented to person, place, and time. She appears well-developed and well-nourished.   HENT:   Head:  Normocephalic and atraumatic.   Eyes: EOM are normal. Pupils are equal, round, and reactive to light.   R eye conjunctivitis   Neck: Normal range of motion. No JVD present.   Cardiovascular: Normal rate, regular rhythm and normal heart sounds.   Pulmonary/Chest: Effort normal and breath sounds normal. No respiratory distress.   Abdominal: Soft. She exhibits no distension. There is no tenderness.   Musculoskeletal: She exhibits no edema or deformity.   Left arm AVF with ulceration and scab   Neurological: She is alert and oriented to person, place, and time.   Skin: Skin is warm and dry. No rash noted.   Psychiatric: She has a normal mood and affect. Her behavior is normal.   Vitals reviewed.

## 2018-11-28 NOTE — ANESTHESIA PREPROCEDURE EVALUATION
[facility]  Anesthesia Pre-Operative Evaluation         Patient Name: Tea Georges  YOB: 1940  MRN: [id number]    SUBJECTIVE:     Pre-operative evaluation for Procedure(s) (LRB):  REVISION, AV FISTULA, permacath placement (Left)     11/28/2018    Tea Georges is a 78 y.o. female w/ a significant PMHx of ESRD on HD (MWF), HTN, CVA of Right MCA s/p thrombectomy (2016), T2DM, HFpEF, COPD, CAD who is admitted for hypertensive emergency and incidental finding of very pulsatile AV fistula with some scabbing on an aneurysm.     Patient now presents for the above procedure(s).    LDA: None documented.       Peripheral IV - Single Lumen 11/26/18 0856 (Active)   Site Assessment Clean;Dry;Intact 11/28/2018  7:05 AM   Line Status Flushed 11/28/2018  7:05 AM   Dressing Status Clean;Dry;Intact 11/28/2018  7:05 AM   Dressing Intervention New dressing 11/27/2018  3:30 AM   Dressing Change Due 11/30/18 11/27/2018  7:23 AM   Site Change Due 11/30/18 11/27/2018  7:23 AM   Reason Not Rotated Not due 11/27/2018  3:30 AM   Number of days: 2            Peripheral IV - Single Lumen 11/26/18 2017 Right Antecubital (Active)   Site Assessment Clean;Dry;Intact 11/28/2018  7:05 AM   Line Status Flushed 11/28/2018  7:05 AM   Dressing Status Clean;Dry;Intact 11/28/2018  7:05 AM   Dressing Intervention New dressing 11/27/2018  3:30 AM   Dressing Change Due 11/30/18 11/27/2018  7:23 AM   Site Change Due 11/30/18 11/27/2018  7:23 AM   Reason Not Rotated Not due 11/27/2018  3:30 AM   Number of days: 1            Hemodialysis AV Fistula 05/23/16 0700 Left forearm (Active)   Needle Size 15ga 11/28/2018 11:25 AM   Site Assessment Clean;Dry;Intact 11/28/2018 11:25 AM   Patency Present;Thrill;Bruit 11/28/2018 11:25 AM   Status Deaccessed 11/28/2018 11:25 AM   Flows Good 11/28/2018  7:55 AM   Dressing Intervention New dressing 11/28/2018 11:25 AM   Dressing Status Clean;Dry;Intact 11/28/2018 11:25 AM   Site Condition Other  (Comment) 11/28/2018 11:25 AM   Dressing Gauze 11/28/2018 11:25 AM   Number of days: 919            Hemodialysis AV Fistula Left upper arm (Active)   Number of days:        Prev airway: None documented.    Drips: None documented.      Patient Active Problem List   Diagnosis    Pleural effusion on right    Essential hypertension    Acute on chronic combined systolic and diastolic heart failure    ESRD (end stage renal disease)    Aortic stenosis, moderate    Acute diastolic congestive heart failure    Anemia in ESRD (end-stage renal disease)    Hyperparathyroidism, secondary renal    Left spastic hemiparesis    Blindness of right eye    Physical debility    Aortic atherosclerosis    Peripheral vascular disease, unspecified    Right-sided cerebrovascular accident (CVA)    Vitamin D insufficiency    CAD (coronary artery disease)    COPD (chronic obstructive pulmonary disease)    Thrombocytopenia, unspecified    Non-rheumatic tricuspid valve insufficiency    Hypertensive emergency    Acute on chronic respiratory failure with hypoxia and hypercapnia    Recurrent periumbilical abdominal pain    Aneurysm of arteriovenous dialysis fistula       Review of patient's allergies indicates:  No Known Allergies    Current Inpatient Medications:   amLODIPine  10 mg Oral Daily    aspirin  81 mg Oral Daily    atorvastatin  40 mg Oral Daily    carvedilol  12.5 mg Oral BID    clopidogrel  75 mg Oral Daily    erythromycin   Right Eye Q8H    fluticasone-vilanterol  1 puff Inhalation Daily    heparin (porcine)  5,000 Units Subcutaneous Q8H    hydrALAZINE  25 mg Oral Q8H    isosorbide mononitrate  30 mg Oral Daily       No current facility-administered medications on file prior to encounter.      Current Outpatient Medications on File Prior to Encounter   Medication Sig Dispense Refill    albuterol 90 mcg/actuation inhaler Inhale 1-2 puffs into the lungs every 6 (six) hours as needed for Wheezing. 1  Inhaler 0    amLODIPine (NORVASC) 10 MG tablet Take 1 tablet (10 mg total) by mouth once daily. 90 tablet 3    atorvastatin (LIPITOR) 40 MG tablet Take 1 tablet (40 mg total) by mouth once daily. 90 tablet 3    carvedilol (COREG) 25 MG tablet Take 1 tablet (25 mg total) by mouth 2 (two) times daily. 180 tablet 3    clopidogrel (PLAVIX) 75 mg tablet Take 1 tablet (75 mg total) by mouth once daily. 90 tablet 3    ergocalciferol (ERGOCALCIFEROL) 50,000 unit Cap Take 1 capsule (50,000 Units total) by mouth every 7 days. 12 capsule 3    fluticasone-vilanterol (BREO ELLIPTA) 200-25 mcg/dose DsDv diskus inhaler Inhale 1 puff into the lungs once daily. 90 each 3    hydrALAZINE (APRESOLINE) 50 MG tablet Take 1 tablet (50 mg total) by mouth 3 (three) times daily. 270 tablet 3    isosorbide dinitrate (ISOCHRON) 40 mg TbSR Take 1 tablet (40 mg total) by mouth every 12 (twelve) hours. 180 tablet 3    loperamide (IMODIUM) 2 mg capsule Take one capsule at onset of diarrhea.  No more than three capsules daily 30 capsule 3    multivitamin (THERAGRAN) per tablet Take 1 tablet by mouth once daily.      ondansetron (ZOFRAN) 4 MG tablet Take 2 tablets (8 mg total) by mouth every 6 (six) hours. 12 tablet 0    RENVELA 800 mg Tab Take 1 tablet (800 mg total) by mouth 3 (three) times daily with meals.      valsartan (DIOVAN) 160 MG tablet Take 1 tablet (160 mg total) by mouth once daily. 90 tablet 3    aspirin 81 MG Chew Take 1 tablet (81 mg total) by mouth once daily. 30 tablet 1    citalopram (CELEXA) 10 MG tablet Take 1 tablet (10 mg total) by mouth once daily. 90 tablet 3    lidocaine-prilocaine (EMLA) cream Apply topically as needed (to apply over access 1/2 over prior to dialysis). 25 g 1       Past Surgical History:   Procedure Laterality Date    BREAST SURGERY      tumor removal x 2    CATARACT EXTRACTION      CHOLECYSTECTOMY      COLONOSCOPY N/A 5/23/2016    Procedure: COLONOSCOPY;  Surgeon: WILLIAM Colvin MD;   [facility]  Service: Endoscopy;  Laterality: N/A;    COLONOSCOPY N/A 5/30/2016    Procedure: COLONOSCOPY;  Surgeon: Sam Davis MD;  [facility]  Service: Endoscopy;  Laterality: N/A;    COLONOSCOPY N/A 5/30/2016    Performed by Sam Davis MD at [facility] (2ND FLR)    COLONOSCOPY N/A 5/23/2016    Performed by WILLIAM Colvin MD at [facility] (2ND FLR)    ESOPHAGOGASTRODUODENOSCOPY (EGD) N/A 5/30/2016    Performed by Sam Davis MD at [facility] (2ND FLR)    ESOPHAGOGASTRODUODENOSCOPY (EGD) N/A 5/27/2016    Performed by Cesario Rubio MD at [facility] (2ND FLR)    UPPER GASTROINTESTINAL ENDOSCOPY         Social History     Socioeconomic History    Marital status:      Spouse name: Not on file    Number of children: Not on file    Years of education: Not on file    Highest education level: Not on file   Social Needs    Financial resource strain: Not on file    Food insecurity - worry: Not on file    Food insecurity - inability: Not on file    Transportation needs - medical: Not on file    Transportation needs - non-medical: Not on file   Occupational History    Occupation: Housekeeping     Employer: [employer]     Comment: Retired; 20-years there   Tobacco Use    Smoking status: Never Smoker    Smokeless tobacco: Never Used   Substance and Sexual Activity    Alcohol use: No     Comment: Reports occasional 1-2 drinks     Drug use: No    Sexual activity: No   Other Topics Concern    Not on file   Social History Narrative    Not on file       OBJECTIVE:     Vital Signs Range (Last 24H):  Temp:  [36.6 °C (97.9 °F)-36.9 °C (98.5 °F)]   Pulse:  [51-66]   Resp:  [17-23]   BP: (125-184)/(45-78)   SpO2:  [95 %-100 %]       Significant Labs:  Lab Results   Component Value Date    WBC 4.18 11/28/2018    HGB 9.7 (L) 11/28/2018    HCT 31.8 (L) 11/28/2018    PLT 86 (L) 11/28/2018    CHOL 100 (L) 09/30/2016    TRIG 115 09/30/2016     HDL 34 (L) 09/30/2016    ALT 8 (L) 11/28/2018    AST 11 11/28/2018     (L) 11/28/2018    K 4.6 11/28/2018     11/28/2018    CREATININE 5.9 (H) 11/28/2018    BUN 49 (H) 11/28/2018    CO2 21 (L) 11/28/2018    TSH 1.371 05/01/2016    INR 1.0 11/26/2018    HGBA1C 4.4 (L) 01/16/2017       Diagnostic Studies: No relevant studies.    EKG:   Sinus rhythm with 1st degree A-V block  Nonspecific ST and T wave abnormality  Abnormal ECG  When compared with ECG of 29-OCT-2018 20:12,  Premature atrial complexes are no longer Present  Minimal criteria for Septal infarct are no longer Present  T wave inversion no longer evident in Lateral leads  Confirmed by DIAMOND AGUIRRE MD ([id number]) on 11/26/2018 10:21:09 PM    2D ECHO:  Results for orders placed or performed during the hospital encounter of 07/12/16   2D Echo w/ Color Flow Doppler   Result Value Ref Range    QEF 63 55 - 65    Mitral Valve Regurgitation TRIVIAL     Diastolic Dysfunction Yes (A)     Aortic Valve Regurgitation MILD     Aortic Valve Stenosis SEVERE (A)     Est. PA Systolic Pressure 60.46 (A)     Pericardial Effusion SMALL (A)     Mitral Valve Mobility NORMAL     Tricuspid Valve Regurgitation TRIVIAL TO MILD          ASSESSMENT/PLAN:         Anesthesia Evaluation    I have reviewed the Patient Summary Reports.    I have reviewed the Nursing Notes.   I have reviewed the Medications.     Review of Systems  Anesthesia Hx:  No problems with previous Anesthesia  History of prior surgery of interest to airway management or planning: Denies Family Hx of Anesthesia complications.   Denies Personal Hx of Anesthesia complications.   Hematology/Oncology:     Oncology Normal    -- Anemia:   EENT/Dental:EENT/Dental Normal   Cardiovascular:   Hypertension Valvular problems/Murmurs, AS Denies MI. CAD    Denies Dysrhythmias.  CHF    Pulmonary:   Asthma    Renal/:   Chronic Renal Disease, ESRD, Dialysis    Hepatic/GI:   BRBPR   Musculoskeletal:  Musculoskeletal Normal     Neurological:  Neurology Normal    Endocrine:   Diabetes, well controlled        Physical Exam  General:  Malnutrition    Airway/Jaw/Neck:  Airway Findings: Mouth Opening: Normal Tongue: Normal  General Airway Assessment: Adult  Mallampati: IV  TM Distance: Normal, at least 6 cm  Jaw/Neck Findings:  Neck ROM: Normal ROM  Neck Findings: Normal    Eyes/Ears/Nose:  EYES/EARS/NOSE FINDINGS: Normal   Dental:  Dental Findings: Edentulous   Chest/Lungs:  Chest/Lungs Findings: Rhonchi     Heart/Vascular:  Heart Findings: Rate: Normal  Rhythm: Regular Rhythm  Heart Murmur  Systolic  Systolic Heart Murmur Description: R Upper Sternal Border, Holosystolic  Systolic Heart Murmur Grade: Grade III        Mental Status:  Mental Status Findings: Normal        Anesthesia Plan  Type of Anesthesia, risks & benefits discussed:  Anesthesia Type:  regional  Patient's Preference:   Intra-op Monitoring Plan: standard ASA monitors  Intra-op Monitoring Plan Comments:   Post Op Pain Control Plan: multimodal analgesia, IV/PO Opioids PRN, per primary service following discharge from PACU and peripheral nerve block  Post Op Pain Control Plan Comments:   Induction:   IV  Beta Blocker:  Patient is on a Beta-Blocker and has received one dose within the past 24 hours (No further documentation required).       Informed Consent: Patient understands risks and agrees with Anesthesia plan.  Questions answered. Anesthesia consent signed with patient.  ASA Score: 4     Day of Surgery Review of History & Physical:    H&P update referred to the surgeon.         Ready For Surgery From Anesthesia Perspective.

## 2018-11-28 NOTE — BRIEF OP NOTE
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 PTA, L AVF (10x40)  2. Fistulagram  3. Conscious Sedation      Anesthesia: RN IV Sedation    Description of Procedure: 70% mid AVF stenosis, ~20% residual after    Description of the findings of the procedure: as above    Estimated Blood Loss: <2cc         Specimens:   Specimen (12h ago, onward)    None

## 2018-11-28 NOTE — PT/OT/SLP PROGRESS
Physical Therapy      Patient Name:  Tea Georges   MRN:  [id number]    PT orders acknowledged and attempted. Pt off the floor for HD and fistulogram. Will re-attempt PT evaluation when next scheduled.     RAMONA WALTER, PT   11/28/2018

## 2018-11-28 NOTE — OP NOTE
DATE OF PROCEDURE:  11/28/2018.    PREOPERATIVE DIAGNOSIS:  Stenosis, left AV fistula.    POSTOPERATIVE DIAGNOSIS:  Stenosis, left AV fistula.    PROCEDURES:  1.  Angioplasty, left AV fistula (10 x 40 Mercer).  2.  Left fistulogram.  3.  Conscious sedation.    SURGEON:  HAZEL Blum III, M.D.    ASSISTANT:  Dr. Rozina Manzano.    ANESTHESIA:  RN-directed sedation and local infiltration.    INDICATIONS:  A 78-year-old female whom I placed a left brachiocephalic AV   fistula in 2011, not seen in over five years, was admitted to the hospital with   hypertensive emergency and incidental finding of very pulsatile AV fistula with   some scabbing on an aneurysm.    PROCEDURE IN DETAIL:  The patient was brought in the Cath Lab and placed in the   supine position.  After sterile prep and drape and infiltration of 1% lidocaine,   the left brachiocephalic fistula was accessed with a micropuncture set.    Fistulogram was then performed which showed a 75% mid fistula stenosis just   distal to the two moderate-sized aneurysms.  There was a cephalic arch Viabahn   stent, which was widely patent, and there was no significant central venous   stenosis.    Stiff-angled Glidewire was passed through the lesion and a 6-Samoan sheath   placed.  Initially, an 8 x 40 Mercer balloon was used.  There was some waisting   here, but it was undersized.  This was removed and replaced with a 10 x 40   Mercer balloon.  There was significant waisting, which then fully effaced at 20   atmospheres; this was held for 2 minutes.  Completion fistulogram revealed   marked improvement in the stenosis with approximately 20% residual.    All catheters and guidewires were removed and hemostasis was achieved with a   Monocryl suture.  Sterile dressing was applied and the patient was sent to the   Recovery Area in stable condition.    I continuously monitored the patient's cardiopulmonary functions throughout this   case.  See nursing notes for dosing of  Versed and fentanyl.  Total sedation   time was 22 minutes.      MACK/VINCE  dd: 11/28/2018 13:28:19 (CST)  td: 11/28/2018 17:29:25 (CST)  Doc ID   #[id number]  Job ID #[id number]    CC:

## 2018-11-28 NOTE — CONSULTS
Tea Georges  11/28/2018    Vascular Surgery Consult Note    CC: AV dialysis access aneurysm with ulceration    HPI:  Patient is a 78 y.o. female with a h/o ESRD on Dialysis (MWF) along with other listed comorbidities who is admitted for hypertensive emergency.     11/16/11 - L BC AVF (Nikki)    Patient has not followed up with Dr. Blum since creation of the fistula. She otherwise has been undergoing HD via this AV fistula without issues until recently. Two large aneurysmal degeneration along the mid AVF with ulceration over the proximal aneurysmal degeneration. We were asked to evaluate.     Last bleeding episode with HD from this ulceration 5 days ago.     Past Medical History:   Diagnosis Date    Anemia in ESRD (end-stage renal disease) 5/29/2016    Anticoagulant long-term use     Aortic atherosclerosis 11/22/2016    Aortic stenosis, moderate 2/18/2016    Asthma in adult without complication 1/8/2016    Bilateral low back pain without sciatica 11/17/2015    Blindness of right eye 11/12/2016    CAD (coronary artery disease) 12/12/2016    Cataract     Central retinal vein occlusion, right eye 6/3/2014    CHF (congestive heart failure)     Chronic diastolic heart failure 1/8/2016    Chronic respiratory failure with hypoxia 5/29/2016    COPD (chronic obstructive pulmonary disease) 1/15/2017    Dependence on hemodialysis     Mon-Wed-Fri    Diverticulosis     Embolic stroke involving right middle cerebral artery     Enlarged LA (left atrium) 10/7/2016    Epiretinal membrane 7/17/2012    ESRD (end stage renal disease)     Essential hypertension 1/8/2016    History of GI diverticular bleed     5/22/16    Left flaccid hemiparesis 10/1/2016    Peripheral vascular disease, unspecified 11/22/2016    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 11/13/2016    Type 2 diabetes mellitus with kidney complication, without long-term current use of insulin 5/1/2018    Type 2 diabetes  mellitus with left eye affected by proliferative retinopathy without macular edema, without long-term current use of insulin 3/26/2013    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, without long-term current use of insulin 3/26/2013    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     Past Surgical History:   Procedure Laterality Date    BREAST SURGERY      tumor removal x 2    CATARACT EXTRACTION      CHOLECYSTECTOMY      COLONOSCOPY N/A 5/23/2016    Procedure: COLONOSCOPY;  Surgeon: WILLIAM Colvin MD;  Location: [facility] ENDO (2ND FLR);  Service: Endoscopy;  Laterality: N/A;    COLONOSCOPY N/A 5/30/2016    Procedure: COLONOSCOPY;  Surgeon: Sam Davis MD;  Location: [facility] ENDO (2ND FLR);  Service: Endoscopy;  Laterality: N/A;    COLONOSCOPY N/A 5/30/2016    Performed by Sam Davis MD at [facility] ENDO (2ND FLR)    COLONOSCOPY N/A 5/23/2016    Performed by WILLIAM Colvin MD at [facility] (2ND FLR)    ESOPHAGOGASTRODUODENOSCOPY (EGD) N/A 5/30/2016    Performed by Sam Davis MD at [facility] (2ND FLR)    ESOPHAGOGASTRODUODENOSCOPY (EGD) N/A 5/27/2016    Performed by Cesario Rubio MD at [facility] (2ND FLR)    UPPER GASTROINTESTINAL ENDOSCOPY       Family History   Problem Relation Age of Onset    Cataracts Mother     Stroke Mother     Hypertension Mother     Cancer Sister     Hypertension Sister     Heart failure Sister     Glaucoma Brother     Hypertension Brother     Heart disease Brother     Hypertension Father     Glaucoma Maternal Aunt     Esophageal cancer Sister     No Known Problems Maternal Uncle     No Known Problems Paternal Aunt     No Known Problems Paternal Uncle     No Known Problems Maternal Grandmother     No Known Problems Maternal Grandfather     No Known Problems Paternal Grandmother     No Known Problems Paternal Grandfather     Heart attack Neg Hx     Colon cancer Neg Hx     Stomach cancer Neg Hx     Anemia Neg  Hx     Arrhythmia Neg Hx     Asthma Neg Hx     Clotting disorder Neg Hx     Fainting Neg Hx     Hyperlipidemia Neg Hx     Atrial Septal Defect Neg Hx      Social History     Socioeconomic History    Marital status:      Spouse name: Not on file    Number of children: Not on file    Years of education: Not on file    Highest education level: Not on file   Social Needs    Financial resource strain: Not on file    Food insecurity - worry: Not on file    Food insecurity - inability: Not on file    Transportation needs - medical: Not on file    Transportation needs - non-medical: Not on file   Occupational History    Occupation: Housekeeping     Employer: [employer]     Comment: Retired; 20-years there   Tobacco Use    Smoking status: Never Smoker    Smokeless tobacco: Never Used   Substance and Sexual Activity    Alcohol use: No     Comment: Reports occasional 1-2 drinks     Drug use: No    Sexual activity: No   Other Topics Concern    Not on file   Social History Narrative    Not on file     Review of patient's allergies indicates:  No Known Allergies    Current Facility-Administered Medications:     acetaminophen tablet 650 mg, 650 mg, Oral, Q6H PRN, Floridalma Dick, NP, 650 mg at 11/27/18 1923    albuterol-ipratropium 2.5 mg-0.5 mg/3 mL nebulizer solution 3 mL, 3 mL, Nebulization, Q4H PRN, Muna B. Dauterive, NP    amLODIPine tablet 10 mg, 10 mg, Oral, Daily, Muna B. Dauterive, NP, 10 mg at 11/27/18 0821    aspirin chewable tablet 81 mg, 81 mg, Oral, Daily, Muna B. Dauterive, NP, 81 mg at 11/27/18 0822    atorvastatin tablet 40 mg, 40 mg, Oral, Daily, Muna B. Dauterive, NP, 40 mg at 11/27/18 0821    carvedilol tablet 12.5 mg, 12.5 mg, Oral, BID, Muna B. Dauterive, NP, 12.5 mg at 11/27/18 2132    clopidogrel tablet 75 mg, 75 mg, Oral, Daily, Muna B. Dauterive, NP, 75 mg at 11/27/18 0822    dextrose 50% injection 25 g, 25 g, Intravenous, PRN, Ebony  ROYCE Myers PA-C    erythromycin 5 mg/gram (0.5 %) ophthalmic ointment, , Right Eye, Q8H, Muna B. Dauterive, NP, 1 inch at 11/27/18 2132    fluticasone-vilanterol 200-25 mcg/dose diskus inhaler 1 puff, 1 puff, Inhalation, Daily, Muna B. Dauterive, NP, 1 puff at 11/27/18 0928    heparin (porcine) injection 5,000 Units, 5,000 Units, Subcutaneous, Q8H, Muna B. Dauterive, NP, 5,000 Units at 11/27/18 1318    hydrALAZINE tablet 25 mg, 25 mg, Oral, Q8H, Muna B. Dauterive, NP, 25 mg at 11/27/18 2132    isosorbide mononitrate 24 hr tablet 30 mg, 30 mg, Oral, Daily, Muna B. Dauterive, NP, 30 mg at 11/27/18 0822    ondansetron injection 4 mg, 4 mg, Intravenous, Q8H PRN, Muna B. Dauterive, NP, 4 mg at 11/28/18 0109    REVIEW OF SYSTEMS:  General: negative;   ENT: negative;   Allergy and Immunology: negative;   Hematological and Lymphatic: Negative;   Endocrine: negative;   Respiratory: no cough, shortness of breath, or wheezing;   Cardiovascular: no chest pain or dyspnea on exertion;   Gastrointestinal: no abdominal pain/back, change in bowel habits, or bloody stools;   Genito-Urinary: no dysuria, trouble voiding, or hematuria;   Musculoskeletal: negative  Neurological: no TIA or stroke symptoms;   Psychiatric: no nervousness, anxiety or depression.    PHYSICAL EXAM:      Pulse: (!) 51  Temp: 97.9 °F (36.6 °C)      General appearance:  Alert, well-appearing, and in no distress.  Oriented to person, place, and time   Neurological:  Normal speech, no focal findings noted; CN II - XII grossly intact           Musculoskeletal: Digits/nail without cyanosis/clubbing.  Normal muscle strength/tone.                 Neck: Supple, no significant adenopathy; thyroid is not enlarged                Chest:  Clear to auscultation, no wheezes, rales or rhonchi, symmetric air entry      No use of accessory muscles             Cardiac: Normal rate and regular rhythm, S1 and S2 normal; PMI non-displaced           Abdomen: Soft, nontender, nondistended, no masses or organomegaly      No rebound tenderness noted; bowel sounds normal      Extremities:   Well matured left BC AVF. Somewhat pulsatile flow throughout.      Two large aneurysmal degeneration along the left arm mid AVF      Ulceration over the proximal aneurysmal degeneration with scab      No bleeding, drainage or signs of infection      2+ radial pulse    LAB RESULTS:  Lab Results   Component Value Date    K 3.2 (L) 11/27/2018    K 5.4 (H) 11/26/2018    K 5.0 11/26/2018    CREATININE 2.3 (H) 11/27/2018    CREATININE 7.8 (H) 11/26/2018    CREATININE 7.4 (H) 11/26/2018     Lab Results   Component Value Date    WBC 8.12 11/27/2018    WBC 6.31 11/26/2018    WBC 7.73 11/01/2018    HCT 35.8 (L) 11/27/2018    HCT 41 11/26/2018    HCT 40.8 11/26/2018     (L) 11/27/2018     (L) 11/26/2018     (L) 11/01/2018     Lab Results   Component Value Date    HGBA1C 4.4 (L) 01/16/2017    HGBA1C 5.0 09/29/2016    HGBA1C <4.0 (L) 06/28/2016       IMAGING:  none    IMP/PLAN:  78 y.o. female with a h/o ESRD on Dialysis (MWF) along with other listed comorbidities who is admitted for hypertensive emergency. Two large aneurysmal degeneration along the mid AVF with ulceration over the proximal aneurysmal degeneration. Last bleeding episode with HD from this ulceration 5 days ago. We were asked to evaluate.     - patient was made NPO after midnight.   - Fistulagram today to assess the AVF and its salvageability.  - likely will need revision of this AVF during this admission. Further plan for revision following fistulagram.    Thank you for this consult.     Geronimo Devlin MD  Vascular/Endovascular Surgery Fellow

## 2018-11-28 NOTE — SUBJECTIVE & OBJECTIVE
Interval History: Doing well today with no acute complaints. Fistulogram today well tolerated with plan for revision tomorrow. Otherwise denies nausea, some mild cough present. On home O2.    Review of Systems   Constitutional: Negative for chills, fatigue and fever.   Respiratory: Positive for cough. Negative for chest tightness and shortness of breath.    Cardiovascular: Negative for chest pain and leg swelling.   Gastrointestinal: Negative for blood in stool and constipation.   Neurological: Negative for headaches.   Psychiatric/Behavioral: Negative for agitation and confusion.     Objective:     Vital Signs (Most Recent):  Temp: 98.5 °F (36.9 °C) (11/28/18 1505)  Pulse: 66 (11/28/18 1531)  Resp: 17 (11/28/18 1509)  BP: (!) 159/78 (11/28/18 1505)  SpO2: 96 % (11/28/18 0705) Vital Signs (24h Range):  Temp:  [97.9 °F (36.6 °C)-98.5 °F (36.9 °C)] 98.5 °F (36.9 °C)  Pulse:  [51-66] 66  Resp:  [17-23] 17  SpO2:  [95 %-100 %] 96 %  BP: (125-184)/(45-78) 159/78     Weight: 49.9 kg (110 lb 0.2 oz)  Body mass index is 19.49 kg/m².    Intake/Output Summary (Last 24 hours) at 11/28/2018 1755  Last data filed at 11/28/2018 1125  Gross per 24 hour   Intake 950 ml   Output 2562 ml   Net -1612 ml      Physical Exam   Constitutional: She is oriented to person, place, and time. She appears well-developed and well-nourished.   HENT:   Head: Normocephalic and atraumatic.   Eyes: EOM are normal. Pupils are equal, round, and reactive to light.   R eye conjunctivitis   Neck: Normal range of motion. No JVD present.   Cardiovascular: Normal rate, regular rhythm and normal heart sounds.   Pulmonary/Chest: Effort normal and breath sounds normal. No respiratory distress.   Abdominal: Soft. She exhibits no distension. There is no tenderness.   Musculoskeletal: She exhibits no edema or deformity.   Left arm AVF with ulceration and scab   Neurological: She is alert and oriented to person, place, and time.   Skin: Skin is warm and dry. No rash  noted.   Psychiatric: She has a normal mood and affect. Her behavior is normal.   Vitals reviewed.      MELD-Na score: 23 at 11/28/2018  7:05 AM  MELD score: 20 at 11/28/2018  7:05 AM  Calculated from:  Serum Creatinine: 5.9 mg/dL (Rounded to 4 mg/dL) at 11/28/2018  7:05 AM  Serum Sodium: 133 mmol/L at 11/28/2018  7:05 AM  Total Bilirubin: 0.4 mg/dL (Rounded to 1 mg/dL) at 11/28/2018  7:05 AM  INR(ratio): 1 at 11/26/2018  9:11 AM  Age: 78 years    Significant Labs:  CBC:  Recent Labs   Lab 11/27/18  0236 11/28/18  0705   WBC 8.12 4.18   HGB 11.1* 9.7*   HCT 35.8* 31.8*   * 86*     CMP:  Recent Labs   Lab 11/26/18 2001 11/27/18  0236 11/28/18  0705    137 133*   K 5.4* 3.2* 4.6    102 101   CO2 23 23 21*   GLU 82 80 87   BUN 82* 16 49*   CREATININE 7.8* 2.3* 5.9*   CALCIUM 8.7 9.4 8.0*   PROT  --  7.9 6.4   ALBUMIN  --  3.6 2.9*   BILITOT  --  0.8 0.4   ALKPHOS  --  137* 107   AST  --  15 11   ALT  --  9* 8*   ANIONGAP 12 12 11   EGFRNONAA 4.6* 20.0* 6.4*     PTINR:  No results for input(s): INR in the last 48 hours.    Significant Procedures:   Dobutamine Stress Test with Color Flow: No results found for this or any previous visit.    None

## 2018-11-28 NOTE — PROGRESS NOTES
Maintenance hemodialysis tx started via FLORENCE AVF, tolerated well, flows good. Ulceration noted to fistula

## 2018-11-28 NOTE — ASSESSMENT & PLAN NOTE
--reportedly wears 2 L NC at home, no PFT's on file  --upon presentation, was tachypneic, in respiratory distress requiring NIPPV, then 5-6L NC.   --see below for HTN emergency management  --nephrology consulted for emergent HD for metabolic and volume management  --O2 requirements decreased to home levels as volume removed with CRRT

## 2018-11-28 NOTE — PLAN OF CARE
Problem: Patient Care Overview  Goal: Plan of Care Review  Outcome: Ongoing (interventions implemented as appropriate)  Hemodialysis tx complete, 1.912L removed in 3 hour tx, tolerated well. Blood returned via FLORENCE AVF, 15g needles removed x2, gauze and tape applied, pressure held for 10 minutes with each stick, hemostasis achieved

## 2018-11-29 ENCOUNTER — ANESTHESIA (OUTPATIENT)
Dept: SURGERY | Facility: HOSPITAL | Age: 78
DRG: 252 | End: 2018-11-29
Payer: MEDICARE

## 2018-11-29 PROBLEM — J81.0 ACUTE PULMONARY EDEMA: Status: ACTIVE | Noted: 2018-11-29

## 2018-11-29 LAB
ALBUMIN SERPL BCP-MCNC: 2.9 G/DL
ALP SERPL-CCNC: 102 U/L
ALT SERPL W/O P-5'-P-CCNC: 6 U/L
ANION GAP SERPL CALC-SCNC: 13 MMOL/L
AST SERPL-CCNC: 11 U/L
BASOPHILS # BLD AUTO: 0.02 K/UL
BASOPHILS NFR BLD: 0.5 %
BILIRUB DIRECT SERPL-MCNC: 0.2 MG/DL
BILIRUB SERPL-MCNC: 0.5 MG/DL
BUN SERPL-MCNC: 28 MG/DL
CALCIUM SERPL-MCNC: 8.5 MG/DL
CHLORIDE SERPL-SCNC: 101 MMOL/L
CO2 SERPL-SCNC: 24 MMOL/L
CREAT SERPL-MCNC: 4.4 MG/DL
DIFFERENTIAL METHOD: ABNORMAL
EOSINOPHIL # BLD AUTO: 0 K/UL
EOSINOPHIL NFR BLD: 1 %
ERYTHROCYTE [DISTWIDTH] IN BLOOD BY AUTOMATED COUNT: 16.6 %
EST. GFR  (AFRICAN AMERICAN): 10.5 ML/MIN/1.73 M^2
EST. GFR  (NON AFRICAN AMERICAN): 9.1 ML/MIN/1.73 M^2
GLUCOSE SERPL-MCNC: 79 MG/DL
HCT VFR BLD AUTO: 34.5 %
HGB BLD-MCNC: 10.2 G/DL
IMM GRANULOCYTES # BLD AUTO: 0.01 K/UL
IMM GRANULOCYTES NFR BLD AUTO: 0.2 %
LYMPHOCYTES # BLD AUTO: 0.5 K/UL
LYMPHOCYTES NFR BLD: 11.4 %
MAGNESIUM SERPL-MCNC: 1.9 MG/DL
MCH RBC QN AUTO: 28.7 PG
MCHC RBC AUTO-ENTMCNC: 29.6 G/DL
MCV RBC AUTO: 97 FL
MONOCYTES # BLD AUTO: 1 K/UL
MONOCYTES NFR BLD: 24.2 %
NEUTROPHILS # BLD AUTO: 2.5 K/UL
NEUTROPHILS NFR BLD: 62.7 %
NRBC BLD-RTO: 0 /100 WBC
PHOSPHATE SERPL-MCNC: 5.8 MG/DL
PLATELET # BLD AUTO: 81 K/UL
PMV BLD AUTO: 13.4 FL
POCT GLUCOSE: 103 MG/DL (ref 70–110)
POCT GLUCOSE: 79 MG/DL (ref 70–110)
POCT GLUCOSE: 85 MG/DL (ref 70–110)
POTASSIUM SERPL-SCNC: 3.8 MMOL/L
PROT SERPL-MCNC: 6.5 G/DL
RBC # BLD AUTO: 3.55 M/UL
SODIUM SERPL-SCNC: 138 MMOL/L
WBC # BLD AUTO: 4.05 K/UL

## 2018-11-29 PROCEDURE — 83735 ASSAY OF MAGNESIUM: CPT

## 2018-11-29 PROCEDURE — 37000009 HC ANESTHESIA EA ADD 15 MINS: Performed by: SURGERY

## 2018-11-29 PROCEDURE — 97165 OT EVAL LOW COMPLEX 30 MIN: CPT

## 2018-11-29 PROCEDURE — 11000001 HC ACUTE MED/SURG PRIVATE ROOM

## 2018-11-29 PROCEDURE — 99232 SBSQ HOSP IP/OBS MODERATE 35: CPT | Mod: 57,,, | Performed by: SURGERY

## 2018-11-29 PROCEDURE — 76942 ECHO GUIDE FOR BIOPSY: CPT | Performed by: ANESTHESIOLOGY

## 2018-11-29 PROCEDURE — 94760 N-INVAS EAR/PLS OXIMETRY 1: CPT

## 2018-11-29 PROCEDURE — D9220A PRA ANESTHESIA: Mod: CRNA,,, | Performed by: NURSE ANESTHETIST, CERTIFIED REGISTERED

## 2018-11-29 PROCEDURE — 80076 HEPATIC FUNCTION PANEL: CPT

## 2018-11-29 PROCEDURE — 64415 NJX AA&/STRD BRCH PLXS IMG: CPT | Mod: 59,LT,, | Performed by: ANESTHESIOLOGY

## 2018-11-29 PROCEDURE — 63600175 PHARM REV CODE 636 W HCPCS: Performed by: NURSE ANESTHETIST, CERTIFIED REGISTERED

## 2018-11-29 PROCEDURE — 37000008 HC ANESTHESIA 1ST 15 MINUTES: Performed by: SURGERY

## 2018-11-29 PROCEDURE — 97161 PT EVAL LOW COMPLEX 20 MIN: CPT

## 2018-11-29 PROCEDURE — 36558 INSERT TUNNELED CV CATH: CPT | Mod: 51,RT,GC, | Performed by: SURGERY

## 2018-11-29 PROCEDURE — 84100 ASSAY OF PHOSPHORUS: CPT

## 2018-11-29 PROCEDURE — C1768 GRAFT, VASCULAR: HCPCS | Performed by: SURGERY

## 2018-11-29 PROCEDURE — 27000221 HC OXYGEN, UP TO 24 HOURS

## 2018-11-29 PROCEDURE — 82962 GLUCOSE BLOOD TEST: CPT | Performed by: SURGERY

## 2018-11-29 PROCEDURE — 71000044 HC DOSC ROUTINE RECOVERY FIRST HOUR: Performed by: SURGERY

## 2018-11-29 PROCEDURE — 36832 AV FISTULA REVISION OPEN: CPT | Mod: GC,,, | Performed by: SURGERY

## 2018-11-29 PROCEDURE — 25000003 PHARM REV CODE 250: Performed by: ANESTHESIOLOGY

## 2018-11-29 PROCEDURE — 25000003 PHARM REV CODE 250: Performed by: HOSPITALIST

## 2018-11-29 PROCEDURE — 0JH63XZ INSERTION OF TUNNELED VASCULAR ACCESS DEVICE INTO CHEST SUBCUTANEOUS TISSUE AND FASCIA, PERCUTANEOUS APPROACH: ICD-10-PCS | Performed by: SURGERY

## 2018-11-29 PROCEDURE — 25000003 PHARM REV CODE 250: Performed by: SURGERY

## 2018-11-29 PROCEDURE — 63600175 PHARM REV CODE 636 W HCPCS: Performed by: SURGERY

## 2018-11-29 PROCEDURE — D9220A PRA ANESTHESIA: Mod: ANES,,, | Performed by: ANESTHESIOLOGY

## 2018-11-29 PROCEDURE — 77001 FLUOROGUIDE FOR VEIN DEVICE: CPT | Mod: 26,GC,, | Performed by: SURGERY

## 2018-11-29 PROCEDURE — 85025 COMPLETE CBC W/AUTO DIFF WBC: CPT

## 2018-11-29 PROCEDURE — C1750 CATH, HEMODIALYSIS,LONG-TERM: HCPCS | Performed by: SURGERY

## 2018-11-29 PROCEDURE — 80048 BASIC METABOLIC PNL TOTAL CA: CPT

## 2018-11-29 PROCEDURE — 36000706: Performed by: SURGERY

## 2018-11-29 PROCEDURE — 25000003 PHARM REV CODE 250: Performed by: NURSE PRACTITIONER

## 2018-11-29 PROCEDURE — 99232 SBSQ HOSP IP/OBS MODERATE 35: CPT | Mod: ,,, | Performed by: HOSPITALIST

## 2018-11-29 PROCEDURE — 02H633Z INSERTION OF INFUSION DEVICE INTO RIGHT ATRIUM, PERCUTANEOUS APPROACH: ICD-10-PCS | Performed by: SURGERY

## 2018-11-29 PROCEDURE — 36000707: Performed by: SURGERY

## 2018-11-29 PROCEDURE — 63600175 PHARM REV CODE 636 W HCPCS: Performed by: ANESTHESIOLOGY

## 2018-11-29 PROCEDURE — 71000015 HC POSTOP RECOV 1ST HR: Performed by: SURGERY

## 2018-11-29 PROCEDURE — 03WY0JZ REVISION OF SYNTHETIC SUBSTITUTE IN UPPER ARTERY, OPEN APPROACH: ICD-10-PCS | Performed by: SURGERY

## 2018-11-29 DEVICE — CATH HEMOSPLIT DL 14.5FR 24CM: Type: IMPLANTABLE DEVICE | Site: SUBCLAVIAN | Status: FUNCTIONAL

## 2018-11-29 DEVICE — GRAFT STRETCH TW RING 4-7 45C: Type: IMPLANTABLE DEVICE | Site: ARM | Status: FUNCTIONAL

## 2018-11-29 RX ORDER — FENTANYL CITRATE 50 UG/ML
INJECTION, SOLUTION INTRAMUSCULAR; INTRAVENOUS
Status: DISCONTINUED | OUTPATIENT
Start: 2018-11-29 | End: 2018-11-30

## 2018-11-29 RX ORDER — SODIUM CHLORIDE 0.9 % (FLUSH) 0.9 %
3 SYRINGE (ML) INJECTION
Status: DISCONTINUED | OUTPATIENT
Start: 2018-11-29 | End: 2018-11-30 | Stop reason: HOSPADM

## 2018-11-29 RX ORDER — HEPARIN SODIUM 1000 [USP'U]/ML
INJECTION, SOLUTION INTRAVENOUS; SUBCUTANEOUS
Status: DISCONTINUED | OUTPATIENT
Start: 2018-11-29 | End: 2018-11-29 | Stop reason: HOSPADM

## 2018-11-29 RX ORDER — PROTAMINE SULFATE 10 MG/ML
INJECTION, SOLUTION INTRAVENOUS
Status: DISCONTINUED | OUTPATIENT
Start: 2018-11-29 | End: 2018-11-30

## 2018-11-29 RX ORDER — CARVEDILOL 12.5 MG/1
12.5 TABLET ORAL 2 TIMES DAILY
Status: DISCONTINUED | OUTPATIENT
Start: 2018-11-29 | End: 2018-11-30 | Stop reason: HOSPADM

## 2018-11-29 RX ORDER — HYDROCODONE BITARTRATE AND ACETAMINOPHEN 5; 325 MG/1; MG/1
1 TABLET ORAL EVERY 4 HOURS PRN
Status: DISCONTINUED | OUTPATIENT
Start: 2018-11-29 | End: 2018-11-30 | Stop reason: HOSPADM

## 2018-11-29 RX ORDER — LIDOCAINE HYDROCHLORIDE 10 MG/ML
INJECTION, SOLUTION EPIDURAL; INFILTRATION; INTRACAUDAL; PERINEURAL
Status: DISCONTINUED | OUTPATIENT
Start: 2018-11-29 | End: 2018-11-29 | Stop reason: HOSPADM

## 2018-11-29 RX ORDER — HYDROMORPHONE HYDROCHLORIDE 1 MG/ML
0.2 INJECTION, SOLUTION INTRAMUSCULAR; INTRAVENOUS; SUBCUTANEOUS EVERY 5 MIN PRN
Status: DISCONTINUED | OUTPATIENT
Start: 2018-11-29 | End: 2018-11-29 | Stop reason: HOSPADM

## 2018-11-29 RX ORDER — PROPOFOL 10 MG/ML
VIAL (ML) INTRAVENOUS
Status: DISCONTINUED | OUTPATIENT
Start: 2018-11-29 | End: 2018-11-30

## 2018-11-29 RX ORDER — SODIUM CHLORIDE 9 MG/ML
INJECTION, SOLUTION INTRAVENOUS
Status: DISCONTINUED | OUTPATIENT
Start: 2018-11-29 | End: 2018-11-30 | Stop reason: HOSPADM

## 2018-11-29 RX ORDER — PROPOFOL 10 MG/ML
VIAL (ML) INTRAVENOUS CONTINUOUS PRN
Status: DISCONTINUED | OUTPATIENT
Start: 2018-11-29 | End: 2018-11-30

## 2018-11-29 RX ORDER — LIDOCAINE HCL/PF 100 MG/5ML
SYRINGE (ML) INTRAVENOUS
Status: DISCONTINUED | OUTPATIENT
Start: 2018-11-29 | End: 2018-11-30

## 2018-11-29 RX ORDER — HEPARIN SODIUM 1000 [USP'U]/ML
INJECTION, SOLUTION INTRAVENOUS; SUBCUTANEOUS
Status: DISCONTINUED | OUTPATIENT
Start: 2018-11-29 | End: 2018-11-30

## 2018-11-29 RX ORDER — HEPARIN 100 UNIT/ML
SYRINGE INTRAVENOUS
Status: DISCONTINUED | OUTPATIENT
Start: 2018-11-29 | End: 2018-11-29 | Stop reason: HOSPADM

## 2018-11-29 RX ORDER — SODIUM CHLORIDE 9 MG/ML
INJECTION, SOLUTION INTRAVENOUS ONCE
Status: DISCONTINUED | OUTPATIENT
Start: 2018-11-29 | End: 2018-11-30 | Stop reason: HOSPADM

## 2018-11-29 RX ADMIN — FENTANYL CITRATE 25 MCG: 50 INJECTION, SOLUTION INTRAMUSCULAR; INTRAVENOUS at 02:11

## 2018-11-29 RX ADMIN — CLOPIDOGREL 75 MG: 75 TABLET, FILM COATED ORAL at 09:11

## 2018-11-29 RX ADMIN — ERYTHROMYCIN 1 INCH: 5 OINTMENT OPHTHALMIC at 09:11

## 2018-11-29 RX ADMIN — HYDRALAZINE HYDROCHLORIDE 25 MG: 25 TABLET ORAL at 09:11

## 2018-11-29 RX ADMIN — ACETAMINOPHEN 650 MG: 325 TABLET, FILM COATED ORAL at 09:11

## 2018-11-29 RX ADMIN — HEPARIN SODIUM 4000 UNITS: 1000 INJECTION, SOLUTION INTRAVENOUS; SUBCUTANEOUS at 02:11

## 2018-11-29 RX ADMIN — PROPOFOL 25 MCG/KG/MIN: 10 INJECTION, EMULSION INTRAVENOUS at 02:11

## 2018-11-29 RX ADMIN — ATORVASTATIN CALCIUM 40 MG: 20 TABLET, FILM COATED ORAL at 09:11

## 2018-11-29 RX ADMIN — ASPIRIN 81 MG CHEWABLE TABLET 81 MG: 81 TABLET CHEWABLE at 09:11

## 2018-11-29 RX ADMIN — CARVEDILOL 12.5 MG: 12.5 TABLET, FILM COATED ORAL at 09:11

## 2018-11-29 RX ADMIN — PROTAMINE SULFATE 35 MG: 10 INJECTION, SOLUTION INTRAVENOUS at 03:11

## 2018-11-29 RX ADMIN — FENTANYL CITRATE 25 MCG: 50 INJECTION, SOLUTION INTRAMUSCULAR; INTRAVENOUS at 01:11

## 2018-11-29 RX ADMIN — SODIUM CHLORIDE: 0.9 INJECTION, SOLUTION INTRAVENOUS at 01:11

## 2018-11-29 RX ADMIN — MEPIVACAINE HYDROCHLORIDE 30 ML: 15 INJECTION, SOLUTION EPIDURAL; INFILTRATION at 02:11

## 2018-11-29 RX ADMIN — AMLODIPINE BESYLATE 10 MG: 10 TABLET ORAL at 09:11

## 2018-11-29 RX ADMIN — SODIUM CHLORIDE, SODIUM GLUCONATE, SODIUM ACETATE, POTASSIUM CHLORIDE, MAGNESIUM CHLORIDE, SODIUM PHOSPHATE, DIBASIC, AND POTASSIUM PHOSPHATE 250 ML: .53; .5; .37; .037; .03; .012; .00082 INJECTION, SOLUTION INTRAVENOUS at 05:11

## 2018-11-29 RX ADMIN — PROPOFOL 20 MG: 10 INJECTION, EMULSION INTRAVENOUS at 02:11

## 2018-11-29 RX ADMIN — ISOSORBIDE MONONITRATE 30 MG: 30 TABLET, EXTENDED RELEASE ORAL at 09:11

## 2018-11-29 RX ADMIN — ERYTHROMYCIN 1 INCH: 5 OINTMENT OPHTHALMIC at 05:11

## 2018-11-29 RX ADMIN — LIDOCAINE HYDROCHLORIDE 10 MG: 20 INJECTION, SOLUTION INTRAVENOUS at 02:11

## 2018-11-29 NOTE — PROGRESS NOTES
[facility] Hospital Medicine  Progress Note    Patient Name: Tea Georges  MRN: [id number]  Patient Class: IP- Inpatient   Admission Date: 11/26/2018  Length of Stay: 2 days  Attending Physician: Rosendo Beltran, *  Primary Care Provider: Prath Posadas Ii, MD    [facility] Medicine Team: [facility]OSP MED A Rosendo Beltran MD    Subjective:     Principal Problem:Hypertensive emergency    HPI:  Mrs. Georges is a 77 yo female with history significant for HTN, ESRD on HD, diastolic HF, right MCA CVA s/p thrombectomy (2016) who presented to the ED on 11/26 for worsening shortness of breath, cough that began on 11/25 and nausea with one episode of emesis that was non bloody, non bilious. She denies any fever, chills, headache, syncope, recent falls, chest pain, palpitations, ABD pain, hematemesis, melena, BRBPR, dysuria, or urinary frequency (does urinate about 2 times daily). She last reports receiving HD on Friday per her usual schedule. Upon ED presentation, she was noted to have HTN emergency with 's with tachypnea, acute on chronic hypoxemia. Labwork revealed hyperkalemia. She was shifted with D50, insulin and given bicarbonate. Bipap attempted due to work of breathing, but was discontinued after patient became nauseated with small amount emesis in the ED. MICU consulted for further management of hypertensive emergency and need for urgent HD.    Hospital Course:  Mrs. Georges is a 77 yo female with history significant for HTN, ESRD on HD, diastolic HF, right MCA CVA s/p thrombectomy (2016) who presented to the ED on 11/26 for worsening shortness of breath, cough that began on 11/25 and nausea with one episode of emesis that was non bloody, non bilious. She denies any fever, chills, headache, syncope, recent falls, chest pain, palpitations, ABD pain, hematemesis, melena, BRBPR, dysuria, or urinary frequency (does urinate about 2 times daily). She last reports receiving HD on Friday per  her usual schedule. Upon ED presentation, she was noted to have HTN emergency with 's with tachypnea, acute on chronic hypoxemia. Labwork revealed hyperkalemia. She was shifted with D50, insulin and given bicarbonate. Bipap attempted due to work of breathing, but was discontinued after patient became nauseated with small amount emesis in the ED. MICU consulted for further management of hypertensive emergency and need for urgent HD.      Blood pressure improved with administration of patient's PO anti-hypertensive medications. Patient received HD overnight, tolerated well with improved BP & O2 requirements. She is now on her home 2L O2 and BP is well controlled. Notably, she has progression of her aortic stenosis and will need f/u with Cardiology.    Notably, she was noted to have ulceration of AVF while in house. Vascular Surgery consulted and performed fistulogram on 11/28 with plans for revision on 11/29.    Interval History: Doing well today with no acute complaints. Fistulogram today well tolerated with plan for revision tomorrow. Otherwise denies nausea, some mild cough present. On home O2.    Review of Systems   Constitutional: Negative for chills, fatigue and fever.   Respiratory: Positive for cough. Negative for chest tightness and shortness of breath.    Cardiovascular: Negative for chest pain and leg swelling.   Gastrointestinal: Negative for blood in stool and constipation.   Neurological: Negative for headaches.   Psychiatric/Behavioral: Negative for agitation and confusion.     Objective:     Vital Signs (Most Recent):  Temp: 98.5 °F (36.9 °C) (11/28/18 1505)  Pulse: 66 (11/28/18 1531)  Resp: 17 (11/28/18 1509)  BP: (!) 159/78 (11/28/18 1505)  SpO2: 96 % (11/28/18 0705) Vital Signs (24h Range):  Temp:  [97.9 °F (36.6 °C)-98.5 °F (36.9 °C)] 98.5 °F (36.9 °C)  Pulse:  [51-66] 66  Resp:  [17-23] 17  SpO2:  [95 %-100 %] 96 %  BP: (125-184)/(45-78) 159/78     Weight: 49.9 kg (110 lb 0.2 oz)  Body mass  index is 19.49 kg/m².    Intake/Output Summary (Last 24 hours) at 11/28/2018 1755  Last data filed at 11/28/2018 1125  Gross per 24 hour   Intake 950 ml   Output 2562 ml   Net -1612 ml      Physical Exam   Constitutional: She is oriented to person, place, and time. She appears well-developed and well-nourished.   HENT:   Head: Normocephalic and atraumatic.   Eyes: EOM are normal. Pupils are equal, round, and reactive to light.   R eye conjunctivitis   Neck: Normal range of motion. No JVD present.   Cardiovascular: Normal rate, regular rhythm and normal heart sounds.   Pulmonary/Chest: Effort normal and breath sounds normal. No respiratory distress.   Abdominal: Soft. She exhibits no distension. There is no tenderness.   Musculoskeletal: She exhibits no edema or deformity.   Left arm AVF with ulceration and scab   Neurological: She is alert and oriented to person, place, and time.   Skin: Skin is warm and dry. No rash noted.   Psychiatric: She has a normal mood and affect. Her behavior is normal.   Vitals reviewed.      MELD-Na score: 23 at 11/28/2018  7:05 AM  MELD score: 20 at 11/28/2018  7:05 AM  Calculated from:  Serum Creatinine: 5.9 mg/dL (Rounded to 4 mg/dL) at 11/28/2018  7:05 AM  Serum Sodium: 133 mmol/L at 11/28/2018  7:05 AM  Total Bilirubin: 0.4 mg/dL (Rounded to 1 mg/dL) at 11/28/2018  7:05 AM  INR(ratio): 1 at 11/26/2018  9:11 AM  Age: 78 years    Significant Labs:  CBC:  Recent Labs   Lab 11/27/18  0236 11/28/18  0705   WBC 8.12 4.18   HGB 11.1* 9.7*   HCT 35.8* 31.8*   * 86*     CMP:  Recent Labs   Lab 11/26/18 2001 11/27/18  0236 11/28/18  0705    137 133*   K 5.4* 3.2* 4.6    102 101   CO2 23 23 21*   GLU 82 80 87   BUN 82* 16 49*   CREATININE 7.8* 2.3* 5.9*   CALCIUM 8.7 9.4 8.0*   PROT  --  7.9 6.4   ALBUMIN  --  3.6 2.9*   BILITOT  --  0.8 0.4   ALKPHOS  --  137* 107   AST  --  15 11   ALT  --  9* 8*   ANIONGAP 12 12 11   EGFRNONAA 4.6* 20.0* 6.4*     PTINR:  No results for  input(s): INR in the last 48 hours.    Significant Procedures:   Dobutamine Stress Test with Color Flow: No results found for this or any previous visit.    None    Assessment/Plan:      * Hypertensive emergency    --presented with , pulmonary edema, acute on chronic hypoxemic respiratory failure  --BP controlled after HD & po meds administered; continue PO medications   --no neurologic complaints, no chest pain, troponin negative  --resolved with reintroduction of home BP meds at reduced doses: currently on coreg 12.5 bid (home 25 bid), imdur 30, hydralazine 25mg tid (home 50 tid), and amlodipine 10  - would increase to home dose if becomes HTN again     Aneurysm of arteriovenous dialysis fistula    - with ulceration  - Vascular Surgery consulted: fistulogram today with plan for revision tomorrow afternoon       Acute on chronic respiratory failure with hypoxia and hypercapnia    --reportedly wears 2 L NC at home, no PFT's on file  --upon presentation, was tachypneic, in respiratory distress requiring NIPPV, then 5-6L NC.   --see below for HTN emergency management  --nephrology consulted for emergent HD for metabolic and volume management  --O2 requirements decreased to home levels as volume removed with CRRT     Right-sided cerebrovascular accident (CVA)    --s/p thrombectomy 2016. Continue ASA, plavix, statin       ESRD (end stage renal disease)    - Nephrology consulted for HD       Acute on chronic combined systolic and diastolic heart failure    --known diastolic HF. Repeat 2D echo shows EF 55% with grade 2 diastolic dysfunction, PA pressure 73 and severe AS  --continue antihypertensive medications as above  --volume removal per HD  --no complaints of chest pain, troponin negative      Pleural effusion on right    --likely 2/2 transudate in setting of ESRD/CHF         VTE Risk Mitigation (From admission, onward)        Ordered     heparin (porcine) injection 5,000 Units  Every 8 hours      11/26/18 1497      IP VTE HIGH RISK PATIENT  Once      11/26/18 1107              Rosendo Beltran MD  Department of Hospital Medicine   [facility]

## 2018-11-29 NOTE — PT/OT/SLP EVAL
Physical Therapy Evaluation    Patient Name:  Tea Georges   MRN:  [id number]    Recommendations:     Discharge Recommendations:  home with home health   Discharge Equipment Recommendations: none   Barriers to discharge: None    Assessment:     Tea Georges is a 78 y.o. female admitted with a medical diagnosis of Hypertensive emergency.  She presents with the following impairments/functional limitations:  weakness, gait instability, impaired endurance, impaired balance, impaired functional mobilty, impaired cardiopulmonary response to activity. Pt performed bed mobility S and transfers SBA with RW. Pt amb ~100ft SBA with RW, vc's for AD management. Pt will benefit from skilled PT to improve deficits and increase overall functional mobility.     Rehab Prognosis:  Good; patient would benefit from acute skilled PT services to address these deficits and reach maximum level of function.      Recent Surgery: Procedure(s) (LRB):  REVISION, AV FISTULA, permacath placement (Left) Day of Surgery    Plan:     During this hospitalization, patient to be seen 2 x/week to address the above listed problems via gait training, therapeutic activities, therapeutic exercises  · Plan of Care Expires:  12/29/18   Plan of Care Reviewed with: patient    Subjective     Communicated with RN prior to session.  Patient found supine in bed upon PT entry to room, agreeable to evaluation.      Chief Complaint: NA  Patient comments/goals: return home  Pain/Comfort:  · Pain Rating 1: 0/10  · Pain Rating Post-Intervention 1: 0/10    Patients cultural, spiritual, Yazidi conflicts given the current situation:      Living Environment:  Pt lives with daughter and granddaughter in 1-story house with 4 MARY with R handrails and tub/shower. Pt reports (I) with amb and mod (I) with ADLs. Pt reports her daughter is able to provide 24hr A upon d/c.   Prior to admission, patients level of function was mod (I).  Patient has the following equipment:  wheelchair, bedside commode, rollator.  DME owned (not currently used): none.  Upon discharge, patient will have assistance from daughter.    Objective:     Patient found with: telemetry     General Precautions: Standard, fall   Orthopedic Precautions:N/A   Braces: N/A     Exams:  · Cognitive Exam:  Patient is oriented to Person, Place, Time and Situation  · Gross Motor Coordination:  WFL  · Postural Exam:  Patient presented with the following abnormalities:    · -       Rounded shoulders  · -       Forward head  · Sensation:    · -       Intact  light/touch B LE  · Skin Integrity/Edema:      · -       Skin integrity: Visible skin intact  · RLE ROM: WFL  · RLE Strength: WFL  · LLE ROM: WFL  · LLE Strength: WFL    Functional Mobility:  Bed Mobility:     · Supine to Sit: supervision    Transfers:     · Sit to Stand:  stand by assistance with rolling walker    Gait: ~100ft SBA with RW, vc's for AD management    AM-PAC 6 CLICK MOBILITY  Total Score:17       Therapeutic Activities and Exercises:  Pt sat EOB with S.  Pt educated on:  -role of PT/POC  -safety with mobility  -importance of OOB activity  Pt safe to amb in hallway with RN staff with RW.     Patient left up in chair with all lines intact, call button in reach and RN notified.    GOALS:   Multidisciplinary Problems     Physical Therapy Goals        Problem: Physical Therapy Goal    Goal Priority Disciplines Outcome Goal Variances Interventions   Physical Therapy Goal     PT, PT/OT Ongoing (interventions implemented as appropriate)     Description:  Goals to be met by: 2018     Patient will increase functional independence with mobility by performin. Supine to sit with Modified [place]  2. Sit to supine with Modified [place]  3. Sit to stand transfer with Supervision  4. Gait  x 150 feet with Supervision using Rolling Walker.   5. Ascend/descend 4 stair with right Handrails Contact Guard Assistance.   6. Lower extremity exercise program  x15 reps per handout, with supervision                      History:     Past Medical History:   Diagnosis Date    Anemia in ESRD (end-stage renal disease) 5/29/2016    Anticoagulant long-term use     Aortic atherosclerosis 11/22/2016    Aortic stenosis, moderate 2/18/2016    Asthma in adult without complication 1/8/2016    Bilateral low back pain without sciatica 11/17/2015    Blindness of right eye 11/12/2016    CAD (coronary artery disease) 12/12/2016    Cataract     Central retinal vein occlusion, right eye 6/3/2014    CHF (congestive heart failure)     Chronic diastolic heart failure 1/8/2016    Chronic respiratory failure with hypoxia 5/29/2016    COPD (chronic obstructive pulmonary disease) 1/15/2017    Dependence on hemodialysis     Mon-Wed-Fri    Diverticulosis     Embolic stroke involving right middle cerebral artery     Enlarged LA (left atrium) 10/7/2016    Epiretinal membrane 7/17/2012    ESRD (end stage renal disease)     Essential hypertension 1/8/2016    History of GI diverticular bleed     5/22/16    Left flaccid hemiparesis 10/1/2016    Peripheral vascular disease, unspecified 11/22/2016    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 11/13/2016    Type 2 diabetes mellitus with kidney complication, without long-term current use of insulin 5/1/2018    Type 2 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, without long-term current use of insulin 3/26/2013    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, without long-term current use of insulin 3/26/2013    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       Past Surgical History:   Procedure Laterality Date    BREAST SURGERY      tumor removal x 2    CATARACT EXTRACTION      CHOLECYSTECTOMY      COLONOSCOPY N/A 5/23/2016    Procedure: COLONOSCOPY;  Surgeon: WILLIAM Colvin MD;  Location: [facility] ([address] [place] [address]);  Service: Endoscopy;   Laterality: N/A;    COLONOSCOPY N/A 5/30/2016    Procedure: COLONOSCOPY;  Surgeon: Sam Davis MD;  Location: [facility] (2ND FLR);  Service: Endoscopy;  Laterality: N/A;    COLONOSCOPY N/A 5/30/2016    Performed by Sam Davis MD at [facility] (2ND FLR)    COLONOSCOPY N/A 5/23/2016    Performed by WILLIAM Colvin MD at [facility] (2ND FLR)    ESOPHAGOGASTRODUODENOSCOPY (EGD) N/A 5/30/2016    Performed by Sam Davis MD at [facility] (2ND FLR)    ESOPHAGOGASTRODUODENOSCOPY (EGD) N/A 5/27/2016    Performed by Cesario Rubio MD at [facility] (2ND FLR)    UPPER GASTROINTESTINAL ENDOSCOPY         Clinical Decision Making:     Decision Making/ Complexity Score   On examination of body system using standardized tests and measures patient presents with 1-2 elements from any of the following: body structures and functions, activity limitations, and/or participation restrictions.  Leading to a clinical presentation that is considered stable and/or uncomplicated                              Clinical Decision Making  (Eval Complexity):  Low- 38790     Time Tracking:     PT Received On: 11/29/18  PT Start Time: 0732     PT Stop Time: 0746  PT Total Time (min): 14 min     Billable Minutes: Evaluation 14      RAMONA WALTER, PT  11/29/2018

## 2018-11-29 NOTE — BRIEF OP NOTE
[facility]  Brief Operative Note    SUMMARY     Surgery Date: 11/29/2018     Surgeon(s) and Role:     * NADINE Blum III, MD - Primary    Assisting Surgeon: Monisha Almonte MD    Pre-op Diagnosis:  ESRD (end stage renal disease) [N18.6]  Aneurysm of arteriovenous dialysis fistula, initial encounter [T82.898A]    Post-op Diagnosis:  Post-Op Diagnosis Codes:     * ESRD (end stage renal disease) [N18.6]     * Aneurysm of arteriovenous dialysis fistula, initial encounter [T82.898A]    PROCEDURES:    1. Revision, L AVF with interposition 7mm PTFE  2. Excision, L AVF aneurysm  3. Permacath placement    Anesthesia: Regional    Description of Procedure: as above    Description of the findings of the procedure: as above    Estimated Blood Loss: 25cc         Specimens:   Specimen (12h ago, onward)    None

## 2018-11-29 NOTE — PLAN OF CARE
Problem: Physical Therapy Goal  Goal: Physical Therapy Goal  Goals to be met by: 2018     Patient will increase functional independence with mobility by performin. Supine to sit with Modified [place]  2. Sit to supine with Modified [place]  3. Sit to stand transfer with Supervision  4. Gait  x 150 feet with Supervision using Rolling Walker.   5. Ascend/descend 4 stair with right Handrails Contact Guard Assistance.   6. Lower extremity exercise program x15 reps per handout, with supervision    Outcome: Ongoing (interventions implemented as appropriate)  Pt evaluation complete; pt goals set.    RAMONA WALTER, PT  2018

## 2018-11-29 NOTE — ANESTHESIA PROCEDURE NOTES
Supraclavicular Single Injection    Patient location during procedure: pre-op   Block not for primary anesthetic.  Reason for block: at surgeon's request and post-op pain management   Post-op Pain Location: left arm pain  Start time: 11/29/2018 2:00 PM  Timeout: 11/29/2018 2:00 PM   End time: 11/29/2018 2:10 PM  Staffing  Anesthesiologist: Marcela Monson MD  Other anesthesia staff: Juan Duran Jr., MD  Performed: other anesthesia staff   Preanesthetic Checklist  Completed: patient identified, site marked, surgical consent, pre-op evaluation, timeout performed, IV checked, risks and benefits discussed and monitors and equipment checked  Peripheral Block  Patient position: supine  Prep: ChloraPrep  Patient monitoring: heart rate, cardiac monitor, continuous pulse ox, continuous capnometry and frequent blood pressure checks  Block type: supraclavicular  Laterality: left  Injection technique: single shot  Needle  Needle type: Stimuplex   Needle gauge: 22 G  Needle length: 2 in  Needle localization: anatomical landmarks and ultrasound guidance   -ultrasound image captured on disc.  Assessment  Injection assessment: negative aspiration, negative parasthesia and local visualized surrounding nerve  Paresthesia pain: none  Heart rate change: no  Slow fractionated injection: yes  Additional Notes  VSS.  [facility] RN monitoring vitals throughout procedure.  Patient tolerated procedure well.

## 2018-11-29 NOTE — PT/OT/SLP EVAL
Occupational Therapy   Evaluation    Name: Tea Georges  MRN: [id number]  Admitting Diagnosis:  Hypertensive emergency Day of Surgery    Recommendations:     Discharge Recommendations: home with home health  Discharge Equipment Recommendations:  none  Barriers to discharge:  None    History:     Occupational Profile:  Living Environment: Pt lives with daughter and granddaughter in 1-story house with 4 MARY with R handrails and tub/shower. Pt reports (I) with amb but uses RW outside of the house and mod (I) with ADLs. Pt reports her daughter is able to provide 24hr A upon d/c.   Prior to admission, patients level of function was mod (I).  Patient has the following equipment: wheelchair, bedside commode, rollator.  DME owned (not currently used): none.  Upon discharge, patient will have assistance from daughter.    Past Medical History:   Diagnosis Date    Anemia in ESRD (end-stage renal disease) 5/29/2016    Anticoagulant long-term use     Aortic atherosclerosis 11/22/2016    Aortic stenosis, moderate 2/18/2016    Asthma in adult without complication 1/8/2016    Bilateral low back pain without sciatica 11/17/2015    Blindness of right eye 11/12/2016    CAD (coronary artery disease) 12/12/2016    Cataract     Central retinal vein occlusion, right eye 6/3/2014    CHF (congestive heart failure)     Chronic diastolic heart failure 1/8/2016    Chronic respiratory failure with hypoxia 5/29/2016    COPD (chronic obstructive pulmonary disease) 1/15/2017    Dependence on hemodialysis     Mon-Wed-Fri    Diverticulosis     Embolic stroke involving right middle cerebral artery     Enlarged LA (left atrium) 10/7/2016    Epiretinal membrane 7/17/2012    ESRD (end stage renal disease)     Essential hypertension 1/8/2016    History of GI diverticular bleed     5/22/16    Left flaccid hemiparesis 10/1/2016    Peripheral vascular disease, unspecified 11/22/2016    Stroke due to embolism of right middle cerebral  artery 11/13/2016    Type 2 diabetes mellitus with kidney complication, without long-term current use of insulin 5/1/2018    Type 2 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, without long-term current use of insulin 3/26/2013    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, without long-term current use of insulin 3/26/2013    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       Past Surgical History:   Procedure Laterality Date    BREAST SURGERY      tumor removal x 2    CATARACT EXTRACTION      CHOLECYSTECTOMY      COLONOSCOPY N/A 5/23/2016    Procedure: COLONOSCOPY;  Surgeon: WILLIAM Colvin MD;  Location: [facility] (2ND FLR);  Service: Endoscopy;  Laterality: N/A;    COLONOSCOPY N/A 5/30/2016    Procedure: COLONOSCOPY;  Surgeon: Sam Davis MD;  [facility]  Service: Endoscopy;  Laterality: N/A;    COLONOSCOPY N/A 5/30/2016    Performed by Sam Davis MD at [facility] (2ND FLR)    COLONOSCOPY N/A 5/23/2016    Performed by WILLIAM Colvin MD at [facility] ENDO (2ND FLR)    ESOPHAGOGASTRODUODENOSCOPY (EGD) N/A 5/30/2016    Performed by Sam Davis MD at [facility] (2ND FLR)    ESOPHAGOGASTRODUODENOSCOPY (EGD) N/A 5/27/2016    Performed by Cesario Rubio MD at [facility] (2ND FLR)    UPPER GASTROINTESTINAL ENDOSCOPY         Subjective     Pain/Comfort:  · Pain Rating 1: 0/10    Patients cultural, spiritual, Jewish conflicts given the current situation:      Objective:     Communicated with: RN prior to session.  Patient found with: call button in reach and   upon OT entry to room.    General Precautions: Standard, fall   Orthopedic Precautions:    Braces:       Occupational Performance:    Bed Mobility:    · Patient completed Rolling/Turning to Left with  supervision  · Patient completed Scooting/Bridging with supervision  · Patient completed Supine to Sit with  "supervision    Functional Mobility/Transfers:  · Patient completed Sit <> Stand Transfer with stand by assistance  with  rolling walker   · Patient completed Bed <> Chair Transfer using Step Transfer technique with stand by assistance with rolling walker  · Functional Mobility: SBA with RW for ~ 100'.    Activities of Daily Living:  · Grooming: supervision seated  · Upper Body Dressing: supervision   · Lower Body Dressing: supervision     Cognitive/Visual Perceptual:  Cognitive/Psychosocial Skills:     -       Oriented to: Person, Place, Time and Situation   -       Safety awareness/insight to disability: intact     Physical Exam:  BUE AROM/MMT: WFL    AMPAC 6 Click ADL:  AMPAC Total Score: 21    Treatment & Education:  UE ROM/MMT  Bed mobility training / assessment  Functional mobility assessment  Sit/standing balance assessment  Educated on importance of sitting OOB in bedside chair to promote increased strength, endurance & breathing.  Discussed OT POC / Post-acute plan  Education:    Patient left up in chair with call button in reach    Assessment:     Tea Georges is a 78 y.o. female with a medical diagnosis of Hypertensive emergency.  She presents with the following performance deficits affecting function: weakness, impaired endurance, impaired self care skills, gait instability, impaired balance.    Pt. currently demonstrates decreased (I) with ADLs, functional mobility & t/fs as well as decreased overall strength, ROM, endurance and balance. Pt would benefit from skilled OT services as well as HHOT to address these deficits and to facilitate improving (I) with daily tasks.    Rehab Prognosis: Good; patient would benefit from acute skilled OT services to address these deficits and reach maximum level of function.         Clinical Decision Makin.  OT Low:  "Pt evaluation falls under low complexity for evaluation coding due to performance deficits noted in 1-3 areas as stated above and 0 co-morbities " "affecting current functional status. Data obtained from problem focused assessments. No modifications or assistance was required for completion of evaluation. Only brief occupational profile and history review completed."     Plan:     Patient to be seen 2 x/week to address the above listed problems via self-care/home management, therapeutic activities, therapeutic exercises  · Plan of Care Expires:    · Plan of Care Reviewed with: patient    This Plan of care has been discussed with the patient who was involved in its development and understands and is in agreement with the identified goals and treatment plan    GOALS:   Multidisciplinary Problems     Occupational Therapy Goals        Problem: Occupational Therapy Goal    Goal Priority Disciplines Outcome Interventions   Occupational Therapy Goal     OT, PT/OT Ongoing (interventions implemented as appropriate)    Description:  Goals to be met by: 12/6/18    Patient will increase functional independence with ADLs by performing:    Grooming while standing at sink with Set-up Assistance.  Toileting from toilet with Set-up Assistance for hygiene and clothing management.   Supine to sit with [place].  Toilet transfer to toilet with Supervision.                      Time Tracking:     OT Date of Treatment: 11/29/18  OT Start Time: 0732  OT Stop Time: 0746  OT Total Time (min): 14 min    Billable Minutes:Evaluation 14    SHERI Lombardo  11/29/2018    "

## 2018-11-29 NOTE — PROGRESS NOTES
[facility] Hospital Medicine  Progress Note    Patient Name: Tea Georges  MRN: [id number]  Patient Class: IP- Inpatient   Admission Date: 11/26/2018  Length of Stay: 3 days  Attending Physician: Rosendo Beltran, *  Primary Care Provider: Parth Posadas Ii, MD    [facility] Medicine Team: [facility] HOSP MED A Rosendo Beltran MD    Subjective:     Principal Problem:Hypertensive emergency    HPI:  Mrs. Georges is a 77 yo female with history significant for HTN, ESRD on HD, diastolic HF, right MCA CVA s/p thrombectomy (2016) who presented to the ED on 11/26 for worsening shortness of breath, cough that began on 11/25 and nausea with one episode of emesis that was non bloody, non bilious. She denies any fever, chills, headache, syncope, recent falls, chest pain, palpitations, ABD pain, hematemesis, melena, BRBPR, dysuria, or urinary frequency (does urinate about 2 times daily). She last reports receiving HD on Friday per her usual schedule. Upon ED presentation, she was noted to have HTN emergency with 's with tachypnea, acute on chronic hypoxemia. Labwork revealed hyperkalemia. She was shifted with D50, insulin and given bicarbonate. Bipap attempted due to work of breathing, but was discontinued after patient became nauseated with small amount emesis in the ED. MICU consulted for further management of hypertensive emergency and need for urgent HD.    Hospital Course:  Mrs. Georges is a 77 yo female with history significant for HTN, ESRD on HD, diastolic HF, right MCA CVA s/p thrombectomy (2016) who presented to the ED on 11/26 for worsening shortness of breath, cough that began on 11/25 and nausea with one episode of emesis that was non bloody, non bilious. She denies any fever, chills, headache, syncope, recent falls, chest pain, palpitations, ABD pain, hematemesis, melena, BRBPR, dysuria, or urinary frequency (does urinate about 2 times daily). She last reports receiving HD on Friday per  her usual schedule. Upon ED presentation, she was noted to have HTN emergency with 's with tachypnea, acute on chronic hypoxemia. Labwork revealed hyperkalemia. She was shifted with D50, insulin and given bicarbonate. Bipap attempted due to work of breathing, but was discontinued after patient became nauseated with small amount emesis in the ED. MICU consulted for further management of hypertensive emergency and need for urgent HD.      Blood pressure improved with administration of patient's PO anti-hypertensive medications. Patient received HD overnight, tolerated well with improved BP & O2 requirements. She is now on her home 2L O2 and BP is well controlled. Notably, she has progression of her aortic stenosis and will need f/u with Cardiology.    Notably, she was noted to have ulceration of AVF while in house. Vascular Surgery consulted and performed fistulogram on 11/28 with plans for revision on 11/29.    Interval History: Doing well today with no acute complaints. Awaiting AVF revision today. On home O2.    Review of Systems   Constitutional: Negative for chills, fatigue and fever.   Respiratory: Positive for cough. Negative for chest tightness and shortness of breath.    Cardiovascular: Negative for chest pain and leg swelling.   Gastrointestinal: Negative for blood in stool and constipation.   Neurological: Negative for headaches.   Psychiatric/Behavioral: Negative for agitation and confusion.     Objective:     Vital Signs (Most Recent):  Temp: 98 °F (36.7 °C) (11/29/18 1235)  Pulse: (!) 51 (11/29/18 1235)  Resp: 16 (11/29/18 1235)  BP: 135/64 (11/29/18 1235)  SpO2: 97 % (11/29/18 1235) Vital Signs (24h Range):  Temp:  [98 °F (36.7 °C)-98.1 °F (36.7 °C)] 98 °F (36.7 °C)  Pulse:  [20-55] 51  Resp:  [16-31] 16  SpO2:  [96 %-100 %] 97 %  BP: ()/(48-65) 135/64     Weight: 49.9 kg (110 lb 0.2 oz)  Body mass index is 19.49 kg/m².    Intake/Output Summary (Last 24 hours) at 11/29/2018 0550  Last data  filed at 11/29/2018 1416  Gross per 24 hour   Intake 620 ml   Output --   Net 620 ml      Physical Exam   Constitutional: She is oriented to person, place, and time. She appears well-developed and well-nourished.   HENT:   Head: Normocephalic and atraumatic.   Eyes: EOM are normal. Pupils are equal, round, and reactive to light.   R eye conjunctivitis   Neck: Normal range of motion. No JVD present.   Cardiovascular: Normal rate, regular rhythm and normal heart sounds.   Pulmonary/Chest: Effort normal and breath sounds normal. No respiratory distress.   Abdominal: Soft. She exhibits no distension. There is no tenderness.   Musculoskeletal: She exhibits no edema or deformity.   Left arm AVF with ulceration and scab   Neurological: She is alert and oriented to person, place, and time.   Skin: Skin is warm and dry. No rash noted.   Psychiatric: She has a normal mood and affect. Her behavior is normal.   Vitals reviewed.      MELD-Na score: 23 at 11/28/2018  7:05 AM  MELD score: 20 at 11/28/2018  7:05 AM  Calculated from:  Serum Creatinine: 5.9 mg/dL (Rounded to 4 mg/dL) at 11/28/2018  7:05 AM  Serum Sodium: 133 mmol/L at 11/28/2018  7:05 AM  Total Bilirubin: 0.4 mg/dL (Rounded to 1 mg/dL) at 11/28/2018  7:05 AM  INR(ratio): 1 at 11/26/2018  9:11 AM  Age: 78 years    Significant Labs:  CBC:  Recent Labs   Lab 11/28/18  0705 11/29/18  0707   WBC 4.18 4.05   HGB 9.7* 10.2*   HCT 31.8* 34.5*   PLT 86* 81*     CMP:  Recent Labs   Lab 11/28/18  0705 11/29/18  0707   * 138   K 4.6 3.8    101   CO2 21* 24   GLU 87 79   BUN 49* 28*   CREATININE 5.9* 4.4*   CALCIUM 8.0* 8.5*   PROT 6.4 6.5   ALBUMIN 2.9* 2.9*   BILITOT 0.4 0.5   ALKPHOS 107 102   AST 11 11   ALT 8* 6*   ANIONGAP 11 13   EGFRNONAA 6.4* 9.1*     PTINR:  No results for input(s): INR in the last 48 hours.    Significant Procedures:   Dobutamine Stress Test with Color Flow: No results found for this or any previous visit.    None    Assessment/Plan:      *  Hypertensive emergency    --presented with , pulmonary edema, acute on chronic hypoxemic respiratory failure  --BP controlled after HD & po meds administered; continue PO medications   --no neurologic complaints, no chest pain, troponin negative  --resolved with reintroduction of home BP meds at reduced doses: currently on coreg 12.5 bid (home 25 bid), imdur 30, hydralazine 25mg tid (home 50 tid), and amlodipine 10  - would increase to home dose if becomes HTN again     Aneurysm of arteriovenous dialysis fistula    - with ulceration  - Vascular Surgery consulted: fistulogram on 11/28 with revision this afternoon       Acute on chronic respiratory failure with hypoxia and hypercapnia    --reportedly wears 2 L NC at home, no PFT's on file  --upon presentation, was tachypneic, in respiratory distress requiring NIPPV, then 5-6L NC.   --see below for HTN emergency management  --nephrology consulted for emergent HD for metabolic and volume management  --O2 requirements decreased to home levels as volume removed with CRRT     Right-sided cerebrovascular accident (CVA)    --s/p thrombectomy 2016. Continue ASA, plavix, statin       ESRD (end stage renal disease)    - Nephrology consulted for HD       Acute on chronic combined systolic and diastolic heart failure    --known diastolic HF. Repeat 2D echo shows EF 55% with grade 2 diastolic dysfunction, PA pressure 73 and severe AS  --continue antihypertensive medications as above  --volume removal per HD  --no complaints of chest pain, troponin negative      Pleural effusion on right    --likely 2/2 transudate in setting of ESRD/CHF         VTE Risk Mitigation (From admission, onward)        Ordered     heparin (porcine) injection 5,000 Units  Every 8 hours      11/26/18 1107     IP VTE HIGH RISK PATIENT  Once      11/26/18 1107              Rosendo Beltran MD  Department of Hospital Medicine   [facility]

## 2018-11-29 NOTE — SUBJECTIVE & OBJECTIVE
Interval History: Doing well today with no acute complaints. Awaiting AVF revision today. On home O2.    Review of Systems   Constitutional: Negative for chills, fatigue and fever.   Respiratory: Positive for cough. Negative for chest tightness and shortness of breath.    Cardiovascular: Negative for chest pain and leg swelling.   Gastrointestinal: Negative for blood in stool and constipation.   Neurological: Negative for headaches.   Psychiatric/Behavioral: Negative for agitation and confusion.     Objective:     Vital Signs (Most Recent):  Temp: 98 °F (36.7 °C) (11/29/18 1235)  Pulse: (!) 51 (11/29/18 1235)  Resp: 16 (11/29/18 1235)  BP: 135/64 (11/29/18 1235)  SpO2: 97 % (11/29/18 1235) Vital Signs (24h Range):  Temp:  [98 °F (36.7 °C)-98.1 °F (36.7 °C)] 98 °F (36.7 °C)  Pulse:  [20-55] 51  Resp:  [16-31] 16  SpO2:  [96 %-100 %] 97 %  BP: ()/(48-65) 135/64     Weight: 49.9 kg (110 lb 0.2 oz)  Body mass index is 19.49 kg/m².    Intake/Output Summary (Last 24 hours) at 11/29/2018 1730  Last data filed at 11/29/2018 1416  Gross per 24 hour   Intake 620 ml   Output --   Net 620 ml      Physical Exam   Constitutional: She is oriented to person, place, and time. She appears well-developed and well-nourished.   HENT:   Head: Normocephalic and atraumatic.   Eyes: EOM are normal. Pupils are equal, round, and reactive to light.   R eye conjunctivitis   Neck: Normal range of motion. No JVD present.   Cardiovascular: Normal rate, regular rhythm and normal heart sounds.   Pulmonary/Chest: Effort normal and breath sounds normal. No respiratory distress.   Abdominal: Soft. She exhibits no distension. There is no tenderness.   Musculoskeletal: She exhibits no edema or deformity.   Left arm AVF with ulceration and scab   Neurological: She is alert and oriented to person, place, and time.   Skin: Skin is warm and dry. No rash noted.   Psychiatric: She has a normal mood and affect. Her behavior is normal.   Vitals  reviewed.      MELD-Na score: 23 at 11/28/2018  7:05 AM  MELD score: 20 at 11/28/2018  7:05 AM  Calculated from:  Serum Creatinine: 5.9 mg/dL (Rounded to 4 mg/dL) at 11/28/2018  7:05 AM  Serum Sodium: 133 mmol/L at 11/28/2018  7:05 AM  Total Bilirubin: 0.4 mg/dL (Rounded to 1 mg/dL) at 11/28/2018  7:05 AM  INR(ratio): 1 at 11/26/2018  9:11 AM  Age: 78 years    Significant Labs:  CBC:  Recent Labs   Lab 11/28/18  0705 11/29/18  0707   WBC 4.18 4.05   HGB 9.7* 10.2*   HCT 31.8* 34.5*   PLT 86* 81*     CMP:  Recent Labs   Lab 11/28/18  0705 11/29/18  0707   * 138   K 4.6 3.8    101   CO2 21* 24   GLU 87 79   BUN 49* 28*   CREATININE 5.9* 4.4*   CALCIUM 8.0* 8.5*   PROT 6.4 6.5   ALBUMIN 2.9* 2.9*   BILITOT 0.4 0.5   ALKPHOS 107 102   AST 11 11   ALT 8* 6*   ANIONGAP 11 13   EGFRNONAA 6.4* 9.1*     PTINR:  No results for input(s): INR in the last 48 hours.    Significant Procedures:   Dobutamine Stress Test with Color Flow: No results found for this or any previous visit.    None

## 2018-11-29 NOTE — ASSESSMENT & PLAN NOTE
77yo F with left BC AVF with aneurysmal changes and ulceration    - Plan for OR today for revision of AVF  - NPO  - Consent in chart

## 2018-11-29 NOTE — PLAN OF CARE
Problem: Patient Care Overview  Goal: Plan of Care Review  Outcome: Ongoing (interventions implemented as appropriate)  Pt free of falls/injuries throughout the shift. Bed locked, in lowest position, call bell within reach. Pt afebrile, pain assessed & denied. VSS, pt in no distress, NPO at midnight, will continue to monitor.

## 2018-11-29 NOTE — PLAN OF CARE
Problem: Occupational Therapy Goal  Goal: Occupational Therapy Goal  Goals to be met by: 12/6/18    Patient will increase functional independence with ADLs by performing:    Grooming while standing at sink with Set-up Assistance.  Toileting from toilet with Set-up Assistance for hygiene and clothing management.   Supine to sit with [place].  Toilet transfer to toilet with Supervision.    Outcome: Ongoing (interventions implemented as appropriate)  Evaluation completed and POC established.    SHERI Donato

## 2018-11-29 NOTE — SUBJECTIVE & OBJECTIVE
Medications:  Continuous Infusions:  Scheduled Meds:   amLODIPine  10 mg Oral Daily    aspirin  81 mg Oral Daily    atorvastatin  40 mg Oral Daily    carvedilol  12.5 mg Oral BID    clopidogrel  75 mg Oral Daily    erythromycin   Right Eye Q8H    fluticasone-vilanterol  1 puff Inhalation Daily    hydrALAZINE  25 mg Oral Q8H    isosorbide mononitrate  30 mg Oral Daily     PRN Meds:sodium chloride 0.9%, acetaminophen, albuterol-ipratropium, dextrose 50%, ondansetron     Objective:     Vital Signs (Most Recent):  Temp: 98.1 °F (36.7 °C) (11/29/18 0349)  Pulse: (!) 50 (11/29/18 0700)  Resp: (!) 31 (11/29/18 0700)  BP: (!) 115/59 (11/29/18 0700)  SpO2: 100 % (11/29/18 0700) Vital Signs (24h Range):  Temp:  [98.1 °F (36.7 °C)-98.5 °F (36.9 °C)] 98.1 °F (36.7 °C)  Pulse:  [50-66] 50  Resp:  [17-31] 31  SpO2:  [96 %-100 %] 100 %  BP: ()/(45-78) 115/59          Physical Exam   Constitutional: She is oriented to person, place, and time. She appears well-developed and well-nourished.   HENT:   Head: Normocephalic and atraumatic.   Cardiovascular: Normal rate, regular rhythm and normal heart sounds.   Pulmonary/Chest: Effort normal and breath sounds normal. No respiratory distress.   Abdominal: Soft. She exhibits no distension. There is no tenderness.   Musculoskeletal: She exhibits no edema or deformity.   Left AVF with aneurysmal changes, ulceration and scab over proximal aneurysm   Neurological: She is alert and oriented to person, place, and time.   Skin: Skin is warm and dry. No rash noted.   Psychiatric: She has a normal mood and affect. Her behavior is normal.   Vitals reviewed.      Significant Labs:  CBC:   Recent Labs   Lab 11/28/18 0705   WBC 4.18   RBC 3.32*   HGB 9.7*   HCT 31.8*   PLT 86*   MCV 96   MCH 29.2   MCHC 30.5*     CMP:   Recent Labs   Lab 11/28/18 0705   GLU 87   CALCIUM 8.0*   ALBUMIN 2.9*   PROT 6.4   *   K 4.6   CO2 21*      BUN 49*   CREATININE 5.9*   ALKPHOS 107   ALT  8*   AST 11   BILITOT 0.4

## 2018-11-29 NOTE — ASSESSMENT & PLAN NOTE
- with ulceration  - Vascular Surgery consulted: fistulogram today with plan for revision tomorrow afternoon

## 2018-11-29 NOTE — ASSESSMENT & PLAN NOTE
--presented with , pulmonary edema, acute on chronic hypoxemic respiratory failure  --BP controlled after HD & po meds administered; continue PO medications   --no neurologic complaints, no chest pain, troponin negative  --resolved with reintroduction of home BP meds at reduced doses: currently on coreg 12.5 bid (home 25 bid), imdur 30, hydralazine 25mg tid (home 50 tid), and amlodipine 10  - would increase to home dose if becomes HTN again

## 2018-11-29 NOTE — ASSESSMENT & PLAN NOTE
- with ulceration  - Vascular Surgery consulted: fistulogram on 11/28 with revision this afternoon

## 2018-11-30 VITALS
TEMPERATURE: 98 F | HEIGHT: 63 IN | OXYGEN SATURATION: 99 % | DIASTOLIC BLOOD PRESSURE: 78 MMHG | SYSTOLIC BLOOD PRESSURE: 195 MMHG | BODY MASS INDEX: 21.87 KG/M2 | WEIGHT: 123.44 LBS | HEART RATE: 62 BPM | RESPIRATION RATE: 18 BRPM

## 2018-11-30 LAB
ALBUMIN SERPL BCP-MCNC: 3.1 G/DL
ALP SERPL-CCNC: 111 U/L
ALT SERPL W/O P-5'-P-CCNC: <5 U/L
ANION GAP SERPL CALC-SCNC: 13 MMOL/L
AST SERPL-CCNC: 15 U/L
BASOPHILS # BLD AUTO: 0.02 K/UL
BASOPHILS NFR BLD: 0.4 %
BILIRUB DIRECT SERPL-MCNC: 0.2 MG/DL
BILIRUB SERPL-MCNC: 0.4 MG/DL
BUN SERPL-MCNC: 38 MG/DL
CALCIUM SERPL-MCNC: 8.1 MG/DL
CHLORIDE SERPL-SCNC: 100 MMOL/L
CO2 SERPL-SCNC: 24 MMOL/L
CREAT SERPL-MCNC: 6.4 MG/DL
DIFFERENTIAL METHOD: ABNORMAL
EOSINOPHIL # BLD AUTO: 0.1 K/UL
EOSINOPHIL NFR BLD: 1.6 %
ERYTHROCYTE [DISTWIDTH] IN BLOOD BY AUTOMATED COUNT: 16.5 %
EST. GFR  (AFRICAN AMERICAN): 6.7 ML/MIN/1.73 M^2
EST. GFR  (NON AFRICAN AMERICAN): 5.8 ML/MIN/1.73 M^2
GLUCOSE SERPL-MCNC: 142 MG/DL
HCT VFR BLD AUTO: 36.4 %
HGB BLD-MCNC: 10.8 G/DL
IMM GRANULOCYTES # BLD AUTO: 0.01 K/UL
IMM GRANULOCYTES NFR BLD AUTO: 0.2 %
LYMPHOCYTES # BLD AUTO: 0.5 K/UL
LYMPHOCYTES NFR BLD: 10.3 %
MAGNESIUM SERPL-MCNC: 2.1 MG/DL
MCH RBC QN AUTO: 29.2 PG
MCHC RBC AUTO-ENTMCNC: 29.7 G/DL
MCV RBC AUTO: 98 FL
MONOCYTES # BLD AUTO: 0.7 K/UL
MONOCYTES NFR BLD: 13.3 %
NEUTROPHILS # BLD AUTO: 3.7 K/UL
NEUTROPHILS NFR BLD: 74.2 %
NRBC BLD-RTO: 0 /100 WBC
PHOSPHATE SERPL-MCNC: 6.9 MG/DL
PLATELET # BLD AUTO: 77 K/UL
PMV BLD AUTO: 13.7 FL
POCT GLUCOSE: 154 MG/DL (ref 70–110)
POTASSIUM SERPL-SCNC: 4.1 MMOL/L
PROT SERPL-MCNC: 7 G/DL
RBC # BLD AUTO: 3.7 M/UL
SODIUM SERPL-SCNC: 137 MMOL/L
WBC # BLD AUTO: 5.04 K/UL

## 2018-11-30 PROCEDURE — 36415 COLL VENOUS BLD VENIPUNCTURE: CPT

## 2018-11-30 PROCEDURE — 25000242 PHARM REV CODE 250 ALT 637 W/ HCPCS: Performed by: NURSE PRACTITIONER

## 2018-11-30 PROCEDURE — 90935 HEMODIALYSIS ONE EVALUATION: CPT

## 2018-11-30 PROCEDURE — 90935 HEMODIALYSIS ONE EVALUATION: CPT | Mod: ,,, | Performed by: INTERNAL MEDICINE

## 2018-11-30 PROCEDURE — 85025 COMPLETE CBC W/AUTO DIFF WBC: CPT

## 2018-11-30 PROCEDURE — 84100 ASSAY OF PHOSPHORUS: CPT

## 2018-11-30 PROCEDURE — 80076 HEPATIC FUNCTION PANEL: CPT

## 2018-11-30 PROCEDURE — 25000003 PHARM REV CODE 250: Performed by: HOSPITALIST

## 2018-11-30 PROCEDURE — 25000003 PHARM REV CODE 250: Performed by: STUDENT IN AN ORGANIZED HEALTH CARE EDUCATION/TRAINING PROGRAM

## 2018-11-30 PROCEDURE — 80048 BASIC METABOLIC PNL TOTAL CA: CPT

## 2018-11-30 PROCEDURE — 63600175 PHARM REV CODE 636 W HCPCS: Performed by: INTERNAL MEDICINE

## 2018-11-30 PROCEDURE — 25000003 PHARM REV CODE 250: Performed by: NURSE PRACTITIONER

## 2018-11-30 PROCEDURE — 83735 ASSAY OF MAGNESIUM: CPT

## 2018-11-30 PROCEDURE — 99239 HOSP IP/OBS DSCHRG MGMT >30: CPT | Mod: ,,, | Performed by: HOSPITALIST

## 2018-11-30 RX ORDER — HYDRALAZINE HYDROCHLORIDE 25 MG/1
50 TABLET, FILM COATED ORAL EVERY 8 HOURS
Qty: 180 TABLET | Refills: 11 | Status: SHIPPED | OUTPATIENT
Start: 2018-11-30 | End: 2019-02-08 | Stop reason: SDUPTHER

## 2018-11-30 RX ORDER — GUAIFENESIN/DEXTROMETHORPHAN 100-10MG/5
5 SYRUP ORAL EVERY 4 HOURS PRN
Status: DISCONTINUED | OUTPATIENT
Start: 2018-11-30 | End: 2018-11-30 | Stop reason: HOSPADM

## 2018-11-30 RX ORDER — GUAIFENESIN/DEXTROMETHORPHAN 100-10MG/5
5 SYRUP ORAL EVERY 4 HOURS PRN
Refills: 0 | COMMUNITY
Start: 2018-11-30 | End: 2018-12-10

## 2018-11-30 RX ORDER — CARVEDILOL 12.5 MG/1
12.5 TABLET ORAL 2 TIMES DAILY
Qty: 60 TABLET | Refills: 11 | Status: SHIPPED | OUTPATIENT
Start: 2018-11-30 | End: 2019-09-05 | Stop reason: SDUPTHER

## 2018-11-30 RX ORDER — ERYTHROMYCIN 5 MG/G
OINTMENT OPHTHALMIC EVERY 8 HOURS
Qty: 3.5 G | Refills: 0 | Status: SHIPPED | OUTPATIENT
Start: 2018-11-30 | End: 2019-05-21 | Stop reason: ALTCHOICE

## 2018-11-30 RX ORDER — HEPARIN SODIUM 1000 [USP'U]/ML
1000 INJECTION, SOLUTION INTRAVENOUS; SUBCUTANEOUS
Status: DISCONTINUED | OUTPATIENT
Start: 2018-11-30 | End: 2018-11-30 | Stop reason: HOSPADM

## 2018-11-30 RX ORDER — HYDRALAZINE HYDROCHLORIDE 25 MG/1
25 TABLET, FILM COATED ORAL EVERY 8 HOURS
Qty: 90 TABLET | Refills: 11 | Status: SHIPPED | OUTPATIENT
Start: 2018-11-30 | End: 2018-11-30

## 2018-11-30 RX ORDER — SODIUM CHLORIDE 9 MG/ML
INJECTION, SOLUTION INTRAVENOUS ONCE
Status: COMPLETED | OUTPATIENT
Start: 2018-11-30 | End: 2018-11-30

## 2018-11-30 RX ORDER — SODIUM CHLORIDE 9 MG/ML
INJECTION, SOLUTION INTRAVENOUS
Status: DISCONTINUED | OUTPATIENT
Start: 2018-11-30 | End: 2018-11-30 | Stop reason: HOSPADM

## 2018-11-30 RX ADMIN — SODIUM CHLORIDE: 0.9 INJECTION, SOLUTION INTRAVENOUS at 08:11

## 2018-11-30 RX ADMIN — AMLODIPINE BESYLATE 10 MG: 10 TABLET ORAL at 01:11

## 2018-11-30 RX ADMIN — ERYTHROMYCIN 1 INCH: 5 OINTMENT OPHTHALMIC at 06:11

## 2018-11-30 RX ADMIN — CLOPIDOGREL 75 MG: 75 TABLET, FILM COATED ORAL at 01:11

## 2018-11-30 RX ADMIN — HYDRALAZINE HYDROCHLORIDE 25 MG: 25 TABLET ORAL at 06:11

## 2018-11-30 RX ADMIN — CARVEDILOL 12.5 MG: 12.5 TABLET, FILM COATED ORAL at 01:11

## 2018-11-30 RX ADMIN — ASPIRIN 81 MG CHEWABLE TABLET 81 MG: 81 TABLET CHEWABLE at 01:11

## 2018-11-30 RX ADMIN — ISOSORBIDE MONONITRATE 30 MG: 30 TABLET, EXTENDED RELEASE ORAL at 01:11

## 2018-11-30 RX ADMIN — HEPARIN SODIUM 1000 UNITS: 1000 INJECTION, SOLUTION INTRAVENOUS; SUBCUTANEOUS at 12:11

## 2018-11-30 RX ADMIN — ERYTHROMYCIN 1 INCH: 5 OINTMENT OPHTHALMIC at 01:11

## 2018-11-30 RX ADMIN — FLUTICASONE FUROATE AND VILANTEROL TRIFENATATE 1 PUFF: 200; 25 POWDER RESPIRATORY (INHALATION) at 01:11

## 2018-11-30 RX ADMIN — HYDROCODONE BITARTRATE AND ACETAMINOPHEN 1 TABLET: 5; 325 TABLET ORAL at 01:11

## 2018-11-30 RX ADMIN — HYDRALAZINE HYDROCHLORIDE 25 MG: 25 TABLET ORAL at 01:11

## 2018-11-30 RX ADMIN — ATORVASTATIN CALCIUM 40 MG: 20 TABLET, FILM COATED ORAL at 01:11

## 2018-11-30 RX ADMIN — HYDROCODONE BITARTRATE AND ACETAMINOPHEN 1 TABLET: 5; 325 TABLET ORAL at 09:11

## 2018-11-30 NOTE — ASSESSMENT & PLAN NOTE
- refer to Cardiology for further monitoring given progression over past two years on recent TTE

## 2018-11-30 NOTE — OP NOTE
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    Procedures:  1. Revision, L AVF with interposition 7mm PTFE  2. Excision, L AVF aneurysm  3. Permacath placement    Surgeon: EVARISTO Blum III, MD     Assistants: Monisha Almonte M.D.     Anesthesia: Regional/MAC    Findings/Key elements:   Good thrill at completion of surgery  Both permacath ports withdrew and flushed easily     Implants:   Implant Name Type Inv. Item Serial No.  Lot No. LRB No. Used   GRAFT STRETCH TW RING 4-7 45C - [id number]  GRAFT STRETCH TW RING 4-7 45C [id number] [employer]  Left 1   CATH HEMOSPLIT DL 14.5FR 24CM - [id number] Dialysis Catheter CATH HEMOSPLIT DL 14.5FR 24CM  [employer] [id number] Right 1             Procedure Details:    Indication: Long-standing LUE extremity fistula with significant aneurysmal formation over time. The fistula has been evaluated and is patent and worth revising the aneurysmal segment to help improve the long-term patency and to prevent further overlying skin changes and thinning of the skin. Adjacent to this aneurysmal segment the fistula has a palpable thrill    We discussed the risks of hemorrhage thrombosis infection and the rare risks of rupture but expected they would most likely do well with this was a good means to preserve the long-term durability of this autologous access.    Description of procedure:  Patient was taken to the operating room and prepped and draped in the usual sterile fashion including the LUE circumferentially. Anesthesia was via regional block . The arm was exsanguinated with an esmark and a tourniquet inflated to 250 mmHg. An elliptical incision was made overlying the aneurysm and carried to the fistula; this was dissected circumferentially. The fistula was then dissected out proximally toward the anastomosis. Next we turned our attention  to the large aneurysmal section of the fistula which was dissected out distally. With good endpoints, a curved Chaudhry scissor was used to cut the aneurysmal portions of the fistula. We then used a 4-7 PTFE graft for an interposition graft. The distal end was spatulated, and our anastomosis created using a 4-0 gore alvino suture.  We then spatulated the proximal end and again created an anastomosis with a 4-0 gore alvino suture. Prior to completing the anastomosis, we controlled the inflow with a clamp, then released the tourniquet and back bled the fistula. The anstomosis was then completed. Hemostasis was obtained using additional gore alvino suture and surgicel. We noted an excellent thrill and hemostasis. The skin was then closed with 3-0 nylon vertical mattress sutures. Gauze and tegaderm were used for dressings.     We then re-prepped and draped the patient for a tunneled catheter placement.  Under ultrasound guidance the R IJ was accessed and confirmed to be in correct position with fluoroscopy. A guidewire was advanced into the IVC. A 3 mm incision was made on the chest and the catheter tunneled superficial to the clavicle to the guidewire site. The IJ was then serially dilated, and the catheter inserted. We noted good positioning of the catheter on fluoroscopy. The arterial and venous ports pull back and flushed easily. The ports were then heparin locked. It was secured in place using multiple 3-0 nylon sutures and a sterile antibacterial dressing was placed.    EBL:  25 mL           Complications:  None; patient tolerated the procedure well.           Disposition: Stable to recovery.      Dr. Blum was scrubbed for the procedure.    Monisha Almonte MD   Fellow, Vascular/Endovascular Surgery

## 2018-11-30 NOTE — PLAN OF CARE
Problem: Patient Care Overview  Goal: Plan of Care Review  Outcome: Ongoing (interventions implemented as appropriate)  Pt free of falls/injuries throughout the shift. Bed locked, in lowest position, call bell within reach. Pt afebrile, pain assessed & treated. VSS, pt in no distress, daughter at the bs, will continue to monitor.

## 2018-11-30 NOTE — PROGRESS NOTES
[facility] HEMODIALYSIS NOTE     Patient currently on hemodialysis for removal of uremic toxins and volume.    Patient seen and evaluated on hemodialysis, tolerating treatment, see HD flowsheet for vitals and assessments.      Ultrafiltration goal is 1-2L     Labs have been reviewed and the dialysate bath has been adjusted.     Assessment/Plan: ESRD, HD today for removal of uremic toxins and volume, continue MWF while in-house.

## 2018-11-30 NOTE — PROGRESS NOTES
[facility]  Vascular Surgery  Progress Note    Patient Name: Tea Georges  MRN: [id number]  Admission Date: 11/26/2018  Primary Care Provider: Parth Posadas Ii, MD    Subjective:     Interval History: NAEON. Doing well this am.     Post-Op Info:  Procedure(s) (LRB):  REVISION, AV FISTULA, (Left)  EXCISION, ANEURYSM (Left)  INSERTION, CATHETER, VASCULAR, DUAL LUMEN (Right)   1 Day Post-Op       Medications:  Continuous Infusions:  Scheduled Meds:   sodium chloride 0.9%   Intravenous Once    sodium chloride 0.9%   Intravenous Once    amLODIPine  10 mg Oral Daily    aspirin  81 mg Oral Daily    atorvastatin  40 mg Oral Daily    carvedilol  12.5 mg Oral BID    clopidogrel  75 mg Oral Daily    erythromycin   Right Eye Q8H    fluticasone-vilanterol  1 puff Inhalation Daily    hydrALAZINE  25 mg Oral Q8H    isosorbide mononitrate  30 mg Oral Daily     PRN Meds:sodium chloride 0.9%, sodium chloride 0.9%, sodium chloride 0.9%, acetaminophen, albuterol-ipratropium, dextrose 50%, HYDROcodone-acetaminophen, ondansetron, sodium chloride 0.9%     Objective:     Vital Signs (Most Recent):  Temp: 98.2 °F (36.8 °C) (11/30/18 0411)  Pulse: (!) 54 (11/30/18 0744)  Resp: 17 (11/29/18 2040)  BP: 133/69 (11/29/18 2040)  SpO2: 99 % (11/30/18 0400) Vital Signs (24h Range):  Temp:  [97.9 °F (36.6 °C)-98.2 °F (36.8 °C)] 98.2 °F (36.8 °C)  Pulse:  [20-60] 54  Resp:  [16-20] 17  SpO2:  [96 %-100 %] 99 %  BP: ()/(47-69) 133/69          Physical Exam   Constitutional: She is oriented to person, place, and time. She appears well-developed and well-nourished.   HENT:   Head: Normocephalic and atraumatic.   Cardiovascular: Normal rate and regular rhythm.   permacath in place   Pulmonary/Chest: Effort normal. No respiratory distress.   Abdominal: Soft. She exhibits no distension. There is no tenderness.   Musculoskeletal: She exhibits no edema or deformity.   LUE incision c/d/i   Neurological: She is alert and  oriented to person, place, and time.   Skin: Skin is warm and dry. No rash noted.   Vitals reviewed.      Significant Labs:  CBC:   Recent Labs   Lab 11/29/18  0707   WBC 4.05   RBC 3.55*   HGB 10.2*   HCT 34.5*   PLT 81*   MCV 97   MCH 28.7   MCHC 29.6*     CMP:   Recent Labs   Lab 11/29/18  0707   GLU 79   CALCIUM 8.5*   ALBUMIN 2.9*   PROT 6.5      K 3.8   CO2 24      BUN 28*   CREATININE 4.4*   ALKPHOS 102   ALT 6*   AST 11   BILITOT 0.5     Assessment/Plan:     Aneurysm of arteriovenous dialysis fistula    77yo F with left BC AVF with aneurysmal changes and ulceration    - S/p aneurysm excision and interval PTFE graft placement, permacath placement  - Continue to dialyze through permacath  - F/u with Dr. Blum in 2 weeks for suture removal  - Ok to discharge per primary team         Carlos Sandoval MD  Vascular Surgery  [facility]

## 2018-11-30 NOTE — PLAN OF CARE
[facility]    HOME HEALTH ORDERS  FACE TO FACE ENCOUNTER    Patient Name: Tea Georges  YOB: 1940    PCP: Parth Posadas Ii, MD   PCP Address: [address] / [place] [address]  PCP Phone Number: [phone]  PCP Fax: [phone]    Encounter Date: 11/30/2018    Admit to Home Health    Diagnoses:  Active Hospital Problems    Diagnosis  POA    *Hypertensive emergency [I16.1]  Yes    Acute pulmonary edema [J81.0]  Yes    Aneurysm of arteriovenous dialysis fistula [T82.898A]  Yes    Acute on chronic respiratory failure with hypoxia and hypercapnia [J96.21, J96.22]  Yes    Right-sided cerebrovascular accident (CVA) [I63.9]  Yes    ESRD (end stage renal disease) [N18.6]  Yes     Chronic    Acute on chronic combined systolic and diastolic heart failure [I50.43]  Yes    Pleural effusion on right [J90]  Yes      Resolved Hospital Problems    Diagnosis Date Resolved POA    Recurrent periumbilical abdominal pain [R10.33] 11/28/2018 No       No future appointments.        I have seen and examined this patient face to face today. My clinical findings that support the need for the home health skilled services and home bound status are the following:  Weakness/numbness causing balance and gait disturbance due to Heart Failure and Weakness/Debility making it taxing to leave home.  Patient with medication mismanagement issues requiring home bound status as evidenced by  Unstable vital signs (blood pressure, heart rate) and Poor adherence to medication regimen/dosage.  Medical restrictions requiring assistance of another human to leave home due to  Fluid/volume overload.    Allergies:Review of patient's allergies indicates:  No Known Allergies    Diet: renal diet    Activities: activity as tolerated    Nursing:   SN to complete comprehensive assessment including routine vital signs. Instruct on disease process and s/s of complications to report to MD. Review/verify medication  list sent home with the patient at time of discharge  and instruct patient/caregiver as needed. Frequency may be adjusted depending on start of care date.    Notify MD if SBP > 160 or < 90; DBP > 90 or < 50; HR > 120 or < 50; Temp > 101; Other:         CONSULTS:    Physical Therapy to evaluate and treat. Evaluate for home safety and equipment needs; Establish/upgrade home exercise program. Perform / instruct on therapeutic exercises, gait training, transfer training, and Range of Motion.  Occupational Therapy to evaluate and treat. Evaluate home environment for safety and equipment needs. Perform/Instruct on transfers, ADL training, ROM, and therapeutic exercises.   to evaluate for community resources/long-range planning.  Aide to provide assistance with personal care, ADLs, and vital signs.    MISCELLANEOUS CARE:  Home Oxygen:  No change    WOUND CARE ORDERS  n/a      Medications: Review discharge medications with patient and family and provide education.      Current Discharge Medication List      START taking these medications    Details   dextromethorphan-guaifenesin  mg/5 ml (ROBITUSSIN-DM)  mg/5 mL liquid Take 5 mLs by mouth every 4 (four) hours as needed.  Refills: 0      erythromycin (ROMYCIN) ophthalmic ointment Place into the right eye every 8 (eight) hours.  Qty: 3.5 g, Refills: 0         CONTINUE these medications which have CHANGED    Details   carvedilol (COREG) 12.5 MG tablet Take 1 tablet (12.5 mg total) by mouth 2 (two) times daily.  Qty: 60 tablet, Refills: 11      hydrALAZINE (APRESOLINE) 25 MG tablet Take 2 tablets (50 mg total) by mouth every 8 (eight) hours.  Qty: 180 tablet, Refills: 11         CONTINUE these medications which have NOT CHANGED    Details   albuterol 90 mcg/actuation inhaler Inhale 1-2 puffs into the lungs every 6 (six) hours as needed for Wheezing.  Qty: 1 Inhaler, Refills: 0      amLODIPine (NORVASC) 10 MG tablet Take 1 tablet (10 mg total) by mouth  once daily.  Qty: 90 tablet, Refills: 3    Associated Diagnoses: Essential hypertension      atorvastatin (LIPITOR) 40 MG tablet Take 1 tablet (40 mg total) by mouth once daily.  Qty: 90 tablet, Refills: 3    Associated Diagnoses: Type 2 diabetes mellitus with chronic kidney disease on chronic dialysis, without long-term current use of insulin; Right-sided cerebrovascular accident (CVA)      clopidogrel (PLAVIX) 75 mg tablet Take 1 tablet (75 mg total) by mouth once daily.  Qty: 90 tablet, Refills: 3    Associated Diagnoses: Peripheral vascular disease, unspecified; Right-sided cerebrovascular accident (CVA)      ergocalciferol (ERGOCALCIFEROL) 50,000 unit Cap Take 1 capsule (50,000 Units total) by mouth every 7 days.  Qty: 12 capsule, Refills: 3    Associated Diagnoses: Vitamin D deficiency      fluticasone-vilanterol (BREO ELLIPTA) 200-25 mcg/dose DsDv diskus inhaler Inhale 1 puff into the lungs once daily.  Qty: 90 each, Refills: 3    Associated Diagnoses: Chronic obstructive pulmonary disease, unspecified COPD type      isosorbide dinitrate (ISOCHRON) 40 mg TbSR Take 1 tablet (40 mg total) by mouth every 12 (twelve) hours.  Qty: 180 tablet, Refills: 3    Associated Diagnoses: Coronary artery disease without angina pectoris, unspecified vessel or lesion type, unspecified whether native or transplanted heart; Peripheral vascular disease, unspecified; Right-sided cerebrovascular accident (CVA)      loperamide (IMODIUM) 2 mg capsule Take one capsule at onset of diarrhea.  No more than three capsules daily  Qty: 30 capsule, Refills: 3      multivitamin (THERAGRAN) per tablet Take 1 tablet by mouth once daily.      ondansetron (ZOFRAN) 4 MG tablet Take 2 tablets (8 mg total) by mouth every 6 (six) hours.  Qty: 12 tablet, Refills: 0      RENVELA 800 mg Tab Take 1 tablet (800 mg total) by mouth 3 (three) times daily with meals.      aspirin 81 MG Chew Take 1 tablet (81 mg total) by mouth once daily.  Qty: 30 tablet,  Refills: 1      citalopram (CELEXA) 10 MG tablet Take 1 tablet (10 mg total) by mouth once daily.  Qty: 90 tablet, Refills: 3    Associated Diagnoses: Moderate single current episode of major depressive disorder      lidocaine-prilocaine (EMLA) cream Apply topically as needed (to apply over access 1/2 over prior to dialysis).  Qty: 25 g, Refills: 1         STOP taking these medications       valsartan (DIOVAN) 160 MG tablet Comments:   Reason for Stopping:         cloNIDine 0.3 mg/24 hr td ptwk (CATAPRES) 0.3 mg/24 hr Comments:   Reason for Stopping:               I certify that this patient is confined to her home and needs intermittent skilled nursing care, physical therapy and occupational therapy.

## 2018-11-30 NOTE — ASSESSMENT & PLAN NOTE
- with ulceration  - Vascular Surgery consulted: fistulogram on 11/28 with successful revision on 11/29  - will have vascular surgery f/u in 2 weeks for suture removal  - permacath in place for HD

## 2018-11-30 NOTE — ANESTHESIA POSTPROCEDURE EVALUATION
"Anesthesia Post Evaluation    Patient: Tea Georges    Procedure(s) Performed: Procedure(s) (LRB):  REVISION, AV FISTULA, (Left)  EXCISION, ANEURYSM (Left)  INSERTION, CATHETER, VASCULAR, DUAL LUMEN (Right)    Final Anesthesia Type: general  Patient location during evaluation: floor  Patient participation: Yes- Able to Participate  Level of consciousness: awake and alert and awake  Post-procedure vital signs: reviewed and stable  Pain management: adequate  Airway patency: patent  PONV status at discharge: No PONV  Anesthetic complications: no      Cardiovascular status: blood pressure returned to baseline  Respiratory status: unassisted and spontaneous ventilation  Hydration status: euvolemic  Follow-up not needed.        Visit Vitals  BP (!) 195/78   Pulse 62   Temp 36.8 °C (98.3 °F) (Oral)   Resp 18   Ht 5' 3" (1.6 m)   Wt 56 kg (123 lb 7.3 oz)   LMP  (LMP Unknown)   SpO2 99%   Breastfeeding? No   BMI 21.87 kg/m²       Pain/Nika Score: Pain Assessment Performed: Yes (11/30/2018  1:25 PM)  Presence of Pain: complains of pain/discomfort (11/30/2018  1:25 PM)  Pain Rating Prior to Med Admin: 10 (11/30/2018  1:25 PM)  Pain Rating Post Med Admin: 7 (states pain is still at a 7) (11/30/2018 12:05 PM)  Nika Score: 10 (11/29/2018  6:15 PM)  Modified Nika Score: 19 (11/29/2018  6:15 PM)        "

## 2018-11-30 NOTE — NURSING TRANSFER
Nursing Transfer Note      11/29/2018     Transfer To: 115[address]    Transfer via stretcher    Transfer with cardiac monitoring    Transported by PCT    Chart send with patient: Yes    Notified:Family, no one in waiting room    Patient reassessed at: 1815 11/29/2018    Upon arrival to floor: cardiac monitor applied, patient oriented to room, call bell in reach and bed in lowest position    Meal ordered for patient

## 2018-11-30 NOTE — PLAN OF CARE
Problem: Patient Care Overview  Goal: Plan of Care Review  Outcome: Ongoing (interventions implemented as appropriate)  Hemodialysis tx complete, 1.488L removed in 3.5 hour tx, tolerated well. Blood returned via right chest perm cath, both lumens hep locked, capped and taped. Report given to Lety BENJAMIN

## 2018-11-30 NOTE — SUBJECTIVE & OBJECTIVE
Medications:  Continuous Infusions:  Scheduled Meds:   sodium chloride 0.9%   Intravenous Once    sodium chloride 0.9%   Intravenous Once    amLODIPine  10 mg Oral Daily    aspirin  81 mg Oral Daily    atorvastatin  40 mg Oral Daily    carvedilol  12.5 mg Oral BID    clopidogrel  75 mg Oral Daily    erythromycin   Right Eye Q8H    fluticasone-vilanterol  1 puff Inhalation Daily    hydrALAZINE  25 mg Oral Q8H    isosorbide mononitrate  30 mg Oral Daily     PRN Meds:sodium chloride 0.9%, sodium chloride 0.9%, sodium chloride 0.9%, acetaminophen, albuterol-ipratropium, dextrose 50%, HYDROcodone-acetaminophen, ondansetron, sodium chloride 0.9%     Objective:     Vital Signs (Most Recent):  Temp: 98.2 °F (36.8 °C) (11/30/18 0411)  Pulse: (!) 54 (11/30/18 0744)  Resp: 17 (11/29/18 2040)  BP: 133/69 (11/29/18 2040)  SpO2: 99 % (11/30/18 0400) Vital Signs (24h Range):  Temp:  [97.9 °F (36.6 °C)-98.2 °F (36.8 °C)] 98.2 °F (36.8 °C)  Pulse:  [20-60] 54  Resp:  [16-20] 17  SpO2:  [96 %-100 %] 99 %  BP: ()/(47-69) 133/69          Physical Exam   Constitutional: She is oriented to person, place, and time. She appears well-developed and well-nourished.   HENT:   Head: Normocephalic and atraumatic.   Cardiovascular: Normal rate and regular rhythm.   permacath in place   Pulmonary/Chest: Effort normal. No respiratory distress.   Abdominal: Soft. She exhibits no distension. There is no tenderness.   Musculoskeletal: She exhibits no edema or deformity.   LUE incision c/d/i   Neurological: She is alert and oriented to person, place, and time.   Skin: Skin is warm and dry. No rash noted.   Vitals reviewed.      Significant Labs:  CBC:   Recent Labs   Lab 11/29/18  0707   WBC 4.05   RBC 3.55*   HGB 10.2*   HCT 34.5*   PLT 81*   MCV 97   MCH 28.7   MCHC 29.6*     CMP:   Recent Labs   Lab 11/29/18  0707   GLU 79   CALCIUM 8.5*   ALBUMIN 2.9*   PROT 6.5      K 3.8   CO2 24      BUN 28*   CREATININE 4.4*    ALKPHOS 102   ALT 6*   AST 11   BILITOT 0.5

## 2018-11-30 NOTE — ASSESSMENT & PLAN NOTE
79yo F with left BC AVF with aneurysmal changes and ulceration    - S/p aneurysm excision and interval PTFE graft placement, permacath placement  - Continue to dialyze through permacath  - F/u with Dr. Blum in 2 weeks for suture removal  - Ok to discharge per primary team

## 2018-11-30 NOTE — DISCHARGE SUMMARY
[facility] Hospital Medicine  Discharge Summary      Patient Name: Tea Georges  MRN: [id number]  Admission Date: 11/26/2018  Hospital Length of Stay: 4 days  Discharge Date and Time:  11/30/2018 1:01 PM  Attending Physician: Rosendo Beltran, *   Discharging Provider: Rosendo Beltran MD  Primary Care Provider: Parth Posadas Ii, MD  [facility] Medicine [place]: [facility] [place] [facility] [place] Rosendo Beltran MD    HPI:   Mrs. Georges is a 77 yo female with history significant for HTN, ESRD on HD, diastolic HF, right MCA CVA s/p thrombectomy (2016) who presented to the ED on 11/26 for worsening shortness of breath, cough that began on 11/25 and nausea with one episode of emesis that was non bloody, non bilious. She denies any fever, chills, headache, syncope, recent falls, chest pain, palpitations, ABD pain, hematemesis, melena, BRBPR, dysuria, or urinary frequency (does urinate about 2 times daily). She last reports receiving HD on Friday per her usual schedule. Upon ED presentation, she was noted to have HTN emergency with 's with tachypnea, acute on chronic hypoxemia. Labwork revealed hyperkalemia. She was shifted with D50, insulin and given bicarbonate. Bipap attempted due to work of breathing, but was discontinued after patient became nauseated with small amount emesis in the ED. MICU consulted for further management of hypertensive emergency and need for urgent HD.    Procedure(s) (LRB):  REVISION, AV FISTULA, (Left)  EXCISION, ANEURYSM (Left)  INSERTION, CATHETER, VASCULAR, DUAL LUMEN (Right)      Hospital Course:   Mrs. Georges is a 77 yo female with history significant for HTN, ESRD on HD, diastolic HF, right MCA CVA s/p thrombectomy (2016) who presented to the ED on 11/26 for worsening shortness of breath, cough that began on 11/25 and nausea with one episode of emesis that was non bloody, non bilious. She denies any fever, chills, headache, syncope, recent falls, chest pain,  palpitations, ABD pain, hematemesis, melena, BRBPR, dysuria, or urinary frequency (does urinate about 2 times daily). She last reports receiving HD on Friday per her usual schedule. Upon ED presentation, she was noted to have HTN emergency with 's with tachypnea, acute on chronic hypoxemia. Labwork revealed hyperkalemia. She was shifted with D50, insulin and given bicarbonate. Bipap attempted due to work of breathing, but was discontinued after patient became nauseated with small amount emesis in the ED. MICU consulted for further management of hypertensive emergency and need for urgent HD.      Blood pressure improved with administration of patient's PO anti-hypertensive medications. Patient received HD overnight, tolerated well with improved BP & O2 requirements. She is now on her home 2L O2 and BP is well controlled. Notably, she has progression of her aortic stenosis and will need f/u with Cardiology.    Notably, she was seen to have ulceration of AVF while in house. Vascular Surgery consulted and performed fistulogram on 11/28 with revision and permacath placement on 11/29.    Vitals:    11/30/18 1130 11/30/18 1145 11/30/18 1200 11/30/18 1205   BP: (!) 172/54 (!) 169/56 (!) 184/52 (!) 175/54   BP Location: Right arm Right arm Right arm Right arm   Patient Position:    Lying   Pulse:  (!) 59  (!) 55   Resp:    20   Temp:    98.3 °F (36.8 °C)   TempSrc:    Oral   SpO2:       Weight:       Height:         Physical Exam   Constitutional: She is oriented to person, place, and time. She appears well-developed and well-nourished.   HENT:   Head: Normocephalic and atraumatic.   Eyes: EOM are normal. Pupils are equal, round, and reactive to light.   R eye conjunctivitis   Neck: Normal range of motion. No JVD present.   Cardiovascular: Normal rate, regular rhythm and normal heart sounds.   Pulmonary/Chest: Effort normal and breath sounds normal. No respiratory distress.   Abdominal: Soft. She exhibits no  distension. There is no tenderness.   Musculoskeletal: She exhibits no edema or deformity.   Left arm AVF with ulceration and scab   Neurological: She is alert and oriented to person, place, and time.   Skin: Skin is warm and dry. No rash noted.   Psychiatric: She has a normal mood and affect. Her behavior is normal.   Vitals reviewed.      Consults:   Consults (From admission, onward)        Status Ordering Provider     Inpatient consult to Critical Care Medicine  Once     Provider:  (Not yet assigned)    Completed JEOVANNY SCHMID     Inpatient consult to Nephrology  Once     Provider:  (Not yet assigned)    Completed JEOVANNY SCHMID     Inpatient consult to Vascular Surgery  Once     Provider:  (Not yet assigned)    Completed SANTOSH MOTT          * Hypertensive emergency    --presented with , pulmonary edema, acute on chronic hypoxemic respiratory failure  --BP controlled after HD & po meds administered; continue PO medications   --no neurologic complaints, no chest pain, troponin negative  --resolved with reintroduction of home BP meds at reduced doses: currently on coreg 12.5 bid (home 25 bid), imdur 30, hydralazine 25mg tid (home 50 tid), and amlodipine 10  --will discharge on coreg 12.5 bid, imdur 40, hydralazine 50 tid, and amlodipine 10     Aneurysm of arteriovenous dialysis fistula    - with ulceration  - Vascular Surgery consulted: fistulogram on 11/28 with successful revision on 11/29  - will have vascular surgery f/u in 2 weeks for suture removal  - permacath in place for HD       Acute on chronic respiratory failure with hypoxia and hypercapnia    --reportedly wears 2 L NC at home, no PFT's on file  --upon presentation, was tachypneic, in respiratory distress requiring NIPPV, then 5-6L NC.   --see below for HTN emergency management  --nephrology consulted for emergent HD for metabolic and volume management  --O2 requirements decreased to home levels as volume removed with CRRT      Right-sided cerebrovascular accident (CVA)    --s/p thrombectomy 2016. Continue ASA, plavix, statin       Nonrheumatic aortic valve stenosis    - refer to Cardiology for further monitoring given progression over past two years on recent TTE       ESRD (end stage renal disease)    - Nephrology consulted for HD       Acute on chronic combined systolic and diastolic heart failure    --known diastolic HF. Repeat 2D echo shows EF 55% with grade 2 diastolic dysfunction, PA pressure 73 and severe AS  --continue antihypertensive medications as above  --volume removal per HD  --no complaints of chest pain, troponin negative      Pleural effusion on right    --likely 2/2 transudate in setting of ESRD/CHF         Final Active Diagnoses:    Diagnosis Date Noted POA    PRINCIPAL PROBLEM:  Hypertensive emergency [I16.1] 11/26/2018 Yes    Acute pulmonary edema [J81.0] 11/29/2018 Yes    Aneurysm of arteriovenous dialysis fistula [T82.898A]  Yes    Acute on chronic respiratory failure with hypoxia and hypercapnia [J96.21, J96.22] 11/26/2018 Yes    Right-sided cerebrovascular accident (CVA) [I63.9] 11/22/2016 Yes    Nonrheumatic aortic valve stenosis [I35.0] 02/04/2016 Yes    ESRD (end stage renal disease) [N18.6]  Yes     Chronic    Acute on chronic combined systolic and diastolic heart failure [I50.43] 01/08/2016 Yes    Pleural effusion on right [J90]  Yes      Problems Resolved During this Admission:    Diagnosis Date Noted Date Resolved POA    Recurrent periumbilical abdominal pain [R10.33] 11/27/2018 11/28/2018 No       Discharged Condition: good    Disposition: Home-Health Care c    Follow Up:  Follow-up Information     W Jaime Blum Iii, MD In 2 weeks.    Specialty:  Vascular Surgery  Why:  For suture removal  Contact information:  [address]  [facility] [address]  [phone]             [facility] CARDIOLOGY.    Specialty:  Cardiology  Why:  For evaluation of your aortic heart valve  Contact  information:  [address]  [facility] [address]  [phone]               Patient Instructions:      Ambulatory consult to Vascular Surgery   Referral Priority: Routine Referral Type: Surgical   Referral Reason: Specialty Services Required   Requested Specialty: Vascular Surgery   Number of Visits Requested: 1     Ambulatory Referral to Cardiology   Referral Priority: Routine Referral Type: Consultation   Referral Reason: Specialty Services Required   Requested Specialty: Cardiology   Number of Visits Requested: 1     Diet renal     Notify your health care provider if you experience any of the following:  temperature >100.4     Notify your health care provider if you experience any of the following:  persistent nausea and vomiting or diarrhea     Notify your health care provider if you experience any of the following:  severe uncontrolled pain     Notify your health care provider if you experience any of the following:  persistent dizziness, light-headedness, or visual disturbances     Notify your health care provider if you experience any of the following:  difficulty breathing or increased cough     Notify your health care provider if you experience any of the following:  increased confusion or weakness     Activity as tolerated       Significant Diagnostic Studies: Labs:   BMP:   Recent Labs   Lab 11/29/18  0707 11/30/18  0925 11/30/18 0926   GLU 79 142*  --     137  --    K 3.8 4.1  --     100  --    CO2 24 24  --    BUN 28* 38*  --    CREATININE 4.4* 6.4*  --    CALCIUM 8.5* 8.1*  --    MG 1.9  --  2.1   , CMP   Recent Labs   Lab 11/29/18  0707 11/30/18 0925 11/30/18  0926    137  --    K 3.8 4.1  --     100  --    CO2 24 24  --    GLU 79 142*  --    BUN 28* 38*  --    CREATININE 4.4* 6.4*  --    CALCIUM 8.5* 8.1*  --    PROT 6.5  --  7.0   ALBUMIN 2.9*  --  3.1*   BILITOT 0.5  --  0.4   ALKPHOS 102  --  111   AST 11  --  15   ALT 6*  --  <5*   ANIONGAP 13 13  --     ESTGFRAFRICA 10.5* 6.7*  --    EGFRNONAA 9.1* 5.8*  --    , CBC   Recent Labs   Lab 11/29/18  0707 11/30/18  0926   WBC 4.05 5.04   HGB 10.2* 10.8*   HCT 34.5* 36.4*   PLT 81* 77*    and All labs within the past 24 hours have been reviewed    Pending Diagnostic Studies:     None         Medications:  Reconciled Home Medications:      Medication List      START taking these medications    dextromethorphan-guaifenesin  mg/5 ml  mg/5 mL liquid  Commonly known as:  ROBITUSSIN-DM  Take 5 mLs by mouth every 4 (four) hours as needed.     erythromycin ophthalmic ointment  Commonly known as:  ROMYCIN  Place into the right eye every 8 (eight) hours.        CHANGE how you take these medications    carvedilol 12.5 MG tablet  Commonly known as:  COREG  Take 1 tablet (12.5 mg total) by mouth 2 (two) times daily.  What changed:    · medication strength  · how much to take     hydrALAZINE 25 MG tablet  Commonly known as:  APRESOLINE  Take 2 tablets (50 mg total) by mouth every 8 (eight) hours.  What changed:    · medication strength  · when to take this        CONTINUE taking these medications    albuterol 90 mcg/actuation inhaler  Commonly known as:  PROVENTIL/VENTOLIN HFA  Inhale 1-2 puffs into the lungs every 6 (six) hours as needed for Wheezing.     amLODIPine 10 MG tablet  Commonly known as:  NORVASC  Take 1 tablet (10 mg total) by mouth once daily.     aspirin 81 MG Chew  Take 1 tablet (81 mg total) by mouth once daily.     atorvastatin 40 MG tablet  Commonly known as:  LIPITOR  Take 1 tablet (40 mg total) by mouth once daily.     citalopram 10 MG tablet  Commonly known as:  CELEXA  Take 1 tablet (10 mg total) by mouth once daily.     clopidogrel 75 mg tablet  Commonly known as:  PLAVIX  Take 1 tablet (75 mg total) by mouth once daily.     ergocalciferol 50,000 unit Cap  Commonly known as:  ERGOCALCIFEROL  Take 1 capsule (50,000 Units total) by mouth every 7 days.     fluticasone-vilanterol 200-25 mcg/dose Dsdv  diskus inhaler  Commonly known as:  BREO ELLIPTA  Inhale 1 puff into the lungs once daily.     isosorbide dinitrate 40 mg Tbsr  Commonly known as:  ISOCHRON  Take 1 tablet (40 mg total) by mouth every 12 (twelve) hours.     lidocaine-prilocaine cream  Commonly known as:  EMLA  Apply topically as needed (to apply over access 1/2 over prior to dialysis).     loperamide 2 mg capsule  Commonly known as:  IMODIUM  Take one capsule at onset of diarrhea.  No more than three capsules daily     multivitamin per tablet  Commonly known as:  THERAGRAN  Take 1 tablet by mouth once daily.     ondansetron 4 MG tablet  Commonly known as:  ZOFRAN  Take 2 tablets (8 mg total) by mouth every 6 (six) hours.     RENVELA 800 mg Tab  Generic drug:  sevelamer carbonate  Take 1 tablet (800 mg total) by mouth 3 (three) times daily with meals.        STOP taking these medications    cloNIDine 0.3 mg/24 hr td ptwk 0.3 mg/24 hr  Commonly known as:  CATAPRES     valsartan 160 MG tablet  Commonly known as:  DIOVAN            Indwelling Lines/Drains at time of discharge:   Lines/Drains/Airways     Central Venous Catheter Line                 Hemodialysis Catheter 11/29/18 1650 right subclavian less than 1 day          Drain                 Hemodialysis AV Fistula Left upper arm -- days         Hemodialysis AV Fistula 05/23/16 0700 Left forearm 921 days          Epidural Line                 Perineural Analgesia/Anesthesia Assessment (using dermatomes) Epidural 11/29/18 1400 less than 1 day                Time spent on the discharge of patient: 35 minutes  Patient was seen and examined on the date of discharge and determined to be suitable for discharge.         Rosendo Beltran MD  Department of Hospital Medicine  [facility]

## 2018-11-30 NOTE — ASSESSMENT & PLAN NOTE
--presented with , pulmonary edema, acute on chronic hypoxemic respiratory failure  --BP controlled after HD & po meds administered; continue PO medications   --no neurologic complaints, no chest pain, troponin negative  --resolved with reintroduction of home BP meds at reduced doses: currently on coreg 12.5 bid (home 25 bid), imdur 30, hydralazine 25mg tid (home 50 tid), and amlodipine 10  --will discharge on coreg 12.5 bid, imdur 40, hydralazine 50 tid, and amlodipine 10

## 2018-11-30 NOTE — PROGRESS NOTES
Maintenance hemodialysis tx started vis right chest perm cath, tolerated well, lines reversed, flows good

## 2018-12-03 ENCOUNTER — TELEPHONE (OUTPATIENT)
Dept: VASCULAR SURGERY | Facility: CLINIC | Age: 78
End: 2018-12-03

## 2018-12-03 DIAGNOSIS — N18.6 ESRD (END STAGE RENAL DISEASE) ON DIALYSIS: Primary | ICD-10-CM

## 2018-12-03 DIAGNOSIS — Z99.2 ESRD (END STAGE RENAL DISEASE) ON DIALYSIS: Primary | ICD-10-CM

## 2018-12-03 RX ORDER — TRAMADOL HYDROCHLORIDE 50 MG/1
50 TABLET ORAL EVERY 8 HOURS PRN
Qty: 30 TABLET | Refills: 0 | Status: SHIPPED | OUTPATIENT
Start: 2018-12-03 | End: 2019-02-08

## 2018-12-03 NOTE — TELEPHONE ENCOUNTER
Contacted patient's daughter  Cruz to notify her that prescription for pain medication has been written and can be picked up in clinic. Daughter verbalized understanding.

## 2018-12-03 NOTE — TELEPHONE ENCOUNTER
Contacted patient's daughter to schedule appt with Dr. Blum for fistulagram FU. Daughter states patient is complaining of pain at incisional area and Tylenol is not helping despite patient taking Tylenol as prescribed. Denies swelling and hand pain or tingling. States Dr. Blum usually prescribes Tramadol after procedures on patient's HD access. Will contact MD on call and will contact patient's daughter with response.

## 2018-12-19 ENCOUNTER — PES CALL (OUTPATIENT)
Dept: ADMINISTRATIVE | Facility: CLINIC | Age: 78
End: 2018-12-19

## 2018-12-24 ENCOUNTER — TELEPHONE (OUTPATIENT)
Dept: ADMINISTRATIVE | Facility: CLINIC | Age: 78
End: 2018-12-24

## 2018-12-26 DIAGNOSIS — Z95.818 STATUS POST PLACEMENT OF IMPLANTABLE LOOP RECORDER: Primary | ICD-10-CM

## 2019-01-08 ENCOUNTER — OFFICE VISIT (OUTPATIENT)
Dept: VASCULAR SURGERY | Facility: CLINIC | Age: 79
End: 2019-01-08
Attending: SURGERY
Payer: MEDICARE

## 2019-01-08 ENCOUNTER — HOSPITAL ENCOUNTER (OUTPATIENT)
Dept: VASCULAR SURGERY | Facility: CLINIC | Age: 79
Discharge: HOME | End: 2019-01-08
Attending: SURGERY
Payer: MEDICARE

## 2019-01-08 VITALS
WEIGHT: 112.44 LBS | SYSTOLIC BLOOD PRESSURE: 166 MMHG | BODY MASS INDEX: 22.07 KG/M2 | TEMPERATURE: 98 F | DIASTOLIC BLOOD PRESSURE: 72 MMHG | HEIGHT: 60 IN | HEART RATE: 72 BPM

## 2019-01-08 DIAGNOSIS — N18.6 ESRD (END STAGE RENAL DISEASE) ON DIALYSIS: ICD-10-CM

## 2019-01-08 DIAGNOSIS — T82.898D ANEURYSM OF ARTERIOVENOUS DIALYSIS FISTULA, SUBSEQUENT ENCOUNTER: Primary | ICD-10-CM

## 2019-01-08 DIAGNOSIS — Z99.2 ESRD (END STAGE RENAL DISEASE) ON DIALYSIS: ICD-10-CM

## 2019-01-08 PROCEDURE — 99999 PR PBB SHADOW E&M-EST. PATIENT-LVL IV: CPT | Mod: PBBFAC,,, | Performed by: SURGERY

## 2019-01-08 PROCEDURE — 99999 PR PBB SHADOW E&M-EST. PATIENT-LVL IV: ICD-10-PCS | Mod: PBBFAC,,, | Performed by: SURGERY

## 2019-01-08 PROCEDURE — 93990 DOPPLER FLOW TESTING: CPT | Mod: 26,S$PBB,, | Performed by: SURGERY

## 2019-01-08 PROCEDURE — 93990 PR DUPLEX HEMODIALYSIS ACCESS: ICD-10-PCS | Mod: 26,S$PBB,, | Performed by: SURGERY

## 2019-01-08 PROCEDURE — 93990 DOPPLER FLOW TESTING: CPT | Mod: PBBFAC | Performed by: SURGERY

## 2019-01-08 PROCEDURE — 99024 POSTOP FOLLOW-UP VISIT: CPT | Mod: POP,,, | Performed by: SURGERY

## 2019-01-08 PROCEDURE — 99024 PR POST-OP FOLLOW-UP VISIT: ICD-10-PCS | Mod: POP,,, | Performed by: SURGERY

## 2019-01-08 PROCEDURE — 99214 OFFICE O/P EST MOD 30 MIN: CPT | Mod: PBBFAC | Performed by: SURGERY

## 2019-01-08 NOTE — LETTER
[facility] Vascular [facility]  [address]  [place] [address]  Phone: [phone]  Fax: [phone] January 8, 2019      Rosendo Beltran MD  [address] [facility] [address]    Patient: Tea Georges   MR Number: [id number]   YOB: 1940   Date of Visit: 1/8/2019     Dear Dr. Beltran:    Thank you for referring Tea Georges to me for evaluation. Attached you will find relevant portions of my assessment and plan of care.    If you have questions, please do not hesitate to call me. I look forward to following Tea Georges along with you.    Sincerely,    NADINE Blum III, MD  Professor and Chief  Vascular and Endovascular Surgery   for Research and [place]  [facility]    WCS/hcr

## 2019-01-08 NOTE — PROGRESS NOTES
See my prior inpatient note, review of systems, family history and social   history were unchanged.    HISTORY OF PRESENT ILLNESS:  A 79-year-old female with end-stage renal disease   status post    Revision, left AV fistula with in a position 7 mm PTFE and excision of large   bleeding to aneurysms on 11/29/2018.    Left brachiocephalic AV fistula creation by me on 11/16/2011.    This is her initial postoperative visit.  She was seen as inpatient consult with   bleeding from very large pseudonyms.  These were excised and in-position graft   placed as well as Perm-A-Cath placed.    She continues to use a Perm-A-Cath without difficulty.    PHYSICAL EXAMINATION:  VITAL SIGNS:  See nursing note.  EXTREMITIES:  Left arm shows the upper arm incision well healed with multiple   nylon sutures in place.  There is a reasonably good thrill with mild pulsatility   proximally.    IMAGING:  Duplex of this composite fistula/graft shows it be patent with no   significant stenosis and flow volume of 1.7 liters.    ASSESSMENT:  A 5-week status post surgical revision left from AV fistula with in   a position 7 mm PTFE.    PLAN:  1.  DC sutures today.  2.  Given her paper for instructions to begin cannulating this graft in two   weeks.  3.  Follow up in five weeks from now for Perm-A-Cath removal, if the graft is   being used exclusively.      MACK/VINCE  dd: 01/08/2019 14:27:45 (CST)  td: 01/08/2019 18:09:10 (CST)  Doc ID   #[id number]  Job ID #[id number]    CC:

## 2019-01-10 ENCOUNTER — CLINICAL SUPPORT (OUTPATIENT)
Dept: CARDIOLOGY | Facility: HOSPITAL | Age: 79
End: 2019-01-10
Attending: INTERNAL MEDICINE
Payer: MEDICARE

## 2019-01-10 DIAGNOSIS — Z95.818 STATUS POST PLACEMENT OF IMPLANTABLE LOOP RECORDER: ICD-10-CM

## 2019-01-22 DIAGNOSIS — Z46.9 FITTING AND ADJUSTMENT OF DEVICE: Primary | ICD-10-CM

## 2019-02-04 ENCOUNTER — TELEPHONE (OUTPATIENT)
Dept: ADMINISTRATIVE | Facility: CLINIC | Age: 79
End: 2019-02-04

## 2019-02-08 ENCOUNTER — OUTPATIENT CASE MANAGEMENT (OUTPATIENT)
Dept: ADMINISTRATIVE | Facility: OTHER | Age: 79
End: 2019-02-08

## 2019-02-08 ENCOUNTER — TELEPHONE (OUTPATIENT)
Dept: INTERNAL MEDICINE | Facility: CLINIC | Age: 79
End: 2019-02-08

## 2019-02-08 ENCOUNTER — OFFICE VISIT (OUTPATIENT)
Dept: INTERNAL MEDICINE | Facility: CLINIC | Age: 79
End: 2019-02-08
Payer: MEDICARE

## 2019-02-08 VITALS
BODY MASS INDEX: 21.68 KG/M2 | HEIGHT: 60 IN | SYSTOLIC BLOOD PRESSURE: 180 MMHG | DIASTOLIC BLOOD PRESSURE: 60 MMHG | HEART RATE: 67 BPM | WEIGHT: 110.44 LBS | OXYGEN SATURATION: 95 %

## 2019-02-08 DIAGNOSIS — R19.7 DIARRHEA, UNSPECIFIED TYPE: ICD-10-CM

## 2019-02-08 DIAGNOSIS — N18.6 ESRD (END STAGE RENAL DISEASE) ON DIALYSIS: Chronic | ICD-10-CM

## 2019-02-08 DIAGNOSIS — Z63.8 FAMILY DISCORD: ICD-10-CM

## 2019-02-08 DIAGNOSIS — I73.9 PERIPHERAL VASCULAR DISEASE, UNSPECIFIED: ICD-10-CM

## 2019-02-08 DIAGNOSIS — D69.6 THROMBOCYTOPENIA, UNSPECIFIED: ICD-10-CM

## 2019-02-08 DIAGNOSIS — I63.9 RIGHT-SIDED CEREBROVASCULAR ACCIDENT (CVA): ICD-10-CM

## 2019-02-08 DIAGNOSIS — I10 ESSENTIAL HYPERTENSION: Primary | Chronic | ICD-10-CM

## 2019-02-08 DIAGNOSIS — Z99.2 ESRD (END STAGE RENAL DISEASE) ON DIALYSIS: Chronic | ICD-10-CM

## 2019-02-08 DIAGNOSIS — F32.1 MODERATE SINGLE CURRENT EPISODE OF MAJOR DEPRESSIVE DISORDER: ICD-10-CM

## 2019-02-08 DIAGNOSIS — G81.14 LEFT SPASTIC HEMIPARESIS: ICD-10-CM

## 2019-02-08 DIAGNOSIS — R79.9 ABNORMAL FINDING OF BLOOD CHEMISTRY: ICD-10-CM

## 2019-02-08 DIAGNOSIS — N25.81 HYPERPARATHYROIDISM, SECONDARY RENAL: ICD-10-CM

## 2019-02-08 PROBLEM — J81.0 ACUTE PULMONARY EDEMA: Status: RESOLVED | Noted: 2018-11-29 | Resolved: 2019-02-08

## 2019-02-08 PROBLEM — I16.1 HYPERTENSIVE EMERGENCY: Status: RESOLVED | Noted: 2018-11-26 | Resolved: 2019-02-08

## 2019-02-08 PROBLEM — J96.22 ACUTE ON CHRONIC RESPIRATORY FAILURE WITH HYPOXIA AND HYPERCAPNIA: Status: RESOLVED | Noted: 2018-11-26 | Resolved: 2019-02-08

## 2019-02-08 PROBLEM — J96.21 ACUTE ON CHRONIC RESPIRATORY FAILURE WITH HYPOXIA AND HYPERCAPNIA: Status: RESOLVED | Noted: 2018-11-26 | Resolved: 2019-02-08

## 2019-02-08 PROCEDURE — 99999 PR PBB SHADOW E&M-EST. PATIENT-LVL III: ICD-10-PCS | Mod: PBBFAC,,, | Performed by: INTERNAL MEDICINE

## 2019-02-08 PROCEDURE — 99214 PR OFFICE/OUTPT VISIT, EST, LEVL IV, 30-39 MIN: ICD-10-PCS | Mod: S$PBB,,, | Performed by: INTERNAL MEDICINE

## 2019-02-08 PROCEDURE — 99214 OFFICE O/P EST MOD 30 MIN: CPT | Mod: S$PBB,,, | Performed by: INTERNAL MEDICINE

## 2019-02-08 PROCEDURE — 99213 OFFICE O/P EST LOW 20 MIN: CPT | Mod: PBBFAC | Performed by: INTERNAL MEDICINE

## 2019-02-08 PROCEDURE — 99999 PR PBB SHADOW E&M-EST. PATIENT-LVL III: CPT | Mod: PBBFAC,,, | Performed by: INTERNAL MEDICINE

## 2019-02-08 RX ORDER — LOSARTAN POTASSIUM 50 MG/1
50 TABLET ORAL DAILY
Qty: 90 TABLET | Refills: 3 | Status: ON HOLD | OUTPATIENT
Start: 2019-02-08 | End: 2021-03-10 | Stop reason: HOSPADM

## 2019-02-08 RX ORDER — SEVELAMER CARBONATE 800 MG/1
800 TABLET, FILM COATED ORAL
Qty: 270 TABLET | Refills: 3 | Status: ON HOLD | OUTPATIENT
Start: 2019-02-08 | End: 2021-06-23 | Stop reason: HOSPADM

## 2019-02-08 RX ORDER — LOPERAMIDE HYDROCHLORIDE 2 MG/1
CAPSULE ORAL
Qty: 30 CAPSULE | Refills: 3 | Status: ON HOLD | OUTPATIENT
Start: 2019-02-08 | End: 2019-06-11 | Stop reason: HOSPADM

## 2019-02-08 RX ORDER — AMLODIPINE BESYLATE 10 MG/1
10 TABLET ORAL DAILY
Qty: 90 TABLET | Refills: 3 | Status: ON HOLD | OUTPATIENT
Start: 2019-02-08 | End: 2020-09-26 | Stop reason: HOSPADM

## 2019-02-08 RX ORDER — HYDRALAZINE HYDROCHLORIDE 25 MG/1
50 TABLET, FILM COATED ORAL EVERY 8 HOURS
Qty: 180 TABLET | Refills: 11 | Status: ON HOLD | OUTPATIENT
Start: 2019-02-08 | End: 2019-06-11 | Stop reason: HOSPADM

## 2019-02-08 RX ORDER — CLOPIDOGREL BISULFATE 75 MG/1
75 TABLET ORAL DAILY
Qty: 90 TABLET | Refills: 3 | Status: SHIPPED | OUTPATIENT
Start: 2019-02-08 | End: 2019-05-21

## 2019-02-08 SDOH — SOCIAL DETERMINANTS OF HEALTH (SDOH): OTHER SPECIFIED PROBLEMS RELATED TO PRIMARY SUPPORT GROUP: Z63.8

## 2019-02-08 NOTE — PROGRESS NOTES
"Subjective:       Patient ID: Tea Georges is a 79 y.o. female.    Chief Complaint: URI    HPI - admitted for 4 days on 11/26/2018 for HTN emergency and urgent dialysis.  During that admission, her AoStenosis was noted to have progressed.  Her main issue today was her living situation - still living with her daughter and FRANSISCO, though he is now in senior care for unclear reasons.  There is a big dog in the house that she wants out of the house, but the daughter will not do that.  She owns the house and "pays for everything" b/c they don't have jobs.  She is depressed about this.  She has home health to help her, but she still has all her pills for today's dialysis in one pill bottle.  Needs some refills.  Not a smoker    PMH:  DM2, in remission d/t dialysis.  off meds  Diabetic eye complications  ESRD on dialysis  HTN - at goal today  HLD  Aortic Stenosis, moderate to severe on echo 2016     Meds:  Discussed with patient during visit today, but unsure what she's actually taking.     Review of Systems   Constitutional: Negative for fever.   HENT: Negative for congestion.    Respiratory: Negative for shortness of breath.    Cardiovascular: Negative for chest pain.   Gastrointestinal: Negative for abdominal pain.   Genitourinary: Negative for difficulty urinating.   Musculoskeletal: Positive for arthralgias.   Skin: Negative for rash.   Neurological: Positive for weakness.   Psychiatric/Behavioral: Positive for dysphoric mood. Negative for sleep disturbance.       Objective:      Physical Exam   Constitutional: She is oriented to person, place, and time. She appears well-developed and well-nourished.   Frail, elderly woman in no distress.  Able to climb onto exam table, but needed some assistance.   HENT:   Head: Normocephalic and atraumatic.   Cardiovascular: Normal rate and regular rhythm. Exam reveals no gallop and no friction rub.   Murmur heard.  Pulmonary/Chest: Effort normal and breath sounds normal. No respiratory " distress. She has no wheezes. She has no rales. She exhibits no tenderness.   Neurological: She is alert and oriented to person, place, and time.   Skin: Skin is warm and dry. No erythema.   Psychiatric: She has a normal mood and affect.   Nursing note and vitals reviewed.      Assessment:       1. Essential hypertension    2. Moderate single current episode of major depressive disorder    3. Hyperparathyroidism, secondary renal    4. Thrombocytopenia, unspecified    5. Left spastic hemiparesis    6. Peripheral vascular disease, unspecified    7. Right-sided cerebrovascular accident (CVA)    8. Diarrhea, unspecified type    9. ESRD (end stage renal disease) on dialysis    10. Abnormal finding of blood chemistry     11. Family discord        Plan:       Tea was seen today for uri.    Diagnoses and all orders for this visit:    Essential hypertension - reconciled medications.  Refilling norvasc and hydralazine; need to restart losartan.  Due for lab work  -     amLODIPine (NORVASC) 10 MG tablet; Take 1 tablet (10 mg total) by mouth once daily.  -     hydrALAZINE (APRESOLINE) 25 MG tablet; Take 2 tablets (50 mg total) by mouth every 8 (eight) hours.  -     losartan (COZAAR) 50 MG tablet; Take 1 tablet (50 mg total) by mouth once daily.  -     Hemoglobin A1c; Future  -     Comprehensive metabolic panel; Future    Moderate single current episode of major depressive disorder - worsening.  I think this is due to familial discord.  Called [facility]PS to see if they can help her out of this situation    Hyperparathyroidism, secondary renal - stable on treatment    Thrombocytopenia, unspecified - stable, noted on labs  -     CBC auto differential; Future    Left spastic hemiparesis    Peripheral vascular disease, unspecified - stable, refilling plavix  -     clopidogrel (PLAVIX) 75 mg tablet; Take 1 tablet (75 mg total) by mouth once daily.    Right-sided cerebrovascular accident (CVA)  -     clopidogrel (PLAVIX) 75 mg tablet; Take  1 tablet (75 mg total) by mouth once daily.    Diarrhea, unspecified type - unstable, untreated.  Needs refill imodium  -     loperamide (IMODIUM) 2 mg capsule; Take one capsule at onset of diarrhea.  No more than three capsules daily    ESRD (end stage renal disease) on dialysis  -     RENVELA 800 mg Tab; Take 1 tablet (800 mg total) by mouth 3 (three) times daily with meals.  -     Ambulatory consult to Social Work    Abnormal finding of blood chemistry   -     Hemoglobin A1c; Future    Family discord  -     Ambulatory consult to Social Work    rtc prn, or in 3 months    ARCELIA Posadas MD MPH  Staff Internist

## 2019-02-08 NOTE — TELEPHONE ENCOUNTER
----- Message from Lindsey Goodson sent at 2/8/2019  9:50 AM CST -----  The following patient has been assigned to Shelbi Neal, RN with Outpatient Complex Care Management for high risk screening.     Reason:   N18.6,Z99.2 (ICD-10-CM) - ESRD (end stage renal disease) on dialysis  Z63.8 (ICD-10-CM) - Family discord     Please only contact her on Mon, Wed, or Friday by cell phone when she is in dialysis. She states her daughter wont let her talk to anyone if you call the home.     Please contact [facility] at [phone] with any questions.     Thank you,     Lindsey Goodson, [facility] [place] [place]  Outpatient Care Mgmt.  [phone]

## 2019-02-08 NOTE — PROGRESS NOTES
The following patient has been assigned to Shelbi Neal RN with Outpatient Complex Care Management for high risk screening.    Reason:   N18.6,Z99.2 (ICD-10-CM) - ESRD (end stage renal disease) on dialysis   Z63.8 (ICD-10-CM) - Family discord     Please only contact her on Mon, Wed, or Friday by cell phone when she is in dialysis. She states her daughter wont let her talk to anyone    Please contact [facility] at [phone] with any questions.    Thank you,    Lindsey Goodson, [facility] [place]  Outpatient Care Mgmt.  [phone]

## 2019-02-12 ENCOUNTER — OFFICE VISIT (OUTPATIENT)
Dept: VASCULAR SURGERY | Facility: CLINIC | Age: 79
End: 2019-02-12
Payer: MEDICARE

## 2019-02-12 VITALS
SYSTOLIC BLOOD PRESSURE: 149 MMHG | WEIGHT: 110.25 LBS | TEMPERATURE: 98 F | DIASTOLIC BLOOD PRESSURE: 65 MMHG | BODY MASS INDEX: 20.29 KG/M2 | HEART RATE: 56 BPM | HEIGHT: 62 IN

## 2019-02-12 DIAGNOSIS — N18.6 ESRD (END STAGE RENAL DISEASE) ON DIALYSIS: Primary | ICD-10-CM

## 2019-02-12 DIAGNOSIS — Z99.2 ESRD (END STAGE RENAL DISEASE) ON DIALYSIS: Primary | ICD-10-CM

## 2019-02-12 DIAGNOSIS — Z99.2 ESRD (END STAGE RENAL DISEASE) ON DIALYSIS: Primary | Chronic | ICD-10-CM

## 2019-02-12 DIAGNOSIS — N18.6 ESRD (END STAGE RENAL DISEASE) ON DIALYSIS: Primary | Chronic | ICD-10-CM

## 2019-02-12 PROCEDURE — 36589 PR REMOVAL TUNNELED CV CATH W/O SUBQ PORT OR PUMP: ICD-10-PCS | Mod: 58,S$PBB,, | Performed by: SURGERY

## 2019-02-12 PROCEDURE — 99213 OFFICE O/P EST LOW 20 MIN: CPT | Mod: PBBFAC | Performed by: SURGERY

## 2019-02-12 PROCEDURE — 99999 PR PBB SHADOW E&M-EST. PATIENT-LVL III: CPT | Mod: PBBFAC,,, | Performed by: SURGERY

## 2019-02-12 PROCEDURE — 36589 REMOVAL TUNNELED CV CATH: CPT | Mod: 58,S$PBB,, | Performed by: SURGERY

## 2019-02-12 PROCEDURE — 99024 PR POST-OP FOLLOW-UP VISIT: ICD-10-PCS | Mod: POP,,, | Performed by: SURGERY

## 2019-02-12 PROCEDURE — 36589 REMOVAL TUNNELED CV CATH: CPT | Mod: PBBFAC | Performed by: SURGERY

## 2019-02-12 PROCEDURE — 99024 POSTOP FOLLOW-UP VISIT: CPT | Mod: POP,,, | Performed by: SURGERY

## 2019-02-12 PROCEDURE — 99999 PR PBB SHADOW E&M-EST. PATIENT-LVL III: ICD-10-PCS | Mod: PBBFAC,,, | Performed by: SURGERY

## 2019-02-12 NOTE — PROGRESS NOTES
See my prior inpatient note, review of systems, family history and social   history were unchanged.    HISTORY OF PRESENT ILLNESS:  A 79-year-old female with end-stage renal disease   status post    Revision, left AV fistula with in a position 7 mm PTFE and excision of large   bleeding to aneurysms on 11/29/2018.    Left brachiocephalic AV fistula creation by me on 11/16/2011.    She is now using the revised AVF/G without difficulty    PHYSICAL EXAMINATION:  VITAL SIGNS:  See nursing note.  EXTREMITIES:  Left arm shows the upper arm incision well healed with multiple   nylon sutures in place.  There is a reasonably good thrill with mild pulsatility   proximally.    IMAGING:  Prior Duplex of this composite fistula/graft shows it be patent with no   significant stenosis and flow volume of 1.7 liters.    ASSESSMENT: Well functioning AVF/G    PLAN:  1.  D/C permacath  2. FU 4 months with AVF duplex if clinically indicated    PROCEDURE NOTE:   After sterile prep and drape and infiltration with lidocaine, the permacath cuff was freed up using blunt and sharp dissection, the permacath removed, and manual compression used for hemostasis.   No complications    HAZEL Blum III, MD, FACS  Professor and Chief, Vascular and Endovascular Surgery

## 2019-02-15 ENCOUNTER — OUTPATIENT CASE MANAGEMENT (OUTPATIENT)
Dept: ADMINISTRATIVE | Facility: OTHER | Age: 79
End: 2019-02-15

## 2019-02-18 ENCOUNTER — OUTPATIENT CASE MANAGEMENT (OUTPATIENT)
Dept: ADMINISTRATIVE | Facility: OTHER | Age: 79
End: 2019-02-18

## 2019-02-18 NOTE — PROGRESS NOTES
"02/18/2019  Summary:  (3rd Attempt)  RN-CM attempted to contact patient on her cell phone while at [facility] [phone]; no answer, left message requesting a return call. Called the portable phone number that was given to me this morning by Acacia at [facility] and the phone was brought to Ms. Georges in the dialysis chair. Spoke briefly with Ms. Georges who states that she is not feeling very well; states she gets like this while on dialysis. Ms. Georges requested that I call her back on a non-dialysis day and she would be able to talk with me. I explained to Ms. Georges that I was calling her at dialysis because she had reported to Dr. Posadas that her daughter would not let her talk to anyone on the phone and that she had reported to him that there were problems in the home. I asked patient if she felt neglected or abused and she denied both. She stated, "I just don't want to live there any more". She states she wants to get her own place. I asked Ms. Georges if she wanted to move into a Nursing Home or other facility and she stated that she wanted to get her own home or apartment, not go in a Nursing Home. I asked her if she would be able to afford to live on her own and she stated, "I might if it didn't cost too much". I asked Ms. Georges if she would be in agreement with having a SW contact her to see if she could assist with housing and she said that would be fine. I asked patient if she would complete the assessment with me over the phone today and she said, "not today, I'm not feeling good". I asked patient when I could call her and she said anytime tomorrow. I asked if her daughter would let her talk to me and she stated, "she lets me talk on the phone, it's my phone". I asked her what time I should call her and we agreed on 9:00 tomorrow morning. Patient stated that she would answer the phone when I called. Will contact patient at 9:00 tomorrow morning as we agreed upon today. Giselle Weaver, " RN-OPCM    Interventions:  - Contacted patient at the Dialysis Center as requested.    Plan:  - Scheduled with patient to call at 9:00 AM Tuesday morning 2/19/19.

## 2019-02-18 NOTE — PROGRESS NOTES
02/15/2019  Summary: (1st Attempt)  RNGiselleCM received OPCM referral for High Risk patient. Patient referred by PCP, Dr. Parth Posadas for End Stage Renal Disease (ESRD) on dialysis and Family Discord. Patient is MSSP attributed to Dr. Posadas. Patient is a 79 year old , female with a prior medical history of Right-Sided CVA s/p Thrombectomy in 2016, Left Spastic Hemiparesis, Depression, Blind Right Eye, COPD, Acute on Chronic Respiratory Failure with Hypoxia and Hypercapnia, Acute Pulmonary Edema, Essential Hypertension (Not Eligible for Digital Medicine), Acute on Chronic Combined Systolic and Diastolic Heart Failure, Non-rheumatic Aortic Valve Stenosis and Tricuspid Valve Insufficiency, Moderate Aortic Stenosis, PVD, CAD, Aneurysm of Arteriovenous Dialysis Fistula, ESRD on Dialysis, Thrombocytopenia, Anemia in ESRD, Hyperparathyroidism, Vitamin D Insufficiency, Physical Debility, Type 2 Diabetes, Asthma, Bilateral Low Back Pain, Central Retinal Vein Occlusion Right Eye, Diverticulosis, GI Diverticular Bleed, Breast Surgery, Cataract Extraction. RNGiselleCM was instructed to only contact patient only on Mon, Wed, or Friday by cell phone when she is in dialysis, as she states her daughter wont let her talk to anyone. Attempted to contact patient Friday afternoon as I am unsure what time the patient goes to dialysis. There was no answer on [phone], number listed in Epic as patient's cell phone. There was no ability to leave a voice mail message as phone rang and then rolled to a busy signal. Will attempt to contact patient Monday morning and if no response, will try again Monday afternoon. - NELDA Weaver RN-OPCM    Interventions:  - Chart review completed.  - Unable to leave message requesting a return call.  - Scheduled patient for next attempt to contact Monday, 2/18/19.

## 2019-02-18 NOTE — PROGRESS NOTES
"02/18/2019  Summary:  (2nd Attempt)  RNJENNY attempted to contact patient to complete initial assessment for OPCM. Called patient's cell number [phone], female answered the phone and stated that patient was not there. Stated that she was at dialysis and wanted to know who was calling. Asked when patient would be home and she asked who was calling again. Asked if there was another phone number where I could reach the patient and she replied "no". Stated that I would try to reach patient at a later time. Did not want to provide any information as I was instructed to call patient's cell phone on M,W,F when patient was in dialysis due to patient's report that her daughter wont let her talk to anyone. Will contact PCP's office to determine where patient goes to dialysis and see if I can reach patient there. Spoke with Katty at Dr. Posadas's office. She was not able to provide any information on the Dialysis Center or provide another phone number. She was also unable to find the name of the Nephrologist for the patient. Will attempt to contact patient's previous HH agency, [facility] (as provided by Katty), to see if they are aware of the Nephrologist or the Dialysis Center that patient attends. Spoke with Eilzabeth at [facility] [phone]. Elizabeth checked patient's record and provided me with [facility] at [phone]. Called [employer] and there was no answer or voice mail available at Ext.224, [facility]es[facility], Ext.229 Clinic Manager or Ext.[phone] . Called the general [facility] Number [phone] and they attempted to contact the [facility] in [place]. After their attempts, they were successful at the [facility] [place] [facility] and connected me with Acacia who confirmed that patient is a M,W,F patient from 12:00-4:00 PM. Acacia also provided a portable phone number [phone] that I could call to speak to in case she did not have her cell phone " with her. Will attempt to reach patient at the Dialysis at approximately 2:00 PM this afternoon as recommended by Acacia. Will send in-basket message to Dr. Posadas with status and provided EPS contact information to call [phone] if he felt that patient was being neglected or abused. - NELDA Weaver, RN-OPCM    Interventions:  - Attempted to contact patient on cell number; not available.  - Contacted Dr. Posadas's office regarding name of Dialysis Center and phone number.  - Attempted to contact [facility]; no answer and no ability to leave voice mail messages at any extensions provided.  - Contacted [facility]' 1-800 number; was eventually connected to the proper Dialysis Center.  - Sent in-basket message to Dr. Posadas and provided EPS contact number if he felt that patient was being neglected or abused.     Plan:  - Will attempt to contact patient at Dialysis (M,W,F from 12:00-4:00PM).

## 2019-02-19 ENCOUNTER — OUTPATIENT CASE MANAGEMENT (OUTPATIENT)
Dept: ADMINISTRATIVE | Facility: OTHER | Age: 79
End: 2019-02-19

## 2019-02-19 NOTE — PROGRESS NOTES
02/19/2019  Summary:  (4th Attempt)  RN-ABI attempted to contact patient at agreed upon time this morning as requested by patient yesterday. Called patient's mobile phone number [phone]; left message with my contact information, requesting a return call. As this was my 4th attempt to enroll patient in OPCM, I will close case today. Will send in-basket message to referring physician, Dr. Parth Posadas, to notify him of case closure due to inability to make contact with patient. - NELDA Weaver RN-OPCM    Interventions:  - Left message requesting a return call.  - Closed case.  - Sent message to referring physician. - 2/19/19 Spoke with Gracie from Dr. Posadas's office. She stated that patient has an appointment with them on Friday 3/8/19 and Dr. Posadas will talk with patient. Gracie will also try to verify that patient's phone number is correct as she has left messages for the patient on the same number and she has not returned her calls either. Discussed with Gracie and Dr. Posadas that we will be happy to assist patient if she is willing and encouraged him to send another referral after he speaks with her at her next visit.

## 2019-02-20 ENCOUNTER — TELEPHONE (OUTPATIENT)
Dept: INTERNAL MEDICINE | Facility: CLINIC | Age: 79
End: 2019-02-20

## 2019-02-20 NOTE — TELEPHONE ENCOUNTER
I called the patient.  She states she will be available in the a.m at the below number if you can call her then please?  She is still in need of housing.  6

## 2019-02-26 ENCOUNTER — CLINICAL SUPPORT (OUTPATIENT)
Dept: CARDIOLOGY | Facility: HOSPITAL | Age: 79
End: 2019-02-26
Attending: INTERNAL MEDICINE
Payer: MEDICARE

## 2019-02-26 DIAGNOSIS — Z46.9 FITTING AND ADJUSTMENT OF DEVICE: ICD-10-CM

## 2019-03-11 ENCOUNTER — CLINICAL SUPPORT (OUTPATIENT)
Dept: CARDIOLOGY | Facility: HOSPITAL | Age: 79
End: 2019-03-11
Attending: INTERNAL MEDICINE
Payer: MEDICARE

## 2019-03-11 DIAGNOSIS — Z46.9 FITTING AND ADJUSTMENT OF DEVICE: ICD-10-CM

## 2019-03-11 PROCEDURE — 93299 CARDIAC DEVICE CHECK - REMOTE: CPT

## 2019-03-19 ENCOUNTER — OFFICE VISIT (OUTPATIENT)
Dept: INTERNAL MEDICINE | Facility: CLINIC | Age: 79
End: 2019-03-19
Payer: MEDICARE

## 2019-03-19 VITALS
WEIGHT: 111.75 LBS | HEIGHT: 62 IN | SYSTOLIC BLOOD PRESSURE: 150 MMHG | OXYGEN SATURATION: 92 % | DIASTOLIC BLOOD PRESSURE: 30 MMHG | BODY MASS INDEX: 20.56 KG/M2 | HEART RATE: 54 BPM

## 2019-03-19 DIAGNOSIS — J44.1 COPD WITH EXACERBATION: Primary | ICD-10-CM

## 2019-03-19 DIAGNOSIS — Z99.2 TYPE 2 DIABETES MELLITUS WITH CHRONIC KIDNEY DISEASE ON CHRONIC DIALYSIS, WITHOUT LONG-TERM CURRENT USE OF INSULIN: ICD-10-CM

## 2019-03-19 DIAGNOSIS — Z78.0 ASYMPTOMATIC MENOPAUSAL STATE: ICD-10-CM

## 2019-03-19 DIAGNOSIS — N18.6 ESRD (END STAGE RENAL DISEASE) ON DIALYSIS: Chronic | ICD-10-CM

## 2019-03-19 DIAGNOSIS — Z99.2 ESRD (END STAGE RENAL DISEASE) ON DIALYSIS: Chronic | ICD-10-CM

## 2019-03-19 DIAGNOSIS — N18.6 TYPE 2 DIABETES MELLITUS WITH CHRONIC KIDNEY DISEASE ON CHRONIC DIALYSIS, WITHOUT LONG-TERM CURRENT USE OF INSULIN: ICD-10-CM

## 2019-03-19 DIAGNOSIS — E11.22 TYPE 2 DIABETES MELLITUS WITH CHRONIC KIDNEY DISEASE ON CHRONIC DIALYSIS, WITHOUT LONG-TERM CURRENT USE OF INSULIN: ICD-10-CM

## 2019-03-19 DIAGNOSIS — T82.898D ANEURYSM OF ARTERIOVENOUS DIALYSIS FISTULA, SUBSEQUENT ENCOUNTER: ICD-10-CM

## 2019-03-19 DIAGNOSIS — R53.81 PHYSICAL DEBILITY: ICD-10-CM

## 2019-03-19 DIAGNOSIS — J44.9 CHRONIC OBSTRUCTIVE PULMONARY DISEASE, UNSPECIFIED COPD TYPE: ICD-10-CM

## 2019-03-19 DIAGNOSIS — I10 ESSENTIAL HYPERTENSION: Chronic | ICD-10-CM

## 2019-03-19 PROCEDURE — 99999 PR PBB SHADOW E&M-EST. PATIENT-LVL III: ICD-10-PCS | Mod: PBBFAC,,, | Performed by: INTERNAL MEDICINE

## 2019-03-19 PROCEDURE — 99213 OFFICE O/P EST LOW 20 MIN: CPT | Mod: PBBFAC | Performed by: INTERNAL MEDICINE

## 2019-03-19 PROCEDURE — 99215 PR OFFICE/OUTPT VISIT, EST, LEVL V, 40-54 MIN: ICD-10-PCS | Mod: S$PBB,,, | Performed by: INTERNAL MEDICINE

## 2019-03-19 PROCEDURE — 99215 OFFICE O/P EST HI 40 MIN: CPT | Mod: S$PBB,,, | Performed by: INTERNAL MEDICINE

## 2019-03-19 PROCEDURE — 99999 PR PBB SHADOW E&M-EST. PATIENT-LVL III: CPT | Mod: PBBFAC,,, | Performed by: INTERNAL MEDICINE

## 2019-03-19 RX ORDER — AZITHROMYCIN 250 MG/1
TABLET, FILM COATED ORAL
Qty: 6 TABLET | Refills: 0 | Status: SHIPPED | OUTPATIENT
Start: 2019-03-19 | End: 2019-03-24

## 2019-03-19 RX ORDER — ALBUTEROL SULFATE 90 UG/1
1-2 AEROSOL, METERED RESPIRATORY (INHALATION) EVERY 6 HOURS PRN
Qty: 1 INHALER | Refills: 0 | Status: SHIPPED | OUTPATIENT
Start: 2019-03-19

## 2019-03-19 RX ORDER — CODEINE PHOSPHATE AND GUAIFENESIN 10; 100 MG/5ML; MG/5ML
5 SOLUTION ORAL 3 TIMES DAILY PRN
Qty: 118 ML | Refills: 0 | Status: SHIPPED | OUTPATIENT
Start: 2019-03-19 | End: 2019-03-29

## 2019-03-19 NOTE — PROGRESS NOTES
Subjective:       Patient ID: Tea Georges is a 79 y.o. female.    Chief Complaint: Follow-up    HPI - Has a bad cold today; wheezing and coughing up phlegm.  She hasn't had a fever; known emphysema.  She's going to dialysis tomorrow.  Pretty fatigued today; wants to talk to  about placement into a different living situation; still with a lot of family drama. She's due for bone density screening.      PMH:  DM2, in remission d/t dialysis.  off meds  Diabetic eye complications  ESRD on dialysis  HTN - at goal today  HLD  Aortic Stenosis, severe on echo 2018     Meds:  Discussed with patient during visit today, but unsure what she's actually taking.       Review of Systems   Constitutional: Positive for fatigue. Negative for fever.   HENT: Positive for congestion, postnasal drip, rhinorrhea and sore throat.    Respiratory: Positive for cough and wheezing.    Cardiovascular: Negative for chest pain.   Gastrointestinal: Negative for diarrhea.   Genitourinary: Negative for difficulty urinating.   Musculoskeletal: Negative for arthralgias.   Skin: Negative for rash.   Neurological: Negative for headaches.   Psychiatric/Behavioral: Positive for sleep disturbance.       Objective:      Physical Exam   Constitutional: She is oriented to person, place, and time. She appears well-developed and well-nourished.   Frail and fatigued appearing elderly woman.  Coughing during interview/exam   HENT:   Head: Normocephalic and atraumatic.   Cardiovascular: Normal rate and regular rhythm. Exam reveals no gallop and no friction rub.   Murmur (loud, 3/6 systolic ejection murmur) heard.  Pulmonary/Chest: Effort normal. No respiratory distress. She has wheezes. She has no rales. She exhibits no tenderness.   Neurological: She is alert and oriented to person, place, and time.   Skin: Skin is warm and dry. No erythema.   Psychiatric: She has a normal mood and affect.   Nursing note and vitals reviewed.      Assessment:       1.  COPD with exacerbation    2. Aneurysm of arteriovenous dialysis fistula, subsequent encounter    3. Chronic obstructive pulmonary disease, unspecified COPD type    4. ESRD (end stage renal disease) on dialysis    5. Essential hypertension    6. Physical debility    7. Type 2 diabetes mellitus with chronic kidney disease on chronic dialysis, without long-term current use of insulin    8. Asymptomatic menopausal state         Plan:       Tea was seen today for follow-up.    Diagnoses and all orders for this visit:    COPD with exacerbation - will treat aggressively, given prior COPD diagnosis and wheezing on exam.  Albuterol, antibiotics and a cough suppressant.  Considered steroids, but will wait on that for now  -     albuterol (PROVENTIL/VENTOLIN HFA) 90 mcg/actuation inhaler; Inhale 1-2 puffs into the lungs every 6 (six) hours as needed for Wheezing.  -     azithromycin (Z-LIBRA) 250 MG tablet; Take 2 tablets by mouth on day 1; Take 1 tablet by mouth on days 2-5  -     guaifenesin-codeine 100-10 mg/5 ml (TUSSI-ORGANIDIN NR)  mg/5 mL syrup; Take 5 mLs by mouth 3 (three) times daily as needed for Cough.    Aneurysm of arteriovenous dialysis fistula, subsequent encounter - noted in chart    Chronic obstructive pulmonary disease, unspecified COPD type    ESRD (end stage renal disease) on dialysis - stable on tiw regimen    Essential hypertension - high today, but she is sick    Physical debility  -     DXA Bone Density Spine And Hip; Future    Type 2 diabetes mellitus with chronic kidney disease on chronic dialysis, without long-term current use of insulin - stable, off treatment.  Stay the course    Asymptomatic menopausal state - diagnosis required for dexa  -     DXA Bone Density Spine And Hip; Future    rtc prn, or as scheduled    ARCELIA Posadas MD MPH  Staff Internist

## 2019-04-10 ENCOUNTER — CLINICAL SUPPORT (OUTPATIENT)
Dept: CARDIOLOGY | Facility: HOSPITAL | Age: 79
End: 2019-04-10
Attending: INTERNAL MEDICINE
Payer: MEDICARE

## 2019-04-10 DIAGNOSIS — Z95.818 STATUS POST PLACEMENT OF IMPLANTABLE LOOP RECORDER: ICD-10-CM

## 2019-04-10 PROCEDURE — 93299 CARDIAC DEVICE CHECK - REMOTE: CPT

## 2019-05-10 ENCOUNTER — CLINICAL SUPPORT (OUTPATIENT)
Dept: CARDIOLOGY | Facility: HOSPITAL | Age: 79
End: 2019-05-10
Attending: INTERNAL MEDICINE
Payer: MEDICARE

## 2019-05-10 DIAGNOSIS — Z95.818 STATUS POST PLACEMENT OF IMPLANTABLE LOOP RECORDER: ICD-10-CM

## 2019-05-10 PROCEDURE — 93299 CARDIAC DEVICE CHECK - REMOTE: CPT

## 2019-05-21 ENCOUNTER — ANESTHESIA EVENT (OUTPATIENT)
Dept: NEUROLOGY | Facility: HOSPITAL | Age: 79
End: 2019-05-21

## 2019-05-21 ENCOUNTER — HOSPITAL ENCOUNTER (INPATIENT)
Facility: HOSPITAL | Age: 79
LOS: 7 days | Discharge: NURSING, REHAB OR LONG-TERM CARE | DRG: 082 | End: 2019-05-28
Attending: EMERGENCY MEDICINE | Admitting: PSYCHIATRY & NEUROLOGY
Payer: MEDICARE

## 2019-05-21 DIAGNOSIS — N18.6 ESRD (END STAGE RENAL DISEASE): Chronic | ICD-10-CM

## 2019-05-21 DIAGNOSIS — S06.5XAA SUBDURAL HEMATOMA: ICD-10-CM
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
## 2019-05-21 DIAGNOSIS — I10 ESSENTIAL HYPERTENSION: Chronic | ICD-10-CM

## 2019-05-21 DIAGNOSIS — S06.5XAA SDH (SUBDURAL HEMATOMA): ICD-10-CM

## 2019-05-21 DIAGNOSIS — I62.00 SUBDURAL HEMORRHAGE: Primary | ICD-10-CM

## 2019-05-21 PROBLEM — I50.30 (HFPEF) HEART FAILURE WITH PRESERVED EJECTION FRACTION: Status: ACTIVE | Noted: 2019-05-21

## 2019-05-21 PROBLEM — J43.9 PULMONARY EMPHYSEMA: Status: ACTIVE | Noted: 2017-01-15

## 2019-05-21 PROBLEM — Z01.818 PREOPERATIVE CLEARANCE: Status: ACTIVE | Noted: 2019-05-21

## 2019-05-21 LAB
ABO + RH BLD: NORMAL
ALBUMIN SERPL BCP-MCNC: 3.4 G/DL (ref 3.5–5.2)
ALP SERPL-CCNC: 86 U/L (ref 55–135)
ALT SERPL W/O P-5'-P-CCNC: 7 U/L (ref 10–44)
ANION GAP SERPL CALC-SCNC: 14 MMOL/L (ref 8–16)
APTT BLDCRRT: 23.5 SEC (ref 21–32)
AST SERPL-CCNC: 21 U/L (ref 10–40)
BACTERIA #/AREA URNS AUTO: ABNORMAL /HPF
BASOPHILS # BLD AUTO: 0.05 K/UL (ref 0–0.2)
BASOPHILS NFR BLD: 0.8 % (ref 0–1.9)
BILIRUB SERPL-MCNC: 0.5 MG/DL (ref 0.1–1)
BILIRUB UR QL STRIP: NEGATIVE
BLD GP AB SCN CELLS X3 SERPL QL: NORMAL
BLD PROD TYP BPU: NORMAL
BLOOD UNIT EXPIRATION DATE: NORMAL
BLOOD UNIT TYPE CODE: 6200
BLOOD UNIT TYPE: NORMAL
BUN SERPL-MCNC: 75 MG/DL (ref 8–23)
CALCIUM SERPL-MCNC: 9.8 MG/DL (ref 8.7–10.5)
CHLORIDE SERPL-SCNC: 106 MMOL/L (ref 95–110)
CK MB SERPL-MCNC: 1.2 NG/ML (ref 0.1–6.5)
CK MB SERPL-RTO: 1.6 % (ref 0–5)
CK SERPL-CCNC: 74 U/L (ref 20–180)
CLARITY UR REFRACT.AUTO: ABNORMAL
CO2 SERPL-SCNC: 19 MMOL/L (ref 23–29)
CODING SYSTEM: NORMAL
COLOR UR AUTO: YELLOW
CREAT SERPL-MCNC: 7.8 MG/DL (ref 0.5–1.4)
DIFFERENTIAL METHOD: ABNORMAL
DISPENSE STATUS: NORMAL
EOSINOPHIL # BLD AUTO: 0.1 K/UL (ref 0–0.5)
EOSINOPHIL NFR BLD: 2 % (ref 0–8)
ERYTHROCYTE [DISTWIDTH] IN BLOOD BY AUTOMATED COUNT: 17.3 % (ref 11.5–14.5)
EST. GFR  (AFRICAN AMERICAN): 5.2 ML/MIN/1.73 M^2
EST. GFR  (NON AFRICAN AMERICAN): 4.5 ML/MIN/1.73 M^2
ESTIMATED AVG GLUCOSE: 108 MG/DL (ref 68–131)
GLUCOSE SERPL-MCNC: 149 MG/DL (ref 70–110)
GLUCOSE UR QL STRIP: NEGATIVE
HBA1C MFR BLD HPLC: 5.4 % (ref 4–5.6)
HCT VFR BLD AUTO: 36.5 % (ref 37–48.5)
HGB BLD-MCNC: 11 G/DL (ref 12–16)
HGB UR QL STRIP: ABNORMAL
HYALINE CASTS UR QL AUTO: 0 /LPF
IMM GRANULOCYTES # BLD AUTO: 0.04 K/UL (ref 0–0.04)
IMM GRANULOCYTES NFR BLD AUTO: 0.6 % (ref 0–0.5)
INR PPP: 1.1 (ref 0.8–1.2)
KETONES UR QL STRIP: NEGATIVE
LEUKOCYTE ESTERASE UR QL STRIP: ABNORMAL
LYMPHOCYTES # BLD AUTO: 0.6 K/UL (ref 1–4.8)
LYMPHOCYTES NFR BLD: 9.1 % (ref 18–48)
MAGNESIUM SERPL-MCNC: 2.7 MG/DL (ref 1.6–2.6)
MCH RBC QN AUTO: 28.5 PG (ref 27–31)
MCHC RBC AUTO-ENTMCNC: 30.1 G/DL (ref 32–36)
MCV RBC AUTO: 95 FL (ref 82–98)
MICROSCOPIC COMMENT: ABNORMAL
MONOCYTES # BLD AUTO: 0.5 K/UL (ref 0.3–1)
MONOCYTES NFR BLD: 7.7 % (ref 4–15)
NEUTROPHILS # BLD AUTO: 5.3 K/UL (ref 1.8–7.7)
NEUTROPHILS NFR BLD: 79.8 % (ref 38–73)
NITRITE UR QL STRIP: NEGATIVE
NRBC BLD-RTO: 0 /100 WBC
PH UR STRIP: 6 [PH] (ref 5–8)
PHOSPHATE SERPL-MCNC: 6.8 MG/DL (ref 2.7–4.5)
PLATELET # BLD AUTO: 83 K/UL (ref 150–350)
PMV BLD AUTO: ABNORMAL FL (ref 9.2–12.9)
POCT GLUCOSE: 90 MG/DL (ref 70–110)
POTASSIUM SERPL-SCNC: 5 MMOL/L (ref 3.5–5.1)
PROT SERPL-MCNC: 6.9 G/DL (ref 6–8.4)
PROT UR QL STRIP: ABNORMAL
PROTHROMBIN TIME: 11.3 SEC (ref 9–12.5)
RBC # BLD AUTO: 3.86 M/UL (ref 4–5.4)
RBC #/AREA URNS AUTO: 29 /HPF (ref 0–4)
SODIUM SERPL-SCNC: 139 MMOL/L (ref 136–145)
SP GR UR STRIP: 1.01 (ref 1–1.03)
SQUAMOUS #/AREA URNS AUTO: 11 /HPF
TRANS PLATPHERESIS VOL PATIENT: NORMAL ML
URN SPEC COLLECT METH UR: ABNORMAL
WBC # BLD AUTO: 6.62 K/UL (ref 3.9–12.7)
WBC #/AREA URNS AUTO: >100 /HPF (ref 0–5)

## 2019-05-21 PROCEDURE — 87077 CULTURE AEROBIC IDENTIFY: CPT

## 2019-05-21 PROCEDURE — 94640 AIRWAY INHALATION TREATMENT: CPT

## 2019-05-21 PROCEDURE — 93010 ELECTROCARDIOGRAM REPORT: CPT | Mod: ,,, | Performed by: INTERNAL MEDICINE

## 2019-05-21 PROCEDURE — 85730 THROMBOPLASTIN TIME PARTIAL: CPT

## 2019-05-21 PROCEDURE — 99291 CRITICAL CARE FIRST HOUR: CPT | Mod: GC,,, | Performed by: PSYCHIATRY & NEUROLOGY

## 2019-05-21 PROCEDURE — 99291 PR CRITICAL CARE, E/M 30-74 MINUTES: ICD-10-PCS | Mod: GC,,, | Performed by: PSYCHIATRY & NEUROLOGY

## 2019-05-21 PROCEDURE — P9035 PLATELET PHERES LEUKOREDUCED: HCPCS

## 2019-05-21 PROCEDURE — 87086 URINE CULTURE/COLONY COUNT: CPT

## 2019-05-21 PROCEDURE — 99291 CRITICAL CARE FIRST HOUR: CPT | Mod: ,,, | Performed by: PHYSICIAN ASSISTANT

## 2019-05-21 PROCEDURE — 99233 SBSQ HOSP IP/OBS HIGH 50: CPT | Mod: ,,, | Performed by: PHYSICIAN ASSISTANT

## 2019-05-21 PROCEDURE — 99291 PR CRITICAL CARE, E/M 30-74 MINUTES: ICD-10-PCS | Mod: ,,, | Performed by: PHYSICIAN ASSISTANT

## 2019-05-21 PROCEDURE — 20000000 HC ICU ROOM

## 2019-05-21 PROCEDURE — 83036 HEMOGLOBIN GLYCOSYLATED A1C: CPT

## 2019-05-21 PROCEDURE — 25000003 PHARM REV CODE 250: Performed by: PHYSICIAN ASSISTANT

## 2019-05-21 PROCEDURE — 25000242 PHARM REV CODE 250 ALT 637 W/ HCPCS: Performed by: PHYSICIAN ASSISTANT

## 2019-05-21 PROCEDURE — 99291 CRITICAL CARE FIRST HOUR: CPT | Mod: 25

## 2019-05-21 PROCEDURE — 63600175 PHARM REV CODE 636 W HCPCS: Performed by: EMERGENCY MEDICINE

## 2019-05-21 PROCEDURE — 87088 URINE BACTERIA CULTURE: CPT

## 2019-05-21 PROCEDURE — 81001 URINALYSIS AUTO W/SCOPE: CPT

## 2019-05-21 PROCEDURE — 99233 PR SUBSEQUENT HOSPITAL CARE,LEVL III: ICD-10-PCS | Mod: ,,, | Performed by: PHYSICIAN ASSISTANT

## 2019-05-21 PROCEDURE — 82550 ASSAY OF CK (CPK): CPT

## 2019-05-21 PROCEDURE — 83735 ASSAY OF MAGNESIUM: CPT

## 2019-05-21 PROCEDURE — 86901 BLOOD TYPING SEROLOGIC RH(D): CPT

## 2019-05-21 PROCEDURE — 99223 1ST HOSP IP/OBS HIGH 75: CPT | Mod: AI,,, | Performed by: HOSPITALIST

## 2019-05-21 PROCEDURE — 96366 THER/PROPH/DIAG IV INF ADDON: CPT

## 2019-05-21 PROCEDURE — 82553 CREATINE MB FRACTION: CPT

## 2019-05-21 PROCEDURE — 36430 TRANSFUSION BLD/BLD COMPNT: CPT

## 2019-05-21 PROCEDURE — 85025 COMPLETE CBC W/AUTO DIFF WBC: CPT

## 2019-05-21 PROCEDURE — 99223 PR INITIAL HOSPITAL CARE,LEVL III: ICD-10-PCS | Mod: AI,,, | Performed by: HOSPITALIST

## 2019-05-21 PROCEDURE — 93005 ELECTROCARDIOGRAM TRACING: CPT

## 2019-05-21 PROCEDURE — 84100 ASSAY OF PHOSPHORUS: CPT

## 2019-05-21 PROCEDURE — 87186 SC STD MICRODIL/AGAR DIL: CPT

## 2019-05-21 PROCEDURE — 80053 COMPREHEN METABOLIC PANEL: CPT

## 2019-05-21 PROCEDURE — 93010 EKG 12-LEAD: ICD-10-PCS | Mod: ,,, | Performed by: INTERNAL MEDICINE

## 2019-05-21 PROCEDURE — 82962 GLUCOSE BLOOD TEST: CPT

## 2019-05-21 PROCEDURE — 94761 N-INVAS EAR/PLS OXIMETRY MLT: CPT

## 2019-05-21 PROCEDURE — 85610 PROTHROMBIN TIME: CPT

## 2019-05-21 PROCEDURE — 96365 THER/PROPH/DIAG IV INF INIT: CPT

## 2019-05-21 RX ORDER — ONDANSETRON 2 MG/ML
4 INJECTION INTRAMUSCULAR; INTRAVENOUS
Status: COMPLETED | OUTPATIENT
Start: 2019-05-21 | End: 2019-05-21

## 2019-05-21 RX ORDER — INSULIN ASPART 100 [IU]/ML
1-10 INJECTION, SOLUTION INTRAVENOUS; SUBCUTANEOUS EVERY 6 HOURS PRN
Status: DISCONTINUED | OUTPATIENT
Start: 2019-05-21 | End: 2019-05-24

## 2019-05-21 RX ORDER — GLUCAGON 1 MG
1 KIT INJECTION
Status: DISCONTINUED | OUTPATIENT
Start: 2019-05-21 | End: 2019-05-24

## 2019-05-21 RX ORDER — SODIUM CHLORIDE 0.9 % (FLUSH) 0.9 %
10 SYRINGE (ML) INJECTION
Status: DISCONTINUED | OUTPATIENT
Start: 2019-05-21 | End: 2019-05-28 | Stop reason: HOSPADM

## 2019-05-21 RX ORDER — HYDROCODONE BITARTRATE AND ACETAMINOPHEN 500; 5 MG/1; MG/1
TABLET ORAL CONTINUOUS
Status: ACTIVE | OUTPATIENT
Start: 2019-05-21 | End: 2019-05-22

## 2019-05-21 RX ORDER — HYDROCODONE BITARTRATE AND ACETAMINOPHEN 500; 5 MG/1; MG/1
TABLET ORAL
Status: DISCONTINUED | OUTPATIENT
Start: 2019-05-21 | End: 2019-05-22

## 2019-05-21 RX ORDER — CARVEDILOL 12.5 MG/1
12.5 TABLET ORAL 2 TIMES DAILY
Status: DISCONTINUED | OUTPATIENT
Start: 2019-05-21 | End: 2019-05-21

## 2019-05-21 RX ORDER — NICARDIPINE HYDROCHLORIDE 0.2 MG/ML
5 INJECTION INTRAVENOUS CONTINUOUS
Status: DISCONTINUED | OUTPATIENT
Start: 2019-05-21 | End: 2019-05-21

## 2019-05-21 RX ORDER — LEVETIRACETAM 250 MG/1
250 TABLET ORAL 2 TIMES DAILY
Status: COMPLETED | OUTPATIENT
Start: 2019-05-21 | End: 2019-05-28

## 2019-05-21 RX ORDER — CARVEDILOL 12.5 MG/1
12.5 TABLET ORAL 2 TIMES DAILY
Status: DISCONTINUED | OUTPATIENT
Start: 2019-05-21 | End: 2019-05-28 | Stop reason: HOSPADM

## 2019-05-21 RX ORDER — IPRATROPIUM BROMIDE AND ALBUTEROL SULFATE 2.5; .5 MG/3ML; MG/3ML
3 SOLUTION RESPIRATORY (INHALATION)
Status: DISCONTINUED | OUTPATIENT
Start: 2019-05-21 | End: 2019-05-28 | Stop reason: HOSPADM

## 2019-05-21 RX ADMIN — IPRATROPIUM BROMIDE AND ALBUTEROL SULFATE 3 ML: .5; 3 SOLUTION RESPIRATORY (INHALATION) at 07:05

## 2019-05-21 RX ADMIN — NICARDIPINE HYDROCHLORIDE 5 MG/HR: 0.2 INJECTION, SOLUTION INTRAVENOUS at 01:05

## 2019-05-21 RX ADMIN — LEVETIRACETAM 250 MG: 250 TABLET ORAL at 11:05

## 2019-05-21 RX ADMIN — ONDANSETRON 4 MG: 2 INJECTION INTRAMUSCULAR; INTRAVENOUS at 12:05

## 2019-05-21 RX ADMIN — NICARDIPINE HYDROCHLORIDE 7.5 MG/HR: 0.2 INJECTION, SOLUTION INTRAVENOUS at 01:05

## 2019-05-21 RX ADMIN — CARVEDILOL 12.5 MG: 12.5 TABLET, FILM COATED ORAL at 07:05

## 2019-05-21 NOTE — CONSULTS
[facility]  Nephrology  Consult Note    Patient Name: Tea Georges  MRN: [id number]  Admission Date: 5/21/2019  Hospital Length of Stay: 0 days  Attending Provider: Ana Sheikh MD   Primary Care Physician: Parth Posadas Ii, MD  Principal Problem:Subdural hematoma    Inpatient consult to Nephrology  Consult performed by: Nba Hyde MD  Consult ordered by: Lucinda Vasquez PA-C        Subjective:     HPI: 80 y/o Black or -American woman ESRD on iHD (MWF), HTN, diastolic HF, right MCA CVA s/p thrombectomy (2016) admitted to [facility] diagnosed with Rt subdural hematoma.  She initially presented with hematoma of superior left eye, headache, nausea/vomiting, s/p unwitnessed fall 521/2019 in AM, and does not recall how she fell.  Patient accompanied by granddaughter who referred found her in the floor.  Denies any dizziness, light-headedness, numbness, weakness, chest pain, SOB, or any other associated symptoms.    Nephrology consulted for evaluation of management of ESRD and HD treatments.    Nephrology Hx:  She dialyzes at [facility] under the care of Dr. Coleman on a MWF schedule for 4 hrs   EDW 50 kg  Access: FLORENCE AVF ++ bruit/thrill  She maintains residual renal function and dialyzes for a 4 hour duration.  She reports now problems with her dialysis, no recent episodes of cramping with ultrafiltration.  Last dialysis prior to hospitalization:    Past Medical History:   Diagnosis Date    Anemia in ESRD (end-stage renal disease) 5/29/2016    Anticoagulant long-term use     Aortic atherosclerosis 11/22/2016    Aortic stenosis, moderate 2/18/2016    Asthma in adult without complication 1/8/2016    Bilateral low back pain without sciatica 11/17/2015    Blindness of right eye 11/12/2016    CAD (coronary artery disease) 12/12/2016    Cataract     Central retinal vein occlusion, right eye 6/3/2014    CHF (congestive heart failure)     Chronic diastolic heart failure  1/8/2016    Chronic respiratory failure with hypoxia 5/29/2016    COPD (chronic obstructive pulmonary disease) 1/15/2017    Dependence on hemodialysis     Mon-Wed-Fri    Diverticulosis     Embolic stroke involving right middle cerebral artery     Enlarged LA (left atrium) 10/7/2016    Epiretinal membrane 7/17/2012    ESRD (end stage renal disease)     Essential hypertension 1/8/2016    History of GI diverticular bleed     5/22/16    Left flaccid hemiparesis 10/1/2016    Peripheral vascular disease, unspecified 11/22/2016    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 11/13/2016    Type 2 diabetes mellitus with kidney complication, without long-term current use of insulin 5/1/2018    Type 2 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, without long-term current use of insulin 3/26/2013    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, without long-term current use of insulin 3/26/2013    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       Past Surgical History:   Procedure Laterality Date    BREAST SURGERY      tumor removal x 2    CATARACT EXTRACTION      CHOLECYSTECTOMY      COLONOSCOPY N/A 5/30/2016    Performed by Sam Davis MD at [facility] ENDO (2ND FLR)    COLONOSCOPY N/A 5/23/2016    Performed by WILLIAM Colvin MD at [facility] ENDO (2ND FLR)    ESOPHAGOGASTRODUODENOSCOPY (EGD) N/A 5/30/2016    Performed by Sam Davis MD at [facility] ENDO (2ND FLR)    ESOPHAGOGASTRODUODENOSCOPY (EGD) N/A 5/27/2016    Performed by Cesario Rubio MD at [facility] ENDO (2ND FLR)    EXCISION, ANEURYSM Left 11/29/2018    Performed by NADINE Blum III, MD at [facility] OR 2ND FLR    Fistulogram Left 11/28/2018    Performed by NADINE Blum III, MD at [facility] CATH LAB    INSERTION, CATHETER, VASCULAR, DUAL LUMEN Right 11/29/2018    Performed by NADINE Blum III, MD at [facility] OR 2ND FLR    PTA, Fistula  11/28/2018     Performed by NADINE Blum III, MD at [facility] CATH LAB    REVISION, AV FISTULA, Left 11/29/2018    Performed by NADINE Blum III, MD at [facility] OR [address] [place] [address]    UPPER GASTROINTESTINAL ENDOSCOPY         Review of patient's allergies indicates:  No Known Allergies  Current Facility-Administered Medications   Medication Frequency    0.9%  NaCl infusion (for blood administration) Q24H PRN    carvedilol tablet 12.5 mg BID    sodium chloride 0.9% flush 10 mL PRN     Current Outpatient Medications   Medication    albuterol (PROVENTIL/VENTOLIN HFA) 90 mcg/actuation inhaler    amLODIPine (NORVASC) 10 MG tablet    aspirin 81 MG Chew    atorvastatin (LIPITOR) 40 MG tablet    carvedilol (COREG) 12.5 MG tablet    citalopram (CELEXA) 10 MG tablet    ergocalciferol (ERGOCALCIFEROL) 50,000 unit Cap    erythromycin (ROMYCIN) ophthalmic ointment    fluticasone-vilanterol (BREO ELLIPTA) 200-25 mcg/dose DsDv diskus inhaler    hydrALAZINE (APRESOLINE) 25 MG tablet    isosorbide dinitrate (ISOCHRON) 40 mg TbSR    lidocaine-prilocaine (EMLA) cream    loperamide (IMODIUM) 2 mg capsule    losartan (COZAAR) 50 MG tablet    ondansetron (ZOFRAN) 4 MG tablet    RENVELA 800 mg Tab     Family History     Problem Relation (Age of Onset)    Cancer Sister    Cataracts Mother    Esophageal cancer Sister    Glaucoma Brother, Maternal Aunt    Heart disease Brother    Heart failure Sister    Hypertension Mother, Sister, Brother, Father    No Known Problems Maternal Uncle, Paternal Aunt, Paternal Uncle, Maternal Grandmother, Maternal Grandfather, Paternal Grandmother, Paternal Grandfather    Stroke Mother        Tobacco Use    Smoking status: Never Smoker    Smokeless tobacco: Never Used   Substance and Sexual Activity    Alcohol use: No     Comment: Reports occasional 1-2 drinks     Drug use: No    Sexual activity: Never     Review of Systems   Constitutional: Negative for activity change and fatigue.   HENT: Negative for ear  discharge, nosebleeds, rhinorrhea and sore throat.    Eyes: Positive for visual disturbance.   Respiratory: Negative for cough and shortness of breath.    Cardiovascular: Negative for chest pain.   Gastrointestinal: Positive for nausea and vomiting.   Genitourinary: Positive for decreased urine volume.   Musculoskeletal: Negative for neck pain and neck stiffness.   Skin: Negative for color change, rash and wound.   Neurological: Positive for headaches. Negative for dizziness, seizures, facial asymmetry, weakness and numbness.   Hematological: Bruises/bleeds easily.   Psychiatric/Behavioral: Negative for confusion.     Objective:     Vital Signs (Most Recent):  Temp: 97.8 °F (36.6 °C) (05/21/19 1509)  Pulse: 67 (05/21/19 1632)  Resp: 18 (05/21/19 1509)  BP: (!) 161/71 (05/21/19 1632)  SpO2: 97 % (05/21/19 1632)  O2 Device (Oxygen Therapy): room air (05/21/19 1509) Vital Signs (24h Range):  Temp:  [97.8 °F (36.6 °C)-98.1 °F (36.7 °C)] 97.8 °F (36.6 °C)  Pulse:  [63-67] 67  Resp:  [16-24] 18  SpO2:  [94 %-98 %] 97 %  BP: (137-224)/(65-91) 161/71        There is no height or weight on file to calculate BMI.  There is no height or weight on file to calculate BSA.    No intake/output data recorded.    Physical Exam   Constitutional: She is oriented to person, place, and time. She appears well-developed and well-nourished. No distress.   HENT:   Mouth/Throat: Oropharynx is clear and moist.   Hematoma over left forehead and orbital area    Eyes:   Left eyelid swollen,unable to open    Neck: Neck supple.   Cardiovascular: Normal rate, regular rhythm and intact distal pulses.   Murmur heard.  Harsh 5/6 systolic murmur    Pulmonary/Chest: Effort normal and breath sounds normal. No respiratory distress. She has no wheezes.   Abdominal: Soft. Bowel sounds are normal. She exhibits no distension. There is tenderness (LLQ).   Musculoskeletal: Normal range of motion. She exhibits no edema.   Neurological: She is alert and oriented  to person, place, and time.   Skin: Skin is warm. She is not diaphoretic.   Vitals reviewed.      Significant Labs:  CBC:   Recent Labs   Lab 05/21/19  1201   WBC 6.62   RBC 3.86*   HGB 11.0*   HCT 36.5*   PLT 83*   MCV 95   MCH 28.5   MCHC 30.1*     CMP:   Recent Labs   Lab 05/21/19  1354   *   CALCIUM 9.8   ALBUMIN 3.4*   PROT 6.9      K 5.0   CO2 19*      BUN 75*   CREATININE 7.8*   ALKPHOS 86   ALT 7*   AST 21   BILITOT 0.5     All labs within the past 24 hours have been reviewed.    Significant Imaging:  CXR personally reviewed.   Head CT scan personally reviewed.    Assessment/Plan:     * Subdural hematoma  - Managed by neurocritical care team    ESRD (end stage renal disease)  Nephrology Hx:  She dialyzes at [facility] under the care of Dr. Coleman on a MWF schedule for 4 hrs   EDW 50 kg  Access: FLORENCE AVF ++ bruit/thrill  She maintains residual renal function and dialyzes for a 4 hour duration.  She reports now problems with her dialysis, no recent episodes of cramping with ultrafiltration.  Last dialysis prior to hospitalization: 5/17/2019    Plan:  - Will provide SLED treatment for 2 hrs in getting patient optimized for surgery tomorrow; for short period of time will CVVHDF is not viable as would need longer treatment times to be effective; short treatment with SLED can optimize labs (hyperkalemia and met acidosis) and get her ready for surgery tomorrow.  - After surgery, for additional RRT treatments will consider CVVHDF because it can run for more than 24 hrs  - To physical exam patient in no distress, no volume overload issues  - Will follow closely  - RFP per RRt protocol  - Strict I/Os and chart        Thank you for your consult. I will follow-up with patient. Please contact us if you have any additional questions.    Nba Hyde MD  Nephrology  [facility]

## 2019-05-21 NOTE — ANESTHESIA PREPROCEDURE EVALUATION
05/21/2019  [facility]  Anesthesia Pre-Operative Evaluation         Patient Name: Tea Georges  YOB: 1940  MRN: [id number]    SUBJECTIVE:     Pre-operative evaluation for Procedure(s) (LRB):  CRANIOTOMY, FOR SUBDURAL HEMATOMA EVACUATION (Right)     05/21/2019    Tea Georges is a 79 y.o. female w/ a significant PMHx of  ESRD on HD (MWF), HTN, Severe Aortic stenosis ( PAULINE 0.66 cm2; peak velocity 4.1 m/s; mean gradient 46 mmHg), Pulmonary HTN ( PA sys 73 mmHg)  CVA of Right MCA s/p thrombectomy (2016) with left sided residual sxs, T2DM, HFpEF, COPD (on 2L home O2, can walk a block at baseline), CAD, PVD, who presented after a fall and was found to have a SDH. Pt has a loop recorder in place. Hospital medicine consulted due to patient's comorbidities. Trialysis catheter planned for today, admit to neuro ICU, and start CRRT (last full dialysis session was Friday). Starting cardene gtt for SBP goal <180. CT abdomen pending for workup of copious diarrhea and abdominal tenderness.    Patient now presents for the above procedure(s).     Patient is oriented and understands the risks/benefits of anesthesia and agrees to proceed. She is blind in the right eye and unable to open the left eye due to swelling, thus can not see. Consent signed in the presence of her granddaughter.       LDA:        Hemodialysis Catheter 11/29/18 1650 right subclavian (Active)   Number of days: 172            Peripheral IV - Single Lumen 11/26/18 2017 Right Antecubital (Active)   Number of days: 175            Peripheral IV - Single Lumen 05/21/19 1315 18 G Right Antecubital (Active)   Site Assessment Clean;Dry;Intact 5/21/2019  1:15 PM   Line Status Blood return noted;Flushed 5/21/2019  1:15 PM   Number of days: 0            Peripheral IV - Single Lumen 05/21/19 1200 20 G Right Forearm (Active)   Site  Assessment Clean;Intact 5/21/2019 12:02 PM   Line Status Blood return noted;Flushed 5/21/2019 12:02 PM   Number of days: 0            Hemodialysis AV Fistula 05/23/16 0700 Left forearm (Active)   Number of days: 1093            Hemodialysis AV Fistula Left upper arm (Active)   Number of days:        Prev airway: None documented.    Drips:    niCARdipine 7.5 mg/hr (05/21/19 1322)       Patient Active Problem List   Diagnosis    Pleural effusion on right    Essential hypertension    ESRD (end stage renal disease) on dialysis    Nonrheumatic aortic valve stenosis    Aortic stenosis, moderate    Anemia in ESRD (end-stage renal disease)    Hyperparathyroidism, secondary renal    Left spastic hemiparesis    Blindness of right eye    Physical debility    Aortic atherosclerosis    Peripheral vascular disease, unspecified    Right-sided cerebrovascular accident (CVA)    Vitamin D insufficiency    CAD (coronary artery disease)    COPD (chronic obstructive pulmonary disease)    Thrombocytopenia, unspecified    Non-rheumatic tricuspid valve insufficiency    Type 2 diabetes mellitus with chronic kidney disease on chronic dialysis, without long-term current use of insulin    Aneurysm of arteriovenous dialysis fistula    Moderate single current episode of major depressive disorder    Preoperative clearance       Review of patient's allergies indicates:  No Known Allergies    Current Inpatient Medications:      No current facility-administered medications on file prior to encounter.      Current Outpatient Medications on File Prior to Encounter   Medication Sig Dispense Refill    albuterol (PROVENTIL/VENTOLIN HFA) 90 mcg/actuation inhaler Inhale 1-2 puffs into the lungs every 6 (six) hours as needed for Wheezing. 1 Inhaler 0    amLODIPine (NORVASC) 10 MG tablet Take 1 tablet (10 mg total) by mouth once daily. 90 tablet 3    aspirin 81 MG Chew Take 1 tablet (81 mg total) by mouth once daily. 30 tablet 1     atorvastatin (LIPITOR) 40 MG tablet Take 1 tablet (40 mg total) by mouth once daily. 90 tablet 3    carvedilol (COREG) 12.5 MG tablet Take 1 tablet (12.5 mg total) by mouth 2 (two) times daily. 60 tablet 11    citalopram (CELEXA) 10 MG tablet Take 1 tablet (10 mg total) by mouth once daily. 90 tablet 3    ergocalciferol (ERGOCALCIFEROL) 50,000 unit Cap Take 1 capsule (50,000 Units total) by mouth every 7 days. 12 capsule 3    erythromycin (ROMYCIN) ophthalmic ointment Place into the right eye every 8 (eight) hours. 3.5 g 0    fluticasone-vilanterol (BREO ELLIPTA) 200-25 mcg/dose DsDv diskus inhaler Inhale 1 puff into the lungs once daily. 90 each 3    hydrALAZINE (APRESOLINE) 25 MG tablet Take 2 tablets (50 mg total) by mouth every 8 (eight) hours. 180 tablet 11    isosorbide dinitrate (ISOCHRON) 40 mg TbSR Take 1 tablet (40 mg total) by mouth every 12 (twelve) hours. 180 tablet 3    lidocaine-prilocaine (EMLA) cream Apply topically as needed (to apply over access 1/2 over prior to dialysis). 25 g 1    loperamide (IMODIUM) 2 mg capsule Take one capsule at onset of diarrhea.  No more than three capsules daily 30 capsule 3    losartan (COZAAR) 50 MG tablet Take 1 tablet (50 mg total) by mouth once daily. 90 tablet 3    ondansetron (ZOFRAN) 4 MG tablet Take 2 tablets (8 mg total) by mouth every 6 (six) hours. 12 tablet 0    RENVELA 800 mg Tab Take 1 tablet (800 mg total) by mouth 3 (three) times daily with meals. 270 tablet 3       Past Surgical History:   Procedure Laterality Date    BREAST SURGERY      tumor removal x 2    CATARACT EXTRACTION      CHOLECYSTECTOMY      COLONOSCOPY N/A 5/30/2016    [facility] ([facility]ND [facility])    COLONOSCOPY N/A 5/23/2016    Performed by WILLIAM Colvin MD at [facility] (2ND FLR)    ESOPHAGOGASTRODUODENOSCOPY (EGD) N/A 5/30/2016    Performed by Sam Davis [facility] at [facility] (2ND FLR)    ESOPHAGOGASTRODUODENOSCOPY (EGD) N/A 5/27/2016     Performed by Cesario Rubio MD at [facility] ENDO (2ND FLR)    EXCISION, ANEURYSM Left 11/29/2018    Performed by NADINE Blum III, [facility] at [facility] OR 2ND FLR    Fistulogram Left 11/28/2018    Per[facility] by [facility], [facility] at [facility] CATH LAB    INSERTION, CATHETER, VASCULAR, DUAL LUMEN Right 11/29/2018    Performed by [facility], [facility] at [facility] OR 2ND FLR    PTA, Fistula  11/28/2018    Performed by NAD[facility] III, [facility] at [facility] CATH LAB    REVISION, AV FISTULA, Left 11/29/2018    Performed by NAD[facility], [facility] at [facility] OR 2ND FLR    UPPER GASTROINTESTINAL ENDOSCOPY         Social History     Socioeconomic History    Marital status:      Spouse name: Not on file    Number of children: Not on file    Years of education: Not on file    Highest education level: Not on file   Occupational History    Occupation: Housekeeping     Employer: [employer]     Comment: Retired; 20-years there   Social Needs    Financial resource strain: Not on file    Food insecurity:     Worry: Not on file     Inability: Not on file    Transportation needs:     Medical: Not on file     Non-medical: Not on file   Tobacco Use    Smoking status: Never Smoker    Smokeless tobacco: Never Used   Substance and Sexual Activity    Alcohol use: No     Comment: Reports occasional 1-2 drinks     Drug use: No    Sexual activity: Never   Lifestyle    Physical activity:     Days per week: Not on file     Minutes per session: Not on file    Stress: Not on file   Relationships    Social connections:     Talks on phone: Not on file     Gets together: Not on file     Attends [employer] service: Not on file     Active member of club or organization: Not on file     Attends meetings of clubs or organizations: Not on file     Relationship status: Not on file   Other Topics Concern    Not on file   Social History Narrative    Not on file       OBJECTIVE:     Vital Signs Range (Last 24H):  Temp:  [36.6 °C (97.8  °F)]   Pulse:  [63-66]   Resp:  [16-24]   BP: (145-224)/(65-91)   SpO2:  [94 %-98 %]       Significant Labs:  Lab Results   Component Value Date    WBC 6.62 05/21/2019    HGB 11.0 (L) 05/21/2019    HCT 36.5 (L) 05/21/2019    PLT 83 (L) 05/21/2019    CHOL 100 (L) 09/30/2016    TRIG 115 09/30/2016    HDL 34 (L) 09/30/2016    ALT 7 (L) 05/21/2019    AST 21 05/21/2019     05/21/2019    K 5.0 05/21/2019     05/21/2019    CREATININE 7.8 (H) 05/21/2019    BUN 75 (H) 05/21/2019    CO2 26 02/08/2019    TSH 1.371 05/01/2016    INR 1.1 05/21/2019    HGBA1C 4.9 02/08/2019       Diagnostic Studies:     CXR FINDINGS:  Loop recorder identified as before.  Cardiomegaly, pulmonary vascular congestion and diffuse edema.  Ill-defined soft tissue density adjacent to the right hilum probably airspace consolidation.  The right-sided pleural effusion identified.  Vascular stent noted in the left axilla as before    CT Head:  Heterogeneous primarily hyperdense extra-axial collection overlying the right cerebral convexity compatible with rece acute/recent nt subdural hemorrhage.  There is mass effect with compression of the right lateral ventricle and approximately 9 mm of leftward midline shift with subfalcine herniation.      EKG: No recent studies available.    2D ECHO:  Results for orders placed or performed during the hospital encounter of 07/12/16   2D Echo w/ Color Flow Doppler   Result Value Ref Range    QEF 63 55 - 65    Mitral Valve Regurgitation TRIVIAL     Diastolic Dysfunction Yes (A)     Aortic Valve Regurgitation MILD     Aortic Valve Stenosis SEVERE (A)     Est. PA Systolic Pressure 60.46 (A)     Pericardial Effusion SMALL (A)     Mitral Valve Mobility NORMAL     Tricuspid Valve Regurgitation TRIVIAL TO MILD      TRANSTHORACIC ECHO (TTE) COMPLETE   Conclusion     · Concentric left ventricular hypertrophy.  · Normal left ventricular systolic function. The estimated ejection fraction is 55%  · Moderate right  ventricular enlargement.  · Normal right ventricular systolic function.  · Grade II (moderate) left ventricular diastolic dysfunction consistent with pseudonormalization.  · Biatrial enlargement.  · Mild tricuspid regurgitation.  · Mild mitral regurgitation.  · Severe aortic valve stenosis. Aortic valve area is 0.66 cm2; peak velocity is 4.1 m/s; mean gradient is 46 mmHg.  · The estimated PA systolic pressure is 73 mm Hg  · Elevated central venous pressure (15 mm Hg).         ASSESSMENT/PLAN:         Anesthesia Evaluation    I have reviewed the Patient Summary Reports.     I have reviewed the Medications.     Review of Systems  Anesthesia Hx:  No problems with previous Anesthesia  History of prior surgery of interest to airway management or planning:  Denies Personal Hx of Anesthesia complications.   Social:  Non-Smoker, Social Alcohol Use    Hematology/Oncology:     Oncology Normal    -- Anemia:   EENT/Dental:EENT/Dental Normal   Cardiovascular:   Hypertension Valvular problems/Murmurs, AS CAD   CHF    Pulmonary:   COPD Asthma On 2L Home O2   Renal/:   Chronic Renal Disease, Dialysis, ESRD    Neurological:   CVA, residual symptoms SDH   Endocrine:   Diabetes, type 2    Psych:   depression          Physical Exam  General:  Malnutrition    Airway/Jaw/Neck:  Airway Findings: Mouth Opening: Small, but > 3cm Tongue: Normal  General Airway Assessment: Adult  Mallampati: IV  TM Distance: Normal, at least 6 cm  Jaw/Neck Findings:  Neck ROM: Normal ROM  Neck Findings: Normal    Eyes/Ears/Nose:  EYES/EARS/NOSE FINDINGS: Normal   Dental:  Dental Findings: Edentulous, Lower Dentures, Upper Dentures   Chest/Lungs:  Chest/Lungs Findings: Rhonchi     Heart/Vascular:  Heart Findings: Rate: Normal  Rhythm: Regular Rhythm  Heart Murmur  Systolic  Systolic Heart Murmur Description: Holosystolic  Systolic Heart Murmur Grade: Grade III        Mental Status:  Mental Status Findings: Normal        Anesthesia Plan  Type of Anesthesia,  risks & benefits discussed:  Anesthesia Type:  general  Patient's Preference:   Intra-op Monitoring Plan: standard ASA monitors, arterial line and central line  Intra-op Monitoring Plan Comments:   Post Op Pain Control Plan: per primary service following discharge from PACU, IV/PO Opioids PRN and multimodal analgesia  Post Op Pain Control Plan Comments:   Induction:   IV  Beta Blocker:  Patient is on a Beta-Blocker and has received one dose within the past 24 hours (No further documentation required).       Informed Consent: Patient understands risks and agrees with Anesthesia plan.  Questions answered. Anesthesia consent signed with patient.  ASA Score: 4     Day of Surgery Review of History & Physical:            Ready For Surgery From Anesthesia Perspective.

## 2019-05-21 NOTE — CONSULTS
[facility]  Neurosurgery  Consult Note    Inpatient consult to Neurosurgery  Consult performed by: Antonia Joseph PA-C  Consult ordered by: Juan Laws PA-C        Subjective:     Chief Complaint/Reason for Admission: SDH    History of Present Illness: Ms. Georges is a 78 YO female with a history of ESRD, DM, aortic stenosis, CVA, diastolic HF who presents to [facility] ED today after an unwitnessed fall.  The patient was found sitting up on the floor with left periorbital edema by her granddaughter.  She does not remember falling.  Unknown loss of consciousness.  The patient currently reports a headache in the left frontal area.  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  She denies dizziness, focal weakness, confusion.  She admits to intermittent nausea but no vomiting.  There are no other associated signs or symptoms.  The patient has a history of Plavix usage, but is unable to give reason for Plavix.  She has a history of CVA with thrombectomy in 2016.  Blood pressure elevated to greater than 200 SBP on admission to ED.      (Not in a hospital admission)    Review of patient's allergies indicates:  No Known Allergies    Past Medical History:   Diagnosis Date    Anemia in ESRD (end-stage renal disease) 5/29/2016    Anticoagulant long-term use     Aortic atherosclerosis 11/22/2016    Aortic stenosis, moderate 2/18/2016    Asthma in adult without complication 1/8/2016    Bilateral low back pain without sciatica 11/17/2015    Blindness of right eye 11/12/2016    CAD (coronary artery disease) 12/12/2016    Cataract     Central retinal vein occlusion, right eye 6/3/2014    CHF (congestive heart failure)     Chronic diastolic heart failure 1/8/2016    Chronic respiratory failure with hypoxia 5/29/2016    COPD (chronic obstructive pulmonary disease) 1/15/2017    Dependence on hemodialysis     Mon-Wed-Fri     Diverticulosis     Embolic stroke involving right middle cerebral artery     Enlarged LA (left atrium) 10/7/2016    Epiretinal membrane 7/17/2012    ESRD (end stage renal disease)     Essential hypertension 1/8/2016    History of GI diverticular bleed     5/22/16    Left flaccid hemiparesis 10/1/2016    Peripheral vascular disease, unspecified 11/22/2016    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 11/13/2016    Type 2 diabetes mellitus with kidney complication, without long-term current use of insulin 5/1/2018    Type 2 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, without long-term current use of insulin 3/26/2013    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, without long-term current use of insulin 3/26/2013    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     Past Surgical History:   Procedure Laterality Date    BREAST SURGERY      tumor removal x 2    CATARACT EXTRACTION      CHOLECYSTECTOMY      COLONOSCOPY N/A 5/30/2016    Performed by Sam Davis MD at [facility] (2ND FLR)    COLONOSCOPY N/A 5/23/2016    Performed by WILLIAM Clovin MD at [facility] END[facility] (2ND FLR)    ESOPHAGOGASTRODUODENOSCOPY (EGD) N/A 5/30/2016    Performed by Sam Davis MD at [facility] END[facility] (2ND FLR)    ESOPHAGOGASTRODUODENOSCOPY (EGD) N/A 5/27/2016    Performed by Cesario Rubio MD at [facility] END[facility] (2ND FLR)    EXCISION, ANEURYSM Left 11/29/2018    Performed by NADINE Blum III, MD at [facility] OR 2ND FLR    Fistulogram Left 11/28/2018    Performed by NADINE Blum III, MD at [facility] CATH LAB    INSERTION, CATHETER, VASCULAR, DUAL LUMEN Right 11/29/2018    Performed by NADINE Blum III, MD at [facility] OR 2ND FLR    PTA, Fistula  11/28/2018    Performed by NADINE Blum III, MD at [facility] CATH LAB    REVISION, AV FISTULA, Left 11/29/2018    Performed by NADINE Blum III, MD at [facility] OR 2ND FLR    UPPER GASTROINTESTINAL  ENDOSCOPY       Family History     Problem Relation (Age of Onset)    Cancer Sister    Cataracts Mother    Esophageal cancer Sister    Glaucoma Brother, Maternal Aunt    Heart disease Brother    Heart failure Sister    Hypertension Mother, Sister, Brother, Father    No Known Problems Maternal Uncle, Paternal Aunt, Paternal Uncle, Maternal Grandmother, Maternal Grandfather, Paternal Grandmother, Paternal Grandfather    Stroke Mother        Tobacco Use    Smoking status: Never Smoker    Smokeless tobacco: Never Used   Substance and Sexual Activity    Alcohol use: No     Comment: Reports occasional 1-2 drinks     Drug use: No    Sexual activity: Never     Review of Systems   Constitutional: Negative for activity change and fatigue.   HENT: Negative for ear discharge, nosebleeds, rhinorrhea and sore throat.    Eyes: Positive for visual disturbance.   Respiratory: Negative for cough and shortness of breath.    Cardiovascular: Negative for chest pain.   Gastrointestinal: Positive for nausea and vomiting.   Musculoskeletal: Negative for neck pain and neck stiffness.   Skin: Negative for color change, rash and wound.   Neurological: Positive for headaches. Negative for dizziness, seizures, facial asymmetry, weakness and numbness.   Hematological: Bruises/bleeds easily.   Psychiatric/Behavioral: Negative for confusion.     Objective:        There is no height or weight on file to calculate BMI.  Vital Signs (Most Recent):  Temp: 97.8 °F (36.6 °C) (05/21/19 1509)  Pulse: 65 (05/21/19 1602)  Resp: 18 (05/21/19 1509)  BP: (!) 145/65 (05/21/19 1602)  SpO2: 96 % (05/21/19 1602) Vital Signs (24h Range):  Temp:  [97.8 °F (36.6 °C)-98.1 °F (36.7 °C)] 97.8 °F (36.6 °C)  Pulse:  [63-66] 65  Resp:  [16-24] 18  SpO2:  [94 %-98 %] 96 %  BP: (137-224)/(65-91) 145/65     Date 05/21/19 0700 - 05/22/19 0659   Shift 4053-6020 6010-6638 7829-1524 24 Hour Total   INTAKE   Blood 288   288   Shift Total 288   288   OUTPUT   Shift Total        Weight (kg)                            Hemodialysis Catheter 11/29/18 1650 right subclavian (Active)            Hemodialysis AV Fistula 05/23/16 0700 Left forearm (Active)            Hemodialysis AV Fistula Left upper arm (Active)       Neurosurgery Physical Exam     General: well developed, well nourished, no distress.   Head: normocephalic.Significant periorbital ecchymosis and edema appreciated on left.    Neurologic: Alert and oriented. Thought content appropriate.  GCS: Motor: 6/Verbal: 5/Eyes: 3 GCS Total: 14  Mental Status:  Alert, Oriented x 4  Language: No aphasia  Speech: No dysarthria  Cranial nerves: tongue midline, CN II-XII grossly intact although somewhat limited secondary to facial swelling.   Eyes: Unable to assess secondary to edema  Pulmonary: normal respirations, no signs of respiratory distress  Abdomen: soft, non-distended, not tender to palpation  Skin: Skin is warm, dry and intact.  Sensory: intact to light touch throughout  Motor Strength:Moves all extremities spontaneously with good tone. No abnormal movements seen.     Strength  Deltoids Triceps Biceps Wrist Extension Wrist Flexion Hand    Upper: R 5/5 5/5 5/5 5/5 5/5 5/5    L 4+/5 4+/5 4+/5 5/5 5/5 4+/5     Iliopsoas Quadriceps Knee  Flexion Tibialis  anterior Gastro- cnemius EHL   Lower: R 5/5 5/5 5/5 5/5 5/5 5/5    L 4+/5 4+/5 4+/5 4+/5 4+/5 4+/5     Reflexes:   DTR: 2+ symmetrically throughout.  Clonus: Negative.     Cerebellar:   Pronator drift: absent bilaterally  Gait deferred     Cervical:   ROM: Full with flexion, extension, lateral rotation and ear-to-shoulder bend.   Midline TTP: Negative.    Significant Labs:  Recent Labs   Lab 05/21/19  1354   *      K 5.0      CO2 19*   BUN 75*   CREATININE 7.8*   CALCIUM 9.8     Recent Labs   Lab 05/21/19  1201   WBC 6.62   HGB 11.0*   HCT 36.5*   PLT 83*     Recent Labs   Lab 05/21/19  1201   INR 1.1   APTT 23.5     Microbiology Results (last 7 days)     Procedure  Component Value Units Date/Time    Urine culture [[id number]] Collected:  05/21/19 1349    Order Status:  No result Specimen:  Urine Updated:  05/21/19 1436        Recent Lab Results       05/21/19  1354   05/21/19  1349   05/21/19  1315   05/21/19  1308   05/21/19  1201        Unit Blood Type Code       6200[P]       Unit Expiration       [id number][P]       Unit Blood Type       A POS[P]       Albumin 3.4             Alkaline Phosphatase 86             ALT 7             Anion Gap 14             Appearance, UA   Cloudy           aPTT         23.5  Comment:  aPTT therapeutic range = 39-69 seconds     AST 21  Comment:  *Result may be interfered by visible hemolysis             Bacteria, UA   Many           Baso #         0.05     Basophil%         0.8     Bilirubin (UA)   Negative           BILIRUBIN TOTAL 0.5  Comment:  For infants and newborns, interpretation of results should be based  on gestational age, weight and in agreement with clinical  observations.  Premature Infant recommended reference ranges:  Up to 24 hours.............<8.0 mg/dL  Up to 48 hours............<12.0 mg/dL  3-5 days..................<15.0 mg/dL  6-29 days.................<15.0 mg/dL               BUN, Bld 75             Calcium 9.8             Chloride 106             CO2 19             CODING SYSTEM       THHB418[P]       Color, UA   Yellow           Creatinine 7.8             Differential Method         Automated     DISPENSE STATUS       ISSUED[P]       eGFR if  5.2             eGFR if non  4.5  Comment:  Calculation used to obtain the estimated glomerular filtration  rate (eGFR) is the CKD-EPI equation.                Eos #         0.1     Eosinophil%         2.0     Glucose 149             Glucose, UA   Negative           Gran # (ANC)         5.3     Gran%         79.8     Group & Rh     A POS         Hematocrit         36.5     Hemoglobin         11.0     Hyaline Casts, UA   0           Immature Grans  (Abs)         0.04  Comment:  Mild elevation in immature granulocytes is non specific and   can be seen in a variety of conditions including stress response,   acute inflammation, trauma and pregnancy. Correlation with other   laboratory and clinical findings is essential.       Immature Granulocytes         0.6     INDIRECT FELY     NEG         Coumadin Monitoring INR         1.1  Comment:  Coumadin Therapy:  2.0 - 3.0 for INR for all indicators except mechanical heart valves  and antiphospholipid syndromes which should use 2.5 - 3.5.       Ketones, UA   Negative           Leukocytes, UA   3+           Lymph #         0.6     Lymph%         9.1     MCH         28.5     MCHC         30.1     MCV         95     Microscopic Comment   SEE COMMENT  Comment:  Other formed elements not mentioned in the report are not   present in the microscopic examination.              Mono #         0.5     Mono%         7.7     MPV         SEE COMMENT  Comment:  Result not available.     NITRITE UA   Negative           nRBC         0     Occult Blood UA   2+           pH, UA   6.0           Platelets         83     Potassium 5.0  Comment:  SPECIMEN SLIGHTLY HEMOLYZED[C]             Product Code       [id number][P]       PROTEIN TOTAL 6.9             Protein, UA   2+  Comment:  Recommend a 24 hour urine protein or a urine   protein/creatinine ratio if globulin induced proteinuria is  clinically suspected.             Protime         11.3     RBC         3.86     RBC, UA   29           RDW         17.3     Sodium 139             Specific Mercer, UA   1.010           Specimen UA   Urine, Catheterized           Squam Epithel, UA   11           UNIT NUMBER       [id number][P]       WBC, UA   >100           WBC         6.62                        All pertinent labs from the last 24 hours have been reviewed.    Significant Diagnostics:  CT: Ct Head Without Contrast    Result Date: 5/21/2019  Heterogeneous primarily hyperdense extra-axial  collection overlying the right cerebral convexity compatible with rece acute/recent nt subdural hemorrhage.  There is mass effect with compression of the right lateral ventricle and approximately 9 mm of leftward midline shift with subfalcine herniation. Additional extra-axial hemorrhages overlying the left cerebral hemisphere with scattered interrupted subdural component overlying the left temporal and frontal lobes as well as small scattered subarachnoid hemorrhage in left frontal sulci. No definite parenchymal hemorrhage. Encephalomalacia from prior large right MCA territory remote infarction with patchy hypoattenuation elsewhere supratentorial white matter suggestive for chronic ischemic change. Prominent soft tissue swelling induration overlying the left orbit concerning for subcutaneous hematoma.  Please see CT maxillofacial bone report for further details Electronically signed by: Gilberto Luke DO Date:    05/21/2019 Time:    13:18  I have reviewed and interpreted all pertinent imaging results/findings within the past 24 hours.  Acute right SDH with midline shift appreciated.     Assessment/Plan:     * Subdural hematoma  80 YO female with complicated PMH presenting with acute right SDH s/p unwitnessed fall this AM.  Patient neurologically stable since admission. She will likely require right craniotomy with SDH evacuation.  Patient has been posted for tomorrow morning to allow medical optimization prior to surgical intervention.   -- Medicine consulted for pre-op risk stratification  -- Recommend admission to [facility]   -- Repeat CT head in 8 hours  -- q1 hour neuro checks  -- SBP<180 mmHg. Monitor carefully for hypotension given aortic stenosis.   -- Platelet transfusion ordered given Plavix history  -- Continue care per Neurocritical care.    Please notify neurosurgery immediately with any change in neuro status.  Assessment and plan reviewed with the patient.  All questions answered.         Thank you for your  consult. Will continue to follow.    Antonia Joseph PA-C  Neurosurgery  [facility]

## 2019-05-21 NOTE — ASSESSMENT & PLAN NOTE
Medicine consulted for preoperative risk assessment and optimization. Patient with significant comorbidities including HFpEF, severe AS, ESRD, COPD, HTN, and HLD. RCRI score of 2 (+1 hx of CVA, +1 hx of CHF) suggestive of ~10% 30-day risk of MI, death, or cardiac arrest. Given severe AS, patient is high risk for hemodynamic instability intraoperative and post-operatively.     -Agree with cardene gtt, recommend continuing home Coreg but holding other anti-hypertensive. Discussed BP target with NCC at bedside, agree with SBP < 180.    -continue home Breo (or equivalent) for COPD. Duo-Neb PRN for wheezing. Incentive spirometry post-operatively. Goal euvolemia, at high risk for respiratory decompensation given AS, elevated PA pressures, underlying lung disease, and dialysis dependence.  -Missed HD session yesterday, recommend volume removal with CRRT prior to surgery   -Significant LLQ tenderness and reports of copious diarrhea in past 24 hours, ordered CT abd w/o contrast. DDx includes infectious vs ischemic colitis vs diverticulitis. Consider empiric colitis treatment with cipro/flagyl or possible c.diff testing pending CT results.

## 2019-05-21 NOTE — HPI
80 y/o Black or -American woman ESRD on iHD (MWF), HTN, diastolic HF, right MCA CVA s/p thrombectomy (2016) admitted to [facility] diagnosed with Rt subdural hematoma.  She initially presented with hematoma of superior left eye, headache, nausea/vomiting, s/p unwitnessed fall 5/21/2019 in AM, and does not recall how she fell.  Patient accompanied by granddaughter who referred found her in the floor.  Denies any dizziness, light-headedness, numbness, weakness, chest pain, SOB, or any other associated symptoms.    Nephrology consulted for evaluation of management of ESRD and HD treatments.    Nephrology Hx:  She dialyzes at [facility] under the care of Dr. Coleman on a MWF schedule for 4 hrs   EDW 50 kg  Access: FLORENCE AVF ++ bruit/thrill  She maintains residual renal function and dialyzes for a 4 hour duration.  She reports now problems with her dialysis, no recent episodes of cramping with ultrafiltration.

## 2019-05-21 NOTE — HPI
Ms. Georges is a 78 YO female with a history of ESRD, DM, aortic stenosis, CVA, diastolic HF who presents to [facility] ED today after an unwitnessed fall.  The patient was found sitting up on the floor with left periorbital edema by her granddaughter.  She does not remember falling.  Unknown loss of consciousness.  The patient currently reports a headache in the left frontal area.  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  She denies dizziness, focal weakness, confusion.  She admits to intermittent nausea but no vomiting.  There are no other associated signs or symptoms.  The patient has a history of Plavix usage, but is unable to give reason for Plavix.  She has a history of CVA with thrombectomy in 2016.  Blood pressure elevated to greater than 200 SBP on admission to ED.

## 2019-05-21 NOTE — ED NOTES
Dr. Ram unsuccessful with left IJ trialysis line placement. Night team will reattempt to insert using femoral.

## 2019-05-21 NOTE — HPI
HPI per ED department: Ms Georges is a 80 yo female patient with PMHx of HTN, ESRD on HD, diastolic HF, right MCA CVA s/p thrombectomy (2016) that presents to the ED with hematoma to superior left eye, headache, nausea, vomiting s/p unwitnessed fall this AM prior to arrival. Pt cannot remember how she fell or falling. Her granddaughter states that she was in another room but found her sitting up on the floor. Pt currently complaining of right sided headache and pain and swelling above left orbit. Denies any dizziness, light-headedness, numbness, weakness. 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      Hospital medicine consulted for preoperative risk stratification and optimization. On chart review patient with PMHx of HFpEF, aortic stenosis, ESRD, DM2, HTN, HLD, hx of CVA in 2016. Patient had unwitnessed fall today and now with bilateral SDH on CT head with 9mm leftwards midline shift. Patient does not recall falling today. Endorses headache and blurry vision. Gets HD MWF, states she missed HD yesterday due to having to go to court, which she also missed. Has a history of COPD from emphysema on chart review, though patient states she only has asthma but has been using albuterol inhaler every day. On 2L home O2 at night. Denies history of MI, cardiac stenting, or CABG. Has history of diastolic CHF secondary to severe aortic stenosis, aortia area 0.66 cm^2 on TTE Nov 2018. On ROS reports numerous episodes of watery diarrhea in past 24 hours.

## 2019-05-21 NOTE — HPI
Tea Georges is a 79 year old patient with PMHx of HTN, ESRD on HD, HF, severe AS, right MCA stroke s/p thrombectomy in 2016, and COPD (on home O2) who presented to OSH after an unwitnessed fall this morning.  She was found down by grand daughter, so LOC is unknown.  At OSH, she was found to have bilateral SDH, R >L, with right to left midline shift. Plan to admit to NCC for optimization and to OR tomorrow for evacuation.

## 2019-05-21 NOTE — CONSULTS
[facility] Hospital Medicine  Consult Note    Patient Name: Tea Georges  MRN: [id number]  Admission Date: 5/21/2019  Hospital Length of Stay: 0 days  Attending Physician: Ana Sheikh MD   Primary Care Provider: Parth Posadas Ii, MD     [facility] Medicine Team: Networked reference to record PCT  Leandro Ram MD      Patient information was obtained from patient, relative(s), past medical records and ER records.     Inpatient consult to Hospital Medicine-General  Consult performed by: Leandro Ram MD  Consult ordered by: Amrita Dos Santos PA-C        Subjective:     Principal Problem: Subdural hematoma    Chief Complaint:   Chief Complaint   Patient presents with    Fall     hematoma to L eye        HPI: HPI per ED department: Ms Georges is a 78 yo female patient with PMHx of HTN, ESRD on HD, diastolic HF, right MCA CVA s/p thrombectomy (2016) that presents to the ED with hematoma to superior left eye, headache, nausea, vomiting s/p unwitnessed fall this AM prior to arrival. Pt cannot remember how she fell or falling. Her granddaughter states that she was in another room but found her sitting up on the floor. Pt currently complaining of right sided headache and pain and swelling above left orbit. Denies any dizziness, light-headedness, numbness, weakness. 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      Hospital medicine consulted for preoperative risk stratification and optimization. On chart review patient with PMHx of HFpEF, aortic stenosis, ESRD, DM2, HTN, HLD, hx of CVA in 2016. Patient had unwitnessed fall today and now with bilateral SDH on CT head with 9mm leftwards midline shift. Patient does not recall falling today. Endorses headache and blurry vision. Gets HD MWF, states she missed HD yesterday due to having to go to court, which she also missed. Has a history of COPD from  emphysema on chart review, though patient states she only has asthma but has been using albuterol inhaler every day. On 2L home O2 at night. Denies history of MI, cardiac stenting, or CABG. Has history of diastolic CHF secondary to severe aortic stenosis, aortia area 0.66 cm^2 on TTE Nov 2018. On ROS reports numerous episodes of watery diarrhea in past 24 hours.     Past Medical History:   Diagnosis Date    Anemia in ESRD (end-stage renal disease) 5/29/2016    Anticoagulant long-term use     Aortic atherosclerosis 11/22/2016    Aortic stenosis, moderate 2/18/2016    Asthma in adult without complication 1/8/2016    Bilateral low back pain without sciatica 11/17/2015    Blindness of right eye 11/12/2016    CAD (coronary artery disease) 12/12/2016    Cataract     Central retinal vein occlusion, right eye 6/3/2014    CHF (congestive heart failure)     Chronic diastolic heart failure 1/8/2016    Chronic respiratory failure with hypoxia 5/29/2016    COPD (chronic obstructive pulmonary disease) 1/15/2017    Dependence on hemodialysis     Mon-Wed-Fri    Diverticulosis     Embolic stroke involving right middle cerebral artery     Enlarged LA (left atrium) 10/7/2016    Epiretinal membrane 7/17/2012    ESRD (end stage renal disease)     Essential hypertension 1/8/2016    History of GI diverticular bleed     5/22/16    Left flaccid hemiparesis 10/1/2016    Peripheral vascular disease, unspecified 11/22/2016    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 11/13/2016    Type 2 diabetes mellitus with kidney complication, without long-term current use of insulin 5/1/2018    Type 2 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, without long-term current use of insulin 3/26/2013    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, without long-term current use of insulin 3/26/2013    Vitreomacular adhesion of right eye  7/17/2012       Past Surgical History:   Procedure Laterality Date    BREAST SURGERY      tumor removal x 2    CATARACT EXTRACTION      CHOLECYSTECTOMY      COLONOSCOPY N/A 5/30/2016    Performed by Sam Davis MD at [facility] ENDO (2ND FLR)    COLONOSCOPY N/A 5/23/2016    Performed by WILLIAM Colvin MD at [facility] ENDO (2ND FLR)    ESOPHAGOGASTRODUODENOSCOPY (EGD) N/A 5/30/2016    Performed by Sam Davis MD at [facility] ENDO (2ND FLR)    ESOPHAGOGASTRODUODENOSCOPY (EGD) N/A 5/27/2016    Performed by [facility] at [facility] ([facility])    EXCISION, ANEURYSM Left 11/29/2018    [facility] NADINE Blum III[facility]    Fistulogram Left 11/28/2018    Performed by NADINE Blum III, MD at [facility] CATH LAB    INSERTION, CATHETER, VASCULAR, DUAL LUMEN Right 11/29/2018    Performed by NADINE Blum III, MD at [facility] OR [address] [place] [address]    PTA, Fistula  11/28/2018    Performed by NADINE Blum III, MD at [facility] CATH LAB    REVISION, AV FISTULA, Left 11/29/2018    Performed by NADINE Blum III, MD at [facility] OR [address] [place] [address]    UPPER GASTROINTESTINAL ENDOSCOPY         Review of patient's allergies indicates:  No Known Allergies    No current facility-administered medications on file prior to encounter.      Current Outpatient Medications on File Prior to Encounter   Medication Sig    albuterol (PROVENTIL/VENTOLIN HFA) 90 mcg/actuation inhaler Inhale 1-2 puffs into the lungs every 6 (six) hours as needed for Wheezing.    amLODIPine (NORVASC) 10 MG tablet Take 1 tablet (10 mg total) by mouth once daily.    aspirin 81 MG Chew Take 1 tablet (81 mg total) by mouth once daily.    atorvastatin (LIPITOR) 40 MG tablet Take 1 tablet (40 mg total) by mouth once daily.    carvedilol (COREG) 12.5 MG tablet Take 1 tablet (12.5 mg total) by mouth 2 (two) times daily.    citalopram (CELEXA) 10 MG tablet Take 1 tablet (10 mg total) by mouth once daily.    ergocalciferol (ERGOCALCIFEROL) 50,000 unit Cap Take 1  capsule (50,000 Units total) by mouth every 7 days.    erythromycin (ROMYCIN) ophthalmic ointment Place into the right eye every 8 (eight) hours.    fluticasone-vilanterol (BREO ELLIPTA) 200-25 mcg/dose DsDv diskus inhaler Inhale 1 puff into the lungs once daily.    hydrALAZINE (APRESOLINE) 25 MG tablet Take 2 tablets (50 mg total) by mouth every 8 (eight) hours.    isosorbide dinitrate (ISOCHRON) 40 mg TbSR Take 1 tablet (40 mg total) by mouth every 12 (twelve) hours.    lidocaine-prilocaine (EMLA) cream Apply topically as needed (to apply over access 1/2 over prior to dialysis).    loperamide (IMODIUM) 2 mg capsule Take one capsule at onset of diarrhea.  No more than three capsules daily    losartan (COZAAR) 50 MG tablet Take 1 tablet (50 mg total) by mouth once daily.    ondansetron (ZOFRAN) 4 MG tablet Take 2 tablets (8 mg total) by mouth every 6 (six) hours.    RENVELA 800 mg Tab Take 1 tablet (800 mg total) by mouth 3 (three) times daily with meals.    [DISCONTINUED] clopidogrel (PLAVIX) 75 mg tablet Take 1 tablet (75 mg total) by mouth once daily.     Family History     Problem Relation (Age of Onset)    Cancer Sister    Cataracts Mother    Esophageal cancer Sister    Glaucoma Brother, Maternal Aunt    Heart disease Brother    Heart failure Sister    Hypertension Mother, Sister, Brother, Father    No Known Problems Maternal Uncle, Paternal Aunt, Paternal Uncle, Maternal Grandmother, Maternal Grandfather, Paternal Grandmother, Paternal Grandfather    Stroke Mother        Tobacco Use    Smoking status: Never Smoker    Smokeless tobacco: Never Used   Substance and Sexual Activity    Alcohol use: No     Comment: Reports occasional 1-2 drinks     Drug use: No    Sexual activity: Never     Review of Systems   Constitutional: Positive for fatigue. Negative for chills, diaphoresis and fever.   HENT: Positive for facial swelling.    Eyes: Positive for visual disturbance.   Respiratory: Positive for  shortness of breath. Negative for cough and wheezing.    Cardiovascular: Negative for chest pain, palpitations and leg swelling.   Gastrointestinal: Positive for abdominal pain and diarrhea. Negative for constipation, nausea and vomiting.   Genitourinary: Negative for difficulty urinating.   Musculoskeletal: Negative for neck pain and neck stiffness.   Skin: Negative for wound.   Neurological: Positive for headaches. Negative for weakness.     Objective:     Vital Signs (Most Recent):  Temp: 97.8 °F (36.6 °C) (05/21/19 1116)  Pulse: 63 (05/21/19 1407)  Resp: (!) 22 (05/21/19 1407)  BP: (!) 145/66 (05/21/19 1401)  SpO2: 96 % (05/21/19 1407) Vital Signs (24h Range):  Temp:  [97.8 °F (36.6 °C)] 97.8 °F (36.6 °C)  Pulse:  [63-66] 63  Resp:  [16-24] 22  SpO2:  [94 %-98 %] 96 %  BP: (145-224)/(66-91) 145/66        There is no height or weight on file to calculate BMI.    Physical Exam   Constitutional: She is oriented to person, place, and time. She appears well-developed and well-nourished. No distress.   HENT:   Mouth/Throat: Oropharynx is clear and moist.   Hematoma over left forehead and orbital area    Eyes:   Left eyelid swollen,unable to open    Neck: Neck supple.   Cardiovascular: Normal rate, regular rhythm and intact distal pulses.   Murmur heard.  Harsh 5/6 systolic murmur    Pulmonary/Chest: Effort normal and breath sounds normal. No respiratory distress. She has no wheezes.   Abdominal: Soft. Bowel sounds are normal. She exhibits no distension. There is tenderness (LLQ).   Musculoskeletal: Normal range of motion. She exhibits no edema.   Neurological: She is alert and oriented to person, place, and time.   Skin: Skin is warm. She is not diaphoretic.       Significant Labs:   Recent Lab Results       05/21/19  1308   05/21/19  1201        Unit Blood Type Code 6200[P]       Unit Expiration [id number][P]       Unit Blood Type A POS[P]       aPTT   23.5  Comment:  aPTT therapeutic range = 39-69 seconds     Hanh  #   0.05     Basophil%   0.8     CODING SYSTEM ZXEZ411[P]       Differential Method   Automated     DISPENSE STATUS ISSUED[P]       Eos #   0.1     Eosinophil%   2.0     Gran # (ANC)   5.3     Gran%   79.8     Hematocrit   36.5     Hemoglobin   11.0     Immature Grans (Abs)   0.04  Comment:  Mild elevation in immature granulocytes is non specific and   can be seen in a variety of conditions including stress response,   acute inflammation, trauma and pregnancy. Correlation with other   laboratory and clinical findings is essential.       Immature Granulocytes   0.6     Coumadin Monitoring INR   1.1  Comment:  Coumadin Therapy:  2.0 - 3.0 for INR for all indicators except mechanical heart valves  and antiphospholipid syndromes which should use 2.5 - 3.5.       Lymph #   0.6     Lymph%   9.1     MCH   28.5     MCHC   30.1     MCV   95     Mono #   0.5     Mono%   7.7     MPV   SEE COMMENT  Comment:  Result not available.     nRBC   0     Platelets   83     Product Code [id number][P]       Protime   11.3     RBC   3.86     RDW   17.3     UNIT NUMBER [id number][P]       WBC   6.62         All pertinent labs within the past 24 hours have been reviewed.    Significant Imaging: I have reviewed and interpreted all pertinent imaging results/findings within the past 24 hours.    Assessment/Plan:     * Subdural hematoma  -management per primary team     Pulmonary emphysema  Severe aortic stenosis  ESRD (end stage renal disease)  (HFpEF) heart failure with preserved ejection fraction    Preoperative clearance  Medicine consulted for preoperative risk assessment and optimization. Patient with significant comorbidities including HFpEF, severe AS, ESRD, COPD, HTN, and HLD. RCRI score of 2 (+1 hx of CVA, +1 hx of CHF) suggestive of ~10% 30-day risk of MI, death, or cardiac arrest. Given severe AS, patient is high risk for hemodynamic instability intraoperative and post-operatively.     -Agree with christie moreno, recommend continuing  home Coreg but holding other anti-hypertensive. Discussed BP target with NCC at bedside, agree with SBP < 180.    -continue home Breo (or equivalent) for COPD. Duo-Neb PRN for wheezing. Incentive spirometry post-operatively. Goal euvolemia, at high risk for respiratory decompensation given AS, elevated PA pressures, underlying lung disease, and dialysis dependence.  -Missed HD session yesterday, recommend volume removal with CRRT prior to surgery   -Significant LLQ tenderness and reports of copious diarrhea in past 24 hours, ordered CT abd w/o contrast. DDx includes infectious vs ischemic colitis vs diverticulitis. Consider empiric colitis treatment with cipro/flagyl or possible c.diff testing pending CT results.           VTE Risk Mitigation (From admission, onward)        Ordered     Place sequential compression device  Until discontinued      05/21/19 1510     IP VTE HIGH RISK PATIENT  Once      05/21/19 1510              Thank you for your consult. I will sign off. Please contact us if you have any additional questions.     Plan discussed with attending Dr. Haji, further recommendations as per attending addendum.    Leandro Ram MD  Resident Physician, PGY1    Leandro Ram MD  Department of Hospital Medicine   [facility]                      05/21/2019                             STAFF PHYSICIAN NOTE                                   Attending Attestation for Rounds with Resident  I have reviewed and concur with the resident's history, physical, assessment, and plan.  I have personally interviewed and examined the patient at bedside and agree with the resident's findings.                                  ________________________________________                                     REASON FOR ADMISSION:     Patient is 79 y.o.female    There is no height or weight on file to calculate BMI.,  Subdural hematoma

## 2019-05-21 NOTE — ED NOTES
Patient identifiers verified and correct for Tea Georges.    LOC: The patient arouses to voice with an appropriate affect, the patient is oriented to person and place speaking appropriately.  APPEARANCE: Patient resting comfortably and in no acute distress, patient is clean and well groomed, patient's clothing is properly fastened.   SKIN: The skin is warm and dry, color consistent with ethnicity, patient has normal skin turgor and moist mucus membranes, hematoma to left eye  MUSCULOSKELETAL: Patient moving all extremities spontaneously, no obvious swelling or deformities noted.  RESPIRATORY: Airway is open and patent, respirations are spontaneous, patient has a normal effort and rate, no accessory muscle use noted, bilateral breath sounds clear  CARDIAC: Patient has a normal rate and regular rhythm, no peripheral edema noted, capillary refill < 3 seconds.  ABDOMEN: Soft and non tender to palpation, no distention noted, normoactive bowel sounds present in all four quadrants.  NEUROLOGIC: see neuro flowsheet.

## 2019-05-21 NOTE — ED PROVIDER NOTES
Encounter Date: 5/21/2019    SCRIBE #1 NOTE: I, am scribing for, and in the presence of. I have scribed the following portions of the note - the EKG reading and the APC attestation.       History     Chief Complaint   Patient presents with    Fall     hematoma to L eye     Ms Georges is a 78 yo female patient with PMHx of HTN, ESRD on HD, diastolic HF, right MCA CVA s/p thrombectomy (2016) that presents to the ED with hematoma to superior left eye, headache, nausea, vomiting s/p unwitnessed fall this AM prior to arrival. Pt cannot remember how she fell or falling. Her granddaughter states that she was in another room but found her sitting up on the floor. Pt currently complaining of right sided headache and pain and swelling above left orbit. Denies any dizziness, light-headedness, numbness, weakness. 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         Review of patient's allergies indicates:  No Known Allergies  Past Medical History:   Diagnosis Date    Anemia in ESRD (end-stage renal disease) 5/29/2016    Anticoagulant long-term use     Aortic atherosclerosis 11/22/2016    Aortic stenosis, moderate 2/18/2016    Asthma in adult without complication 1/8/2016    Bilateral low back pain without sciatica 11/17/2015    Blindness of right eye 11/12/2016    CAD (coronary artery disease) 12/12/2016    Cataract     Central retinal vein occlusion, right eye 6/3/2014    CHF (congestive heart failure)     Chronic diastolic heart failure 1/8/2016    Chronic respiratory failure with hypoxia 5/29/2016    COPD (chronic obstructive pulmonary disease) 1/15/2017    Dependence on hemodialysis     Mon-Wed-Fri    Diverticulosis     Embolic stroke involving right middle cerebral artery     Enlarged LA (left atrium) 10/7/2016    Epiretinal membrane 7/17/2012    ESRD (end stage renal disease)     Essential hypertension 1/8/2016     History of GI diverticular bleed     5/22/16    Left flaccid hemiparesis 10/1/2016    Peripheral vascular disease, unspecified 11/22/2016    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 11/13/2016    Type 2 diabetes mellitus with kidney complication, without long-term current use of insulin 5/1/2018    Type 2 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, without long-term current use of insulin 3/26/2013    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, without long-term current use of insulin 3/26/2013    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     Past Surgical History:   Procedure Laterality Date    BREAST SURGERY      tumor removal x 2    CATARACT EXTRACTION      CHOLECYSTECTOMY      COLONOSCOPY N/A 5/30/2016    Performed by Sam Davis MD at [facility] (2ND FLR)    COLONOSCOPY N/A 5/23/2016    Performed by WILLIAM Colvin MD at [facility] (2ND FLR)    ESOPHAGOGASTRODUODENOSCOPY (EGD) N/A 5/30/2016    Performed by Sam Davis MD at [facility] (2ND FLR)    ESOPHAGOGASTRODUODENOSCOPY (EGD) N/A 5/27/2016    Performed by Ces[facility] at [facility] ([facility])    EXCISION, ANEURYSM Left 11/29/2018    Performed by NADINE Blum III, MD at [facility] OR 2ND FLR    Fistulogram Left 11/28/2018    Performed by NADINE Blum III, MD at [facility] CATH LAB    INSERTION, CATHETER, VASCULAR, DUAL LUMEN Right 11/29/2018    Performed by NADINE Blum III, MD at [facility] OR 2ND FLR    PTA, Fistula  11/28/2018    Performed by NADINE Blum III, MD at [facility] CATH [facility]    REVISION, AV FISTULA, Left 11/29/2018    Performed by NADINE Blum III, MD at [facility] OR 2ND FLR    UPPER GASTROINTESTINAL ENDOSCOPY       Family History   Problem Relation Age of Onset    Cataracts Mother     Stroke Mother     Hypertension Mother     Cancer Sister     Hypertension Sister     Heart failure Sister     Glaucoma Brother      Hypertension Brother     Heart disease Brother     Hypertension Father     Glaucoma Maternal Aunt     Esophageal cancer Sister     No Known Problems Maternal Uncle     No Known Problems Paternal Aunt     No Known Problems Paternal Uncle     No Known Problems Maternal Grandmother     No Known Problems Maternal Grandfather     No Known Problems Paternal Grandmother     No Known Problems Paternal Grandfather     Heart attack Neg Hx     Colon cancer Neg Hx     Stomach cancer Neg Hx     Anemia Neg Hx     Arrhythmia Neg Hx     Asthma Neg Hx     Clotting disorder Neg Hx     Fainting Neg Hx     Hyperlipidemia Neg Hx     Atrial Septal Defect Neg Hx      Social History     Tobacco Use    Smoking status: Never Smoker    Smokeless tobacco: Never Used   Substance Use Topics    Alcohol use: No     Comment: Reports occasional 1-2 drinks     Drug use: No     Review of Systems   Constitutional: Negative for chills and fever.   HENT: Negative for congestion, rhinorrhea and sore throat.    Eyes: Positive for pain. Negative for visual disturbance.   Respiratory: Negative for cough and shortness of breath.    Cardiovascular: Negative for chest pain.   Gastrointestinal: Positive for abdominal pain, nausea and vomiting.   Genitourinary: Negative for dysuria, flank pain and frequency.   Musculoskeletal: Negative for back pain, neck pain and neck stiffness.   Neurological: Positive for syncope and headaches. Negative for dizziness, weakness, light-headedness and numbness.   Psychiatric/Behavioral: Negative for confusion.       Physical Exam     Initial Vitals [05/21/19 1116]   BP Pulse Resp Temp SpO2   (!) 216/72 66 16 97.8 °F (36.6 °C) (!) 94 %      MAP       --         Physical Exam    Constitutional: She appears well-developed and well-nourished. She is cooperative.   HENT:   Head: Head is with contusion.   Large hematoma over superior left orbit.   Eyes: Conjunctivae and EOM are normal. Right pupil is not  reactive.   Neck: Normal range of motion. Neck supple. No spinous process tenderness and no muscular tenderness present.   Cardiovascular: Normal rate and intact distal pulses. Exam reveals no gallop and no friction rub.    No murmur heard.  Pulmonary/Chest: Effort normal. Tachypnea noted. She has decreased breath sounds in the right lower field and the left lower field. She has no wheezes. She has no rhonchi. She has no rales.   Abdominal: Soft. There is no tenderness. There is no rigidity, no rebound, no guarding and no CVA tenderness.   Musculoskeletal: Normal range of motion.   Neurological: She is alert and oriented to person, place, and time. She has normal strength. No cranial nerve deficit or sensory deficit.   Skin: Skin is warm and dry.         ED Course   Critical Care  Date/Time: 5/21/2019 1:23 PM  Performed by: Ana Sheikh MD  Authorized by: Ana Sheikh MD   Direct patient critical care time: 25 minutes  Additional history critical care time: 5 minutes  Ordering / reviewing critical care time: 10 minutes  Documentation critical care time: 10 minutes  Consulting other physicians critical care time: 5 minutes  Consult with family critical care time: 5 minutes  Total critical care time (exclusive of procedural time) : 60 minutes  Critical care time was exclusive of separately billable procedures and treating other patients and teaching time.  Critical care was necessary to treat or prevent imminent or life-threatening deterioration of the following conditions: CNS failure or compromise.  Critical care was time spent personally by me on the following activities: development of treatment plan with patient or surrogate, discussions with consultants, interpretation of cardiac output measurements, examination of patient, evaluation of patient's response to treatment, obtaining history from patient or surrogate, ordering and performing treatments and interventions, ordering and review of  radiographic studies, ordering and review of laboratory studies, pulse oximetry, re-evaluation of patient's condition and review of old charts.        Labs Reviewed   CBC W/ AUTO DIFFERENTIAL - Abnormal; Notable for the following components:       Result Value    RBC 3.86 (*)     Hemoglobin 11.0 (*)     Hematocrit 36.5 (*)     Mean Corpuscular Hemoglobin Conc 30.1 (*)     RDW 17.3 (*)     Platelets 83 (*)     Immature Granulocytes 0.6 (*)     Lymph # 0.6 (*)     Gran% 79.8 (*)     Lymph% 9.1 (*)     All other components within normal limits   URINALYSIS, REFLEX TO URINE CULTURE - Abnormal; Notable for the following components:    Appearance, UA Cloudy (*)     Protein, UA 2+ (*)     Occult Blood UA 2+ (*)     Leukocytes, UA 3+ (*)     All other components within normal limits    Narrative:     Preferred Collection Type->Urine, Clean Catch  gray and yellow   COMPREHENSIVE METABOLIC PANEL - Abnormal; Notable for the following components:    CO2 19 (*)     Glucose 149 (*)     BUN, Bld 75 (*)     Creatinine 7.8 (*)     Albumin 3.4 (*)     ALT 7 (*)     eGFR if  5.2 (*)     eGFR if non  4.5 (*)     All other components within normal limits   URINALYSIS MICROSCOPIC - Abnormal; Notable for the following components:    RBC, UA 29 (*)     WBC, UA >100 (*)     Bacteria Many (*)     All other components within normal limits    Narrative:     Preferred Collection Type->Urine, Clean Catch  gray and yellow   MAGNESIUM - Abnormal; Notable for the following components:    Magnesium 2.7 (*)     All other components within normal limits    Narrative:     ADD-ON CKMB #[id number]; MG #[id number]; PHOS #[id number] PER TARYN SAENZ MD 16:39  05/21/2019    PHOSPHORUS - Abnormal; Notable for the following components:    Phosphorus 6.8 (*)     All other components within normal limits    Narrative:     ADD-ON CKMB #[id number]; MG #[id number]; PHOS #[id number] PER TARYN SAENZ MD 16:39  05/21/2019     CULTURE, URINE   APTT   PROTIME-INR   APTT   PROTIME-INR   PHOSPHORUS   MAGNESIUM   COMPREHENSIVE METABOLIC PANEL   CK-MB   CBC W/ AUTO DIFFERENTIAL   CK-MB    Narrative:     ADD-ON CKMB #[id number]; MG #[id number]; PHOS #[id number] PER TARYN SAENZ MD 16:39  05/21/2019    TYPE & SCREEN   PREPARE PLATELETS (DOSE) SOFT     EKG Readings: (Independently Interpreted)   Initial Reading: No STEMI. Rhythm: Normal Sinus Rhythm. Heart Rate: 65. Conduction: 1st Degree AV Block.   , QRS 98, ; No T wave inversions      ECG Results          EKG 12-lead (Final result)  Result time 05/21/19 15:57:39    Final result by Interface, Lab In [facility] (05/21/19 15:57:39)                 Narrative:    Test Reason : W19.XXXA,    Vent. Rate : 065 BPM     Atrial Rate : 065 BPM     P-R Int : 286 ms          QRS Dur : 098 ms      QT Int : 452 ms       P-R-T Axes : 041 044 087 degrees     QTc Int : 470 ms    Sinus rhythm with 1st degree A-V block  Nonspecific T wave abnormality  Prolonged QT  Abnormal ECG  When compared with ECG of 26-NOV-2018 09:24,  No significant change was found  Confirmed by WOODY ZAMBRANO MD ([id number]) on 5/21/2019 3:57:33 PM    Referred By: [email or web address]   SELF           Confirmed By:WOODY ZAMBRANO MD                            Imaging Results          X-Ray Chest AP Portable (Final result)  Result time 05/21/19 14:10:29    Final result by Scar Zhu MD (05/21/19 14:10:29)                 Impression:      See above      Electronically signed by: Scar Zhu MD  Date:    05/21/2019  Time:    14:10             Narrative:    EXAMINATION:  XR CHEST AP PORTABLE    CLINICAL HISTORY:  fall;    TECHNIQUE:  Single frontal view of the chest was performed.    COMPARISON:  None 11/29/2018    FINDINGS:  Loop recorder identified as before.  Cardiomegaly, pulmonary vascular congestion and diffuse edema.  Ill-defined soft tissue density adjacent to the right hilum probably airspace consolidation.  The right-sided pleural  effusion identified.  Vascular stent noted in the left axilla as before                               CT Head Without Contrast (Final result)  Result time 05/21/19 13:18:34    Final result by Gilberto Luke DO (05/21/19 13:18:34)                 Impression:      Heterogeneous primarily hyperdense extra-axial collection overlying the right cerebral convexity compatible with rece acute/recent nt subdural hemorrhage.  There is mass effect with compression of the right lateral ventricle and approximately 9 mm of leftward midline shift with subfalcine herniation.    Additional extra-axial hemorrhages overlying the left cerebral hemisphere with scattered interrupted subdural component overlying the left temporal and frontal lobes as well as small scattered subarachnoid hemorrhage in left frontal sulci.    No definite parenchymal hemorrhage.    Encephalomalacia from prior large right MCA territory remote infarction with patchy hypoattenuation elsewhere supratentorial white matter suggestive for chronic ischemic change.    Prominent soft tissue swelling induration overlying the left orbit concerning for subcutaneous hematoma.  Please see CT maxillofacial bone report for further details      Electronically signed by: Gilberto Luke DO  Date:    05/21/2019  Time:    13:18             Narrative:    EXAMINATION:  CT HEAD WITHOUT CONTRAST    CLINICAL HISTORY:  Headache, post trauma;    TECHNIQUE:  Multiple sequential 5 mm axial images of the head without contrast.  Coronal and sagittal reformatted imaging from the axial acquisition.    COMPARISON:  None    FINDINGS:  There is a heterogeneous primarily hyperdense extra-axial collection overlying the right cerebral convexity compatible with acute subdural hemorrhage this measures approximately 1.9 cm in greatest thickness with mass effect on the right cerebral hemisphere resulting in 9 mm of leftward midline shift with subfalcine herniation.    There is small volume subarachnoid  hemorrhage within the left frontal sulci with locular hyperdensity overlying the left frontal lobe compatible with additional subdural hemorrhage overlying the superior posterior frontal parietal convexity.    There is a thin extra-axial collection overlying the left temporal convexity which is also hyperdense measuring 4-5 mm in thickness concerning for additional subdural hemorrhage.    There is no definite parenchymal hemorrhage.  There is encephalomalacia right MCA territory compatible with remote infarction.    There is no evidence for significant sulcal effacement to suggest large territory recent infarction.  Patchy hypoattenuation supratentorial white matter concerning for underlying chronic ischemic change.  There is severe opacification of the sphenoid sinus.    Soft tissue swelling induration overlying the left inferior frontal calvarium without gross underlying calvarial fracture.  Additional soft tissue swelling induration overlying the left orbit periorbital preseptal soft tissues.  Please see CT maxillofacial bone report for further details.  Case discussed with  on 05/21/2019 at 13:17.                               CT Maxillofacial Without Contrast (Final result)  Result time 05/21/19 13:21:25    Final result by Gilberto Luke DO (05/21/19 13:21:25)                 Impression:      Study limited by patient motion and partial exclusion of the right aspect of the maxillofacial bones.  There is no evidence for acute fracture of the visualized maxillofacial bones.    There is diffuse soft tissue swelling induration overlying the left periorbital preseptal soft tissues extending to the Radha zygomatic soft tissues without underlying fracture.    Partial inclusion of intracranial hemorrhages.  Please see CT head report for further details.      Electronically signed by: Gilberto Luke DO  Date:    05/21/2019  Time:    13:21             Narrative:    EXAMINATION:  CT MAXILLOFACIAL WITHOUT  CONTRAST    CLINICAL HISTORY:  Maxface trauma blunt;    TECHNIQUE:  Multiple sequential 2.5 mm axial images of the maxillofacial bones without contrast.  Coronal and sagittal reformatted imaging from the axial acquisition.    COMPARISON:  Concomitant CT head    FINDINGS:  There is pronounced soft tissue swelling induration overlying the left periorbital preseptal soft tissues without underlying bony orbital fracture allowing for motion limitation.    Please note that the study is limited by partial exclusion of the right Radha zygomatic soft tissues which was included in the CT of the head.  There is no definite right bony orbital fracture.  Please note there is partial exclusion of the right mandible as well no evidence for acute fracture or dislocation visualized mandible with exclusion of the right mandibular condyle and temporomandibular joint.    Severe opacification of the sphenoid sinus as seen on CT head.    Remote operative change from bilateral cataract repair.  Otherwise the globes and extraocular muscles are relatively symmetric without evidence for traumatic globe rupture.    Partially visualized extra-axial hemorrhage overlying the frontal lobes right greater than left.  Please see CT head report for further details                               CT Cervical Spine Without Contrast (Final result)  Result time 05/21/19 13:43:56    Final result by Scar Zhu MD (05/21/19 13:43:56)                 Impression:      No evidence of fracture or dislocation.    Degenerative changes of the visualized spine.    Markedly enlarged and heterogeneous thyroid.    Electronically signed by resident: Oly Pena  Date:    05/21/2019  Time:    13:19    Electronically signed by: Scar Zhu MD  Date:    05/21/2019  Time:    13:43             Narrative:    EXAMINATION:  CT CERVICAL SPINE WITHOUT CONTRAST    CLINICAL HISTORY:  C-spine trauma, NEXUS/CCR negative, low risk;    TECHNIQUE:  Low dose axial images, sagittal  and coronal reformations were performed though the cervical spine.  Contrast was not administered.    COMPARISON:  CTA head and neck 09/26/2016    FINDINGS:  Sagittal reformatted images demonstrate adequate alignment of the cervical spine.    There is no evidence of fracture or dislocation.    There are degenerative changes with multilevel disc space narrowing and anterior osteophyte formation and bridging osteophytes at the levels C4-C7 and extending into the partially visualized proximal thoracic spine.    There is a markedly heterogeneous enlarged left and right thyroid lobes, left greater than right.    The airway is patent and the lung apices are unremarkable.  The visualized portions of the brain demonstrate no significant abnormality.                                 Medical Decision Making:   History:   Old Medical Records: I decided to obtain old medical records.  Initial Assessment:   Ms Georges is a 78 yo female patient with PMHx of HTN, ESRD on HD, diastolic HF, right MCA CVA s/p thrombectomy (2016) that presents to the ED with hematoma to superior left eye, headache, nausea, vomiting s/p unwitnessed fall this AM prior to arrival. Pt cannot remember how she fell or falling. Her granddaughter states that she was in another room but found her sitting up on the floor. Pt currently complaining of right sided headache and pain and swelling above left orbit. Denies any dizziness, light-headedness, numbness, weakness. 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   Independently Interpreted Test(s):   I have ordered and independently interpreted EKG Reading(s) - see prior notes  Clinical Tests:   Medical Tests: Ordered and Reviewed  ED Management:  CT read reports bilateral subdural hemorrhage with mass effect compression of right lateral ventricle and approximately 9 mm of leftward midline shift with subfalcine herniation. Pt  hypertensive at 224/91. Cardene drip ordered. Blood consent obtained and Platelets ordered.  Neurosurgery consulted and will evaluate patient in ED.  Neurosurgery evaluated patient and recommend risk stratification consult by Hospital Medicine and admission to Neuro Critical Care. Discussed patient with [facility] who will evaluate patient. Pt admitted to [facility] for further treatment and management.             Scribe Attestation:   Scribe #1: I performed the above scribed service and the documentation accurately describes the services I performed. I attest to the accuracy of the note.    Attending Attestation:     Physician Attestation Statement for NP/PA:   I have conducted a face to face encounter with this patient in addition to the NP/PA, due to Medical Complexity    Other NP/PA Attestation Additions:    History of Present Illness: Unwitnessed fall today, nauseated, on Plavix.  Granddaughter heard fall, saw pt sitting upright. Hematoma over Left eye. Patient is endorsing right-sided headache, occasionally has them after her prior CVA.  She denies any abdominal pain, reports last bowel movement yesterday and normal, passing gas today, but states that she became acutely nauseated while in ED.    Physical Exam: On exam pt alert and oriented x3, blind in right eye, large left eye hematoma with periorbital swelling, EOMI bilaterally intact with reactive pupil left eye, left facial droop (granddaughter reports chronic), bilateral U/LE 5/5 and sensation intact     Medical Decision Makin:10 PM  CT scan shows large right side subdural, patient is alert and oriented, but with fatigue and consistent right sided headache, no new neuro deficits developed since last exam. Will give patient platelets for Plavix reversal. Discussed with neurosurgery.     2:18 PM  Discussed goals of care with patient, she is amenable to Neurosurgery if necessary, and she does wish to have intubation and CPR if necessary as well. FULL CODE.                     Clinical Impression:       ICD-10-CM ICD-9-CM   1. Subdural hemorrhage I62.00 432.1   2. Fall W19.XXXA E888.9         Disposition:   Disposition: Admitted  Condition: Critical                        Juan Laws PA-C  05/21/19 1806       Ana Sheikh MD  06/07/19 0621

## 2019-05-21 NOTE — ASSESSMENT & PLAN NOTE
78 YO female with complicated PMH presenting with acute right SDH s/p unwitnessed fall this AM.  Patient neurologically stable since admission. She will likely require right craniotomy with SDH evacuation.  Patient has been posted for tomorrow morning to allow medical optimization prior to surgical intervention.   -- Medicine consulted for pre-op risk stratification  -- Recommend admission to [facility]   -- Repeat CT head in 8 hours  -- q1 hour neuro checks  -- SBP<180 mmHg. Monitor carefully for hypotension given aortic stenosis.   -- Platelet transfusion ordered given Plavix history  -- Continue care per Neurocritical care.    Please notify neurosurgery immediately with any change in neuro status.  Assessment and plan reviewed with the patient.  All questions answered.

## 2019-05-21 NOTE — SUBJECTIVE & OBJECTIVE
Past Medical History:   Diagnosis Date    Anemia in ESRD (end-stage renal disease) 5/29/2016    Anticoagulant long-term use     Aortic atherosclerosis 11/22/2016    Aortic stenosis, moderate 2/18/2016    Asthma in adult without complication 1/8/2016    Bilateral low back pain without sciatica 11/17/2015    Blindness of right eye 11/12/2016    CAD (coronary artery disease) 12/12/2016    Cataract     Central retinal vein occlusion, right eye 6/3/2014    CHF (congestive heart failure)     Chronic diastolic heart failure 1/8/2016    Chronic respiratory failure with hypoxia 5/29/2016    COPD (chronic obstructive pulmonary disease) 1/15/2017    Dependence on hemodialysis     Mon-Wed-Fri    Diverticulosis     Embolic stroke involving right middle cerebral artery     Enlarged LA (left atrium) 10/7/2016    Epiretinal membrane 7/17/2012    ESRD (end stage renal disease)     Essential hypertension 1/8/2016    History of GI diverticular bleed     5/22/16    Left flaccid hemiparesis 10/1/2016    Peripheral vascular disease, unspecified 11/22/2016    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 11/13/2016    Type 2 diabetes mellitus with kidney complication, without long-term current use of insulin 5/1/2018    Type 2 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, without long-term current use of insulin 3/26/2013    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, without long-term current use of insulin 3/26/2013    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       Past Surgical History:   Procedure Laterality Date    BREAST SURGERY      tumor removal x 2    CATARACT EXTRACTION      CHOLECYSTECTOMY      COLONOSCOPY N/A 5/30/2016    Performed by [facility] at [facility] ENDO (2ND FLR)    COLONOSCOPY N/A 5/23/2016    Performed by WILLIAM Colvin MD at [facility] ENDO (2ND FLR)    ESOPHAGOGASTRODUODENOSCOPY  (EGD) N/A 5/30/2016    Performed by [facility] at [facility] ENDO (2ND FLR)    ESOPHAGOGASTRODUODENOSCOPY (EGD) N/A 5/27/2016    Performed by Cesario Rubio MD at [facility] ENDO (2ND FLR)    EXCISION, ANEURYSM Left 11/29/2018    Performed by NADINE Blum III, MD at [facility] OR 2ND FLR    Fistulogram Left 11/28/2018    Performed by NADINE Blum III, MD at [facility] CATH LAB    INSERTION, CATHETER, VASCULAR, DUAL LUMEN Right 11/29/2018    Performed by NADINE Blum III, MD at [facility] OR 2ND FLR    PTA, Fistula  11/28/2018    Performed by NADINE Blum III, MD at [facility] CATH LAB    REVISION, AV FISTULA, Left 11/29/2018    Performed by NADINE Blum III, MD at [facility] OR 2ND FLR    UPPER GASTROINTESTINAL ENDOSCOPY         Review of patient's allergies indicates:  No Known Allergies  Current Facility-Administered Medications   Medication Frequency    0.9%  NaCl infusion (for blood administration) Q24H PRN    carvedilol tablet 12.5 mg BID    sodium chloride 0.9% flush 10 mL PRN     Current Outpatient Medications   Medication    albuterol (PROVENTIL/VENTOLIN HFA) 90 mcg/actuation inhaler    amLODIPine (NORVASC) 10 MG tablet    aspirin 81 MG Chew    atorvastatin (LIPITOR) 40 MG tablet    carvedilol (COREG) 12.5 MG tablet    citalopram (CELEXA) 10 MG tablet    ergocalciferol (ERGOCALCIFEROL) 50,000 unit Cap    erythromycin (ROMYCIN) ophthalmic ointment    fluticasone-vilanterol (BREO ELLIPTA) 200-25 mcg/dose DsDv diskus inhaler    hydrALAZINE (APRESOLINE) 25 MG tablet    isosorbide dinitrate (ISOCHRON) 40 mg TbSR    lidocaine-prilocaine (EMLA) cream    loperamide (IMODIUM) 2 mg capsule    losartan (COZAAR) 50 MG tablet    ondansetron (ZOFRAN) 4 MG tablet    RENVELA 800 mg Tab     Family History     Problem Relation (Age of Onset)    Cancer Sister    Cataracts Mother    Esophageal cancer Sister    Glaucoma Brother, Maternal Aunt    Heart disease Brother    Heart failure Sister    Hypertension Mother,  Sister, Brother, Father    No Known Problems Maternal Uncle, Paternal Aunt, Paternal Uncle, Maternal Grandmother, Maternal Grandfather, Paternal Grandmother, Paternal Grandfather    Stroke Mother        Tobacco Use    Smoking status: Never Smoker    Smokeless tobacco: Never Used   Substance and Sexual Activity    Alcohol use: No     Comment: Reports occasional 1-2 drinks     Drug use: No    Sexual activity: Never     Review of Systems   Constitutional: Negative for activity change and fatigue.   HENT: Negative for ear discharge, nosebleeds, rhinorrhea and sore throat.    Eyes: Positive for visual disturbance.   Respiratory: Negative for cough and shortness of breath.    Cardiovascular: Negative for chest pain.   Gastrointestinal: Positive for nausea and vomiting.   Genitourinary: Positive for decreased urine volume.   Musculoskeletal: Negative for neck pain and neck stiffness.   Skin: Negative for color change, rash and wound.   Neurological: Positive for headaches. Negative for dizziness, seizures, facial asymmetry, weakness and numbness.   Hematological: Bruises/bleeds easily.   Psychiatric/Behavioral: Negative for confusion.     Objective:     Vital Signs (Most Recent):  Temp: 97.8 °F (36.6 °C) (05/21/19 1509)  Pulse: 67 (05/21/19 1632)  Resp: 18 (05/21/19 1509)  BP: (!) 161/71 (05/21/19 1632)  SpO2: 97 % (05/21/19 1632)  O2 Device (Oxygen Therapy): room air (05/21/19 1509) Vital Signs (24h Range):  Temp:  [97.8 °F (36.6 °C)-98.1 °F (36.7 °C)] 97.8 °F (36.6 °C)  Pulse:  [63-67] 67  Resp:  [16-24] 18  SpO2:  [94 %-98 %] 97 %  BP: (137-224)/(65-91) 161/71        There is no height or weight on file to calculate BMI.  There is no height or weight on file to calculate BSA.    No intake/output data recorded.    Physical Exam   Constitutional: She is oriented to person, place, and time. She appears well-developed and well-nourished. No distress.   HENT:   Mouth/Throat: Oropharynx is clear and moist.   Hematoma  over left forehead and orbital area    Eyes:   Left eyelid swollen,unable to open    Neck: Neck supple.   Cardiovascular: Normal rate, regular rhythm and intact distal pulses.   Murmur heard.  Harsh 5/6 systolic murmur    Pulmonary/Chest: Effort normal and breath sounds normal. No respiratory distress. She has no wheezes.   Abdominal: Soft. Bowel sounds are normal. She exhibits no distension. There is tenderness (LLQ).   Musculoskeletal: Normal range of motion. She exhibits no edema.   Neurological: She is alert and oriented to person, place, and time.   Skin: Skin is warm. She is not diaphoretic.   Vitals reviewed.      Significant Labs:  CBC:   Recent Labs   Lab 05/21/19  1201   WBC 6.62   RBC 3.86*   HGB 11.0*   HCT 36.5*   PLT 83*   MCV 95   MCH 28.5   MCHC 30.1*     CMP:   Recent Labs   Lab 05/21/19  1354   *   CALCIUM 9.8   ALBUMIN 3.4*   PROT 6.9      K 5.0   CO2 19*      BUN 75*   CREATININE 7.8*   ALKPHOS 86   ALT 7*   AST 21   BILITOT 0.5     All labs within the past 24 hours have been reviewed.    Significant Imaging:  CXR personally reviewed.   Head CT scan personally reviewed.

## 2019-05-21 NOTE — ED NOTES
"Patient arrives via EMS after having fall at home. Granddaughter states fall was unwitnessed, but she found her grandma "on her back". Patient with hematoma to left eye, and unable to open eye. Patient blind in right eye. Dialysis MWF, last dialyzed yesterday. Patient unable to provide details on fall, appears drowsy. Oriented to person and place. Currently on plavix.  "

## 2019-05-21 NOTE — ASSESSMENT & PLAN NOTE
Nephrology Hx:  She dialyzes at [facility] under the care of Dr. Coleman on a MWF schedule for 4 hrs   EDW 50 kg  Access: [place] AVF ++ bruit/thrill  She maintains residual renal function and dialyzes for a 4 hour duration.  She reports now problems with her dialysis, no recent episodes of cramping with ultrafiltration.  Last dialysis prior to hospitalization: 5/17/2019    Plan:  - Will provide SLED treatment for 2 hrs in getting patient optimized for surgery tomorrow; for short period of time will CVVHDF is not viable as would need longer treatment times to be effective; short treatment with SLED can optimize labs (hyperkalemia and met acidosis) and get her ready for surgery tomorrow.  - After surgery, for additional RRT treatments will consider CVVHDF because it can run for more than 24 hrs  - To physical exam patient in no distress, no volume overload issues  - Will follow closely  - RFP per RRt protocol  - Strict I/Os and chart

## 2019-05-21 NOTE — SUBJECTIVE & OBJECTIVE
(Not in a hospital admission)    Review of patient's allergies indicates:  No Known Allergies    Past Medical History:   Diagnosis Date    Anemia in ESRD (end-stage renal disease) 5/29/2016    Anticoagulant long-term use     Aortic atherosclerosis 11/22/2016    Aortic stenosis, moderate 2/18/2016    Asthma in adult without complication 1/8/2016    Bilateral low back pain without sciatica 11/17/2015    Blindness of right eye 11/12/2016    CAD (coronary artery disease) 12/12/2016    Cataract     Central retinal vein occlusion, right eye 6/3/2014    CHF (congestive heart failure)     Chronic diastolic heart failure 1/8/2016    Chronic respiratory failure with hypoxia 5/29/2016    COPD (chronic obstructive pulmonary disease) 1/15/2017    Dependence on hemodialysis     Mon-Wed-Fri    Diverticulosis     Embolic stroke involving right middle cerebral artery     Enlarged LA (left atrium) 10/7/2016    Epiretinal membrane 7/17/2012    ESRD (end stage renal disease)     Essential hypertension 1/8/2016    History of GI diverticular bleed     5/22/16    Left flaccid hemiparesis 10/1/2016    Peripheral vascular disease, unspecified 11/22/2016    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 11/13/2016    Type 2 diabetes mellitus with kidney complication, without long-term current use of insulin 5/1/2018    Type 2 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, without long-term current use of insulin 3/26/2013    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, without long-term current use of insulin 3/26/2013    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     Past Surgical History:   Procedure Laterality Date    BREAST SURGERY      tumor removal x 2    CATARACT EXTRACTION      CHOLECYSTECTOMY      COLONOSCOPY N/A 5/30/2016    [facility]formed by [facility] at [facility] (2ND FLR)    COLONOSCOPY N/A  5/23/2016    Performed by WILLIAM [facility]vin MD at [facility] ENDO (2ND FLR)    ESOPHAGOGASTRODUODENOSCOPY (EGD) N/A 5/30/2016    Performed by Sam Davis MD at [facility] ENDO (2ND FLR)    ESOPHAGOGASTRODUODENOSCOPY (EGD) N/A 5/27/2016    Performed by Cesario Rubio MD at [facility] ENDO (2ND FLR)    EXCISION, ANEURYSM Left 11/29/2018    Performed by NADINE Blum III, MD at [facility] OR 2ND FLR    Fistulogram Left 11/28/2018    Performed by NADINE Blum III, MD at [facility] CATH LAB    INSERTION, CATHETER, VASCULAR, DUAL LUMEN Right 11/29/2018    Performed by NADINE Blum III, MD at [facility] OR 2ND FLR    PTA, Fistula  11/28/2018    Performed by NADINE Blum III, MD at [facility] CATH LAB    REVISION, AV FISTULA, Left 11/29/2018    Performed by NADINE Blum III, MD at [facility] OR 2ND FLR    UPPER GASTROINTESTINAL ENDOSCOPY       Family History     Problem Relation (Age of Onset)    Cancer Sister    Cataracts Mother    Esophageal cancer Sister    Glaucoma Brother, Maternal Aunt    Heart disease Brother    Heart failure Sister    Hypertension Mother, Sister, Brother, Father    No Known Problems Maternal Uncle, Paternal Aunt, Paternal Uncle, Maternal Grandmother, Maternal Grandfather, Paternal Grandmother, Paternal Grandfather    Stroke Mother        Tobacco Use    Smoking status: Never Smoker    Smokeless tobacco: Never Used   Substance and Sexual Activity    Alcohol use: No     Comment: Reports occasional 1-2 drinks     Drug use: No    Sexual activity: Never     Review of Systems   Constitutional: Negative for activity change and fatigue.   HENT: Negative for ear discharge, nosebleeds, rhinorrhea and sore throat.    Eyes: Positive for visual disturbance.   Respiratory: Negative for cough and shortness of breath.    Cardiovascular: Negative for chest pain.   Gastrointestinal: Positive for nausea and vomiting.   Musculoskeletal: Negative for neck pain and neck stiffness.   Skin: Negative for color change, rash and wound.    Neurological: Positive for headaches. Negative for dizziness, seizures, facial asymmetry, weakness and numbness.   Hematological: Bruises/bleeds easily.   Psychiatric/Behavioral: Negative for confusion.     Objective:        There is no height or weight on file to calculate BMI.  Vital Signs (Most Recent):  Temp: 97.8 °F (36.6 °C) (05/21/19 1509)  Pulse: 65 (05/21/19 1602)  Resp: 18 (05/21/19 1509)  BP: (!) 145/65 (05/21/19 1602)  SpO2: 96 % (05/21/19 1602) Vital Signs (24h Range):  Temp:  [97.8 °F (36.6 °C)-98.1 °F (36.7 °C)] 97.8 °F (36.6 °C)  Pulse:  [63-66] 65  Resp:  [16-24] 18  SpO2:  [94 %-98 %] 96 %  BP: (137-224)/(65-91) 145/65     Date 05/21/19 0700 - 05/22/19 0659   Shift 0338-1176 5664-1751 2303-8438 24 Hour Total   INTAKE   Blood 288   288   Shift Total 288   288   OUTPUT   Shift Total       Weight (kg)                            Hemodialysis Catheter 11/29/18 1650 right subclavian (Active)            Hemodialysis AV Fistula 05/23/16 0700 Left forearm (Active)            Hemodialysis AV Fistula Left upper arm (Active)       Neurosurgery Physical Exam     General: well developed, well nourished, no distress.   Head: normocephalic.Significant periorbital ecchymosis and edema appreciated on left.    Neurologic: Alert and oriented. Thought content appropriate.  GCS: Motor: 6/Verbal: 5/Eyes: 3 GCS Total: 14  Mental Status:  Alert, Oriented x 4  Language: No aphasia  Speech: No dysarthria  Cranial nerves: tongue midline, CN II-XII grossly intact although somewhat limited secondary to facial swelling.   Eyes: Unable to assess secondary to edema  Pulmonary: normal respirations, no signs of respiratory distress  Abdomen: soft, non-distended, not tender to palpation  Skin: Skin is warm, dry and intact.  Sensory: intact to light touch throughout  Motor Strength:Moves all extremities spontaneously with good tone. No abnormal movements seen.     Strength  Deltoids Triceps Biceps Wrist Extension Wrist Flexion Hand     Upper: R 5/5 5/5 5/5 5/5 5/5 5/5    L 4+/5 4+/5 4+/5 5/5 5/5 4+/5     Iliopsoas Quadriceps Knee  Flexion Tibialis  anterior Gastro- cnemius EHL   Lower: R 5/5 5/5 5/5 5/5 5/5 5/5    L 4+/5 4+/5 4+/5 4+/5 4+/5 4+/5     Reflexes:   DTR: 2+ symmetrically throughout.  Clonus: Negative.     Cerebellar:   Pronator drift: absent bilaterally  Gait deferred     Cervical:   ROM: Full with flexion, extension, lateral rotation and ear-to-shoulder bend.   Midline TTP: Negative.    Significant Labs:  Recent Labs   Lab 05/21/19  1354   *      K 5.0      CO2 19*   BUN 75*   CREATININE 7.8*   CALCIUM 9.8     Recent Labs   Lab 05/21/19  1201   WBC 6.62   HGB 11.0*   HCT 36.5*   PLT 83*     Recent Labs   Lab 05/21/19  1201   INR 1.1   APTT 23.5     Microbiology Results (last 7 days)     Procedure Component Value Units Date/Time    Urine culture [[id number]] Collected:  05/21/19 1349    Order Status:  No result Specimen:  Urine Updated:  05/21/19 1436        Recent Lab Results       05/21/19  1354   05/21/19  1349   05/21/19  1315   05/21/19  1308   05/21/19  1201        Unit Blood Type Code       6200[P]       Unit Expiration       [id number][P]       Unit Blood Type       A POS[P]       Albumin 3.4             Alkaline Phosphatase 86             ALT 7             Anion Gap 14             Appearance, UA   Cloudy           aPTT         23.5  Comment:  aPTT therapeutic range = 39-69 seconds     AST 21  Comment:  *Result may be interfered by visible hemolysis             Bacteria, UA   Many           Baso #         0.05     Basophil%         0.8     Bilirubin (UA)   Negative           BILIRUBIN TOTAL 0.5  Comment:  For infants and newborns, interpretation of results should be based  on gestational age, weight and in agreement with clinical  observations.  Premature Infant recommended reference ranges:  Up to 24 hours.............<8.0 mg/dL  Up to 48 hours............<12.0 mg/dL  3-5 days..................<15.0  mg/dL  6-29 days.................<15.0 mg/dL               BUN, Bld 75             Calcium 9.8             Chloride 106             CO2 19             CODING SYSTEM       [id number][P]       Color, UA   Yellow           Creatinine 7.8             Differential Method         Automated     DISPENSE STATUS       ISSUED[P]       eGFR if  5.2             eGFR if non  4.5  Comment:  Calculation used to obtain the estimated glomerular filtration  rate (eGFR) is the CKD-EPI equation.                Eos #         0.1     Eosinophil%         2.0     Glucose 149             Glucose, UA   Negative           Gran # (ANC)         5.3     Gran%         79.8     Group & Rh     A POS         Hematocrit         36.5     Hemoglobin         11.0     Hyaline Casts, UA   0           Immature Grans (Abs)         0.04  Comment:  Mild elevation in immature granulocytes is non specific and   can be seen in a variety of conditions including stress response,   acute inflammation, trauma and pregnancy. Correlation with other   laboratory and clinical findings is essential.       Immature Granulocytes         0.6     INDIRECT FELY     NEG         Coumadin Monitoring INR         1.1  Comment:  Coumadin Therapy:  2.0 - 3.0 for INR for all indicators except mechanical heart valves  and antiphospholipid syndromes which should use 2.5 - 3.5.       Ketones, UA   Negative           Leukocytes, UA   3+           Lymph #         0.6     Lymph%         9.1     MCH         28.5     MCHC         30.1     MCV         95     Microscopic Comment   SEE COMMENT  Comment:  Other formed elements not mentioned in the report are not   present in the microscopic examination.              Mono #         0.5     Mono%         7.7     MPV         SEE COMMENT  Comment:  Result not available.     NITRITE UA   Negative           nRBC         0     Occult Blood UA   2+           pH, UA   6.0           Platelets         83     Potassium  5.0  Comment:  SPECIMEN SLIGHTLY HEMOLYZED[C]             Product Code       [id number][P]       PROTEIN TOTAL 6.9             Protein, UA   2+  Comment:  Recommend a 24 hour urine protein or a urine   protein/creatinine ratio if globulin induced proteinuria is  clinically suspected.             Protime         11.3     RBC         3.86     RBC, UA   29           RDW         17.3     Sodium 139             Specific [place], UA   1.010           Specimen UA   Urine, Catheterized           Squam Epithel, UA   11           UNIT NUMBER       [id number][P]       WBC, UA   >100           WBC         6.62                        All pertinent labs from the last 24 hours have been reviewed.    Significant Diagnostics:  CT: Ct Head Without Contrast    Result Date: 5/21/2019  Heterogeneous primarily hyperdense extra-axial collection overlying the right cerebral convexity compatible with rece acute/recent nt subdural hemorrhage.  There is mass effect with compression of the right lateral ventricle and approximately 9 mm of leftward midline shift with subfalcine herniation. Additional extra-axial hemorrhages overlying the left cerebral hemisphere with scattered interrupted subdural component overlying the left temporal and frontal lobes as well as small scattered subarachnoid hemorrhage in left frontal sulci. No definite parenchymal hemorrhage. Encephalomalacia from prior large right MCA territory remote infarction with patchy hypoattenuation elsewhere supratentorial white matter suggestive for chronic ischemic change. Prominent soft tissue swelling induration overlying the left orbit concerning for subcutaneous hematoma.  Please see CT maxillofacial bone report for further details Electronically signed by: Gilberto Luke DO Date:    05/21/2019 Time:    13:18  I have reviewed and interpreted all pertinent imaging results/findings within the past 24 hours.  Acute right SDH with midline shift appreciated.

## 2019-05-21 NOTE — PROCEDURES
"Tea Georges is a 79 y.o. female patient.    Temp: 97.8 °F (36.6 °C) (05/21/19 1509)  Pulse: 67 (05/21/19 1632)  Resp: 18 (05/21/19 1509)  BP: (!) 161/71 (05/21/19 1632)  SpO2: 97 % (05/21/19 1632)       Central Line  Date/Time: 5/21/2019 6:10 PM  Performed by: Leandro Ram MD  Pre-operative Diagnosis: esrd  Post-operative diagnosis: esrd  Consent Done: Yes  Time out: Immediately prior to procedure a "time out" was called to verify the correct patient, procedure, equipment, support staff and site/side marked as required.  Indications: hemodialysis  Anesthesia: local infiltration    Anesthesia:  Local Anesthetic: lidocaine 1% without epinephrine  Preparation: skin prepped with ChloraPrep  Skin prep agent dried: skin prep agent completely dried prior to procedure  Sterile barriers: all five maximum sterile barriers used - cap, mask, sterile gown, sterile gloves, and large sterile sheet  Hand hygiene: hand hygiene performed prior to central venous catheter insertion  Location details: left internal jugular  Ultrasound guidance: yes  Manometry: Yes  Number of attempts: 1  Assessment: no pneumothorax on x-ray  Comments: Right IJ with visible nonocclusive clot on ultrasound. Left IJ cannulated in 1 attempt. Guidewire advanced and confirmed in vessel by ultrasound and manometry. Resistance met advancing guidewire with ~25cm in the vessel, no ectopy on monitor. Attending Dr. Salgado called to bedside, cardiac US performed and guidewire not seen in right atrium, procedure subsequently aborted. Vessel was NOT dilated.         Leandro Ram  5/21/2019  "

## 2019-05-21 NOTE — SUBJECTIVE & OBJECTIVE
Past Medical History:   Diagnosis Date    Anemia in ESRD (end-stage renal disease) 5/29/2016    Anticoagulant long-term use     Aortic atherosclerosis 11/22/2016    Aortic stenosis, moderate 2/18/2016    Asthma in adult without complication 1/8/2016    Bilateral low back pain without sciatica 11/17/2015    Blindness of right eye 11/12/2016    CAD (coronary artery disease) 12/12/2016    Cataract     Central retinal vein occlusion, right eye 6/3/2014    CHF (congestive heart failure)     Chronic diastolic heart failure 1/8/2016    Chronic respiratory failure with hypoxia 5/29/2016    COPD (chronic obstructive pulmonary disease) 1/15/2017    Dependence on hemodialysis     Mon-Wed-Fri    Diverticulosis     Embolic stroke involving right middle cerebral artery     Enlarged LA (left atrium) 10/7/2016    Epiretinal membrane 7/17/2012    ESRD (end stage renal disease)     Essential hypertension 1/8/2016    History of GI diverticular bleed     5/22/16    Left flaccid hemiparesis 10/1/2016    Peripheral vascular disease, unspecified 11/22/2016    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 11/13/2016    Type 2 diabetes mellitus with kidney complication, without long-term current use of insulin 5/1/2018    Type 2 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, without long-term current use of insulin 3/26/2013    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, without long-term current use of insulin 3/26/2013    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       Past Surgical History:   Procedure Laterality Date    BREAST SURGERY      tumor removal x 2    CATARACT EXTRACTION      CHOLECYSTECTOMY      COLONOSCOPY N/A 5/30/2016    Performed by [facility] (2ND FLR)    COLONOSCOPY N/A 5/23/2016    Performed by WILLIAM Colvin MD at [facility] ENDO (2ND FLR)    ESOPHAGOGASTRODUODENOSCOPY  (EGD) N/A 5/30/2016    [facility] (2ND FLR)    ESOPHAGOGASTRODUODENOSCOPY (EGD) N/A 5/27/2016    Performed by Cesario Rubio MD at [facility] ENDO (2ND FLR)    EXCISION, ANEURYSM Left 11/29/2018    Performed by NADINE Blum III, [facility] at [facility] OR 2ND FLR    Fistulogram Left 11/28/2018    [facility] by NADINE Blum III, [facility] at [facility] CATH LAB    INSERTION, CATHETER, VASCULAR, DUAL LUMEN Right 11/29/2018    Performed by NADINE Blum III, MD at [facility] OR 2ND FLR    PTA, Fistula  11/28/2018    Performed by NADINE Blum III, [facility] at [facility] CATH LAB    REVISION, AV FISTULA, Left 11/29/2018    Performed by NADINE [facility]um III, [facility] at [facility] OR 2ND FLR    UPPER GASTROINTESTINAL ENDOSCOPY         Review of patient's allergies indicates:  No Known Allergies    No current facility-administered medications on file prior to encounter.      Current Outpatient Medications on File Prior to Encounter   Medication Sig    albuterol (PROVENTIL/VENTOLIN HFA) 90 mcg/actuation inhaler Inhale 1-2 puffs into the lungs every 6 (six) hours as needed for Wheezing.    amLODIPine (NORVASC) 10 MG tablet Take 1 tablet (10 mg total) by mouth once daily.    aspirin 81 MG Chew Take 1 tablet (81 mg total) by mouth once daily.    atorvastatin (LIPITOR) 40 MG tablet Take 1 tablet (40 mg total) by mouth once daily.    carvedilol (COREG) 12.5 MG tablet Take 1 tablet (12.5 mg total) by mouth 2 (two) times daily.    citalopram (CELEXA) 10 MG tablet Take 1 tablet (10 mg total) by mouth once daily.    ergocalciferol (ERGOCALCIFEROL) 50,000 unit Cap Take 1 capsule (50,000 Units total) by mouth every 7 days.    erythromycin (ROMYCIN) ophthalmic ointment Place into the right eye every 8 (eight) hours.    fluticasone-vilanterol (BREO ELLIPTA) 200-25 mcg/dose DsDv diskus inhaler Inhale 1 puff into the lungs once daily.    hydrALAZINE (APRESOLINE) 25 MG tablet Take 2 tablets (50 mg total) by mouth every 8 (eight) hours.     isosorbide dinitrate (ISOCHRON) 40 mg TbSR Take 1 tablet (40 mg total) by mouth every 12 (twelve) hours.    lidocaine-prilocaine (EMLA) cream Apply topically as needed (to apply over access 1/2 over prior to dialysis).    loperamide (IMODIUM) 2 mg capsule Take one capsule at onset of diarrhea.  No more than three capsules daily    losartan (COZAAR) 50 MG tablet Take 1 tablet (50 mg total) by mouth once daily.    ondansetron (ZOFRAN) 4 MG tablet Take 2 tablets (8 mg total) by mouth every 6 (six) hours.    RENVELA 800 mg Tab Take 1 tablet (800 mg total) by mouth 3 (three) times daily with meals.    [DISCONTINUED] clopidogrel (PLAVIX) 75 mg tablet Take 1 tablet (75 mg total) by mouth once daily.     Family History     Problem Relation (Age of Onset)    Cancer Sister    Cataracts Mother    Esophageal cancer Sister    Glaucoma Brother, Maternal Aunt    Heart disease Brother    Heart failure Sister    Hypertension Mother, Sister, Brother, Father    No Known Problems Maternal Uncle, Paternal Aunt, Paternal Uncle, Maternal Grandmother, Maternal Grandfather, Paternal Grandmother, Paternal Grandfather    Stroke Mother        Tobacco Use    Smoking status: Never Smoker    Smokeless tobacco: Never Used   Substance and Sexual Activity    Alcohol use: No     Comment: Reports occasional 1-2 drinks     Drug use: No    Sexual activity: Never     Review of Systems   Constitutional: Positive for fatigue. Negative for chills, diaphoresis and fever.   HENT: Positive for facial swelling.    Eyes: Positive for visual disturbance.   Respiratory: Positive for shortness of breath. Negative for cough and wheezing.    Cardiovascular: Negative for chest pain, palpitations and leg swelling.   Gastrointestinal: Positive for abdominal pain and diarrhea. Negative for constipation, nausea and vomiting.   Genitourinary: Negative for difficulty urinating.   Musculoskeletal: Negative for neck pain and neck stiffness.   Skin: Negative for  wound.   Neurological: Positive for headaches. Negative for weakness.     Objective:     Vital Signs (Most Recent):  Temp: 97.8 °F (36.6 °C) (05/21/19 1116)  Pulse: 63 (05/21/19 1407)  Resp: (!) 22 (05/21/19 1407)  BP: (!) 145/66 (05/21/19 1401)  SpO2: 96 % (05/21/19 1407) Vital Signs (24h Range):  Temp:  [97.8 °F (36.6 °C)] 97.8 °F (36.6 °C)  Pulse:  [63-66] 63  Resp:  [16-24] 22  SpO2:  [94 %-98 %] 96 %  BP: (145-224)/(66-91) 145/66        There is no height or weight on file to calculate BMI.    Physical Exam   Constitutional: She is oriented to person, place, and time. She appears well-developed and well-nourished. No distress.   HENT:   Mouth/Throat: Oropharynx is clear and moist.   Hematoma over left forehead and orbital area    Eyes:   Left eyelid swollen,unable to open    Neck: Neck supple.   Cardiovascular: Normal rate, regular rhythm and intact distal pulses.   Murmur heard.  Harsh 5/6 systolic murmur    Pulmonary/Chest: Effort normal and breath sounds normal. No respiratory distress. She has no wheezes.   Abdominal: Soft. Bowel sounds are normal. She exhibits no distension. There is tenderness (LLQ).   Musculoskeletal: Normal range of motion. She exhibits no edema.   Neurological: She is alert and oriented to person, place, and time.   Skin: Skin is warm. She is not diaphoretic.       Significant Labs:   Recent Lab Results       05/21/19  1308   05/21/19  1201        Unit Blood Type Code 6200[P]       Unit Expiration [id number][P]       Unit Blood Type A POS[P]       aPTT   23.5  Comment:  aPTT therapeutic range = 39-69 seconds     Baso #   0.05     Basophil%   0.8     CODING SYSTEM LWDI884[P]       Differential Method   Automated     DISPENSE STATUS ISSUED[P]       Eos #   0.1     Eosinophil%   2.0     Gran # (ANC)   5.3     Gran%   79.8     Hematocrit   36.5     Hemoglobin   11.0     Immature Grans (Abs)   0.04  Comment:  Mild elevation in immature granulocytes is non specific and   can be seen in a  variety of conditions including stress response,   acute inflammation, trauma and pregnancy. Correlation with other   laboratory and clinical findings is essential.       Immature Granulocytes   0.6     Coumadin Monitoring INR   1.1  Comment:  Coumadin Therapy:  2.0 - 3.0 for INR for all indicators except mechanical heart valves  and antiphospholipid syndromes which should use 2.5 - 3.5.       Lymph #   0.6     Lymph%   9.1     MCH   28.5     MCHC   30.1     MCV   95     Mono #   0.5     Mono%   7.7     MPV   SEE COMMENT  Comment:  Result not available.     nRBC   0     Platelets   83     Product Code [id number][P]       Protime   11.3     RBC   3.86     RDW   17.3     UNIT NUMBER [id number][P]       WBC   6.62         All pertinent labs within the past 24 hours have been reviewed.    Significant Imaging: I have reviewed and interpreted all pertinent imaging results/findings within the past 24 hours.

## 2019-05-22 ENCOUNTER — ANESTHESIA (OUTPATIENT)
Dept: NEUROLOGY | Facility: HOSPITAL | Age: 79
End: 2019-05-22

## 2019-05-22 LAB
ALBUMIN SERPL BCP-MCNC: 3.2 G/DL (ref 3.5–5.2)
ALP SERPL-CCNC: 78 U/L (ref 55–135)
ALT SERPL W/O P-5'-P-CCNC: 8 U/L (ref 10–44)
ANION GAP SERPL CALC-SCNC: 16 MMOL/L (ref 8–16)
ASCENDING AORTA: 2.66 CM
AST SERPL-CCNC: 23 U/L (ref 10–40)
AV INDEX (PROSTH): 0.23
AV MEAN GRADIENT: 55.02 MMHG
AV PEAK GRADIENT: 83.91 MMHG
AV VALVE AREA: 0.69 CM2
AV VELOCITY RATIO: 0.22
BASOPHILS # BLD AUTO: 0.02 K/UL (ref 0–0.2)
BASOPHILS NFR BLD: 0.4 % (ref 0–1.9)
BILIRUB SERPL-MCNC: 0.5 MG/DL (ref 0.1–1)
BSA FOR ECHO PROCEDURE: 1.54 M2
BUN SERPL-MCNC: 83 MG/DL (ref 8–23)
CALCIUM SERPL-MCNC: 9.4 MG/DL (ref 8.7–10.5)
CHLORIDE SERPL-SCNC: 105 MMOL/L (ref 95–110)
CO2 SERPL-SCNC: 19 MMOL/L (ref 23–29)
CREAT SERPL-MCNC: 8.7 MG/DL (ref 0.5–1.4)
CV ECHO LV RWT: 0.51 CM
DIFFERENTIAL METHOD: ABNORMAL
DOP CALC AO PEAK VEL: 4.58 M/S
DOP CALC AO VTI: 129.87 CM
DOP CALC LVOT AREA: 3.05 CM2
DOP CALC LVOT DIAMETER: 1.97 CM
DOP CALC LVOT PEAK VEL: 0.99 M/S
DOP CALC LVOT STROKE VOLUME: 89.35 CM3
DOP CALCLVOT PEAK VEL VTI: 29.33 CM
E WAVE DECELERATION TIME: 166.17 MSEC
E/A RATIO: 1.22
E/E' RATIO: 47.25
ECHO LV POSTERIOR WALL: 1.11 CM (ref 0.6–1.1)
EOSINOPHIL # BLD AUTO: 0 K/UL (ref 0–0.5)
EOSINOPHIL NFR BLD: 0.8 % (ref 0–8)
ERYTHROCYTE [DISTWIDTH] IN BLOOD BY AUTOMATED COUNT: 17.2 % (ref 11.5–14.5)
EST. GFR  (AFRICAN AMERICAN): 4.6 ML/MIN/1.73 M^2
EST. GFR  (NON AFRICAN AMERICAN): 4 ML/MIN/1.73 M^2
FRACTIONAL SHORTENING: 25 % (ref 28–44)
GLUCOSE SERPL-MCNC: 85 MG/DL (ref 70–110)
HCT VFR BLD AUTO: 30.9 % (ref 37–48.5)
HGB BLD-MCNC: 9.3 G/DL (ref 12–16)
IMM GRANULOCYTES # BLD AUTO: 0.01 K/UL (ref 0–0.04)
IMM GRANULOCYTES NFR BLD AUTO: 0.2 % (ref 0–0.5)
INTERVENTRICULAR SEPTUM: 0.87 CM (ref 0.6–1.1)
LA MAJOR: 5.56 CM
LA MINOR: 5.43 CM
LA WIDTH: 5.05 CM
LEFT ATRIUM SIZE: 4.48 CM
LEFT ATRIUM VOLUME INDEX: 68.6 ML/M2
LEFT ATRIUM VOLUME: 105.66 CM3
LEFT INTERNAL DIMENSION IN SYSTOLE: 3.29 CM (ref 2.1–4)
LEFT VENTRICLE DIASTOLIC VOLUME INDEX: 56.24 ML/M2
LEFT VENTRICLE DIASTOLIC VOLUME: 86.59 ML
LEFT VENTRICLE MASS INDEX: 94 G/M2
LEFT VENTRICLE SYSTOLIC VOLUME INDEX: 28.5 ML/M2
LEFT VENTRICLE SYSTOLIC VOLUME: 43.96 ML
LEFT VENTRICULAR INTERNAL DIMENSION IN DIASTOLE: 4.38 CM (ref 3.5–6)
LEFT VENTRICULAR MASS: 144.73 G
LV LATERAL E/E' RATIO: 37.8
LV SEPTAL E/E' RATIO: 63
LYMPHOCYTES # BLD AUTO: 0.4 K/UL (ref 1–4.8)
LYMPHOCYTES NFR BLD: 7 % (ref 18–48)
MAGNESIUM SERPL-MCNC: 2.8 MG/DL (ref 1.6–2.6)
MCH RBC QN AUTO: 28.6 PG (ref 27–31)
MCHC RBC AUTO-ENTMCNC: 30.1 G/DL (ref 32–36)
MCV RBC AUTO: 95 FL (ref 82–98)
MONOCYTES # BLD AUTO: 0.6 K/UL (ref 0.3–1)
MONOCYTES NFR BLD: 10.7 % (ref 4–15)
MV PEAK A VEL: 1.55 M/S
MV PEAK E VEL: 1.89 M/S
NEUTROPHILS # BLD AUTO: 4.3 K/UL (ref 1.8–7.7)
NEUTROPHILS NFR BLD: 80.9 % (ref 38–73)
NRBC BLD-RTO: 0 /100 WBC
PHOSPHATE SERPL-MCNC: 7.2 MG/DL (ref 2.7–4.5)
PISA TR MAX VEL: 3.94 M/S
PLATELET # BLD AUTO: 108 K/UL (ref 150–350)
PMV BLD AUTO: 12.8 FL (ref 9.2–12.9)
POCT GLUCOSE: 103 MG/DL (ref 70–110)
POCT GLUCOSE: 108 MG/DL (ref 70–110)
POCT GLUCOSE: 112 MG/DL (ref 70–110)
POCT GLUCOSE: 99 MG/DL (ref 70–110)
POTASSIUM SERPL-SCNC: 5.3 MMOL/L (ref 3.5–5.1)
PROT SERPL-MCNC: 6.6 G/DL (ref 6–8.4)
RA MAJOR: 4.75 CM
RA PRESSURE: 15 MMHG
RA WIDTH: 3.71 CM
RBC # BLD AUTO: 3.25 M/UL (ref 4–5.4)
RIGHT VENTRICULAR END-DIASTOLIC DIMENSION: 5.42 CM
SINUS: 2.61 CM
SODIUM SERPL-SCNC: 140 MMOL/L (ref 136–145)
STJ: 2.27 CM
TDI LATERAL: 0.05
TDI SEPTAL: 0.03
TDI: 0.04
TR MAX PG: 62.09 MMHG
TRICUSPID ANNULAR PLANE SYSTOLIC EXCURSION: 2.25 CM
TV REST PULMONARY ARTERY PRESSURE: 77 MMHG
WBC # BLD AUTO: 5.32 K/UL (ref 3.9–12.7)

## 2019-05-22 PROCEDURE — 99291 CRITICAL CARE FIRST HOUR: CPT | Mod: GC,,, | Performed by: PSYCHIATRY & NEUROLOGY

## 2019-05-22 PROCEDURE — 25000242 PHARM REV CODE 250 ALT 637 W/ HCPCS: Performed by: PHYSICIAN ASSISTANT

## 2019-05-22 PROCEDURE — 99232 SBSQ HOSP IP/OBS MODERATE 35: CPT | Mod: GC,,, | Performed by: NEUROLOGICAL SURGERY

## 2019-05-22 PROCEDURE — 27000221 HC OXYGEN, UP TO 24 HOURS

## 2019-05-22 PROCEDURE — 63600175 PHARM REV CODE 636 W HCPCS: Performed by: NURSE PRACTITIONER

## 2019-05-22 PROCEDURE — 25000003 PHARM REV CODE 250: Performed by: PHYSICIAN ASSISTANT

## 2019-05-22 PROCEDURE — 85025 COMPLETE CBC W/AUTO DIFF WBC: CPT

## 2019-05-22 PROCEDURE — 92610 EVALUATE SWALLOWING FUNCTION: CPT

## 2019-05-22 PROCEDURE — 80053 COMPREHEN METABOLIC PANEL: CPT

## 2019-05-22 PROCEDURE — 80100014 HC HEMODIALYSIS 1:1

## 2019-05-22 PROCEDURE — 84100 ASSAY OF PHOSPHORUS: CPT

## 2019-05-22 PROCEDURE — 99233 SBSQ HOSP IP/OBS HIGH 50: CPT | Mod: GC,,, | Performed by: INTERNAL MEDICINE

## 2019-05-22 PROCEDURE — 94761 N-INVAS EAR/PLS OXIMETRY MLT: CPT

## 2019-05-22 PROCEDURE — 20000000 HC ICU ROOM

## 2019-05-22 PROCEDURE — 83735 ASSAY OF MAGNESIUM: CPT

## 2019-05-22 PROCEDURE — 99232 PR SUBSEQUENT HOSPITAL CARE,LEVL II: ICD-10-PCS | Mod: GC,,, | Performed by: NEUROLOGICAL SURGERY

## 2019-05-22 PROCEDURE — 99291 PR CRITICAL CARE, E/M 30-74 MINUTES: ICD-10-PCS | Mod: GC,,, | Performed by: PSYCHIATRY & NEUROLOGY

## 2019-05-22 PROCEDURE — 92523 SPEECH SOUND LANG COMPREHEN: CPT

## 2019-05-22 PROCEDURE — 99233 PR SUBSEQUENT HOSPITAL CARE,LEVL III: ICD-10-PCS | Mod: GC,,, | Performed by: INTERNAL MEDICINE

## 2019-05-22 PROCEDURE — 94640 AIRWAY INHALATION TREATMENT: CPT

## 2019-05-22 RX ORDER — SODIUM CHLORIDE 9 MG/ML
INJECTION, SOLUTION INTRAVENOUS
Status: DISCONTINUED | OUTPATIENT
Start: 2019-05-22 | End: 2019-05-23

## 2019-05-22 RX ORDER — ACETAMINOPHEN 325 MG/1
650 TABLET ORAL EVERY 6 HOURS PRN
Status: DISCONTINUED | OUTPATIENT
Start: 2019-05-22 | End: 2019-05-28 | Stop reason: HOSPADM

## 2019-05-22 RX ORDER — HYDRALAZINE HYDROCHLORIDE 20 MG/ML
10 INJECTION INTRAMUSCULAR; INTRAVENOUS EVERY 6 HOURS PRN
Status: DISCONTINUED | OUTPATIENT
Start: 2019-05-22 | End: 2019-05-23

## 2019-05-22 RX ORDER — HYDRALAZINE HYDROCHLORIDE 20 MG/ML
10 INJECTION INTRAMUSCULAR; INTRAVENOUS EVERY 6 HOURS PRN
Status: DISCONTINUED | OUTPATIENT
Start: 2019-05-22 | End: 2019-05-22

## 2019-05-22 RX ORDER — ONDANSETRON 2 MG/ML
INJECTION INTRAMUSCULAR; INTRAVENOUS
Status: DISPENSED
Start: 2019-05-22 | End: 2019-05-22

## 2019-05-22 RX ORDER — ONDANSETRON 2 MG/ML
4 INJECTION INTRAMUSCULAR; INTRAVENOUS EVERY 6 HOURS PRN
Status: DISCONTINUED | OUTPATIENT
Start: 2019-05-22 | End: 2019-05-28 | Stop reason: HOSPADM

## 2019-05-22 RX ADMIN — HYDRALAZINE HYDROCHLORIDE 10 MG: 20 INJECTION INTRAMUSCULAR; INTRAVENOUS at 06:05

## 2019-05-22 RX ADMIN — ONDANSETRON 4 MG: 2 INJECTION INTRAMUSCULAR; INTRAVENOUS at 06:05

## 2019-05-22 RX ADMIN — LEVETIRACETAM 250 MG: 250 TABLET ORAL at 09:05

## 2019-05-22 RX ADMIN — HYDRALAZINE HYDROCHLORIDE 10 MG: 20 INJECTION INTRAMUSCULAR; INTRAVENOUS at 12:05

## 2019-05-22 RX ADMIN — IPRATROPIUM BROMIDE AND ALBUTEROL SULFATE 3 ML: .5; 3 SOLUTION RESPIRATORY (INHALATION) at 08:05

## 2019-05-22 RX ADMIN — HYDRALAZINE HYDROCHLORIDE 10 MG: 20 INJECTION INTRAMUSCULAR; INTRAVENOUS at 07:05

## 2019-05-22 RX ADMIN — IPRATROPIUM BROMIDE AND ALBUTEROL SULFATE 3 ML: .5; 3 SOLUTION RESPIRATORY (INHALATION) at 07:05

## 2019-05-22 RX ADMIN — CARVEDILOL 12.5 MG: 12.5 TABLET, FILM COATED ORAL at 09:05

## 2019-05-22 RX ADMIN — IPRATROPIUM BROMIDE AND ALBUTEROL SULFATE 3 ML: .5; 3 SOLUTION RESPIRATORY (INHALATION) at 12:05

## 2019-05-22 NOTE — PROGRESS NOTES
[facility]  Nephrology  Progress Note    Patient Name: Tea Georges  MRN: [id number]  Admission Date: 5/21/2019  Hospital Length of Stay: 1 days  Attending Provider: Marty Salgado MD   Primary Care Physician: Parth Posadas Ii, MD  Principal Problem:Subdural hematoma    Subjective:     HPI: 78 y/o Black or -American woman ESRD on iHD (MWF), HTN, diastolic HF, right MCA CVA s/p thrombectomy (2016) admitted to [facility] diagnosed with Rt subdural hematoma.  She initially presented with hematoma of superior left eye, headache, nausea/vomiting, s/p unwitnessed fall 521/2019 in AM, and does not recall how she fell.  Patient accompanied by granddaughter who referred found her in the floor.  Denies any dizziness, light-headedness, numbness, weakness, chest pain, SOB, or any other associated symptoms.    Nephrology consulted for evaluation of management of ESRD and HD treatments.    Nephrology Hx:  She dialyzes at [facility] under the care of Dr. Coleman on a MWF schedule for 4 hrs   EDW 50 kg  Access: FLORENCE AVF ++ bruit/thrill  She maintains residual renal function and dialyzes for a 4 hour duration.  She reports now problems with her dialysis, no recent episodes of cramping with ultrafiltration.  Last dialysis prior to hospitalization:    Interval History: neurosurgery tentatively evaluated patient and reportedly not taking to IR for evacuation, was to have received RRT at bedside last night for 2hrs, but did not get due to no access    Review of patient's allergies indicates:  No Known Allergies  Current Facility-Administered Medications   Medication Frequency    0.9%  NaCl infusion (CRRT USE ONLY) Continuous    albuterol-ipratropium 2.5 mg-0.5 mg/3 mL nebulizer solution 3 mL Q6H WAKE    carvedilol tablet 12.5 mg BID    dextrose 50% injection 12.5 g PRN    glucagon (human recombinant) injection 1 mg PRN    hydrALAZINE injection 10 mg Q6H PRN    insulin aspart U-100 pen 1-10 Units Q6H PRN     levETIRAcetam tablet 250 mg BID    ondansetron 4 mg/2 mL injection     ondansetron injection 4 mg Q6H PRN    sodium chloride 0.9% flush 10 mL PRN       Objective:     Vital Signs (Most Recent):  Temp: 97.3 °F (36.3 °C) (05/22/19 0705)  Pulse: 60 (05/22/19 1000)  Resp: 15 (05/22/19 1000)  BP: (!) 155/69 (05/22/19 1000)  SpO2: 98 % (05/22/19 1000)  O2 Device (Oxygen Therapy): nasal cannula (05/22/19 1000) Vital Signs (24h Range):  Temp:  [97.3 °F (36.3 °C)-98.3 °F (36.8 °C)] 97.3 °F (36.3 °C)  Pulse:  [56-67] 60  Resp:  [12-26] 15  SpO2:  [92 %-100 %] 98 %  BP: (137-224)/(65-91) 155/69     Weight: 53.1 kg (117 lb) (05/22/19 1000)  Body mass index is 20.73 kg/m².  Body surface area is 1.54 meters squared.    I/O last 3 completed shifts:  In: 946.6 [I.V.:75.8; Blood:870.8]  Out: -     Physical Exam   HENT:   Head: Atraumatic.   Neck: No JVD present.   Cardiovascular: Normal rate and regular rhythm.   Pulmonary/Chest: Effort normal. She has rales.   Abdominal: Soft. She exhibits no distension.   Musculoskeletal: She exhibits no edema or tenderness.   Neurological: She is alert.   Skin: Skin is warm.       Significant Labs:  CBC:   Recent Labs   Lab 05/22/19 0159   WBC 5.32   RBC 3.25*   HGB 9.3*   HCT 30.9*   *   MCV 95   MCH 28.6   MCHC 30.1*     CMP:   Recent Labs   Lab 05/22/19 0159   GLU 85   CALCIUM 9.4   ALBUMIN 3.2*   PROT 6.6      K 5.3*   CO2 19*      BUN 83*   CREATININE 8.7*   ALKPHOS 78   ALT 8*   AST 23   BILITOT 0.5     All labs within the past 24 hours have been reviewed.       Assessment/Plan:     * Subdural hematoma  - Managed by neurocritical care team, no increase in size per repeat CT this morning, reportedly NS not taking patient to OR, concern for shifts and possible increase in bleed with regular HD/SLED, discussed with neuro-critical care and CVVHD would be the desired RRT modality for this ESRD patient    ESRD (end stage renal disease)  Nephrology Hx:  She dialyzes at  [facility] under the care of Dr. Coleman on a MWF schedule for 4 hrs   EDW 50 kg  Access: FLORENCE AVF ++ bruit/thrill  She maintains residual renal function and dialyzes for a 4 hour duration.  She reports now problems with her dialysis, no recent episodes of cramping with ultrafiltration.  Last dialysis prior to hospitalization: 5/17/2019      Plan:  - will start CVVHD as soon as trialysis access is placed, discussed with neuro-critical care and concern for increased bleeding / shifts with regular HD / SLED        Addendum:  Discussed with neuro-critical care again and concerns about inctracranial shifts are less and appears regular HD is acceptable --> so will order brief HD session with low flows    Thank you for your consult. I will follow-up with patient. Please contact us if you have any additional questions.    Scar Phillip MD  Nephrology  [facility]    ATTENDING PHYSICIAN ATTESTATION  I have personally interviewed and examined the patient. I thoroughly reviewed the demographic, clinical, laboratorial and imaging information available in medical records. I agree with the assessment and recommendations provided by the subspecialty resident. Dr. Phillip was under my supervision.

## 2019-05-22 NOTE — PLAN OF CARE
Problem: SLP Goal  Goal: SLP Goal  Speech Language Pathology Goals  Goals expected to be met by 5/29:  1. Patient will tolerate a regular solid diet and thin liquids with no overt signs of airway compromise.   2. Patient will complete mental manipulation/organization tasks with 80% acc given min A.   3. Patient will complete problem solving tasks with 80% acc indep.   4. Patient will provide +10 items within concrete category indep within 1 timed minute.   5. Pt will participate in further assessment involving reading, writing, and visual spatial when appropriate.       Bedside Swallow Evaluation along with full Speech, Language, and Cognitive Evaluation completed with implemented plan of care.    Emily P. Abadie M.S., CCC-SLP  Speech Language Pathologist  [phone]  05/22/2019

## 2019-05-22 NOTE — PROGRESS NOTES
Patient arrived to [facility]     Type of stroke/diagnosis:  bilateral SDH s/p unwitnessed fall     TPA start and end time NA    Thrombectomy start and end time NA    Current symptoms: HA, swollen L eye, AOX4    Skin assessment done: yes  Wounds noted: swollen L eye, AV fistula site L arm     NCC notified:  Angela Trinidad NP

## 2019-05-22 NOTE — PROGRESS NOTES
Hemodialysis set up bedside in room 9086. Dialysis initiated via left arm AVG with 15g needles  x 2 without difficulty. Orders and machine settings verified. Tx started.      Hemodialysis 1:1   Bedside

## 2019-05-22 NOTE — PLAN OF CARE
Problem: Adult Inpatient Plan of Care  Goal: Plan of Care Review  Outcome: Ongoing (interventions implemented as appropriate)  POC reviewed with pt and pt's granddaughter at 1100. Both pt and pt's granddaughter verbalized understanding. Questions and concerns addressed. No acute events today. Plan for HD this evening.  L eye swollen shut, blind in R eye.  Cardiac diet.  Pt progressing toward goals. Will continue to monitor. See flowsheets for full assessment and VS info.

## 2019-05-22 NOTE — PROGRESS NOTES
Wound Care Documentation:    Wound care consulted:  yes    LDA added: No    Type of Wound: periorbital swelling, possible hematoma    Location of Wound: left and eye

## 2019-05-22 NOTE — PLAN OF CARE
05/22/19 1430   Discharge Assessment   Assessment Type Discharge Planning Assessment   Confirmed/corrected address and phone number on facesheet? Yes   Assessment information obtained from? Caregiver  (granddaughter, Alesha)   Expected Length of Stay (days) 4   Communicated expected length of stay with patient/caregiver no   Prior to hospitilization cognitive status: Alert/Oriented   Prior to hospitalization functional status: Assistive Equipment;Needs Assistance   Current cognitive status: Unable to Assess   Current Functional Status: Needs Assistance   Lives With child(li), adult;grandchild(li)   Able to Return to Prior Arrangements yes   Is patient able to care for self after discharge? Unable to determine at this time (comments)   Who are your caregiver(s) and their phone number(s)? Cruz Azar (daughter) [phone]   Patient's perception of discharge disposition other (comments)  (morris)   Readmission Within the Last 30 Days no previous admission in last 30 days   Patient currently being followed by outpatient case management? No   Patient currently receives any other outside agency services? Yes   Name and contact number of agency or person providing outside services Per Granddaughter, patient has home health, Nurse's Registry    Is it the patient/care giver preference to resume care with the current outside agency? Other (comment)  (morris)   Equipment Currently Used at Home cane, straight;walker, rolling;wheelchair;oxygen   Do you have any problems affording any of your prescribed medications? No   Is the patient taking medications as prescribed?   (morris)   Does the patient have transportation home? Yes   Transportation Anticipated family or friend will provide   Does the patient receive services at the [facility]madin Clinic? No   Discharge Plan A Skilled Nursing Facility   Discharge Plan B Home with family;Home Health   DME Needed Upon Discharge  other (see comments)  (tbd)   Patient/Family in Agreement with  Plan unable to assess         Discharge/ My Health Packet Folder Given to patient/family:      yes      PCP:  Parth Posadas Ii, MD        [facility]    [facility] [place] [place] [facility] [address]  [address]  [facility] [address]  Phone: [phone] Fax: [phone]    [id number] [place], [place] - [address]  [address]  [place] [address]  Phone: [phone] Fax: [phone]        Emergency Contacts:  Extended Emergency Contact Information  Primary Emergency Contact: Cruz Azar   [place]  Mobile Phone: [phone]  Relation: Daughter  Secondary Emergency Contact: Alesha Traore  Address: [address] [place] [address]  Mobile Phone: [phone]  Relation: Grandchild      Insurance:  Payor: MEDICARE / Plan: MEDICARE PART A & B / Product Type: Government /     Jhoana Pitts RN, CCRN-K, [facility]  Neuro-Critical Care   X [phone]

## 2019-05-22 NOTE — HOSPITAL COURSE
05/22: surgery on hold, will require dialysis, less chance for anticoagulant exposure if undergoes standard HD, follow exam closely pre and post dialysis  05/23: tolerated HD yesterday, I see no change in SDH thickness or midline shift, remains on small amount of cardene  5/24: decreasing SBP parameters from < 180 to < 160; increasing scheduled hydralazine. Neurosurgery states okay to step down to their service. Patient without complaints this issue except mild pain at right ankle (IV site); she states that she prefers not to use pain medications.

## 2019-05-22 NOTE — PROGRESS NOTES
[facility]  Neurosurgery  Progress Note    Subjective:     History of Present Illness: Ms. Georges is a 80 YO female with a history of ESRD, DM, aortic stenosis, CVA, diastolic HF who [place] to [facility] today after an unwitnessed fall.  The patient was found sitting up on the floor with left periorbital edema by her granddaughter.  She does not remember falling.  Unknown loss of consciousness.  The patient currently reports a headache in the left frontal area.  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  She denies dizziness, focal weakness, confusion.  She admits to intermittent nausea but no vomiting.  There are no other associated signs or symptoms.  The patient has a history of Plavix usage, but is unable to give reason for Plavix.  She has a history of CVA with thrombectomy in 2016.  Blood pressure elevated to greater than 200 SBP on admission to ED.    Post-Op Info:  Procedure(s) (LRB):  CRANIOTOMY, FOR SUBDURAL HEMATOMA EVACUATION (Right)         Medications Prior to Admission   Medication Sig Dispense Refill Last Dose    albuterol (PROVENTIL/VENTOLIN HFA) 90 mcg/actuation inhaler Inhale 1-2 puffs into the lungs every 6 (six) hours as needed for Wheezing. 1 Inhaler 0     amLODIPine (NORVASC) 10 MG tablet Take 1 tablet (10 mg total) by mouth once daily. 90 tablet 3 Taking    hydrALAZINE (APRESOLINE) 25 MG tablet Take 2 tablets (50 mg total) by mouth every 8 (eight) hours. 180 tablet 11 Taking    losartan (COZAAR) 50 MG tablet Take 1 tablet (50 mg total) by mouth once daily. 90 tablet 3 Taking    RENVELA 800 mg Tab Take 1 tablet (800 mg total) by mouth 3 (three) times daily with meals. 270 tablet 3 Taking    aspirin 81 MG Chew Take 1 tablet (81 mg total) by mouth once daily. 30 tablet 1 Taking    carvedilol (COREG) 12.5 MG tablet Take 1 tablet (12.5 mg total) by mouth 2 (two) times daily. 60 tablet 11 More  than a month at Unknown time    ergocalciferol (ERGOCALCIFEROL) 50,000 unit Cap Take 1 capsule (50,000 Units total) by mouth every 7 days. 12 capsule 3 Taking    fluticasone-vilanterol (BREO ELLIPTA) 200-25 mcg/dose DsDv diskus inhaler Inhale 1 puff into the lungs once daily. 90 each 3 More than a month at Unknown time    loperamide (IMODIUM) 2 mg capsule Take one capsule at onset of diarrhea.  No more than three capsules daily 30 capsule 3 Unknown at Unknown time       Review of patient's allergies indicates:  No Known Allergies    Past Medical History:   Diagnosis Date    Anemia in ESRD (end-stage renal disease) 5/29/2016    Anticoagulant long-term use     Aortic atherosclerosis 11/22/2016    Aortic stenosis, moderate 2/18/2016    Asthma in adult without complication 1/8/2016    Bilateral low back pain without sciatica 11/17/2015    Blindness of right eye 11/12/2016    CAD (coronary artery disease) 12/12/2016    Cataract     Central retinal vein occlusion, right eye 6/3/2014    CHF (congestive heart failure)     Chronic diastolic heart failure 1/8/2016    Chronic respiratory failure with hypoxia 5/29/2016    COPD (chronic obstructive pulmonary disease) 1/15/2017    Dependence on hemodialysis     Mon-Wed-Fri    Diverticulosis     Embolic stroke involving right middle cerebral artery     Enlarged LA (left atrium) 10/7/2016    Epiretinal membrane 7/17/2012    ESRD (end stage renal disease)     Essential hypertension 1/8/2016    History of GI diverticular bleed     5/22/16    Left flaccid hemiparesis 10/1/2016    Peripheral vascular disease, unspecified 11/22/2016    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 11/13/2016    Type 2 diabetes mellitus with kidney complication, without long-term current use of insulin 5/1/2018    Type 2 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, without long-term current use of insulin 3/26/2013     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, without long-term current use of insulin 3/26/2013    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     Past Surgical History:   Procedure Laterality Date    BREAST SURGERY      tumor removal x 2    CATARACT EXTRACTION      CHOLECYSTECTOMY      COLONOSCOPY N/A 5/30/2016    Performed by Sam Davis MD at [facility] ENDO (2ND FLR)    COLONOSCOPY N/A 5/23/2016    Performed by WILLIAM Colvin MD at [facility] ENDO (2ND FLR)    ESOPHAGOGASTRODUODENOSCOPY (EGD) N/A 5/30/2016    Performed by Sam Davis MD at [facility] ENDO (2ND FLR)    ESOPHAGOGASTRODUODENOSCOPY (EGD) N/A 5/27/2016    Performed by Cesario Rubio MD at [facility] ENDO (2ND FLR)    EXCISION, ANEURYSM Left 11/29/2018    Performed by NADINE Blum III, MD at [facility] OR 2ND FLR    Fistulogram Left 11/28/2018    Performed by NADINE Blum III, MD at [facility] CATH LAB    INSERTION, CATHETER, VASCULAR, DUAL LUMEN Right 11/29/2018    Performed by NADINE Blum III, MD at [facility] OR 2ND FLR    PTA, Fistula  11/28/2018    Performed by NADINE Blum III, MD at [facility] CATH LAB    REVISION, AV FISTULA, Left 11/29/2018    Performed by NADINE Blum III, MD at [facility] OR 2ND FLR    UPPER GASTROINTESTINAL ENDOSCOPY       Family History     Problem Relation (Age of Onset)    Cancer Sister    Cataracts Mother    Esophageal cancer Sister    Glaucoma Brother, Maternal Aunt    Heart disease Brother    Heart failure Sister    Hypertension Mother, Sister, Brother, Father    No Known Problems Maternal Uncle, Paternal Aunt, Paternal Uncle, Maternal Grandmother, Maternal Grandfather, Paternal Grandmother, Paternal Grandfather    Stroke Mother        Tobacco Use    Smoking status: Never Smoker    Smokeless tobacco: Never Used   Substance and Sexual Activity    Alcohol use: No     Comment: Reports occasional 1-2 drinks     Drug use: No    Sexual activity: Never     Review of Systems    Constitutional: Negative for activity change and fatigue.   HENT: Negative for ear discharge, nosebleeds, rhinorrhea and sore throat.    Eyes: Positive for visual disturbance.   Respiratory: Negative for cough and shortness of breath.    Cardiovascular: Negative for chest pain.   Gastrointestinal: Positive for nausea and vomiting.   Musculoskeletal: Negative for neck pain and neck stiffness.   Skin: Negative for color change, rash and wound.   Neurological: Positive for headaches. Negative for dizziness, seizures, facial asymmetry, weakness and numbness.   Hematological: Bruises/bleeds easily.   Psychiatric/Behavioral: Negative for confusion.     Objective:     Weight: 53.1 kg (117 lb)  Body mass index is 20.73 kg/m².  Vital Signs (Most Recent):  Temp: 97.3 °F (36.3 °C) (05/22/19 1500)  Pulse: (!) 57 (05/22/19 1600)  Resp: 20 (05/22/19 1600)  BP: (!) 180/77 (05/22/19 1600)  SpO2: 99 % (05/22/19 1600) Vital Signs (24h Range):  Temp:  [97.3 °F (36.3 °C)-98.3 °F (36.8 °C)] 97.3 °F (36.3 °C)  Pulse:  [54-67] 57  Resp:  [12-26] 20  SpO2:  [92 %-100 %] 99 %  BP: (145-194)/(66-89) 180/77     Date 05/22/19 0700 - 05/23/19 0659   Shift 2646-0129 3116-2125 3381-4057 24 Hour Total   INTAKE   P.O. 100   100   Shift Total(mL/kg) 100(1.9)   100(1.9)   OUTPUT   Shift Total(mL/kg)       Weight (kg) 53.1 53.1 53.1 53.1                        Hemodialysis Catheter 11/29/18 1650 right subclavian (Active)            Hemodialysis AV Fistula 05/23/16 0700 Left forearm (Active)            Hemodialysis AV Fistula Left upper arm (Active)       Neurosurgery Physical Exam       General: well developed, well nourished, no distress.   Head: normocephalic.Significant periorbital ecchymosis and edema appreciated on left.    Neurologic: Alert and oriented. Thought content appropriate.  GCS: Motor: 6/Verbal: 5/Eyes: 3 GCS Total: 14  Mental Status:  Alert, Oriented x 4  Language: No aphasia  Speech: No dysarthria  Cranial nerves: tongue midline,  CN II-XII grossly intact although somewhat limited secondary to facial swelling.   Eyes: Unable to assess secondary to edema  Pulmonary: normal respirations, no signs of respiratory distress  Abdomen: soft, non-distended, not tender to palpation  Skin: Skin is warm, dry and intact.  Sensory: intact to light touch throughout  Motor Strength:Moves all extremities spontaneously with good tone. No abnormal movements seen.     Strength  Deltoids Triceps Biceps Wrist Extension Wrist Flexion Hand    Upper: R 5/5 5/5 5/5 5/5 5/5 5/5    L 4+/5 4+/5 4+/5 5/5 5/5 4+/5     Iliopsoas Quadriceps Knee  Flexion Tibialis  anterior Gastro- cnemius EHL   Lower: R 5/5 5/5 5/5 5/5 5/5 5/5    L 4+/5 4+/5 4+/5 4+/5 4+/5 4+/5     Reflexes:   DTR: 2+ symmetrically throughout.  Clonus: Negative.     Cerebellar:   Pronator drift: absent bilaterally  Gait deferred     Cervical:   ROM: Full with flexion, extension, lateral rotation and ear-to-shoulder bend.   Midline TTP: Negative.    Significant Labs:  Recent Labs   Lab 05/21/19  1354 05/22/19  0159   * 85    140   K 5.0 5.3*    105   CO2 19* 19*   BUN 75* 83*   CREATININE 7.8* 8.7*   CALCIUM 9.8 9.4   MG 2.7* 2.8*     Recent Labs   Lab 05/21/19  1201 05/22/19  0159   WBC 6.62 5.32   HGB 11.0* 9.3*   HCT 36.5* 30.9*   PLT 83* 108*     Recent Labs   Lab 05/21/19  1201   INR 1.1   APTT 23.5     Microbiology Results (last 7 days)     Procedure Component Value Units Date/Time    Urine culture [[id number]] Collected:  05/21/19 1349    Order Status:  Completed Specimen:  Urine Updated:  05/22/19 1326     Urine Culture, Routine --     GRAM NEGATIVE NELIDA  >100,000 cfu/ml  Identification and susceptibility pending      Narrative:       Preferred Collection Type->Urine, Clean Catch  gray and yellow        Recent Lab Results       05/22/19  1230   05/22/19  1013   05/22/19  0627   05/22/19  0159   05/22/19  0004        Albumin       3.2       Alkaline Phosphatase       78       ALT        8       Anion Gap       16       Ascending aorta   2.66           Ao peak olga   4.58           Ao VTI   129.87           AST       23  Comment:  *Result may be interfered by visible hemolysis       AV valve area   0.69           AV mean gradient   55.02           AV peak gradient   83.91           AV Velocity Ratio   0.22           Baso #       0.02       Basophil%       0.4       BILIRUBIN TOTAL       0.5  Comment:  For infants and newborns, interpretation of results should be based  on gestational age, weight and in agreement with clinical  observations.  Premature Infant recommended reference ranges:  Up to 24 hours.............<8.0 mg/dL  Up to 48 hours............<12.0 mg/dL  3-5 days..................<15.0 mg/dL  6-29 days.................<15.0 mg/dL         BSA   1.54           BUN, Bld       83       Calcium       9.4       Chloride       105       CO2       19       Creatinine       8.7       Left Ventricle Relative Wall Thickness   0.51           Differential Method       Automated       AV index (prosthetic)   0.23           E/A ratio   1.22           E/E' ratio   47.25           eGFR if        4.6       eGFR if non        4.0  Comment:  Calculation used to obtain the estimated glomerular filtration  rate (eGFR) is the CKD-EPI equation.          Eos #       0.0       Eosinophil%       0.8       E wave decelartion time   166.17           FS   25           Glucose       85       Gran # (ANC)       4.3       Gran%       80.9       Hematocrit       30.9       Hemoglobin       9.3       Immature Grans (Abs)       0.01  Comment:  Mild elevation in immature granulocytes is non specific and   can be seen in a variety of conditions including stress response,   acute inflammation, trauma and pregnancy. Correlation with other   laboratory and clinical findings is essential.         Immature Granulocytes       0.2       IVS   0.87           LA WIDTH   5.05           Left Atrium  Major Axis   5.56           Left Atrium Minor Axis   5.43           LA size   4.48           LA volume   105.66           LA Volume Index   68.6           LVOT area   3.05           LV LATERAL E/E' RATIO   37.80           LV SEPTAL E/E' RATIO   63.00           LV Diastolic Volume   86.59           LV Diastolic Volume Index   56.24           LVIDD   4.38           LVIDS   3.29           LV mass   144.73           LV Mass Index   94.0           LVOT diameter   1.97           LVOT peak jared   [id number]           LVOT stroke volume   89.35           LVOT peak VTI   29.33           LV Systolic Volume   43.96           LV Systolic Volume Index   28.5           Lymph #       0.4       Lymph%       7.0       Magnesium       2.8       MCH       28.6       MCHC       30.1       MCV       95       Mean e'   0.04           Mono #       0.6       Mono%       10.7       MPV       12.8       MV Peak A Jared   1.55           MV Peak E Jared   1.89           nRBC       0       Phosphorus       7.2       Platelets       108       POCT Glucose 103   108   99     Potassium       5.3  Comment:  *Result may be interfered by visible hemolysis       PROTEIN TOTAL       6.6  Comment:  *Result may be interfered by visible hemolysis       PW   1.11           Right Atrial Pressure (from IVC)   15           RA Major Axis   4.75           RA Width   3.71           RBC       3.25       RDW       17.2       RVDD   5.42           Sinus   2.61           Sodium       140       STJ   2.27           TAPSE   2.25           TDI SEPTAL   0.03           TDI LATERAL   0.05           Triscuspid Valve Regurgitation Peak Gradient   62.09           TR Max Jared   3.94           TV rest pulmonary artery pressure   77           WBC       5.32                        05/21/19 2007        Albumin       Alkaline Phosphatase       ALT       Anion Gap       Ascending aorta       Ao peak jared       Ao VTI       AST       AV valve area       AV mean gradient       AV  peak gradient       AV Velocity Ratio       Baso #       Basophil%       BILIRUBIN TOTAL       BSA       BUN, Bld       Calcium       Chloride       CO2       Creatinine       Left Ventricle Relative Wall Thickness       Differential Method       AV index (prosthetic)       E/A ratio       E/E' ratio       eGFR if        eGFR if non        Eos #       Eosinophil%       E wave decelartion time       FS       Glucose       Gran # (ANC)       Gran%       Hematocrit       Hemoglobin       Immature Grans (Abs)       Immature Granulocytes       IVS       LA WIDTH       Left Atrium Major Axis       Left Atrium Minor Axis       LA size       LA volume       LA Volume Index       LVOT area       LV LATERAL E/E' RATIO       LV SEPTAL E/E' RATIO       LV Diastolic Volume       LV Diastolic Volume Index       LVIDD       LVIDS       LV mass       LV Mass Index       LVOT diameter       LVOT peak jared       LVOT stroke volume       LVOT peak VTI       LV Systolic Volume       LV Systolic Volume Index       Lymph #       Lymph%       Magnesium       MCH       MCHC       MCV       Mean e'       Mono #       Mono%       MPV       MV Peak A Jared       MV Peak E Jared       nRBC       Phosphorus       Platelets       POCT Glucose 90     Potassium       PROTEIN TOTAL       PW       Right Atrial Pressure (from IVC)       RA Major Axis       RA Width       RBC       RDW       RVDD       Sinus       Sodium       STJ       TAPSE       TDI SEPTAL       TDI LATERAL       Triscuspid Valve Regurgitation Peak Gradient       TR Max Jared       TV rest pulmonary artery pressure       WBC           All pertinent labs from the last 24 hours have been reviewed.    Significant Diagnostics:  CT: Ct Head Without Contrast    Result Date: 5/21/2019  Heterogeneous primarily hyperdense extra-axial collection overlying the right cerebral convexity compatible with rece acute/recent nt subdural hemorrhage.  There is mass effect with  compression of the right lateral ventricle and approximately 9 mm of leftward midline shift with subfalcine herniation. Additional extra-axial hemorrhages overlying the left cerebral hemisphere with scattered interrupted subdural component overlying the left temporal and frontal lobes as well as small scattered subarachnoid hemorrhage in left frontal sulci. No definite parenchymal hemorrhage. Encephalomalacia from prior large right MCA territory remote infarction with patchy hypoattenuation elsewhere supratentorial white matter suggestive for chronic ischemic change. Prominent soft tissue swelling induration overlying the left orbit concerning for subcutaneous hematoma.  Please see CT maxillofacial bone report for further details Electronically signed by: Gilberto Luke DO Date:    05/21/2019 Time:    13:18  I have reviewed and interpreted all pertinent imaging results/findings within the past 24 hours.  Acute right SDH with midline shift appreciated.     Assessment/Plan:     * Subdural hematoma  78 YO female with complicated PMH presenting with acute right SDH s/p unwitnessed fall this AM.  Patient neurologically stable since admission. She will likely require right craniotomy with SDH evacuation.  Patient has been posted for tomorrow morning to allow medical optimization prior to surgical intervention.     Surgical intervention indefinitely delayed. Continue maximal medical management for time being. High risk for perioperative complication and pt is stable clinically.    --Continue care per primary team.  --Continue levetiracetam 250 bid.  --Continue blood pressure parameters, SBP < 160.  --We will continue to monitor closely, please contact us with any questions or concerns.          Kash Canada MD  Neurosurgery  [facility]

## 2019-05-22 NOTE — PLAN OF CARE
05/22/19 1430   Discharge Assessment   Assessment Type Discharge Planning Assessment   Confirmed/corrected address and phone number on facesheet? Yes   Assessment information obtained from? Caregiver  (granddaughter, Alesha)   Expected Length of Stay (days) 4   Communicated expected length of stay with patient/caregiver no   Prior to hospitilization cognitive status: Alert/Oriented   Prior to hospitalization functional status: Assistive Equipment;Needs Assistance   Current cognitive status: Unable to Assess   Current Functional Status: Needs Assistance   Lives With child(li), adult;grandchild(li)   Able to Return to Prior Arrangements yes   Is patient able to care for self after discharge? Unable to determine at this time (comments)   Who are your caregiver(s) and their phone number(s)? Cruz Azar (daughter) [phone]   Patient's perception of discharge disposition other (comments)  (morris)   Readmission Within the Last 30 Days no previous admission in last 30 days   Patient currently being followed by outpatient case management? No   Patient currently receives any other outside agency services? Yes   Name and contact number of agency or person providing outside services Per Granddaughter, patient has home health, Nurse's Registry    Is it the patient/care giver preference to resume care with the current outside agency? Other (comment)  (morris)   Equipment Currently Used at Home cane, straight;walker, rolling;wheelchair;oxygen   Do you have any problems affording any of your prescribed medications? No   Is the patient taking medications as prescribed?   (morris)   Does the patient have transportation home? Yes   Transportation Anticipated family or friend will provide   [place] and Scheduled [place] [facility]  [place]   Does the patient receive services at the [facility]mad[facility] Clinic? No   Discharge Plan A Skilled Nursing Facility   Discharge Plan B Home with family;Home Health   DME Needed Upon Discharge   other (see comments)  (tbd)   Patient/Family in Agreement with Plan unable to assess

## 2019-05-22 NOTE — ASSESSMENT & PLAN NOTE
-HD MWF  -missed HD 5/20, so last dialysis was Friday 5/17  -nephrology consulted  -no need for urgent dialysis at this time, but patient should be dialyzed with Prismaflex should acute need for dialysis arise  Risks of developing clotting complications with prismaflex and need for anticoagulant use outweighs potential osmotic changes in subdural or underlying brain parenchyma

## 2019-05-22 NOTE — ASSESSMENT & PLAN NOTE
-history of in 2016  -thrombectomy at that time  -per patient, she returned to baseline and was able to ambulate on her own, perform ADLs

## 2019-05-22 NOTE — NURSING
Pt started to complain of nausea. DIAN Trinidad NP notified. Stated she would place order for PRN. Will continue to monitor.

## 2019-05-22 NOTE — SUBJECTIVE & OBJECTIVE
Interval History: neurosurgery tentatively evaluated patient and reportedly not taking to IR for evacuation, was to have received RRT at bedside last night for 2hrs, but did not get due to no access    Review of patient's allergies indicates:  No Known Allergies  Current Facility-Administered Medications   Medication Frequency    0.9%  NaCl infusion (CRRT USE ONLY) Continuous    albuterol-ipratropium 2.5 mg-0.5 mg/3 mL nebulizer solution 3 mL Q6H WAKE    carvedilol tablet 12.5 mg BID    dextrose 50% injection 12.5 g PRN    glucagon (human recombinant) injection 1 mg PRN    hydrALAZINE injection 10 mg Q6H PRN    insulin aspart U-100 pen 1-10 Units Q6H PRN    levETIRAcetam tablet 250 mg BID    ondansetron 4 mg/2 mL injection     ondansetron injection 4 mg Q6H PRN    sodium chloride 0.9% flush 10 mL PRN       Objective:     Vital Signs (Most Recent):  Temp: 97.3 °F (36.3 °C) (05/22/19 0705)  Pulse: 60 (05/22/19 1000)  Resp: 15 (05/22/19 1000)  BP: (!) 155/69 (05/22/19 1000)  SpO2: 98 % (05/22/19 1000)  O2 Device (Oxygen Therapy): nasal cannula (05/22/19 1000) Vital Signs (24h Range):  Temp:  [97.3 °F (36.3 °C)-98.3 °F (36.8 °C)] 97.3 °F (36.3 °C)  Pulse:  [56-67] 60  Resp:  [12-26] 15  SpO2:  [92 %-100 %] 98 %  BP: (137-224)/(65-91) 155/69     Weight: 53.1 kg (117 lb) (05/22/19 1000)  Body mass index is 20.73 kg/m².  Body surface area is 1.54 meters squared.    I/O last 3 completed shifts:  In: 946.6 [I.V.:75.8; Blood:870.8]  Out: -     Physical Exam   HENT:   Head: Atraumatic.   Neck: No JVD present.   Cardiovascular: Normal rate and regular rhythm.   Pulmonary/Chest: Effort normal. She has rales.   Abdominal: Soft. She exhibits no distension.   Musculoskeletal: She exhibits no edema or tenderness.   Neurological: She is alert.   Skin: Skin is warm.       Significant Labs:  CBC:   Recent Labs   Lab 05/22/19  0159   WBC 5.32   RBC 3.25*   HGB 9.3*   HCT 30.9*   *   MCV 95   MCH 28.6   MCHC 30.1*      CMP:   Recent Labs   Lab 05/22/19  0159   GLU 85   CALCIUM 9.4   ALBUMIN 3.2*   PROT 6.6      K 5.3*   CO2 19*      BUN 83*   CREATININE 8.7*   ALKPHOS 78   ALT 8*   AST 23   BILITOT 0.5     All labs within the past 24 hours have been reviewed.

## 2019-05-22 NOTE — ASSESSMENT & PLAN NOTE
- Managed by neurocritical care team, no increase in size per repeat CT this morning, reportedly NS not taking patient to OR, concern for shifts and possible increase in bleed with regular HD/SLED, discussed with neuro-critical care and CVVHD would be the desired RRT modality for this ESRD patient

## 2019-05-22 NOTE — SUBJECTIVE & OBJECTIVE
Medications Prior to Admission   Medication Sig Dispense Refill Last Dose    albuterol (PROVENTIL/VENTOLIN HFA) 90 mcg/actuation inhaler Inhale 1-2 puffs into the lungs every 6 (six) hours as needed for Wheezing. 1 Inhaler 0     amLODIPine (NORVASC) 10 MG tablet Take 1 tablet (10 mg total) by mouth once daily. 90 tablet 3 Taking    hydrALAZINE (APRESOLINE) 25 MG tablet Take 2 tablets (50 mg total) by mouth every 8 (eight) hours. 180 tablet 11 Taking    losartan (COZAAR) 50 MG tablet Take 1 tablet (50 mg total) by mouth once daily. 90 tablet 3 Taking    RENVELA 800 mg Tab Take 1 tablet (800 mg total) by mouth 3 (three) times daily with meals. 270 tablet 3 Taking    aspirin 81 MG Chew Take 1 tablet (81 mg total) by mouth once daily. 30 tablet 1 Taking    carvedilol (COREG) 12.5 MG tablet Take 1 tablet (12.5 mg total) by mouth 2 (two) times daily. 60 tablet 11 More than a month at Unknown time    ergocalciferol (ERGOCALCIFEROL) 50,000 unit Cap Take 1 capsule (50,000 Units total) by mouth every 7 days. 12 capsule 3 Taking    fluticasone-vilanterol (BREO ELLIPTA) 200-25 mcg/dose DsDv diskus inhaler Inhale 1 puff into the lungs once daily. 90 each 3 More than a month at Unknown time    loperamide (IMODIUM) 2 mg capsule Take one capsule at onset of diarrhea.  No more than three capsules daily 30 capsule 3 Unknown at Unknown time       Review of patient's allergies indicates:  No Known Allergies    Past Medical History:   Diagnosis Date    Anemia in ESRD (end-stage renal disease) 5/29/2016    Anticoagulant long-term use     Aortic atherosclerosis 11/22/2016    Aortic stenosis, moderate 2/18/2016    Asthma in adult without complication 1/8/2016    Bilateral low back pain without sciatica 11/17/2015    Blindness of right eye 11/12/2016    CAD (coronary artery disease) 12/12/2016    Cataract     Central retinal vein occlusion, right eye 6/3/2014    CHF (congestive heart failure)     Chronic diastolic  heart failure 1/8/2016    Chronic respiratory failure with hypoxia 5/29/2016    COPD (chronic obstructive pulmonary disease) 1/15/2017    Dependence on hemodialysis     Mon-Wed-Fri    Diverticulosis     Embolic stroke involving right middle cerebral artery     Enlarged LA (left atrium) 10/7/2016    Epiretinal membrane 7/17/2012    ESRD (end stage renal disease)     Essential hypertension 1/8/2016    History of GI diverticular bleed     5/22/16    Left flaccid hemiparesis 10/1/2016    Peripheral vascular disease, unspecified 11/22/2016    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 11/13/2016    Type 2 diabetes mellitus with kidney complication, without long-term current use of insulin 5/1/2018    Type 2 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, without long-term current use of insulin 3/26/2013    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, without long-term current use of insulin 3/26/2013    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     Past Surgical History:   Procedure Laterality Date    BREAST SURGERY      tumor removal x 2    CATARACT EXTRACTION      CHOLECYSTECTOMY      COLONOSCOPY N/A 5/30/2016    Performed by [facility] at [facility] (2ND FLR)    COLONOSCOPY N/A 5/23/2016    Performed by WILLIAM Colvin MD at [facility] ENDO (2ND FLR)    ESOPHAGOGASTRODUODENOSCOPY (EGD) N/A 5/30/2016    Performed by Sam Davis MD at [facility] ENDO (2ND FLR)    ESOPHAGOGASTRODUODENOSCOPY (EGD) N/A 5/27/2016    Performed by Cesario Rubio MD at [facility] ENDO (2ND FLR)    EXCISION, ANEURYSM Left 11/29/2018    Performed by NADINE Blum III, MD at [facility] OR 2ND FLR    Fistulogram Left 11/28/2018    Performed by NADINE Blum III, MD at [facility] CATH LAB    INSERTION, CATHETER, VASCULAR, DUAL LUMEN Right 11/29/2018    Performed by NADINE Blum III, MD at [facility] OR 2ND FLR    PTA, Fistula   11/28/2018    Per[facility] by [facility] at [facility] CATH LAB    REVISION, AV FISTULA, Left 11/29/2018    Performed by NADINE Blum III, MD at [facility] F[facility]    UPPER GASTROINTESTINAL ENDOSCOPY       Family History     Problem Relation (Age of Onset)    Cancer Sister    Cataracts Mother    Esophageal cancer Sister    Glaucoma Brother, Maternal Aunt    Heart disease Brother    Heart failure Sister    Hypertension Mother, Sister, Brother, Father    No Known Problems Maternal Uncle, Paternal Aunt, Paternal Uncle, Maternal Grandmother, Maternal Grandfather, Paternal Grandmother, Paternal Grandfather    Stroke Mother        Tobacco Use    Smoking status: Never Smoker    Smokeless tobacco: Never Used   Substance and Sexual Activity    Alcohol use: No     Comment: Reports occasional 1-2 drinks     Drug use: No    Sexual activity: Never     Review of Systems   Constitutional: Negative for activity change and fatigue.   HENT: Negative for ear discharge, nosebleeds, rhinorrhea and sore throat.    Eyes: Positive for visual disturbance.   Respiratory: Negative for cough and shortness of breath.    Cardiovascular: Negative for chest pain.   Gastrointestinal: Positive for nausea and vomiting.   Musculoskeletal: Negative for neck pain and neck stiffness.   Skin: Negative for color change, rash and wound.   Neurological: Positive for headaches. Negative for dizziness, seizures, facial asymmetry, weakness and numbness.   Hematological: Bruises/bleeds easily.   Psychiatric/Behavioral: Negative for confusion.     Objective:     Weight: 53.1 kg (117 lb)  Body mass index is 20.73 kg/m².  Vital Signs (Most Recent):  Temp: 97.3 °F (36.3 °C) (05/22/19 1500)  Pulse: (!) 57 (05/22/19 1600)  Resp: 20 (05/22/19 1600)  BP: (!) 180/77 (05/22/19 1600)  SpO2: 99 % (05/22/19 1600) Vital Signs (24h Range):  Temp:  [97.3 °F (36.3 °C)-98.3 °F (36.8 °C)] 97.3 °F (36.3 °C)  Pulse:  [54-67] 57  Resp:  [12-26] 20  SpO2:  [92 %-100 %] 99  %  BP: (145-194)/(66-89) 180/77     Date 05/22/19 0700 - 05/23/19 0659   Shift 6679-8974 3418-1953 2792-9584 24 Hour Total   INTAKE   P.O. 100   100   Shift Total(mL/kg) 100(1.9)   100(1.9)   OUTPUT   Shift Total(mL/kg)       Weight (kg) 53.1 53.1 53.1 53.1                        Hemodialysis Catheter 11/29/18 1650 right subclavian (Active)            Hemodialysis AV Fistula 05/23/16 0700 Left forearm (Active)            Hemodialysis AV Fistula Left upper arm (Active)       Neurosurgery Physical Exam       General: well developed, well nourished, no distress.   Head: normocephalic.Significant periorbital ecchymosis and edema appreciated on left.    Neurologic: Alert and oriented. Thought content appropriate.  GCS: Motor: 6/Verbal: 5/Eyes: 3 GCS Total: 14  Mental Status:  Alert, Oriented x 4  Language: No aphasia  Speech: No dysarthria  Cranial nerves: tongue midline, CN II-XII grossly intact although somewhat limited secondary to facial swelling.   Eyes: Unable to assess secondary to edema  Pulmonary: normal respirations, no signs of respiratory distress  Abdomen: soft, non-distended, not tender to palpation  Skin: Skin is warm, dry and intact.  Sensory: intact to light touch throughout  Motor Strength:Moves all extremities spontaneously with good tone. No abnormal movements seen.     Strength  Deltoids Triceps Biceps Wrist Extension Wrist Flexion Hand    Upper: R 5/5 5/5 5/5 5/5 5/5 5/5    L 4+/5 4+/5 4+/5 5/5 5/5 4+/5     Iliopsoas Quadriceps Knee  Flexion Tibialis  anterior Gastro- cnemius EHL   Lower: R 5/5 5/5 5/5 5/5 5/5 5/5    L 4+/5 4+/5 4+/5 4+/5 4+/5 4+/5     Reflexes:   DTR: 2+ symmetrically throughout.  Clonus: Negative.     Cerebellar:   Pronator drift: absent bilaterally  Gait deferred     Cervical:   ROM: Full with flexion, extension, lateral rotation and ear-to-shoulder bend.   Midline TTP: Negative.    Significant Labs:  Recent Labs   Lab 05/21/19  1354 05/22/19  0159   * 85    140    K 5.0 5.3*    105   CO2 19* 19*   BUN 75* 83*   CREATININE 7.8* 8.7*   CALCIUM 9.8 9.4   MG 2.7* 2.8*     Recent Labs   Lab 05/21/19  1201 05/22/19  0159   WBC 6.62 5.32   HGB 11.0* 9.3*   HCT 36.5* 30.9*   PLT 83* 108*     Recent Labs   Lab 05/21/19  1201   INR 1.1   APTT 23.5     Microbiology Results (last 7 days)     Procedure Component Value Units Date/Time    Urine culture [[id number]] Collected:  05/21/19 1349    Order Status:  Completed Specimen:  Urine Updated:  05/22/19 1326     Urine Culture, Routine --     GRAM NEGATIVE NELIDA  >100,000 cfu/ml  Identification and susceptibility pending      Narrative:       Preferred Collection Type->Urine, Clean Catch  gray and yellow        Recent Lab Results       05/22/19  1230   05/22/19  1013   05/22/19  0627   05/22/19  0159   05/22/19  0004        Albumin       3.2       Alkaline Phosphatase       78       ALT       8       Anion Gap       16       Ascending aorta   2.66           Ao peak olga   4.58           Ao VTI   129.87           AST       23  Comment:  *Result may be interfered by visible hemolysis       AV valve area   0.69           AV mean gradient   55.02           AV peak gradient   83.91           AV Velocity Ratio   0.22           Baso #       0.02       Basophil%       0.4       BILIRUBIN TOTAL       0.5  Comment:  For infants and newborns, interpretation of results should be based  on gestational age, weight and in agreement with clinical  observations.  Premature Infant recommended reference ranges:  Up to 24 hours.............<8.0 mg/dL  Up to 48 hours............<12.0 mg/dL  3-5 days..................<15.0 mg/dL  6-29 days.................<15.0 mg/dL         BSA   1.54           BUN, Bld       83       Calcium       9.4       Chloride       105       CO2       19       Creatinine       8.7       Left Ventricle Relative Wall Thickness   0.51           Differential Method       Automated       AV index (prosthetic)   0.23           E/A ratio    1.22           E/E' ratio   47.25           eGFR if        4.6       eGFR if non        4.0  Comment:  Calculation used to obtain the estimated glomerular filtration  rate (eGFR) is the CKD-EPI equation.          Eos #       0.0       Eosinophil%       0.8       E wave decelartion time   166.17           FS   25           Glucose       85       Gran # (ANC)       4.3       Gran%       80.9       Hematocrit       30.9       Hemoglobin       9.3       Immature Grans (Abs)       0.01  Comment:  Mild elevation in immature granulocytes is non specific and   can be seen in a variety of conditions including stress response,   acute inflammation, trauma and pregnancy. Correlation with other   laboratory and clinical findings is essential.         Immature Granulocytes       0.2       IVS   0.87           LA WIDTH   5.05           Left Atrium Major Axis   5.56           Left Atrium Minor Axis   5.43           LA size   4.48           LA volume   105.66           LA Volume Index   68.6           LVOT area   3.05           LV LATERAL E/E' RATIO   37.80           LV SEPTAL E/E' RATIO   63.00           LV Diastolic Volume   86.59           LV Diastolic Volume Index   56.24           LVIDD   4.38           LVIDS   3.29           LV mass   144.73           LV Mass Index   94.0           LVOT diameter   1.97           LVOT peak jared   [id number]           LVOT stroke volume   89.35           LVOT peak VTI   29.33           LV Systolic Volume   43.96           LV Systolic Volume Index   28.5           Lymph #       0.4       Lymph%       7.0       Magnesium       2.8       MCH       28.6       MCHC       30.1       MCV       95       Mean e'   0.04           Mono #       0.6       Mono%       10.7       MPV       12.8       MV Peak A Jared   1.55           MV Peak E Jared   1.89           nRBC       0       Phosphorus       7.2       Platelets       108       POCT Glucose 103   108   99      Potassium       5.3  Comment:  *Result may be interfered by visible hemolysis       PROTEIN TOTAL       6.6  Comment:  *Result may be interfered by visible hemolysis       PW   1.11           Right Atrial Pressure (from IVC)   15           RA Major Axis   4.75           RA Width   3.71           RBC       3.25       RDW       17.2       RVDD   5.42           Sinus   2.61           Sodium       140       STJ   2.27           TAPSE   2.25           TDI SEPTAL   0.03           TDI LATERAL   0.05           Triscuspid Valve Regurgitation Peak Gradient   62.09           TR Max Jared   3.94           TV rest pulmonary artery pressure   77           WBC       5.32                        05/21/19 2007        Albumin       Alkaline Phosphatase       ALT       Anion Gap       Ascending aorta       Ao peak jared       Ao VTI       AST       AV valve area       AV mean gradient       AV peak gradient       AV Velocity Ratio       Baso #       Basophil%       BILIRUBIN TOTAL       BSA       BUN, Bld       Calcium       Chloride       CO2       Creatinine       Left Ventricle Relative Wall Thickness       Differential Method       AV index (prosthetic)       E/A ratio       E/E' ratio       eGFR if        eGFR if non        Eos #       Eosinophil%       E wave decelartion time       FS       Glucose       Gran # (ANC)       Gran%       Hematocrit       Hemoglobin       Immature Grans (Abs)       Immature Granulocytes       IVS       LA WIDTH       Left Atrium Major Axis       Left Atrium Minor Axis       LA size       LA volume       LA Volume Index       LVOT area       LV LATERAL E/E' RATIO       LV SEPTAL E/E' RATIO       LV Diastolic Volume       LV Diastolic Volume Index       LVIDD       LVIDS       LV mass       LV Mass Index       LVOT diameter       LVOT peak jared       LVOT stroke volume       LVOT peak VTI       LV Systolic Volume       LV Systolic Volume Index       Lymph #       Lymph%        Magnesium       MCH       MCHC       MCV       Mean e'       Mono #       Mono%       MPV       MV Peak A Jared       MV Peak E Jared       nRBC       Phosphorus       Platelets       POCT Glucose 90     Potassium       PROTEIN TOTAL       PW       Right Atrial Pressure (from IVC)       RA Major Axis       RA Width       RBC       RDW       RVDD       Sinus       Sodium       STJ       TAPSE       TDI SEPTAL       TDI LATERAL       Triscuspid Valve Regurgitation Peak Gradient       TR Max Jared       TV rest pulmonary artery pressure       WBC           All pertinent labs from the last 24 hours have been reviewed.    Significant Diagnostics:  CT: Ct Head Without Contrast    Result Date: 5/21/2019  Heterogeneous primarily hyperdense extra-axial collection overlying the right cerebral convexity compatible with rece acute/recent nt subdural hemorrhage.  There is mass effect with compression of the right lateral ventricle and approximately 9 mm of leftward midline shift with subfalcine herniation. Additional extra-axial hemorrhages overlying the left cerebral hemisphere with scattered interrupted subdural component overlying the left temporal and frontal lobes as well as small scattered subarachnoid hemorrhage in left frontal sulci. No definite parenchymal hemorrhage. Encephalomalacia from prior large right MCA territory remote infarction with patchy hypoattenuation elsewhere supratentorial white matter suggestive for chronic ischemic change. Prominent soft tissue swelling induration overlying the left orbit concerning for subcutaneous hematoma.  Please see CT maxillofacial bone report for further details Electronically signed by: Gilberto Luke DO Date:    05/21/2019 Time:    13:18  I have reviewed and interpreted all pertinent imaging results/findings within the past 24 hours.  Acute right SDH with midline shift appreciated.

## 2019-05-22 NOTE — CONSULTS
Wound care consulted for left eye swollen, possible hematoma.  The patient is laying in bed, not responsive to verbal stimuli, was found down at home.  The left eye has swelling,discoloration- red/purple with induration along the eye brown. Skin is intact. Nursing to monitor. No wound care recommendations. Wound care signing off.   Left eye edema

## 2019-05-22 NOTE — ASSESSMENT & PLAN NOTE
-Bilateral acute SDH, R>L with right to left midline shift   -on plavix, platelets given   -pt scheduled to go to OR for evacuation tomorrow  -SBP < 180   -keppra ppx

## 2019-05-22 NOTE — ASSESSMENT & PLAN NOTE
Nephrology Hx:  She dialyzes at [facility] under the care of Dr. Coleman on a MWF schedule for 4 hrs   EDW 50 kg  Access: [place] AVF ++ bruit/thrill  She maintains residual renal function and dialyzes for a 4 hour duration.  She reports now problems with her dialysis, no recent episodes of cramping with ultrafiltration.  Last dialysis prior to hospitalization: 5/17/2019      Plan:  - will start CVVHD as soon as trialysis access is placed, discussed with neuro-critical care and concern for increased bleeding / shifts with regular HD / SLED

## 2019-05-22 NOTE — PROGRESS NOTES
[facility]  Neurocritical Care  Progress Note    Admit Date: 5/21/2019  Service Date: 05/22/2019  Length of Stay: 1    Subjective:     Chief Complaint: Subdural hematoma    History of Present Illness: Tea Georges is a 79 year old patient with PMHx of HTN, ESRD on HD, HF, severe AS, right MCA stroke s/p thrombectomy in 2016, and COPD (on home O2) who presented to OSH after an unwitnessed fall this morning.  She was found down by grand daughter, so LOC is unknown.  At OSH, she was found to have bilateral SDH, R >L, with right to left midline shift. Plan to admit to [facility] for optimization and to OR tomorrow for evacuation.    Hospital Course: 05/22: surgery on hold, will require dialysis, less chance for anticoagulant exposure if undergoes standard HD, follow exam closely pre and post dialysis        Review of Systems   Constitutional: Negative for chills.   HENT: Positive for facial swelling.    Eyes: Positive for pain.   Respiratory: Negative for chest tightness.    Cardiovascular: Negative for chest pain.   Gastrointestinal: Negative for abdominal pain.   Musculoskeletal: Negative.    Neurological: Positive for headaches.   Hematological: Negative for adenopathy.   Psychiatric/Behavioral: Negative for agitation.       Objective:     Vitals:  Temp: 98 °F (36.7 °C)  Pulse: (!) 55  Rhythm: sinus bradycardia  BP: (!) 176/70  MAP (mmHg): 101  Resp: 16  SpO2: 100 %  O2 Device (Oxygen Therapy): nasal cannula    Temp  Min: 97.3 °F (36.3 °C)  Max: 98.3 °F (36.8 °C)  Pulse  Min: 54  Max: 67  BP  Min: 137/65  Max: 194/80  MAP (mmHg)  Min: 94  Max: 118  Resp  Min: 12  Max: 26  SpO2  Min: 92 %  Max: 100 %    05/21 0701 - 05/22 0700  In: 946.6 [I.V.:75.8]  Out: -    Unmeasured Output  Urine Occurrence: 1  Stool Occurrence: 0       Physical Exam   Constitutional:   Mild cachexia   HENT:   Head: Normocephalic.   Eyes: Pupils are equal, round, and reactive to light.   Neck: No JVD present.   Cardiovascular: Regular  rhythm.   Pulmonary/Chest: Breath sounds normal.   Abdominal: Soft. Bowel sounds are normal.   Musculoskeletal: She exhibits no edema.   Neurological:   Awake  Left eye swollen shut  Resists eye opening on right  No obvious asymetry with withdrawal response   Skin: Skin is dry.       Medications:  Continuous  sodium chloride 0.9%   Scheduled  albuterol-ipratropium 3 mL Q6H WAKE   carvedilol 12.5 mg BID   levETIRAcetam 250 mg BID   ondansetron     PRN  sodium chloride 0.9%  PRN   dextrose 50% 12.5 g PRN   glucagon (human recombinant) 1 mg PRN   hydrALAZINE 10 mg Q6H PRN   insulin aspart U-100 1-10 Units Q6H PRN   ondansetron 4 mg Q6H PRN   sodium chloride 0.9% 10 mL PRN     Today I personally reviewed pertinent medications, lines/drains/airways, imaging, laboratory results, notably:    Diet  Diet Cardiac Thin  Diet Cardiac Thin        Assessment/Plan:     Neuro  * Subdural hematoma  -Bilateral acute SDH, R>L with right to left midline shift   -on plavix, platelets given   -pt scheduled to go to OR for evacuation tomorrow  -SBP < 180   -keppra ppx  05/22:evac delayed probably until tomorrow    Pulmonary  Pulmonary emphysema  -CXR  -resume home nebs      Cardiac/Vascular  (HFpEF) heart failure with preserved ejection fraction  Attempt even fluid balance, rate control, afterload reduction    Severe aortic stenosis  -echo pending   -resume home carvedilol 12.5 mg bid rate control and afterload reduction    Essential hypertension  -SBP < 180  -discussed goal with NSGY and hospital medicine teams   -avoid nicardipine if possible  -resume carvedilol 12.5 mg bid    Renal/  ESRD (end stage renal disease)  -HD MWF  -missed HD 5/20, so last dialysis was Friday 5/17  -nephrology consulted  -no need for urgent dialysis at this time, but patient should be dialyzed with Prismaflex should acute need for dialysis arise  Risks of developing clotting complications with prismaflex and need for anticoagulant use outweighs potential  osmotic changes in subdural or underlying brain parenchyma      Endocrine  Type 2 diabetes mellitus with chronic kidney disease on chronic dialysis, without long-term current use of insulin  -SSI while NPO  -Hgb A1C pending           The patient is being Prophylaxed for:  Venous Thromboembolism with: Mechanical  Stress Ulcer with: None  Ventilator Pneumonia with: not applicable    Activity Orders          Diet Cardiac Thin: Cardiac starting at 05/22 1148      CRC 35 min  Full Code    Fuentes Damian MD  Neurocritical Care  [facility]

## 2019-05-22 NOTE — ASSESSMENT & PLAN NOTE
-Bilateral acute SDH, R>L with right to left midline shift   -on plavix, platelets given   -pt scheduled to go to OR for evacuation tomorrow  -SBP < 180   -keppra ppx  05/22:evac delayed probably until tomorrow

## 2019-05-22 NOTE — PT/OT/SLP EVAL
Speech Language Pathology Evaluation  Cognitive/Bedside Swallow    Patient Name:  Tea Georges   MRN:  [id number]  Admitting Diagnosis: Subdural hematoma    Recommendations:                  General Recommendations:  Cognitive-linguistic therapy and follow up for diet tolerance  Diet recommendations:  Regular, Thin   Aspiration Precautions:   · Assistance with meals 2/2 vision impairment  · Standard aspiration precautions   General Precautions: Standard, vision impaired  Communication strategies:  go to room if call light pushed    History:     Past Medical History:   Diagnosis Date    Anemia in ESRD (end-stage renal disease) 5/29/2016    Anticoagulant long-term use     Aortic atherosclerosis 11/22/2016    Aortic stenosis, moderate 2/18/2016    Asthma in adult without complication 1/8/2016    Bilateral low back pain without sciatica 11/17/2015    Blindness of right eye 11/12/2016    CAD (coronary artery disease) 12/12/2016    Cataract     Central retinal vein occlusion, right eye 6/3/2014    CHF (congestive heart failure)     Chronic diastolic heart failure 1/8/2016    Chronic respiratory failure with hypoxia 5/29/2016    COPD (chronic obstructive pulmonary disease) 1/15/2017    Dependence on hemodialysis     Mon-Wed-Fri    Diverticulosis     Embolic stroke involving right middle cerebral artery     Enlarged LA (left atrium) 10/7/2016    Epiretinal membrane 7/17/2012    ESRD (end stage renal disease)     Essential hypertension 1/8/2016    History of GI diverticular bleed     5/22/16    Left flaccid hemiparesis 10/1/2016    Peripheral vascular disease, unspecified 11/22/2016    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 11/13/2016    Type 2 diabetes mellitus with kidney complication, without long-term current use of insulin 5/1/2018    Type 2 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, without long-term current use of insulin  3/26/2013    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, without long-term current use of insulin 3/26/2013    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       Past Surgical History:   Procedure Laterality Date    BREAST SURGERY      tumor removal x 2    CATARACT EXTRACTION      CHOLECYSTECTOMY      COLONOSCOPY N/A 5/30/2016    Performed by [facility] at [facility] ENDO (2ND FLR)    COLONOSCOPY N/A 5/23/2016    Performed by WILLIAM Colvin MD at [facility] ENDO (2ND FLR)    ESOPHAGOGASTRODUODENOSCOPY (EGD) N/A 5/30/2016    Performed by Sam Davis MD at [facility] ENDO (2ND FLR)    ESOPHAGOGASTRODUODENOSCOPY (EGD) N/A 5/27/2016    Performed by Cesario Rubio MD at [facility] ENDO (2ND FLR)    EXCISION, ANEURYSM Left 11/29/2018    Performed by NADINE Blum III, MD at [facility] OR 2ND FLR    Fistulogram Left 11/28/2018    Performed by NADINE Blum III, MD at [facility] CATH LAB    INSERTION, CATHETER, VASCULAR, DUAL LUMEN Right 11/29/2018    Performed by NADINE Blum III, MD at [facility] OR [facility] F[facility]    PTA, Fistula  11/28/2018    Performed by NADINE Blum III, MD at [facility] CATH LAB    REVISION, AV FISTULA, Left 11/29/2018    [facility]    UPPER GASTROINTESTINAL ENDOSCOPY       Prior diet: Regular / thin per granddaughter.     Subjective     Pt asleep in bed, roused given single verbal cue.   HOB elevated for session.     Pain/Comfort:  Pain Rating 1: 0/10    Objective:   Patient blind in R eye, edema influencing L eye vision. Reading, writing, and visual spatial assessment were not completed.     Cognitive Status:    Arousal/Alertness: Appropriate response to stimuli  Attention Divided attention deficit , and Sustained attention  deficit noted.   Perseveration: patient perseverating on task - difficulty task  switching.   Orientation: Oriented x4  Memory:    Immediate Recall WFL,    Delayed recall of 0/3 unassociated words -  "recalled   2/3 given semantic cues.      recall general information completed WFL  Problem Solving:   Categories patient [id number] items iwthin    concrete category indep,    Sequencing of 4 item tasks completed with 0% acc   indep.     Compare/contrast completed with 50% acc -   patient with specific difficulty provided "similarity"   and required multile cues/redirects to question.   Safety awareness: fair      Receptive Language:   Comprehension:      Questions Simple yes/no WFL  Complex yes/no WFL  Commands  two step basic commands WFL  multistep basic commands WFL    Expressive Language:  Verbal:    Naming Divergent responsive WFL and Single word responsive naming WFL  Sentence completion: WFL      Motor Speech:  WFL    Voice:   WFL    Visual-Spatial:  Did not assess 2/2 vision impairment.     Reading:   did not assess 2/2 vision impairment.      Written Expression:   did not assess 2/2 vision impairment.     Oral Musculature Evaluation  Oral Musculature: WFL  Dentition: upper and lower dentures  Secretion Management: adequate  Mucosal Quality: good  Mandibular Strength and Mobility: WFL  Oral Labial Strength and Mobility: WFL  Lingual Strength and Mobility: WFL  Velar Elevation: WFL  Buccal Strength and Mobility: WFL  Volitional Cough: Present  Volitional Swallow: Present    Bedside Swallow Eval:   Consistencies Assessed:  · Thin liquids via tsp cup and straw (4oz)  · Puree x2  · Solids via 1 cracker     Oral Phase:   · WFL    Pharyngeal Phase:   · no overt clinical signs/symptoms of aspiration  · no overt clinical signs/symptoms of pharyngeal dysphagia    Compensatory Strategies  · None    Treatment: Patient with appearing intact oral and pharyngeal phases of swallow. Skilled education was provided to patient and family members re: diet recs, standard aspiration precautions of which to follow, and ongoing ST plan of care. Verbalized understanding.   Assessment:     Tea Georges is a 79 y.o. female with an " SLP diagnosis of no oropharyngeal dypshagia  and mild to moderate Cognitive-Linguistic Impairment.  .    Goals:   Multidisciplinary Problems     SLP Goals        Problem: SLP Goal    Goal Priority Disciplines Outcome   SLP Goal     SLP    Description:  Speech Language Pathology Goals  Goals expected to be met by 5/29:  1. Patient will tolerate a regular solid diet and thin liquids with no overt signs of airway compromise.   2. Patient will complete mental manipulation/organization tasks with 80% acc given min A.   3. Patient will complete problem solving tasks with 80% acc indep.   4. Patient will provide +10 items within concrete category indep within 1 timed minute.   4. Patient will complete functional memory tasks with 80% acc indep.   6. Pt will participate in further assessment involving reading, writing, and visual spatial when appropriate.                          Plan:     · Patient to be seen:  4 x/week   · Plan of Care expires:     · Plan of Care reviewed with:  patient, grandchild(li)   · SLP Follow-Up:  Yes       Discharge recommendations:  Discharge Facility/Level of Care Needs: other (see comments)   Barriers to Discharge:  None    Time Tracking:     SLP Treatment Date:   05/22/19  Speech Start Time:  1100  Speech Stop Time:  1116     Speech Total Time (min):  16 min    Billable Minutes: Eval 8  and Eval Swallow and Oral Function 8    Emily Abadie, CCC-SLP  05/22/2019

## 2019-05-22 NOTE — H&P
[facility]  Neurocritical Care  History & Physical    Admit Date: 5/21/2019  Service Date: 05/21/2019  Length of Stay: 0    Subjective:     Chief Complaint: Subdural hematoma    History of Present Illness: Tea Georges is a 79 year old patient with PMHx of HTN, ESRD on HD, HF, severe AS, right MCA stroke s/p thrombectomy in 2016, and COPD (on home O2) who presented to OSH after an unwitnessed fall this morning.  She was found down by grand daughter, so LOC is unknown.  At OSH, she was found to have bilateral SDH, R >L, with right to left midline shift. Plan to admit to NCC for optimization and to OR tomorrow for evacuation.    Past Medical History:   Diagnosis Date    Anemia in ESRD (end-stage renal disease) 5/29/2016    Anticoagulant long-term use     Aortic atherosclerosis 11/22/2016    Aortic stenosis, moderate 2/18/2016    Asthma in adult without complication 1/8/2016    Bilateral low back pain without sciatica 11/17/2015    Blindness of right eye 11/12/2016    CAD (coronary artery disease) 12/12/2016    Cataract     Central retinal vein occlusion, right eye 6/3/2014    CHF (congestive heart failure)     Chronic diastolic heart failure 1/8/2016    Chronic respiratory failure with hypoxia 5/29/2016    COPD (chronic obstructive pulmonary disease) 1/15/2017    Dependence on hemodialysis     Mon-Wed-Fri    Diverticulosis     Embolic stroke involving right middle cerebral artery     Enlarged LA (left atrium) 10/7/2016    Epiretinal membrane 7/17/2012    ESRD (end stage renal disease)     Essential hypertension 1/8/2016    History of GI diverticular bleed     5/22/16    Left flaccid hemiparesis 10/1/2016    Peripheral vascular disease, unspecified 11/22/2016    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 11/13/2016    Type 2 diabetes mellitus with kidney complication, without long-term current use of insulin 5/1/2018    Type 2 diabetes mellitus with left eye  affected by proliferative retinopathy without macular edema, without long-term current use of insulin 3/26/2013    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, without long-term current use of insulin 3/26/2013    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     Past Surgical History:   Procedure Laterality Date    BREAST SURGERY      tumor removal x 2    CATARACT EXTRACTION      CHOLECYSTECTOMY      COLONOSCOPY N/A 5/30/2016    Performed by [facility] at [facility] ENDO (2ND FLR)    COLONOSCOPY N/A 5/23/2016    Performed by WILLIAM Colvin MD at [facility] ENDO (2ND FLR)    ESOPHAGOGASTRODUODENOSCOPY (EGD) N/A 5/30/2016    Performed by Sam Davis MD at [facility] ENDO (2ND FLR)    ESOPHAGOGASTRODUODENOSCOPY (EGD) N/A 5/27/2016    Performed by Cesario Rubio MD at [facility] ENDO (2ND FLR)    EXCISION, ANEURYSM Left 11/29/2018    Performed by NADNIE Blum III, MD at [facility] OR 2ND FLR    Fistulogram Left 11/28/2018    Performed by NADINE Blum III, MD at [facility] CATH LAB    INSERTION, CATHETER, VASCULAR, DUAL LUMEN Right 11/29/2018    Performed by NADINE Blum III, MD at [facility] OR 2ND FLR    PTA, Fistula  11/28/2018    Performed by NADINE Blum III, MD at [facility] CATH LAB    REVISION, AV FISTULA, Left 11/29/2018    Performed by NADINE Blum III, MD at [facility] OR 2ND FLR    UPPER GASTROINTESTINAL ENDOSCOPY         No current facility-administered medications on file prior to encounter.      Current Outpatient Medications on File Prior to Encounter   Medication Sig Dispense Refill    albuterol (PROVENTIL/VENTOLIN HFA) 90 mcg/actuation inhaler Inhale 1-2 puffs into the lungs every 6 (six) hours as needed for Wheezing. 1 Inhaler 0    amLODIPine (NORVASC) 10 MG tablet Take 1 tablet (10 mg total) by mouth once daily. 90 tablet 3    hydrALAZINE (APRESOLINE) 25 MG tablet Take 2 tablets (50 mg total) by mouth every 8 (eight) hours. 180 tablet 11    losartan (COZAAR) 50 MG  tablet Take 1 tablet (50 mg total) by mouth once daily. 90 tablet 3    RENVELA 800 mg Tab Take 1 tablet (800 mg total) by mouth 3 (three) times daily with meals. 270 tablet 3    aspirin 81 MG Chew Take 1 tablet (81 mg total) by mouth once daily. 30 tablet 1    carvedilol (COREG) 12.5 MG tablet Take 1 tablet (12.5 mg total) by mouth 2 (two) times daily. 60 tablet 11    ergocalciferol (ERGOCALCIFEROL) 50,000 unit Cap Take 1 capsule (50,000 Units total) by mouth every 7 days. 12 capsule 3    fluticasone-vilanterol (BREO ELLIPTA) 200-25 mcg/dose DsDv diskus inhaler Inhale 1 puff into the lungs once daily. 90 each 3    isosorbide dinitrate (ISOCHRON) 40 mg TbSR Take 1 tablet (40 mg total) by mouth every 12 (twelve) hours. 180 tablet 3    loperamide (IMODIUM) 2 mg capsule Take one capsule at onset of diarrhea.  No more than three capsules daily 30 capsule 3    [DISCONTINUED] atorvastatin (LIPITOR) 40 MG tablet Take 1 tablet (40 mg total) by mouth once daily. 90 tablet 3    [DISCONTINUED] citalopram (CELEXA) 10 MG tablet Take 1 tablet (10 mg total) by mouth once daily. 90 tablet 3    [DISCONTINUED] clopidogrel (PLAVIX) 75 mg tablet Take 1 tablet (75 mg total) by mouth once daily. 90 tablet 3    [DISCONTINUED] erythromycin (ROMYCIN) ophthalmic ointment Place into the right eye every 8 (eight) hours. 3.5 g 0    [DISCONTINUED] lidocaine-prilocaine (EMLA) cream Apply topically as needed (to apply over access 1/2 over prior to dialysis). 25 g 1    [DISCONTINUED] ondansetron (ZOFRAN) 4 MG tablet Take 2 tablets (8 mg total) by mouth every 6 (six) hours. 12 tablet 0     Allergies: Patient has no known allergies.    Family History   Problem Relation Age of Onset    Cataracts Mother     Stroke Mother     Hypertension Mother     Cancer Sister     Hypertension Sister     Heart failure Sister     Glaucoma Brother     Hypertension Brother     Heart disease Brother     Hypertension Father     Glaucoma Maternal  Aunt     Esophageal cancer Sister     No Known Problems Maternal Uncle     No Known Problems Paternal Aunt     No Known Problems Paternal Uncle     No Known Problems Maternal Grandmother     No Known Problems Maternal Grandfather     No Known Problems Paternal Grandmother     No Known Problems Paternal Grandfather     Heart attack Neg Hx     Colon cancer Neg Hx     Stomach cancer Neg Hx     Anemia Neg Hx     Arrhythmia Neg Hx     Asthma Neg Hx     Clotting disorder Neg Hx     Fainting Neg Hx     Hyperlipidemia Neg Hx     Atrial Septal Defect Neg Hx        Social History     Tobacco Use    Smoking status: Never Smoker    Smokeless tobacco: Never Used   Substance Use Topics    Alcohol use: No     Comment: Reports occasional 1-2 drinks     Drug use: No      Review of Systems: Review of Systems   Constitutional: Negative for chills and fever.   Eyes: Positive for pain.   Respiratory: Positive for wheezing. Negative for stridor.    Cardiovascular: Negative for chest pain and palpitations.   Gastrointestinal: Negative for nausea and vomiting.   Genitourinary: Negative for frequency and urgency.   Musculoskeletal: Positive for falls.   Skin: Negative for itching and rash.   Neurological: Positive for headaches. Negative for seizures.   Psychiatric/Behavioral: Negative for memory loss.         Vitals:   Temp: 97.8 °F (36.6 °C)  Pulse: 64  BP: (!) 176/74  MAP (mmHg): 106  Resp: 17  SpO2: 98 %  O2 Device (Oxygen Therapy): room air    Temp  Min: 97.8 °F (36.6 °C)  Max: 98.1 °F (36.7 °C)  Pulse  Min: 63  Max: 67  BP  Min: 137/65  Max: 224/91  MAP (mmHg)  Min: 94  Max: 131  Resp  Min: 16  Max: 24  SpO2  Min: 94 %  Max: 99 %    No intake/output data recorded.         Examination:   Constitutional: Well-nourished and -developed. No apparent distress.   Eyes: Conjunctiva clear, anicteric. Large L orbital hematoma  Head/Ears/Nose/Mouth/Throat/Neck: Moist mucous membranes. External ears, nose atraumatic.    Cardiovascular: Regular rhythm. + systolic murmur. No leg edema.  Respiratory: Comfortable respirations. Clear to auscultation.  Gastrointestinal: No hernia. Soft, nondistended, tender. + bowel sounds.    Neurologic:   -[id number]  -Alert. Oriented to person, place, and time. Speech fluent. Follows commands.  -Strength 5/5 and symmetric in arms, legs. Tone normal.   -Sensation intact to touch in arms, legs.  -unable to assess pupils bilaterally 2/2 hematoma and pain     Today I independently reviewed pertinent medications, lines/drains/airways, imaging, cardiology results, laboratory results, microbiology results,        Assessment/Plan:     Neuro  * Subdural hematoma  -Bilateral acute SDH, R>L with right to left midline shift   -on plavix, platelets given   -pt scheduled to go to OR for evacuation tomorrow  -SBP < 180   -keppra ppx    Right-sided cerebrovascular accident (CVA)  -history of in 2016  -thrombectomy at that time  -per patient, she returned to baseline and was able to ambulate on her own, perform ADLs    Ophtho  Blindness of right eye  -complication of uncontrolled DM    Pulmonary  Pulmonary emphysema  -CXR  -resume home nebs      Cardiac/Vascular  (HFpEF) heart failure with preserved ejection fraction  -echo pending     Severe aortic stenosis  -echo pending   -avoid nicardipine if able  -resume home carvedilol 12.5 mg bid    Essential hypertension  -SBP < 180  -discussed goal with NSGY and hospital medicine teams   -avoid nicardipine if possible  -resume carvedilol 12.5 mg bid    Renal/  ESRD (end stage renal disease)  -HD MWF  -missed HD 5/20, so last dialysis was Friday 5/17  -nephrology consulted  -no need for urgent dialysis at this time, but patient should be dialyzed with Prismaflex should acute need for dialysis arise      Endocrine  Type 2 diabetes mellitus with chronic kidney disease on chronic dialysis, without long-term current use of insulin  -SSI while NPO  -Hgb A1C pending           The  patient is being Prophylaxed for:  Venous Thromboembolism with: Mechanical  Stress Ulcer with: Not Applicable   Ventilator Pneumonia with: not applicable    Activity Orders          Diet NPO: NPO starting at 05/21 1510        Full Code     Critical Care Time: 35 minutes     Lucinda Vasquez PA-C  Neurocritical Care  [facility]

## 2019-05-22 NOTE — SUBJECTIVE & OBJECTIVE
Review of Systems   Constitutional: Negative for chills.   HENT: Positive for facial swelling.    Eyes: Positive for pain.   Respiratory: Negative for chest tightness.    Cardiovascular: Negative for chest pain.   Gastrointestinal: Negative for abdominal pain.   Musculoskeletal: Negative.    Neurological: Positive for headaches.   Hematological: Negative for adenopathy.   Psychiatric/Behavioral: Negative for agitation.       Objective:     Vitals:  Temp: 98 °F (36.7 °C)  Pulse: (!) 55  Rhythm: sinus bradycardia  BP: (!) 176/70  MAP (mmHg): 101  Resp: 16  SpO2: 100 %  O2 Device (Oxygen Therapy): nasal cannula    Temp  Min: 97.3 °F (36.3 °C)  Max: 98.3 °F (36.8 °C)  Pulse  Min: 54  Max: 67  BP  Min: 137/65  Max: 194/80  MAP (mmHg)  Min: 94  Max: 118  Resp  Min: 12  Max: 26  SpO2  Min: 92 %  Max: 100 %    05/21 0701 - 05/22 0700  In: 946.6 [I.V.:75.8]  Out: -    Unmeasured Output  Urine Occurrence: 1  Stool Occurrence: 0       Physical Exam   Constitutional:   Mild cachexia   HENT:   Head: Normocephalic.   Eyes: Pupils are equal, round, and reactive to light.   Neck: No JVD present.   Cardiovascular: Regular rhythm.   Pulmonary/Chest: Breath sounds normal.   Abdominal: Soft. Bowel sounds are normal.   Musculoskeletal: She exhibits no edema.   Neurological:   Awake  Left eye swollen shut  Resists eye opening on right  No obvious asymetry with withdrawal response   Skin: Skin is dry.       Medications:  Continuous  sodium chloride 0.9%   Scheduled  albuterol-ipratropium 3 mL Q6H WAKE   carvedilol 12.5 mg BID   levETIRAcetam 250 mg BID   ondansetron     PRN  sodium chloride 0.9%  PRN   dextrose 50% 12.5 g PRN   glucagon (human recombinant) 1 mg PRN   hydrALAZINE 10 mg Q6H PRN   insulin aspart U-100 1-10 Units Q6H PRN   ondansetron 4 mg Q6H PRN   sodium chloride 0.9% 10 mL PRN     Today I personally reviewed pertinent medications, lines/drains/airways, imaging, laboratory results, notably:    Diet  Diet Cardiac  Thin  Diet Cardiac Thin

## 2019-05-22 NOTE — PLAN OF CARE
Problem: Adult Inpatient Plan of Care  Goal: Plan of Care Review  POC reviewed with pt and family at 0400. Pt verbalized understanding. Questions and concerns addressed. Pt taken to CT on monitor and NC 2L. Pt kept NPO after midnight. Chlorhexidine bath given and pt placed on new sheets. No change in neuro exam over the night.  No acute events today. Pt progressing toward goals. Will continue to monitor. See flowsheets for full assessment and VS info.

## 2019-05-22 NOTE — ASSESSMENT & PLAN NOTE
-SBP < 180  -discussed goal with NSGY and hospital medicine teams   -avoid nicardipine if possible  -resume carvedilol 12.5 mg bid

## 2019-05-22 NOTE — PLAN OF CARE
05/22/19 1440   Post-Acute Status   Post-Acute Authorization Placement   Post-Acute Placement Status Awaiting Internal Medical Clearance   Discharge Delays None known at this time       Jhoana Pitts RN, CCRN-K, [facility]  Neuro-Critical Care   [phone]

## 2019-05-22 NOTE — ASSESSMENT & PLAN NOTE
-HD MWF  -missed HD 5/20, so last dialysis was Friday 5/17  -nephrology consulted  -no need for urgent dialysis at this time, but patient should be dialyzed with Prismaflex should acute need for dialysis arise

## 2019-05-23 LAB
ALBUMIN SERPL BCP-MCNC: 3.1 G/DL (ref 3.5–5.2)
ALP SERPL-CCNC: 80 U/L (ref 55–135)
ALT SERPL W/O P-5'-P-CCNC: 6 U/L (ref 10–44)
ANION GAP SERPL CALC-SCNC: 12 MMOL/L (ref 8–16)
AST SERPL-CCNC: 9 U/L (ref 10–40)
BACTERIA UR CULT: NORMAL
BASOPHILS # BLD AUTO: 0.02 K/UL (ref 0–0.2)
BASOPHILS NFR BLD: 0.3 % (ref 0–1.9)
BILIRUB SERPL-MCNC: 0.5 MG/DL (ref 0.1–1)
BUN SERPL-MCNC: 44 MG/DL (ref 8–23)
CALCIUM SERPL-MCNC: 9.2 MG/DL (ref 8.7–10.5)
CHLORIDE SERPL-SCNC: 100 MMOL/L (ref 95–110)
CO2 SERPL-SCNC: 25 MMOL/L (ref 23–29)
CREAT SERPL-MCNC: 5.5 MG/DL (ref 0.5–1.4)
DIFFERENTIAL METHOD: ABNORMAL
EOSINOPHIL # BLD AUTO: 0 K/UL (ref 0–0.5)
EOSINOPHIL NFR BLD: 0.5 % (ref 0–8)
ERYTHROCYTE [DISTWIDTH] IN BLOOD BY AUTOMATED COUNT: 17 % (ref 11.5–14.5)
EST. GFR  (AFRICAN AMERICAN): 8 ML/MIN/1.73 M^2
EST. GFR  (NON AFRICAN AMERICAN): 6.9 ML/MIN/1.73 M^2
GLUCOSE SERPL-MCNC: 74 MG/DL (ref 70–110)
HCT VFR BLD AUTO: 29.7 % (ref 37–48.5)
HGB BLD-MCNC: 9.1 G/DL (ref 12–16)
IMM GRANULOCYTES # BLD AUTO: 0.03 K/UL (ref 0–0.04)
IMM GRANULOCYTES NFR BLD AUTO: 0.5 % (ref 0–0.5)
LYMPHOCYTES # BLD AUTO: 0.4 K/UL (ref 1–4.8)
LYMPHOCYTES NFR BLD: 6.1 % (ref 18–48)
MAGNESIUM SERPL-MCNC: 2.1 MG/DL (ref 1.6–2.6)
MCH RBC QN AUTO: 28.7 PG (ref 27–31)
MCHC RBC AUTO-ENTMCNC: 30.6 G/DL (ref 32–36)
MCV RBC AUTO: 94 FL (ref 82–98)
MONOCYTES # BLD AUTO: 0.7 K/UL (ref 0.3–1)
MONOCYTES NFR BLD: 9.9 % (ref 4–15)
NEUTROPHILS # BLD AUTO: 5.4 K/UL (ref 1.8–7.7)
NEUTROPHILS NFR BLD: 82.7 % (ref 38–73)
NRBC BLD-RTO: 0 /100 WBC
PHOSPHATE SERPL-MCNC: 5.3 MG/DL (ref 2.7–4.5)
PLATELET # BLD AUTO: 90 K/UL (ref 150–350)
PMV BLD AUTO: 12.4 FL (ref 9.2–12.9)
POCT GLUCOSE: 106 MG/DL (ref 70–110)
POCT GLUCOSE: 147 MG/DL (ref 70–110)
POCT GLUCOSE: 69 MG/DL (ref 70–110)
POCT GLUCOSE: 71 MG/DL (ref 70–110)
POCT GLUCOSE: 76 MG/DL (ref 70–110)
POCT GLUCOSE: 86 MG/DL (ref 70–110)
POTASSIUM SERPL-SCNC: 3.9 MMOL/L (ref 3.5–5.1)
PROT SERPL-MCNC: 6.3 G/DL (ref 6–8.4)
RBC # BLD AUTO: 3.17 M/UL (ref 4–5.4)
SODIUM SERPL-SCNC: 137 MMOL/L (ref 136–145)
WBC # BLD AUTO: 6.56 K/UL (ref 3.9–12.7)

## 2019-05-23 PROCEDURE — 92526 ORAL FUNCTION THERAPY: CPT

## 2019-05-23 PROCEDURE — 20000000 HC ICU ROOM

## 2019-05-23 PROCEDURE — 92507 TX SP LANG VOICE COMM INDIV: CPT

## 2019-05-23 PROCEDURE — 84100 ASSAY OF PHOSPHORUS: CPT

## 2019-05-23 PROCEDURE — 63600175 PHARM REV CODE 636 W HCPCS: Performed by: NURSE PRACTITIONER

## 2019-05-23 PROCEDURE — 99232 SBSQ HOSP IP/OBS MODERATE 35: CPT | Mod: GC,,, | Performed by: INTERNAL MEDICINE

## 2019-05-23 PROCEDURE — 25000003 PHARM REV CODE 250: Performed by: NURSE PRACTITIONER

## 2019-05-23 PROCEDURE — 99232 PR SUBSEQUENT HOSPITAL CARE,LEVL II: ICD-10-PCS | Mod: GC,,, | Performed by: NEUROLOGICAL SURGERY

## 2019-05-23 PROCEDURE — 25000003 PHARM REV CODE 250: Performed by: PHYSICIAN ASSISTANT

## 2019-05-23 PROCEDURE — 99232 SBSQ HOSP IP/OBS MODERATE 35: CPT | Mod: GC,,, | Performed by: NEUROLOGICAL SURGERY

## 2019-05-23 PROCEDURE — 25000003 PHARM REV CODE 250: Performed by: PSYCHIATRY & NEUROLOGY

## 2019-05-23 PROCEDURE — 80053 COMPREHEN METABOLIC PANEL: CPT

## 2019-05-23 PROCEDURE — 99291 CRITICAL CARE FIRST HOUR: CPT | Mod: GC,,, | Performed by: PSYCHIATRY & NEUROLOGY

## 2019-05-23 PROCEDURE — 94761 N-INVAS EAR/PLS OXIMETRY MLT: CPT

## 2019-05-23 PROCEDURE — 94640 AIRWAY INHALATION TREATMENT: CPT

## 2019-05-23 PROCEDURE — 27000221 HC OXYGEN, UP TO 24 HOURS

## 2019-05-23 PROCEDURE — 83735 ASSAY OF MAGNESIUM: CPT

## 2019-05-23 PROCEDURE — 25000242 PHARM REV CODE 250 ALT 637 W/ HCPCS: Performed by: PHYSICIAN ASSISTANT

## 2019-05-23 PROCEDURE — 99232 PR SUBSEQUENT HOSPITAL CARE,LEVL II: ICD-10-PCS | Mod: GC,,, | Performed by: INTERNAL MEDICINE

## 2019-05-23 PROCEDURE — 85025 COMPLETE CBC W/AUTO DIFF WBC: CPT

## 2019-05-23 PROCEDURE — 99291 PR CRITICAL CARE, E/M 30-74 MINUTES: ICD-10-PCS | Mod: GC,,, | Performed by: PSYCHIATRY & NEUROLOGY

## 2019-05-23 RX ORDER — HYDRALAZINE HYDROCHLORIDE 20 MG/ML
10 INJECTION INTRAMUSCULAR; INTRAVENOUS EVERY 6 HOURS PRN
Status: DISCONTINUED | OUTPATIENT
Start: 2019-05-23 | End: 2019-05-24

## 2019-05-23 RX ORDER — HYDRALAZINE HYDROCHLORIDE 50 MG/1
50 TABLET, FILM COATED ORAL EVERY 8 HOURS
Status: DISCONTINUED | OUTPATIENT
Start: 2019-05-23 | End: 2019-05-24

## 2019-05-23 RX ORDER — NICARDIPINE HYDROCHLORIDE 0.2 MG/ML
2 INJECTION INTRAVENOUS CONTINUOUS
Status: DISCONTINUED | OUTPATIENT
Start: 2019-05-23 | End: 2019-05-24

## 2019-05-23 RX ORDER — OXYCODONE HYDROCHLORIDE 5 MG/1
5 TABLET ORAL ONCE
Status: COMPLETED | OUTPATIENT
Start: 2019-05-23 | End: 2019-05-23

## 2019-05-23 RX ADMIN — IPRATROPIUM BROMIDE AND ALBUTEROL SULFATE 3 ML: .5; 3 SOLUTION RESPIRATORY (INHALATION) at 07:05

## 2019-05-23 RX ADMIN — ACETAMINOPHEN 650 MG: 325 TABLET ORAL at 03:05

## 2019-05-23 RX ADMIN — HYDRALAZINE HYDROCHLORIDE 50 MG: 50 TABLET ORAL at 09:05

## 2019-05-23 RX ADMIN — HYDRALAZINE HYDROCHLORIDE 10 MG: 20 INJECTION INTRAMUSCULAR; INTRAVENOUS at 03:05

## 2019-05-23 RX ADMIN — CARVEDILOL 12.5 MG: 12.5 TABLET, FILM COATED ORAL at 06:05

## 2019-05-23 RX ADMIN — LEVETIRACETAM 250 MG: 250 TABLET ORAL at 09:05

## 2019-05-23 RX ADMIN — OXYCODONE HYDROCHLORIDE 5 MG: 5 TABLET ORAL at 04:05

## 2019-05-23 RX ADMIN — ONDANSETRON 4 MG: 2 INJECTION INTRAMUSCULAR; INTRAVENOUS at 04:05

## 2019-05-23 RX ADMIN — IPRATROPIUM BROMIDE AND ALBUTEROL SULFATE 3 ML: .5; 3 SOLUTION RESPIRATORY (INHALATION) at 02:05

## 2019-05-23 RX ADMIN — ACETAMINOPHEN 650 MG: 325 TABLET ORAL at 12:05

## 2019-05-23 RX ADMIN — CARVEDILOL 12.5 MG: 12.5 TABLET, FILM COATED ORAL at 09:05

## 2019-05-23 RX ADMIN — DEXTROSE MONOHYDRATE 12.5 G: 500 INJECTION PARENTERAL at 06:05

## 2019-05-23 RX ADMIN — HYDRALAZINE HYDROCHLORIDE 50 MG: 50 TABLET ORAL at 10:05

## 2019-05-23 RX ADMIN — IPRATROPIUM BROMIDE AND ALBUTEROL SULFATE 3 ML: .5; 3 SOLUTION RESPIRATORY (INHALATION) at 08:05

## 2019-05-23 RX ADMIN — NICARDIPINE HYDROCHLORIDE 5 MG/HR: 0.2 INJECTION, SOLUTION INTRAVENOUS at 10:05

## 2019-05-23 NOTE — ASSESSMENT & PLAN NOTE
-HD MWF  -missed HD 5/20, so last dialysis was Friday 5/17  -nephrology consulted  -no need for urgent dialysis at this time, but patient should be dialyzed with Prismaflex should acute need for dialysis arise  Risks of developing clotting complications with prismaflex and need for anticoagulant use outweighs potential osmotic changes in subdural or underlying brain parenchyma  05/23: tolerated HD through graft yesterday

## 2019-05-23 NOTE — PLAN OF CARE
Problem: SLP Goal  Goal: SLP Goal  Speech Language Pathology Goals  Goals expected to be met by 5/29:  1. Patient will tolerate a regular solid diet and thin liquids with no overt signs of airway compromise.   2. Patient will complete mental manipulation/organization tasks with 80% acc given min A.   3. Patient will complete problem solving tasks with 80% acc indep.   4. Patient will provide +10 items within concrete category indep within 1 timed minute.   5. Patient will complete functional memory tasks with 80% acc indep.   6. Pt will participate in further assessment involving reading, writing, and visual spatial when appropriate.              Pt with good progress towards goals     Jessica Friedman MA, CCC-SLP  Speech Language Pathologist  Pager: [phone]  Date 5/23/2019

## 2019-05-23 NOTE — PROGRESS NOTES
Dialysis tx completed. 2.5 hours. 1 liter removed. Needles pulled from left arm AVG. Hemostasis achieved. Gauze and tape to sites. Tolerated tx well.

## 2019-05-23 NOTE — PROGRESS NOTES
[facility]  Neurosurgery  Progress Note    Subjective:     History of Present Illness: Ms. Georges is a 78 YO female with a history of ESRD, DM, aortic stenosis, CVA, diastolic HF who presents to [facility] [place] [place] ED today after an unwitnessed fall.  The patient was found sitting up on the floor with left periorbital edema by her granddaughter.  She does not remember falling.  Unknown loss of consciousness.  The patient currently reports a headache in the left frontal area.  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  She denies dizziness, focal weakness, confusion.  She admits to intermittent nausea but no vomiting.  There are no other associated signs or symptoms.  The patient has a history of Plavix usage, but is unable to give reason for Plavix.  She has a history of CVA with thrombectomy in 2016.  Blood pressure elevated to greater than 200 SBP on admission to ED.    Post-Op Info:  Procedure(s) (LRB):  CRANIOTOMY, FOR SUBDURAL HEMATOMA EVACUATION (Right)         Interval History: NAEON.     Medications:  Continuous Infusions:  Scheduled Meds:   albuterol-ipratropium  3 mL Nebulization Q6H WAKE    carvedilol  12.5 mg Oral BID    levETIRAcetam  250 mg Oral BID     PRN Meds:sodium chloride 0.9%, acetaminophen, dextrose 50%, glucagon (human recombinant), hydrALAZINE, insulin aspart U-100, ondansetron, sodium chloride 0.9%     Review of Systems  Objective:     Weight: 53.1 kg (117 lb)  Body mass index is 20.73 kg/m².  Vital Signs (Most Recent):  Temp: 98.5 °F (36.9 °C) (05/23/19 0300)  Pulse: (!) 59 (05/23/19 0705)  Resp: 16 (05/23/19 0705)  BP: (!) 183/77 (05/23/19 0705)  SpO2: 100 % (05/23/19 0705) Vital Signs (24h Range):  Temp:  [97.3 °F (36.3 °C)-98.6 °F (37 °C)] 98.5 °F (36.9 °C)  Pulse:  [54-66] 59  Resp:  [12-24] 16  SpO2:  [96 %-100 %] 100 %  BP: (132-190)/() 183/77              Hemodialysis Catheter 11/29/18 1650  right subclavian (Active)            Hemodialysis AV Fistula 05/23/16 0700 Left forearm (Active)            Hemodialysis AV Fistula Left upper arm (Active)       Neurosurgery Physical Exam    AAOx3, PERRL, EOMI, FS, TM, 5/5 throughout, SILT.  BL eyes swollen  Abdomen soft  No respiratory distress    Significant Labs:  Recent Labs   Lab 05/21/19  1354 05/22/19  0159 05/23/19  0143   * 85 74    140 137   K 5.0 5.3* 3.9    105 100   CO2 19* 19* 25   BUN 75* 83* 44*   CREATININE 7.8* 8.7* 5.5*   CALCIUM 9.8 9.4 9.2   MG 2.7* 2.8* 2.1     Recent Labs   Lab 05/21/19  1201 05/22/19  0159 05/23/19  0143   WBC 6.62 5.32 6.56   HGB 11.0* 9.3* 9.1*   HCT 36.5* 30.9* 29.7*   PLT 83* 108* 90*     Recent Labs   Lab 05/21/19  1201   INR 1.1   APTT 23.5     Microbiology Results (last 7 days)     Procedure Component Value Units Date/Time    Urine culture [[id number]] Collected:  05/21/19 1349    Order Status:  Completed Specimen:  Urine Updated:  05/22/19 1326     Urine Culture, Routine --     GRAM NEGATIVE NELIDA  >100,000 cfu/ml  Identification and susceptibility pending      Narrative:       Preferred Collection Type->Urine, Clean Catch  gray and yellow        All pertinent labs from the last 24 hours have been reviewed.    Significant Diagnostics:  I have reviewed all pertinent imaging results/findings within the past 24 hours.    Assessment/Plan:     * Subdural hematoma  80 YO female with complicated PMH presenting with acute right SDH s/p unwitnessed fall this AM.  Patient neurologically stable since admission. She will likely require right craniotomy with SDH evacuation.  Patient has been posted for tomorrow morning to allow medical optimization prior to surgical intervention.     Surgical intervention indefinitely delayed. Continue maximal medical management for time being. High risk for perioperative complication and pt is stable clinically.    --Continue care per primary team.  --Continue levetiracetam 250  bid.  --Continue blood pressure parameters, SBP < 160.  --OK to TTF, hospital medicine service, given significant medical comorbidities.   --Obtain CTH prior to DC  --PT/OT  --We will continue to monitor closely, please contact us with any questions or concerns.          Scar Moraels MD  Neurosurgery  [facility]

## 2019-05-23 NOTE — ASSESSMENT & PLAN NOTE
-SBP < 180  -discussed goal with NSGY and hospital medicine teams   -avoid nicardipine if possible  -resume carvedilol 12.5 mg bid  Apresoline added at 50mg tid, wean cardene

## 2019-05-23 NOTE — PLAN OF CARE
Problem: Adult Inpatient Plan of Care  Goal: Plan of Care Review  Outcome: Ongoing (interventions implemented as appropriate)  POC reviewed with pt at 1400.   Pt verbalized understanding.   Questions and concerns addressed.   See flowsheets for full assessment and VS info.    Pt SBP goal less than 160. Pt on and off cardene to maintain.   CT head done today.

## 2019-05-23 NOTE — SUBJECTIVE & OBJECTIVE
Review of Systems   Constitutional: Negative for chills.   HENT: Positive for facial swelling.    Eyes: Positive for pain.   Respiratory: Negative for chest tightness.    Cardiovascular: Negative for chest pain.   Gastrointestinal: Negative for abdominal pain.   Musculoskeletal: Negative.    Neurological: Positive for headaches.   Hematological: Negative for adenopathy.   Psychiatric/Behavioral: Negative for agitation.       Objective:     Vitals:  Temp: 97.8 °F (36.6 °C)  Pulse: (!) 59  Rhythm: normal sinus rhythm, sinus bradycardia  BP: (!) 156/69  MAP (mmHg): 99  Resp: 19  SpO2: 97 %  O2 Device (Oxygen Therapy): nasal cannula    Temp  Min: 97.3 °F (36.3 °C)  Max: 98.6 °F (37 °C)  Pulse  Min: 54  Max: 66  BP  Min: 132/57  Max: 190/77  MAP (mmHg)  Min: 89  Max: 142  Resp  Min: 12  Max: 24  SpO2  Min: 97 %  Max: 100 %    05/22 0701 - 05/23 0700  In: 650 [P.O.:350]  Out: 1300    Unmeasured Output  Urine Occurrence: 1  Stool Occurrence: 1  Pad Count: 1       Physical Exam   Constitutional:   Mild cachexia   HENT:   Head: Normocephalic.   Eyes: Pupils are equal, round, and reactive to light.   Neck: No JVD present.   Cardiovascular: Regular rhythm.   Pulmonary/Chest: Breath sounds normal.   Abdominal: Soft. Bowel sounds are normal.   Musculoskeletal: She exhibits no edema.   Neurological:   Awake  Left eye swollen shut  Full eye movements OD  Fully oriented,GCS 15  No motor asymmetry   Skin: Skin is dry.       Medications:  Continuous    nicardipine Last Rate: Stopped (05/23/19 1115)   Scheduled    albuterol-ipratropium 3 mL Q6H WAKE   carvedilol 12.5 mg BID   hydrALAZINE 50 mg Q8H   levETIRAcetam 250 mg BID   PRN    sodium chloride 0.9%  PRN   acetaminophen 650 mg Q6H PRN   dextrose 50% 12.5 g PRN   glucagon (human recombinant) 1 mg PRN   hydrALAZINE 10 mg Q6H PRN   insulin aspart U-100 1-10 Units Q6H PRN   ondansetron 4 mg Q6H PRN   sodium chloride 0.9% 10 mL PRN     Today I personally reviewed pertinent  medications, lines/drains/airways, imaging, laboratory results, notably:    Diet  Diet Cardiac Thin  Diet Cardiac Thin

## 2019-05-23 NOTE — PROGRESS NOTES
[facility]  Neurocritical Care  Progress Note    Admit Date: 5/21/2019  Service Date: 05/23/2019  Length of Stay: 2    Subjective:     Chief Complaint: Subdural hematoma    History of Present Illness: Tea Georges is a 79 year old patient with PMHx of HTN, ESRD on HD, HF, severe AS, right MCA stroke s/p thrombectomy in 2016, and COPD (on home O2) who presented to OSH after an unwitnessed fall this morning.  She was found down by grand daughter, so LOC is unknown.  At OSH, she was found to have bilateral SDH, R >L, with right to left midline shift. Plan to admit to NCC for optimization and to OR tomorrow for evacuation.    Hospital Course: 05/22: surgery on hold, will require dialysis, less chance for anticoagulant exposure if undergoes standard HD, follow exam closely pre and post dialysis  05/23: tolerated HD yesterday, I see no change in SDH thickness or midline shift, remains on small amount of cardene        Review of Systems   Constitutional: Negative for chills.   HENT: Positive for facial swelling.    Eyes: Positive for pain.   Respiratory: Negative for chest tightness.    Cardiovascular: Negative for chest pain.   Gastrointestinal: Negative for abdominal pain.   Musculoskeletal: Negative.    Neurological: Positive for headaches.   Hematological: Negative for adenopathy.   Psychiatric/Behavioral: Negative for agitation.       Objective:     Vitals:  Temp: 97.8 °F (36.6 °C)  Pulse: (!) 59  Rhythm: normal sinus rhythm, sinus bradycardia  BP: (!) 156/69  MAP (mmHg): 99  Resp: 19  SpO2: 97 %  O2 Device (Oxygen Therapy): nasal cannula    Temp  Min: 97.3 °F (36.3 °C)  Max: 98.6 °F (37 °C)  Pulse  Min: 54  Max: 66  BP  Min: 132/57  Max: 190/77  MAP (mmHg)  Min: 89  Max: 142  Resp  Min: 12  Max: 24  SpO2  Min: 97 %  Max: 100 %    05/22 0701 - 05/23 0700  In: 650 [P.O.:350]  Out: 1300    Unmeasured Output  Urine Occurrence: 1  Stool Occurrence: 1  Pad Count: 1       Physical Exam   Constitutional:    Mild cachexia   HENT:   Head: Normocephalic.   Eyes: Pupils are equal, round, and reactive to light.   Neck: No JVD present.   Cardiovascular: Regular rhythm.   Pulmonary/Chest: Breath sounds normal.   Abdominal: Soft. Bowel sounds are normal.   Musculoskeletal: She exhibits no edema.   Neurological:   Awake  Left eye swollen shut  Full eye movements OD  Fully oriented,GCS 15  No motor asymmetry   Skin: Skin is dry.       Medications:  Continuous    nicardipine Last Rate: Stopped (05/23/19 1115)   Scheduled    albuterol-ipratropium 3 mL Q6H WAKE   carvedilol 12.5 mg BID   hydrALAZINE 50 mg Q8H   levETIRAcetam 250 mg BID   PRN    sodium chloride 0.9%  PRN   acetaminophen 650 mg Q6H PRN   dextrose 50% 12.5 g PRN   glucagon (human recombinant) 1 mg PRN   hydrALAZINE 10 mg Q6H PRN   insulin aspart U-100 1-10 Units Q6H PRN   ondansetron 4 mg Q6H PRN   sodium chloride 0.9% 10 mL PRN     Today I personally reviewed pertinent medications, lines/drains/airways, imaging, laboratory results, notably:    Diet  Diet Cardiac Thin  Diet Cardiac Thin        Assessment/Plan:     Pulmonary  Pulmonary emphysema  -CXR  -resume home nebs      Cardiac/Vascular  (HFpEF) heart failure with preserved ejection fraction  Attempt even fluid balance, rate control, afterload reduction    Essential hypertension  -SBP < 180  -discussed goal with NSGY and hospital medicine teams   -avoid nicardipine if possible  -resume carvedilol 12.5 mg bid  Apresoline added at 50mg tid, wean cardene    Renal/  ESRD (end stage renal disease)  -HD MWF  -missed HD 5/20, so last dialysis was Friday 5/17  -nephrology consulted  -no need for urgent dialysis at this time, but patient should be dialyzed with Prismaflex should acute need for dialysis arise  Risks of developing clotting complications with prismaflex and need for anticoagulant use outweighs potential osmotic changes in subdural or underlying brain parenchyma  05/23: tolerated HD through graft  yesterday      Endocrine  Type 2 diabetes mellitus with chronic kidney disease on chronic dialysis, without long-term current use of insulin  -SSI while NPO  -Hgb A1C pending   Beginning diabetic diet, cont SSI          The patient is being Prophylaxed for:  Venous Thromboembolism with: Mechanical  Stress Ulcer with: None  Ventilator Pneumonia with: not applicable    Activity Orders          Diet Cardiac Thin: Cardiac starting at 05/22 1148      CRC 35 min  Full Code    Fuentes Damian MD  Neurocritical Care  [facility]

## 2019-05-23 NOTE — SUBJECTIVE & OBJECTIVE
Interval History: HD x 2hrs with low flows yesterday, tolerated without issue, BUN 83 --> 44    Review of patient's allergies indicates:  No Known Allergies  Current Facility-Administered Medications   Medication Frequency    0.9%  NaCl infusion PRN    acetaminophen tablet 650 mg Q6H PRN    albuterol-ipratropium 2.5 mg-0.5 mg/3 mL nebulizer solution 3 mL Q6H WAKE    carvedilol tablet 12.5 mg BID    dextrose 50% injection 12.5 g PRN    glucagon (human recombinant) injection 1 mg PRN    hydrALAZINE injection 10 mg Q6H PRN    hydrALAZINE tablet 50 mg Q8H    insulin aspart U-100 pen 1-10 Units Q6H PRN    levETIRAcetam tablet 250 mg BID    niCARdipine 40 mg/200 mL infusion Continuous    ondansetron injection 4 mg Q6H PRN    sodium chloride 0.9% flush 10 mL PRN       Objective:     Vital Signs (Most Recent):  Temp: 97.8 °F (36.6 °C) (05/23/19 0705)  Pulse: (!) 59 (05/23/19 1205)  Resp: 19 (05/23/19 1205)  BP: (!) 156/69 (05/23/19 1205)  SpO2: 97 % (05/23/19 1205)  O2 Device (Oxygen Therapy): nasal cannula (05/23/19 1105) Vital Signs (24h Range):  Temp:  [97.3 °F (36.3 °C)-98.6 °F (37 °C)] 97.8 °F (36.6 °C)  Pulse:  [54-66] 59  Resp:  [12-24] 19  SpO2:  [97 %-100 %] 97 %  BP: (132-190)/() 156/69     Weight: 53.1 kg (117 lb) (05/22/19 1000)  Body mass index is 20.73 kg/m².  Body surface area is 1.54 meters squared.    I/O last 3 completed shifts:  In: 650 [P.O.:350; Other:300]  Out: 1300 [Other:1300]    Physical Exam   HENT:   Head: Atraumatic.   Neck: No JVD present.   Cardiovascular: Normal rate and regular rhythm.   Pulmonary/Chest: Effort normal and breath sounds normal.   Abdominal: Soft. She exhibits no distension.   Musculoskeletal: She exhibits no edema or tenderness.   Neurological: She is alert.   Skin: Skin is warm.       Significant Labs:  CBC:   Recent Labs   Lab 05/23/19  0143   WBC 6.56   RBC 3.17*   HGB 9.1*   HCT 29.7*   PLT 90*   MCV 94   MCH 28.7   MCHC 30.6*     CMP:   Recent Labs    Lab 05/23/19  0143   GLU 74   CALCIUM 9.2   ALBUMIN 3.1*   PROT 6.3      K 3.9   CO2 25      BUN 44*   CREATININE 5.5*   ALKPHOS 80   ALT 6*   AST 9*   BILITOT 0.5     All labs within the past 24 hours have been reviewed.

## 2019-05-23 NOTE — PLAN OF CARE
Problem: Adult Inpatient Plan of Care  Goal: Plan of Care Review  Outcome: Ongoing (interventions implemented as appropriate)  POC reviewed with pt and family at 0400. Pt verbalized understanding. No change in neuro exam over night. Night bath given. PRN hydralazine 10 mg given twice over the night shift Questions and concerns addressed. No acute events today. Pt progressing toward goals. Will continue to monitor. See flowsheets for full assessment and VS info.

## 2019-05-23 NOTE — PROGRESS NOTES
[facility]  Nephrology  Progress Note    Patient Name: Tea Georges  MRN: [id number]  Admission Date: 5/21/2019  Hospital Length of Stay: 2 days  Attending Provider: Marty Salgado MD   Primary Care Physician: Parth Posadas Ii, MD  Principal Problem:Subdural hematoma    Subjective:     HPI: 78 y/o Black or -American woman ESRD on iHD (MWF), HTN, diastolic HF, right MCA CVA s/p thrombectomy (2016) admitted to [facility] diagnosed with Rt subdural hematoma.  She initially presented with hematoma of superior left eye, headache, nausea/vomiting, s/p unwitnessed fall 521/2019 in AM, and does not recall how she fell.  Patient accompanied by granddaughter who referred found her in the floor.  Denies any dizziness, light-headedness, numbness, weakness, chest pain, SOB, or any other associated symptoms.    Nephrology consulted for evaluation of management of ESRD and HD treatments.    Nephrology Hx:  She dialyzes at [facility] under the care of Dr. Coleman on a MWF schedule for 4 hrs   EDW 50 kg  Access: FLORENCE AVF ++ bruit/thrill  She maintains residual renal function and dialyzes for a 4 hour duration.  She reports now problems with her dialysis, no recent episodes of cramping with ultrafiltration.  Last dialysis prior to hospitalization:    Interval History: HD x 2hrs with low flows yesterday, tolerated without issue, BUN 83 --> 44    Review of patient's allergies indicates:  No Known Allergies  Current Facility-Administered Medications   Medication Frequency    0.9%  NaCl infusion PRN    acetaminophen tablet 650 mg Q6H PRN    albuterol-ipratropium 2.5 mg-0.5 mg/3 mL nebulizer solution 3 mL Q6H WAKE    carvedilol tablet 12.5 mg BID    dextrose 50% injection 12.5 g PRN    glucagon (human recombinant) injection 1 mg PRN    hydrALAZINE injection 10 mg Q6H PRN    hydrALAZINE tablet 50 mg Q8H    insulin aspart U-100 pen 1-10 Units Q6H PRN    levETIRAcetam tablet 250 mg BID    niCARdipine 40 mg/200  mL infusion Continuous    ondansetron injection 4 mg Q6H PRN    sodium chloride 0.9% flush 10 mL PRN       Objective:     Vital Signs (Most Recent):  Temp: 97.8 °F (36.6 °C) (05/23/19 0705)  Pulse: (!) 59 (05/23/19 1205)  Resp: 19 (05/23/19 1205)  BP: (!) 156/69 (05/23/19 1205)  SpO2: 97 % (05/23/19 1205)  O2 Device (Oxygen Therapy): nasal cannula (05/23/19 1105) Vital Signs (24h Range):  Temp:  [97.3 °F (36.3 °C)-98.6 °F (37 °C)] 97.8 °F (36.6 °C)  Pulse:  [54-66] 59  Resp:  [12-24] 19  SpO2:  [97 %-100 %] 97 %  BP: (132-190)/() 156/69     Weight: 53.1 kg (117 lb) (05/22/19 1000)  Body mass index is 20.73 kg/m².  Body surface area is 1.54 meters squared.    I/O last 3 completed shifts:  In: 650 [P.O.:350; Other:300]  Out: 1300 [Other:1300]    Physical Exam   HENT:   Head: Atraumatic.   Neck: No JVD present.   Cardiovascular: Normal rate and regular rhythm.   Pulmonary/Chest: Effort normal and breath sounds normal.   Abdominal: Soft. She exhibits no distension.   Musculoskeletal: She exhibits no edema or tenderness.   Neurological: She is alert.   Skin: Skin is warm.       Significant Labs:  CBC:   Recent Labs   Lab 05/23/19  0143   WBC 6.56   RBC 3.17*   HGB 9.1*   HCT 29.7*   PLT 90*   MCV 94   MCH 28.7   MCHC 30.6*     CMP:   Recent Labs   Lab 05/23/19 0143   GLU 74   CALCIUM 9.2   ALBUMIN 3.1*   PROT 6.3      K 3.9   CO2 25      BUN 44*   CREATININE 5.5*   ALKPHOS 80   ALT 6*   AST 9*   BILITOT 0.5     All labs within the past 24 hours have been reviewed.         Assessment/Plan:     * Subdural hematoma  - Managed by neurocritical care team, no increase in size per repeat CT this morning, reportedly NS not taking patient to OR, concern for shifts and possible increase in bleed with regular HD/SLED, discussed with neuro-critical care and CVVHD would be the desired RRT modality for this ESRD patient    ESRD (end stage renal disease)  Nephrology Hx:  She dialyzes at [facility] under the care  of Dr. Coleman on a MWF schedule for 4 hrs   EDW 50 kg  Access: FLORENCE AVF ++ bruit/thrill  She maintains residual renal function and dialyzes for a 4 hour duration.  She reports now problems with her dialysis, no recent episodes of cramping with ultrafiltration.  Last dialysis prior to hospitalization: 5/17/2019    RRT (HD) yesterday, tolerated fine    Plan:  - no RRT today  - tentatively for regular HD tomorrow          Thank you for your consult. I will follow-up with patient. Please contact us if you have any additional questions.    Scar Phillip MD  Nephrology  [facility]    ATTENDING PHYSICIAN ATTESTATION  I have personally interviewed and examined the patient. I thoroughly reviewed the demographic, clinical, laboratorial and imaging information available in medical records. I agree with the assessment and recommendations provided by the subspecialty resident. Dr. Phillip was under my supervision.

## 2019-05-23 NOTE — ASSESSMENT & PLAN NOTE
80 YO female with complicated PMH presenting with acute right SDH s/p unwitnessed fall this AM.  Patient neurologically stable since admission. She will likely require right craniotomy with SDH evacuation.  Patient has been posted for tomorrow morning to allow medical optimization prior to surgical intervention.     Surgical intervention indefinitely delayed. Continue maximal medical management for time being. High risk for perioperative complication and pt is stable clinically.    --Continue care per primary team.  --Continue levetiracetam 250 bid.  --Continue blood pressure parameters, SBP < 160.  --OK to TTF, hospital medicine service, given significant medical comorbidities.   --Obtain CTH prior to DC  --PT/OT  --We will continue to monitor closely, please contact us with any questions or concerns.

## 2019-05-23 NOTE — ASSESSMENT & PLAN NOTE
Nephrology Hx:  She dialyzes at [facility] under the care of Dr. Coleman on a MWF schedule for 4 hrs   EDW 50 kg  Access: FLORENCE AVF ++ bruit/thrill  She maintains residual renal function and dialyzes for a 4 hour duration.  She reports now problems with her dialysis, no recent episodes of cramping with ultrafiltration.  Last dialysis prior to hospitalization: 5/17/2019    RRT (HD) yesterday, tolerated fine    Plan:  - no RRT today  - tentatively for regular HD tomorrow

## 2019-05-23 NOTE — PROGRESS NOTES
1115- pt transferred to CT with RN via bed, portable monitor, O2, and ambubag.   1145- returned to room 9086  VSS. Will continue to monitor.

## 2019-05-23 NOTE — PT/OT/SLP PROGRESS
"Speech Language Pathology Treatment    Patient Name:  Tea Georges   MRN:  [id number]  Admitting Diagnosis: Subdural hematoma    Recommendations:                 General Recommendations:  Cognitive-linguistic therapy  Diet recommendations:  Regular, Liquid Diet Level: Thin   Aspiration Precautions: Standard aspiration precautions   General Precautions: Standard, vision impaired  Communication strategies:  none    Subjective     "I'm trying to get it together" Pt referring to difficulties with self care/self feeding with new onset of visual deficits 2/2 facial swelling post fall     Pain/Comfort:  · Pain Rating 1: 0/10  · Pain Rating Post-Intervention 1: 0/10    Objective:     Has the patient been evaluated by SLP for swallowing?   Yes  Keep patient NPO? No   Current Respiratory Status: room air      Pt is tolerating regular diet without difficulty. She reports minimal appetite and continues to warrant assistance with fixing her tray and self feeding 2/2 vision deficits at this time s/p fall. Pt was observed to consume single sip of thin liquids via straw and regular solids (grapes) without any clinical signs of aspiration. Pt to continue regular unrestricted diet.     Pt presenting with improved confusion compared to initial assessment. Pt demonstrated ability to independently provide 9 items per concrete category and increased readily to 12+ items with SLP min verbal semantic cues. Pt demonstrated ability to independently provide all ingredients needed and sequence steps to complete familiar homemade meal. Given granddaughters report of confusion upon initial assessment and current visual limitations and this time speech service will continue to monitor. Whiteboard current.         Assessment:     Tea Georges is a 79 y.o. female with an SLP diagnosis of Cognitive-Linguistic Impairment.    Goals:   Multidisciplinary Problems     SLP Goals        Problem: SLP Goal    Goal Priority Disciplines Outcome   SLP Goal     " SLP    Description:  Speech Language Pathology Goals  Goals expected to be met by 5/29:  1. Patient will tolerate a regular solid diet and thin liquids with no overt signs of airway compromise.   2. Patient will complete mental manipulation/organization tasks with 80% acc given min A.   3. Patient will complete problem solving tasks with 80% acc indep.   4. Patient will provide +10 items within concrete category indep within 1 timed minute.   5. Patient will complete functional memory tasks with 80% acc indep.   6. Pt will participate in further assessment involving reading, writing, and visual spatial when appropriate.                           Plan:     · Patient to be seen:  3 x/week   · Plan of Care expires:     · Plan of Care reviewed with:  patient, grandchild(li)   · SLP Follow-Up:  Yes       Discharge recommendations:  home(likely no ST f/u )   Barriers to Discharge:  None    Time Tracking:     SLP Treatment Date:   05/23/19  Speech Start Time:  0931  Speech Stop Time:  0947     Speech Total Time (min):  16 min    Billable Minutes: Speech Therapy Individual 8 and Treatment Swallowing Dysfunction 8    Jessica Friedman CCC-SLP  05/23/2019

## 2019-05-23 NOTE — SUBJECTIVE & OBJECTIVE
Interval History: NAEON.     Medications:  Continuous Infusions:  Scheduled Meds:   albuterol-ipratropium  3 mL Nebulization Q6H WAKE    carvedilol  12.5 mg Oral BID    levETIRAcetam  250 mg Oral BID     PRN Meds:sodium chloride 0.9%, acetaminophen, dextrose 50%, glucagon (human recombinant), hydrALAZINE, insulin aspart U-100, ondansetron, sodium chloride 0.9%     Review of Systems  Objective:     Weight: 53.1 kg (117 lb)  Body mass index is 20.73 kg/m².  Vital Signs (Most Recent):  Temp: 98.5 °F (36.9 °C) (05/23/19 0300)  Pulse: (!) 59 (05/23/19 0705)  Resp: 16 (05/23/19 0705)  BP: (!) 183/77 (05/23/19 0705)  SpO2: 100 % (05/23/19 0705) Vital Signs (24h Range):  Temp:  [97.3 °F (36.3 °C)-98.6 °F (37 °C)] 98.5 °F (36.9 °C)  Pulse:  [54-66] 59  Resp:  [12-24] 16  SpO2:  [96 %-100 %] 100 %  BP: (132-190)/() 183/77              Hemodialysis Catheter 11/29/18 1650 right subclavian (Active)            Hemodialysis AV Fistula 05/23/16 0700 Left forearm (Active)            Hemodialysis AV Fistula Left upper arm (Active)       Neurosurgery Physical Exam    AAOx3, PERRL, EOMI, FS, TM, 5/5 throughout, SILT.  BL eyes swollen  Abdomen soft  No respiratory distress    Significant Labs:  Recent Labs   Lab 05/21/19  1354 05/22/19  0159 05/23/19  0143   * 85 74    140 137   K 5.0 5.3* 3.9    105 100   CO2 19* 19* 25   BUN 75* 83* 44*   CREATININE 7.8* 8.7* 5.5*   CALCIUM 9.8 9.4 9.2   MG 2.7* 2.8* 2.1     Recent Labs   Lab 05/21/19  1201 05/22/19  0159 05/23/19  0143   WBC 6.62 5.32 6.56   HGB 11.0* 9.3* 9.1*   HCT 36.5* 30.9* 29.7*   PLT 83* 108* 90*     Recent Labs   Lab 05/21/19  1201   INR 1.1   APTT 23.5     Microbiology Results (last 7 days)     Procedure Component Value Units Date/Time    Urine culture [[id number]] Collected:  05/21/19 1349    Order Status:  Completed Specimen:  Urine Updated:  05/22/19 1326     Urine Culture, Routine --     GRAM NEGATIVE NELIDA  >100,000 cfu/ml  Identification and  susceptibility pending      Narrative:       Preferred Collection Type->Urine, Clean Catch  gray and yellow        All pertinent labs from the last 24 hours have been reviewed.    Significant Diagnostics:  I have reviewed all pertinent imaging results/findings within the past 24 hours.

## 2019-05-24 PROBLEM — I62.00 SUBDURAL HEMORRHAGE: Status: ACTIVE | Noted: 2019-05-24

## 2019-05-24 LAB
ALBUMIN SERPL BCP-MCNC: 3.1 G/DL (ref 3.5–5.2)
ALP SERPL-CCNC: 78 U/L (ref 55–135)
ALT SERPL W/O P-5'-P-CCNC: 6 U/L (ref 10–44)
ANION GAP SERPL CALC-SCNC: 16 MMOL/L (ref 8–16)
AST SERPL-CCNC: 10 U/L (ref 10–40)
BASOPHILS # BLD AUTO: 0.03 K/UL (ref 0–0.2)
BASOPHILS NFR BLD: 0.6 % (ref 0–1.9)
BILIRUB SERPL-MCNC: 0.4 MG/DL (ref 0.1–1)
BUN SERPL-MCNC: 61 MG/DL (ref 8–23)
CALCIUM SERPL-MCNC: 9.2 MG/DL (ref 8.7–10.5)
CHLORIDE SERPL-SCNC: 99 MMOL/L (ref 95–110)
CO2 SERPL-SCNC: 21 MMOL/L (ref 23–29)
CREAT SERPL-MCNC: 7.1 MG/DL (ref 0.5–1.4)
DIFFERENTIAL METHOD: ABNORMAL
EOSINOPHIL # BLD AUTO: 0.1 K/UL (ref 0–0.5)
EOSINOPHIL NFR BLD: 1.6 % (ref 0–8)
ERYTHROCYTE [DISTWIDTH] IN BLOOD BY AUTOMATED COUNT: 16.8 % (ref 11.5–14.5)
EST. GFR  (AFRICAN AMERICAN): 5.9 ML/MIN/1.73 M^2
EST. GFR  (NON AFRICAN AMERICAN): 5.1 ML/MIN/1.73 M^2
GLUCOSE SERPL-MCNC: 78 MG/DL (ref 70–110)
HCT VFR BLD AUTO: 32.1 % (ref 37–48.5)
HGB BLD-MCNC: 9.6 G/DL (ref 12–16)
IMM GRANULOCYTES # BLD AUTO: 0.02 K/UL (ref 0–0.04)
IMM GRANULOCYTES NFR BLD AUTO: 0.4 % (ref 0–0.5)
LYMPHOCYTES # BLD AUTO: 0.4 K/UL (ref 1–4.8)
LYMPHOCYTES NFR BLD: 7.4 % (ref 18–48)
MAGNESIUM SERPL-MCNC: 2.2 MG/DL (ref 1.6–2.6)
MCH RBC QN AUTO: 28.5 PG (ref 27–31)
MCHC RBC AUTO-ENTMCNC: 29.9 G/DL (ref 32–36)
MCV RBC AUTO: 95 FL (ref 82–98)
MONOCYTES # BLD AUTO: 0.6 K/UL (ref 0.3–1)
MONOCYTES NFR BLD: 12.4 % (ref 4–15)
NEUTROPHILS # BLD AUTO: 3.9 K/UL (ref 1.8–7.7)
NEUTROPHILS NFR BLD: 77.6 % (ref 38–73)
NRBC BLD-RTO: 0 /100 WBC
PHOSPHATE SERPL-MCNC: 7.7 MG/DL (ref 2.7–4.5)
PLATELET # BLD AUTO: 80 K/UL (ref 150–350)
PMV BLD AUTO: 13 FL (ref 9.2–12.9)
POCT GLUCOSE: 101 MG/DL (ref 70–110)
POCT GLUCOSE: 103 MG/DL (ref 70–110)
POCT GLUCOSE: 86 MG/DL (ref 70–110)
POTASSIUM SERPL-SCNC: 4.6 MMOL/L (ref 3.5–5.1)
PROT SERPL-MCNC: 6.6 G/DL (ref 6–8.4)
RBC # BLD AUTO: 3.37 M/UL (ref 4–5.4)
SODIUM SERPL-SCNC: 136 MMOL/L (ref 136–145)
WBC # BLD AUTO: 5 K/UL (ref 3.9–12.7)

## 2019-05-24 PROCEDURE — 92507 TX SP LANG VOICE COMM INDIV: CPT

## 2019-05-24 PROCEDURE — 25000003 PHARM REV CODE 250: Performed by: PHYSICIAN ASSISTANT

## 2019-05-24 PROCEDURE — 85025 COMPLETE CBC W/AUTO DIFF WBC: CPT

## 2019-05-24 PROCEDURE — 99232 SBSQ HOSP IP/OBS MODERATE 35: CPT | Mod: GC,,, | Performed by: INTERNAL MEDICINE

## 2019-05-24 PROCEDURE — 99232 PR SUBSEQUENT HOSPITAL CARE,LEVL II: ICD-10-PCS | Mod: GC,,, | Performed by: INTERNAL MEDICINE

## 2019-05-24 PROCEDURE — 99232 PR SUBSEQUENT HOSPITAL CARE,LEVL II: ICD-10-PCS | Mod: GC,,, | Performed by: NEUROLOGICAL SURGERY

## 2019-05-24 PROCEDURE — 99232 SBSQ HOSP IP/OBS MODERATE 35: CPT | Mod: GC,,, | Performed by: NEUROLOGICAL SURGERY

## 2019-05-24 PROCEDURE — 92526 ORAL FUNCTION THERAPY: CPT

## 2019-05-24 PROCEDURE — 99233 SBSQ HOSP IP/OBS HIGH 50: CPT | Mod: GC,,, | Performed by: PSYCHIATRY & NEUROLOGY

## 2019-05-24 PROCEDURE — 83735 ASSAY OF MAGNESIUM: CPT

## 2019-05-24 PROCEDURE — 63600175 PHARM REV CODE 636 W HCPCS: Performed by: NURSE PRACTITIONER

## 2019-05-24 PROCEDURE — 25000242 PHARM REV CODE 250 ALT 637 W/ HCPCS: Performed by: PHYSICIAN ASSISTANT

## 2019-05-24 PROCEDURE — 84100 ASSAY OF PHOSPHORUS: CPT

## 2019-05-24 PROCEDURE — 20600001 HC STEP DOWN PRIVATE ROOM

## 2019-05-24 PROCEDURE — 99233 PR SUBSEQUENT HOSPITAL CARE,LEVL III: ICD-10-PCS | Mod: GC,,, | Performed by: PSYCHIATRY & NEUROLOGY

## 2019-05-24 PROCEDURE — 25000003 PHARM REV CODE 250: Performed by: PSYCHIATRY & NEUROLOGY

## 2019-05-24 PROCEDURE — 27000221 HC OXYGEN, UP TO 24 HOURS

## 2019-05-24 PROCEDURE — 80053 COMPREHEN METABOLIC PANEL: CPT

## 2019-05-24 PROCEDURE — 94761 N-INVAS EAR/PLS OXIMETRY MLT: CPT

## 2019-05-24 PROCEDURE — 94640 AIRWAY INHALATION TREATMENT: CPT

## 2019-05-24 RX ORDER — DIPHENHYDRAMINE HYDROCHLORIDE 50 MG/ML
12.5 INJECTION INTRAMUSCULAR; INTRAVENOUS EVERY 4 HOURS PRN
Status: DISCONTINUED | OUTPATIENT
Start: 2019-05-24 | End: 2019-05-24

## 2019-05-24 RX ORDER — HYDROMORPHONE HCL IN 0.9% NACL 6 MG/30 ML
PATIENT CONTROLLED ANALGESIA SYRINGE INTRAVENOUS CONTINUOUS
Status: DISCONTINUED | OUTPATIENT
Start: 2019-05-24 | End: 2019-05-24

## 2019-05-24 RX ORDER — IBUPROFEN 200 MG
24 TABLET ORAL
Status: DISCONTINUED | OUTPATIENT
Start: 2019-05-24 | End: 2019-05-28 | Stop reason: HOSPADM

## 2019-05-24 RX ORDER — GLUCAGON 1 MG
1 KIT INJECTION
Status: DISCONTINUED | OUTPATIENT
Start: 2019-05-24 | End: 2019-05-28 | Stop reason: HOSPADM

## 2019-05-24 RX ORDER — SODIUM CHLORIDE 9 MG/ML
INJECTION, SOLUTION INTRAVENOUS
Status: DISCONTINUED | OUTPATIENT
Start: 2019-05-24 | End: 2019-05-28 | Stop reason: HOSPADM

## 2019-05-24 RX ORDER — INSULIN ASPART 100 [IU]/ML
1-10 INJECTION, SOLUTION INTRAVENOUS; SUBCUTANEOUS
Status: DISCONTINUED | OUTPATIENT
Start: 2019-05-24 | End: 2019-05-28 | Stop reason: HOSPADM

## 2019-05-24 RX ORDER — IBUPROFEN 200 MG
16 TABLET ORAL
Status: DISCONTINUED | OUTPATIENT
Start: 2019-05-24 | End: 2019-05-28 | Stop reason: HOSPADM

## 2019-05-24 RX ORDER — HYDRALAZINE HYDROCHLORIDE 50 MG/1
50 TABLET, FILM COATED ORAL ONCE
Status: COMPLETED | OUTPATIENT
Start: 2019-05-24 | End: 2019-05-24

## 2019-05-24 RX ORDER — NALOXONE HCL 0.4 MG/ML
0.02 VIAL (ML) INJECTION
Status: DISCONTINUED | OUTPATIENT
Start: 2019-05-24 | End: 2019-05-24

## 2019-05-24 RX ORDER — HYDRALAZINE HYDROCHLORIDE 50 MG/1
100 TABLET, FILM COATED ORAL EVERY 8 HOURS
Status: DISCONTINUED | OUTPATIENT
Start: 2019-05-24 | End: 2019-05-28 | Stop reason: HOSPADM

## 2019-05-24 RX ORDER — SODIUM CHLORIDE 9 MG/ML
INJECTION, SOLUTION INTRAVENOUS ONCE
Status: COMPLETED | OUTPATIENT
Start: 2019-05-24 | End: 2019-05-25

## 2019-05-24 RX ORDER — HYDRALAZINE HYDROCHLORIDE 20 MG/ML
10 INJECTION INTRAMUSCULAR; INTRAVENOUS EVERY 6 HOURS PRN
Status: CANCELLED | OUTPATIENT
Start: 2019-05-24

## 2019-05-24 RX ADMIN — HYDRALAZINE HYDROCHLORIDE 10 MG: 20 INJECTION INTRAMUSCULAR; INTRAVENOUS at 04:05

## 2019-05-24 RX ADMIN — IPRATROPIUM BROMIDE AND ALBUTEROL SULFATE 3 ML: .5; 3 SOLUTION RESPIRATORY (INHALATION) at 08:05

## 2019-05-24 RX ADMIN — HYDRALAZINE HYDROCHLORIDE 10 MG: 20 INJECTION INTRAMUSCULAR; INTRAVENOUS at 03:05

## 2019-05-24 RX ADMIN — LEVETIRACETAM 250 MG: 250 TABLET ORAL at 09:05

## 2019-05-24 RX ADMIN — CARVEDILOL 12.5 MG: 12.5 TABLET, FILM COATED ORAL at 09:05

## 2019-05-24 RX ADMIN — HYDRALAZINE HYDROCHLORIDE 50 MG: 50 TABLET ORAL at 05:05

## 2019-05-24 RX ADMIN — NICARDIPINE HYDROCHLORIDE 2.5 MG/HR: 0.2 INJECTION, SOLUTION INTRAVENOUS at 04:05

## 2019-05-24 RX ADMIN — HYDRALAZINE HYDROCHLORIDE 100 MG: 50 TABLET ORAL at 09:05

## 2019-05-24 RX ADMIN — IPRATROPIUM BROMIDE AND ALBUTEROL SULFATE 3 ML: .5; 3 SOLUTION RESPIRATORY (INHALATION) at 01:05

## 2019-05-24 RX ADMIN — HYDRALAZINE HYDROCHLORIDE: 20 INJECTION INTRAMUSCULAR; INTRAVENOUS at 11:05

## 2019-05-24 RX ADMIN — HYDRALAZINE HYDROCHLORIDE 50 MG: 50 TABLET ORAL at 09:05

## 2019-05-24 RX ADMIN — HYDRALAZINE HYDROCHLORIDE 100 MG: 50 TABLET ORAL at 02:05

## 2019-05-24 NOTE — PLAN OF CARE
Problem: Adult Inpatient Plan of Care  Goal: Plan of Care Review  Outcome: Ongoing (interventions implemented as appropriate)  POC reviewed with pt and family at 1700. Tea Georges and brother verbalized understanding and POC. Questions and concerns addressed. No acute events today. Pt progressing toward goals. Pt remained afebrile throughout shift. Transfer orders in place. Cardene gtt off. Will continue to monitor. See flowsheets for full assessment and VS info.

## 2019-05-24 NOTE — PLAN OF CARE
Neuro Critical Care Transfer of Care note    Date of Admit: 5/21/2019  Date of Transfer / Stepdown: 5/24/2019    Brief History of Present Illness:      Tea Georges is a 79 year old patient with PMHx of HTN, ESRD on HD, HF, severe AS, right MCA stroke s/p thrombectomy in 2016, and COPD (on home O2) who presented to OSH after an unwitnessed fall this morning.  She was found down by grand daughter, so LOC is unknown.  At OSH, she was found to have bilateral SDH, R >L, with right to left midline shift. Was admitted to [facility] with potential plan for neurosurgical intervention. Neurosurgery later decided intervention would no longer be warranted in this case.  During stay in neuro ICU, patient had HD. She is a patient who normally has this done MWF but had missed session and on admit, her electrolytes were notably abnormal. Her electrolytes have since improved with HD. Patient is at baseline concerning neurological exam. Will be stepping down to medicine per neurosurgery; cleared by hospital medicine, Dr. Salgado.    Hospital Course By Problem with Pertinent Findings:     Subdural hematoma  -Bilateral acute SDH, R>L with right to left midline shift   -was on plavix, platelets given at admit; holding antiplatelet until NSx (neurosurgery) clears for restart  -no surgical intervention per NSx  -SBP < 180   -keppra ppx for 2 weeks from 05/21 per NSx     History of right-sided cerebrovascular accident (CVA)  -history of in 2016  -thrombectomy at that time  -per patient, she returned to baseline and was able to ambulate on her own, perform ADLs     Blindness of right eye  -complication of uncontrolled DM     Pulmonary emphysema  -resume home nebs     (HFpEF) heart failure with preserved ejection fraction  Attempt even fluid balance, rate control, afterload reduction     Severe aortic stenosis  -echo pending   -resume home carvedilol 12.5 mg bid for rate control and afterload reduction     Essential hypertension  -SBP <  160  -discussed goal with NSGY  -resume carvedilol 12.5 mg bid  -hydralazine increased from 50mg tid to 100 mg tid     ESRD (end stage renal disease)  -HD MWF at home  -missed HD 5/20, so last dialysis before admission was Friday 5/17 05/22: tolerated HD through graft  05/24: probable HD today per nephro     Type 2 diabetes mellitus with chronic kidney disease on chronic dialysis, without long-term current use of insulin  A1C 5.4  Beginning diabetic diet, cont SSI    Consultants and Procedures:     Consultants:  Neurosurgery  Nephrology    Procedures:    HD    Transfer Information:     Diet:  Diabetic, cardiac thin    Physical Activity:  Per PT/OT    To Do / Pending Studies / Follow ups:  F/U with NSx regarding antiplatelets    [facility]  Neuro Crtical [facility]

## 2019-05-24 NOTE — PLAN OF CARE
Problem: Adult Inpatient Plan of Care  Goal: Plan of Care Review  POC reviewed with pt at 0500. Pt verbalized understanding. Questions and concerns addressed. No acute events overnight. Pt progressing toward goals. Will continue to monitor. See flowsheets for full assessment and VS info

## 2019-05-24 NOTE — ASSESSMENT & PLAN NOTE
-Bilateral acute SDH, R>L with right to left midline shift   -was on plavix, platelets given at admit   -no surgical intervention per NSx  -SBP < 180   -keppra ppx

## 2019-05-24 NOTE — ASSESSMENT & PLAN NOTE
-HD MWF at home  -missed HD 5/20, so last dialysis before admission was Friday 5/17 05/22: tolerated HD through graft  05/24: probable HD today per nephro

## 2019-05-24 NOTE — SUBJECTIVE & OBJECTIVE
Review of Systems   Constitutional: Negative for fever.   HENT: Negative for rhinorrhea and sinus pain.    Eyes: Positive for visual disturbance. Negative for pain.        Baseline blindness in right eye per patient along with baseline difficulty in opening either eyelid   Respiratory: Negative for cough, shortness of breath and wheezing.    Cardiovascular: Negative for chest pain, palpitations and leg swelling.   Gastrointestinal: Negative for constipation, diarrhea and nausea.   Genitourinary: Negative for dysuria and frequency.   Musculoskeletal: Negative for neck pain.   Skin: Negative for rash.   Neurological: Negative for dizziness, tremors, seizures, weakness, light-headedness and numbness.   Psychiatric/Behavioral: Negative for agitation.     Objective:     Vitals:  Temp: 97.7 °F (36.5 °C)  Pulse: (!) 59  Rhythm: sinus bradycardia  BP: (!) 162/72  MAP (mmHg): 103  Resp: 19  SpO2: 99 %  O2 Device (Oxygen Therapy): nasal cannula    Temp  Min: 97.6 °F (36.4 °C)  Max: 98.4 °F (36.9 °C)  Pulse  Min: 49  Max: 66  BP  Min: 131/62  Max: 184/77  MAP (mmHg)  Min: 84  Max: 132  Resp  Min: 10  Max: 24  SpO2  Min: 94 %  Max: 100 %    05/23 0701 - 05/24 0700  In: 417.9 [P.O.:300; I.V.:117.9]  Out: -    Unmeasured Output  Urine Occurrence: 1  Stool Occurrence: 1  Pad Count: 1       Physical Exam   Constitutional: No distress.   Eyes:   Baseline blindness in right eye and baseline difficulty in opening either eyelid   Neck: Normal range of motion. No JVD present.   Cardiovascular: Normal rate and regular rhythm.   Pulmonary/Chest: Effort normal and breath sounds normal. No respiratory distress. She has no wheezes.   Abdominal: Soft. Bowel sounds are normal. She exhibits no distension. There is no tenderness.   Musculoskeletal: She exhibits no edema.   Mild pain at right ankle due to PIV. Very limited ROM in RUE as movement worsens the pain per patient.   Neurological:   Alert and oriented x 4    Fluent speech    Baseline  blindness in right eyes and baseline difficulty in opening either eyelid per patient    5/5 all limbs except RLE (though patient does have antigravity seen in R hip) (also, patient states not moving RLE much as movement causes her pain in right ankle - site of PIV)   Skin: Skin is warm and dry. No rash noted. She is not diaphoretic.   Psychiatric: She has a normal mood and affect.     Medications:  Continuous Scheduled  albuterol-ipratropium 3 mL Q6H WAKE   carvedilol 12.5 mg BID   hydrALAZINE 100 mg Q8H   levETIRAcetam 250 mg BID   PRN  acetaminophen 650 mg Q6H PRN   dextrose 50% 12.5 g PRN   dextrose 50% 25 g PRN   glucagon (human recombinant) 1 mg PRN   glucose 16 g PRN   glucose 24 g PRN   hydrALAZINE 10 mg Q6H PRN   insulin aspart U-100 1-10 Units QID (AC + HS) PRN   ondansetron 4 mg Q6H PRN   sodium chloride 0.9% 10 mL PRN     Today I personally reviewed pertinent medications, laboratory results,  Diet  Diet Cardiac Thin  Diet Cardiac Thin

## 2019-05-24 NOTE — PROGRESS NOTES
[facility]  Nephrology  Progress Note    Patient Name: Tea Georges  MRN: [id number]  Admission Date: 5/21/2019  Hospital Length of Stay: 3 days  Attending Provider: Marty Salgado MD   Primary Care Physician: Parth Posadas Ii, MD  Principal Problem:Subdural hematoma    Subjective:     HPI: 78 y/o Black or -American woman ESRD on iHD (MWF), HTN, diastolic HF, right MCA CVA s/p thrombectomy (2016) admitted to [facility] diagnosed with Rt subdural hematoma.  She initially presented with hematoma of superior left eye, headache, nausea/vomiting, s/p unwitnessed fall 521/2019 in AM, and does not recall how she fell.  Patient accompanied by granddaughter who referred found her in the floor.  Denies any dizziness, light-headedness, numbness, weakness, chest pain, SOB, or any other associated symptoms.    Nephrology consulted for evaluation of management of ESRD and HD treatments.    Nephrology Hx:  She dialyzes at [facility] under the care of Dr. Coleman on a MWF schedule for 4 hrs   EDW 50 kg  Access: FLORENCE AVF ++ bruit/thrill  She maintains residual renal function and dialyzes for a 4 hour duration.  She reports now problems with her dialysis, no recent episodes of cramping with ultrafiltration.  Last dialysis prior to hospitalization:    Interval History: last RRT session 5/22, no issues    Review of patient's allergies indicates:  No Known Allergies  Current Facility-Administered Medications   Medication Frequency    acetaminophen tablet 650 mg Q6H PRN    albuterol-ipratropium 2.5 mg-0.5 mg/3 mL nebulizer solution 3 mL Q6H WAKE    carvedilol tablet 12.5 mg BID    dextrose 50% injection 12.5 g PRN    dextrose 50% injection 25 g PRN    glucagon (human recombinant) injection 1 mg PRN    glucose chewable tablet 16 g PRN    glucose chewable tablet 24 g PRN    hydrALAZINE injection 10 mg Q6H PRN    hydrALAZINE tablet 100 mg Q8H    insulin aspart U-100 pen 1-10 Units QID (AC + HS) PRN     levETIRAcetam tablet 250 mg BID    ondansetron injection 4 mg Q6H PRN    sodium chloride 0.9% flush 10 mL PRN       Objective:     Vital Signs (Most Recent):  Temp: 97.8 °F (36.6 °C) (05/24/19 1105)  Pulse: (!) 52 (05/24/19 1105)  Resp: 15 (05/24/19 1105)  BP: (!) 177/92 (05/24/19 1133)  SpO2: 100 % (05/24/19 1105)  O2 Device (Oxygen Therapy): nasal cannula (05/24/19 1105) Vital Signs (24h Range):  Temp:  [97.6 °F (36.4 °C)-98.4 °F (36.9 °C)] 97.8 °F (36.6 °C)  Pulse:  [49-66] 52  Resp:  [10-24] 15  SpO2:  [94 %-100 %] 100 %  BP: (131-184)/(53-99) 177/92     Weight: 53.1 kg (117 lb) (05/22/19 1000)  Body mass index is 20.73 kg/m².  Body surface area is 1.54 meters squared.    I/O last 3 completed shifts:  In: 817.9 [P.O.:400; I.V.:117.9; Other:300]  Out: 1300 [Other:1300]    Physical Exam   HENT:   Head: Atraumatic.   Neck: No JVD present.   Cardiovascular: Normal rate and regular rhythm.   Pulmonary/Chest: Effort normal and breath sounds normal.   Abdominal: Soft. She exhibits no distension.   Musculoskeletal: She exhibits no edema or tenderness.   Neurological: She is alert.   Skin: Skin is warm.       Significant Labs:  CBC:   Recent Labs   Lab 05/24/19  0345   WBC 5.00   RBC 3.37*   HGB 9.6*   HCT 32.1*   PLT 80*   MCV 95   MCH 28.5   MCHC 29.9*     CMP:   Recent Labs   Lab 05/24/19  0345   GLU 78   CALCIUM 9.2   ALBUMIN 3.1*   PROT 6.6      K 4.6   CO2 21*   CL 99   BUN 61*   CREATININE 7.1*   ALKPHOS 78   ALT 6*   AST 10   BILITOT 0.4     All labs within the past 24 hours have been reviewed.         Assessment/Plan:     * Subdural hematoma  - Managed by neurocritical care team    ESRD (end stage renal disease)  Nephrology Hx:  She dialyzes at [facility] under the care of Dr. Coleman on a MWF schedule for 4 hrs   EDW 50 kg  Access: FLORENCE AVF ++ bruit/thrill  She maintains residual renal function and dialyzes for a 4 hour duration.  She reports now problems with her dialysis, no recent episodes of cramping  with ultrafiltration.  Last dialysis prior to hospitalization: 5/17/2019    Last HD session on 5/22, remaining hemodynamically stable, neurocritical care ok with regular HD    Plan:  - tentatively for RRT (HD) at bedside later today          Thank you for your consult. I will follow-up with patient. Please contact us if you have any additional questions.    Scar Phillip MD  Nephrology  [facility]    ATTENDING PHYSICIAN ATTESTATION  I have personally interviewed and examined the patient. I thoroughly reviewed the demographic, clinical, laboratorial and imaging information available in medical records. I agree with the assessment and recommendations provided by the subspecialty resident. Dr. Phillip was under my supervision.

## 2019-05-24 NOTE — PROGRESS NOTES
[facility]  Neurocritical Care  Progress Note    Admit Date: 5/21/2019  Service Date: 05/24/2019  Length of Stay: 3    Subjective:     Chief Complaint: Subdural hematoma    History of Present Illness: Tea Georges is a 79 year old patient with PMHx of HTN, ESRD on HD, HF, severe AS, right MCA stroke s/p thrombectomy in 2016, and COPD (on home O2) who presented to OSH after an unwitnessed fall this morning.  She was found down by grand daughter, so LOC is unknown.  At OSH, she was found to have bilateral SDH, R >L, with right to left midline shift. Plan to admit to NCC for optimization and to OR tomorrow for evacuation.    Hospital Course: 05/22: surgery on hold, will require dialysis, less chance for anticoagulant exposure if undergoes standard HD, follow exam closely pre and post dialysis  05/23: tolerated HD yesterday, I see no change in SDH thickness or midline shift, remains on small amount of cardene  5/24: decreasing SBP parameters from < 180 to < 160; increasing scheduled hydralazine. Neurosurgery states okay to step down to Hospital Medicine. Patient without complaints this issue except mild pain at right ankle (IV site); she states that she prefers not to use pain medications.      Review of Systems   Constitutional: Negative for fever.   HENT: Negative for rhinorrhea and sinus pain.    Eyes: Positive for visual disturbance. Negative for pain.        Baseline blindness in right eye per patient along with baseline difficulty in opening either eyelid   Respiratory: Negative for cough, shortness of breath and wheezing.    Cardiovascular: Negative for chest pain, palpitations and leg swelling.   Gastrointestinal: Negative for constipation, diarrhea and nausea.   Genitourinary: Negative for dysuria and frequency.   Musculoskeletal: Negative for neck pain.   Skin: Negative for rash.   Neurological: Negative for dizziness, tremors, seizures, weakness, light-headedness and numbness.    Psychiatric/Behavioral: Negative for agitation.     Objective:     Vitals:  Temp: 97.7 °F (36.5 °C)  Pulse: (!) 59  Rhythm: sinus bradycardia  BP: (!) 162/72  MAP (mmHg): 103  Resp: 19  SpO2: 99 %  O2 Device (Oxygen Therapy): nasal cannula    Temp  Min: 97.6 °F (36.4 °C)  Max: 98.4 °F (36.9 °C)  Pulse  Min: 49  Max: 66  BP  Min: 131/62  Max: 184/77  MAP (mmHg)  Min: 84  Max: 132  Resp  Min: 10  Max: 24  SpO2  Min: 94 %  Max: 100 %    05/23 0701 - 05/24 0700  In: 417.9 [P.O.:300; I.V.:117.9]  Out: -    Unmeasured Output  Urine Occurrence: 1  Stool Occurrence: 1  Pad Count: 1       Physical Exam   Constitutional: No distress.   Eyes:   Baseline blindness in right eye and baseline difficulty in opening either eyelid   Neck: Normal range of motion. No JVD present.   Cardiovascular: Normal rate and regular rhythm.   Pulmonary/Chest: Effort normal and breath sounds normal. No respiratory distress. She has no wheezes.   Abdominal: Soft. Bowel sounds are normal. She exhibits no distension. There is no tenderness.   Musculoskeletal: She exhibits no edema.   Mild pain at right ankle due to PIV. Very limited ROM in RUE as movement worsens the pain per patient.   Neurological:   Alert and oriented x 4    Fluent speech    Baseline blindness in right eyes and baseline difficulty in opening either eyelid per patient    5/5 all limbs except RLE (though patient does have antigravity seen in R hip) (also, patient states not moving RLE much as movement causes her pain in right ankle - site of PIV)   Skin: Skin is warm and dry. No rash noted. She is not diaphoretic.   Psychiatric: She has a normal mood and affect.     Medications:  Continuous Scheduled  albuterol-ipratropium 3 mL Q6H WAKE   carvedilol 12.5 mg BID   hydrALAZINE 100 mg Q8H   levETIRAcetam 250 mg BID   PRN  acetaminophen 650 mg Q6H PRN   dextrose 50% 12.5 g PRN   dextrose 50% 25 g PRN   glucagon (human recombinant) 1 mg PRN   glucose 16 g PRN   glucose 24 g PRN    hydrALAZINE 10 mg Q6H PRN   insulin aspart U-100 1-10 Units QID (AC + HS) PRN   ondansetron 4 mg Q6H PRN   sodium chloride 0.9% 10 mL PRN     Today I personally reviewed pertinent medications, laboratory results,  Diet  Diet Cardiac Thin  Diet Cardiac Thin      Assessment/Plan:     Neuro  * Subdural hematoma  -Bilateral acute SDH, R>L with right to left midline shift   -was on plavix, platelets given at admit   -no surgical intervention per NSx  -SBP < 180   -keppra ppx    Right-sided cerebrovascular accident (CVA)  -history of in 2016  -thrombectomy at that time  -per patient, she returned to baseline and was able to ambulate on her own, perform ADLs    Ophtho  Blindness of right eye  -complication of uncontrolled DM    Pulmonary  Pulmonary emphysema  -resume home nebs      Cardiac/Vascular  (HFpEF) heart failure with preserved ejection fraction  Attempt even fluid balance, rate control, afterload reduction    Severe aortic stenosis  -echo pending   -resume home carvedilol 12.5 mg bid for rate control and afterload reduction    Essential hypertension  -SBP < 160  -discussed goal with NSGY  -resume carvedilol 12.5 mg bid  -hydralazine increased from 50mg tid to 100 mg tid    Renal/  ESRD (end stage renal disease)  -HD MWF at home  -missed HD 5/20, so last dialysis before admission was Friday 5/17 05/22: tolerated HD through graft  05/24: probable HD today per nephro    Endocrine  Type 2 diabetes mellitus with chronic kidney disease on chronic dialysis, without long-term current use of insulin  A1C 5.4  Beginning diabetic diet, cont SSI          The patient is being Prophylaxed for:  Venous Thromboembolism with: Mechanical  Stress Ulcer with: Not Applicable   Ventilator Pneumonia with: not applicable    Activity Orders          Diet Cardiac Thin: Cardiac starting at 05/22 1148        Full Code    Christian Bowen MD  Neurocritical Care  [facility]

## 2019-05-24 NOTE — SUBJECTIVE & OBJECTIVE
Interval History: last RRT session 5/22, no issues    Review of patient's allergies indicates:  No Known Allergies  Current Facility-Administered Medications   Medication Frequency    acetaminophen tablet 650 mg Q6H PRN    albuterol-ipratropium 2.5 mg-0.5 mg/3 mL nebulizer solution 3 mL Q6H WAKE    carvedilol tablet 12.5 mg BID    dextrose 50% injection 12.5 g PRN    dextrose 50% injection 25 g PRN    glucagon (human recombinant) injection 1 mg PRN    glucose chewable tablet 16 g PRN    glucose chewable tablet 24 g PRN    hydrALAZINE injection 10 mg Q6H PRN    hydrALAZINE tablet 100 mg Q8H    insulin aspart U-100 pen 1-10 Units QID (AC + HS) PRN    levETIRAcetam tablet 250 mg BID    ondansetron injection 4 mg Q6H PRN    sodium chloride 0.9% flush 10 mL PRN       Objective:     Vital Signs (Most Recent):  Temp: 97.8 °F (36.6 °C) (05/24/19 1105)  Pulse: (!) 52 (05/24/19 1105)  Resp: 15 (05/24/19 1105)  BP: (!) 177/92 (05/24/19 1133)  SpO2: 100 % (05/24/19 1105)  O2 Device (Oxygen Therapy): nasal cannula (05/24/19 1105) Vital Signs (24h Range):  Temp:  [97.6 °F (36.4 °C)-98.4 °F (36.9 °C)] 97.8 °F (36.6 °C)  Pulse:  [49-66] 52  Resp:  [10-24] 15  SpO2:  [94 %-100 %] 100 %  BP: (131-184)/(53-99) 177/92     Weight: 53.1 kg (117 lb) (05/22/19 1000)  Body mass index is 20.73 kg/m².  Body surface area is 1.54 meters squared.    I/O last 3 completed shifts:  In: 817.9 [P.O.:400; I.V.:117.9; Other:300]  Out: 1300 [Other:1300]    Physical Exam   HENT:   Head: Atraumatic.   Neck: No JVD present.   Cardiovascular: Normal rate and regular rhythm.   Pulmonary/Chest: Effort normal and breath sounds normal.   Abdominal: Soft. She exhibits no distension.   Musculoskeletal: She exhibits no edema or tenderness.   Neurological: She is alert.   Skin: Skin is warm.       Significant Labs:  CBC:   Recent Labs   Lab 05/24/19  0345   WBC 5.00   RBC 3.37*   HGB 9.6*   HCT 32.1*   PLT 80*   MCV 95   MCH 28.5   MCHC 29.9*     CMP:    Recent Labs   Lab 05/24/19  0345   GLU 78   CALCIUM 9.2   ALBUMIN 3.1*   PROT 6.6      K 4.6   CO2 21*   CL 99   BUN 61*   CREATININE 7.1*   ALKPHOS 78   ALT 6*   AST 10   BILITOT 0.4     All labs within the past 24 hours have been reviewed.

## 2019-05-24 NOTE — ASSESSMENT & PLAN NOTE
-SBP < 160  -discussed goal with NSGY  -resume carvedilol 12.5 mg bid  -hydralazine increased from 50mg tid to 100 mg tid

## 2019-05-24 NOTE — NURSING TRANSFER
Nursing Transfer Note      5/24/2019     Transfer To: [facility] [id number]    Transfer via bed    Transfer with  to O2, cardiac monitoring    Transported by Stacia Dawson RN    Medicines sent: NA    Chart send with patient: Yes    Notified: daughter    Patient reassessed at:05/24/18, 0430     Upon arrival to floor: cardiac monitor applied, patient oriented to room, call bell in reach and bed in lowest position    Bedside Neuro completed with Heriberto Guzman RN

## 2019-05-24 NOTE — SUBJECTIVE & OBJECTIVE
Interval History: 5/24: NAEON, AFVSS, Exam stable, stable for TTF onto medicine service    Medications:  Continuous Infusions:  Scheduled Meds:   sodium chloride 0.9%   Intravenous Once    albuterol-ipratropium  3 mL Nebulization Q6H WAKE    carvedilol  12.5 mg Oral BID    hydrALAZINE  100 mg Oral Q8H    levETIRAcetam  250 mg Oral BID     PRN Meds:sodium chloride 0.9%, acetaminophen, dextrose 50%, dextrose 50%, glucagon (human recombinant), glucose, glucose, hydrALAZINE, insulin aspart U-100, ondansetron, sodium chloride 0.9%     Review of Systems    Objective:     Weight: 53.1 kg (117 lb)  Body mass index is 20.73 kg/m².  Vital Signs (Most Recent):  Temp: 97.8 °F (36.6 °C) (05/24/19 1105)  Pulse: (!) 52 (05/24/19 1331)  Resp: 15 (05/24/19 1331)  BP: (!) 140/78 (05/24/19 1305)  SpO2: 100 % (05/24/19 1331) Vital Signs (24h Range):  Temp:  [97.6 °F (36.4 °C)-98.4 °F (36.9 °C)] 97.8 °F (36.6 °C)  Pulse:  [49-66] 52  Resp:  [10-24] 15  SpO2:  [94 %-100 %] 100 %  BP: (131-184)/(53-99) 140/78              Hemodialysis Catheter 11/29/18 1650 right subclavian (Active)            Hemodialysis AV Fistula 05/23/16 0700 Left forearm (Active)            Hemodialysis AV Fistula Left upper arm (Active)       Neurosurgery Physical Exam  AAOx3, PERRL, EOMI, FS, TM, 5/5 throughout, SILT.  BL eyes swollen, blind in R eye   Abdomen soft  No respiratory distress    Significant Labs:  Recent Labs   Lab 05/23/19  0143 05/24/19  0345   GLU 74 78    136   K 3.9 4.6    99   CO2 25 21*   BUN 44* 61*   CREATININE 5.5* 7.1*   CALCIUM 9.2 9.2   MG 2.1 2.2     Recent Labs   Lab 05/23/19  0143 05/24/19  0345   WBC 6.56 5.00   HGB 9.1* 9.6*   HCT 29.7* 32.1*   PLT 90* 80*     No results for input(s): LABPT, INR, APTT in the last 48 hours.  Microbiology Results (last 7 days)     Procedure Component Value Units Date/Time    Urine culture [[id number]]  (Susceptibility) Collected:  05/21/19 1349    Order Status:  Completed Specimen:   Urine Updated:  05/23/19 1782     Urine Culture, Routine --     ESCHERICHIA COLI  >100,000 cfu/ml      Narrative:       Preferred Collection Type->Urine, Clean Catch  gray and yellow        All pertinent labs from the last 24 hours have been reviewed.    Significant Diagnostics:  I have reviewed all pertinent imaging results/findings within the past 24 hours.

## 2019-05-24 NOTE — HOSPITAL COURSE
5/24: ISIS IZQUIERDO, Exam stable, stable for TTF onto medicine service  5/25: TTF to medicine service

## 2019-05-24 NOTE — PT/OT/SLP PROGRESS
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   Yes  Keep patient NPO? No   Current Respiratory Status: room air      Pt is tolerating regular diet without difficulty. SLP assisted pt in oral care and placing upper and lower dentures upon arrival. Pt warrants assistance with fixing her tray and self feeding 2/2 vision deficits at this time s/p fall.  Pt was observed to consume single sip of thin liquids via straw and regular solids without any clinical signs of aspiration. Pt to continue regular unrestricted diet yet will warrant 1:1 assistance given current vision deficits at this time.    Pt appropriately conversationally engaging. Pt oriented x4. Pt demonstrated ability to independently provide an avg of 8 items per concrete categories independently and easily increases to 10+ with min semantic cueing. Pt completed mental flexibility tasks w/ 80% acc independently. Given pt underlying vision deficits at this time SLP will continue to monitor and assess read/writing/spatial skills as facial swelling subsides and vision improves.  Pt would benefit from PT/OT orders and assessment and will plan to discuss with medical team for baseline assessment and monitoring prior to pending surgical " intervention. Whiteboard current.         Assessment:     Tea Georges is a 79 y.o. 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    Goals:   Multidisciplinary Problems     SLP Goals        Problem: SLP Goal    Goal Priority Disciplines Outcome   SLP Goal     SLP    Description:  Speech Language Pathology Goals  Goals expected to be met by 5/29:  1. Patient will tolerate a regular solid diet and thin liquids with no overt signs of airway compromise.   2. Patient will complete mental manipulation/organization tasks with 80% acc given min A.   3. Patient will complete problem solving tasks with 80% acc indep.   4. Patient will provide +10 items within concrete category indep within 1 timed minute.   5. Patient will complete functional memory tasks with 80% acc indep.   6. Pt will participate in further assessment involving reading, writing, and visual spatial when appropriate.                           Plan:     · Patient to be seen:  3 x/week   · Plan of Care expires:     · Plan of Care reviewed with:  patient   · SLP Follow-Up:  Yes       Discharge recommendations:  home(likely no s/t follow up )   Barriers to Discharge:  Level of Skilled Assistance Needed      Time Tracking:     SLP Treatment Date:   05/24/19  Speech Start Time:  0856  Speech Stop Time:  0917     Speech Total Time (min):  21 min    Billable Minutes: Speech Therapy Individual 10 and Treatment Swallowing Dysfunction 11    Jessica Friedman CCC-SLP  05/24/2019

## 2019-05-24 NOTE — ASSESSMENT & PLAN NOTE
Nephrology Hx:  She dialyzes at [facility] under the care of Dr. Coleman on a MWF schedule for 4 hrs   EDW 50 kg  Access: [place] AVF ++ bruit/thrill  She maintains residual renal function and dialyzes for a 4 hour duration.  She reports now problems with her dialysis, no recent episodes of cramping with ultrafiltration.  Last dialysis prior to hospitalization: 5/17/2019    Last HD session on 5/22, remaining hemodynamically stable, neurocritical care ok with regular HD    Plan:  - tentatively for RRT (HD) at bedside later today

## 2019-05-24 NOTE — PLAN OF CARE
Problem: SLP Goal  Goal: SLP Goal  Speech Language Pathology Goals  Goals expected to be met by 5/29:  1. Patient will tolerate a regular solid diet and thin liquids with no overt signs of airway compromise.   2. Patient will complete mental manipulation/organization tasks with 80% acc given min A.   3. Patient will complete problem solving tasks with 80% acc indep.   4. Patient will provide +10 items within concrete category indep within 1 timed minute.   5. Patient will complete functional memory tasks with 80% acc indep.   6. Pt will participate in further assessment involving reading, writing, and visual spatial when appropriate.            Pt with good progress towards goals     Jessica Friedman MS, CCC-SLP  Speech Language Pathologist  Pager: [phone]  Date 5/24/2019

## 2019-05-24 NOTE — ASSESSMENT & PLAN NOTE
80 YO female with complicated PMH presenting with acute right SDH s/p unwitnessed fall this AM.  Patient neurologically stable since admission. She will likely require right craniotomy with SDH evacuation.  Patient has been postponed to allow medical optimization prior to surgical intervention.     --Continue care per primary team.  --Continue levetiracetam 250 bid.  --CTH reviewed 5/24  --Continue blood pressure parameters, SBP < 160.  --OK to TTF, hospital medicine service, given significant medical comorbidities.   --Obtain CTH prior to DC  --PT/OT/OOB  --We will continue to monitor closely, please contact us with any questions or concerns.

## 2019-05-24 NOTE — PLAN OF CARE
IMB to assume care of patient when she arrives on the floor.    Tea Georges is a 79 year old patient with PMHx of HTN, ESRD on HD, HF, severe AS, right MCA stroke s/p thrombectomy in 2016, and COPD (on home O2) who presented to OSH after an unwitnessed fall this morning.  She was found down by grand daughter, so LOC is unknown.  At OSH, she was found to have bilateral SDH, R >L, with right to left midline shift. Plan to admit to NCC for optimization and to OR tomorrow for evacuation.     Hospital Course: 05/22: surgery on hold, will require dialysis, less chance for anticoagulant exposure if undergoes standard HD, follow exam closely pre and post dialysis  05/23: tolerated HD yesterday, I see no change in SDH thickness or midline shift, remains on small amount of cardene  5/24: decreasing SBP parameters from < 180 to < 160; increasing scheduled hydralazine. Neurosurgery states okay to step down to their service. Patient without complaints this issue except mild pain at right ankle (IV site); she states that she prefers not to use pain medications.

## 2019-05-25 LAB
ALBUMIN SERPL BCP-MCNC: 2.9 G/DL (ref 3.5–5.2)
ALP SERPL-CCNC: 76 U/L (ref 55–135)
ALT SERPL W/O P-5'-P-CCNC: <5 U/L (ref 10–44)
ANION GAP SERPL CALC-SCNC: 12 MMOL/L (ref 8–16)
AST SERPL-CCNC: 7 U/L (ref 10–40)
BASOPHILS # BLD AUTO: 0.02 K/UL (ref 0–0.2)
BASOPHILS NFR BLD: 0.4 % (ref 0–1.9)
BILIRUB SERPL-MCNC: 0.4 MG/DL (ref 0.1–1)
BUN SERPL-MCNC: 79 MG/DL (ref 8–23)
CALCIUM SERPL-MCNC: 9.4 MG/DL (ref 8.7–10.5)
CHLORIDE SERPL-SCNC: 98 MMOL/L (ref 95–110)
CO2 SERPL-SCNC: 23 MMOL/L (ref 23–29)
CREAT SERPL-MCNC: 8.4 MG/DL (ref 0.5–1.4)
DIFFERENTIAL METHOD: ABNORMAL
EOSINOPHIL # BLD AUTO: 0.1 K/UL (ref 0–0.5)
EOSINOPHIL NFR BLD: 2 % (ref 0–8)
ERYTHROCYTE [DISTWIDTH] IN BLOOD BY AUTOMATED COUNT: 16.7 % (ref 11.5–14.5)
EST. GFR  (AFRICAN AMERICAN): 4.8 ML/MIN/1.73 M^2
EST. GFR  (NON AFRICAN AMERICAN): 4.2 ML/MIN/1.73 M^2
GLUCOSE SERPL-MCNC: 129 MG/DL (ref 70–110)
HCT VFR BLD AUTO: 29.3 % (ref 37–48.5)
HGB BLD-MCNC: 9 G/DL (ref 12–16)
IMM GRANULOCYTES # BLD AUTO: 0.02 K/UL (ref 0–0.04)
IMM GRANULOCYTES NFR BLD AUTO: 0.4 % (ref 0–0.5)
LYMPHOCYTES # BLD AUTO: 0.5 K/UL (ref 1–4.8)
LYMPHOCYTES NFR BLD: 9.6 % (ref 18–48)
MAGNESIUM SERPL-MCNC: 2.3 MG/DL (ref 1.6–2.6)
MCH RBC QN AUTO: 29 PG (ref 27–31)
MCHC RBC AUTO-ENTMCNC: 30.7 G/DL (ref 32–36)
MCV RBC AUTO: 95 FL (ref 82–98)
MONOCYTES # BLD AUTO: 0.8 K/UL (ref 0.3–1)
MONOCYTES NFR BLD: 16.4 % (ref 4–15)
NEUTROPHILS # BLD AUTO: 3.6 K/UL (ref 1.8–7.7)
NEUTROPHILS NFR BLD: 71.2 % (ref 38–73)
NRBC BLD-RTO: 0 /100 WBC
PHOSPHATE SERPL-MCNC: 8.2 MG/DL (ref 2.7–4.5)
PLATELET # BLD AUTO: 75 K/UL (ref 150–350)
PMV BLD AUTO: 12.8 FL (ref 9.2–12.9)
POCT GLUCOSE: 104 MG/DL (ref 70–110)
POCT GLUCOSE: 106 MG/DL (ref 70–110)
POCT GLUCOSE: 134 MG/DL (ref 70–110)
POTASSIUM SERPL-SCNC: 4.6 MMOL/L (ref 3.5–5.1)
PROT SERPL-MCNC: 6.2 G/DL (ref 6–8.4)
RBC # BLD AUTO: 3.1 M/UL (ref 4–5.4)
SODIUM SERPL-SCNC: 133 MMOL/L (ref 136–145)
WBC # BLD AUTO: 5.01 K/UL (ref 3.9–12.7)

## 2019-05-25 PROCEDURE — 25000003 PHARM REV CODE 250: Performed by: PHYSICIAN ASSISTANT

## 2019-05-25 PROCEDURE — 25000003 PHARM REV CODE 250: Performed by: PSYCHIATRY & NEUROLOGY

## 2019-05-25 PROCEDURE — 84100 ASSAY OF PHOSPHORUS: CPT

## 2019-05-25 PROCEDURE — 20600001 HC STEP DOWN PRIVATE ROOM

## 2019-05-25 PROCEDURE — 94761 N-INVAS EAR/PLS OXIMETRY MLT: CPT

## 2019-05-25 PROCEDURE — 80053 COMPREHEN METABOLIC PANEL: CPT

## 2019-05-25 PROCEDURE — 83735 ASSAY OF MAGNESIUM: CPT

## 2019-05-25 PROCEDURE — 27000221 HC OXYGEN, UP TO 24 HOURS

## 2019-05-25 PROCEDURE — 25000242 PHARM REV CODE 250 ALT 637 W/ HCPCS: Performed by: PHYSICIAN ASSISTANT

## 2019-05-25 PROCEDURE — 99232 SBSQ HOSP IP/OBS MODERATE 35: CPT | Mod: ,,, | Performed by: INTERNAL MEDICINE

## 2019-05-25 PROCEDURE — 80100016 HC MAINTENANCE HEMODIALYSIS

## 2019-05-25 PROCEDURE — 25000003 PHARM REV CODE 250: Performed by: INTERNAL MEDICINE

## 2019-05-25 PROCEDURE — 90935 HEMODIALYSIS ONE EVALUATION: CPT

## 2019-05-25 PROCEDURE — 36415 COLL VENOUS BLD VENIPUNCTURE: CPT

## 2019-05-25 PROCEDURE — 85025 COMPLETE CBC W/AUTO DIFF WBC: CPT

## 2019-05-25 PROCEDURE — 99232 PR SUBSEQUENT HOSPITAL CARE,LEVL II: ICD-10-PCS | Mod: ,,, | Performed by: INTERNAL MEDICINE

## 2019-05-25 PROCEDURE — 94640 AIRWAY INHALATION TREATMENT: CPT

## 2019-05-25 RX ORDER — HYDRALAZINE HYDROCHLORIDE 25 MG/1
25 TABLET, FILM COATED ORAL EVERY 8 HOURS
Status: DISCONTINUED | OUTPATIENT
Start: 2019-05-25 | End: 2019-05-25

## 2019-05-25 RX ORDER — HYDRALAZINE HYDROCHLORIDE 25 MG/1
25 TABLET, FILM COATED ORAL ONCE
Status: COMPLETED | OUTPATIENT
Start: 2019-05-25 | End: 2019-05-25

## 2019-05-25 RX ADMIN — LEVETIRACETAM 250 MG: 250 TABLET ORAL at 10:05

## 2019-05-25 RX ADMIN — IPRATROPIUM BROMIDE AND ALBUTEROL SULFATE 3 ML: .5; 3 SOLUTION RESPIRATORY (INHALATION) at 08:05

## 2019-05-25 RX ADMIN — HYDRALAZINE HYDROCHLORIDE 100 MG: 50 TABLET ORAL at 01:05

## 2019-05-25 RX ADMIN — CARVEDILOL 12.5 MG: 12.5 TABLET, FILM COATED ORAL at 10:05

## 2019-05-25 RX ADMIN — HYDRALAZINE HYDROCHLORIDE 100 MG: 50 TABLET ORAL at 06:05

## 2019-05-25 RX ADMIN — SODIUM CHLORIDE 300 ML: 0.9 INJECTION, SOLUTION INTRAVENOUS at 10:05

## 2019-05-25 RX ADMIN — HYDRALAZINE HYDROCHLORIDE 100 MG: 50 TABLET ORAL at 10:05

## 2019-05-25 RX ADMIN — LEVETIRACETAM 250 MG: 250 TABLET ORAL at 12:05

## 2019-05-25 RX ADMIN — IPRATROPIUM BROMIDE AND ALBUTEROL SULFATE 3 ML: .5; 3 SOLUTION RESPIRATORY (INHALATION) at 01:05

## 2019-05-25 RX ADMIN — CARVEDILOL 12.5 MG: 12.5 TABLET, FILM COATED ORAL at 12:05

## 2019-05-25 RX ADMIN — HYDRALAZINE HYDROCHLORIDE 25 MG: 25 TABLET, FILM COATED ORAL at 06:05

## 2019-05-25 NOTE — PT/OT/SLP PROGRESS
Occupational Therapy      Patient Name:  Tea Georges   MRN:  [id number]    Patient not seen today secondary to receiving dialysis x 2 attempts. Will follow-up per schedule.    Lucinda Lorenzana, OT  5/25/2019

## 2019-05-25 NOTE — PLAN OF CARE
Problem: Adult Inpatient Plan of Care  Goal: Plan of Care Review  POC reviewed with patient and brother at the bedside. Questions encouraged and answered. Dialysis to with 1.5L removed. BP noted 185/58  Manually call place to Mike new order given for Hydralazine 25 mg po now. Denies any pain or discomfort. WCTM. All safety precautions maintained.

## 2019-05-25 NOTE — PLAN OF CARE
Problem: Adult Inpatient Plan of Care  Goal: Plan of Care Review  Outcome: Ongoing (interventions implemented as appropriate)  3.0 hour maintenance hd tx comopleted w/o  Diff. bld returned. Dialyzer cleared well. Needles x2 removed and pressure held x 5 min. Pressure dsg applied after and report called to conchis. Removed 1.5L fluid w/o diff. Pt transported back to room via stretcher vss nad

## 2019-05-25 NOTE — PROGRESS NOTES
Maintenance hd tx 2.5 hours initiatted via laf w/ 2 15 g needles w/o diff. Vss, nad, no noted fluid retention uf goal set for 1l net. Lines secured and visible bfr 400 achieved w noted good flows

## 2019-05-25 NOTE — PROGRESS NOTES
Hospital Medicine  Progress note    Team: [facility]OS[facility] MED B Torrey Fong MD  Admit Date: 5/21/2019  GORDY 5/29/2019  Length of Stay:  LOS: 4 days   Code status: Full Code    Principal Problem:  Subdural hematoma    Overview:    Interval hx: Feeling well, will need PT to evaluate. No surgery planned.     ROS     Respiratory: no cough or shortness of breath  Cardiovascular: no chest pain or palpitations  Gastrointestinal: no nausea or vomiting, no abdominal pain or change in bowel habits  Behavioral/Psych: no depression or anxiety    PEx  Temp:  [96.5 °F (35.8 °C)-98.2 °F (36.8 °C)]   Pulse:  [55-68]   Resp:  [14-23]   BP: (157-202)/(55-78)   SpO2:  [95 %-100 %]     Intake/Output Summary (Last 24 hours) at 5/25/2019 1622  Last data filed at 5/25/2019 1210  Gross per 24 hour   Intake 700 ml   Output 2126 ml   Net -1426 ml       General Appearance: no acute distress   Heart: regular rate and rhythm  Respiratory: Normal respiratory effort, no crackles   Abdomen: Soft, non-tender; bowel sounds active  Skin: intact.Skin intact  Neurologic:  No focal numbness or weakness  Mental status: Alert, oriented x 4, affect appropriate     Recent Labs   Lab 05/23/19 0143 05/24/19 0345 05/25/19  0455   WBC 6.56 5.00 5.01   HGB 9.1* 9.6* 9.0*   HCT 29.7* 32.1* 29.3*   PLT 90* 80* 75*     Recent Labs   Lab 05/23/19 0143 05/24/19 0345 05/25/19  0455    136 133*   K 3.9 4.6 4.6    99 98   CO2 25 21* 23   BUN 44* 61* 79*   CREATININE 5.5* 7.1* 8.4*   GLU 74 78 129*   CALCIUM 9.2 9.2 9.4   MG 2.1 2.2 2.3   PHOS 5.3* 7.7* 8.2*     Recent Labs   Lab 05/21/19  1201  05/23/19  0143 05/24/19  0345 05/25/19  0455   ALKPHOS  --    < > 80 78 76   ALT  --    < > 6* 6* <5*   AST  --    < > 9* 10 7*   ALBUMIN  --    < > 3.1* 3.1* 2.9*   PROT  --    < > 6.3 6.6 6.2   BILITOT  --    < > 0.5 0.4 0.4   INR 1.1  --   --   --   --     < > = values in this interval not displayed.        No results for input(s): CPK, CPKMB, MB, TROPONINI in the  last 72 hours.  Recent Labs   Lab 05/23/19  1815 05/23/19  1838 05/24/19  0538 05/24/19  0914 05/24/19  1411 05/25/19  0802   POCTGLUCOSE 71 147* 86 103 101 134*     Hemoglobin A1C   Date Value Ref Range Status   05/21/2019 5.4 4.0 - 5.6 % Final     Comment:     ADA Screening Guidelines:  5.7-6.4%  Consistent with prediabetes  >or=6.5%  Consistent with diabetes  High levels of fetal hemoglobin interfere with the HbA1C  assay. Heterozygous hemoglobin variants (HbS, HgC, etc)do  not significantly interfere with this assay.   However, presence of multiple variants may affect accuracy.     02/08/2019 4.9 4.0 - 5.6 % Final     Comment:     ADA Screening Guidelines:  5.7-6.4%  Consistent with prediabetes  >or=6.5%  Consistent with diabetes  High levels of fetal hemoglobin interfere with the HbA1C  assay. Heterozygous hemoglobin variants (HbS, HgC, etc)do  not significantly interfere with this assay.   However, presence of multiple variants may affect accuracy.     01/16/2017 4.4 (L) 4.5 - 6.2 % Final     Comment:     According to ADA guidelines, hemoglobin A1C <7.0% represents  optimal control in non-pregnant diabetic patients.  Different  metrics may apply to specific populations.   Standards of Medical Care in Diabetes - 2016.  For the purpose of screening for the presence of diabetes:  <5.7%     Consistent with the absence of diabetes  5.7-6.4%  Consistent with increasing risk for diabetes   (prediabetes)  >or=6.5%  Consistent with diabetes  Currently no consensus exists for use of hemoglobin A1C  for diagnosis of diabetes for children.         Scheduled Meds:   albuterol-ipratropium  3 mL Nebulization Q6H WAKE    carvedilol  12.5 mg Oral BID    hydrALAZINE  100 mg Oral Q8H    levETIRAcetam  250 mg Oral BID     Continuous Infusions:  As Needed:  sodium chloride 0.9%, acetaminophen, dextrose 50%, dextrose 50%, glucagon (human recombinant), glucose, glucose, insulin aspart U-100, ondansetron, sodium chloride  0.9%    Active Hospital Problems    Diagnosis  POA    *Subdural hematoma [S06.5X9A]  Yes    Subdural hemorrhage [I62.00]  Yes    Preoperative clearance [Z01.818]  Not Applicable    (HFpEF) heart failure with preserved ejection fraction [I50.30]  Yes    Type 2 diabetes mellitus with chronic kidney disease on chronic dialysis, without long-term current use of insulin [E11.22, N18.6, Z99.2]  Not Applicable    Pulmonary emphysema [J43.9]  Yes    Right-sided cerebrovascular accident (CVA) [I63.9]  Yes    Blindness of right eye [H54.40]  Yes     Chronic    Severe aortic stenosis [I35.0]  Yes    ESRD (end stage renal disease) [N18.6]  Yes     Chronic    Essential hypertension [I10]  Yes     Chronic      Resolved Hospital Problems   No resolved problems to display.         Assessment and Plan    * Subdural hematoma  -Bilateral acute SDH, R>L with right to left midline shift   -was on plavix, platelets given at admit   -no surgical intervention per NSx  -SBP < 180   -keppra ppx     Right-sided cerebrovascular accident (CVA)  -history of in 2016  -thrombectomy at that time  -per patient, she returned to baseline and was able to ambulate on her own, perform ADLs     Ophtho  Blindness of right eye  -complication of uncontrolled DM     Pulmonary  Pulmonary emphysema  -resume home nebs          (HFpEF) heart failure with preserved ejection fraction  Attempt even fluid balance, rate control, afterload reduction     Severe aortic stenosis  -echo pending   -resume home carvedilol 12.5 mg bid for rate control and afterload reduction     Essential hypertension  -SBP < 160  -discussed goal with NSGY  -resume carvedilol 12.5 mg bid  -hydralazine increased from 50mg tid to 100 mg tid       ESRD (end stage renal disease)  -HD MWF at home  -missed HD 5/20, so last dialysis before admission was Friday 5/17 05/22: tolerated HD through graft  05/24: probable HD today per nephro     Endocrine  Type 2 diabetes mellitus with chronic  kidney disease on chronic dialysis, without long-term current use of insulin  A1C 5.4  Beginning diabetic diet, cont SSI                        Time (minutes) spent in care of the patient (Greater than 1/2 spent in direct face-to-face contact) 30 minutes     Torrey Fong MD

## 2019-05-26 LAB
ALBUMIN SERPL BCP-MCNC: 3 G/DL (ref 3.5–5.2)
ALP SERPL-CCNC: 69 U/L (ref 55–135)
ALT SERPL W/O P-5'-P-CCNC: 5 U/L (ref 10–44)
ANION GAP SERPL CALC-SCNC: 12 MMOL/L (ref 8–16)
AST SERPL-CCNC: 8 U/L (ref 10–40)
BASOPHILS # BLD AUTO: 0.01 K/UL (ref 0–0.2)
BASOPHILS NFR BLD: 0.2 % (ref 0–1.9)
BILIRUB SERPL-MCNC: 0.5 MG/DL (ref 0.1–1)
BUN SERPL-MCNC: 36 MG/DL (ref 8–23)
CALCIUM SERPL-MCNC: 9.7 MG/DL (ref 8.7–10.5)
CHLORIDE SERPL-SCNC: 100 MMOL/L (ref 95–110)
CO2 SERPL-SCNC: 22 MMOL/L (ref 23–29)
CREAT SERPL-MCNC: 5.4 MG/DL (ref 0.5–1.4)
DIFFERENTIAL METHOD: ABNORMAL
EOSINOPHIL # BLD AUTO: 0.1 K/UL (ref 0–0.5)
EOSINOPHIL NFR BLD: 1.8 % (ref 0–8)
ERYTHROCYTE [DISTWIDTH] IN BLOOD BY AUTOMATED COUNT: 16.5 % (ref 11.5–14.5)
EST. GFR  (AFRICAN AMERICAN): 8.2 ML/MIN/1.73 M^2
EST. GFR  (NON AFRICAN AMERICAN): 7.1 ML/MIN/1.73 M^2
GLUCOSE SERPL-MCNC: 99 MG/DL (ref 70–110)
HCT VFR BLD AUTO: 30.1 % (ref 37–48.5)
HGB BLD-MCNC: 9.1 G/DL (ref 12–16)
IMM GRANULOCYTES # BLD AUTO: 0.03 K/UL (ref 0–0.04)
IMM GRANULOCYTES NFR BLD AUTO: 0.7 % (ref 0–0.5)
LYMPHOCYTES # BLD AUTO: 0.4 K/UL (ref 1–4.8)
LYMPHOCYTES NFR BLD: 9 % (ref 18–48)
MAGNESIUM SERPL-MCNC: 2.1 MG/DL (ref 1.6–2.6)
MCH RBC QN AUTO: 28.9 PG (ref 27–31)
MCHC RBC AUTO-ENTMCNC: 30.2 G/DL (ref 32–36)
MCV RBC AUTO: 96 FL (ref 82–98)
MONOCYTES # BLD AUTO: 0.7 K/UL (ref 0.3–1)
MONOCYTES NFR BLD: 14.9 % (ref 4–15)
NEUTROPHILS # BLD AUTO: 3.2 K/UL (ref 1.8–7.7)
NEUTROPHILS NFR BLD: 73.4 % (ref 38–73)
NRBC BLD-RTO: 0 /100 WBC
PHOSPHATE SERPL-MCNC: 6.3 MG/DL (ref 2.7–4.5)
PLATELET # BLD AUTO: 77 K/UL (ref 150–350)
PMV BLD AUTO: 13.4 FL (ref 9.2–12.9)
POCT GLUCOSE: 103 MG/DL (ref 70–110)
POCT GLUCOSE: 106 MG/DL (ref 70–110)
POCT GLUCOSE: 74 MG/DL (ref 70–110)
POCT GLUCOSE: 94 MG/DL (ref 70–110)
POTASSIUM SERPL-SCNC: 4.7 MMOL/L (ref 3.5–5.1)
PROT SERPL-MCNC: 6.5 G/DL (ref 6–8.4)
RBC # BLD AUTO: 3.15 M/UL (ref 4–5.4)
SODIUM SERPL-SCNC: 134 MMOL/L (ref 136–145)
WBC # BLD AUTO: 4.42 K/UL (ref 3.9–12.7)

## 2019-05-26 PROCEDURE — 99232 SBSQ HOSP IP/OBS MODERATE 35: CPT | Mod: ,,, | Performed by: INTERNAL MEDICINE

## 2019-05-26 PROCEDURE — 25000003 PHARM REV CODE 250: Performed by: PHYSICIAN ASSISTANT

## 2019-05-26 PROCEDURE — 84100 ASSAY OF PHOSPHORUS: CPT

## 2019-05-26 PROCEDURE — 20600001 HC STEP DOWN PRIVATE ROOM

## 2019-05-26 PROCEDURE — 36415 COLL VENOUS BLD VENIPUNCTURE: CPT

## 2019-05-26 PROCEDURE — 94640 AIRWAY INHALATION TREATMENT: CPT

## 2019-05-26 PROCEDURE — 63600175 PHARM REV CODE 636 W HCPCS: Performed by: NURSE PRACTITIONER

## 2019-05-26 PROCEDURE — 99232 PR SUBSEQUENT HOSPITAL CARE,LEVL II: ICD-10-PCS | Mod: ,,, | Performed by: INTERNAL MEDICINE

## 2019-05-26 PROCEDURE — 85025 COMPLETE CBC W/AUTO DIFF WBC: CPT

## 2019-05-26 PROCEDURE — 83735 ASSAY OF MAGNESIUM: CPT

## 2019-05-26 PROCEDURE — 27000221 HC OXYGEN, UP TO 24 HOURS

## 2019-05-26 PROCEDURE — 25000003 PHARM REV CODE 250: Performed by: PSYCHIATRY & NEUROLOGY

## 2019-05-26 PROCEDURE — 25000242 PHARM REV CODE 250 ALT 637 W/ HCPCS: Performed by: PHYSICIAN ASSISTANT

## 2019-05-26 PROCEDURE — 25000003 PHARM REV CODE 250: Performed by: INTERNAL MEDICINE

## 2019-05-26 PROCEDURE — 94761 N-INVAS EAR/PLS OXIMETRY MLT: CPT

## 2019-05-26 PROCEDURE — 80053 COMPREHEN METABOLIC PANEL: CPT

## 2019-05-26 RX ORDER — POLYETHYLENE GLYCOL 3350 17 G/17G
17 POWDER, FOR SOLUTION ORAL DAILY
Status: DISCONTINUED | OUTPATIENT
Start: 2019-05-26 | End: 2019-05-28 | Stop reason: HOSPADM

## 2019-05-26 RX ORDER — AMOXICILLIN 250 MG
1 CAPSULE ORAL DAILY PRN
Status: DISCONTINUED | OUTPATIENT
Start: 2019-05-26 | End: 2019-05-28 | Stop reason: HOSPADM

## 2019-05-26 RX ADMIN — LEVETIRACETAM 250 MG: 250 TABLET ORAL at 08:05

## 2019-05-26 RX ADMIN — IPRATROPIUM BROMIDE AND ALBUTEROL SULFATE 3 ML: .5; 3 SOLUTION RESPIRATORY (INHALATION) at 03:05

## 2019-05-26 RX ADMIN — CARVEDILOL 12.5 MG: 12.5 TABLET, FILM COATED ORAL at 09:05

## 2019-05-26 RX ADMIN — HYDRALAZINE HYDROCHLORIDE 100 MG: 50 TABLET ORAL at 09:05

## 2019-05-26 RX ADMIN — HYDRALAZINE HYDROCHLORIDE 100 MG: 50 TABLET ORAL at 05:05

## 2019-05-26 RX ADMIN — IPRATROPIUM BROMIDE AND ALBUTEROL SULFATE 3 ML: .5; 3 SOLUTION RESPIRATORY (INHALATION) at 09:05

## 2019-05-26 RX ADMIN — CARVEDILOL 12.5 MG: 12.5 TABLET, FILM COATED ORAL at 08:05

## 2019-05-26 RX ADMIN — ONDANSETRON 4 MG: 2 INJECTION INTRAMUSCULAR; INTRAVENOUS at 04:05

## 2019-05-26 RX ADMIN — LEVETIRACETAM 250 MG: 250 TABLET ORAL at 09:05

## 2019-05-26 RX ADMIN — POLYETHYLENE GLYCOL 3350 17 G: 17 POWDER, FOR SOLUTION ORAL at 09:05

## 2019-05-26 RX ADMIN — HYDRALAZINE HYDROCHLORIDE 100 MG: 50 TABLET ORAL at 03:05

## 2019-05-26 NOTE — NURSING
0725: BP out of parameters, IM B paged.    9830: Dr. Fong called back--MD to look over VS/meds, add nausea med and adjust VS/neuro check schedule.    1235: BP out of parameters, IM B paged.    1745: BG 74 --pt given an apple juice and is eating dinner.  Pt states her nausea is improving after phenergan admin --medication did make her very drowsy, but arousalable with no changes observed in neuro exam.

## 2019-05-26 NOTE — PROGRESS NOTES
Hospital Medicine  Progress note    Team: [facility] MED B Torrey Fong MD  Admit Date: 5/21/2019  GORDY 5/29/2019  Length of Stay:  LOS: 5 days   Code status: Full Code    Principal Problem:  Subdural hematoma    Overview:    Interval hx:      ROS     Respiratory: no cough or shortness of breath  Cardiovascular: no chest pain or palpitations  Gastrointestinal: no nausea or vomiting, no abdominal pain or change in bowel habits  Behavioral/Psych: no depression or anxiety    PEx  Temp:  [97.4 °F (36.3 °C)-98.3 °F (36.8 °C)]   Pulse:  [54-68]   Resp:  [14-20]   BP: (162-202)/(55-79)   SpO2:  [93 %-100 %]     Intake/Output Summary (Last 24 hours) at 5/26/2019 0927  Last data filed at 5/25/2019 1732  Gross per 24 hour   Intake 500 ml   Output 2126 ml   Net -1626 ml       General Appearance: no acute distress   Heart: regular rate and rhythm  Respiratory: Normal respiratory effort, no crackles   Abdomen: Soft, non-tender; bowel sounds active  Skin: intact.Skin intact  Neurologic:  No focal numbness or weakness  Mental status: Alert, oriented x 4, affect appropriate     Recent Labs   Lab 05/24/19 0345 05/25/19 0455 05/26/19 0428   WBC 5.00 5.01 4.42   HGB 9.6* 9.0* 9.1*   HCT 32.1* 29.3* 30.1*   PLT 80* 75* 77*     Recent Labs   Lab 05/24/19 0345 05/25/19 0455 05/26/19 0428    133* 134*   K 4.6 4.6 4.7   CL 99 98 100   CO2 21* 23 22*   BUN 61* 79* 36*   CREATININE 7.1* 8.4* 5.4*   GLU 78 129* 99   CALCIUM 9.2 9.4 9.7   MG 2.2 2.3 2.1   PHOS 7.7* 8.2* 6.3*     Recent Labs   Lab 05/21/19  1201  05/24/19 0345 05/25/19 0455 05/26/19 0428   ALKPHOS  --    < > 78 76 69   ALT  --    < > 6* <5* 5*   AST  --    < > 10 7* 8*   ALBUMIN  --    < > 3.1* 2.9* 3.0*   PROT  --    < > 6.6 6.2 6.5   BILITOT  --    < > 0.4 0.4 0.5   INR 1.1  --   --   --   --     < > = values in this interval not displayed.        No results for input(s): CPK, CPKMB, MB, TROPONINI in the last 72 hours.  Recent Labs   Lab 05/24/19  0987  05/24/19  1411 05/25/19  0802 05/25/19  1627 05/25/19  2210 05/26/19  0713   POCTGLUCOSE 103 101 134* 106 104 94     Hemoglobin A1C   Date Value Ref Range Status   05/21/2019 5.4 4.0 - 5.6 % Final     Comment:     ADA Screening Guidelines:  5.7-6.4%  Consistent with prediabetes  >or=6.5%  Consistent with diabetes  High levels of fetal hemoglobin interfere with the HbA1C  assay. Heterozygous hemoglobin variants (HbS, HgC, etc)do  not significantly interfere with this assay.   However, presence of multiple variants may affect accuracy.     02/08/2019 4.9 4.0 - 5.6 % Final     Comment:     ADA Screening Guidelines:  5.7-6.4%  Consistent with prediabetes  >or=6.5%  Consistent with diabetes  High levels of fetal hemoglobin interfere with the HbA1C  assay. Heterozygous hemoglobin variants (HbS, HgC, etc)do  not significantly interfere with this assay.   However, presence of multiple variants may affect accuracy.     01/16/2017 4.4 (L) 4.5 - 6.2 % Final     Comment:     According to ADA guidelines, hemoglobin A1C <7.0% represents  optimal control in non-pregnant diabetic patients.  Different  metrics may apply to specific populations.   Standards of Medical Care in Diabetes - 2016.  For the purpose of screening for the presence of diabetes:  <5.7%     Consistent with the absence of diabetes  5.7-6.4%  Consistent with increasing risk for diabetes   (prediabetes)  >or=6.5%  Consistent with diabetes  Currently no consensus exists for use of hemoglobin A1C  for diagnosis of diabetes for children.         Scheduled Meds:   albuterol-ipratropium  3 mL Nebulization Q6H WAKE    carvedilol  12.5 mg Oral BID    hydrALAZINE  100 mg Oral Q8H    levETIRAcetam  250 mg Oral BID    polyethylene glycol  17 g Oral Daily     Continuous Infusions:  As Needed:  sodium chloride 0.9%, acetaminophen, dextrose 50%, dextrose 50%, glucagon (human recombinant), glucose, glucose, insulin aspart U-100, ondansetron, promethazine (PHENERGAN) IVPB,  senna-docusate 8.6-50 mg, sodium chloride 0.9%    Active Hospital Problems    Diagnosis  POA    *Subdural hematoma [S06.5X9A]  Yes    Subdural hemorrhage [I62.00]  Yes    Preoperative clearance [Z01.818]  Not Applicable    (HFpEF) heart failure with preserved ejection fraction [I50.30]  Yes    Type 2 diabetes mellitus with chronic kidney disease on chronic dialysis, without long-term current use of insulin [E11.22, N18.6, Z99.2]  Not Applicable    Pulmonary emphysema [J43.9]  Yes    Right-sided cerebrovascular accident (CVA) [I63.9]  Yes    Blindness of right eye [H54.40]  Yes     Chronic    Severe aortic stenosis [I35.0]  Yes    ESRD (end stage renal disease) [N18.6]  Yes     Chronic    Essential hypertension [I10]  Yes     Chronic      Resolved Hospital Problems   No resolved problems to display.         Assessment and Plan    * Subdural hematoma  -Bilateral acute SDH, R>L with right to left midline shift   -was on plavix, platelets given at admit   -no surgical intervention per NSx  -SBP < 180   -keppra ppx     Right-sided cerebrovascular accident (CVA)  -history of in 2016  -thrombectomy at that time  -per patient, she returned to baseline and was able to ambulate on her own, perform ADLs     Ophtho  Blindness of right eye  -complication of uncontrolled DM     Pulmonary  Pulmonary emphysema  -resume home nebs          (HFpEF) heart failure with preserved ejection fraction  Attempt even fluid balance, rate control, afterload reduction     Severe aortic stenosis  -echo pending   -resume home carvedilol 12.5 mg bid for rate control and afterload reduction     Essential hypertension  -SBP < 160  -discussed goal with NSGY  -resume carvedilol 12.5 mg bid  -hydralazine increased from 50mg tid to 100 mg tid       ESRD (end stage renal disease)  -HD MWF at home  -missed HD 5/20, so last dialysis before admission was Friday 5/17 05/22: tolerated HD through graft  05/24: probable HD today per  nephro     Endocrine  Type 2 diabetes mellitus with chronic kidney disease on chronic dialysis, without long-term current use of insulin  A1C 5.4  Beginning diabetic diet, cont SSI                        Time (minutes) spent in care of the patient (Greater than 1/2 spent in direct face-to-face contact) 30 minutes     Torrey Fong MD

## 2019-05-27 LAB
ALBUMIN SERPL BCP-MCNC: 2.8 G/DL (ref 3.5–5.2)
ALP SERPL-CCNC: 67 U/L (ref 55–135)
ALT SERPL W/O P-5'-P-CCNC: 5 U/L (ref 10–44)
ANION GAP SERPL CALC-SCNC: 12 MMOL/L (ref 8–16)
AST SERPL-CCNC: 6 U/L (ref 10–40)
BASOPHILS # BLD AUTO: 0.02 K/UL (ref 0–0.2)
BASOPHILS NFR BLD: 0.5 % (ref 0–1.9)
BILIRUB SERPL-MCNC: 0.4 MG/DL (ref 0.1–1)
BUN SERPL-MCNC: 49 MG/DL (ref 8–23)
CALCIUM SERPL-MCNC: 9.8 MG/DL (ref 8.7–10.5)
CHLORIDE SERPL-SCNC: 100 MMOL/L (ref 95–110)
CO2 SERPL-SCNC: 20 MMOL/L (ref 23–29)
CREAT SERPL-MCNC: 6.4 MG/DL (ref 0.5–1.4)
DIFFERENTIAL METHOD: ABNORMAL
EOSINOPHIL # BLD AUTO: 0.1 K/UL (ref 0–0.5)
EOSINOPHIL NFR BLD: 2.9 % (ref 0–8)
ERYTHROCYTE [DISTWIDTH] IN BLOOD BY AUTOMATED COUNT: 16.1 % (ref 11.5–14.5)
EST. GFR  (AFRICAN AMERICAN): 6.7 ML/MIN/1.73 M^2
EST. GFR  (NON AFRICAN AMERICAN): 5.8 ML/MIN/1.73 M^2
GLUCOSE SERPL-MCNC: 93 MG/DL (ref 70–110)
HCT VFR BLD AUTO: 30.9 % (ref 37–48.5)
HGB BLD-MCNC: 9.3 G/DL (ref 12–16)
IMM GRANULOCYTES # BLD AUTO: 0.01 K/UL (ref 0–0.04)
IMM GRANULOCYTES NFR BLD AUTO: 0.2 % (ref 0–0.5)
LYMPHOCYTES # BLD AUTO: 0.5 K/UL (ref 1–4.8)
LYMPHOCYTES NFR BLD: 12.4 % (ref 18–48)
MAGNESIUM SERPL-MCNC: 2 MG/DL (ref 1.6–2.6)
MCH RBC QN AUTO: 28.8 PG (ref 27–31)
MCHC RBC AUTO-ENTMCNC: 30.1 G/DL (ref 32–36)
MCV RBC AUTO: 96 FL (ref 82–98)
MONOCYTES # BLD AUTO: 0.7 K/UL (ref 0.3–1)
MONOCYTES NFR BLD: 15.5 % (ref 4–15)
NEUTROPHILS # BLD AUTO: 2.9 K/UL (ref 1.8–7.7)
NEUTROPHILS NFR BLD: 68.5 % (ref 38–73)
NRBC BLD-RTO: 0 /100 WBC
PHOSPHATE SERPL-MCNC: 6.9 MG/DL (ref 2.7–4.5)
PLATELET # BLD AUTO: 88 K/UL (ref 150–350)
PMV BLD AUTO: 13.1 FL (ref 9.2–12.9)
POCT GLUCOSE: 106 MG/DL (ref 70–110)
POCT GLUCOSE: 120 MG/DL (ref 70–110)
POCT GLUCOSE: 127 MG/DL (ref 70–110)
POCT GLUCOSE: 129 MG/DL (ref 70–110)
POTASSIUM SERPL-SCNC: 4.7 MMOL/L (ref 3.5–5.1)
PROT SERPL-MCNC: 6.2 G/DL (ref 6–8.4)
RBC # BLD AUTO: 3.23 M/UL (ref 4–5.4)
SODIUM SERPL-SCNC: 132 MMOL/L (ref 136–145)
WBC # BLD AUTO: 4.2 K/UL (ref 3.9–12.7)

## 2019-05-27 PROCEDURE — 83735 ASSAY OF MAGNESIUM: CPT

## 2019-05-27 PROCEDURE — 99232 SBSQ HOSP IP/OBS MODERATE 35: CPT | Mod: ,,, | Performed by: INTERNAL MEDICINE

## 2019-05-27 PROCEDURE — 94761 N-INVAS EAR/PLS OXIMETRY MLT: CPT

## 2019-05-27 PROCEDURE — 94640 AIRWAY INHALATION TREATMENT: CPT

## 2019-05-27 PROCEDURE — 36415 COLL VENOUS BLD VENIPUNCTURE: CPT

## 2019-05-27 PROCEDURE — 25000242 PHARM REV CODE 250 ALT 637 W/ HCPCS: Performed by: PHYSICIAN ASSISTANT

## 2019-05-27 PROCEDURE — 27000221 HC OXYGEN, UP TO 24 HOURS

## 2019-05-27 PROCEDURE — 25000003 PHARM REV CODE 250: Performed by: PHYSICIAN ASSISTANT

## 2019-05-27 PROCEDURE — 80053 COMPREHEN METABOLIC PANEL: CPT

## 2019-05-27 PROCEDURE — 99232 PR SUBSEQUENT HOSPITAL CARE,LEVL II: ICD-10-PCS | Mod: ,,, | Performed by: INTERNAL MEDICINE

## 2019-05-27 PROCEDURE — 20600001 HC STEP DOWN PRIVATE ROOM

## 2019-05-27 PROCEDURE — 85025 COMPLETE CBC W/AUTO DIFF WBC: CPT

## 2019-05-27 PROCEDURE — 84100 ASSAY OF PHOSPHORUS: CPT

## 2019-05-27 PROCEDURE — 97166 OT EVAL MOD COMPLEX 45 MIN: CPT

## 2019-05-27 PROCEDURE — 25000003 PHARM REV CODE 250: Performed by: PSYCHIATRY & NEUROLOGY

## 2019-05-27 RX ADMIN — IPRATROPIUM BROMIDE AND ALBUTEROL SULFATE 3 ML: .5; 3 SOLUTION RESPIRATORY (INHALATION) at 08:05

## 2019-05-27 RX ADMIN — IPRATROPIUM BROMIDE AND ALBUTEROL SULFATE 3 ML: .5; 3 SOLUTION RESPIRATORY (INHALATION) at 01:05

## 2019-05-27 RX ADMIN — IPRATROPIUM BROMIDE AND ALBUTEROL SULFATE 3 ML: .5; 3 SOLUTION RESPIRATORY (INHALATION) at 07:05

## 2019-05-27 RX ADMIN — HYPROMELLOSE 2910 2 DROP: 5 SOLUTION OPHTHALMIC at 09:05

## 2019-05-27 RX ADMIN — CARVEDILOL 12.5 MG: 12.5 TABLET, FILM COATED ORAL at 08:05

## 2019-05-27 RX ADMIN — ACETAMINOPHEN 650 MG: 325 TABLET ORAL at 08:05

## 2019-05-27 RX ADMIN — HYDRALAZINE HYDROCHLORIDE 100 MG: 50 TABLET ORAL at 03:05

## 2019-05-27 RX ADMIN — LEVETIRACETAM 250 MG: 250 TABLET ORAL at 08:05

## 2019-05-27 RX ADMIN — HYDRALAZINE HYDROCHLORIDE 100 MG: 50 TABLET ORAL at 05:05

## 2019-05-27 RX ADMIN — HYDRALAZINE HYDROCHLORIDE 100 MG: 50 TABLET ORAL at 08:05

## 2019-05-27 NOTE — PLAN OF CARE
05/27/19 1028   Discharge Reassessment   Assessment Type Discharge Planning Reassessment   Provided patient/caregiver education on the expected discharge date and the discharge plan Yes   Do you have any problems affording any of your prescribed medications? No   Discharge Plan A Home with family   Discharge Plan B Home Health   DME Needed Upon Discharge  none   Patient choice form signed by patient/caregiver Yes   Anticipated Discharge Disposition Home   Can the patient answer the patient profile reliably? Yes, cognitively intact   How does the patient rate their overall health at the present time? Fair   Describe the patient's ability to walk at the present time. Walks with the help of equipment   How often would a person be available to care for the patient? Whenever needed   Number of comorbid conditions (as recorded on the chart) Five or more   During the past month, has the patient often been bothered by feeling down, depressed or hopeless? No   During the past month, has the patient often been bothered by little interest or pleasure in doing things? No   Post-Acute Status   Post-Acute Authorization Home Health/Hospice   Post-Acute Placement Status Awaiting Internal Medical Clearance

## 2019-05-27 NOTE — PT/OT/SLP EVAL
"Occupational Therapy   Evaluation    Name: Tea Georges  MRN: [id number]  Admitting Diagnosis:  Subdural hematoma  s/p fall     Recommendations:     Discharge Recommendations: nursing facility, skilled(short stay)  Discharge Equipment Recommendations:  none  Barriers to discharge:  Other (Comment)(fall risk)    Assessment:     Tea Georges is a 79 y.o. female with a medical diagnosis of Subdural hematoma.  She presents with generalized weakness and a decline in her funcitonal strength and mobility from baseline. She requires added assistance with all ADLs/self care tasks at this time. She would greatly benefit from nursing progressive mobility in acute setting and SS SNF at post acute level of care to ensure best safety at home. Surgical intervention (crani for evacuation) pending at this time. Performance deficits affecting function: weakness, impaired endurance, impaired self care skills, impaired functional mobilty, gait instability, visual deficits, decreased upper extremity function, decreased lower extremity function, decreased safety awareness, edema.      Rehab Prognosis: Good; patient would benefit from acute skilled OT services to address these deficits and reach maximum level of function.       Plan:     Patient to be seen 4 x/week to address the above listed problems via self-care/home management, therapeutic activities, therapeutic exercises, neuromuscular re-education, cognitive retraining  · Plan of Care Expires: 06/23/19  · Plan of Care Reviewed with: patient    Subjective     Chief Complaint: "I'm just so weak"  Patient/Family Comments/goals: to get stronger    Occupational Profile:  Living Environment: Pt lives with daughter and 2 adult grand daughters in [facility] with 5 MARY and rail available (back enterance). Tub/shower combo.  Previous level of function: reported using 0 DME for mobility. Requiring daughter's assistance with bathing and occasionally with LB dressing. Pt with L hemiparesis from previous " stroke. Blind in R eye. Leaving house 3x/w (M,W,F) for HD with transportation. Reported this as only fall in 3 months  Roles and Routines: mother, grandmother, care taker to self   Equipment Used at Home:  wheelchair, walker, rolling, bedside commode, cane, straight(owns but does not use)  Assistance upon Discharge: yes, from daughter    Pain/Comfort:  · Pain Rating 1: 0/10  · Pain Rating Post-Intervention 1: 0/10    Patients cultural, spiritual, Restorationist conflicts given the current situation: no    Objective:     Communicated with: RN prior to session.  Patient found HOB elevated with peripheral IV, bed alarm, telemetry upon OT entry to room.    General Precautions: Standard, aspiration, vision impaired, diabetic   Orthopedic Precautions:N/A   Braces: N/A     Occupational Performance:  Bed Mobility:    · Patient completed Rolling/Turning to Right with minimum assistance and with side rail  · Patient completed Supine to Sit with moderate assistance    Functional Mobility/Transfers:  · Patient completed Sit <> Stand Transfer with minimum assistance  with  hand-held assist   · Patient completed Bed <> Chair Transfer using Step Transfer technique with moderate assistance with hand-held assist   · To R  · Functional Mobility: 4 steps to chair with moderate assistance and B UE support    Activities of Daily Living:  · Feeding:  set up and supervision while seated in chair for lunch    · Grooming: seated in chair with set up and supervision- to wash face    · Upper Body Dressing: contact guard assistance EOB to don gown as jacket  · Lower Body Dressing: maximal assistance to don pull up brief   · Answering cell phone with set up and mod(I)/added time    Cognitive/Visual Perceptual:  Cognitive/Psychosocial Skills:     -       Oriented to: Person, Place, Time and Situation   -       Follows Commands/attention:Follows multistep  commands  -       Communication: clear/fluent  -       Memory: Poor immediate recall  -        Safety awareness/insight to disability: intact   -       Mood/Affect/Coping skills/emotional control: Cooperative  Visual/Perceptual:      -Impaired  R eye      Physical Exam:  Balance:    -       Impaired standing  Postural examination/scapula alignment:    -       Rounded shoulders  -       Forward head  -       Posterior pelvic tilt  Skin integrity: Dry  Edema:  None noted  Sensation:    -       Intact  B UE  Motor Planning:    -       drift L UE  Dominant hand:    -       R  Upper Extremity Range of Motion:     -       Right Upper Extremity: WFL  -       Left Upper Extremity: AAROM WFL  Upper Extremity Strength:    -       Right Upper Extremity: 4+/5  -       Left Upper Extremity: 4/5   Strength:    -       Right Upper Extremity: 4/5  -       Left Upper Extremity: 4/5    AMPAC 6 Click ADL:  AMPAC Total Score: 13   Body mass index is 20.73 kg/m².  Vitals:    05/27/19 1203   BP: (!) 207/84   Pulse: (!) 59   Resp: 18   Temp: (!) 89.1 °F (31.7 °C)     Treatment & Education:  -Pt alert and agreeable to therapy session; edu on OT role in care  -discussed goals for acute setting  -edu on use of call light; pt able to demo understanding for staff assistance when OOB  -Communication board updated; questions/concerns addressed within OT scope of practice  -no family present for education   Education:    Patient left up in chair with all lines intact, call button in reach, chair alarm on and RN notified    GOALS:   Multidisciplinary Problems     Occupational Therapy Goals        Problem: Occupational Therapy Goal    Goal Priority Disciplines Outcome Interventions   Occupational Therapy Goal     OT, PT/OT Ongoing (interventions implemented as appropriate)    Description:  Goals to be met by: 6/3     Patient will increase functional independence with ADLs by performing:    UE Dressing with Set-up Assistance and Supervision.  LE Dressing (brief) with Set-up Assistance and Contact Guard Assistance.  Grooming while standing  at sink with Minimal Assistance.  Toileting from toilet with Minimal Assistance for hygiene and clothing management.   Toilet transfer to toilet with Minimal Assistance.  Functional mobility at short household distance for ADL task with AD and minimal assistance.                       History:     Past Medical History:   Diagnosis Date    Anemia in ESRD (end-stage renal disease) 5/29/2016    Anticoagulant long-term use     Aortic atherosclerosis 11/22/2016    Aortic stenosis, moderate 2/18/2016    Asthma in adult without complication 1/8/2016    Bilateral low back pain without sciatica 11/17/2015    Blindness of right eye 11/12/2016    CAD (coronary artery disease) 12/12/2016    Cataract     Central retinal vein occlusion, right eye 6/3/2014    CHF (congestive heart failure)     Chronic diastolic heart failure 1/8/2016    Chronic respiratory failure with hypoxia 5/29/2016    COPD (chronic obstructive pulmonary disease) 1/15/2017    Dependence on hemodialysis     Mon-Wed-Fri    Diverticulosis     Embolic stroke involving right middle cerebral artery     Enlarged LA (left atrium) 10/7/2016    Epiretinal membrane 7/17/2012    ESRD (end stage renal disease)     Essential hypertension 1/8/2016    History of GI diverticular bleed     5/22/16    Left flaccid hemiparesis 10/1/2016    Peripheral vascular disease, unspecified 11/22/2016    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 11/13/2016    Type 2 diabetes mellitus with kidney complication, without long-term current use of insulin 5/1/2018    Type 2 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, without long-term current use of insulin 3/26/2013    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, without long-term current use of insulin 3/26/2013    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       Past Surgical History:   Procedure Laterality Date     BREAST SURGERY      tumor removal x 2    CATARACT EXTRACTION      CHOLECYSTECTOMY      COLONOSCOPY N/A 5/30/2016    Performed by Sam Davis MD at [facility] ENDO (2ND FLR)    COLONOSCOPY N/A 5/23/2016    Performed by WILLIAM Colvin MD at [facility] ENDO (2ND FLR)    ESOPHAGOGASTRODUODENOSCOPY (EGD) N/A 5/30/2016    Performed by Sam Davis MD at [facility] ENDO (2ND FLR)    ESOPHAGOGASTRODUODENOSCOPY (EGD) N/A 5/27/2016    Performed by Cesario Rubio MD at [facility] ENDO (2ND FLR)    EXCISION, ANEURYSM Left 11/29/2018    Performed by NADINE Blum III, MD at [facility] OR 2ND FLR    Fistulogram Left 11/28/2018    Performed by NADINE Blum III, MD at [facility] CATH LAB    INSERTION, CATHETER, VASCULAR, DUAL LUMEN Right 11/29/2018    Performed by NADINE Blum III, MD at [facility] OR [facility] FLR    PTA, Fistula  11/28/2018    Performed by NADINE Blum III, MD at [facility] CATH LAB    REVISION, AV FISTULA, Left 11/29/2018    Performed by NADINE Blum III, MD at [facility] OR 2ND FLR    UPPER GASTROINTESTINAL ENDOSCOPY         Time Tracking:     OT Date of Treatment: 05/27/19  OT Start Time: 1127  OT Stop Time: 1150  OT Total Time (min): 23 min    Billable Minutes:Evaluation 23    SHERI Underwood  5/27/2019

## 2019-05-27 NOTE — PLAN OF CARE
05/27/19 1138   Post-Acute Status   Post-Acute Authorization Placement   Post-Acute Placement Status Set-up Complete   Discharge Delays (!) Other  (Awaiting Chair change at [facility] )

## 2019-05-27 NOTE — PLAN OF CARE
CM attempted To contact patients daughter in regards to S-SNF placement, no answer, referral placed to O-SNF, will continue to follow.

## 2019-05-27 NOTE — CARE UPDATE
Rapid Response Nurse Chart Check     Chart check completed, abnormal VS noted, bedside RNOneyda contacted, no concerns   verbalized at this time, instructed to call [phone] for further concerns or assistance.

## 2019-05-27 NOTE — PLAN OF CARE
Problem: Occupational Therapy Goal  Goal: Occupational Therapy Goal  Goals to be met by: 6/3     Patient will increase functional independence with ADLs by performing:    UE Dressing with Set-up Assistance and Supervision.  LE Dressing (brief) with Set-up Assistance and Contact Guard Assistance.  Grooming while standing at sink with Minimal Assistance.  Toileting from toilet with Minimal Assistance for hygiene and clothing management.   Toilet transfer to toilet with Minimal Assistance.  Functional mobility at short household distance for ADL task with AD and minimal assistance.     Outcome: Ongoing (interventions implemented as appropriate)  Pt requiring increased assistance for ADLs and mobility from her reported baseline. She is a cont fall risk for return home. Rec SS SNF for d/c planning. OT plan of care for 4x/w in acute setting. SHERI Underwood 5/27/2019

## 2019-05-27 NOTE — PROGRESS NOTES
Hospital Medicine  Progress note    [place] [facility] [place] Torrey Fong MD  Admit Date: 5/21/2019  GORDY 5/28/2019  Length of Stay:  LOS: 6 days   Code status: Full Code    Principal Problem:  Subdural hematoma    Overview:    Interval hx Doing well, MS is returning to normal. Await PT input. Possible SNF placement.  ROS     Respiratory: no cough or shortness of breath  Cardiovascular: no chest pain or palpitations  Gastrointestinal: no nausea or vomiting, no abdominal pain or change in bowel habits  Behavioral/Psych: no depression or anxiety    PEx  Temp:  [98 °F (36.7 °C)-98.3 °F (36.8 °C)]   Pulse:  [52-62]   Resp:  [16-18]   BP: (156-207)/()   SpO2:  [89 %-100 %]     Intake/Output Summary (Last 24 hours) at 5/27/2019 1419  Last data filed at 5/27/2019 0930  Gross per 24 hour   Intake 240 ml   Output --   Net 240 ml       General Appearance: moderate distress   Heart: regular rate and rhythm  Respiratory: Normal respiratory effort, no crackles   Abdomen: Soft, non-tender; bowel sounds active  Skin: intact.Skin intact  Neurologic:  No focal numbness or weakness  Mental status: Alert, oriented x 4, affect appropriate     Recent Labs   Lab 05/25/19 0455 05/26/19 0428 05/27/19 0349   WBC 5.01 4.42 4.20   HGB 9.0* 9.1* 9.3*   HCT 29.3* 30.1* 30.9*   PLT 75* 77* 88*     Recent Labs   Lab 05/25/19 0455 05/26/19 0428 05/27/19 0349   * 134* 132*   K 4.6 4.7 4.7   CL 98 100 100   CO2 23 22* 20*   BUN 79* 36* 49*   CREATININE 8.4* 5.4* 6.4*   * 99 93   CALCIUM 9.4 9.7 9.8   MG 2.3 2.1 2.0   PHOS 8.2* 6.3* 6.9*     Recent Labs   Lab 05/21/19  1201  05/25/19 0455 05/26/19 0428 05/27/19 0349   ALKPHOS  --    < > 76 69 67   ALT  --    < > <5* 5* 5*   AST  --    < > 7* 8* 6*   ALBUMIN  --    < > 2.9* 3.0* 2.8*   PROT  --    < > 6.2 6.5 6.2   BILITOT  --    < > 0.4 0.5 0.4   INR 1.1  --   --   --   --     < > = values in this interval not displayed.        No results for input(s): CPK, CPKMB, MB,  TROPONINI in the last 72 hours.  Recent Labs   Lab 05/26/19  0713 05/26/19  1119 05/26/19  1719 05/26/19  2146 05/27/19  0726 05/27/19  1207   POCTGLUCOSE 94 103 74 106 120* 129*     Hemoglobin A1C   Date Value Ref Range Status   05/21/2019 5.4 4.0 - 5.6 % Final     Comment:     ADA Screening Guidelines:  5.7-6.4%  Consistent with prediabetes  >or=6.5%  Consistent with diabetes  High levels of fetal hemoglobin interfere with the HbA1C  assay. Heterozygous hemoglobin variants (HbS, HgC, etc)do  not significantly interfere with this assay.   However, presence of multiple variants may affect accuracy.     02/08/2019 4.9 4.0 - 5.6 % Final     Comment:     ADA Screening Guidelines:  5.7-6.4%  Consistent with prediabetes  >or=6.5%  Consistent with diabetes  High levels of fetal hemoglobin interfere with the HbA1C  assay. Heterozygous hemoglobin variants (HbS, HgC, etc)do  not significantly interfere with this assay.   However, presence of multiple variants may affect accuracy.     01/16/2017 4.4 (L) 4.5 - 6.2 % Final     Comment:     According to ADA guidelines, hemoglobin A1C <7.0% represents  optimal control in non-pregnant diabetic patients.  Different  metrics may apply to specific populations.   Standards of Medical Care in Diabetes - 2016.  For the purpose of screening for the presence of diabetes:  <5.7%     Consistent with the absence of diabetes  5.7-6.4%  Consistent with increasing risk for diabetes   (prediabetes)  >or=6.5%  Consistent with diabetes  Currently no consensus exists for use of hemoglobin A1C  for diagnosis of diabetes for children.         Scheduled Meds:   albuterol-ipratropium  3 mL Nebulization Q6H WAKE    carvedilol  12.5 mg Oral BID    hydrALAZINE  100 mg Oral Q8H    levETIRAcetam  250 mg Oral BID    polyethylene glycol  17 g Oral Daily     Continuous Infusions:  As Needed:  sodium chloride 0.9%, acetaminophen, dextrose 50%, dextrose 50%, glucagon (human recombinant), glucose, glucose,  insulin aspart U-100, ondansetron, promethazine (PHENERGAN) IVPB, senna-docusate 8.6-50 mg, sodium chloride 0.9%    Active Hospital Problems    Diagnosis  POA    *Subdural hematoma [S06.5X9A]  Yes    Subdural hemorrhage [I62.00]  Yes    Preoperative clearance [Z01.818]  Not Applicable    (HFpEF) heart failure with preserved ejection fraction [I50.30]  Yes    Type 2 diabetes mellitus with chronic kidney disease on chronic dialysis, without long-term current use of insulin [E11.22, N18.6, Z99.2]  Not Applicable    Pulmonary emphysema [J43.9]  Yes    Right-sided cerebrovascular accident (CVA) [I63.9]  Yes    Blindness of right eye [H54.40]  Yes     Chronic    Severe aortic stenosis [I35.0]  Yes    ESRD (end stage renal disease) [N18.6]  Yes     Chronic    Essential hypertension [I10]  Yes     Chronic      Resolved Hospital Problems   No resolved problems to display.         Assessment and Plan    * Subdural hematoma  -Bilateral acute SDH, R>L with right to left midline shift   -was on plavix, platelets given at admit   -no surgical intervention per NSx  -SBP < 180   -keppra ppx     Right-sided cerebrovascular accident (CVA)  -history of in 2016  -thrombectomy at that time  -per patient, she returned to baseline and was able to ambulate on her own, perform ADLs       Blindness of right eye  -complication of uncontrolled DM       Pulmonary emphysema  -resume home nebs          (HFpEF) heart failure with preserved ejection fraction  Attempt even fluid balance, rate control, afterload reduction     Severe aortic stenosis  -echo pending   -resume home carvedilol 12.5 mg bid for rate control and afterload reduction     Essential hypertension  -SBP < 160  -discussed goal with NSGY  -resume carvedilol 12.5 mg bid  -hydralazine increased from 50mg tid to 100 mg tid       ESRD (end stage renal disease)  -HD MWF at home  -missed HD 5/20, so last dialysis before admission was Friday 5/17 05/22: tolerated HD through  graft  05/24: probable HD today per nephro       Type 2 diabetes mellitus with chronic kidney disease on chronic dialysis, without long-term current use of insulin  A1C 5.4  Beginning diabetic diet, cont SSI                        Time (minutes) spent in care of the patient (Greater than 1/2 spent in direct face-to-face contact) 30 minutes     Torrey Fong MD

## 2019-05-28 ENCOUNTER — HOSPITAL ENCOUNTER (INPATIENT)
Facility: HOSPITAL | Age: 79
LOS: 14 days | Discharge: HOME | DRG: 082 | End: 2019-06-11
Attending: INTERNAL MEDICINE | Admitting: INTERNAL MEDICINE
Payer: MEDICARE

## 2019-05-28 ENCOUNTER — TELEPHONE (OUTPATIENT)
Dept: NEUROSURGERY | Facility: CLINIC | Age: 79
End: 2019-05-28

## 2019-05-28 VITALS
SYSTOLIC BLOOD PRESSURE: 172 MMHG | TEMPERATURE: 97 F | DIASTOLIC BLOOD PRESSURE: 74 MMHG | HEIGHT: 63 IN | BODY MASS INDEX: 20.73 KG/M2 | OXYGEN SATURATION: 100 % | RESPIRATION RATE: 18 BRPM | HEART RATE: 70 BPM | WEIGHT: 117 LBS

## 2019-05-28 DIAGNOSIS — I62.00 SUBDURAL HEMORRHAGE: ICD-10-CM

## 2019-05-28 DIAGNOSIS — R53.81 DEBILITY: ICD-10-CM

## 2019-05-28 DIAGNOSIS — N18.6 TYPE 2 DIABETES MELLITUS WITH CHRONIC KIDNEY DISEASE ON CHRONIC DIALYSIS, WITHOUT LONG-TERM CURRENT USE OF INSULIN: ICD-10-CM

## 2019-05-28 DIAGNOSIS — S06.5XAA SUBDURAL HEMATOMA: ICD-10-CM

## 2019-05-28 DIAGNOSIS — S06.5XAA SUBDURAL HEMATOMA: Primary | ICD-10-CM

## 2019-05-28 DIAGNOSIS — N18.6 ESRD (END STAGE RENAL DISEASE): Chronic | ICD-10-CM

## 2019-05-28 DIAGNOSIS — E11.22 TYPE 2 DIABETES MELLITUS WITH CHRONIC KIDNEY DISEASE ON CHRONIC DIALYSIS, WITHOUT LONG-TERM CURRENT USE OF INSULIN: ICD-10-CM

## 2019-05-28 DIAGNOSIS — I62.00 SUBDURAL HEMORRHAGE: Primary | ICD-10-CM

## 2019-05-28 DIAGNOSIS — E44.0 MODERATE MALNUTRITION: ICD-10-CM

## 2019-05-28 DIAGNOSIS — Z99.2 TYPE 2 DIABETES MELLITUS WITH CHRONIC KIDNEY DISEASE ON CHRONIC DIALYSIS, WITHOUT LONG-TERM CURRENT USE OF INSULIN: ICD-10-CM

## 2019-05-28 PROBLEM — Z01.818 PREOPERATIVE CLEARANCE: Status: RESOLVED | Noted: 2019-05-21 | Resolved: 2019-05-28

## 2019-05-28 LAB
ALBUMIN SERPL BCP-MCNC: 3 G/DL (ref 3.5–5.2)
ALP SERPL-CCNC: 73 U/L (ref 55–135)
ALT SERPL W/O P-5'-P-CCNC: <5 U/L (ref 10–44)
ANION GAP SERPL CALC-SCNC: 16 MMOL/L (ref 8–16)
AST SERPL-CCNC: 8 U/L (ref 10–40)
BASOPHILS # BLD AUTO: 0.03 K/UL (ref 0–0.2)
BASOPHILS NFR BLD: 0.7 % (ref 0–1.9)
BILIRUB SERPL-MCNC: 0.4 MG/DL (ref 0.1–1)
BUN SERPL-MCNC: 69 MG/DL (ref 8–23)
CALCIUM SERPL-MCNC: 9.5 MG/DL (ref 8.7–10.5)
CHLORIDE SERPL-SCNC: 98 MMOL/L (ref 95–110)
CO2 SERPL-SCNC: 17 MMOL/L (ref 23–29)
CREAT SERPL-MCNC: 8 MG/DL (ref 0.5–1.4)
DIFFERENTIAL METHOD: ABNORMAL
EOSINOPHIL # BLD AUTO: 0.2 K/UL (ref 0–0.5)
EOSINOPHIL NFR BLD: 4 % (ref 0–8)
ERYTHROCYTE [DISTWIDTH] IN BLOOD BY AUTOMATED COUNT: 16.3 % (ref 11.5–14.5)
EST. GFR  (AFRICAN AMERICAN): 5.1 ML/MIN/1.73 M^2
EST. GFR  (NON AFRICAN AMERICAN): 4.4 ML/MIN/1.73 M^2
GLUCOSE SERPL-MCNC: 96 MG/DL (ref 70–110)
HCT VFR BLD AUTO: 31.2 % (ref 37–48.5)
HGB BLD-MCNC: 9.4 G/DL (ref 12–16)
IMM GRANULOCYTES # BLD AUTO: 0.02 K/UL (ref 0–0.04)
IMM GRANULOCYTES NFR BLD AUTO: 0.4 % (ref 0–0.5)
LYMPHOCYTES # BLD AUTO: 0.6 K/UL (ref 1–4.8)
LYMPHOCYTES NFR BLD: 13.9 % (ref 18–48)
MAGNESIUM SERPL-MCNC: 2.3 MG/DL (ref 1.6–2.6)
MCH RBC QN AUTO: 28.7 PG (ref 27–31)
MCHC RBC AUTO-ENTMCNC: 30.1 G/DL (ref 32–36)
MCV RBC AUTO: 95 FL (ref 82–98)
MONOCYTES # BLD AUTO: 0.6 K/UL (ref 0.3–1)
MONOCYTES NFR BLD: 13.6 % (ref 4–15)
NEUTROPHILS # BLD AUTO: 3 K/UL (ref 1.8–7.7)
NEUTROPHILS NFR BLD: 67.4 % (ref 38–73)
NRBC BLD-RTO: 0 /100 WBC
PHOSPHATE SERPL-MCNC: 7.4 MG/DL (ref 2.7–4.5)
PLATELET # BLD AUTO: 108 K/UL (ref 150–350)
PMV BLD AUTO: 13 FL (ref 9.2–12.9)
POCT GLUCOSE: 80 MG/DL (ref 70–110)
POCT GLUCOSE: 92 MG/DL (ref 70–110)
POCT GLUCOSE: 97 MG/DL (ref 70–110)
POTASSIUM SERPL-SCNC: 5.1 MMOL/L (ref 3.5–5.1)
PROT SERPL-MCNC: 6.4 G/DL (ref 6–8.4)
RBC # BLD AUTO: 3.27 M/UL (ref 4–5.4)
SODIUM SERPL-SCNC: 131 MMOL/L (ref 136–145)
WBC # BLD AUTO: 4.47 K/UL (ref 3.9–12.7)

## 2019-05-28 PROCEDURE — 80100016 HC MAINTENANCE HEMODIALYSIS

## 2019-05-28 PROCEDURE — 97530 THERAPEUTIC ACTIVITIES: CPT

## 2019-05-28 PROCEDURE — 25000003 PHARM REV CODE 250: Performed by: HOSPITALIST

## 2019-05-28 PROCEDURE — 80053 COMPREHEN METABOLIC PANEL: CPT

## 2019-05-28 PROCEDURE — 97161 PT EVAL LOW COMPLEX 20 MIN: CPT

## 2019-05-28 PROCEDURE — 25000242 PHARM REV CODE 250 ALT 637 W/ HCPCS: Performed by: HOSPITALIST

## 2019-05-28 PROCEDURE — 85025 COMPLETE CBC W/AUTO DIFF WBC: CPT

## 2019-05-28 PROCEDURE — 83735 ASSAY OF MAGNESIUM: CPT

## 2019-05-28 PROCEDURE — 94640 AIRWAY INHALATION TREATMENT: CPT

## 2019-05-28 PROCEDURE — 36415 COLL VENOUS BLD VENIPUNCTURE: CPT

## 2019-05-28 PROCEDURE — 90935 PR HEMODIALYSIS, ONE EVALUATION: ICD-10-PCS | Mod: ,,, | Performed by: NURSE PRACTITIONER

## 2019-05-28 PROCEDURE — 25000003 PHARM REV CODE 250: Performed by: NURSE PRACTITIONER

## 2019-05-28 PROCEDURE — 25000003 PHARM REV CODE 250: Performed by: PHYSICIAN ASSISTANT

## 2019-05-28 PROCEDURE — 99239 PR HOSPITAL DISCHARGE DAY,>30 MIN: ICD-10-PCS | Mod: ,,, | Performed by: HOSPITALIST

## 2019-05-28 PROCEDURE — 25000003 PHARM REV CODE 250: Performed by: PSYCHIATRY & NEUROLOGY

## 2019-05-28 PROCEDURE — 99239 HOSP IP/OBS DSCHRG MGMT >30: CPT | Mod: ,,, | Performed by: HOSPITALIST

## 2019-05-28 PROCEDURE — 84100 ASSAY OF PHOSPHORUS: CPT

## 2019-05-28 PROCEDURE — 25000003 PHARM REV CODE 250: Performed by: INTERNAL MEDICINE

## 2019-05-28 PROCEDURE — 90935 HEMODIALYSIS ONE EVALUATION: CPT

## 2019-05-28 PROCEDURE — 27000221 HC OXYGEN, UP TO 24 HOURS

## 2019-05-28 PROCEDURE — 11000004 HC SNF PRIVATE

## 2019-05-28 PROCEDURE — 63600175 PHARM REV CODE 636 W HCPCS: Performed by: NURSE PRACTITIONER

## 2019-05-28 PROCEDURE — 90935 HEMODIALYSIS ONE EVALUATION: CPT | Mod: ,,, | Performed by: NURSE PRACTITIONER

## 2019-05-28 RX ORDER — ONDANSETRON 2 MG/ML
4 INJECTION INTRAMUSCULAR; INTRAVENOUS EVERY 6 HOURS PRN
Status: DISCONTINUED | OUTPATIENT
Start: 2019-05-28 | End: 2019-05-29

## 2019-05-28 RX ORDER — HYDRALAZINE HYDROCHLORIDE 50 MG/1
100 TABLET, FILM COATED ORAL EVERY 8 HOURS
Status: DISCONTINUED | OUTPATIENT
Start: 2019-05-28 | End: 2019-06-12 | Stop reason: HOSPADM

## 2019-05-28 RX ORDER — AMOXICILLIN 250 MG
1 CAPSULE ORAL DAILY PRN
Status: CANCELLED | OUTPATIENT
Start: 2019-05-28

## 2019-05-28 RX ORDER — SEVELAMER CARBONATE FOR ORAL SUSPENSION 800 MG/1
0.8 POWDER, FOR SUSPENSION ORAL
Status: CANCELLED | OUTPATIENT
Start: 2019-05-28

## 2019-05-28 RX ORDER — CARVEDILOL 12.5 MG/1
12.5 TABLET ORAL 2 TIMES DAILY
Status: CANCELLED | OUTPATIENT
Start: 2019-05-28

## 2019-05-28 RX ORDER — CALCIUM CARBONATE 200(500)MG
500 TABLET,CHEWABLE ORAL 2 TIMES DAILY PRN
Status: DISCONTINUED | OUTPATIENT
Start: 2019-05-28 | End: 2019-06-12 | Stop reason: HOSPADM

## 2019-05-28 RX ORDER — INSULIN ASPART 100 [IU]/ML
1-10 INJECTION, SOLUTION INTRAVENOUS; SUBCUTANEOUS
Status: CANCELLED | OUTPATIENT
Start: 2019-05-28

## 2019-05-28 RX ORDER — CARVEDILOL 12.5 MG/1
12.5 TABLET ORAL 2 TIMES DAILY
Status: DISCONTINUED | OUTPATIENT
Start: 2019-05-28 | End: 2019-06-12 | Stop reason: HOSPADM

## 2019-05-28 RX ORDER — RAMELTEON 8 MG/1
8 TABLET ORAL NIGHTLY PRN
Status: DISCONTINUED | OUTPATIENT
Start: 2019-05-28 | End: 2019-06-12 | Stop reason: HOSPADM

## 2019-05-28 RX ORDER — HYDRALAZINE HYDROCHLORIDE 50 MG/1
100 TABLET, FILM COATED ORAL EVERY 8 HOURS
Status: CANCELLED | OUTPATIENT
Start: 2019-05-28

## 2019-05-28 RX ORDER — INSULIN ASPART 100 [IU]/ML
1-10 INJECTION, SOLUTION INTRAVENOUS; SUBCUTANEOUS
Status: DISCONTINUED | OUTPATIENT
Start: 2019-05-28 | End: 2019-06-12 | Stop reason: HOSPADM

## 2019-05-28 RX ORDER — ACETAMINOPHEN 325 MG/1
650 TABLET ORAL EVERY 6 HOURS PRN
Status: DISCONTINUED | OUTPATIENT
Start: 2019-05-28 | End: 2019-06-12 | Stop reason: HOSPADM

## 2019-05-28 RX ORDER — IPRATROPIUM BROMIDE AND ALBUTEROL SULFATE 2.5; .5 MG/3ML; MG/3ML
3 SOLUTION RESPIRATORY (INHALATION)
Status: CANCELLED | OUTPATIENT
Start: 2019-05-28

## 2019-05-28 RX ORDER — CALCIUM CARBONATE 200(500)MG
500 TABLET,CHEWABLE ORAL 2 TIMES DAILY PRN
Status: CANCELLED | OUTPATIENT
Start: 2019-05-28

## 2019-05-28 RX ORDER — IPRATROPIUM BROMIDE AND ALBUTEROL SULFATE 2.5; .5 MG/3ML; MG/3ML
3 SOLUTION RESPIRATORY (INHALATION)
Status: DISCONTINUED | OUTPATIENT
Start: 2019-05-28 | End: 2019-05-29

## 2019-05-28 RX ORDER — AMOXICILLIN 250 MG
1 CAPSULE ORAL 2 TIMES DAILY
Status: DISCONTINUED | OUTPATIENT
Start: 2019-05-28 | End: 2019-06-12 | Stop reason: HOSPADM

## 2019-05-28 RX ORDER — POLYETHYLENE GLYCOL 3350 17 G/17G
17 POWDER, FOR SOLUTION ORAL DAILY
Status: CANCELLED | OUTPATIENT
Start: 2019-05-29

## 2019-05-28 RX ORDER — ACETAMINOPHEN 325 MG/1
650 TABLET ORAL EVERY 6 HOURS PRN
Status: CANCELLED | OUTPATIENT
Start: 2019-05-28

## 2019-05-28 RX ORDER — AMOXICILLIN 250 MG
1 CAPSULE ORAL DAILY PRN
Status: DISCONTINUED | OUTPATIENT
Start: 2019-05-28 | End: 2019-06-12 | Stop reason: HOSPADM

## 2019-05-28 RX ORDER — SEVELAMER CARBONATE FOR ORAL SUSPENSION 800 MG/1
0.8 POWDER, FOR SUSPENSION ORAL
Status: DISCONTINUED | OUTPATIENT
Start: 2019-05-29 | End: 2019-06-12 | Stop reason: HOSPADM

## 2019-05-28 RX ORDER — ONDANSETRON 2 MG/ML
4 INJECTION INTRAMUSCULAR; INTRAVENOUS EVERY 6 HOURS PRN
Status: CANCELLED | OUTPATIENT
Start: 2019-05-28

## 2019-05-28 RX ORDER — SEVELAMER CARBONATE FOR ORAL SUSPENSION 800 MG/1
0.8 POWDER, FOR SUSPENSION ORAL
Status: DISCONTINUED | OUTPATIENT
Start: 2019-05-28 | End: 2019-05-28 | Stop reason: HOSPADM

## 2019-05-28 RX ORDER — RAMELTEON 8 MG/1
8 TABLET ORAL NIGHTLY PRN
Status: CANCELLED | OUTPATIENT
Start: 2019-05-28

## 2019-05-28 RX ORDER — AMOXICILLIN 250 MG
1 CAPSULE ORAL 2 TIMES DAILY
Status: CANCELLED | OUTPATIENT
Start: 2019-05-28

## 2019-05-28 RX ORDER — POLYETHYLENE GLYCOL 3350 17 G/17G
17 POWDER, FOR SOLUTION ORAL DAILY
Status: DISCONTINUED | OUTPATIENT
Start: 2019-05-29 | End: 2019-06-12 | Stop reason: HOSPADM

## 2019-05-28 RX ADMIN — HYDRALAZINE HYDROCHLORIDE 100 MG: 50 TABLET ORAL at 02:05

## 2019-05-28 RX ADMIN — IPRATROPIUM BROMIDE AND ALBUTEROL SULFATE 3 ML: .5; 3 SOLUTION RESPIRATORY (INHALATION) at 08:05

## 2019-05-28 RX ADMIN — HYDRALAZINE HYDROCHLORIDE 100 MG: 50 TABLET ORAL at 05:05

## 2019-05-28 RX ADMIN — ONDANSETRON 4 MG: 2 INJECTION INTRAMUSCULAR; INTRAVENOUS at 02:05

## 2019-05-28 RX ADMIN — CARVEDILOL 12.5 MG: 12.5 TABLET, FILM COATED ORAL at 09:05

## 2019-05-28 RX ADMIN — CARVEDILOL 12.5 MG: 12.5 TABLET, FILM COATED ORAL at 08:05

## 2019-05-28 RX ADMIN — ACETAMINOPHEN 650 MG: 325 TABLET ORAL at 08:05

## 2019-05-28 RX ADMIN — LEVETIRACETAM 250 MG: 250 TABLET ORAL at 08:05

## 2019-05-28 RX ADMIN — SEVELAMER CARBONATE 0.8 G: 800 POWDER, FOR SUSPENSION ORAL at 02:05

## 2019-05-28 RX ADMIN — POLYETHYLENE GLYCOL 3350 17 G: 17 POWDER, FOR SOLUTION ORAL at 02:05

## 2019-05-28 RX ADMIN — HYDRALAZINE HYDROCHLORIDE 100 MG: 50 TABLET ORAL at 09:05

## 2019-05-28 NOTE — PLAN OF CARE
Problem: Occupational Therapy Goal  Goal: Occupational Therapy Goal  Goals to be met by: 6/3     Patient will increase functional independence with ADLs by performing:    UE Dressing with Set-up Assistance and Supervision.  LE Dressing (brief) with Set-up Assistance and Contact Guard Assistance.  Grooming while standing at sink with Minimal Assistance.  Toileting from toilet with Minimal Assistance for hygiene and clothing management.   Toilet transfer to toilet with Minimal Assistance.  Functional mobility at short household distance for ADL task with AD and minimal assistance.      Outcome: Ongoing (interventions implemented as appropriate)  Continue OT plan of care.

## 2019-05-28 NOTE — PHYSICIAN QUERY
PT Name: Tea Georges  MR #: [id number]     Physician Query Form - Documentation Clarification      CDS/: Kishore Walker               Contact information: [phone]    This form is a permanent document in the medical record.     Query Date: May 28, 2019    By submitting this query, we are merely seeking further clarification of documentation. Please utilize your independent clinical judgment when addressing the question(s) below.    The Medical record reflects the following:    Supporting Clinical Findings Location in Medical Record   Physical Exam  General:  Malnutrition      Pre-operative evaluation for Procedure(s) (LRB):  CRANIOTOMY, FOR SUBDURAL HEMATOMA EVACUATION (Right    79 y.o. female w/ a significant PMHx of  ESRD on HD (MWF), HTN, Severe Aortic stenosis ( PAULINE 0.66 cm2; peak velocity 4.1 m/s; mean gradient 46 mmHg), Pulmonary HTN ( PA sys 73 mmHg)  CVA of Right MCA s/p thrombectomy (2016) with left sided residual sxs, T2DM, HFpEF, COPD (on 2L home O2, can walk a block at baseline), CAD, PVD, who presented after a fall and was found to have a SDH       Anesthesia 5/21 ([facility])   Physical Exam   Constitutional:   Mild cachexia     Diet  Diet Cardiac Thin  Diet Cardiac Thin    BMI 20.8  Ht 5'3  Kg 53.1         Progress note Neuro CC 5/23 ([facility] [place])                  Anthro Flow sheet 5/22 ([facility])                                                                            Doctor, Please specify Malnutrition Diagnose.     Provider Use Only    (     )  Malnutrition (Please Specify)           (     )  Mild            (      )  Moderate    (      )  Malnutrition Ruled Out    (      )  Other__________________                                                                                                               [  ] Clinically Undetermined

## 2019-05-28 NOTE — PLAN OF CARE
05/28/19 1518   Final Note   Assessment Type Final Discharge Note   Anticipated Discharge Disposition SNF   What phone number can be called within the next 1-3 days to see how you are doing after discharge? [phone]   Hospital Follow Up  Appt(s) scheduled? Yes   Discharge plans and expectations educations in teach back method with documentation complete? Yes   Right Care Referral Info   Post Acute Recommendation SNF / Sub-Acute Rehab   Facility Name [facility] Future Appointments   Date Time Provider Department Center   6/12/2019  1:30 PM Marcelino Flores MD [facility] NEUROS7 [place]     Patient and Pt's family updated about discharge plan.

## 2019-05-28 NOTE — DISCHARGE SUMMARY
[facility] Hospital Medicine  Discharge Summary      Patient Name: Tea Georges  MRN: [id number]  Admission Date: 5/21/2019  Hospital Length of Stay: 7 days  Discharge Date and Time:  05/28/2019 1:24 PM  Attending Physician: Dickson Levy MD   Discharging Provider: Dickson Levy MD  Primary Care Provider: Parth Posadsa Ii, MD    [facility] Medicine Team: [facility] MED B Dickson Levy MD    HPI:  Tea Georges is a 79 year old patient with PMHx of HTN, ESRD on HD, HF, severe AS, right MCA stroke s/p thrombectomy in 2016, and COPD (on home O2) who presented to OSH after an unwitnessed fall this morning.  She was found down by grand daughter, so LOC is unknown.  At OSH, she was found to have bilateral SDH, R >L, with right to left midline shift. Plan to admit to [facility] for optimization and to OR tomorrow for evacuation.    Procedure(s) (LRB):  CRANIOTOMY, FOR SUBDURAL HEMATOMA EVACUATION (Right)      Hospital Course:     Subdural hematoma  -Bilateral acute SDH, R>L with right to left midline shift   -was on plavix, platelets given at admit   -no surgical intervention per NSx  -SBP < 180   -keppra ppx  repeat Ct head 5/28/19-Multicompartmental intracranial hemorrhage appears grossly stable from prior exam of 05/23/2019 as above.  No new infarct or hemorrhage. No neurosurgical intervention was warranted. Pt is now being discharged to SNF by primary team. Repeat CTH today stable with no evidence of increased hemorrhage. Recommend to continue to hold daily aspirin. Will have pt follow up in NSGY clinic in 2 weeks with repeat CTH     Right-sided cerebrovascular accident (CVA)  -history of in 2016  -thrombectomy at that time  -per patient, she returned to baseline and was able to ambulate on her own, perform ADLs        Blindness of right eye  -complication of uncontrolled DM        Pulmonary emphysema  -resume home nebs           (HFpEF) heart failure with preserved ejection fraction  Attempt even  fluid balance, rate control, afterload reduction     Severe aortic stenosis  -echo pending   -resume home carvedilol 12.5 mg bid for rate control and afterload reduction     Essential hypertension  -SBP < 160  -discussed goal with NSGY  -resume carvedilol 12.5 mg bid  -hydralazine increased from 50mg tid to 100 mg tid        ESRD (end stage renal disease)  -HD MWF at home  -missed HD 5/20, so last dialysis before admission was Friday 5/17 05/22: tolerated HD through graft  05/24: probable HD today per nephro        Type 2 diabetes mellitus with chronic kidney disease on chronic dialysis, without long-term current use of insulin  A1C 5.4  Beginning diabetic diet, cont SSI     Discharged to [facility]    Consults:   Consults (From admission, onward)        Status Ordering Provider     Inpatient consult to Hospital Medicine-General  Once     Provider:  (Not yet assigned)    Completed JEOVANNY EDGE     Inpatient consult to Nephrology  Once     Provider:  (Not yet assigned)    Completed ELMER OSUNA     Inpatient consult to Neuro ICU  Once     Provider:  (Not yet assigned)    Acknowledged LUAN SHUKLA     Inpatient consult to Neurosurgery  Once     Provider:  (Not yet assigned)    Completed LUAN SHUKLA     Inpatient consult to SNF [facility]  Once     Provider:  (Not yet assigned)    Acknowledged LANCE BHANDARI          Final Active Diagnoses:    Diagnosis Date Noted POA    PRINCIPAL PROBLEM:  Subdural hematoma [S06.5X9A] 05/21/2019 Yes    Subdural hemorrhage [I62.00] 05/24/2019 Yes    Preoperative clearance [Z01.818] 05/21/2019 Not Applicable    (HFpEF) heart failure with preserved ejection fraction [I50.30] 05/21/2019 Yes    Type 2 diabetes mellitus with chronic kidney disease on chronic dialysis, without long-term current use of insulin [E11.22, N18.6, Z99.2] 05/01/2018 Not Applicable    Pulmonary emphysema [J43.9] 01/15/2017 Yes    Right-sided cerebrovascular accident (CVA) [I63.9] 11/22/2016  Yes    Blindness of right eye [H54.40] 11/12/2016 Yes     Chronic    Severe aortic stenosis [I35.0] 02/18/2016 Yes    ESRD (end stage renal disease) [N18.6]  Yes     Chronic    Essential hypertension [I10] 01/08/2016 Yes     Chronic      Problems Resolved During this Admission:      Discharged Condition: fair    Disposition: Skilled Nursing Facility    Follow Up:    Patient Instructions:   No discharge procedures on file.  Medications:  per [facility]    Significant Diagnostic Studies: Labs:   BMP:   Recent Labs   Lab 05/27/19 0349 05/28/19  0454   GLU 93 96   * 131*   K 4.7 5.1    98   CO2 20* 17*   BUN 49* 69*   CREATININE 6.4* 8.0*   CALCIUM 9.8 9.5   MG 2.0 2.3   , CMP   Recent Labs   Lab 05/27/19 0349 05/28/19  0454   * 131*   K 4.7 5.1    98   CO2 20* 17*   GLU 93 96   BUN 49* 69*   CREATININE 6.4* 8.0*   CALCIUM 9.8 9.5   PROT 6.2 6.4   ALBUMIN 2.8* 3.0*   BILITOT 0.4 0.4   ALKPHOS 67 73   AST 6* 8*   ALT 5* <5*   ANIONGAP 12 16   ESTGFRAFRICA 6.7* 5.1*   EGFRNONAA 5.8* 4.4*   , CBC   Recent Labs   Lab 05/27/19 0349 05/28/19  0454   WBC 4.20 4.47   HGB 9.3* 9.4*   HCT 30.9* 31.2*   PLT 88* 108*   , INR   Lab Results   Component Value Date    INR 1.1 05/21/2019    INR 1.0 11/26/2018    INR 1.1 12/24/2016   , Lipid Panel   Lab Results   Component Value Date    CHOL 100 (L) 09/30/2016    HDL 34 (L) 09/30/2016    LDLCALC 43.0 (L) 09/30/2016    TRIG 115 09/30/2016    CHOLHDL 34.0 09/30/2016   , Troponin No results for input(s): TROPONINI in the last 168 hours., A1C:   Recent Labs   Lab 02/08/19  0946 05/21/19  1201   HGBA1C 4.9 5.4    and All labs within the past 24 hours have been reviewed  Microbiology:   Blood Culture   Lab Results   Component Value Date    LABBLOO  10/29/2018     Gram stain aer bottle: Gram positive cocci in chains resembling Strep    LABBLOO  10/29/2018     Results called to and read back by: APRIL Barnes RN  10/30/2018  14:13    LABOO  10/29/2018     VIRIDANS  STREPTOCOCCUS GROUP  Organism is a probable contaminant     , Sputum Culture   Lab Results   Component Value Date    RESPIRATORYC NO SIGNIFICANT ISOLATE. 06/18/2010    and Urine Culture    Lab Results   Component Value Date    LABURIN ESCHERICHIA COLI  >100,000 cfu/ml   05/21/2019     Radiology  Imaging Results          CT Abdomen Without Contrast (Final result)  Result time 05/21/19 20:19:51    Final result by Jose Fuchs MD (05/21/19 20:19:51)             Impression:      1. Questionable nonspecific gastric wall thickening which may be related to underdistention versus nonspecific gastritis from infectious or inflammatory etiology.  Further evaluation/follow-up as warranted.  2. Diverticulosis coli without focal diverticulitis or colitis definitively seen of the imaged mid to upper abdominal bowel loops.  3. Cholecystectomy.  4. Advanced coronary and systemic atherosclerosis.  5. Grossly stable few additional findings as above.      Electronically signed by: Jose Fuchs MD  Date:    05/21/2019  Time:    20:19           Narrative:    EXAMINATION:  CT ABDOMEN WITHOUT CONTRAST    CLINICAL HISTORY:  Abd pain, gastroenteritis or colitis suspected;    TECHNIQUE:  Low dose axial images, sagittal and coronal reformations were obtained from the lung bases to the pubic symphysis.  Contrast was not administered.    COMPARISON:  CT abdomen and pelvis 09/14/2017    FINDINGS:  Study is somewhat limited by beam hardening with streak artifact from overlying monitoring leads, upper extremities and respiratory motion.    Please note that the lack of intravenous contrast limits evaluation of soft tissue and vascular structures.    Partially imaged moderate layering right-sided pleural effusion with associated overlying compressive atelectasis of the right middle lobe and also right lower lobe.  Partially imaged small layering left pleural effusion with mild compressive atelectasis of the left lower lobe.  Patchy ground-glass  attenuation of the remaining aerated lungs, nonspecific.    Heart is enlarged without significant pericardial fluid.  Coronary arterial calcifications noted.  Advanced aortic and mitral annular calcifications noted.    Small hiatal hernia.  Stomach is minimally distended with intraluminal contents.  There is apparent mild circumferential wall thickening of the stomach without adjacent inflammation.    Scattered nonspecific calcifications of the partially imaged bilateral breasts.    Noncontrast appearance of the liver, pancreas, spleen, duodenum and bilateral adrenal glands are within normal limits.  No biliary ductal dilatation.  Cholecystectomy.    Bilateral kidneys are grossly stable in size, shape and location.  Subcentimeter hypoattenuating cortical focus at the right renal upper pole which is too small to characterize.  No hydronephrosis.  No radiodense calculus seen within the collecting systems on either side noting scattered advanced renal vascular calcifications.  Grossly stable mild bilateral nonspecific perinephric stranding.  Proximal ureters are nondilated.    Partially imaged small fat containing umbilical hernia.  Multiple scattered diverticula of the imaged colon without focal diverticulitis.  No bowel obstruction or inflammation of the imaged mid to upper abdominal bowel loops.    No ascites, free air or lymphadenopathy of the imaged abdomen.  Scattered advanced atherosclerotic vascular calcifications of the aorta and its proximal branch vessels.  Aorta is nonaneurysmal.    Cleared osseous structures appear grossly stable without acute process seen.                              CT Head Without Contrast (Final result)  Result time 05/21/19 21:32:04    Final result by Jarrod Wayne MD (05/21/19 21:32:04)             Impression:      Mildly increased volume of subarachnoid hemorrhage overlying the left cerebral convexity with new subarachnoid blood in the left occipital region.  Continued short-term  follow-up is suggested.    Stable acute subdural hematomas overlying the bilateral cerebral convexities, right larger than left.  Stable mass effect with 0.8 cm leftward midline shift and right to left subfalcine herniation.  No hydrocephalus.    This report was flagged in Epic as abnormal.    Findings communicated with neurocritical care team (Adrian AGUILAR) on 05/21/2019 at 20:50.    Electronically signed by resident: Ryan Duran  Date:    05/21/2019  Time:    20:18    Electronically signed by: Jarrod Wayne MD  Date:    05/21/2019  Time:    21:32           Narrative:    EXAMINATION:  CT HEAD WITHOUT CONTRAST    CLINICAL HISTORY:  SDH;    TECHNIQUE:  Low dose axial images were obtained through the head.  Coronal and sagittal reformations were also performed. Contrast was not administered.    COMPARISON:  CT head and CT maxillofacial from earlier today.    FINDINGS:  Stable hyperdense extra-axial collection overlying the right cerebral convexity extending anterior to the right temporal lobe consistent with acute subdural hemorrhage measuring 1.8 cm in max thickness (previously 1.9 cm). Persistent mass effect with partial effacement of the right lateral ventricle and 0.8 cm leftward midline shift and right to left subfalcine herniation.  No hydrocephalus.    There is also minimal increase in the amount of subdural blood along the anterior interhemispheric fissure.  Stable thin acute subdural hemorrhage overlying the left temporal convexity measuring 4 mm in maximal thickness.    Stable distribution of subarachnoid hemorrhage overlying the left anterior frontal, left posterior frontal, lobes, mildly increased in volume.  There is new subarachnoid blood within the left occipital region.    Stable encephalomalacia within the right MCA territory compatible with remote infarct. Stable degree of chronic microvascular ischemic changes throughout the supratentorial white matter.  No new intraparenchymal hemorrhage or major  vascular distribution infarct.    No acute fracture.  Persistent soft tissue swelling and induration overlying the left frontal calvarium and left periorbital soft tissues.  Complete opacification of the sphenoid sinus.  Remaining paranasal sinuses and mastoid air cells are essentially clear.                             X-Ray Chest AP Portable (Final result)  Result time 05/21/19 19:55:54    Final result by Lulú Quintana MD (05/21/19 19:55:54)             Impression:      No pneumothorax.    Cardiomegaly, pulmonary edema and moderate right-sided pleural effusion.      Electronically signed by: Lulú Quintana  Date:    05/21/2019  Time:    19:55           Narrative:    EXAMINATION:  XR CHEST AP PORTABLE    CLINICAL HISTORY:  r/o pneumothorax. left IJ CVC attempted but not placed due to difficulties advancing guidewire.;    TECHNIQUE:  Single frontal view of the chest was performed.    COMPARISON:  Multiple priors, most recent 05/21/2019    FINDINGS:  Unchanged cardiomegaly with central pulmonary vascular congestion moderate size right pleural effusion.  Diffuse interstitial opacities.  No pneumothorax.  Vascular the left subclavian region.                             X-Ray Chest AP Portable (Final result)  Result time 05/21/19 14:10:29    Final result by Scar Zhu MD (05/21/19 14:10:29)             Impression:      See above      Electronically signed by: Scar Zhu MD  Date:    05/21/2019  Time:    14:10           Narrative:    EXAMINATION:  XR CHEST AP PORTABLE    CLINICAL HISTORY:  fall;    TECHNIQUE:  Single frontal view of the chest was performed.    COMPARISON:  None 11/29/2018    FINDINGS:  Loop recorder identified as before.  Cardiomegaly, pulmonary vascular congestion and diffuse edema.  Ill-defined soft tissue density adjacent to the right hilum probably airspace consolidation.  The right-sided pleural effusion identified.  Vascular stent noted in the left axilla as before                              CT Head Without Contrast (Final result)  Result time 05/21/19 13:18:34    Final result by Gilberto Luke DO (05/21/19 13:18:34)             Impression:      Heterogeneous primarily hyperdense extra-axial collection overlying the right cerebral convexity compatible with rece acute/recent nt subdural hemorrhage.  There is mass effect with compression of the right lateral ventricle and approximately 9 mm of leftward midline shift with subfalcine herniation.    Additional extra-axial hemorrhages overlying the left cerebral hemisphere with scattered interrupted subdural component overlying the left temporal and frontal lobes as well as small scattered subarachnoid hemorrhage in left frontal sulci.    No definite parenchymal hemorrhage.    Encephalomalacia from prior large right MCA territory remote infarction with patchy hypoattenuation elsewhere supratentorial white matter suggestive for chronic ischemic change.    Prominent soft tissue swelling induration overlying the left orbit concerning for subcutaneous hematoma.  Please see CT maxillofacial bone report for further details      Electronically signed by: Gilberto Luke DO  Date:    05/21/2019  Time:    13:18           Narrative:    EXAMINATION:  CT HEAD WITHOUT CONTRAST    CLINICAL HISTORY:  Headache, post trauma;    TECHNIQUE:  Multiple sequential 5 mm axial images of the head without contrast.  Coronal and sagittal reformatted imaging from the axial acquisition.    COMPARISON:  None    FINDINGS:  There is a heterogeneous primarily hyperdense extra-axial collection overlying the right cerebral convexity compatible with acute subdural hemorrhage this measures approximately 1.9 cm in greatest thickness with mass effect on the right cerebral hemisphere resulting in 9 mm of leftward midline shift with subfalcine herniation.    There is small volume subarachnoid hemorrhage within the left frontal sulci with locular hyperdensity overlying the left frontal lobe  compatible with additional subdural hemorrhage overlying the superior posterior frontal parietal convexity.    There is a thin extra-axial collection overlying the left temporal convexity which is also hyperdense measuring 4-5 mm in thickness concerning for additional subdural hemorrhage.    There is no definite parenchymal hemorrhage.  There is encephalomalacia right MCA territory compatible with remote infarction.    There is no evidence for significant sulcal effacement to suggest large territory recent infarction.  Patchy hypoattenuation supratentorial white matter concerning for underlying chronic ischemic change.  There is severe opacification of the sphenoid sinus.    Soft tissue swelling induration overlying the left inferior frontal calvarium without gross underlying calvarial fracture.  Additional soft tissue swelling induration overlying the left orbit periorbital preseptal soft tissues.  Please see CT maxillofacial bone report for further details.  Case discussed with  on 05/21/2019 at 13:17.                             CT Maxillofacial Without Contrast (Final result)  Result time 05/21/19 13:21:25    Final result by Gilberto Luke DO (05/21/19 13:21:25)             Impression:      Study limited by patient motion and partial exclusion of the right aspect of the maxillofacial bones.  There is no evidence for acute fracture of the visualized maxillofacial bones.    There is diffuse soft tissue swelling induration overlying the left periorbital preseptal soft tissues extending to the Radha zygomatic soft tissues without underlying fracture.    Partial inclusion of intracranial hemorrhages.  Please see CT head report for further details.      Electronically signed by: Gilberto Luke DO  Date:    05/21/2019  Time:    13:21           Narrative:    EXAMINATION:  CT MAXILLOFACIAL WITHOUT CONTRAST    CLINICAL HISTORY:  Maxface trauma blunt;    TECHNIQUE:  Multiple sequential 2.5 mm axial images of  the maxillofacial bones without contrast.  Coronal and sagittal reformatted imaging from the axial acquisition.    COMPARISON:  Concomitant CT head    FINDINGS:  There is pronounced soft tissue swelling induration overlying the left periorbital preseptal soft tissues without underlying bony orbital fracture allowing for motion limitation.    Please note that the study is limited by partial exclusion of the right Radha zygomatic soft tissues which was included in the CT of the head.  There is no definite right bony orbital fracture.  Please note there is partial exclusion of the right mandible as well no evidence for acute fracture or dislocation visualized mandible with exclusion of the right mandibular condyle and temporomandibular joint.    Severe opacification of the sphenoid sinus as seen on CT head.    Remote operative change from bilateral cataract repair.  Otherwise the globes and extraocular muscles are relatively symmetric without evidence for traumatic globe rupture.    Partially visualized extra-axial hemorrhage overlying the frontal lobes right greater than left.  Please see CT head report for further details                             CT Cervical Spine Without Contrast (Final result)  Result time 05/21/19 13:43:56    Final result by Scar Zhu MD (05/21/19 13:43:56)             Impression:      No evidence of fracture or dislocation.    Degenerative changes of the visualized spine.    Markedly enlarged and heterogeneous thyroid.    Electronically signed by resident: Oly Pena  Date:    05/21/2019  Time:    13:19    Electronically signed by: Scar Zhu MD  Date:    05/21/2019  Time:    13:43           Narrative:    EXAMINATION:  CT CERVICAL SPINE WITHOUT CONTRAST    CLINICAL HISTORY:  C-spine trauma, NEXUS/CCR negative, low risk;    TECHNIQUE:  Low dose axial images, sagittal and coronal reformations were performed though the cervical spine.  Contrast was not administered.    COMPARISON:  CTA  head and neck 09/26/2016    FINDINGS:  Sagittal reformatted images demonstrate adequate alignment of the cervical spine.    There is no evidence of fracture or dislocation.    There are degenerative changes with multilevel disc space narrowing and anterior osteophyte formation and bridging osteophytes at the levels C4-C7 and extending into the partially visualized proximal thoracic spine.    There is a markedly heterogeneous enlarged left and right thyroid lobes, left greater than right.    The airway is patent and the lung apices are unremarkable.  The visualized portions of the brain demonstrate no significant abnormality.                            Cardiac Graphics: Sinus rhythm with 1st degree A-V block  Nonspecific T wave abnormality  Prolonged QT    Pending Diagnostic Studies:     Procedure Component Value Units Date/Time    CT Head Without Contrast [[id number]] Resulted:  05/28/19 1252    Order Status:  Sent Lab Status:  In process Updated:  05/28/19 1305        Indwelling Lines/Drains at time of discharge:   Lines/Drains/Airways     Drain                 Hemodialysis AV Fistula Left upper arm -- days         Hemodialysis AV Fistula 05/23/16 0700 Left upper arm 1100 days          Epidural Line                 Perineural Analgesia/Anesthesia Assessment (using dermatomes) Epidural 11/29/18 1400 179 days                  Dickson Levy MD  Department of Hospital Medicine  [facility]

## 2019-05-28 NOTE — PROGRESS NOTES
HD treatment complete. Duration of treatment 3.5 hours and 2 L removed. Treatment was tolerated well and no complications with access to left upper arm. Needles removed and hemostasis achieved. Dressing intact and no drainage noted. Thrill and bruit present.

## 2019-05-28 NOTE — PT/OT/SLP PROGRESS
Speech Language Pathology      Tea Georges  MRN: [id number]    Patient not seen today secondary to returning from dialysis and head CT this PM. Upon attempt pt just returning to room and waiting to consume lunch meal.   . Will follow-up 05/29/19 .        Jessica Friedman, IRAIS-SLP

## 2019-05-28 NOTE — PT/OT/SLP PROGRESS
"Occupational Therapy   Treatment    Name: Tea Georges  MRN: [id number]  Admitting Diagnosis:  Subdural hematoma       Recommendations:     Discharge Recommendations: nursing facility, skilled (short-stay)  Discharge Equipment Recommendations:  none  Barriers to discharge: fall risk    Assessment:     Tea Georges is a 79 y.o. female with a medical diagnosis of Subdural hematoma.  She presents with performance deficits including weakness, impaired endurance, impaired self care skills, gait instability, decreased safety awareness, impaired cardiopulmonary response to activity. Pt progressing toward goals and would continue to benefit from OT to increase functional independence. Recommend short-stay SNF upon D/C.    Rehab Prognosis: Good; patient would benefit from acute skilled OT services to address these deficits and reach maximum level of function.       Plan:     Patient to be seen 4 x/week to address the above listed problems via self-care/home management, therapeutic activities, therapeutic exercises, neuromuscular re-education, cognitive retraining  · Plan of Care Expires: 06/23/19  · Plan of Care Reviewed with: patient    Subjective     Pain/Comfort:  · Pain Rating 1: (General malaise)    Objective:     Communicated with: RN prior to session. Patient found seated on BSC with telemetry, oxygen upon OT entry to room.  Pt stated several times "I don't feel well" but did not elaborate further.    General Precautions: Standard, fall, vision impaired, aspiration   Orthopedic Precautions:N/A   Braces: N/A     Occupational Performance:     Bed Mobility:    · Patient completed Sit to Supine with moderate assistance     Functional Mobility/Transfers:  · Patient completed Sit <> Stand Transfer with minimum assistance with hand-held assist from EOB and bedside chair  · Functional Mobility: Few steps from EOB<>BSC<>bedside chair with minimum assistance hand-held assist; further mobility limited by pt reporting needing to " "sit on the commode and also generally not feeling well    Activities of Daily Living:  · Pt declined      AMPA 6 Click ADL: 13    Treatment & Education:  Pt assisted to BSC-- left with RN present and call button in reach-- upon return, pt sitting in bedside chair, c/o "not feeling well" and requesting to return to bed; encouraged OOB activity and assisted pt to return to bed with HOB elevated    Patient left HOB elevated with all lines intact and call button in reachEducation:      GOALS:   Multidisciplinary Problems     Occupational Therapy Goals        Problem: Occupational Therapy Goal    Goal Priority Disciplines Outcome Interventions   Occupational Therapy Goal     OT, PT/OT Ongoing (interventions implemented as appropriate)    Description:  Goals to be met by: 6/3     Patient will increase functional independence with ADLs by performing:    UE Dressing with Set-up Assistance and Supervision.  LE Dressing (brief) with Set-up Assistance and Contact Guard Assistance.  Grooming while standing at sink with Minimal Assistance.  Toileting from toilet with Minimal Assistance for hygiene and clothing management.   Toilet transfer to toilet with Minimal Assistance.  Functional mobility at short household distance for ADL task with AD and minimal assistance.                       Time Tracking:     OT Date of Treatment: 05/28/19  OT Start Time: 1449  OT Stop Time: 1510  OT Total Time (min): 21 min    Billable Minutes:Therapeutic Activity 10 minutes    SHERI Johnson  5/28/2019    "

## 2019-05-28 NOTE — PLAN OF CARE
Problem: Physical Therapy Goal  Goal: Physical Therapy Goal  Goals to be met by: 19     Patient will increase functional independence with mobility by performin. Supine to sit with Contact Guard Assistance  2. Sit to stand transfer with Contact Guard Assistance  3. Bed to chair transfer with Contact Guard Assistance using AD as needed  4. Gait  x 50 feet with Contact Guard Assistance using AD as needed.  5. Lower extremity exercise program x15 reps, with supervision, in order to increase LE strength and (I) with functional mobility.      Outcome: Ongoing (interventions implemented as appropriate)  PT evaluation complete and appropriate goals established.    Marcella Lopez, PT, DPT   2019  [phone]

## 2019-05-28 NOTE — PT/OT/SLP EVAL
"Physical Therapy Evaluation    Patient Name:  Tea eGorges   MRN:  [id number]    Recommendations:     Discharge Recommendations:  nursing facility, skilled(short stay)   Discharge Equipment Recommendations: none   Barriers to discharge: Inaccessible home (5 MARY)    Assessment:     Tea Georges is a 79 y.o. female admitted with a medical diagnosis of Subdural hematoma.  She presents with the following impairments/functional limitations:  weakness, impaired functional mobilty, impaired endurance, impaired self care skills, gait instability, impaired balance, impaired cardiopulmonary response to activity. Pt requiring assist to complete functional mobility this date. Able to complete stand pivot transfers, but further mobility and activity tolerance limited 2* general malaise. Pt would continue to benefit from skilled acute PT in order to address current deficits and progress functional mobility.     Rehab Prognosis: Good; patient would benefit from acute skilled PT services to address these deficits and reach maximum level of function.    Recent Surgery: Procedure(s) (LRB):  CRANIOTOMY, FOR SUBDURAL HEMATOMA EVACUATION (Right)      Plan:     During this hospitalization, patient to be seen 4 x/week to address the identified rehab impairments via therapeutic activities, therapeutic exercises, gait training, neuromuscular re-education and progress toward the following goals:    · Plan of Care Expires:  06/26/19    Subjective     Chief Complaint: general malaise   Patient/Family Comments/goals: "I don't feel good."  Pain/Comfort:  · Pain Rating 1: (reported general malaise)  · Pain Addressed 1: Reposition, Distraction, Nurse notified, Cessation of Activity    Patients cultural, spiritual, Jainism conflicts given the current situation: no    Living Environment:  Pt lives with her daughter and 2 adult granddaughters in a 1SH with 5 MARY, handrail present at back entrance.   Prior to admission, patient ambulated household " distances with HHA and used RW for community distances (dialysis). Pt reports that she was (I) with ADLs PTA.  Equipment used at home: wheelchair, walker, rolling, oxygen, bedside commode, cane, straight. Upon discharge, patient will have assistance from family.    Objective:     Communicated with RN prior to session.  Patient found seated EOB with telemetry, oxygen  upon PT entry to room.    General Precautions: Standard, fall, vision impaired, aspiration   Orthopedic Precautions:N/A   Braces: N/A     Exams:  · Cognitive Exam:  Patient is oriented to Person, Place and Situation  · Sensation:    · -       Intact  · RLE ROM: WFL  · RLE Strength: grossly 3+/5 throughout  · LLE ROM: WFL  · LLE Strength: grossly 3+/5 throughout    Functional Mobility:  · Bed Mobility:     · Sit to Supine: moderate assistance (managing BLE)  · Transfers:     · Sit to Stand:  minimum assistance with hand-held assist and x2 reps  · Bed to Chair: minimum assistance with  hand-held assist  using  Stand Pivot  · Toilet Transfer: minimum assistance with  hand-held assist and bedside commode  using  Stand Pivot  · Gait: ~1 ft. bed>BSC + ~2 ft. chair>bed with Kingsley and HHA  · Decreased step length, impaired weight-shifting ability, increased trunk flex, mild instability       Therapeutic Activities and Exercises:   Pt educated on role of PT and PT POC. Pt verbalized understanding.   PT to bedside for initial evaluation, but pt needing to void. Transferred to BSC to complete seated toileting. PT then returned to complete evaluation and assist pt chair>bed.     AM-PAC 6 CLICK MOBILITY  Total Score:16     Patient left supine with all lines intact and call button in reach.    GOALS:   Multidisciplinary Problems     Physical Therapy Goals        Problem: Physical Therapy Goal    Goal Priority Disciplines Outcome Goal Variances Interventions   Physical Therapy Goal     PT, PT/OT Ongoing (interventions implemented as appropriate)     Description:  Goals  to be met by: 19     Patient will increase functional independence with mobility by performin. Supine to sit with Contact Guard Assistance  2. Sit to stand transfer with Contact Guard Assistance  3. Bed to chair transfer with Contact Guard Assistance using AD as needed  4. Gait  x 50 feet with Contact Guard Assistance using AD as needed.  5. Lower extremity exercise program x15 reps, with supervision, in order to increase LE strength and (I) with functional mobility.                        History:     Past Medical History:   Diagnosis Date    Anemia in ESRD (end-stage renal disease) 2016    Anticoagulant long-term use     Aortic atherosclerosis 2016    Aortic stenosis, moderate 2016    Asthma in adult without complication 2016    Bilateral low back pain without sciatica 2015    Blindness of right eye 2016    CAD (coronary artery disease) 2016    Cataract     Central retinal vein occlusion, right eye 6/3/2014    CHF (congestive heart failure)     Chronic diastolic heart failure 2016    Chronic respiratory failure with hypoxia 2016    COPD (chronic obstructive pulmonary disease) 1/15/2017    Dependence on hemodialysis         Diverticulosis     Embolic stroke involving right middle cerebral artery     Enlarged LA (left atrium) 10/7/2016    Epiretinal membrane 2012    ESRD (end stage renal disease)     Essential hypertension 2016    History of GI diverticular bleed     16    Left flaccid hemiparesis 10/1/2016    Peripheral vascular disease, unspecified 2016    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 2016    Type 2 diabetes mellitus with kidney complication, without long-term current use of insulin 2018    Type 2 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, without long-term current use of insulin 3/26/2013    Type 2 diabetes  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, without long-term current use of insulin 3/26/2013    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       Past Surgical History:   Procedure Laterality Date    BREAST SURGERY      tumor removal x 2    CATARACT EXTRACTION      CHOLECYSTECTOMY      COLONOSCOPY N/A 5/30/2016    [facility] ENDO (2ND FLR)    COLONOSCOPY N/A 5/23/2016    Performed by WILLIAM Colvin [facility] at [facility] ENDO (2ND FLR)    ESOPHAGOGASTRODUODENOSCOPY (EGD) N/A 5/30/2016    Per[facility] by [facility] ENDO (2ND FLR)    ESOPHAGOGASTRODUODENOSCOPY (EGD) N/A 5/27/2016    Performed by Cesario Rubio [facility] at [facility] ENDO (2ND FLR)    EXCISION, ANEURYSM Left 11/29/2018    Performed by NADINE Blum III, [facility] at [facility] OR 2ND FLR    Fistulogram Left 11/28/2018    Per[facility] by [facility], [facility] at [facility] CATH LAB    INSERTION, CATHETER, VASCULAR, DUAL LUMEN Right 11/29/2018    [facility] FLR    PTA, Fistula  11/28/2018    Performed by NADINE [facility] III, [facility] at [facility] CATH LAB    REVISION, AV FISTULA, Left 11/29/2018    Performed by NADINE [facility] III[facility] FLR    UPPER GASTROINTESTINAL ENDOSCOPY         Time Tracking:     PT Received On: 05/28/19  PT Start Time: 1449     PT Stop Time: 1505  PT Total Time (min): 16 min     Billable Minutes: Evaluation 16  (co-tx with OT)    Marcella Lopez, PT, DPT   5/28/2019  [phone]

## 2019-05-28 NOTE — PROGRESS NOTES
Pt is 78 y/o female with complicated PMH who presented with acute right SDH s/p unwitnessed fall. No neurosurgical intervention was warranted. Pt is now being discharged to SNF by primary team. Repeat CTH today stable with no evidence of increased hemorrhage. Recommend to continue to hold daily aspirin. Will have pt follow up in NSGY clinic in 2 weeks with repeat CTH. Our office will contact pt to schedule appointment.    Please contact NSGY with any further questions.    Larissa Lux PA-C  Neurosurgery

## 2019-05-28 NOTE — PT/OT/SLP PROGRESS
Occupational Therapy      Patient Name:  Tea Georges   MRN:  [id number]    Patient FELICIA for HD.   Will follow up at later and more appropriate time.    SHERI Underwood  5/28/2019

## 2019-05-28 NOTE — PLAN OF CARE
Pt is going to [facility] today. Nurse can call report to [phone].  SW setup transport for 4:30pm.    Josh Bae LMSW  [facility]  [phone]

## 2019-05-28 NOTE — PROGRESS NOTES
[facility] HEMODIALYSIS NOTE     Patient currently on hemodialysis for removal of uremic toxins and volume.     Patient seen and evaluated on hemodialysis, tolerating treatment, see HD flowsheet for vitals and assessments.      Ultrafiltration goal is 2-3L     Labs have been reviewed and the dialysate bath has been adjusted.     Assessment/Plan:  Seen on dialysis this morning, tolerating well.  Will start on SATNAM with dialysis  Will start on Phos binders with meals  Renal diet    SHANEKA Cooley, FNP-BC  Nephrology  Pager:  [phone]

## 2019-05-28 NOTE — NURSING
"Patient is AAOx4. Patient given tylenol for headache before leaving the unit. She went to dialysis today for 3 1/2 hours. Patient returned stating "I dont feel good." BG checked-92, VS WNL, IV nausea med given. Patient helped up to bedside commode. Patient helped back to bed and ate a snack. Discharge instructions reviewed. Patient verbalized understanding. Tele and IV removed without incidence. Patient left with transport in a wheelchair in no apparent distress. Family notified of patient's discharge.   "

## 2019-05-29 LAB — POCT GLUCOSE: 82 MG/DL (ref 70–110)

## 2019-05-29 PROCEDURE — 99900035 HC TECH TIME PER 15 MIN (STAT)

## 2019-05-29 PROCEDURE — 27000221 HC OXYGEN, UP TO 24 HOURS

## 2019-05-29 PROCEDURE — 94640 AIRWAY INHALATION TREATMENT: CPT

## 2019-05-29 PROCEDURE — 97116 GAIT TRAINING THERAPY: CPT

## 2019-05-29 PROCEDURE — 97165 OT EVAL LOW COMPLEX 30 MIN: CPT

## 2019-05-29 PROCEDURE — 97535 SELF CARE MNGMENT TRAINING: CPT

## 2019-05-29 PROCEDURE — 25000003 PHARM REV CODE 250: Performed by: NURSE PRACTITIONER

## 2019-05-29 PROCEDURE — 25000003 PHARM REV CODE 250: Performed by: INTERNAL MEDICINE

## 2019-05-29 PROCEDURE — 94761 N-INVAS EAR/PLS OXIMETRY MLT: CPT

## 2019-05-29 PROCEDURE — 97161 PT EVAL LOW COMPLEX 20 MIN: CPT

## 2019-05-29 PROCEDURE — 97530 THERAPEUTIC ACTIVITIES: CPT

## 2019-05-29 PROCEDURE — 25000003 PHARM REV CODE 250: Performed by: HOSPITALIST

## 2019-05-29 PROCEDURE — 25000242 PHARM REV CODE 250 ALT 637 W/ HCPCS: Performed by: HOSPITALIST

## 2019-05-29 PROCEDURE — 11000004 HC SNF PRIVATE

## 2019-05-29 PROCEDURE — 97110 THERAPEUTIC EXERCISES: CPT

## 2019-05-29 RX ORDER — ALUMINUM HYDROXIDE, MAGNESIUM HYDROXIDE, AND SIMETHICONE 2400; 240; 2400 MG/30ML; MG/30ML; MG/30ML
30 SUSPENSION ORAL ONCE
Status: DISCONTINUED | OUTPATIENT
Start: 2019-05-29 | End: 2019-06-04

## 2019-05-29 RX ORDER — IPRATROPIUM BROMIDE AND ALBUTEROL SULFATE 2.5; .5 MG/3ML; MG/3ML
3 SOLUTION RESPIRATORY (INHALATION) EVERY 6 HOURS PRN
Status: DISCONTINUED | OUTPATIENT
Start: 2019-05-29 | End: 2019-06-12 | Stop reason: HOSPADM

## 2019-05-29 RX ORDER — ALUMINUM HYDROXIDE, MAGNESIUM HYDROXIDE, AND SIMETHICONE 2400; 240; 2400 MG/30ML; MG/30ML; MG/30ML
30 SUSPENSION ORAL EVERY 6 HOURS PRN
Status: DISCONTINUED | OUTPATIENT
Start: 2019-05-29 | End: 2019-06-12 | Stop reason: HOSPADM

## 2019-05-29 RX ORDER — AMLODIPINE BESYLATE 10 MG/1
10 TABLET ORAL DAILY
Status: DISCONTINUED | OUTPATIENT
Start: 2019-05-29 | End: 2019-06-12 | Stop reason: HOSPADM

## 2019-05-29 RX ORDER — ONDANSETRON 4 MG/1
4 TABLET, ORALLY DISINTEGRATING ORAL EVERY 6 HOURS PRN
Status: DISCONTINUED | OUTPATIENT
Start: 2019-05-29 | End: 2019-06-03

## 2019-05-29 RX ORDER — ALBUTEROL SULFATE 90 UG/1
2 AEROSOL, METERED RESPIRATORY (INHALATION) EVERY 6 HOURS PRN
Status: DISCONTINUED | OUTPATIENT
Start: 2019-05-29 | End: 2019-06-12 | Stop reason: HOSPADM

## 2019-05-29 RX ORDER — FLUTICASONE FUROATE AND VILANTEROL 200; 25 UG/1; UG/1
1 POWDER RESPIRATORY (INHALATION) DAILY
Status: DISCONTINUED | OUTPATIENT
Start: 2019-05-29 | End: 2019-06-12 | Stop reason: HOSPADM

## 2019-05-29 RX ADMIN — CARVEDILOL 12.5 MG: 12.5 TABLET, FILM COATED ORAL at 09:05

## 2019-05-29 RX ADMIN — IPRATROPIUM BROMIDE AND ALBUTEROL SULFATE 3 ML: .5; 3 SOLUTION RESPIRATORY (INHALATION) at 07:05

## 2019-05-29 RX ADMIN — ONDANSETRON 4 MG: 4 TABLET, ORALLY DISINTEGRATING ORAL at 05:05

## 2019-05-29 RX ADMIN — SEVELAMER CARBONATE 0.8 G: 800 POWDER, FOR SUSPENSION ORAL at 05:05

## 2019-05-29 RX ADMIN — CARVEDILOL 12.5 MG: 12.5 TABLET, FILM COATED ORAL at 08:05

## 2019-05-29 RX ADMIN — HYDRALAZINE HYDROCHLORIDE 100 MG: 50 TABLET ORAL at 09:05

## 2019-05-29 RX ADMIN — AMLODIPINE BESYLATE 10 MG: 10 TABLET ORAL at 05:05

## 2019-05-29 RX ADMIN — HYDRALAZINE HYDROCHLORIDE 100 MG: 50 TABLET ORAL at 05:05

## 2019-05-29 RX ADMIN — SENNOSIDES AND DOCUSATE SODIUM 1 TABLET: 8.6; 5 TABLET ORAL at 08:05

## 2019-05-29 RX ADMIN — SEVELAMER CARBONATE 0.8 G: 800 POWDER, FOR SUSPENSION ORAL at 08:05

## 2019-05-29 NOTE — PT/OT/SLP EVAL
Occupational Therapy  Evaluation/ Treatment    Tea Georges   MRN: [id number]   Admitting Diagnosis: Subdural hemorrhage     OT Date of Treatment: 05/29/19              Billable Minutes:  Evaluation 20  Self Care/Home Management 40    Diagnosis: Subdural hemorrhage    Past Medical History:   Diagnosis Date    Anemia in ESRD (end-stage renal disease) 5/29/2016    Anticoagulant long-term use     Aortic atherosclerosis 11/22/2016    Aortic stenosis, moderate 2/18/2016    Asthma in adult without complication 1/8/2016    Bilateral low back pain without sciatica 11/17/2015    Blindness of right eye 11/12/2016    CAD (coronary artery disease) 12/12/2016    Cataract     Central retinal vein occlusion, right eye 6/3/2014    CHF (congestive heart failure)     Chronic diastolic heart failure 1/8/2016    Chronic respiratory failure with hypoxia 5/29/2016    COPD (chronic obstructive pulmonary disease) 1/15/2017    Dependence on hemodialysis     Mon-Wed-Fri    Diverticulosis     Embolic stroke involving right middle cerebral artery     Encounter for blood transfusion     Enlarged LA (left atrium) 10/7/2016    Epiretinal membrane 7/17/2012    ESRD (end stage renal disease)     Essential hypertension 1/8/2016    History of GI diverticular bleed     5/22/16    Left flaccid hemiparesis 10/1/2016    Peripheral vascular disease, unspecified 11/22/2016    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 11/13/2016    Type 2 diabetes mellitus with kidney complication, without long-term current use of insulin 5/1/2018    Type 2 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, without long-term current use of insulin 3/26/2013    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, without long-term current use of insulin 3/26/2013    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      Past Surgical History:   Procedure Laterality Date     ABDOMINAL SURGERY      BREAST SURGERY      tumor removal x 2    CARDIAC SURGERY      CATARACT EXTRACTION      CHOLECYSTECTOMY      COLONOSCOPY N/A 5/30/2016    [facility] END[facility] (2ND FLR)    COLONOSCOPY N/A 5/23/2016    [facility]formed by WILLIAM [facility]vin [facility] at [facility] ENDO (2ND FLR)    ESOPHAGOGASTRODUODENOSCOPY (EGD) N/A 5/30/2016    Performed by [facility] ENDO (2ND FLR)    ESOPHAGOGASTRODUODENOSCOPY (EGD) N/A 5/27/2016    Performed by Cesario Rubio MD at [facility] ENDO (2ND FLR)    EXCISION, ANEURYSM Left 11/29/2018    Performed by NADINE Blum III, MD at [facility] OR 2ND FLR    EYE SURGERY      Fistulogram Left 11/28/2018    Performed by NADINE [facility] at [facility] CATH LAB    INSERTION, CATHETER, VASCULAR, DUAL LUMEN Right 11/29/2018    [facility]    PTA, Fistula  11/28/2018    Performed by NADINE [facility] at [facility] CATH LAB    REVISION, AV FISTULA, Left 11/29/2018    Performed by NADINE Bl[facility] III, [facility] at [facility] OR 2ND FLR    UPPER GASTROINTESTINAL ENDOSCOPY           General Precautions: Standard,    Orthopedic Precautions:  NA  Braces:  NA    Spiritual, Cultural Beliefs, Sikhism Practices, Values that Affect Care: no     Patient History:  Lives With: child(li), adult, grandchild(li)  Living Arrangements: house  Home Accessibility: stairs to enter home  Home Layout: Able to live on 1st floor  Stair Railings at Home: inside, present on right side  Living Environment Comment: Lives in house w/ 5 MARY. ususally has assist for front steps, 5 w/o HR; also has 5 steps w/ RHR at back; reports dtr no help, but 20 yo and 22 yo helpful, in school. HD 3x/week.   Equipment Currently Used at Home: cane, straight, oxygen, walker, rolling, wheelchair    Prior level of function:    Bed Mobility/Transfers: independent  Grooming: independent  Bathing: needs assist  Upper Body Dressing: independent  Lower Body  Dressing: independent  Toileting: independent  Homemaking Responsibilities: Yes  Meal Prep Responsibility: (cooking)  Cleaning Responsibility: (manages)  Driving License: No  Type of Occupation: (Retired: housekeeping)     Dominant hand: right    Subjective:  Communicated with nurse during to session.  Chief Complaint: Severe nausea and shortness of breath  Patient/Family stated goals: to return home     Pain/Comfort  Pain Rating 1: 0/10  Pain Rating Post-Intervention 1: 0/10    Objective:   Patient found with: oxygen    Cognitive Exam:  Oriented to: Person, Place and Situation  Follows Commands/attention: Follows two-step commands  Communication: clear/fluent  Memory:  Impaired LTM  Safety awareness/insight to disability: intact  Coping skills/emotional control: Appropriate to situation    Visual/perceptual:  Impaired  acuity and visual field   Blind in R eye    Physical Exam:  Postural examination/scapula alignment:    -       Rounded shoulders  Skin integrity: Thin and Dry     Painful raised bump around the lateral side of her left eye from her fall.  Edema: None noted     Sensation:      -       Intact    Upper Extremity Range of Motion:  Right Upper Extremity: WFL  Left Upper Extremity: WFL    Upper Extremity Strength:  Right Upper Extremity: WFL 4/5  Left Upper Extremity: WFL  4/5   Strength: R UE: WFL    L UE: 4/5    Fine motor coordination:      -       Intact   L hand has more difficult with thumb opposition and object manipulation due to residual L hemiparesis from a past stroke.      Gross motor coordination: WFL    Occupational Performance:    Bed Mobility:    · Patient completed Rolling/Turning to Right with minimum assistance  · Patient completed Scooting/Bridging with minimum assistance  · Patient completed Supine to Sit with minimum assistance     Functional Mobility/Transfers:  · Patient completed Sit <> Stand Transfer with stand by assistance  with  rolling walker   · Patient completed Toilet  Transfer Step Transfer technique with contact guard assistance with  rolling walker and grab bars  · Functional Mobility: Pt ambulated from bedside to bathroom toilet with RW and CGA a distance of 15 feet.      Activities of Daily Living:  · Feeding:  Set up assistance to unwrap food and open condiments/packets. Pt seated in w/c.  · Grooming: supervision and stand by assistance provided while seated in w/c at sink. Pt washed face and clean dentures.   · Bathing: minimum assistance to steady while standing and wash rear.   · Upper Body Dressing: Set up assistance to don hospital gown seated in w/c.   · Lower Body Dressing: contact guard assistance to steady while standing to manage breifs and pants over hips. Pt was able to don breifs, srubs pants, and doff/fidel socks seated in w/c/  · Toileting: minimum assistance to steady while standing and to clean rear.     [facility] 6 Click:  AMPA Total Score: 19      Additional Treatment:  Pt edu on POC of role of OT  Pt edu on safety/techniques with t/fs and functional mobility  Sc tasks completed as noted above.     Patient left up in chair with call button in reach    Assessment:  Tea Georges is a 79 y.o. female with a medical diagnosis of Subdural hemorrhage. Pt reported of severe nausea throughout her session resulting in taking multiple rest breaks. She presents with generalized weakness, shortness of breath, and limited endurance while performing functional mobility and ADLs. She has very little recollection of how she actually fell, but the last thing she recalls is dressing to prepare to go to dialysis. Pt tolerated therapy but continuously insisted on returning to bed to rest. Pt will continue to benefit from OT services to address deficits and return (I) with functional mobility and ADLs.    Rehab identified problem list/impairments: weakness, impaired endurance, impaired self care skills, impaired functional mobilty, impaired balance, visual deficits, decreased  upper extremity function, impaired fine motor    Rehab potential is good    Activity tolerance: Fair    Discharge recommendations: home with home health     Barriers to discharge:       Equipment recommendations: tub bench, commode     GOALS:   Multidisciplinary Problems     Occupational Therapy Goals        Problem: Occupational Therapy Goal    Goal Priority Disciplines Outcome Interventions   Occupational Therapy Goal     OT, PT/OT Ongoing (interventions implemented as appropriate)    Description:  Goals to be met by: 6/12/19    Patient will increase functional independence with ADLs by performing:    Feeding with Modified [place].  UE Dressing with Modified [place] seated in w/c.  LE Dressing (briefs) with Stand by Assistance.  Grooming while seated with Modified [place].  Toileting from toilet with Stand by Assistance for hygiene and clothing management.   Bathing from shower chair/bench with Stand-by Assistance.  Supine to sit with Modified [place].  Stand pivot transfers with Supervision.  Toilet transfer to toilet with Supervision/ Stand By Assistance.  Upper extremity exercise program x15 reps per handout, with independence to build strength and endurance in B UE to improve functional independence with ADLs and IADLs.                      PLAN: Patient to be seen 6 x/week to address the above listed problems via self-care/home management, therapeutic activities, therapeutic exercises  Plan of Care expires: 06/29/19  Plan of Care reviewed with: patient    I certify that I was present in the room directing the student in service delivery and guiding them using my skilled judgment. As the co-signing therapist I have reviewed the students documentation and am responsible for the treatment, assessment, and plan.         Homa Farrell, ARPAN  05/29/2019

## 2019-05-29 NOTE — NURSING
Admission note:  Ms Georges escorted onto to unit via w/c per wheelchair van service. VS, assessment, and admission information collected. Ms Georges called her sister and, her brother and informed them that she was now on the [facility] unit. Safety measures maintained. Call light is within reach.

## 2019-05-29 NOTE — PLAN OF CARE
Problem: Physical Therapy Goal  Goal: Physical Therapy Goal  Goals to be met by: 2019. 15 days     Patient will increase functional independence with mobility by performin. Supine to sit with Modified [place]  2. Sit to supine with Modified [place]  3. Sit to stand transfer with Supervision  4. Bed to chair transfer with Supervision using Rolling Walker or least restrictive assistive device  5. Gait  x 150 feet with Stand-by Assistance using Rolling Walker.  or least restrictive assistive device  6. Wheelchair propulsion x150 feet with Supervision using bilateral upper extremities/bilateral lower extremities  7. Ascend/descend 4 stair with right Handrails Stand-by Assistance .   8. Ascend/Descend 4 inch curb step with Stand-by Assistance using Rolling Walker. or least restrictive assistive device  9. Stand for 3 minutes with Stand-by Assistance using Rolling Walker or least restrictive assistive device and perform an activity  10. Lower extremity exercise program x20 reps per handout, with assistance as needed and gym therex    PT eval performed.  Windy De La Cruz, PT 2019

## 2019-05-29 NOTE — PLAN OF CARE
Problem: Occupational Therapy Goal  Goal: Occupational Therapy Goal  Goals to be met by: 6/11/19    Patient will increase functional independence with ADLs by performing:    Feeding with Modified [place].  UE Dressing with Modified [place] seated in w/c.  LE Dressing (briefs) with Stand by Assistance.  Grooming while seated with Modified [place].  Toileting from toilet with Stand by Assistance for hygiene and clothing management.   Bathing from shower chair/bench with Stand-by Assistance.  Supine to sit with Modified [place].  Stand pivot transfers with Supervision.  Toilet transfer to toilet with Supervision/ Stand By Assistance.  Upper extremity exercise program x15 reps per handout, with independence to build strength and endurance in B UE to improve functional independence with ADLs and IADLs.    Outcome: Ongoing (interventions implemented as appropriate)  I certify that I was present in the room directing the student in service delivery and guiding them using my skilled judgment. As the co-signing therapist I have reviewed the students documentation and am responsible for the treatment, assessment, and plan.     ARPAN Del Cid

## 2019-05-29 NOTE — PHYSICIAN QUERY
PT Name: Tea Georges  MR #: [id number]     Physician Query Form - Documentation Clarification      CDS/: Kishore Walker               Contact information: [phone]    This form is a permanent document in the medical record.     Query Date: May 29, 2019    By submitting this query, we are merely seeking further clarification of documentation. Please utilize your independent clinical judgment when addressing the question(s) below.    The Medical record reflects the following:    Supporting Clinical Findings Location in Medical Record   Physical Exam  General:  Malnutrition      Pre-operative evaluation for Procedure(s) (LRB):  CRANIOTOMY, FOR SUBDURAL HEMATOMA EVACUATION (Right    79 y.o. female w/ a significant PMHx of  ESRD on HD (MWF), HTN, Severe Aortic stenosis ( PAULIEN 0.66 cm2; peak velocity 4.1 m/s; mean gradient 46 mmHg), Pulmonary HTN ( PA sys 73 mmHg)  CVA of Right MCA s/p thrombectomy (2016) with left sided residual sxs, T2DM, HFpEF, COPD (on 2L home O2, can walk a block at baseline), CAD, PVD, who presented after a fall and was found to have a SDH       Anesthesia 5/21 ([facility])   Physical Exam   Constitutional:   Mild cachexia     Diet  Diet Cardiac Thin  Diet Cardiac Thin    BMI 20.8  Ht 5'3  Kg 53.1         Progress note [facility]                  Anthro Flow sheet 5/22 ([facility])                                                                            Doctor, Please specify Malnutrition Diagnose.     Provider Use Only    (     )  Malnutrition (Please Specify)           (     )  Mild            (      )  Moderate    (      )  Malnutrition Ruled Out    (      )  Other__________________                                                                                                               [ x ] Clinically Undetermined

## 2019-05-29 NOTE — PROGRESS NOTES
[facility]                                  Skilled Nursing Facility                   Progress Note   DOS 5/29/2019  Admit Date: 5/28/2019  GORDY   Principal Problem:  Subdural hemorrhage   HPI obtained from patient interview and chart review     Chief Complaint: Establish Care/ Initial Visit     HPI: Ms José Manuel barrera 79 y.o. 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 Nsgy decided to utilize Non-surgical treatment options.     Patient will be treated at [facility] with PT and OT to improve functional status and ability to perform ADLs.     ROS  Constitutional: Negative for fever and malaise/fatigue.   Eyes: Left eye: Negative for blurred vision and discharge. + Visual disturbance, Rt eye blind.   Respiratory: Negative for cough, shortness of breath and wheezing.    Cardiovascular: Negative for chest pain, palpitations, claudication, and leg swelling.   Gastrointestinal: Negative for abdominal pain, constipation, diarrhea, nausea and vomiting.   Genitourinary: Negative for dysuria, frequency and urgency.   Musculoskeletal:  + generalized weakness. Negative for back pain and myalgias.   Skin: Negative for itching and rash.   Neurological: Negative for dizziness, speech change, seizures, and headaches.   Psychiatric/Behavioral: Negative for depression. The patient is not nervous/anxious.       PEx  Constitutional: Patient appears well-developed and in no distress   HENT:   Head: Normocephalic and atraumatic.   Eyes: + Right eye blindness and baseline difficulty opening eyelids   Neck: Normal range of motion. Neck supple.   Cardiovascular: Normal rate, regular rhythm and normal heart sounds.    Pulmonary/Chest: Effort normal and breath sounds are clear  Abdominal: Soft. Bowel sounds are normal.    Musculoskeletal: Normal range of motion. + generalized weakness  Neurological: Alert and oriented to person, place, and time.   Skin: Skin is warm and dry.   Psychiatric: Normal mood and affect. Behavior is normal.     Temp:  [65.1 °F (18.4 °C)-98.3 °F (36.8 °C)]   Pulse:  [63-87]   Resp:  [16-18]   BP: (148-196)/(54-89)   SpO2:  [97 %-100 %]   Body mass index is 17.69 kg/m².       Past Medical History: Patient has a past medical history of Anemia in ESRD (end-stage renal disease) (5/29/2016), Anticoagulant long-term use, Aortic atherosclerosis (11/22/2016), Aortic stenosis, moderate (2/18/2016), Asthma in adult without complication (1/8/2016), Bilateral low back pain without sciatica (11/17/2015), Blindness of right eye (11/12/2016), CAD (coronary artery disease) (12/12/2016), Cataract, Central retinal vein occlusion, right eye (6/3/2014), CHF (congestive heart failure), Chronic diastolic heart failure (1/8/2016), Chronic respiratory failure with hypoxia (5/29/2016), COPD (chronic obstructive pulmonary disease) (1/15/2017), Dependence on hemodialysis, Diverticulosis, Embolic stroke involving right middle cerebral artery, Encounter for blood transfusion, Enlarged LA (left atrium) (10/7/2016), Epiretinal membrane (7/17/2012), ESRD (end stage renal disease), Essential hypertension (1/8/2016), History of GI diverticular bleed, Left flaccid hemiparesis (10/1/2016), Peripheral vascular disease, unspecified (11/22/2016), 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 (11/13/2016), Type 2 diabetes mellitus with kidney complication, without long-term current use of insulin (5/1/2018), Type 2 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, without long-term current use of insulin (3/26/2013), 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, without long-term current use of insulin (3/26/2013), and Vitreomacular adhesion of right eye  (7/17/2012).    Past Surgical History: Patient has a past surgical history that includes Cataract extraction; Cholecystectomy; Colonoscopy (N/A, 5/23/2016); Colonoscopy (N/A, 5/30/2016); Upper gastrointestinal endoscopy; Breast surgery; Fistulogram (Left, 11/28/2018); Revision of arteriovenous fistula (Left, 11/29/2018); Resection of aneurysm (Left, 11/29/2018); Placement of dual-lumen vascular catheter (Right, 11/29/2018); Abdominal surgery; Eye surgery; and Cardiac surgery.    Social History: Patient reports that she has never smoked. She has never used smokeless tobacco. She reports that she does not drink alcohol or use drugs.    Family History: family history includes Cancer in her sister; Cataracts in her mother; Esophageal cancer in her sister; Glaucoma in her brother and maternal aunt; Heart disease in her brother; Heart failure in her sister; Hypertension in her brother, father, mother, and sister; No Known Problems in her maternal grandfather, maternal grandmother, maternal uncle, paternal aunt, paternal grandfather, paternal grandmother, and paternal uncle; Stroke in her mother.    Medications:   Prior to Admission medications    Medication Sig Start Date End Date Taking? Authorizing Provider   albuterol (PROVENTIL/VENTOLIN HFA) 90 mcg/actuation inhaler Inhale 1-2 puffs into the lungs every 6 (six) hours as needed for Wheezing. 3/19/19   Parth Posadas II, MD   amLODIPine (NORVASC) 10 MG tablet Take 1 tablet (10 mg total) by mouth once daily. 2/8/19 2/8/20  Parth Posadas II, MD   aspirin 81 MG Chew Take 1 tablet (81 mg total) by mouth once daily. 12/8/16 2/8/19  Lola Benton MD   carvedilol (COREG) 12.5 MG tablet Take 1 tablet (12.5 mg total) by mouth 2 (two) times daily. 11/30/18 11/30/19  Rosendo Beltran MD   ergocalciferol (ERGOCALCIFEROL) 50,000 unit Cap Take 1 capsule (50,000 Units total) by mouth every 7 days. 6/20/17   Parth Posadas II, MD   fluticasone-vilanterol (BREO ELLIPTA)  200-25 mcg/dose DsDv diskus inhaler Inhale 1 puff into the lungs once daily. 5/1/18   Parth Posadas II, MD   hydrALAZINE (APRESOLINE) 25 MG tablet Take 2 tablets (50 mg total) by mouth every 8 (eight) hours. 2/8/19 2/8/20  Parth Posadas II, MD   loperamide (IMODIUM) 2 mg capsule Take one capsule at onset of diarrhea.  No more than three capsules daily 2/8/19   Parth Posadas II, MD   losartan (COZAAR) 50 MG tablet Take 1 tablet (50 mg total) by mouth once daily. 2/8/19 2/8/20  Parth Posadas II, MD   RENVELA 800 mg Tab Take 1 tablet (800 mg total) by mouth 3 (three) times daily with meals. 2/8/19   Parth Posadas II, MD       Current Meds:    albuterol-ipratropium  3 mL Nebulization Q6H WAKE    carvedilol  12.5 mg Oral BID    [START ON 5/30/2019] epoetin shon-ebpx (RETACRIT) injection  2,000 Units Intravenous Every Tues, Thurs, Sat    hydrALAZINE  100 mg Oral Q8H    polyethylene glycol  17 g Oral Daily    senna-docusate 8.6-50 mg  1 tablet Oral BID    sevelamer carbonate  0.8 g Oral TID WM       Allergies: Patient has No Known Allergies.    Recent Labs   Lab 05/26/19 0428 05/27/19 0349 05/28/19  0454   WBC 4.42 4.20 4.47   HGB 9.1* 9.3* 9.4*   HCT 30.1* 30.9* 31.2*   PLT 77* 88* 108*     Recent Labs   Lab 05/26/19 0428 05/27/19  0349 05/28/19  0454   * 132* 131*   K 4.7 4.7 5.1    100 98   CO2 22* 20* 17*   BUN 36* 49* 69*   CREATININE 5.4* 6.4* 8.0*   GLU 99 93 96   CALCIUM 9.7 9.8 9.5   MG 2.1 2.0 2.3   PHOS 6.3* 6.9* 7.4*     Recent Labs   Lab 05/26/19 0428 05/27/19  0349 05/28/19  0454   ALKPHOS 69 67 73   ALT 5* 5* <5*   AST 8* 6* 8*   ALBUMIN 3.0* 2.8* 3.0*   PROT 6.5 6.2 6.4   BILITOT 0.5 0.4 0.4      Recent Labs   Lab 05/27/19  1718 05/27/19  2102 05/28/19  1232 05/28/19  1630 05/28/19  2111 05/29/19  0706   POCTGLUCOSE 106 127* 80 92 97 82       Assessment and Plan:  Subdural hematoma  -Bilateral acute SDH, R>L with right to left midline shift on plavix and reversed with  platelets. No surgical intervention per Nsgy.  -5/28 CTH revealed Multicompartmental intracranial hemorrhage appears grossly stable from prior exam of 05/23/2019. No new infarct or hemorrhage. No neurosurgical intervention was warranted.   -Follow up with Nsgy in 2 wks with repeat CTH  5/29 Holding aspirin    Debility  -Continue with PT/OT for gait training and strengthening and restoration of ADL's   -Encourage mobility, OOB in chair, and early ambulation as appropriate  -Fall precautions   -Monitor for bowel and bladder dysfunction  -Monitor for and prevent skin breakdown and pressure ulcers  -5/29 Initiated DVT prophylaxis with teds and scds     Essential hypertension  -Continuecarvedilol 12.5 mg bid  -5/29 Continue Hydralazine 100 mg tid  -5/29 Initiated amlodipine 10 mg daily     Pulmonary emphysema  -5/29 Initiated fluticasone furoate-vilanterol 200-25 mcg/dose diskus inhaler 1 puff daily and initiated albuterol inhaler 2 puff q6h prn    Type 2 diabetes mellitus with chronic kidney disease on chronic dialysis, without long-term current use of insulin  -A1C 5.4 Continue SSI  -5/29 Initiated Diet diabetic [facility]; 2000 Calorie; Cardiac (Low Na/Chol), Renal; Fluid - 1500mL; Thin     (HFpEF) heart failure with preserved ejection fraction  -Continue even fluid balance, rate control, afterload reduction  -5/29 Initiated Diet diabetic [facility]; 2000 Calorie; Cardiac (Low Na/Chol), Renal; Fluid - 1500mL; Thin     Severe aortic stenosis  ECHO 5/22 reviewed on 5/29  · Low normal left ventricular systolic function. The estimated ejection fraction is 53%  · Local segmental wall motion abnormalities.  · Concentric left ventricular remodeling.  · Grade II (moderate) left ventricular diastolic dysfunction consistent with pseudonormalization.  · Severe left atrial enlargement.  · Elevated left atrial pressure.  · Moderate right ventricular enlargement.  · Normal right ventricular systolic function.  · Mild  right atrial enlargement.  · Severe aortic valve stenosis.  · Aortic valve area is 0.69 cm2; peak velocity is 4.58 m/s; mean gradient is 55.02 mmHg.  · Moderate mitral sclerosis.  · Mild-to-moderate mitral regurgitation.  · Moderate to severe tricuspid regurgitation.  · Mild pulmonic regurgitation.  · Elevated central venous pressure (15 mm Hg).  · The estimated PA systolic pressure is 77 mm Hg  -resume home carvedilol 12.5 mg bid for rate control and afterload reduction    Constipation  -5/29 continue polyethylene glycol packet 17 g daily and senna-docusate 8.6-50 mg 1 tablet b.i.d.    ESRD (end stage renal disease)  -Continue HD MWF  -5/29 Continue epoetin shon-epbx injection 2,000 Units 3x per wk  -5/29 continue sevelamer carbonate pwpk 0.8 g    Right-sided cerebrovascular accident (CVA) s/p thrombectiomy(2016)  -per patient, she returned to baseline and was able to ambulate on her own, perform ADLs  -Stable     Blindness of right eye  -complication of uncontrolled DM  -Continue artificial tears 0.5 % ophthalmic solution 2 drop qid prn  -Stable      Future Appointments   Date Time Provider Department Center   6/12/2019  1:30 PM Marcelino Flores MD [facility] NEUROS7 [place]       I certify that SNF services are required to be given on an inpatient basis because Tea Georges needs for skilled nursing care and/or skilled rehabilitation are required on a daily basis and such services can only practically be provided in a skilled nursing facility setting and are for an ongoing condition for which she received inpatient care in the hospital.     Time (minutes) spent in the care of the patient (Greater than 1/2 spent in non direct face-to-face contact) 70 mins.   36 of 70 minutes spent on documentation and counseling patient on clinical condition and therapies provided regarding Subdural hematoma, Debility, HTN, Pulmonary emphysema, and ESRD with HD.  The remainder of the time was spent in direct patient care.     Ousmane RAY  Rosita NP

## 2019-05-29 NOTE — PLAN OF CARE
Problem: Adult Inpatient Plan of Care  Goal: Plan of Care Review  Outcome: Ongoing (interventions implemented as appropriate)     05/29/19 0542   Plan of Care Review   Plan of Care Reviewed With patient       Problem: Fall Injury Risk  Goal: Absence of Fall and Fall-Related Injury    Intervention: Promote Injury-Free Environment     05/29/19 0542   Optimize [place] and Functional Mobility   Environmental Safety Modification assistive device/personal items within reach   Optimize Balance and Safe Activity   Safety Promotion/Fall Prevention lighting adjusted;medications reviewed;instructed to call staff for mobility

## 2019-05-29 NOTE — PT/OT/SLP DISCHARGE
Occupational Therapy Discharge Summary    Tea Georges  MRN: [id number]   Principal Problem: Subdural hematoma      Patient Discharged from acute Occupational Therapy on 5/28/19.  Please refer to prior OT note dated 5/28/19 for functional status.    Assessment:      Patient appropriate for care in another setting.    Objective:     GOALS:   Multidisciplinary Problems     Occupational Therapy Goals     Not on file          Multidisciplinary Problems (Resolved)        Problem: Occupational Therapy Goal    Goal Priority Disciplines Outcome Interventions   Occupational Therapy Goal   (Resolved)     OT, PT/OT Outcome(s) achieved    Description:  Goals to be met by: 6/3     Patient will increase functional independence with ADLs by performing:    UE Dressing with Set-up Assistance and Supervision.  LE Dressing (brief) with Set-up Assistance and Contact Guard Assistance.  Grooming while standing at sink with Minimal Assistance.  Toileting from toilet with Minimal Assistance for hygiene and clothing management.   Toilet transfer to toilet with Minimal Assistance.  Functional mobility at short household distance for ADL task with AD and minimal assistance.                       Reasons for Discontinuation of Therapy Services  Transfer to alternate level of care.      Plan:     Patient Discharged to: Skilled Nursing Facility    SHERI Johnson  5/29/2019

## 2019-05-29 NOTE — PT/OT/SLP EVAL
PhysicalTherapy   Evaluation    Tea Georges   MRN: [id number]     PT Received On: 05/29/19  PT Start Time: 1025     PT Stop Time: 1113    PT Total Time (min): 48 min       Billable Minutes:  Evaluation 10, Gait Training 15, Therapeutic Activity 15 and Therapeutic Exercise 8    Diagnosis: Subdural hemorrhage  Past Medical History:   Diagnosis Date    Anemia in ESRD (end-stage renal disease) 5/29/2016    Anticoagulant long-term use     Aortic atherosclerosis 11/22/2016    Aortic stenosis, moderate 2/18/2016    Asthma in adult without complication 1/8/2016    Bilateral low back pain without sciatica 11/17/2015    Blindness of right eye 11/12/2016    CAD (coronary artery disease) 12/12/2016    Cataract     Central retinal vein occlusion, right eye 6/3/2014    CHF (congestive heart failure)     Chronic diastolic heart failure 1/8/2016    Chronic respiratory failure with hypoxia 5/29/2016    COPD (chronic obstructive pulmonary disease) 1/15/2017    Dependence on hemodialysis     Mon-Wed-Fri    Diverticulosis     Embolic stroke involving right middle cerebral artery     Encounter for blood transfusion     Enlarged LA (left atrium) 10/7/2016    Epiretinal membrane 7/17/2012    ESRD (end stage renal disease)     Essential hypertension 1/8/2016    History of GI diverticular bleed     5/22/16    Left flaccid hemiparesis 10/1/2016    Peripheral vascular disease, unspecified 11/22/2016    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 11/13/2016    Type 2 diabetes mellitus with kidney complication, without long-term current use of insulin 5/1/2018    Type 2 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, without long-term current use of insulin 3/26/2013    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, without long-term current use of insulin 3/26/2013    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      Past  Surgical History:   Procedure Laterality Date    ABDOMINAL SURGERY      BREAST SURGERY      tumor removal x 2    CARDIAC SURGERY      CATARACT EXTRACTION      CHOLECYSTECTOMY      COLONOSCOPY N/A 5/30/2016    Performed by [facility] ENDO (2ND FLR)    COLONOSCOPY N/A 5/23/2016    Performed by WILLIAM Colvin MD at [facility] ENDO (2ND FLR)    ESOPHAGOGASTRODUODENOSCOPY (EGD) N/A 5/30/2016    Performed by [facility] ENDO (2ND FLR)    ESOPHAGOGASTRODUODENOSCOPY (EGD) N/A 5/27/2016    Performed by Cesario Rubio MD at [facility] ENDO (2ND FLR)    EXCISION, ANEURYSM Left 11/29/2018    Performed by NADINE Blum III, MD at [facility] OR 2ND [place]    EYE SURGERY      Fistulogram Left 11/28/2018    Performed by NADINE Blum III, [facility] CATH LAB    INSERTION, CATHETER, VASCULAR, DUAL LUMEN Right 11/29/2018    Performed by NADINE Blum III, [facility] at [facility] OR [address] [place] [address]    PTA, Fistula  11/28/2018    Performed by NADINE Bl[facility] III, [facility] CATH LAB    REVISION, AV FISTULA, Left 11/29/2018    [facility]    UPPER GASTROINTESTINAL ENDOSCOPY           General Precautions: Standard, vision impaired, aspiration, fall  Orthopedic Precautions: N/A   Braces:           Patient History:  Lives With: child(li), adult, grandchild(li)  Living Arrangements: house  Home Accessibility: stairs to enter home  Home Layout: Able to live on 1st floor  Stair Railings at Home: outside, present on right side(R HR back steps and w/o HR front entrance)  Transportation Anticipated: (uses taxi for grocery store; van to HD)  Living Environment Comment: Lives in house w/ 5 MARY. ususally has assist for front steps, 5 w/o HR; also has 5 steps w/ RHR at back; reports dtr no help, but 18 yo and 22 yo helpful, in school. HD 3x/week. takes taxi w/ grnddtr to buy groceries  Equipment Currently Used at Home: walker, rolling, wheelchair, cane, straight, commode  DME owned  "(not currently used): none    Previous Level of Function:  Ambulation Skills: other (see comments)(mod(I) house; RW and w/c community; assist for stairs)  Transfer Skills: other (see comments)    Subjective:  Communicated with patient prior to session.  Agreeable to session. Reports "I just want to lie down. I'm tired and sick"  Chief Complaint: tired and sick; dizzy since this fall  Patient goals: return home to [place]; nikki.         Objective:  Patient found seated in w/c with      Cognitive Exam:  Oriented to: Person, Place, Time and Situation  Follows Commands/attention: Follows two-step commands  Communication: clear/fluent  Safety awareness/insight to disability: intact    Physical Exam:  Postural examination/scapula alignment:    -       Rounded shoulders  -       Forward head  -       Posterior pelvic tilt    Skin integrity: Visible skin intact Patient w/ swelling above left eye (from fall)  Edema: None noted     Sensation:      -       Intact    Upper Extremity Range of Motion:  Right Upper Extremity: WFL  Left Upper Extremity: WFL    Upper Extremity Strength:  Right Upper Extremity: WFL  Left Upper Extremity: WFL    Lower Extremity Range of Motion:  Right Lower Extremity: WFL  Left Lower Extremity: WFL    Lower Extremity Strength:  Right Lower Extremity: WFL  Left Lower Extremity: WFL      Gross motor coordination: WFL      AM-PAC 6 CLICK MOBILITY  Total Score:     Bed Mobility:  Sit>Supine:on mat w/ CGA  Supine>Sit: on mat w/ CGA    Transfers:  Sit<>Stand: to/from w/c w/o AD w/ min assist; to/from w/c w/ RW and CGA/min assist;   Stand Pivot Transfer: w/ RW and min assist      Gait:  Amb w/ RW and CG/min assist and w/c folow75 feet, oxygen 2 LPM in tow  Equal steps, no LOB, sufficient foot clearance. Fatigue limiting       Advanced Gait:  Stairs: unable  Curb Step: unable  Unable due to c/o fatigue and not feeling well    Wheelchair Mobility:  Patient propels w/c w/ BUEs and BLEs 56 feet w/ min assistance, " slow pace. Attempted w/ UEs and unable, prefers to use U/LEs. Distance limited by fatiuge     Therex:  Quad sets,   Glute sets,   Ankle  Pumps,   hip abduction/adduction,  heelslides,   SAQ,   LAQ   Hip flexion  x20 reps w/ assist as needed    Balance:  Sits EOM w/o LOB; stands w/o AD w/ CGA    Additional Treatment:  Patient educated on PT POC; gait and trf technique    Patient left up in chair with nurse present and patient being prepared to go to HD. preparing patient to go to HD.    Assessment:  Tea Georges is a 79 y.o. female with a medical diagnosis of Subdural hemorrhage. Patient s/p fall at home w/ SDH. PMH includes strokes and patient has dx of ESRD and goes to HD 3x / week. Patient will benefit from skilled PT to improve safety and functional mobiltiy and progress to safe discharge home w/ family assist..    Rehab identified problem list/impairments: weakness, impaired endurance, impaired functional mobilty, gait instability, impaired balance    Rehab potential is good.    Activity tolerance: Good    Discharge recommendations: home with home health     Barriers to discharge: Inaccessible home environment, Decreased caregiver support    Equipment recommendations: tub bench     GOALS:   Multidisciplinary Problems     Physical Therapy Goals        Problem: Physical Therapy Goal    Goal Priority Disciplines Outcome Goal Variances Interventions   Physical Therapy Goal     PT, PT/OT      Description:  Goals to be met by: 2019. 15 days     Patient will increase functional independence with mobility by performin. Supine to sit with Modified [place]  2. Sit to supine with Modified [place]  3. Sit to stand transfer with Supervision  4. Bed to chair transfer with Supervision using Rolling Walker or least restrictive assistive device  5. Gait  x 150 feet with Stand-by Assistance using Rolling Walker.  or least restrictive assistive device  6. Wheelchair propulsion x150 feet with Supervision  using bilateral upper extremities/bilateral lower extremities  7. Ascend/descend 4 stair with right Handrails Stand-by Assistance .   8. Ascend/Descend 4 inch curb step with Stand-by Assistance using Rolling Walker. or least restrictive assistive device  9. Stand for 3 minutes with Stand-by Assistance using Rolling Walker or least restrictive assistive device and perform an activity  10. Lower extremity exercise program x20 reps per handout, with assistance as needed and gym therex                      PLAN:    Patient to be seen 5 x/week  to address the above listed problems via therapeutic activities, gait training, therapeutic exercises, wheelchair management/training  Plan of Care Expires: 06/28/19    Windy De La Cruz, PT 5/30/2019

## 2019-05-30 PROBLEM — E44.0 MODERATE MALNUTRITION: Status: ACTIVE | Noted: 2019-05-30

## 2019-05-30 LAB
ANION GAP SERPL CALC-SCNC: 8 MMOL/L (ref 8–16)
BASOPHILS # BLD AUTO: 0.04 K/UL (ref 0–0.2)
BASOPHILS NFR BLD: 0.7 % (ref 0–1.9)
BUN SERPL-MCNC: 12 MG/DL (ref 8–23)
CALCIUM SERPL-MCNC: 9.5 MG/DL (ref 8.7–10.5)
CHLORIDE SERPL-SCNC: 100 MMOL/L (ref 95–110)
CO2 SERPL-SCNC: 31 MMOL/L (ref 23–29)
CREAT SERPL-MCNC: 2.5 MG/DL (ref 0.5–1.4)
DIFFERENTIAL METHOD: ABNORMAL
EOSINOPHIL # BLD AUTO: 0.2 K/UL (ref 0–0.5)
EOSINOPHIL NFR BLD: 3.1 % (ref 0–8)
ERYTHROCYTE [DISTWIDTH] IN BLOOD BY AUTOMATED COUNT: 16 % (ref 11.5–14.5)
EST. GFR  (AFRICAN AMERICAN): 20.7 ML/MIN/1.73 M^2
EST. GFR  (NON AFRICAN AMERICAN): 18 ML/MIN/1.73 M^2
GLUCOSE SERPL-MCNC: 84 MG/DL (ref 70–110)
HCT VFR BLD AUTO: 30.4 % (ref 37–48.5)
HGB BLD-MCNC: 8.8 G/DL (ref 12–16)
IMM GRANULOCYTES # BLD AUTO: 0.05 K/UL (ref 0–0.04)
IMM GRANULOCYTES NFR BLD AUTO: 0.9 % (ref 0–0.5)
LYMPHOCYTES # BLD AUTO: 0.5 K/UL (ref 1–4.8)
LYMPHOCYTES NFR BLD: 9.2 % (ref 18–48)
MAGNESIUM SERPL-MCNC: 1.8 MG/DL (ref 1.6–2.6)
MCH RBC QN AUTO: 28.6 PG (ref 27–31)
MCHC RBC AUTO-ENTMCNC: 28.9 G/DL (ref 32–36)
MCV RBC AUTO: 99 FL (ref 82–98)
MONOCYTES # BLD AUTO: 0.7 K/UL (ref 0.3–1)
MONOCYTES NFR BLD: 12.5 % (ref 4–15)
NEUTROPHILS # BLD AUTO: 4.2 K/UL (ref 1.8–7.7)
NEUTROPHILS NFR BLD: 73.6 % (ref 38–73)
NRBC BLD-RTO: 0 /100 WBC
PHOSPHATE SERPL-MCNC: 4 MG/DL (ref 2.7–4.5)
PLATELET # BLD AUTO: 98 K/UL (ref 150–350)
PMV BLD AUTO: 12.6 FL (ref 9.2–12.9)
POCT GLUCOSE: 126 MG/DL (ref 70–110)
POCT GLUCOSE: 133 MG/DL (ref 70–110)
POCT GLUCOSE: 87 MG/DL (ref 70–110)
POTASSIUM SERPL-SCNC: 3.5 MMOL/L (ref 3.5–5.1)
RBC # BLD AUTO: 3.08 M/UL (ref 4–5.4)
SODIUM SERPL-SCNC: 139 MMOL/L (ref 136–145)
WBC # BLD AUTO: 5.76 K/UL (ref 3.9–12.7)

## 2019-05-30 PROCEDURE — 25000242 PHARM REV CODE 250 ALT 637 W/ HCPCS: Performed by: NURSE PRACTITIONER

## 2019-05-30 PROCEDURE — 92610 EVALUATE SWALLOWING FUNCTION: CPT

## 2019-05-30 PROCEDURE — 97535 SELF CARE MNGMENT TRAINING: CPT

## 2019-05-30 PROCEDURE — 97802 MEDICAL NUTRITION INDIV IN: CPT

## 2019-05-30 PROCEDURE — 94761 N-INVAS EAR/PLS OXIMETRY MLT: CPT

## 2019-05-30 PROCEDURE — 97110 THERAPEUTIC EXERCISES: CPT

## 2019-05-30 PROCEDURE — 11000004 HC SNF PRIVATE

## 2019-05-30 PROCEDURE — 97530 THERAPEUTIC ACTIVITIES: CPT

## 2019-05-30 PROCEDURE — 84100 ASSAY OF PHOSPHORUS: CPT

## 2019-05-30 PROCEDURE — 97116 GAIT TRAINING THERAPY: CPT

## 2019-05-30 PROCEDURE — 27000221 HC OXYGEN, UP TO 24 HOURS

## 2019-05-30 PROCEDURE — 25000003 PHARM REV CODE 250: Performed by: NURSE PRACTITIONER

## 2019-05-30 PROCEDURE — 85025 COMPLETE CBC W/AUTO DIFF WBC: CPT

## 2019-05-30 PROCEDURE — 80048 BASIC METABOLIC PNL TOTAL CA: CPT

## 2019-05-30 PROCEDURE — 92523 SPEECH SOUND LANG COMPREHEN: CPT

## 2019-05-30 PROCEDURE — 36415 COLL VENOUS BLD VENIPUNCTURE: CPT

## 2019-05-30 PROCEDURE — 25000003 PHARM REV CODE 250: Performed by: HOSPITALIST

## 2019-05-30 PROCEDURE — 83735 ASSAY OF MAGNESIUM: CPT

## 2019-05-30 RX ADMIN — HYDRALAZINE HYDROCHLORIDE 100 MG: 50 TABLET ORAL at 09:05

## 2019-05-30 RX ADMIN — CARVEDILOL 12.5 MG: 12.5 TABLET, FILM COATED ORAL at 09:05

## 2019-05-30 RX ADMIN — HYDRALAZINE HYDROCHLORIDE 100 MG: 50 TABLET ORAL at 05:05

## 2019-05-30 RX ADMIN — SEVELAMER CARBONATE 0.8 G: 800 POWDER, FOR SUSPENSION ORAL at 12:05

## 2019-05-30 RX ADMIN — SEVELAMER CARBONATE 0.8 G: 800 POWDER, FOR SUSPENSION ORAL at 05:05

## 2019-05-30 RX ADMIN — AMLODIPINE BESYLATE 10 MG: 10 TABLET ORAL at 09:05

## 2019-05-30 RX ADMIN — HYDRALAZINE HYDROCHLORIDE 100 MG: 50 TABLET ORAL at 03:05

## 2019-05-30 RX ADMIN — SENNOSIDES AND DOCUSATE SODIUM 1 TABLET: 8.6; 5 TABLET ORAL at 09:05

## 2019-05-30 RX ADMIN — FLUTICASONE FUROATE AND VILANTEROL TRIFENATATE 1 PUFF: 200; 25 POWDER RESPIRATORY (INHALATION) at 09:05

## 2019-05-30 RX ADMIN — SEVELAMER CARBONATE 0.8 G: 800 POWDER, FOR SUSPENSION ORAL at 08:05

## 2019-05-30 NOTE — PLAN OF CARE
Problem: Physical Therapy Goal  Goal: Physical Therapy Goal  Goals to be met by: 2019. 15 days     Patient will increase functional independence with mobility by performin. Supine to sit with Modified [place]  2. Sit to supine with Modified [place]  3. Sit to stand transfer with Supervision  4. Bed to chair transfer with Supervision using Rolling Walker or least restrictive assistive device  5. Gait  x 150 feet with Stand-by Assistance using Rolling Walker.  or least restrictive assistive device  6. Wheelchair propulsion x150 feet with Supervision using bilateral upper extremities/bilateral lower extremities  7. Ascend/descend 4 stair with right Handrails Stand-by Assistance .   8. Ascend/Descend 4 inch curb step with Stand-by Assistance using Rolling Walker. or least restrictive assistive device  9. Stand for 3 minutes with Stand-by Assistance using Rolling Walker or least restrictive assistive device and perform an activity  10. Lower extremity exercise program x20 reps per handout, with assistance as needed and gym therex     Goals remain appropriate. Continue with Physical therapy Plan of Care. Windy De La Cruz, PT 2019

## 2019-05-30 NOTE — ASSESSMENT & PLAN NOTE
Moderate Malnutrition in the context of chronic illness    Related to (etiology):  Variable appetite, weakness    Signs and Symptoms (as evidenced by):  Energy Intake: <75% of estimated energy requirement for > 3 months  Body Fat Depletion: mild depletion of orbitals and triceps   Muscle Mass Depletion: mild depletion of temples, clavicle region, scapular region, interosseous muscle and lower extremities   Weight Loss: 10% x 6 months  Fluid Accumulation: mild    Interventions/Recommendations (treatment strategy):  [place] modified diet-     renal , cardiac diet  Carbohydrate modified diet -   2000 diabetic  Commercial beverage- [employer] source renal BID breakfast and supper  Texture modified diet- chopped meats    Nutrition Diagnosis Status:  New

## 2019-05-30 NOTE — PLAN OF CARE
Problem: Occupational Therapy Goal  Goal: Occupational Therapy Goal  Goals to be met by: 6/12/19    Patient will increase functional independence with ADLs by performing:    Feeding with Modified [place].  UE Dressing with Modified [place] seated in w/c.  LE Dressing (briefs) with Stand by Assistance.  Grooming while seated with Modified [place]. - Met  Toileting from toilet with Stand by Assistance for hygiene and clothing management.   Bathing from shower chair/bench with Stand-by Assistance.  Supine to sit with Modified [place].  Stand pivot transfers with Supervision.  Toilet transfer to toilet with Supervision/ Stand By Assistance.  Upper extremity exercise program x15 reps per handout, with independence to build strength and endurance in B UE to improve functional independence with ADLs and IADLs.     Outcome: Ongoing (interventions implemented as appropriate)  I certify that I was present in the room directing the student in service delivery and guiding them using my skilled judgment. As the co-signing therapist I have reviewed the students documentation and am responsible for the treatment, assessment, and plan.     ARPAN Del Cid

## 2019-05-30 NOTE — PT/OT/SLP PROGRESS
Physical Therapy  Treatment    Tea Georges   MRN: [id number]   Admitting Diagnosis: Subdural hemorrhage    PT Received On: 05/30/19          Billable Minutes:  Gait Training 16, Therapeutic Activity 15 and Therapeutic Exercise 15    Treatment Type: Treatment  PT/PTA: PT             General Precautions: Standard, vision impaired, aspiration, fall  Orthopedic Precautions: N/A   Braces: N/A         Subjective:  Communicated with patient prior to session.  Agreeable to session.  Patient c/o having to use oxygen all the time    Pain/Comfort  Pain Rating 1: 1/10  Location 1: head  Pain Addressed 1: Reposition, Distraction  Pain Rating Post-Intervention 1: 1/10    Objective:  Patient found seated in w/c w/ brother visiting.   Oxygen 3 LPM; 100%  With 100% saturation.  Oxygen changed to 2 LPM during session and SpO2 97 to 100% t/o session       AM-PAC 6 CLICK MOBILITY  Total Score:17    Bed Mobility:  Sit>Supine:not performed  Supine>Sit: not performed    Transfers:  Sit<>Stand: to/from w/c w/ RW and SBA x2 trials. To/from rollator seate w/ CGA;   Stand Pivot Transfer: to/from nustep w/ rolaltor and CGA; to/from rollator seat w/ cues for tech, CGA      Gait:  Amb w/ RW and CGA w/ w/c follow and 2 LPM oxygen 120 feet. afterwalking SpO2 98%  After seated rest break, amb w/ rollator and SBA/ feet. SpO2 98% on 2 LPM       Advanced Gait:  Stairs: up/down 4 steps w/ BHR and SBA  Curb Step: not performed    Wheelchair Mobility:  Patient pushed in w/c     Therex:   Ankle  Pumps,   LAQ   Hip flexion  x20 reps w/ assist as needed      Additional Treatment:  Patient performed 10 minutes on recumbent stepper with bilateral arms and legs on workload 1-2  Patient educated on gait and trf tech; use of oxygen,       Patient left up in chair with all lines intact, call button in reach and family visiting present.    Assessment:  Tea Georges is a 79 y.o. female with a medical diagnosis of Subdural hemorrhage.  Patient used rollator w/  good technique. Improved mobility and patient reported feeling better today. Patient will benefit from continued physical therapy to address deficits and improve safety and functional mobility. Continue with physical therapy plan of care. .    Rehab identified problem list/impairments: weakness, impaired endurance, impaired functional mobilty, gait instability, impaired balance    Rehab potential is good.    Activity tolerance: Good    Discharge recommendations: home with home health     Barriers to discharge: Inaccessible home environment, Decreased caregiver support    Equipment recommendations: tub bench     GOALS:   Multidisciplinary Problems     Physical Therapy Goals        Problem: Physical Therapy Goal    Goal Priority Disciplines Outcome Goal Variances Interventions   Physical Therapy Goal     PT, PT/OT      Description:  Goals to be met by: 2019. 15 days     Patient will increase functional independence with mobility by performin. Supine to sit with Modified [place]  2. Sit to supine with Modified [place]  3. Sit to stand transfer with Supervision  4. Bed to chair transfer with Supervision using Rolling Walker or least restrictive assistive device  5. Gait  x 150 feet with Stand-by Assistance using Rolling Walker.  or least restrictive assistive device  6. Wheelchair propulsion x150 feet with Supervision using bilateral upper extremities/bilateral lower extremities  7. Ascend/descend 4 stair with right Handrails Stand-by Assistance .   8. Ascend/Descend 4 inch curb step with Stand-by Assistance using Rolling Walker. or least restrictive assistive device  9. Stand for 3 minutes with Stand-by Assistance using Rolling Walker or least restrictive assistive device and perform an activity  10. Lower extremity exercise program x20 reps per handout, with assistance as needed and gym therex                      PLAN:    Patient to be seen 5 x/week  to address the above listed problems via  therapeutic activities, gait training, therapeutic exercises, wheelchair management/training  Plan of Care expires: 06/28/19  Plan of Care reviewed with: patient    Windy De La Cruz, PT  05/30/2019

## 2019-05-30 NOTE — NURSING
ASHKAN REN NP NOTIFIED OF PATIENT HAVING A SMALL AMOUNT OF DARK RED BLOOD FROM NOSE AND SUBSIDED..

## 2019-05-30 NOTE — PLAN OF CARE
Problem: Adult Inpatient Plan of Care  Goal: Plan of Care Review  Outcome: Ongoing (interventions implemented as appropriate)  Moderate Malnutrition in the context of chronic illness     Related to (etiology):  Variable appetite, weakness     Signs and Symptoms (as evidenced by):  Energy Intake: <75% of estimated energy requirement for > 3 months  Body Fat Depletion: mild depletion of orbitals and triceps   Muscle Mass Depletion: mild depletion of temples, clavicle region, scapular region, interosseous muscle and lower extremities   Weight Loss: 10% x 6 months  Fluid Accumulation: mild    Recommendation/Intervention: Continue diabetic diet, decrease calories to 1500, dc cardiac and maintain, renal diabetic with 1.5 L fluid restriction, recommend add novasource renal oral supplement BID at breakfast and supper per pt request   Goals: PO to meet 75% of EEN to include ONS by next RD visit

## 2019-05-30 NOTE — PLAN OF CARE
Problem: Physical Therapy Goal  Goal: Physical Therapy Goal  Goals to be met by: 2019. 15 days     Patient will increase functional independence with mobility by performin. Supine to sit with Modified [place]  2. Sit to supine with Modified [place]  3. Sit to stand transfer with Supervision  4. Bed to chair transfer with Supervision using Rolling Walker or least restrictive assistive device  5. Gait  x 150 feet with Stand-by Assistance using Rolling Walker.  or least restrictive assistive device  6. Wheelchair propulsion x150 feet with Supervision using bilateral upper extremities/bilateral lower extremities  7. Ascend/descend 4 stair with right Handrails Stand-by Assistance .   8. Ascend/Descend 4 inch curb step with Stand-by Assistance using Rolling Walker. or least restrictive assistive device  9. Stand for 3 minutes with Stand-by Assistance using Rolling Walker or least restrictive assistive device and perform an activity  10. Lower extremity exercise program x20 reps per handout, with assistance as needed and gym therex     Goals remain appropriate. Continue with Physical therapy Plan of Care. Windy De La Cruz, PT 2019

## 2019-05-30 NOTE — PROGRESS NOTES
[facility]                                  Skilled Nursing Facility                   Progress Note   DOS 5/30/2019  Admit Date: 5/28/2019  GORDY   Principal Problem:  Subdural hemorrhage   HPI obtained from patient interview and chart review     Chief Complaint: Revaluation of medical treatment and therapy status: Lab review    HPI: Ms Georges a 79 y.o. 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 Nsgy decided to utilize Non-surgical treatment options.     Interval Hx: During f/u of care, patient continued to be treated at [facility] with PT and OT to improve functional status and ability to perform ADLs. Today, lab review reveals platelets at 98, trending biweekly. Creatinine 2.5, continue dialysis 3 times a week. Pharmacist reporting patient receiving Procrit at dialysis treatments, discontinue Procrit.    ROS  Constitutional: Negative for fever and malaise/fatigue.   Eyes: Left eye: Negative for blurred vision and discharge. + Visual disturbance, Rt eye blind.   Respiratory: Negative for cough, shortness of breath and wheezing.    Cardiovascular: Negative for chest pain, palpitations, claudication, and leg swelling.   Gastrointestinal: Negative for abdominal pain, constipation, diarrhea, nausea and vomiting.   Genitourinary: Negative for dysuria, frequency and urgency.   Musculoskeletal:  + generalized weakness. Negative for back pain and myalgias.   Skin: Negative for itching and rash.   Neurological: Negative for dizziness, speech change, seizures, and headaches.   Psychiatric/Behavioral: Negative for depression. The patient is not nervous/anxious.       PEx  Constitutional: Patient appears well-developed and in no distress   HENT:   Head: Normocephalic and atraumatic.   Eyes: + Right eye  blindness and baseline difficulty opening eyelids   Neck: Normal range of motion. Neck supple.   Cardiovascular: Normal rate, regular rhythm and normal heart sounds.    Pulmonary/Chest: Effort normal and breath sounds are clear  Abdominal: Soft. Bowel sounds are normal.   Musculoskeletal: Normal range of motion. + generalized weakness  Neurological: Alert and oriented to person, place, and time.   Skin: Skin is warm and dry.   Psychiatric: Normal mood and affect. Behavior is normal.     Temp:  [98.2 °F (36.8 °C)-98.3 °F (36.8 °C)]   Pulse:  [62-76]   Resp:  [18-20]   BP: (138-166)/(61-84)   SpO2:  [87 %-100 %]   Body mass index is 18.24 kg/m² (pended).       Past Medical History: Patient has a past medical history of Anemia in ESRD (end-stage renal disease) (5/29/2016), Anticoagulant long-term use, Aortic atherosclerosis (11/22/2016), Aortic stenosis, moderate (2/18/2016), Asthma in adult without complication (1/8/2016), Bilateral low back pain without sciatica (11/17/2015), Blindness of right eye (11/12/2016), CAD (coronary artery disease) (12/12/2016), Cataract, Central retinal vein occlusion, right eye (6/3/2014), CHF (congestive heart failure), Chronic diastolic heart failure (1/8/2016), Chronic respiratory failure with hypoxia (5/29/2016), COPD (chronic obstructive pulmonary disease) (1/15/2017), Dependence on hemodialysis, Diverticulosis, Embolic stroke involving right middle cerebral artery, Encounter for blood transfusion, Enlarged LA (left atrium) (10/7/2016), Epiretinal membrane (7/17/2012), ESRD (end stage renal disease), Essential hypertension (1/8/2016), History of GI diverticular bleed, Left flaccid hemiparesis (10/1/2016), Peripheral vascular disease, unspecified (11/22/2016), 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 (11/13/2016), Type 2 diabetes mellitus with kidney complication, without long-term current use of insulin (5/1/2018), Type 2 diabetes mellitus with left eye affected by  proliferative retinopathy without macular edema, without long-term current use of insulin (3/26/2013), 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    Past Surgical History: Patient has a past surgical history that includes Cataract extraction; Cholecystectomy; Colonoscopy (N/A, 5/23/2016); Colonoscopy (N/A, 5/30/2016); Upper gastrointestinal endoscopy; Breast surgery; Fistulogram (Left, 11/28/2018); Revision of arteriovenous fistula (Left, 11/29/2018); Resection of aneurysm (Left, 11/29/2018); Placement of dual-lumen vascular catheter (Right, 11/29/2018); Abdominal surgery; Eye surgery; and Cardiac surgery.    Social History: Patient reports that she has never smoked. She has never used smokeless tobacco. She reports that she does not drink alcohol or use drugs.    Family History: family history includes Cancer in her sister; Cataracts in her mother; Esophageal cancer in her sister; Glaucoma in her brother and maternal aunt; Heart disease in her brother; Heart failure in her sister; Hypertension in her brother, father, mother, and sister; No Known Problems in her maternal grandfather, maternal grandmother, maternal uncle, paternal aunt, paternal grandfather, paternal grandmother, and paternal uncle; Stroke in her mother.      Allergies: Patient has No Known Allergies.    Recent Labs   Lab 05/27/19  0349 05/28/19  0454 05/30/19  0518   WBC 4.20 4.47 5.76   HGB 9.3* 9.4* 8.8*   HCT 30.9* 31.2* 30.4*   PLT 88* 108* 98*     Recent Labs   Lab 05/27/19  0349 05/28/19  0454 05/30/19  0518   * 131* 139   K 4.7 5.1 3.5    98 100   CO2 20* 17* 31*   BUN 49* 69* 12   CREATININE 6.4* 8.0* 2.5*   GLU 93 96 84   CALCIUM 9.8 9.5 9.5   MG 2.0 2.3 1.8   PHOS 6.9* 7.4* 4.0     Recent Labs   Lab 05/26/19  0428 05/27/19  0349 05/28/19  0454   ALKPHOS 69 67 73   ALT 5* 5* <5*   AST 8*  6* 8*   ALBUMIN 3.0* 2.8* 3.0*   PROT 6.5 6.2 6.4   BILITOT 0.5 0.4 0.4      Recent Labs   Lab 05/27/19  1718 05/27/19  2102 05/28/19  1232 05/28/19  1630 05/28/19  2111 05/29/19  0706   POCTGLUCOSE 106 127* 80 92 97 82       Assessment and Plan:  Thrombocytopenia  -5/30 platelets at 98, trending biweekly.     ESRD (end stage renal disease)  -Continue HD MWF  -5/29 Continue epoetin shon-epbx injection 2,000 Units 3x per wk  -5/29 continue sevelamer carbonate pwpk 0.8 g  -5/30 Creatinine 2.5, continue dialysis 3 times a week. Pharmacist reporting patient receiving Procrit at dialysis treatments, discontinue Procrit.    CONTINUED    Subdural hematoma  -Bilateral acute SDH, R>L with right to left midline shift on plavix and reversed with platelets. No surgical intervention per Nsgy.  -5/28 CTH revealed Multicompartmental intracranial hemorrhage appears grossly stable from prior exam of 05/23/2019. No new infarct or hemorrhage. No neurosurgical intervention was warranted.   -Follow up with Nsgy in 2 wks with repeat CTH  5/29 Holding aspirin    Debility  -Continue with PT/OT for gait training and strengthening and restoration of ADL's   -Encourage mobility, OOB in chair, and early ambulation as appropriate  -Fall precautions   -Monitor for bowel and bladder dysfunction  -Monitor for and prevent skin breakdown and pressure ulcers  -5/29 Initiated DVT prophylaxis with teds and scds     Essential hypertension  -Continuecarvedilol 12.5 mg bid  -5/29 Continue Hydralazine 100 mg tid  -5/29 Initiated amlodipine 10 mg daily     Pulmonary emphysema  -5/29 Initiated fluticasone furoate-vilanterol 200-25 mcg/dose diskus inhaler 1 puff daily and initiated albuterol inhaler 2 puff q6h prn    Type 2 diabetes mellitus with chronic kidney disease on chronic dialysis, without long-term current use of insulin  -A1C 5.4 Continue SSI  -5/29 Initiated Diet diabetic [facility]; 2000 Calorie; Cardiac (Low Na/Chol), Renal; Fluid - 1500mL;  Thin     (HFpEF) heart failure with preserved ejection fraction  -Continue even fluid balance, rate control, afterload reduction  -5/29 Initiated Diet diabetic [facility]; 2000 Calorie; Cardiac (Low Na/Chol), Renal; Fluid - 1500mL; Thin     Severe aortic stenosis  ECHO 5/22 reviewed on 5/29  · Low normal left ventricular systolic function. The estimated ejection fraction is 53%  · Local segmental wall motion abnormalities.  · Concentric left ventricular remodeling.  · Grade II (moderate) left ventricular diastolic dysfunction consistent with pseudonormalization.  · Severe left atrial enlargement.  · Elevated left atrial pressure.  · Moderate right ventricular enlargement.  · Normal right ventricular systolic function.  · Mild right atrial enlargement.  · Severe aortic valve stenosis.  · Aortic valve area is 0.69 cm2; peak velocity is 4.58 m/s; mean gradient is 55.02 mmHg.  · Moderate mitral sclerosis.  · Mild-to-moderate mitral regurgitation.  · Moderate to severe tricuspid regurgitation.  · Mild pulmonic regurgitation.  · Elevated central venous pressure (15 mm Hg).  · The estimated PA systolic pressure is 77 mm Hg  -resume home carvedilol 12.5 mg bid for rate control and afterload reduction    Constipation  -5/29 continue polyethylene glycol packet 17 g daily and senna-docusate 8.6-50 mg 1 tablet b.i.d.    Right-sided cerebrovascular accident (CVA) s/p thrombectiomy(2016)  -per patient, she returned to baseline and was able to ambulate on her own, perform ADLs  -Stable     Blindness of right eye  -complication of uncontrolled DM  -Continue artificial tears 0.5 % ophthalmic solution 2 drop qid prn  -Stable      Future Appointments   Date Time Provider Department Center   6/12/2019  1:30 PM Marcelino Flores MD [facility] NEUROS7 Carroll Becker NP

## 2019-05-30 NOTE — PT/OT/SLP EVAL
Speech Language Pathology  Evaluation    Tea Georges   MRN: [id number]   Admitting Diagnosis: Subdural hemorrhage    Diet recommendations: Solid Diet Level: Chopped meat, Regular  Liquid Diet Level: Thin 1 bite/sip at a time, Alternating bites/sips, Avoid talking while eating, Frequent oral care, HOB to 90 degrees, Monitor for s/s of aspiration, Small bites/sips and Strict aspiration precautions    SLP Treatment Date: 05/30/19  Speech Start Time: 0836     Speech Stop Time: 0906     Speech Total (min): 30 min       TREATMENT BILLABLE MINUTES:  Eval 20  and Eval Swallow and Oral Function 10    Diagnosis: Subdural hemorrhage  S/p fall    Past Medical History:   Diagnosis Date    Anemia in ESRD (end-stage renal disease) 5/29/2016    Anticoagulant long-term use     Aortic atherosclerosis 11/22/2016    Aortic stenosis, moderate 2/18/2016    Asthma in adult without complication 1/8/2016    Bilateral low back pain without sciatica 11/17/2015    Blindness of right eye 11/12/2016    CAD (coronary artery disease) 12/12/2016    Cataract     Central retinal vein occlusion, right eye 6/3/2014    CHF (congestive heart failure)     Chronic diastolic heart failure 1/8/2016    Chronic respiratory failure with hypoxia 5/29/2016    COPD (chronic obstructive pulmonary disease) 1/15/2017    Dependence on hemodialysis     Mon-Wed-Fri    Diverticulosis     Embolic stroke involving right middle cerebral artery     Encounter for blood transfusion     Enlarged LA (left atrium) 10/7/2016    Epiretinal membrane 7/17/2012    ESRD (end stage renal disease)     Essential hypertension 1/8/2016    History of GI diverticular bleed     5/22/16    Left flaccid hemiparesis 10/1/2016    Peripheral vascular disease, unspecified 11/22/2016    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 11/13/2016    Type 2 diabetes mellitus with kidney complication, without long-term current use of insulin 5/1/2018    Type 2 diabetes  mellitus with left eye affected by proliferative retinopathy without macular edema, without long-term current use of insulin 3/26/2013    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, without long-term current use of insulin 3/26/2013    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     Past Surgical History:   Procedure Laterality Date    ABDOMINAL SURGERY      BREAST SURGERY      tumor removal x 2    CARDIAC SURGERY      CATARACT EXTRACTION      CHOLECYSTECTOMY      COLONOSCOPY N/A 5/30/2016    Performed by Sam Davis [facility] at [facility] ENDO (2ND FLR)    COLONOSCOPY N/A 5/23/2016    Performed by WILLIAM Colvin MD at [facility] ENDO (2ND FLR)    ESOPHAGOGASTRODUODENOSCOPY (EGD) N/A 5/30/2016    Performed by [facility] at [facility] (2ND FLR)    ESOPHAGOGASTRODUODENOSCOPY (EGD) N/A 5/27/2016    Performed by Cesario Rubio MD at [facility] (2ND FLR)    EXCISION, ANEURYSM Left 11/29/2018    Performed by NADINE Blum III, MD at [facility] OR [address] [place] [address]    EYE SURGERY      Fistulogram Left 11/28/2018    Performed by NADINE Blum III, MD at [facility] CATH LAB    INSERTION, CATHETER, VASCULAR, DUAL LUMEN Right 11/29/2018    Performed by NADINE Blum III, MD at [facility] OR [address] [place] [address]    PTA, Fistula  11/28/2018    Performed by NADINE Blum III, MD at [facility] CATH LAB    REVISION, AV FISTULA, Left 11/29/2018    Performed by NADINE Blum III, MD at [facility] OR [address] [place] [address]    UPPER GASTROINTESTINAL ENDOSCOPY         Has the patient been evaluated by SLP for swallowing? : Yes  Keep patient NPO?: No   General Precautions: Standard, fall, aspiration, vision impaired(right eye blindness)    Current Respiratory Status: nasal cannula     Social Hx: Patient reports that daughter and 2 granddaughters live with her.  She reports being independent ind'ly, cooking, cleaning, and managing medications and finances premorbidly. Pt states she does not drive. Pt denies changes in cognition since  "incurring SDH.     Prior diet: regular/thins, though pt reports difficulty chewing meats and would prefer meats to be served chopped    Subjective:  "I chew too long trying to get it down." pt commented regarding chewing meats.        Objective:   Patient found with: oxygen    Oral Musculature Evaluation  Oral Musculature: WFL  Dentition: upper and lower dentures  Secretion Management: adequate  Mucosal Quality: good  Mandibular Strength and Mobility: WFL  Oral Labial Strength and Mobility: WFL  Lingual Strength and Mobility: WFL  Velar Elevation: WFL  Buccal Strength and Mobility: WFL  Voice Prior to PO Intake: dry, clear     Cognitive Status:  Behavioral Observations: appropriate and cooperative-  Memory and Orientation: O x 4. Pt immediately recalled series of 7 digits. Following a 2 minute delay, pt recalled 3/3 unrelated words given cues (0/3 ind'ly).    Attention: adequate  Problem Solving: Pt provided appropriate solutions to problem situations and generated multiple causes for hypothetical situation. Pt was unable to complete 4-word sequencing task.     Auditory Comprehension: answers yes/no questions and able to follow commands complex    Verbal Expression: conversational speech pt able to express basic and functional information without evidence of expressive language deficits. During a word fluency task, pt listed 13 items in a category in one minute (15-20 is wnl).     Motor Speech: wfl    Voice: wfl    Reading: not yet assessed    Writing: not yet assessed    Visual-Spatial: not yet assessed; PMH notes right eye blindness     Bedside Swallow Eval:  Consistencies Assessed: Thin liquids cup sips of coffee  Soft solids bite of pancake x 2  Solids bite of sausage  Oral Phase: Prolonged mastication with sausage link  Pharyngeal Phase: no overt clinical signs/symptoms of aspiration  no overt clinical signs/symptoms of pharyngeal dysphagia      Assessment:  Tea Georges is a 79 y.o. female with a medical " diagnosis of Subdural hemorrhage and presents with mild cognitive impairment.      Discharge recommendations: Discharge Facility/Level of Care Needs: (tbd)     Diet recommendations: Solid Diet Level: Chopped meat, Regular  Liquid Diet Level: Thin     Goals:   Multidisciplinary Problems     SLP Goals        Problem: SLP Goal    Goal Priority Disciplines Outcome   SLP Goal     SLP    Description:  Speech Language Pathology Goals  Goals expected to be met by 6/6:  1. Pt will recall 3/3 facts or words given min-mod cues following a 2 minute delay.   2. Pt will complete 4 step sequencing tasks with 70% accuracy given mod cues.  3. Pt will complete functional math/time/money management tasks with 80% accuracy given min-mod cues.  4. Pt will tolerate least restrictive diet without s/s of aspiration.                            Plan:   Patient to be seen Therapy Frequency: 3 x/week   Planned Interventions: Cognitive-Linguistic Therapy, Dysphagia Therapy  Plan of Care expires: 06/30/19  Plan of Care reviewed with: patient  SLP Follow-up?: Yes              BRITTON Novak, IRAIS-SLP  05/30/2019      BRITTON Novak, CCC-SLP  Speech Language Pathologist  [phone]  5/30/2019

## 2019-05-30 NOTE — PLAN OF CARE
Problem: Adult Inpatient Plan of Care  Goal: Plan of Care Review  Outcome: Ongoing (interventions implemented as appropriate)  BLOOD GLUCOSES MONITORED.AFEBRILE.FALL PRECAUTIONS MAINTAINED NO INJURIES NOTED.

## 2019-05-30 NOTE — PLAN OF CARE
Problem: Occupational Therapy Goal  Goal: Occupational Therapy Goal  Goals to be met by: 6/12/19    Patient will increase functional independence with ADLs by performing:    Feeding with Modified [place].  UE Dressing with Modified [place] seated in w/c.  LE Dressing (briefs) with Stand by Assistance.  Grooming while seated with Modified [place]. - Met  Toileting from toilet with Stand by Assistance for hygiene and clothing management.   Bathing from shower chair/bench with Stand-by Assistance.  Supine to sit with Modified [place].  Stand pivot transfers with Supervision.  Toilet transfer to toilet with Supervision/ Stand By Assistance.  Upper extremity exercise program x15 reps per handout, with independence to build strength and endurance in B UE to improve functional independence with ADLs and IADLs.     Outcome: Ongoing (interventions implemented as appropriate)  I certify that I was present in the room directing the student in service delivery and guiding them using my skilled judgment. As the co-signing therapist I have reviewed the students documentation and am responsible for the treatment, assessment, and plan.     ARPAN Del Cid

## 2019-05-30 NOTE — PT/OT/SLP PROGRESS
Occupational Therapy  Treatment    Tea Georges   MRN: [id number]   Admitting Diagnosis: Subdural hemorrhage    OT Date of Treatment: 05/30/19       Billable Minutes:  Self Care/Home Management 40 and Therapeutic Activity 15    General Precautions: Standard, vision impaired  Orthopedic Precautions: N/A  Braces: N/A    Spiritual, Cultural Beliefs, Orthodoxy Practices, Values that Affect Care: no    Subjective:  Communicated with pateint prior to session.    Pain/Comfort  Pain Rating 1: 0/10    Objective:       Occupational Performance:    Bed Mobility:    · Patient completed Rolling/Turning to Right with stand by assistance  · Patient completed Scooting/Bridging with stand by assistance  · Patient completed Supine to Sit with stand by assistance     Functional Mobility/Transfers:  · Patient completed Sit <> Stand Transfer with stand by assistance  with  rolling walker   · Functional Mobility: Pt ambulated with RW and SBA 15 feet from bedside into bathroom to the sink to complete grooming.     Activities of Daily Living:  · Grooming: modified independence standing at sink side with RW.   · Bathing: contact guard assistance to steady while standing to clean rear and wilian area. Pt completed bathing seated in w/c at sink.   · Upper Body Dressing: minimum assistance to fasten front buttons on a button up gown.   · Lower Body Dressing: contact guard assistance to steady while standing to manage clothing over hips.     AMPA 6 Click:  AMPAC Total Score: 20      Additional Treatment:  SC tasks were completed as noted above.   Pt educ on POC and role of OT.     Patient left up in chair in therapy gym with PT notified and present for her next therapy session.     ASSESSMENT:  Tea Georges is a 79 y.o. female with a medical diagnosis of Subdural hemorrhage. Pt tolerated therapy much better than the previous day. She is more alert with less nausea and more endurance. Pt still presents with generalized weakness, limited endurance,  and decreased standing balance. Pt is making progress but would benefit to continue with OT services to increase functional mobility, t/fs, and ADLs.     Rehab identified problem list/impairments: weakness, impaired endurance, impaired self care skills, impaired functional mobilty, impaired balance, visual deficits, decreased upper extremity function, impaired fine motor    Rehab potential is good    Activity tolerance: Good    Discharge recommendations: home with home health     Barriers to discharge:       Equipment recommendations: tub bench, commode     GOALS:   Multidisciplinary Problems     Occupational Therapy Goals        Problem: Occupational Therapy Goal    Goal Priority Disciplines Outcome Interventions   Occupational Therapy Goal     OT, PT/OT Ongoing (interventions implemented as appropriate)    Description:  Goals to be met by: 6/12/19    Patient will increase functional independence with ADLs by performing:    Feeding with Modified [place].  UE Dressing with Modified [place] seated in w/c.  LE Dressing (briefs) with Stand by Assistance.  Grooming while seated with Modified [place]. - Met  Toileting from toilet with Stand by Assistance for hygiene and clothing management.   Bathing from shower chair/bench with Stand-by Assistance.  Supine to sit with Modified [place].  Stand pivot transfers with Supervision.  Toilet transfer to toilet with Supervision/ Stand By Assistance.  Upper extremity exercise program x15 reps per handout, with independence to build strength and endurance in B UE to improve functional independence with ADLs and IADLs.                       Plan:  Patient to be seen 6 x/week to address the above listed problems via self-care/home management, therapeutic activities, therapeutic exercises  Plan of Care expires: 06/29/19  Plan of Care reviewed with: patient    I certify that I was present in the room directing the student in service delivery and guiding them using  my skilled judgment. As the co-signing therapist I have reviewed the students documentation and am responsible for the treatment, assessment, and plan.         ARPAN Del Cid  05/30/2019

## 2019-05-30 NOTE — CONSULTS
"  [facility]  Adult Nutrition  Consult Note    SUMMARY   Recommendations  Recommendation/Intervention: Continue diabetic diet, decrease calories to 1500, dc cardiac and maintain, renal diabetic with 1.5 L fluid restriction, recommend add novasource renal oral supplement BID at breakfast and supper per pt request   Goals: PO to meet 75% of EEN to include ONS by next RD visit  Communication of RD Recs: reviewed with physician    Reason for Assessment    Reason For Assessment: consult  Diagnosis: (SDH, Debility)  Relevant Medical History: COPD, ESRD with dialysis, CVA, CHF, DM2, anemia, PVD, blind R eye, HTN, diverticulosis  Interdisciplinary Rounds: attended  General Information Comments: NFPE completed  Nutrition Discharge Planning: DC on renal diet    Nutrition Risk Screen    Nutrition Risk Screen: no indicators present, tube feeding or parenteral nutrition    Nutrition/Diet History    Patient Reported Diet/Restrictions/Preferences: renal  Food Preferences: chopped meats , drinking too many fluids based on all the cups and bottles at bedside, pt would not state or did not know her fluid restriction which is 1500 ml per day  Spiritual, Cultural Beliefs, Adventist Practices, Values that Affect Care: no  Food Allergies: NKFA  Factors Affecting Nutritional Intake: decreased appetite    Anthropometrics    Temp: 98.2 °F (36.8 °C)  Height: 5' 3" (160 cm)  Height (inches): 63 in  Weight Method: (P) (Post-Dialysis)  Weight: 45.3 kg (99 lb 13.9 oz)  Weight (lb): 99.87 lb  Ideal Body Weight (IBW), Female: 115 lb  % Ideal Body Weight, Female (lb): 86.84 lb  BMI (Calculated): 17.7  BMI Grade: 17 - 18.4 protein-energy malnutrition grade I  Weight Loss: unintentional  Usual Body Weight (UBW), k kg  % Usual Body Weight: 89.01  % Weight Change From Usual Weight: -11.18 %       Lab/Procedures/Meds    Pertinent Labs Reviewed: reviewed  Pertinent Labs Comments: Na 131, BUN 69, Cr 8.0, glucose 96, PO4 7.4, " albumin 3.0,  Pertinent Medications Reviewed: reviewed  Pertinent Medications Comments: Epo, polyethylene glycol senna-docusate, sevelamer         Estimated/Assessed Needs    Weight Used For Calorie Calculations: 45.3 kg (99 lb 13.9 oz)  Energy Calorie Requirements (kcal): 1134  Energy Need Method: Hardee-St Jeor  Protein Requirements: 64g  Weight Used For Protein Calculations: 53 kg (116 lb 13.5 oz)(IBW(geriatric) x 1.2g/kg)  Fluid Requirements (mL): per MD 1500     RDA Method (mL): 1134  CHO Requirement: 50% of calories      Nutrition Prescription Ordered    Current Diet Order: renal, 2000 diabetic, cardiac, 1.5L fluid restriction  Oral Nutrition Supplement: none    Evaluation of Received Nutrient/Fluid Intake    Energy Calories Required: not meeting needs  Protein Required: not meeting needs  Fluid Required: exceeds needs  Tolerance: tolerating  % Intake of Estimated Energy Needs: 50 - 75 %  % Meal Intake: 50 - 75 %    Nutrition Risk    Level of Risk/Frequency of Follow-up: high     Assessment and Plan    Moderate malnutrition  Moderate Malnutrition in the context of chronic illness    Related to (etiology):  Variable appetite, weakness    Signs and Symptoms (as evidenced by):  Energy Intake: <75% of estimated energy requirement for > 3 months  Body Fat Depletion: mild depletion of orbitals and triceps   Muscle Mass Depletion: mild depletion of temples, clavicle region, scapular region, interosseous muscle and lower extremities   Weight Loss: 10% x 6 months  Fluid Accumulation: mild    Interventions/Recommendations (treatment strategy):  Mineral modified diet-     renal , cardiac diet  Carbohydrate modified diet -   2000 diabetic  Commercial beverage- Nova source renal BID breakfast and supper  Texture modified diet- chopped meats    Nutrition Diagnosis Status:  New    Monitor and Evaluation    Food and Nutrient Intake: food and beverage intake  Food and Nutrient Adminstration: diet  order  Knowledge/Beliefs/Attitudes: food and nutrition knowledge/skill  Physical Activity and Function: nutrition-related ADLs and IADLs  Biochemical Data, Medical Tests and Procedures: electrolyte and renal panel, inflammatory profile, gastrointestinal profile  Nutrition-Focused Physical Findings: overall appearance     Malnutrition Assessment   5/30/19  Malnutrition Type: chronic illness  Energy Intake: moderate energy intake  Neck/Chest (Micronutrient): muscle wasting, subcutaneous fat loss  Musculoskeletal/Lower Extremities: subcutaneous fat loss       Weight Loss (Malnutrition): 10% in 6 months(may also represent fluid fluctuation)  Energy Intake (Malnutrition): less than 75% for greater than or equal to 3 months  Subcutaneous Fat (Malnutrition): mild depletion  Muscle Mass (Malnutrition): mild depletion   Orbital Region (Subcutaneous Fat Loss): mild depletion  Upper Arm Region (Subcutaneous Fat Loss): mild depletion   Quaker Region (Muscle Loss): mild depletion  Clavicle Bone Region (Muscle Loss): mild depletion  Clavicle and Acromion Bone Region (Muscle Loss): mild depletion  Dorsal Hand (Muscle Loss): mild depletion  Patellar Region (Muscle Loss): mild depletion  Anterior Thigh Region (Muscle Loss): mild depletion  Posterior Calf Region (Muscle Loss): mild depletion               Nutrition Follow-Up    RD Follow-up?: Yes

## 2019-05-31 ENCOUNTER — TELEPHONE (OUTPATIENT)
Dept: ADMINISTRATIVE | Facility: CLINIC | Age: 79
End: 2019-05-31

## 2019-05-31 LAB
POCT GLUCOSE: 142 MG/DL (ref 70–110)
POCT GLUCOSE: 143 MG/DL (ref 70–110)
POCT GLUCOSE: 173 MG/DL (ref 70–110)
POCT GLUCOSE: 181 MG/DL (ref 70–110)
POCT GLUCOSE: 82 MG/DL (ref 70–110)

## 2019-05-31 PROCEDURE — 25000003 PHARM REV CODE 250: Performed by: HOSPITALIST

## 2019-05-31 PROCEDURE — 27000221 HC OXYGEN, UP TO 24 HOURS

## 2019-05-31 PROCEDURE — 63600175 PHARM REV CODE 636 W HCPCS: Performed by: HOSPITALIST

## 2019-05-31 PROCEDURE — 11000004 HC SNF PRIVATE

## 2019-05-31 PROCEDURE — 92526 ORAL FUNCTION THERAPY: CPT

## 2019-05-31 PROCEDURE — 25000242 PHARM REV CODE 250 ALT 637 W/ HCPCS: Performed by: NURSE PRACTITIONER

## 2019-05-31 PROCEDURE — 97530 THERAPEUTIC ACTIVITIES: CPT

## 2019-05-31 PROCEDURE — 94761 N-INVAS EAR/PLS OXIMETRY MLT: CPT

## 2019-05-31 PROCEDURE — 97110 THERAPEUTIC EXERCISES: CPT

## 2019-05-31 PROCEDURE — 97116 GAIT TRAINING THERAPY: CPT

## 2019-05-31 PROCEDURE — 25000003 PHARM REV CODE 250: Performed by: INTERNAL MEDICINE

## 2019-05-31 PROCEDURE — 92507 TX SP LANG VOICE COMM INDIV: CPT

## 2019-05-31 RX ADMIN — HYDRALAZINE HYDROCHLORIDE 100 MG: 50 TABLET ORAL at 05:05

## 2019-05-31 RX ADMIN — SEVELAMER CARBONATE 0.8 G: 800 POWDER, FOR SUSPENSION ORAL at 05:05

## 2019-05-31 RX ADMIN — HYDRALAZINE HYDROCHLORIDE 100 MG: 50 TABLET ORAL at 10:05

## 2019-05-31 RX ADMIN — SENNOSIDES AND DOCUSATE SODIUM 1 TABLET: 8.6; 5 TABLET ORAL at 08:05

## 2019-05-31 RX ADMIN — ONDANSETRON 4 MG: 4 TABLET, ORALLY DISINTEGRATING ORAL at 01:05

## 2019-05-31 RX ADMIN — SEVELAMER CARBONATE 0.8 G: 800 POWDER, FOR SUSPENSION ORAL at 09:05

## 2019-05-31 RX ADMIN — INSULIN ASPART 1 UNITS: 100 INJECTION, SOLUTION INTRAVENOUS; SUBCUTANEOUS at 09:05

## 2019-05-31 RX ADMIN — CARVEDILOL 12.5 MG: 12.5 TABLET, FILM COATED ORAL at 08:05

## 2019-05-31 RX ADMIN — FLUTICASONE FUROATE AND VILANTEROL TRIFENATATE 1 PUFF: 200; 25 POWDER RESPIRATORY (INHALATION) at 09:05

## 2019-05-31 NOTE — PT/OT/SLP PROGRESS
"Physical Therapy  Treatment    Tea Georges   MRN: [id number]   Admitting Diagnosis: Subdural hemorrhage    PT Received On: 05/31/19        Billable Minutes:  Gait Training 13, Therapeutic [id number] and Therapeutic Exercise 23    Treatment Type: Treatment  PT/PTA: PTA     PTA Visit Number: 1       General Precautions: Standard, vision impaired, aspiration, fall  Orthopedic Precautions: N/A   Braces: N/A     Subjective:  "alright"    Pain/Comfort  Pain Rating 1: 0/10  Pain Rating Post-Intervention 1: 0/10    Objective:   Patient found with: oxygen     AM-PAC 6 CLICK MOBILITY  Total Score:18    Transfers:  Sit<>Stand: with rollator CGA vcs for handplacement and brakes  Stand Pivot Transfer: with Rollator CGA     Gait:  Amb with rollator CGA ~ 135 ft  And 120 ft wc/02 in tow    Advanced Gait:  Stairs:asc/lotus 4 steps with BHR SBA     Therex:  2x10 reps AP,GS,LAQ,hip flex    Additional Treatment:  recumbent cross  x 12 min L-2    Patient left up in chair with call button in reach and belongings in reach.    Assessment:  Tea Georges is a 79 y.o. female with a medical diagnosis of Subdural hemorrhage.  Pt tolerated well, pt would continue to benefit from skilled PT services to improve overall functional mobility, strength and endurance.  .    Rehab identified problem list/impairments: weakness, impaired endurance, impaired functional mobilty, gait instability, impaired balance    Rehab potential is good.    Activity tolerance: Fair    Discharge recommendations: home with home health     Barriers to discharge: Inaccessible home environment, Decreased caregiver support    Equipment recommendations: tub bench     GOALS:   Multidisciplinary Problems     Physical Therapy Goals        Problem: Physical Therapy Goal    Goal Priority Disciplines Outcome Goal Variances Interventions   Physical Therapy Goal     PT, PT/OT Ongoing (interventions implemented as appropriate)     Description:  Goals to be met by: June 11, 2019. 15 " days     Patient will increase functional independence with mobility by performin. Supine to sit with Modified [employer]  2. Sit to supine with Modified [employer]  3. Sit to stand transfer with Supervision  4. Bed to chair transfer with Supervision using Rolling Walker or least restrictive assistive device  5. Gait  x 150 feet with Stand-by Assistance using Rolling Walker.  or least restrictive assistive device  6. Wheelchair propulsion x150 feet with Supervision using bilateral upper extremities/bilateral lower extremities  7. Ascend/descend 4 stair with right Handrails Stand-by Assistance .   8. Ascend/Descend 4 inch curb step with Stand-by Assistance using Rolling Walker. or least restrictive assistive device  9. Stand for 3 minutes with Stand-by Assistance using Rolling Walker or least restrictive assistive device and perform an activity  10. Lower extremity exercise program x20 reps per handout, with assistance as needed and gym therex                      PLAN:    Patient to be seen 5 x/week  to address the above listed problems via therapeutic activities, gait training, therapeutic exercises, wheelchair management/training  Plan of Care expires: 19  Plan of Care reviewed with: patient    Shaila Thomason, PTA  2019

## 2019-05-31 NOTE — PLAN OF CARE
Problem: Occupational Therapy Goal  Goal: Occupational Therapy Goal  Goals to be met by: 6/12/19    Patient will increase functional independence with ADLs by performing:    Feeding with Modified [place].  UE Dressing with Modified [place] seated in w/c.  LE Dressing (briefs) with Stand by Assistance.  Grooming while seated with Modified [place]. - Met  Toileting from toilet with Stand by Assistance for hygiene and clothing management.   Bathing from shower chair/bench with Stand-by Assistance.  Supine to sit with Modified [place].  Stand pivot transfers with Supervision.  Toilet transfer to toilet with Supervision/ Stand By Assistance.  Upper extremity exercise program x15 reps per handout, with independence to build strength and endurance in B UE to improve functional independence with ADLs and IADLs.      Outcome: Ongoing (interventions implemented as appropriate)  Patient goals are appropriate.

## 2019-05-31 NOTE — PLAN OF CARE
Problem: Physical Therapy Goal  Goal: Physical Therapy Goal  Goals to be met by: 2019. 15 days     Patient will increase functional independence with mobility by performin. Supine to sit with Modified [place]  2. Sit to supine with Modified [place]  3. Sit to stand transfer with Supervision  4. Bed to chair transfer with Supervision using Rolling Walker or least restrictive assistive device  5. Gait  x 150 feet with Stand-by Assistance using Rolling Walker.  or least restrictive assistive device  6. Wheelchair propulsion x150 feet with Supervision using bilateral upper extremities/bilateral lower extremities  7. Ascend/descend 4 stair with right Handrails Stand-by Assistance .   8. Ascend/Descend 4 inch curb step with Stand-by Assistance using Rolling Walker. or least restrictive assistive device  9. Stand for 3 minutes with Stand-by Assistance using Rolling Walker or least restrictive assistive device and perform an activity  10. Lower extremity exercise program x20 reps per handout, with assistance as needed and gym therex     Outcome: Ongoing (interventions implemented as appropriate)  Goals remain appropriate

## 2019-05-31 NOTE — CLINICAL REVIEW
Clinical Pharmacy Chart Review Note      Admit Date: 5/28/2019   LOS: 3 days       Tea Georges is a 79 y.o. female admitted to SNF for PT/OT after hospitalization for subdural hemorrhage.    Active Hospital Problems    Diagnosis  POA    *Subdural hemorrhage [I62.00]  Yes    Moderate malnutrition [E44.0]  Yes      Resolved Hospital Problems   No resolved problems to display.     Review of patient's allergies indicates:  No Known Allergies  Patient Active Problem List    Diagnosis Date Noted    Moderate malnutrition 05/30/2019    Subdural hemorrhage 05/24/2019    Subdural hematoma 05/21/2019    (HFpEF) heart failure with preserved ejection fraction 05/21/2019    Moderate single current episode of major depressive disorder 02/08/2019    Aneurysm of arteriovenous dialysis fistula     Type 2 diabetes mellitus with chronic kidney disease on chronic dialysis, without long-term current use of insulin 05/01/2018    Pulmonary emphysema 01/15/2017    Thrombocytopenia, unspecified 01/15/2017    Non-rheumatic tricuspid valve insufficiency 01/15/2017    Vitamin D insufficiency 12/12/2016    CAD (coronary artery disease) 12/12/2016    Aortic atherosclerosis 11/22/2016    Peripheral vascular disease, unspecified 11/22/2016    Right-sided cerebrovascular accident (CVA) 11/22/2016    Physical debility 11/17/2016    Left spastic hemiparesis 11/13/2016    Blindness of right eye 11/12/2016    Hyperparathyroidism, secondary renal 10/14/2016    Anemia in ESRD (end-stage renal disease) 05/29/2016    Severe aortic stenosis 02/18/2016    Nonrheumatic aortic valve stenosis 02/04/2016    ESRD (end stage renal disease)     Essential hypertension 01/08/2016    Pleural effusion on right        Scheduled Meds:    aluminum & magnesium hydroxide-simethicone  30 mL Oral Once    amLODIPine  10 mg Oral Daily    carvedilol  12.5 mg Oral BID    fluticasone furoate-vilanterol  1 puff Inhalation Daily    hydrALAZINE  100  mg Oral Q8H    polyethylene glycol  17 g Oral Daily    senna-docusate 8.6-50 mg  1 tablet Oral BID    sevelamer carbonate  0.8 g Oral TID WM     Continuous Infusions:   PRN Meds: acetaminophen, albuterol, albuterol-ipratropium, aluminum & magnesium hydroxide-simethicone, artificial tears, calcium carbonate, insulin aspart U-100, ondansetron, ramelteon, senna-docusate 8.6-50 mg    OBJECTIVE:     Vital Signs (Last 24H)  Temp:  [96.7 °F (35.9 °C)-98.3 °F (36.8 °C)]   Pulse:  [56-84]   Resp:  [18-19]   BP: (151-172)/(60-73)   SpO2:  [96 %-100 %]     Laboratory:  CBC:   Recent Labs   Lab 05/27/19 0349 05/28/19 0454 05/30/19 0518   WBC 4.20 4.47 5.76   RBC 3.23* 3.27* 3.08*   HGB 9.3* 9.4* 8.8*   HCT 30.9* 31.2* 30.4*   PLT 88* 108* 98*   MCV 96 95 99*   MCH 28.8 28.7 28.6   MCHC 30.1* 30.1* 28.9*     BMP:   Recent Labs   Lab 05/27/19 0349 05/28/19 0454 05/30/19  0518   GLU 93 96 84   * 131* 139   K 4.7 5.1 3.5    98 100   CO2 20* 17* 31*   BUN 49* 69* 12   CREATININE 6.4* 8.0* 2.5*   CALCIUM 9.8 9.5 9.5   MG 2.0 2.3 1.8     CMP:   Recent Labs   Lab 05/26/19  0428 05/27/19 0349 05/28/19 0454 05/30/19  0518   GLU 99 93 96 84   CALCIUM 9.7 9.8 9.5 9.5   ALBUMIN 3.0* 2.8* 3.0*  --    PROT 6.5 6.2 6.4  --    * 132* 131* 139   K 4.7 4.7 5.1 3.5   CO2 22* 20* 17* 31*    100 98 100   BUN 36* 49* 69* 12   CREATININE 5.4* 6.4* 8.0* 2.5*   ALKPHOS 69 67 73  --    ALT 5* 5* <5*  --    AST 8* 6* 8*  --    BILITOT 0.5 0.4 0.4  --      LFTs:   Recent Labs   Lab 05/26/19  0428 05/27/19  0349 05/28/19  0454   ALT 5* 5* <5*   AST 8* 6* 8*   ALKPHOS 69 67 73   BILITOT 0.5 0.4 0.4   PROT 6.5 6.2 6.4   ALBUMIN 3.0* 2.8* 3.0*         ASSESSMENT/PLAN:     I have reviewed the medications in compliance with CMS Regulation F329 of the JOSELYN Appendix PP. No irregularities were noted at this time.    Medications will be reviewed and monitored by Pharm. D.        Stoney Mathews D.  Clinical  Pharmacist  [facility]

## 2019-05-31 NOTE — PLAN OF CARE
Problem: SLP Goal  Goal: SLP Goal  Speech Language Pathology Goals  Goals expected to be met by 6/6:  1. Pt will recall 3/3 facts or words given min-mod cues following a 2 minute delay.   2. Pt will complete 4 step sequencing tasks with 70% accuracy given mod cues.  3. Pt will complete functional math/time/money management tasks with 80% accuracy given min-mod cues.  4. Pt will tolerate least restrictive diet without s/s of aspiration.          Outcome: Ongoing (interventions implemented as appropriate)  Cont POC.   BRITTON Novak, CCC-SLP  Speech Language Pathologist  [phone]  5/31/2019

## 2019-05-31 NOTE — PT/OT/SLP PROGRESS
"Speech Language Pathology  Treatment    Tea Georges   MRN: [id number]   Admitting Diagnosis: Subdural hemorrhage    Diet recommendations: Solid Diet Level: Regular, Chopped meat  Liquid Diet Level: Thin 1 bite/sip at a time, Alternating bites/sips, Avoid talking while eating, HOB to 90 degrees, Monitor for s/s of aspiration, Small bites/sips and Standard aspiration precautions    SLP Treatment Date: 05/31/19  Speech Start Time: 0833     Speech Stop Time: 0903     Speech Total (min): 30 min       TREATMENT BILLABLE MINUTES:  Speech Therapy Individual 22 and Treatment Swallowing Dysfunction 8    Has the patient been evaluated by SLP for swallowing? : Yes  Keep patient NPO?: No   General Precautions: Standard, fall, aspiration, vision impaired  Current Respiratory Status: nasal cannula       Subjective:  "Usually, at home, I use a magnifying glass." pt stated re: reading due to visual limitations.          Objective:   Patient found with: oxygen    Pt eating breakfast meal upon entry while sitting up at EOB. Pt observed eating multiple bites of grits mixed with hard boiled egg and cup sips of coffee. Oral and pharyngeal phases were WFL with no s/s of aspiration.  Pt denies any new visual changes since incurring SDH. Reading abilities were assessed at the sentence/short paragraph level and found to be WFL.  Pt compensates well despite right eye blindness.  Pt completed a delayed memory task recalling 3/3 facts after a 2 minute delay with 100% acc.  Pt solved functional word problems related to money and time with 50% accuracy ind'ly/100% given cues.      Assessment:  Tea Georges is a 79 y.o. female with a medical diagnosis of Subdural hemorrhage and presents with mild cognitive impairments s/p SDH.       Discharge recommendations: Discharge Facility/Level of Care Needs: (tbd)     Goals:   Multidisciplinary Problems     SLP Goals        Problem: SLP Goal    Goal Priority Disciplines Outcome   SLP Goal     SLP Ongoing " (interventions implemented as appropriate)   Description:  Speech Language Pathology Goals  Goals expected to be met by 6/6:  1. Pt will recall 3/3 facts or words given min-mod cues following a 2 minute delay.   2. Pt will complete 4 step sequencing tasks with 70% accuracy given mod cues.  3. Pt will complete functional math/time/money management tasks with 80% accuracy given min-mod cues.  4. Pt will tolerate least restrictive diet without s/s of aspiration.                            Plan:   Patient to be seen Therapy Frequency: 3 x/week  Planned Interventions: Cognitive-Linguistic Therapy, Dysphagia Therapy  Plan of Care expires: 06/30/19  Plan of Care reviewed with: patient  SLP Follow-up?: Yes              BRITTON Novak, CCC-SLP  05/31/2019     BRITTNO Novak, CCC-SLP  Speech Language Pathologist  [phone]  5/31/2019

## 2019-05-31 NOTE — PT/OT/SLP PROGRESS
Occupational Therapy  Treatment    Tea Georges   MRN: [id number]   Admitting Diagnosis: Subdural hemorrhage    OT Date of Treatment: 05/31/19       Billable Minutes:  Therapeutic Activity 15 and Therapeutic Exercise 8    General Precautions: Standard, vision impaired  Orthopedic Precautions: N/A  Braces: N/A    Spiritual, Cultural Beliefs, Mosque Practices, Values that Affect Care: no    Subjective:  Communicated with patient, nursing, and PCT prior to session.    Pain/Comfort  Pain Rating 1: 0/10    Objective:   Patient found up in w/c in bathroom with PCT.     Occupational Performance:    Functional Mobility/Transfers:  · Patient completed Sit <> Stand Transfer with stand by assistance  with  no assistive device     [facility] 6 Click:  [facility] Total Score: 20    OT Exercises: AROM BUE exercises using red resistive band x 10 reps each of horizontal abduction and R/L elbow extension to increase strength and endurance needed for adls.     Additional Treatment:  Patient stood at counter level with no AD and with SBA ~5 minutes with a seated rest break after while retrieving and replacing items from overhead cabinet in preparation of home management. Patient also stood at counter level with SBA and no AD ~10 minutes with no rest break while copying patterns using small pegs requiring fine motor skills, crossing midline, and visual motor skills in preparation of self care.     Patient left up in chair  in rehab gym for PT.    ASSESSMENT:  Tea Georges is a 79 y.o. female with a medical diagnosis of Subdural hemorrhage and presents with the deficits listed below. Patient demo no difficulty completing standing activities. She tolerated session well and will continue to benefit from OT. Pt. was unable to participate in full OT session this day d/t a delay from dressing with PCT, nursing administering medication/taking vitals, and HD appointment.     Rehab identified problem list/impairments: weakness, impaired endurance,  impaired self care skills, impaired functional mobilty, impaired balance, visual deficits, decreased upper extremity function, impaired fine motor    Rehab potential is good    Activity tolerance: Good    Discharge recommendations: home with home health     Barriers to discharge:       Equipment recommendations: tub bench, commode     GOALS:   Multidisciplinary Problems     Occupational Therapy Goals        Problem: Occupational Therapy Goal    Goal Priority Disciplines Outcome Interventions   Occupational Therapy Goal     OT, PT/OT Ongoing (interventions implemented as appropriate)    Description:  Goals to be met by: 6/12/19    Patient will increase functional independence with ADLs by performing:    Feeding with Modified [place].  UE Dressing with Modified [place] seated in w/c.  LE Dressing (briefs) with Stand by Assistance.  Grooming while seated with Modified [place]. - Met  Toileting from toilet with Stand by Assistance for hygiene and clothing management.   Bathing from shower chair/bench with Stand-by Assistance.  Supine to sit with Modified [place].  Stand pivot transfers with Supervision.  Toilet transfer to toilet with Supervision/ Stand By Assistance.  Upper extremity exercise program x15 reps per handout, with independence to build strength and endurance in B UE to improve functional independence with ADLs and IADLs.                       Plan:  Patient to be seen 6 x/week to address the above listed problems via self-care/home management, therapeutic activities, therapeutic exercises  Plan of Care expires: 06/29/19  Plan of Care reviewed with: patient   The SHERI and YRIS have collaborated and discussed the patient's status, treatment plan and progress toward established goals.   EDVIN Donis 5/31/2019     EDVIN Donis  05/31/2019

## 2019-06-01 LAB
POCT GLUCOSE: 112 MG/DL (ref 70–110)
POCT GLUCOSE: 125 MG/DL (ref 70–110)
POCT GLUCOSE: 166 MG/DL (ref 70–110)
POCT GLUCOSE: 168 MG/DL (ref 70–110)

## 2019-06-01 PROCEDURE — 94761 N-INVAS EAR/PLS OXIMETRY MLT: CPT

## 2019-06-01 PROCEDURE — 25000003 PHARM REV CODE 250: Performed by: NURSE PRACTITIONER

## 2019-06-01 PROCEDURE — 11000004 HC SNF PRIVATE

## 2019-06-01 PROCEDURE — 25000003 PHARM REV CODE 250: Performed by: INTERNAL MEDICINE

## 2019-06-01 PROCEDURE — 25000003 PHARM REV CODE 250: Performed by: HOSPITALIST

## 2019-06-01 RX ADMIN — ACETAMINOPHEN 650 MG: 325 TABLET ORAL at 12:06

## 2019-06-01 RX ADMIN — HYDRALAZINE HYDROCHLORIDE 100 MG: 50 TABLET ORAL at 01:06

## 2019-06-01 RX ADMIN — SEVELAMER CARBONATE 0.8 G: 800 POWDER, FOR SUSPENSION ORAL at 05:06

## 2019-06-01 RX ADMIN — AMLODIPINE BESYLATE 10 MG: 10 TABLET ORAL at 09:06

## 2019-06-01 RX ADMIN — ACETAMINOPHEN 650 MG: 325 TABLET ORAL at 11:06

## 2019-06-01 RX ADMIN — ONDANSETRON 4 MG: 4 TABLET, ORALLY DISINTEGRATING ORAL at 11:06

## 2019-06-01 RX ADMIN — HYDRALAZINE HYDROCHLORIDE 100 MG: 50 TABLET ORAL at 11:06

## 2019-06-01 RX ADMIN — FLUTICASONE FUROATE AND VILANTEROL TRIFENATATE 1 PUFF: 200; 25 POWDER RESPIRATORY (INHALATION) at 08:06

## 2019-06-01 RX ADMIN — ONDANSETRON 4 MG: 4 TABLET, ORALLY DISINTEGRATING ORAL at 10:06

## 2019-06-01 RX ADMIN — INSULIN ASPART 1 UNITS: 100 INJECTION, SOLUTION INTRAVENOUS; SUBCUTANEOUS at 11:06

## 2019-06-01 RX ADMIN — CARVEDILOL 12.5 MG: 12.5 TABLET, FILM COATED ORAL at 11:06

## 2019-06-01 RX ADMIN — HYDRALAZINE HYDROCHLORIDE 100 MG: 50 TABLET ORAL at 05:06

## 2019-06-01 RX ADMIN — INSULIN ASPART 2 UNITS: 100 INJECTION, SOLUTION INTRAVENOUS; SUBCUTANEOUS at 08:06

## 2019-06-01 RX ADMIN — SEVELAMER CARBONATE 0.8 G: 800 POWDER, FOR SUSPENSION ORAL at 01:06

## 2019-06-01 RX ADMIN — ONDANSETRON 4 MG: 4 TABLET, ORALLY DISINTEGRATING ORAL at 12:06

## 2019-06-01 RX ADMIN — CARVEDILOL 12.5 MG: 12.5 TABLET, FILM COATED ORAL at 09:06

## 2019-06-01 RX ADMIN — SEVELAMER CARBONATE 0.8 G: 800 POWDER, FOR SUSPENSION ORAL at 08:06

## 2019-06-02 LAB
POCT GLUCOSE: 107 MG/DL (ref 70–110)
POCT GLUCOSE: 118 MG/DL (ref 70–110)
POCT GLUCOSE: 121 MG/DL (ref 70–110)
POCT GLUCOSE: 140 MG/DL (ref 70–110)

## 2019-06-02 PROCEDURE — 94761 N-INVAS EAR/PLS OXIMETRY MLT: CPT

## 2019-06-02 PROCEDURE — 25000003 PHARM REV CODE 250: Performed by: HOSPITALIST

## 2019-06-02 PROCEDURE — 27000221 HC OXYGEN, UP TO 24 HOURS

## 2019-06-02 PROCEDURE — 11000004 HC SNF PRIVATE

## 2019-06-02 PROCEDURE — 25000003 PHARM REV CODE 250: Performed by: NURSE PRACTITIONER

## 2019-06-02 PROCEDURE — 97110 THERAPEUTIC EXERCISES: CPT

## 2019-06-02 PROCEDURE — 97535 SELF CARE MNGMENT TRAINING: CPT

## 2019-06-02 PROCEDURE — 25000003 PHARM REV CODE 250: Performed by: INTERNAL MEDICINE

## 2019-06-02 RX ADMIN — ACETAMINOPHEN 650 MG: 325 TABLET ORAL at 10:06

## 2019-06-02 RX ADMIN — HYDRALAZINE HYDROCHLORIDE 100 MG: 50 TABLET ORAL at 05:06

## 2019-06-02 RX ADMIN — ONDANSETRON 4 MG: 4 TABLET, ORALLY DISINTEGRATING ORAL at 05:06

## 2019-06-02 RX ADMIN — SEVELAMER CARBONATE 0.8 G: 800 POWDER, FOR SUSPENSION ORAL at 10:06

## 2019-06-02 RX ADMIN — SEVELAMER CARBONATE 0.8 G: 800 POWDER, FOR SUSPENSION ORAL at 05:06

## 2019-06-02 RX ADMIN — AMLODIPINE BESYLATE 10 MG: 10 TABLET ORAL at 10:06

## 2019-06-02 RX ADMIN — CARVEDILOL 12.5 MG: 12.5 TABLET, FILM COATED ORAL at 10:06

## 2019-06-02 RX ADMIN — HYDRALAZINE HYDROCHLORIDE 100 MG: 50 TABLET ORAL at 10:06

## 2019-06-02 RX ADMIN — ACETAMINOPHEN 650 MG: 325 TABLET ORAL at 05:06

## 2019-06-02 RX ADMIN — SEVELAMER CARBONATE 0.8 G: 800 POWDER, FOR SUSPENSION ORAL at 12:06

## 2019-06-02 RX ADMIN — ALUMINUM HYDROXIDE, MAGNESIUM HYDROXIDE, AND DIMETHICONE 30 ML: 400; 400; 40 SUSPENSION ORAL at 03:06

## 2019-06-02 RX ADMIN — HYDRALAZINE HYDROCHLORIDE 100 MG: 50 TABLET ORAL at 02:06

## 2019-06-02 RX ADMIN — FLUTICASONE FUROATE AND VILANTEROL TRIFENATATE 1 PUFF: 200; 25 POWDER RESPIRATORY (INHALATION) at 10:06

## 2019-06-02 NOTE — PT/OT/SLP PROGRESS
"Physical Therapy  Treatment    Tea Georges   MRN: [id number]   Admitting Diagnosis: Subdural hemorrhage    PT Received On: 06/02/19        Billable Minutes:  Therapeutic Exercise 25    Treatment Type: Treatment  PT/PTA: PTA     PTA Visit Number: 2       General Precautions: Standard, vision impaired, aspiration, fall  Orthopedic Precautions: N/A   Braces: N/A     Subjective:  Communicated with nsmerline Middleton prior/post session re pts repts of not feeling well, nausea/cramps pt only agreeable to therex, x 2 attempts AM/PM  Ed on the importance/benefits of PT services      Pain/Comfort  Pain Rating 1: (did not rate "cramp")  Location - Orientation 1: generalized  Location 1: abdomen  Pain Addressed 1: Pre-medicate for activity, Distraction, Nurse notified(nsg aware)  Pain Rating Post-Intervention 1: ("same")    Objective:   Patient found with: oxygen     AM-PAC 6 CLICK MOBILITY  Total Score:18    Transfers:refused/declined not feeling well    Gait:refused/declined not feeling well     Therex:  2x10 reps supine/seated AP,GS,QS,HS,abd/add, LAQ,hip flex    Patient left up in chair with all lines intact, call button in reach, brother came during session present and belongings in reach.    Assessment:  Tea Georges is a 79 y.o. female with a medical diagnosis of Subdural hemorrhage.  Pt tolerated fair, limited session 2* to repts of not feeling well, nsg aware,  pt would continue to benefit from skilled PT services to improve overall functional mobility, strength and endurance.  ].    Rehab identified problem list/impairments: weakness, impaired endurance, impaired functional mobilty, gait instability, impaired balance    Rehab potential is good.    Activity tolerance: Fair    Discharge recommendations: home with home health     Barriers to discharge: Inaccessible home environment, Decreased caregiver support    Equipment recommendations: tub bench     GOALS:   Multidisciplinary Problems     Physical Therapy Goals        " Problem: Physical Therapy Goal    Goal Priority Disciplines Outcome Goal Variances Interventions   Physical Therapy Goal     PT, PT/OT Ongoing (interventions implemented as appropriate)     Description:  Goals to be met by: 2019. 15 days     Patient will increase functional independence with mobility by performin. Supine to sit with Modified [place]  2. Sit to supine with Modified [place]  3. Sit to stand transfer with Supervision  4. Bed to chair transfer with Supervision using Rolling Walker or least restrictive assistive device  5. Gait  x 150 feet with Stand-by Assistance using Rolling Walker.  or least restrictive assistive device  6. Wheelchair propulsion x150 feet with Supervision using bilateral upper extremities/bilateral lower extremities  7. Ascend/descend 4 stair with right Handrails Stand-by Assistance .   8. Ascend/Descend 4 inch curb step with Stand-by Assistance using Rolling Walker. or least restrictive assistive device  9. Stand for 3 minutes with Stand-by Assistance using Rolling Walker or least restrictive assistive device and perform an activity  10. Lower extremity exercise program x20 reps per handout, with assistance as needed and gym therex                      PLAN:    Patient to be seen 5 x/week  to address the above listed problems via therapeutic activities, gait training, therapeutic exercises, wheelchair management/training  Plan of Care expires: 19  Plan of Care reviewed with: patient    Shailamadhav Thomason, PTA  2019

## 2019-06-02 NOTE — PT/OT/SLP PROGRESS
Occupational Therapy  Treatment    Tea Georges   MRN: [id number]   Admitting Diagnosis: Subdural hemorrhage    OT Date of Treatment: 06/02/19       Billable Minutes:  Self Care/Home Management 23 and Therapeutic Exercise 15    General Precautions: Standard, vision impaired  Orthopedic Precautions: N/A  Braces: N/A    Spiritual, Cultural Beliefs, Adventist Practices, Values that Affect Care: no    Subjective:  Communicated with patient prior to session.    Pain/Comfort  Pain Rating 1: 0/10  Pain Rating Post-Intervention 1: 0/10    Objective:       Occupational Performance:    Bed Mobility:    · Patient completed Supine to Sit with minimum assistance /c HOB slightly elevated    Functional Mobility/Transfers:  · Patient completed Sit <> Stand Transfer with contact guard assistance  with  hand-held assist and rolling walker   · Patient completed Toilet Transfer bed>toilet; toilet>bedside chair /c functional ambulation technique with contact guard assistance with  hand-held assist and rolling walker    [facility] 6 Click:  [facility] Total Score: 20    OT Exercises:  Patient performed B UE ROM exercises using 2# dowel brianne 2 x 15 in all planes and joints focusing to improve strength and endurance to increase independence with ADLs.     Patient left up in chair with call button in reach and all needs met.     ASSESSMENT:  Tea Georges is a 79 y.o. female with a medical diagnosis of Subdural hemorrhage and presents with the deficits listed below. Patient was not feeling well, but agreed to tasks as noted above. Patient reported being nauseated during OT session. Patient continues to benefit from skilled OT services to achieve maximal independence.    Rehab identified problem list/impairments: weakness, impaired endurance, impaired self care skills, impaired functional mobilty, impaired balance, visual deficits, decreased upper extremity function, impaired fine motor    Rehab potential is good    Activity tolerance: Good    Discharge  recommendations: home with home health     Barriers to discharge:   Inaccessible home environment, Decreased caregiver support    Equipment recommendations: tub bench, commode     GOALS:   Multidisciplinary Problems     Occupational Therapy Goals        Problem: Occupational Therapy Goal    Goal Priority Disciplines Outcome Interventions   Occupational Therapy Goal     OT, PT/OT Ongoing (interventions implemented as appropriate)    Description:  Goals to be met by: 6/12/19    Patient will increase functional independence with ADLs by performing:    Feeding with Modified [place].  UE Dressing with Modified [place] seated in w/c.  LE Dressing (briefs) with Stand by Assistance.  Grooming while seated with Modified [place]. - Met  Toileting from toilet with Stand by Assistance for hygiene and clothing management.   Bathing from shower chair/bench with Stand-by Assistance.  Supine to sit with Modified [place].  Stand pivot transfers with Supervision.  Toilet transfer to toilet with Supervision/ Stand By Assistance.  Upper extremity exercise program x15 reps per handout, with independence to build strength and endurance in B UE to improve functional independence with ADLs and IADLs.                       Plan:  Patient to be seen 6 x/week to address the above listed problems via self-care/home management, therapeutic activities, therapeutic exercises  Plan of Care expires: 06/29/19  Plan of Care reviewed with: patient    SHERI Ibarra  06/02/2019

## 2019-06-02 NOTE — PLAN OF CARE
Problem: Physical Therapy Goal  Goal: Physical Therapy Goal  Goals to be met by: 2019. 15 days     Patient will increase functional independence with mobility by performin. Supine to sit with Modified [place]  2. Sit to supine with Modified [place]  3. Sit to stand transfer with Supervision  4. Bed to chair transfer with Supervision using Rolling Walker or least restrictive assistive device  5. Gait  x 150 feet with Stand-by Assistance using Rolling Walker.  or least restrictive assistive device  6. Wheelchair propulsion x150 feet with Supervision using bilateral upper extremities/bilateral lower extremities  7. Ascend/descend 4 stair with right Handrails Stand-by Assistance .   8. Ascend/Descend 4 inch curb step with Stand-by Assistance using Rolling Walker. or least restrictive assistive device  9. Stand for 3 minutes with Stand-by Assistance using Rolling Walker or least restrictive assistive device and perform an activity  10. Lower extremity exercise program x20 reps per handout, with assistance as needed and gym therex     Outcome: Ongoing (interventions implemented as appropriate)  Goals remain appropriate

## 2019-06-02 NOTE — PLAN OF CARE
Problem: Occupational Therapy Goal  Goal: Occupational Therapy Goal  Goals to be met by: 6/12/19    Patient will increase functional independence with ADLs by performing:    Feeding with Modified [place].  UE Dressing with Modified [place] seated in w/c.  LE Dressing (briefs) with Stand by Assistance.  Grooming while seated with Modified [place]. - Met  Toileting from toilet with Stand by Assistance for hygiene and clothing management.   Bathing from shower chair/bench with Stand-by Assistance.  Supine to sit with Modified [place].  Stand pivot transfers with Supervision.  Toilet transfer to toilet with Supervision/ Stand By Assistance.  Upper extremity exercise program x15 reps per handout, with independence to build strength and endurance in B UE to improve functional independence with ADLs and IADLs.      Outcome: Ongoing (interventions implemented as appropriate)  Patient's goals are appropriate.   SHERI Ibarra  6/2/2019

## 2019-06-03 LAB
POCT GLUCOSE: 119 MG/DL (ref 70–110)
POCT GLUCOSE: 121 MG/DL (ref 70–110)
POCT GLUCOSE: 87 MG/DL (ref 70–110)

## 2019-06-03 PROCEDURE — 92507 TX SP LANG VOICE COMM INDIV: CPT

## 2019-06-03 PROCEDURE — 25000003 PHARM REV CODE 250: Performed by: INTERNAL MEDICINE

## 2019-06-03 PROCEDURE — 97110 THERAPEUTIC EXERCISES: CPT

## 2019-06-03 PROCEDURE — 11000004 HC SNF PRIVATE

## 2019-06-03 PROCEDURE — 97803 MED NUTRITION INDIV SUBSEQ: CPT

## 2019-06-03 PROCEDURE — 97535 SELF CARE MNGMENT TRAINING: CPT

## 2019-06-03 PROCEDURE — 25000003 PHARM REV CODE 250: Performed by: HOSPITALIST

## 2019-06-03 PROCEDURE — 25000003 PHARM REV CODE 250: Performed by: NURSE PRACTITIONER

## 2019-06-03 PROCEDURE — 97116 GAIT TRAINING THERAPY: CPT

## 2019-06-03 PROCEDURE — 97530 THERAPEUTIC ACTIVITIES: CPT

## 2019-06-03 PROCEDURE — 27000221 HC OXYGEN, UP TO 24 HOURS

## 2019-06-03 PROCEDURE — 94761 N-INVAS EAR/PLS OXIMETRY MLT: CPT

## 2019-06-03 RX ORDER — ONDANSETRON 8 MG/1
8 TABLET, ORALLY DISINTEGRATING ORAL EVERY 6 HOURS PRN
Status: DISCONTINUED | OUTPATIENT
Start: 2019-06-03 | End: 2019-06-06

## 2019-06-03 RX ADMIN — ACETAMINOPHEN 650 MG: 325 TABLET ORAL at 05:06

## 2019-06-03 RX ADMIN — CARVEDILOL 12.5 MG: 12.5 TABLET, FILM COATED ORAL at 08:06

## 2019-06-03 RX ADMIN — HYDRALAZINE HYDROCHLORIDE 100 MG: 50 TABLET ORAL at 09:06

## 2019-06-03 RX ADMIN — ONDANSETRON 4 MG: 4 TABLET, ORALLY DISINTEGRATING ORAL at 05:06

## 2019-06-03 RX ADMIN — SENNOSIDES AND DOCUSATE SODIUM 1 TABLET: 8.6; 5 TABLET ORAL at 09:06

## 2019-06-03 RX ADMIN — SEVELAMER CARBONATE 0.8 G: 800 POWDER, FOR SUSPENSION ORAL at 09:06

## 2019-06-03 RX ADMIN — FLUTICASONE FUROATE AND VILANTEROL TRIFENATATE 1 PUFF: 200; 25 POWDER RESPIRATORY (INHALATION) at 09:06

## 2019-06-03 RX ADMIN — SEVELAMER CARBONATE 0.8 G: 800 POWDER, FOR SUSPENSION ORAL at 05:06

## 2019-06-03 RX ADMIN — HYDRALAZINE HYDROCHLORIDE 100 MG: 50 TABLET ORAL at 05:06

## 2019-06-03 RX ADMIN — AMLODIPINE BESYLATE 10 MG: 10 TABLET ORAL at 09:06

## 2019-06-03 RX ADMIN — CARVEDILOL 12.5 MG: 12.5 TABLET, FILM COATED ORAL at 09:06

## 2019-06-03 NOTE — PLAN OF CARE
Problem: SLP Goal  Goal: SLP Goal  Speech Language Pathology Goals  Goals expected to be met by 6/6:  1. Pt will recall 3/3 facts or words given min-mod cues following a 2 minute delay.   2. Pt will complete 4 step sequencing tasks with 70% accuracy given mod cues.  3. Pt will complete functional math/time/money management tasks with 80% accuracy given min-mod cues.  4. Pt will tolerate least restrictive diet without s/s of aspiration.          Outcome: Ongoing (interventions implemented as appropriate)  Cont POC.   BRITTON Novak, CCC-SLP  Speech Language Pathologist  [phone]  6/3/2019

## 2019-06-03 NOTE — PT/OT/SLP PROGRESS
"Physical Therapy  Treatment    Tea Georges   MRN: [id number]   Admitting Diagnosis: Subdural hemorrhage    PT Received On: 06/03/19        Billable Minutes:  Gait Training 14, Therapeutic Activity 15 and Therapeutic Exercise 15    Treatment Type: Treatment  PT/PTA: PTA     PTA Visit Number: 3       General Precautions: Standard, vision impaired, aspiration, fall  Orthopedic Precautions: N/A   Braces: N/A     Subjective:  "still don't feel good" nausea/vomitting/stomach cramps and diarrhea for a week now" robert Rg notified also sent message to NP    Pain/Comfort  Pain Rating 1: (di not rate "cramps")  Location - Orientation 1: generalized  Location 1: abdomen(nausea/diarrhea)  Pain Addressed 1: Pre-medicate for activity, Reposition, Distraction, Cessation of Activity, Nurse notified  Pain Rating Post-Intervention 1: (througout session)    Objective:   Patient found with: oxygen in wc     AM-PAC 6 CLICK MOBILITY  Total Score:18    Transfers:  Sit<>Stand: with Rollator CGA and from rollator pt locked brakes/control descent  Stand Pivot Transfer: CGA no AD WC<>BScommode over toilet    Gait:  Amb with rollator CGA ~ ~60 ft x 2 trials seated rest break on rollator     Therex:  2x10 reps PA,GS,LAQ,hip flex,abd/add    Additional Treatment:  toileting CGA with trfs no AD/grab bar, CGA while pt managed clothes, set up for wilian care and hand hygiene in sitting    Patient left up in chair with all lines intact, call button in reach and belongings in reach.    Assessment:  Tea Georges is a 79 y.o. female with a medical diagnosis of Subdural hemorrhage.  Pt tolerated fairly well, pt remains limited by repts of not feeling well, nsg aware/notified, message also sent to NP, pt would continue to benefit from skilled PT services to improve overall functional mobility, strength and endurance.  .    Rehab identified problem list/impairments: weakness, impaired endurance, impaired functional mobilty, gait instability, impaired " balance    Rehab potential is good.    Activity tolerance: Fair    Discharge recommendations: home with home health     Barriers to discharge: Inaccessible home environment, Decreased caregiver support    Equipment recommendations: tub bench     GOALS:   Multidisciplinary Problems     Physical Therapy Goals        Problem: Physical Therapy Goal    Goal Priority Disciplines Outcome Goal Variances Interventions   Physical Therapy Goal     PT, PT/OT Ongoing (interventions implemented as appropriate)     Description:  Goals to be met by: 2019. 15 days     Patient will increase functional independence with mobility by performin. Supine to sit with Modified [place]  2. Sit to supine with Modified [place]  3. Sit to stand transfer with Supervision  4. Bed to chair transfer with Supervision using Rolling Walker or least restrictive assistive device  5. Gait  x 150 feet with Stand-by Assistance using Rolling Walker.  or least restrictive assistive device  6. Wheelchair propulsion x150 feet with Supervision using bilateral upper extremities/bilateral lower extremities  7. Ascend/descend 4 stair with right Handrails Stand-by Assistance .   8. Ascend/Descend 4 inch curb step with Stand-by Assistance using Rolling Walker. or least restrictive assistive device  9. Stand for 3 minutes with Stand-by Assistance using Rolling Walker or least restrictive assistive device and perform an activity  10. Lower extremity exercise program x20 reps per handout, with assistance as needed and gym therex                      PLAN:    Patient to be seen 5 x/week  to address the above listed problems via therapeutic activities, gait training, therapeutic exercises, wheelchair management/training  Plan of Care expires: 19  Plan of Care reviewed with: patient    Shaila Thomason, PTA  2019

## 2019-06-03 NOTE — PLAN OF CARE
Problem: Physical Therapy Goal  Goal: Physical Therapy Goal  Goals to be met by: 2019. 15 days     Patient will increase functional independence with mobility by performin. Supine to sit with Modified [place]  2. Sit to supine with Modified [place]  3. Sit to stand transfer with Supervision  4. Bed to chair transfer with Supervision using Rolling Walker or least restrictive assistive device  5. Gait  x 150 feet with Stand-by Assistance using Rolling Walker.  or least restrictive assistive device  6. Wheelchair propulsion x150 feet with Supervision using bilateral upper extremities/bilateral lower extremities  7. Ascend/descend 4 stair with right Handrails Stand-by Assistance .   8. Ascend/Descend 4 inch curb step with Stand-by Assistance using Rolling Walker. or least restrictive assistive device  9. Stand for 3 minutes with Stand-by Assistance using Rolling Walker or least restrictive assistive device and perform an activity  10. Lower extremity exercise program x20 reps per handout, with assistance as needed and gym therex     Outcome: Ongoing (interventions implemented as appropriate)  Goals remain appropriate

## 2019-06-03 NOTE — PROGRESS NOTES
[facility]                                  Skilled Nursing Facility                   Progress Note   DOS 6/3/2019  Admit Date: 5/28/2019  GORDY   Principal Problem:  Subdural hemorrhage   HPI obtained from patient interview and chart review     Chief Complaint: Nutritionist reporting the need for dietary adjustment to optimize patient's nutritional needs.     HPI: Ms Georges a 79 y.o. 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 Nsgy decided to utilize Non-surgical treatment options.     Interval Hx: During f/u of care, patient continued to be treated at [facility] with PT and OT to improve functional status and ability to perform ADLs. Nutritionist reporting the need for dietary adjustment to optimize patient's nutritional needs. Continue diabetic diet, decreased calories to 1500, dced cardiac diet and maintain, renal diabetic with 1.5 L fluid restriction, Initiated novasource renal oral supplement BID at breakfast and supper. Will continue to follow dietary recommendation and patient nutritional status.    ROS  Constitutional: Negative for fever and malaise/fatigue.   Eyes: Left eye: Negative for blurred vision and discharge. + Visual disturbance, Rt eye blind.   Respiratory: Negative for cough, shortness of breath and wheezing.    Cardiovascular: Negative for chest pain, palpitations, claudication, and leg swelling.   Gastrointestinal: Negative for abdominal pain, constipation, diarrhea, nausea and vomiting.   Genitourinary: Negative for dysuria, frequency and urgency.   Musculoskeletal:  + generalized weakness. Negative for back pain and myalgias.   Skin: Negative for itching and rash.   Neurological: Negative for dizziness, speech change, seizures, and headaches.   Psychiatric/Behavioral:  Negative for depression. The patient is not nervous/anxious.       PEx  Constitutional: Patient appears well-developed and in no distress   HENT:   Head: Normocephalic and atraumatic.   Eyes: + Right eye blindness and baseline difficulty opening eyelids   Neck: Normal range of motion. Neck supple.   Cardiovascular: Normal rate, regular rhythm and normal heart sounds.    Pulmonary/Chest: Effort normal and breath sounds are clear  Abdominal: Soft. Bowel sounds are normal.   Musculoskeletal: Normal range of motion. + generalized weakness  Neurological: Alert and oriented to person, place, and time.   Skin: Skin is warm and dry.   Psychiatric: Normal mood and affect. Behavior is normal.     Temp:  [97.8 °F (36.6 °C)-98 °F (36.7 °C)]   Pulse:  [57-62]   Resp:  [17-18]   BP: (144-165)/(67-92)   SpO2:  [96 %-98 %]   Body mass index is 19.29 kg/m².       Past Medical History: Patient has a past medical history of Anemia in ESRD (end-stage renal disease) (5/29/2016), Anticoagulant long-term use, Aortic atherosclerosis (11/22/2016), Aortic stenosis, moderate (2/18/2016), Asthma in adult without complication (1/8/2016), Bilateral low back pain without sciatica (11/17/2015), Blindness of right eye (11/12/2016), CAD (coronary artery disease) (12/12/2016), Cataract, Central retinal vein occlusion, right eye (6/3/2014), CHF (congestive heart failure), Chronic diastolic heart failure (1/8/2016), Chronic respiratory failure with hypoxia (5/29/2016), COPD (chronic obstructive pulmonary disease) (1/15/2017), Dependence on hemodialysis, Diverticulosis, Embolic stroke involving right middle cerebral artery, Encounter for blood transfusion, Enlarged LA (left atrium) (10/7/2016), Epiretinal membrane (7/17/2012), ESRD (end stage renal disease), Essential hypertension (1/8/2016), History of GI diverticular bleed, Left flaccid hemiparesis (10/1/2016), Peripheral vascular disease, unspecified (11/22/2016), Stroke due to embolism of right  middle cerebral artery (11/13/2016), Type 2 diabetes mellitus with kidney complication, without long-term current use of insulin (5/1/2018), Type 2 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, without long-term current use of insulin (3/26/2013), 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    Past Surgical History: Patient has a past surgical history that includes Cataract extraction; Cholecystectomy; Colonoscopy (N/A, 5/23/2016); Colonoscopy (N/A, 5/30/2016); Upper gastrointestinal endoscopy; Breast surgery; Fistulogram (Left, 11/28/2018); Revision of arteriovenous fistula (Left, 11/29/2018); Resection of aneurysm (Left, 11/29/2018); Placement of dual-lumen vascular catheter (Right, 11/29/2018); Abdominal surgery; Eye surgery; and Cardiac surgery.    Social History: Patient reports that she has never smoked. She has never used smokeless tobacco. She reports that she does not drink alcohol or use drugs.    Family History: family history includes Cancer in her sister; Cataracts in her mother; Esophageal cancer in her sister; Glaucoma in her brother and maternal aunt; Heart disease in her brother; Heart failure in her sister; Hypertension in her brother, father, mother, and sister; No Known Problems in her maternal grandfather, maternal grandmother, maternal uncle, paternal aunt, paternal grandfather, paternal grandmother, and paternal uncle; Stroke in her mother.      Allergies: Patient has No Known Allergies.    Recent Labs   Lab 05/28/19 0454 05/30/19 0518   WBC 4.47 5.76   HGB 9.4* 8.8*   HCT 31.2* 30.4*   * 98*     Recent Labs   Lab 05/28/19 0454 05/30/19 0518   * 139   K 5.1 3.5   CL 98 100   CO2 17* 31*   BUN 69* 12   CREATININE 8.0* 2.5*   GLU 96 84   CALCIUM 9.5 9.5   MG 2.3 1.8   PHOS 7.4* 4.0     Recent  Labs   Lab 05/28/19  0454   ALKPHOS 73   ALT <5*   AST 8*   ALBUMIN 3.0*   PROT 6.4   BILITOT 0.4      Recent Labs   Lab 06/01/19  2102 06/02/19  0717 06/02/19  1124 06/02/19  1619 06/02/19 2058 06/03/19  0652   POCTGLUCOSE 166* 121* 118* 140* 107 121*       Assessment and Plan:  Moderate malnutrition   -6/03 Continue diabetic diet, decreased calories to 1500, dced cardiac diet and maintain, renal diabetic with 1.5 L fluid restriction, Initiated novasource renal oral supplement BID at breakfast and supper. Will continue to follow dietary recommendation and patient nutritional status.    CONTINUED    Thrombocytopenia  -5/30 platelets at 98, trending biweekly.     ESRD (end stage renal disease)  -Continue HD MWF  -5/29 Continue epoetin shon-epbx injection 2,000 Units 3x per wk  -5/29 continue sevelamer carbonate pwpk 0.8 g  -5/30 Creatinine 2.5, continue dialysis 3 times a week. Pharmacist reporting patient receiving Procrit at dialysis treatments, discontinue Procrit.    Subdural hematoma  -Bilateral acute SDH, R>L with right to left midline shift on plavix and reversed with platelets. No surgical intervention per Nsgy.  -5/28 CTH revealed Multicompartmental intracranial hemorrhage appears grossly stable from prior exam of 05/23/2019. No new infarct or hemorrhage. No neurosurgical intervention was warranted.   -Follow up with Nsgy in 2 wks with repeat CTH  5/29 Holding aspirin    Debility  -Continue with PT/OT for gait training and strengthening and restoration of ADL's   -Encourage mobility, OOB in chair, and early ambulation as appropriate  -Fall precautions   -Monitor for bowel and bladder dysfunction  -Monitor for and prevent skin breakdown and pressure ulcers  -5/29 Initiated DVT prophylaxis with teds and scds     Essential hypertension  -Continuecarvedilol 12.5 mg bid  -5/29 Continue Hydralazine 100 mg tid  -5/29 Initiated amlodipine 10 mg daily     Pulmonary emphysema  -5/29 Initiated fluticasone  furoate-vilanterol 200-25 mcg/dose diskus inhaler 1 puff daily and initiated albuterol inhaler 2 puff q6h prn    Type 2 diabetes mellitus with chronic kidney disease on chronic dialysis, without long-term current use of insulin  -A1C 5.4 Continue SSI  -5/29 Initiated Diet diabetic [facility]; 2000 Calorie; Cardiac (Low Na/Chol), Renal; Fluid - 1500mL; Thin     (HFpEF) heart failure with preserved ejection fraction  -Continue even fluid balance, rate control, afterload reduction  -5/29 Initiated Diet diabetic [facility]; 2000 Calorie; Cardiac (Low Na/Chol), Renal; Fluid - 1500mL; Thin     Severe aortic stenosis  ECHO 5/22 reviewed on 5/29  · Low normal left ventricular systolic function. The estimated ejection fraction is 53%  · Local segmental wall motion abnormalities.  · Concentric left ventricular remodeling.  · Grade II (moderate) left ventricular diastolic dysfunction consistent with pseudonormalization.  · Severe left atrial enlargement.  · Elevated left atrial pressure.  · Moderate right ventricular enlargement.  · Normal right ventricular systolic function.  · Mild right atrial enlargement.  · Severe aortic valve stenosis.  · Aortic valve area is 0.69 cm2; peak velocity is 4.58 m/s; mean gradient is 55.02 mmHg.  · Moderate mitral sclerosis.  · Mild-to-moderate mitral regurgitation.  · Moderate to severe tricuspid regurgitation.  · Mild pulmonic regurgitation.  · Elevated central venous pressure (15 mm Hg).  · The estimated PA systolic pressure is 77 mm Hg  -resume home carvedilol 12.5 mg bid for rate control and afterload reduction    Constipation  -5/29 continue polyethylene glycol packet 17 g daily and senna-docusate 8.6-50 mg 1 tablet b.i.d.    Right-sided cerebrovascular accident (CVA) s/p thrombectiomy(2016)  -per patient, she returned to baseline and was able to ambulate on her own, perform ADLs  -Stable     Blindness of right eye  -complication of uncontrolled DM  -Continue artificial  tears 0.5 % ophthalmic solution 2 drop qid prn  -Stable      Future Appointments   Date Time Provider Department Center   6/12/2019  1:30 PM Marcelino Flores MD [facility] NEUROS7 Carroll Becker NP

## 2019-06-03 NOTE — PLAN OF CARE
Problem: Occupational Therapy Goal  Goal: Occupational Therapy Goal  Goals to be met by: 6/12/19    Patient will increase functional independence with ADLs by performing:    Feeding with Modified [place].  UE Dressing with Modified [place] seated in w/c.  LE Dressing (briefs) with Stand by Assistance.  Grooming while seated with Modified [place]. - Met  Toileting from toilet with Stand by Assistance for hygiene and clothing management.   Bathing from shower chair/bench with Stand-by Assistance.  Supine to sit with Modified [place].  Stand pivot transfers with Supervision.  Toilet transfer to toilet with Supervision/ Stand By Assistance.  Upper extremity exercise program x15 reps per handout, with independence to build strength and endurance in B UE to improve functional independence with ADLs and IADLs.      Outcome: Ongoing (interventions implemented as appropriate)  Patient goals are appropriate.

## 2019-06-03 NOTE — PLAN OF CARE
Problem: Fall Injury Risk  Goal: Absence of Fall and Fall-Related Injury    Intervention: Identify and Manage Contributors to Fall Injury Risk     06/03/19 1747   Manage Acute Allergic Reaction   Medication Review/Management medications reviewed   Identify and Manage Contributors to Fall Injury Risk   Self-Care Promotion BADL personal objects within reach;BADL personal routines maintained;safe use of adaptive equipment encouraged;meal setup provided

## 2019-06-03 NOTE — PT/OT/SLP PROGRESS
"Speech Language Pathology  Treatment    Tea Georges   MRN: [id number]   Admitting Diagnosis: Subdural hemorrhage    Diet recommendations: Solid Diet Level: Chopped meat, Regular  Liquid Diet Level: Thin 1 bite/sip at a time, Alternating bites/sips, Feed only when awake/alert, Frequent oral care, HOB to 90 degrees, Monitor for s/s of aspiration, Small bites/sips and Standard aspiration precautions    SLP Treatment Date: 06/03/19  Speech Start Time: 0906     Speech Stop Time: 0932     Speech Total (min): 26 min       TREATMENT BILLABLE MINUTES:  Speech Therapy Individual 26    Has the patient been evaluated by SLP for swallowing? : Yes  Keep patient NPO?: No   General Precautions: Standard, aspiration, fall, vision impaired  Current Respiratory Status: nasal cannula       Subjective:  "Ever since I fell." pt reporting persistent nausea since incurring fall.  She stated nausea medication gives her relief, but nausea returns when medication wears off. Will notify providers.          Objective:   Patient found with: oxygen    Pt completed a delayed memory task recalling 3/3 unrelated words after a 2 minute delay without assistance.  SLP guided pt with using compensatory memory strategies upon presentation, but no other assistance was provided for recall.  Pt recalled largest double digit number in fo4 with 100% acc.  She completed mental manipulation/word progression tasks with 83% accuracy ind'ly/100% given cues.  Pt solved math word problems related to time and medication management with 80% accuracy ind'ly/100% given cues.  Pt discussed methods for managing her medications at home and stated granddaughters will assist her with managing medications as needed upon return to home.  Pt compared and contrasted 2 given items with 75% accuracy ind'ly/100% given cues.     Assessment:  Tea Georges is a 79 y.o. female with a medical diagnosis of Subdural hemorrhage and presents with mild cognitive impairment.     Discharge " recommendations: Discharge Facility/Level of Care Needs: (tbd)     Goals:   Multidisciplinary Problems     SLP Goals        Problem: SLP Goal    Goal Priority Disciplines Outcome   SLP Goal     SLP Ongoing (interventions implemented as appropriate)   Description:  Speech Language Pathology Goals  Goals expected to be met by 6/6:  1. Pt will recall 3/3 facts or words given min-mod cues following a 2 minute delay.   2. Pt will complete 4 step sequencing tasks with 70% accuracy given mod cues.  3. Pt will complete functional math/time/money management tasks with 80% accuracy given min-mod cues.  4. Pt will tolerate least restrictive diet without s/s of aspiration.                            Plan:   Patient to be seen Therapy Frequency: 3 x/week  Planned Interventions: Cognitive-Linguistic Therapy, Dysphagia Therapy  Plan of Care expires: 06/30/19  Plan of Care reviewed with: patient  SLP Follow-up?: Yes              BRITTON Novak, CCC-SLP  06/03/2019     BRITTON Novak, CCC-SLP  Speech Language Pathologist  [phone]  6/3/2019

## 2019-06-03 NOTE — PT/OT/SLP PROGRESS
"Occupational Therapy  Treatment    Tea Georges   MRN: [id number]   Admitting Diagnosis: Subdural hemorrhage    OT Date of Treatment: 06/03/19       Billable Minutes:  Self Care/Home Management 30    General Precautions: Standard, vision impaired  Orthopedic Precautions: N/A  Braces: N/A    Spiritual, Cultural Beliefs, Mandaen Practices, Values that Affect Care: no    Subjective:  Communicated with patient prior to session.  "I feel sick."  "I've had nausea all night."     Pain/Comfort  Pain Rating 1: 0/10    Objective:  Patient found with: oxygen    Occupational Performance:    Bed Mobility:    · Patient completed Supine to Sit with supervision  · Patient completed Sit to Supine with supervision     Functional Mobility/Transfers:  · Patient completed Sit <> Stand Transfer with contact guard assistance  with  rolling walker   · Patient completed Toilet Transfer bed <> toilet ambulation technique with contact guard assistance with  rolling walker and grab bars  · Functional Mobility: Patient performed functional mobility from bed <> toilet using rolling walker with CGA.     Activities of Daily Living:  · Grooming: set up assistance performing oral hygiene seated EOB  · Upper Body Dressing: set up assistance doffing gown and donning pull over top seated EOB  · Lower Body Dressing: minimum assistance to fidel pull up brief and pants over feet seated EOB and over hips while standing   · Toileting: stand by assistance     [facility] 6 Click:  AMPA Total Score: 20    Patient left supine withcall button in reach    ASSESSMENT:  Tea Georges is a 79 y.o. female with a medical diagnosis of Subdural hemorrhage and presents with the deficits listed below. Patient was not feeling well today and vomited x 1 during session. Nurse notified. Pt. deferred continuing therapy session to rehab gym. Will continue to benefit from OT services.    Rehab identified problem list/impairments: weakness, impaired endurance, impaired self care " skills, impaired functional mobilty, impaired balance, visual deficits, decreased upper extremity function, impaired fine motor    Rehab potential is good    Activity tolerance: Fair    Discharge recommendations: home with home health     Barriers to discharge:       Equipment recommendations: tub bench, commode     GOALS:   Multidisciplinary Problems     Occupational Therapy Goals        Problem: Occupational Therapy Goal    Goal Priority Disciplines Outcome Interventions   Occupational Therapy Goal     OT, PT/OT Ongoing (interventions implemented as appropriate)    Description:  Goals to be met by: 6/12/19    Patient will increase functional independence with ADLs by performing:    Feeding with Modified [place].  UE Dressing with Modified [place] seated in w/c.  LE Dressing (briefs) with Stand by Assistance.  Grooming while seated with Modified [place]. - Met  Toileting from toilet with Stand by Assistance for hygiene and clothing management.   Bathing from shower chair/bench with Stand-by Assistance.  Supine to sit with Modified [place].  Stand pivot transfers with Supervision.  Toilet transfer to toilet with Supervision/ Stand By Assistance.  Upper extremity exercise program x15 reps per handout, with independence to build strength and endurance in B UE to improve functional independence with ADLs and IADLs.                       Plan:  Patient to be seen 6 x/week to address the above listed problems via self-care/home management, therapeutic activities, therapeutic exercises  Plan of Care expires: 06/29/19  Plan of Care reviewed with: patient    EDVIN Donis  06/03/2019

## 2019-06-04 ENCOUNTER — PATIENT OUTREACH (OUTPATIENT)
Dept: HOME HEALTH SERVICES | Facility: HOSPITAL | Age: 79
End: 2019-06-04

## 2019-06-04 LAB
ANION GAP SERPL CALC-SCNC: 9 MMOL/L (ref 8–16)
BASOPHILS # BLD AUTO: 0.04 K/UL (ref 0–0.2)
BASOPHILS NFR BLD: 0.6 % (ref 0–1.9)
BUN SERPL-MCNC: 13 MG/DL (ref 8–23)
CALCIUM SERPL-MCNC: 9.7 MG/DL (ref 8.7–10.5)
CHLORIDE SERPL-SCNC: 100 MMOL/L (ref 95–110)
CO2 SERPL-SCNC: 28 MMOL/L (ref 23–29)
CREAT SERPL-MCNC: 2.7 MG/DL (ref 0.5–1.4)
DIFFERENTIAL METHOD: ABNORMAL
EOSINOPHIL # BLD AUTO: 0.1 K/UL (ref 0–0.5)
EOSINOPHIL NFR BLD: 0.9 % (ref 0–8)
ERYTHROCYTE [DISTWIDTH] IN BLOOD BY AUTOMATED COUNT: 16.3 % (ref 11.5–14.5)
EST. GFR  (AFRICAN AMERICAN): 18.9 ML/MIN/1.73 M^2
EST. GFR  (NON AFRICAN AMERICAN): 16.4 ML/MIN/1.73 M^2
GLUCOSE SERPL-MCNC: 80 MG/DL (ref 70–110)
HCT VFR BLD AUTO: 30.3 % (ref 37–48.5)
HGB BLD-MCNC: 9 G/DL (ref 12–16)
IMM GRANULOCYTES # BLD AUTO: 0.03 K/UL (ref 0–0.04)
IMM GRANULOCYTES NFR BLD AUTO: 0.5 % (ref 0–0.5)
LYMPHOCYTES # BLD AUTO: 0.5 K/UL (ref 1–4.8)
LYMPHOCYTES NFR BLD: 8.1 % (ref 18–48)
MAGNESIUM SERPL-MCNC: 2.2 MG/DL (ref 1.6–2.6)
MCH RBC QN AUTO: 28.6 PG (ref 27–31)
MCHC RBC AUTO-ENTMCNC: 29.7 G/DL (ref 32–36)
MCV RBC AUTO: 96 FL (ref 82–98)
MONOCYTES # BLD AUTO: 0.8 K/UL (ref 0.3–1)
MONOCYTES NFR BLD: 12.3 % (ref 4–15)
NEUTROPHILS # BLD AUTO: 5 K/UL (ref 1.8–7.7)
NEUTROPHILS NFR BLD: 77.6 % (ref 38–73)
NRBC BLD-RTO: 0 /100 WBC
PHOSPHATE SERPL-MCNC: 2.4 MG/DL (ref 2.7–4.5)
PLATELET # BLD AUTO: 83 K/UL (ref 150–350)
PMV BLD AUTO: 13.2 FL (ref 9.2–12.9)
POCT GLUCOSE: 100 MG/DL (ref 70–110)
POCT GLUCOSE: 104 MG/DL (ref 70–110)
POCT GLUCOSE: 144 MG/DL (ref 70–110)
POCT GLUCOSE: 92 MG/DL (ref 70–110)
POTASSIUM SERPL-SCNC: 3.4 MMOL/L (ref 3.5–5.1)
RBC # BLD AUTO: 3.15 M/UL (ref 4–5.4)
SODIUM SERPL-SCNC: 137 MMOL/L (ref 136–145)
WBC # BLD AUTO: 6.4 K/UL (ref 3.9–12.7)

## 2019-06-04 PROCEDURE — 92507 TX SP LANG VOICE COMM INDIV: CPT

## 2019-06-04 PROCEDURE — 97110 THERAPEUTIC EXERCISES: CPT

## 2019-06-04 PROCEDURE — 83735 ASSAY OF MAGNESIUM: CPT

## 2019-06-04 PROCEDURE — 85025 COMPLETE CBC W/AUTO DIFF WBC: CPT

## 2019-06-04 PROCEDURE — 27000221 HC OXYGEN, UP TO 24 HOURS

## 2019-06-04 PROCEDURE — 25000003 PHARM REV CODE 250: Performed by: HOSPITALIST

## 2019-06-04 PROCEDURE — 94761 N-INVAS EAR/PLS OXIMETRY MLT: CPT

## 2019-06-04 PROCEDURE — 36415 COLL VENOUS BLD VENIPUNCTURE: CPT

## 2019-06-04 PROCEDURE — 97530 THERAPEUTIC ACTIVITIES: CPT

## 2019-06-04 PROCEDURE — 80048 BASIC METABOLIC PNL TOTAL CA: CPT

## 2019-06-04 PROCEDURE — 84100 ASSAY OF PHOSPHORUS: CPT

## 2019-06-04 PROCEDURE — 25000003 PHARM REV CODE 250: Performed by: NURSE PRACTITIONER

## 2019-06-04 PROCEDURE — 11000004 HC SNF PRIVATE

## 2019-06-04 PROCEDURE — 97116 GAIT TRAINING THERAPY: CPT

## 2019-06-04 PROCEDURE — 97535 SELF CARE MNGMENT TRAINING: CPT

## 2019-06-04 RX ORDER — POTASSIUM CHLORIDE 20 MEQ/1
20 TABLET, EXTENDED RELEASE ORAL ONCE
Status: COMPLETED | OUTPATIENT
Start: 2019-06-04 | End: 2019-06-04

## 2019-06-04 RX ADMIN — AMLODIPINE BESYLATE 10 MG: 10 TABLET ORAL at 08:06

## 2019-06-04 RX ADMIN — CARVEDILOL 12.5 MG: 12.5 TABLET, FILM COATED ORAL at 10:06

## 2019-06-04 RX ADMIN — SEVELAMER CARBONATE 0.8 G: 800 POWDER, FOR SUSPENSION ORAL at 08:06

## 2019-06-04 RX ADMIN — FLUTICASONE FUROATE AND VILANTEROL TRIFENATATE 1 PUFF: 200; 25 POWDER RESPIRATORY (INHALATION) at 08:06

## 2019-06-04 RX ADMIN — HYDRALAZINE HYDROCHLORIDE 100 MG: 50 TABLET ORAL at 05:06

## 2019-06-04 RX ADMIN — HYDRALAZINE HYDROCHLORIDE 100 MG: 50 TABLET ORAL at 02:06

## 2019-06-04 RX ADMIN — SEVELAMER CARBONATE 0.8 G: 800 POWDER, FOR SUSPENSION ORAL at 04:06

## 2019-06-04 RX ADMIN — HYDRALAZINE HYDROCHLORIDE 100 MG: 50 TABLET ORAL at 10:06

## 2019-06-04 RX ADMIN — CARVEDILOL 12.5 MG: 12.5 TABLET, FILM COATED ORAL at 08:06

## 2019-06-04 RX ADMIN — POTASSIUM CHLORIDE 20 MEQ: 1500 TABLET, EXTENDED RELEASE ORAL at 12:06

## 2019-06-04 RX ADMIN — SEVELAMER CARBONATE 0.8 G: 800 POWDER, FOR SUSPENSION ORAL at 12:06

## 2019-06-04 NOTE — PT/OT/SLP PROGRESS
"Speech Language Pathology  Treatment    Tea Georges   MRN: [id number]   Admitting Diagnosis: Subdural hemorrhage    Diet recommendations: Solid Diet Level: Regular, Chopped meat  Liquid Diet Level: Thin 1 bite/sip at a time, Alternating bites/sips, Avoid talking while eating, HOB to 90 degrees, Monitor for s/s of aspiration, Small bites/sips and Standard aspiration precautions    SLP Treatment Date: 06/04/19  Speech Start Time: 1116     Speech Stop Time: 1147     Speech Total (min): 31 min       TREATMENT BILLABLE MINUTES:  Speech Therapy Individual 23 and [place] Care/Home Management Training 8    Has the patient been evaluated by SLP for swallowing? : Yes  Keep patient NPO?: No   General Precautions: Standard, aspiration, fall, vision impaired  Current Respiratory Status: nasal cannula       Subjective:  "Mind ain't good today." "My head is foggy this morning."       Objective:   Patient found with: oxygen    Following a 3 minute delay, pt recalled 2/3 facts given moderate cues (0/3 ind'ly).  Pt recalled facts from paragraphs presented by SLP with 30% accuracy ind'ly/90% given cues.  Pt completed category exclusion tasks recalling the "odd man out" in fo5 with 83% accuracy ind'ly/100% given cues.  Education provided to pt regarding memory strategies, benefits of cognitive stimulation, and possible fluctuations in cognition.  Pt demonstrated understanding, but will benefit from continued reinforcement.     Assessment:  Tea Georges is a 79 y.o. female with a medical diagnosis of Subdural hemorrhage and presents with cognitive-linguistic deficits.     Discharge recommendations: Discharge Facility/Level of Care Needs: home health speech therapy     Goals:   Multidisciplinary Problems     SLP Goals        Problem: SLP Goal    Goal Priority Disciplines Outcome   SLP Goal     SLP Ongoing (interventions implemented as appropriate)   Description:  Speech Language Pathology Goals  Goals expected to be met by 6/6:  1. Pt will " recall 3/3 facts or words given min-mod cues following a 2 minute delay.   2. Pt will complete 4 step sequencing tasks with 70% accuracy given mod cues.  3. Pt will complete functional math/time/money management tasks with 80% accuracy given min-mod cues.  4. Pt will tolerate least restrictive diet without s/s of aspiration.                            Plan:   Patient to be seen Therapy Frequency: 3 x/week  Planned Interventions: Cognitive-Linguistic Therapy, Dysphagia Therapy  Plan of Care expires: 06/30/19  Plan of Care reviewed with: patient  SLP Follow-up?: Yes              BRITTON Novak, CCC-SLP  06/04/2019     BRITTON Novak, CCC-SLP  Speech Language Pathologist  [phone]  6/4/2019

## 2019-06-04 NOTE — PROGRESS NOTES
[facility]                                  Skilled Nursing Facility                   Progress Note   DOS 6/4/2019  Admit Date: 5/28/2019  GORDY   Principal Problem:  Subdural hemorrhage   HPI obtained from patient interview and chart review     Chief Complaint: Revaluation of medical treatment and therapy status: Lab review    HPI: Ms Georges a 79 y.o. 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 Nsgy decided to utilize Non-surgical treatment options.     Interval Hx: During f/u of care, patient continued to be treated at [facility] with PT and OT to improve functional status and ability to perform ADLs. Lab review revealed platelets at 83, stable, trending biweekly. K at 3.4, initiated K 20 meq x 1. Cr at 2.7, continue Dialysis MWF. Review of constipation revealed Bowel movement on 6/03, stable.     ROS  Constitutional: Negative for fever and malaise/fatigue.   Eyes: Left eye: Negative for blurred vision and discharge. + Visual disturbance, Rt eye blind.   Respiratory: Negative for cough, shortness of breath and wheezing.    Cardiovascular: Negative for chest pain, palpitations, claudication, and leg swelling.   Gastrointestinal: Negative for abdominal pain, constipation, diarrhea, nausea and vomiting.   Genitourinary: Negative for dysuria, frequency and urgency.   Musculoskeletal:  + generalized weakness. Negative for back pain and myalgias.   Skin: Negative for itching and rash.   Neurological: Negative for dizziness, speech change, seizures, and headaches.   Psychiatric/Behavioral: Negative for depression. The patient is not nervous/anxious.       PEx  Constitutional: Patient appears well-developed and in no distress   HENT:   Head: Normocephalic and atraumatic.   Eyes: + Right eye  blindness and baseline difficulty opening eyelids   Neck: Normal range of motion. Neck supple.   Cardiovascular: Normal rate, regular rhythm and normal heart sounds.    Pulmonary/Chest: Effort normal and breath sounds are clear  Abdominal: Soft. Bowel sounds are normal.   Musculoskeletal: Normal range of motion. + generalized weakness  Neurological: Alert and oriented to person, place, and time.   Skin: Skin is warm and dry.   Psychiatric: Normal mood and affect. Behavior is normal.     Temp:  [98.1 °F (36.7 °C)-98.3 °F (36.8 °C)]   Pulse:  [57-67]   Resp:  [18]   BP: (145-163)/(65-85)   SpO2:  [94 %-100 %]   Body mass index is 18.32 kg/m².       Past Medical History: Patient has a past medical history of Anemia in ESRD (end-stage renal disease) (5/29/2016), Anticoagulant long-term use, Aortic atherosclerosis (11/22/2016), Aortic stenosis, moderate (2/18/2016), Asthma in adult without complication (1/8/2016), Bilateral low back pain without sciatica (11/17/2015), Blindness of right eye (11/12/2016), CAD (coronary artery disease) (12/12/2016), Cataract, Central retinal vein occlusion, right eye (6/3/2014), CHF (congestive heart failure), Chronic diastolic heart failure (1/8/2016), Chronic respiratory failure with hypoxia (5/29/2016), COPD (chronic obstructive pulmonary disease) (1/15/2017), Dependence on hemodialysis, Diverticulosis, Embolic stroke involving right middle cerebral artery, Encounter for blood transfusion, Enlarged LA (left atrium) (10/7/2016), Epiretinal membrane (7/17/2012), ESRD (end stage renal disease), Essential hypertension (1/8/2016), History of GI diverticular bleed, Left flaccid hemiparesis (10/1/2016), Peripheral vascular disease, unspecified (11/22/2016), 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 (11/13/2016), Type 2 diabetes mellitus with kidney complication, without long-term current use of insulin (5/1/2018), Type 2 diabetes mellitus 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    Past Surgical History: Patient has a past surgical history that includes Cataract extraction; Cholecystectomy; Colonoscopy (N/A, 5/23/2016); Colonoscopy (N/A, 5/30/2016); Upper gastrointestinal endoscopy; Breast surgery; Fistulogram (Left, 11/28/2018); Revision of arteriovenous fistula (Left, 11/29/2018); Resection of aneurysm (Left, 11/29/2018); Placement of dual-lumen vascular catheter (Right, 11/29/2018); Abdominal surgery; Eye surgery; and Cardiac surgery.    Social History: Patient reports that she has never smoked. She has never used smokeless tobacco. She reports that she does not drink alcohol or use drugs.    Family History: family history includes Cancer in her sister; Cataracts in her mother; Esophageal cancer in her sister; Glaucoma in her brother and maternal aunt; Heart disease in her brother; Heart failure in her sister; Hypertension in her brother, father, mother, and sister; No Known Problems in her maternal grandfather, maternal grandmother, maternal uncle, paternal aunt, paternal grandfather, paternal grandmother, and paternal uncle; Stroke in her mother.      Allergies: Patient has No Known Allergies.    Recent Labs   Lab 05/30/19 0518 06/04/19  0533   WBC 5.76 6.40   HGB 8.8* 9.0*   HCT 30.4* 30.3*   PLT 98* 83*     Recent Labs   Lab 05/30/19 0518 06/04/19  0533    137   K 3.5 3.4*    100   CO2 31* 28   BUN 12 13   CREATININE 2.5* 2.7*   GLU 84 80   CALCIUM 9.5 9.7   MG 1.8 2.2   PHOS 4.0 2.4*     No results for input(s): ALKPHOS, ALT, AST, ALBUMIN, PROT, BILITOT, INR in the last 168 hours.   Recent Labs   Lab 06/02/19 2058 06/03/19 0652 06/03/19 1739 06/03/19 2041 06/04/19  0648 06/04/19  1100    POCTGLUCOSE 107 121* 87 119* 92 100       Assessment and Plan:  Hypokalemia  -6/04 K at 3.4, initiated K 20 meq x 1.     Thrombocytopenia  -5/30 platelets at 98, trending biweekly.  -6/04 platelets at 83, stable, trending biweekly.    ESRD (end stage renal disease)  -Continue HD MWF  -5/29 Continue epoetin shon-epbx injection 2,000 Units 3x per wk  -5/29 continue sevelamer carbonate pwpk 0.8 g  -5/30 Creatinine 2.5, continue dialysis 3 times a week. Pharmacist reporting patient receiving Procrit at dialysis treatments, discontinue Procrit.  -6/04 Cr at 2.7, continue Dialysis MWF.     Constipation  -5/29 continue polyethylene glycol packet 17 g daily and senna-docusate 8.6-50 mg 1 tablet b.i.d.  -6/04 stable    CONTINUED    Moderate malnutrition   -6/03 Continue diabetic diet, decreased calories to 1500, dced cardiac diet and maintain, renal diabetic with 1.5 L fluid restriction, Initiated novasource renal oral supplement BID at breakfast and supper. Will continue to follow dietary recommendation and patient nutritional status.    Subdural hematoma  -Bilateral acute SDH, R>L with right to left midline shift on plavix and reversed with platelets. No surgical intervention per Nsgy.  -5/28 CTH revealed Multicompartmental intracranial hemorrhage appears grossly stable from prior exam of 05/23/2019. No new infarct or hemorrhage. No neurosurgical intervention was warranted.   -Follow up with Nsgy in 2 wks with repeat CTH  5/29 Holding aspirin    Debility  -Continue with PT/OT for gait training and strengthening and restoration of ADL's   -Encourage mobility, OOB in chair, and early ambulation as appropriate  -Fall precautions   -Monitor for bowel and bladder dysfunction  -Monitor for and prevent skin breakdown and pressure ulcers  -5/29 Initiated DVT prophylaxis with teds and scds     Essential hypertension  -Continuecarvedilol 12.5 mg bid  -5/29 Continue Hydralazine 100 mg tid  -5/29 Initiated amlodipine 10 mg  daily     Pulmonary emphysema  -5/29 Initiated fluticasone furoate-vilanterol 200-25 mcg/dose diskus inhaler 1 puff daily and initiated albuterol inhaler 2 puff q6h prn    Type 2 diabetes mellitus with chronic kidney disease on chronic dialysis, without long-term current use of insulin  -A1C 5.4 Continue SSI  -5/29 Initiated Diet diabetic [facility]; 2000 Calorie; Cardiac (Low Na/Chol), Renal; Fluid - 1500mL; Thin     (HFpEF) heart failure with preserved ejection fraction  -Continue even fluid balance, rate control, afterload reduction  -5/29 Initiated Diet diabetic [facility]; 2000 Calorie; Cardiac (Low Na/Chol), Renal; Fluid - 1500mL; Thin     Severe aortic stenosis  ECHO 5/22 reviewed on 5/29  · Low normal left ventricular systolic function. The estimated ejection fraction is 53%  · Local segmental wall motion abnormalities.  · Concentric left ventricular remodeling.  · Grade II (moderate) left ventricular diastolic dysfunction consistent with pseudonormalization.  · Severe left atrial enlargement.  · Elevated left atrial pressure.  · Moderate right ventricular enlargement.  · Normal right ventricular systolic function.  · Mild right atrial enlargement.  · Severe aortic valve stenosis.  · Aortic valve area is 0.69 cm2; peak velocity is 4.58 m/s; mean gradient is 55.02 mmHg.  · Moderate mitral sclerosis.  · Mild-to-moderate mitral regurgitation.  · Moderate to severe tricuspid regurgitation.  · Mild pulmonic regurgitation.  · Elevated central venous pressure (15 mm Hg).  · The estimated PA systolic pressure is 77 mm Hg  -resume home carvedilol 12.5 mg bid for rate control and afterload reduction     Right-sided cerebrovascular accident (CVA) s/p thrombectiomy(2016)  -per patient, she returned to baseline and was able to ambulate on her own, perform ADLs  -Stable     Blindness of right eye  -complication of uncontrolled DM  -Continue artificial tears 0.5 % ophthalmic solution 2 drop qid  prn  -Stable      Future Appointments   Date Time Provider Department Center   6/12/2019  1:30 PM Marcelino Flores MD [facility]

## 2019-06-04 NOTE — PLAN OF CARE
Problem: Fall Injury Risk  Goal: Absence of Fall and Fall-Related Injury    Intervention: Identify and Manage Contributors to Fall Injury Risk     06/04/19 1738   Manage Acute Allergic Reaction   Medication Review/Management medications reviewed;high risk medications identified   Identify and Manage Contributors to Fall Injury Risk   Self-Care Promotion BADL personal objects within reach;independence encouraged;meal setup provided;safe use of adaptive equipment encouraged

## 2019-06-04 NOTE — PLAN OF CARE
Problem: Physical Therapy Goal  Goal: Physical Therapy Goal  Goals to be met by: 2019. 15 days     Patient will increase functional independence with mobility by performin. Supine to sit with Modified [place] = met 2019  2. Sit to supine with Modified [place]= met 2019  3. Sit to stand transfer with Supervision  4. Bed to chair transfer with Supervision using Rolling Walker or least restrictive assistive device  5. Gait  x 150 feet with Stand-by Assistance using Rolling Walker.  or least restrictive assistive device = met 2019 RW  6. Wheelchair propulsion x150 feet with Supervision using bilateral upper extremities/bilateral lower extremities  7. Ascend/descend 4 stair with right Handrails Stand-by Assistance .   8. Ascend/Descend 4 inch curb step with Stand-by Assistance using Rolling Walker. or least restrictive assistive device  9. Stand for 3 minutes with Stand-by Assistance using Rolling Walker or least restrictive assistive device and perform an activity  10. Lower extremity exercise program x20 reps per handout, with assistance as needed and gym therex     Goals remain appropriate. Continue with Physical therapy Plan of Care. Windy De La Cruz, PT 2019

## 2019-06-04 NOTE — PLAN OF CARE
Problem: Occupational Therapy Goal  Goal: Occupational Therapy Goal  Goals to be met by: 6/12/19    Patient will increase functional independence with ADLs by performing:    Feeding with Modified [place].  UE Dressing with Modified [place] seated in w/c.  LE Dressing (briefs) with Stand by Assistance.  Grooming while seated with Modified [place]. - Met  Toileting from toilet with Stand by Assistance for hygiene and clothing management.   Bathing from shower chair/bench with Stand-by Assistance.  Supine to sit with Modified [place].  Stand pivot transfers with Supervision.  Toilet transfer to toilet with Supervision/ Stand By Assistance.  Upper extremity exercise program x15 reps per handout, with independence to build strength and endurance in B UE to improve functional independence with ADLs and IADLs.      Outcome: Ongoing (interventions implemented as appropriate)  Patient goals are appropriate.

## 2019-06-04 NOTE — PROGRESS NOTES
"[facility] - Skilled Nursing Care  Adult Nutrition  Progress Note    SUMMARY   Recommendations    Recommendation/Intervention: Continue diabetic renal diet 1.5 L fluid, Recommend change to boost breeze at supper and novasource renal at breakfast only as patient not currently taking novasource renal she requested.  Goals: PO to meet 75% of EEN to include ONS by next RD visit  Nutrition Goal Status: goal not met      Reason for Assessment    Reason For Assessment: RD follow-up  Diagnosis: (SDH, Debility)  Relevant Medical History: COPD, ESRD with dialysis, CVA, CHF, DM2, anemia, PVD, blind R eye, HTN, diverticulosis  Interdisciplinary Rounds: attended  General Information Comments: PO 25-50%(pt has both nova source renal at bedside, did not take)  Nutrition Discharge Planning: DC on renal diet    Nutrition Risk Screen    Nutrition Risk Screen: no indicators present, tube feeding or parenteral nutrition    Nutrition/Diet History    Patient Reported Diet/Restrictions/Preferences: renal  Food Preferences: chopped meats , drinking too many fluids based on all the cups and bottles at bedside, pt would not state or did not know her fluid restriction which is 1500 ml per day  Spiritual, Cultural Beliefs, Spiritism Practices, Values that Affect Care: no  Food Allergies: NKFA  Factors Affecting Nutritional Intake: decreased appetite    Anthropometrics    Temp: 97.8 °F (36.6 °C)  Height: 5' 3" (160 cm)  Height (inches): 63 in  Weight Method: Standard Scale  Weight: 49.4 kg (108 lb 14.5 oz)  Weight (lb): 108.91 lb  Ideal Body Weight (IBW), Female: 115 lb  % Ideal Body Weight, Female (lb): 86.84 lb  BMI (Calculated): 17.7  BMI Grade: 17 - 18.4 protein-energy malnutrition grade I  Weight Loss: unintentional  Usual Body Weight (UBW), k kg  % Usual Body Weight: 89.01  % Weight Change From Usual Weight: -11.18 %       Lab/Procedures/Meds    Pertinent Labs Reviewed: reviewed  Pertinent Labs Comments: Cr 2.5, glucose 121  Pertinent " Medications Reviewed: reviewed  Pertinent Medications Comments: ondansetron PRN           Estimated/Assessed Needs    Weight Used For Calorie Calculations: 45.3 kg (99 lb 13.9 oz)  Energy Calorie Requirements (kcal): 1134  Energy Need Method: [place]-St Jeor  Protein Requirements: 64g  Weight Used For Protein Calculations: 53 kg (116 lb 13.5 oz)(IBW(geriatric) x 1.2g/kg)  Fluid Requirements (mL): per MD 1500     RDA Method (mL): 1134  CHO Requirement: 50% of calories      Nutrition Prescription Ordered    Current Diet Order: diabetic 1500 , cardiac, 1500 ml fluid restriction,   Nutrition Order Comments: PO 25%  Oral Nutrition Supplement: novasource renal bid    Evaluation of Received Nutrient/Fluid Intake    Energy Calories Required: not meeting needs  Protein Required: not meeting needs  Fluid Required: meeting needs  Comments: nauseated  Tolerance: not tolerating  % Intake of Estimated Energy Needs: 25 - 50 %  % Meal Intake: 25 - 50 %    Nutrition Risk    Level of Risk/Frequency of Follow-up: high     Assessment and Plan  Moderate Malnutrition in the context of chronic illness     Related to (etiology):  Variable appetite, weakness     Signs and Symptoms (as evidenced by):  Energy Intake: <75% of estimated energy requirement for > 3 months  Body Fat Depletion: mild depletion of orbitals and triceps   Muscle Mass Depletion: mild depletion of temples, clavicle region, scapular region, interosseous muscle and lower extremities   Weight Loss: 10% x 6 months  Fluid Accumulation: mild    Ongoing  Monitor and Evaluation    Food and Nutrient Intake: food and beverage intake  Food and Nutrient Adminstration: diet order  Knowledge/Beliefs/Attitudes: food and nutrition knowledge/skill  Physical Activity and Function: nutrition-related ADLs and IADLs  Biochemical Data, Medical Tests and Procedures: electrolyte and renal panel, inflammatory profile, gastrointestinal profile  Nutrition-Focused Physical Findings: overall  appearance     Malnutrition Assessment       5/301/19  Malnutrition Type: chronic illness  Energy Intake: moderate energy intake  Neck/Chest (Micronutrient): muscle wasting, subcutaneous fat loss  Musculoskeletal/Lower Extremities: subcutaneous fat loss       Weight Loss (Malnutrition): 10% in 6 months(may also represent fluid fluctuation)  Energy Intake (Malnutrition): less than 75% for greater than or equal to 3 months  Subcutaneous Fat (Malnutrition): mild depletion  Muscle Mass (Malnutrition): mild depletion   Orbital Region (Subcutaneous Fat Loss): mild depletion  Upper Arm Region (Subcutaneous Fat Loss): mild depletion   [place] Region (Muscle Loss): mild depletion  Clavicle Bone Region (Muscle Loss): mild depletion  Clavicle and Acromion Bone Region (Muscle Loss): mild depletion  Dorsal Hand (Muscle Loss): mild depletion  Patellar Region (Muscle Loss): mild depletion  Anterior Thigh Region (Muscle Loss): mild depletion  Posterior Calf Region (Muscle Loss): mild depletion                 Nutrition Follow-Up    RD Follow-up?: Yes

## 2019-06-04 NOTE — PLAN OF CARE
Problem: Adult Inpatient Plan of Care  Goal: Plan of Care Review  Outcome: Ongoing (interventions implemented as appropriate)     06/04/19 0158   Plan of Care Review   Plan of Care Reviewed With patient       Problem: Fall Injury Risk  Goal: Absence of Fall and Fall-Related Injury  Outcome: Ongoing (interventions implemented as appropriate)  Intervention: Identify and Manage Contributors to Fall Injury Risk     06/04/19 0158   Manage Acute Allergic Reaction   Medication Review/Management medications reviewed   Identify and Manage Contributors to Fall Injury Risk   Self-Care Promotion BADL personal routines maintained;BADL personal objects within reach;safe use of adaptive equipment encouraged

## 2019-06-04 NOTE — PT/OT/SLP PROGRESS
Physical Therapy  Treatment    Tea Georges   MRN: [id number]   Admitting Diagnosis: Subdural hemorrhage    PT Received On: 06/04/19          Billable Minutes:  Gait Training 15, Therapeutic Activity 10 and Therapeutic Exercise 20=45    Treatment Type: Treatment  PT/PTA: PT     PTA Visit Number: 0       General Precautions: Standard, vision impaired, aspiration, fall  Orthopedic Precautions: N/A   Braces: N/A         Subjective:  Communicated with patient prior to session.  Agreeable to session. Reports feels much better today.    Pain/Comfort  Pain Rating 1: 0/10  Location - Side 1: Right  Location 1: knee  Pain Addressed 1: Cessation of Activity(intermittent w/ exercises)  Pain Rating Post-Intervention 1: 0/10    Objective:  Patient found seated in w/c with Patient found with: oxygen     AM-PAC 6 CLICK MOBILITY  Total Score:20    Bed Mobility:  Sit>Supine:on mat w/ mod(I) w/ wedge/pillows  Supine>Sit: on mat w/ mod(I0    Transfers:  Sit<>Stand: to/from w/c, mat w/ RW and SBA  Stand Pivot Transfer: to/from mat w/ RW and SBA      Gait:  Amb w/ RW and , 75, 47 feet. Slow pace, oxygen in tow;    Advanced Gait:  Stairs: up/down 4 steps w/ BHR   Curb Step: not performed    Wheelchair Mobility:  Patient pushed in w/c     Therex:  Quad sets,   Glute sets,   Ankle  Pumps,   hip abduction/adduction,  heelslides,   SAQ,   LAQ   Hip flexion  x20 reps w/ assist as needed  Patient performed 12 minutes on recumbent stepper with bilateral arms and legs on workload 2      Additional Treatment:  Patient educated on gait and trf technique, PT POC    Patient left up in chair with call button in reach.    Assessment:  Tea Georges is a 79 y.o. female with a medical diagnosis of Subdural hemorrhage.  Patient reports feeling much better today and performed well w/ all mobilty. Patient will benefit from continued physical therapy to address deficits and improve safety and functional mobility. Continue with physical therapy plan of  care. .    Rehab identified problem list/impairments: weakness, impaired endurance, impaired functional mobilty, gait instability, impaired balance    Rehab potential is good.    Activity tolerance: Good    Discharge recommendations: home with home health     Barriers to discharge: Inaccessible home environment, Decreased caregiver support    Equipment recommendations: tub bench     GOALS:   Multidisciplinary Problems     Physical Therapy Goals        Problem: Physical Therapy Goal    Goal Priority Disciplines Outcome Goal Variances Interventions   Physical Therapy Goal     PT, PT/OT Ongoing (interventions implemented as appropriate)     Description:  Goals to be met by: 2019. 15 days     Patient will increase functional independence with mobility by performin. Supine to sit with Modified [place] = met 2019  2. Sit to supine with Modified [place]= met 2019  3. Sit to stand transfer with Supervision  4. Bed to chair transfer with Supervision using Rolling Walker or least restrictive assistive device  5. Gait  x 150 feet with Stand-by Assistance using Rolling Walker.  or least restrictive assistive device = met 2019 RW  6. Wheelchair propulsion x150 feet with Supervision using bilateral upper extremities/bilateral lower extremities  7. Ascend/descend 4 stair with right Handrails Stand-by Assistance .   8. Ascend/Descend 4 inch curb step with Stand-by Assistance using Rolling Walker. or least restrictive assistive device  9. Stand for 3 minutes with Stand-by Assistance using Rolling Walker or least restrictive assistive device and perform an activity  10. Lower extremity exercise program x20 reps per handout, with assistance as needed and gym therex                       PLAN:    Patient to be seen 5 x/week  to address the above listed problems via therapeutic activities, gait training, therapeutic exercises, wheelchair management/training  Plan of Care expires: 19  Plan of  Care reviewed with: patient    Windy De La Cruz, PT  06/04/2019

## 2019-06-04 NOTE — PT/OT/SLP PROGRESS
Occupational Therapy  Treatment    Tea Georges   MRN: [id number]   Admitting Diagnosis: Subdural hemorrhage    OT Date of Treatment: 06/04/19       Billable Minutes:  Therapeutic Activity 17 and Therapeutic Exercise 23    General Precautions: Standard, vision impaired  Orthopedic Precautions: N/A  Braces: N/A    Spiritual, Cultural Beliefs, Church Practices, Values that Affect Care: no    Subjective:  Communicated with patient prior to session.    Pain/Comfort  Pain Rating 1: 8/10  Location - Side 1: Bilateral  Location - Orientation 1: lower  Location 1: back  Pain Addressed 1: Reposition, Distraction  Pain Rating Post-Intervention 1: 0/10  Pain Rating 2: 9/10  Location - Side 2: Bilateral  Location - Orientation 2: generalized  Location 2: hip  Pain Addressed 2: Reposition, Distraction, Cessation of Activity  Pain Rating Post-Intervention 2: 0/10    Objective:  Patient found with: oxygen    Occupational Performance:    Bed Mobility:    · Patient completed Supine to Sit with stand by assistance  · Patient completed Sit to Supine with stand by assistance     Functional Mobility/Transfers:  · Patient completed Sit <> Stand Transfer with stand by assistance  with  no assistive device   · Patient completed Bed <> wheelchair Transfer using Stand Pivot technique with stand by assistance with no assistive device    [facility] 6 Click:  [facility] Total Score: 20    OT Exercises: AROM using 2 lb dowel x 3 sets 10 reps in all available pain free ranges to increase strength and endurance needed for adls.    Additional Treatment:  Patient stood at table top level with SBA and no AD ~5 minutes, ~3 minutes, and ~2 minutes with seated rest breaks between each stand while completing card matching requiring fine motor skills, visual scanning, and crossing midline in preparation of self care.    Patient left supine with call button in reach    ASSESSMENT:  Tea Georges is a 79 y.o. female with a medical diagnosis of Subdural hemorrhage and  presents with the deficits listed below. Patient required several rest breaks throughout session, but tolerated activities well. Will continue to benefit from OT.    Rehab identified problem list/impairments: weakness, impaired endurance, impaired self care skills, impaired functional mobilty, impaired balance, visual deficits, decreased upper extremity function, impaired fine motor    Rehab potential is good    Activity tolerance: Good    Discharge recommendations: home with home health     Barriers to discharge:       Equipment recommendations: tub bench, commode     GOALS:   Multidisciplinary Problems     Occupational Therapy Goals        Problem: Occupational Therapy Goal    Goal Priority Disciplines Outcome Interventions   Occupational Therapy Goal     OT, PT/OT Ongoing (interventions implemented as appropriate)    Description:  Goals to be met by: 6/12/19    Patient will increase functional independence with ADLs by performing:    Feeding with Modified [place].  UE Dressing with Modified [place] seated in w/c.  LE Dressing (briefs) with Stand by Assistance.  Grooming while seated with Modified [place]. - Met  Toileting from toilet with Stand by Assistance for hygiene and clothing management.   Bathing from shower chair/bench with Stand-by Assistance.  Supine to sit with Modified [place].  Stand pivot transfers with Supervision.  Toilet transfer to toilet with Supervision/ Stand By Assistance.  Upper extremity exercise program x15 reps per handout, with independence to build strength and endurance in B UE to improve functional independence with ADLs and IADLs.                       Plan:  Patient to be seen 6 x/week to address the above listed problems via self-care/home management, therapeutic activities, therapeutic exercises  Plan of Care expires: 06/29/19  Plan of Care reviewed with: patient    Christina EDVIN Ni  06/04/2019

## 2019-06-04 NOTE — TELEPHONE ENCOUNTER
Clinical status:  Requires  a. Injections  b. IV  c. Nebulizers, 02 evaluation sats  d. Enteral feedings new or recent change  e. Care of new colostomy or teaching  f. Frequent suctioning, trach and /or vent needs  g. Frequent irrigation, replacement of urinary cath, complex suprapubic  h. Treatment stg 3/ or 4 pressure ulcers, complex wound care  i. Nursing evaluation due to unstable and complex medical condition  j. Nursing rehab teaching e.g. bowel and bladder training, adaptive care    PT/OT/ST    *Transfer mobility (eg, from bed to chair)   Independent (patient can perform an activity safely and independently without any devices, physical assistance, or supervision)  Modified independent (patient requires the use of an assistive device or extra time to complete an activity, but is otherwise independent)  X Contact guard assistance (patient can perform an activity independently, but needs constant physical touch to steady or guide to ensure safe performance) with Rollator  X Supervision (patient can perform an activity, but supervision is provided to address safety concerns)  Minimum assistance (patient can perform most of an activity (ie, approximately 75%), but requires limited assistance from one person for safe completion)  Moderate assistance (patient can perform about half the effort of an activity, but requires extensive assistance from one or more persons for safe performance of the other half)  Maximum assistance (patient has difficulty performing an activity, but can offer some assistance (eg, approximately 25% of effort) and is dependent on one or more people to assist for safe completion)  Dependent or unable (patient is unable to perform an activity or depends on 2 or more people to complete it)    *Ambulation inside (eg, within room, hallways, to toilet) _60__Ft x 2 attempts  Independent  Modified independent  X Contact guard assistance with Rollator and seated rest breaks  Supervision  Minimum  assistance  Moderate assistance  Maximum assistance  Dependent or unable    *Ambulation outside (eg, getting in or out of buildings, walking on uneven surfaces) ______Ft  Independent  Modified independent  Contact guard assistance  Supervision  Minimum assistance  Moderate assistance  Maximum assistance  X Dependent or unable    *Toileting self-management:   Independent  Modified independent  X Contact guard assistance SBA  Supervision  Minimum assistance  Moderate assistance  Maximum assistance  Dependent or unable    *Nourishment self-management (ie, ability to feed self)   Independent  X Modified independent  Supervision  Minimum assistance  Moderate assistance  Maximum assistance  Dependent or unable    *Personal care self-management (eg, dressing, bathing, brushing teeth, washing hair)   Independent  Modified independent  X Contact guard assistance managing clothes, set up for wilian care  Supervision  Minimum assistance  Moderate assistance  Maximum assistance  Dependent or unable    Medication support (ie, preparing/taking all prescribed and over-the-counter medications reliably and safely, including correct dosage at correct times)   X Not applicable  Independent  Modified independent  Supervision  Minimum assistance  Moderate assistance  Maximum assistance    ADL adaptive devices self-management (ie, ability to manage putting on and/or removing braces, splints, and other adaptive devices)   X Not applicable  Independent  Modified independent  Supervision  Minimum assistance  Moderate assistance  Maximum assistance  Dependent or unable          For Extensions  - Unanticipated functional problems impacting safe completion of therapy  - Multiple comorbidities or frailty impairing functional improvement  - Cognitive impairment requiring additional intervention and education  - Communication impairment requiring additional intervention and education  - Assessment or care unmanageable at lower level of  care  - Expect brief to moderate stay extension.        Barriers to progress:  Weakness, Impaired endurance, impaired functional mobility, gait instability, impaired balance    New treatments:  Cont with POC       Projected length of stay/ expected discharge:  15 days (06/14/2019)/ 06/11/2019    Discharge Disposition:  Home with home health      Recommendations:  Cont with POC until discharge

## 2019-06-04 NOTE — PLAN OF CARE
Problem: Physical Therapy Goal  Goal: Physical Therapy Goal  Goals to be met by: 2019. 15 days     Patient will increase functional independence with mobility by performin. Supine to sit with Modified [place] = met 2019  2. Sit to supine with Modified [place]= met 2019  3. Sit to stand transfer with Supervision  4. Bed to chair transfer with Supervision using Rolling Walker or least restrictive assistive device  5. Gait  x 150 feet with Stand-by Assistance using Rolling Walker.  or least restrictive assistive device =not met  6. Wheelchair propulsion x150 feet with Supervision using bilateral upper extremities/bilateral lower extremities  7. Ascend/descend 4 stair with right Handrails Stand-by Assistance .   8. Ascend/Descend 4 inch curb step with Stand-by Assistance using Rolling Walker. or least restrictive assistive device  9. Stand for 3 minutes with Stand-by Assistance using Rolling Walker or least restrictive assistive device and perform an activity  10. Lower extremity exercise program x20 reps per handout, with assistance as needed and gym therex      Goals remain appropriate. Continue with Physical therapy Plan of Care. Windy De La Cruz, PT 2019

## 2019-06-05 LAB
POCT GLUCOSE: 118 MG/DL (ref 70–110)
POCT GLUCOSE: 122 MG/DL (ref 70–110)
POCT GLUCOSE: 85 MG/DL (ref 70–110)

## 2019-06-05 PROCEDURE — 11000004 HC SNF PRIVATE

## 2019-06-05 PROCEDURE — 97110 THERAPEUTIC EXERCISES: CPT

## 2019-06-05 PROCEDURE — 25000003 PHARM REV CODE 250: Performed by: HOSPITALIST

## 2019-06-05 PROCEDURE — 97116 GAIT TRAINING THERAPY: CPT

## 2019-06-05 PROCEDURE — 94761 N-INVAS EAR/PLS OXIMETRY MLT: CPT

## 2019-06-05 PROCEDURE — 25000003 PHARM REV CODE 250: Performed by: NURSE PRACTITIONER

## 2019-06-05 PROCEDURE — 27000221 HC OXYGEN, UP TO 24 HOURS

## 2019-06-05 PROCEDURE — 97535 SELF CARE MNGMENT TRAINING: CPT

## 2019-06-05 PROCEDURE — 97530 THERAPEUTIC ACTIVITIES: CPT

## 2019-06-05 RX ADMIN — AMLODIPINE BESYLATE 10 MG: 10 TABLET ORAL at 08:06

## 2019-06-05 RX ADMIN — FLUTICASONE FUROATE AND VILANTEROL TRIFENATATE 1 PUFF: 200; 25 POWDER RESPIRATORY (INHALATION) at 08:06

## 2019-06-05 RX ADMIN — SEVELAMER CARBONATE 0.8 G: 800 POWDER, FOR SUSPENSION ORAL at 08:06

## 2019-06-05 RX ADMIN — CARVEDILOL 12.5 MG: 12.5 TABLET, FILM COATED ORAL at 10:06

## 2019-06-05 RX ADMIN — SEVELAMER CARBONATE 0.8 G: 800 POWDER, FOR SUSPENSION ORAL at 05:06

## 2019-06-05 RX ADMIN — HYDRALAZINE HYDROCHLORIDE 100 MG: 50 TABLET ORAL at 06:06

## 2019-06-05 RX ADMIN — CARVEDILOL 12.5 MG: 12.5 TABLET, FILM COATED ORAL at 08:06

## 2019-06-05 RX ADMIN — SENNOSIDES AND DOCUSATE SODIUM 1 TABLET: 8.6; 5 TABLET ORAL at 10:06

## 2019-06-05 RX ADMIN — HYDRALAZINE HYDROCHLORIDE 100 MG: 50 TABLET ORAL at 10:06

## 2019-06-05 NOTE — PT/OT/SLP PROGRESS
Physical Therapy  Treatment    Tea Georges   MRN: [id number]   Admitting Diagnosis: Subdural hemorrhage    PT Received On: 06/05/19        Billable Minutes:  Gait Training 10, Therapeutic Activity 13, Therapeutic Exercise 22 and Total Time 45    Treatment Type: Treatment  PT/PTA: PT     PTA Visit Number: 0       General Precautions: Standard, vision impaired, aspiration, fall  Orthopedic Precautions: N/A   Braces: N/A    Subjective:  Communicated with patient prior to session.  Pt agreeable to session.     Pain/Comfort  Pain Rating 1: 8/10  Location - Side 1: Bilateral  Location - Orientation 1: generalized  Location 1: abdomen  Pain Addressed 1: Distraction  Pain Rating Post-Intervention 1: 8/10    Objective:  Patient found sitting in w/c. Patient found with: oxygen     [facility] 6 CLICK MOBILITY  Total Score:20    Bed Mobility:  Not performed    Transfers:  Sit<>Stand: to/from w/c (3 trials) w/ RW and SBA  Stand Pivot Transfer: w/c<>nustep w/ RW and SBA  Cueing to take tissues out of hands prior to transfers    Gait:  Amb 120ft w/ RW and SBA for safety, limited by fatigue  FFP  Swing through gait pattern     Advanced Gait:  Stairs: asc/lotus 5 steps w/ BUE on RHR and CGA/close SBA for safety  Cueing/demo for technique/sequencing    Therex:  Seated BLE therex 2x15 reps (HF, LAQ, AP)    Balance:  Standing balloon tap x3min w/ RW and SBA for safety  Mild instability noted but no LOB  Limited in duration by fatigue    Additional Treatment:  Recumbent cross , Level 3, x10min to inc overall strength/endurance    Patient left up in chair with all lines intact and call button in reach.    Assessment:  Tea Georges is a 79 y.o. female with a medical diagnosis of Subdural hemorrhage.  Pt keena session well w/ good participation. She was able to complete stair training using only 1 HR today to prepare for D/C home. She was also able to meet standing balance goal. She is progressing well but will continue PT POC in order to  improve strength, endurance, and balance.    Rehab identified problem list/impairments: weakness, impaired endurance, impaired functional mobilty, gait instability, impaired balance    Rehab potential is good.    Activity tolerance: Good    Discharge recommendations: home with home health     Barriers to discharge: Inaccessible home environment, Decreased caregiver support    Equipment recommendations: tub bench     GOALS:   Multidisciplinary Problems     Physical Therapy Goals        Problem: Physical Therapy Goal    Goal Priority Disciplines Outcome Goal Variances Interventions   Physical Therapy Goal     PT, PT/OT Ongoing (interventions implemented as appropriate)     Description:  Goals to be met by: 2019    Patient will increase functional independence with mobility by performin. Supine to sit with Modified [place] = met 2019  2. Sit to supine with Modified [place]= met 2019  3. Sit to stand transfer with Supervision  4. Bed to chair transfer with Supervision using Rolling Walker or least restrictive assistive device  5. Gait  x 150 feet with Stand-by Assistance using Rolling Walker.  or least restrictive assistive device =not met  6. Wheelchair propulsion x150 feet with Supervision using bilateral upper extremities/bilateral lower extremities  7. Ascend/descend 4 stair with right Handrails Stand-by Assistance .   8. Ascend/Descend 4 inch curb step with Stand-by Assistance using Rolling Walker. or least restrictive assistive device  9. Stand for 3 minutes with Stand-by Assistance using Rolling Walker or least restrictive assistive device and perform an activity- MET 2019  10. Lower extremity exercise program x20 reps per handout, with assistance as needed and gym therex                         PLAN:    Patient to be seen 5 x/week  to address the above listed problems via therapeutic activities, gait training, therapeutic exercises, wheelchair management/training  Plan of  Care expires: 06/28/19  Plan of Care reviewed with: patient    Lorelei PRATER David, PT  06/05/2019

## 2019-06-05 NOTE — PROGRESS NOTES
Patient back from dialysis, aaox4, ndn, respirations unlabored, afebrile. Supper served. Lt arm dialysis fistula covered with small white gauze clean dry and intact. Good bruitt present. Call light in reach. Pt instructed to call prn, verbalized understanding. Call light in reach.

## 2019-06-05 NOTE — PLAN OF CARE
Problem: Occupational Therapy Goal  Goal: Occupational Therapy Goal  Goals to be met by: 6/12/19    Patient will increase functional independence with ADLs by performing:    Feeding with Modified [place].  UE Dressing with Modified [place] seated in w/c.  LE Dressing (briefs) with Stand by Assistance.  Grooming while seated with Modified [place]. - Met  Toileting from toilet with Stand by Assistance for hygiene and clothing management.   Bathing from shower chair/bench with Stand-by Assistance.  Supine to sit with Modified [place].  Stand pivot transfers with Supervision.  Toilet transfer to toilet with Supervision/ Stand By Assistance.  Upper extremity exercise program x15 reps per handout, with independence to build strength and endurance in B UE to improve functional independence with ADLs and IADLs.      Outcome: Ongoing (interventions implemented as appropriate)  Patient goals are appropriate.

## 2019-06-05 NOTE — PLAN OF CARE
Problem: Physical Therapy Goal  Goal: Physical Therapy Goal  Goals to be met by: 2019    Patient will increase functional independence with mobility by performin. Supine to sit with Modified [place] = met 2019  2. Sit to supine with Modified [place]= met 2019  3. Sit to stand transfer with Supervision  4. Bed to chair transfer with Supervision using Rolling Walker or least restrictive assistive device  5. Gait  x 150 feet with Stand-by Assistance using Rolling Walker.  or least restrictive assistive device =not met  6. Wheelchair propulsion x150 feet with Supervision using bilateral upper extremities/bilateral lower extremities  7. Ascend/descend 4 stair with right Handrails Stand-by Assistance .   8. Ascend/Descend 4 inch curb step with Stand-by Assistance using Rolling Walker. or least restrictive assistive device  9. Stand for 3 minutes with Stand-by Assistance using Rolling Walker or least restrictive assistive device and perform an activity- MET 2019  10. Lower extremity exercise program x20 reps per handout, with assistance as needed and gym therex       Outcome: Ongoing (interventions implemented as appropriate)  LTGs remain appropriate. Pt will continue PT POC.    Lorelei Hernandez PT  2019

## 2019-06-05 NOTE — PT/OT/SLP PROGRESS
Occupational Therapy  Treatment    Tea Georges   MRN: [id number]   Admitting Diagnosis: Subdural hemorrhage    OT Date of Treatment: 06/05/19       Billable Minutes:  Self Care/Home Management 45    General Precautions: Standard, vision impaired  Orthopedic Precautions: N/A  Braces: N/A    Spiritual, Cultural Beliefs, Orthodoxy Practices, Values that Affect Care: no    Subjective:  Communicated with patient prior to session.    Pain/Comfort  Pain Rating 1: 8/10  Location - Side 1: Left  Location - Orientation 1: generalized  Location 1: (stomach (nausea) )  Pain Addressed 1: Distraction  Pain Rating Post-Intervention 1: 0/10    Objective:  Patient found with: oxygen    Occupational Performance:    Bed Mobility:    · Patient completed Supine to Sit with stand by assistance     Functional Mobility/Transfers:  · Patient completed Sit <> Stand Transfer with stand by assistance  with  rolling walker  · Patient completed Bed <> wheelchair Transfer using ambulation technique with stand by assistance with rolling walker  · Patient completed Toilet Transfer w/c <> toilet Stand Pivot technique with stand by assistance with  grab bars  · Functional Mobility: Patient performed functional mobility in room using rolling walker with SBA/CGA.     Activities of Daily Living:  · Feeding:  Set up asssitance  · Grooming: set up assistance seated in w/c at sink level performing oral hygiene and washing face  · Bathing: stand by assistance standing at sink level with no AD to bathe bottom  · Upper Body Dressing: set up assistance fidel/doff pull over top seated EOB  · Lower Body Dressing: minimum assistance donning/doffing brief and pants over feet seated in w/c and over hips standing at rolling walker  · Toileting: supervision     [facility] 6 Click:  [facility] Total Score: 20    Patient left seated EOB with call button in reach    ASSESSMENT:  Tea Georges is a 79 y.o. female with a medical diagnosis of Subdural hemorrhage and presents with the  deficits listed below. Patient noted to be SOB quickly, but tolerated session well. Patient deferred bathing entire body (only bathed face and wilian area). Will continue to benefit from OT services to increase independence with adls.     Rehab identified problem list/impairments: weakness, impaired endurance, impaired self care skills, impaired functional mobilty, impaired balance, visual deficits, decreased upper extremity function, impaired fine motor    Rehab potential is good    Activity tolerance: Good    Discharge recommendations: home with home health     Barriers to discharge:       Equipment recommendations: tub bench, commode     GOALS:   Multidisciplinary Problems     Occupational Therapy Goals        Problem: Occupational Therapy Goal    Goal Priority Disciplines Outcome Interventions   Occupational Therapy Goal     OT, PT/OT Ongoing (interventions implemented as appropriate)    Description:  Goals to be met by: 6/12/19    Patient will increase functional independence with ADLs by performing:    Feeding with Modified [place].  UE Dressing with Modified [place] seated in w/c.  LE Dressing (briefs) with Stand by Assistance.  Grooming while seated with Modified [place]. - Met  Toileting from toilet with Stand by Assistance for hygiene and clothing management.   Bathing from shower chair/bench with Stand-by Assistance.  Supine to sit with Modified [place].  Stand pivot transfers with Supervision.  Toilet transfer to toilet with Supervision/ Stand By Assistance.  Upper extremity exercise program x15 reps per handout, with independence to build strength and endurance in B UE to improve functional independence with ADLs and IADLs.                       Plan:  Patient to be seen 6 x/week to address the above listed problems via self-care/home management, therapeutic activities, therapeutic exercises  Plan of Care expires: 06/29/19  Plan of Care reviewed with: patient    Christina Ni  EDVIN  06/05/2019

## 2019-06-05 NOTE — TREATMENT PLAN
Rehab Services' DME recommendations    Tea Georges  MRN: [id number]    [x] Tub bench Standard (unpadded)     [] Home health PT, OT, Aide and Nurse    Lorelei Hernandez, PT 6/5/2019

## 2019-06-06 LAB
POCT GLUCOSE: 122 MG/DL (ref 70–110)
POCT GLUCOSE: 135 MG/DL (ref 70–110)
POCT GLUCOSE: 181 MG/DL (ref 70–110)
POCT GLUCOSE: 193 MG/DL (ref 70–110)

## 2019-06-06 PROCEDURE — 92526 ORAL FUNCTION THERAPY: CPT

## 2019-06-06 PROCEDURE — 11000004 HC SNF PRIVATE

## 2019-06-06 PROCEDURE — 97803 MED NUTRITION INDIV SUBSEQ: CPT

## 2019-06-06 PROCEDURE — 27000221 HC OXYGEN, UP TO 24 HOURS

## 2019-06-06 PROCEDURE — 92507 TX SP LANG VOICE COMM INDIV: CPT

## 2019-06-06 PROCEDURE — 25000003 PHARM REV CODE 250: Performed by: HOSPITALIST

## 2019-06-06 PROCEDURE — 94761 N-INVAS EAR/PLS OXIMETRY MLT: CPT

## 2019-06-06 PROCEDURE — 25000003 PHARM REV CODE 250: Performed by: NURSE PRACTITIONER

## 2019-06-06 RX ORDER — ONDANSETRON 8 MG/1
8 TABLET, ORALLY DISINTEGRATING ORAL EVERY 6 HOURS
Status: DISCONTINUED | OUTPATIENT
Start: 2019-06-06 | End: 2019-06-12 | Stop reason: HOSPADM

## 2019-06-06 RX ADMIN — HYDRALAZINE HYDROCHLORIDE 100 MG: 50 TABLET ORAL at 02:06

## 2019-06-06 RX ADMIN — ONDANSETRON 8 MG: 8 TABLET, ORALLY DISINTEGRATING ORAL at 08:06

## 2019-06-06 RX ADMIN — ALUMINUM HYDROXIDE, MAGNESIUM HYDROXIDE, AND DIMETHICONE 30 ML: 400; 400; 40 SUSPENSION ORAL at 09:06

## 2019-06-06 RX ADMIN — HYDRALAZINE HYDROCHLORIDE 100 MG: 50 TABLET ORAL at 06:06

## 2019-06-06 RX ADMIN — CARVEDILOL 12.5 MG: 12.5 TABLET, FILM COATED ORAL at 11:06

## 2019-06-06 RX ADMIN — SEVELAMER CARBONATE 0.8 G: 800 POWDER, FOR SUSPENSION ORAL at 05:06

## 2019-06-06 RX ADMIN — ONDANSETRON 8 MG: 8 TABLET, ORALLY DISINTEGRATING ORAL at 05:06

## 2019-06-06 RX ADMIN — CALCIUM CARBONATE (ANTACID) CHEW TAB 500 MG 500 MG: 500 CHEW TAB at 03:06

## 2019-06-06 RX ADMIN — HYDRALAZINE HYDROCHLORIDE 100 MG: 50 TABLET ORAL at 09:06

## 2019-06-06 RX ADMIN — AMLODIPINE BESYLATE 10 MG: 10 TABLET ORAL at 11:06

## 2019-06-06 RX ADMIN — FLUTICASONE FUROATE AND VILANTEROL TRIFENATATE 1 PUFF: 200; 25 POWDER RESPIRATORY (INHALATION) at 11:06

## 2019-06-06 RX ADMIN — CARVEDILOL 12.5 MG: 12.5 TABLET, FILM COATED ORAL at 09:06

## 2019-06-06 RX ADMIN — SEVELAMER CARBONATE 0.8 G: 800 POWDER, FOR SUSPENSION ORAL at 08:06

## 2019-06-06 RX ADMIN — ONDANSETRON 8 MG: 8 TABLET, ORALLY DISINTEGRATING ORAL at 11:06

## 2019-06-06 NOTE — PT/OT/SLP PROGRESS
"Speech Language Pathology  Treatment    Tea Georges   MRN: [id number]   Admitting Diagnosis: Subdural hemorrhage    Diet recommendations: Solid Diet Level: Regular, Chopped meat  Liquid Diet Level: Thin 1 bite/sip at a time, Alternating bites/sips, Assistance with meals, Avoid talking while eating, Frequent oral care, HOB to 90 degrees, Monitor for s/s of aspiration, Remain upright 30 minutes post meal, Small bites/sips and Standard aspiration precautions    SLP Treatment Date: 06/06/19  Speech Start Time: 1026     Speech Stop Time: 1104     Speech Total (min): 38 min       TREATMENT BILLABLE MINUTES:  Speech Therapy Individual 28 and Treatment Swallowing Dysfunction 10    Has the patient been evaluated by SLP for swallowing? : Yes  Keep patient NPO?: No   General Precautions: Standard, aspiration, fall, vision impaired  Current Respiratory Status: nasal cannula       Subjective:  "I feel awful."  "I chew forever when I don't feel good."      Objective:   Patient found with: oxygen    Pt c/o nausea on first attempted. Nurse notified and nausea medication administered.  SLP returned later to re-attempt tx. Pt still c/o feeling sick with stomach cramping. She reported being awake since 10pm last night and feeling nauseated after drinking nutritional supplement at 2am.  Pt reports having 2 bouts of diarrhea since then.  NP and nurse notified.  Despite feeling ill, pt was willing to participate in bedside cognitive therapy at this time. She also agreed to accept saltine crackers in an effort to settle her stomach. Pt tolerated straw sips of water and 2 crackers without s/s of aspiration.  Pt immediately recalled 5 digits in a series (zip codes) with 100% acc. And 7 digits in a series (phone numbers) with 60% accuracy ind'ly/80% given cues.  Following a 2 minute delay, pt recalled 3/3 unrelated words given min cues (2/3 ind'ly).     Assessment:  Tea Georges is a 79 y.o. female with a medical diagnosis of Subdural " hemorrhage and presents with mild cognitive-linguistic impairments.     Discharge recommendations: Discharge Facility/Level of Care Needs: home health speech therapy     Goals:   Multidisciplinary Problems     SLP Goals        Problem: SLP Goal    Goal Priority Disciplines Outcome   SLP Goal     SLP Revised   Description:  Speech Language Pathology Goals  Goals expected to be met by 6/13:  1. Pt will recall 3/3 facts or words given min cues following a 3 minute delay.   2. Pt will complete 4 step sequencing tasks with 70% accuracy given mod cues.  3. Pt will complete functional math/time/money management tasks with 80% accuracy given min-mod cues.  4. Pt will tolerate least restrictive diet without s/s of aspiration.     Goals expected to be met by 6/6:  1. Pt will recall 3/3 facts or words given min-mod cues following a 2 minute delay. Goal met  2. Pt will complete 4 step sequencing tasks with 70% accuracy given mod cues. ongoing  3. Pt will complete functional math/time/money management tasks with 80% accuracy given min-mod cues. ongoing  4. Pt will tolerate least restrictive diet without s/s of aspiration. ongoing                            Plan:   Patient to be seen Therapy Frequency: 3 x/week  Planned Interventions: Cognitive-Linguistic Therapy, Dysphagia Therapy  Plan of Care expires: 06/30/19  Plan of Care reviewed with: patient  SLP Follow-up?: Yes              BRITTON Novak, IRAIS-SLP  06/06/2019     BRITTON Novak, CCC-SLP  Speech Language Pathologist  [phone]  6/6/2019

## 2019-06-06 NOTE — PROGRESS NOTES
[facility]                                  Skilled Nursing Facility                   Progress Note   DOS 6/6/2019  Admit Date: 5/28/2019  GORDY   Principal Problem:  Subdural hemorrhage   HPI obtained from patient interview and chart review     Chief Complaint: Patient reporting nausea overnight and this am since ingesting her nutritional supplement at 10 pm last night    HPI: Ms José Manuel barrera 79 y.o. 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 Nsgy decided to utilize Non-surgical treatment options.     Interval Hx: During f/u of care, patient continued to be treated at [facility] with PT and OT to improve functional status and ability to perform ADLs. Patient reporting nausea overnight and this am since ingesting her nutritional supplement at 10 pm last night. Initiated nutritionist consult to adjust supplement if possible and adjusted Zofran to 8 mg ODT q8h scheduled and instructed the nurse to utilize the prn Mylanta stat.     ROS  Constitutional: Negative for fever and malaise/fatigue.   Eyes: Left eye: Negative for blurred vision and discharge. + Visual disturbance, Rt eye blind.   Respiratory: Negative for cough, shortness of breath and wheezing.    Cardiovascular: Negative for chest pain, palpitations, claudication, and leg swelling.   Gastrointestinal: Negative for abdominal pain, constipation, diarrhea, vomiting + nausea   Genitourinary: Negative for dysuria, frequency and urgency.   Musculoskeletal:  + generalized weakness. Negative for back pain and myalgias.   Skin: Negative for itching and rash.   Neurological: Negative for dizziness, speech change, seizures, and headaches.   Psychiatric/Behavioral: Negative for depression. The patient is not nervous/anxious.        PEx  Constitutional: Patient appears well-developed and in no distress   HENT:   Head: Normocephalic and atraumatic.   Eyes: + Right eye blindness and baseline difficulty opening eyelids   Neck: Normal range of motion. Neck supple.   Cardiovascular: Normal rate, regular rhythm and normal heart sounds.    Pulmonary/Chest: Effort normal and breath sounds are clear  Abdominal: Soft. Bowel sounds are normal.   Musculoskeletal: Normal range of motion. + generalized weakness  Neurological: Alert and oriented to person, place, and time.   Skin: Skin is warm and dry.   Psychiatric: Normal mood and affect. Behavior is normal.     Temp:  [96.8 °F (36 °C)-98.5 °F (36.9 °C)]   Pulse:  [61-67]   Resp:  [18]   BP: (155-173)/(60-73)   SpO2:  [95 %-100 %]   Body mass index is 18.24 kg/m².       Past Medical History: Patient has a past medical history of Anemia in ESRD (end-stage renal disease) (5/29/2016), Anticoagulant long-term use, Aortic atherosclerosis (11/22/2016), Aortic stenosis, moderate (2/18/2016), Asthma in adult without complication (1/8/2016), Bilateral low back pain without sciatica (11/17/2015), Blindness of right eye (11/12/2016), CAD (coronary artery disease) (12/12/2016), Cataract, Central retinal vein occlusion, right eye (6/3/2014), CHF (congestive heart failure), Chronic diastolic heart failure (1/8/2016), Chronic respiratory failure with hypoxia (5/29/2016), COPD (chronic obstructive pulmonary disease) (1/15/2017), Dependence on hemodialysis, Diverticulosis, Embolic stroke involving right middle cerebral artery, Encounter for blood transfusion, Enlarged LA (left atrium) (10/7/2016), Epiretinal membrane (7/17/2012), ESRD (end stage renal disease), Essential hypertension (1/8/2016), History of GI diverticular bleed, Left flaccid hemiparesis (10/1/2016), Peripheral vascular disease, unspecified (11/22/2016), 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 (11/13/2016), Type 2 diabetes mellitus with  kidney complication, without long-term current use of insulin (5/1/2018), Type 2 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, without long-term current use of insulin (3/26/2013), 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    Past Surgical History: Patient has a past surgical history that includes Cataract extraction; Cholecystectomy; Colonoscopy (N/A, 5/23/2016); Colonoscopy (N/A, 5/30/2016); Upper gastrointestinal endoscopy; Breast surgery; Fistulogram (Left, 11/28/2018); Revision of arteriovenous fistula (Left, 11/29/2018); Resection of aneurysm (Left, 11/29/2018); Placement of dual-lumen vascular catheter (Right, 11/29/2018); Abdominal surgery; Eye surgery; and Cardiac surgery.    Social History: Patient reports that she has never smoked. She has never used smokeless tobacco. She reports that she does not drink alcohol or use drugs.    Family History: family history includes Cancer in her sister; Cataracts in her mother; Esophageal cancer in her sister; Glaucoma in her brother and maternal aunt; Heart disease in her brother; Heart failure in her sister; Hypertension in her brother, father, mother, and sister; No Known Problems in her maternal grandfather, maternal grandmother, maternal uncle, paternal aunt, paternal grandfather, paternal grandmother, and paternal uncle; Stroke in her mother.      Allergies: Patient has No Known Allergies.    Recent Labs   Lab 06/04/19  0533   WBC 6.40   HGB 9.0*   HCT 30.3*   PLT 83*     Recent Labs   Lab 06/04/19  0533      K 3.4*      CO2 28   BUN 13   CREATININE 2.7*   GLU 80   CALCIUM 9.7   MG 2.2   PHOS 2.4*     No results for input(s): ALKPHOS, ALT, AST, ALBUMIN, PROT, BILITOT, INR in the last 168 hours.   Recent Labs   Lab 06/04/19  2046 06/05/19  0709 06/05/19  1703  06/05/19 2053 06/06/19 0705 06/06/19  1121   POCTGLUCOSE 104 118* 85 122* 193* 122*       Assessment and Plan:  Nausea  6/06 Initiated nutritionist consult to adjust supplement if possible and adjusted Zofran to 8 mg ODT q8h scheduled and instructed the nurse to utilize the prn Mylanta stat.     Moderate malnutrition   -6/03 Continue diabetic diet, decreased calories to 1500, dced cardiac diet and maintain, renal diabetic with 1.5 L fluid restriction, Initiated novasource renal oral supplement BID at breakfast and supper. Will continue to follow dietary recommendation and patient nutritional status.  -6/06 Initiated nutritionist consult to adjust supplement if possible for patients nausea post ingestion of supplement.    CONTINUED    Hypokalemia  -6/04 K at 3.4, initiated K 20 meq x 1.     Thrombocytopenia  -5/30 platelets at 98, trending biweekly.  -6/04 platelets at 83, stable, trending biweekly.    ESRD (end stage renal disease)  -Continue HD MWF  -5/29 Continue epoetin shon-epbx injection 2,000 Units 3x per wk  -5/29 continue sevelamer carbonate pwpk 0.8 g  -5/30 Creatinine 2.5, continue dialysis 3 times a week. Pharmacist reporting patient receiving Procrit at dialysis treatments, discontinue Procrit.  -6/04 Cr at 2.7, continue Dialysis MWF.     Constipation  -5/29 continue polyethylene glycol packet 17 g daily and senna-docusate 8.6-50 mg 1 tablet b.i.d.  -6/04 stable    Subdural hematoma  -Bilateral acute SDH, R>L with right to left midline shift on plavix and reversed with platelets. No surgical intervention per Nsgy.  -5/28 CTH revealed Multicompartmental intracranial hemorrhage appears grossly stable from prior exam of 05/23/2019. No new infarct or hemorrhage. No neurosurgical intervention was warranted.   -Follow up with Nsgy in 2 wks with repeat CTH  5/29 Holding aspirin    Debility  -Continue with PT/OT for gait training and strengthening and restoration of ADL's   -Encourage mobility, OOB in chair,  and early ambulation as appropriate  -Fall precautions   -Monitor for bowel and bladder dysfunction  -Monitor for and prevent skin breakdown and pressure ulcers  -5/29 Initiated DVT prophylaxis with teds and scds     Essential hypertension  -Continuecarvedilol 12.5 mg bid  -5/29 Continue Hydralazine 100 mg tid  -5/29 Initiated amlodipine 10 mg daily     Pulmonary emphysema  -5/29 Initiated fluticasone furoate-vilanterol 200-25 mcg/dose diskus inhaler 1 puff daily and initiated albuterol inhaler 2 puff q6h prn    Type 2 diabetes mellitus with chronic kidney disease on chronic dialysis, without long-term current use of insulin  -A1C 5.4 Continue SSI  -5/29 Initiated Diet diabetic [facility]; 2000 Calorie; Cardiac (Low Na/Chol), Renal; Fluid - 1500mL; Thin     (HFpEF) heart failure with preserved ejection fraction  -Continue even fluid balance, rate control, afterload reduction  -5/29 Initiated Diet diabetic [facility]; 2000 Calorie; Cardiac (Low Na/Chol), Renal; Fluid - 1500mL; Thin     Severe aortic stenosis  ECHO 5/22 reviewed on 5/29  · Low normal left ventricular systolic function. The estimated ejection fraction is 53%  · Local segmental wall motion abnormalities.  · Concentric left ventricular remodeling.  · Grade II (moderate) left ventricular diastolic dysfunction consistent with pseudonormalization.  · Severe left atrial enlargement.  · Elevated left atrial pressure.  · Moderate right ventricular enlargement.  · Normal right ventricular systolic function.  · Mild right atrial enlargement.  · Severe aortic valve stenosis.  · Aortic valve area is 0.69 cm2; peak velocity is 4.58 m/s; mean gradient is 55.02 mmHg.  · Moderate mitral sclerosis.  · Mild-to-moderate mitral regurgitation.  · Moderate to severe tricuspid regurgitation.  · Mild pulmonic regurgitation.  · Elevated central venous pressure (15 mm Hg).  · The estimated PA systolic pressure is 77 mm Hg  -resume home carvedilol 12.5 mg bid for  rate control and afterload reduction     Right-sided cerebrovascular accident (CVA) s/p thrombectiomy(2016)  -per patient, she returned to baseline and was able to ambulate on her own, perform ADLs  -Stable     Blindness of right eye  -complication of uncontrolled DM  -Continue artificial tears 0.5 % ophthalmic solution 2 drop qid prn  -Stable      Future Appointments   Date Time Provider Department Center   6/12/2019  1:30 PM Marcelino [facility] MD [facility] Carroll Becker NP

## 2019-06-06 NOTE — PROGRESS NOTES
"[facility] [place] [facility]  Adult Nutrition  Progress Note    SUMMARY   Recommendations    Recommendation/Intervention: Continue diabetic , renal diet, fluid restriction,   Goals: PO to meet 75% of EEN to include ONS by next RD visit  Nutrition Goal Status: progressing towards goal  Consult to investigate if ONS was cause for nausea all along.    Reason for Assessment    Reason For Assessment: RD follow-up  Diagnosis: (SDH, Debility)  Relevant Medical History: COPD, ESRD with dialysis, CVA, CHF, DM2, anemia, PVD, blind R eye, HTN, diverticulosis  Interdisciplinary Rounds: attended  General Information Comments: po improving slightly since last note, pt states today that she wants to continue to receive nutrition supplement even though drinking one in the middle of the night last nite, made her nauseated for some time. She has not eaten today as a result. She likes the boost breeze as well as the novasource, but for less simple sugar, it would be best to change her back to novasource bid and cancel the boost breeze  Nutrition Discharge Planning: DC on renal diet    Nutrition/Diet History    Patient Reported Diet/Restrictions/Preferences: renal  Food Preferences: chopped meats , drinking too many fluids based on all the cups and bottles at bedside, pt would not state or did not know her fluid restriction which is 1500 ml per day  Spiritual, Cultural Beliefs, Roman Catholic Practices, Values that Affect Care: no  Food Allergies: NKFA  Factors Affecting Nutritional Intake: decreased appetite    Anthropometrics    Temp: 98.4 °F (36.9 °C)  Height: 5' 3" (160 cm)  Height (inches): 63 in  Weight Method: Standard Scale  Weight: 46.7 kg (102 lb 15.3 oz)  Weight (lb): 102.96 lb  Ideal Body Weight (IBW), Female: 115 lb  % Ideal Body Weight, Female (lb): 86.84 lb  BMI (Calculated): 17.7  BMI Grade: 17 - 18.4 protein-energy malnutrition grade I  Weight Loss: unintentional  Usual Body Weight (UBW), k kg  % Usual Body Weight: " 89.01  % Weight Change From Usual Weight: -11.18 %       Lab/Procedures/Meds    Pertinent Labs Reviewed: reviewed  Pertinent Labs Comments: Cr 2.5, glucose 121  Pertinent Medications Reviewed: reviewed  Pertinent Medications Comments: ondansetron PRN         Estimated/Assessed Needs    Weight Used For Calorie Calculations: 45.3 kg (99 lb 13.9 oz)  Energy Calorie Requirements (kcal): 1134  Energy Need Method: [place]-St Jeor  Protein Requirements: 64g  Weight Used For Protein Calculations: 53 kg (116 lb 13.5 oz)(IBW(geriatric) x 1.2g/kg)  Fluid Requirements (mL): per MD 1500     RDA Method (mL): 1134  CHO Requirement: 50% of calories      Nutrition Prescription Ordered    Current Diet Order: 1500 diabetic, renal, 1500 ml fluid restriction,RD following  Nutrition Order Comments: PO 50-75%  Oral Nutrition Supplement: boost breeze daily, novasource renal daily    Evaluation of Received Nutrient/Fluid Intake    Energy Calories Required: not meeting needs  Protein Required: not meeting needs  Fluid Required: meeting needs  Comments: nauseated  Tolerance: not tolerating  % Intake of Estimated Energy Needs: 50 - 75 %  % Meal Intake: 50 - 75 %    Nutrition Risk    Level of Risk/Frequency of Follow-up: high     Assessment and Plan  Moderate Malnutrition in the context of chronic illness     Related to (etiology):  Variable appetite, weakness     Signs and Symptoms (as evidenced by):  Energy Intake: <75% of estimated energy requirement for > 3 months  Body Fat Depletion: mild depletion of orbitals and triceps   Muscle Mass Depletion: mild depletion of temples, clavicle region, scapular region, interosseous muscle and lower extremities   Weight Loss: 10% x 6 months  Fluid Accumulation: mild     Ongoing    Monitor and Evaluation    Food and Nutrient Intake: food and beverage intake  Food and Nutrient Adminstration: diet order  Knowledge/Beliefs/Attitudes: food and nutrition knowledge/skill  Physical Activity and Function:  nutrition-related ADLs and IADLs  Biochemical Data, Medical Tests and Procedures: electrolyte and renal panel, inflammatory profile, gastrointestinal profile  Nutrition-Focused Physical Findings: overall appearance     Malnutrition Assessment  5/30/19  Malnutrition Type: chronic illness  Energy Intake: moderate energy intake  Neck/Chest (Micronutrient): muscle wasting, subcutaneous fat loss  Musculoskeletal/Lower Extremities: subcutaneous fat loss       Weight Loss (Malnutrition): 10% in 6 months(may also represent fluid fluctuation)  Energy Intake (Malnutrition): less than 75% for greater than or equal to 3 months  Subcutaneous Fat (Malnutrition): mild depletion  Muscle Mass (Malnutrition): mild depletion   Orbital Region (Subcutaneous Fat Loss): mild depletion  Upper Arm Region (Subcutaneous Fat Loss): mild depletion   Muslim Region (Muscle Loss): mild depletion  Clavicle Bone Region (Muscle Loss): mild depletion  Clavicle and Acromion Bone Region (Muscle Loss): mild depletion  Dorsal Hand (Muscle Loss): mild depletion  Patellar Region (Muscle Loss): mild depletion  Anterior Thigh Region (Muscle Loss): mild depletion  Posterior Calf Region (Muscle Loss): mild depletion                 Nutrition Follow-Up    RD Follow-up?: Yes

## 2019-06-06 NOTE — PT/OT/SLP PROGRESS
Occupational Therapy      Patient Name:  Tea Georges   MRN:  [id number]    Patient not seen today secondary to  not feeling well. Nursing advised no therapy today. Will follow-up next session.    EDVIN Donis  6/6/2019

## 2019-06-06 NOTE — PT/OT/SLP PROGRESS
Physical Therapy      Patient Name:  Tea Georges   MRN:  [id number]    Patient not seen today secondary to on hold per nursing due to nausea and inc'd fatigue. NP notified via Epic messenger  . Will follow-up tomorrow per PT POC.    Lorelei Hernandez, PT   6/6/2019

## 2019-06-06 NOTE — PLAN OF CARE
Problem: SLP Goal  Goal: SLP Goal  Speech Language Pathology Goals  Goals expected to be met by 6/13:  1. Pt will recall 3/3 facts or words given min cues following a 3 minute delay.   2. Pt will complete 4 step sequencing tasks with 70% accuracy given mod cues.  3. Pt will complete functional math/time/money management tasks with 80% accuracy given min-mod cues.  4. Pt will tolerate least restrictive diet without s/s of aspiration.     Goals expected to be met by 6/6:  1. Pt will recall 3/3 facts or words given min-mod cues following a 2 minute delay. Goal met  2. Pt will complete 4 step sequencing tasks with 70% accuracy given mod cues. ongoing  3. Pt will complete functional math/time/money management tasks with 80% accuracy given min-mod cues. ongoing  4. Pt will tolerate least restrictive diet without s/s of aspiration. ongoing         Outcome: Revised  Goals reviewed and revised. Cont POC.   BRITTON Novak, CCC-SLP  Speech Language Pathologist  [phone]  6/6/2019

## 2019-06-07 LAB
ANION GAP SERPL CALC-SCNC: 8 MMOL/L (ref 8–16)
BASOPHILS # BLD AUTO: 0.02 K/UL (ref 0–0.2)
BASOPHILS NFR BLD: 0.3 % (ref 0–1.9)
BUN SERPL-MCNC: 22 MG/DL (ref 8–23)
CALCIUM SERPL-MCNC: 11.1 MG/DL (ref 8.7–10.5)
CHLORIDE SERPL-SCNC: 98 MMOL/L (ref 95–110)
CO2 SERPL-SCNC: 29 MMOL/L (ref 23–29)
CREAT SERPL-MCNC: 3.7 MG/DL (ref 0.5–1.4)
DIFFERENTIAL METHOD: ABNORMAL
EOSINOPHIL # BLD AUTO: 0.1 K/UL (ref 0–0.5)
EOSINOPHIL NFR BLD: 1 % (ref 0–8)
ERYTHROCYTE [DISTWIDTH] IN BLOOD BY AUTOMATED COUNT: 16.5 % (ref 11.5–14.5)
EST. GFR  (AFRICAN AMERICAN): 12.9 ML/MIN/1.73 M^2
EST. GFR  (NON AFRICAN AMERICAN): 11.2 ML/MIN/1.73 M^2
GLUCOSE SERPL-MCNC: 106 MG/DL (ref 70–110)
HCT VFR BLD AUTO: 28.7 % (ref 37–48.5)
HGB BLD-MCNC: 8.4 G/DL (ref 12–16)
IMM GRANULOCYTES # BLD AUTO: 0.03 K/UL (ref 0–0.04)
IMM GRANULOCYTES NFR BLD AUTO: 0.4 % (ref 0–0.5)
LYMPHOCYTES # BLD AUTO: 0.5 K/UL (ref 1–4.8)
LYMPHOCYTES NFR BLD: 7.9 % (ref 18–48)
MAGNESIUM SERPL-MCNC: 2.3 MG/DL (ref 1.6–2.6)
MCH RBC QN AUTO: 29.3 PG (ref 27–31)
MCHC RBC AUTO-ENTMCNC: 29.3 G/DL (ref 32–36)
MCV RBC AUTO: 100 FL (ref 82–98)
MONOCYTES # BLD AUTO: 0.9 K/UL (ref 0.3–1)
MONOCYTES NFR BLD: 12.5 % (ref 4–15)
NEUTROPHILS # BLD AUTO: 5.3 K/UL (ref 1.8–7.7)
NEUTROPHILS NFR BLD: 77.9 % (ref 38–73)
NRBC BLD-RTO: 0 /100 WBC
PHOSPHATE SERPL-MCNC: 3 MG/DL (ref 2.7–4.5)
PLATELET # BLD AUTO: 86 K/UL (ref 150–350)
PMV BLD AUTO: 13.4 FL (ref 9.2–12.9)
POCT GLUCOSE: 142 MG/DL (ref 70–110)
POCT GLUCOSE: 160 MG/DL (ref 70–110)
POCT GLUCOSE: 163 MG/DL (ref 70–110)
POCT GLUCOSE: 165 MG/DL (ref 70–110)
POTASSIUM SERPL-SCNC: 3.6 MMOL/L (ref 3.5–5.1)
RBC # BLD AUTO: 2.87 M/UL (ref 4–5.4)
SODIUM SERPL-SCNC: 135 MMOL/L (ref 136–145)
WBC # BLD AUTO: 6.86 K/UL (ref 3.9–12.7)

## 2019-06-07 PROCEDURE — 36415 COLL VENOUS BLD VENIPUNCTURE: CPT

## 2019-06-07 PROCEDURE — 25000003 PHARM REV CODE 250: Performed by: NURSE PRACTITIONER

## 2019-06-07 PROCEDURE — 84100 ASSAY OF PHOSPHORUS: CPT

## 2019-06-07 PROCEDURE — 97530 THERAPEUTIC ACTIVITIES: CPT

## 2019-06-07 PROCEDURE — 27000221 HC OXYGEN, UP TO 24 HOURS

## 2019-06-07 PROCEDURE — 94761 N-INVAS EAR/PLS OXIMETRY MLT: CPT

## 2019-06-07 PROCEDURE — 85025 COMPLETE CBC W/AUTO DIFF WBC: CPT

## 2019-06-07 PROCEDURE — 25000003 PHARM REV CODE 250: Performed by: HOSPITALIST

## 2019-06-07 PROCEDURE — 97535 SELF CARE MNGMENT TRAINING: CPT

## 2019-06-07 PROCEDURE — 97110 THERAPEUTIC EXERCISES: CPT

## 2019-06-07 PROCEDURE — 11000004 HC SNF PRIVATE

## 2019-06-07 PROCEDURE — 97116 GAIT TRAINING THERAPY: CPT

## 2019-06-07 PROCEDURE — 83735 ASSAY OF MAGNESIUM: CPT

## 2019-06-07 PROCEDURE — 80048 BASIC METABOLIC PNL TOTAL CA: CPT

## 2019-06-07 RX ADMIN — SEVELAMER CARBONATE 0.8 G: 800 POWDER, FOR SUSPENSION ORAL at 08:06

## 2019-06-07 RX ADMIN — HYDRALAZINE HYDROCHLORIDE 100 MG: 50 TABLET ORAL at 09:06

## 2019-06-07 RX ADMIN — HYDRALAZINE HYDROCHLORIDE 100 MG: 50 TABLET ORAL at 05:06

## 2019-06-07 RX ADMIN — AMLODIPINE BESYLATE 10 MG: 10 TABLET ORAL at 09:06

## 2019-06-07 RX ADMIN — ONDANSETRON 8 MG: 8 TABLET, ORALLY DISINTEGRATING ORAL at 05:06

## 2019-06-07 RX ADMIN — CARVEDILOL 12.5 MG: 12.5 TABLET, FILM COATED ORAL at 09:06

## 2019-06-07 RX ADMIN — ONDANSETRON 8 MG: 8 TABLET, ORALLY DISINTEGRATING ORAL at 11:06

## 2019-06-07 RX ADMIN — INSULIN ASPART 1 UNITS: 100 INJECTION, SOLUTION INTRAVENOUS; SUBCUTANEOUS at 09:06

## 2019-06-07 RX ADMIN — SEVELAMER CARBONATE 0.8 G: 800 POWDER, FOR SUSPENSION ORAL at 05:06

## 2019-06-07 RX ADMIN — FLUTICASONE FUROATE AND VILANTEROL TRIFENATATE 1 PUFF: 200; 25 POWDER RESPIRATORY (INHALATION) at 09:06

## 2019-06-07 RX ADMIN — SEVELAMER CARBONATE 0.8 G: 800 POWDER, FOR SUSPENSION ORAL at 11:06

## 2019-06-07 NOTE — NURSING
Pt en route to Dialysis, via w/c. Pt accompanied by @ 2 help  Transporter. Will note  Once pt return to facility.

## 2019-06-07 NOTE — PT/OT/SLP PROGRESS
Physical Therapy  Treatment    Tea Georges   MRN: [id number]   Admitting Diagnosis: Subdural hemorrhage    PT Received On: 06/07/19        Billable Minutes:  Gait Training 11, Therapeutic Activity 10, Therapeutic Exercise 20 and Total Time 41    Treatment Type: Treatment  PT/PTA: PT     PTA Visit Number: 0       General Precautions: Standard, vision impaired, aspiration, fall  Orthopedic Precautions: N/A   Braces: N/A    Subjective:  Communicated with patient prior to session.  Pt initially declining PT session due to fatigue. She required inc'd time and encouragement to wake up fully and agree to therapy session.    Pain/Comfort  Pain Rating 1: 0/10    Objective:  Patient found supine in bed. Patient found with: oxygen     AM-PAC 6 CLICK MOBILITY  Total Score:20    Bed Mobility:  Supine>Sit: on bed Ralph, HOB elevated and w/ side rail    Transfers:  Sit<>Stand: to/from w/c (3 trials) w/ RW and Min/CGA to rise  Stand Pivot Transfer: EOB>w/c and w/c<>nustep; all w/ RW and Min/CGA to rise  Cueing for forward scoot prior to standing, cueing to step back close to chair prior to sitting  Cueing also for hand placement    Gait:  Amb 38ft w/ RW and CGA for safety  Cueing for posture   Decreased floor clearance and little to no initial heel strike noted  Limited in distance by fatigue     Therex:  Seated BLE therex 2x10 reps (GS, HF, LAQ, AP)    Balance:  Standing card matching activity 2 trials (7amx25m and 3min) w/ RW and CGA for safety  No instability or LOB noted  Limited in duration by fatigue    Additional Treatment:  Recumbent cross , Level 3, x5min to inc overall strength/endurance  Time limited by HD transportation arrival    Patient left up in chair with all lines intact and call button in reach.    Assessment:  Tea Georges is a 79 y.o. female with a medical diagnosis of Subdural hemorrhage.  Pt was limited t/o session by fatigue. She required inc'd assistance on this date for transfers and ambulation-  likely due to staying in bed yesterday w/ N&V. She will continue PT POC.    Rehab identified problem list/impairments: weakness, impaired endurance, impaired functional mobilty, gait instability, impaired balance    Rehab potential is good.    Activity tolerance: Good    Discharge recommendations: home with home health     Barriers to discharge: Inaccessible home environment, Decreased caregiver support    Equipment recommendations: tub bench     GOALS:   Multidisciplinary Problems     Physical Therapy Goals        Problem: Physical Therapy Goal    Goal Priority Disciplines Outcome Goal Variances Interventions   Physical Therapy Goal     PT, PT/OT Ongoing (interventions implemented as appropriate)     Description:  Goals to be met by: 2019    Patient will increase functional independence with mobility by performin. Supine to sit with Modified [place] = met 2019  2. Sit to supine with Modified [place]= met 2019  3. Sit to stand transfer with Supervision  4. Bed to chair transfer with Supervision using Rolling Walker or least restrictive assistive device  5. Gait  x 150 feet with Stand-by Assistance using Rolling Walker.  or least restrictive assistive device =not met  6. Wheelchair propulsion x150 feet with Supervision using bilateral upper extremities/bilateral lower extremities  7. Ascend/descend 4 stair with right Handrails Stand-by Assistance .   8. Ascend/Descend 4 inch curb step with Stand-by Assistance using Rolling Walker. or least restrictive assistive device  9. Stand for 3 minutes with Stand-by Assistance using Rolling Walker or least restrictive assistive device and perform an activity- MET 2019  10. Lower extremity exercise program x20 reps per handout, with assistance as needed and gym therex                         PLAN:    Patient to be seen 5 x/week  to address the above listed problems via therapeutic activities, gait training, therapeutic exercises, wheelchair  management/training  Plan of Care expires: 06/28/19  Plan of Care reviewed with: patient    Lorelei PRATER David, PT  06/07/2019

## 2019-06-07 NOTE — PLAN OF CARE
[facility] [place] - Skilled Nursing Care    HOME HEALTH ORDERS  FACE TO FACE ENCOUNTER    Patient Name: Tea Georges  YOB: 1940    PCP: Parth Posadas Ii, MD   PCP Address: [address] / [place] [address]  PCP Phone Number: [phone]  PCP Fax: [phone]    Encounter Date: 06/07/2019    Admit to Home Health    Diagnoses:  Active Hospital Problems    Diagnosis  POA    *Subdural hemorrhage [I62.00]  Yes    Moderate malnutrition [E44.0]  Yes      Resolved Hospital Problems   No resolved problems to display.       Future Appointments   Date Time Provider Department Center   6/12/2019  1:30 PM Marcelino Flores MD [facility] NEUROS7 [address] [facility]     Follow-up Information     Parth Posadas Ii, MD In 1 week.    Specialty:  Internal Medicine  Contact information:  [address]  [facility] [id number] [phone]                     I have seen and examined this patient face to face today. My clinical findings that support the need for the home health skilled services and home bound status are the following:  Weakness/numbness causing balance and gait disturbance due to Weakness/Debility and SDH making it taxing to leave home.  Requiring assistive device to leave home due to unsteady gait caused by  Weakness/Debility and SDH.    Allergies:Review of patient's allergies indicates:  No Known Allergies    Diet: diabetic diet: 1500, renal diet and fluid restriction: 1500    Activities: activity as tolerated    Nursing:   SN to complete comprehensive assessment including routine vital signs. Instruct on disease process and s/s of complications to report to MD. Review/verify medication list sent home with the patient at time of discharge  and instruct patient/caregiver as needed. Frequency may be adjusted depending on start of care date.    Notify MD if SBP > 160 or < 90; DBP > 90 or < 50; HR > 120 or < 50; Temp > 101; Other:         CONSULTS:    Physical Therapy to evaluate and treat. Evaluate for home safety  and equipment needs; Establish/upgrade home exercise program. Perform / instruct on therapeutic exercises, gait training, transfer training, and Range of Motion.  Occupational Therapy to evaluate and treat. Evaluate home environment for safety and equipment needs. Perform/Instruct on transfers, ADL training, ROM, and therapeutic exercises.  Aide to provide assistance with personal care, ADLs, and vital signs.    Medications: Review discharge medications with patient and family and provide education.        I certify that this patient is confined to her home and needs intermittent skilled nursing care, physical therapy and occupational therapy.

## 2019-06-07 NOTE — PLAN OF CARE
Problem: Diabetes Comorbidity  Goal: Blood Glucose Level Within Desired Range    Intervention: Maintain Glycemic Control     06/07/19 1800   Monitor and Manage Ketoacidosis   Glycemic Management blood glucose monitoring;oral hydration promoted

## 2019-06-07 NOTE — PLAN OF CARE
Problem: Occupational Therapy Goal  Goal: Occupational Therapy Goal  Goals to be met by: 6/12/19    Patient will increase functional independence with ADLs by performing:    Feeding with Modified [place].  UE Dressing with Modified [place] seated in w/c.  LE Dressing (briefs) with Stand by Assistance.  Grooming while seated with Modified [place]. - Met  Toileting from toilet with Stand by Assistance for hygiene and clothing management.   Bathing from shower chair/bench with Stand-by Assistance.  Supine to sit with Modified [place]. Met 6/7/19  Stand pivot transfers with Supervision. Met 6/7/19  Toilet transfer to toilet with Supervision/ Stand By Assistance.  Upper extremity exercise program x15 reps per handout, with independence to build strength and endurance in B UE to improve functional independence with ADLs and IADLs.      Outcome: Ongoing (interventions implemented as appropriate)  Patient goals are appropriate.

## 2019-06-07 NOTE — PT/OT/SLP PROGRESS
Occupational Therapy  Treatment    Tea Georges   MRN: [id number]   Admitting Diagnosis: Subdural hemorrhage    OT Date of Treatment: 06/07/19       Billable Minutes:  Self Care/Home Management 30    General Precautions: Standard, vision impaired  Orthopedic Precautions: N/A  Braces: N/A    Spiritual, Cultural Beliefs, Sikh Practices, Values that Affect Care: no    Subjective:  Communicated with nursing and patient prior to session.  Nursing stated patient had no complaints and is okay to participate in therapy.   Patient stated she has a stomach ache and is weak.     Pain/Comfort  Pain Rating 1: (Pt. did not rate. )  Location - Orientation 1: generalized  Location 1: abdomen((stomach ache))  Pain Addressed 1: Distraction  Pain Rating Post-Intervention 1: (Pt. did not rate. )    Objective:  Patient found with: oxygen    Occupational Performance:    Bed Mobility:    · Patient completed Supine to Sit with modified independence using hand rails  · Patient completed Sit to Supine with modified independence using hand rails    Functional Mobility/Transfers:  · Patient completed Sit <> Stand Transfer with minimum assistance  with  rolling walker and grab bars(s)   · Patient completed Toilet Transfer bed <> toilet ambulation technique with supervision with  rolling walker and grab bars  · Functional Mobility: Patient performed functional mobility using rolling walker with SBA from bed to bathroom.     Activities of Daily Living:  · Toileting: total assistance for hygiene   · Lower Body Dressing: total assistance to doff brief and moderate assistance to fidel brief and pants over feet seated on toilet and over hips in standing    [facility] 6 Click:  [facility] Total Score: 20    Patient left supine with call button in reach and daughter present    ASSESSMENT:  Tea Georges is a 79 y.o. female with a medical diagnosis of Subdural hemorrhage and presents with the deficits listed below. Patient deferred bathing and changing into new  "clothes. Patient did not want to dress herself today, stating,"I don't feel like doing it." She asked ARNDT to dress her. Patient with LOB x 2 this session while standing with rolling walker.Patient refused therapy session in therapy gym. Patient will continue to benefit from OT services to increase independence with ADLs.     Rehab identified problem list/impairments: weakness, impaired endurance, impaired self care skills, impaired functional mobilty, impaired balance, visual deficits, decreased upper extremity function, impaired fine motor    Rehab potential is good    Activity tolerance: Fair    Discharge recommendations: home with home health     Barriers to discharge:       Equipment recommendations: tub bench, commode     GOALS:   Multidisciplinary Problems     Occupational Therapy Goals        Problem: Occupational Therapy Goal    Goal Priority Disciplines Outcome Interventions   Occupational Therapy Goal     OT, PT/OT Ongoing (interventions implemented as appropriate)    Description:  Goals to be met by: 6/12/19    Patient will increase functional independence with ADLs by performing:    Feeding with Modified [place].  UE Dressing with Modified [place] seated in w/c.  LE Dressing (briefs) with Stand by Assistance.  Grooming while seated with Modified [place]. - Met  Toileting from toilet with Stand by Assistance for hygiene and clothing management.   Bathing from shower chair/bench with Stand-by Assistance.  Supine to sit with Modified [place]. Met 6/7/19  Stand pivot transfers with Supervision. Met 6/7/19  Toilet transfer to toilet with Supervision/ Stand By Assistance.  Upper extremity exercise program x15 reps per handout, with independence to build strength and endurance in B UE to improve functional independence with ADLs and IADLs.                        Plan:  Patient to be seen 6 x/week to address the above listed problems via self-care/home management, therapeutic activities, " therapeutic exercises  Plan of Care expires: 06/29/19  Plan of Care reviewed with: patient    Christina Ni, EDVIN  06/07/2019

## 2019-06-07 NOTE — PLAN OF CARE
Problem: Physical Therapy Goal  Goal: Physical Therapy Goal  Goals to be met by: 2019    Patient will increase functional independence with mobility by performin. Supine to sit with Modified [place] = met 2019  2. Sit to supine with Modified [place]= met 2019  3. Sit to stand transfer with Supervision  4. Bed to chair transfer with Supervision using Rolling Walker or least restrictive assistive device  5. Gait  x 150 feet with Stand-by Assistance using Rolling Walker.  or least restrictive assistive device =not met  6. Wheelchair propulsion x150 feet with Supervision using bilateral upper extremities/bilateral lower extremities  7. Ascend/descend 4 stair with right Handrails Stand-by Assistance .   8. Ascend/Descend 4 inch curb step with Stand-by Assistance using Rolling Walker. or least restrictive assistive device  9. Stand for 3 minutes with Stand-by Assistance using Rolling Walker or least restrictive assistive device and perform an activity- MET 2019  10. Lower extremity exercise program x20 reps per handout, with assistance as needed and gym therex        Outcome: Ongoing (interventions implemented as appropriate)  LTGs remain appropriate. Pt will continue PT POC.    Lorelei Hernandez PT  2019

## 2019-06-07 NOTE — PROGRESS NOTES
[facility]                                  Skilled Nursing Facility                   Progress Note   DOS 6/7/2019  Admit Date: 5/28/2019  GORDY   Principal Problem:  Subdural hemorrhage   HPI obtained from patient interview and chart review     Chief Complaint: Revaluation of medical treatment and therapy status: Lab review    HPI: Ms Georges a 79 y.o. 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 Nsgy decided to utilize Non-surgical treatment options.     Interval Hx: During f/u of care, patient continued to be treated at [facility] with PT and OT to improve functional status and ability to perform ADLs.  Lab review revealed platelets stable at 86, continue to trend biweekly.  Potassium at 3.6, initiate 20 mEq of potassium x1.  Creatinine 3.7, continue dialysis MWF.  Assessment of nausea revealed nausea stable per patient.    ROS  Constitutional: Negative for fever and malaise/fatigue.   Eyes: Left eye: Negative for blurred vision and discharge. + Visual disturbance, Rt eye blind.   Respiratory: Negative for cough, shortness of breath and wheezing.    Cardiovascular: Negative for chest pain, palpitations, claudication, and leg swelling.   Gastrointestinal: Negative for abdominal pain, constipation, diarrhea, vomiting + nausea   Genitourinary: Negative for dysuria, frequency and urgency.   Musculoskeletal:  + generalized weakness. Negative for back pain and myalgias.   Skin: Negative for itching and rash.   Neurological: Negative for dizziness, speech change, seizures, and headaches.   Psychiatric/Behavioral: Negative for depression. The patient is not nervous/anxious.       PEx  Constitutional: Patient appears well-developed and in no distress   HENT:   Head: Normocephalic and atraumatic.    Eyes: + Right eye blindness and baseline difficulty opening eyelids   Neck: Normal range of motion. Neck supple.   Cardiovascular: Normal rate, regular rhythm and normal heart sounds.    Pulmonary/Chest: Effort normal and breath sounds are clear  Abdominal: Soft. Bowel sounds are normal.   Musculoskeletal: Normal range of motion. + generalized weakness  Neurological: Alert and oriented to person, place, and time.   Skin: Skin is warm and dry.   Psychiatric: Normal mood and affect. Behavior is normal.     Temp:  [97.6 °F (36.4 °C)-98.2 °F (36.8 °C)]   Pulse:  [55-64]   Resp:  [18]   BP: (142-170)/(60-85)   SpO2:  [98 %-99 %]   Body mass index is 18.28 kg/m².       Past Medical History: Patient has a past medical history of Anemia in ESRD (end-stage renal disease) (5/29/2016), Anticoagulant long-term use, Aortic atherosclerosis (11/22/2016), Aortic stenosis, moderate (2/18/2016), Asthma in adult without complication (1/8/2016), Bilateral low back pain without sciatica (11/17/2015), Blindness of right eye (11/12/2016), CAD (coronary artery disease) (12/12/2016), Cataract, Central retinal vein occlusion, right eye (6/3/2014), CHF (congestive heart failure), Chronic diastolic heart failure (1/8/2016), Chronic respiratory failure with hypoxia (5/29/2016), COPD (chronic obstructive pulmonary disease) (1/15/2017), Dependence on hemodialysis, Diverticulosis, Embolic stroke involving right middle cerebral artery, Encounter for blood transfusion, Enlarged LA (left atrium) (10/7/2016), Epiretinal membrane (7/17/2012), ESRD (end stage renal disease), Essential hypertension (1/8/2016), History of GI diverticular bleed, Left flaccid hemiparesis (10/1/2016), Peripheral vascular disease, unspecified (11/22/2016), 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 (11/13/2016), Type 2 diabetes mellitus with kidney complication, without long-term current use of insulin (5/1/2018), Type 2 diabetes mellitus with left eye affected  by proliferative retinopathy without macular edema, without long-term current use of insulin (3/26/2013), 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    Past Surgical History: Patient has a past surgical history that includes Cataract extraction; Cholecystectomy; Colonoscopy (N/A, 5/23/2016); Colonoscopy (N/A, 5/30/2016); Upper gastrointestinal endoscopy; Breast surgery; Fistulogram (Left, 11/28/2018); Revision of arteriovenous fistula (Left, 11/29/2018); Resection of aneurysm (Left, 11/29/2018); Placement of dual-lumen vascular catheter (Right, 11/29/2018); Abdominal surgery; Eye surgery; and Cardiac surgery.    Social History: Patient reports that she has never smoked. She has never used smokeless tobacco. She reports that she does not drink alcohol or use drugs.    Family History: family history includes Cancer in her sister; Cataracts in her mother; Esophageal cancer in her sister; Glaucoma in her brother and maternal aunt; Heart disease in her brother; Heart failure in her sister; Hypertension in her brother, father, mother, and sister; No Known Problems in her maternal grandfather, maternal grandmother, maternal uncle, paternal aunt, paternal grandfather, paternal grandmother, and paternal uncle; Stroke in her mother.      Allergies: Patient has No Known Allergies.    Recent Labs   Lab 06/04/19 0533 06/07/19  0517   WBC 6.40 6.86   HGB 9.0* 8.4*   HCT 30.3* 28.7*   PLT 83* 86*     Recent Labs   Lab 06/04/19 0533 06/07/19  0517    135*   K 3.4* 3.6    98   CO2 28 29   BUN 13 22   CREATININE 2.7* 3.7*   GLU 80 106   CALCIUM 9.7 11.1*   MG 2.2 2.3   PHOS 2.4* 3.0     No results for input(s): ALKPHOS, ALT, AST, ALBUMIN, PROT, BILITOT, INR in the last 168 hours.   Recent Labs   Lab 06/05/19 2053 06/06/19  0705 06/06/19  1121 06/06/19  1651 06/06/19  2044  06/07/19  0715   POCTGLUCOSE 122* 193* 122* 181* 135* 163*       Assessment and Plan:  Hypokalemia  -6/04 K at 3.4, initiated K 20 meq x 1.   -6/07 Potassium at 3.6, initiate 20 mEq of potassium x1.      Thrombocytopenia, chronic  -5/30 platelets at 98, trending biweekly.  -6/04 platelets at 83, stable, trending biweekly.  -6/07 platelets stable at 86, continue to trend biweekly.      ESRD (end stage renal disease)  -Continue HD MWF  -5/29 Continue epoetin shon-epbx injection 2,000 Units 3x per wk  -5/29 continue sevelamer carbonate pwpk 0.8 g  -5/30 Creatinine 2.5, continue dialysis 3 times a week. Pharmacist reporting patient receiving Procrit at dialysis treatments, discontinue Procrit.  -6/04 Cr at 2.7, continue Dialysis MWF.   -6/07 Creatinine 3.7, continue dialysis MWF.    Nausea  6/06 Initiated nutritionist consult to adjust supplement if possible and adjusted Zofran to 8 mg ODT q8h scheduled and instructed the nurse to utilize the prn Mylanta stat.   6/07 Assessment of nausea revealed nausea stable per patient.    CONTINUED    Moderate malnutrition   -6/03 Continue diabetic diet, decreased calories to 1500, dced cardiac diet and maintain, renal diabetic with 1.5 L fluid restriction, Initiated novasource renal oral supplement BID at breakfast and supper. Will continue to follow dietary recommendation and patient nutritional status.  -6/06 Initiated nutritionist consult to adjust supplement if possible for patients nausea post ingestion of supplement.    Subdural hematoma  -Bilateral acute SDH, R>L with right to left midline shift on plavix and reversed with platelets. No surgical intervention per Nsgy.  -5/28 CTH revealed Multicompartmental intracranial hemorrhage appears grossly stable from prior exam of 05/23/2019. No new infarct or hemorrhage. No neurosurgical intervention was warranted.   -Follow up with Nsgy in 2 wks with repeat CTH  5/29 Holding aspirin    Debility  -Continue with PT/OT for gait  training and strengthening and restoration of ADL's   -Encourage mobility, OOB in chair, and early ambulation as appropriate  -Fall precautions   -Monitor for bowel and bladder dysfunction  -Monitor for and prevent skin breakdown and pressure ulcers  -5/29 Initiated DVT prophylaxis with teds and scds     Essential hypertension  -Continuecarvedilol 12.5 mg bid  -5/29 Continue Hydralazine 100 mg tid  -5/29 Initiated amlodipine 10 mg daily     Pulmonary emphysema  -5/29 Initiated fluticasone furoate-vilanterol 200-25 mcg/dose diskus inhaler 1 puff daily and initiated albuterol inhaler 2 puff q6h prn    Type 2 diabetes mellitus with chronic kidney disease on chronic dialysis, without long-term current use of insulin  -A1C 5.4 Continue SSI  -5/29 Initiated Diet diabetic [facility]; 2000 Calorie; Cardiac (Low Na/Chol), Renal; Fluid - 1500mL; Thin     (HFpEF) heart failure with preserved ejection fraction  -Continue even fluid balance, rate control, afterload reduction  -5/29 Initiated Diet diabetic [facility]; 2000 Calorie; Cardiac (Low Na/Chol), Renal; Fluid - 1500mL; Thin     Severe aortic stenosis  ECHO 5/22 reviewed on 5/29  · Low normal left ventricular systolic function. The estimated ejection fraction is 53%  · Local segmental wall motion abnormalities.  · Concentric left ventricular remodeling.  · Grade II (moderate) left ventricular diastolic dysfunction consistent with pseudonormalization.  · Severe left atrial enlargement.  · Elevated left atrial pressure.  · Moderate right ventricular enlargement.  · Normal right ventricular systolic function.  · Mild right atrial enlargement.  · Severe aortic valve stenosis.  · Aortic valve area is 0.69 cm2; peak velocity is 4.58 m/s; mean gradient is 55.02 mmHg.  · Moderate mitral sclerosis.  · Mild-to-moderate mitral regurgitation.  · Moderate to severe tricuspid regurgitation.  · Mild pulmonic regurgitation.  · Elevated central venous pressure (15 mm  Hg).  · The estimated PA systolic pressure is 77 mm Hg  -resume home carvedilol 12.5 mg bid for rate control and afterload reduction     Right-sided cerebrovascular accident (CVA) s/p thrombectiomy(2016)  -per patient, she returned to baseline and was able to ambulate on her own, perform ADLs  -Stable     Blindness of right eye  -complication of uncontrolled DM  -Continue artificial tears 0.5 % ophthalmic solution 2 drop qid prn  -Stable      Future Appointments   Date Time Provider Department Center   6/12/2019  1:30 PM Marcelino Flores MD [facility] NEUROS7 Carroll Becker NP

## 2019-06-08 LAB
POCT GLUCOSE: 100 MG/DL (ref 70–110)
POCT GLUCOSE: 107 MG/DL (ref 70–110)
POCT GLUCOSE: 123 MG/DL (ref 70–110)
POCT GLUCOSE: 211 MG/DL (ref 70–110)

## 2019-06-08 PROCEDURE — 97110 THERAPEUTIC EXERCISES: CPT

## 2019-06-08 PROCEDURE — 97530 THERAPEUTIC ACTIVITIES: CPT

## 2019-06-08 PROCEDURE — 25000003 PHARM REV CODE 250: Performed by: NURSE PRACTITIONER

## 2019-06-08 PROCEDURE — 94761 N-INVAS EAR/PLS OXIMETRY MLT: CPT

## 2019-06-08 PROCEDURE — 27000221 HC OXYGEN, UP TO 24 HOURS

## 2019-06-08 PROCEDURE — 25000003 PHARM REV CODE 250: Performed by: HOSPITALIST

## 2019-06-08 PROCEDURE — 11000004 HC SNF PRIVATE

## 2019-06-08 RX ADMIN — ONDANSETRON 8 MG: 8 TABLET, ORALLY DISINTEGRATING ORAL at 05:06

## 2019-06-08 RX ADMIN — INSULIN ASPART 4 UNITS: 100 INJECTION, SOLUTION INTRAVENOUS; SUBCUTANEOUS at 12:06

## 2019-06-08 RX ADMIN — CARVEDILOL 12.5 MG: 12.5 TABLET, FILM COATED ORAL at 09:06

## 2019-06-08 RX ADMIN — HYDRALAZINE HYDROCHLORIDE 100 MG: 50 TABLET ORAL at 09:06

## 2019-06-08 RX ADMIN — HYDRALAZINE HYDROCHLORIDE 100 MG: 50 TABLET ORAL at 01:06

## 2019-06-08 RX ADMIN — SEVELAMER CARBONATE 0.8 G: 800 POWDER, FOR SUSPENSION ORAL at 12:06

## 2019-06-08 RX ADMIN — CARVEDILOL 12.5 MG: 12.5 TABLET, FILM COATED ORAL at 08:06

## 2019-06-08 RX ADMIN — ONDANSETRON 8 MG: 8 TABLET, ORALLY DISINTEGRATING ORAL at 12:06

## 2019-06-08 RX ADMIN — SEVELAMER CARBONATE 0.8 G: 800 POWDER, FOR SUSPENSION ORAL at 05:06

## 2019-06-08 RX ADMIN — FLUTICASONE FUROATE AND VILANTEROL TRIFENATATE 1 PUFF: 200; 25 POWDER RESPIRATORY (INHALATION) at 08:06

## 2019-06-08 RX ADMIN — ONDANSETRON 8 MG: 8 TABLET, ORALLY DISINTEGRATING ORAL at 01:06

## 2019-06-08 RX ADMIN — ALUMINUM HYDROXIDE, MAGNESIUM HYDROXIDE, AND DIMETHICONE 30 ML: 400; 400; 40 SUSPENSION ORAL at 06:06

## 2019-06-08 RX ADMIN — SEVELAMER CARBONATE 0.8 G: 800 POWDER, FOR SUSPENSION ORAL at 08:06

## 2019-06-08 RX ADMIN — HYDRALAZINE HYDROCHLORIDE 100 MG: 50 TABLET ORAL at 05:06

## 2019-06-08 RX ADMIN — AMLODIPINE BESYLATE 10 MG: 10 TABLET ORAL at 08:06

## 2019-06-08 NOTE — PT/OT/SLP PROGRESS
Occupational Therapy  Treatment    Tea Georges   MRN: [id number]   Admitting Diagnosis: Subdural hemorrhage    OT Date of Treatment: 06/08/19       Billable Minutes:47  Therapeutic Activity 37 and Therapeutic Exercise 10    General Precautions: Standard, vision impaired  Orthopedic Precautions: N/A  Braces: N/A    Spiritual, Cultural Beliefs, Temple Practices, Values that Affect Care: no    Subjective:  Communicated with nsg prior to session.  My stomach hurts a little bit today    Pain/Comfort  Pain Rating 1: 4/10  Location 1: abdomen  Pain Addressed 1: Reposition, Distraction    Objective:    Pt. Supine on arrival  Occupational Performance:    Bed Mobility:    · Patient completed Scooting/Bridging with minimum assistance  · Patient completed Supine to Sit with stand by assistance  · Patient completed Sit to Supine with stand by assistance     Functional Mobility/Transfers:  · Patient completed Sit <> Stand Transfer with contact guard assistance  with  rolling walker Pt. With cues for safety and hand placement   · Patient completed Bed <> Chair Transfer using Stand Pivot technique with contact guard assistance with no assistive device      Activities of Daily Living:  · Grooming: stand by assistance with grooming asepcts  · Upper Body Dressing: minimum assistance to fidel robe around back   · (Pt. Declined bathing on this day)    [facility] 6 Click:  [facility] Total Score: 20    OT Exercises: UE Ergometer 10 min with intermittent breaks in between     Additional Treatment:  Pt. With fxl mobility on this day. Pt. With ambulation approx 19 ft and then seated rest break and another 19ft with CGA and RW for bal with 02 in tow    Patient left supine with all lines intact and call button in reach    ASSESSMENT:  Tea Georges is a 79 y.o. female with a medical diagnosis of Subdural hemorrhage Pt. participated well with session on this day. Pt. With several intermittent breaks on this day during session. Pt  Was very particular  ain regard to which activity she wanted to participate in on this day. .Pt demos physical deficits with balance  functional mobility, UB strength, endurance  level of functional indep with daily tasks and activities and selfcare skills .Pt. Will continue to benefit from continued OT to progress towards goals  .    Rehab identified problem list/impairments: weakness, impaired endurance, impaired self care skills, impaired functional mobilty, impaired balance, visual deficits, decreased upper extremity function, impaired fine motor    Rehab potential is fair    Activity tolerance: Fair    Discharge recommendations: home with home health     Barriers to discharge:       Equipment recommendations: tub bench, commode     GOALS:   Multidisciplinary Problems     Occupational Therapy Goals        Problem: Occupational Therapy Goal    Goal Priority Disciplines Outcome Interventions   Occupational Therapy Goal     OT, PT/OT Ongoing (interventions implemented as appropriate)    Description:  Goals to be met by: 6/12/19    Patient will increase functional independence with ADLs by performing:    Feeding with Modified [place].  UE Dressing with Modified [place] seated in w/c.  LE Dressing (briefs) with Stand by Assistance.  Grooming while seated with Modified [place]. - Met  Toileting from toilet with Stand by Assistance for hygiene and clothing management.   Bathing from shower chair/bench with Stand-by Assistance.  Supine to sit with Modified [place]. Met 6/7/19  Stand pivot transfers with Supervision. Met 6/7/19  Toilet transfer to toilet with Supervision/ Stand By Assistance.  Upper extremity exercise program x15 reps per handout, with independence to build strength and endurance in B UE to improve functional independence with ADLs and IADLs.                    Plan:  Patient to be seen 6 x/week to address the above listed problems via self-care/home management, therapeutic activities, therapeutic  exercises  Plan of Care expires: 06/29/19  Plan of Care reviewed with: patient    The SHERI and YRIS have collaborated and discussed the patient's status, treatment plan and progress toward established goals.   YRIS Hood/GISELE 6/8/2019

## 2019-06-08 NOTE — PLAN OF CARE
Problem: Adult Inpatient Plan of Care  Goal: Plan of Care Review  Outcome: Ongoing (interventions implemented as appropriate)  BLOOD GLUCOSES MONITORED.FALL PRECAUTIONS MAINTAINED NO INJURIES NOTED.

## 2019-06-08 NOTE — PLAN OF CARE
Problem: Occupational Therapy Goal  Goal: Occupational Therapy Goal  Goals to be met by: 6/12/19    Patient will increase functional independence with ADLs by performing:    Feeding with Modified [place].  UE Dressing with Modified [place] seated in w/c.  LE Dressing (briefs) with Stand by Assistance.  Grooming while seated with Modified [place]. - Met  Toileting from toilet with Stand by Assistance for hygiene and clothing management.   Bathing from shower chair/bench with Stand-by Assistance.  Supine to sit with Modified [place]. Met 6/7/19  Stand pivot transfers with Supervision. Met 6/7/19  Toilet transfer to toilet with Supervision/ Stand By Assistance.  Upper extremity exercise program x15 reps per handout, with independence to build strength and endurance in B UE to improve functional independence with ADLs and IADLs.       Outcome: Ongoing (interventions implemented as appropriate)  .

## 2019-06-09 LAB
POCT GLUCOSE: 106 MG/DL (ref 70–110)
POCT GLUCOSE: 146 MG/DL (ref 70–110)
POCT GLUCOSE: 150 MG/DL (ref 70–110)
POCT GLUCOSE: 166 MG/DL (ref 70–110)

## 2019-06-09 PROCEDURE — 25000003 PHARM REV CODE 250: Performed by: NURSE PRACTITIONER

## 2019-06-09 PROCEDURE — 11000004 HC SNF PRIVATE

## 2019-06-09 PROCEDURE — 25000003 PHARM REV CODE 250: Performed by: HOSPITALIST

## 2019-06-09 PROCEDURE — 27000221 HC OXYGEN, UP TO 24 HOURS

## 2019-06-09 RX ADMIN — ONDANSETRON 8 MG: 8 TABLET, ORALLY DISINTEGRATING ORAL at 05:06

## 2019-06-09 RX ADMIN — ONDANSETRON 8 MG: 8 TABLET, ORALLY DISINTEGRATING ORAL at 12:06

## 2019-06-09 RX ADMIN — SENNOSIDES AND DOCUSATE SODIUM 1 TABLET: 8.6; 5 TABLET ORAL at 09:06

## 2019-06-09 RX ADMIN — HYDRALAZINE HYDROCHLORIDE 100 MG: 50 TABLET ORAL at 01:06

## 2019-06-09 RX ADMIN — HYDRALAZINE HYDROCHLORIDE 100 MG: 50 TABLET ORAL at 05:06

## 2019-06-09 RX ADMIN — SEVELAMER CARBONATE 0.8 G: 800 POWDER, FOR SUSPENSION ORAL at 04:06

## 2019-06-09 RX ADMIN — CARVEDILOL 12.5 MG: 12.5 TABLET, FILM COATED ORAL at 09:06

## 2019-06-09 RX ADMIN — SEVELAMER CARBONATE 0.8 G: 800 POWDER, FOR SUSPENSION ORAL at 12:06

## 2019-06-09 RX ADMIN — HYDRALAZINE HYDROCHLORIDE 100 MG: 50 TABLET ORAL at 09:06

## 2019-06-09 RX ADMIN — SEVELAMER CARBONATE 0.8 G: 800 POWDER, FOR SUSPENSION ORAL at 09:06

## 2019-06-09 RX ADMIN — INSULIN ASPART 1 UNITS: 100 INJECTION, SOLUTION INTRAVENOUS; SUBCUTANEOUS at 09:06

## 2019-06-09 RX ADMIN — AMLODIPINE BESYLATE 10 MG: 10 TABLET ORAL at 09:06

## 2019-06-09 RX ADMIN — FLUTICASONE FUROATE AND VILANTEROL TRIFENATATE 1 PUFF: 200; 25 POWDER RESPIRATORY (INHALATION) at 09:06

## 2019-06-10 ENCOUNTER — CLINICAL SUPPORT (OUTPATIENT)
Dept: CARDIOLOGY | Facility: HOSPITAL | Age: 79
End: 2019-06-10
Attending: INTERNAL MEDICINE
Payer: MEDICARE

## 2019-06-10 DIAGNOSIS — Z95.818 STATUS POST PLACEMENT OF IMPLANTABLE LOOP RECORDER: ICD-10-CM

## 2019-06-10 LAB
ANION GAP SERPL CALC-SCNC: 11 MMOL/L (ref 8–16)
BASOPHILS # BLD AUTO: 0.02 K/UL (ref 0–0.2)
BASOPHILS NFR BLD: 0.3 % (ref 0–1.9)
BUN SERPL-MCNC: 40 MG/DL (ref 8–23)
CALCIUM SERPL-MCNC: 11.8 MG/DL (ref 8.7–10.5)
CHLORIDE SERPL-SCNC: 95 MMOL/L (ref 95–110)
CO2 SERPL-SCNC: 27 MMOL/L (ref 23–29)
CREAT SERPL-MCNC: 4.9 MG/DL (ref 0.5–1.4)
DIFFERENTIAL METHOD: ABNORMAL
EOSINOPHIL # BLD AUTO: 0.1 K/UL (ref 0–0.5)
EOSINOPHIL NFR BLD: 1.3 % (ref 0–8)
ERYTHROCYTE [DISTWIDTH] IN BLOOD BY AUTOMATED COUNT: 16.4 % (ref 11.5–14.5)
EST. GFR  (AFRICAN AMERICAN): 9.2 ML/MIN/1.73 M^2
EST. GFR  (NON AFRICAN AMERICAN): 8 ML/MIN/1.73 M^2
GLUCOSE SERPL-MCNC: 148 MG/DL (ref 70–110)
HCT VFR BLD AUTO: 29.9 % (ref 37–48.5)
HGB BLD-MCNC: 8.7 G/DL (ref 12–16)
IMM GRANULOCYTES # BLD AUTO: 0.04 K/UL (ref 0–0.04)
IMM GRANULOCYTES NFR BLD AUTO: 0.6 % (ref 0–0.5)
LYMPHOCYTES # BLD AUTO: 0.6 K/UL (ref 1–4.8)
LYMPHOCYTES NFR BLD: 8.7 % (ref 18–48)
MAGNESIUM SERPL-MCNC: 2.7 MG/DL (ref 1.6–2.6)
MCH RBC QN AUTO: 28.9 PG (ref 27–31)
MCHC RBC AUTO-ENTMCNC: 29.1 G/DL (ref 32–36)
MCV RBC AUTO: 99 FL (ref 82–98)
MONOCYTES # BLD AUTO: 0.7 K/UL (ref 0.3–1)
MONOCYTES NFR BLD: 9.2 % (ref 4–15)
NEUTROPHILS # BLD AUTO: 5.6 K/UL (ref 1.8–7.7)
NEUTROPHILS NFR BLD: 79.9 % (ref 38–73)
NRBC BLD-RTO: 0 /100 WBC
PHOSPHATE SERPL-MCNC: 3.1 MG/DL (ref 2.7–4.5)
PLATELET # BLD AUTO: 88 K/UL (ref 150–350)
PMV BLD AUTO: 13.2 FL (ref 9.2–12.9)
POCT GLUCOSE: 123 MG/DL (ref 70–110)
POCT GLUCOSE: 140 MG/DL (ref 70–110)
POCT GLUCOSE: 164 MG/DL (ref 70–110)
POCT GLUCOSE: 99 MG/DL (ref 70–110)
POTASSIUM SERPL-SCNC: 4 MMOL/L (ref 3.5–5.1)
RBC # BLD AUTO: 3.01 M/UL (ref 4–5.4)
SODIUM SERPL-SCNC: 133 MMOL/L (ref 136–145)
WBC # BLD AUTO: 7.05 K/UL (ref 3.9–12.7)

## 2019-06-10 PROCEDURE — 92507 TX SP LANG VOICE COMM INDIV: CPT

## 2019-06-10 PROCEDURE — 25000003 PHARM REV CODE 250: Performed by: HOSPITALIST

## 2019-06-10 PROCEDURE — 84100 ASSAY OF PHOSPHORUS: CPT

## 2019-06-10 PROCEDURE — 83735 ASSAY OF MAGNESIUM: CPT

## 2019-06-10 PROCEDURE — 27000221 HC OXYGEN, UP TO 24 HOURS

## 2019-06-10 PROCEDURE — 97535 SELF CARE MNGMENT TRAINING: CPT

## 2019-06-10 PROCEDURE — 94761 N-INVAS EAR/PLS OXIMETRY MLT: CPT

## 2019-06-10 PROCEDURE — 97803 MED NUTRITION INDIV SUBSEQ: CPT

## 2019-06-10 PROCEDURE — 97110 THERAPEUTIC EXERCISES: CPT

## 2019-06-10 PROCEDURE — 85025 COMPLETE CBC W/AUTO DIFF WBC: CPT

## 2019-06-10 PROCEDURE — 97530 THERAPEUTIC ACTIVITIES: CPT

## 2019-06-10 PROCEDURE — 80048 BASIC METABOLIC PNL TOTAL CA: CPT

## 2019-06-10 PROCEDURE — 25000003 PHARM REV CODE 250: Performed by: NURSE PRACTITIONER

## 2019-06-10 PROCEDURE — 93299 CARDIAC DEVICE CHECK - REMOTE: CPT

## 2019-06-10 PROCEDURE — 36415 COLL VENOUS BLD VENIPUNCTURE: CPT

## 2019-06-10 PROCEDURE — 11000004 HC SNF PRIVATE

## 2019-06-10 PROCEDURE — 97116 GAIT TRAINING THERAPY: CPT

## 2019-06-10 RX ADMIN — CARVEDILOL 12.5 MG: 12.5 TABLET, FILM COATED ORAL at 08:06

## 2019-06-10 RX ADMIN — FLUTICASONE FUROATE AND VILANTEROL TRIFENATATE 1 PUFF: 200; 25 POWDER RESPIRATORY (INHALATION) at 08:06

## 2019-06-10 RX ADMIN — ONDANSETRON 8 MG: 8 TABLET, ORALLY DISINTEGRATING ORAL at 05:06

## 2019-06-10 RX ADMIN — SEVELAMER CARBONATE 0.8 G: 800 POWDER, FOR SUSPENSION ORAL at 08:06

## 2019-06-10 RX ADMIN — ONDANSETRON 8 MG: 8 TABLET, ORALLY DISINTEGRATING ORAL at 12:06

## 2019-06-10 RX ADMIN — CARVEDILOL 12.5 MG: 12.5 TABLET, FILM COATED ORAL at 09:06

## 2019-06-10 RX ADMIN — HYDRALAZINE HYDROCHLORIDE 100 MG: 50 TABLET ORAL at 09:06

## 2019-06-10 RX ADMIN — SENNOSIDES AND DOCUSATE SODIUM 1 TABLET: 8.6; 5 TABLET ORAL at 08:06

## 2019-06-10 RX ADMIN — AMLODIPINE BESYLATE 10 MG: 10 TABLET ORAL at 08:06

## 2019-06-10 RX ADMIN — SEVELAMER CARBONATE 0.8 G: 800 POWDER, FOR SUSPENSION ORAL at 05:06

## 2019-06-10 RX ADMIN — HYDRALAZINE HYDROCHLORIDE 100 MG: 50 TABLET ORAL at 05:06

## 2019-06-10 RX ADMIN — INSULIN ASPART 1 UNITS: 100 INJECTION, SOLUTION INTRAVENOUS; SUBCUTANEOUS at 09:06

## 2019-06-10 RX ADMIN — POLYETHYLENE GLYCOL 3350 17 G: 17 POWDER, FOR SOLUTION ORAL at 08:06

## 2019-06-10 NOTE — PROGRESS NOTES
[facility]                                  Skilled Nursing Facility                   Progress Note   DOS 6/10/2019  Admit Date: 5/28/2019  GORDY   Principal Problem:  Subdural hemorrhage   HPI obtained from patient interview and chart review     Chief Complaint: Revaluation of medical treatment and therapy status: Lab review    HPI: Ms Georges a 79 y.o. 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 Nsgy decided to utilize Non-surgical treatment options.     Interval Hx: During f/u of care, patient continued to be treated at [facility] with PT and OT to improve functional status and ability to perform ADLs.  Lab review revealed platelets stable at 88, K at 4 and stable, trending biweekly  continue to trend biweekly. Na at 133, continue fluid restriction. BUN/Creatinine 40/4.9 and mag at 2.7, continue dialysis MWF.     ROS  Constitutional: Negative for fever and malaise/fatigue.   Eyes: Left eye: Negative for blurred vision and discharge. + Visual disturbance, Rt eye blind.   Respiratory: Negative for cough, shortness of breath and wheezing.    Cardiovascular: Negative for chest pain, palpitations, claudication, and leg swelling.   Gastrointestinal: Negative for abdominal pain, constipation, diarrhea, vomiting - nausea   Genitourinary: Negative for dysuria, frequency and urgency.   Musculoskeletal:  + generalized weakness. Negative for back pain and myalgias.   Skin: Negative for itching and rash.   Neurological: Negative for dizziness, speech change, seizures, and headaches.   Psychiatric/Behavioral: Negative for depression. The patient is not nervous/anxious.       PEx  Constitutional: Patient appears well-developed and in no distress   HENT:   Head: Normocephalic and atraumatic.   Eyes: +  Right eye blindness and baseline difficulty opening eyelids   Neck: Normal range of motion. Neck supple.   Cardiovascular: Normal rate, regular rhythm and normal heart sounds.    Pulmonary/Chest: Effort normal and breath sounds are clear  Abdominal: Soft. Bowel sounds are normal.   Musculoskeletal: Normal range of motion. + generalized weakness  Neurological: Alert and oriented to person, place, and time.   Skin: Skin is warm and dry.   Psychiatric: Normal mood and affect. Behavior is normal.     Temp:  [98.3 °F (36.8 °C)]   Pulse:  [54-62]   Resp:  [18]   BP: (153-156)/(64-75)   SpO2:  [98 %-99 %]   Body mass index is 18.43 kg/m².       Past Medical History: Patient has a past medical history of Anemia in ESRD (end-stage renal disease) (5/29/2016), Anticoagulant long-term use, Aortic atherosclerosis (11/22/2016), Aortic stenosis, moderate (2/18/2016), Asthma in adult without complication (1/8/2016), Bilateral low back pain without sciatica (11/17/2015), Blindness of right eye (11/12/2016), CAD (coronary artery disease) (12/12/2016), Cataract, Central retinal vein occlusion, right eye (6/3/2014), CHF (congestive heart failure), Chronic diastolic heart failure (1/8/2016), Chronic respiratory failure with hypoxia (5/29/2016), COPD (chronic obstructive pulmonary disease) (1/15/2017), Dependence on hemodialysis, Diverticulosis, Embolic stroke involving right middle cerebral artery, Encounter for blood transfusion, Enlarged LA (left atrium) (10/7/2016), Epiretinal membrane (7/17/2012), ESRD (end stage renal disease), Essential hypertension (1/8/2016), History of GI diverticular bleed, Left flaccid hemiparesis (10/1/2016), Peripheral vascular disease, unspecified (11/22/2016), 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 (11/13/2016), Type 2 diabetes mellitus with kidney complication, without long-term current use of insulin (5/1/2018), Type 2 diabetes mellitus with left eye affected by proliferative retinopathy  without macular edema, without long-term current use of insulin (3/26/2013), 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    Past Surgical History: Patient has a past surgical history that includes Cataract extraction; Cholecystectomy; Colonoscopy (N/A, 5/23/2016); Colonoscopy (N/A, 5/30/2016); Upper gastrointestinal endoscopy; Breast surgery; Fistulogram (Left, 11/28/2018); Revision of arteriovenous fistula (Left, 11/29/2018); Resection of aneurysm (Left, 11/29/2018); Placement of dual-lumen vascular catheter (Right, 11/29/2018); Abdominal surgery; Eye surgery; and Cardiac surgery.    Social History: Patient reports that she has never smoked. She has never used smokeless tobacco. She reports that she does not drink alcohol or use drugs.    Family History: family history includes Cancer in her sister; Cataracts in her mother; Esophageal cancer in her sister; Glaucoma in her brother and maternal aunt; Heart disease in her brother; Heart failure in her sister; Hypertension in her brother, father, mother, and sister; No Known Problems in her maternal grandfather, maternal grandmother, maternal uncle, paternal aunt, paternal grandfather, paternal grandmother, and paternal uncle; Stroke in her mother.      Allergies: Patient has No Known Allergies.    Recent Labs   Lab 06/04/19  0533 06/07/19  0517 06/10/19  0558   WBC 6.40 6.86 7.05   HGB 9.0* 8.4* 8.7*   HCT 30.3* 28.7* 29.9*   PLT 83* 86* 88*     Recent Labs   Lab 06/04/19  0533 06/07/19  0517 06/10/19  0558    135* 133*   K 3.4* 3.6 4.0    98 95   CO2 28 29 27   BUN 13 22 40*   CREATININE 2.7* 3.7* 4.9*   GLU 80 106 148*   CALCIUM 9.7 11.1* 11.8*   MG 2.2 2.3 2.7*   PHOS 2.4* 3.0 3.1     No results for input(s): ALKPHOS, ALT, AST, ALBUMIN, PROT, BILITOT, INR in the last 168 hours.   Recent Labs   Lab 06/08/19 2051  06/09/19  0720 06/09/19  1127 06/09/19  1602 06/09/19  2050 06/10/19  0700   POCTGLUCOSE 123* 106 146* 150* 166* 123*       Assessment and Plan:  Hypokalemia  -6/04 K at 3.4, initiated K 20 meq x 1.   -6/07 Potassium at 3.6, initiate 20 mEq of potassium x1.   -6/10 K at 4 and stable, trending biweekly     Thrombocytopenia, chronic  -5/30 platelets at 98, trending biweekly.  -6/04 platelets at 83, stable, trending biweekly.  -6/07 platelets stable at 86, continue to trend biweekly.  -6/10  platelets stable at 88, trending biweekly     Hyponatremia  -6/10 Na at 133, continue 1500 cc fluid restriction.    ESRD (end stage renal disease)  -Continue HD MWF  -5/29 Continue epoetin shon-epbx injection 2,000 Units 3x per wk  -5/29 continue sevelamer carbonate pwpk 0.8 g  -5/30 Creatinine 2.5, continue dialysis 3 times a week. Pharmacist reporting patient receiving Procrit at dialysis treatments, discontinue Procrit.  -6/04 Cr at 2.7, continue Dialysis MWF.   -6/07 Creatinine 3.7, continue dialysis MWF.  -6/10  BUN/Creatinine 40/4.9 and mag at 2.7, continue dialysis MWF.     CONTINUED    Nausea  6/06 Initiated nutritionist consult to adjust supplement if possible and adjusted Zofran to 8 mg ODT q8h scheduled and instructed the nurse to utilize the prn Mylanta stat.   6/07 Assessment of nausea revealed nausea stable per patient.    Moderate malnutrition   -6/03 Continue diabetic diet, decreased calories to 1500, dced cardiac diet and maintain, renal diabetic with 1.5 L fluid restriction, Initiated novasource renal oral supplement BID at breakfast and supper. Will continue to follow dietary recommendation and patient nutritional status.  -6/06 Initiated nutritionist consult to adjust supplement if possible for patients nausea post ingestion of supplement.    Subdural hematoma  -Bilateral acute SDH, R>L with right to left midline shift on plavix and reversed with platelets. No surgical intervention per Nsgy.  -5/28 CTH revealed  Multicompartmental intracranial hemorrhage appears grossly stable from prior exam of 05/23/2019. No new infarct or hemorrhage. No neurosurgical intervention was warranted.   -Follow up with Nsgy in 2 wks with repeat CTH  5/29 Holding aspirin    Debility  -Continue with PT/OT for gait training and strengthening and restoration of ADL's   -Encourage mobility, OOB in chair, and early ambulation as appropriate  -Fall precautions   -Monitor for bowel and bladder dysfunction  -Monitor for and prevent skin breakdown and pressure ulcers  -5/29 Initiated DVT prophylaxis with teds and scds     Essential hypertension  -Continuecarvedilol 12.5 mg bid  -5/29 Continue Hydralazine 100 mg tid  -5/29 Initiated amlodipine 10 mg daily     Pulmonary emphysema  -5/29 Initiated fluticasone furoate-vilanterol 200-25 mcg/dose diskus inhaler 1 puff daily and initiated albuterol inhaler 2 puff q6h prn    Type 2 diabetes mellitus with chronic kidney disease on chronic dialysis, without long-term current use of insulin  -A1C 5.4 Continue SSI  -5/29 Initiated Diet diabetic [facility]; 2000 Calorie; Cardiac (Low Na/Chol), Renal; Fluid - 1500mL; Thin     (HFpEF) heart failure with preserved ejection fraction  -Continue even fluid balance, rate control, afterload reduction  -5/29 Initiated Diet diabetic [facility]; 2000 Calorie; Cardiac (Low Na/Chol), Renal; Fluid - 1500mL; Thin     Severe aortic stenosis  ECHO 5/22 reviewed on 5/29  · Low normal left ventricular systolic function. The estimated ejection fraction is 53%  · Local segmental wall motion abnormalities.  · Concentric left ventricular remodeling.  · Grade II (moderate) left ventricular diastolic dysfunction consistent with pseudonormalization.  · Severe left atrial enlargement.  · Elevated left atrial pressure.  · Moderate right ventricular enlargement.  · Normal right ventricular systolic function.  · Mild right atrial enlargement.  · Severe aortic valve stenosis.  · Aortic  valve area is 0.69 cm2; peak velocity is 4.58 m/s; mean gradient is 55.02 mmHg.  · Moderate mitral sclerosis.  · Mild-to-moderate mitral regurgitation.  · Moderate to severe tricuspid regurgitation.  · Mild pulmonic regurgitation.  · Elevated central venous pressure (15 mm Hg).  · The estimated PA systolic pressure is 77 mm Hg  -resume home carvedilol 12.5 mg bid for rate control and afterload reduction     Right-sided cerebrovascular accident (CVA) s/p thrombectiomy(2016)  -per patient, she returned to baseline and was able to ambulate on her own, perform ADLs  -Stable     Blindness of right eye  -complication of uncontrolled DM  -Continue artificial tears 0.5 % ophthalmic solution 2 drop qid prn  -Stable      Future Appointments   Date Time Provider Department Center   6/12/2019  1:30 PM Marcelino Flores MD [facility] NEUROS7 Carroll Becker NP

## 2019-06-10 NOTE — PLAN OF CARE
Problem: Physical Therapy Goal  Goal: Physical Therapy Goal  Goals to be met by: 2019    Patient will increase functional independence with mobility by performin. Supine to sit with Modified [place]  2. Sit to supine with Modified [place]  3. Sit to stand transfer with Supervision  4. Bed to chair transfer with Supervision using Rolling Walker or least restrictive assistive device  5. Gait  x 150 feet with Stand-by Assistance using Rolling Walker.  or least restrictive assistive device =not met  6. Wheelchair propulsion x150 feet with Supervision using bilateral upper extremities/bilateral lower extremities  7. Ascend/descend 4 stair with right Handrails Stand-by Assistance .   8. Ascend/Descend 4 inch curb step with Stand-by Assistance using Rolling Walker. or least restrictive assistive device  9. Stand for 3 minutes with Stand-by Assistance using Rolling Walker or least restrictive assistive device and perform an activity- MET 2019  10. Lower extremity exercise program x20 reps per handout, with assistance as needed and gym therex        Outcome: Ongoing (interventions implemented as appropriate)  Bed mobility goals no longer met. LTGs remain appropriate. Pt will continue PT POC.    John Trinidad, SPT  6/10/2019

## 2019-06-10 NOTE — PROGRESS NOTES
"[facility] - Skilled Nursing Care  Adult Nutrition  Progress Note    SUMMARY   Recommendations    Recommendation/Intervention: Continue diabetic renal diet, fluid restriction, Chopped meats, novasource renal BID  Goals: PO to meet 75% of EEN to include ONS by next RD visit  Nutrition Goal Status: progressing towards goal  Reason for Assessment    Reason For Assessment: RD follow-up  Diagnosis: (SDH, Debility)  Relevant Medical History: COPD, ESRD with dialysis, CVA, CHF, DM2, anemia, PVD, blind R eye, HTN, diverticulosis  Interdisciplinary Rounds: attended  General Information Comments: PO 50-75%, taking novasource renal BID  Nutrition Discharge Planning: DC on renal diet    Nutrition Risk Screen    Nutrition Risk Screen: no indicators present, tube feeding or parenteral nutrition    Nutrition/Diet History    Patient Reported Diet/Restrictions/Preferences: renal  Food Preferences: chopped meats , drinking too many fluids based on all the cups and bottles at bedside, pt would not state or did not know her fluid restriction which is 1500 ml per day  Spiritual, Cultural Beliefs, Yarsanism Practices, Values that Affect Care: no  Food Allergies: NKFA  Factors Affecting Nutritional Intake: decreased appetite    Anthropometrics    Temp: 97.8 °F (36.6 °C)  Height: 5' 3" (160 cm)  Height (inches): 63 in  Weight Method: Standard Scale  Weight: 47.2 kg (104 lb 0.9 oz)  Weight (lb): 104.06 lb  Ideal Body Weight (IBW), Female: 115 lb  % Ideal Body Weight, Female (lb): 86.84 lb  BMI (Calculated): 17.7  BMI Grade: 17 - 18.4 protein-energy malnutrition grade I  Weight Loss: unintentional  Usual Body Weight (UBW), k kg  % Usual Body Weight: 89.01  % Weight Change From Usual Weight: -11.18 %       Lab/Procedures/Meds    Pertinent Labs Reviewed: reviewed  Pertinent Labs Comments: Mg 2.7, Na 133  Pertinent Medications Reviewed: reviewed  Pertinent Medications Comments: ondansetron PRN         Estimated/Assessed Needs    Weight Used " For Calorie Calculations: 45.3 kg (99 lb 13.9 oz)  Energy Calorie Requirements (kcal): 1134  Energy Need Method: [place]-St Jeor  Protein Requirements: 64g  Weight Used For Protein Calculations: 53 kg (116 lb 13.5 oz)(IBW(geriatric) x 1.2g/kg)  Fluid Requirements (mL): per MD 1500     RDA Method (mL): 1134  CHO Requirement: 50% of calories      Nutrition Prescription Ordered    Current Diet Order: 1500 diabetic , renal, 1500 ml fluid restriction, RD following  Nutrition Order Comments: PO 50-75%  Oral Nutrition Supplement: boost breeze daily, novasource renal daily    Evaluation of Received Nutrient/Fluid Intake    Energy Calories Required: meeting needs  Protein Required: not meeting needs  Fluid Required: meeting needs  Comments: less complaints of nausea per nursing  Tolerance: tolerating  % Intake of Estimated Energy Needs: 75 - 100 %  % Meal Intake: 50 - 75 %    Nutrition Risk    Level of Risk/Frequency of Follow-up: low     Assessment and Plan    Moderate Malnutrition in the context of chronic illness     Related to (etiology):  Variable appetite, weakness     Signs and Symptoms (as evidenced by):  Energy Intake: <75% of estimated energy requirement for > 3 months  Body Fat Depletion: mild depletion of orbitals and triceps   Muscle Mass Depletion: mild depletion of temples, clavicle region, scapular region, interosseous muscle and lower extremities   Weight Loss: 10% x 6 months  Fluid Accumulation: mild  Ongoing    Monitor and Evaluation    Food and Nutrient Intake: food and beverage intake  Food and Nutrient Adminstration: diet order  Knowledge/Beliefs/Attitudes: food and nutrition knowledge/skill  Physical Activity and Function: nutrition-related ADLs and IADLs  Biochemical Data, Medical Tests and Procedures: electrolyte and renal panel, inflammatory profile, gastrointestinal profile  Nutrition-Focused Physical Findings: overall appearance     Malnutrition Assessment 5/30/19  Malnutrition Type: chronic  illness  Energy Intake: moderate energy intake  Neck/Chest (Micronutrient): muscle wasting, subcutaneous fat loss  Musculoskeletal/Lower Extremities: subcutaneous fat loss       Weight Loss (Malnutrition): 10% in 6 months(may also represent fluid fluctuation)  Energy Intake (Malnutrition): less than 75% for greater than or equal to 3 months  Subcutaneous Fat (Malnutrition): mild depletion  Muscle Mass (Malnutrition): mild depletion   Orbital Region (Subcutaneous Fat Loss): mild depletion  Upper Arm Region (Subcutaneous Fat Loss): mild depletion   [place] Region (Muscle Loss): mild depletion  Clavicle Bone Region (Muscle Loss): mild depletion  Clavicle and Acromion Bone Region (Muscle Loss): mild depletion  Dorsal Hand (Muscle Loss): mild depletion  Patellar Region (Muscle Loss): mild depletion  Anterior Thigh Region (Muscle Loss): mild depletion  Posterior Calf Region (Muscle Loss): mild depletion                 Nutrition Follow-Up    RD Follow-up?: Yes

## 2019-06-10 NOTE — PT/OT/SLP PROGRESS
Occupational Therapy  Treatment    Tea Georges   MRN: [id number]   Admitting Diagnosis: Subdural hemorrhage    OT Date of Treatment: 06/10/19       Billable Minutes:  Self Care/Home Management 56    General Precautions: Standard, aspiration, fall, vision impaired  Orthopedic Precautions: N/A  Braces: N/A    Spiritual, Cultural Beliefs, Christianity Practices, Values that Affect Care: no    Subjective:  Communicated with nurse prior to session.  Pt. Reported that she did not want the breakfast    Pain/Comfort  Pain Rating 1: 0/10  Pain Rating Post-Intervention 1: 0/10    Objective:  Patient found with: oxygen(supine in bed )    Occupational Performance:    Bed Mobility:    · Patient completed Supine to Sit with stand by assistance  · Patient completed Sit to Supine with stand by assistance     Functional Mobility/Transfers:  · Patient completed Sit <> Stand Transfer with contact guard assistance  with  no assistive device   · Patient completed Bed <> Chair Transfer using Stand Pivot technique with contact guard assistance with no assistive device  · Functional Mobility: not tested    Activities of Daily Living:  · Grooming: stand by assistance to brush teeth and wash face seated at sink  · Bathing: minimum assistance to steady in stand when washing [place] region  · Upper Body Dressing: stand by assistance to don/doff pull over shirt  · Lower Body Dressing: moderate assistance with assist to don left sock, initially thread RLE and to steady when managing over hips in stand at sink  [facility] 6 Click:  [facility] Total Score: 19    OT Exercises: n/a    Additional Treatment:  Pt. Educated on safety with transfers, self-care skills, LBD    Patient left supine with call button in reach, nurse notified and HOB elevated however pt. declining breakfast on this date    ASSESSMENT:  Tea Georges is a 79 y.o. female with a medical diagnosis of Subdural hemorrhage and presents with deficits in self-care skills, functional mobility and  endurance.  Pt. Appeared to be fatigued this date with LBD tasks.  Pt. Required increased assist for ADL task of LBD compared to previous sessions.     Rehab identified problem list/impairments: weakness, impaired endurance, impaired self care skills, impaired functional mobilty, impaired balance, visual deficits, decreased upper extremity function, impaired fine motor    Rehab potential is fair    Activity tolerance: Fair    Discharge recommendations: home with home health and light assist    Barriers to discharge:   none     Equipment recommendations: tub bench, commode     GOALS:   Multidisciplinary Problems     Occupational Therapy Goals        Problem: Occupational Therapy Goal    Goal Priority Disciplines Outcome Interventions   Occupational Therapy Goal     OT, PT/OT Ongoing (interventions implemented as appropriate)    Description:  Goals to be met by: 6/12/19    Patient will increase functional independence with ADLs by performing:    Feeding with Modified [place].  UE Dressing with Modified [place] seated in w/c.  LE Dressing (briefs) with Stand by Assistance.  Grooming while seated with Modified [place]. - Met  Toileting from toilet with Stand by Assistance for hygiene and clothing management.   Bathing from shower chair/bench with Stand-by Assistance.  Supine to sit with Modified [place]. Met 6/7/19  Stand pivot transfers with Supervision. Met 6/7/19  Toilet transfer to toilet with Supervision/ Stand By Assistance.  Upper extremity exercise program x15 reps per handout, with independence to build strength and endurance in B UE to improve functional independence with ADLs and IADLs.                        Plan:  Patient to be seen 6 x/week to address the above listed problems via self-care/home management, therapeutic activities, therapeutic exercises  Plan of Care expires: 06/29/19  Plan of Care reviewed with: patient    SHERI Chatman  06/10/2019

## 2019-06-10 NOTE — NURSING
To dialysis via w/c accompanied by transport personal. Denies pain or discomfort , assessment per flowsheet unchanged.

## 2019-06-10 NOTE — PT/OT/SLP PROGRESS
"Physical Therapy  Treatment    Tea Georges   MRN: [id number]   Admitting Diagnosis: Subdural hemorrhage    PT Received On: 06/10/19          Billable Minutes:  Gait Training 10, Therapeutic Activity 34, Therapeutic Exercise 22 and Total Time 66    Treatment Type: Treatment  PT/PTA: PT     PTA Visit Number: 0       General Precautions: Standard, vision impaired, aspiration, fall  Orthopedic Precautions: N/A   Braces: N/A     Subjective:  Communicated with patient prior to session.  Pt agreeable to session    Pain/Comfort  Pain Rating 1: 8/10  Location - Side 1: Right  Location - Orientation 1: generalized  Location 1: knee  Pain Addressed 1: (Pt able to continue therapy)  Pain Rating Post-Intervention 1: 8/10    Objective:  Patient found supine in bed with HOB elevated. Patient found with: oxygen     AM-PAC 6 CLICK MOBILITY  Total Score:17    Bed Mobility:  Sit>Supine: SBA for safety and verbal cueing (on mat)  Supine>Sit: SBA pt used BUE on side rails for assistance (on bed with HOB elevated); Kingsley to L hip (on mat)  Rolling to R: [place] to complete roll (on mat)  Rolling to L: SBA to cue compete roll (on mat)    Transfers:  Sit<>Stand: to/from w/c x2 trials c/ RW and CGA/SBA (x1 trial); [place] (x1 trial)  Stand Pivot Transfer: w/c to mat s/ AD and CGA;   Simulated car transfer: w/c to/from recumbent cross  c/ RW and min-modA   Pt educated on technique for car transfer.  Pt cued on hand placement for all transfers.    Gait:  Amb 20 ft c/ RW and CGA/SBA. Ambulation distance limited by fatigue.  Pt demonstrated forward trunk flexion and decreased limb clearance during ambulation.    Advanced Gait:  Stairs: ascend/descend 2 steps x1 trial laterally using BUE on RHR for stability and [place]. Mild instability noted.  Curb Step: Pt performed 4" curb step c/ RW and CGA/SBA.  PT demo'd techniques for advanced gait.  Pt verbally cued on techniques.    Wheelchair Mobility:  Patient propels w/c 24 ft CGA/SBA using BUE and " "BLE.  Pt educated and cued on w/c management.    Therex:  Seated, BLE 2x10:  -LAQ  -HF  -AP  -GS    Additional Treatment:  Recumbent cross  x10 min Level 2 to improve overall strength/endurance    Patient left up in chair with transportation present to take her to dialysis.    Assessment:  Tea Georges is a 79 y.o. female with a medical diagnosis of Subdural hemorrhage.  Pt keena session well but did report being "tired". Pt was able to complete all therapy today but had to take many rest breaks due to fatigue. Bed mobility goals no longer met. Pt not progressing towards goals due to increased lethargy over past few days. NP was notified via Epic messenger. Pt will continue to benefit from skilled PT.    Rehab identified problem list/impairments: weakness, impaired endurance, impaired functional mobilty, gait instability, impaired balance    Rehab potential is good.    Activity tolerance: Fair    Discharge recommendations: home with home health     Barriers to discharge: Inaccessible home environment, Decreased caregiver support    Equipment recommendations: tub bench     GOALS:   Multidisciplinary Problems     Physical Therapy Goals        Problem: Physical Therapy Goal    Goal Priority Disciplines Outcome Goal Variances Interventions   Physical Therapy Goal     PT, PT/OT Ongoing (interventions implemented as appropriate)     Description:  Goals to be met by: 2019    Patient will increase functional independence with mobility by performin. Supine to sit with Modified [place]   2. Sit to supine with Modified [place]  3. Sit to stand transfer with Supervision  4. Bed to chair transfer with Supervision using Rolling Walker or least restrictive assistive device  5. Gait  x 150 feet with Stand-by Assistance using Rolling Walker.  or least restrictive assistive device =not met  6. Wheelchair propulsion x150 feet with Supervision using bilateral upper extremities/bilateral lower " extremities  7. Ascend/descend 4 stair with right Handrails Stand-by Assistance .   8. Ascend/Descend 4 inch curb step with Stand-by Assistance using Rolling Walker. or least restrictive assistive device  9. Stand for 3 minutes with Stand-by Assistance using Rolling Walker or least restrictive assistive device and perform an activity- MET 6/5/2019  10. Lower extremity exercise program x20 reps per handout, with assistance as needed and gym therex                         PLAN:    Patient to be seen 5 x/week  to address the above listed problems via therapeutic activities, gait training, therapeutic exercises, wheelchair management/training  Plan of Care expires: 06/28/19  Plan of Care reviewed with: patient    John Tovar, SPT  06/10/2019

## 2019-06-10 NOTE — PLAN OF CARE
Problem: SLP Goal  Goal: SLP Goal  Speech Language Pathology Goals  Goals expected to be met by 6/13:  1. Pt will recall 3/3 facts or words given min cues following a 3 minute delay.   2. Pt will complete 4 step sequencing tasks with 70% accuracy given mod cues.  3. Pt will complete functional math/time/money management tasks with 80% accuracy given min-mod cues.  4. Pt will tolerate least restrictive diet without s/s of aspiration.     Goals expected to be met by 6/6:  1. Pt will recall 3/3 facts or words given min-mod cues following a 2 minute delay. Goal met  2. Pt will complete 4 step sequencing tasks with 70% accuracy given mod cues. ongoing  3. Pt will complete functional math/time/money management tasks with 80% accuracy given min-mod cues. ongoing  4. Pt will tolerate least restrictive diet without s/s of aspiration. ongoing          Outcome: Ongoing (interventions implemented as appropriate)  Pt more lethargic today.  Difficult to rouse from sleep.  PT noting steady decline in functional status and mobility over the past few days.  Providers notified via Secure Chat by PT.  D/c was scheduled for 6/11, but PT stating this date may no longer be appropriate.    BRITTON Novak, CCC-SLP  Speech Language Pathologist  [phone]  6/10/2019

## 2019-06-10 NOTE — PT/OT/SLP PROGRESS
"Speech Language Pathology  Treatment    Tea Georges   MRN: [id number]   Admitting Diagnosis: Subdural hemorrhage    Diet recommendations: Solid Diet Level: Regular, Chopped meat  Liquid Diet Level: Thin 1 bite/sip at a time, Alternating bites/sips, Avoid talking while eating, Frequent oral care, HOB to 90 degrees, Meds whole 1 at a time, Monitor for s/s of aspiration, Small bites/sips and Standard aspiration precautions    SLP Treatment Date: 06/10/19  Speech Start Time: 0945     Speech Stop Time: 1010     Speech Total (min): 25 min       TREATMENT BILLABLE MINUTES:  Speech Therapy Individual 17 and [place] Care/Home Management Training 8    Has the patient been evaluated by SLP for swallowing? : Yes  Keep patient NPO?: No   General Precautions: Standard, aspiration, fall, vision impaired  Current Respiratory Status: nasal cannula       Subjective:  "I'm just sleepy." "I wake up and don't go back to sleep." pt stated after finally rousing with maximum stimulation.  Pt denies feeling ill, but states she did not sleep well last night.      Objective:   Patient found with: oxygen    SLP attempted to rouse pt this morning, but pt did not rouse without light sternal rub.  She was unable to maintain alertness and indicated wishes to be allowed to rest at this time.  SLP returned later for 2nd attempt.  Max stimulation was required to rouse pt.  A wet wash cloth to face finally irritated pt and kept her awake.  Pt was responsive to orientation q's, but kept eyes closed for some time. Pt denied feeling ill, but stated she woke up last night and could not go back to sleep when asked why she was so sleepy.  Pt was O x 4.  Following a 3 minute delay, pt recalled 3/3 unrelated words.  Pt answered problem solving q's regarding hypothetical safety situations with 80% accuracy ind'ly/100% given cues.  During this task, education was provided regarding fall risks and importance of minimizing risks of falling in all environments.  Pt " expressed understanding.  Pt completed concrete convergent categorization tasks with 100% acc.  She named additional category items to these groups with 60% accuracy ind'ly/100% given cues.     Assessment:  Tea Georges is a 79 y.o. female with a medical diagnosis of Subdural hemorrhage and presents with cognitive-linguistic deficits.     Discharge recommendations: Discharge Facility/Level of Care Needs: home health speech therapy     Goals:   Multidisciplinary Problems     SLP Goals        Problem: SLP Goal    Goal Priority Disciplines Outcome   SLP Goal     SLP Ongoing (interventions implemented as appropriate)   Description:  Speech Language Pathology Goals  Goals expected to be met by 6/13:  1. Pt will recall 3/3 facts or words given min cues following a 3 minute delay.   2. Pt will complete 4 step sequencing tasks with 70% accuracy given mod cues.  3. Pt will complete functional math/time/money management tasks with 80% accuracy given min-mod cues.  4. Pt will tolerate least restrictive diet without s/s of aspiration.     Goals expected to be met by 6/6:  1. Pt will recall 3/3 facts or words given min-mod cues following a 2 minute delay. Goal met  2. Pt will complete 4 step sequencing tasks with 70% accuracy given mod cues. ongoing  3. Pt will complete functional math/time/money management tasks with 80% accuracy given min-mod cues. ongoing  4. Pt will tolerate least restrictive diet without s/s of aspiration. ongoing                            Plan:   Patient to be seen Therapy Frequency: 3 x/week  Planned Interventions: Cognitive-Linguistic Therapy, Dysphagia Therapy  Plan of Care expires: 06/30/19  Plan of Care reviewed with: patient  SLP Follow-up?: Yes              BRITTON Novak, IRAIS-SLP  06/10/2019     BRITTON Novak, CCC-SLP  Speech Language Pathologist  [phone]  6/10/2019

## 2019-06-11 VITALS
RESPIRATION RATE: 18 BRPM | HEIGHT: 63 IN | WEIGHT: 107.38 LBS | TEMPERATURE: 98 F | BODY MASS INDEX: 19.03 KG/M2 | HEART RATE: 54 BPM | OXYGEN SATURATION: 100 % | SYSTOLIC BLOOD PRESSURE: 149 MMHG | DIASTOLIC BLOOD PRESSURE: 65 MMHG

## 2019-06-11 LAB
POCT GLUCOSE: 112 MG/DL (ref 70–110)
POCT GLUCOSE: 136 MG/DL (ref 70–110)
POCT GLUCOSE: 152 MG/DL (ref 70–110)
POCT GLUCOSE: 170 MG/DL (ref 70–110)

## 2019-06-11 PROCEDURE — 97535 SELF CARE MNGMENT TRAINING: CPT

## 2019-06-11 PROCEDURE — 11000004 HC SNF PRIVATE

## 2019-06-11 PROCEDURE — 97530 THERAPEUTIC ACTIVITIES: CPT

## 2019-06-11 PROCEDURE — 97116 GAIT TRAINING THERAPY: CPT

## 2019-06-11 PROCEDURE — 25000003 PHARM REV CODE 250: Performed by: HOSPITALIST

## 2019-06-11 PROCEDURE — 25000003 PHARM REV CODE 250: Performed by: NURSE PRACTITIONER

## 2019-06-11 RX ORDER — AMOXICILLIN 250 MG
1 CAPSULE ORAL DAILY PRN
Status: ON HOLD | COMMUNITY
Start: 2019-06-11 | End: 2021-03-10 | Stop reason: HOSPADM

## 2019-06-11 RX ORDER — AMOXICILLIN 250 MG
1 CAPSULE ORAL 2 TIMES DAILY
Status: ON HOLD | COMMUNITY
Start: 2019-06-11 | End: 2019-07-01 | Stop reason: HOSPADM

## 2019-06-11 RX ORDER — ACETAMINOPHEN 325 MG/1
650 TABLET ORAL EVERY 6 HOURS PRN
Refills: 0 | COMMUNITY
Start: 2019-06-11

## 2019-06-11 RX ORDER — ONDANSETRON 8 MG/1
8 TABLET, ORALLY DISINTEGRATING ORAL EVERY 6 HOURS PRN
Qty: 30 TABLET | Refills: 2 | Status: SHIPPED | OUTPATIENT
Start: 2019-06-11

## 2019-06-11 RX ORDER — HYDRALAZINE HYDROCHLORIDE 100 MG/1
100 TABLET, FILM COATED ORAL EVERY 8 HOURS
Qty: 90 TABLET | Refills: 3 | Status: ON HOLD | OUTPATIENT
Start: 2019-06-11 | End: 2020-09-26 | Stop reason: SDUPTHER

## 2019-06-11 RX ORDER — POLYETHYLENE GLYCOL 3350 17 G/17G
17 POWDER, FOR SOLUTION ORAL DAILY
Refills: 0 | Status: ON HOLD
Start: 2019-06-11 | End: 2021-07-26 | Stop reason: HOSPADM

## 2019-06-11 RX ADMIN — ONDANSETRON 8 MG: 8 TABLET, ORALLY DISINTEGRATING ORAL at 12:06

## 2019-06-11 RX ADMIN — ONDANSETRON 8 MG: 8 TABLET, ORALLY DISINTEGRATING ORAL at 06:06

## 2019-06-11 RX ADMIN — SEVELAMER CARBONATE 0.8 G: 800 POWDER, FOR SUSPENSION ORAL at 04:06

## 2019-06-11 RX ADMIN — SEVELAMER CARBONATE 0.8 G: 800 POWDER, FOR SUSPENSION ORAL at 08:06

## 2019-06-11 RX ADMIN — AMLODIPINE BESYLATE 10 MG: 10 TABLET ORAL at 08:06

## 2019-06-11 RX ADMIN — HYDRALAZINE HYDROCHLORIDE 100 MG: 50 TABLET ORAL at 06:06

## 2019-06-11 RX ADMIN — FLUTICASONE FUROATE AND VILANTEROL TRIFENATATE 1 PUFF: 200; 25 POWDER RESPIRATORY (INHALATION) at 08:06

## 2019-06-11 RX ADMIN — ONDANSETRON 8 MG: 8 TABLET, ORALLY DISINTEGRATING ORAL at 05:06

## 2019-06-11 RX ADMIN — HYDRALAZINE HYDROCHLORIDE 100 MG: 50 TABLET ORAL at 01:06

## 2019-06-11 RX ADMIN — SEVELAMER CARBONATE 0.8 G: 800 POWDER, FOR SUSPENSION ORAL at 12:06

## 2019-06-11 RX ADMIN — SENNOSIDES AND DOCUSATE SODIUM 1 TABLET: 8.6; 5 TABLET ORAL at 09:06

## 2019-06-11 RX ADMIN — HYDRALAZINE HYDROCHLORIDE 100 MG: 50 TABLET ORAL at 09:06

## 2019-06-11 RX ADMIN — CARVEDILOL 12.5 MG: 12.5 TABLET, FILM COATED ORAL at 09:06

## 2019-06-11 RX ADMIN — CARVEDILOL 12.5 MG: 12.5 TABLET, FILM COATED ORAL at 08:06

## 2019-06-11 NOTE — HPI
Ms Georges a 79 y.o. 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 Nsgy decided to utilize Non-surgical treatment options.      ROS  Constitutional: Negative for fever and malaise/fatigue.   Eyes: Left eye: Negative for blurred vision and discharge. + Visual disturbance, Rt eye blind.   Respiratory: Negative for cough, shortness of breath and wheezing.    Cardiovascular: Negative for chest pain, palpitations, claudication, and leg swelling.   Gastrointestinal: Negative for abdominal pain, constipation, diarrhea, nausea and vomiting.   Genitourinary: Negative for dysuria, frequency and urgency.   Musculoskeletal:  + generalized weakness. Negative for back pain and myalgias.   Skin: Negative for itching and rash.   Neurological: Negative for dizziness, speech change, seizures, and headaches.   Psychiatric/Behavioral: Negative for depression. The patient is not nervous/anxious.       PEx  Constitutional: Patient appears well-developed and in no distress   HENT:   Head: Normocephalic and atraumatic.   Eyes: + Right eye blindness and baseline difficulty opening eyelids   Neck: Normal range of motion. Neck supple.   Cardiovascular: Normal rate, regular rhythm and normal heart sounds.    Pulmonary/Chest: Effort normal and breath sounds are clear  Abdominal: Soft. Bowel sounds are normal.   Musculoskeletal: Normal range of motion. + generalized weakness  Neurological: Alert and oriented to person, place, and time.   Skin: Skin is warm and dry.   Psychiatric: Normal mood and affect. Behavior is normal.

## 2019-06-11 NOTE — PT/OT/SLP PROGRESS
Physical Therapy  Treatment/Family Training/Discharge Summary    Tea Georges   MRN: [id number]   Admitting Diagnosis: Subdural hemorrhage    PT Received On: 06/11/19        Billable Minutes:  Gait Training 8, Therapeutic Activity 17 and Total Time 25    Treatment Type: Treatment  PT/PTA: PT     PTA Visit Number: 0       General Precautions: Standard, vision impaired, aspiration, fall  Orthopedic Precautions: N/A   Braces: N/A    Subjective:  Communicated with patient and her granddaughter prior to session.  Pt and granddaughter agreeable to family training session prior to her D/C home.    Pain/Comfort  Pain Rating 1: 0/10    Objective:  Patient found sitting in w/c. Patient found with: oxygen     AM-PAC 6 CLICK MOBILITY  Total Score:18    Bed Mobility:  Not performed    Transfers:  Sit<>Stand: to/from w/c (3 trials) w/ RW and Cristofer to rise  Car transfer (simulation): w/c<>nustep w/ RW and Cristofer to rise  Cueing for forward scoot/lean and for foot placement    Gait:  Amb 2 short trials (each ~15ft) w/ RW and CGA for safety  Cueing for posture and to remain inside RW     Advanced Gait:  Stairs: asc/lotus 3 steps w/ BUE on RHR and Cristofer for stability  Cueing for technique     Additional Treatment:  Pt's daughter observed and participated in assisting pt w/ transfers, ambulation, stairs, and car transfer. She demo'd understanding of all functional tasks w/ use of RW. Her granddaughter was informed that pt requires Min/CGA for all mobility tasks for safety and fall prevention. Pt and her grand daughter were also educated on the role of HH therapy. Both verb understanding.    Patient left up in chair with all lines intact and call button in reach.    Assessment:  Tea Georges is a 79 y.o. female with a medical diagnosis of Subdural hemorrhage.  Pt keena session well. She is scheduled for D/C home this PM w/ assistance from her family for safety along w/ home health for continued progress. Family training was completed  successfully. Pt is appropriate for D/C home.    Rehab identified problem list/impairments: weakness, impaired endurance, impaired functional mobilty, gait instability, impaired balance    Rehab potential is good.    Activity tolerance: Good    Discharge recommendations: home with home health     Barriers to discharge: Inaccessible home environment, Decreased caregiver support    Equipment recommendations: tub bench     GOALS:   Multidisciplinary Problems     Physical Therapy Goals        Problem: Physical Therapy Goal    Goal Priority Disciplines Outcome Goal Variances Interventions   Physical Therapy Goal     PT, PT/OT Unable to achieve outcome(s) by discharge     Description:  Goals to be met by: 2019    Patient will increase functional independence with mobility by performin. Supine to sit with Modified [place]- not met  2. Sit to supine with Modified [place]- not met  3. Sit to stand transfer with Supervision- not met  4. Bed to chair transfer with Supervision using Rolling Walker or least restrictive assistive device- not met  5. Gait  x 150 feet with Stand-by Assistance using Rolling Walker.  or least restrictive assistive device =not met  6. Wheelchair propulsion x150 feet with Supervision using bilateral upper extremities/bilateral lower extremities- not met  7. Ascend/descend 4 stair with right Handrails Stand-by Assistance . - not met  8. Ascend/Descend 4 inch curb step with Stand-by Assistance using Rolling Walker. or least restrictive assistive device- not met  9. Stand for 3 minutes with Stand-by Assistance using Rolling Walker or least restrictive assistive device and perform an activity- MET 2019  10. Lower extremity exercise program x20 reps per handout, with assistance as needed and gym therex                           PLAN:    D/C home w/ home health and family assistance.  Plan of Care expires: 19  Plan of Care reviewed with: patient, grandchild(tex PRATER  David, PT  06/11/2019

## 2019-06-11 NOTE — PLAN OF CARE
Problem: Occupational Therapy Goal  Goal: Occupational Therapy Goal  Goals to be met by: 6/12/19    Patient will increase functional independence with ADLs by performing:    Feeding with Modified [place].  UE Dressing with Modified [place] seated in w/c.  LE Dressing (briefs) with Stand by Assistance.  Grooming while seated with Modified [place]. - Met  Toileting from toilet with Stand by Assistance for hygiene and clothing management.   Bathing from shower chair/bench with Stand-by Assistance.  Supine to sit with Modified [place]. Met 6/7/19  Stand pivot transfers with Supervision. Met 6/7/19  Toilet transfer to toilet with Supervision/ Stand By Assistance.  Upper extremity exercise program x15 reps per handout, with independence to build strength and endurance in B UE to improve functional independence with ADLs and IADLs.       Outcome: Ongoing (interventions implemented as appropriate)  Patient goals are appropriate.

## 2019-06-11 NOTE — PT/OT/SLP PROGRESS
Occupational Therapy  Treatment    Tea Georges   MRN: [id number]   Admitting Diagnosis: Subdural hemorrhage    OT Date of Treatment: 06/11/19       Billable Minutes:  Self Care/Home Management 30    General Precautions: Standard, aspiration, fall, vision impaired  Orthopedic Precautions: N/A  Braces: N/A    Spiritual, Cultural Beliefs, Hoahaoism Practices, Values that Affect Care: no    Subjective:  Communicated with patient prior to session.    Pain/Comfort  Pain Rating 1: 0/10  Pain Rating Post-Intervention 1: 0/10    Objective:   Patient found supine with head of bed slightly elevated and granddaughter present.     Occupational Performance:    Bed Mobility:    · Patient completed Supine to Sit with supervision     Functional Mobility/Transfers:  · Patient completed Sit <> Stand Transfer with stand by assistance  with  rolling walker   · Patient completed Bed <> wheelchair Transfer using Stand Pivot technique with stand by assistance with no assistive device  · Patient completed Toilet Transfer bed <> BSCambulation  technique with stand by assistance with  rolling walker   · Patient completed tub transfer w/c <> tub bench with SBA with no AD  · Functional Mobility: Patient ambulated ~3-4 feet in room using rolling walker with SBA.     Activities of Daily Living:  · Lower Body Dressing: supervision pulling pants over hips while standing at rolling walker  · Toileting: (simulated) supervision for t/f     AMPA 6 Click:  AMPAC Total Score: 19    Patient left up in chair with call button in reach and granddaughter present    ASSESSMENT:  Tea Georges is a 79 y.o. female with a medical diagnosis of Subdural hemorrhage and presents with the deficits listed below. Granddaughter was present for family training. Patietn expressed several times how she did not want to do the family training. Educated patient and granddaughter on safety with t/fs, dressing, and toileting. Both verbalized understanding. Patient expressed  interest in tub bench for home use. Neither patient nor granddaughter have any concerns about patient returning home.     Rehab identified problem list/impairments: weakness, impaired endurance, impaired self care skills, impaired functional mobilty, impaired balance, visual deficits, decreased upper extremity function, impaired fine motor    Rehab potential is good    Activity tolerance: Good    Discharge recommendations: home with home health     Barriers to discharge:       Equipment recommendations: tub bench, commode     GOALS:   Multidisciplinary Problems     Occupational Therapy Goals        Problem: Occupational Therapy Goal    Goal Priority Disciplines Outcome Interventions   Occupational Therapy Goal     OT, PT/OT Ongoing (interventions implemented as appropriate)    Description:  Goals to be met by: 6/12/19    Patient will increase functional independence with ADLs by performing:    Feeding with Modified [place].  UE Dressing with Modified [place] seated in w/c.  LE Dressing (briefs) with Stand by Assistance.  Grooming while seated with Modified [place]. - Met  Toileting from toilet with Stand by Assistance for hygiene and clothing management.   Bathing from shower chair/bench with Stand-by Assistance.  Supine to sit with Modified [place]. Met 6/7/19  Stand pivot transfers with Supervision. Met 6/7/19  Toilet transfer to toilet with Supervision/ Stand By Assistance.  Upper extremity exercise program x15 reps per handout, with independence to build strength and endurance in B UE to improve functional independence with ADLs and IADLs.                        Plan:  Patient to be seen 6 x/week to address the above listed problems via self-care/home management, therapeutic activities, therapeutic exercises  Plan of Care expires: 06/29/19  Plan of Care reviewed with: patient, grandchild(li)    EDVIN Donis  06/11/2019

## 2019-06-11 NOTE — NURSING
DISCHARGE INSTRUCTIONS WERE GIVEN EARLIER TODAY AND PATIENT PLUS GRANDDAUGHTER UNDERSTANDS.WAITING FOR RIDE AND PORTABLE OXYGEN TO ARRIVE FROM HOME.

## 2019-06-11 NOTE — PLAN OF CARE
Problem: Physical Therapy Goal  Goal: Physical Therapy Goal  Goals to be met by: 2019    Patient will increase functional independence with mobility by performin. Supine to sit with Modified [place]- not met  2. Sit to supine with Modified [place]- not met  3. Sit to stand transfer with Supervision- not met  4. Bed to chair transfer with Supervision using Rolling Walker or least restrictive assistive device- not met  5. Gait  x 150 feet with Stand-by Assistance using Rolling Walker.  or least restrictive assistive device =not met  6. Wheelchair propulsion x150 feet with Supervision using bilateral upper extremities/bilateral lower extremities- not met  7. Ascend/descend 4 stair with right Handrails Stand-by Assistance . - not met  8. Ascend/Descend 4 inch curb step with Stand-by Assistance using Rolling Walker. or least restrictive assistive device- not met  9. Stand for 3 minutes with Stand-by Assistance using Rolling Walker or least restrictive assistive device and perform an activity- MET 2019  10. Lower extremity exercise program x20 reps per handout, with assistance as needed and gym therex         Outcome: Unable to achieve outcome(s) by discharge  Pt has not met LTGs due to slow progress. She is appropriate for D/C home due to completion of family training. Pt will also receive home health therapy for continued progress.    Lorelei Hernandez, PT  2019

## 2019-06-11 NOTE — DISCHARGE SUMMARY
[facility] - [facility]  Discharge Summary      Patient Name: Tea Georges  MRN: [id number]  Admission Date: 5/28/2019  Hospital Length of Stay: 14 days  Discharge Date and Time:  06/11/2019 11:14 AM  Attending Physician: Annette Ortega MD   Discharging Provider: Ousmane Becker NP  Primary Care Provider: Parth Posadas Ii, MD      HPI:   Ms Georges a 79 y.o. 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 Nsgy decided to utilize Non-surgical treatment options.      ROS  Constitutional: Negative for fever and malaise/fatigue.   Eyes: Left eye: Negative for blurred vision and discharge. + Visual disturbance, Rt eye blind.   Respiratory: Negative for cough, shortness of breath and wheezing.    Cardiovascular: Negative for chest pain, palpitations, claudication, and leg swelling.   Gastrointestinal: Negative for abdominal pain, constipation, diarrhea, nausea and vomiting.   Genitourinary: Negative for dysuria, frequency and urgency.   Musculoskeletal:  + generalized weakness. Negative for back pain and myalgias.   Skin: Negative for itching and rash.   Neurological: Negative for dizziness, speech change, seizures, and headaches.   Psychiatric/Behavioral: Negative for depression. The patient is not nervous/anxious.       PEx  Constitutional: Patient appears well-developed and in no distress   HENT:   Head: Normocephalic and atraumatic.   Eyes: + Right eye blindness and baseline difficulty opening eyelids   Neck: Normal range of motion. Neck supple.   Cardiovascular: Normal rate, regular rhythm and normal heart sounds.    Pulmonary/Chest: Effort normal and breath sounds are clear  Abdominal: Soft. Bowel sounds are normal.   Musculoskeletal: Normal range of motion. + generalized weakness  Neurological: Alert  "and oriented to person, place, and time.   Skin: Skin is warm and dry.   Psychiatric: Normal mood and affect. Behavior is normal.      * No surgery found *      Hospital Course:   Patient progressed well with PT and OT. Patient had no significant events during their stay at SNF. Home health was set up. DME was ordered if needed. Follow up appointment have been set up. All prescriptions and discharge instructions were given to patient.          Consults:   Consults (From admission, onward)        Status Ordering Provider     Inpatient consult to Registered Dietitian/Nutritionist  Once     Provider:  (Not yet assigned)    Completed YAMILETH AC     Inpatient consult to Registered Dietitian/Nutritionist  Once     Provider:  (Not yet assigned)    Completed ASHKAN REN          No new Assessment & Plan notes have been filed under this hospital service since the last note was generated.  Service: Hospital Medicine    Final Active Diagnoses:    Diagnosis Date Noted POA    PRINCIPAL PROBLEM:  Subdural hemorrhage [I62.00] 05/24/2019 Yes    Moderate malnutrition [E44.0] 05/30/2019 Yes      Problems Resolved During this Admission:       Discharged Condition: good    Disposition: Home or Self Care    Follow Up:  Follow-up Information     Parth Posadas Ii, MD In 1 week.    Specialty:  Internal Medicine  [facility] information:  [address] [facility] [id number] [phone]             [facility].    Specialty:  Home Health Services  Why:  Agency will call pt to schedule a home health assessment.  Contact information:  [address] [place] [address] [phone]                 Patient Instructions:      TRANSFER TUB BENCH FOR HOME USE     Order Specific Question Answer Comments   Type of Transfer Tub Bench: Unpadded    Height: 5' 3" (1.6 m)    Weight: 46.8 kg (103 lb 2.8 oz)    Does patient have medical equipment at home? cane, straight    Does patient have medical equipment at home? " oxygen    Does patient have medical equipment at home? walker, rolling    Does patient have medical equipment at home? wheelchair    Length of need (1-99 months): 99      Ambulatory Referral to Outpatient Case Management   Referral Priority: Routine Referral Type: Consultation   Referral Reason: Specialty Services Required   Number of Visits Requested: 1     No driving until:   Order Comments: Until cleared by Primary Care MD     Notify your health care provider if you experience any of the following:  temperature >100.4     Notify your health care provider if you experience any of the following:  persistent nausea and vomiting or diarrhea     Notify your health care provider if you experience any of the following:  severe uncontrolled pain     Notify your health care provider if you experience any of the following:  redness, tenderness, or signs of infection (pain, swelling, redness, odor or green/yellow discharge around incision site)     Notify your health care provider if you experience any of the following:  difficulty breathing or increased cough     Notify your health care provider if you experience any of the following:  severe persistent headache     Notify your health care provider if you experience any of the following:  persistent dizziness, light-headedness, or visual disturbances     Notify your health care provider if you experience any of the following:  increased confusion or weakness     Activity as tolerated       Significant Diagnostic Studies: Labs: All labs within the past 24 hours have been reviewed    Pending Diagnostic Studies:     None         Medications:  Reconciled Home Medications:      Medication List      START taking these medications    acetaminophen 325 MG tablet  Commonly known as:  TYLENOL  Take 2 tablets (650 mg total) by mouth every 6 (six) hours as needed for Pain.     artificial tears 0.5 % ophthalmic solution  Commonly known as:  ISOPTO TEARS  Place 2 drops into both eyes 4  (four) times daily as needed.     ondansetron 8 MG Tbdl  Commonly known as:  ZOFRAN-ODT  Take 1 tablet (8 mg total) by mouth every 6 (six) hours as needed (nausea).     polyethylene glycol 17 gram Pwpk  Commonly known as:  GLYCOLAX  Take 17 g by mouth once daily.     * senna-docusate 8.6-50 mg 8.6-50 mg per tablet  Commonly known as:  PERICOLACE  Take 1 tablet by mouth daily as needed for Constipation.     * senna-docusate 8.6-50 mg 8.6-50 mg per tablet  Commonly known as:  PERICOLACE  Take 1 tablet by mouth 2 (two) times daily.         * This list has 2 medication(s) that are the same as other medications prescribed for you. Read the directions carefully, and ask your doctor or other care provider to review them with you.            CHANGE how you take these medications    hydrALAZINE 100 MG tablet  Commonly known as:  APRESOLINE  Take 1 tablet (100 mg total) by mouth every 8 (eight) hours.  What changed:    · medication strength  · how much to take        CONTINUE taking these medications    albuterol 90 mcg/actuation inhaler  Commonly known as:  PROVENTIL/VENTOLIN HFA  Inhale 1-2 puffs into the lungs every 6 (six) hours as needed for Wheezing.     amLODIPine 10 MG tablet  Commonly known as:  NORVASC  Take 1 tablet (10 mg total) by mouth once daily.     carvedilol 12.5 MG tablet  Commonly known as:  COREG  Take 1 tablet (12.5 mg total) by mouth 2 (two) times daily.     ergocalciferol 50,000 unit Cap  Commonly known as:  ERGOCALCIFEROL  Take 1 capsule (50,000 Units total) by mouth every 7 days.     fluticasone furoate-vilanterol 200-25 mcg/dose Dsdv diskus inhaler  Commonly known as:  BREO ELLIPTA  Inhale 1 puff into the lungs once daily.     losartan 50 MG tablet  Commonly known as:  COZAAR  Take 1 tablet (50 mg total) by mouth once daily.     RENVELA 800 mg Tab  Generic drug:  sevelamer carbonate  Take 1 tablet (800 mg total) by mouth 3 (three) times daily with meals.        STOP taking these medications     aspirin 81 MG Chew     loperamide 2 mg capsule  Commonly known as:  IMODIUM            Indwelling Lines/Drains at time of discharge:   Lines/Drains/Airways     Drain                 Hemodialysis AV Fistula Left upper arm -- days         Hemodialysis AV Fistula 05/23/16 0700 Left upper arm 1114 days                Time spent on the discharge of patient: 36 minutes  Patient was seen and examined on the date of discharge and determined to be suitable for discharge.         Ousmane Becker NP  Department of Hospital Medicine  [facility] - Skilled Nursing Care

## 2019-06-11 NOTE — HOSPITAL COURSE
Patient progressed well with PT and OT. Patient had no significant events during their stay at SNF. Home health was set up. DME was ordered if needed. Follow up appointment have been set up. All prescriptions and discharge instructions were given to patient.

## 2019-06-12 ENCOUNTER — TELEPHONE (OUTPATIENT)
Dept: NEUROSURGERY | Facility: CLINIC | Age: 79
End: 2019-06-12

## 2019-06-12 ENCOUNTER — TELEPHONE (OUTPATIENT)
Dept: HOME HEALTH SERVICES | Facility: HOSPITAL | Age: 79
End: 2019-06-12

## 2019-06-12 NOTE — PROGRESS NOTES
Notified  pt and granddaughter reported the pt did not have transportation home. [facility] transportation set up to transfer pt home.

## 2019-06-13 ENCOUNTER — OUTPATIENT CASE MANAGEMENT (OUTPATIENT)
Dept: ADMINISTRATIVE | Facility: OTHER | Age: 79
End: 2019-06-13

## 2019-06-13 ENCOUNTER — TELEPHONE (OUTPATIENT)
Dept: INTERNAL MEDICINE | Facility: CLINIC | Age: 79
End: 2019-06-13

## 2019-06-13 NOTE — TELEPHONE ENCOUNTER
----- Message from Meghan Dumont RN sent at 6/13/2019 10:21 AM CDT -----  Contact: CASSIDY Man Dr./Staff:    Ms. Georges is in need of a post-SNF appt with PCP, ASAP.   Please schedule on Tues or Thurs--------pt attends dialysis M-W-F.  Please advise. Thank you.    FYI---  Pt was d/cd on 6/11 from SNF, is staying temporarily with her brother and sister-in-law who are not prepared or willing to care for pt long-term. Possible NHP was mentioned by sister-in-law.   Pt is reported to have of her rx meds together in one med bottle. Pt limited in her knowledge or capacity to manage her meds and no one else knows what she is taking.   I understood, Mr. Ricardo Pena- brother say that pt's daughter cared for pt prior to latest fall with SDH, however pt's home on [address] is without water, gas, electricity for several months-- because the daughter was not paying the bills.   [facility] initial visit is pending. Current address at brother's provided.     Sincerely,  SUYAPA Man, RN, CCM [facility]  [email or web address]  TEL:  [phone]

## 2019-06-13 NOTE — LETTER
June 13, 2019    Tea Georges  [address] [place] [address]             [facility] [address] [place] [address] Dear Ms. Tea Georges:    Welcome to [facility] Program.  It was a pleasure talking with you today.  My name is Meghan Dumont RN and I look forward to being your Care Manager.  My goal is to help you function at the healthiest and highest level possible.    You can contact me directly at [phone]  Office Hours Mon-Fri, 8 am-4:30pm  [facility] Main Office- TEL:  [phone]     Office Hours Mon-Fri, 8 am - 4:30 pm   [facility], 24/7 Nurse Help Line (non emergent)- TEL:  [phone]    As an [facility] patient, some of the services we may be able to provide include:      Development of an individualized care plan with a Registered Nurse    Connection with a    Connection with available resources and services     Coordinate communication among your care team members    Provide coaching and education    Help you understand your doctors treatment plan   Help you obtain information about your insurance coverage.     All services provided by [facility] Complex Care Managers and other care team members are coordinated with and communicated to your primary care team.    As part of your enrollment, you will be receiving education materials and more information about these services in your [facility] account, by phone or through the mail.  If you do not wish to participate or receive information, please contact our office at [phone].      Sincerely,      Meghan Dumont RN  [facility]   Out-patient RN Complex Care Manager

## 2019-06-13 NOTE — PROGRESS NOTES
6/13/19  Summary:  Patient referred to OPCM for high risk. Spoke with patient telephonically. Explained OPCM program. Patient hands phone to her sister-in-law, Zohra Pena. Patient and Zohra are in agreement to have OPCM assist & manage health issues. S/p ED/HOSP 5/21-5/28/19 for Subdural hematoma 2* fall, SNF 5/28-6/11/19. Discharged from SNF to the home of pt's brother, Ricardo Pena and spouse, Zohra. [facility]  ordered, pt on continuous O2. Pertinent h/o-- ESRD/HD (M-W-F), HTN, COPD/Emphysema, DM2 (last serum glucose=152),CHF (EF= 53%), Mod Malnutrition (5/28/19 Albumin = 3.0).    Zohra stresses that pt's stay in her home is temporary and she expresses NHP may be appropriate. Pt was up all night. Zohra reports pt's home on [address] is without electricity currently. Pt usually lives with granddaughter but Zohra doesn't know where the granddaughter is residing. Zohra agrees to have names/contacts for Zohra and Ricardo added to Demographics/contacts added now.  Zohra asked to be called back  since someone is at the door.   RN CM called back -Completed Initial/RN Assessments/MED REC/PHQ9-- Unable to complete Med Rec-- pt/family can not say what meds pt should be taking, plus pt has all rxs combined into one med bottle according to Zohra. Pt says she is taking Hydralazine; can not report on other rxs. No future appts in Epic. Pt in need of SNF PCP appt. Zohra states pt requires total care assistance with ADLs/IADLs. Pt able to self-feed. Appetite fair. Zohra asks how long the assessment will be as she is preparing breakfast for pt. Zohra hands ph to her spouse/pt's brother, Ricardo. Voice quality of pt's phone muffled, communication strained. Ricardo speaks briefly with RN CM, alludes to concerns regarding pt's care at her own home--where there has been no water, no gas, no electricity for several months and where pt was living with her daughter & granddaughter. Daughter was suppose to be  paying the household bills. That is why, Ricardo states, they brought pt to his home so that pt's care can be sorted out. Ricardo reports the [employer] ([facility] ) arrived yest to bring pt to dialysis. Due to the time it took to assist pt out of house, the van left before pt boarded leaving Ricardo to bring pt to dialysis and plans bring pt to dialysis tomorrow. Ricardo reports pt is staying in w/c, legs are weak & not amb like pt was while at SNF-amb with guarded contact assist per SNF PT. Zohra states pt in need of a walker.Ricardo hands ph back to Zohra who is eager to get pt's breakfast ready.   ZohraRicardo and pt agree to f/u PCP appt, agree to f/u call from RN CM and contact by OPCM LCSW. Zohra and Ricardo state their knowledge of [facility] call to schedule initial visit.     Interventions:  Verified with Kassy BENJAMIN INTAKE at [facility] that referral received--- pending initial home visit. Provided pt's current address -brother, Ricardo Pena and spouse, Zohra--[address] [address] and contact number for each. .   Provided encouragement that with [facility] services and [facility] RN/LCSW services will assist in helping to coordinate pt care but that it will be a process.   Instructed both Zohra and Ricardo to expect call from [facility] on pt's phone and to mention the need for walker.    In basket message to Dr. Posadas, PCP to schedule post SNF appt ASAP given the unique circumstances and med confusion resulting in non-compliance/safety issues  Referred to OPCM LCSW for following barriers:  Support, Housing, Financial Support, Disaster Plan, Advanced Care Planning.   Provided contact for this RN CM and mailed the same plus [facility] contact, a welcome letter and fall prevention education material. ( [address] [address] and contact number for each) .     Plan:  F/u on PCP appt scheduling ASAP.   MEDS---Check on Pill Packaging with PCP, Complete Med Rec  Resources--- f/u on O[facility] LCSW assessment  Safety--  f/u on receipt of welcome letter and fall prevention, check on O[facility] services, DME needs--walker.    Todays [facility] Self-Management Care Plan was developed with the patients/caregivers input and was based on identified barriers from todays assessment.  Goals were written today with the patient/caregiver and the patient has agreed to work towards these goals to improve his/her overall well-being. Patient verbalized understanding of the care plan, goals, and all of today's instructions. Encouraged patient/caregiver to communicate with his/her physician and health care team about health conditions and the treatment plan.  Provided my contact information today and encouraged patient/caregiver to call me with any questions as needed.

## 2019-06-14 ENCOUNTER — OUTPATIENT CASE MANAGEMENT (OUTPATIENT)
Dept: ADMINISTRATIVE | Facility: OTHER | Age: 79
End: 2019-06-14

## 2019-06-14 NOTE — PROGRESS NOTES
6/14/19  Summary:  Chart review. Attempt to reach pt to schedule PCP appt made- no answer.   F/u call to pt- phone rings no answer. Called and spoke with brother, Ricardo Pena who responded to message left on his phone. Ricardo is unaware of call from Dr. Posadas's clinic. Ricardo hands phone to his spouse, Zohra. Zohra is busy bathing pt in preparation to attend HD.    Interventions:  Strongly urged pt's brother and sister-in-law to call and schedule a PCP appt to clarify pt's meds/treatment plan. Provided clinic schedule #. Ricardo comments that this number is the same for his PCP.  Provided brother with [employer]'s reservation ph #--[phone] in response to his request to inform [facility] [facility] of address change. Meanwhile, Ricardo says he will bring pt to dialysis.   Messaged Dr Posadas/Staff with contact for Ricardo Pena for appt scheduling--to ensure appt scheduled.     Plan:  F/u on PCP appt scheduling.  MEDS---Check on Pill Packaging with PCP, Complete Med Rec  Resources--- f/u on O[facility] LCSW assessment  Safety-- f/u on receipt of welcome letter and fall prevention, check on [facility] services, DME needs--walker      Todays O[facility] Self-Management Care Plan was developed with the patients/caregivers input and was based on identified barriers from todays assessment.  Goals were written today with the patient/caregiver and the patient has agreed to work towards these goals to improve his/her overall well-being. Patient verbalized understanding of the care plan, goals, and all of today's instructions. Encouraged patient/caregiver to communicate with his/her physician and health care team about health conditions and the treatment plan.  Provided my contact information today and encouraged patient/caregiver to call me with any questions as needed.

## 2019-06-16 PROCEDURE — G0180 MD CERTIFICATION HHA PATIENT: HCPCS | Mod: ,,, | Performed by: INTERNAL MEDICINE

## 2019-06-16 PROCEDURE — G0180 PR HOME HEALTH MD CERTIFICATION: ICD-10-PCS | Mod: ,,, | Performed by: INTERNAL MEDICINE

## 2019-06-18 ENCOUNTER — OUTPATIENT CASE MANAGEMENT (OUTPATIENT)
Dept: ADMINISTRATIVE | Facility: OTHER | Age: 79
End: 2019-06-18

## 2019-06-18 ENCOUNTER — TELEPHONE (OUTPATIENT)
Dept: INTERNAL MEDICINE | Facility: CLINIC | Age: 79
End: 2019-06-18

## 2019-06-18 NOTE — TELEPHONE ENCOUNTER
----- Message from Meghan Dumont RN sent at 6/18/2019  1:58 PM CDT -----  Contact: Meghan Dumont RN  Addendum:    Also-- pt would be good candidate for pill packaging at the [facility] at the time of PCP appt.     Meghan   ----- Message -----  From: Meghan Dumont RN  Sent: 6/18/2019   1:50 PM  To: [facility] Staff    Dr. Posadas/Staff:    Please note-- pt attends dialysis M-W-F. Pt's PCP appt is on Fri 6/28 at the time she goes to dialysis at 11 am.     Please contact pt's son, Ricardo Pena TEL: [phone] to change the date to a Tues or Thurs ASAP. Don't know what med pt is taking. All are in the same pill bottle. Medications need to be reconciled.   Ricardo tell this RN CM that Tea Georges has left a message for Dr Posadas to call her. (I don't know what it concerns).   [facility] started [facility] visits.     Please advise.   SUYAPA Man, RN, CCM [facility]  [email or web address]  TEL:  [phone]

## 2019-06-19 ENCOUNTER — OUTPATIENT CASE MANAGEMENT (OUTPATIENT)
Dept: ADMINISTRATIVE | Facility: OTHER | Age: 79
End: 2019-06-19

## 2019-06-21 ENCOUNTER — HOSPITAL ENCOUNTER (INPATIENT)
Facility: HOSPITAL | Age: 79
LOS: 7 days | Discharge: NURSING, REHAB OR LONG-TERM CARE | DRG: 025 | End: 2019-07-01
Attending: EMERGENCY MEDICINE | Admitting: ANESTHESIOLOGY
Payer: MEDICARE

## 2019-06-21 ENCOUNTER — TELEPHONE (OUTPATIENT)
Dept: HOME HEALTH SERVICES | Facility: HOSPITAL | Age: 79
End: 2019-06-21

## 2019-06-21 DIAGNOSIS — I50.30 (HFPEF) HEART FAILURE WITH PRESERVED EJECTION FRACTION: ICD-10-CM

## 2019-06-21 DIAGNOSIS — Z99.2 ESRD ON HEMODIALYSIS: ICD-10-CM

## 2019-06-21 DIAGNOSIS — R06.02 SHORTNESS OF BREATH: ICD-10-CM

## 2019-06-21 DIAGNOSIS — D64.9 ANEMIA, UNSPECIFIED TYPE: ICD-10-CM

## 2019-06-21 DIAGNOSIS — I62.00 SUBDURAL HEMORRHAGE: ICD-10-CM

## 2019-06-21 DIAGNOSIS — I15.0 RENOVASCULAR HYPERTENSION: ICD-10-CM

## 2019-06-21 DIAGNOSIS — R56.9 SEIZURE: ICD-10-CM

## 2019-06-21 DIAGNOSIS — I35.0 NONRHEUMATIC AORTIC VALVE STENOSIS: ICD-10-CM

## 2019-06-21 DIAGNOSIS — J43.1 PANLOBULAR EMPHYSEMA: ICD-10-CM

## 2019-06-21 DIAGNOSIS — H54.40 BLINDNESS OF RIGHT EYE: Chronic | ICD-10-CM

## 2019-06-21 DIAGNOSIS — N18.6 ESRD ON HEMODIALYSIS: ICD-10-CM

## 2019-06-21 DIAGNOSIS — I10 HYPERTENSION, UNSPECIFIED TYPE: ICD-10-CM

## 2019-06-21 DIAGNOSIS — I36.1 NON-RHEUMATIC TRICUSPID VALVE INSUFFICIENCY: ICD-10-CM

## 2019-06-21 DIAGNOSIS — J96.11 CHRONIC RESPIRATORY FAILURE WITH HYPOXIA: ICD-10-CM

## 2019-06-21 DIAGNOSIS — G81.14 LEFT SPASTIC HEMIPARESIS: ICD-10-CM

## 2019-06-21 DIAGNOSIS — E44.0 MODERATE MALNUTRITION: ICD-10-CM

## 2019-06-21 DIAGNOSIS — N25.81 HYPERPARATHYROIDISM, SECONDARY RENAL: Chronic | ICD-10-CM

## 2019-06-21 DIAGNOSIS — N18.6 ESRD (END STAGE RENAL DISEASE): Chronic | ICD-10-CM

## 2019-06-21 DIAGNOSIS — J90 PLEURAL EFFUSION: Primary | ICD-10-CM

## 2019-06-21 DIAGNOSIS — D69.6 THROMBOCYTOPENIA: ICD-10-CM

## 2019-06-21 DIAGNOSIS — D63.1 ANEMIA IN ESRD (END-STAGE RENAL DISEASE): Chronic | ICD-10-CM

## 2019-06-21 DIAGNOSIS — J90 PLEURAL EFFUSION ON RIGHT: ICD-10-CM

## 2019-06-21 DIAGNOSIS — R53.81 PHYSICAL DEBILITY: ICD-10-CM

## 2019-06-21 DIAGNOSIS — S06.5XAA SUBDURAL HEMATOMA: ICD-10-CM

## 2019-06-21 DIAGNOSIS — I44.0 1ST DEGREE AV BLOCK: ICD-10-CM

## 2019-06-21 DIAGNOSIS — N18.6 ANEMIA IN ESRD (END-STAGE RENAL DISEASE): Chronic | ICD-10-CM

## 2019-06-21 DIAGNOSIS — I27.20 PULMONARY HYPERTENSION: ICD-10-CM

## 2019-06-21 LAB
ALBUMIN SERPL BCP-MCNC: 3 G/DL (ref 3.5–5.2)
ALP SERPL-CCNC: 86 U/L (ref 55–135)
ALT SERPL W/O P-5'-P-CCNC: 6 U/L (ref 10–44)
ANION GAP SERPL CALC-SCNC: 10 MMOL/L (ref 8–16)
AST SERPL-CCNC: 9 U/L (ref 10–40)
BASOPHILS # BLD AUTO: 0.02 K/UL (ref 0–0.2)
BASOPHILS NFR BLD: 0.4 % (ref 0–1.9)
BILIRUB SERPL-MCNC: 0.6 MG/DL (ref 0.1–1)
BNP SERPL-MCNC: >4900 PG/ML (ref 0–99)
BUN SERPL-MCNC: 5 MG/DL (ref 8–23)
CALCIUM SERPL-MCNC: 9.5 MG/DL (ref 8.7–10.5)
CHLORIDE SERPL-SCNC: 103 MMOL/L (ref 95–110)
CO2 SERPL-SCNC: 28 MMOL/L (ref 23–29)
CREAT SERPL-MCNC: 1.8 MG/DL (ref 0.5–1.4)
DIFFERENTIAL METHOD: ABNORMAL
EOSINOPHIL # BLD AUTO: 0 K/UL (ref 0–0.5)
EOSINOPHIL NFR BLD: 0.7 % (ref 0–8)
ERYTHROCYTE [DISTWIDTH] IN BLOOD BY AUTOMATED COUNT: 16.8 % (ref 11.5–14.5)
EST. GFR  (AFRICAN AMERICAN): 30.9 ML/MIN/1.73 M^2
EST. GFR  (NON AFRICAN AMERICAN): 26.8 ML/MIN/1.73 M^2
GLUCOSE SERPL-MCNC: 97 MG/DL (ref 70–110)
HCT VFR BLD AUTO: 29.2 % (ref 37–48.5)
HGB BLD-MCNC: 8.5 G/DL (ref 12–16)
IMM GRANULOCYTES # BLD AUTO: 0.02 K/UL (ref 0–0.04)
IMM GRANULOCYTES NFR BLD AUTO: 0.4 % (ref 0–0.5)
LYMPHOCYTES # BLD AUTO: 0.5 K/UL (ref 1–4.8)
LYMPHOCYTES NFR BLD: 9.6 % (ref 18–48)
MCH RBC QN AUTO: 28.9 PG (ref 27–31)
MCHC RBC AUTO-ENTMCNC: 29.1 G/DL (ref 32–36)
MCV RBC AUTO: 99 FL (ref 82–98)
MONOCYTES # BLD AUTO: 0.7 K/UL (ref 0.3–1)
MONOCYTES NFR BLD: 12.2 % (ref 4–15)
NEUTROPHILS # BLD AUTO: 4.1 K/UL (ref 1.8–7.7)
NEUTROPHILS NFR BLD: 76.7 % (ref 38–73)
NRBC BLD-RTO: 0 /100 WBC
PLATELET # BLD AUTO: 117 K/UL (ref 150–350)
PMV BLD AUTO: 12.2 FL (ref 9.2–12.9)
POTASSIUM SERPL-SCNC: 3.2 MMOL/L (ref 3.5–5.1)
PROT SERPL-MCNC: 6.6 G/DL (ref 6–8.4)
RBC # BLD AUTO: 2.94 M/UL (ref 4–5.4)
SODIUM SERPL-SCNC: 141 MMOL/L (ref 136–145)
WBC # BLD AUTO: 5.39 K/UL (ref 3.9–12.7)

## 2019-06-21 PROCEDURE — 93010 EKG 12-LEAD: ICD-10-PCS | Mod: ,,, | Performed by: INTERNAL MEDICINE

## 2019-06-21 PROCEDURE — 99220 PR INITIAL OBSERVATION CARE,LEVL III: CPT | Mod: ,,, | Performed by: PHYSICIAN ASSISTANT

## 2019-06-21 PROCEDURE — 83880 ASSAY OF NATRIURETIC PEPTIDE: CPT

## 2019-06-21 PROCEDURE — 99285 EMERGENCY DEPT VISIT HI MDM: CPT | Mod: 25

## 2019-06-21 PROCEDURE — 85025 COMPLETE CBC W/AUTO DIFF WBC: CPT

## 2019-06-21 PROCEDURE — G0378 HOSPITAL OBSERVATION PER HR: HCPCS

## 2019-06-21 PROCEDURE — 99285 PR EMERGENCY DEPT VISIT,LEVEL V: ICD-10-PCS | Mod: ,,, | Performed by: EMERGENCY MEDICINE

## 2019-06-21 PROCEDURE — 99220 PR INITIAL OBSERVATION CARE,LEVL III: ICD-10-PCS | Mod: ,,, | Performed by: PHYSICIAN ASSISTANT

## 2019-06-21 PROCEDURE — 80053 COMPREHEN METABOLIC PANEL: CPT

## 2019-06-21 PROCEDURE — 93005 ELECTROCARDIOGRAM TRACING: CPT

## 2019-06-21 PROCEDURE — 99285 EMERGENCY DEPT VISIT HI MDM: CPT | Mod: ,,, | Performed by: EMERGENCY MEDICINE

## 2019-06-21 PROCEDURE — 93010 ELECTROCARDIOGRAM REPORT: CPT | Mod: ,,, | Performed by: INTERNAL MEDICINE

## 2019-06-21 NOTE — PATIENT INSTRUCTIONS
What Is a Subdural Hematoma?  A subdural hematoma is a buildup of blood on the surface of the brain. The blood builds up in a space between the layers that surround your brain.  Your brain sits inside a bony skull. Inside your skull are several layers called the meninges. These layers cover and protect the brain. The layer just inside the skull is called the dura mater, or just dura. It is a tough, fibrous layer of tissue. On the inside of the dura is a layer called the arachnoid. When blood builds up between these layers, it can cause severe problems. A subdural hematoma is a medical emergency.     When to call the healthcare provider  A subdural hematoma is a medical emergency. Call 911 if you have the symptoms listed below.   What causes a subdural hematoma?  The most common cause for a subdural hematoma is head injury. This may be caused by a fall, a car crash, a sports injury, or violent attack. The sudden impact can strain the blood vessels inside the dura, causing them to rip and bleed. Small arteries may break in the subdural space.  In some people, the brain shrinks (often from aging) and the subdural space gets bigger. This can make the blood vessels more likely to break.  Another cause is medicine to prevent blood clots. These include warfarin, aspirin, and other blood thinners.  Rare causes include leaking of cerebrospinal fluid, a tumor, or rupture of a weak part of a blood vessel (cerebral aneurysm).  Symptoms of a subdural hematoma  A subdural hematoma may cause symptoms right away. Or it may grow slowly and cause symptoms weeks after it occurs. Signs and symptoms of a subdural hematoma may include:  · Headache  · Nausea or vomiting  · Loss of consciousness  · Confusion  · Dizziness  · Balance or walking problems  · Speech difficulties  · Vision problems  · Sleepiness  · Weakness or numbness that may come and go  · Seizures  Treating a subdural hematoma  The most common treatment is surgery. This  helps to relieve the pressure on the brain. There are 2 surgeries to treat the hematoma:  · Drilling a hole in the skull to allow the blood to drain (nikki hole)  · Cutting a flap of skull open to remove the blood (craniotomy)  If the subdural hematoma is small, your doctor may not do surgery right away. Instead, he or she may closely watch it. In this case, you will likely stay in the hospital. You may need these:  · Repeated CT scans to watch the hematoma  · A sensor inserted in your head to measure your intracranial pressure  · Medicines to control symptoms  · Stopping blood thinner medicine  · Vitamin K therapy. This is to reverse the effects of some blood thinner medicines.  Date Last Reviewed: 2/6/2015  © 1963-0146 [employer]. [address] [place] [address], [place] [address]. All rights reserved. This information is not intended as a substitute for professional medical care. Always follow your healthcare professional's instructions.

## 2019-06-21 NOTE — ED TRIAGE NOTES
Pt presents from home after dialysis with SOB. Pt reports having full run of dialysis earlier today, immediately after began experiencing increasing SOB. Pt reports wearing oxygen at home everyday at 2L nasal cannula. Pt denies any SOB at this time. Pt is not tachypneic or using accessory muscles to breathe. Pt denies any CP or abd pain. Pt reports occasional cough at home that began a few weeks ago.

## 2019-06-21 NOTE — TELEPHONE ENCOUNTER
C3 nurse attempted to contact patient. No answer. The following message was left for the patient to return the call:  Good morning  I am a nurse calling on behalf of [facility] from the Care Coordination Center.  This is a Transitional Care Call for Tea Georges . When you have a moment please contact us at [phone] or [phone] Monday through Friday, between the hours of 8 am to 4 pm. We look forward to speaking with you. On behalf of [facility] have a nice day.

## 2019-06-21 NOTE — ED PROVIDER NOTES
Encounter Date: 6/21/2019    SCRIBE #1 NOTE: I, Neela Gilmore, am scribing for, and in the presence of,  Dr. Chan. I have scribed the entire note.       History     Chief Complaint   Patient presents with    Shortness of Breath     SOB after dialysis. pt received full run of dialysis. pt denies sob at this time     Ms. Georges is a 78 y/o female w/ ESRD, CHF, COPD (pt is on 2L O2 via nasal cannula at home), h/o CVA here today w/ shortness of breath. The patient was underwent a full run of dialysis today, and immediately after reported feeling increasingly short of breath. Her shortness of breath has currently resolved. She is somnulent, having difficulty remaining awake throughout physician examination. She denies chest pain, abdominal pain, nausea, or vomiting. Patient endorses taking Phenergan this morning.       The history is provided by the patient and medical records.          Review of patient's allergies indicates:  No Known Allergies  Past Medical History:   Diagnosis Date    Anemia in ESRD (end-stage renal disease) 5/29/2016    Anticoagulant long-term use     Aortic atherosclerosis 11/22/2016    Aortic stenosis, moderate 2/18/2016    Asthma in adult without complication 1/8/2016    Bilateral low back pain without sciatica 11/17/2015    Blindness of right eye 11/12/2016    CAD (coronary artery disease) 12/12/2016    Cataract     Central retinal vein occlusion, right eye 6/3/2014    CHF (congestive heart failure)     Chronic diastolic heart failure 1/8/2016    Chronic respiratory failure with hypoxia 5/29/2016    COPD (chronic obstructive pulmonary disease) 1/15/2017    Dependence on hemodialysis     Mon-Wed-Fri    Diverticulosis     Embolic stroke involving right middle cerebral artery     Encounter for blood transfusion     Enlarged LA (left atrium) 10/7/2016    Epiretinal membrane 7/17/2012    ESRD (end stage renal disease)     Essential hypertension 1/8/2016    History of GI  diverticular bleed     5/22/16    Left flaccid hemiparesis 10/1/2016    Peripheral vascular disease, unspecified 11/22/2016    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 11/13/2016    Type 2 diabetes mellitus with kidney complication, without long-term current use of insulin 5/1/2018    Type 2 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, without long-term current use of insulin 3/26/2013    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, without long-term current use of insulin 3/26/2013    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     Past Surgical History:   Procedure Laterality Date    ABDOMINAL SURGERY      BREAST SURGERY      tumor removal x 2    CARDIAC SURGERY      CATARACT EXTRACTION      CHOLECYSTECTOMY      COLONOSCOPY N/A 5/30/2016    Performed by [facility] at [facility] ENDO (2ND FLR)    COLONOSCOPY N/A 5/23/2016    Performed by WILLIAM Colvin MD at [facility] ENDO (2ND FLR)    ESOPHAGOGASTRODUODENOSCOPY (EGD) N/A 5/30/2016    Performed by Sam [facility] MD at [facility] END[facility] (2ND FLR)    ESOPHAGOGASTRODUODENOSCOPY (EGD) N/A 5/27/2016    Performed by Ces[facility] at [facility] (2ND FLR)    EXCISION, ANEURYSM Left 11/29/2018    Performed by NADINE Blum III, MD at [facility] OR [address] [place] [address]    EYE SURGERY      Fistulogram Left 11/28/2018    Performed by NADINE Blum III, MD at [facility] CATH LAB    INSERTION, CATHETER, VASCULAR, DUAL LUMEN Right 11/29/2018    Performed by NADINE Blum III, MD at [facility] OR [address] [place] [address]    PTA, Fistula  11/28/2018    Performed by NADINE Blum III, MD at [facility] CATH LAB    REVISION, AV FISTULA, Left 11/29/2018    Performed by NADINE Blum III, MD at [facility] OR [address] [place] [address]    UPPER GASTROINTESTINAL ENDOSCOPY       Family History   Problem Relation Age of Onset    Cataracts Mother     Stroke Mother     Hypertension Mother     Cancer Sister     Hypertension Sister      Heart failure Sister     Glaucoma Brother     Hypertension Brother     Heart disease Brother     Hypertension Father     Glaucoma Maternal Aunt     Esophageal cancer Sister     No Known Problems Maternal Uncle     No Known Problems Paternal Aunt     No Known Problems Paternal Uncle     No Known Problems Maternal Grandmother     No Known Problems Maternal Grandfather     No Known Problems Paternal Grandmother     No Known Problems Paternal Grandfather     Heart attack Neg Hx     Colon cancer Neg Hx     Stomach cancer Neg Hx     Anemia Neg Hx     Arrhythmia Neg Hx     Asthma Neg Hx     Clotting disorder Neg Hx     Fainting Neg Hx     Hyperlipidemia Neg Hx     Atrial Septal Defect Neg Hx      Social History     Tobacco Use    Smoking status: Never Smoker    Smokeless tobacco: Never Used   Substance Use Topics    Alcohol use: No     Comment: Reports occasional 1-2 drinks     Drug use: No     Review of Systems   Constitutional: Negative for fever.   HENT: Negative for sore throat.    Eyes: Negative for pain.   Respiratory: Negative for shortness of breath.    Cardiovascular: Negative for chest pain.   Gastrointestinal: Negative for abdominal pain.   Genitourinary: Negative for difficulty urinating.   Musculoskeletal: Negative for back pain.   Skin: Negative for wound.   Neurological: Negative for headaches.       Physical Exam     Initial Vitals [06/21/19 1751]   BP Pulse Resp Temp SpO2   (!) 165/57 65 18 98.4 °F (36.9 °C) 100 %      MAP       --         Physical Exam    Nursing note and vitals reviewed.  Constitutional: She appears well-developed and well-nourished. She is not diaphoretic. No distress.   HENT:   Head: Normocephalic and atraumatic.   Right Ear: External ear normal.   Left Ear: External ear normal.   Eyes:   Right cornea hazy.   Neck: Neck supple.   Cardiovascular: Normal rate, regular rhythm and intact distal pulses.   Murmur (cooing) heard.  Pulmonary/Chest: Breath sounds  normal. No respiratory distress. She has no wheezes. She has no rhonchi. She has no rales.   Abdominal: Soft. She exhibits no distension. There is no tenderness.   Musculoskeletal: She exhibits no edema.   Neurological: She is oriented to person, place, and time. GCS score is 15. GCS eye subscore is 4. GCS verbal subscore is 5. GCS motor subscore is 6.   Skin: Skin is warm. Capillary refill takes less than 2 seconds. No rash noted.   Psychiatric: She has a normal mood and affect.         ED Course   Procedures  Labs Reviewed   CBC W/ AUTO DIFFERENTIAL - Abnormal; Notable for the following components:       Result Value    RBC 2.94 (*)     Hemoglobin 8.5 (*)     Hematocrit 29.2 (*)     Mean Corpuscular Volume 99 (*)     Mean Corpuscular Hemoglobin Conc 29.1 (*)     RDW 16.8 (*)     Platelets 117 (*)     Lymph # 0.5 (*)     Gran% 76.7 (*)     Lymph% 9.6 (*)     All other components within normal limits   COMPREHENSIVE METABOLIC PANEL - Abnormal; Notable for the following components:    Potassium 3.2 (*)     BUN, Bld 5 (*)     Creatinine 1.8 (*)     Albumin 3.0 (*)     AST 9 (*)     ALT 6 (*)     eGFR if  30.9 (*)     eGFR if non  26.8 (*)     All other components within normal limits   B-TYPE NATRIURETIC PEPTIDE - Abnormal; Notable for the following components:    BNP >4,900 (*)     All other components within normal limits     EKG Readings: (Independently Interpreted)   Initial Reading: No STEMI. Rhythm: Normal Sinus Rhythm. Heart Rate: 70.   Prolonged AR intervals at 268, thus 1st degree AV block. No ischemic changes.        Imaging Results          X-Ray Chest AP Portable (Final result)  Result time 06/21/19 19:46:26    Final result by Kash Panchal MD (06/21/19 19:46:26)                 Impression:      Radiographic findings suggestive of CHF pattern pulmonary edema with moderate right-sided pleural effusion.      Electronically signed by: Kash  Ansley  Date:    06/21/2019  Time:    19:46             Narrative:    EXAMINATION:  XR CHEST AP PORTABLE    CLINICAL HISTORY:  Shortness of breath    TECHNIQUE:  Single frontal view of the chest was performed.    COMPARISON:  Radiograph 05/21/2019.    FINDINGS:  The lungs are symmetrically expanded.  Mediastinal structures are midline.  The cardiac silhouette is enlarged, stable.  Central pulmonary vascular congestion noted, right greater than left, with associated moderate right-sided pleural effusion.  No pneumothorax is seen.  Aortic atherosclerosis is present.  A stent is present in the left axilla.                                 Medical Decision Making:   History:   Old Medical Records: I decided to obtain old medical records.  Old Records Summarized: records from clinic visits and records from previous admission(s).  Independently Interpreted Test(s):   I have ordered and independently interpreted X-rays - see summary below.       <> Summary of X-Ray Reading(s): CXR w/ bilateral pleural effusions, worsened on right from prior, on my read.  I have ordered and independently interpreted EKG Reading(s) - see prior notes  Clinical Tests:   Lab Tests: Ordered and Reviewed  Radiological Study: Ordered and Reviewed  Medical Tests: Ordered and Reviewed  ED Management:  Vitals normal. Afebrile. Here w/ SOB at dialysis; completed full session. Feels normal and asymptomatic now. Has numerous and significant comorbidities. Labs and CXR obtained. Clinically, I wonder if they are pulling enough fluid off at dialysis.    Labs grossly WNL w/o actionable values. BNP significantly elevated though. Has anemia and thrombocytopenia, but these are stable.  EKG reassuring except prolonged SD.  CXR w/ worsening right pleural effusion. Has had this in past, but according to chart review, has never had thoracentesis or further chest imaging here.  I suspect pt could benefit from thoracentesis and/or advanced imaging for evaluation of  fluid.    Discussed with internal medicine who admitted pt.  Other:   I have discussed this case with another health care provider.       <> Summary of the Discussion: Internal medicine            Scribe Attestation:   Scribe #1: I performed the above scribed service and the documentation accurately describes the services I performed. I attest to the accuracy of the note.               Clinical Impression:       ICD-10-CM ICD-9-CM   1. Pleural effusion J90 511.9   2. Shortness of breath R06.02 786.05   3. Thrombocytopenia D69.6 287.5   4. Anemia, unspecified type D64.9 285.9   5. Pleural effusion on right J90 511.9   6. (HFpEF) heart failure with preserved ejection fraction I50.30 428.9   7. Anemia in ESRD (end-stage renal disease) N18.6 285.21    D63.1 585.6   8. Chronic respiratory failure with hypoxia J96.11 518.83     799.02   9. ESRD (end stage renal disease) N18.6 585.6   10. Hyperparathyroidism, secondary renal N25.81 588.81   11. Moderate malnutrition E44.0 263.0   12. Nonrheumatic aortic valve stenosis I35.0 424.1   13. Non-rheumatic tricuspid valve insufficiency I36.1 424.2   14. Physical debility R53.81 799.3   15. Panlobular emphysema J43.1 492.8   16. Subdural hemorrhage I62.00 432.1   17. Hypertension, unspecified type I10 401.9   18. 1st degree AV block I44.0 426.11         Disposition:   Disposition: Placed in Observation  Condition: Stable                        Chadwick Chan MD  06/22/19 1415       Chadwick Chan MD  06/22/19 1419

## 2019-06-22 PROBLEM — J90 PLEURAL EFFUSION: Status: RESOLVED | Noted: 2019-06-21 | Resolved: 2019-06-22

## 2019-06-22 PROBLEM — J96.11 CHRONIC RESPIRATORY FAILURE WITH HYPOXIA: Status: ACTIVE | Noted: 2019-06-22

## 2019-06-22 PROBLEM — G93.5 BRAIN COMPRESSION: Status: ACTIVE | Noted: 2019-06-22

## 2019-06-22 LAB
ABO + RH BLD: NORMAL
ANION GAP SERPL CALC-SCNC: 8 MMOL/L (ref 8–16)
APTT BLDCRRT: 29.2 SEC (ref 21–32)
BASOPHILS # BLD AUTO: 0.02 K/UL (ref 0–0.2)
BASOPHILS NFR BLD: 0.4 % (ref 0–1.9)
BLD GP AB SCN CELLS X3 SERPL QL: NORMAL
BUN SERPL-MCNC: 8 MG/DL (ref 8–23)
CALCIUM SERPL-MCNC: 9.7 MG/DL (ref 8.7–10.5)
CHLORIDE SERPL-SCNC: 101 MMOL/L (ref 95–110)
CO2 SERPL-SCNC: 30 MMOL/L (ref 23–29)
CREAT SERPL-MCNC: 2.5 MG/DL (ref 0.5–1.4)
DIFFERENTIAL METHOD: ABNORMAL
EOSINOPHIL # BLD AUTO: 0.1 K/UL (ref 0–0.5)
EOSINOPHIL NFR BLD: 1.6 % (ref 0–8)
ERYTHROCYTE [DISTWIDTH] IN BLOOD BY AUTOMATED COUNT: 16.9 % (ref 11.5–14.5)
EST. GFR  (AFRICAN AMERICAN): 20.7 ML/MIN/1.73 M^2
EST. GFR  (NON AFRICAN AMERICAN): 18 ML/MIN/1.73 M^2
GLUCOSE SERPL-MCNC: 78 MG/DL (ref 70–110)
HCT VFR BLD AUTO: 29 % (ref 37–48.5)
HGB BLD-MCNC: 8.3 G/DL (ref 12–16)
IMM GRANULOCYTES # BLD AUTO: 0.03 K/UL (ref 0–0.04)
IMM GRANULOCYTES NFR BLD AUTO: 0.7 % (ref 0–0.5)
INR PPP: 1.1 (ref 0.8–1.2)
LYMPHOCYTES # BLD AUTO: 0.7 K/UL (ref 1–4.8)
LYMPHOCYTES NFR BLD: 14.6 % (ref 18–48)
MAGNESIUM SERPL-MCNC: 2 MG/DL (ref 1.6–2.6)
MCH RBC QN AUTO: 28.2 PG (ref 27–31)
MCHC RBC AUTO-ENTMCNC: 28.6 G/DL (ref 32–36)
MCV RBC AUTO: 99 FL (ref 82–98)
MONOCYTES # BLD AUTO: 0.6 K/UL (ref 0.3–1)
MONOCYTES NFR BLD: 13.7 % (ref 4–15)
NEUTROPHILS # BLD AUTO: 3.1 K/UL (ref 1.8–7.7)
NEUTROPHILS NFR BLD: 69 % (ref 38–73)
NRBC BLD-RTO: 0 /100 WBC
PHOSPHATE SERPL-MCNC: 2.8 MG/DL (ref 2.7–4.5)
PLATELET # BLD AUTO: 111 K/UL (ref 150–350)
PMV BLD AUTO: 11.9 FL (ref 9.2–12.9)
POCT GLUCOSE: 105 MG/DL (ref 70–110)
POCT GLUCOSE: 116 MG/DL (ref 70–110)
POCT GLUCOSE: 94 MG/DL (ref 70–110)
POTASSIUM SERPL-SCNC: 3.2 MMOL/L (ref 3.5–5.1)
POTASSIUM SERPL-SCNC: 4.2 MMOL/L (ref 3.5–5.1)
PROCALCITONIN SERPL IA-MCNC: 0.6 NG/ML
PROTHROMBIN TIME: 11.3 SEC (ref 9–12.5)
RBC # BLD AUTO: 2.94 M/UL (ref 4–5.4)
SODIUM SERPL-SCNC: 139 MMOL/L (ref 136–145)
WBC # BLD AUTO: 4.45 K/UL (ref 3.9–12.7)

## 2019-06-22 PROCEDURE — 85730 THROMBOPLASTIN TIME PARTIAL: CPT

## 2019-06-22 PROCEDURE — 86920 COMPATIBILITY TEST SPIN: CPT

## 2019-06-22 PROCEDURE — 84100 ASSAY OF PHOSPHORUS: CPT

## 2019-06-22 PROCEDURE — 99291 CRITICAL CARE FIRST HOUR: CPT | Mod: 25,,, | Performed by: NURSE PRACTITIONER

## 2019-06-22 PROCEDURE — 99214 OFFICE O/P EST MOD 30 MIN: CPT | Mod: GC,,, | Performed by: INTERNAL MEDICINE

## 2019-06-22 PROCEDURE — 85025 COMPLETE CBC W/AUTO DIFF WBC: CPT

## 2019-06-22 PROCEDURE — 99214 PR OFFICE/OUTPT VISIT, EST, LEVL IV, 30-39 MIN: ICD-10-PCS | Mod: GC,,, | Performed by: INTERNAL MEDICINE

## 2019-06-22 PROCEDURE — 80048 BASIC METABOLIC PNL TOTAL CA: CPT

## 2019-06-22 PROCEDURE — 94799 UNLISTED PULMONARY SVC/PX: CPT

## 2019-06-22 PROCEDURE — 36620 INSERTION CATHETER ARTERY: CPT | Mod: ,,, | Performed by: NURSE PRACTITIONER

## 2019-06-22 PROCEDURE — 84145 PROCALCITONIN (PCT): CPT

## 2019-06-22 PROCEDURE — 63600175 PHARM REV CODE 636 W HCPCS: Performed by: HOSPITALIST

## 2019-06-22 PROCEDURE — G0378 HOSPITAL OBSERVATION PER HR: HCPCS

## 2019-06-22 PROCEDURE — 63600175 PHARM REV CODE 636 W HCPCS: Performed by: NURSE PRACTITIONER

## 2019-06-22 PROCEDURE — 27200188 HC TRANSDUCER, ART ADULT/PEDS

## 2019-06-22 PROCEDURE — 85610 PROTHROMBIN TIME: CPT

## 2019-06-22 PROCEDURE — 99222 PR INITIAL HOSPITAL CARE,LEVL II: ICD-10-PCS | Mod: 57,GC,, | Performed by: NEUROLOGICAL SURGERY

## 2019-06-22 PROCEDURE — 99222 1ST HOSP IP/OBS MODERATE 55: CPT | Mod: 57,GC,, | Performed by: NEUROLOGICAL SURGERY

## 2019-06-22 PROCEDURE — 94761 N-INVAS EAR/PLS OXIMETRY MLT: CPT

## 2019-06-22 PROCEDURE — 84132 ASSAY OF SERUM POTASSIUM: CPT

## 2019-06-22 PROCEDURE — 25000003 PHARM REV CODE 250: Performed by: PHYSICIAN ASSISTANT

## 2019-06-22 PROCEDURE — 25000003 PHARM REV CODE 250: Performed by: HOSPITALIST

## 2019-06-22 PROCEDURE — 87040 BLOOD CULTURE FOR BACTERIA: CPT | Mod: 59

## 2019-06-22 PROCEDURE — 99291 PR CRITICAL CARE, E/M 30-74 MINUTES: ICD-10-PCS | Mod: 25,,, | Performed by: NURSE PRACTITIONER

## 2019-06-22 PROCEDURE — 25000003 PHARM REV CODE 250: Performed by: STUDENT IN AN ORGANIZED HEALTH CARE EDUCATION/TRAINING PROGRAM

## 2019-06-22 PROCEDURE — 99900035 HC TECH TIME PER 15 MIN (STAT)

## 2019-06-22 PROCEDURE — 25000242 PHARM REV CODE 250 ALT 637 W/ HCPCS: Performed by: PHYSICIAN ASSISTANT

## 2019-06-22 PROCEDURE — 83735 ASSAY OF MAGNESIUM: CPT

## 2019-06-22 PROCEDURE — 36620 INSERTION CATHETER ARTERY: CPT

## 2019-06-22 PROCEDURE — 36620 ARTERIAL LINE: ICD-10-PCS | Mod: ,,, | Performed by: NURSE PRACTITIONER

## 2019-06-22 PROCEDURE — 86901 BLOOD TYPING SEROLOGIC RH(D): CPT

## 2019-06-22 PROCEDURE — 36415 COLL VENOUS BLD VENIPUNCTURE: CPT

## 2019-06-22 RX ORDER — INSULIN ASPART 100 [IU]/ML
1-10 INJECTION, SOLUTION INTRAVENOUS; SUBCUTANEOUS EVERY 6 HOURS PRN
Status: DISCONTINUED | OUTPATIENT
Start: 2019-06-22 | End: 2019-07-01 | Stop reason: HOSPADM

## 2019-06-22 RX ORDER — IBUPROFEN 200 MG
16 TABLET ORAL
Status: DISCONTINUED | OUTPATIENT
Start: 2019-06-22 | End: 2019-07-01 | Stop reason: HOSPADM

## 2019-06-22 RX ORDER — IPRATROPIUM BROMIDE AND ALBUTEROL SULFATE 2.5; .5 MG/3ML; MG/3ML
3 SOLUTION RESPIRATORY (INHALATION) EVERY 4 HOURS PRN
Status: DISCONTINUED | OUTPATIENT
Start: 2019-06-22 | End: 2019-07-01 | Stop reason: HOSPADM

## 2019-06-22 RX ORDER — POTASSIUM CHLORIDE 20 MEQ/1
40 TABLET, EXTENDED RELEASE ORAL ONCE
Status: COMPLETED | OUTPATIENT
Start: 2019-06-22 | End: 2019-06-22

## 2019-06-22 RX ORDER — SODIUM CHLORIDE 0.9 % (FLUSH) 0.9 %
5 SYRINGE (ML) INJECTION
Status: DISCONTINUED | OUTPATIENT
Start: 2019-06-22 | End: 2019-07-01 | Stop reason: HOSPADM

## 2019-06-22 RX ORDER — LABETALOL HCL 20 MG/4 ML
10 SYRINGE (ML) INTRAVENOUS EVERY 4 HOURS PRN
Status: DISCONTINUED | OUTPATIENT
Start: 2019-06-22 | End: 2019-06-28

## 2019-06-22 RX ORDER — FUROSEMIDE 10 MG/ML
80 INJECTION INTRAMUSCULAR; INTRAVENOUS 2 TIMES DAILY
Status: DISCONTINUED | OUTPATIENT
Start: 2019-06-22 | End: 2019-06-25

## 2019-06-22 RX ORDER — AMOXICILLIN 250 MG
1 CAPSULE ORAL 2 TIMES DAILY PRN
Status: DISCONTINUED | OUTPATIENT
Start: 2019-06-22 | End: 2019-07-01 | Stop reason: HOSPADM

## 2019-06-22 RX ORDER — POLYETHYLENE GLYCOL 3350 17 G/17G
17 POWDER, FOR SOLUTION ORAL DAILY
Status: DISCONTINUED | OUTPATIENT
Start: 2019-06-22 | End: 2019-07-01 | Stop reason: HOSPADM

## 2019-06-22 RX ORDER — HYDRALAZINE HYDROCHLORIDE 50 MG/1
100 TABLET, FILM COATED ORAL EVERY 8 HOURS
Status: DISCONTINUED | OUTPATIENT
Start: 2019-06-22 | End: 2019-06-29

## 2019-06-22 RX ORDER — GLUCAGON 1 MG
1 KIT INJECTION
Status: DISCONTINUED | OUTPATIENT
Start: 2019-06-22 | End: 2019-07-01 | Stop reason: HOSPADM

## 2019-06-22 RX ORDER — SODIUM CHLORIDE 0.9 % (FLUSH) 0.9 %
10 SYRINGE (ML) INJECTION
Status: DISCONTINUED | OUTPATIENT
Start: 2019-06-22 | End: 2019-07-01 | Stop reason: HOSPADM

## 2019-06-22 RX ORDER — HYDRALAZINE HYDROCHLORIDE 20 MG/ML
20 INJECTION INTRAMUSCULAR; INTRAVENOUS EVERY 4 HOURS PRN
Status: DISCONTINUED | OUTPATIENT
Start: 2019-06-22 | End: 2019-06-28

## 2019-06-22 RX ORDER — ONDANSETRON 4 MG/1
4 TABLET, ORALLY DISINTEGRATING ORAL EVERY 8 HOURS PRN
Status: DISCONTINUED | OUTPATIENT
Start: 2019-06-22 | End: 2019-07-01 | Stop reason: HOSPADM

## 2019-06-22 RX ORDER — LOSARTAN POTASSIUM 50 MG/1
50 TABLET ORAL DAILY
Status: DISCONTINUED | OUTPATIENT
Start: 2019-06-22 | End: 2019-06-22

## 2019-06-22 RX ORDER — HYDRALAZINE HYDROCHLORIDE 20 MG/ML
10 INJECTION INTRAMUSCULAR; INTRAVENOUS EVERY 4 HOURS PRN
Status: DISCONTINUED | OUTPATIENT
Start: 2019-06-22 | End: 2019-06-22

## 2019-06-22 RX ORDER — GLUCAGON 1 MG
1 KIT INJECTION
Status: DISCONTINUED | OUTPATIENT
Start: 2019-06-22 | End: 2019-06-22

## 2019-06-22 RX ORDER — AMLODIPINE BESYLATE 10 MG/1
10 TABLET ORAL DAILY
Status: DISCONTINUED | OUTPATIENT
Start: 2019-06-22 | End: 2019-07-01 | Stop reason: HOSPADM

## 2019-06-22 RX ORDER — CARVEDILOL 12.5 MG/1
12.5 TABLET ORAL 2 TIMES DAILY
Status: DISCONTINUED | OUTPATIENT
Start: 2019-06-22 | End: 2019-06-23

## 2019-06-22 RX ORDER — ACETAMINOPHEN 325 MG/1
650 TABLET ORAL EVERY 4 HOURS PRN
Status: DISCONTINUED | OUTPATIENT
Start: 2019-06-22 | End: 2019-07-01 | Stop reason: HOSPADM

## 2019-06-22 RX ORDER — SEVELAMER CARBONATE 800 MG/1
800 TABLET, FILM COATED ORAL
Status: DISCONTINUED | OUTPATIENT
Start: 2019-06-22 | End: 2019-06-25

## 2019-06-22 RX ORDER — IBUPROFEN 200 MG
24 TABLET ORAL
Status: DISCONTINUED | OUTPATIENT
Start: 2019-06-22 | End: 2019-07-01 | Stop reason: HOSPADM

## 2019-06-22 RX ORDER — LOSARTAN POTASSIUM 50 MG/1
100 TABLET ORAL DAILY
Status: DISCONTINUED | OUTPATIENT
Start: 2019-06-23 | End: 2019-06-25

## 2019-06-22 RX ORDER — FLUTICASONE FUROATE AND VILANTEROL 200; 25 UG/1; UG/1
1 POWDER RESPIRATORY (INHALATION) DAILY
Status: DISCONTINUED | OUTPATIENT
Start: 2019-06-22 | End: 2019-07-01 | Stop reason: HOSPADM

## 2019-06-22 RX ORDER — INSULIN ASPART 100 [IU]/ML
0-5 INJECTION, SOLUTION INTRAVENOUS; SUBCUTANEOUS
Status: DISCONTINUED | OUTPATIENT
Start: 2019-06-22 | End: 2019-06-22

## 2019-06-22 RX ORDER — RAMELTEON 8 MG/1
8 TABLET ORAL NIGHTLY PRN
Status: DISCONTINUED | OUTPATIENT
Start: 2019-06-22 | End: 2019-06-22

## 2019-06-22 RX ADMIN — LOSARTAN POTASSIUM 50 MG: 50 TABLET, FILM COATED ORAL at 08:06

## 2019-06-22 RX ADMIN — AMLODIPINE BESYLATE 10 MG: 10 TABLET ORAL at 08:06

## 2019-06-22 RX ADMIN — HYDRALAZINE HYDROCHLORIDE 10 MG: 20 INJECTION INTRAMUSCULAR; INTRAVENOUS at 07:06

## 2019-06-22 RX ADMIN — SEVELAMER CARBONATE 800 MG: 800 TABLET, FILM COATED ORAL at 05:06

## 2019-06-22 RX ADMIN — FLUTICASONE FUROATE AND VILANTEROL TRIFENATATE 1 PUFF: 200; 25 POWDER RESPIRATORY (INHALATION) at 08:06

## 2019-06-22 RX ADMIN — FUROSEMIDE 80 MG: 10 INJECTION, SOLUTION INTRAMUSCULAR; INTRAVENOUS at 01:06

## 2019-06-22 RX ADMIN — CARVEDILOL 12.5 MG: 12.5 TABLET, FILM COATED ORAL at 10:06

## 2019-06-22 RX ADMIN — LABETALOL HCL IV SOLN PREFILLED SYRINGE 20 MG/4ML (5 MG/ML) 10 MG: 20/4 SOLUTION PREFILLED SYRINGE at 08:06

## 2019-06-22 RX ADMIN — HYDRALAZINE HYDROCHLORIDE 100 MG: 50 TABLET ORAL at 10:06

## 2019-06-22 RX ADMIN — SEVELAMER CARBONATE 800 MG: 800 TABLET, FILM COATED ORAL at 07:06

## 2019-06-22 RX ADMIN — CARVEDILOL 12.5 MG: 12.5 TABLET, FILM COATED ORAL at 08:06

## 2019-06-22 RX ADMIN — POLYETHYLENE GLYCOL 3350 17 G: 17 POWDER, FOR SOLUTION ORAL at 08:06

## 2019-06-22 RX ADMIN — SEVELAMER CARBONATE 800 MG: 800 TABLET, FILM COATED ORAL at 01:06

## 2019-06-22 RX ADMIN — HYDRALAZINE HYDROCHLORIDE 100 MG: 50 TABLET ORAL at 06:06

## 2019-06-22 RX ADMIN — HYDRALAZINE HYDROCHLORIDE 100 MG: 50 TABLET ORAL at 01:06

## 2019-06-22 RX ADMIN — POTASSIUM CHLORIDE 40 MEQ: 1500 TABLET, EXTENDED RELEASE ORAL at 09:06

## 2019-06-22 NOTE — SUBJECTIVE & OBJECTIVE
Past Medical History:   Diagnosis Date    Anemia in ESRD (end-stage renal disease) 5/29/2016    Anticoagulant long-term use     Aortic atherosclerosis 11/22/2016    Aortic stenosis, moderate 2/18/2016    Asthma in adult without complication 1/8/2016    Bilateral low back pain without sciatica 11/17/2015    Blindness of right eye 11/12/2016    CAD (coronary artery disease) 12/12/2016    Cataract     Central retinal vein occlusion, right eye 6/3/2014    CHF (congestive heart failure)     Chronic diastolic heart failure 1/8/2016    Chronic respiratory failure with hypoxia 5/29/2016    COPD (chronic obstructive pulmonary disease) 1/15/2017    Dependence on hemodialysis     Mon-Wed-Fri    Diverticulosis     Embolic stroke involving right middle cerebral artery     Encounter for blood transfusion     Enlarged LA (left atrium) 10/7/2016    Epiretinal membrane 7/17/2012    ESRD (end stage renal disease)     Essential hypertension 1/8/2016    History of GI diverticular bleed     5/22/16    Left flaccid hemiparesis 10/1/2016    Peripheral vascular disease, unspecified 11/22/2016    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 11/13/2016    Type 2 diabetes mellitus with kidney complication, without long-term current use of insulin 5/1/2018    Type 2 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, without long-term current use of insulin 3/26/2013    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, without long-term current use of insulin 3/26/2013    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       Past Surgical History:   Procedure Laterality Date    ABDOMINAL SURGERY      BREAST SURGERY      tumor removal x 2    CARDIAC SURGERY      CATARACT EXTRACTION      CHOLECYSTECTOMY      COLONOSCOPY N/A 5/30/2016    [facility] (2ND FLR)    COLONOSCOPY N/A 5/23/2016     Performed by WILLIAM Colvin MD at [facility] ENDO (2ND FLR)    ESOPHAGOGASTRODUODENOSCOPY (EGD) N/A 5/30/2016    Performed by Sam Davis MD at [facility] ENDO (2ND FLR)    ESOPHAGOGASTRODUODENOSCOPY (EGD) N/A 5/27/2016    Performed by Cesario Rubio MD at [facility] ENDO (2ND FLR)    EXCISION, ANEURYSM Left 11/29/2018    Performed by NADINE Blum III, MD at [facility] OR [address] [place] [address]    EYE SURGERY      Fistulogram Left 11/28/2018    Performed by NADINE Blum III, MD at [facility] CATH LAB    INSERTION, CATHETER, VASCULAR, DUAL LUMEN Right 11/29/2018    Performed by NADINE Blum III, MD at [facility] OR [address] [place] [address]    PTA, Fistula  11/28/2018    Performed by NADINE Blum III, MD at [facility] CATH LAB    REVISION, AV FISTULA, Left 11/29/2018    Performed by NADINE Blum III, MD at [facility] OR [address] [place] [address]    UPPER GASTROINTESTINAL ENDOSCOPY         Review of patient's allergies indicates:  No Known Allergies    No current facility-administered medications on file prior to encounter.      Current Outpatient Medications on File Prior to Encounter   Medication Sig    acetaminophen (TYLENOL) 325 MG tablet Take 2 tablets (650 mg total) by mouth every 6 (six) hours as needed for Pain.    albuterol (PROVENTIL/VENTOLIN HFA) 90 mcg/actuation inhaler Inhale 1-2 puffs into the lungs every 6 (six) hours as needed for Wheezing.    amLODIPine (NORVASC) 10 MG tablet Take 1 tablet (10 mg total) by mouth once daily.    artificial tears (ISOPTO TEARS) 0.5 % ophthalmic solution Place 2 drops into both eyes 4 (four) times daily as needed.    carvedilol (COREG) 12.5 MG tablet Take 1 tablet (12.5 mg total) by mouth 2 (two) times daily.    ergocalciferol (ERGOCALCIFEROL) 50,000 unit Cap Take 1 capsule (50,000 Units total) by mouth every 7 days.    fluticasone-vilanterol (BREO ELLIPTA) 200-25 mcg/dose DsDv diskus inhaler Inhale 1 puff into the lungs once daily.    hydrALAZINE (APRESOLINE) 100 MG tablet Take 1 tablet (100 mg total) by mouth every 8 (eight)  hours.    losartan (COZAAR) 50 MG tablet Take 1 tablet (50 mg total) by mouth once daily.    ondansetron (ZOFRAN-ODT) 8 MG TbDL Take 1 tablet (8 mg total) by mouth every 6 (six) hours as needed (nausea).    polyethylene glycol (GLYCOLAX) 17 gram PwPk Take 17 g by mouth once daily.    RENVELA 800 mg Tab Take 1 tablet (800 mg total) by mouth 3 (three) times daily with meals.    senna-docusate 8.6-50 mg (PERICOLACE) 8.6-50 mg per tablet Take 1 tablet by mouth daily as needed for Constipation.    senna-docusate 8.6-50 mg (PERICOLACE) 8.6-50 mg per tablet Take 1 tablet by mouth 2 (two) times daily.     Family History     Problem Relation (Age of Onset)    Cancer Sister    Cataracts Mother    Esophageal cancer Sister    Glaucoma Brother, Maternal Aunt    Heart disease Brother    Heart failure Sister    Hypertension Mother, Sister, Brother, Father    No Known Problems Maternal Uncle, Paternal Aunt, Paternal Uncle, Maternal Grandmother, Maternal Grandfather, Paternal Grandmother, Paternal Grandfather    Stroke Mother        Tobacco Use    Smoking status: Never Smoker    Smokeless tobacco: Never Used   Substance and Sexual Activity    Alcohol use: No     Comment: Reports occasional 1-2 drinks     Drug use: No    Sexual activity: Never     Review of Systems   Constitutional: Negative for chills, fever and unexpected weight change.   HENT: Negative for congestion and rhinorrhea.    Eyes: Negative for photophobia and visual disturbance (chronic R eye blindness).   Respiratory: Positive for wheezing. Negative for cough, chest tightness and shortness of breath.         +orthopnea   Cardiovascular: Negative for chest pain and palpitations.   Gastrointestinal: Negative for abdominal pain, diarrhea, nausea and vomiting.   Genitourinary: Negative for difficulty urinating and dysuria.        Oliguric   Musculoskeletal: Negative for back pain and gait problem.   Skin: Negative for rash and wound.   Neurological: Negative for  dizziness and headaches.   Psychiatric/Behavioral: Positive for agitation and confusion (mild).     Objective:     Vital Signs (Most Recent):  Temp: 97.9 °F (36.6 °C) (06/21/19 2355)  Pulse: 65 (06/21/19 2355)  Resp: 18 (06/21/19 2355)  BP: (!) 172/74 (06/21/19 2355)  SpO2: 97 % (06/21/19 2355) Vital Signs (24h Range):  Temp:  [97.9 °F (36.6 °C)-98.4 °F (36.9 °C)] 97.9 °F (36.6 °C)  Pulse:  [60-68] 65  Resp:  [16-18] 18  SpO2:  [93 %-100 %] 97 %  BP: (165-184)/(57-80) 172/74     Weight: 50.1 kg (110 lb 7.2 oz)  Body mass index is 19.57 kg/m².    Physical Exam   Constitutional: She is oriented to person, place, and time. She appears well-developed and well-nourished.   HENT:   Head: Normocephalic and atraumatic.   Eyes: EOM are normal.   R eye blindness; R eye opaque   Neck: Normal range of motion. Neck supple.   Cardiovascular: Normal rate, regular rhythm, normal heart sounds and intact distal pulses.   Pulmonary/Chest: Effort normal. She exhibits no tenderness.   R sided crackles to middle and lower lobes with faint bibasilar lung sounds   Abdominal: Soft. Bowel sounds are normal.   Musculoskeletal: Normal range of motion. She exhibits no edema or deformity.   LUE AVF   Neurological: She is alert and oriented to person, place, and time.   Strength intact, R>L  Oriented to year, president, place, person, but not situation   Skin: Skin is warm and dry.   Psychiatric: She has a normal mood and affect. Her speech is normal. Judgment normal. She is agitated (that I woke her up from sleep) and withdrawn. She exhibits abnormal recent memory. She is inattentive.   Nursing note and vitals reviewed.        CRANIAL NERVES     CN III, IV, VI   Extraocular motions are normal.        Significant Labs:   CBC:   Recent Labs   Lab 06/21/19 1920   WBC 5.39   HGB 8.5*   HCT 29.2*   *     CMP:   Recent Labs   Lab 06/21/19 1920      K 3.2*      CO2 28   GLU 97   BUN 5*   CREATININE 1.8*   CALCIUM 9.5   PROT 6.6    ALBUMIN 3.0*   BILITOT 0.6   ALKPHOS 86   AST 9*   ALT 6*   ANIONGAP 10   EGFRNONAA 26.8*     Troponin: No results for input(s): TROPONINI in the last 48 hours.    Significant Imaging: I have reviewed all pertinent imaging results/findings within the past 24 hours.

## 2019-06-22 NOTE — HPI
"Patient is a 79 year old female known to this service, with a history of ESRD, CHF, COPD, and right MCA stroke who is admitted to medicine with complain of SOB as well as "not feeling right". She was previously admitted to [facility] in May with R acute SDH after a fall while on plavix. Nonoperative at that time. She was discharged to [facility], then home, but lost to follow up. Medicine team performed CTH which demonstrated increase in size and chronicification of R convexity SDH with mass effect. Patient reports intermittent headaches and some left sided weakness, denies nausea/vomiting. Not currently on anticoagulation.       "

## 2019-06-22 NOTE — ED NOTES
Pt is sleeping on stretcher with family at bedside. Call light in place, bed wheels locked, side rails X2. No complaints at this time. Pt appears to be in no acute distress or pain at this time. Will continue to monitor.

## 2019-06-22 NOTE — SUBJECTIVE & OBJECTIVE
Medications Prior to Admission   Medication Sig Dispense Refill Last Dose    acetaminophen (TYLENOL) 325 MG tablet Take 2 tablets (650 mg total) by mouth every 6 (six) hours as needed for Pain.  0     albuterol (PROVENTIL/VENTOLIN HFA) 90 mcg/actuation inhaler Inhale 1-2 puffs into the lungs every 6 (six) hours as needed for Wheezing. 1 Inhaler 0 Not Taking    amLODIPine (NORVASC) 10 MG tablet Take 1 tablet (10 mg total) by mouth once daily. 90 tablet 3 Taking    artificial tears (ISOPTO TEARS) 0.5 % ophthalmic solution Place 2 drops into both eyes 4 (four) times daily as needed.       carvedilol (COREG) 12.5 MG tablet Take 1 tablet (12.5 mg total) by mouth 2 (two) times daily. 60 tablet 11 More than a month at Unknown time    ergocalciferol (ERGOCALCIFEROL) 50,000 unit Cap Take 1 capsule (50,000 Units total) by mouth every 7 days. 12 capsule 3 Taking    fluticasone-vilanterol (BREO ELLIPTA) 200-25 mcg/dose DsDv diskus inhaler Inhale 1 puff into the lungs once daily. 90 each 3 More than a month at Unknown time    hydrALAZINE (APRESOLINE) 100 MG tablet Take 1 tablet (100 mg total) by mouth every 8 (eight) hours. 90 tablet 3 Taking    losartan (COZAAR) 50 MG tablet Take 1 tablet (50 mg total) by mouth once daily. 90 tablet 3 Taking    ondansetron (ZOFRAN-ODT) 8 MG TbDL Take 1 tablet (8 mg total) by mouth every 6 (six) hours as needed (nausea). 30 tablet 2     polyethylene glycol (GLYCOLAX) 17 gram PwPk Take 17 g by mouth once daily.  0     RENVELA 800 mg Tab Take 1 tablet (800 mg total) by mouth 3 (three) times daily with meals. 270 tablet 3 Taking    senna-docusate 8.6-50 mg (PERICOLACE) 8.6-50 mg per tablet Take 1 tablet by mouth daily as needed for Constipation.       senna-docusate 8.6-50 mg (PERICOLACE) 8.6-50 mg per tablet Take 1 tablet by mouth 2 (two) times daily.          Review of patient's allergies indicates:  No Known Allergies    Past Medical History:   Diagnosis Date    Anemia in ESRD  (end-stage renal disease) 5/29/2016    Anticoagulant long-term use     Aortic atherosclerosis 11/22/2016    Aortic stenosis, moderate 2/18/2016    Asthma in adult without complication 1/8/2016    Bilateral low back pain without sciatica 11/17/2015    Blindness of right eye 11/12/2016    CAD (coronary artery disease) 12/12/2016    Cataract     Central retinal vein occlusion, right eye 6/3/2014    CHF (congestive heart failure)     Chronic diastolic heart failure 1/8/2016    Chronic respiratory failure with hypoxia 5/29/2016    COPD (chronic obstructive pulmonary disease) 1/15/2017    Dependence on hemodialysis     Mon-Wed-Fri    Diverticulosis     Embolic stroke involving right middle cerebral artery     Encounter for blood transfusion     Enlarged LA (left atrium) 10/7/2016    Epiretinal membrane 7/17/2012    ESRD (end stage renal disease)     Essential hypertension 1/8/2016    History of GI diverticular bleed     5/22/16    Left flaccid hemiparesis 10/1/2016    Peripheral vascular disease, unspecified 11/22/2016    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 11/13/2016    Type 2 diabetes mellitus with kidney complication, without long-term current use of insulin 5/1/2018    Type 2 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, without long-term current use of insulin 3/26/2013    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, without long-term current use of insulin 3/26/2013    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     Past Surgical History:   Procedure Laterality Date    ABDOMINAL SURGERY      BREAST SURGERY      tumor removal x 2    CARDIAC SURGERY      CATARACT EXTRACTION      CHOLECYSTECTOMY      COLONOSCOPY N/A 5/30/2016    Performed by [facility] at [facility] ENDO (2ND FLR)    COLONOSCOPY N/A 5/23/2016    Performed by WILLIAM Colvin MD at [facility] ENDO (2ND FLR)     ESOPHAGOGASTRODUODENOSCOPY (EGD) N/A 5/30/2016    Per[facility] by [facility] at [facility] ENDO (2ND FLR)    ESOPHAGOGASTRODUODENOSCOPY (EGD) N/A 5/27/2016    Performed by Cesario Rubio MD at [facility] ENDO (2ND FLR)    EXCISION, ANEURYSM Left 11/29/2018    Performed by NADINE Blum III, MD at [facility] OR 2ND FLR    EYE SURGERY      Fistulogram Left 11/28/2018    Performed by NADINE Blum III, MD at [facility] CATH LAB    INSERTION, CATHETER, VASCULAR, DUAL LUMEN Right 11/29/2018    Performed by NADINE Blum III, MD at [facility] OR 2ND FLR    PTA, Fistula  11/28/2018    Performed by NADINE Blum III, MD at [facility] CATH LAB    REVISION, AV FISTULA, Left 11/29/2018    Performed by NADINE Blum III, MD at [facility] OR 2ND FLR    UPPER GASTROINTESTINAL ENDOSCOPY       Family History     Problem Relation (Age of Onset)    Cancer Sister    Cataracts Mother    Esophageal cancer Sister    Glaucoma Brother, Maternal Aunt    Heart disease Brother    Heart failure Sister    Hypertension Mother, Sister, Brother, Father    No Known Problems Maternal Uncle, Paternal Aunt, Paternal Uncle, Maternal Grandmother, Maternal Grandfather, Paternal Grandmother, Paternal Grandfather    Stroke Mother        Tobacco Use    Smoking status: Never Smoker    Smokeless tobacco: Never Used   Substance and Sexual Activity    Alcohol use: No     Comment: Reports occasional 1-2 drinks     Drug use: No    Sexual activity: Never     Review of Systems   Constitutional: Positive for fatigue. Negative for chills and fever.   Eyes: Positive for visual disturbance. Negative for photophobia.   Respiratory: Positive for shortness of breath.    Gastrointestinal: Negative for abdominal pain, nausea and vomiting.   Neurological: Positive for weakness and headaches. Negative for numbness.   Psychiatric/Behavioral: Positive for confusion.     Objective:     Weight: 50.1 kg (110 lb 7.2 oz)  Body mass index is 19.57 kg/m².  Vital Signs (Most Recent):  Temp:  97.6 °F (36.4 °C) (06/22/19 1539)  Pulse: 62 (06/22/19 1539)  Resp: 18 (06/22/19 1539)  BP: (!) 162/67 (06/22/19 1539)  SpO2: 97 % (06/22/19 1539) Vital Signs (24h Range):  Temp:  [97.6 °F (36.4 °C)-98.3 °F (36.8 °C)] 97.6 °F (36.4 °C)  Pulse:  [60-68] 62  Resp:  [16-18] 18  SpO2:  [93 %-100 %] 97 %  BP: (150-190)/(66-80) 162/67     Date 06/22/19 0700 - 06/23/19 0659   Shift 9033-7668 6235-8458 2373-7154 24 Hour Total   INTAKE   P.O. 240   240   Shift Total(mL/kg) 240(4.8)   240(4.8)   OUTPUT   Stool 1   1   Shift Total(mL/kg) 1(0)   1(0)   Weight (kg) 50.1 50.1 50.1 50.1              Hemodialysis AV Fistula 05/23/16 0700 Left upper arm (Active)            Hemodialysis AV Fistula Left upper arm (Active)       Neurosurgery Physical Exam    General: well developed, well nourished, no distress.   Head: normocephalic, atraumatic  Neurologic: Alert and oriented. Thought content appropriate.  GCS: Motor: 6/Verbal: 5/Eyes: 4 GCS Total: 15  Mental Status: Awake, Alert, Oriented x 4  Language: No aphasia  Cranial nerves: face symmetric, tongue midline, CN II-XII grossly intact.   Eyes: pupils equal, round, reactive to light with accomodation, EOMI.  Pulmonary: normal respirations, no signs of respiratory distress  Abdomen: soft, non-distended  Sensory: intact to light touch throughout  Motor Strength: Moves all extremities spontaneously with good tone.  Full strength upper and lower extremities. No abnormal movements seen.     Strength  Deltoids Triceps Biceps Wrist Extension Wrist Flexion Hand    Upper: R 5/5 5/5 5/5 5/5 5/5 5/5    L 5/5 5/5 5/5 5/5 5/5 5/5     Iliopsoas Quadriceps Knee  Flexion Tibialis  anterior Gastro- cnemius EHL   Lower: R 5/5 5/5 5/5 5/5 5/5 5/5    L 5/5 5/5 5/5 5/5 5/5 5/5     Significant Labs:  Recent Labs   Lab 06/21/19 1920 06/22/19  0434   GLU 97 78    139   K 3.2* 3.2*    101   CO2 28 30*   BUN 5* 8   CREATININE 1.8* 2.5*   CALCIUM 9.5 9.7   MG  --  2.0     Recent Labs   Lab  06/21/19  1920 06/22/19  0434   WBC 5.39 4.45   HGB 8.5* 8.3*   HCT 29.2* 29.0*   * 111*     No results for input(s): LABPT, INR, APTT in the last 48 hours.  Microbiology Results (last 7 days)     Procedure Component Value Units Date/Time    Blood culture [[id number]] Collected:  06/22/19 0656    Order Status:  Completed Specimen:  Blood Updated:  06/22/19 1515     Blood Culture, Routine No Growth to date    Blood culture [[id number]] Collected:  06/22/19 0656    Order Status:  Completed Specimen:  Blood Updated:  06/22/19 1515     Blood Culture, Routine No Growth to date        All pertinent labs from the last 24 hours have been reviewed.    Significant Diagnostics:  I have reviewed all pertinent imaging results/findings within the past 24 hours.

## 2019-06-22 NOTE — HPI
"The patient is a 79 year old F with PMHx of ESRD (on HD M/W/F), HFpEF, COPD (2L O2 NC @ home), h/o SDH (w/ recent admission to [facility] on 5/21/19-w/bilateral SDH R>L s/p unwitnessed fall on plavix, no neurosurgical intervention) and right MCA stroke s/p thrombectomy in 2016 admitted to [facility] with increased R SDH.  After initial admission to [facility] on (5/21) pt was stepdown to hospital medicine, discharged to inpatient rehab/SNF and and then to home, however she failed to make it to her follow up appointment. Patient was admitted 06/21/19 to the hospital to hospital medicine with chief complaint of "not feeling right". Medicine team performed CT Head on 6/22 which demonstrated increase in size and chronicification of R convexity SDH with mass effect. Pt stated she had a fall 2 weeks prior to presentation w/LOC, not on any anticoagulants and had intermittent headaches since then however she denies any headaches at time of this presentation. 06/23/2019 s/p craniotomy by neurosurgery, SD placed. 6/25 Pt had MMA embolization. 6/27/19 SD drain removed, CT head was stable and patient stepped down to Hospital Medicine 3.   "

## 2019-06-22 NOTE — CONSULTS
"[facility]  Neurosurgery  Consult Note    Consults  Subjective:     Chief Complaint/Reason for Admission: SOB    History of Present Illness: Patient is a 79 year old female known to this service, with a history of ESRD, CHF, COPD, and right MCA stroke who is admitted to medicine with complain of SOB as well as "not feeling right". She was previously admitted to [facility] in May with R acute SDH after a fall while on plavix. Nonoperative at that time. She was discharged to [facility], then home, but lost to follow up. Medicine team performed CTH which demonstrated increase in size and chronicification of R convexity SDH with mass effect. Patient reports intermittent headaches and some left sided weakness, denies nausea/vomiting. Not currently on anticoagulation.         Medications Prior to Admission   Medication Sig Dispense Refill Last Dose    acetaminophen (TYLENOL) 325 MG tablet Take 2 tablets (650 mg total) by mouth every 6 (six) hours as needed for Pain.  0     albuterol (PROVENTIL/VENTOLIN HFA) 90 mcg/actuation inhaler Inhale 1-2 puffs into the lungs every 6 (six) hours as needed for Wheezing. 1 Inhaler 0 Not Taking    amLODIPine (NORVASC) 10 MG tablet Take 1 tablet (10 mg total) by mouth once daily. 90 tablet 3 Taking    artificial tears (ISOPTO TEARS) 0.5 % ophthalmic solution Place 2 drops into both eyes 4 (four) times daily as needed.       carvedilol (COREG) 12.5 MG tablet Take 1 tablet (12.5 mg total) by mouth 2 (two) times daily. 60 tablet 11 More than a month at Unknown time    ergocalciferol (ERGOCALCIFEROL) 50,000 unit Cap Take 1 capsule (50,000 Units total) by mouth every 7 days. 12 capsule 3 Taking    fluticasone-vilanterol (BREO ELLIPTA) 200-25 mcg/dose DsDv diskus inhaler Inhale 1 puff into the lungs once daily. 90 each 3 More than a month at Unknown time    hydrALAZINE (APRESOLINE) 100 MG tablet Take 1 tablet (100 mg total) by mouth every 8 (eight) hours. 90 tablet 3 Taking    " losartan (COZAAR) 50 MG tablet Take 1 tablet (50 mg total) by mouth once daily. 90 tablet 3 Taking    ondansetron (ZOFRAN-ODT) 8 MG TbDL Take 1 tablet (8 mg total) by mouth every 6 (six) hours as needed (nausea). 30 tablet 2     polyethylene glycol (GLYCOLAX) 17 gram PwPk Take 17 g by mouth once daily.  0     RENVELA 800 mg Tab Take 1 tablet (800 mg total) by mouth 3 (three) times daily with meals. 270 tablet 3 Taking    senna-docusate 8.6-50 mg (PERICOLACE) 8.6-50 mg per tablet Take 1 tablet by mouth daily as needed for Constipation.       senna-docusate 8.6-50 mg (PERICOLACE) 8.6-50 mg per tablet Take 1 tablet by mouth 2 (two) times daily.          Review of patient's allergies indicates:  No Known Allergies    Past Medical History:   Diagnosis Date    Anemia in ESRD (end-stage renal disease) 5/29/2016    Anticoagulant long-term use     Aortic atherosclerosis 11/22/2016    Aortic stenosis, moderate 2/18/2016    Asthma in adult without complication 1/8/2016    Bilateral low back pain without sciatica 11/17/2015    Blindness of right eye 11/12/2016    CAD (coronary artery disease) 12/12/2016    Cataract     Central retinal vein occlusion, right eye 6/3/2014    CHF (congestive heart failure)     Chronic diastolic heart failure 1/8/2016    Chronic respiratory failure with hypoxia 5/29/2016    COPD (chronic obstructive pulmonary disease) 1/15/2017    Dependence on hemodialysis     Mon-Wed-Fri    Diverticulosis     Embolic stroke involving right middle cerebral artery     Encounter for blood transfusion     Enlarged LA (left atrium) 10/7/2016    Epiretinal membrane 7/17/2012    ESRD (end stage renal disease)     Essential hypertension 1/8/2016    History of GI diverticular bleed     5/22/16    Left flaccid hemiparesis 10/1/2016    Peripheral vascular disease, unspecified 11/22/2016    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 11/13/2016    Type 2 diabetes mellitus with kidney  complication, without long-term current use of insulin 5/1/2018    Type 2 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, without long-term current use of insulin 3/26/2013    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, without long-term current use of insulin 3/26/2013    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     Past Surgical History:   Procedure Laterality Date    ABDOMINAL SURGERY      BREAST SURGERY      tumor removal x 2    CARDIAC SURGERY      CATARACT EXTRACTION      CHOLECYSTECTOMY      COLONOSCOPY N/A 5/30/2016    Performed by Sam Davis MD at [facility] ENDO (2ND FLR)    COLONOSCOPY N/A 5/23/2016    Performed by WILLIAM Colvin MD at [facility] ENDO (2ND FLR)    ESOPHAGOGASTRODUODENOSCOPY (EGD) N/A 5/30/2016    Performed by Sam Davis MD at [facility] ENDO (2ND FLR)    ESOPHAGOGASTRODUODENOSCOPY (EGD) N/A 5/27/2016    Performed by Cesario Rubio MD at [facility] ENDO (2ND FLR)    EXCISION, ANEURYSM Left 11/29/2018    Performed by NADINE Blum III, MD at [facility] OR [address] [place] [address]    EYE SURGERY      Fistulogram Left 11/28/2018    Performed by NADINE Blum III, MD at [facility] CATH LAB    INSERTION, CATHETER, VASCULAR, DUAL LUMEN Right 11/29/2018    Performed by NADINE Blum III, MD at [facility] OR [address] [place] [address]    PTA, Fistula  11/28/2018    Performed by NADINE Blum III, MD at [facility] CATH LAB    REVISION, AV FISTULA, Left 11/29/2018    Performed by NADINE Blum III, MD at [facility] OR [address] [place] [address]    UPPER GASTROINTESTINAL ENDOSCOPY       Family History     Problem Relation (Age of Onset)    Cancer Sister    Cataracts Mother    Esophageal cancer Sister    Glaucoma Brother, Maternal Aunt    Heart disease Brother    Heart failure Sister    Hypertension Mother, Sister, Brother, Father    No Known Problems Maternal Uncle, Paternal Aunt, Paternal Uncle, Maternal Grandmother, Maternal Grandfather, Paternal Grandmother, Paternal  Grandfather    Stroke Mother        Tobacco Use    Smoking status: Never Smoker    Smokeless tobacco: Never Used   Substance and Sexual Activity    Alcohol use: No     Comment: Reports occasional 1-2 drinks     Drug use: No    Sexual activity: Never     Review of Systems   Constitutional: Positive for fatigue. Negative for chills and fever.   Eyes: Positive for visual disturbance. Negative for photophobia.   Respiratory: Positive for shortness of breath.    Gastrointestinal: Negative for abdominal pain, nausea and vomiting.   Neurological: Positive for weakness and headaches. Negative for numbness.   Psychiatric/Behavioral: Positive for confusion.     Objective:     Weight: 50.1 kg (110 lb 7.2 oz)  Body mass index is 19.57 kg/m².  Vital Signs (Most Recent):  Temp: 97.6 °F (36.4 °C) (06/22/19 1539)  Pulse: 62 (06/22/19 1539)  Resp: 18 (06/22/19 1539)  BP: (!) 162/67 (06/22/19 1539)  SpO2: 97 % (06/22/19 1539) Vital Signs (24h Range):  Temp:  [97.6 °F (36.4 °C)-98.3 °F (36.8 °C)] 97.6 °F (36.4 °C)  Pulse:  [60-68] 62  Resp:  [16-18] 18  SpO2:  [93 %-100 %] 97 %  BP: (150-190)/(66-80) 162/67     Date 06/22/19 0700 - 06/23/19 0659   Shift 0296-5799 8617-7548 6479-9303 24 Hour Total   INTAKE   P.O. 240   240   Shift Total(mL/kg) 240(4.8)   240(4.8)   OUTPUT   Stool 1   1   Shift Total(mL/kg) 1(0)   1(0)   Weight (kg) 50.1 50.1 50.1 50.1              Hemodialysis AV Fistula 05/23/16 0700 Left upper arm (Active)            Hemodialysis AV Fistula Left upper arm (Active)       Neurosurgery Physical Exam    General: well developed, well nourished, no distress.   Head: normocephalic, atraumatic  Neurologic: Alert and oriented. Thought content appropriate.  GCS: Motor: 6/Verbal: 5/Eyes: 4 GCS Total: 15  Mental Status: Awake, Alert, Oriented x 4  Language: No aphasia  Cranial nerves: face symmetric, tongue midline, CN II-XII grossly intact.   Eyes: pupils equal, round, reactive to light with accomodation, EOMI.  Pulmonary:  normal respirations, no signs of respiratory distress  Abdomen: soft, non-distended  Sensory: intact to light touch throughout  Motor Strength: Moves all extremities spontaneously with good tone.  Full strength upper and lower extremities. No abnormal movements seen.     Strength  Deltoids Triceps Biceps Wrist Extension Wrist Flexion Hand    Upper: R 5/5 5/5 5/5 5/5 5/5 5/5    L 5/5 5/5 5/5 5/5 5/5 5/5     Iliopsoas Quadriceps Knee  Flexion Tibialis  anterior Gastro- cnemius EHL   Lower: R 5/5 5/5 5/5 5/5 5/5 5/5    L 5/5 5/5 5/5 5/5 5/5 5/5     Significant Labs:  Recent Labs   Lab 06/21/19 1920 06/22/19  0434   GLU 97 78    139   K 3.2* 3.2*    101   CO2 28 30*   BUN 5* 8   CREATININE 1.8* 2.5*   CALCIUM 9.5 9.7   MG  --  2.0     Recent Labs   Lab 06/21/19 1920 06/22/19  0434   WBC 5.39 4.45   HGB 8.5* 8.3*   HCT 29.2* 29.0*   * 111*     No results for input(s): LABPT, INR, APTT in the last 48 hours.  Microbiology Results (last 7 days)     Procedure Component Value Units Date/Time    Blood culture [[id number]] Collected:  06/22/19 0656    Order Status:  Completed Specimen:  Blood Updated:  06/22/19 1515     Blood Culture, Routine No Growth to date    Blood culture [[id number]] Collected:  06/22/19 0656    Order Status:  Completed Specimen:  Blood Updated:  06/22/19 1515     Blood Culture, Routine No Growth to date        All pertinent labs from the last 24 hours have been reviewed.    Significant Diagnostics:  I have reviewed all pertinent imaging results/findings within the past 24 hours.    Assessment/Plan:     Subdural hemorrhage  79F PMH ESRD, CHF, COPD, and right MCA stroke with known R convexity SDH, now with expansion and chronicificaiton. Remains neurointact.    -- No acute neurosurgical intervention necessary.  -- Transfer to [facility].  -- Patient will need evacuation in the near future, sooner if neurological decline.  -- SBP <160  -- HOB >30  -- Neurochecks q1h  -- Further care per NCC.          Thank you for your consult. I will follow-up with patient. Please contact us if you have any additional questions.    Scar Morales MD  Neurosurgery  [facility]

## 2019-06-22 NOTE — ASSESSMENT & PLAN NOTE
Hyperparathyroidism, secondary renal  Anemia in ESRD (end-stage renal disease)  HD MWF via LUE AVF  - nephrology consulted for HD while admitted  - Cr improved from baseline  - H/H stable; EPO with dialysis, PRN  - c/w sevelamer TIDWM  - strict I/O, daily weights

## 2019-06-22 NOTE — ASSESSMENT & PLAN NOTE
79F PMH ESRD, CHF, COPD, and right MCA stroke with known R convexity SDH, now with expansion and chronicificaiton. Remains neurointact.    -- No acute neurosurgical intervention necessary.  -- Transfer to [facility].  -- Patient will need evacuation in the near future, sooner if neurological decline.  -- SBP <160  -- HOB >30  -- Neurochecks q1h  -- Further care per [facility].

## 2019-06-22 NOTE — HPI
"The patient is a 79 year old pleasant female with PMHx of  ESRD (on HD M/W/F), CHF, COPD (2L O2 NC@ home), h/oSDH (w/ recent admission to [facility] on 5/21/19-w/bilateral SDH R>L s/p unwitnessed fall on plavix, no neurosurgical intervention) and right MCA stroke s/p thrombectomy in 2016 admitted to [facility] with increased R SDH.  After initial admission to [facility] on (5/21) pt was stepdown to hospital medicine, inpt rehab/SNF and and then to home however she failed to make it to her follow up appointment. Patient was admitted yesterday (06/21/19) to the hospital to hospital medicine with cc of "not feeling right". Medicine team performed CTH on 6/22 which demonstrated increase in size and chronicification of R convexity SDH with mass effect. Pt stated she had a fall 2 weeks ago w/LOC, not on any anticoagulants and had intermittent headaches since then however she denies any headaches now.  Pending repeat CTH at 11PM. NPO after midnight likely to OR in AM w/ NSGY team for evacuation of SDH. Patient admitted to [facility] for close monitoring and higher level of care.          "

## 2019-06-22 NOTE — NURSING
Verbal report given to Eugene in Neuro ICU. Pt updated on plan of care. Pt transported in bed via cardiac monitoring and 2 liters 02 NC

## 2019-06-22 NOTE — ASSESSMENT & PLAN NOTE
- h/o recent bilateral acute SDH, R>L with right to left midline shift on plavix and reversed with platelets. No surgical intervention performed per Nsgy recs.  - Appears plavix and ASA held since this event. PharmD consulted.  - has yet to f/u with NSGY

## 2019-06-22 NOTE — ASSESSMENT & PLAN NOTE
CXR reveals chronic R moderate pleural effusion (has been present since at least 2016), now appears to be more symptomatic than baseline.  Stable on home 2L NC  - will obtain CT chest to better characterize. Likely 2/2 ESRD, not malignancy  - Pulmonology consulted and appreciate recs  - procal .60, but no evidence of infectious process  - completed HD day of admission  - will need further collateral from family

## 2019-06-22 NOTE — ASSESSMENT & PLAN NOTE
PT/OT consulted  - appears now patient is bedbound.  - appears there are social issues and home and family can no longer care for patient who requires 24 hour care.

## 2019-06-22 NOTE — SUBJECTIVE & OBJECTIVE
Enedelia London will be in tomorrow with her    Past Medical History:   Diagnosis Date    Anemia in ESRD (end-stage renal disease) 5/29/2016    Anticoagulant long-term use     Aortic atherosclerosis 11/22/2016    Aortic stenosis, moderate 2/18/2016    Asthma in adult without complication 1/8/2016    Bilateral low back pain without sciatica 11/17/2015    Blindness of right eye 11/12/2016    CAD (coronary artery disease) 12/12/2016    Cataract     Central retinal vein occlusion, right eye 6/3/2014    CHF (congestive heart failure)     Chronic diastolic heart failure 1/8/2016    Chronic respiratory failure with hypoxia 5/29/2016    COPD (chronic obstructive pulmonary disease) 1/15/2017    Dependence on hemodialysis     Mon-Wed-Fri    Diverticulosis     Embolic stroke involving right middle cerebral artery     Encounter for blood transfusion     Enlarged LA (left atrium) 10/7/2016    Epiretinal membrane 7/17/2012    ESRD (end stage renal disease)     Essential hypertension 1/8/2016    History of GI diverticular bleed     5/22/16    Left flaccid hemiparesis 10/1/2016    Peripheral vascular disease, unspecified 11/22/2016    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 11/13/2016    Type 2 diabetes mellitus with kidney complication, without long-term current use of insulin 5/1/2018    Type 2 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, without long-term current use of insulin 3/26/2013    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, without long-term current use of insulin 3/26/2013    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       Past Surgical History:   Procedure Laterality Date    ABDOMINAL SURGERY      BREAST SURGERY      tumor removal x 2    CARDIAC SURGERY      CATARACT EXTRACTION      CHOLECYSTECTOMY      COLONOSCOPY N/A 5/30/2016    [facility] ([facility])    COLONOSCOPY N/A 5/23/2016     Performed by WILLIAM Colvin MD at [facility] ENDO (2ND FLR)    ESOPHAGOGASTRODUODENOSCOPY (EGD) N/A 5/30/2016    Performed by Sam Davis MD at [facility] ENDO (2ND FLR)    ESOPHAGOGASTRODUODENOSCOPY (EGD) N/A 5/27/2016    Performed by Cesario Rubio MD at [facility] ENDO (2ND FLR)    EXCISION, ANEURYSM Left 11/29/2018    Performed by NADINE Blum III, MD at [facility] OR 2ND [facility]    EYE SURGERY      Fistulogram Left 11/28/2018    Performed by NADINE Blum III, MD at [facility] CATH LAB    INSERTION, CATHETER, VASCULAR, DUAL LUMEN Right 11/29/2018    Performed by NADINE Blum III, MD at [facility] OR [address] [place] [address]    PTA, Fistula  11/28/2018    Performed by NADINE Blum III, MD at [facility] CATH LAB    REVISION, AV FISTULA, Left 11/29/2018    Performed by NADINE Blum III, MD at [facility] OR [address] [place] [address]    UPPER GASTROINTESTINAL ENDOSCOPY         Review of patient's allergies indicates:  No Known Allergies  Current Facility-Administered Medications   Medication Frequency    acetaminophen tablet 650 mg Q4H PRN    albuterol-ipratropium 2.5 mg-0.5 mg/3 mL nebulizer solution 3 mL Q4H PRN    amLODIPine tablet 10 mg Daily    carvedilol tablet 12.5 mg BID    dextrose 10% (D10W) Bolus PRN    dextrose 10% (D10W) Bolus PRN    fluticasone furoate-vilanterol 200-25 mcg/dose diskus inhaler 1 puff Daily    glucagon (human recombinant) injection 1 mg PRN    glucose chewable tablet 16 g PRN    glucose chewable tablet 24 g PRN    hydrALAZINE tablet 100 mg Q8H    insulin aspart U-100 pen 0-5 Units QID (AC + HS) PRN    losartan tablet 50 mg Daily    ondansetron disintegrating tablet 4 mg Q8H PRN    polyethylene glycol packet 17 g Daily    promethazine (PHENERGAN) 6.25 mg in dextrose 5 % 50 mL IVPB Q6H PRN    ramelteon tablet 8 mg Nightly PRN    senna-docusate 8.6-50 mg per tablet 1 tablet BID PRN    sevelamer carbonate tablet 800 mg TID WM    sodium chloride 0.9% flush 5 mL PRN     Family History     Problem Relation (Age of Onset)     Cancer Sister    Cataracts Mother    Esophageal cancer Sister    Glaucoma Brother, Maternal Aunt    Heart disease Brother    Heart failure Sister    Hypertension Mother, Sister, Brother, Father    No Known Problems Maternal Uncle, Paternal Aunt, Paternal Uncle, Maternal Grandmother, Maternal Grandfather, Paternal Grandmother, Paternal Grandfather    Stroke Mother        Tobacco Use    Smoking status: Never Smoker    Smokeless tobacco: Never Used   Substance and Sexual Activity    Alcohol use: No     Comment: Reports occasional 1-2 drinks     Drug use: No    Sexual activity: Never     Review of Systems   Constitutional: Negative for chills, fatigue and fever.   Respiratory: Negative for chest tightness and shortness of breath.    Cardiovascular: Negative for chest pain and leg swelling.   Gastrointestinal: Negative for abdominal distention, abdominal pain, diarrhea and nausea.   Genitourinary: Negative for decreased urine volume, difficulty urinating, flank pain, frequency, hematuria and urgency.   Skin: Negative for rash.   Neurological: Negative for tremors, speech difficulty and weakness.     Objective:     Vital Signs (Most Recent):  Temp: 98.1 °F (36.7 °C) (06/22/19 0708)  Pulse: 68 (06/22/19 0759)  Resp: 17 (06/22/19 0759)  BP: (!) 188/80 (06/22/19 0708)  SpO2: 96 % (06/22/19 0708)  O2 Device (Oxygen Therapy): nasal cannula (06/22/19 0348) Vital Signs (24h Range):  Temp:  [97.9 °F (36.6 °C)-98.4 °F (36.9 °C)] 98.1 °F (36.7 °C)  Pulse:  [60-68] 68  Resp:  [16-18] 17  SpO2:  [93 %-100 %] 96 %  BP: (165-190)/(57-80) 188/80     Weight: 50.1 kg (110 lb 7.2 oz) (06/21/19 8385)  Body mass index is 19.57 kg/m².  Body surface area is 1.49 meters squared.    No intake/output data recorded.    Physical Exam   Constitutional: She is oriented to person, place, and time.   HENT:   Head: Atraumatic.   Neck: No JVD present.   Cardiovascular: Normal rate and regular rhythm.   Pulmonary/Chest: Effort normal and breath  sounds normal.   Abdominal: Soft. She exhibits no distension.   Musculoskeletal: She exhibits no edema or tenderness.   Neurological: She is alert and oriented to person, place, and time.   Skin: Skin is warm.       Significant Labs:  CBC:   Recent Labs   Lab 06/22/19 0434   WBC 4.45   RBC 2.94*   HGB 8.3*   HCT 29.0*   *   MCV 99*   MCH 28.2   MCHC 28.6*     CMP:   Recent Labs   Lab 06/21/19  1920 06/22/19 0434   GLU 97 78   CALCIUM 9.5 9.7   ALBUMIN 3.0*  --    PROT 6.6  --     139   K 3.2* 3.2*   CO2 28 30*    101   BUN 5* 8   CREATININE 1.8* 2.5*   ALKPHOS 86  --    ALT 6*  --    AST 9*  --    BILITOT 0.6  --      All labs within the past 24 hours have been reviewed.

## 2019-06-22 NOTE — PLAN OF CARE
Problem: Adult Inpatient Plan of Care  Goal: Plan of Care Review  Outcome: Ongoing (interventions implemented as appropriate)  POC reviewed with patient, who verbalized understanding. AAOx3, mildly confused on time.   Remains free of falls and injury. No shortness of breath reported, on 2L NC.   HTN, MD team notified. Left temple bruising from fall prior.   Tolerating diet. No  Nausea. No Pain.    Dialysis patient, No urine in brief. 0 X BM.   Up with assist chair fast.  Telemetry monitor. Blood Glucose monitor AC/HS. TEDS in place.   No acute events. No distress noted.  Call bell in reach.   Will continue to monitor.

## 2019-06-22 NOTE — ED NOTES
LOC: Patient name and date of birth verified for Tea Georges. The patient is awake, alert and aware of environment with an appropriate affect, the patient is oriented x 3 and speaking appropriately.   APPEARANCE: Patient resting comfortably, patient is clean and well groomed, patient's clothing is properly fastened.  SKIN: The skin is warm and dry, color consistent with ethnicity, patient has normal skin turgor and moist mucus membranes, skin intact, no breakdown or bruising noted. L arm dialysis fistula.   MUSCULOSKELETAL: Patient moving all extremities well, no obvious swelling or deformities noted.   RESPIRATORY: Respirations are spontaneous, patient has a normal effort and rate, no accessory muscle use noted. Pt on home 02 at 2L nasal cannula.   CARDIAC: Patient has a normal rate, no periphreal edema noted, capillary refill < 3 seconds.  ABDOMEN: Soft and non tender to palpation, no distention noted. Bowel sounds present in all four quadrants.  NEUROLOGIC: Eyes open spontaneously, behavior appropriate to situation, follows commands, facial expression symmetrical.

## 2019-06-22 NOTE — H&P
"[facility] Hospital Medicine  History & Physical    Patient Name: Tea Georges  MRN: [id number]  Admission Date: 6/21/2019  Attending Physician: Iveth Mcneal*   Primary Care Provider: Parth Posadas Ii, MD    [facility] Medicine Team: Networked reference to record PCT  Sesar Cameron PA-C     Patient information was obtained from past medical records and ER records.     Subjective:     Principal Problem:Pleural effusion on right    Chief Complaint:   Chief Complaint   Patient presents with    Shortness of Breath     SOB after dialysis. pt received full run of dialysis. pt denies sob at this time        HPI: Tea Georges is a 79 y.o. F with pmhx ESRD, CHF, COPD (2L O2 NC), h/o SDH, right MCA stroke s/p thrombectomy in 2016 who presents to ED c/o shortness of breath. Patient is lethargic, confused and states that she is here for a fall. Per ED chart: "The patient was underwent a full run of dialysis today, and immediately after reported feeling increasingly short of breath. Her shortness of breath has currently resolved. She is somnulent, having difficulty remaining awake throughout physician examination. She denies chest pain, abdominal pain, nausea, or vomiting. Patient endorses taking Phenergan this morning."     Patient admits to chronic orthopnea at home and wheezing this AM.  She denies any SOB to me. She states that she lives at home with her daughter and two granddaughters.     Recentlly admitted for bilateral acute SDH, R>L with right to left midline shift s/p fall while on plavix.  Discharged to SNF. Per nursing, patient has been cared for by family and she is reported as bedbound.     In ED, afebrile, BP elevated 160s- 180s systolic, no leukocytosis. Renal function improved from baseline. BNP >4900 (BL 3000 to 4000).  CXR shows moderate R sided pleural effusion and R sided pulmonary edema, similar to previous.     Past Medical History:   Diagnosis Date    Anemia in ESRD " (end-stage renal disease) 5/29/2016    Anticoagulant long-term use     Aortic atherosclerosis 11/22/2016    Aortic stenosis, moderate 2/18/2016    Asthma in adult without complication 1/8/2016    Bilateral low back pain without sciatica 11/17/2015    Blindness of right eye 11/12/2016    CAD (coronary artery disease) 12/12/2016    Cataract     Central retinal vein occlusion, right eye 6/3/2014    CHF (congestive heart failure)     Chronic diastolic heart failure 1/8/2016    Chronic respiratory failure with hypoxia 5/29/2016    COPD (chronic obstructive pulmonary disease) 1/15/2017    Dependence on hemodialysis     Mon-Wed-Fri    Diverticulosis     Embolic stroke involving right middle cerebral artery     Encounter for blood transfusion     Enlarged LA (left atrium) 10/7/2016    Epiretinal membrane 7/17/2012    ESRD (end stage renal disease)     Essential hypertension 1/8/2016    History of GI diverticular bleed     5/22/16    Left flaccid hemiparesis 10/1/2016    Peripheral vascular disease, unspecified 11/22/2016    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 11/13/2016    Type 2 diabetes mellitus with kidney complication, without long-term current use of insulin 5/1/2018    Type 2 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, without long-term current use of insulin 3/26/2013    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, without long-term current use of insulin 3/26/2013    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       Past Surgical History:   Procedure Laterality Date    ABDOMINAL SURGERY      BREAST SURGERY      tumor removal x 2    CARDIAC SURGERY      CATARACT EXTRACTION      CHOLECYSTECTOMY      COLONOSCOPY N/A 5/30/2016    Performed by Sam Davis MD at [facility] ENDO (2ND FLR)    COLONOSCOPY N/A 5/23/2016    Performed by WILLIAM Colvin MD at [facility] ENDO (2ND FLR)     ESOPHAGOGASTRODUODENOSCOPY (EGD) N/A 5/30/2016    Performed by Sam Davis MD at [facility] ENDO (2ND FLR)    ESOPHAGOGASTRODUODENOSCOPY (EGD) N/A 5/27/2016    Performed by Cesario Rubio MD at [facility] ENDO (2ND FLR)    EXCISION, ANEURYSM Left 11/29/2018    Performed by NADINE Blum III, MD at [facility] OR 2ND FLR    EYE SURGERY      Fistulogram Left 11/28/2018    Performed by NADINE Blum III, MD at [facility] CATH LAB    INSERTION, CATHETER, VASCULAR, DUAL LUMEN Right 11/29/2018    Performed by NADINE Blum III, MD at [facility] OR 2ND FLR    PTA, Fistula  11/28/2018    Performed by NADINE Blum III, MD at [facility] CATH LAB    REVISION, AV FISTULA, Left 11/29/2018    Performed by NADINE Blum III, MD at [facility] OR 2ND FLR    UPPER GASTROINTESTINAL ENDOSCOPY         Review of patient's allergies indicates:  No Known Allergies    No current facility-administered medications on file prior to encounter.      Current Outpatient Medications on File Prior to Encounter   Medication Sig    acetaminophen (TYLENOL) 325 MG tablet Take 2 tablets (650 mg total) by mouth every 6 (six) hours as needed for Pain.    albuterol (PROVENTIL/VENTOLIN HFA) 90 mcg/actuation inhaler Inhale 1-2 puffs into the lungs every 6 (six) hours as needed for Wheezing.    amLODIPine (NORVASC) 10 MG tablet Take 1 tablet (10 mg total) by mouth once daily.    artificial tears (ISOPTO TEARS) 0.5 % ophthalmic solution Place 2 drops into both eyes 4 (four) times daily as needed.    carvedilol (COREG) 12.5 MG tablet Take 1 tablet (12.5 mg total) by mouth 2 (two) times daily.    ergocalciferol (ERGOCALCIFEROL) 50,000 unit Cap Take 1 capsule (50,000 Units total) by mouth every 7 days.    fluticasone-vilanterol (BREO ELLIPTA) 200-25 mcg/dose DsDv diskus inhaler Inhale 1 puff into the lungs once daily.    hydrALAZINE (APRESOLINE) 100 MG tablet Take 1 tablet (100 mg total) by mouth every 8 (eight) hours.    losartan (COZAAR) 50 MG tablet Take 1 tablet (50  mg total) by mouth once daily.    ondansetron (ZOFRAN-ODT) 8 MG TbDL Take 1 tablet (8 mg total) by mouth every 6 (six) hours as needed (nausea).    polyethylene glycol (GLYCOLAX) 17 gram PwPk Take 17 g by mouth once daily.    RENVELA 800 mg Tab Take 1 tablet (800 mg total) by mouth 3 (three) times daily with meals.    senna-docusate 8.6-50 mg (PERICOLACE) 8.6-50 mg per tablet Take 1 tablet by mouth daily as needed for Constipation.    senna-docusate 8.6-50 mg (PERICOLACE) 8.6-50 mg per tablet Take 1 tablet by mouth 2 (two) times daily.     Family History     Problem Relation (Age of Onset)    Cancer Sister    Cataracts Mother    Esophageal cancer Sister    Glaucoma Brother, Maternal Aunt    Heart disease Brother    Heart failure Sister    Hypertension Mother, Sister, Brother, Father    No Known Problems Maternal Uncle, Paternal Aunt, Paternal Uncle, Maternal Grandmother, Maternal Grandfather, Paternal Grandmother, Paternal Grandfather    Stroke Mother        Tobacco Use    Smoking status: Never Smoker    Smokeless tobacco: Never Used   Substance and Sexual Activity    Alcohol use: No     Comment: Reports occasional 1-2 drinks     Drug use: No    Sexual activity: Never     Review of Systems   Constitutional: Negative for chills, fever and unexpected weight change.   HENT: Negative for congestion and rhinorrhea.    Eyes: Negative for photophobia and visual disturbance (chronic R eye blindness).   Respiratory: Positive for wheezing. Negative for cough, chest tightness and shortness of breath.         +orthopnea   Cardiovascular: Negative for chest pain and palpitations.   Gastrointestinal: Negative for abdominal pain, diarrhea, nausea and vomiting.   Genitourinary: Negative for difficulty urinating and dysuria.        Oliguric   Musculoskeletal: Negative for back pain and gait problem.   Skin: Negative for rash and wound.   Neurological: Negative for dizziness and headaches.   Psychiatric/Behavioral: Positive  for agitation and confusion (mild).     Objective:     Vital Signs (Most Recent):  Temp: 97.9 °F (36.6 °C) (06/21/19 2355)  Pulse: 65 (06/21/19 2355)  Resp: 18 (06/21/19 2355)  BP: (!) 172/74 (06/21/19 2355)  SpO2: 97 % (06/21/19 2355) Vital Signs (24h Range):  Temp:  [97.9 °F (36.6 °C)-98.4 °F (36.9 °C)] 97.9 °F (36.6 °C)  Pulse:  [60-68] 65  Resp:  [16-18] 18  SpO2:  [93 %-100 %] 97 %  BP: (165-184)/(57-80) 172/74     Weight: 50.1 kg (110 lb 7.2 oz)  Body mass index is 19.57 kg/m².    Physical Exam   Constitutional: She is oriented to person, place, and time. She appears well-developed and well-nourished.   HENT:   Head: Normocephalic and atraumatic.   Eyes: EOM are normal.   R eye blindness; R eye opaque   Neck: Normal range of motion. Neck supple.   Cardiovascular: Normal rate, regular rhythm, normal heart sounds and intact distal pulses.   Pulmonary/Chest: Effort normal. She exhibits no tenderness.   R sided crackles to middle and lower lobes with faint bibasilar lung sounds   Abdominal: Soft. Bowel sounds are normal.   Musculoskeletal: Normal range of motion. She exhibits no edema or deformity.   LUE AVF   Neurological: She is alert and oriented to person, place, and time.   Strength intact, R>L  Oriented to year, president, place, person, but not situation   Skin: Skin is warm and dry.   Psychiatric: She has a normal mood and affect. Her speech is normal. Judgment normal. She is agitated (that I woke her up from sleep) and withdrawn. She exhibits abnormal recent memory. She is inattentive.   Nursing note and vitals reviewed.        CRANIAL NERVES     CN III, IV, VI   Extraocular motions are normal.        Significant Labs:   CBC:   Recent Labs   Lab 06/21/19 1920   WBC 5.39   HGB 8.5*   HCT 29.2*   *     CMP:   Recent Labs   Lab 06/21/19 1920      K 3.2*      CO2 28   GLU 97   BUN 5*   CREATININE 1.8*   CALCIUM 9.5   PROT 6.6   ALBUMIN 3.0*   BILITOT 0.6   ALKPHOS 86   AST 9*   ALT 6*    ANIONGAP 10   EGFRNONAA 26.8*     Troponin: No results for input(s): TROPONINI in the last 48 hours.    Significant Imaging: I have reviewed all pertinent imaging results/findings within the past 24 hours.    Assessment/Plan:     * Pleural effusion on right  CXR reveals chronic R moderate pleural effusion (has been present since at least 2016), now appears to be more symptomatic than baseline.  Stable on home 2L NC  - will obtain CT chest to better characterize. Likely 2/2 ESRD, not malignancy  - Pulmonology consulted and appreciate recs  - procal .60, but no evidence of infectious process  - completed HD day of admission  - will need further collateral from family    ESRD (end stage renal disease)  Hyperparathyroidism, secondary renal  Anemia in ESRD (end-stage renal disease)  HD MWF via LUE AVF  - nephrology consulted for HD while admitted  - Cr improved from baseline  - H/H stable; EPO with dialysis, PRN  - c/w sevelamer TIDWM  - strict I/O, daily weights    (HFpEF) heart failure with preserved ejection fraction  Nonrheumatic aortic valve stenosis  Non-rheumatic tricuspid valve insufficiency    - TTE 5/22 shows EF 53, grade II DD, severe LAE, moderate TR and MR, PASP 77, CVP 15 mm  - diurese with HD    Subdural hemorrhage  - h/o recent bilateral acute SDH, R>L with right to left midline shift on plavix and reversed with platelets. No surgical intervention performed per Nsgy recs.  - Appears plavix and ASA held since this event. PharmD consulted.  - has yet to f/u with NSGY    Pulmonary emphysema  Chronic respiratory failure with hypoxia  - c/w ICS/LABA, PRN duonebs  - c/w 2L NC O2    Physical debility  PT/OT consulted  - appears now patient is bedbound.  - appears there are social issues and home and family can no longer care for patient who requires 24 hour care.     Moderate malnutrition  - c/w beneprotein supplements  - Albumin 3.0 on admission    VTE Risk Mitigation (From admission, onward)        Ordered      IP VTE HIGH RISK PATIENT  Once      06/22/19 0017     Place MADDY hose  Until discontinued      06/22/19 0017     Place sequential compression device  Until discontinued      06/22/19 0017             Sesar Cameron PA-C  Department of Hospital Medicine   [facility]

## 2019-06-22 NOTE — HPI
"78yo AAF with pmhx ESRD, CHF, COPD (2L O2 NC), h/o SDH, right MCA stroke s/p thrombectomy in 2016 who presents to ED c/o shortness of breath. Patient is lethargic, confused and states that she is here for a fall. Per ED chart: "The patient was underwent a full run of dialysis today, and immediately after reported feeling increasingly short of breath. Her shortness of breath has currently resolved. She is somnulent, having difficulty remaining awake throughout physician examination. She denies chest pain, abdominal pain, nausea, or vomiting. Patient endorses taking Phenergan this morning."      Patient admits to chronic orthopnea at home and wheezing this AM.  She denies any SOB to me. She states that she lives at home with her daughter and two granddaughters.      Recentlly admitted for bilateral acute SDH, R>L with right to left midline shift s/p fall while on plavix.  Discharged to SNF. Per nursing, patient has been cared for by family and she is reported as bedbound.      In ED, afebrile, BP elevated 160s- 180s systolic, no leukocytosis. Renal function improved from baseline. BNP >4900 (BL 3000 to 4000).  CXR shows moderate R sided pleural effusion and R sided pulmonary edema, similar to previous.   "

## 2019-06-22 NOTE — ASSESSMENT & PLAN NOTE
Nonrheumatic aortic valve stenosis  Non-rheumatic tricuspid valve insufficiency    - TTE 5/22 shows EF 53, grade II DD, severe LAE, moderate TR and MR, PASP 77, CVP 15 mm  - diurese with HD

## 2019-06-22 NOTE — CARE UPDATE
"Patient seen and examined at bedside. For full H&P, please see ARELIS Cameron's note from 6am.     Complaining of malaise this morning; unable to focalize or elaborate. "I just don't feel good". Also, "I can't breathe too good", but she admits this is chronic. She is on her home supplemental O2 needs. Rpt CT of the chest showed bilateral pleural effusions unchanged from prior.     Agree with plan, with the following additions: will check UA (still produces some urine) and f/u on BCx to assess for underlying infection. PT/OT ordered for dispo needs. Incentive spirometry and early mobilization to prevent atelectasis and worsening SOB. Trial of IV lasix for enhanced volume removal. Nephrology consulted for HD.     Update:   5:38 PM    CTH ordered due to history of recent SDH and generalized malaise. Alerted by Radiologist that patient's CTH sig/f worsening SDH with midline shift concerning for impending herniation. NSGY consult placed STAT and service notified. Patient not currently on any anticoagulation. Plts >100, PT/INR pending.   "

## 2019-06-22 NOTE — ASSESSMENT & PLAN NOTE
Modality: iHD  Days: MWF  Access: LUE AVF  Unit: [facility]  Nephrologist: Dr Jared Martinez Duration: 3-4  EDW: 50kg  UF : 2-3    Lytes, acid/base and volume status appear stable    Plan for dialysis:  -tentatively for RRT in the [facility] on Monday

## 2019-06-23 ENCOUNTER — ANESTHESIA (OUTPATIENT)
Dept: SURGERY | Facility: HOSPITAL | Age: 79
DRG: 025 | End: 2019-06-23
Payer: MEDICARE

## 2019-06-23 ENCOUNTER — ANESTHESIA EVENT (OUTPATIENT)
Dept: SURGERY | Facility: HOSPITAL | Age: 79
DRG: 025 | End: 2019-06-23
Payer: MEDICARE

## 2019-06-23 LAB
ALBUMIN SERPL BCP-MCNC: 2.8 G/DL (ref 3.5–5.2)
ALP SERPL-CCNC: 79 U/L (ref 55–135)
ALT SERPL W/O P-5'-P-CCNC: <5 U/L (ref 10–44)
ANION GAP SERPL CALC-SCNC: 8 MMOL/L (ref 8–16)
AST SERPL-CCNC: 8 U/L (ref 10–40)
BASOPHILS # BLD AUTO: 0.03 K/UL (ref 0–0.2)
BASOPHILS NFR BLD: 0.5 % (ref 0–1.9)
BILIRUB SERPL-MCNC: 0.6 MG/DL (ref 0.1–1)
BLD PROD TYP BPU: NORMAL
BLOOD UNIT EXPIRATION DATE: NORMAL
BLOOD UNIT TYPE CODE: 6200
BLOOD UNIT TYPE: NORMAL
BUN SERPL-MCNC: 17 MG/DL (ref 8–23)
CALCIUM SERPL-MCNC: 9.9 MG/DL (ref 8.7–10.5)
CHLORIDE SERPL-SCNC: 105 MMOL/L (ref 95–110)
CO2 SERPL-SCNC: 29 MMOL/L (ref 23–29)
CODING SYSTEM: NORMAL
CREAT SERPL-MCNC: 3.7 MG/DL (ref 0.5–1.4)
DIFFERENTIAL METHOD: ABNORMAL
DISPENSE STATUS: NORMAL
EOSINOPHIL # BLD AUTO: 0.1 K/UL (ref 0–0.5)
EOSINOPHIL NFR BLD: 1.3 % (ref 0–8)
ERYTHROCYTE [DISTWIDTH] IN BLOOD BY AUTOMATED COUNT: 17.2 % (ref 11.5–14.5)
EST. GFR  (AFRICAN AMERICAN): 12.9 ML/MIN/1.73 M^2
EST. GFR  (NON AFRICAN AMERICAN): 11.2 ML/MIN/1.73 M^2
GLUCOSE SERPL-MCNC: 99 MG/DL (ref 70–110)
HCT VFR BLD AUTO: 26.8 % (ref 37–48.5)
HGB BLD-MCNC: 8 G/DL (ref 12–16)
IMM GRANULOCYTES # BLD AUTO: 0.03 K/UL (ref 0–0.04)
IMM GRANULOCYTES NFR BLD AUTO: 0.5 % (ref 0–0.5)
INR PPP: 1.2 (ref 0.8–1.2)
LYMPHOCYTES # BLD AUTO: 0.4 K/UL (ref 1–4.8)
LYMPHOCYTES NFR BLD: 7.2 % (ref 18–48)
MAGNESIUM SERPL-MCNC: 2.1 MG/DL (ref 1.6–2.6)
MCH RBC QN AUTO: 29 PG (ref 27–31)
MCHC RBC AUTO-ENTMCNC: 29.9 G/DL (ref 32–36)
MCV RBC AUTO: 97 FL (ref 82–98)
MONOCYTES # BLD AUTO: 0.6 K/UL (ref 0.3–1)
MONOCYTES NFR BLD: 10.2 % (ref 4–15)
NEUTROPHILS # BLD AUTO: 4.8 K/UL (ref 1.8–7.7)
NEUTROPHILS NFR BLD: 80.3 % (ref 38–73)
NRBC BLD-RTO: 0 /100 WBC
PHOSPHATE SERPL-MCNC: 3.2 MG/DL (ref 2.7–4.5)
PLATELET # BLD AUTO: 111 K/UL (ref 150–350)
PMV BLD AUTO: 12.1 FL (ref 9.2–12.9)
POCT GLUCOSE: 103 MG/DL (ref 70–110)
POCT GLUCOSE: 79 MG/DL (ref 70–110)
POCT GLUCOSE: 88 MG/DL (ref 70–110)
POCT GLUCOSE: 99 MG/DL (ref 70–110)
POTASSIUM SERPL-SCNC: 4.4 MMOL/L (ref 3.5–5.1)
PROT SERPL-MCNC: 6 G/DL (ref 6–8.4)
PROTHROMBIN TIME: 12 SEC (ref 9–12.5)
RBC # BLD AUTO: 2.76 M/UL (ref 4–5.4)
SODIUM SERPL-SCNC: 142 MMOL/L (ref 136–145)
TRANS PLATPHERESIS VOL PATIENT: NORMAL ML
WBC # BLD AUTO: 5.99 K/UL (ref 3.9–12.7)

## 2019-06-23 PROCEDURE — 85610 PROTHROMBIN TIME: CPT

## 2019-06-23 PROCEDURE — G0378 HOSPITAL OBSERVATION PER HR: HCPCS

## 2019-06-23 PROCEDURE — 27000221 HC OXYGEN, UP TO 24 HOURS

## 2019-06-23 PROCEDURE — D9220A PRA ANESTHESIA: ICD-10-PCS | Mod: ANES,,, | Performed by: ANESTHESIOLOGY

## 2019-06-23 PROCEDURE — 25000003 PHARM REV CODE 250: Performed by: STUDENT IN AN ORGANIZED HEALTH CARE EDUCATION/TRAINING PROGRAM

## 2019-06-23 PROCEDURE — 25000003 PHARM REV CODE 250: Performed by: NURSE ANESTHETIST, CERTIFIED REGISTERED

## 2019-06-23 PROCEDURE — 80053 COMPREHEN METABOLIC PANEL: CPT

## 2019-06-23 PROCEDURE — 36000710: Performed by: NEUROLOGICAL SURGERY

## 2019-06-23 PROCEDURE — 85025 COMPLETE CBC W/AUTO DIFF WBC: CPT

## 2019-06-23 PROCEDURE — 27201423 OPTIME MED/SURG SUP & DEVICES STERILE SUPPLY: Performed by: NEUROLOGICAL SURGERY

## 2019-06-23 PROCEDURE — 63600175 PHARM REV CODE 636 W HCPCS: Performed by: STUDENT IN AN ORGANIZED HEALTH CARE EDUCATION/TRAINING PROGRAM

## 2019-06-23 PROCEDURE — 94640 AIRWAY INHALATION TREATMENT: CPT

## 2019-06-23 PROCEDURE — D9220A PRA ANESTHESIA: Mod: ANES,,, | Performed by: ANESTHESIOLOGY

## 2019-06-23 PROCEDURE — 25000242 PHARM REV CODE 250 ALT 637 W/ HCPCS: Performed by: PHYSICIAN ASSISTANT

## 2019-06-23 PROCEDURE — 84100 ASSAY OF PHOSPHORUS: CPT

## 2019-06-23 PROCEDURE — D9220A PRA ANESTHESIA: ICD-10-PCS | Mod: CRNA,,, | Performed by: NURSE ANESTHETIST, CERTIFIED REGISTERED

## 2019-06-23 PROCEDURE — 83735 ASSAY OF MAGNESIUM: CPT

## 2019-06-23 PROCEDURE — D9220A PRA ANESTHESIA: Mod: CRNA,,, | Performed by: NURSE ANESTHETIST, CERTIFIED REGISTERED

## 2019-06-23 PROCEDURE — 61154: ICD-10-PCS | Mod: RT,GC,, | Performed by: NEUROLOGICAL SURGERY

## 2019-06-23 PROCEDURE — 99291 CRITICAL CARE FIRST HOUR: CPT | Mod: GC,,, | Performed by: PSYCHIATRY & NEUROLOGY

## 2019-06-23 PROCEDURE — 94761 N-INVAS EAR/PLS OXIMETRY MLT: CPT

## 2019-06-23 PROCEDURE — 25000003 PHARM REV CODE 250

## 2019-06-23 PROCEDURE — 94799 UNLISTED PULMONARY SVC/PX: CPT

## 2019-06-23 PROCEDURE — 25000003 PHARM REV CODE 250: Performed by: NEUROLOGICAL SURGERY

## 2019-06-23 PROCEDURE — 99291 PR CRITICAL CARE, E/M 30-74 MINUTES: ICD-10-PCS | Mod: GC,,, | Performed by: PSYCHIATRY & NEUROLOGY

## 2019-06-23 PROCEDURE — 37000008 HC ANESTHESIA 1ST 15 MINUTES: Performed by: NEUROLOGICAL SURGERY

## 2019-06-23 PROCEDURE — 61154 BURR HOLE W/EVAC&/DRG HMTMA: CPT | Mod: RT,GC,, | Performed by: NEUROLOGICAL SURGERY

## 2019-06-23 PROCEDURE — 63600175 PHARM REV CODE 636 W HCPCS: Performed by: NURSE ANESTHETIST, CERTIFIED REGISTERED

## 2019-06-23 PROCEDURE — 36000711: Performed by: NEUROLOGICAL SURGERY

## 2019-06-23 PROCEDURE — 25000003 PHARM REV CODE 250: Performed by: PSYCHIATRY & NEUROLOGY

## 2019-06-23 PROCEDURE — 63600175 PHARM REV CODE 636 W HCPCS: Performed by: HOSPITALIST

## 2019-06-23 PROCEDURE — 63600175 PHARM REV CODE 636 W HCPCS: Performed by: NEUROLOGICAL SURGERY

## 2019-06-23 PROCEDURE — 37000009 HC ANESTHESIA EA ADD 15 MINS: Performed by: NEUROLOGICAL SURGERY

## 2019-06-23 PROCEDURE — 99900035 HC TECH TIME PER 15 MIN (STAT)

## 2019-06-23 PROCEDURE — C1729 CATH, DRAINAGE: HCPCS | Performed by: NEUROLOGICAL SURGERY

## 2019-06-23 PROCEDURE — 25000003 PHARM REV CODE 250: Performed by: PHYSICIAN ASSISTANT

## 2019-06-23 RX ORDER — MIDAZOLAM HYDROCHLORIDE 1 MG/ML
INJECTION, SOLUTION INTRAMUSCULAR; INTRAVENOUS
Status: DISCONTINUED | OUTPATIENT
Start: 2019-06-23 | End: 2019-06-23

## 2019-06-23 RX ORDER — CARVEDILOL 25 MG/1
25 TABLET ORAL 2 TIMES DAILY
Status: DISCONTINUED | OUTPATIENT
Start: 2019-06-23 | End: 2019-06-29

## 2019-06-23 RX ORDER — NICARDIPINE HYDROCHLORIDE 0.2 MG/ML
INJECTION INTRAVENOUS CONTINUOUS PRN
Status: DISCONTINUED | OUTPATIENT
Start: 2019-06-23 | End: 2019-06-23

## 2019-06-23 RX ORDER — LIDOCAINE HYDROCHLORIDE AND EPINEPHRINE 10; 10 MG/ML; UG/ML
INJECTION, SOLUTION INFILTRATION; PERINEURAL
Status: DISCONTINUED | OUTPATIENT
Start: 2019-06-23 | End: 2019-06-23 | Stop reason: HOSPADM

## 2019-06-23 RX ORDER — BACITRACIN 50000 [IU]/1
INJECTION, POWDER, FOR SOLUTION INTRAMUSCULAR
Status: DISCONTINUED | OUTPATIENT
Start: 2019-06-23 | End: 2019-06-23 | Stop reason: HOSPADM

## 2019-06-23 RX ORDER — NICARDIPINE HYDROCHLORIDE 0.2 MG/ML
5 INJECTION INTRAVENOUS CONTINUOUS
Status: DISCONTINUED | OUTPATIENT
Start: 2019-06-23 | End: 2019-06-25

## 2019-06-23 RX ORDER — CEFAZOLIN SODIUM 1 G/3ML
1 INJECTION, POWDER, FOR SOLUTION INTRAMUSCULAR; INTRAVENOUS
Status: DISCONTINUED | OUTPATIENT
Start: 2019-06-23 | End: 2019-06-23

## 2019-06-23 RX ORDER — FENTANYL CITRATE 50 UG/ML
INJECTION, SOLUTION INTRAMUSCULAR; INTRAVENOUS
Status: DISCONTINUED | OUTPATIENT
Start: 2019-06-23 | End: 2019-06-23

## 2019-06-23 RX ORDER — OXYCODONE HYDROCHLORIDE 5 MG/1
5 TABLET ORAL EVERY 6 HOURS PRN
Status: DISCONTINUED | OUTPATIENT
Start: 2019-06-23 | End: 2019-06-28

## 2019-06-23 RX ORDER — BACITRACIN ZINC 500 UNIT/G
OINTMENT (GRAM) TOPICAL
Status: DISCONTINUED | OUTPATIENT
Start: 2019-06-23 | End: 2019-06-23 | Stop reason: HOSPADM

## 2019-06-23 RX ORDER — NICARDIPINE HYDROCHLORIDE 0.2 MG/ML
INJECTION INTRAVENOUS
Status: COMPLETED
Start: 2019-06-23 | End: 2019-06-23

## 2019-06-23 RX ORDER — CEFAZOLIN SODIUM 1 G/3ML
1 INJECTION, POWDER, FOR SOLUTION INTRAMUSCULAR; INTRAVENOUS
Status: DISCONTINUED | OUTPATIENT
Start: 2019-06-24 | End: 2019-06-27

## 2019-06-23 RX ADMIN — HYDRALAZINE HYDROCHLORIDE 100 MG: 50 TABLET ORAL at 09:06

## 2019-06-23 RX ADMIN — NICARDIPINE HYDROCHLORIDE 2.5 MG/HR: 0.2 INJECTION, SOLUTION INTRAVENOUS at 10:06

## 2019-06-23 RX ADMIN — NICARDIPINE HYDROCHLORIDE 2.5 MG/HR: 0.2 INJECTION, SOLUTION INTRAVENOUS at 03:06

## 2019-06-23 RX ADMIN — HYDRALAZINE HYDROCHLORIDE 100 MG: 50 TABLET ORAL at 03:06

## 2019-06-23 RX ADMIN — NICARDIPINE HYDROCHLORIDE 2.5 MG/HR: 0.2 INJECTION, SOLUTION INTRAVENOUS at 01:06

## 2019-06-23 RX ADMIN — IPRATROPIUM BROMIDE AND ALBUTEROL SULFATE 3 ML: .5; 3 SOLUTION RESPIRATORY (INHALATION) at 04:06

## 2019-06-23 RX ADMIN — DEXTROSE 2 G: 50 INJECTION, SOLUTION INTRAVENOUS at 11:06

## 2019-06-23 RX ADMIN — OXYCODONE HYDROCHLORIDE 5 MG: 5 TABLET ORAL at 08:06

## 2019-06-23 RX ADMIN — MIDAZOLAM HYDROCHLORIDE 2 MG: 1 INJECTION, SOLUTION INTRAMUSCULAR; INTRAVENOUS at 10:06

## 2019-06-23 RX ADMIN — FUROSEMIDE 80 MG: 10 INJECTION, SOLUTION INTRAMUSCULAR; INTRAVENOUS at 06:06

## 2019-06-23 RX ADMIN — IPRATROPIUM BROMIDE AND ALBUTEROL SULFATE 3 ML: .5; 3 SOLUTION RESPIRATORY (INHALATION) at 02:06

## 2019-06-23 RX ADMIN — FLUTICASONE FUROATE AND VILANTEROL TRIFENATATE 1 PUFF: 200; 25 POWDER RESPIRATORY (INHALATION) at 09:06

## 2019-06-23 RX ADMIN — IPRATROPIUM BROMIDE AND ALBUTEROL SULFATE 3 ML: .5; 3 SOLUTION RESPIRATORY (INHALATION) at 09:06

## 2019-06-23 RX ADMIN — FENTANYL CITRATE 25 MCG: 50 INJECTION, SOLUTION INTRAMUSCULAR; INTRAVENOUS at 10:06

## 2019-06-23 RX ADMIN — SODIUM CHLORIDE, SODIUM GLUCONATE, SODIUM ACETATE, POTASSIUM CHLORIDE, MAGNESIUM CHLORIDE, SODIUM PHOSPHATE, DIBASIC, AND POTASSIUM PHOSPHATE: .53; .5; .37; .037; .03; .012; .00082 INJECTION, SOLUTION INTRAVENOUS at 10:06

## 2019-06-23 RX ADMIN — CARVEDILOL 25 MG: 25 TABLET, FILM COATED ORAL at 09:06

## 2019-06-23 RX ADMIN — CEFAZOLIN 1 G: 1 INJECTION, POWDER, FOR SOLUTION INTRAMUSCULAR; INTRAVENOUS at 01:06

## 2019-06-23 RX ADMIN — FUROSEMIDE 80 MG: 10 INJECTION, SOLUTION INTRAMUSCULAR; INTRAVENOUS at 08:06

## 2019-06-23 RX ADMIN — ACETAMINOPHEN 650 MG: 325 TABLET ORAL at 03:06

## 2019-06-23 NOTE — PLAN OF CARE
Problem: Adult Inpatient Plan of Care  Goal: Plan of Care Review  POC reviewed with pt at 0500. Pt verbalized understanding. Questions and concerns addressed. No acute events overnight. Right radial Shelly inserted. Pt taken to CT overnight per RN and PCT. Pt tolerated trip well. Pt started on cardene gtt at 2.5. Will continue to monitor. See flowsheets for full assessment and VS info

## 2019-06-23 NOTE — SUBJECTIVE & OBJECTIVE
Past Medical History:   Diagnosis Date    Anemia in ESRD (end-stage renal disease) 5/29/2016    Anticoagulant long-term use     Aortic atherosclerosis 11/22/2016    Aortic stenosis, moderate 2/18/2016    Asthma in adult without complication 1/8/2016    Bilateral low back pain without sciatica 11/17/2015    Blindness of right eye 11/12/2016    CAD (coronary artery disease) 12/12/2016    Cataract     Central retinal vein occlusion, right eye 6/3/2014    CHF (congestive heart failure)     Chronic diastolic heart failure 1/8/2016    Chronic respiratory failure with hypoxia 5/29/2016    COPD (chronic obstructive pulmonary disease) 1/15/2017    Dependence on hemodialysis     Mon-Wed-Fri    Diverticulosis     Embolic stroke involving right middle cerebral artery     Encounter for blood transfusion     Enlarged LA (left atrium) 10/7/2016    Epiretinal membrane 7/17/2012    ESRD (end stage renal disease)     Essential hypertension 1/8/2016    History of GI diverticular bleed     5/22/16    Left flaccid hemiparesis 10/1/2016    Peripheral vascular disease, unspecified 11/22/2016    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 11/13/2016    Type 2 diabetes mellitus with kidney complication, without long-term current use of insulin 5/1/2018    Type 2 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, without long-term current use of insulin 3/26/2013    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, without long-term current use of insulin 3/26/2013    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     Past Surgical History:   Procedure Laterality Date    ABDOMINAL SURGERY      BREAST SURGERY      tumor removal x 2    CARDIAC SURGERY      CATARACT EXTRACTION      CHOLECYSTECTOMY      COLONOSCOPY N/A 5/30/2016    [facility]formed by [facility] ([facility])    COLONOSCOPY N/A 5/23/2016    Performed  by WILLIAM Colvin MD at [facility] END[facility] (2ND FLR)    ESOPHAGOGASTRODUODENOSCOPY (EGD) N/A 5/30/2016    Performed by Sam Davis MD at [facility] ENDO (2ND FLR)    ESOPHAGOGASTRODUODENOSCOPY (EGD) N/A 5/27/2016    Performed by Cesario Rubio MD at [facility] ENDO (2ND FLR)    EXCISION, ANEURYSM Left 11/29/2018    Performed by NADINE Blum III, MD at [facility] OR [address] [place] [address]    EYE SURGERY      Fistulogram Left 11/28/2018    Performed by NADINE Blum III, MD at [facility] CATH LAB    INSERTION, CATHETER, VASCULAR, DUAL LUMEN Right 11/29/2018    Performed by NADINE Blum III, MD at [facility] OR [address] [place] [address]    PTA, Fistula  11/28/2018    Performed by NADINE Blum III, MD at [facility] CATH LAB    REVISION, AV FISTULA, Left 11/29/2018    Performed by NADINE Blum III, MD at [facility] OR [address] [place] [address]    UPPER GASTROINTESTINAL ENDOSCOPY        No current facility-administered medications on file prior to encounter.      Current Outpatient Medications on File Prior to Encounter   Medication Sig Dispense Refill    acetaminophen (TYLENOL) 325 MG tablet Take 2 tablets (650 mg total) by mouth every 6 (six) hours as needed for Pain.  0    albuterol (PROVENTIL/VENTOLIN HFA) 90 mcg/actuation inhaler Inhale 1-2 puffs into the lungs every 6 (six) hours as needed for Wheezing. 1 Inhaler 0    amLODIPine (NORVASC) 10 MG tablet Take 1 tablet (10 mg total) by mouth once daily. 90 tablet 3    artificial tears (ISOPTO TEARS) 0.5 % ophthalmic solution Place 2 drops into both eyes 4 (four) times daily as needed.      carvedilol (COREG) 12.5 MG tablet Take 1 tablet (12.5 mg total) by mouth 2 (two) times daily. 60 tablet 11    ergocalciferol (ERGOCALCIFEROL) 50,000 unit Cap Take 1 capsule (50,000 Units total) by mouth every 7 days. 12 capsule 3    fluticasone-vilanterol (BREO ELLIPTA) 200-25 mcg/dose DsDv diskus inhaler Inhale 1 puff into the lungs once daily. 90 each 3    hydrALAZINE (APRESOLINE) 100 MG tablet Take 1 tablet (100 mg total) by mouth every 8  (eight) hours. 90 tablet 3    losartan (COZAAR) 50 MG tablet Take 1 tablet (50 mg total) by mouth once daily. 90 tablet 3    ondansetron (ZOFRAN-ODT) 8 MG TbDL Take 1 tablet (8 mg total) by mouth every 6 (six) hours as needed (nausea). 30 tablet 2    polyethylene glycol (GLYCOLAX) 17 gram PwPk Take 17 g by mouth once daily.  0    RENVELA 800 mg Tab Take 1 tablet (800 mg total) by mouth 3 (three) times daily with meals. 270 tablet 3    senna-docusate 8.6-50 mg (PERICOLACE) 8.6-50 mg per tablet Take 1 tablet by mouth daily as needed for Constipation.      senna-docusate 8.6-50 mg (PERICOLACE) 8.6-50 mg per tablet Take 1 tablet by mouth 2 (two) times daily.        Allergies: Patient has no known allergies.    Family History   Problem Relation Age of Onset    Cataracts Mother     Stroke Mother     Hypertension Mother     Cancer Sister     Hypertension Sister     Heart failure Sister     Glaucoma Brother     Hypertension Brother     Heart disease Brother     Hypertension Father     Glaucoma Maternal Aunt     Esophageal cancer Sister     No Known Problems Maternal Uncle     No Known Problems Paternal Aunt     No Known Problems Paternal Uncle     No Known Problems Maternal Grandmother     No Known Problems Maternal Grandfather     No Known Problems Paternal Grandmother     No Known Problems Paternal Grandfather     Heart attack Neg Hx     Colon cancer Neg Hx     Stomach cancer Neg Hx     Anemia Neg Hx     Arrhythmia Neg Hx     Asthma Neg Hx     Clotting disorder Neg Hx     Fainting Neg Hx     Hyperlipidemia Neg Hx     Atrial Septal Defect Neg Hx      Social History     Tobacco Use    Smoking status: Never Smoker    Smokeless tobacco: Never Used   Substance Use Topics    Alcohol use: No     Comment: Reports occasional 1-2 drinks     Drug use: No     Review of Systems   Constitutional: Denies fevers, weight loss, chills  Eyes: Denies changes in vision. R eye cataract  ENT: Denies  dysphagia, nasal discharge, ear pain or discharge.  Cardiovascular: Denies chest pain, palpitations, orthopnea, or claudication. Intermittent SOB, but is not new  Respiratory: Denies shortness of breath, cough, hemoptysis, or wheezing.  GI: Denies nausea/vomitting, hematochezia, melena, abd pain, or changes in appetite.  : Denies dysuria, incontinence, or hematuria.  Musculoskeletal: Denies joint pain or myalgias.  Skin/breast: Denies rashes, lumps, lesions, or discharge.  Neurologic: Denies headache, dizziness, vertigo, or paresthesias. Complains of left side weakness   Psychiatric: Denies changes in mood or hallucinations.  Endocrine: Denies polyuria, polydipsia, heat/cold intolerance.  Hematologic/Lymph: Denies lymphadenopathy, easy bruising or easy bleeding.  Allergic/Immunologic: Denies rash, rhinitis.   Objective:     Vitals:    Temp: 98.3 °F (36.8 °C)  Pulse: 68  Rhythm: normal sinus rhythm  BP: (!) 167/74  MAP (mmHg): 106  Resp: (!) 33  SpO2: 100 %  O2 Device (Oxygen Therapy): nasal cannula    Temp  Min: 97.6 °F (36.4 °C)  Max: 98.3 °F (36.8 °C)  Pulse  Min: 62  Max: 68  BP  Min: 150/66  Max: 190/79  MAP (mmHg)  Min: 95  Max: 115  Resp  Min: 16  Max: 33  SpO2  Min: 95 %  Max: 100 %    No intake/output data recorded.           Physical Exam  GA: Alert, comfortable, no acute distress.   HEENT: No scleral icterus or JVD.   Pulmonary: Course to auscultation A/L.   Cardiac: RRR S1 & S2 w/o rubs/murmurs/gallops.   Abdominal: Bowel sounds present x 4. No appreciable hepatosplenomegaly.  Skin: No jaundice, rashes, or visible lesions.  Neuro:  --GCS: E4 V5 M6  --Mental Status:  AAOX4, follows all commands, clear speech  --CN II-XII grossly intact.   --R eye cataract, left eye 3-2mm reactive and brisk  --Corneal reflex, gag, cough intact.  --LUE strength: 4/5  --RUE strength: 5/5  --LLE strength: 4/5  --RLE strength: 5/5  --sensation to light touch intact    Today I personally reviewed pertinent medications,  lines/drains/airways, imaging, laboratory results,

## 2019-06-23 NOTE — PROCEDURES
"Tea Georges is a 79 y.o. female patient.    Temp: 98.3 °F (36.8 °C) (06/22/19 1930)  Pulse: 68 (06/22/19 1930)  Resp: 18 (06/22/19 1539)  BP: (!) 178/75 (06/22/19 1930)  SpO2: 100 % (06/22/19 1930)  Weight: 50.1 kg (110 lb 7.2 oz) (06/21/19 2355)  Height: 5' 3" (160 cm) (06/21/19 2355)       Arterial Line  Date/Time: 6/22/2019 7:56 PM  Location procedure was performed: [facility]  Performed by: Nellie Chandler NP  Authorized by: Nellie Chandler NP   Consent Done: Emergent Situation  Preparation: Patient was prepped and draped in the usual sterile fashion.  Indications: multiple ABGs and hemodynamic monitoring  Location: right radial  Patient sedated: no  Rigoberto's test normal: yes  Needle gauge: 20  Seldinger technique: Seldinger technique used  Number of attempts: 2  Complications: No  Specimens: No  Implants: No  Post-procedure: dressing applied  Post-procedure CMS: normal  Patient tolerance: Patient tolerated the procedure well with no immediate complications          Nellie Chandler  6/22/2019  "

## 2019-06-23 NOTE — PROGRESS NOTES
Patient arrived to [facility] room 9074 from Obs    Type of stroke/diagnosis: SDH enlargement    TPA start and end time (if applicable)- N/A    Thrombectomy start and end time (if applicable)- N/A    Current symptoms: Generalized weakness    Skin assessment done: Y    Wounds noted:none    NCC notified: JEFF Arevalo

## 2019-06-23 NOTE — PROGRESS NOTES
Attempted to call all numbers listed on Face sheet to notify of patient's transfer to ICU however when someone answered the daughter's number that was listed I  was told I reached the wrong number.  The grandchild's listed number I left a message.  The brother's number  No one answer and other number was out of service.

## 2019-06-23 NOTE — ANESTHESIA PREPROCEDURE EVALUATION
[facility]  Anesthesia Pre-Operative Evaluation         Patient Name: Tea Georges  YOB: 1940  MRN: [id number]    SUBJECTIVE:     Pre-operative evaluation for Procedure(s) (LRB):  KATE HOLES FOR SUBDURAL HEMATOMA EVACUATION (Right)  Scheduled for 6/23/2019    HPI 06/23/2019:  Tea Georges is a 79 y.o. female with hx of HTN, HFrEF (5/22/2019 EF 53%, diastolic dysfunction, severe LA enlargement, moderate RV enlargement, severe AS, mod-severe TR, moderate MS, PASP 77), ESRD on dialysis, COPD with 2L/min NC O2 at home, hx of SDH in 5/2019 on plavix, R MCA stroke in 2016.    Patient was admitted on 6/21/2019 via ED for SOB after dialysis.  CT head performed due to patient malaise which noted increase in size and chronicity of R convex SDH with mass effect.  Reporting L side weakness but no N/V.  Not currently on anticoagulation.    Patient presents for the above procedure(s).    Previous Airway:   No prior records in Epic.    Oxygen/Ventilation Requirements:  On NC 2L/min satting 100%       Current LDA:        Peripheral IV - Single Lumen 06/21/19 1809 20 G Right Forearm (Active)   Site Assessment Clean;Dry;Intact;No redness;No swelling 6/23/2019  7:05 AM   Line Status Flushed;Saline locked 6/23/2019  7:05 AM   Dressing Status Clean;Dry;Intact 6/23/2019  7:05 AM   Dressing Intervention Dressing reinforced 6/23/2019  7:05 AM   Dressing Change Due 06/25/19 6/23/2019  7:05 AM   Site Change Due 06/25/19 6/23/2019  7:05 AM   Reason Not Rotated Not due 6/23/2019  7:05 AM   Number of days: 1            Peripheral IV - Single Lumen 06/22/19 2133 20 G Left Foot (Active)   Site Assessment Clean;Dry;Intact;No redness;No swelling 6/23/2019  7:05 AM   Line Status Flushed;Saline locked 6/23/2019  7:05 AM   Dressing Status Clean;Dry;Intact 6/23/2019  7:05 AM   Dressing Intervention New dressing 6/23/2019  7:05 AM   Dressing Change Due 06/26/19 6/23/2019  7:05 AM   Site Change Due 06/26/19 6/23/2019   7:05 AM   Reason Not Rotated Not due 6/23/2019  7:05 AM   Number of days: 0            Arterial Line 06/22/19 1952 Right Radial (Active)   Site Assessment Clean;Dry;Intact;No redness;No swelling 6/23/2019  7:05 AM   Line Status Pulsatile blood flow 6/23/2019  7:05 AM   Art Line Waveform Square wave test performed 6/23/2019  7:05 AM   Arterial Line Interventions Zeroed and calibrated;Leveled 6/23/2019  7:05 AM   Color/Movement/Sensation Capillary refill less than 3 sec 6/23/2019  7:05 AM   Dressing Type Transparent 6/23/2019  7:05 AM   Dressing Status Biopatch in place;Clean;Dry;Intact 6/23/2019  7:05 AM   Dressing Intervention Dressing reinforced 6/23/2019  7:05 AM   Dressing Change Due 06/29/19 6/23/2019  7:05 AM   Number of days: 0            Hemodialysis AV Fistula 05/23/16 0700 Left upper arm (Active)   Number of days: 1126            Hemodialysis AV Fistula Left upper arm (Active)   Number of days:        Current Drips:   nicardipine 2.5 mg/hr (06/23/19 1005)       Patient Active Problem List   Diagnosis    Pleural effusion on right    Essential hypertension    ESRD (end stage renal disease)    Nonrheumatic aortic valve stenosis    Severe aortic stenosis    Anemia in ESRD (end-stage renal disease)    Hyperparathyroidism, secondary renal    Left spastic hemiparesis    Blindness of right eye    Physical debility    Aortic atherosclerosis    Peripheral vascular disease, unspecified    Right-sided cerebrovascular accident (CVA)    Hypokalemia    Vitamin D insufficiency    CAD (coronary artery disease)    Pulmonary emphysema    Thrombocytopenia, unspecified    Non-rheumatic tricuspid valve insufficiency    Type 2 diabetes mellitus with chronic kidney disease on chronic dialysis, without long-term current use of insulin    Aneurysm of arteriovenous dialysis fistula    Moderate single current episode of major depressive disorder    Subdural hematoma    (HFpEF) heart failure with preserved  ejection fraction    Subdural hemorrhage    Moderate malnutrition    Chronic respiratory failure with hypoxia    Brain compression       Review of patient's allergies indicates:  No Known Allergies    Outpatient Medications:  No current facility-administered medications on file prior to encounter.      Current Outpatient Medications on File Prior to Encounter   Medication Sig Dispense Refill    acetaminophen (TYLENOL) 325 MG tablet Take 2 tablets (650 mg total) by mouth every 6 (six) hours as needed for Pain.  0    albuterol (PROVENTIL/VENTOLIN HFA) 90 mcg/actuation inhaler Inhale 1-2 puffs into the lungs every 6 (six) hours as needed for Wheezing. 1 Inhaler 0    amLODIPine (NORVASC) 10 MG tablet Take 1 tablet (10 mg total) by mouth once daily. 90 tablet 3    artificial tears (ISOPTO TEARS) 0.5 % ophthalmic solution Place 2 drops into both eyes 4 (four) times daily as needed.      carvedilol (COREG) 12.5 MG tablet Take 1 tablet (12.5 mg total) by mouth 2 (two) times daily. 60 tablet 11    ergocalciferol (ERGOCALCIFEROL) 50,000 unit Cap Take 1 capsule (50,000 Units total) by mouth every 7 days. 12 capsule 3    fluticasone-vilanterol (BREO ELLIPTA) 200-25 mcg/dose DsDv diskus inhaler Inhale 1 puff into the lungs once daily. 90 each 3    hydrALAZINE (APRESOLINE) 100 MG tablet Take 1 tablet (100 mg total) by mouth every 8 (eight) hours. 90 tablet 3    losartan (COZAAR) 50 MG tablet Take 1 tablet (50 mg total) by mouth once daily. 90 tablet 3    ondansetron (ZOFRAN-ODT) 8 MG TbDL Take 1 tablet (8 mg total) by mouth every 6 (six) hours as needed (nausea). 30 tablet 2    polyethylene glycol (GLYCOLAX) 17 gram PwPk Take 17 g by mouth once daily.  0    RENVELA 800 mg Tab Take 1 tablet (800 mg total) by mouth 3 (three) times daily with meals. 270 tablet 3    senna-docusate 8.6-50 mg (PERICOLACE) 8.6-50 mg per tablet Take 1 tablet by mouth daily as needed for Constipation.      senna-docusate 8.6-50 mg  (PERICOLACE) 8.6-50 mg per tablet Take 1 tablet by mouth 2 (two) times daily.          Current Inpatient Medications:   amLODIPine  10 mg Oral Daily    carvedilol  25 mg Oral BID    fluticasone furoate-vilanterol  1 puff Inhalation Daily    furosemide  80 mg Intravenous BID    hydrALAZINE  100 mg Oral Q8H    losartan  100 mg Oral Daily    polyethylene glycol  17 g Oral Daily    sevelamer carbonate  800 mg Oral TID WM       Past Surgical History:   Procedure Laterality Date    ABDOMINAL SURGERY      BREAST SURGERY      tumor removal x 2    CARDIAC SURGERY      CATARACT EXTRACTION      CHOLECYSTECTOMY      COLONOSCOPY N/A 5/30/2016    Performed by Sam Davis MD at [facility] ENDO (2ND FLR)    COLONOSCOPY N/A 5/23/2016    Performed by WILLIAM Colvin MD at [facility] ENDO (2ND FLR)    ESOPHAGOGASTRODUODENOSCOPY (EGD) N/A 5/30/2016    Performed by Sam Davis MD at [facility] ENDO (2ND FLR)    ESOPHAGOGASTRODUODENOSCOPY (EGD) N/A 5/27/2016    Performed by Cesario Rubio MD at [facility] ENDO (2ND FLR)    EXCISION, ANEURYSM Left 11/29/2018    Performed by NADINE Blum III, MD at [facility] OR 2ND [facility]    EYE SURGERY      Fistulogram Left 11/28/2018    Performed by NADINE Blum III, MD at [facility] CATH LAB    INSERTION, CATHETER, VASCULAR, DUAL LUMEN Right 11/29/2018    Performed by NADINE Blum III, MD at [facility] OR [address] [place] [address]    PTA, Fistula  11/28/2018    Performed by NADINE Blum III, MD at [facility] CATH LAB    REVISION, AV FISTULA, Left 11/29/2018    Performed by NADINE Blum III, MD at [facility] OR [address] [place] [address]    UPPER GASTROINTESTINAL ENDOSCOPY         Social History     Socioeconomic History    Marital status:      Spouse name: Not on file    Number of children: Not on file    Years of education: Not on file    Highest education level: Not on file   Occupational History    Occupation: Housekeeping     Employer: [employer]     Comment: Retired; 20-years there   Social Needs    Financial  resource strain: Not on file    Food insecurity:     Worry: Not on file     Inability: Not on file    Transportation needs:     Medical: Not on file     Non-medical: Not on file   Tobacco Use    Smoking status: Never Smoker    Smokeless tobacco: Never Used   Substance and Sexual Activity    Alcohol use: No     Comment: Reports occasional 1-2 drinks     Drug use: No    Sexual activity: Never   Lifestyle    Physical activity:     Days per week: Not on file     Minutes per session: Not on file    Stress: Not on file   Relationships    Social connections:     Talks on phone: Not on file     Gets together: Not on file     Attends Mandaen service: Not on file     Active member of club or organization: Not on file     Attends meetings of clubs or organizations: Not on file     Relationship status: Not on file   Other Topics Concern    Not on file   Social History Narrative    Not on file       OBJECTIVE:   Weight:  Wt Readings from Last 4 Encounters:   06/21/19 50.1 kg (110 lb 7.2 oz)   06/11/19 48.7 kg (107 lb 5.8 oz)   05/22/19 53.1 kg (117 lb)   03/19/19 50.7 kg (111 lb 12.4 oz)       Vital Signs Range (Last 24H):  Temp:  [36.4 °C (97.6 °F)-36.8 °C (98.3 °F)]   Pulse:  [53-68]   Resp:  [13-53]   BP: (135-183)/(43-77)   SpO2:  [94 %-100 %]   Arterial Line BP: (126-179)/(24-71)       CBC:   Lab Results   Component Value Date    WBC 5.99 06/23/2019    HGB 8.0 (L) 06/23/2019    HCT 26.8 (L) 06/23/2019    MCV 97 06/23/2019     (L) 06/23/2019       CMP:     Chemistry        Component Value Date/Time     06/23/2019 0159    K 4.4 06/23/2019 0159     06/23/2019 0159    CO2 29 06/23/2019 0159    BUN 17 06/23/2019 0159    CREATININE 3.7 (H) 06/23/2019 0159    GLU 99 06/23/2019 0159        Component Value Date/Time    CALCIUM 9.9 06/23/2019 0159    ALKPHOS 79 06/23/2019 0159    AST 8 (L) 06/23/2019 0159    ALT <5 (L) 06/23/2019 0159    BILITOT 0.6 06/23/2019 0159    ESTGFRAFRICA 12.9 (A) 06/23/2019  0159    EGFRNONAA 11.2 (A) 2019 0159            INR:  Lab Results   Component Value Date    INR 1.2 2019    INR 1.1 2019    INR 1.1 2019       Diagnostic Studies:  CXR 2019  Mild improvement at the left costophrenic sulcus and mild increased pleural fluid at the right minor fissure otherwise stable radiographic appearance with bilateral pattern of infiltrates more prominent on the right.    CT Head wo Contrast 2019  Large loculated low-density right subdural hematoma with severe mass effect and 10.3 mm midline shift unchanged.    Small old right frontal white matter infarct.  No acute infarct.  No acute hemorrhage.    EK2019  Vent. Rate : 070 BPM     Atrial Rate : 070 BPM     P-R Int : 268 ms          QRS Dur : 096 ms      QT Int : 446 ms       P-R-T Axes : 060 081 026 degrees     QTc Int : 481 ms    Sinus rhythm with 1st degree A-V block  Septal infarct ,age undetermined  Abnormal ECG  When compared with ECG of 21-MAY-2019 12:01,  Septal infarct is now Present  T wave amplitude has decreased in Anterior leads  Nonspecific T wave abnormality no longer evident in Lateral leads     2D Echo:  2019  · Low normal left ventricular systolic function. The estimated ejection fraction is 53%  · Local segmental wall motion abnormalities.  · Concentric left ventricular remodeling.  · Grade II (moderate) left ventricular diastolic dysfunction consistent with pseudonormalization.  · Severe left atrial enlargement.  · Elevated left atrial pressure.  · Moderate right ventricular enlargement.  · Normal right ventricular systolic function.  · Mild right atrial enlargement.  · Severe aortic valve stenosis.  · Aortic valve area is 0.69 cm2; peak velocity is 4.58 m/s; mean gradient is 55.02 mmHg.  · Moderate mitral sclerosis.  · Mild-to-moderate mitral regurgitation.  · Moderate to severe tricuspid regurgitation.  · Mild pulmonic regurgitation.  · Elevated central venous pressure (15 mm  Hg).  · The estimated PA systolic pressure is 77 mm Hg  · Pulmonary hypertension present.  ·   Results for orders placed or performed during the hospital encounter of 07/12/16   2D Echo w/ Color Flow Doppler   Result Value Ref Range    QEF 63 55 - 65    Mitral Valve Regurgitation TRIVIAL     Diastolic Dysfunction Yes (A)     Aortic Valve Regurgitation MILD     Aortic Valve Stenosis SEVERE (A)     Est. PA Systolic Pressure 60.46 (A)     Pericardial Effusion SMALL (A)     Mitral Valve Mobility NORMAL     Tricuspid Valve Regurgitation TRIVIAL TO MILD          ASSESSMENT/PLAN:         Anesthesia Evaluation    I have reviewed the Patient Summary Reports.     I have reviewed the Medications.     Review of Systems  Anesthesia Hx:  No problems with previous Anesthesia  History of prior surgery of interest to airway management or planning:  Denies Personal Hx of Anesthesia complications.   Social:  Non-Smoker, Social Alcohol Use    Hematology/Oncology:     Oncology Normal    -- Anemia:   EENT/Dental:EENT/Dental Normal   Cardiovascular:   Hypertension Valvular problems/Murmurs, AS CAD   CHF    Pulmonary:   COPD Asthma On 2L Home O2   Renal/:   Chronic Renal Disease, Dialysis, ESRD    Neurological:   CVA, residual symptoms SDH   Endocrine:   Diabetes, type 2    Psych:   depression          Physical Exam  General:  Malnutrition    Airway/Jaw/Neck:  Airway Findings: Mouth Opening: Small, but > 3cm Tongue: Normal  General Airway Assessment: Adult  Mallampati: IV  TM Distance: Normal, at least 6 cm  Jaw/Neck Findings:  Neck ROM: Normal ROM  Neck Findings: Normal    Eyes/Ears/Nose:  EYES/EARS/NOSE FINDINGS: Normal   Dental:  Dental Findings: Edentulous, Lower Dentures, Upper Dentures   Chest/Lungs:  Chest/Lungs Findings: Rhonchi     Heart/Vascular:  Heart Findings: Rate: Normal  Rhythm: Regular Rhythm  Heart Murmur  Systolic  Systolic Heart Murmur Description: Holosystolic  Systolic Heart Murmur Grade: Grade III        Mental  Status:  Mental Status Findings: Normal        Anesthesia Plan  Type of Anesthesia, risks & benefits discussed:  Anesthesia Type:  general  Patient's Preference:   Intra-op Monitoring Plan: standard ASA monitors and arterial line  Intra-op Monitoring Plan Comments:   Post Op Pain Control Plan: per primary service following discharge from PACU, IV/PO Opioids PRN and multimodal analgesia  Post Op Pain Control Plan Comments:   Induction:   IV  Beta Blocker:  Patient is on a Beta-Blocker and has received one dose within the past 24 hours (No further documentation required).       Informed Consent: Patient understands risks and agrees with Anesthesia plan.  Questions answered. Anesthesia consent signed with patient.  ASA Score: 4  emergent   Day of Surgery Review of History & Physical:  There are no significant changes.  H&P update referred to the surgeon.         Ready For Surgery From Anesthesia Perspective.

## 2019-06-23 NOTE — ASSESSMENT & PLAN NOTE
· Low normal left ventricular systolic function. The estimated ejection fraction is 53%  · Local segmental wall motion abnormalities.  · Concentric left ventricular remodeling.  · Grade II (moderate) left ventricular diastolic dysfunction consistent with pseudonormalization.  · Severe left atrial enlargement.  · Elevated left atrial pressure.  · Moderate right ventricular enlargement.  · Normal right ventricular systolic function.  · Mild right atrial enlargement.  · Severe aortic valve stenosis.  · Aortic valve area is 0.69 cm2; peak velocity is 4.58 m/s; mean gradient is 55.02 mmHg.  · Moderate mitral sclerosis.  · Mild-to-moderate mitral regurgitation.  · Moderate to severe tricuspid regurgitation.  · Mild pulmonic regurgitation.  · Elevated central venous pressure (15 mm Hg).  · The estimated PA systolic pressure is 77 mm Hg  · Pulmonary hypertension present.

## 2019-06-23 NOTE — ASSESSMENT & PLAN NOTE
--Hx of COPD  --at home on O2  --O2 goal 88-92%  --continue home med Breo  --duo nebs PRN  --pending repeat CXR in AM

## 2019-06-23 NOTE — OP NOTE
DATE OF PROCEDURE: 6/23/2019     PREOPERATIVE DIAGNOSES:   Subdural hemorrhage [I62.00], chronic    POSTOPERATIVE DIAGNOSES:   Subdural hemorrhage [I62.00], chronic    PROCEDURES PERFORMED:   Procedure(s) (LRB):  KATE HOLES FOR SUBDURAL HEMATOMA EVACUATION (Right)    Surgeon(s) and Role:     * Trevor Conner MD - Primary    Resident: Eliecer Morales MD ( neurosurgery resident)    ANESTHESIA:   Conscious sedation    ESTIMATED BLOOD LOSS:  100 cc     IV fluids:  50 cc.    CLINICAL HISTORY AND INDICATION FOR SURGERY: Tea Georges is a 79 y.o. female  who presented with   A large right-sided subdural hematoma, with mass effect and midline shift. Given the progression of the symptoms,  Including left-sided pronator drift,surgery was offered and after discussing all the attendant risks, benefits, and potential complication of surgery. Patient/ and family wanted to proceed with surgery and consented to surgery.     OPERATIVE PROCEDURE: The patient was brought into the Operating Room. The patient was identified and general anesthesia was induced using endotracheal intubation. IV lines were obtained. The patient's anatomical landmarks were marked, over the  right side. Then a 2 cm arturo was placed over the area of the desired bur hole at the temporal line, above the ear, and in front of the ear. The hair was shaved, then the skin was marked. Skin was prepped and draped  under sterile conditions. An intraoperative timeout was carried.     A 1 g of  Ancef was given.      18 mL of lidocaine with epinephrine were injected over the area of the   incision.     A 10 blade knife was used to create 2 cm incision. The periosteum was   using periosteal elevator. Hemostasis was achieved using bipolar Bovie. At   this point, a high-speed  was used to create a bur hole. Dura was   visualized and coagulated using bipolars. The dura was opened using a 15-blade   knife. The dura leaves were retracted using bipolar  Luis. High pressure   subdural hematoma came out. The subdural cavity was copiously irrigated   with 3 litters of saline. At this point, a matchstick drill was used to create a trough over  the bur hole for placement of  7. Flat LOPEZ drain. The drain trocar  was passed 3 cm out of the incision, and then the drain was placed in the   subdural space using a Penfield 3 instrument without complications.     The wound was copiously irrigated and attention was put to closure. The bur hole was   covered with Gelfoam with thrombin. The galea was closed using 3-0 Vicryl, the   skin was closed with staples. The drain was secured to the skin using 3-0   Vicryl.  The wound was covered with bacitracin and Telfa. The patient was transferred to the Neuro ICU in critical condition. All instrument and needle counts were confirmed x3.     Dr. Lopez was present during the entire procedure.

## 2019-06-23 NOTE — H&P
"[facility]  Neurocritical Care  History & Physical    Admit Date: 6/21/2019  Service Date: 06/22/2019  Length of Stay: 0    Subjective:     Chief Complaint: Subdural hemorrhage    History of Present Illness: The patient is a 79 year old pleasant female with PMHx of  ESRD (on HD M/W/F), CHF, COPD (2L O2 NC@ home), h/oSDH (w/ recent admission to [facility] on 5/21/19-w/bilateral SDH R>L s/p unwitnessed fall on plavix, no neurosurgical intervention) and right MCA stroke s/p thrombectomy in 2016 admitted to [facility] with increased R SDH.  After initial admission to [facility] on (5/21) pt was stepdown to hospital medicine, inpt rehab/SNF and and then to home however she failed to make it to her follow up appointment. Patient was admitted yesterday (06/21/19) to the hospital to hospital medicine with cc of "not feeling right". Medicine team performed CTH on 6/22 which demonstrated increase in size and chronicification of R convexity SDH with mass effect. Pt stated she had a fall 2 weeks ago w/LOC, not on any anticoagulants and had intermittent headaches since then however she denies any headaches now.  Pending repeat CTH at 11PM. NPO after midnight likely to OR in AM w/ NSGY team for evacuation of SDH. Patient admitted to [facility] for close monitoring and higher level of care.            Past Medical History:   Diagnosis Date    Anemia in ESRD (end-stage renal disease) 5/29/2016    Anticoagulant long-term use     Aortic atherosclerosis 11/22/2016    Aortic stenosis, moderate 2/18/2016    Asthma in adult without complication 1/8/2016    Bilateral low back pain without sciatica 11/17/2015    Blindness of right eye 11/12/2016    CAD (coronary artery disease) 12/12/2016    Cataract     Central retinal vein occlusion, right eye 6/3/2014    CHF (congestive heart failure)     Chronic diastolic heart failure 1/8/2016    Chronic respiratory failure with hypoxia 5/29/2016    COPD (chronic obstructive pulmonary disease) " 1/15/2017    Dependence on hemodialysis     Mon-Wed-Fri    Diverticulosis     Embolic stroke involving right middle cerebral artery     Encounter for blood transfusion     Enlarged LA (left atrium) 10/7/2016    Epiretinal membrane 7/17/2012    ESRD (end stage renal disease)     Essential hypertension 1/8/2016    History of GI diverticular bleed     5/22/16    Left flaccid hemiparesis 10/1/2016    Peripheral vascular disease, unspecified 11/22/2016    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 11/13/2016    Type 2 diabetes mellitus with kidney complication, without long-term current use of insulin 5/1/2018    Type 2 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, without long-term current use of insulin 3/26/2013    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, without long-term current use of insulin 3/26/2013    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     Past Surgical History:   Procedure Laterality Date    ABDOMINAL SURGERY      BREAST SURGERY      tumor removal x 2    CARDIAC SURGERY      CATARACT EXTRACTION      CHOLECYSTECTOMY      COLONOSCOPY N/A 5/30/2016    Performed by Sam Davis [facility] at [facility] ENDO (2ND FLR)    COLONOSCOPY N/A 5/23/2016    Performed by WILLIAM Colvin MD at [facility] ENDO (2ND FLR)    ESOPHAGOGASTRODUODENOSCOPY (EGD) N/A 5/30/2016    Performed by Sam Davis MD at [facility] ENDO (2ND FLR)    ESOPHAGOGASTRODUODENOSCOPY (EGD) N/A 5/27/2016    Performed by Cesario Rubio MD at [facility] ENDO (2ND FLR)    EXCISION, ANEURYSM Left 11/29/2018    Performed by NADINE Blum III, MD at [facility] OR 2ND FLR    EYE SURGERY      Fistulogram Left 11/28/2018    Performed by NADINE Blum III, MD at [facility] CATH LAB    INSERTION, CATHETER, VASCULAR, DUAL LUMEN Right 11/29/2018    [facility]    PTA, Fistula  11/28/2018    Performed by NADINE MOSELEY  Viktoria SAMS MD at [facility] CATH LAB    REVISION, AV FISTULA, Left 11/29/2018    Performed by NADINE Blum III, MD at [facility] OR [address] [place] [address]    UPPER GASTROINTESTINAL ENDOSCOPY        No current facility-administered medications on file prior to encounter.      Current Outpatient Medications on File Prior to Encounter   Medication Sig Dispense Refill    acetaminophen (TYLENOL) 325 MG tablet Take 2 tablets (650 mg total) by mouth every 6 (six) hours as needed for Pain.  0    albuterol (PROVENTIL/VENTOLIN HFA) 90 mcg/actuation inhaler Inhale 1-2 puffs into the lungs every 6 (six) hours as needed for Wheezing. 1 Inhaler 0    amLODIPine (NORVASC) 10 MG tablet Take 1 tablet (10 mg total) by mouth once daily. 90 tablet 3    artificial tears (ISOPTO TEARS) 0.5 % ophthalmic solution Place 2 drops into both eyes 4 (four) times daily as needed.      carvedilol (COREG) 12.5 MG tablet Take 1 tablet (12.5 mg total) by mouth 2 (two) times daily. 60 tablet 11    ergocalciferol (ERGOCALCIFEROL) 50,000 unit Cap Take 1 capsule (50,000 Units total) by mouth every 7 days. 12 capsule 3    fluticasone-vilanterol (BREO ELLIPTA) 200-25 mcg/dose DsDv diskus inhaler Inhale 1 puff into the lungs once daily. 90 each 3    hydrALAZINE (APRESOLINE) 100 MG tablet Take 1 tablet (100 mg total) by mouth every 8 (eight) hours. 90 tablet 3    losartan (COZAAR) 50 MG tablet Take 1 tablet (50 mg total) by mouth once daily. 90 tablet 3    ondansetron (ZOFRAN-ODT) 8 MG TbDL Take 1 tablet (8 mg total) by mouth every 6 (six) hours as needed (nausea). 30 tablet 2    polyethylene glycol (GLYCOLAX) 17 gram PwPk Take 17 g by mouth once daily.  0    RENVELA 800 mg Tab Take 1 tablet (800 mg total) by mouth 3 (three) times daily with meals. 270 tablet 3    senna-docusate 8.6-50 mg (PERICOLACE) 8.6-50 mg per tablet Take 1 tablet by mouth daily as needed for Constipation.      senna-docusate 8.6-50 mg (PERICOLACE) 8.6-50 mg per tablet Take 1 tablet by  mouth 2 (two) times daily.        Allergies: Patient has no known allergies.    Family History   Problem Relation Age of Onset    Cataracts Mother     Stroke Mother     Hypertension Mother     Cancer Sister     Hypertension Sister     Heart failure Sister     Glaucoma Brother     Hypertension Brother     Heart disease Brother     Hypertension Father     Glaucoma Maternal Aunt     Esophageal cancer Sister     No Known Problems Maternal Uncle     No Known Problems Paternal Aunt     No Known Problems Paternal Uncle     No Known Problems Maternal Grandmother     No Known Problems Maternal Grandfather     No Known Problems Paternal Grandmother     No Known Problems Paternal Grandfather     Heart attack Neg Hx     Colon cancer Neg Hx     Stomach cancer Neg Hx     Anemia Neg Hx     Arrhythmia Neg Hx     Asthma Neg Hx     Clotting disorder Neg Hx     Fainting Neg Hx     Hyperlipidemia Neg Hx     Atrial Septal Defect Neg Hx      Social History     Tobacco Use    Smoking status: Never Smoker    Smokeless tobacco: Never Used   Substance Use Topics    Alcohol use: No     Comment: Reports occasional 1-2 drinks     Drug use: No     Review of Systems   Constitutional: Denies fevers, weight loss, chills  Eyes: Denies changes in vision. R eye cataract  ENT: Denies dysphagia, nasal discharge, ear pain or discharge.  Cardiovascular: Denies chest pain, palpitations, orthopnea, or claudication. Intermittent SOB, but is not new  Respiratory: Denies shortness of breath, cough, hemoptysis, or wheezing.  GI: Denies nausea/vomitting, hematochezia, melena, abd pain, or changes in appetite.  : Denies dysuria, incontinence, or hematuria.  Musculoskeletal: Denies joint pain or myalgias.  Skin/breast: Denies rashes, lumps, lesions, or discharge.  Neurologic: Denies headache, dizziness, vertigo, or paresthesias. Complains of left side weakness   Psychiatric: Denies changes in mood or hallucinations.  Endocrine:  Denies polyuria, polydipsia, heat/cold intolerance.  Hematologic/Lymph: Denies lymphadenopathy, easy bruising or easy bleeding.  Allergic/Immunologic: Denies rash, rhinitis.   Objective:     Vitals:    Temp: 98.3 °F (36.8 °C)  Pulse: 68  Rhythm: normal sinus rhythm  BP: (!) 167/74  MAP (mmHg): 106  Resp: (!) 33  SpO2: 100 %  O2 Device (Oxygen Therapy): nasal cannula    Temp  Min: 97.6 °F (36.4 °C)  Max: 98.3 °F (36.8 °C)  Pulse  Min: 62  Max: 68  BP  Min: 150/66  Max: 190/79  MAP (mmHg)  Min: 95  Max: 115  Resp  Min: 16  Max: 33  SpO2  Min: 95 %  Max: 100 %    No intake/output data recorded.           Physical Exam  GA: Alert, comfortable, no acute distress.   HEENT: No scleral icterus or JVD.   Pulmonary: Course to auscultation A/L.   Cardiac: RRR S1 & S2 w/o rubs/murmurs/gallops.   Abdominal: Bowel sounds present x 4. No appreciable hepatosplenomegaly.  Skin: No jaundice, rashes, or visible lesions.  Neuro:  --GCS: E4 V5 M6  --Mental Status:  AAOX4, follows all commands, clear speech  --CN II-XII grossly intact.   --R eye cataract, left eye 3-2mm reactive and brisk  --Corneal reflex, gag, cough intact.  --LUE strength: 4/5  --RUE strength: 5/5  --LLE strength: 4/5  --RLE strength: 5/5  --sensation to light touch intact    Today I personally reviewed pertinent medications, lines/drains/airways, imaging, laboratory results,     Assessment/Plan:     Neuro  * Subdural hemorrhage  --Hx of Rob SDH R>L on 5/21( was admitted to [facility] on 5/21/19- no neurosurgical intervention  --pt stated she fell 2 weeks ago, w/LOC, not on anticoagulations   --Pt readmitted today to [facility] w/enlargement of R SDH  --CTH 6/22 reveals-enlargement of right subdural hematoma with associated worsening localized mass effect and leftward midline shift, the latter of which now resulting in subfalcine herniation.  No new extra-axial hemorrhage or fluid collection.  --pending repeat CTH in 6 hrs  ---160  --NSGY following  --Plan for SDH siri in AM  per NSGY  --NPO  --Neuro checks q 1 hr  --PT/OT    Brain compression  --Follow up imaging  --follow up neuro checks      Right-sided cerebrovascular accident (CVA)  -hx of    Left spastic hemiparesis  -likely d/t SDH  -PT/OT    Ophtho  Blindness of right eye  --hx of cataract    Pulmonary  Pulmonary emphysema  --Hx of COPD  --at home on O2  --O2 goal 88-92%  --continue home med Breo  --duo nebs PRN  --pending repeat CXR in AM\  --continue O2 per NC    Cardiac/Vascular  (HFpEF) heart failure with preserved ejection fraction  · Low normal left ventricular systolic function. The estimated ejection fraction is 53%  · Local segmental wall motion abnormalities.  · Concentric left ventricular remodeling.  · Grade II (moderate) left ventricular diastolic dysfunction consistent with pseudonormalization.  · Severe left atrial enlargement.  · Elevated left atrial pressure.  · Moderate right ventricular enlargement.  · Normal right ventricular systolic function.  · Mild right atrial enlargement.  · Severe aortic valve stenosis.  · Aortic valve area is 0.69 cm2; peak velocity is 4.58 m/s; mean gradient is 55.02 mmHg.  · Moderate mitral sclerosis.  · Mild-to-moderate mitral regurgitation.  · Moderate to severe tricuspid regurgitation.  · Mild pulmonic regurgitation.  · Elevated central venous pressure (15 mm Hg).  · The estimated PA systolic pressure is 77 mm Hg  · Pulmonary hypertension present.    CAD (coronary artery disease)  -Hx of   -not on any antiplt now, plan to go to OR in AM     Severe aortic stenosis  --seen on TTE    Essential hypertension  --hx of HTN  ---160  --continue scheduled antihypertensive meds  --PRN hydralazine  --EF 53% (05/22/19)      Renal/  ESRD (end stage renal disease)  --on HD M/W/F  --nephrology following  --continue sevelamer w/meals    Oncology  Anemia in ESRD (end-stage renal disease)  --H/H stable  --continue to monitor daily    Endocrine  Type 2 diabetes mellitus with chronic kidney  disease on chronic dialysis, without long-term current use of insulin  --A1c 5.4 in 5/21  --prn ssi    Other  Physical debility  --pt/ot          The patient is being Prophylaxed for:  Venous Thromboembolism with: Mechanical  Stress Ulcer with: None  Ventilator Pneumonia with: not applicable    Full Code    Nellie Chandler NP  Neurocritical Care  [facility]    Critical care time >32 min.

## 2019-06-23 NOTE — CONSULTS
"[facility]  Nephrology  Consult Note    Patient Name: Tea Georges  MRN: [id number]  Admission Date: 6/21/2019  Hospital Length of Stay: 0 days  Attending Provider: Iveth Mcneal*   Primary Care Physician: Parth Posadas Ii, MD  Principal Problem:Pleural effusion on right    Inpatient consult to Nephrology  Consult performed by: Scar Phillip MD  Consult ordered by: Sesar Cameron PA-C  Reason for consult: esrd        Subjective:     HPI: 78yo AAF with pmhx ESRD, CHF, COPD (2L O2 NC), h/o SDH, right MCA stroke s/p thrombectomy in 2016 who presents to ED c/o shortness of breath. Patient is lethargic, confused and states that she is here for a fall. Per ED chart: "The patient was underwent a full run of dialysis today, and immediately after reported feeling increasingly short of breath. Her shortness of breath has currently resolved. She is somnulent, having difficulty remaining awake throughout physician examination. She denies chest pain, abdominal pain, nausea, or vomiting. Patient endorses taking Phenergan this morning."      Patient admits to chronic orthopnea at home and wheezing this AM.  She denies any SOB to me. She states that she lives at home with her daughter and two granddaughters.      Recentlly admitted for bilateral acute SDH, R>L with right to left midline shift s/p fall while on plavix.  Discharged to SNF. Per nursing, patient has been cared for by family and she is reported as bedbound.      In ED, afebrile, BP elevated 160s- 180s systolic, no leukocytosis. Renal function improved from baseline. BNP >4900 (BL 3000 to 4000).  CXR shows moderate R sided pleural effusion and R sided pulmonary edema, similar to previous.     No new subjective & objective note has been filed under this hospital service since the last note was generated.    Assessment/Plan:     ESRD (end stage renal disease)  Modality: iHD  Days: MWF  Access: LUE AVF  [facility]  Nephrologist: " Dr Coleman  Tx Duration: 3-4  EDW: 50kg  UF : 2-3    Lytes, acid/base and volume status appear stable    Plan for dialysis:  -tentatively for RRT in the [facility] on Monday        Thank you for your consult. I will follow-up with patient. Please contact us if you have any additional questions.    Scar Phillip MD  Nephrology  [facility]    ATTENDING PHYSICIAN ATTESTATION  I have personally interviewed and examined the patient. I thoroughly reviewed the demographic, clinical, laboratorial and imaging information available in medical records. I agree with the assessment and recommendations provided by the subspecialty resident. Dr. Phillip was under my supervision.

## 2019-06-23 NOTE — PLAN OF CARE
Problem: Adult Inpatient Plan of Care  Goal: Plan of Care Review  Outcome: Ongoing (interventions implemented as appropriate)  POC reviewed with patient and family at 1430. Patient verbalized understanding. Questions and concerns addressed with the patient. Patient went to OR for Albino hole evacuation of SDH. LOPEZ drain in place on R. Side and is in the subdural layer. Orders are to have LOPEZ to thumbprint per NSGY. HOB < 15 or flat as possible for patient comfort per NSGY. Post Op CT scan completed. 2 L NC in place with a goal of 88 - 92%. Cardene ordered to have SBP < 160. Patient progressing toward goals. RN will continue to monitor. See flowsheets for full assessment and VS info.

## 2019-06-23 NOTE — SUBJECTIVE & OBJECTIVE
Interval History: S/p Albino Hole for evacuation. SD drain in place. On Cardene for BP goals.      Review of Systems   Constitutional: Negative for chills and fever.   HENT: Negative for trouble swallowing.    Eyes: Negative for visual disturbance.   Respiratory: Negative for shortness of breath.    Gastrointestinal: Negative for abdominal pain and constipation.   Neurological: Positive for weakness. Negative for seizures, facial asymmetry, speech difficulty and headaches.   Psychiatric/Behavioral: Negative for agitation and confusion.     Objective:     Vitals:  Temp: 97.9 °F (36.6 °C)  Pulse: 66  Rhythm: sinus arrhythmia  BP: (!) 159/70  MAP (mmHg): 100  Resp: (!) 25  SpO2: (!) 94 %  O2 Device (Oxygen Therapy): nasal cannula    Temp  Min: 97.9 °F (36.6 °C)  Max: 98.3 °F (36.8 °C)  Pulse  Min: 53  Max: 68  BP  Min: 135/62  Max: 183/77  MAP (mmHg)  Min: 89  Max: 121  Resp  Min: 13  Max: 53  SpO2  Min: 88 %  Max: 100 %    06/22 0701 - 06/23 0700  In: 368.5 [P.O.:300; I.V.:68.5]  Out: 201 [Urine:200]   Unmeasured Output  Urine Occurrence: 0  Stool Occurrence: 0       Physical Exam   Constitutional: No distress.   Eyes: Pupils are equal, round, and reactive to light. EOM are normal.   Cardiovascular: Normal rate.   Pulmonary/Chest: Effort normal.   Neurological: She is alert. GCS eye subscore is 4. GCS verbal subscore is 5. GCS motor subscore is 6.   Alert and oriented.  Follows commands.  Speech normal.  No CN deficits.  RUE - 5/5  RLE - 5/5  LUE - 4/5  LLE - 4/5  No sensory loss.  Gait not tested.   Nursing note and vitals reviewed.    Medications:  Continuous  nicardipine Last Rate: Stopped (06/23/19 1710)   Scheduled  amLODIPine 10 mg Daily   carvedilol 25 mg BID   [START ON 6/24/2019] ceFAZolin (ANCEF) IVPB 1 g Q24H   fluticasone furoate-vilanterol 1 puff Daily   furosemide 80 mg BID   hydrALAZINE 100 mg Q8H   losartan 100 mg Daily   polyethylene glycol 17 g Daily   sevelamer carbonate 800 mg TID WM    PRN  acetaminophen 650 mg Q4H PRN   albuterol-ipratropium 3 mL Q4H PRN   Dextrose 10% Bolus 12.5 g PRN   Dextrose 10% Bolus 25 g PRN   glucagon (human recombinant) 1 mg PRN   glucose 16 g PRN   glucose 24 g PRN   hydrALAZINE 20 mg Q4H PRN   insulin aspart U-100 1-10 Units Q6H PRN   labetalol 10 mg Q4H PRN   ondansetron 4 mg Q8H PRN   oxyCODONE 5 mg Q6H PRN   senna-docusate 8.6-50 mg 1 tablet BID PRN   sodium chloride 0.9% 10 mL PRN   sodium chloride 0.9% 5 mL PRN     Today I personally reviewed pertinent medications, lines/drains/airways, imaging, cardiology results, laboratory results, notably:    Diet  Diet NPO  Diet NPO

## 2019-06-23 NOTE — TRANSFER OF CARE
"Anesthesia Transfer of Care Note    Patient: Tea Georges    Procedure(s) Performed: Procedure(s) (LRB):  KATE HOLES FOR SUBDURAL HEMATOMA EVACUATION (Right)    Patient location: ICU    Anesthesia Type: MAC    Transport from OR: Transported from OR on 2-3 L/min O2 by NC with adequate spontaneous ventilation. Continuous ECG monitoring in transport. Continuous SpO2 monitoring in transport. Continuos invasive BP monitoring in transport    Post pain: adequate analgesia    Post assessment: no apparent anesthetic complications    Post vital signs: stable    Level of consciousness: awake and alert    Nausea/Vomiting: no nausea/vomiting    Complications: none    Transfer of care protocol was followedComments: Transported to ICU on 5mg/hr cardene. 's on transport, goal < 160. Report off to ICU RN. VSS.      Last vitals:   Visit Vitals  BP (!) 155/72 (BP Location: Right arm, Patient Position: Lying)   Pulse 61   Temp 36.6 °C (97.9 °F) (Oral)   Resp (!) 22   Ht 5' 3" (1.6 m)   Wt 50.1 kg (110 lb 7.2 oz)   LMP  (LMP Unknown)   SpO2 100%   Breastfeeding? No   BMI 19.57 kg/m²     "

## 2019-06-23 NOTE — ASSESSMENT & PLAN NOTE
-- SBP goal <160  -- TTE with EF 53%, segmental WMA and severe AS.  -- Resumed home antihypertensives.

## 2019-06-23 NOTE — PT/OT/SLP PROGRESS
Physical Therapy      Patient Name:  Tea Georges   MRN:  [id number]    Patient not seen today secondary to (Pt to OR for KATE HOLES FOR SUBDURAL HEMATOMA EVACUATION). PT orders d/c at this time. Please place new orders when pt appropriate for PT evaluation and treatment.     Marcella Lopez, PT, DPT   6/23/2019  [phone]

## 2019-06-23 NOTE — ASSESSMENT & PLAN NOTE
-- On HD M/W/F  -- Nephrology following, appreciate recs,  -- Continue sevelamer w/meals  -- Renally dose all medications. Avoid nephrotoxins.

## 2019-06-23 NOTE — PROGRESS NOTES
Patient transferred to 2nd floor surgery on a portable monitor and 2L O2 tank. All consents with patient. Care assumed per OR RN and Anesthesia.

## 2019-06-23 NOTE — HOSPITAL COURSE
6/22: Pt admitted to [facility] w/ increased size of SDH, -160, plan for repeat CTH, NPO after midnight and plan to OR in AM for evac  06/23/2019 S/p Craniotomy. SD drain in place.   6/24: NAEO  6/25: Had MMA embolization   6/26: NAEO  6/27: SD drain was removed. CTH was satisfactory

## 2019-06-23 NOTE — ASSESSMENT & PLAN NOTE
--Hx of Rob SDH R>L on 5/21( was admitted to [facility] on 5/21/19- no neurosurgical intervention  --pt stated she fell 2 weeks ago, w/LOC, not on anticoagulations   --Pt readmitted today to [facility] w/enlargement of R SDH  --CTH 6/22 reveals-enlargement of right subdural hematoma with associated worsening localized mass effect and leftward midline shift, the latter of which now resulting in subfalcine herniation.  No new extra-axial hemorrhage or fluid collection.  --pending repeat CTH in 6 hrs  ---160  --NSGY following  --Plan for SDH siri in AM per NSGY  --NPO  --Neuro checks q 1 hr  --PT/OT

## 2019-06-23 NOTE — PROGRESS NOTES
"[facility]  Neurocritical Care  Progress Note    Admit Date: 6/21/2019  Service Date: 06/23/2019  Length of Stay: 0    Subjective:     Chief Complaint: Subdural hemorrhage    History of Present Illness: The patient is a 79 year old pleasant female with PMHx of  ESRD (on HD M/W/F), CHF, COPD (2L O2 NC@ home), h/oSDH (w/ recent admission to [facility] on 5/21/19-w/bilateral SDH R>L s/p unwitnessed fall on plavix, no neurosurgical intervention) and right MCA stroke s/p thrombectomy in 2016 admitted to [facility] with increased R SDH.  After initial admission to [facility] on (5/21) pt was stepdown to hospital medicine, inpt rehab/SNF and and then to home however she failed to make it to her follow up appointment. Patient was admitted yesterday (06/21/19) to the hospital to hospital medicine with cc of "not feeling right". Medicine team performed CTH on 6/22 which demonstrated increase in size and chronicification of R convexity SDH with mass effect. Pt stated she had a fall 2 weeks ago w/LOC, not on any anticoagulants and had intermittent headaches since then however she denies any headaches now.  Pending repeat CTH at 11PM. NPO after midnight likely to OR in AM w/ NSGY team for evacuation of SDH. Patient admitted to [facility] for close monitoring and higher level of care.            Hospital Course: 6/22: Pt admitted to [facility] w/ increased size of SDH, -160, plan for repeat CTH, NPO after midnight and plan to OR in AM for evac  06/23/2019 S/p Craniotomy. SD drain in place.     Interval History: S/p [place] Hole for evacuation. SD drain in place. On Cardene for BP goals.      Review of Systems   Constitutional: Negative for chills and fever.   HENT: Negative for trouble swallowing.    Eyes: Negative for visual disturbance.   Respiratory: Negative for shortness of breath.    Gastrointestinal: Negative for abdominal pain and constipation.   Neurological: Positive for weakness. Negative for seizures, facial asymmetry, speech " difficulty and headaches.   Psychiatric/Behavioral: Negative for agitation and confusion.     Objective:     Vitals:  Temp: 97.9 °F (36.6 °C)  Pulse: 66  Rhythm: sinus arrhythmia  BP: (!) 159/70  MAP (mmHg): 100  Resp: (!) 25  SpO2: (!) 94 %  O2 Device (Oxygen Therapy): nasal cannula    Temp  Min: 97.9 °F (36.6 °C)  Max: 98.3 °F (36.8 °C)  Pulse  Min: 53  Max: 68  BP  Min: 135/62  Max: 183/77  MAP (mmHg)  Min: 89  Max: 121  Resp  Min: 13  Max: 53  SpO2  Min: 88 %  Max: 100 %    06/22 0701 - 06/23 0700  In: 368.5 [P.O.:300; I.V.:68.5]  Out: 201 [Urine:200]   Unmeasured Output  Urine Occurrence: 0  Stool Occurrence: 0       Physical Exam   Constitutional: No distress.   Eyes: Pupils are equal, round, and reactive to light. EOM are normal.   Cardiovascular: Normal rate.   Pulmonary/Chest: Effort normal.   Neurological: She is alert. GCS eye subscore is 4. GCS verbal subscore is 5. GCS motor subscore is 6.   Alert and oriented.  Follows commands.  Speech normal.  No CN deficits.  RUE - 5/5  RLE - 5/5  LUE - 4/5  LLE - 4/5  No sensory loss.  Gait not tested.   Nursing note and vitals reviewed.    Medications:  Continuous  nicardipine Last Rate: Stopped (06/23/19 1710)   Scheduled  amLODIPine 10 mg Daily   carvedilol 25 mg BID   [START ON 6/24/2019] ceFAZolin (ANCEF) IVPB 1 g Q24H   fluticasone furoate-vilanterol 1 puff Daily   furosemide 80 mg BID   hydrALAZINE 100 mg Q8H   losartan 100 mg Daily   polyethylene glycol 17 g Daily   sevelamer carbonate 800 mg TID WM   PRN  acetaminophen 650 mg Q4H PRN   albuterol-ipratropium 3 mL Q4H PRN   Dextrose 10% Bolus 12.5 g PRN   Dextrose 10% Bolus 25 g PRN   glucagon (human recombinant) 1 mg PRN   glucose 16 g PRN   glucose 24 g PRN   hydrALAZINE 20 mg Q4H PRN   insulin aspart U-100 1-10 Units Q6H PRN   labetalol 10 mg Q4H PRN   ondansetron 4 mg Q8H PRN   oxyCODONE 5 mg Q6H PRN   senna-docusate 8.6-50 mg 1 tablet BID PRN   sodium chloride 0.9% 10 mL PRN   sodium chloride 0.9% 5 mL  PRN     Today I personally reviewed pertinent medications, lines/drains/airways, imaging, cardiology results, laboratory results, notably:    Diet  Diet NPO  Diet NPO      Assessment/Plan:     Neuro  * Subdural hemorrhage  79 yr old with recent admission for bilateral SDHs R>L (no intervention at the time) now readmitted with increasing size of the SDH and mental status change.    CTH (6/22) - Enlargement of right subdural hematoma with associated worsening localized mass effect and leftward midline shift, the latter of which now resulting in subfalcine herniation.  No new extra-axial hemorrhage or fluid collection.    -- Admitted to [facility].  -- NSGY following, appreciate recs.  -- S/p R craniotomy with SD drain in place.  -- Post op CT stable with reduced midline shift and interval expansion of the ventricle.  -- SBP <160. Cardene as needed.  -- HOB flat.  -- Neuro checks q1  -- PT/OT    Brain compression  -- 2/2 SDH      Right-sided cerebrovascular accident (CVA)  -- H/o R MCA stroke s/p EVT in 2016    Left spastic hemiparesis  -- H/o R MCA stroke  -- Now with b/l SDH R>L    Ophtho  Blindness of right eye  -- h/o of cataract    Pulmonary  Pulmonary emphysema  -- On home O2  -- O2 goal 88-92%  -- Continue home med Breo  -- Duo nebs PRN    Cardiac/Vascular  (HFpEF) heart failure with preserved ejection fraction  · Low normal left ventricular systolic function. The estimated ejection fraction is 53%  · Local segmental wall motion abnormalities.  · Concentric left ventricular remodeling.  · Grade II (moderate) left ventricular diastolic dysfunction consistent with pseudonormalization.  · Severe left atrial enlargement.  · Elevated left atrial pressure.  · Moderate right ventricular enlargement.  · Normal right ventricular systolic function.  · Mild right atrial enlargement.  · Severe aortic valve stenosis.  · Aortic valve area is 0.69 cm2; peak velocity is 4.58 m/s; mean gradient is 55.02 mmHg.  · Moderate mitral  sclerosis.  · Mild-to-moderate mitral regurgitation.  · Moderate to severe tricuspid regurgitation.  · Mild pulmonic regurgitation.  · Elevated central venous pressure (15 mm Hg).  · The estimated PA systolic pressure is 77 mm Hg  · Pulmonary hypertension present.    CAD (coronary artery disease)  -- Previously on plavix  -- Currently not on any antiplatelets.    Severe aortic stenosis  -- Per TTE    Essential hypertension  -- SBP goal <160  -- TTE with EF 53%, segmental WMA and severe AS.  -- Resumed home antihypertensives.       Renal/  ESRD (end stage renal disease)  -- On HD M/W/F  -- Nephrology following, appreciate recs,  -- Continue sevelamer w/meals  -- Renally dose all medications. Avoid nephrotoxins.    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    Other  Physical debility  -- PT/OT          The patient is being Prophylaxed for:  Venous Thromboembolism with: Mechanical  Stress Ulcer with: None  Ventilator Pneumonia with: none    Activity Orders          Diet NPO: NPO starting at 06/23 0001    Straight Cath starting at 06/22 0017        Full Code    Johnson Betancourt MD  Neurocritical Care  [facility]

## 2019-06-23 NOTE — PROGRESS NOTES
Patient returned from OR on a portable monitor and 2 L NC. Subdural drain in place to [facility] per Andrew. HOB < 30. Post Op CT scan completed with no acute events during transfer. Patient was on 2L NC and a portable monitor. RN will continue to monitor.

## 2019-06-24 ENCOUNTER — ANESTHESIA (OUTPATIENT)
Dept: ENDOSCOPY | Facility: HOSPITAL | Age: 79
DRG: 025 | End: 2019-06-24
Payer: MEDICARE

## 2019-06-24 ENCOUNTER — ANESTHESIA EVENT (OUTPATIENT)
Dept: ENDOSCOPY | Facility: HOSPITAL | Age: 79
DRG: 025 | End: 2019-06-24
Payer: MEDICARE

## 2019-06-24 PROBLEM — R56.9 SEIZURE: Status: ACTIVE | Noted: 2019-06-24

## 2019-06-24 PROBLEM — J90 PLEURAL EFFUSION: Status: ACTIVE | Noted: 2019-06-24

## 2019-06-24 LAB
ALBUMIN SERPL BCP-MCNC: 2.8 G/DL (ref 3.5–5.2)
ALP SERPL-CCNC: 81 U/L (ref 55–135)
ALT SERPL W/O P-5'-P-CCNC: <5 U/L (ref 10–44)
ANION GAP SERPL CALC-SCNC: 13 MMOL/L (ref 8–16)
AST SERPL-CCNC: 10 U/L (ref 10–40)
BASOPHILS # BLD AUTO: 0.03 K/UL (ref 0–0.2)
BASOPHILS NFR BLD: 0.6 % (ref 0–1.9)
BILIRUB SERPL-MCNC: 0.4 MG/DL (ref 0.1–1)
BLD PROD TYP BPU: NORMAL
BLD PROD TYP BPU: NORMAL
BLOOD UNIT EXPIRATION DATE: NORMAL
BLOOD UNIT EXPIRATION DATE: NORMAL
BLOOD UNIT TYPE CODE: 6200
BLOOD UNIT TYPE CODE: 6200
BLOOD UNIT TYPE: NORMAL
BLOOD UNIT TYPE: NORMAL
BUN SERPL-MCNC: 26 MG/DL (ref 8–23)
CALCIUM SERPL-MCNC: 9.7 MG/DL (ref 8.7–10.5)
CHLORIDE SERPL-SCNC: 105 MMOL/L (ref 95–110)
CO2 SERPL-SCNC: 23 MMOL/L (ref 23–29)
CODING SYSTEM: NORMAL
CODING SYSTEM: NORMAL
CREAT SERPL-MCNC: 4.7 MG/DL (ref 0.5–1.4)
DIFFERENTIAL METHOD: ABNORMAL
DISPENSE STATUS: NORMAL
DISPENSE STATUS: NORMAL
EOSINOPHIL # BLD AUTO: 0.1 K/UL (ref 0–0.5)
EOSINOPHIL NFR BLD: 1.3 % (ref 0–8)
ERYTHROCYTE [DISTWIDTH] IN BLOOD BY AUTOMATED COUNT: 17.2 % (ref 11.5–14.5)
EST. GFR  (AFRICAN AMERICAN): 9.7 ML/MIN/1.73 M^2
EST. GFR  (NON AFRICAN AMERICAN): 8.4 ML/MIN/1.73 M^2
GLUCOSE SERPL-MCNC: 82 MG/DL (ref 70–110)
HCT VFR BLD AUTO: 28 % (ref 37–48.5)
HGB BLD-MCNC: 8.3 G/DL (ref 12–16)
IMM GRANULOCYTES # BLD AUTO: 0.02 K/UL (ref 0–0.04)
IMM GRANULOCYTES NFR BLD AUTO: 0.4 % (ref 0–0.5)
LYMPHOCYTES # BLD AUTO: 0.5 K/UL (ref 1–4.8)
LYMPHOCYTES NFR BLD: 8.5 % (ref 18–48)
MAGNESIUM SERPL-MCNC: 2.1 MG/DL (ref 1.6–2.6)
MCH RBC QN AUTO: 28.5 PG (ref 27–31)
MCHC RBC AUTO-ENTMCNC: 29.6 G/DL (ref 32–36)
MCV RBC AUTO: 96 FL (ref 82–98)
MONOCYTES # BLD AUTO: 0.6 K/UL (ref 0.3–1)
MONOCYTES NFR BLD: 11 % (ref 4–15)
NEUTROPHILS # BLD AUTO: 4.1 K/UL (ref 1.8–7.7)
NEUTROPHILS NFR BLD: 78.2 % (ref 38–73)
NRBC BLD-RTO: 0 /100 WBC
NUM UNITS TRANS FFP: NORMAL
NUM UNITS TRANS FFP: NORMAL
PHOSPHATE SERPL-MCNC: 4.3 MG/DL (ref 2.7–4.5)
PLATELET # BLD AUTO: 112 K/UL (ref 150–350)
PMV BLD AUTO: 12 FL (ref 9.2–12.9)
POCT GLUCOSE: 129 MG/DL (ref 70–110)
POCT GLUCOSE: 77 MG/DL (ref 70–110)
POTASSIUM SERPL-SCNC: 4.7 MMOL/L (ref 3.5–5.1)
PROT SERPL-MCNC: 6.1 G/DL (ref 6–8.4)
RBC # BLD AUTO: 2.91 M/UL (ref 4–5.4)
SODIUM SERPL-SCNC: 141 MMOL/L (ref 136–145)
WBC # BLD AUTO: 5.29 K/UL (ref 3.9–12.7)

## 2019-06-24 PROCEDURE — 25000003 PHARM REV CODE 250: Performed by: STUDENT IN AN ORGANIZED HEALTH CARE EDUCATION/TRAINING PROGRAM

## 2019-06-24 PROCEDURE — 25000242 PHARM REV CODE 250 ALT 637 W/ HCPCS: Performed by: PHYSICIAN ASSISTANT

## 2019-06-24 PROCEDURE — 80074 ACUTE HEPATITIS PANEL: CPT

## 2019-06-24 PROCEDURE — 94799 UNLISTED PULMONARY SVC/PX: CPT

## 2019-06-24 PROCEDURE — 80053 COMPREHEN METABOLIC PANEL: CPT

## 2019-06-24 PROCEDURE — 37000008 HC ANESTHESIA 1ST 15 MINUTES

## 2019-06-24 PROCEDURE — 25000003 PHARM REV CODE 250: Performed by: PHYSICIAN ASSISTANT

## 2019-06-24 PROCEDURE — 99900035 HC TECH TIME PER 15 MIN (STAT)

## 2019-06-24 PROCEDURE — 83735 ASSAY OF MAGNESIUM: CPT

## 2019-06-24 PROCEDURE — 99232 SBSQ HOSP IP/OBS MODERATE 35: CPT | Mod: GC,,, | Performed by: INTERNAL MEDICINE

## 2019-06-24 PROCEDURE — D9220A PRA ANESTHESIA: ICD-10-PCS | Mod: CRNA,,, | Performed by: NURSE ANESTHETIST, CERTIFIED REGISTERED

## 2019-06-24 PROCEDURE — 63600175 PHARM REV CODE 636 W HCPCS: Performed by: STUDENT IN AN ORGANIZED HEALTH CARE EDUCATION/TRAINING PROGRAM

## 2019-06-24 PROCEDURE — 94640 AIRWAY INHALATION TREATMENT: CPT

## 2019-06-24 PROCEDURE — 95816 EEG AWAKE AND DROWSY: CPT | Mod: 26,,, | Performed by: PSYCHIATRY & NEUROLOGY

## 2019-06-24 PROCEDURE — 63600175 PHARM REV CODE 636 W HCPCS: Performed by: HOSPITALIST

## 2019-06-24 PROCEDURE — D9220A PRA ANESTHESIA: ICD-10-PCS | Mod: ANES,,, | Performed by: ANESTHESIOLOGY

## 2019-06-24 PROCEDURE — 20000000 HC ICU ROOM

## 2019-06-24 PROCEDURE — 95819 EEG AWAKE AND ASLEEP: CPT

## 2019-06-24 PROCEDURE — 84100 ASSAY OF PHOSPHORUS: CPT

## 2019-06-24 PROCEDURE — 25000003 PHARM REV CODE 250: Performed by: NURSE ANESTHETIST, CERTIFIED REGISTERED

## 2019-06-24 PROCEDURE — 27000221 HC OXYGEN, UP TO 24 HOURS

## 2019-06-24 PROCEDURE — 25000003 PHARM REV CODE 250: Performed by: PSYCHIATRY & NEUROLOGY

## 2019-06-24 PROCEDURE — 95816 PR EEG,W/AWAKE & DROWSY RECORD: ICD-10-PCS | Mod: 26,,, | Performed by: PSYCHIATRY & NEUROLOGY

## 2019-06-24 PROCEDURE — 99291 PR CRITICAL CARE, E/M 30-74 MINUTES: ICD-10-PCS | Mod: GC,,, | Performed by: PSYCHIATRY & NEUROLOGY

## 2019-06-24 PROCEDURE — 99291 CRITICAL CARE FIRST HOUR: CPT | Mod: GC,,, | Performed by: PSYCHIATRY & NEUROLOGY

## 2019-06-24 PROCEDURE — 63600175 PHARM REV CODE 636 W HCPCS: Performed by: NURSE ANESTHETIST, CERTIFIED REGISTERED

## 2019-06-24 PROCEDURE — 85025 COMPLETE CBC W/AUTO DIFF WBC: CPT

## 2019-06-24 PROCEDURE — 94761 N-INVAS EAR/PLS OXIMETRY MLT: CPT

## 2019-06-24 PROCEDURE — 80100014 HC HEMODIALYSIS 1:1

## 2019-06-24 PROCEDURE — 99232 PR SUBSEQUENT HOSPITAL CARE,LEVL II: ICD-10-PCS | Mod: GC,,, | Performed by: INTERNAL MEDICINE

## 2019-06-24 PROCEDURE — 37000009 HC ANESTHESIA EA ADD 15 MINS

## 2019-06-24 PROCEDURE — D9220A PRA ANESTHESIA: Mod: ANES,,, | Performed by: ANESTHESIOLOGY

## 2019-06-24 PROCEDURE — D9220A PRA ANESTHESIA: Mod: CRNA,,, | Performed by: NURSE ANESTHETIST, CERTIFIED REGISTERED

## 2019-06-24 RX ORDER — LEVETIRACETAM 500 MG/1
500 TABLET ORAL 2 TIMES DAILY
Status: DISCONTINUED | OUTPATIENT
Start: 2019-06-24 | End: 2019-06-26

## 2019-06-24 RX ORDER — LORAZEPAM 2 MG/ML
INJECTION INTRAMUSCULAR
Status: DISPENSED
Start: 2019-06-24 | End: 2019-06-24

## 2019-06-24 RX ORDER — SODIUM CHLORIDE 9 MG/ML
INJECTION, SOLUTION INTRAVENOUS
Status: DISCONTINUED | OUTPATIENT
Start: 2019-06-24 | End: 2019-06-25

## 2019-06-24 RX ORDER — SODIUM CHLORIDE 9 MG/ML
INJECTION, SOLUTION INTRAVENOUS ONCE
Status: DISCONTINUED | OUTPATIENT
Start: 2019-06-24 | End: 2019-06-25

## 2019-06-24 RX ORDER — LIDOCAINE AND PRILOCAINE 25; 25 MG/G; MG/G
CREAM TOPICAL ONCE
Status: DISCONTINUED | OUTPATIENT
Start: 2019-06-25 | End: 2019-06-27

## 2019-06-24 RX ORDER — FENTANYL CITRATE 50 UG/ML
INJECTION, SOLUTION INTRAMUSCULAR; INTRAVENOUS
Status: DISCONTINUED | OUTPATIENT
Start: 2019-06-24 | End: 2019-06-24

## 2019-06-24 RX ORDER — MIDAZOLAM HYDROCHLORIDE 1 MG/ML
INJECTION, SOLUTION INTRAMUSCULAR; INTRAVENOUS
Status: DISCONTINUED | OUTPATIENT
Start: 2019-06-24 | End: 2019-06-24

## 2019-06-24 RX ADMIN — CEFAZOLIN 1 G: 1 INJECTION, POWDER, FOR SOLUTION INTRAMUSCULAR; INTRAVENOUS at 01:06

## 2019-06-24 RX ADMIN — HYDRALAZINE HYDROCHLORIDE 100 MG: 50 TABLET ORAL at 09:06

## 2019-06-24 RX ADMIN — FUROSEMIDE 80 MG: 10 INJECTION, SOLUTION INTRAMUSCULAR; INTRAVENOUS at 08:06

## 2019-06-24 RX ADMIN — FENTANYL CITRATE 25 MCG: 50 INJECTION, SOLUTION INTRAMUSCULAR; INTRAVENOUS at 03:06

## 2019-06-24 RX ADMIN — IPRATROPIUM BROMIDE AND ALBUTEROL SULFATE 3 ML: .5; 3 SOLUTION RESPIRATORY (INHALATION) at 10:06

## 2019-06-24 RX ADMIN — MIDAZOLAM HYDROCHLORIDE 1 MG: 1 INJECTION, SOLUTION INTRAMUSCULAR; INTRAVENOUS at 02:06

## 2019-06-24 RX ADMIN — FUROSEMIDE 80 MG: 10 INJECTION, SOLUTION INTRAMUSCULAR; INTRAVENOUS at 05:06

## 2019-06-24 RX ADMIN — HYDRALAZINE HYDROCHLORIDE 100 MG: 50 TABLET ORAL at 01:06

## 2019-06-24 RX ADMIN — IPRATROPIUM BROMIDE AND ALBUTEROL SULFATE 3 ML: .5; 3 SOLUTION RESPIRATORY (INHALATION) at 04:06

## 2019-06-24 RX ADMIN — POLYETHYLENE GLYCOL 3350 17 G: 17 POWDER, FOR SOLUTION ORAL at 09:06

## 2019-06-24 RX ADMIN — LOSARTAN POTASSIUM 100 MG: 50 TABLET, FILM COATED ORAL at 08:06

## 2019-06-24 RX ADMIN — AMLODIPINE BESYLATE 10 MG: 10 TABLET ORAL at 08:06

## 2019-06-24 RX ADMIN — LEVETIRACETAM 500 MG: 500 TABLET ORAL at 09:06

## 2019-06-24 RX ADMIN — FENTANYL CITRATE 25 MCG: 50 INJECTION, SOLUTION INTRAMUSCULAR; INTRAVENOUS at 02:06

## 2019-06-24 RX ADMIN — LEVETIRACETAM 500 MG: 500 TABLET ORAL at 10:06

## 2019-06-24 RX ADMIN — SEVELAMER CARBONATE 800 MG: 800 TABLET, FILM COATED ORAL at 08:06

## 2019-06-24 RX ADMIN — NICARDIPINE HYDROCHLORIDE 2 MG/HR: 0.2 INJECTION, SOLUTION INTRAVENOUS at 07:06

## 2019-06-24 RX ADMIN — OXYCODONE HYDROCHLORIDE 5 MG: 5 TABLET ORAL at 08:06

## 2019-06-24 RX ADMIN — HYDRALAZINE HYDROCHLORIDE 20 MG: 20 INJECTION INTRAMUSCULAR; INTRAVENOUS at 02:06

## 2019-06-24 RX ADMIN — FLUTICASONE FUROATE AND VILANTEROL TRIFENATATE 1 PUFF: 200; 25 POWDER RESPIRATORY (INHALATION) at 08:06

## 2019-06-24 RX ADMIN — SODIUM CHLORIDE, SODIUM GLUCONATE, SODIUM ACETATE, POTASSIUM CHLORIDE, MAGNESIUM CHLORIDE, SODIUM PHOSPHATE, DIBASIC, AND POTASSIUM PHOSPHATE: .53; .5; .37; .037; .03; .012; .00082 INJECTION, SOLUTION INTRAVENOUS at 02:06

## 2019-06-24 RX ADMIN — HYDRALAZINE HYDROCHLORIDE 100 MG: 50 TABLET ORAL at 05:06

## 2019-06-24 RX ADMIN — CARVEDILOL 25 MG: 25 TABLET, FILM COATED ORAL at 09:06

## 2019-06-24 RX ADMIN — NICARDIPINE HYDROCHLORIDE 2.5 MG/HR: 0.2 INJECTION, SOLUTION INTRAVENOUS at 02:06

## 2019-06-24 RX ADMIN — CARVEDILOL 25 MG: 25 TABLET, FILM COATED ORAL at 08:06

## 2019-06-24 NOTE — PLAN OF CARE
Problem: Adult Inpatient Plan of Care  Goal: Plan of Care Review  POC reviewed with pt and family at 1300. Pt verbalized understanding. Questions and concerns addressed. Pt went to IR today for embolization, pt tolerated well,  Pt progressing toward goals. Will continue to monitor. See flowsheets for full assessment and VS info.

## 2019-06-24 NOTE — SUBJECTIVE & OBJECTIVE
Interval History:  >4 elements OR status of 3 inpatient conditions    Review of Systems   Constitutional: Positive for fatigue.   HENT: Negative.    Eyes: Negative.    Respiratory: Negative.    Cardiovascular: Negative.    Gastrointestinal: Negative.    Endocrine: Negative.    Genitourinary: Negative.    Musculoskeletal: Positive for back pain.   Neurological: Positive for headaches.   Hematological: Negative.    Psychiatric/Behavioral: Negative.      2 systems OR Unable to obtain a complete ROS due to level of consciousness.  Objective:     Vitals:  Temp: 98 °F (36.7 °C)  Pulse: (!) 53  Rhythm: sinus arrhythmia  BP: (!) 152/86  MAP (mmHg): 113  Resp: 17  SpO2: 96 %  O2 Device (Oxygen Therapy): nasal cannula    Temp  Min: 97.5 °F (36.4 °C)  Max: 98 °F (36.7 °C)  Pulse  Min: 53  Max: 70  BP  Min: 136/63  Max: 182/72  MAP (mmHg)  Min: 88  Max: 121  Resp  Min: 17  Max: 42  SpO2  Min: 88 %  Max: 100 %    06/23 0701 - 06/24 0700  In: 271.3 [I.V.:271.3]  Out: 220 [Drains:120]   Unmeasured Output  Urine Occurrence: 0  Stool Occurrence: 0       Physical Exam  Constitutional: No apparent distress.   Eyes: Right eye cataract  Head/Ears/Nose/Mouth/Throat/Neck: Moist mucous membranes. External ears, nose atraumatic. SD drain in place  Cardiovascular: Regular rhythm. ESM  Respiratory: Clear to auscultation.  Gastrointestinal: No hernia. Soft, nondistended, nontender. + bowel sounds.    Neurologic Examination:    -Mental Status: Alert. Oriented to person, place, and time. Follows commands.  -Cranial nerves: Visual fields full. Pupil on the left round, and reactive to light. Cataract on the right. . Extraocular movements intact. Facial sensation intact. Facial strength symmetric. Hears finger rub bilateral. Palate elevation symmetric. Uvula midline. Shoulder shrug symmetric. Tongue protrusion midline.  -Motor: Left upper extremities mild drift   -Sensation: Intact to touch in arms, legs.      Medications:  Continuous  nicardipine  Last Rate: Stopped (06/24/19 0800)   Scheduled  sodium chloride 0.9%  Once   amLODIPine 10 mg Daily   carvedilol 25 mg BID   ceFAZolin (ANCEF) IVPB 1 g Q24H   fluticasone furoate-vilanterol 1 puff Daily   furosemide 80 mg BID   hydrALAZINE 100 mg Q8H   levETIRAcetam 500 mg BID   lorazepam     losartan 100 mg Daily   polyethylene glycol 17 g Daily   sevelamer carbonate 800 mg TID WM   PRN  sodium chloride 0.9%  PRN   acetaminophen 650 mg Q4H PRN   albuterol-ipratropium 3 mL Q4H PRN   Dextrose 10% Bolus 12.5 g PRN   Dextrose 10% Bolus 25 g PRN   glucagon (human recombinant) 1 mg PRN   glucose 16 g PRN   glucose 24 g PRN   hydrALAZINE 20 mg Q4H PRN   insulin aspart U-100 1-10 Units Q6H PRN   labetalol 10 mg Q4H PRN   ondansetron 4 mg Q8H PRN   oxyCODONE 5 mg Q6H PRN   senna-docusate 8.6-50 mg 1 tablet BID PRN   sodium chloride 0.9% 10 mL PRN   sodium chloride 0.9% 5 mL PRN     Today I personally reviewed pertinent medications, lines/drains/airways, imaging, cardiology results, laboratory results, microbiology results, notably:    Diet  Diet NPO  Diet NPO

## 2019-06-24 NOTE — ASSESSMENT & PLAN NOTE
79F PMH ESRD, CHF, COPD, and right MCA stroke with known R convexity SDH, now with expansion and chronicificaiton. No s/p R sided nikki holes for SDH evacuation.  Exam stable with 4-/5 LUE and LLE strength.     -- Continue ICU care with q1 hour neurochecks  -- Ct head post operative stable  -- IR today for consideration for MMA embolization  -- SBP <160  -- Maintain bed flat with SD drain to thumbprint suction  -- continue abx while sd drain is in place  -- Further care per NCC.

## 2019-06-24 NOTE — ASSESSMENT & PLAN NOTE
- S/P evac.   - Neurological exam is unchanged  - SD drain management per nsy  - Seizure prophylaxis

## 2019-06-24 NOTE — PROGRESS NOTES
Pt arrived to  with ICU RN at 1400 for cerebral angiogram. Pt safely moved to table.  Consent, labs, and allergies reviewed. All medications and monitoring per anesthesia.

## 2019-06-24 NOTE — SUBJECTIVE & OBJECTIVE
Interval History: Patient evaluated bedside, stable vital signs.  S/p [place] hole and evacuation of subdural hematoma 6/23/2019.  Night uneventful.    Review of patient's allergies indicates:  No Known Allergies  Current Facility-Administered Medications   Medication Frequency    0.9%  NaCl infusion PRN    0.9%  NaCl infusion Once    acetaminophen tablet 650 mg Q4H PRN    albuterol-ipratropium 2.5 mg-0.5 mg/3 mL nebulizer solution 3 mL Q4H PRN    amLODIPine tablet 10 mg Daily    carvedilol tablet 25 mg BID    ceFAZolin injection 1 g Q24H    dextrose 10% (D10W) Bolus PRN    dextrose 10% (D10W) Bolus PRN    fluticasone furoate-vilanterol 200-25 mcg/dose diskus inhaler 1 puff Daily    furosemide injection 80 mg BID    glucagon (human recombinant) injection 1 mg PRN    glucose chewable tablet 16 g PRN    glucose chewable tablet 24 g PRN    hydrALAZINE injection 20 mg Q4H PRN    hydrALAZINE tablet 100 mg Q8H    insulin aspart U-100 pen 1-10 Units Q6H PRN    labetalol 20 mg/4 mL (5 mg/mL) IV syring Q4H PRN    levETIRAcetam tablet 500 mg BID    lorazepam (ATIVAN) 2 mg/mL injection     losartan tablet 100 mg Daily    niCARdipine 40 mg/200 mL infusion Continuous    ondansetron disintegrating tablet 4 mg Q8H PRN    oxyCODONE immediate release tablet 5 mg Q6H PRN    polyethylene glycol packet 17 g Daily    senna-docusate 8.6-50 mg per tablet 1 tablet BID PRN    sevelamer carbonate tablet 800 mg TID WM    sodium chloride 0.9% flush 10 mL PRN    sodium chloride 0.9% flush 5 mL PRN       Objective:     Vital Signs (Most Recent):  Temp: 98 °F (36.7 °C) (06/24/19 0700)  Pulse: (!) 58 (06/24/19 1000)  Resp: 18 (06/24/19 1000)  BP: (!) 152/86 (06/24/19 1000)  SpO2: 95 % (06/24/19 1000)  O2 Device (Oxygen Therapy): nasal cannula (06/24/19 0557) Vital Signs (24h Range):  Temp:  [97.5 °F (36.4 °C)-98 °F (36.7 °C)] 98 °F (36.7 °C)  Pulse:  [55-70] 58  Resp:  [18-42] 18  SpO2:  [88 %-100 %] 95 %  BP:  (136-182)/(43-88) 152/86  Arterial Line BP: (136-177)/(34-48) 152/38     Weight: 51.3 kg (113 lb 1.5 oz) (06/24/19 0400)  Body mass index is 20.03 kg/m².  Body surface area is 1.51 meters squared.    I/O last 3 completed shifts:  In: 329.8 [I.V.:329.8]  Out: 220 [Drains:120; Blood:100]    Physical Exam   Constitutional: She is oriented to person, place, and time. She appears well-developed and well-nourished. No distress.   HENT:   Head gauze covered around wound.   Eyes: Pupils are equal, round, and reactive to light.   Cardiovascular: Normal rate.   Pulmonary/Chest: Effort normal. No respiratory distress.   Abdominal: Soft. Bowel sounds are normal. She exhibits no distension.   Musculoskeletal: She exhibits no edema.   Neurological: She is alert and oriented to person, place, and time.   Skin: Skin is warm.   Nursing note and vitals reviewed.      Significant Labs:  CBC:   Recent Labs   Lab 06/24/19  0114   WBC 5.29   RBC 2.91*   HGB 8.3*   HCT 28.0*   *   MCV 96   MCH 28.5   MCHC 29.6*     CMP:   Recent Labs   Lab 06/24/19 0114   GLU 82   CALCIUM 9.7   ALBUMIN 2.8*   PROT 6.1      K 4.7   CO2 23      BUN 26*   CREATININE 4.7*   ALKPHOS 81   ALT <5*   AST 10   BILITOT 0.4     All labs within the past 24 hours have been reviewed.     Significant Imaging:  CXR personally reviewed.

## 2019-06-24 NOTE — ASSESSMENT & PLAN NOTE
Modality: iHD  Days: MWF  Access: LUE AVF  Unit: [facility]  Nephrologist: Dr Coleman  Tx Duration: 3-4  EDW: 50kg  UF : 2-3    Lytes, acid/base and volume status appear stable    Plan for dialysis:  - Will provide HD today (low blood flows)  - Strict I/Os and chart  - MAP > 65  - If able, daily standing weights and chart  - Will monitor closely

## 2019-06-24 NOTE — CONSULTS
Nephrology already following patient.  No additional consult needed.    Refer to our notes for recs.    Emmett Abraham MD  Nephrology  [facility]

## 2019-06-24 NOTE — PROGRESS NOTES
Pt received from IR, pt is drowsy but answers all questions apropriatly, moves all extremities, pulses are 1+ to bilateral lower extremities, R groin site without swelling redness drainage or hematoma noted, will continue care

## 2019-06-24 NOTE — ANESTHESIA POSTPROCEDURE EVALUATION
Anesthesia Post Evaluation    Patient: Tea Georges    Procedure(s) Performed: Procedure(s) (LRB):  KATE HOLES FOR SUBDURAL HEMATOMA EVACUATION (Right)    Final Anesthesia Type: MAC  Patient location during evaluation: ICU  Patient participation: Yes- Able to Participate  Level of consciousness: awake and alert and oriented  Post-procedure vital signs: reviewed and stable  Pain management: adequate  Airway patency: patent  PONV status at discharge: No PONV  Anesthetic complications: no      Cardiovascular status: blood pressure returned to baseline  Respiratory status: unassisted, spontaneous ventilation and room air  Hydration status: euvolemic  Follow-up not needed.          Vitals Value Taken Time   /86 6/24/2019 10:02 AM   Temp 36.7 °C (98 °F) 6/24/2019  7:00 AM   Pulse 58 6/24/2019 10:10 AM   Resp 18 6/24/2019 10:10 AM   SpO2 95 % 6/24/2019 10:10 AM   Vitals shown include unvalidated device data.      No case tracking events are documented in the log.      Pain/Nika Score: Pain Rating Prior to Med Admin: 8 (6/24/2019  8:14 AM)  Pain Rating Post Med Admin: 0 (6/23/2019  9:24 PM)

## 2019-06-24 NOTE — ANESTHESIA POSTPROCEDURE EVALUATION
Anesthesia Post Evaluation    Patient: Tea Georges    Procedure(s) Performed: Procedure(s) (LRB):  ANGIOGRAM-CEREBRAL (N/A)    Final Anesthesia Type: MAC  Patient location during evaluation: telemetry step down  Patient participation: Yes- Able to Participate  Level of consciousness: awake and alert  Post-procedure vital signs: reviewed and stable  Pain management: adequate  Airway patency: patent  PONV status at discharge: No PONV  Anesthetic complications: no      Cardiovascular status: hemodynamically stable  Respiratory status: unassisted  Hydration status: euvolemic  Follow-up not needed.          Vitals Value Taken Time   /70 6/24/2019  5:32 PM   Temp 36.7 °C (98 °F) 6/24/2019 11:00 AM   Pulse 52 6/24/2019  5:47 PM   Resp 15 6/24/2019  5:47 PM   SpO2 98 % 6/24/2019  5:47 PM   Vitals shown include unvalidated device data.      No case tracking events are documented in the log.      Pain/Nika Score: Pain Rating Prior to Med Admin: 8 (6/24/2019  8:14 AM)  Pain Rating Post Med Admin: 0 (6/24/2019  9:14 AM)

## 2019-06-24 NOTE — PLAN OF CARE
06/24/19 1716   Discharge Assessment   Assessment Type Discharge Planning Assessment   Confirmed/corrected address and phone number on facesheet? Yes   Assessment information obtained from? Patient   Expected Length of Stay (days) 5   Communicated expected length of stay with patient/caregiver yes   Prior to hospitilization cognitive status: Alert/Oriented   Prior to hospitalization functional status: Assistive Equipment;Needs Assistance   Current cognitive status: Alert/Oriented   Current Functional Status: Assistive Equipment;Needs Assistance   Facility Arrived From: home   Lives With sibling(s)  (brother\)   Able to Return to Prior Arrangements other (see comments)  (morris)   Is patient able to care for self after discharge? Unable to determine at this time (comments)   Who are your caregiver(s) and their phone number(s)? brother Ricardo [phone], Cruz Doe dtr) [phone]   Patient's perception of discharge disposition home or selfcare;home health   Readmission Within the Last 30 Days no previous admission in last 30 days   Patient currently being followed by outpatient case management? No   Patient currently receives any other outside agency services? No   Equipment Currently Used at Home walker, rolling;oxygen;wheelchair;cane, straight   Do you have any problems affording any of your prescribed medications? No   Is the patient taking medications as prescribed? yes   Does the patient have transportation home? Yes   Transportation Anticipated family or friend will provide   Does the patient receive services at the [facility]? No   Discharge Plan A Rehab   Discharge Plan B Home with family;Home Health   DME Needed Upon Discharge  other (see comments)  (tbd)   Patient/Family in Agreement with Plan yes         Discharge/ My Health Packet Folder Given to patient/family:      yes    PCP:  Parth Posadas Ii, MD        Pharmacy:    [facility]  [place].  [address] [facility] [address]  [facility] [address]  Phone: [phone] Fax: [phone]    [id number] - [place], [place] - [address]  [address]  [place] [address]  Phone: [phone] Fax: [phone]        Emergency Contacts:  Extended Emergency Contact Information  Primary Emergency Contact: Cruz Azar   [place]  Mobile Phone: [phone]  Relation: Daughter  Secondary Emergency Contact: Alesha Traore  Address: [address] [place] [address]  Mobile Phone: [phone]  Relation: Grandchild      Insurance:  Payor: MEDICARE / Plan: MEDICARE PART A & B / Product Type: Government /         Jhoana Pitts RN, CCRN-K, [facility]  Neuro-Critical Care   [phone]

## 2019-06-24 NOTE — HOSPITAL COURSE
6/24: SELVIN ON. Exam stable. POD 1 from R sided nikki holes for SDH. 120cc output in SD drain. Consult IR for MMA embolization today.   6/27: CT stable s/p removal of SD drain. Step down to medicine.   6/28: stable after step down, just received dialysis. Afebrile, vitals stable. Na 134. Platelets 84.

## 2019-06-24 NOTE — PROGRESS NOTES
"[facility]  Neurocritical Care  Progress Note    Admit Date: 6/21/2019  Service Date: 06/24/2019  Length of Stay: 0    Subjective:     Chief Complaint: <principal problem not specified>    History of Present Illness: The patient is a 79 year old pleasant female with PMHx of  ESRD (on HD M/W/F), CHF, COPD (2L O2 NC@ home), h/oSDH (w/ recent admission to [facility] on 5/21/19-w/bilateral SDH R>L s/p unwitnessed fall on plavix, no neurosurgical intervention) and right MCA stroke s/p thrombectomy in 2016 admitted to [facility] with increased R SDH.  After initial admission to [facility] on (5/21) pt was stepdown to hospital medicine, inpt rehab/SNF and and then to home however she failed to make it to her follow up appointment. Patient was admitted yesterday (06/21/19) to the hospital to hospital medicine with cc of "not feeling right". Medicine team performed CTH on 6/22 which demonstrated increase in size and chronicification of R convexity SDH with mass effect. Pt stated she had a fall 2 weeks ago w/LOC, not on any anticoagulants and had intermittent headaches since then however she denies any headaches now.  Pending repeat CTH at 11PM. NPO after midnight likely to OR in AM w/ NSGY team for evacuation of SDH. Patient admitted to [facility] for close monitoring and higher level of care.            Hospital Course: 6/22: Pt admitted to [facility] w/ increased size of SDH, -160, plan for repeat CTH, NPO after midnight and plan to OR in AM for evac  06/23/2019 S/p Craniotomy. SD drain in place.   6/24: NAEO    Interval History:  >4 elements OR status of 3 inpatient conditions    Review of Systems   Constitutional: Positive for fatigue.   HENT: Negative.    Eyes: Negative.    Respiratory: Negative.    Cardiovascular: Negative.    Gastrointestinal: Negative.    Endocrine: Negative.    Genitourinary: Negative.    Musculoskeletal: Positive for back pain.   Neurological: Positive for headaches.   Hematological: Negative.  "   Psychiatric/Behavioral: Negative.      2 systems OR Unable to obtain a complete ROS due to level of consciousness.  Objective:     Vitals:  Temp: 98 °F (36.7 °C)  Pulse: (!) 53  Rhythm: sinus arrhythmia  BP: (!) 152/86  MAP (mmHg): 113  Resp: 17  SpO2: 96 %  O2 Device (Oxygen Therapy): nasal cannula    Temp  Min: 97.5 °F (36.4 °C)  Max: 98 °F (36.7 °C)  Pulse  Min: 53  Max: 70  BP  Min: 136/63  Max: 182/72  MAP (mmHg)  Min: 88  Max: 121  Resp  Min: 17  Max: 42  SpO2  Min: 88 %  Max: 100 %    06/23 0701 - 06/24 0700  In: 271.3 [I.V.:271.3]  Out: 220 [Drains:120]   Unmeasured Output  Urine Occurrence: 0  Stool Occurrence: 0       Physical Exam  Constitutional: No apparent distress.   Eyes: Right eye cataract  Head/Ears/Nose/Mouth/Throat/Neck: Moist mucous membranes. External ears, nose atraumatic. SD drain in place  Cardiovascular: Regular rhythm. ESM  Respiratory: Clear to auscultation.  Gastrointestinal: No hernia. Soft, nondistended, nontender. + bowel sounds.    Neurologic Examination:    -Mental Status: Alert. Oriented to person, place, and time. Follows commands.  -Cranial nerves: Visual fields full. Pupil on the left round, and reactive to light. Cataract on the right. . Extraocular movements intact. Facial sensation intact. Facial strength symmetric. Hears finger rub bilateral. Palate elevation symmetric. Uvula midline. Shoulder shrug symmetric. Tongue protrusion midline.  -Motor: Left upper extremities mild drift   -Sensation: Intact to touch in arms, legs.      Medications:  Continuous  nicardipine Last Rate: Stopped (06/24/19 0800)   Scheduled  sodium chloride 0.9%  Once   amLODIPine 10 mg Daily   carvedilol 25 mg BID   ceFAZolin (ANCEF) IVPB 1 g Q24H   fluticasone furoate-vilanterol 1 puff Daily   furosemide 80 mg BID   hydrALAZINE 100 mg Q8H   levETIRAcetam 500 mg BID   lorazepam     losartan 100 mg Daily   polyethylene glycol 17 g Daily   sevelamer carbonate 800 mg TID WM   PRN  sodium chloride 0.9%   PRN   acetaminophen 650 mg Q4H PRN   albuterol-ipratropium 3 mL Q4H PRN   Dextrose 10% Bolus 12.5 g PRN   Dextrose 10% Bolus 25 g PRN   glucagon (human recombinant) 1 mg PRN   glucose 16 g PRN   glucose 24 g PRN   hydrALAZINE 20 mg Q4H PRN   insulin aspart U-100 1-10 Units Q6H PRN   labetalol 10 mg Q4H PRN   ondansetron 4 mg Q8H PRN   oxyCODONE 5 mg Q6H PRN   senna-docusate 8.6-50 mg 1 tablet BID PRN   sodium chloride 0.9% 10 mL PRN   sodium chloride 0.9% 5 mL PRN     Today I personally reviewed pertinent medications, lines/drains/airways, imaging, cardiology results, laboratory results, microbiology results, notably:    Diet  Diet NPO  Diet NPO        Assessment/Plan:     Neuro  Seizure  - Had a witnessed clinical seizure. Continue keppra and connect to EEG     Brain compression  -- 2/2 SDH      Subdural hematoma  - S/P evac.   - Neurological exam is unchanged  - SD drain management per nsy  - Seizure prophylaxis     Left spastic hemiparesis  - PT/OT    Pulmonary  Pulmonary emphysema  -- On home O2, wean O2   -- O2 goal 88-92%  -- Continue home med Breo  -- Duo nebs PRN    Cardiac/Vascular  Nonrheumatic aortic valve stenosis  No acute issues  BP control    Essential hypertension  -- SBP goal <160  - Continue current regimen       Renal/  ESRD (end stage renal disease)  - Plans for slow HD today     Oncology  Anemia in ESRD (end-stage renal disease)  -- H/H stable  -- Continue to monitor daily    Endocrine  Moderate malnutrition  Start diet when she passes swallow eval    Type 2 diabetes mellitus with chronic kidney disease on chronic dialysis, without long-term current use of insulin  - SSI          The patient is being Prophylaxed for:  Venous Thromboembolism with: Mechanical  Stress Ulcer with: Not Applicable   Ventilator Pneumonia with: not applicable    Activity Orders          Diet NPO: NPO starting at 06/23 0001    Straight Cath starting at 06/22 0017        Full Code    Uninterrupted Critical  Care/Counseling Time (not including procedures): 35 minutes     David Grullon MD  Neurocritical Care  [facility]

## 2019-06-24 NOTE — PROGRESS NOTES
"[facility]  Neurosurgery  Progress Note    Subjective:     History of Present Illness: Patient is a 79 year old female known to this service, with a history of ESRD, CHF, COPD, and right MCA stroke who is admitted to medicine with complain of SOB as well as "not feeling right". She was previously admitted to [facility] in May with R acute SDH after a fall while on plavix. Nonoperative at that time. She was discharged to [facility], then home, but lost to follow up. Medicine team performed CTH which demonstrated increase in size and chronicification of R convexity SDH with mass effect. Patient reports intermittent headaches and some left sided weakness, denies nausea/vomiting. Not currently on anticoagulation.         Post-Op Info:  Procedure(s) (LRB):  KATE HOLES FOR SUBDURAL HEMATOMA EVACUATION (Right)   1 Day Post-Op     Interval History: 6/24: SELVIN ON. Exam stable. POD 1 from R sided kate holes for SDH. 120cc output in SD drain. Consult IR for MMA embolization today.    Medications:  Continuous Infusions:   nicardipine Stopped (06/24/19 0800)     Scheduled Meds:   amLODIPine  10 mg Oral Daily    carvedilol  25 mg Oral BID    ceFAZolin (ANCEF) IVPB  1 g Intravenous Q24H    fluticasone furoate-vilanterol  1 puff Inhalation Daily    furosemide  80 mg Intravenous BID    hydrALAZINE  100 mg Oral Q8H    levETIRAcetam  500 mg Oral BID    lorazepam        losartan  100 mg Oral Daily    polyethylene glycol  17 g Oral Daily    sevelamer carbonate  800 mg Oral TID WM     PRN Meds:acetaminophen, albuterol-ipratropium, Dextrose 10% Bolus, Dextrose 10% Bolus, glucagon (human recombinant), glucose, glucose, hydrALAZINE, insulin aspart U-100, labetalol, ondansetron, oxyCODONE, senna-docusate 8.6-50 mg, sodium chloride 0.9%, sodium chloride 0.9%     Review of Systems  Objective:     Weight: 51.3 kg (113 lb 1.5 oz)  Body mass index is 20.03 kg/m².  Vital Signs (Most Recent):  Temp: 98 °F (36.7 °C) (06/24/19 " 0700)  Pulse: (!) 58 (06/24/19 1000)  Resp: 18 (06/24/19 1000)  BP: (!) 152/86 (06/24/19 1000)  SpO2: 95 % (06/24/19 1000) Vital Signs (24h Range):  Temp:  [97.5 °F (36.4 °C)-98 °F (36.7 °C)] 98 °F (36.7 °C)  Pulse:  [55-70] 58  Resp:  [18-42] 18  SpO2:  [88 %-100 %] 95 %  BP: (136-182)/(43-88) 152/86  Arterial Line BP: (136-177)/(34-48) 152/38                          Closed/Suction Drain 06/23/19 1134 Right Other (Comment) Bulb 7 Fr. (Active)   Site Description Unable to view 6/24/2019  3:00 AM   Dressing Type Transparent 6/24/2019  3:00 AM   Dressing Status Clean;Dry;Intact 6/24/2019  3:00 AM   Dressing Intervention Dressing reinforced 6/23/2019  3:05 PM   Drainage Serosanguineous 6/24/2019  3:00 AM   Status To bulb suction 6/24/2019  3:00 AM   Output (mL) 0 mL 6/24/2019  6:00 AM            Hemodialysis AV Fistula 05/23/16 0700 Left upper arm (Active)            Hemodialysis AV Fistula Left upper arm (Active)       Neurosurgery Physical Exam  [id number]  Awake alert and oriented x 3.  No aphasia  PERRL, EOMI, no facial droop, tongue midline.   5/5 strength R side  4-/5 LUE and LLE  SILT       Significant Labs:  Recent Labs   Lab 06/22/19  2147 06/23/19 0159 06/24/19  0114   GLU  --  99 82   NA  --  142 141   K 4.2 4.4 4.7   CL  --  105 105   CO2  --  29 23   BUN  --  17 26*   CREATININE  --  3.7* 4.7*   CALCIUM  --  9.9 9.7   MG  --  2.1 2.1     Recent Labs   Lab 06/23/19  0159 06/24/19  0114   WBC 5.99 5.29   HGB 8.0* 8.3*   HCT 26.8* 28.0*   * 112*     Recent Labs   Lab 06/22/19  1749 06/22/19 2027 06/23/19  0159   INR 1.1  --  1.2   APTT  --  29.2  --      Microbiology Results (last 7 days)     Procedure Component Value Units Date/Time    Blood culture [[id number]] Collected:  06/22/19 0656    Order Status:  Completed Specimen:  Blood Updated:  06/24/19 0822     Blood Culture, Routine No Growth to date     Blood Culture, Routine No Growth to date     Blood Culture, Routine No Growth to date    Blood  culture [[id number]] Collected:  06/22/19 0656    Order Status:  Completed Specimen:  Blood Updated:  06/24/19 0822     Blood Culture, Routine No Growth to date     Blood Culture, Routine No Growth to date     Blood Culture, Routine No Growth to date        CMP:   Recent Labs   Lab 06/22/19 2147 06/23/19 0159 06/24/19  0114   GLU  --  99 82   CALCIUM  --  9.9 9.7   ALBUMIN  --  2.8* 2.8*   PROT  --  6.0 6.1   NA  --  142 141   K 4.2 4.4 4.7   CO2  --  29 23   CL  --  105 105   BUN  --  17 26*   CREATININE  --  3.7* 4.7*   ALKPHOS  --  79 81   ALT  --  <5* <5*   AST  --  8* 10   BILITOT  --  0.6 0.4     CRP: No results for input(s): CRP in the last 48 hours.  ESR: No results for input(s): POCESR, ERYTHROCYTES in the last 48 hours.  LFTs:   Recent Labs   Lab 06/23/19 0159 06/24/19  0114   ALT <5* <5*   AST 8* 10   ALKPHOS 79 81   BILITOT 0.6 0.4   PROT 6.0 6.1   ALBUMIN 2.8* 2.8*       Significant Diagnostics:  CT: Ct Head Without Contrast    Result Date: 6/23/2019  Interval expected operative change status post right frontal parietal nikki hole and right subdural drainage catheter placement for extra-axial collection evacuation.  There is scattered postoperative gas fluid and small volume hemorrhage with residual intermediate to hypodense collection overlying the right cerebral convexity. Mass effect on the right cerebral hemisphere with approximately 7 mm of leftward midline shift which is reduced from prior. Partial re-expansion of the lateral ventricles without definite hydrocephalus. Clinical correlation and continued follow-up advised See above for additional details. Electronically signed by: Gilberto Luke DO Date:    06/23/2019 Time:    12:38  Echoencephalography: No results found in the last 24 hours.  MRI: No results found in the last 24 hours.    Assessment/Plan:     * Subdural hemorrhage  79F PMH ESRD, CHF, COPD, and right MCA stroke with known R convexity SDH, now with expansion and chronicificaiton. No  s/p R sided nikki holes for SDH evacuation.  Exam stable with 4-/5 LUE and LLE strength.     -- Continue ICU care with q1 hour neurochecks  -- Ct head post operative stable  -- IR today for consideration for MMA embolization  -- SBP <160  -- Maintain bed flat with SD drain to thumbprint suction  -- continue abx while sd drain is in place  -- Further care per NCC.         Josh Khan MD  Neurosurgery  [facility]

## 2019-06-24 NOTE — CONSULTS
See consult note from earlier today.    Yrn Worley MD  Interventional Radiology PGY-II  [facility]  Pager: [phone]

## 2019-06-24 NOTE — ANESTHESIA PREPROCEDURE EVALUATION
[facility]  Anesthesia Pre-Operative Evaluation         Patient Name: Tea Georges  YOB: 1940  MRN: [id number]    SUBJECTIVE:     Pre-operative evaluation for Procedure(s) (LRB):  KATE HOLES FOR SUBDURAL HEMATOMA EVACUATION (Right)  Scheduled for 6/23/2019    HPI 06/24/2019:  Tea Georges is a 79 y.o. female with hx of HTN, HFrEF (5/22/2019 EF 53%, diastolic dysfunction, severe LA enlargement, moderate RV enlargement, severe AS, mod-severe TR, moderate MS, PASP 77), ESRD on dialysis, COPD with 2L/min NC O2 at home, hx of SDH in 5/2019 on plavix, R MCA stroke in 2016.    Currently Admitted for R SDH which was evacuated 06/23/19  Now for IR Angiogram to assess supply vessels to area.     Extubated  Oriented to person, place    Right Radial Shelly  Right Forearm 20G IV  Left Arm AVF    Last HD Friday  Potassium Today: 4.7    Previous Airway:   DL with MAC3  Grade 1 view  ETT 7.5    Oxygen/Ventilation Requirements:  On NC 2L/min satting 100%       Current LDA:        Peripheral IV - Single Lumen 06/21/19 1809 20 G Right Forearm (Active)   Site Assessment Clean;Dry;Intact;No redness;No swelling 6/23/2019  7:05 AM   Line Status Flushed;Saline locked 6/23/2019  7:05 AM   Dressing Status Clean;Dry;Intact 6/23/2019  7:05 AM   Dressing Intervention Dressing reinforced 6/23/2019  7:05 AM   Dressing Change Due 06/25/19 6/23/2019  7:05 AM   Site Change Due 06/25/19 6/23/2019  7:05 AM   Reason Not Rotated Not due 6/23/2019  7:05 AM   Number of days: 1            Peripheral IV - Single Lumen 06/22/19 2133 20 G Left Foot (Active)   Site Assessment Clean;Dry;Intact;No redness;No swelling 6/23/2019  7:05 AM   Line Status Flushed;Saline locked 6/23/2019  7:05 AM   Dressing Status Clean;Dry;Intact 6/23/2019  7:05 AM   Dressing Intervention New dressing 6/23/2019  7:05 AM   Dressing Change Due 06/26/19 6/23/2019  7:05 AM   Site Change Due 06/26/19 6/23/2019  7:05 AM   Reason Not Rotated Not due 6/23/2019   7:05 AM   Number of days: 0            Arterial Line 06/22/19 1952 Right Radial (Active)   Site Assessment Clean;Dry;Intact;No redness;No swelling 6/23/2019  7:05 AM   Line Status Pulsatile blood flow 6/23/2019  7:05 AM   Art Line Waveform Square wave test performed 6/23/2019  7:05 AM   Arterial Line Interventions Zeroed and calibrated;Leveled 6/23/2019  7:05 AM   Color/Movement/Sensation Capillary refill less than 3 sec 6/23/2019  7:05 AM   Dressing Type Transparent 6/23/2019  7:05 AM   Dressing Status Biopatch in place;Clean;Dry;Intact 6/23/2019  7:05 AM   Dressing Intervention Dressing reinforced 6/23/2019  7:05 AM   Dressing Change Due 06/29/19 6/23/2019  7:05 AM   Number of days: 0            Hemodialysis AV Fistula 05/23/16 0700 Left upper arm (Active)   Number of days: 1126            Hemodialysis AV Fistula Left upper arm (Active)   Number of days:        Current Drips:      Patient Active Problem List   Diagnosis    Pleural effusion on right    Shortness of breath    Essential hypertension    ESRD (end stage renal disease)    Nonrheumatic aortic valve stenosis    Severe aortic stenosis    Anemia in ESRD (end-stage renal disease)    Hyperparathyroidism, secondary renal    Left spastic hemiparesis    Blindness of right eye    Physical debility    Aortic atherosclerosis    Peripheral vascular disease, unspecified    Right-sided cerebrovascular accident (CVA)    Hypokalemia    Vitamin D insufficiency    CAD (coronary artery disease)    Pulmonary emphysema    Thrombocytopenia, unspecified    Non-rheumatic tricuspid valve insufficiency    Type 2 diabetes mellitus with chronic kidney disease on chronic dialysis, without long-term current use of insulin    Aneurysm of arteriovenous dialysis fistula    Moderate single current episode of major depressive disorder    Subdural hematoma    (HFpEF) heart failure with preserved ejection fraction    Subdural hemorrhage    Moderate malnutrition     Chronic respiratory failure with hypoxia    Brain compression    Pleural effusion       Review of patient's allergies indicates:  No Known Allergies    Outpatient Medications:  Current Facility-Administered Medications on File Prior to Visit   Medication Dose Route Frequency Provider Last Rate Last Dose    acetaminophen tablet 650 mg  650 mg Oral Q4H PRN Sesar Cameron PA-C   650 mg at 06/23/19 1557    albuterol-ipratropium 2.5 mg-0.5 mg/3 mL nebulizer solution 3 mL  3 mL Nebulization Q4H PRN Sesar Cameron PA-C   3 mL at 06/24/19 0406    amLODIPine tablet 10 mg  10 mg Oral Daily Sesar Cameron PA-C   10 mg at 06/24/19 0814    carvedilol tablet 25 mg  25 mg Oral BID Gordo Faustin MD   25 mg at 06/24/19 0814    ceFAZolin injection 1 g  1 g Intravenous Q24H Johnson Betancourt MD        dextrose 10% (D10W) Bolus  12.5 g Intravenous PRN Jean-Pierre Lara MD        dextrose 10% (D10W) Bolus  25 g Intravenous PRN Jean-Pierre Lara MD        fluticasone furoate-vilanterol 200-25 mcg/dose diskus inhaler 1 puff  1 puff Inhalation Daily Sesar Cameron PA-C   1 puff at 06/24/19 0815    furosemide injection 80 mg  80 mg Intravenous BID Iveth Mcneal MD   80 mg at 06/24/19 0814    glucagon (human recombinant) injection 1 mg  1 mg Intramuscular PRN Sesar Cameron PA-C        glucose chewable tablet 16 g  16 g Oral PRN Sesar Cameron PA-C        glucose chewable tablet 24 g  24 g Oral PRN Sesar Cameron PA-C        hydrALAZINE injection 20 mg  20 mg Intravenous Q4H PRN Gordo Faustin MD   20 mg at 06/24/19 0202    hydrALAZINE tablet 100 mg  100 mg Oral Q8H Sesar Camerno PA-C   100 mg at 06/24/19 0505    insulin aspart U-100 pen 1-10 Units  1-10 Units Subcutaneous Q6H PRN Nellie Chandler NP        labetalol 20 mg/4 mL (5 mg/mL) IV syring  10 mg Intravenous Q4H PRN Gordo Faustin MD   10 mg at 06/22/19 2044    levETIRAcetam tablet 500 mg  500 mg  Oral BID Kash Canada MD        lorazepam (ATIVAN) 2 mg/mL injection        Stopped at 06/24/19 0515    losartan tablet 100 mg  100 mg Oral Daily Gordo Faustin MD   100 mg at 06/24/19 0814    niCARdipine 40 mg/200 mL infusion  5 mg/hr Intravenous Continuous Gordo Faustin MD   Stopped at 06/24/19 0800    ondansetron disintegrating tablet 4 mg  4 mg Oral Q8H PRN Sesar Cameron PA-C        oxyCODONE immediate release tablet 5 mg  5 mg Oral Q6H PRN Joshua Melton PA-C   5 mg at 06/24/19 0814    polyethylene glycol packet 17 g  17 g Oral Daily Sesar Cameron PA-C   17 g at 06/24/19 0900    senna-docusate 8.6-50 mg per tablet 1 tablet  1 tablet Oral BID PRN Sesar Cameron PA-C        sevelamer carbonate tablet 800 mg  800 mg Oral TID  Sesar Cameron PA-C   800 mg at 06/24/19 0814    sodium chloride 0.9% flush 10 mL  10 mL Intravenous PRN Chadwick Chan MD        sodium chloride 0.9% flush 5 mL  5 mL Intravenous PRN Sesar Cameron PA-C         Current Outpatient Medications on File Prior to Visit   Medication Sig Dispense Refill    acetaminophen (TYLENOL) 325 MG tablet Take 2 tablets (650 mg total) by mouth every 6 (six) hours as needed for Pain.  0    albuterol (PROVENTIL/VENTOLIN HFA) 90 mcg/actuation inhaler Inhale 1-2 puffs into the lungs every 6 (six) hours as needed for Wheezing. 1 Inhaler 0    amLODIPine (NORVASC) 10 MG tablet Take 1 tablet (10 mg total) by mouth once daily. 90 tablet 3    artificial tears (ISOPTO TEARS) 0.5 % ophthalmic solution Place 2 drops into both eyes 4 (four) times daily as needed.      carvedilol (COREG) 12.5 MG tablet Take 1 tablet (12.5 mg total) by mouth 2 (two) times daily. 60 tablet 11    ergocalciferol (ERGOCALCIFEROL) 50,000 unit Cap Take 1 capsule (50,000 Units total) by mouth every 7 days. 12 capsule 3    fluticasone-vilanterol (BREO ELLIPTA) 200-25 mcg/dose DsDv diskus inhaler Inhale 1 puff into the lungs  once daily. 90 each 3    hydrALAZINE (APRESOLINE) 100 MG tablet Take 1 tablet (100 mg total) by mouth every 8 (eight) hours. 90 tablet 3    losartan (COZAAR) 50 MG tablet Take 1 tablet (50 mg total) by mouth once daily. 90 tablet 3    ondansetron (ZOFRAN-ODT) 8 MG TbDL Take 1 tablet (8 mg total) by mouth every 6 (six) hours as needed (nausea). 30 tablet 2    polyethylene glycol (GLYCOLAX) 17 gram PwPk Take 17 g by mouth once daily.  0    RENVELA 800 mg Tab Take 1 tablet (800 mg total) by mouth 3 (three) times daily with meals. 270 tablet 3    senna-docusate 8.6-50 mg (PERICOLACE) 8.6-50 mg per tablet Take 1 tablet by mouth daily as needed for Constipation.      senna-docusate 8.6-50 mg (PERICOLACE) 8.6-50 mg per tablet Take 1 tablet by mouth 2 (two) times daily.          Current Inpatient Medications:      Past Surgical History:   Procedure Laterality Date    ABDOMINAL SURGERY      BREAST SURGERY      tumor removal x 2    KATE HOLES FOR SUBDURAL HEMATOMA EVACUATION Right 6/23/2019    [facility] 2ND FLR    CARDIAC SURGERY      CATARACT EXTRACTION      CHOLECYSTECTOMY      COLONOSCOPY N/A 5/30/2016    Performed by Sam Davis MD at [facility] ENDO (2ND FLR)    COLONOSCOPY N/A 5/23/2016    Performed by WILLIAM Colvin MD at [facility] ENDO (2ND FLR)    CREATION, CRANIAL KATE HOLE, WITH HEMATOMA EVACUATION, SUBDURAL, possible crani Right 6/23/2019    Performed by [facility] at [facility] OR 2ND FLR    ESOPHAGOGASTRODUODENOSCOPY (EGD) N/A 5/30/2016    Performed by Sam [facility] at [facility] ENDO (2ND FLR)    ESOPHAGOGASTRODUODENOSCOPY (EGD) N/A 5/27/2016    Performed by Cesario Rubio MD at [facility] ENDO (2ND FLR)    EXCISION, ANEURYSM Left 11/29/2018    Performed by NADINE Blum III, MD at [facility] OR 2ND FLR    EYE SURGERY      Fistulogram Left 11/28/2018    Performed by NADINE [facility] III, MD at [facility] CATH LAB    INSERTION, CATHETER, VASCULAR, DUAL LUMEN  Right 11/29/2018    [facility] at [facility] OR 2ND FLR    PTA, Fistula  11/28/2018    Performed by NADINE [facility] III, [facility] at [facility] CATH LAB    REVISION, AV FISTULA, Left 11/29/2018    Performed by NADINE [facility]um III, MD at [facility] OR 2ND FLR    UPPER GASTROINTESTINAL ENDOSCOPY         Social History     Socioeconomic History    Marital status:      Spouse name: Not on file    Number of children: Not on file    Years of education: Not on file    Highest education level: Not on file   Occupational History    Occupation: Housekeeping     Employer: [employer]     Comment: Retired; 20-years there   Social Needs    Financial resource strain: Not on file    Food insecurity:     Worry: Not on file     Inability: Not on file    Transportation needs:     Medical: Not on file     Non-medical: Not on file   Tobacco Use    Smoking status: Never Smoker    Smokeless tobacco: Never Used   Substance and Sexual Activity    Alcohol use: No     Comment: Reports occasional 1-2 drinks     Drug use: No    Sexual activity: Never   Lifestyle    Physical activity:     Days per week: Not on file     Minutes per session: Not on file    Stress: Not on file   Relationships    Social connections:     Talks on phone: Not on file     Gets together: Not on file     Attends Gnosticism service: Not on file     Active member of club or organization: Not on file     Attends meetings of clubs or organizations: Not on file     Relationship status: Not on file   Other Topics Concern    Not on file   Social History Narrative    Not on file       OBJECTIVE:   Weight:  Wt Readings from Last 4 Encounters:   06/24/19 51.3 kg (113 lb 1.5 oz)   06/11/19 48.7 kg (107 lb 5.8 oz)   05/22/19 53.1 kg (117 lb)   03/19/19 50.7 kg (111 lb 12.4 oz)       Vital Signs Range (Last 24H):  Temp:  [36.4 °C (97.5 °F)-36.7 °C (98 °F)]   Pulse:  [55-70]   Resp:  [18-42]   BP: (136-182)/(43-88)   SpO2:  [88 %-100 %]    Arterial Line BP: (136-177)/(34-48)       CBC:   Lab Results   Component Value Date    WBC 5.29 2019    HGB 8.3 (L) 2019    HCT 28.0 (L) 2019    MCV 96 2019     (L) 2019       CMP:     Chemistry        Component Value Date/Time     2019 0114    K 4.7 2019 0114     2019 0114    CO2 23 2019 0114    BUN 26 (H) 2019 0114    CREATININE 4.7 (H) 2019 0114    GLU 82 2019 0114        Component Value Date/Time    CALCIUM 9.7 2019 0114    ALKPHOS 81 2019 0114    AST 10 2019 0114    ALT <5 (L) 2019 0114    BILITOT 0.4 2019 0114    ESTGFRAFRICA 9.7 (A) 2019 0114    EGFRNONAA 8.4 (A) 2019 0114            INR:  Lab Results   Component Value Date    INR 1.2 2019    INR 1.1 2019    INR 1.1 2019       Diagnostic Studies:  CXR 2019  Mild improvement at the left costophrenic sulcus and mild increased pleural fluid at the right minor fissure otherwise stable radiographic appearance with bilateral pattern of infiltrates more prominent on the right.    CT Head wo Contrast 2019  Large loculated low-density right subdural hematoma with severe mass effect and 10.3 mm midline shift unchanged.    Small old right frontal white matter infarct.  No acute infarct.  No acute hemorrhage.    EK2019  Vent. Rate : 070 BPM     Atrial Rate : 070 BPM     P-R Int : 268 ms          QRS Dur : 096 ms      QT Int : 446 ms       P-R-T Axes : 060 081 026 degrees     QTc Int : 481 ms    Sinus rhythm with 1st degree A-V block  Septal infarct ,age undetermined  Abnormal ECG  When compared with ECG of 21-MAY-2019 12:01,  Septal infarct is now Present  T wave amplitude has decreased in Anterior leads  Nonspecific T wave abnormality no longer evident in Lateral leads     2D Echo:  2019  · Low normal left ventricular systolic function. The estimated ejection fraction is 53%  · Local segmental  wall motion abnormalities.  · Concentric left ventricular remodeling.  · Grade II (moderate) left ventricular diastolic dysfunction consistent with pseudonormalization.  · Severe left atrial enlargement.  · Elevated left atrial pressure.  · Moderate right ventricular enlargement.  · Normal right ventricular systolic function.  · Mild right atrial enlargement.  · Severe aortic valve stenosis.  · Aortic valve area is 0.69 cm2; peak velocity is 4.58 m/s; mean gradient is 55.02 mmHg.  · Moderate mitral sclerosis.  · Mild-to-moderate mitral regurgitation.  · Moderate to severe tricuspid regurgitation.  · Mild pulmonic regurgitation.  · Elevated central venous pressure (15 mm Hg).  · The estimated PA systolic pressure is 77 mm Hg  · Pulmonary hypertension present.  ·   Results for orders placed or performed during the hospital encounter of 07/12/16   2D Echo w/ Color Flow Doppler   Result Value Ref Range    QEF 63 55 - 65    Mitral Valve Regurgitation TRIVIAL     Diastolic Dysfunction Yes (A)     Aortic Valve Regurgitation MILD     Aortic Valve Stenosis SEVERE (A)     Est. PA Systolic Pressure 60.46 (A)     Pericardial Effusion SMALL (A)     Mitral Valve Mobility NORMAL     Tricuspid Valve Regurgitation TRIVIAL TO MILD          ASSESSMENT/PLAN:         Pre-op Assessment    I have reviewed the Patient Summary Reports.      I have reviewed the Medications.     Review of Systems  Anesthesia Hx:  No problems with previous Anesthesia  History of prior surgery of interest to airway management or planning:  Denies Personal Hx of Anesthesia complications.   Social:  Non-Smoker, Social Alcohol Use    Hematology/Oncology:     Oncology Normal    -- Anemia:   EENT/Dental:EENT/Dental Normal   Cardiovascular:   Hypertension Valvular problems/Murmurs, AS CAD   CHF    Pulmonary:   COPD Asthma On 2L Home O2   Renal/:   Chronic Renal Disease, Dialysis, ESRD    Neurological:   CVA, residual symptoms SDH   Endocrine:   Diabetes, type 2     Psych:   depression          Physical Exam  General:  Malnutrition    Airway/Jaw/Neck:  Airway Findings: Mouth Opening: Small, but > 3cm Tongue: Normal  General Airway Assessment: Adult  Mallampati: IV  TM Distance: Normal, at least 6 cm  Jaw/Neck Findings:  Neck ROM: Normal ROM  Neck Findings: Normal    Eyes/Ears/Nose:  EYES/EARS/NOSE FINDINGS: Normal   Dental:  Dental Findings: Edentulous, Lower Dentures, Upper Dentures   Chest/Lungs:  Chest/Lungs Findings: Rhonchi     Heart/Vascular:  Heart Findings: Rate: Normal  Rhythm: Regular Rhythm  Heart Murmur  Systolic  Systolic Heart Murmur Description: Holosystolic  Systolic Heart Murmur Grade: Grade III        Mental Status:  Mental Status Findings: Normal        Anesthesia Plan  Type of Anesthesia, risks & benefits discussed:  Anesthesia Type:  general  Patient's Preference:   Intra-op Monitoring Plan: standard ASA monitors and arterial line  Intra-op Monitoring Plan Comments:   Post Op Pain Control Plan: per primary service following discharge from PACU, IV/PO Opioids PRN and multimodal analgesia  Post Op Pain Control Plan Comments:   Induction:   IV  Beta Blocker:  Patient is on a Beta-Blocker and has received one dose within the past 24 hours (No further documentation required).       Informed Consent: Patient representative understands risks and agrees with Anesthesia plan.  Questions answered. Anesthesia consent signed with patient representative.  ASA Score: 4  emergent   Day of Surgery Review of History & Physical:  There are no significant changes.  H&P update referred to the surgeon.         Ready For Surgery From Anesthesia Perspective.

## 2019-06-24 NOTE — CONSULTS
Radiology Consult    Tea Georges is a 79 y.o. female with a history of multiple medical problems and chronic subdural hematoma, now s/p evac with concern for MMA supply.    Past Medical History:   Diagnosis Date    Anemia in ESRD (end-stage renal disease) 5/29/2016    Anticoagulant long-term use     Aortic atherosclerosis 11/22/2016    Aortic stenosis, moderate 2/18/2016    Asthma in adult without complication 1/8/2016    Bilateral low back pain without sciatica 11/17/2015    Blindness of right eye 11/12/2016    CAD (coronary artery disease) 12/12/2016    Cataract     Central retinal vein occlusion, right eye 6/3/2014    CHF (congestive heart failure)     Chronic diastolic heart failure 1/8/2016    Chronic respiratory failure with hypoxia 5/29/2016    COPD (chronic obstructive pulmonary disease) 1/15/2017    Dependence on hemodialysis     Mon-Wed-Fri    Diverticulosis     Embolic stroke involving right middle cerebral artery     Encounter for blood transfusion     Enlarged LA (left atrium) 10/7/2016    Epiretinal membrane 7/17/2012    ESRD (end stage renal disease)     Essential hypertension 1/8/2016    History of GI diverticular bleed     5/22/16    Left flaccid hemiparesis 10/1/2016    Peripheral vascular disease, unspecified 11/22/2016    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 11/13/2016    Type 2 diabetes mellitus with kidney complication, without long-term current use of insulin 5/1/2018    Type 2 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, without long-term current use of insulin 3/26/2013    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, without long-term current use of insulin 3/26/2013    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     Past Surgical History:   Procedure Laterality Date    ABDOMINAL SURGERY      BREAST SURGERY      tumor removal x 2    KATE HOLES FOR SUBDURAL  HEMATOMA EVACUATION Right 6/23/2019    Performed by [facility] at [facility] OR 2ND FLR    CARDIAC SURGERY      CATARACT EXTRACTION      CHOLECYSTECTOMY      COLONOSCOPY N/A 5/30/2016    Performed by Sam Davis MD at [facility] ENDO (2ND FLR)    COLONOSCOPY N/A 5/23/2016    Performed by WILLIAM Colvin MD at [facility] ENDO (2ND FLR)    CREATION, CRANIAL KATE HOLE, WITH HEMATOMA EVACUATION, SUBDURAL, possible crani Right 6/23/2019    Performed by Trevor [facility]ner MD at [facility] OR 2ND FLR    ESOPHAGOGASTRODUODENOSCOPY (EGD) N/A 5/30/2016    Performed [facility] at [facility] (2ND FLR)    ESOPHAGOGASTRODUODENOSCOPY (EGD) N/A 5/27/2016    Performed by Cesario Rubio MD at [facility] (2ND FLR)    EXCISION, ANEURYSM Left 11/29/2018    Performed by NADINE Blum III, MD at [facility] OR [address] [place] [address]    EYE SURGERY      Fistulogram Left 11/28/2018    Performed by NADINE Blum III, MD at [facility] CATH LAB    INSERTION, CATHETER, VASCULAR, DUAL LUMEN Right 11/29/2018    Performed by NADINE Blum III, MD at [facility] OR [address] [place] [address]    PTA, Fistula  11/28/2018    Performed by NADINE Blum III, MD at [facility] CATH LAB    REVISION, AV FISTULA, Left 11/29/2018    Performed by NADINE Blum III, MD at [facility] OR [address] [place] [address]    UPPER GASTROINTESTINAL ENDOSCOPY         Imaging reviewed with Radiology staff, Dr. Garcia.     Procedure: Angiogram +/- MMA embolization.    Scheduled Meds:    amLODIPine  10 mg Oral Daily    carvedilol  25 mg Oral BID    ceFAZolin (ANCEF) IVPB  1 g Intravenous Q24H    fluticasone furoate-vilanterol  1 puff Inhalation Daily    furosemide  80 mg Intravenous BID    hydrALAZINE  100 mg Oral Q8H    levETIRAcetam  500 mg Oral BID    lorazepam        losartan  100 mg Oral Daily    polyethylene glycol  17 g Oral Daily    sevelamer carbonate  800 mg Oral TID WM     Continuous Infusions:    nicardipine Stopped (06/24/19 0800)     PRN Meds:acetaminophen, albuterol-ipratropium, Dextrose 10%  Bolus, Dextrose 10% Bolus, glucagon (human recombinant), glucose, glucose, hydrALAZINE, insulin aspart U-100, labetalol, ondansetron, oxyCODONE, senna-docusate 8.6-50 mg, sodium chloride 0.9%, sodium chloride 0.9%    Allergies: Review of patient's allergies indicates:  No Known Allergies    Labs:  Recent Labs   Lab 06/23/19  0159   INR 1.2       Recent Labs   Lab 06/24/19  0114   WBC 5.29   HGB 8.3*   HCT 28.0*   MCV 96   *      Recent Labs   Lab 06/24/19  0114   GLU 82      K 4.7      CO2 23   BUN 26*   CREATININE 4.7*   CALCIUM 9.7   MG 2.1   ALT <5*   AST 10   ALBUMIN 2.8*   BILITOT 0.4         Vitals (Most Recent):  Temp: 98 °F (36.7 °C) (06/24/19 0700)  Pulse: 64 (06/24/19 0900)  Resp: (!) 21 (06/24/19 0900)  BP: (!) 160/70 (06/24/19 0900)  SpO2: 99 % (06/24/19 0900)    Plan:   Case request placed with Anesthesia this AM.  Orders placed.    Mango Singh MD  Radiology

## 2019-06-24 NOTE — PROCEDURES
Radiology Post-Procedure Note    Pre Op Diagnosis: Right SDH    Post Op Diagnosis: same    Procedure: Cerebral angiogram and R MMA embolization using PVA    Procedure performed by: Shai Garcia MD    Written Informed Consent Obtained: Yes    Specimen Removed: NO    Estimated Blood Loss: Minimal    Procedure report:     A 6F sheath was placed into the right femoral artery and catheter employed to select left and right common carotid artery  were subselected and angiography of the brain was performed after injection into each of these vessels. Microcatheter were used to select the right middle meningeal artery.     Preliminary interpretation: Right Middle Meningeal Artery embolized using PVA.  Please see Imaging report for full details.    A right femoral artery angiogram was performed, the sheath removed and hemostasis achieved using exoseal  No hematoma was present at the time of hemostasis.    The patient tolerated the procedure well.

## 2019-06-24 NOTE — PROGRESS NOTES
"[facility]  Nephrology  Progress Note    Patient Name: Tea Georges  MRN: [id number]  Admission Date: 6/21/2019  Hospital Length of Stay: 0 days  Attending Provider: David Grullon MD   Primary Care Physician: Parth Posadas Ii, MD  Principal Problem:<principal problem not specified>    Subjective:     HPI: 80yo AAF with pmhx ESRD, CHF, COPD (2L O2 NC), h/o SDH, right MCA stroke s/p thrombectomy in 2016 who presents to ED c/o shortness of breath. Patient is lethargic, confused and states that she is here for a fall. Per ED chart: "The patient was underwent a full run of dialysis today, and immediately after reported feeling increasingly short of breath. Her shortness of breath has currently resolved. She is somnulent, having difficulty remaining awake throughout physician examination. She denies chest pain, abdominal pain, nausea, or vomiting. Patient endorses taking Phenergan this morning."      Patient admits to chronic orthopnea at home and wheezing this AM.  She denies any SOB to me. She states that she lives at home with her daughter and two granddaughters.      Recentlly admitted for bilateral acute SDH, R>L with right to left midline shift s/p fall while on plavix.  Discharged to SNF. Per nursing, patient has been cared for by family and she is reported as bedbound.      In ED, afebrile, BP elevated 160s- 180s systolic, no leukocytosis. Renal function improved from baseline. BNP >4900 (BL 3000 to 4000).  CXR shows moderate R sided pleural effusion and R sided pulmonary edema, similar to previous.     Interval History: Patient evaluated bedside, stable vital signs.  S/p [place] hole and evacuation of subdural hematoma 6/23/2019.  Night uneventful.    Review of patient's allergies indicates:  No Known Allergies  Current Facility-Administered Medications   Medication Frequency    0.9%  NaCl infusion PRN    0.9%  NaCl infusion Once    acetaminophen tablet 650 mg Q4H PRN    " albuterol-ipratropium 2.5 mg-0.5 mg/3 mL nebulizer solution 3 mL Q4H PRN    amLODIPine tablet 10 mg Daily    carvedilol tablet 25 mg BID    ceFAZolin injection 1 g Q24H    dextrose 10% (D10W) Bolus PRN    dextrose 10% (D10W) Bolus PRN    fluticasone furoate-vilanterol 200-25 mcg/dose diskus inhaler 1 puff Daily    furosemide injection 80 mg BID    glucagon (human recombinant) injection 1 mg PRN    glucose chewable tablet 16 g PRN    glucose chewable tablet 24 g PRN    hydrALAZINE injection 20 mg Q4H PRN    hydrALAZINE tablet 100 mg Q8H    insulin aspart U-100 pen 1-10 Units Q6H PRN    labetalol 20 mg/4 mL (5 mg/mL) IV syring Q4H PRN    levETIRAcetam tablet 500 mg BID    lorazepam (ATIVAN) 2 mg/mL injection     losartan tablet 100 mg Daily    niCARdipine 40 mg/200 mL infusion Continuous    ondansetron disintegrating tablet 4 mg Q8H PRN    oxyCODONE immediate release tablet 5 mg Q6H PRN    polyethylene glycol packet 17 g Daily    senna-docusate 8.6-50 mg per tablet 1 tablet BID PRN    sevelamer carbonate tablet 800 mg TID WM    sodium chloride 0.9% flush 10 mL PRN    sodium chloride 0.9% flush 5 mL PRN       Objective:     Vital Signs (Most Recent):  Temp: 98 °F (36.7 °C) (06/24/19 0700)  Pulse: (!) 58 (06/24/19 1000)  Resp: 18 (06/24/19 1000)  BP: (!) 152/86 (06/24/19 1000)  SpO2: 95 % (06/24/19 1000)  O2 Device (Oxygen Therapy): nasal cannula (06/24/19 0557) Vital Signs (24h Range):  Temp:  [97.5 °F (36.4 °C)-98 °F (36.7 °C)] 98 °F (36.7 °C)  Pulse:  [55-70] 58  Resp:  [18-42] 18  SpO2:  [88 %-100 %] 95 %  BP: (136-182)/(43-88) 152/86  Arterial Line BP: (136-177)/(34-48) 152/38     Weight: 51.3 kg (113 lb 1.5 oz) (06/24/19 0400)  Body mass index is 20.03 kg/m².  Body surface area is 1.51 meters squared.    I/O last 3 completed shifts:  In: 329.8 [I.V.:329.8]  Out: 220 [Drains:120; Blood:100]    Physical Exam   Constitutional: She is oriented to person, place, and time. She appears  well-developed and well-nourished. No distress.   HENT:   Head gauze covered around wound.   Eyes: Pupils are equal, round, and reactive to light.   Cardiovascular: Normal rate.   Pulmonary/Chest: Effort normal. No respiratory distress.   Abdominal: Soft. Bowel sounds are normal. She exhibits no distension.   Musculoskeletal: She exhibits no edema.   Neurological: She is alert and oriented to person, place, and time.   Skin: Skin is warm.   Nursing note and vitals reviewed.      Significant Labs:  CBC:   Recent Labs   Lab 06/24/19  0114   WBC 5.29   RBC 2.91*   HGB 8.3*   HCT 28.0*   *   MCV 96   MCH 28.5   MCHC 29.6*     CMP:   Recent Labs   Lab 06/24/19 0114   GLU 82   CALCIUM 9.7   ALBUMIN 2.8*   PROT 6.1      K 4.7   CO2 23      BUN 26*   CREATININE 4.7*   ALKPHOS 81   ALT <5*   AST 10   BILITOT 0.4     All labs within the past 24 hours have been reviewed.     Significant Imaging:  CXR personally reviewed.    Assessment/Plan:     ESRD (end stage renal disease)  Modality: iHD  Days: MWF  Access: LUE AVF  Unit: [facility]  Nephrologist: Dr Jared Martinez Duration: 3-4  EDW: 50kg  UF : 2-3    Lytes, acid/base and volume status appear stable    Plan for dialysis:  - Will provide HD today (low blood flows)  - Strict I/Os and chart  - MAP > 65  - If able, daily standing weights and chart  - Will monitor closely    Subdural hemorrhage  - s/p subdural hematoma evacuation 6/23/2019  - Followed by primary team        Thank you for your consult. I will follow-up with patient. Please contact us if you have any additional questions.    Nba Hyde MD  Nephrology  [facility]

## 2019-06-24 NOTE — PROGRESS NOTES
Cerebral Angiogram complete. Pt tolerated well Closure device deployed at 1545. Flat bedrest for 2 hours until 1745.  Pt taken to ICU with CRNA.

## 2019-06-24 NOTE — PLAN OF CARE
Problem: Adult Inpatient Plan of Care  Goal: Plan of Care Review  Outcome: Ongoing (interventions implemented as appropriate)  VS and assessment per flow sheet, patient progressing towards goal as tolerated. Pt restarted on Cardene gtt to maintain SBP < 160. Pt had brief episode of jerking movements and SOB at 0350 (see note). Plan of care reviewed with Tea Georges at 0430, all concerns addressed. Will continue to monitor.

## 2019-06-24 NOTE — PLAN OF CARE
06/24/19 1721   Post-Acute Status   Post-Acute Authorization Placement   Post-Acute Placement Status Awaiting Internal Medical Clearance   Discharge Delays None known at this time     Jhoana Pitts RN, CCRN-K, [facility]  Neuro-Critical Care   [phone]

## 2019-06-24 NOTE — PROCEDURES
ROUTINE ELECTROENCEPHALOGRAM REPORT      Tea Georges  [id number]  1940    DATE OF SERVICE: 6/24/19    REQUESTING PROVIDER: Titi Waller MD    METHODOLOGY   Electroencephalographic (EEG) recording is with electrodes placed according to the International 10-20 placement system.  Thirty two (32) channels of digital signal (sampling rate of 512/sec) including T1 and T2 was simultaneously recorded from the scalp and may include  EKG, EMG, and/or eye monitors.  Recording band pass was 0.1 to 512 hz.  Digital video recording of the patient is simultaneously recorded with the EEG.  The patient is instructed report clinical symptoms which may occur during the recording session.  EEG and video recording is stored and archived in digital format. Activation procedures which include photic stimulation, hyperventilation and instructing patients to perform simple task are done in selected patients.    The EEG is displayed on a monitor screen and can be reviewed using different montages.  Computer assisted analysis is employed to detect spike and electrographic seizure activity.   The entire record is submitted for computer analysis.  The entire recording is visually reviewed and the times identified by computer analysis as being spikes or seizures are reviewed again.  Compresses spectral analysis (CSA) is also performed on the activity recorded from each individual channel.  This is displayed as a power display of frequencies from 0 to 30 Hz over time.   The CSA is reviewed looking for asymmetries in power between homologous areas of the scalp and then compared with the original EEG recording.     Nexi software was also utilized in the review of this study.  This software suite analyzes the EEG recording in multiple domains.  Coherence and rhythmicity is computed to identify EEG sections which may contain organized seizures.  Each channel undergoes analysis to detect presence of spike and sharp waves which have  special and morphological characteristic of epileptic activity.  The routine EEG recording is converted from spacial into frequency domain.  This is then displayed comparing homologous areas to identify areas of significant asymmetry.  Algorithm to identify non-cortically generated artifact is used to separate eye movement, EMG and other artifact from the EEG    EEG FINDINGS  Background activity:   The background rhythm was characterized by theta-alpha (7-9 Hz) activity with a 9 Hz posterior dominant alpha rhythm on the left   Symmetry and continuity: The background was continuous; asymmetry with right sided breach and delta-theta (4-7 Hz) slowing with 7 Hz posterior dominant rhythm was seen on the right    Sleep:   No clear sleep seen; poorly developed architecture, better seen on the left.    Activation procedures:   Hyperventilation and photic stimulation were not performed    Abnormal activity:   No epileptiform discharges, periodic discharges, lateralized rhythmic delta activity or electrographic seizures were seen.    EKG:   Irregular rhythm seen    IMPRESSION:   This is an abnormal routine EEG due to:  1) asymmetry with breach rhythm seen on the right, suggestive of prior craniotomy  2) moderate generalized slowing seen on the right, suggestive of underlying structural lesion  No epileptiform activity or electrographic seizures seen.    CLINICAL CORRELATION IS RECOMMENDED.    Zofia Penaloza MD, TIFFANY(), JAMES HARMON.  Neurology-Epilepsy.  [facility].

## 2019-06-24 NOTE — SUBJECTIVE & OBJECTIVE
Interval History: 6/24: SELVIN ON. Exam stable. POD 1 from R sided nikki holes for SDH. 120cc output in SD drain. Consult IR for MMA embolization today.    Medications:  Continuous Infusions:   nicardipine Stopped (06/24/19 0800)     Scheduled Meds:   amLODIPine  10 mg Oral Daily    carvedilol  25 mg Oral BID    ceFAZolin (ANCEF) IVPB  1 g Intravenous Q24H    fluticasone furoate-vilanterol  1 puff Inhalation Daily    furosemide  80 mg Intravenous BID    hydrALAZINE  100 mg Oral Q8H    levETIRAcetam  500 mg Oral BID    lorazepam        losartan  100 mg Oral Daily    polyethylene glycol  17 g Oral Daily    sevelamer carbonate  800 mg Oral TID WM     PRN Meds:acetaminophen, albuterol-ipratropium, Dextrose 10% Bolus, Dextrose 10% Bolus, glucagon (human recombinant), glucose, glucose, hydrALAZINE, insulin aspart U-100, labetalol, ondansetron, oxyCODONE, senna-docusate 8.6-50 mg, sodium chloride 0.9%, sodium chloride 0.9%     Review of Systems  Objective:     Weight: 51.3 kg (113 lb 1.5 oz)  Body mass index is 20.03 kg/m².  Vital Signs (Most Recent):  Temp: 98 °F (36.7 °C) (06/24/19 0700)  Pulse: (!) 58 (06/24/19 1000)  Resp: 18 (06/24/19 1000)  BP: (!) 152/86 (06/24/19 1000)  SpO2: 95 % (06/24/19 1000) Vital Signs (24h Range):  Temp:  [97.5 °F (36.4 °C)-98 °F (36.7 °C)] 98 °F (36.7 °C)  Pulse:  [55-70] 58  Resp:  [18-42] 18  SpO2:  [88 %-100 %] 95 %  BP: (136-182)/(43-88) 152/86  Arterial Line BP: (136-177)/(34-48) 152/38                          Closed/Suction Drain 06/23/19 1134 Right Other (Comment) Bulb 7 Fr. (Active)   Site Description Unable to view 6/24/2019  3:00 AM   Dressing Type Transparent 6/24/2019  3:00 AM   Dressing Status Clean;Dry;Intact 6/24/2019  3:00 AM   Dressing Intervention Dressing reinforced 6/23/2019  3:05 PM   Drainage Serosanguineous 6/24/2019  3:00 AM   Status To bulb suction 6/24/2019  3:00 AM   Output (mL) 0 mL 6/24/2019  6:00 AM            Hemodialysis AV Fistula 05/23/16 0700 Left  upper arm (Active)            Hemodialysis AV Fistula Left upper arm (Active)       Neurosurgery Physical Exam  [id number]  Awake alert and oriented x 3.  No aphasia  PERRL, EOMI, no facial droop, tongue midline.   5/5 strength R side  4-/5 LUE and LLE  SILT       Significant Labs:  Recent Labs   Lab 06/22/19 2147 06/23/19 0159 06/24/19  0114   GLU  --  99 82   NA  --  142 141   K 4.2 4.4 4.7   CL  --  105 105   CO2  --  29 23   BUN  --  17 26*   CREATININE  --  3.7* 4.7*   CALCIUM  --  9.9 9.7   MG  --  2.1 2.1     Recent Labs   Lab 06/23/19 0159 06/24/19  0114   WBC 5.99 5.29   HGB 8.0* 8.3*   HCT 26.8* 28.0*   * 112*     Recent Labs   Lab 06/22/19 1749 06/22/19 2027 06/23/19 0159   INR 1.1  --  1.2   APTT  --  29.2  --      Microbiology Results (last 7 days)     Procedure Component Value Units Date/Time    Blood culture [[id number]] Collected:  06/22/19 0656    Order Status:  Completed Specimen:  Blood Updated:  06/24/19 0822     Blood Culture, Routine No Growth to date     Blood Culture, Routine No Growth to date     Blood Culture, Routine No Growth to date    Blood culture [[id number]] Collected:  06/22/19 0656    Order Status:  Completed Specimen:  Blood Updated:  06/24/19 0822     Blood Culture, Routine No Growth to date     Blood Culture, Routine No Growth to date     Blood Culture, Routine No Growth to date        CMP:   Recent Labs   Lab 06/22/19 2147 06/23/19 0159 06/24/19  0114   GLU  --  99 82   CALCIUM  --  9.9 9.7   ALBUMIN  --  2.8* 2.8*   PROT  --  6.0 6.1   NA  --  142 141   K 4.2 4.4 4.7   CO2  --  29 23   CL  --  105 105   BUN  --  17 26*   CREATININE  --  3.7* 4.7*   ALKPHOS  --  79 81   ALT  --  <5* <5*   AST  --  8* 10   BILITOT  --  0.6 0.4     CRP: No results for input(s): CRP in the last 48 hours.  ESR: No results for input(s): POCESR, ERYTHROCYTES in the last 48 hours.  LFTs:   Recent Labs   Lab 06/23/19  0159 06/24/19  0114   ALT <5* <5*   AST 8* 10   ALKPHOS 79 81   BILITOT 0.6  0.4   PROT 6.0 6.1   ALBUMIN 2.8* 2.8*       Significant Diagnostics:  CT: Ct Head Without Contrast    Result Date: 6/23/2019  Interval expected operative change status post right frontal parietal nikki hole and right subdural drainage catheter placement for extra-axial collection evacuation.  There is scattered postoperative gas fluid and small volume hemorrhage with residual intermediate to hypodense collection overlying the right cerebral convexity. Mass effect on the right cerebral hemisphere with approximately 7 mm of leftward midline shift which is reduced from prior. Partial re-expansion of the lateral ventricles without definite hydrocephalus. Clinical correlation and continued follow-up advised See above for additional details. Electronically signed by: Gilberto Luke DO Date:    06/23/2019 Time:    12:38  Echoencephalography: No results found in the last 24 hours.  MRI: No results found in the last 24 hours.

## 2019-06-25 LAB
ALBUMIN SERPL BCP-MCNC: 2.9 G/DL (ref 3.5–5.2)
ALP SERPL-CCNC: 81 U/L (ref 55–135)
ALT SERPL W/O P-5'-P-CCNC: <5 U/L (ref 10–44)
ANION GAP SERPL CALC-SCNC: 13 MMOL/L (ref 8–16)
AST SERPL-CCNC: 11 U/L (ref 10–40)
BASOPHILS # BLD AUTO: 0.02 K/UL (ref 0–0.2)
BASOPHILS NFR BLD: 0.4 % (ref 0–1.9)
BILIRUB SERPL-MCNC: 0.5 MG/DL (ref 0.1–1)
BUN SERPL-MCNC: 13 MG/DL (ref 8–23)
CALCIUM SERPL-MCNC: 8.9 MG/DL (ref 8.7–10.5)
CHLORIDE SERPL-SCNC: 103 MMOL/L (ref 95–110)
CO2 SERPL-SCNC: 22 MMOL/L (ref 23–29)
CREAT SERPL-MCNC: 2.6 MG/DL (ref 0.5–1.4)
DIFFERENTIAL METHOD: ABNORMAL
EOSINOPHIL # BLD AUTO: 0.1 K/UL (ref 0–0.5)
EOSINOPHIL NFR BLD: 1.3 % (ref 0–8)
ERYTHROCYTE [DISTWIDTH] IN BLOOD BY AUTOMATED COUNT: 17 % (ref 11.5–14.5)
EST. GFR  (AFRICAN AMERICAN): 19.8 ML/MIN/1.73 M^2
EST. GFR  (NON AFRICAN AMERICAN): 17.2 ML/MIN/1.73 M^2
GLUCOSE SERPL-MCNC: 75 MG/DL (ref 70–110)
HAV IGM SERPL QL IA: NEGATIVE
HBV CORE IGM SERPL QL IA: NEGATIVE
HBV SURFACE AG SERPL QL IA: NEGATIVE
HCT VFR BLD AUTO: 28.1 % (ref 37–48.5)
HCV AB SERPL QL IA: NEGATIVE
HGB BLD-MCNC: 8.1 G/DL (ref 12–16)
IMM GRANULOCYTES # BLD AUTO: 0.02 K/UL (ref 0–0.04)
IMM GRANULOCYTES NFR BLD AUTO: 0.4 % (ref 0–0.5)
LYMPHOCYTES # BLD AUTO: 0.3 K/UL (ref 1–4.8)
LYMPHOCYTES NFR BLD: 6.7 % (ref 18–48)
MAGNESIUM SERPL-MCNC: 2 MG/DL (ref 1.6–2.6)
MCH RBC QN AUTO: 27.9 PG (ref 27–31)
MCHC RBC AUTO-ENTMCNC: 28.8 G/DL (ref 32–36)
MCV RBC AUTO: 97 FL (ref 82–98)
MONOCYTES # BLD AUTO: 0.4 K/UL (ref 0.3–1)
MONOCYTES NFR BLD: 9.3 % (ref 4–15)
NEUTROPHILS # BLD AUTO: 3.9 K/UL (ref 1.8–7.7)
NEUTROPHILS NFR BLD: 81.9 % (ref 38–73)
NRBC BLD-RTO: 0 /100 WBC
PHOSPHATE SERPL-MCNC: 2.6 MG/DL (ref 2.7–4.5)
PLATELET # BLD AUTO: 95 K/UL (ref 150–350)
PMV BLD AUTO: 11.1 FL (ref 9.2–12.9)
POCT GLUCOSE: 113 MG/DL (ref 70–110)
POCT GLUCOSE: 42 MG/DL (ref 70–110)
POCT GLUCOSE: 61 MG/DL (ref 70–110)
POCT GLUCOSE: 63 MG/DL (ref 70–110)
POCT GLUCOSE: 63 MG/DL (ref 70–110)
POCT GLUCOSE: 67 MG/DL (ref 70–110)
POCT GLUCOSE: 71 MG/DL (ref 70–110)
POCT GLUCOSE: 75 MG/DL (ref 70–110)
POCT GLUCOSE: 78 MG/DL (ref 70–110)
POCT GLUCOSE: 83 MG/DL (ref 70–110)
POCT GLUCOSE: 85 MG/DL (ref 70–110)
POTASSIUM SERPL-SCNC: 3.6 MMOL/L (ref 3.5–5.1)
PROT SERPL-MCNC: 6.4 G/DL (ref 6–8.4)
RBC # BLD AUTO: 2.9 M/UL (ref 4–5.4)
SODIUM SERPL-SCNC: 138 MMOL/L (ref 136–145)
WBC # BLD AUTO: 4.75 K/UL (ref 3.9–12.7)

## 2019-06-25 PROCEDURE — 80053 COMPREHEN METABOLIC PANEL: CPT

## 2019-06-25 PROCEDURE — 83735 ASSAY OF MAGNESIUM: CPT

## 2019-06-25 PROCEDURE — 99233 PR SUBSEQUENT HOSPITAL CARE,LEVL III: ICD-10-PCS | Mod: GC,,, | Performed by: PSYCHIATRY & NEUROLOGY

## 2019-06-25 PROCEDURE — 20000000 HC ICU ROOM

## 2019-06-25 PROCEDURE — 99233 SBSQ HOSP IP/OBS HIGH 50: CPT | Mod: GC,,, | Performed by: PSYCHIATRY & NEUROLOGY

## 2019-06-25 PROCEDURE — 63600175 PHARM REV CODE 636 W HCPCS: Performed by: STUDENT IN AN ORGANIZED HEALTH CARE EDUCATION/TRAINING PROGRAM

## 2019-06-25 PROCEDURE — 94761 N-INVAS EAR/PLS OXIMETRY MLT: CPT

## 2019-06-25 PROCEDURE — 25000003 PHARM REV CODE 250: Performed by: HOSPITALIST

## 2019-06-25 PROCEDURE — 25000003 PHARM REV CODE 250: Performed by: NURSE PRACTITIONER

## 2019-06-25 PROCEDURE — 25000003 PHARM REV CODE 250: Performed by: STUDENT IN AN ORGANIZED HEALTH CARE EDUCATION/TRAINING PROGRAM

## 2019-06-25 PROCEDURE — 27000221 HC OXYGEN, UP TO 24 HOURS

## 2019-06-25 PROCEDURE — 84100 ASSAY OF PHOSPHORUS: CPT

## 2019-06-25 PROCEDURE — 25000003 PHARM REV CODE 250: Performed by: PHYSICIAN ASSISTANT

## 2019-06-25 PROCEDURE — 85025 COMPLETE CBC W/AUTO DIFF WBC: CPT

## 2019-06-25 PROCEDURE — 25000003 PHARM REV CODE 250: Performed by: PSYCHIATRY & NEUROLOGY

## 2019-06-25 RX ORDER — LOSARTAN POTASSIUM 50 MG/1
100 TABLET ORAL NIGHTLY
Status: DISCONTINUED | OUTPATIENT
Start: 2019-06-25 | End: 2019-07-01 | Stop reason: HOSPADM

## 2019-06-25 RX ADMIN — DEXTROSE 125 ML: 10 SOLUTION INTRAVENOUS at 10:06

## 2019-06-25 RX ADMIN — LOSARTAN POTASSIUM 100 MG: 50 TABLET, FILM COATED ORAL at 09:06

## 2019-06-25 RX ADMIN — POLYETHYLENE GLYCOL 3350 17 G: 17 POWDER, FOR SOLUTION ORAL at 09:06

## 2019-06-25 RX ADMIN — DEXTROSE 125 ML: 10 SOLUTION INTRAVENOUS at 08:06

## 2019-06-25 RX ADMIN — HYDRALAZINE HYDROCHLORIDE 100 MG: 50 TABLET ORAL at 05:06

## 2019-06-25 RX ADMIN — HYDRALAZINE HYDROCHLORIDE 100 MG: 50 TABLET ORAL at 01:06

## 2019-06-25 RX ADMIN — AMLODIPINE BESYLATE 10 MG: 10 TABLET ORAL at 09:06

## 2019-06-25 RX ADMIN — CARVEDILOL 25 MG: 25 TABLET, FILM COATED ORAL at 09:06

## 2019-06-25 RX ADMIN — OXYCODONE HYDROCHLORIDE 5 MG: 5 TABLET ORAL at 02:06

## 2019-06-25 RX ADMIN — LEVETIRACETAM 500 MG: 500 TABLET ORAL at 09:06

## 2019-06-25 RX ADMIN — FLUTICASONE FUROATE AND VILANTEROL TRIFENATATE 1 PUFF: 200; 25 POWDER RESPIRATORY (INHALATION) at 09:06

## 2019-06-25 RX ADMIN — DEXTROSE 125 ML: 10 SOLUTION INTRAVENOUS at 05:06

## 2019-06-25 RX ADMIN — HYDRALAZINE HYDROCHLORIDE 100 MG: 50 TABLET ORAL at 09:06

## 2019-06-25 RX ADMIN — CEFAZOLIN 1 G: 1 INJECTION, POWDER, FOR SOLUTION INTRAMUSCULAR; INTRAVENOUS at 01:06

## 2019-06-25 NOTE — PLAN OF CARE
Problem: Adult Inpatient Plan of Care  Goal: Plan of Care Review  Outcome: Ongoing (interventions implemented as appropriate)  POC reviewed with pt at 0800. Pt verbalized understanding but needs frequent reinforcement of the plan of care. Questions and concerns addressed. No acute events today. HOB flat while subdural drain in place.  Plan for CT head at 0200.  Pt progressing toward goals. Will continue to monitor. See flowsheets for full assessment and VS info.

## 2019-06-25 NOTE — SUBJECTIVE & OBJECTIVE
Interval History:  >4 elements OR status of 3 inpatient conditions    Review of Systems   Constitutional: Positive for fatigue.   HENT: Negative.    Eyes: Negative.    Respiratory: Negative.    Cardiovascular: Negative.    Gastrointestinal: Negative.    Endocrine: Negative.    Genitourinary: Negative.    Musculoskeletal: Negative.    Neurological: Positive for headaches.   Hematological: Negative.    Psychiatric/Behavioral: Negative.      2 systems OR Unable to obtain a complete ROS due to level of consciousness.  Objective:     Vitals:  Temp: 97.7 °F (36.5 °C)  Pulse: (!) 56  Rhythm: sinus bradycardia  BP: (!) 155/75  MAP (mmHg): 107  Resp: (!) 24  SpO2: 95 %  O2 Device (Oxygen Therapy): nasal cannula    Temp  Min: 96 °F (35.6 °C)  Max: 97.7 °F (36.5 °C)  Pulse  Min: 48  Max: 59  BP  Min: 135/62  Max: 181/73  MAP (mmHg)  Min: 89  Max: 125  Resp  Min: 14  Max: 41  SpO2  Min: 94 %  Max: 100 %    06/24 0701 - 06/25 0700  In: 700 [I.V.:200]  Out: 1555 [Drains:55]   Unmeasured Output  Urine Occurrence: 0  Stool Occurrence: 0       Physical Exam    Constitutional: No apparent distress.   Eyes: Right eye cataract  Head/Ears/Nose/Mouth/Throat/Neck: Moist mucous membranes. External ears, nose atraumatic. SD drain in place  Cardiovascular: Regular rhythm. ESM  Respiratory: Clear to auscultation.  Gastrointestinal: No hernia. Soft, nondistended, nontender. + bowel sounds.    Neurologic Examination:    -Mental Status: Alert. Oriented to person, place, and time. Follows commands.  -Cranial nerves: Visual fields full. Pupil on the left round, and reactive to light. Cataract on the right. . Extraocular movements intact. Facial sensation intact. Facial strength symmetric. Hears finger rub bilateral. Palate elevation symmetric. Uvula midline. Shoulder shrug symmetric. Tongue protrusion midline.  -Motor: Left upper extremity drift   -Sensation: Intact to touch in arms, legs.      Medications:  Continuous Scheduled    amLODIPine 10  mg Daily   carvedilol 25 mg BID   ceFAZolin (ANCEF) IVPB 1 g Q24H   fluticasone furoate-vilanterol 1 puff Daily   hydrALAZINE 100 mg Q8H   levETIRAcetam 500 mg BID   lidocaine-prilocaine  Once   losartan 100 mg QHS   polyethylene glycol 17 g Daily   PRN    acetaminophen 650 mg Q4H PRN   albuterol-ipratropium 3 mL Q4H PRN   Dextrose 10% Bolus 12.5 g PRN   Dextrose 10% Bolus 25 g PRN   glucagon (human recombinant) 1 mg PRN   glucose 16 g PRN   glucose 24 g PRN   hydrALAZINE 20 mg Q4H PRN   insulin aspart U-100 1-10 Units Q6H PRN   labetalol 10 mg Q4H PRN   ondansetron 4 mg Q8H PRN   oxyCODONE 5 mg Q6H PRN   senna-docusate 8.6-50 mg 1 tablet BID PRN   sodium chloride 0.9% 10 mL PRN   sodium chloride 0.9% 5 mL PRN     Today I personally reviewed pertinent medications, lines/drains/airways, imaging, cardiology results, laboratory results, microbiology results, notably:    Diet  Diet NPO  Diet NPO

## 2019-06-25 NOTE — PROGRESS NOTES
"[facility]  Neurocritical Care  Progress Note    Admit Date: 6/21/2019  Service Date: 06/25/2019  Length of Stay: 1    Subjective:     Chief Complaint: <principal problem not specified>    History of Present Illness: The patient is a 79 year old pleasant female with PMHx of  ESRD (on HD M/W/F), CHF, COPD (2L O2 NC@ home), h/oSDH (w/ recent admission to [facility] on 5/21/19-w/bilateral SDH R>L s/p unwitnessed fall on plavix, no neurosurgical intervention) and right MCA stroke s/p thrombectomy in 2016 admitted to [facility] with increased R SDH.  After initial admission to [facility] on (5/21) pt was stepdown to hospital medicine, inpt rehab/SNF and and then to home however she failed to make it to her follow up appointment. Patient was admitted yesterday (06/21/19) to the hospital to hospital medicine with cc of "not feeling right". Medicine team performed CTH on 6/22 which demonstrated increase in size and chronicification of R convexity SDH with mass effect. Pt stated she had a fall 2 weeks ago w/LOC, not on any anticoagulants and had intermittent headaches since then however she denies any headaches now.  Pending repeat CTH at 11PM. NPO after midnight likely to OR in AM w/ NSGY team for evacuation of SDH. Patient admitted to [facility] for close monitoring and higher level of care.            Hospital Course: 6/22: Pt admitted to [facility] w/ increased size of SDH, -160, plan for repeat CTH, NPO after midnight and plan to OR in AM for evac  06/23/2019 S/p Craniotomy. SD drain in place.   6/24: NAEO  6/25: Had MMA embolization     Interval History:  >4 elements OR status of 3 inpatient conditions    Review of Systems   Constitutional: Positive for fatigue.   HENT: Negative.    Eyes: Negative.    Respiratory: Negative.    Cardiovascular: Negative.    Gastrointestinal: Negative.    Endocrine: Negative.    Genitourinary: Negative.    Musculoskeletal: Negative.    Neurological: Positive for headaches.   Hematological: " Negative.    Psychiatric/Behavioral: Negative.      2 systems OR Unable to obtain a complete ROS due to level of consciousness.  Objective:     Vitals:  Temp: 97.7 °F (36.5 °C)  Pulse: (!) 56  Rhythm: sinus bradycardia  BP: (!) 155/75  MAP (mmHg): 107  Resp: (!) 24  SpO2: 95 %  O2 Device (Oxygen Therapy): nasal cannula    Temp  Min: 96 °F (35.6 °C)  Max: 97.7 °F (36.5 °C)  Pulse  Min: 48  Max: 59  BP  Min: 135/62  Max: 181/73  MAP (mmHg)  Min: 89  Max: 125  Resp  Min: 14  Max: 41  SpO2  Min: 94 %  Max: 100 %    06/24 0701 - 06/25 0700  In: 700 [I.V.:200]  Out: 1555 [Drains:55]   Unmeasured Output  Urine Occurrence: 0  Stool Occurrence: 0       Physical Exam    Constitutional: No apparent distress.   Eyes: Right eye cataract  Head/Ears/Nose/Mouth/Throat/Neck: Moist mucous membranes. External ears, nose atraumatic. SD drain in place  Cardiovascular: Regular rhythm. ESM  Respiratory: Clear to auscultation.  Gastrointestinal: No hernia. Soft, nondistended, nontender. + bowel sounds.    Neurologic Examination:    -Mental Status: Alert. Oriented to person, place, and time. Follows commands.  -Cranial nerves: Visual fields full. Pupil on the left round, and reactive to light. Cataract on the right. . Extraocular movements intact. Facial sensation intact. Facial strength symmetric. Hears finger rub bilateral. Palate elevation symmetric. Uvula midline. Shoulder shrug symmetric. Tongue protrusion midline.  -Motor: Left upper extremity drift   -Sensation: Intact to touch in arms, legs.      Medications:  Continuous Scheduled    amLODIPine 10 mg Daily   carvedilol 25 mg BID   ceFAZolin (ANCEF) IVPB 1 g Q24H   fluticasone furoate-vilanterol 1 puff Daily   hydrALAZINE 100 mg Q8H   levETIRAcetam 500 mg BID   lidocaine-prilocaine  Once   losartan 100 mg QHS   polyethylene glycol 17 g Daily   PRN    acetaminophen 650 mg Q4H PRN   albuterol-ipratropium 3 mL Q4H PRN   Dextrose 10% Bolus 12.5 g PRN   Dextrose 10% Bolus 25 g PRN    glucagon (human recombinant) 1 mg PRN   glucose 16 g PRN   glucose 24 g PRN   hydrALAZINE 20 mg Q4H PRN   insulin aspart U-100 1-10 Units Q6H PRN   labetalol 10 mg Q4H PRN   ondansetron 4 mg Q8H PRN   oxyCODONE 5 mg Q6H PRN   senna-docusate 8.6-50 mg 1 tablet BID PRN   sodium chloride 0.9% 10 mL PRN   sodium chloride 0.9% 5 mL PRN     Today I personally reviewed pertinent medications, lines/drains/airways, imaging, cardiology results, laboratory results, microbiology results, notably:    Diet  Diet NPO  Diet NPO        Assessment/Plan:     Neuro  Brain compression  -- 2/2 SDH      Subdural hematoma  - S/P evac.   - Neurological exam is unchanged  - SD drain management per nsy  - Seizure prophylaxis     Pulmonary  Pulmonary emphysema  -- On home O2, wean O2   -- O2 goal 88-92%  -- Continue home med Breo  -- Duo nebs PRN    Cardiac/Vascular  Nonrheumatic aortic valve stenosis  No acute issues  BP control    Essential hypertension  -- SBP goal <160  - Continue current regimen       Renal/  ESRD (end stage renal disease)  - Tolerated HD yesterday  - Appreciate nephrology recs    Oncology  Anemia in ESRD (end-stage renal disease)  -- H/H stable  -- Continue to monitor daily    Endocrine  Moderate malnutrition  Swallow eval today     Type 2 diabetes mellitus with chronic kidney disease on chronic dialysis, without long-term current use of insulin  - SSI    Other  Physical debility  -- PT/OT          The patient is being Prophylaxed for:  Venous Thromboembolism with: Mechanical  Stress Ulcer with: Not Applicable   Ventilator Pneumonia with: not applicable    Activity Orders          Diet NPO: NPO starting at 06/23 0001    Straight Cath starting at 06/22 0017        Full Code    Level 3    David Grullon MD  Neurocritical Care  [facility]

## 2019-06-25 NOTE — ASSESSMENT & PLAN NOTE
Modality: iHD  Days: MWF  Access: LUE AVF  Unit: [facility]  Nephrologist: Dr Coleman  Tx Duration: 3-4  EDW: 50kg  UF : 2-3    Lytes, acid/base and volume status appear stable    Plan for dialysis:  - No RRT needed at this time  - Will likely provide HD on a MWF schedule as inpatient  - Strict I/Os and chart  - MAP > 65  - If able, daily standing weights and chart  - Will monitor closely

## 2019-06-25 NOTE — PROGRESS NOTES
Hemodialysis    3-hour HD completed, patient tolerated well, vitally stable. No prolonged bleeding upon needle removal. Hemostasis achieved. Left upper arm AV fistula/graft  with bruit and thrill, gauzed and taped.    NET UF REMOVED: 1 liter  Report given to primary nurse.

## 2019-06-25 NOTE — PROGRESS NOTES
"3540: Pt refused PIV placement stating that "she was going to start screaming." Will continue to monitor.  "

## 2019-06-25 NOTE — PROGRESS NOTES
Hemodialysis    Bedside HD treatment in room 9089A. Patient is awake, conversant, hooked to oxygen via nasal cannula saturating well.    ACCESS: Left Arm Brachiobasilic fistula with interposition graft material, with good bruit and thrill. Cannulated using gauge 16 needles smoothly.    HD started. Blood flow set to 250ml/min as ordered. Dialysate flow minimum @600ml/min preventing low rohan error alarm

## 2019-06-25 NOTE — SUBJECTIVE & OBJECTIVE
Interval History: Patient evaluated bedside, stable vital signs, had HD session during the night uneventful.      Review of patient's allergies indicates:  No Known Allergies  Current Facility-Administered Medications   Medication Frequency    acetaminophen tablet 650 mg Q4H PRN    albuterol-ipratropium 2.5 mg-0.5 mg/3 mL nebulizer solution 3 mL Q4H PRN    amLODIPine tablet 10 mg Daily    carvedilol tablet 25 mg BID    ceFAZolin injection 1 g Q24H    dextrose 10% (D10W) Bolus PRN    dextrose 10% (D10W) Bolus PRN    fluticasone furoate-vilanterol 200-25 mcg/dose diskus inhaler 1 puff Daily    glucagon (human recombinant) injection 1 mg PRN    glucose chewable tablet 16 g PRN    glucose chewable tablet 24 g PRN    hydrALAZINE injection 20 mg Q4H PRN    hydrALAZINE tablet 100 mg Q8H    insulin aspart U-100 pen 1-10 Units Q6H PRN    labetalol 20 mg/4 mL (5 mg/mL) IV syring Q4H PRN    levETIRAcetam tablet 500 mg BID    lidocaine-prilocaine cream Once    losartan tablet 100 mg QHS    ondansetron disintegrating tablet 4 mg Q8H PRN    oxyCODONE immediate release tablet 5 mg Q6H PRN    polyethylene glycol packet 17 g Daily    senna-docusate 8.6-50 mg per tablet 1 tablet BID PRN    sodium chloride 0.9% flush 10 mL PRN    sodium chloride 0.9% flush 5 mL PRN       Objective:     Vital Signs (Most Recent):  Temp: 97.7 °F (36.5 °C) (06/25/19 1100)  Pulse: (!) 54 (06/25/19 1200)  Resp: 17 (06/25/19 1200)  BP: (!) 159/71 (06/25/19 1200)  SpO2: 97 % (06/25/19 1200)  O2 Device (Oxygen Therapy): nasal cannula (06/25/19 1100) Vital Signs (24h Range):  Temp:  [96 °F (35.6 °C)-97.7 °F (36.5 °C)] 97.7 °F (36.5 °C)  Pulse:  [48-59] 54  Resp:  [14-41] 17  SpO2:  [94 %-100 %] 97 %  BP: (135-181)/(62-88) 159/71  Arterial Line BP: (132-160)/(30-49) 132/44     Weight: 51.7 kg (113 lb 15.7 oz) (06/25/19 0400)  Body mass index is 20.19 kg/m².  Body surface area is 1.52 meters squared.    I/O last 3 completed shifts:  In:  729.4 [I.V.:229.4; Other:500]  Out: 1590 [Drains:90; Other:1500]    Physical Exam   Constitutional: She is oriented to person, place, and time. She appears well-developed and well-nourished. No distress.   HENT:   Head gauze covered around wound.   Eyes: Pupils are equal, round, and reactive to light.   Cardiovascular: Normal rate.   Pulmonary/Chest: Effort normal. No respiratory distress.   Abdominal: Soft. Bowel sounds are normal. She exhibits no distension.   Musculoskeletal: She exhibits no edema.   Neurological: She is alert and oriented to person, place, and time.   Skin: Skin is warm.   Nursing note and vitals reviewed.      Significant Labs:  CBC:   Recent Labs   Lab 06/25/19  0101   WBC 4.75   RBC 2.90*   HGB 8.1*   HCT 28.1*   PLT 95*   MCV 97   MCH 27.9   MCHC 28.8*     CMP:   Recent Labs   Lab 06/25/19 0101   GLU 75   CALCIUM 8.9   ALBUMIN 2.9*   PROT 6.4      K 3.6   CO2 22*      BUN 13   CREATININE 2.6*   ALKPHOS 81   ALT <5*   AST 11   BILITOT 0.5     All labs within the past 24 hours have been reviewed.

## 2019-06-25 NOTE — PLAN OF CARE
Problem: Adult Inpatient Plan of Care  Goal: Plan of Care Review  Outcome: Ongoing (interventions implemented as appropriate)  No acute events throughout the night, VS and assessment per flow sheet, patient progressing towards goal as tolerated. Pt received HD from 2100 to 0000.  Plan of care reviewed with Tea Georges at 0400, all concerns addressed. Will continue to monitor.

## 2019-06-25 NOTE — PROGRESS NOTES
"[facility]  Nephrology  Progress Note    Patient Name: Tea Georges  MRN: [id number]  Admission Date: 6/21/2019  Hospital Length of Stay: 1 days  Attending Provider: David Grullon MD   Primary Care Physician: Parth Posadas Ii, MD  Principal Problem:<principal problem not specified>    Subjective:     HPI: 78yo AAF with pmhx ESRD, CHF, COPD (2L O2 NC), h/o SDH, right MCA stroke s/p thrombectomy in 2016 who presents to ED c/o shortness of breath. Patient is lethargic, confused and states that she is here for a fall. Per ED chart: "The patient was underwent a full run of dialysis today, and immediately after reported feeling increasingly short of breath. Her shortness of breath has currently resolved. She is somnulent, having difficulty remaining awake throughout physician examination. She denies chest pain, abdominal pain, nausea, or vomiting. Patient endorses taking Phenergan this morning."      Patient admits to chronic orthopnea at home and wheezing this AM.  She denies any SOB to me. She states that she lives at home with her daughter and two granddaughters.      Recentlly admitted for bilateral acute SDH, R>L with right to left midline shift s/p fall while on plavix.  Discharged to SNF. Per nursing, patient has been cared for by family and she is reported as bedbound.      In ED, afebrile, BP elevated 160s- 180s systolic, no leukocytosis. Renal function improved from baseline. BNP >4900 (BL 3000 to 4000).  CXR shows moderate R sided pleural effusion and R sided pulmonary edema, similar to previous.     Interval History: Patient evaluated bedside, stable vital signs, had HD session during the night uneventful.      Review of patient's allergies indicates:  No Known Allergies  Current Facility-Administered Medications   Medication Frequency    acetaminophen tablet 650 mg Q4H PRN    albuterol-ipratropium 2.5 mg-0.5 mg/3 mL nebulizer solution 3 mL Q4H PRN    amLODIPine tablet 10 mg Daily    " carvedilol tablet 25 mg BID    ceFAZolin injection 1 g Q24H    dextrose 10% (D10W) Bolus PRN    dextrose 10% (D10W) Bolus PRN    fluticasone furoate-vilanterol 200-25 mcg/dose diskus inhaler 1 puff Daily    glucagon (human recombinant) injection 1 mg PRN    glucose chewable tablet 16 g PRN    glucose chewable tablet 24 g PRN    hydrALAZINE injection 20 mg Q4H PRN    hydrALAZINE tablet 100 mg Q8H    insulin aspart U-100 pen 1-10 Units Q6H PRN    labetalol 20 mg/4 mL (5 mg/mL) IV syring Q4H PRN    levETIRAcetam tablet 500 mg BID    lidocaine-prilocaine cream Once    losartan tablet 100 mg QHS    ondansetron disintegrating tablet 4 mg Q8H PRN    oxyCODONE immediate release tablet 5 mg Q6H PRN    polyethylene glycol packet 17 g Daily    senna-docusate 8.6-50 mg per tablet 1 tablet BID PRN    sodium chloride 0.9% flush 10 mL PRN    sodium chloride 0.9% flush 5 mL PRN       Objective:     Vital Signs (Most Recent):  Temp: 97.7 °F (36.5 °C) (06/25/19 1100)  Pulse: (!) 54 (06/25/19 1200)  Resp: 17 (06/25/19 1200)  BP: (!) 159/71 (06/25/19 1200)  SpO2: 97 % (06/25/19 1200)  O2 Device (Oxygen Therapy): nasal cannula (06/25/19 1100) Vital Signs (24h Range):  Temp:  [96 °F (35.6 °C)-97.7 °F (36.5 °C)] 97.7 °F (36.5 °C)  Pulse:  [48-59] 54  Resp:  [14-41] 17  SpO2:  [94 %-100 %] 97 %  BP: (135-181)/(62-88) 159/71  Arterial Line BP: (132-160)/(30-49) 132/44     Weight: 51.7 kg (113 lb 15.7 oz) (06/25/19 0400)  Body mass index is 20.19 kg/m².  Body surface area is 1.52 meters squared.    I/O last 3 completed shifts:  In: 729.4 [I.V.:229.4; Other:500]  Out: 1590 [Drains:90; Other:1500]    Physical Exam   Constitutional: She is oriented to person, place, and time. She appears well-developed and well-nourished. No distress.   HENT:   Head gauze covered around wound.   Eyes: Pupils are equal, round, and reactive to light.   Cardiovascular: Normal rate.   Pulmonary/Chest: Effort normal. No respiratory distress.    Abdominal: Soft. Bowel sounds are normal. She exhibits no distension.   Musculoskeletal: She exhibits no edema.   Neurological: She is alert and oriented to person, place, and time.   Skin: Skin is warm.   Nursing note and vitals reviewed.      Significant Labs:  CBC:   Recent Labs   Lab 06/25/19  0101   WBC 4.75   RBC 2.90*   HGB 8.1*   HCT 28.1*   PLT 95*   MCV 97   MCH 27.9   MCHC 28.8*     CMP:   Recent Labs   Lab 06/25/19  0101   GLU 75   CALCIUM 8.9   ALBUMIN 2.9*   PROT 6.4      K 3.6   CO2 22*      BUN 13   CREATININE 2.6*   ALKPHOS 81   ALT <5*   AST 11   BILITOT 0.5     All labs within the past 24 hours have been reviewed.       Assessment/Plan:     ESRD (end stage renal disease)  Modality: iHD  Days: MWF  Access: LUE AVF  Unit: [facility]  Nephrologist: Dr Jared Martinez Duration: 3-4  EDW: 50kg  UF : 2-3    Lytes, acid/base and volume status appear stable    Plan for dialysis:  - No RRT needed at this time  - Will likely provide HD on a MWF schedule as inpatient  - Strict I/Os and chart  - MAP > 65  - If able, daily standing weights and chart  - Will monitor closely    Subdural hemorrhage  - s/p subdural hematoma evacuation 6/23/2019  - Followed by primary team        Thank you for your consult. I will follow-up with patient. Please contact us if you have any additional questions.    Nba Hyde MD  Nephrology  [facility]

## 2019-06-26 LAB
ABO + RH BLD: NORMAL
ALBUMIN SERPL BCP-MCNC: 2.8 G/DL (ref 3.5–5.2)
ALP SERPL-CCNC: 80 U/L (ref 55–135)
ALT SERPL W/O P-5'-P-CCNC: <5 U/L (ref 10–44)
ANION GAP SERPL CALC-SCNC: 10 MMOL/L (ref 8–16)
AST SERPL-CCNC: 11 U/L (ref 10–40)
BASOPHILS # BLD AUTO: 0.03 K/UL (ref 0–0.2)
BASOPHILS NFR BLD: 0.6 % (ref 0–1.9)
BILIRUB SERPL-MCNC: 0.4 MG/DL (ref 0.1–1)
BLD GP AB SCN CELLS X3 SERPL QL: NORMAL
BLD PROD TYP BPU: NORMAL
BLOOD UNIT EXPIRATION DATE: NORMAL
BLOOD UNIT TYPE CODE: 6200
BLOOD UNIT TYPE: NORMAL
BUN SERPL-MCNC: 22 MG/DL (ref 8–23)
CALCIUM SERPL-MCNC: 9.2 MG/DL (ref 8.7–10.5)
CHLORIDE SERPL-SCNC: 100 MMOL/L (ref 95–110)
CO2 SERPL-SCNC: 22 MMOL/L (ref 23–29)
CODING SYSTEM: NORMAL
CREAT SERPL-MCNC: 4.2 MG/DL (ref 0.5–1.4)
DIFFERENTIAL METHOD: ABNORMAL
DISPENSE STATUS: NORMAL
EOSINOPHIL # BLD AUTO: 0.1 K/UL (ref 0–0.5)
EOSINOPHIL NFR BLD: 1.6 % (ref 0–8)
ERYTHROCYTE [DISTWIDTH] IN BLOOD BY AUTOMATED COUNT: 16.9 % (ref 11.5–14.5)
EST. GFR  (AFRICAN AMERICAN): 11.1 ML/MIN/1.73 M^2
EST. GFR  (NON AFRICAN AMERICAN): 9.6 ML/MIN/1.73 M^2
GLUCOSE SERPL-MCNC: 86 MG/DL (ref 70–110)
HCT VFR BLD AUTO: 27 % (ref 37–48.5)
HGB BLD-MCNC: 8 G/DL (ref 12–16)
IMM GRANULOCYTES # BLD AUTO: 0.02 K/UL (ref 0–0.04)
IMM GRANULOCYTES NFR BLD AUTO: 0.4 % (ref 0–0.5)
LYMPHOCYTES # BLD AUTO: 0.4 K/UL (ref 1–4.8)
LYMPHOCYTES NFR BLD: 8 % (ref 18–48)
MAGNESIUM SERPL-MCNC: 2.1 MG/DL (ref 1.6–2.6)
MCH RBC QN AUTO: 27.8 PG (ref 27–31)
MCHC RBC AUTO-ENTMCNC: 29.6 G/DL (ref 32–36)
MCV RBC AUTO: 94 FL (ref 82–98)
MONOCYTES # BLD AUTO: 0.6 K/UL (ref 0.3–1)
MONOCYTES NFR BLD: 10.7 % (ref 4–15)
NEUTROPHILS # BLD AUTO: 4 K/UL (ref 1.8–7.7)
NEUTROPHILS NFR BLD: 78.7 % (ref 38–73)
NRBC BLD-RTO: 0 /100 WBC
NUM UNITS TRANS PACKED RBC: NORMAL
PHOSPHATE SERPL-MCNC: 3.4 MG/DL (ref 2.7–4.5)
PLATELET # BLD AUTO: 104 K/UL (ref 150–350)
PMV BLD AUTO: 12.1 FL (ref 9.2–12.9)
POCT GLUCOSE: 142 MG/DL (ref 70–110)
POCT GLUCOSE: 67 MG/DL (ref 70–110)
POCT GLUCOSE: 72 MG/DL (ref 70–110)
POCT GLUCOSE: 74 MG/DL (ref 70–110)
POCT GLUCOSE: 78 MG/DL (ref 70–110)
POCT GLUCOSE: 81 MG/DL (ref 70–110)
POCT GLUCOSE: 82 MG/DL (ref 70–110)
POCT GLUCOSE: 90 MG/DL (ref 70–110)
POCT GLUCOSE: 95 MG/DL (ref 70–110)
POCT GLUCOSE: 96 MG/DL (ref 70–110)
POTASSIUM SERPL-SCNC: 3.9 MMOL/L (ref 3.5–5.1)
PROT SERPL-MCNC: 6.3 G/DL (ref 6–8.4)
RBC # BLD AUTO: 2.88 M/UL (ref 4–5.4)
SODIUM SERPL-SCNC: 132 MMOL/L (ref 136–145)
SODIUM SERPL-SCNC: 136 MMOL/L (ref 136–145)
WBC # BLD AUTO: 5.12 K/UL (ref 3.9–12.7)

## 2019-06-26 PROCEDURE — 85025 COMPLETE CBC W/AUTO DIFF WBC: CPT

## 2019-06-26 PROCEDURE — 25000003 PHARM REV CODE 250: Performed by: NURSE PRACTITIONER

## 2019-06-26 PROCEDURE — 92610 EVALUATE SWALLOWING FUNCTION: CPT

## 2019-06-26 PROCEDURE — 27000221 HC OXYGEN, UP TO 24 HOURS

## 2019-06-26 PROCEDURE — 99233 PR SUBSEQUENT HOSPITAL CARE,LEVL III: ICD-10-PCS | Mod: GC,,, | Performed by: PSYCHIATRY & NEUROLOGY

## 2019-06-26 PROCEDURE — 80100014 HC HEMODIALYSIS 1:1

## 2019-06-26 PROCEDURE — 83735 ASSAY OF MAGNESIUM: CPT

## 2019-06-26 PROCEDURE — 94761 N-INVAS EAR/PLS OXIMETRY MLT: CPT

## 2019-06-26 PROCEDURE — 20000000 HC ICU ROOM

## 2019-06-26 PROCEDURE — 25000003 PHARM REV CODE 250: Performed by: HOSPITALIST

## 2019-06-26 PROCEDURE — 99232 PR SUBSEQUENT HOSPITAL CARE,LEVL II: ICD-10-PCS | Mod: ,,, | Performed by: INTERNAL MEDICINE

## 2019-06-26 PROCEDURE — 80053 COMPREHEN METABOLIC PANEL: CPT

## 2019-06-26 PROCEDURE — 84295 ASSAY OF SERUM SODIUM: CPT

## 2019-06-26 PROCEDURE — 25000003 PHARM REV CODE 250: Performed by: STUDENT IN AN ORGANIZED HEALTH CARE EDUCATION/TRAINING PROGRAM

## 2019-06-26 PROCEDURE — 99233 SBSQ HOSP IP/OBS HIGH 50: CPT | Mod: GC,,, | Performed by: PSYCHIATRY & NEUROLOGY

## 2019-06-26 PROCEDURE — 84100 ASSAY OF PHOSPHORUS: CPT

## 2019-06-26 PROCEDURE — 63600175 PHARM REV CODE 636 W HCPCS: Performed by: STUDENT IN AN ORGANIZED HEALTH CARE EDUCATION/TRAINING PROGRAM

## 2019-06-26 PROCEDURE — 99900035 HC TECH TIME PER 15 MIN (STAT)

## 2019-06-26 PROCEDURE — 86901 BLOOD TYPING SEROLOGIC RH(D): CPT

## 2019-06-26 PROCEDURE — 25000003 PHARM REV CODE 250: Performed by: PHYSICIAN ASSISTANT

## 2019-06-26 PROCEDURE — 25000003 PHARM REV CODE 250: Performed by: PSYCHIATRY & NEUROLOGY

## 2019-06-26 PROCEDURE — 99232 SBSQ HOSP IP/OBS MODERATE 35: CPT | Mod: ,,, | Performed by: INTERNAL MEDICINE

## 2019-06-26 RX ORDER — CLONIDINE HYDROCHLORIDE 0.1 MG/1
0.1 TABLET ORAL 3 TIMES DAILY
Status: DISCONTINUED | OUTPATIENT
Start: 2019-06-26 | End: 2019-07-01 | Stop reason: HOSPADM

## 2019-06-26 RX ORDER — SODIUM CHLORIDE 9 MG/ML
INJECTION, SOLUTION INTRAVENOUS ONCE
Status: DISCONTINUED | OUTPATIENT
Start: 2019-06-26 | End: 2019-06-27

## 2019-06-26 RX ORDER — SODIUM CHLORIDE 9 MG/ML
INJECTION, SOLUTION INTRAVENOUS
Status: DISCONTINUED | OUTPATIENT
Start: 2019-06-26 | End: 2019-06-27

## 2019-06-26 RX ORDER — LEVETIRACETAM 500 MG/1
500 TABLET ORAL 2 TIMES DAILY
Status: DISCONTINUED | OUTPATIENT
Start: 2019-06-27 | End: 2019-07-01 | Stop reason: HOSPADM

## 2019-06-26 RX ADMIN — CARVEDILOL 25 MG: 25 TABLET, FILM COATED ORAL at 09:06

## 2019-06-26 RX ADMIN — CLONIDINE HYDROCHLORIDE 0.1 MG: 0.1 TABLET ORAL at 09:06

## 2019-06-26 RX ADMIN — CARVEDILOL 25 MG: 25 TABLET, FILM COATED ORAL at 08:06

## 2019-06-26 RX ADMIN — DEXTROSE 125 ML: 10 SOLUTION INTRAVENOUS at 08:06

## 2019-06-26 RX ADMIN — DEXTROSE 125 ML: 10 SOLUTION INTRAVENOUS at 12:06

## 2019-06-26 RX ADMIN — LOSARTAN POTASSIUM 100 MG: 50 TABLET, FILM COATED ORAL at 09:06

## 2019-06-26 RX ADMIN — LEVETIRACETAM 500 MG: 500 TABLET ORAL at 08:06

## 2019-06-26 RX ADMIN — FLUTICASONE FUROATE AND VILANTEROL TRIFENATATE 1 PUFF: 200; 25 POWDER RESPIRATORY (INHALATION) at 08:06

## 2019-06-26 RX ADMIN — CEFAZOLIN 1 G: 1 INJECTION, POWDER, FOR SOLUTION INTRAMUSCULAR; INTRAVENOUS at 02:06

## 2019-06-26 RX ADMIN — AMLODIPINE BESYLATE 10 MG: 10 TABLET ORAL at 08:06

## 2019-06-26 RX ADMIN — LEVETIRACETAM 500 MG: 500 TABLET ORAL at 09:06

## 2019-06-26 RX ADMIN — HYDRALAZINE HYDROCHLORIDE 100 MG: 50 TABLET ORAL at 09:06

## 2019-06-26 RX ADMIN — HYDRALAZINE HYDROCHLORIDE 100 MG: 50 TABLET ORAL at 05:06

## 2019-06-26 RX ADMIN — POLYETHYLENE GLYCOL 3350 17 G: 17 POWDER, FOR SOLUTION ORAL at 08:06

## 2019-06-26 NOTE — NURSING
1755H- Josh Delcid MD at bedside removing Subdural drain and stated may elevate head of bed after 15mins. Will monitor patient.

## 2019-06-26 NOTE — ASSESSMENT & PLAN NOTE
Modality: iHD  Days: MWF  Access: LUE AVF  Unit: [facility]  Nephrologist: Dr Coleman  Tx Duration: 3-4  EDW: 50kg  UF : 2-3    Lytes, acid/base and volume status appear stable    Plan for dialysis:  - HD session today  - Baths adjusted to current labs  - Strict I/Os and chart  - MAP > 65  - If able, daily standing weights and chart  - Will monitor closely

## 2019-06-26 NOTE — NURSING
1230H- MIKEY was done, Patient with delayed swallowing on solid food. Per Mitchel CALLAHAN, May elevate Head of bed 30 degress during while for 15minutes only. (while Subdural drain is still in)

## 2019-06-26 NOTE — ASSESSMENT & PLAN NOTE
79F PMH ESRD, CHF, COPD, and right MCA stroke with known R convexity SDH, now with expansion and chronicificaiton. No s/p R sided nikki holes for SDH evacuation.  Exam stable with 4-/5 LUE and LLE strength.     Neuro stable  CTH stable.   Continue ICU care with q1 hour neurochecks  Remove SD drain today, stop ppx Abx.   SBP <160    Cont ICU care

## 2019-06-26 NOTE — PROGRESS NOTES
"[facility]  Neurocritical Care  Progress Note    Admit Date: 6/21/2019  Service Date: 06/26/2019  Length of Stay: 2    Subjective:     Chief Complaint: <principal problem not specified>    History of Present Illness: The patient is a 79 year old pleasant female with PMHx of  ESRD (on HD M/W/F), CHF, COPD (2L O2 NC@ home), h/oSDH (w/ recent admission to [facility] on 5/21/19-w/bilateral SDH R>L s/p unwitnessed fall on plavix, no neurosurgical intervention) and right MCA stroke s/p thrombectomy in 2016 admitted to [facility] with increased R SDH.  After initial admission to [facility] on (5/21) pt was stepdown to hospital medicine, inpt rehab/SNF and and then to home however she failed to make it to her follow up appointment. Patient was admitted yesterday (06/21/19) to the hospital to hospital medicine with cc of "not feeling right". Medicine team performed CTH on 6/22 which demonstrated increase in size and chronicification of R convexity SDH with mass effect. Pt stated she had a fall 2 weeks ago w/LOC, not on any anticoagulants and had intermittent headaches since then however she denies any headaches now.  Pending repeat CTH at 11PM. NPO after midnight likely to OR in AM w/ NSGY team for evacuation of SDH. Patient admitted to [facility] for close monitoring and higher level of care.            Hospital Course: 6/22: Pt admitted to [facility] w/ increased size of SDH, -160, plan for repeat CTH, NPO after midnight and plan to OR in AM for evac  06/23/2019 S/p Craniotomy. SD drain in place.   6/24: NAEO  6/25: Had MMA embolization   6/26: NAEO    Interval History:  >4 elements OR status of 3 inpatient conditions    Review of Systems   Constitutional: Positive for fatigue.   HENT: Negative.    Eyes: Negative.    Respiratory: Negative.    Cardiovascular: Negative.    Gastrointestinal: Negative.    Endocrine: Negative.    Genitourinary: Negative.    Musculoskeletal: Negative.    Neurological: Positive for headaches. "   Hematological: Negative.    Psychiatric/Behavioral: Negative.      2 systems OR Unable to obtain a complete ROS due to level of consciousness.  Objective:     Vitals:  Temp: 98.5 °F (36.9 °C)  Pulse: (!) 51  Rhythm: sinus bradycardia  BP: (!) 144/65  MAP (mmHg): 93  Resp: 17  SpO2: 98 %  O2 Device (Oxygen Therapy): nasal cannula    Temp  Min: 97.6 °F (36.4 °C)  Max: 98.6 °F (37 °C)  Pulse  Min: 51  Max: 63  BP  Min: 144/65  Max: 184/77  MAP (mmHg)  Min: 93  Max: 142  Resp  Min: 17  Max: 53  SpO2  Min: 95 %  Max: 100 %    06/25 0701 - 06/26 0700  In: 330 [P.O.:80]  Out: 40 [Drains:40]   Unmeasured Output  Urine Occurrence: 1  Stool Occurrence: 0  Pad Count: 1       Physical Exam    Constitutional: No apparent distress.   Eyes: Right eye cataract  Head/Ears/Nose/Mouth/Throat/Neck: Moist mucous membranes. External ears, nose atraumatic. SD drain in place  Cardiovascular: Regular rhythm. ESM  Respiratory: Clear to auscultation.  Gastrointestinal: No hernia. Soft, nondistended, nontender. + bowel sounds.    Neurologic Examination:    -Mental Status: Alert. Oriented to person, place, and time. Follows commands.  -Cranial nerves: Visual fields full. Pupil on the left round, and reactive to light. Cataract on the right. . Extraocular movements intact. Facial sensation intact. Facial strength symmetric. Hears finger rub bilateral. Palate elevation symmetric. Uvula midline. Shoulder shrug symmetric. Tongue protrusion midline.  -Motor: Left upper extremity drift   -Sensation: Intact to touch in arms, legs.      Medications:  Continuous Scheduled    sodium chloride 0.9%  Once   amLODIPine 10 mg Daily   carvedilol 25 mg BID   ceFAZolin (ANCEF) IVPB 1 g Q24H   cloNIDine 0.1 mg TID   fluticasone furoate-vilanterol 1 puff Daily   hydrALAZINE 100 mg Q8H   levETIRAcetam 500 mg BID   lidocaine-prilocaine  Once   losartan 100 mg QHS   polyethylene glycol 17 g Daily   PRN    sodium chloride 0.9%  PRN   acetaminophen 650 mg Q4H PRN    albuterol-ipratropium 3 mL Q4H PRN   Dextrose 10% Bolus 12.5 g PRN   Dextrose 10% Bolus 25 g PRN   glucagon (human recombinant) 1 mg PRN   glucose 16 g PRN   glucose 24 g PRN   hydrALAZINE 20 mg Q4H PRN   insulin aspart U-100 1-10 Units Q6H PRN   labetalol 10 mg Q4H PRN   ondansetron 4 mg Q8H PRN   oxyCODONE 5 mg Q6H PRN   senna-docusate 8.6-50 mg 1 tablet BID PRN   sodium chloride 0.9% 10 mL PRN   sodium chloride 0.9% 5 mL PRN     Today I personally reviewed pertinent medications, lines/drains/airways, imaging, cardiology results, laboratory results, microbiology results, notably:    Diet  Diet NPO  Diet NPO        Assessment/Plan:     Neuro  Brain compression  -- 2/2 SDH      Subdural hematoma  - S/P evac.   - Neurological exam is unchanged  - SD drain management per nsy  - Seizure prophylaxis   - CTH this am with no interval changes     Left spastic hemiparesis  - PT/OT    Pulmonary  Pulmonary emphysema  -- On home O2  -- O2 goal 88-92%  -- Continue home med Breo  -- Duo nebs PRN    Cardiac/Vascular  CAD (coronary artery disease)  -- Previously on plavix  -- Currently not on any antiplatelets in the settings of SDH    Nonrheumatic aortic valve stenosis  No acute issues  BP control    Essential hypertension  -- SBP goal <160  - Still poorly controlled, add clonidine       Renal/  ESRD (end stage renal disease)    - Appreciate nephrology recs  - Plans for HD today     Oncology  Anemia in ESRD (end-stage renal disease)  -- H/H stable  -- Continue to monitor daily    Endocrine  Moderate malnutrition  Swallow eval   If fails swallowing today we will start TF     Type 2 diabetes mellitus with chronic kidney disease on chronic dialysis, without long-term current use of insulin  - SSI          The patient is being Prophylaxed for:  Venous Thromboembolism with: Mechanical  Stress Ulcer with: Not Applicable   Ventilator Pneumonia with: not applicable    Activity Orders          Diet NPO: NPO starting at 06/23 0001     Straight Cath starting at 06/22 0017        Full Code    Level 3    David Grullon MD  Neurocritical Care  [facility]

## 2019-06-26 NOTE — PLAN OF CARE
Problem: Adult Inpatient Plan of Care  Goal: Plan of Care Review  Outcome: Ongoing (interventions implemented as appropriate)  POC reviewed with pt and family at 1300. Pt verbalized understanding. Questions and concerns addressed. Patient with episode of hypoglycemia. Patient still has the subdural drain to thumbprint suction. HOB maintained flat.  Pt progressing toward goals. Will continue to monitor. See flowsheets for full assessment and VS info.

## 2019-06-26 NOTE — PT/OT/SLP EVAL
Speech Language Pathology Evaluation/Discharge  Bedside Swallow    Patient Name:  Tea Georges   MRN:  [id number]  Admitting Diagnosis: <principal problem not specified>    Recommendations:                 General Recommendations:  Follow-up not indicated for swallowing function. If concerns for changes in cognition s/p new SDH, please reconsult SLP services with orders for cognitive evaluation  Diet recommendations:  Dental Soft, Thin   Aspiration Precautions: 1 bite/sip at a time, Alternating bites/sips, Avoid talking while eating, Frequent oral care, HOB to 90 degrees, Meds whole 1 at a time, Monitor for s/s of aspiration, Small bites/sips and Standard aspiration precautions   General Precautions: Standard, aspiration, fall(restrictions on HOB elevation)  Communication strategies:  none    History:     Past Medical History:   Diagnosis Date    Anemia in ESRD (end-stage renal disease) 5/29/2016    Anticoagulant long-term use     Aortic atherosclerosis 11/22/2016    Aortic stenosis, moderate 2/18/2016    Asthma in adult without complication 1/8/2016    Bilateral low back pain without sciatica 11/17/2015    Blindness of right eye 11/12/2016    CAD (coronary artery disease) 12/12/2016    Cataract     Central retinal vein occlusion, right eye 6/3/2014    CHF (congestive heart failure)     Chronic diastolic heart failure 1/8/2016    Chronic respiratory failure with hypoxia 5/29/2016    COPD (chronic obstructive pulmonary disease) 1/15/2017    Dependence on hemodialysis     Mon-Wed-Fri    Diverticulosis     Embolic stroke involving right middle cerebral artery     Encounter for blood transfusion     Enlarged LA (left atrium) 10/7/2016    Epiretinal membrane 7/17/2012    ESRD (end stage renal disease)     Essential hypertension 1/8/2016    History of GI diverticular bleed     5/22/16    Left flaccid hemiparesis 10/1/2016    Peripheral vascular disease, unspecified 11/22/2016    Seizure 6/24/2019     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 11/13/2016    Type 2 diabetes mellitus with kidney complication, without long-term current use of insulin 5/1/2018    Type 2 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, without long-term current use of insulin 3/26/2013    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, without long-term current use of insulin 3/26/2013    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       Past Surgical History:   Procedure Laterality Date    ABDOMINAL SURGERY      ANGIOGRAM-CEREBRAL N/A 6/24/2019    Performed by Kenisha Surgeon at [facility] KENISHA    BREAST SURGERY      tumor removal x 2    KATE HOLES FOR SUBDURAL HEMATOMA EVACUATION Right 6/23/2019    [facility]    CARDIAC SURGERY      CATARACT EXTRACTION      CHOLECYSTECTOMY      COLONOSCOPY N/A 5/30/2016    Performed by Sam Davis MD at [facility] ENDO (2ND FLR)    COLONOSCOPY N/A 5/23/2016    Performed by WILLIAM Colvin MD at [facility] ENDO (2ND FLR)    CREATION, CRANIAL KATE HOLE, WITH HEMATOMA EVACUATION, SUBDURAL, possible crani Right 6/23/2019    Performed by Trevor Conner MD at [facility] OR 2ND FLR    ESOPHAGOGASTRODUODENOSCOPY (EGD) N/A 5/30/2016    Performed by Sam Davis MD at [facility] ENDO (2ND FLR)    ESOPHAGOGASTRODUODENOSCOPY (EGD) N/A 5/27/2016    Performed by Cesario Rubio MD at [facility] ENDO (2ND FLR)    EXCISION, ANEURYSM Left 11/29/2018    Performed by NADINE Blum III, MD at [facility] OR 2ND FLR    EYE SURGERY      Fistulogram Left 11/28/2018    Performed by NADINE Blum III, MD at [facility] CATH LAB    INSERTION, CATHETER, VASCULAR, DUAL LUMEN Right 11/29/2018    [facility] NAD[facility]    PTA, Fistula  11/28/2018    Performed by NADINE Blum III, MD at [facility] CATH LAB    REVISION, AV FISTULA, Left 11/29/2018    Performed by NADINE MOSELEY  "Viktoria SAMS MD at [facility] OR [address] [place] [address]    UPPER GASTROINTESTINAL ENDOSCOPY     History of Present Illness: The patient is a 79 year old pleasant female with PMHx of  ESRD (on HD M/W/F), CHF, COPD (2L O2 NC@ home), h/oSDH (w/ recent admission to [facility] on 5/21/19-w/bilateral SDH R>L s/p unwitnessed fall on plavix, no neurosurgical intervention) and right MCA stroke s/p thrombectomy in 2016 admitted to [facility] with increased R SDH.  After initial admission to [facility] on (5/21) pt was stepdown to hospital medicine, inpt rehab/SNF and and then to home however she failed to make it to her follow up appointment. Patient was admitted yesterday (06/21/19) to the hospital to hospital medicine with cc of "not feeling right". Medicine team performed CTH on 6/22 which demonstrated increase in size and chronicification of R convexity SDH with mass effect. Pt stated she had a fall 2 weeks ago w/LOC, not on any anticoagulants and had intermittent headaches since then however she denies any headaches now.  Pending repeat CTH at 11PM. NPO after midnight likely to OR in AM w/ NSGY team for evacuation of SDH. Patient admitted to [facility] for close monitoring and higher level of care.       Hospital Course: 6/22: Pt admitted to [facility] w/ increased size of SDH, -160, plan for repeat CTH, NPO after midnight and plan to OR in AM for evac  06/23/2019 S/p Craniotomy. SD drain in place.   6/24: NAEO  6/25: Had MMA embolization   6/26: NAEO    Prior Intubation HX:  Intubated for SDH evacuation on 6/23    Modified Barium Swallow: none on file    Chest X-Rays: 6/24/19:   COMPARISON:  06/23/2019    FINDINGS:  Cardiomegaly, pulmonary vascular congestion and edema.  Significant at airspace consolidation within the right lung and pleural effusion similar to the previous study.      Impression       No significant change     Prior diet: currently NPO; regular diet with chopped meats/thin liquids PTA; MIKEY score 19/20 on 6/26 at 1230    Subjective     "I chew " "forever." pt stated during mastication of solid.     Pain/Comfort:  · Pain Rating 1: (no c/o or indications of pain)    Objective:     Oral Musculature Evaluation  · Oral Musculature: left weakness  · Dentition: upper and lower dentures  · Secretion Management: adequate  · Mucosal Quality: dry  · Mandibular Strength and Mobility: WFL  · Oral Labial Strength and Mobility: impaired retraction  · Lingual Strength and Mobility: WFL  · Velar Elevation: WFL  · Buccal Strength and Mobility: WFL  · Volitional Cough: fair  · Volitional Swallow: elicited  · Voice Prior to PO Intake: dry, clear, decresed volume    Bedside Swallow Eval:   Consistencies Assessed:  · Thin liquids multiple straw sips (approx 2oz)  · Solids 1/4 cracker x 1     Oral Phase:   · Prolonged mastication  · Lingual residue    Pharyngeal Phase:   · no overt clinical signs/symptoms of aspiration  · no overt clinical signs/symptoms of pharyngeal dysphagia    Compensatory Strategies  · multiple liquid washes; lingual residue and residue on dentures likely due to dry mucosa and dry, hard consistency    Treatment: Education provided to pt regarding reason for SLP consultation and swallowing assessment, diet recommendations, safe swallowing strategies, and recommendations for medication administration. Pt expressed understanding.    Assessment:     Tea Georges is a 79 y.o. female.  Oral and pharyngeal phases of the swallow appear to be WFL for dental soft diet and at/near baseline. No further skilled SLP services warranted at this time to address swallowing function. If team is concerned for changes in cognition post new SDH, may re-consult SLP services for cognitive evaluation.     Goals:   Multidisciplinary Problems     SLP Goals     Not on file                Plan:     · Plan of Care reviewed with:  patient   · SLP Follow-Up:  No       Discharge recommendations:  (no further skilled SLP services warranted at this time to address swallowing)       Time " Tracking:     SLP Treatment Date:   06/26/19  Speech Start Time:  1552  Speech Stop Time:  1608     Speech Total Time (min):  16 min    Billable Minutes: Eval Swallow and Oral Function 16    BRITTON Novak, CCC-SLP  06/26/2019     BRITTON Novak, CCC-SLP  Speech Language Pathologist  [phone]  6/26/2019

## 2019-06-26 NOTE — PLAN OF CARE
Problem: Adult Inpatient Plan of Care  Goal: Plan of Care Review  Outcome: Ongoing (interventions implemented as appropriate)  POC reviewed with pt at 0500. Pt verbalized understanding. Questions and concerns addressed. No acute events overnight. Pt taken to CT; tolerated well. HOB flat. Subdural drain to thumbprint per order. Pt progressing toward goals. Will continue to monitor. See flowsheets for full assessment and VS info

## 2019-06-26 NOTE — PROGRESS NOTES
"[facility]  Nephrology  Progress Note    Patient Name: Tea Georges  MRN: [id number]  Admission Date: 6/21/2019  Hospital Length of Stay: 2 days  Attending Provider: David Grullon MD   Primary Care Physician: Parth Posadas Ii, MD  Principal Problem:<principal problem not specified>    Subjective:     HPI: 80yo AAF with pmhx ESRD, CHF, COPD (2L O2 NC), h/o SDH, right MCA stroke s/p thrombectomy in 2016 who presents to ED c/o shortness of breath. Patient is lethargic, confused and states that she is here for a fall. Per ED chart: "The patient was underwent a full run of dialysis today, and immediately after reported feeling increasingly short of breath. Her shortness of breath has currently resolved. She is somnulent, having difficulty remaining awake throughout physician examination. She denies chest pain, abdominal pain, nausea, or vomiting. Patient endorses taking Phenergan this morning."      Patient admits to chronic orthopnea at home and wheezing this AM.  She denies any SOB to me. She states that she lives at home with her daughter and two granddaughters.      Recentlly admitted for bilateral acute SDH, R>L with right to left midline shift s/p fall while on plavix.  Discharged to SNF. Per nursing, patient has been cared for by family and she is reported as bedbound.      In ED, afebrile, BP elevated 160s- 180s systolic, no leukocytosis. Renal function improved from baseline. BNP >4900 (BL 3000 to 4000).  CXR shows moderate R sided pleural effusion and R sided pulmonary edema, similar to previous.     Interval History: Patient evaluated bedside, stable vital signs.  Night uneventful.  Scheduled for HD session today.    Review of patient's allergies indicates:  No Known Allergies  Current Facility-Administered Medications   Medication Frequency    0.9%  NaCl infusion PRN    0.9%  NaCl infusion Once    acetaminophen tablet 650 mg Q4H PRN    albuterol-ipratropium 2.5 mg-0.5 mg/3 mL " nebulizer solution 3 mL Q4H PRN    amLODIPine tablet 10 mg Daily    carvedilol tablet 25 mg BID    ceFAZolin injection 1 g Q24H    cloNIDine tablet 0.1 mg TID    dextrose 10% (D10W) Bolus PRN    dextrose 10% (D10W) Bolus PRN    fluticasone furoate-vilanterol 200-25 mcg/dose diskus inhaler 1 puff Daily    glucagon (human recombinant) injection 1 mg PRN    glucose chewable tablet 16 g PRN    glucose chewable tablet 24 g PRN    hydrALAZINE injection 20 mg Q4H PRN    hydrALAZINE tablet 100 mg Q8H    insulin aspart U-100 pen 1-10 Units Q6H PRN    labetalol 20 mg/4 mL (5 mg/mL) IV syring Q4H PRN    levETIRAcetam tablet 500 mg BID    lidocaine-prilocaine cream Once    losartan tablet 100 mg QHS    ondansetron disintegrating tablet 4 mg Q8H PRN    oxyCODONE immediate release tablet 5 mg Q6H PRN    polyethylene glycol packet 17 g Daily    senna-docusate 8.6-50 mg per tablet 1 tablet BID PRN    sodium chloride 0.9% flush 10 mL PRN    sodium chloride 0.9% flush 5 mL PRN       Objective:     Vital Signs (Most Recent):  Temp: 97.5 °F (36.4 °C) (06/26/19 1501)  Pulse: (!) 51 (06/26/19 1501)  Resp: 19 (06/26/19 1501)  BP: (!) 151/68 (06/26/19 1501)  SpO2: 99 % (06/26/19 1501)  O2 Device (Oxygen Therapy): nasal cannula (06/26/19 1501) Vital Signs (24h Range):  Temp:  [97.5 °F (36.4 °C)-98.6 °F (37 °C)] 97.5 °F (36.4 °C)  Pulse:  [51-63] 51  Resp:  [17-53] 19  SpO2:  [95 %-100 %] 99 %  BP: (138-184)/() 151/68  Arterial Line BP: (117-166)/(30-44) 142/40     Weight: 51.7 kg (113 lb 15.7 oz) (06/25/19 0400)  Body mass index is 20.19 kg/m².  Body surface area is 1.52 meters squared.    I/O last 3 completed shifts:  In: 830 [P.O.:80; Other:500; IV Piggyback:250]  Out: 1565 [Drains:65; Other:1500]    Physical Exam   Constitutional: She is oriented to person, place, and time. She appears well-developed and well-nourished. No distress.   HENT:   Head gauze covered around wound.   Eyes: Pupils are equal, round,  and reactive to light.   Cardiovascular: Normal rate.   Pulmonary/Chest: Effort normal. No respiratory distress.   Abdominal: Soft. Bowel sounds are normal. She exhibits no distension.   Musculoskeletal: She exhibits no edema.   Neurological: She is alert and oriented to person, place, and time.   Skin: Skin is warm.   Nursing note and vitals reviewed.      Significant Labs:  CBC:   Recent Labs   Lab 06/26/19  0242   WBC 5.12   RBC 2.88*   HGB 8.0*   HCT 27.0*   *   MCV 94   MCH 27.8   MCHC 29.6*     CMP:   Recent Labs   Lab 06/26/19  0242 06/26/19  0825   GLU 86  --    CALCIUM 9.2  --    ALBUMIN 2.8*  --    PROT 6.3  --    * 136   K 3.9  --    CO2 22*  --      --    BUN 22  --    CREATININE 4.2*  --    ALKPHOS 80  --    ALT <5*  --    AST 11  --    BILITOT 0.4  --      All labs within the past 24 hours have been reviewed.    Assessment/Plan:     ESRD (end stage renal disease)  Modality: iHD  Days: MWF  Access: LUE AVF  Unit: [facility]  Nephrologist: Dr Jared Martinez Duration: 3-4  EDW: 50kg  UF : 2-3    Lytes, acid/base and volume status appear stable    Plan for dialysis:  - HD session today  - Baths adjusted to current labs  - Strict I/Os and chart  - MAP > 65  - If able, daily standing weights and chart  - Will monitor closely    Subdural hemorrhage  - s/p subdural hematoma evacuation 6/23/2019  - Followed by primary team        Thank you for your consult. I will follow-up with patient. Please contact us if you have any additional questions.    Nba Hyde MD  Nephrology  [facility]

## 2019-06-26 NOTE — NURSING
1430H- Followed up Speech emma Sharpe from Rehab sated that she will try to contact speech to check patient but it might be done tomorrow morning. Team was notified.

## 2019-06-26 NOTE — ASSESSMENT & PLAN NOTE
- S/P evac.   - Neurological exam is unchanged  - SD drain management per nsy  - Seizure prophylaxis   - CTH this am with no interval changes

## 2019-06-26 NOTE — PROGRESS NOTES
Arrived to BS regular maintenance day 3 hour hd tx initiated using 15g needles, cannulated arterial x2. -400 achieved w/ good flows noted. Lines taped securely and visible at all times

## 2019-06-26 NOTE — SUBJECTIVE & OBJECTIVE
Interval History:  >4 elements OR status of 3 inpatient conditions    Review of Systems   Constitutional: Positive for fatigue.   HENT: Negative.    Eyes: Negative.    Respiratory: Negative.    Cardiovascular: Negative.    Gastrointestinal: Negative.    Endocrine: Negative.    Genitourinary: Negative.    Musculoskeletal: Negative.    Neurological: Positive for headaches.   Hematological: Negative.    Psychiatric/Behavioral: Negative.      2 systems OR Unable to obtain a complete ROS due to level of consciousness.  Objective:     Vitals:  Temp: 98.5 °F (36.9 °C)  Pulse: (!) 51  Rhythm: sinus bradycardia  BP: (!) 144/65  MAP (mmHg): 93  Resp: 17  SpO2: 98 %  O2 Device (Oxygen Therapy): nasal cannula    Temp  Min: 97.6 °F (36.4 °C)  Max: 98.6 °F (37 °C)  Pulse  Min: 51  Max: 63  BP  Min: 144/65  Max: 184/77  MAP (mmHg)  Min: 93  Max: 142  Resp  Min: 17  Max: 53  SpO2  Min: 95 %  Max: 100 %    06/25 0701 - 06/26 0700  In: 330 [P.O.:80]  Out: 40 [Drains:40]   Unmeasured Output  Urine Occurrence: 1  Stool Occurrence: 0  Pad Count: 1       Physical Exam    Constitutional: No apparent distress.   Eyes: Right eye cataract  Head/Ears/Nose/Mouth/Throat/Neck: Moist mucous membranes. External ears, nose atraumatic. SD drain in place  Cardiovascular: Regular rhythm. ESM  Respiratory: Clear to auscultation.  Gastrointestinal: No hernia. Soft, nondistended, nontender. + bowel sounds.    Neurologic Examination:    -Mental Status: Alert. Oriented to person, place, and time. Follows commands.  -Cranial nerves: Visual fields full. Pupil on the left round, and reactive to light. Cataract on the right. . Extraocular movements intact. Facial sensation intact. Facial strength symmetric. Hears finger rub bilateral. Palate elevation symmetric. Uvula midline. Shoulder shrug symmetric. Tongue protrusion midline.  -Motor: Left upper extremity drift   -Sensation: Intact to touch in arms, legs.      Medications:  Continuous Scheduled    sodium  chloride 0.9%  Once   amLODIPine 10 mg Daily   carvedilol 25 mg BID   ceFAZolin (ANCEF) IVPB 1 g Q24H   cloNIDine 0.1 mg TID   fluticasone furoate-vilanterol 1 puff Daily   hydrALAZINE 100 mg Q8H   levETIRAcetam 500 mg BID   lidocaine-prilocaine  Once   losartan 100 mg QHS   polyethylene glycol 17 g Daily   PRN    sodium chloride 0.9%  PRN   acetaminophen 650 mg Q4H PRN   albuterol-ipratropium 3 mL Q4H PRN   Dextrose 10% Bolus 12.5 g PRN   Dextrose 10% Bolus 25 g PRN   glucagon (human recombinant) 1 mg PRN   glucose 16 g PRN   glucose 24 g PRN   hydrALAZINE 20 mg Q4H PRN   insulin aspart U-100 1-10 Units Q6H PRN   labetalol 10 mg Q4H PRN   ondansetron 4 mg Q8H PRN   oxyCODONE 5 mg Q6H PRN   senna-docusate 8.6-50 mg 1 tablet BID PRN   sodium chloride 0.9% 10 mL PRN   sodium chloride 0.9% 5 mL PRN     Today I personally reviewed pertinent medications, lines/drains/airways, imaging, cardiology results, laboratory results, microbiology results, notably:    Diet  Diet NPO  Diet NPO

## 2019-06-26 NOTE — PROGRESS NOTES
"[facility]  Neurosurgery  Progress Note    Subjective:     History of Present Illness: Patient is a 79 year old female known to this service, with a history of ESRD, CHF, COPD, and right MCA stroke who is admitted to medicine with complain of SOB as well as "not feeling right". She was previously admitted to [facility] in May with R acute SDH after a fall while on plavix. Nonoperative at that time. She was discharged to [facility], then home, but lost to follow up. Medicine team performed CTH which demonstrated increase in size and chronicification of R convexity SDH with mass effect. Patient reports intermittent headaches and some left sided weakness, denies nausea/vomiting. Not currently on anticoagulation.         Post-Op Info:  Procedure(s) (LRB):  ANGIOGRAM-CEREBRAL (N/A)   2 Days Post-Op     Interval History: MMA embo yesterday otherwise no AE. AFVSS.     Medications:  Continuous Infusions:  Scheduled Meds:   sodium chloride 0.9%   Intravenous Once    amLODIPine  10 mg Oral Daily    carvedilol  25 mg Oral BID    ceFAZolin (ANCEF) IVPB  1 g Intravenous Q24H    cloNIDine  0.1 mg Oral TID    fluticasone furoate-vilanterol  1 puff Inhalation Daily    hydrALAZINE  100 mg Oral Q8H    levETIRAcetam  500 mg Oral BID    lidocaine-prilocaine   Topical (Top) Once    losartan  100 mg Oral QHS    polyethylene glycol  17 g Oral Daily     PRN Meds:sodium chloride 0.9%, acetaminophen, albuterol-ipratropium, Dextrose 10% Bolus, Dextrose 10% Bolus, glucagon (human recombinant), glucose, glucose, hydrALAZINE, insulin aspart U-100, labetalol, ondansetron, oxyCODONE, senna-docusate 8.6-50 mg, sodium chloride 0.9%, sodium chloride 0.9%       Objective:     Weight: 51.7 kg (113 lb 15.7 oz)  Body mass index is 20.19 kg/m².  Vital Signs (Most Recent):  Temp: 97.7 °F (36.5 °C) (06/26/19 1540)  Pulse: (!) 54 (06/26/19 1730)  Resp: (!) 21 (06/26/19 1730)  BP: (!) 152/69 (06/26/19 1701)  SpO2: 99 % (06/26/19 1730) Vital " Signs (24h Range):  Temp:  [97.5 °F (36.4 °C)-98.6 °F (37 °C)] 97.7 °F (36.5 °C)  Pulse:  [51-63] 54  Resp:  [17-53] 21  SpO2:  [95 %-100 %] 99 %  BP: (138-184)/() 152/69  Arterial Line BP: (117-166)/(30-44) 154/36     Date 06/26/19 0700 - 06/27/19 0659   Shift 0291-9299 1657-3915 9294-5337 24 Hour Total   INTAKE   P.O. 200 100  300   IV Piggyback 375   375   Shift Total(mL/kg) 575(11.1) 100(1.9)  675(13.1)   OUTPUT   Drains  15  15   Shift Total(mL/kg)  15(0.3)  15(0.3)   Weight (kg) 51.7 51.7 51.7 51.7                        Closed/Suction Drain 06/23/19 1134 Right Other (Comment) Bulb 7 Fr. (Active)   Site Description Healing 6/26/2019  3:01 PM   Dressing Type No dressing 6/26/2019  3:01 PM   Dressing Status Clean;Dry;Intact 6/26/2019  3:01 PM   Dressing Intervention Dressing reinforced 6/26/2019  3:01 PM   Drainage Serosanguineous 6/26/2019  3:01 PM   Status Other (Comment) 6/26/2019  3:01 PM   Output (mL) 15 mL 6/26/2019  5:01 PM            Hemodialysis AV Fistula 05/23/16 0700 Left upper arm (Active)   Needle Size 15ga 6/26/2019  3:40 PM   Site Assessment Clean;Dry;Intact;No redness;No swelling 6/26/2019  3:40 PM   Patency Present;Thrill;Bruit 6/26/2019  3:40 PM   Status Accessed 6/26/2019  3:40 PM   Flows Good 6/26/2019  3:40 PM   Dressing Intervention Removed 6/26/2019  3:40 PM   Dressing Status Clean;Dry;Intact 6/26/2019  3:40 PM   Site Condition No complications 6/26/2019  3:40 PM   Dressing Gauze 6/26/2019  3:05 AM            Hemodialysis AV Fistula Left upper arm (Active)       Physical Exam:  Nursing note and vitals reviewed.    Constitutional: She appears well-developed and well-nourished.     Abdominal: Soft.     Psych/Behavior: She is alert. She is oriented to person, place, and time. She has a normal mood and affect.     Neurological:   PERRL, EOMI FS TM  L hemiparesis otherwise 5/5 RUE/RLE  KATHI  Inc- c/d/i       Significant Labs:  Recent Labs   Lab 06/25/19  0101 06/26/19  0242  06/26/19  0825   GLU 75 86  --     132* 136   K 3.6 3.9  --     100  --    CO2 22* 22*  --    BUN 13 22  --    CREATININE 2.6* 4.2*  --    CALCIUM 8.9 9.2  --    MG 2.0 2.1  --      Recent Labs   Lab 06/25/19  0101 06/26/19  0242   WBC 4.75 5.12   HGB 8.1* 8.0*   HCT 28.1* 27.0*   PLT 95* 104*     No results for input(s): LABPT, INR, APTT in the last 48 hours.  Microbiology Results (last 7 days)     Procedure Component Value Units Date/Time    Blood culture [[id number]] Collected:  06/22/19 0656    Order Status:  Completed Specimen:  Blood Updated:  06/26/19 0822     Blood Culture, Routine No Growth to date     Blood Culture, Routine No Growth to date     Blood Culture, Routine No Growth to date     Blood Culture, Routine No Growth to date     Blood Culture, Routine No Growth to date    Blood culture [[id number]] Collected:  06/22/19 0656    Order Status:  Completed Specimen:  Blood Updated:  06/26/19 0822     Blood Culture, Routine No Growth to date     Blood Culture, Routine No Growth to date     Blood Culture, Routine No Growth to date     Blood Culture, Routine No Growth to date     Blood Culture, Routine No Growth to date        All pertinent labs from the last 24 hours have been reviewed.    Significant Diagnostics:  CT: Ct Head Without Contrast    Result Date: 6/26/2019  1. Stable postsurgical changes of right subdural drainage catheter placement for subdural hematoma evacuation with improving mass effect and leftward midline shift now measuring 4 mm. 2. Areas of encephalomalacia within the right frontal lobe and left cerebellum compatible with remote infarcts. 3. Generalized cerebral volume loss and chronic microvascular ischemic change. Electronically signed by resident: Antonio Louis Date:    06/26/2019 Time:    02:36 Electronically signed by: Russ Luong MD Date:    06/26/2019 Time:    03:30    Assessment/Plan:     Subdural hemorrhage  79F PMH ESRD, CHF, COPD, and right MCA stroke with known  R convexity SDH, now with expansion and chronicificaiton. No s/p R sided nikki holes for SDH evacuation.  Exam stable with 4-/5 LUE and LLE strength.     Neuro stable  CTH stable.   Continue ICU care with q1 hour neurochecks  Remove SD drain today, stop ppx Abx.   SBP <160    Cont ICU care         Manish Hill MD  Neurosurgery  [facility]

## 2019-06-26 NOTE — SUBJECTIVE & OBJECTIVE
Interval History: MMA embo yesterday otherwise no AE. AFVSS.     Medications:  Continuous Infusions:  Scheduled Meds:   sodium chloride 0.9%   Intravenous Once    amLODIPine  10 mg Oral Daily    carvedilol  25 mg Oral BID    ceFAZolin (ANCEF) IVPB  1 g Intravenous Q24H    cloNIDine  0.1 mg Oral TID    fluticasone furoate-vilanterol  1 puff Inhalation Daily    hydrALAZINE  100 mg Oral Q8H    levETIRAcetam  500 mg Oral BID    lidocaine-prilocaine   Topical (Top) Once    losartan  100 mg Oral QHS    polyethylene glycol  17 g Oral Daily     PRN Meds:sodium chloride 0.9%, acetaminophen, albuterol-ipratropium, Dextrose 10% Bolus, Dextrose 10% Bolus, glucagon (human recombinant), glucose, glucose, hydrALAZINE, insulin aspart U-100, labetalol, ondansetron, oxyCODONE, senna-docusate 8.6-50 mg, sodium chloride 0.9%, sodium chloride 0.9%       Objective:     Weight: 51.7 kg (113 lb 15.7 oz)  Body mass index is 20.19 kg/m².  Vital Signs (Most Recent):  Temp: 97.7 °F (36.5 °C) (06/26/19 1540)  Pulse: (!) 54 (06/26/19 1730)  Resp: (!) 21 (06/26/19 1730)  BP: (!) 152/69 (06/26/19 1701)  SpO2: 99 % (06/26/19 1730) Vital Signs (24h Range):  Temp:  [97.5 °F (36.4 °C)-98.6 °F (37 °C)] 97.7 °F (36.5 °C)  Pulse:  [51-63] 54  Resp:  [17-53] 21  SpO2:  [95 %-100 %] 99 %  BP: (138-184)/() 152/69  Arterial Line BP: (117-166)/(30-44) 154/36     Date 06/26/19 0700 - 06/27/19 0659   Shift 6034-2547 9864-4660 3779-0806 24 Hour Total   INTAKE   P.O. 200 100  300   IV Piggyback 375   375   Shift Total(mL/kg) 575(11.1) 100(1.9)  675(13.1)   OUTPUT   Drains  15  15   Shift Total(mL/kg)  15(0.3)  15(0.3)   Weight (kg) 51.7 51.7 51.7 51.7                        Closed/Suction Drain 06/23/19 1134 Right Other (Comment) Bulb 7 Fr. (Active)   Site Description Healing 6/26/2019  3:01 PM   Dressing Type No dressing 6/26/2019  3:01 PM   Dressing Status Clean;Dry;Intact 6/26/2019  3:01 PM   Dressing Intervention Dressing reinforced 6/26/2019   3:01 PM   Drainage Serosanguineous 6/26/2019  3:01 PM   Status Other (Comment) 6/26/2019  3:01 PM   Output (mL) 15 mL 6/26/2019  5:01 PM            Hemodialysis AV Fistula 05/23/16 0700 Left upper arm (Active)   Needle Size 15ga 6/26/2019  3:40 PM   Site Assessment Clean;Dry;Intact;No redness;No swelling 6/26/2019  3:40 PM   Patency Present;Thrill;Bruit 6/26/2019  3:40 PM   Status Accessed 6/26/2019  3:40 PM   Flows Good 6/26/2019  3:40 PM   Dressing Intervention Removed 6/26/2019  3:40 PM   Dressing Status Clean;Dry;Intact 6/26/2019  3:40 PM   Site Condition No complications 6/26/2019  3:40 PM   Dressing Gauze 6/26/2019  3:05 AM            Hemodialysis AV Fistula Left upper arm (Active)       Physical Exam:  Nursing note and vitals reviewed.    Constitutional: She appears well-developed and well-nourished.     Abdominal: Soft.     Psych/Behavior: She is alert. She is oriented to person, place, and time. She has a normal mood and affect.     Neurological:   PERRL, EOMI FS TM  L hemiparesis otherwise 5/5 RUE/RLE  SILT  Inc- c/d/i       Significant Labs:  Recent Labs   Lab 06/25/19 0101 06/26/19 0242 06/26/19 0825   GLU 75 86  --     132* 136   K 3.6 3.9  --     100  --    CO2 22* 22*  --    BUN 13 22  --    CREATININE 2.6* 4.2*  --    CALCIUM 8.9 9.2  --    MG 2.0 2.1  --      Recent Labs   Lab 06/25/19 0101 06/26/19 0242   WBC 4.75 5.12   HGB 8.1* 8.0*   HCT 28.1* 27.0*   PLT 95* 104*     No results for input(s): LABPT, INR, APTT in the last 48 hours.  Microbiology Results (last 7 days)     Procedure Component Value Units Date/Time    Blood culture [[id number]] Collected:  06/22/19 0656    Order Status:  Completed Specimen:  Blood Updated:  06/26/19 0822     Blood Culture, Routine No Growth to date     Blood Culture, Routine No Growth to date     Blood Culture, Routine No Growth to date     Blood Culture, Routine No Growth to date     Blood Culture, Routine No Growth to date    Blood culture  [[id number]] Collected:  06/22/19 0656    Order Status:  Completed Specimen:  Blood Updated:  06/26/19 0822     Blood Culture, Routine No Growth to date     Blood Culture, Routine No Growth to date     Blood Culture, Routine No Growth to date     Blood Culture, Routine No Growth to date     Blood Culture, Routine No Growth to date        All pertinent labs from the last 24 hours have been reviewed.    Significant Diagnostics:  CT: Ct Head Without Contrast    Result Date: 6/26/2019  1. Stable postsurgical changes of right subdural drainage catheter placement for subdural hematoma evacuation with improving mass effect and leftward midline shift now measuring 4 mm. 2. Areas of encephalomalacia within the right frontal lobe and left cerebellum compatible with remote infarcts. 3. Generalized cerebral volume loss and chronic microvascular ischemic change. Electronically signed by resident: Antonio Louis Date:    06/26/2019 Time:    02:36 Electronically signed by: Russ Luong MD Date:    06/26/2019 Time:    03:30

## 2019-06-27 LAB
ALBUMIN SERPL BCP-MCNC: 2.6 G/DL (ref 3.5–5.2)
ALP SERPL-CCNC: 79 U/L (ref 55–135)
ALT SERPL W/O P-5'-P-CCNC: <5 U/L (ref 10–44)
ANION GAP SERPL CALC-SCNC: 7 MMOL/L (ref 8–16)
AST SERPL-CCNC: 12 U/L (ref 10–40)
BACTERIA BLD CULT: NORMAL
BACTERIA BLD CULT: NORMAL
BASOPHILS # BLD AUTO: 0.03 K/UL (ref 0–0.2)
BASOPHILS NFR BLD: 0.6 % (ref 0–1.9)
BILIRUB SERPL-MCNC: 0.3 MG/DL (ref 0.1–1)
BUN SERPL-MCNC: 12 MG/DL (ref 8–23)
CALCIUM SERPL-MCNC: 9 MG/DL (ref 8.7–10.5)
CHLORIDE SERPL-SCNC: 103 MMOL/L (ref 95–110)
CO2 SERPL-SCNC: 23 MMOL/L (ref 23–29)
CREAT SERPL-MCNC: 2.8 MG/DL (ref 0.5–1.4)
DIFFERENTIAL METHOD: ABNORMAL
EOSINOPHIL # BLD AUTO: 0.1 K/UL (ref 0–0.5)
EOSINOPHIL NFR BLD: 1.8 % (ref 0–8)
ERYTHROCYTE [DISTWIDTH] IN BLOOD BY AUTOMATED COUNT: 16.5 % (ref 11.5–14.5)
EST. GFR  (AFRICAN AMERICAN): 18.1 ML/MIN/1.73 M^2
EST. GFR  (NON AFRICAN AMERICAN): 15.7 ML/MIN/1.73 M^2
GLUCOSE SERPL-MCNC: 100 MG/DL (ref 70–110)
HCT VFR BLD AUTO: 26.4 % (ref 37–48.5)
HGB BLD-MCNC: 7.8 G/DL (ref 12–16)
IMM GRANULOCYTES # BLD AUTO: 0.03 K/UL (ref 0–0.04)
IMM GRANULOCYTES NFR BLD AUTO: 0.6 % (ref 0–0.5)
INR PPP: 1.1 (ref 0.8–1.2)
LYMPHOCYTES # BLD AUTO: 0.4 K/UL (ref 1–4.8)
LYMPHOCYTES NFR BLD: 7.3 % (ref 18–48)
MAGNESIUM SERPL-MCNC: 2 MG/DL (ref 1.6–2.6)
MCH RBC QN AUTO: 28.2 PG (ref 27–31)
MCHC RBC AUTO-ENTMCNC: 29.5 G/DL (ref 32–36)
MCV RBC AUTO: 95 FL (ref 82–98)
MONOCYTES # BLD AUTO: 0.6 K/UL (ref 0.3–1)
MONOCYTES NFR BLD: 12 % (ref 4–15)
NEUTROPHILS # BLD AUTO: 3.8 K/UL (ref 1.8–7.7)
NEUTROPHILS NFR BLD: 77.7 % (ref 38–73)
NRBC BLD-RTO: 0 /100 WBC
PHOSPHATE SERPL-MCNC: 3.4 MG/DL (ref 2.7–4.5)
PLATELET # BLD AUTO: 93 K/UL (ref 150–350)
PMV BLD AUTO: 12.7 FL (ref 9.2–12.9)
POCT GLUCOSE: 65 MG/DL (ref 70–110)
POCT GLUCOSE: 75 MG/DL (ref 70–110)
POCT GLUCOSE: 76 MG/DL (ref 70–110)
POCT GLUCOSE: 77 MG/DL (ref 70–110)
POCT GLUCOSE: 83 MG/DL (ref 70–110)
POCT GLUCOSE: 90 MG/DL (ref 70–110)
POCT GLUCOSE: 92 MG/DL (ref 70–110)
POCT GLUCOSE: 99 MG/DL (ref 70–110)
POTASSIUM SERPL-SCNC: 4 MMOL/L (ref 3.5–5.1)
PROT SERPL-MCNC: 6 G/DL (ref 6–8.4)
PROTHROMBIN TIME: 11.7 SEC (ref 9–12.5)
RBC # BLD AUTO: 2.77 M/UL (ref 4–5.4)
SODIUM SERPL-SCNC: 133 MMOL/L (ref 136–145)
SODIUM SERPL-SCNC: 135 MMOL/L (ref 136–145)
WBC # BLD AUTO: 4.91 K/UL (ref 3.9–12.7)

## 2019-06-27 PROCEDURE — 85610 PROTHROMBIN TIME: CPT

## 2019-06-27 PROCEDURE — 25000003 PHARM REV CODE 250: Performed by: PHYSICIAN ASSISTANT

## 2019-06-27 PROCEDURE — 25000003 PHARM REV CODE 250: Performed by: STUDENT IN AN ORGANIZED HEALTH CARE EDUCATION/TRAINING PROGRAM

## 2019-06-27 PROCEDURE — 25000003 PHARM REV CODE 250: Performed by: NURSE PRACTITIONER

## 2019-06-27 PROCEDURE — 94761 N-INVAS EAR/PLS OXIMETRY MLT: CPT

## 2019-06-27 PROCEDURE — 83735 ASSAY OF MAGNESIUM: CPT

## 2019-06-27 PROCEDURE — 85025 COMPLETE CBC W/AUTO DIFF WBC: CPT

## 2019-06-27 PROCEDURE — 99233 SBSQ HOSP IP/OBS HIGH 50: CPT | Mod: GC,,, | Performed by: PSYCHIATRY & NEUROLOGY

## 2019-06-27 PROCEDURE — 84100 ASSAY OF PHOSPHORUS: CPT

## 2019-06-27 PROCEDURE — 25000003 PHARM REV CODE 250: Performed by: PSYCHIATRY & NEUROLOGY

## 2019-06-27 PROCEDURE — 99900035 HC TECH TIME PER 15 MIN (STAT)

## 2019-06-27 PROCEDURE — 80053 COMPREHEN METABOLIC PANEL: CPT

## 2019-06-27 PROCEDURE — 99233 PR SUBSEQUENT HOSPITAL CARE,LEVL III: ICD-10-PCS | Mod: GC,,, | Performed by: PSYCHIATRY & NEUROLOGY

## 2019-06-27 PROCEDURE — 27000221 HC OXYGEN, UP TO 24 HOURS

## 2019-06-27 PROCEDURE — 20600001 HC STEP DOWN PRIVATE ROOM

## 2019-06-27 PROCEDURE — 84295 ASSAY OF SERUM SODIUM: CPT

## 2019-06-27 RX ADMIN — CLONIDINE HYDROCHLORIDE 0.1 MG: 0.1 TABLET ORAL at 05:06

## 2019-06-27 RX ADMIN — LEVETIRACETAM 500 MG: 500 TABLET ORAL at 09:06

## 2019-06-27 RX ADMIN — CLONIDINE HYDROCHLORIDE 0.1 MG: 0.1 TABLET ORAL at 08:06

## 2019-06-27 RX ADMIN — CLONIDINE HYDROCHLORIDE 0.1 MG: 0.1 TABLET ORAL at 09:06

## 2019-06-27 RX ADMIN — LEVETIRACETAM 500 MG: 500 TABLET ORAL at 08:06

## 2019-06-27 RX ADMIN — HYDRALAZINE HYDROCHLORIDE 100 MG: 50 TABLET ORAL at 05:06

## 2019-06-27 RX ADMIN — FLUTICASONE FUROATE AND VILANTEROL TRIFENATATE 1 PUFF: 200; 25 POWDER RESPIRATORY (INHALATION) at 08:06

## 2019-06-27 RX ADMIN — AMLODIPINE BESYLATE 10 MG: 10 TABLET ORAL at 08:06

## 2019-06-27 RX ADMIN — CARVEDILOL 25 MG: 25 TABLET, FILM COATED ORAL at 09:06

## 2019-06-27 RX ADMIN — HYDRALAZINE HYDROCHLORIDE 100 MG: 50 TABLET ORAL at 09:06

## 2019-06-27 RX ADMIN — LOSARTAN POTASSIUM 100 MG: 50 TABLET, FILM COATED ORAL at 09:06

## 2019-06-27 RX ADMIN — CARVEDILOL 25 MG: 25 TABLET, FILM COATED ORAL at 08:06

## 2019-06-27 NOTE — ASSESSMENT & PLAN NOTE
- S/P evac.   - Neurological exam is unchanged  - SD drain is out and f/u CTH was satisfactory   - Seizure prophylaxis

## 2019-06-27 NOTE — PLAN OF CARE
Problem: Adult Inpatient Plan of Care  Goal: Plan of Care Review  3.0 hour hd tx completed removing 2L w/o diff. Dialyzer cleared well needles removed x2 bleeding x 5 min pressure dsg applied after. Report given vss nad

## 2019-06-27 NOTE — SUBJECTIVE & OBJECTIVE
Interval History:  >4 elements OR status of 3 inpatient conditions    Review of Systems   Constitutional: Positive for fatigue.   HENT: Negative.    Eyes: Negative.    Respiratory: Negative.    Cardiovascular: Negative.    Gastrointestinal: Negative.    Endocrine: Negative.    Genitourinary: Negative.    Musculoskeletal: Negative.    Neurological: Positive for headaches.   Hematological: Negative.    Psychiatric/Behavioral: Negative.      2 systems OR Unable to obtain a complete ROS due to level of consciousness.  Objective:     Vitals:  Temp: 97.7 °F (36.5 °C)  Pulse: (!) 48  Rhythm: normal sinus rhythm  BP: (!) 122/58  MAP (mmHg): 83  Resp: 16  SpO2: 99 %  O2 Device (Oxygen Therapy): nasal cannula    Temp  Min: 97.5 °F (36.4 °C)  Max: 97.8 °F (36.6 °C)  Pulse  Min: 48  Max: 62  BP  Min: 113/56  Max: 161/67  MAP (mmHg)  Min: 79  Max: 99  Resp  Min: 13  Max: 37  SpO2  Min: 95 %  Max: 100 %    06/26 0701 - 06/27 0700  In: 1175 [P.O.:300]  Out: 2515 [Drains:15]   Unmeasured Output  Urine Occurrence: 1  Stool Occurrence: 1  Pad Count: 1       Physical Exam    Constitutional: No apparent distress.   Eyes: Right eye cataract  Head/Ears/Nose/Mouth/Throat/Neck: Moist mucous membranes. External ears, nose atraumatic. SD drain in place  Cardiovascular: Regular rhythm. ESM  Respiratory: Clear to auscultation.  Gastrointestinal: No hernia. Soft, nondistended, nontender. + bowel sounds.    Neurologic Examination:    -Mental Status: Alert. Oriented to person, place, and time. Follows commands.  -Cranial nerves: Visual fields full. Pupil on the left round, and reactive to light. Cataract on the right. . Extraocular movements intact. Facial sensation intact. Facial strength symmetric. Hears finger rub bilateral. Palate elevation symmetric. Uvula midline. Shoulder shrug symmetric. Tongue protrusion midline.  -Motor: Left upper extremity drift   -Sensation: Intact to touch in arms, legs.      Medications:  Continuous  Scheduled    amLODIPine 10 mg Daily   carvedilol 25 mg BID   cloNIDine 0.1 mg TID   fluticasone furoate-vilanterol 1 puff Daily   hydrALAZINE 100 mg Q8H   levETIRAcetam 500 mg BID   losartan 100 mg QHS   polyethylene glycol 17 g Daily   PRN    acetaminophen 650 mg Q4H PRN   albuterol-ipratropium 3 mL Q4H PRN   Dextrose 10% Bolus 12.5 g PRN   Dextrose 10% Bolus 25 g PRN   glucagon (human recombinant) 1 mg PRN   glucose 16 g PRN   glucose 24 g PRN   hydrALAZINE 20 mg Q4H PRN   insulin aspart U-100 1-10 Units Q6H PRN   labetalol 10 mg Q4H PRN   ondansetron 4 mg Q8H PRN   oxyCODONE 5 mg Q6H PRN   senna-docusate 8.6-50 mg 1 tablet BID PRN   sodium chloride 0.9% 10 mL PRN   sodium chloride 0.9% 5 mL PRN     Today I personally reviewed pertinent medications, lines/drains/airways, imaging, cardiology results, laboratory results, microbiology results, notably:    Diet  Diet renal [facility]; Dysphagia (Mechanical Soft); Thin  Diet renal [facility]; Dysphagia (Mechanical Soft); Thin

## 2019-06-27 NOTE — ASSESSMENT & PLAN NOTE
79F PMH ESRD, CHF, COPD, and right MCA stroke with known R convexity SDH, now with expansion and chronicificaiton. No s/p R sided nikki holes for SDH evacuation.  Exam stable with 4-/5 LUE and LLE strength. SD drain removed yesterday    Neuro stable  CTH stable from overnight s/p drain removal  Stop ppx Abx.   SBP <160  Step down to medicine for significant comorbidities ESRD, CHF, COPD  Neurologically stable from subdural, no HOB restrictions, will continue care over incision.   Call neurosurgery with any questions

## 2019-06-27 NOTE — PROGRESS NOTES
"[facility]  Neurocritical Care  Progress Note    Admit Date: 6/21/2019  Service Date: 06/27/2019  Length of Stay: 3    Subjective:     Chief Complaint: <principal problem not specified>    History of Present Illness: The patient is a 79 year old pleasant female with PMHx of  ESRD (on HD M/W/F), CHF, COPD (2L O2 NC@ home), h/oSDH (w/ recent admission to [facility] on 5/21/19-w/bilateral SDH R>L s/p unwitnessed fall on plavix, no neurosurgical intervention) and right MCA stroke s/p thrombectomy in 2016 admitted to [facility] with increased R SDH.  After initial admission to [facility] on (5/21) pt was stepdown to hospital medicine, inpt rehab/SNF and and then to home however she failed to make it to her follow up appointment. Patient was admitted yesterday (06/21/19) to the hospital to hospital medicine with cc of "not feeling right". Medicine team performed CTH on 6/22 which demonstrated increase in size and chronicification of R convexity SDH with mass effect. Pt stated she had a fall 2 weeks ago w/LOC, not on any anticoagulants and had intermittent headaches since then however she denies any headaches now.  Pending repeat CTH at 11PM. NPO after midnight likely to OR in AM w/ NSGY team for evacuation of SDH. Patient admitted to [facility] for close monitoring and higher level of care.            Hospital Course: 6/22: Pt admitted to [facility] w/ increased size of SDH, -160, plan for repeat CTH, NPO after midnight and plan to OR in AM for evac  06/23/2019 S/p Craniotomy. SD drain in place.   6/24: NAEO  6/25: Had MMA embolization   6/26: NAEO  6/27: SD drain was removed. CTH was satisfactory     Interval History:  >4 elements OR status of 3 inpatient conditions    Review of Systems   Constitutional: Positive for fatigue.   HENT: Negative.    Eyes: Negative.    Respiratory: Negative.    Cardiovascular: Negative.    Gastrointestinal: Negative.    Endocrine: Negative.    Genitourinary: Negative.    Musculoskeletal: Negative.  "   Neurological: Positive for headaches.   Hematological: Negative.    Psychiatric/Behavioral: Negative.      2 systems OR Unable to obtain a complete ROS due to level of consciousness.  Objective:     Vitals:  Temp: 97.7 °F (36.5 °C)  Pulse: (!) 48  Rhythm: normal sinus rhythm  BP: (!) 122/58  MAP (mmHg): 83  Resp: 16  SpO2: 99 %  O2 Device (Oxygen Therapy): nasal cannula    Temp  Min: 97.5 °F (36.4 °C)  Max: 97.8 °F (36.6 °C)  Pulse  Min: 48  Max: 62  BP  Min: 113/56  Max: 161/67  MAP (mmHg)  Min: 79  Max: 99  Resp  Min: 13  Max: 37  SpO2  Min: 95 %  Max: 100 %    06/26 0701 - 06/27 0700  In: 1175 [P.O.:300]  Out: 2515 [Drains:15]   Unmeasured Output  Urine Occurrence: 1  Stool Occurrence: 1  Pad Count: 1       Physical Exam    Constitutional: No apparent distress.   Eyes: Right eye cataract  Head/Ears/Nose/Mouth/Throat/Neck: Moist mucous membranes. External ears, nose atraumatic. SD drain in place  Cardiovascular: Regular rhythm. ESM  Respiratory: Clear to auscultation.  Gastrointestinal: No hernia. Soft, nondistended, nontender. + bowel sounds.    Neurologic Examination:    -Mental Status: Alert. Oriented to person, place, and time. Follows commands.  -Cranial nerves: Visual fields full. Pupil on the left round, and reactive to light. Cataract on the right. . Extraocular movements intact. Facial sensation intact. Facial strength symmetric. Hears finger rub bilateral. Palate elevation symmetric. Uvula midline. Shoulder shrug symmetric. Tongue protrusion midline.  -Motor: Left upper extremity drift   -Sensation: Intact to touch in arms, legs.      Medications:  Continuous Scheduled    amLODIPine 10 mg Daily   carvedilol 25 mg BID   cloNIDine 0.1 mg TID   fluticasone furoate-vilanterol 1 puff Daily   hydrALAZINE 100 mg Q8H   levETIRAcetam 500 mg BID   losartan 100 mg QHS   polyethylene glycol 17 g Daily   PRN    acetaminophen 650 mg Q4H PRN   albuterol-ipratropium 3 mL Q4H PRN   Dextrose 10% Bolus 12.5 g PRN    Dextrose 10% Bolus 25 g PRN   glucagon (human recombinant) 1 mg PRN   glucose 16 g PRN   glucose 24 g PRN   hydrALAZINE 20 mg Q4H PRN   insulin aspart U-100 1-10 Units Q6H PRN   labetalol 10 mg Q4H PRN   ondansetron 4 mg Q8H PRN   oxyCODONE 5 mg Q6H PRN   senna-docusate 8.6-50 mg 1 tablet BID PRN   sodium chloride 0.9% 10 mL PRN   sodium chloride 0.9% 5 mL PRN     Today I personally reviewed pertinent medications, lines/drains/airways, imaging, cardiology results, laboratory results, microbiology results, notably:    Diet  Diet renal [facility]; Dysphagia (Mechanical Soft); Thin  Diet renal [facility]; Dysphagia (Mechanical Soft); Thin        Assessment/Plan:     Neuro  Brain compression  -- 2/2 SDH      Subdural hematoma  - S/P evac.   - Neurological exam is unchanged  - SD drain is out and f/u CTH was satisfactory   - Seizure prophylaxis       Pulmonary  Pulmonary emphysema  -- On home O2  -- O2 goal 88-92%  -- Continue home med Breo  -- Duo nebs PRN    Cardiac/Vascular  CAD (coronary artery disease)  -- Previously on plavix  -- Currently not on any antiplatelets in the settings of SDH    Nonrheumatic aortic valve stenosis  No acute issues  BP control    Essential hypertension  -- SBP goal <160  - Continue current regimen  - DC A line       Renal/  ESRD (end stage renal disease)    - Appreciate nephrology recs  - Tolerated HD yesterday     Oncology  Anemia in ESRD (end-stage renal disease)  -- H/H stable  -- Continue to monitor daily    Endocrine  Moderate malnutrition  Passed swallow eval   Tolerating PO  Add boost     Type 2 diabetes mellitus with chronic kidney disease on chronic dialysis, without long-term current use of insulin  - SSI    Other  Physical debility  -- PT/OT          The patient is being Prophylaxed for:  Venous Thromboembolism with: Chemical  Stress Ulcer with: Not Applicable   Ventilator Pneumonia with: not applicable    Activity Orders          Diet renal [facility];  Dysphagia (Mechanical Soft); Thin: Renal starting at 06/26 1634    Straight Cath starting at 06/22 0017        Full Code    Level 3    David Grullon MD  Neurocritical Care  [facility]

## 2019-06-27 NOTE — PROGRESS NOTES
"[facility]  Neurosurgery  Progress Note    Subjective:     History of Present Illness: Patient is a 79 year old female known to this service, with a history of ESRD, CHF, COPD, and right MCA stroke who is admitted to medicine with complain of SOB as well as "not feeling right". She was previously admitted to [facility] in May with R acute SDH after a fall while on plavix. Nonoperative at that time. She was discharged to [facility], then home, but lost to follow up. Medicine team performed CTH which demonstrated increase in size and chronicification of R convexity SDH with mass effect. Patient reports intermittent headaches and some left sided weakness, denies nausea/vomiting. Not currently on anticoagulation.         Post-Op Info:  Procedure(s) (LRB):  ANGIOGRAM-CEREBRAL (N/A)   3 Days Post-Op     Interval History:    6/27: CT stable s/p removal of SD drain. Step down to medicine for medical co morbidities ESRD, CHF, COPD.     Medications:  Continuous Infusions:  Scheduled Meds:   amLODIPine  10 mg Oral Daily    carvedilol  25 mg Oral BID    cloNIDine  0.1 mg Oral TID    fluticasone furoate-vilanterol  1 puff Inhalation Daily    hydrALAZINE  100 mg Oral Q8H    levETIRAcetam  500 mg Per NG tube BID    losartan  100 mg Oral QHS    polyethylene glycol  17 g Oral Daily     PRN Meds:acetaminophen, albuterol-ipratropium, Dextrose 10% Bolus, Dextrose 10% Bolus, glucagon (human recombinant), glucose, glucose, hydrALAZINE, insulin aspart U-100, labetalol, ondansetron, oxyCODONE, senna-docusate 8.6-50 mg, sodium chloride 0.9%, sodium chloride 0.9%     Review of Systems  Objective:     Weight: 51.7 kg (113 lb 15.7 oz)  Body mass index is 20.19 kg/m².  Vital Signs (Most Recent):  Temp: 97.7 °F (36.5 °C) (06/27/19 0800)  Pulse: (!) 48 (06/27/19 1100)  Resp: 16 (06/27/19 1100)  BP: (!) 122/58 (06/27/19 1100)  SpO2: 99 % (06/27/19 1100) Vital Signs (24h Range):  Temp:  [97.7 °F (36.5 °C)-97.8 °F (36.6 °C)] 97.7 °F " (36.5 °C)  Pulse:  [48-62] 48  Resp:  [13-37] 16  SpO2:  [95 %-100 %] 99 %  BP: (113-158)/(55-69) 122/58  Arterial Line BP: (124-160)/(32-44) 142/36                          Hemodialysis AV Fistula 05/23/16 0700 Left upper arm (Active)   Needle Size 15ga 6/26/2019  3:40 PM   Site Assessment Clean;Dry;Intact 6/27/2019 11:00 AM   Patency Present;Thrill;Bruit 6/27/2019 11:00 AM   Status Deaccessed 6/27/2019  3:05 AM   Flows Good 6/26/2019  3:40 PM   Dressing Intervention New dressing 6/26/2019  7:05 PM   Dressing Status Clean;Dry;Intact 6/27/2019  3:05 AM   Site Condition No complications 6/27/2019 11:00 AM   Dressing Pressure dressing 6/27/2019  3:05 AM            Hemodialysis AV Fistula Left upper arm (Active)       Neurosurgery Physical Exam  PERRL, EOMI FS TM  L hemiparesis otherwise 5/5 RUE/RLE  Strength on left side improving.   4-/5 LLE, 4+/5 LUE  SILT  Inc- c/d/i     Significant Labs:  Recent Labs   Lab 06/26/19  0242 06/26/19  0825 06/27/19 0215 06/27/19  1006   GLU 86  --  100  --    * 136 133* 135*   K 3.9  --  4.0  --      --  103  --    CO2 22*  --  23  --    BUN 22  --  12  --    CREATININE 4.2*  --  2.8*  --    CALCIUM 9.2  --  9.0  --    MG 2.1  --  2.0  --      Recent Labs   Lab 06/26/19  0242 06/27/19 0215   WBC 5.12 4.91   HGB 8.0* 7.8*   HCT 27.0* 26.4*   * 93*     Recent Labs   Lab 06/27/19  1006   INR 1.1     Microbiology Results (last 7 days)     Procedure Component Value Units Date/Time    Blood culture [[id number]] Collected:  06/22/19 0656    Order Status:  Completed Specimen:  Blood Updated:  06/27/19 0822     Blood Culture, Routine No growth after 5 days.    Blood culture [[id number]] Collected:  06/22/19 0656    Order Status:  Completed Specimen:  Blood Updated:  06/27/19 0822     Blood Culture, Routine No growth after 5 days.        CMP:   Recent Labs   Lab 06/26/19  0242 06/26/19  0825 06/27/19  0215 06/27/19  1006   GLU 86  --  100  --    CALCIUM 9.2  --  9.0  --     ALBUMIN 2.8*  --  2.6*  --    PROT 6.3  --  6.0  --    * 136 133* 135*   K 3.9  --  4.0  --    CO2 22*  --  23  --      --  103  --    BUN 22  --  12  --    CREATININE 4.2*  --  2.8*  --    ALKPHOS 80  --  79  --    ALT <5*  --  <5*  --    AST 11  --  12  --    BILITOT 0.4  --  0.3  --      CRP: No results for input(s): CRP in the last 48 hours.  ESR: No results for input(s): POCESR, ERYTHROCYTES in the last 48 hours.  LFTs:   Recent Labs   Lab 06/26/19  0242 06/27/19  0215   ALT <5* <5*   AST 11 12   ALKPHOS 80 79   BILITOT 0.4 0.3   PROT 6.3 6.0   ALBUMIN 2.8* 2.6*       Significant Diagnostics:  CT: Ct Head Without Contrast    Result Date: 6/27/2019  Interval removal of right-sided subdural drainage catheter with relatively stable size and appearance of right-sided extra-axial collection.  There is relatively stable localized mass effect with approximately 0.4 cm of leftward midline shift, stable from prior exam. Electronically signed by: Smooth Dia MD Date:    06/27/2019 Time:    02:39  Echoencephalography: No results found in the last 24 hours.  MRI: No results found in the last 24 hours.    Assessment/Plan:     Subdural hemorrhage  79F PMH ESRD, CHF, COPD, and right MCA stroke with known R convexity SDH, now with expansion and chronicificaiton. No s/p R sided nikki holes for SDH evacuation.  Exam stable with 4-/5 LUE and LLE strength. SD drain removed yesterday    Neuro stable  CTH stable from overnight s/p drain removal  Stop ppx Abx.   SBP <160  Step down to medicine for significant comorbidities ESRD on dialysis, CHF, COPD  Neurologically stable from subdural, no HOB restrictions, will continue care over incision.   Call neurosurgery with any questions    D/w Dr. Jessica Khan MD  Neurosurgery  [facility]

## 2019-06-27 NOTE — SUBJECTIVE & OBJECTIVE
Interval History:    6/27: CT stable s/p removal of SD drain. Step down to medicine for medical co morbidities ESRD, CHF, COPD.     Medications:  Continuous Infusions:  Scheduled Meds:   amLODIPine  10 mg Oral Daily    carvedilol  25 mg Oral BID    cloNIDine  0.1 mg Oral TID    fluticasone furoate-vilanterol  1 puff Inhalation Daily    hydrALAZINE  100 mg Oral Q8H    levETIRAcetam  500 mg Per NG tube BID    losartan  100 mg Oral QHS    polyethylene glycol  17 g Oral Daily     PRN Meds:acetaminophen, albuterol-ipratropium, Dextrose 10% Bolus, Dextrose 10% Bolus, glucagon (human recombinant), glucose, glucose, hydrALAZINE, insulin aspart U-100, labetalol, ondansetron, oxyCODONE, senna-docusate 8.6-50 mg, sodium chloride 0.9%, sodium chloride 0.9%     Review of Systems  Objective:     Weight: 51.7 kg (113 lb 15.7 oz)  Body mass index is 20.19 kg/m².  Vital Signs (Most Recent):  Temp: 97.7 °F (36.5 °C) (06/27/19 0800)  Pulse: (!) 48 (06/27/19 1100)  Resp: 16 (06/27/19 1100)  BP: (!) 122/58 (06/27/19 1100)  SpO2: 99 % (06/27/19 1100) Vital Signs (24h Range):  Temp:  [97.7 °F (36.5 °C)-97.8 °F (36.6 °C)] 97.7 °F (36.5 °C)  Pulse:  [48-62] 48  Resp:  [13-37] 16  SpO2:  [95 %-100 %] 99 %  BP: (113-158)/(55-69) 122/58  Arterial Line BP: (124-160)/(32-44) 142/36                          Hemodialysis AV Fistula 05/23/16 0700 Left upper arm (Active)   Needle Size 15ga 6/26/2019  3:40 PM   Site Assessment Clean;Dry;Intact 6/27/2019 11:00 AM   Patency Present;Thrill;Bruit 6/27/2019 11:00 AM   Status Deaccessed 6/27/2019  3:05 AM   Flows Good 6/26/2019  3:40 PM   Dressing Intervention New dressing 6/26/2019  7:05 PM   Dressing Status Clean;Dry;Intact 6/27/2019  3:05 AM   Site Condition No complications 6/27/2019 11:00 AM   Dressing Pressure dressing 6/27/2019  3:05 AM            Hemodialysis AV Fistula Left upper arm (Active)       Neurosurgery Physical Exam  PERRL, EOMI FS TM  L hemiparesis otherwise 5/5 RUE/RLE  Strength  on left side improving.   4-/5 LLE, 4+/5 LUE  SILT  Inc- c/d/i     Significant Labs:  Recent Labs   Lab 06/26/19  0242 06/26/19 0825 06/27/19 0215 06/27/19  1006   GLU 86  --  100  --    * 136 133* 135*   K 3.9  --  4.0  --      --  103  --    CO2 22*  --  23  --    BUN 22  --  12  --    CREATININE 4.2*  --  2.8*  --    CALCIUM 9.2  --  9.0  --    MG 2.1  --  2.0  --      Recent Labs   Lab 06/26/19 0242 06/27/19 0215   WBC 5.12 4.91   HGB 8.0* 7.8*   HCT 27.0* 26.4*   * 93*     Recent Labs   Lab 06/27/19  1006   INR 1.1     Microbiology Results (last 7 days)     Procedure Component Value Units Date/Time    Blood culture [[id number]] Collected:  06/22/19 0656    Order Status:  Completed Specimen:  Blood Updated:  06/27/19 0822     Blood Culture, Routine No growth after 5 days.    Blood culture [[id number]] Collected:  06/22/19 0656    Order Status:  Completed Specimen:  Blood Updated:  06/27/19 0822     Blood Culture, Routine No growth after 5 days.        CMP:   Recent Labs   Lab 06/26/19  0242 06/26/19 0825 06/27/19 0215 06/27/19  1006   GLU 86  --  100  --    CALCIUM 9.2  --  9.0  --    ALBUMIN 2.8*  --  2.6*  --    PROT 6.3  --  6.0  --    * 136 133* 135*   K 3.9  --  4.0  --    CO2 22*  --  23  --      --  103  --    BUN 22  --  12  --    CREATININE 4.2*  --  2.8*  --    ALKPHOS 80  --  79  --    ALT <5*  --  <5*  --    AST 11  --  12  --    BILITOT 0.4  --  0.3  --      CRP: No results for input(s): CRP in the last 48 hours.  ESR: No results for input(s): POCESR, ERYTHROCYTES in the last 48 hours.  LFTs:   Recent Labs   Lab 06/26/19  0242 06/27/19  0215   ALT <5* <5*   AST 11 12   ALKPHOS 80 79   BILITOT 0.4 0.3   PROT 6.3 6.0   ALBUMIN 2.8* 2.6*       Significant Diagnostics:  CT: Ct Head Without Contrast    Result Date: 6/27/2019  Interval removal of right-sided subdural drainage catheter with relatively stable size and appearance of right-sided extra-axial collection.   There is relatively stable localized mass effect with approximately 0.4 cm of leftward midline shift, stable from prior exam. Electronically signed by: Smooth Dia MD Date:    06/27/2019 Time:    02:39  Echoencephalography: No results found in the last 24 hours.  MRI: No results found in the last 24 hours.

## 2019-06-27 NOTE — PLAN OF CARE
Problem: Adult Inpatient Plan of Care  Goal: Plan of Care Review  Outcome: Ongoing (interventions implemented as appropriate)  POC reviewed with pt at 0500. Pt verbalized understanding. Questions and concerns addressed. No acute events overnight. Pt taken to CT; tolerated well. Pt progressing toward goals. Will continue to monitor. See flowsheets for full assessment and VS info

## 2019-06-27 NOTE — PROVIDER TRANSFER
"Neuro Critical Care Transfer of Care note    Date of Admit: 6/21/2019  Date of Transfer / Stepdown: 6/27/2019    Brief History of Present Illness:      The patient is a 79 year old pleasant female with PMHx of  ESRD (on HD M/W/F), CHF, COPD (2L O2 NC@ home), h/oSDH (w/ recent admission to [facility] on 5/21/19-w/bilateral SDH R>L s/p unwitnessed fall on plavix, no neurosurgical intervention) and right MCA stroke s/p thrombectomy in 2016 admitted to [facility] with increased R SDH.  After initial admission to [facility] on (5/21) pt was stepdown to hospital medicine, inpt rehab/SNF and and then to home however she failed to make it to her follow up appointment. Patient was admitted yesterday (06/21/19) to the hospital to hospital medicine with cc of "not feeling right". Medicine team performed CTH on 6/22 which demonstrated increase in size and chronicification of R convexity SDH with mass effect. Pt stated she had a fall 2 weeks ago w/LOC, not on any anticoagulants and had intermittent headaches since then however she denies any headaches now.  Pending repeat CTH at 11PM. NPO after midnight likely to OR in AM w/ NSGY team for evacuation of SDH. Patient admitted to [facility] for close monitoring and higher level of care.        Hospital Course By Problem with Pertinent Findings:     1. Neuro  Brain compression  -- 2/2 SDH        Subdural hematoma  - S/P evac.   - Neurological exam is unchanged  - SD drain is out and f/u CTH was satisfactory   - Seizure prophylaxis         Pulmonary  Pulmonary emphysema  -- On home O2  -- O2 goal 88-92%  -- Continue home med Breo  -- Duo nebs PRN     Cardiac/Vascular  CAD (coronary artery disease)  -- Previously on plavix  -- Currently not on any antiplatelets in the settings of SDH     Nonrheumatic aortic valve stenosis  No acute issues  BP control     Essential hypertension  -- SBP goal <160  - Continue current regimen  - DC A line         Renal/  ESRD (end stage renal disease)     - Appreciate nephrology " recs  - Tolerated HD yesterday      Oncology  Anemia in ESRD (end-stage renal disease)  -- H/H stable  -- Continue to monitor daily     Endocrine  Moderate malnutrition  Passed swallow eval   Tolerating PO  Add boost      Type 2 diabetes mellitus with chronic kidney disease on chronic dialysis, without long-term current use of insulin  - SSI     Other  Physical debility  -- PT/OT       Consultants and Procedures:     Consultants:  NSGY    Procedures:    S/P r SIDED KATE HOLES FOR SDH evacuation    Transfer Information:     Diet:  RENAL    Physical Activity:  Pt/OT  AS TOLERATED    To Do / Pending Studies / Follow ups:   -follow up w/ NSGY (antwon on)    Nellie Chandler  Neuro Crtical Care

## 2019-06-28 PROBLEM — G93.5 BRAIN COMPRESSION: Status: RESOLVED | Noted: 2019-06-22 | Resolved: 2019-06-28

## 2019-06-28 PROBLEM — I27.20 PULMONARY HYPERTENSION: Status: ACTIVE | Noted: 2019-06-28

## 2019-06-28 LAB
ALBUMIN SERPL BCP-MCNC: 2.6 G/DL (ref 3.5–5.2)
ALP SERPL-CCNC: 83 U/L (ref 55–135)
ALT SERPL W/O P-5'-P-CCNC: <5 U/L (ref 10–44)
ANION GAP SERPL CALC-SCNC: 9 MMOL/L (ref 8–16)
AST SERPL-CCNC: 13 U/L (ref 10–40)
BASOPHILS # BLD AUTO: 0.03 K/UL (ref 0–0.2)
BASOPHILS NFR BLD: 0.7 % (ref 0–1.9)
BILIRUB SERPL-MCNC: 0.3 MG/DL (ref 0.1–1)
BUN SERPL-MCNC: 26 MG/DL (ref 8–23)
CALCIUM SERPL-MCNC: 9.4 MG/DL (ref 8.7–10.5)
CHLORIDE SERPL-SCNC: 102 MMOL/L (ref 95–110)
CO2 SERPL-SCNC: 23 MMOL/L (ref 23–29)
CREAT SERPL-MCNC: 4.4 MG/DL (ref 0.5–1.4)
DIFFERENTIAL METHOD: ABNORMAL
EOSINOPHIL # BLD AUTO: 0.1 K/UL (ref 0–0.5)
EOSINOPHIL NFR BLD: 2.2 % (ref 0–8)
ERYTHROCYTE [DISTWIDTH] IN BLOOD BY AUTOMATED COUNT: 16.6 % (ref 11.5–14.5)
EST. GFR  (AFRICAN AMERICAN): 10.5 ML/MIN/1.73 M^2
EST. GFR  (NON AFRICAN AMERICAN): 9.1 ML/MIN/1.73 M^2
GLUCOSE SERPL-MCNC: 96 MG/DL (ref 70–110)
HCT VFR BLD AUTO: 24.9 % (ref 37–48.5)
HGB BLD-MCNC: 7.4 G/DL (ref 12–16)
IMM GRANULOCYTES # BLD AUTO: 0.04 K/UL (ref 0–0.04)
IMM GRANULOCYTES NFR BLD AUTO: 0.9 % (ref 0–0.5)
LYMPHOCYTES # BLD AUTO: 0.4 K/UL (ref 1–4.8)
LYMPHOCYTES NFR BLD: 9.6 % (ref 18–48)
MAGNESIUM SERPL-MCNC: 2.1 MG/DL (ref 1.6–2.6)
MCH RBC QN AUTO: 28.5 PG (ref 27–31)
MCHC RBC AUTO-ENTMCNC: 29.7 G/DL (ref 32–36)
MCV RBC AUTO: 96 FL (ref 82–98)
MONOCYTES # BLD AUTO: 0.6 K/UL (ref 0.3–1)
MONOCYTES NFR BLD: 13.5 % (ref 4–15)
NEUTROPHILS # BLD AUTO: 3.3 K/UL (ref 1.8–7.7)
NEUTROPHILS NFR BLD: 73.1 % (ref 38–73)
NRBC BLD-RTO: 0 /100 WBC
PHOSPHATE SERPL-MCNC: 4.6 MG/DL (ref 2.7–4.5)
PLATELET # BLD AUTO: 84 K/UL (ref 150–350)
PMV BLD AUTO: 12.9 FL (ref 9.2–12.9)
POCT GLUCOSE: 80 MG/DL (ref 70–110)
POTASSIUM SERPL-SCNC: 4.1 MMOL/L (ref 3.5–5.1)
PROT SERPL-MCNC: 5.9 G/DL (ref 6–8.4)
RBC # BLD AUTO: 2.6 M/UL (ref 4–5.4)
SODIUM SERPL-SCNC: 134 MMOL/L (ref 136–145)
WBC # BLD AUTO: 4.46 K/UL (ref 3.9–12.7)

## 2019-06-28 PROCEDURE — 83735 ASSAY OF MAGNESIUM: CPT

## 2019-06-28 PROCEDURE — 25000003 PHARM REV CODE 250: Performed by: PSYCHIATRY & NEUROLOGY

## 2019-06-28 PROCEDURE — 92523 SPEECH SOUND LANG COMPREHEN: CPT

## 2019-06-28 PROCEDURE — 80053 COMPREHEN METABOLIC PANEL: CPT

## 2019-06-28 PROCEDURE — 99222 PR INITIAL HOSPITAL CARE,LEVL II: ICD-10-PCS | Mod: ,,, | Performed by: NURSE PRACTITIONER

## 2019-06-28 PROCEDURE — 90935 HEMODIALYSIS ONE EVALUATION: CPT | Mod: ,,, | Performed by: NURSE PRACTITIONER

## 2019-06-28 PROCEDURE — 63600175 PHARM REV CODE 636 W HCPCS: Performed by: NURSE PRACTITIONER

## 2019-06-28 PROCEDURE — 99232 PR SUBSEQUENT HOSPITAL CARE,LEVL II: ICD-10-PCS | Mod: GC,,, | Performed by: INTERNAL MEDICINE

## 2019-06-28 PROCEDURE — 25000003 PHARM REV CODE 250: Performed by: STUDENT IN AN ORGANIZED HEALTH CARE EDUCATION/TRAINING PROGRAM

## 2019-06-28 PROCEDURE — 25000003 PHARM REV CODE 250: Performed by: PHYSICIAN ASSISTANT

## 2019-06-28 PROCEDURE — 25000003 PHARM REV CODE 250: Performed by: GENERAL PRACTICE

## 2019-06-28 PROCEDURE — 84100 ASSAY OF PHOSPHORUS: CPT

## 2019-06-28 PROCEDURE — 36415 COLL VENOUS BLD VENIPUNCTURE: CPT

## 2019-06-28 PROCEDURE — 99222 1ST HOSP IP/OBS MODERATE 55: CPT | Mod: ,,, | Performed by: NURSE PRACTITIONER

## 2019-06-28 PROCEDURE — 97166 OT EVAL MOD COMPLEX 45 MIN: CPT

## 2019-06-28 PROCEDURE — 25000003 PHARM REV CODE 250: Performed by: NURSE PRACTITIONER

## 2019-06-28 PROCEDURE — 97535 SELF CARE MNGMENT TRAINING: CPT

## 2019-06-28 PROCEDURE — 99232 SBSQ HOSP IP/OBS MODERATE 35: CPT | Mod: GC,,, | Performed by: INTERNAL MEDICINE

## 2019-06-28 PROCEDURE — 20600001 HC STEP DOWN PRIVATE ROOM

## 2019-06-28 PROCEDURE — 80100016 HC MAINTENANCE HEMODIALYSIS

## 2019-06-28 PROCEDURE — 85025 COMPLETE CBC W/AUTO DIFF WBC: CPT

## 2019-06-28 PROCEDURE — 90935 PR HEMODIALYSIS, ONE EVALUATION: ICD-10-PCS | Mod: ,,, | Performed by: NURSE PRACTITIONER

## 2019-06-28 PROCEDURE — 97161 PT EVAL LOW COMPLEX 20 MIN: CPT

## 2019-06-28 RX ORDER — SODIUM CHLORIDE 9 MG/ML
INJECTION, SOLUTION INTRAVENOUS
Status: DISCONTINUED | OUTPATIENT
Start: 2019-06-28 | End: 2019-06-30

## 2019-06-28 RX ORDER — SODIUM CHLORIDE 9 MG/ML
INJECTION, SOLUTION INTRAVENOUS ONCE
Status: COMPLETED | OUTPATIENT
Start: 2019-06-28 | End: 2019-06-28

## 2019-06-28 RX ADMIN — FLUTICASONE FUROATE AND VILANTEROL TRIFENATATE 1 PUFF: 200; 25 POWDER RESPIRATORY (INHALATION) at 10:06

## 2019-06-28 RX ADMIN — LEVETIRACETAM 500 MG: 500 TABLET ORAL at 08:06

## 2019-06-28 RX ADMIN — EPOETIN ALFA-EPBX 2490 UNITS: 3000 INJECTION, SOLUTION INTRAVENOUS; SUBCUTANEOUS at 03:06

## 2019-06-28 RX ADMIN — LEVETIRACETAM 500 MG: 500 TABLET ORAL at 10:06

## 2019-06-28 RX ADMIN — SODIUM CHLORIDE: 0.9 INJECTION, SOLUTION INTRAVENOUS at 01:06

## 2019-06-28 RX ADMIN — HYDRALAZINE HYDROCHLORIDE 100 MG: 50 TABLET ORAL at 09:06

## 2019-06-28 RX ADMIN — CLONIDINE HYDROCHLORIDE 0.1 MG: 0.1 TABLET ORAL at 08:06

## 2019-06-28 RX ADMIN — CARVEDILOL 25 MG: 25 TABLET, FILM COATED ORAL at 08:06

## 2019-06-28 RX ADMIN — LOSARTAN POTASSIUM 100 MG: 50 TABLET, FILM COATED ORAL at 08:06

## 2019-06-28 RX ADMIN — HYDRALAZINE HYDROCHLORIDE 100 MG: 50 TABLET ORAL at 05:06

## 2019-06-28 RX ADMIN — POLYETHYLENE GLYCOL 3350 17 G: 17 POWDER, FOR SOLUTION ORAL at 10:06

## 2019-06-28 NOTE — HPI
Tea Georges is a 79-year-old female with PMHx of ESRD on HD with anemia, Nonrheumatic aortic valve stenosis, CHF, COPD (2L O2 NC), right MCA stroke s/p thrombectomy in 2016. She recently has a bilateral acute SDH, R>L with right to left midline shift s/p fall while on plavix. She was discharged to SNF and has been cared for by family as she is reportedly bedbound.  Patient presented to [facility] on 6/21 s/p fall with SOB, lethargy, and AMS.  CTH revealed interval enlargement of right subdural hematoma with associated worsening localized mass effect and leftward midline shift, the latter of which now resulting in subfalcine herniation. NSGY consulted and now s/p evacuation of SDH on 6/23.  Hospital course complicated by a witnessed seizure, L spastic hemiparesis, anemia, and moderate malnutrition.     Functional History: Per chart review, patient lives in [place] with daughter.  Recently at SNF.  Prior to admission, (A) with ADLs and bedbound per nursing.  Patient states she uses RW.

## 2019-06-28 NOTE — SUBJECTIVE & OBJECTIVE
Past Medical History:   Diagnosis Date    Anemia in ESRD (end-stage renal disease) 5/29/2016    Anticoagulant long-term use     Aortic atherosclerosis 11/22/2016    Aortic stenosis, moderate 2/18/2016    Asthma in adult without complication 1/8/2016    Bilateral low back pain without sciatica 11/17/2015    Blindness of right eye 11/12/2016    Brain compression 6/22/2019    CAD (coronary artery disease) 12/12/2016    Cataract     Central retinal vein occlusion, right eye 6/3/2014    CHF (congestive heart failure)     Chronic diastolic heart failure 1/8/2016    Chronic respiratory failure with hypoxia 5/29/2016    COPD (chronic obstructive pulmonary disease) 1/15/2017    Dependence on hemodialysis     Mon-Wed-Fri    Diverticulosis     Embolic stroke involving right middle cerebral artery     Encounter for blood transfusion     Enlarged LA (left atrium) 10/7/2016    Epiretinal membrane 7/17/2012    ESRD (end stage renal disease)     Essential hypertension 1/8/2016    History of GI diverticular bleed     5/22/16    Left flaccid hemiparesis 10/1/2016    Peripheral vascular disease, unspecified 11/22/2016    Right-sided cerebrovascular accident (CVA) 11/22/2016    Seizure 6/24/2019    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 11/13/2016    Type 2 diabetes mellitus with kidney complication, without long-term current use of insulin 5/1/2018    Type 2 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, without long-term current use of insulin 3/26/2013    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, without long-term current use of insulin 3/26/2013    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       Past Surgical History:   Procedure Laterality Date    ABDOMINAL SURGERY      ANGIOGRAM-CEREBRAL N/A 6/24/2019    Performed by [facility]isha Surgeon at [facility]    BREAST SURGERY      tumor removal x 2    KATE  HOLES FOR SUBDURAL HEMATOMA EVACUATION Right 6/23/2019    Performed by Trevor Conner MD at [facility] OR [address] [place] [address]    CARDIAC SURGERY      CATARACT EXTRACTION      CHOLECYSTECTOMY      COLONOSCOPY N/A 5/30/2016    Performed by Sam Davis MD at [facility] (2ND FLR)    COLONOSCOPY N/A 5/23/2016    Performed by WILLIAM Colvin MD at [facility] ENDO (2ND FLR)    CREATION, CRANIAL KATE HOLE, WITH HEMATOMA EVACUATION, SUBDURAL, possible crani Right 6/23/2019    Performed by Trevor Conner MD at [facility] OR [address] [place] [address]    ESOPHAGOGASTRODUODENOSCOPY (EGD) N/A 5/30/2016    Performed by Sam Davis MD at [facility] (2ND FLR)    ESOPHAGOGASTRODUODENOSCOPY (EGD) N/A 5/27/2016    Performed by Cesario Rubio MD at [facility] (2ND FLR)    EXCISION, ANEURYSM Left 11/29/2018    Performed by NADINE Blum III, MD at [facility] OR [address] [place] [address]    EYE SURGERY      Fistulogram Left 11/28/2018    Performed by NADINE Blum III, MD at [facility] CATH LAB    INSERTION, CATHETER, VASCULAR, DUAL LUMEN Right 11/29/2018    Performed by NADINE Blum III, MD at [facility] OR [address] [place] [address]    PTA, Fistula  11/28/2018    Performed by NADINE Blum III, MD at [facility] CATH LAB    REVISION, AV FISTULA, Left 11/29/2018    Performed by NADINE Blum III, MD at [facility] OR [address] [place] [address]    UPPER GASTROINTESTINAL ENDOSCOPY         Review of patient's allergies indicates:  No Known Allergies    No current facility-administered medications on file prior to encounter.      Current Outpatient Medications on File Prior to Encounter   Medication Sig    acetaminophen (TYLENOL) 325 MG tablet Take 2 tablets (650 mg total) by mouth every 6 (six) hours as needed for Pain.    albuterol (PROVENTIL/VENTOLIN HFA) 90 mcg/actuation inhaler Inhale 1-2 puffs into the lungs every 6 (six) hours as needed for Wheezing.    amLODIPine (NORVASC) 10 MG tablet Take 1 tablet (10 mg total) by mouth once daily.    artificial tears (ISOPTO TEARS) 0.5 % ophthalmic solution Place 2 drops  into both eyes 4 (four) times daily as needed.    carvedilol (COREG) 12.5 MG tablet Take 1 tablet (12.5 mg total) by mouth 2 (two) times daily.    ergocalciferol (ERGOCALCIFEROL) 50,000 unit Cap Take 1 capsule (50,000 Units total) by mouth every 7 days.    fluticasone-vilanterol (BREO ELLIPTA) 200-25 mcg/dose DsDv diskus inhaler Inhale 1 puff into the lungs once daily.    hydrALAZINE (APRESOLINE) 100 MG tablet Take 1 tablet (100 mg total) by mouth every 8 (eight) hours.    losartan (COZAAR) 50 MG tablet Take 1 tablet (50 mg total) by mouth once daily.    ondansetron (ZOFRAN-ODT) 8 MG TbDL Take 1 tablet (8 mg total) by mouth every 6 (six) hours as needed (nausea).    polyethylene glycol (GLYCOLAX) 17 gram PwPk Take 17 g by mouth once daily.    RENVELA 800 mg Tab Take 1 tablet (800 mg total) by mouth 3 (three) times daily with meals.    senna-docusate 8.6-50 mg (PERICOLACE) 8.6-50 mg per tablet Take 1 tablet by mouth daily as needed for Constipation.    senna-docusate 8.6-50 mg (PERICOLACE) 8.6-50 mg per tablet Take 1 tablet by mouth 2 (two) times daily.     Family History     Problem Relation (Age of Onset)    Cancer Sister    Cataracts Mother    Esophageal cancer Sister    Glaucoma Brother, Maternal Aunt    Heart disease Brother    Heart failure Sister    Hypertension Mother, Sister, Brother, Father    No Known Problems Maternal Uncle, Paternal Aunt, Paternal Uncle, Maternal Grandmother, Maternal Grandfather, Paternal Grandmother, Paternal Grandfather    Stroke Mother        Tobacco Use    Smoking status: Never Smoker    Smokeless tobacco: Never Used   Substance and Sexual Activity    Alcohol use: No     Comment: Reports occasional 1-2 drinks     Drug use: No    Sexual activity: Never     Review of Systems   Constitutional: Negative for chills, fatigue and fever.   HENT: Negative.    Respiratory: Negative.    Cardiovascular: Negative.    Gastrointestinal: Negative.    Genitourinary: Negative.     Musculoskeletal: Negative.    Neurological: Positive for weakness.     Objective:     Vital Signs (Most Recent):  Temp: 97.3 °F (36.3 °C) (06/28/19 0855)  Pulse: (!) 52 (06/28/19 1134)  Resp: 18 (06/28/19 1056)  BP: (!) 110/59 (06/28/19 1056)  SpO2: 97 % (06/28/19 1056) Vital Signs (24h Range):  Temp:  [96 °F (35.6 °C)-97.9 °F (36.6 °C)] 97.3 °F (36.3 °C)  Pulse:  [47-60] 52  Resp:  [11-33] 18  SpO2:  [97 %-100 %] 97 %  BP: (110-143)/(56-86) 110/59     Weight: 51.7 kg (113 lb 15.7 oz)  Body mass index is 20.19 kg/m².    Physical Exam   Constitutional: She appears well-developed. No distress.   HENT:   Head: Normocephalic.   R sided cranial staples, clean and dry. Without sign of infection.    Eyes:   R pupil total opacification   Cardiovascular: Normal rate and regular rhythm.   RAMIRO   Pulmonary/Chest: Effort normal. No respiratory distress.   Abdominal: Soft. She exhibits no distension.   Neurological: She is alert.   Oriented to person and place.    Skin: She is not diaphoretic.           Significant Labs: All pertinent labs within the past 24 hours have been reviewed.    Significant Imaging: I have reviewed all pertinent imaging results/findings within the past 24 hours.

## 2019-06-28 NOTE — SUBJECTIVE & OBJECTIVE
Interval History:   6/28: stable after step down, just received dialysis. Afebrile, vitals stable. Na 134. Platelets 84.     Medications:  Continuous Infusions:  Scheduled Meds:   amLODIPine  10 mg Oral Daily    carvedilol  25 mg Oral BID    cloNIDine  0.1 mg Oral TID    epoetin shon-ebpx (RETACRIT) injection  50 Units/kg (Dosing Weight) Intravenous Every Mon, Wed, Fri    fluticasone furoate-vilanterol  1 puff Inhalation Daily    hydrALAZINE  100 mg Oral Q8H    levETIRAcetam  500 mg Per NG tube BID    losartan  100 mg Oral QHS    polyethylene glycol  17 g Oral Daily     PRN Meds:sodium chloride 0.9%, acetaminophen, albuterol-ipratropium, Dextrose 10% Bolus, Dextrose 10% Bolus, glucagon (human recombinant), glucose, glucose, insulin aspart U-100, ondansetron, senna-docusate 8.6-50 mg, sodium chloride 0.9%, sodium chloride 0.9%     Review of Systems  Objective:     Weight: 51.7 kg (113 lb 15.7 oz)  Body mass index is 20.19 kg/m².  Vital Signs (Most Recent):  Temp: 97.7 °F (36.5 °C) (06/28/19 1600)  Pulse: (!) 52 (06/28/19 1600)  Resp: 16 (06/28/19 1600)  BP: (!) 124/47 (06/28/19 1600)  SpO2: 97 % (06/28/19 1056) Vital Signs (24h Range):  Temp:  [96 °F (35.6 °C)-97.9 °F (36.6 °C)] 97.7 °F (36.5 °C)  Pulse:  [47-60] 52  Resp:  [16-30] 16  SpO2:  [97 %-100 %] 97 %  BP: (110-143)/(42-86) 124/47     Date 06/28/19 0700 - 06/29/19 0659   Shift 4053-1418 6458-0623 1683-3276 24 Hour Total   INTAKE   Other  600  600   Shift Total(mL/kg)  600(11.6)  600(11.6)   OUTPUT   Other  1600  1600   Shift Total(mL/kg)  1600(30.9)  1600(30.9)   Weight (kg) 51.7 51.7 51.7 51.7                        Hemodialysis AV Fistula 05/23/16 0700 Left upper arm (Active)   Needle Size 15ga 6/28/2019  4:00 PM   Site Assessment Clean;Dry;Intact 6/28/2019  4:00 PM   Patency Present;Thrill;Bruit 6/28/2019  4:00 PM   Status Deaccessed 6/28/2019  4:00 PM   Flows Good 6/28/2019  1:00 PM   Dressing Intervention New dressing 6/28/2019  4:00 PM    Dressing Status Clean;Dry;Intact 6/28/2019  4:00 PM   Site Condition No complications 6/28/2019  4:00 PM   Dressing Gauze 6/28/2019  4:00 PM            Hemodialysis AV Fistula Left upper arm (Active)       Neurosurgery Physical Exam  PERRL, EOMI FS TM  L hemiparesis otherwise 5/5 RUE/RLE  Strength on left side improving.   4-/5 LLE, 4+/5 LUE  SILT  Inc- c/d/i     Significant Labs:  Recent Labs   Lab 06/27/19  0215 06/27/19  1006 06/28/19  0320     --  96   * 135* 134*   K 4.0  --  4.1     --  102   CO2 23  --  23   BUN 12  --  26*   CREATININE 2.8*  --  4.4*   CALCIUM 9.0  --  9.4   MG 2.0  --  2.1     Recent Labs   Lab 06/27/19  0215 06/28/19  0320   WBC 4.91 4.46   HGB 7.8* 7.4*   HCT 26.4* 24.9*   PLT 93* 84*     Recent Labs   Lab 06/27/19  1006   INR 1.1     Microbiology Results (last 7 days)     Procedure Component Value Units Date/Time    Blood culture [[id number]] Collected:  06/22/19 0656    Order Status:  Completed Specimen:  Blood Updated:  06/27/19 0822     Blood Culture, Routine No growth after 5 days.    Blood culture [[id number]] Collected:  06/22/19 0656    Order Status:  Completed Specimen:  Blood Updated:  06/27/19 0822     Blood Culture, Routine No growth after 5 days.        Recent Lab Results       06/28/19  1545   06/28/19  0320        Albumin   2.6     Alkaline Phosphatase   83     ALT   <5     Anion Gap   9     AST   13     Baso #   0.03     Basophil%   0.7     BILIRUBIN TOTAL   0.3  Comment:  For infants and newborns, interpretation of results should be based  on gestational age, weight and in agreement with clinical  observations.  Premature Infant recommended reference ranges:  Up to 24 hours.............<8.0 mg/dL  Up to 48 hours............<12.0 mg/dL  3-5 days..................<15.0 mg/dL  6-29 days.................<15.0 mg/dL       BUN, Bld   26     Calcium   9.4     Chloride   102     CO2   23     Creatinine   4.4     Differential Method   Automated     eGFR if     10.5     eGFR if non    9.1  Comment:  Calculation used to obtain the estimated glomerular filtration  rate (eGFR) is the CKD-EPI equation.        Eos #   0.1     Eosinophil%   2.2     Glucose   96     Gran # (ANC)   3.3     Gran%   73.1     Hematocrit   24.9     Hemoglobin   7.4     Immature Grans (Abs)   0.04  Comment:  Mild elevation in immature granulocytes is non specific and   can be seen in a variety of conditions including stress response,   acute inflammation, trauma and pregnancy. Correlation with other   laboratory and clinical findings is essential.       Immature Granulocytes   0.9     Lymph #   0.4     Lymph%   9.6     Magnesium   2.1     MCH   28.5     MCHC   29.7     MCV   96     Mono #   0.6     Mono%   13.5     MPV   12.9     nRBC   0     Phosphorus   4.6     Platelets   84     POCT Glucose 80       Potassium   4.1     PROTEIN TOTAL   5.9     RBC   2.60     RDW   16.6     Sodium   134     WBC   4.46           Significant Diagnostics:  CT: No results found in the last 24 hours.  Echoencephalography: No results found in the last 24 hours.  MRI: No results found in the last 24 hours.

## 2019-06-28 NOTE — PLAN OF CARE
Problem: Occupational Therapy Goal  Goal: Occupational Therapy Goal  Goals to be met by: 7/28/2019    Patient will increase functional independence with ADLs by performing:    UE Dressing with Set-up Assistance.  LE Dressing with Set-up Assistance and Assistive Devices as needed.  Grooming while standing at sink with Set-up Assistance.  Toileting from toilet with Modified [place] for hygiene and clothing management.   Bathing from  standing at sink with Set-up Assistance and Assistive Devices as needed.  Toilet transfer to toilet with Contact Guard Assistance adaptive tech as necessary.    Outcome: Ongoing (interventions implemented as appropriate)  Eval complete. Pt tolerated session well. Initiate OT POC.

## 2019-06-28 NOTE — PLAN OF CARE
Hospital day # 7. Pt. stepped down from NCC to floor. D/C needs to be determined. SW/CM will follow.        06/28/19 0803   Right Care Assessment   Can the patient answer the patient profile reliably? Yes, cognitively intact   How often would a person be available to care for the patient? Whenever needed   Describe the patient's ability to walk at the present time. Walks with the help of equipment   How does the patient rate their overall health at the present time? Poor   Number of comorbid conditions (as recorded on the chart) Five or more

## 2019-06-28 NOTE — HOSPITAL COURSE
6/26/19: Passed bedside swallow evaluation.  SLP recommending dental soft diet and thin liquids.  06/28/2019: Therapy evaluations pending.

## 2019-06-28 NOTE — PLAN OF CARE
Problem: SLP Goal  Goal: SLP Goal  Speech-Language Pathology Goals  Goals to be met by 7/5/19  1. Patient will recall 4/4 unrelated objects with a 5-minute delay and min assist.   2. Patient will answer problem solving/safety awareness questions with 80% accuracy and min assist.   3. Patient will independently follow complex commands with 75% accuracy.  4. Patient will participate in ongoing assessment of high-level cognition, language, reading, writing and visuospatial skills.  5. Educate patient regarding role of ST in her POC.  Patient seen for a speech/language/cognitive eval.     Lul Mora CCC-SLP  Speech-Language Pathology  Pager: [phone]

## 2019-06-28 NOTE — PHARMACY MED REC
"Admission Medication Reconciliation - Pharmacy Consult Note    The home medication history was taken by Staci Moy, pharmacy technician.  Based on information gathered and subsequent review by the clinical pharmacist, the items below may need attention.     You may go to "Admission" then "Reconcile Home Medications" tabs to review and/or act upon these items.     Potentially problematic discrepancies with current MAR  o Patient IS taking the following which was not ordered upon admit  o Ergocalciferol 50 000 U Weekly   o Renvela 800 mg TID   o Patient is taking a drug DIFFERENTLY than how ordered upon admit  o Coreg 12.5 mg BID   o Losartan 50 mg QD     Please address this information as you see fit.  Feel free to contact us if you have any questions or require assistance.  Marian Steven  EXT [phone]     .    .            "

## 2019-06-28 NOTE — ASSESSMENT & PLAN NOTE
Nonrheumatic aortic valve stenosis  Non-rheumatic tricuspid valve insufficiency    - TTE 5/22 shows EF 53, grade II DD, severe LAE, moderate TR and MR, PASP 77, CVP 15 mm  - continue GDMT, as tolerated

## 2019-06-28 NOTE — PLAN OF CARE
Problem: Adult Inpatient Plan of Care  Goal: Plan of Care Review  Outcome: Ongoing (interventions implemented as appropriate)  Up in chair 8 hours today, tolerated well.  VS stable, neuro intact.

## 2019-06-28 NOTE — ASSESSMENT & PLAN NOTE
79F PMH ESRD, CHF, COPD, and right MCA stroke with known R convexity SDH, now with expansion and chronicificaiton. No s/p R sided nikki holes for SDH evacuation.  Exam stable with 4-/5 LUE and LLE strength. SD drain removed yesterday    Neuro stable  CTH stable after drain removal  Stop ppx Abx.   SBP <160  Stepped down to medicine for significant comorbidities ESRD, CHF, COPD  Neurologically stable from subdural, no HOB restrictions  Call neurosurgery with any questions, will sign off at this time  Will arrange follow up for staple removal and monitor incision

## 2019-06-28 NOTE — PROGRESS NOTES
[facility] HEMODIALYSIS NOTE     Patient currently on hemodialysis for removal of uremic toxins and volume.     Patient seen and evaluated on hemodialysis, tolerating treatment, see HD flowsheet for vitals and assessments.      Ultrafiltration goal is 1L as tolerated, keep map >65      Labs have been reviewed and the dialysate bath has been adjusted.     Assessment/Plan:    -Patient seen on HD, tolerating treatment well, w/o complaints   -Renal diet  -Strict I/O's and daily weights  -Daily renal function panels  -Hbg 7.4, will start Epo with HD   -Will continue to follow while inpatient       SHANEKA Tuttle, AGNP-C  Nephrology  Pager:  [phone]

## 2019-06-28 NOTE — ASSESSMENT & PLAN NOTE
CXR reveals chronic R moderate pleural effusion (has been present since at least 2016), now appears to be more symptomatic than baseline. Stable on home 2L NC

## 2019-06-28 NOTE — CONSULTS
Inpatient consult to Physical Medicine Rehab  Consult performed by: Geno Boudreaux NP  Consult ordered by: Carmen Ross MD  Reason for consult: assess rehab needs        Reviewed patient history and current admission.  Rehab team following.  Full consult to follow.    SHANEKA Hoffman, FNP-C  Physical Medicine & Rehabilitation   06/28/2019  Spectralink: [phone]

## 2019-06-28 NOTE — CONSULTS
[facility]  Physical Medicine & Rehab  Consult Note    Patient Name: Tea Georges  MRN: [id number]  Admission Date: 6/21/2019  Hospital Length of Stay: 4 days  Attending Physician: Salvador Ayala MD     Inpatient consult to Physical Medicine & Rehabilitation  Consult performed by: Geno Boudreaux NP  Consult requested by:  Salvador Ayala MD    Reason for Consult:  assess rehabilitation needs  Consults  Subjective:     Principal Problem: Subdural hemorrhage    HPI: Tea Georges is a 79-year-old female with PMHx of ESRD on HD with anemia, Nonrheumatic aortic valve stenosis, CHF, COPD (2L O2 NC), right MCA stroke s/p thrombectomy in 2016. She recently has a bilateral acute SDH, R>L with right to left midline shift s/p fall while on plavix. She was discharged to SNF and has been cared for by family as she is reportedly bedbound.  Patient presented to [facility] on 6/21 s/p fall with SOB, lethargy, and AMS.  CTH revealed interval enlargement of right subdural hematoma with associated worsening localized mass effect and leftward midline shift, the latter of which now resulting in subfalcine herniation. NSGY consulted and now s/p evacuation of SDH on 6/23. Drain removed. Hospital course complicated by a witnessed seizure, L spastic hemiparesis, anemia, and moderate malnutrition.     Functional History: Per chart review, patient lives in [place] with daughter.  Recently at SNF.  Prior to admission, (A) with ADLs and bedbound per nursing.  Patient states she uses RW.    Hospital Course: 6/26/19: Passed bedside swallow evaluation.  SLP recommending dental soft diet and thin liquids.  06/28/2019: Therapy evaluations pending.       Past Medical History:   Diagnosis Date    Anemia in ESRD (end-stage renal disease) 5/29/2016    Anticoagulant long-term use     Aortic atherosclerosis 11/22/2016    Aortic stenosis, moderate 2/18/2016    Asthma in adult without complication 1/8/2016    Bilateral low back pain without  sciatica 11/17/2015    Blindness of right eye 11/12/2016    Brain compression 6/22/2019    CAD (coronary artery disease) 12/12/2016    Cataract     Central retinal vein occlusion, right eye 6/3/2014    CHF (congestive heart failure)     Chronic diastolic heart failure 1/8/2016    Chronic respiratory failure with hypoxia 5/29/2016    COPD (chronic obstructive pulmonary disease) 1/15/2017    Dependence on hemodialysis     Mon-Wed-Fri    Diverticulosis     Embolic stroke involving right middle cerebral artery     Encounter for blood transfusion     Enlarged LA (left atrium) 10/7/2016    Epiretinal membrane 7/17/2012    ESRD (end stage renal disease)     Essential hypertension 1/8/2016    History of GI diverticular bleed     5/22/16    Left flaccid hemiparesis 10/1/2016    Peripheral vascular disease, unspecified 11/22/2016    Right-sided cerebrovascular accident (CVA) 11/22/2016    Seizure 6/24/2019    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 11/13/2016    Type 2 diabetes mellitus with kidney complication, without long-term current use of insulin 5/1/2018    Type 2 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, without long-term current use of insulin 3/26/2013    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, without long-term current use of insulin 3/26/2013    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     Past Surgical History:   Procedure Laterality Date    ABDOMINAL SURGERY      ANGIOGRAM-CEREBRAL N/A 6/24/2019    Performed by Kenisha Surgeon at [facility]    BREAST SURGERY      tumor removal x 2    KATE HOLES FOR SUBDURAL HEMATOMA EVACUATION Right 6/23/2019    Performed by Trevor Conner MD at [facility] OR 2ND FLR    CARDIAC SURGERY      CATARACT EXTRACTION      CHOLECYSTECTOMY      COLONOSCOPY N/A 5/30/2016    Performed by Sam Davis MD at [facility] ENDO (2ND FLR)    COLONOSCOPY  N/A 5/23/2016    Performed by WILLIAM Colvin MD at [facility] ENDO (2ND FLR)    CREATION, CRANIAL KATE HOLE, WITH HEMATOMA EVACUATION, SUBDURAL, possible crani Right 6/23/2019    Performed by Trevor Conner MD at [facility] OR 2ND FLR    ESOPHAGOGASTRODUODENOSCOPY (EGD) N/A 5/30/2016    Performed by Sam Davis MD at [facility] ENDO (2ND FLR)    ESOPHAGOGASTRODUODENOSCOPY (EGD) N/A 5/27/2016    Performed by Cesario Rubio MD at [facility] ENDO (2ND FLR)    EXCISION, ANEURYSM Left 11/29/2018    Performed by NADINE Blum III, MD at [facility] OR [address] [place] [address]    EYE SURGERY      Fistulogram Left 11/28/2018    Performed by NADINE Blum III, MD at [facility] CATH LAB    INSERTION, CATHETER, VASCULAR, DUAL LUMEN Right 11/29/2018    [facility]    PTA, Fistula  11/28/2018    Performed by NADINE Blum III, MD at [facility] CATH LAB    REVISION, AV FISTULA, Left 11/29/2018    Performed by NADINE Blum III, MD at [facility] OR [address] [place] [address]    UPPER GASTROINTESTINAL ENDOSCOPY       Review of patient's allergies indicates:  No Known Allergies    Scheduled Medications:    sodium chloride 0.9%   Intravenous Once    amLODIPine  10 mg Oral Daily    carvedilol  25 mg Oral BID    cloNIDine  0.1 mg Oral TID    fluticasone furoate-vilanterol  1 puff Inhalation Daily    hydrALAZINE  100 mg Oral Q8H    levETIRAcetam  500 mg Per NG tube BID    losartan  100 mg Oral QHS    polyethylene glycol  17 g Oral Daily       PRN Medications: sodium chloride 0.9%, acetaminophen, albuterol-ipratropium, Dextrose 10% Bolus, Dextrose 10% Bolus, glucagon (human recombinant), glucose, glucose, insulin aspart U-100, ondansetron, oxyCODONE, senna-docusate 8.6-50 mg, sodium chloride 0.9%, sodium chloride 0.9%    Family History     Problem Relation (Age of Onset)    Cancer Sister    Cataracts Mother    Esophageal cancer Sister    Glaucoma Brother, Maternal Aunt    Heart disease Brother    Heart failure Sister    Hypertension  Mother, Sister, Brother, Father    No Known Problems Maternal Uncle, Paternal Aunt, Paternal Uncle, Maternal Grandmother, Maternal Grandfather, Paternal Grandmother, Paternal Grandfather    Stroke Mother        Tobacco Use    Smoking status: Never Smoker    Smokeless tobacco: Never Used   Substance and Sexual Activity    Alcohol use: No     Comment: Reports occasional 1-2 drinks     Drug use: No    Sexual activity: Never     Review of Systems   Reason unable to perform ROS: limited 2/2 somnolence and cognition    Constitutional: Positive for activity change.   HENT: Positive for trouble swallowing.    Musculoskeletal: Positive for gait problem.   Neurological: Positive for weakness.   Psychiatric/Behavioral: Positive for confusion.     Objective:     Vital Signs (Most Recent):  Temp: 97.3 °F (36.3 °C) (06/28/19 0855)  Pulse: (!) 56 (06/28/19 0855)  Resp: 18 (06/28/19 0855)  BP: 120/82 (06/28/19 0855)  SpO2: 100 % (06/28/19 0855)    Vital Signs (24h Range):  Temp:  [96 °F (35.6 °C)-97.9 °F (36.6 °C)] 97.3 °F (36.3 °C)  Pulse:  [47-60] 56  Resp:  [11-33] 18  SpO2:  [97 %-100 %] 100 %  BP: (115-143)/(56-86) 120/82     Body mass index is 20.19 kg/m².    Physical Exam   Constitutional: She is oriented to person, place, and time. She appears well-developed and well-nourished. She is sleeping. She is easily aroused.   HENT:   S/p R crani    Eyes: Right eye exhibits no discharge. Left eye exhibits no discharge.   R eye abnormality    Neck: Neck supple.   Cardiovascular: Normal rate and intact distal pulses.   Pulmonary/Chest: Effort normal. No respiratory distress.   Abdominal: Soft. There is no tenderness.   Musculoskeletal: She exhibits no edema or deformity.   Spontaneously moved BLE  B/l  3/5    Neurological: She is oriented to person, place, and time and easily aroused.   Awakens to voice and tactile stimuli  Follows commands  Possible cognitive deficits  Skin: Skin is warm and dry.   Psychiatric: She has a  normal mood and affect. Her behavior is normal.   Vitals reviewed.    Diagnostic Results:   Labs: Reviewed  ECG: Reviewed  X-Ray: Reviewed  CT: Reviewed    Assessment/Plan:     * Subdural hemorrhage  -h/o right MCA stroke with known R convexity SDH, now with expansion  -now s/p R sided nikki holes for SDH evacuation on 6/23 with NSGY  -drain removed     See hospital course for functional, cognitive/speech/language, and nutrition/swallow status.      Recommendations  -  Encourage mobility, OOB in chair at least 3 hours per day, and early ambulation as appropriate   -  PT/OT evaluate and treat  -  SLP speech and cognitive evaluate and treat  -  Monitor sleep disturbances and establish consistent sleep-wake cycle  -  Monitor for bowel and bladder dysfunction  -  Monitor for shoulder pain, subluxation, & spasticity  -  Monitor for and prevent skin breakdown and pressure ulcers  · Early mobility, repositioning/weight shifting every 20-30 minutes when sitting, turn patient every 2 hours, proper mattress/overlay and chair cushioning, pressure relief/heel protector boots  -  DVT prophylaxis (if appropriate)  -  Reviewed discharge options (IP rehab, SNF, HH therapy, and OP therapy)    Physical debility  -previously at SNF  -required (A) from family and per nursing note is bedbound?  -patient states she uses a RW    Left spastic hemiparesis  -PT/OT evaluate and treat     Anemia in ESRD (end-stage renal disease)  -primary managing     ESRD on hemodialysis  -Nephrology following  -on HD     Therapy evaluations pending. Will follow progress and discuss with rehab team for post acute care/rehab recommendation.      Thank you for your consult.     Geno Boudreaux NP  Department of Physical Medicine & Rehab  [facility]

## 2019-06-28 NOTE — ASSESSMENT & PLAN NOTE
PT/OT consulted  appears there are social issues and home and family can no longer care for patient who requires 24 hour care.

## 2019-06-28 NOTE — PLAN OF CARE
Problem: Infection (Hemodialysis)  Goal: Absence of Infection Signs/Symptoms    Intervention: Prevent or Manage Infection  AT Maintained while accessing LAF to prevent infectio.

## 2019-06-28 NOTE — PT/OT/SLP EVAL
"Occupational Therapy   Evaluation    Name: Tea Georges  MRN: [id number]  Admitting Diagnosis:  Subdural hemorrhage 4 Days Post-Op    Recommendations:     Discharge Recommendations: rehabilitation facility  Discharge Equipment Recommendations:  commode, shower chair  Barriers to discharge:  Inaccessible home environment, Decreased caregiver support    Assessment:     Tea Georges is a 79 y.o. female with a medical diagnosis of Subdural hemorrhage.  She presents with generalized weakness and poor endurance w/ limits her occupational performance. Performance deficits affecting function: weakness, impaired endurance, impaired self care skills, impaired functional mobilty, gait instability, impaired balance, decreased coordination, decreased upper extremity function, decreased lower extremity function, impaired cardiopulmonary response to activity.      Rehab Prognosis: Fair; patient would benefit from acute skilled OT services to address these deficits and reach maximum level of function.       Plan:     Patient to be seen 4 x/week to address the above listed problems via self-care/home management, therapeutic activities, neuromuscular re-education, therapeutic exercises  · Plan of Care Expires: 07/28/19  · Plan of Care Reviewed with: patient    Subjective     Chief Complaint: Fatigue  Patient/Family Comments/goals: "You woke me up from my nap, I should punch you."    Occupational Profile:  Living Environment: Prior to previous hospitalization, pt lived with her daughter and 2 granddaughters in 1SH with 5 MARY, handrail present at back entrance. Since return home from SNF, pt has been staying at her brother's house, which is also 1-story with 5 MARY, no handrails present.   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 "     Pain/Comfort:  · Pain Rating 1: 0/10  · Pain Rating Post-Intervention 1: 0/10    Patients cultural, spiritual, Hindu conflicts given the current situation: no    Objective:     Communicated with: RN prior to session.  Patient found HOB elevated with telemetry, oxygen upon OT entry to room.    General Precautions: Standard,     Orthopedic Precautions:N/A   Braces: N/A     Occupational Performance:    Bed Mobility:    · Patient completed Rolling/Turning to Left with  minimum assistance  · Patient completed Rolling/Turning to Right with stand by assistance  · Patient completed Scooting/Bridging with stand by assistance  · Patient completed Supine to Sit with maximal assistance  · Patient completed Sit to Supine with contact guard assistance    Functional Mobility/Transfers:  · Patient completed Sit <> Stand Transfer with minimum assistance  with  no assistive device   · Functional Mobility: Pt took 3 lateral steps toward HOB, w/ Min A and HHA.     Activities of Daily Living:  · Upper Body Dressing: minimum assistance seated at EOB w/ setup and HHA w/ bringing gown around.   · Lower Body Dressing: stand by assistance seated EOB to don/[facility] sock.     Cognitive/Visual Perceptual:  Cognitive/Psychosocial Skills:     -       Oriented to: Person, Place, Time and Situation   -       Follows Commands/attention:Follows one-step commands  -       Communication: clear/fluent  -       Memory: No Deficits noted  -       Safety awareness/insight to disability: impaired   -       Mood/Affect/Coping skills/emotional control: Cooperative  Visual/Perceptual:      -Intact  -able to maintain eye contact, darrel no glasses worn or present during eval    Physical Exam:  Balance:    -       Grossly Fair -   Skin integrity: Wound on R side of head secured w/ staples, dry, and clean.   Dominant hand:    -       RH  Upper Extremity Range of Motion:     -       Right Upper Extremity: WFL  -       Left Upper Extremity: WFL  Upper  Extremity Strength:    -       Right Upper Extremity: WFL  -       Left Upper Extremity: WFL   Strength:    -       Right Upper Extremity: WFL  -       Left Upper Extremity: WFL  Fine Motor Coordination:    -       Intact  Gross motor coordination:   WFL    AMPAC 6 Click ADL:  AMPAC Total Score: 22    Treatment & Education:  -Pt edu on OT role/POC  -Importance of OOB activity with staff assistance  -Safety during functional t/f and mobility  - White board updated  - Multiple self care tasks/functional mobility completed-- assistance level noted above  - All questions/concerns answered within OT scope of practice    Education:    Patient left HOB elevated with all lines intact, call button in reach and RN notified    GOALS:   Multidisciplinary Problems     Occupational Therapy Goals        Problem: Occupational Therapy Goal    Goal Priority Disciplines Outcome Interventions   Occupational Therapy Goal     OT, PT/OT Ongoing (interventions implemented as appropriate)    Description:  Goals to be met by: 7/28/2019    Patient will increase functional independence with ADLs by performing:    UE Dressing with Set-up Assistance.  LE Dressing with Set-up Assistance and Assistive Devices as needed.  Grooming while standing at sink with Set-up Assistance.  Toileting from toilet with Modified [place] for hygiene and clothing management.   Bathing from  standing at sink with Set-up Assistance and Assistive Devices as needed.  Toilet transfer to toilet with Contact Guard Assistance adaptive tech as necessary.                      History:     Past Medical History:   Diagnosis Date    Anemia in ESRD (end-stage renal disease) 5/29/2016    Anticoagulant long-term use     Aortic atherosclerosis 11/22/2016    Aortic stenosis, moderate 2/18/2016    Asthma in adult without complication 1/8/2016    Bilateral low back pain without sciatica 11/17/2015    Blindness of right eye 11/12/2016    Brain compression 6/22/2019     CAD (coronary artery disease) 12/12/2016    Cataract     Central retinal vein occlusion, right eye 6/3/2014    CHF (congestive heart failure)     Chronic diastolic heart failure 1/8/2016    Chronic respiratory failure with hypoxia 5/29/2016    COPD (chronic obstructive pulmonary disease) 1/15/2017    Dependence on hemodialysis     Mon-Wed-Fri    Diverticulosis     Embolic stroke involving right middle cerebral artery     Encounter for blood transfusion     Enlarged LA (left atrium) 10/7/2016    Epiretinal membrane 7/17/2012    ESRD (end stage renal disease)     Essential hypertension 1/8/2016    History of GI diverticular bleed     5/22/16    Left flaccid hemiparesis 10/1/2016    Peripheral vascular disease, unspecified 11/22/2016    Right-sided cerebrovascular accident (CVA) 11/22/2016    Seizure 6/24/2019    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 11/13/2016    Type 2 diabetes mellitus with kidney complication, without long-term current use of insulin 5/1/2018    Type 2 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, without long-term current use of insulin 3/26/2013    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, without long-term current use of insulin 3/26/2013    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       Past Surgical History:   Procedure Laterality Date    ABDOMINAL SURGERY      ANGIOGRAM-CEREBRAL N/A 6/24/2019    Performed by Kenisha Surgeon at [facility] K[facility]ISHA    BREAST SURGERY      tumor removal x 2    KATE HOLES FOR SUBDURAL HEMATOMA EVACUATION Right 6/23/2019    Performed by Trevor Conner MD at [facility] OR 2ND FLR    CARDIAC SURGERY      CATARACT EXTRACTION      CHOLECYSTECTOMY      COLONOSCOPY N/A 5/30/2016    Performed by Sam Davis MD at [facility] ENDO (2ND FLR)    COLONOSCOPY N/A 5/23/2016    Performed by WILLIAM Colvin MD at [facility] ENDO (2ND FLR)    CREATION,  CRANIAL KATE HOLE, WITH HEMATOMA EVACUATION, SUBDURAL, possible crani Right 6/23/2019    Performed by [facility] MD at [facility] OR [address] [place] [address]    ESOPHAGOGASTRODUODENOSCOPY (EGD) N/A 5/30/2016    Performed by Sam Davis MD at [facility] ENDO (2ND FLR)    ESOPHAGOGASTRODUODENOSCOPY (EGD) N/A 5/27/2016    Performed by Cesario Rubio MD at [facility] ENDO (2ND FLR)    EXCISION, ANEURYSM Left 11/29/2018    Performed by NADINE Blum III, MD at [facility] OR [address] [place] [address]    EYE SURGERY      Fistulogram Left 11/28/2018    Performed by NADINE Blum III, MD at [facility] CATH LAB    INSERTION, CATHETER, VASCULAR, DUAL LUMEN Right 11/29/2018    Performed by NADINE Blum III, MD at [facility] OR [address] [place] [address]    PTA, Fistula  11/28/2018    Performed by NADINE Blum III, MD at [facility] CATH LAB    REVISION, AV FISTULA, Left 11/29/2018    Performed by NADINE Blum III, MD at [facility] OR [address] [place] [address]    UPPER GASTROINTESTINAL ENDOSCOPY         Time Tracking:     OT Date of Treatment: 06/28/19  OT Start Time: 1102  OT Stop Time: 1118  OT Total Time (min): 16 min    Billable Minutes:Evaluation 16 mins    Melvin Modi, OT  6/28/2019

## 2019-06-28 NOTE — PLAN OF CARE
Problem: Adult Inpatient Plan of Care  Goal: Plan of Care Review  Outcome: Ongoing (interventions implemented as appropriate)  Hemodialysis tx complete, 1L removed a 3 hour tx, tolerated well. Blood returned via [place] AVG, 15g needles removed x2, gauze and tape applied, pressure held to each site for 5 minutes hemostasis achieved

## 2019-06-28 NOTE — SUBJECTIVE & OBJECTIVE
Past Medical History:   Diagnosis Date    Anemia in ESRD (end-stage renal disease) 5/29/2016    Anticoagulant long-term use     Aortic atherosclerosis 11/22/2016    Aortic stenosis, moderate 2/18/2016    Asthma in adult without complication 1/8/2016    Bilateral low back pain without sciatica 11/17/2015    Blindness of right eye 11/12/2016    Brain compression 6/22/2019    CAD (coronary artery disease) 12/12/2016    Cataract     Central retinal vein occlusion, right eye 6/3/2014    CHF (congestive heart failure)     Chronic diastolic heart failure 1/8/2016    Chronic respiratory failure with hypoxia 5/29/2016    COPD (chronic obstructive pulmonary disease) 1/15/2017    Dependence on hemodialysis     Mon-Wed-Fri    Diverticulosis     Embolic stroke involving right middle cerebral artery     Encounter for blood transfusion     Enlarged LA (left atrium) 10/7/2016    Epiretinal membrane 7/17/2012    ESRD (end stage renal disease)     Essential hypertension 1/8/2016    History of GI diverticular bleed     5/22/16    Left flaccid hemiparesis 10/1/2016    Peripheral vascular disease, unspecified 11/22/2016    Right-sided cerebrovascular accident (CVA) 11/22/2016    Seizure 6/24/2019    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 11/13/2016    Type 2 diabetes mellitus with kidney complication, without long-term current use of insulin 5/1/2018    Type 2 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, without long-term current use of insulin 3/26/2013    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, without long-term current use of insulin 3/26/2013    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     Past Surgical History:   Procedure Laterality Date    ABDOMINAL SURGERY      ANGIOGRAM-CEREBRAL N/A 6/24/2019    [facility]    BREAST SURGERY      tumor removal x 2    KATE  HOLES FOR SUBDURAL HEMATOMA EVACUATION Right 6/23/2019    Performed by Trevor Conner MD at [facility] OR [address] [place] [address]    CARDIAC SURGERY      CATARACT EXTRACTION      CHOLECYSTECTOMY      COLONOSCOPY N/A 5/30/2016    Performed by Sam Davis MD at [facility] ENDO (2ND FLR)    COLONOSCOPY N/A 5/23/2016    Performed by WILLIAM Colvin MD at [facility] ENDO (2ND FLR)    CREATION, CRANIAL KATE HOLE, WITH HEMATOMA EVACUATION, SUBDURAL, possible crani Right 6/23/2019    Performed by Trevor Conner MD at [facility] OR [address] [place] [address]    ESOPHAGOGASTRODUODENOSCOPY (EGD) N/A 5/30/2016    Performed by [facility] at [facility] (2ND FLR)    ESOPHAGOGASTRODUODENOSCOPY (EGD) N/A 5/27/2016    Performed by Cesario Rubio MD at [facility] (2ND FLR)    EXCISION, ANEURYSM Left 11/29/2018    Performed by NADINE Blum III, MD at [facility] OR [address] [place] [address]    EYE SURGERY      Fistulogram Left 11/28/2018    Performed by NADINE Blum III, MD at [facility] CATH LAB    INSERTION, CATHETER, VASCULAR, DUAL LUMEN Right 11/29/2018    Performed by NADINE Blum III, MD at [facility] OR [address] [place] [address]    PTA, Fistula  11/28/2018    Performed by NADINE Blum III, MD at [facility] CATH LAB    REVISION, AV FISTULA, Left 11/29/2018    Performed by NADINE Blum III, MD at [facility] OR [address] [place] [address]    UPPER GASTROINTESTINAL ENDOSCOPY       Review of patient's allergies indicates:  No Known Allergies    Scheduled Medications:    sodium chloride 0.9%   Intravenous Once    amLODIPine  10 mg Oral Daily    carvedilol  25 mg Oral BID    cloNIDine  0.1 mg Oral TID    fluticasone furoate-vilanterol  1 puff Inhalation Daily    hydrALAZINE  100 mg Oral Q8H    levETIRAcetam  500 mg Per NG tube BID    losartan  100 mg Oral QHS    polyethylene glycol  17 g Oral Daily       PRN Medications: sodium chloride 0.9%, acetaminophen, albuterol-ipratropium, Dextrose 10% Bolus, Dextrose 10% Bolus, glucagon (human recombinant), glucose, glucose, insulin aspart U-100, ondansetron,  oxyCODONE, senna-docusate 8.6-50 mg, sodium chloride 0.9%, sodium chloride 0.9%    Family History     Problem Relation (Age of Onset)    Cancer Sister    Cataracts Mother    Esophageal cancer Sister    Glaucoma Brother, Maternal Aunt    Heart disease Brother    Heart failure Sister    Hypertension Mother, Sister, Brother, Father    No Known Problems Maternal Uncle, Paternal Aunt, Paternal Uncle, Maternal Grandmother, Maternal Grandfather, Paternal Grandmother, Paternal Grandfather    Stroke Mother        Tobacco Use    Smoking status: Never Smoker    Smokeless tobacco: Never Used   Substance and Sexual Activity    Alcohol use: No     Comment: Reports occasional 1-2 drinks     Drug use: No    Sexual activity: Never     Review of Systems   Reason unable to perform ROS: limited 2/2 somnolence and cognition    Constitutional: Positive for activity change.   HENT: Positive for trouble swallowing.    Musculoskeletal: Positive for gait problem.   Neurological: Positive for weakness.   Psychiatric/Behavioral: Positive for confusion.     Objective:     Vital Signs (Most Recent):  Temp: 97.3 °F (36.3 °C) (06/28/19 0855)  Pulse: (!) 56 (06/28/19 0855)  Resp: 18 (06/28/19 0855)  BP: 120/82 (06/28/19 0855)  SpO2: 100 % (06/28/19 0855)    Vital Signs (24h Range):  Temp:  [96 °F (35.6 °C)-97.9 °F (36.6 °C)] 97.3 °F (36.3 °C)  Pulse:  [47-60] 56  Resp:  [11-33] 18  SpO2:  [97 %-100 %] 100 %  BP: (115-143)/(56-86) 120/82     Body mass index is 20.19 kg/m².    Physical Exam   Constitutional: She is oriented to person, place, and time. She appears well-developed and well-nourished. She is sleeping. She is easily aroused.   HENT:   S/p R crani    Eyes: Right eye exhibits no discharge. Left eye exhibits no discharge.   R eye abnormality    Neck: Neck supple.   Cardiovascular: Normal rate and intact distal pulses.   Pulmonary/Chest: Effort normal. No respiratory distress.   Abdominal: Soft. There is no tenderness.   Musculoskeletal:  She exhibits no edema or deformity.   Spontaneously moved BLE  B/l  3/5    Neurological: She is oriented to person, place, and time and easily aroused.   Awakens to voice and tactile stimuli  Follows commands  Possible cognitive deficits     Skin: Skin is warm and dry.   Psychiatric: She has a normal mood and affect. Her behavior is normal.   Vitals reviewed.    NEUROLOGICAL EXAMINATION:     MENTAL STATUS   Oriented to person, place, and time.       Diagnostic Results:   Labs: Reviewed  ECG: Reviewed  X-Ray: Reviewed  CT: Reviewed

## 2019-06-28 NOTE — PLAN OF CARE
SW following for D/C needs. SW is in communication with patient's CM and patient's Care Team - IM3. SW will continue to follow.      06/28/19 0933   Post-Acute Status   Post-Acute Authorization Other   Post-Acute Placement Status Awaiting Internal Medical Clearance     Yoli Reed LMSW   - [facility]  Ext. [phone]

## 2019-06-28 NOTE — PROGRESS NOTES
Maintenance regular day 3.0 our hd tx initiated via LAF w/2 15g needles w/  achieved w/good flows noted. Lines taped securely and visible at all times

## 2019-06-28 NOTE — PLAN OF CARE
LEE faxed referral to Tina via  for review. SW following for D/C needs. SW is in communication with patient's CM and patient's Care Team - IM3. LEE will continue to follow.      06/28/19 1541   Post-Acute Status   Post-Acute Authorization Placement   Post-Acute Placement Status Referrals Sent     Yoli Reed LMSW   - [facility]  Ext. [phone]

## 2019-06-28 NOTE — ASSESSMENT & PLAN NOTE
-h/o right MCA stroke with known R convexity SDH, now with expansion  -now s/p R sided nikki holes for SDH evacuation on 6/23 with NSGY  -drain removed     See hospital course for functional, cognitive/speech/language, and nutrition/swallow status.      Recommendations  -  Encourage mobility, OOB in chair at least 3 hours per day, and early ambulation as appropriate   -  PT/OT evaluate and treat  -  SLP speech and cognitive evaluate and treat  -  Monitor sleep disturbances and establish consistent sleep-wake cycle  -  Monitor for bowel and bladder dysfunction  -  Monitor for shoulder pain, subluxation, & spasticity  -  Monitor for and prevent skin breakdown and pressure ulcers  · Early mobility, repositioning/weight shifting every 20-30 minutes when sitting, turn patient every 2 hours, proper mattress/overlay and chair cushioning, pressure relief/heel protector boots  -  DVT prophylaxis (if appropriate)  -  Reviewed discharge options (IP rehab, SNF, HH therapy, and OP therapy)

## 2019-06-28 NOTE — ASSESSMENT & PLAN NOTE
Hyperparathyroidism, secondary renal  Anemia in ESRD (end-stage renal disease)  HD MWF via LUE AVF  - nephrology consulted for HD while admitted  - H/H stable; EPO with dialysis, PRN  - strict I/O, daily weights

## 2019-06-28 NOTE — PLAN OF CARE
Problem: Physical Therapy Goal  Goal: Physical Therapy Goal  Goals to be met by: 19     Patient will increase functional independence with mobility by performin. Supine to sit with Contact Guard Assistance  2. Sit to stand transfer with Contact Guard Assistance  3. Bed to chair transfer with Contact Guard Assistance using AD as needed  4. Gait  x 150 feet with Contact Guard Assistance using AD as needed.  5. Lower extremity exercise program x15 reps, with supervision, in order to increase LE strength and (I) with functional mobility.      Outcome: Ongoing (interventions implemented as appropriate)  PT evaluation complete and appropriate goals established.    Marcella Lopez, PT, DPT   2019  [phone]

## 2019-06-28 NOTE — ASSESSMENT & PLAN NOTE
H/o recent bilateral acute SDH, R>L with right to left midline shift on plavix and reversed with platelets. No surgical intervention prformed per Nsgy recs. Appears plavix and ASA held since this event. Re-presented for malaise found to have enlarged SDH. 06/23/2019 s/p craniotomy by neurosurgery, SD placed. 6/25 Pt had MMA embolization. 6/27/19 SD drain removed, CT head was stable, stepped down to medicine 06/28/2019.    - Follow NSGY recs  - will need placement, SNF vs rehab  - PT/OT/Rehab consult

## 2019-06-28 NOTE — PROGRESS NOTES
Nursing Transfer Note       Transfer To NSU rm [id number]    Transfer via bed    Transfer with cardiac monitoring    Transported by CASSIDY Mccabe and PCT    Medicines sent: yes    Chart sent with patient: Yes    Notified: daughter    Bedside Neuro assessment performed: Yes    Bedside Handoff given to: CASSIDY Franco     Upon arrival to floor: cardiac monitor applied, patient oriented to room, call bell in reach and bed in lowest position; VSS. Neuro intact.

## 2019-06-28 NOTE — PT/OT/SLP EVAL
"Physical Therapy Evaluation    Patient Name:  Tea Georges   MRN:  [id number]    Recommendations:     Discharge Recommendations:  rehabilitation facility   Discharge Equipment Recommendations: shower chair   Barriers to discharge: Inaccessible home and Decreased caregiver support    Assessment:     Tea Georges is a 79 y.o. female admitted with a medical diagnosis of Subdural hemorrhage.  She presents with the following impairments/functional limitations:  weakness, impaired functional mobilty, impaired endurance, gait instability, impaired balance, impaired self care skills, impaired cardiopulmonary response to activity, decreased coordination, decreased safety awareness, visual deficits. Pt with increased fatigue this date, limiting mobility. Demo'd good sitting balance and BLE strength WFL. Pt would continue to benefit from skilled acute PT in order to address current deficits and progress functional mobility. Pt is at increased risk of falls given fall history, vision impairments, and safety awareness. Recommend IP rehab upon d/c in order to progress safety and (I) with mobility prior to return home.     Rehab Prognosis: Good; patient would benefit from acute skilled PT services to address these deficits and reach maximum level of function.    Recent Surgery: Procedure(s) (LRB):  ANGIOGRAM-CEREBRAL (N/A) 4 Days Post-Op    Plan:     During this hospitalization, patient to be seen 4 x/week to address the identified rehab impairments via gait training, therapeutic activities, therapeutic exercises, neuromuscular re-education and progress toward the following goals:    · Plan of Care Expires:  07/27/19    Subjective     Chief Complaint: fatigue  Patient/Family Comments/goals: "Drop it like it's hot... I can dance. I don't need any music."  Pain/Comfort:  · Pain Rating 1: 0/10    Patients cultural, spiritual, Evangelical conflicts given the current situation: no     Living Environment:  Prior to previous " hospitalization, pt lived with her daughter and 2 granddaughters in 1SH with 5 MARY, handrail present at back entrance. Since return home from SNF, pt has been staying at her brother's house, which is also 1-story with 5 MARY, no handrails present.   Pt reports that since return from SNF she has been ambulating and performing ADLs without assist or DME. Pt reports that she was receiving HH PTA.  Equipment used at home: oxygen, bedside commode, walker, rolling, wheelchair, cane, straight.  Upon discharge, patient will have assistance from family (however, pt is unsure amount of assist available upon d/c).    Objective:     Communicated with RN prior to session.  Patient found supine with telemetry, oxygen  upon PT entry to room.    General Precautions: Standard, aspiration, fall, vision impaired  Orthopedic Precautions:N/A   Braces: N/A     Exams:  · Cognitive Exam:  Patient is oriented to Person, Place, Time and Situation  · Sensation:    · -       Intact  · RLE ROM: WFL  · RLE Strength: WFL  · LLE ROM: WFL  · LLE Strength: WFL    Functional Mobility:  · Bed Mobility:     · Supine to Sit: maximal assistance (2* increased fatigue)  · Sit to Supine: contact guard assistance  · Transfers:     · Sit to Stand:  minimum assistance with no AD  · Gait: 3 L lateral steps along EOB with Kingsley and HHAx2   · Mild instability noted, but no overt LOB  · Further gait limited 2* fatigue and pt requesting return to supine      Therapeutic Activities and Exercises:   Pt educated on role of PT and PT POC. Pt verbalized understanding.   RN notified of pt performance and level of assist needed for transfers. Encouraged transfers to chair with nursing assist over the weekend.  RN to order waffle mattress overlay for pressure relief.     AM-PAC 6 CLICK MOBILITY  Total Score:16     Patient left supine with all lines intact, call button in reach, bed alarm on and RN notified.    GOALS:   Multidisciplinary Problems     Physical Therapy Goals         Problem: Physical Therapy Goal    Goal Priority Disciplines Outcome Goal Variances Interventions   Physical Therapy Goal     PT, PT/OT Ongoing (interventions implemented as appropriate)     Description:  Goals to be met by: 19     Patient will increase functional independence with mobility by performin. Supine to sit with Contact Guard Assistance  2. Sit to stand transfer with Contact Guard Assistance  3. Bed to chair transfer with Contact Guard Assistance using AD as needed  4. Gait  x 150 feet with Contact Guard Assistance using AD as needed.  5. Lower extremity exercise program x15 reps, with supervision, in order to increase LE strength and (I) with functional mobility.                        History:     Past Medical History:   Diagnosis Date    Anemia in ESRD (end-stage renal disease) 2016    Anticoagulant long-term use     Aortic atherosclerosis 2016    Aortic stenosis, moderate 2016    Asthma in adult without complication 2016    Bilateral low back pain without sciatica 2015    Blindness of right eye 2016    Brain compression 2019    CAD (coronary artery disease) 2016    Cataract     Central retinal vein occlusion, right eye 6/3/2014    CHF (congestive heart failure)     Chronic diastolic heart failure 2016    Chronic respiratory failure with hypoxia 2016    COPD (chronic obstructive pulmonary disease) 1/15/2017    Dependence on hemodialysis         Diverticulosis     Embolic stroke involving right middle cerebral artery     Encounter for blood transfusion     Enlarged LA (left atrium) 10/7/2016    Epiretinal membrane 2012    ESRD (end stage renal disease)     Essential hypertension 2016    History of GI diverticular bleed     16    Left flaccid hemiparesis 10/1/2016    Peripheral vascular disease, unspecified 2016    Right-sided cerebrovascular accident (CVA) 2016    Seizure  6/24/2019    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 11/13/2016    Type 2 diabetes mellitus with kidney complication, without long-term current use of insulin 5/1/2018    Type 2 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, without long-term current use of insulin 3/26/2013    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, without long-term current use of insulin 3/26/2013    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       Past Surgical History:   Procedure Laterality Date    ABDOMINAL SURGERY      ANGIOGRAM-CEREBRAL N/A 6/24/2019    Performed by Kenisha Surgeon at [facility] KENISHA    BREAST SURGERY      tumor removal x 2    KATE HOLES FOR SUBDURAL HEMATOMA EVACUATION Right 6/23/2019    Performed by Trevor Conner MD at [facility] OR [address] [place] [address]    CARDIAC SURGERY      CATARACT EXTRACTION      CHOLECYSTECTOMY      COLONOSCOPY N/A 5/30/2016    Performed by Sam Davis MD at [facility] ENDO (2ND FLR)    COLONOSCOPY N/A 5/23/2016    Performed by WILLIAM Colvin MD at [facility] ENDO (2ND FLR)    CREATION, CRANIAL KATE HOLE, WITH HEMATOMA EVACUATION, SUBDURAL, possible crani Right 6/23/2019    Performed by Trevor [facility]ner MD at [facility] OR 2ND FLR    ESOPHAGOGASTRODUODENOSCOPY (EGD) N/A 5/30/2016    Performed by Sam Davis MD at [facility] ENDO (2ND FLR)    ESOPHAGOGASTRODUODENOSCOPY (EGD) N/A 5/27/2016    Performed by Cesario Rubio MD at [facility] ENDO (2ND FLR)    EXCISION, ANEURYSM Left 11/29/2018    Performed by NADINE Blum III, MD at [facility] OR 2ND FLR    EYE SURGERY      Fistulogram Left 11/28/2018    Performed by NADINE Blum III, MD at [facility] CATH LAB    INSERTION, CATHETER, VASCULAR, DUAL LUMEN Right 11/29/2018    [facility]    PTA, Fistula  11/28/2018    Performed by NADINE Blum III, MD at [facility] CATH LAB    REVISION, AV FISTULA, Left 11/29/2018     Performed by [facility]INE [facility], [facility] at [facility] OR [address] [place] [address]    UPPER GASTROINTESTINAL ENDOSCOPY         Time Tracking:     PT Received On: 06/28/19  PT Start Time: 1057     PT Stop Time: 1116  PT Total Time (min): 19 min     Billable Minutes: Evaluation 19  (co-eval with OT)    Marcella Lopez, PT, DPT   6/28/2019  [phone]

## 2019-06-28 NOTE — PROGRESS NOTES
"[facility]  Neurosurgery  Progress Note    Subjective:     History of Present Illness: Patient is a 79 year old female known to this service, with a history of ESRD, CHF, COPD, and right MCA stroke who is admitted to medicine with complain of SOB as well as "not feeling right". She was previously admitted to [facility] in May with R acute SDH after a fall while on plavix. Nonoperative at that time. She was discharged to [facility], then home, but lost to follow up. Medicine team performed CTH which demonstrated increase in size and chronicification of R convexity SDH with mass effect. Patient reports intermittent headaches and some left sided weakness, denies nausea/vomiting. Not currently on anticoagulation.         Post-Op Info:  Procedure(s) (LRB):  ANGIOGRAM-CEREBRAL (N/A)   4 Days Post-Op     Interval History:   6/28: stable after step down, just received dialysis. Afebrile, vitals stable. Na 134. Platelets 84.     Medications:  Continuous Infusions:  Scheduled Meds:   amLODIPine  10 mg Oral Daily    carvedilol  25 mg Oral BID    cloNIDine  0.1 mg Oral TID    epoetin shon-ebpx (RETACRIT) injection  50 Units/kg (Dosing Weight) Intravenous Every Mon, Wed, Fri    fluticasone furoate-vilanterol  1 puff Inhalation Daily    hydrALAZINE  100 mg Oral Q8H    levETIRAcetam  500 mg Per NG tube BID    losartan  100 mg Oral QHS    polyethylene glycol  17 g Oral Daily     PRN Meds:sodium chloride 0.9%, acetaminophen, albuterol-ipratropium, Dextrose 10% Bolus, Dextrose 10% Bolus, glucagon (human recombinant), glucose, glucose, insulin aspart U-100, ondansetron, senna-docusate 8.6-50 mg, sodium chloride 0.9%, sodium chloride 0.9%     Review of Systems  Objective:     Weight: 51.7 kg (113 lb 15.7 oz)  Body mass index is 20.19 kg/m².  Vital Signs (Most Recent):  Temp: 97.7 °F (36.5 °C) (06/28/19 1600)  Pulse: (!) 52 (06/28/19 1600)  Resp: 16 (06/28/19 1600)  BP: (!) 124/47 (06/28/19 1600)  SpO2: 97 % (06/28/19 " 1056) Vital Signs (24h Range):  Temp:  [96 °F (35.6 °C)-97.9 °F (36.6 °C)] 97.7 °F (36.5 °C)  Pulse:  [47-60] 52  Resp:  [16-30] 16  SpO2:  [97 %-100 %] 97 %  BP: (110-143)/(42-86) 124/47     Date 06/28/19 0700 - 06/29/19 0659   Shift 7822-9812 8324-5925 3904-1893 24 Hour Total   INTAKE   Other  600  600   Shift Total(mL/kg)  600(11.6)  600(11.6)   OUTPUT   Other  1600  1600   Shift Total(mL/kg)  1600(30.9)  1600(30.9)   Weight (kg) 51.7 51.7 51.7 51.7                        Hemodialysis AV Fistula 05/23/16 0700 Left upper arm (Active)   Needle Size 15ga 6/28/2019  4:00 PM   Site Assessment Clean;Dry;Intact 6/28/2019  4:00 PM   Patency Present;Thrill;Bruit 6/28/2019  4:00 PM   Status Deaccessed 6/28/2019  4:00 PM   Flows Good 6/28/2019  1:00 PM   Dressing Intervention New dressing 6/28/2019  4:00 PM   Dressing Status Clean;Dry;Intact 6/28/2019  4:00 PM   Site Condition No complications 6/28/2019  4:00 PM   Dressing Gauze 6/28/2019  4:00 PM            Hemodialysis AV Fistula Left upper arm (Active)       Neurosurgery Physical Exam  PERRL, EOMI FS TM  L hemiparesis otherwise 5/5 RUE/RLE  Strength on left side improving.   4-/5 LLE, 4+/5 LUE  SILT  Inc- c/d/i     Significant Labs:  Recent Labs   Lab 06/27/19  0215 06/27/19  1006 06/28/19  0320     --  96   * 135* 134*   K 4.0  --  4.1     --  102   CO2 23  --  23   BUN 12  --  26*   CREATININE 2.8*  --  4.4*   CALCIUM 9.0  --  9.4   MG 2.0  --  2.1     Recent Labs   Lab 06/27/19  0215 06/28/19  0320   WBC 4.91 4.46   HGB 7.8* 7.4*   HCT 26.4* 24.9*   PLT 93* 84*     Recent Labs   Lab 06/27/19  1006   INR 1.1     Microbiology Results (last 7 days)     Procedure Component Value Units Date/Time    Blood culture [[id number]] Collected:  06/22/19 0656    Order Status:  Completed Specimen:  Blood Updated:  06/27/19 0822     Blood Culture, Routine No growth after 5 days.    Blood culture [[id number]] Collected:  06/22/19 0656    Order Status:  Completed  Specimen:  Blood Updated:  06/27/19 0822     Blood Culture, Routine No growth after 5 days.        Recent Lab Results       06/28/19  1545   06/28/19  0320        Albumin   2.6     Alkaline Phosphatase   83     ALT   <5     Anion Gap   9     AST   13     Baso #   0.03     Basophil%   0.7     BILIRUBIN TOTAL   0.3  Comment:  For infants and newborns, interpretation of results should be based  on gestational age, weight and in agreement with clinical  observations.  Premature Infant recommended reference ranges:  Up to 24 hours.............<8.0 mg/dL  Up to 48 hours............<12.0 mg/dL  3-5 days..................<15.0 mg/dL  6-29 days.................<15.0 mg/dL       BUN, Bld   26     Calcium   9.4     Chloride   102     CO2   23     Creatinine   4.4     Differential Method   Automated     eGFR if African American   10.5     eGFR if non    9.1  Comment:  Calculation used to obtain the estimated glomerular filtration  rate (eGFR) is the CKD-EPI equation.        Eos #   0.1     Eosinophil%   2.2     Glucose   96     Gran # (ANC)   3.3     Gran%   73.1     Hematocrit   24.9     Hemoglobin   7.4     Immature Grans (Abs)   0.04  Comment:  Mild elevation in immature granulocytes is non specific and   can be seen in a variety of conditions including stress response,   acute inflammation, trauma and pregnancy. Correlation with other   laboratory and clinical findings is essential.       Immature Granulocytes   0.9     Lymph #   0.4     Lymph%   9.6     Magnesium   2.1     MCH   28.5     MCHC   29.7     MCV   96     Mono #   0.6     Mono%   13.5     MPV   12.9     nRBC   0     Phosphorus   4.6     Platelets   84     POCT Glucose 80       Potassium   4.1     PROTEIN TOTAL   5.9     RBC   2.60     RDW   16.6     Sodium   134     WBC   4.46           Significant Diagnostics:  CT: No results found in the last 24 hours.  Echoencephalography: No results found in the last 24 hours.  MRI: No results found in the  last 24 hours.    Assessment/Plan:     * Subdural hemorrhage  79F PMH ESRD, CHF, COPD, and right MCA stroke with known R convexity SDH, now with expansion and chronicificaiton. No s/p R sided nikki holes for SDH evacuation.  Exam stable with 4-/5 LUE and LLE strength. SD drain removed yesterday    Neuro stable  CTH stable after drain removal  Stop ppx Abx.   SBP <160  Stepped down to medicine for significant comorbidities ESRD, CHF, COPD  Neurologically stable from subdural, no HOB restrictions  Call neurosurgery with any questions, will sign off at this time  Will arrange follow up for staple removal and monitor incision            Josh Khan MD  Neurosurgery  [facility]

## 2019-06-28 NOTE — PT/OT/SLP EVAL
Speech Language Pathology Evaluation  Cognitive Communication    Patient Name:  Tea Georges   MRN:  [id number]  Admitting Diagnosis: Subdural hemorrhage    Recommendations:     Recommendations:                General Recommendations:  Cognitive-linguistic therapy  Diet recommendations:  Dental Soft, Thin   Aspiration Precautions: Standard aspiration precautions   General Precautions: Standard, fall, vision impaired  Communication strategies:  go to room if call light pushed    History:     Past Medical History:   Diagnosis Date    Anemia in ESRD (end-stage renal disease) 5/29/2016    Anticoagulant long-term use     Aortic atherosclerosis 11/22/2016    Aortic stenosis, moderate 2/18/2016    Asthma in adult without complication 1/8/2016    Bilateral low back pain without sciatica 11/17/2015    Blindness of right eye 11/12/2016    Brain compression 6/22/2019    CAD (coronary artery disease) 12/12/2016    Cataract     Central retinal vein occlusion, right eye 6/3/2014    CHF (congestive heart failure)     Chronic diastolic heart failure 1/8/2016    Chronic respiratory failure with hypoxia 5/29/2016    COPD (chronic obstructive pulmonary disease) 1/15/2017    Dependence on hemodialysis     Mon-Wed-Fri    Diverticulosis     Embolic stroke involving right middle cerebral artery     Encounter for blood transfusion     Enlarged LA (left atrium) 10/7/2016    Epiretinal membrane 7/17/2012    ESRD (end stage renal disease)     Essential hypertension 1/8/2016    History of GI diverticular bleed     5/22/16    Left flaccid hemiparesis 10/1/2016    Peripheral vascular disease, unspecified 11/22/2016    Right-sided cerebrovascular accident (CVA) 11/22/2016    Seizure 6/24/2019    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 11/13/2016    Type 2 diabetes mellitus with kidney complication, without long-term current use of insulin 5/1/2018    Type 2 diabetes mellitus with left eye affected by  proliferative retinopathy without macular edema, without long-term current use of insulin 3/26/2013    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, without long-term current use of insulin 3/26/2013    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       Past Surgical History:   Procedure Laterality Date    ABDOMINAL SURGERY      ANGIOGRAM-CEREBRAL N/A 6/24/2019    Performed by [facility]isha Surgeon at [facility] KENISHA    BREAST SURGERY      tumor removal x 2    KATE HOLES FOR SUBDURAL HEMATOMA EVACUATION Right 6/23/2019    [facility]    CARDIAC SURGERY      CATARACT EXTRACTION      CHOLECYSTECTOMY      COLONOSCOPY N/A 5/30/2016    Performed by Sam Davis MD at [facility] ENDO (2ND FLR)    COLONOSCOPY N/A 5/23/2016    Performed by WILLIAM Colvin MD at [facility] ENDO (2ND FLR)    CREATION, CRANIAL KATE HOLE, WITH HEMATOMA EVACUATION, SUBDURAL, possible crani Right 6/23/2019    Performed by Trevor Conner MD at [facility] OR 2ND FLR    ESOPHAGOGASTRODUODENOSCOPY (EGD) N/A 5/30/2016    Performed by Sam Davis MD at [facility] ENDO (2ND FLR)    ESOPHAGOGASTRODUODENOSCOPY (EGD) N/A 5/27/2016    Performed by Cesario Rubio MD at [facility] ENDO (2ND FLR)    EXCISION, ANEURYSM Left 11/29/2018    Performed by NADINE Blum III, MD at [facility] OR 2ND FLR    EYE SURGERY      Fistulogram Left 11/28/2018    Performed by NADINE Blum III, MD at [facility] CATH LAB    INSERTION, CATHETER, VASCULAR, DUAL LUMEN Right 11/29/2018    [facility]    PTA, Fistula  11/28/2018    Performed by NADINE Blum III, MD at [facility] CATH LAB    REVISION, AV FISTULA, Left 11/29/2018    Performed by NADINE Blum III, MD at [facility] OR [address] [place] [address]    UPPER GASTROINTESTINAL ENDOSCOPY         Social History: Patient lives with her daughter and granddaughters.    Occupation/hobbies/homemaking: She is retired from working as a   at [employer]. She enjoys cooking and socializing with friends.       Subjective     Patient awake and alert during session  Patient with flat affect during session  Communicated with nurse prior to session     Pain/Comfort:  · Pain Rating 1: 0/10  · Pain Rating Post-Intervention 1: 0/10    Objective:   Cognitive Status:    Arousal/Alertness Appropriate response to stimuli  Attention No obvious deficits observed throughout session  Orientation Oriented x4  Memory Immediate Recall WFL and Delayed Recall: independently recalled 0/3 unrelated objects with a 5-minute delay, increasing to 3/3 with max assist  Problem Solving Conclusions min assist and Compare/contrast 60% with min assist  Simple calculation WFL     Receptive Language:   Comprehension:      Questions Complex yes/no WFL  Commands  two step basic commands WFL  multistep basic commands 1/3 with max assist    Pragmatics:    Flat affect    Expressive Language:  Verbal:    Naming Confrontation WFL, Convergent WFL and Divergent responsive TBA    Motor Speech:  WFL    Voice:   WFL    Visual-Spatial:  TBA further, but patient with right eye blindness    Reading:   TBA, but patient stated that she required a magnifying glass for reading     Written Expression:   TBA    Treatment: Patient seen for a speech/language/cognitive eval. She was sitting up in bed during session. Patient reported that she felt as if she was at baseline from a ST perspective. SLP educated her regarding the role of ST in her POC and she verbalized understanding. Whiteboard updated. Patient left in room with call light within reach.    Assessment:   Tea Georges is a 79 y.o. female with an SLP diagnosis of Cognitive-Linguistic Impairment.     Goals:   Multidisciplinary Problems     SLP Goals        Problem: SLP Goal    Goal Priority Disciplines Outcome   SLP Goal     SLP    Description:  Speech-Language Pathology Goals  Goals to be met by 7/5/19  1. Patient will recall 4/4 unrelated objects with a  5-minute delay and min assist.   2. Patient will answer problem solving/safety awareness questions with 80% accuracy and min assist.   3. Patient will independently follow complex commands with 75% accuracy.  4. Patient will participate in ongoing assessment of high-level cognition, language, reading, writing and visuospatial skills.  5. Educate patient regarding role of ST in her POC.                    Plan:   · Patient to be seen:  4 x/week   · Plan of Care expires:  07/28/19  · Plan of Care reviewed with:  patient   · SLP Follow-Up:  Yes       Discharge recommendations:  Discharge Facility/Level of Care Needs: rehabilitation facility   Barriers to Discharge:  Level of Skilled Assistance Needed     Time Tracking:   SLP Treatment Date:   06/28/19  Speech Start Time:  1159  Speech Stop Time:  1215     Speech Total Time (min):  16 min    Billable Minutes: Quentin Mora CCC-SLP  Speech-Language Pathology  Pager: [phone]   06/28/2019

## 2019-06-29 LAB
ALBUMIN SERPL BCP-MCNC: 2.7 G/DL (ref 3.5–5.2)
ALP SERPL-CCNC: 89 U/L (ref 55–135)
ALT SERPL W/O P-5'-P-CCNC: <5 U/L (ref 10–44)
ANION GAP SERPL CALC-SCNC: 9 MMOL/L (ref 8–16)
AST SERPL-CCNC: 18 U/L (ref 10–40)
BASOPHILS # BLD AUTO: 0.02 K/UL (ref 0–0.2)
BASOPHILS NFR BLD: 0.5 % (ref 0–1.9)
BILIRUB SERPL-MCNC: 0.3 MG/DL (ref 0.1–1)
BUN SERPL-MCNC: 16 MG/DL (ref 8–23)
CALCIUM SERPL-MCNC: 8.9 MG/DL (ref 8.7–10.5)
CHLORIDE SERPL-SCNC: 103 MMOL/L (ref 95–110)
CO2 SERPL-SCNC: 22 MMOL/L (ref 23–29)
CREAT SERPL-MCNC: 2.9 MG/DL (ref 0.5–1.4)
DIFFERENTIAL METHOD: ABNORMAL
EOSINOPHIL # BLD AUTO: 0.1 K/UL (ref 0–0.5)
EOSINOPHIL NFR BLD: 2.1 % (ref 0–8)
ERYTHROCYTE [DISTWIDTH] IN BLOOD BY AUTOMATED COUNT: 16.6 % (ref 11.5–14.5)
EST. GFR  (AFRICAN AMERICAN): 17.3 ML/MIN/1.73 M^2
EST. GFR  (NON AFRICAN AMERICAN): 15 ML/MIN/1.73 M^2
GLUCOSE SERPL-MCNC: 93 MG/DL (ref 70–110)
HCT VFR BLD AUTO: 25.9 % (ref 37–48.5)
HGB BLD-MCNC: 7.5 G/DL (ref 12–16)
IMM GRANULOCYTES # BLD AUTO: 0.08 K/UL (ref 0–0.04)
IMM GRANULOCYTES NFR BLD AUTO: 1.9 % (ref 0–0.5)
LYMPHOCYTES # BLD AUTO: 0.5 K/UL (ref 1–4.8)
LYMPHOCYTES NFR BLD: 11.8 % (ref 18–48)
MAGNESIUM SERPL-MCNC: 2 MG/DL (ref 1.6–2.6)
MCH RBC QN AUTO: 28.4 PG (ref 27–31)
MCHC RBC AUTO-ENTMCNC: 29 G/DL (ref 32–36)
MCV RBC AUTO: 98 FL (ref 82–98)
MONOCYTES # BLD AUTO: 0.5 K/UL (ref 0.3–1)
MONOCYTES NFR BLD: 11.6 % (ref 4–15)
NEUTROPHILS # BLD AUTO: 3.1 K/UL (ref 1.8–7.7)
NEUTROPHILS NFR BLD: 72.1 % (ref 38–73)
NRBC BLD-RTO: 0 /100 WBC
PHOSPHATE SERPL-MCNC: 3.8 MG/DL (ref 2.7–4.5)
PLATELET # BLD AUTO: 79 K/UL (ref 150–350)
PMV BLD AUTO: 12.4 FL (ref 9.2–12.9)
POCT GLUCOSE: 109 MG/DL (ref 70–110)
POCT GLUCOSE: 117 MG/DL (ref 70–110)
POCT GLUCOSE: 119 MG/DL (ref 70–110)
POCT GLUCOSE: 135 MG/DL (ref 70–110)
POCT GLUCOSE: 166 MG/DL (ref 70–110)
POCT GLUCOSE: 95 MG/DL (ref 70–110)
POCT GLUCOSE: 97 MG/DL (ref 70–110)
POTASSIUM SERPL-SCNC: 4.1 MMOL/L (ref 3.5–5.1)
PROT SERPL-MCNC: 6.2 G/DL (ref 6–8.4)
RBC # BLD AUTO: 2.64 M/UL (ref 4–5.4)
SODIUM SERPL-SCNC: 134 MMOL/L (ref 136–145)
WBC # BLD AUTO: 4.23 K/UL (ref 3.9–12.7)

## 2019-06-29 PROCEDURE — 25000003 PHARM REV CODE 250: Performed by: PSYCHIATRY & NEUROLOGY

## 2019-06-29 PROCEDURE — 99232 PR SUBSEQUENT HOSPITAL CARE,LEVL II: ICD-10-PCS | Mod: GC,,, | Performed by: INTERNAL MEDICINE

## 2019-06-29 PROCEDURE — 99232 SBSQ HOSP IP/OBS MODERATE 35: CPT | Mod: GC,,, | Performed by: INTERNAL MEDICINE

## 2019-06-29 PROCEDURE — 83735 ASSAY OF MAGNESIUM: CPT

## 2019-06-29 PROCEDURE — 25000003 PHARM REV CODE 250: Performed by: STUDENT IN AN ORGANIZED HEALTH CARE EDUCATION/TRAINING PROGRAM

## 2019-06-29 PROCEDURE — 20600001 HC STEP DOWN PRIVATE ROOM

## 2019-06-29 PROCEDURE — 84100 ASSAY OF PHOSPHORUS: CPT

## 2019-06-29 PROCEDURE — 85025 COMPLETE CBC W/AUTO DIFF WBC: CPT

## 2019-06-29 PROCEDURE — 80053 COMPREHEN METABOLIC PANEL: CPT

## 2019-06-29 PROCEDURE — 25000003 PHARM REV CODE 250: Performed by: NURSE PRACTITIONER

## 2019-06-29 PROCEDURE — 27000221 HC OXYGEN, UP TO 24 HOURS

## 2019-06-29 PROCEDURE — 94761 N-INVAS EAR/PLS OXIMETRY MLT: CPT

## 2019-06-29 PROCEDURE — 25000003 PHARM REV CODE 250: Performed by: PHYSICIAN ASSISTANT

## 2019-06-29 PROCEDURE — 36415 COLL VENOUS BLD VENIPUNCTURE: CPT

## 2019-06-29 RX ORDER — CARVEDILOL 12.5 MG/1
12.5 TABLET ORAL 2 TIMES DAILY
Status: DISCONTINUED | OUTPATIENT
Start: 2019-06-29 | End: 2019-07-01 | Stop reason: HOSPADM

## 2019-06-29 RX ADMIN — HYDRALAZINE HYDROCHLORIDE 100 MG: 50 TABLET ORAL at 05:06

## 2019-06-29 RX ADMIN — CLONIDINE HYDROCHLORIDE 0.1 MG: 0.1 TABLET ORAL at 08:06

## 2019-06-29 RX ADMIN — CLONIDINE HYDROCHLORIDE 0.1 MG: 0.1 TABLET ORAL at 04:06

## 2019-06-29 RX ADMIN — CARVEDILOL 12.5 MG: 12.5 TABLET, FILM COATED ORAL at 08:06

## 2019-06-29 RX ADMIN — FLUTICASONE FUROATE AND VILANTEROL TRIFENATATE 1 PUFF: 200; 25 POWDER RESPIRATORY (INHALATION) at 08:06

## 2019-06-29 RX ADMIN — CARVEDILOL 25 MG: 25 TABLET, FILM COATED ORAL at 08:06

## 2019-06-29 RX ADMIN — LOSARTAN POTASSIUM 100 MG: 50 TABLET, FILM COATED ORAL at 08:06

## 2019-06-29 RX ADMIN — LEVETIRACETAM 500 MG: 500 TABLET ORAL at 08:06

## 2019-06-29 RX ADMIN — AMLODIPINE BESYLATE 10 MG: 10 TABLET ORAL at 08:06

## 2019-06-29 NOTE — HOSPITAL COURSE
Ms.l Georges was stepped down to hospital medicine on 6/21 from neurocritical care after admission for subdural hematoma. NSGY signed off with plan to follow up outpatient for staple removal and patient awaiting rehab placement.    She has remained stable on the floor and has been accepted to [facility]. Continue PT/OT.   She will f/u with , Neurosurgery in 2 weeks for staple removal.

## 2019-06-29 NOTE — SUBJECTIVE & OBJECTIVE
Interval History: Patient doing well, without complaints.     Review of Systems   Constitutional: Negative for diaphoresis, fatigue and fever.   HENT: Negative.    Eyes: Negative.    Respiratory: Negative for shortness of breath.    Cardiovascular: Negative for chest pain.   Gastrointestinal: Negative for abdominal distention, nausea and vomiting.   Genitourinary: Negative.    Musculoskeletal: Negative.      Objective:     Vital Signs (Most Recent):  Temp: 98.6 °F (37 °C) (06/29/19 0723)  Pulse: 75 (06/29/19 0844)  Resp: 16 (06/29/19 0844)  BP: 126/60 (06/29/19 0723)  SpO2: 98 % (06/29/19 0723) Vital Signs (24h Range):  Temp:  [96.5 °F (35.8 °C)-98.6 °F (37 °C)] 98.6 °F (37 °C)  Pulse:  [50-75] 75  Resp:  [14-20] 16  SpO2:  [96 %-100 %] 98 %  BP: (110-151)/(42-65) 126/60     Weight: 58.7 kg (129 lb 6.6 oz)  Body mass index is 22.92 kg/m².    Intake/Output Summary (Last 24 hours) at 6/29/2019 0931  Last data filed at 6/29/2019 0100  Gross per 24 hour   Intake 600 ml   Output 1900 ml   Net -1300 ml      Physical Exam   Constitutional: She is oriented to person, place, and time. She appears well-developed and well-nourished. No distress.   HENT:   Head: Normocephalic.   R side craniotomy staples, clean and dry.   Eyes:   R sided cataract, complete opacification of pupil   Cardiovascular: Normal rate, regular rhythm and normal heart sounds.   II/VI RAMIRO, best heard at RSB   Pulmonary/Chest: Effort normal and breath sounds normal.   Abdominal: Soft. She exhibits no distension. There is no tenderness.   Musculoskeletal: She exhibits no edema.   Neurological: She is alert and oriented to person, place, and time.   L sided weakness, UE>LE   Skin: She is not diaphoretic.   Psychiatric: She has a normal mood and affect. Her behavior is normal.   Nursing note and vitals reviewed.      Significant Labs: All pertinent labs within the past 24 hours have been reviewed.    Significant Imaging: I have reviewed all pertinent imaging  results/findings within the past 24 hours.

## 2019-06-29 NOTE — CONSULTS
Inpatient consult to Physical Medicine Rehab  Consult performed by: Geno Boudreaux NP  Consult ordered by: Salvador Ayala MD  Reason for consult: assess rehab needs        Reviewed patient history and current admission.  Rehab team following.  Full consult to follow.    SHANEKA Hoffman, FNP-C  Physical Medicine & Rehabilitation   06/29/2019

## 2019-06-29 NOTE — PLAN OF CARE
Problem: Adult Inpatient Plan of Care  Goal: Plan of Care Review  Outcome: Ongoing (interventions implemented as appropriate)  POC and meds reviewed with patient. Questions encouraged and answered. Patient verbalized understanding. V/S stable throughout shift; please see flowsheets for V/S information and full assessment data. Continued Accu checks Q 4 hours, WNL.  NAEO. Siderails up x 2, bed locked in lowest position, call bell in reach, O2 via NC & suction at bedside, WCTM.

## 2019-06-29 NOTE — ASSESSMENT & PLAN NOTE
Nonrheumatic aortic valve stenosis  Non-rheumatic tricuspid valve insufficiency    - TTE 5/22 shows EF 53, grade II DD, severe LAE, moderate TR and MR, PASP 77, CVP 15 mm  - continue GDMT, as tolerated  - decreased carvedilol dose as patient normotensive without BB and hydralazine  - will DC hydralazine and monitor for use

## 2019-06-29 NOTE — ASSESSMENT & PLAN NOTE
H/o recent bilateral acute SDH, R>L with right to left midline shift on plavix and reversed with platelets. No surgical intervention prformed per Nsgy recs. Appears plavix and ASA held since this event. Re-presented for malaise found to have enlarged SDH. 06/23/2019 s/p craniotomy by neurosurgery, SD placed. 6/25 Pt had MMA embolization. 6/27/19 SD drain removed, CT head was stable, stepped down to medicine 06/29/2019.    - NSGY signed off, will will need placement, likely rehab  - PT/OT/Rehab consult placed

## 2019-06-30 LAB
ALBUMIN SERPL BCP-MCNC: 2.7 G/DL (ref 3.5–5.2)
ALP SERPL-CCNC: 83 U/L (ref 55–135)
ALT SERPL W/O P-5'-P-CCNC: <5 U/L (ref 10–44)
ANION GAP SERPL CALC-SCNC: 10 MMOL/L (ref 8–16)
AST SERPL-CCNC: 28 U/L (ref 10–40)
BASOPHILS # BLD AUTO: 0.02 K/UL (ref 0–0.2)
BASOPHILS NFR BLD: 0.5 % (ref 0–1.9)
BILIRUB SERPL-MCNC: 0.3 MG/DL (ref 0.1–1)
BUN SERPL-MCNC: 30 MG/DL (ref 8–23)
CALCIUM SERPL-MCNC: 9.5 MG/DL (ref 8.7–10.5)
CHLORIDE SERPL-SCNC: 102 MMOL/L (ref 95–110)
CO2 SERPL-SCNC: 20 MMOL/L (ref 23–29)
CREAT SERPL-MCNC: 4.7 MG/DL (ref 0.5–1.4)
DIFFERENTIAL METHOD: ABNORMAL
EOSINOPHIL # BLD AUTO: 0.1 K/UL (ref 0–0.5)
EOSINOPHIL NFR BLD: 2.1 % (ref 0–8)
ERYTHROCYTE [DISTWIDTH] IN BLOOD BY AUTOMATED COUNT: 16.7 % (ref 11.5–14.5)
EST. GFR  (AFRICAN AMERICAN): 9.7 ML/MIN/1.73 M^2
EST. GFR  (NON AFRICAN AMERICAN): 8.4 ML/MIN/1.73 M^2
GLUCOSE SERPL-MCNC: 98 MG/DL (ref 70–110)
HCT VFR BLD AUTO: 25.3 % (ref 37–48.5)
HGB BLD-MCNC: 7.6 G/DL (ref 12–16)
IMM GRANULOCYTES # BLD AUTO: 0.06 K/UL (ref 0–0.04)
IMM GRANULOCYTES NFR BLD AUTO: 1.6 % (ref 0–0.5)
LYMPHOCYTES # BLD AUTO: 0.5 K/UL (ref 1–4.8)
LYMPHOCYTES NFR BLD: 13.9 % (ref 18–48)
MCH RBC QN AUTO: 28.7 PG (ref 27–31)
MCHC RBC AUTO-ENTMCNC: 30 G/DL (ref 32–36)
MCV RBC AUTO: 96 FL (ref 82–98)
MONOCYTES # BLD AUTO: 0.5 K/UL (ref 0.3–1)
MONOCYTES NFR BLD: 13.9 % (ref 4–15)
NEUTROPHILS # BLD AUTO: 2.6 K/UL (ref 1.8–7.7)
NEUTROPHILS NFR BLD: 68 % (ref 38–73)
NRBC BLD-RTO: 0 /100 WBC
PLATELET # BLD AUTO: 87 K/UL (ref 150–350)
PMV BLD AUTO: 13.3 FL (ref 9.2–12.9)
POCT GLUCOSE: 102 MG/DL (ref 70–110)
POCT GLUCOSE: 115 MG/DL (ref 70–110)
POCT GLUCOSE: 141 MG/DL (ref 70–110)
POCT GLUCOSE: 96 MG/DL (ref 70–110)
POTASSIUM SERPL-SCNC: 4.5 MMOL/L (ref 3.5–5.1)
PROT SERPL-MCNC: 6.3 G/DL (ref 6–8.4)
RBC # BLD AUTO: 2.65 M/UL (ref 4–5.4)
SODIUM SERPL-SCNC: 132 MMOL/L (ref 136–145)
WBC # BLD AUTO: 3.82 K/UL (ref 3.9–12.7)

## 2019-06-30 PROCEDURE — 25000003 PHARM REV CODE 250: Performed by: PSYCHIATRY & NEUROLOGY

## 2019-06-30 PROCEDURE — 84100 ASSAY OF PHOSPHORUS: CPT

## 2019-06-30 PROCEDURE — 25000003 PHARM REV CODE 250: Performed by: PHYSICIAN ASSISTANT

## 2019-06-30 PROCEDURE — 99232 PR SUBSEQUENT HOSPITAL CARE,LEVL II: ICD-10-PCS | Mod: GC,,, | Performed by: INTERNAL MEDICINE

## 2019-06-30 PROCEDURE — 85025 COMPLETE CBC W/AUTO DIFF WBC: CPT

## 2019-06-30 PROCEDURE — 25000003 PHARM REV CODE 250: Performed by: NURSE PRACTITIONER

## 2019-06-30 PROCEDURE — 36415 COLL VENOUS BLD VENIPUNCTURE: CPT

## 2019-06-30 PROCEDURE — 99232 SBSQ HOSP IP/OBS MODERATE 35: CPT | Mod: GC,,, | Performed by: INTERNAL MEDICINE

## 2019-06-30 PROCEDURE — 20600001 HC STEP DOWN PRIVATE ROOM

## 2019-06-30 PROCEDURE — 80053 COMPREHEN METABOLIC PANEL: CPT

## 2019-06-30 PROCEDURE — 83735 ASSAY OF MAGNESIUM: CPT

## 2019-06-30 PROCEDURE — 25000003 PHARM REV CODE 250: Performed by: STUDENT IN AN ORGANIZED HEALTH CARE EDUCATION/TRAINING PROGRAM

## 2019-06-30 RX ORDER — SODIUM CHLORIDE 9 MG/ML
INJECTION, SOLUTION INTRAVENOUS
Status: DISCONTINUED | OUTPATIENT
Start: 2019-07-01 | End: 2019-07-01 | Stop reason: HOSPADM

## 2019-06-30 RX ORDER — SODIUM CHLORIDE 9 MG/ML
INJECTION, SOLUTION INTRAVENOUS ONCE
Status: COMPLETED | OUTPATIENT
Start: 2019-07-01 | End: 2019-07-01

## 2019-06-30 RX ADMIN — AMLODIPINE BESYLATE 10 MG: 10 TABLET ORAL at 08:06

## 2019-06-30 RX ADMIN — CLONIDINE HYDROCHLORIDE 0.1 MG: 0.1 TABLET ORAL at 08:06

## 2019-06-30 RX ADMIN — LEVETIRACETAM 500 MG: 500 TABLET ORAL at 09:06

## 2019-06-30 RX ADMIN — POLYETHYLENE GLYCOL 3350 17 G: 17 POWDER, FOR SOLUTION ORAL at 08:06

## 2019-06-30 RX ADMIN — LEVETIRACETAM 500 MG: 500 TABLET ORAL at 08:06

## 2019-06-30 RX ADMIN — ACETAMINOPHEN 650 MG: 325 TABLET ORAL at 09:06

## 2019-06-30 RX ADMIN — CLONIDINE HYDROCHLORIDE 0.1 MG: 0.1 TABLET ORAL at 02:06

## 2019-06-30 RX ADMIN — CLONIDINE HYDROCHLORIDE 0.1 MG: 0.1 TABLET ORAL at 09:06

## 2019-06-30 RX ADMIN — LOSARTAN POTASSIUM 100 MG: 50 TABLET, FILM COATED ORAL at 09:06

## 2019-06-30 RX ADMIN — CARVEDILOL 12.5 MG: 12.5 TABLET, FILM COATED ORAL at 08:06

## 2019-06-30 RX ADMIN — FLUTICASONE FUROATE AND VILANTEROL TRIFENATATE 1 PUFF: 200; 25 POWDER RESPIRATORY (INHALATION) at 08:06

## 2019-06-30 RX ADMIN — CARVEDILOL 12.5 MG: 12.5 TABLET, FILM COATED ORAL at 09:06

## 2019-06-30 NOTE — ASSESSMENT & PLAN NOTE
Continue carvedilol 12.5mg bid, clonidine 0.1mg tid, losartan 100mg daily, amlodipine 10mg daily

## 2019-06-30 NOTE — SUBJECTIVE & OBJECTIVE
Interval History: No complaints this morning, feels well. Eating and drinking well.     Review of Systems   Constitutional: Negative for chills and fever.   Respiratory: Negative for cough and shortness of breath.    Gastrointestinal: Negative for abdominal pain, nausea and vomiting.     Objective:     Vital Signs (Most Recent):  Temp: 98.2 °F (36.8 °C) (06/30/19 0737)  Pulse: 60 (06/30/19 0844)  Resp: 12 (06/30/19 0844)  BP: (!) 149/66 (06/30/19 0737)  SpO2: 95 % (06/30/19 0737) Vital Signs (24h Range):  Temp:  [97.5 °F (36.4 °C)-98.2 °F (36.8 °C)] 98.2 °F (36.8 °C)  Pulse:  [54-76] 60  Resp:  [12-19] 12  SpO2:  [93 %-98 %] 95 %  BP: (119-150)/(58-69) 149/66     Weight: 54.4 kg (119 lb 14.9 oz)  Body mass index is 21.24 kg/m².    Intake/Output Summary (Last 24 hours) at 6/30/2019 0917  Last data filed at 6/29/2019 1800  Gross per 24 hour   Intake 200 ml   Output --   Net 200 ml      Physical Exam   Constitutional: She is oriented to person, place, and time. She appears well-developed and well-nourished. No distress.   HENT:   Head: Normocephalic.   R side craniotomy staples, clean and dry.   Eyes:   R sided cataract, complete opacification of pupil   Cardiovascular: Normal rate and regular rhythm.   Murmur (systolic) heard.  Pulmonary/Chest: Effort normal and breath sounds normal.   Abdominal: Soft. She exhibits no distension. There is no tenderness.   Musculoskeletal: She exhibits no edema.   Neurological: She is alert and oriented to person, place, and time.   Skin: She is not diaphoretic.   Psychiatric: She has a normal mood and affect. Her behavior is normal.   Nursing note and vitals reviewed.      Significant Labs:   CBC:   Recent Labs   Lab 06/29/19  0536 06/30/19  0511   WBC 4.23 3.82*   HGB 7.5* 7.6*   HCT 25.9* 25.3*   PLT 79* 87*     CMP:   Recent Labs   Lab 06/29/19  0536 06/30/19  0511   * 132*   K 4.1 4.5    102   CO2 22* 20*   GLU 93 98   BUN 16 30*   CREATININE 2.9* 4.7*   CALCIUM 8.9  9.5   PROT 6.2 6.3   ALBUMIN 2.7* 2.7*   BILITOT 0.3 0.3   ALKPHOS 89 83   AST 18 28   ALT <5* <5*   ANIONGAP 9 10   EGFRNONAA 15.0* 8.4*       Significant Imaging: I have reviewed all pertinent imaging results/findings within the past 24 hours.

## 2019-06-30 NOTE — ASSESSMENT & PLAN NOTE
H/o recent bilateral acute SDH, R>L with right to left midline shift on plavix and reversed with platelets. No surgical intervention prformed per Nsgy recs. Appears plavix and ASA held since this event. Re-presented for malaise found to have enlarged SDH. 06/23/2019 s/p craniotomy by neurosurgery, SD placed. 6/25 Pt had MMA embolization. 6/27/19 SD drain removed, CT head was stable, stepped down to medicine 06/30/2019.    - NSGY signed off, will will need placement, likely rehab  - PT/OT/Rehab consult placed

## 2019-06-30 NOTE — PROGRESS NOTES
"[facility] Medicine  Progress Note    Patient Name: Tea Georges  MRN: [id number]  Patient Class: IP- Inpatient   Admission Date: 6/21/2019  Length of Stay: 6 days  Attending Physician: Salvador Ayala MD  Primary Care Provider: Parth Posadas Ii, MD    [facility] Medicine Team: [facility] HOSP MED 3 Fauzia Alvarado DO    Subjective:     Principal Problem:Subdural hemorrhage      HPI:  The patient is a 79 year old F with PMHx of ESRD (on HD M/W/F), HFpEF, COPD (2L O2 NC @ home), h/o SDH (w/ recent admission to [facility] on 5/21/19-w/bilateral SDH R>L s/p unwitnessed fall on plavix, no neurosurgical intervention) and right MCA stroke s/p thrombectomy in 2016 admitted to [facility] with increased R SDH.  After initial admission to [facility] on (5/21) pt was stepdown to hospital medicine, discharged to inpatient rehab/SNF and and then to home, however she failed to make it to her follow up appointment. Patient was admitted 06/21/19 to the hospital to hospital medicine with chief complaint of "not feeling right". Medicine team performed CT Head on 6/22 which demonstrated increase in size and chronicification of R convexity SDH with mass effect. Pt stated she had a fall 2 weeks prior to presentation w/LOC, not on any anticoagulants and had intermittent headaches since then however she denies any headaches at time of this presentation. 06/23/2019 s/p craniotomy by neurosurgery, SD placed. 6/25 Pt had MMA embolization. 6/27/19 SD drain removed, CT head was stable and patient stepped down to Hospital Medicine 3.     Overview/Hospital Course:  Ms.l Georges was stepped down to hospital medicine on 6/21 from neurocritical care after admission for subdural hematoma. NSGY signed off with plan to follow up outpatient for staple removal and patient awaiting rehab placement.    Interval History: No complaints this morning, feels well. Eating and drinking well.     Review of Systems   Constitutional: Negative for chills and " fever.   Respiratory: Negative for cough and shortness of breath.    Gastrointestinal: Negative for abdominal pain, nausea and vomiting.     Objective:     Vital Signs (Most Recent):  Temp: 98.2 °F (36.8 °C) (06/30/19 0737)  Pulse: 60 (06/30/19 0844)  Resp: 12 (06/30/19 0844)  BP: (!) 149/66 (06/30/19 0737)  SpO2: 95 % (06/30/19 0737) Vital Signs (24h Range):  Temp:  [97.5 °F (36.4 °C)-98.2 °F (36.8 °C)] 98.2 °F (36.8 °C)  Pulse:  [54-76] 60  Resp:  [12-19] 12  SpO2:  [93 %-98 %] 95 %  BP: (119-150)/(58-69) 149/66     Weight: 54.4 kg (119 lb 14.9 oz)  Body mass index is 21.24 kg/m².    Intake/Output Summary (Last 24 hours) at 6/30/2019 0917  Last data filed at 6/29/2019 1800  Gross per 24 hour   Intake 200 ml   Output --   Net 200 ml      Physical Exam   Constitutional: She is oriented to person, place, and time. She appears well-developed and well-nourished. No distress.   HENT:   Head: Normocephalic.   R side craniotomy staples, clean and dry.   Eyes:   R sided cataract, complete opacification of pupil   Cardiovascular: Normal rate and regular rhythm.   Murmur (systolic) heard.  Pulmonary/Chest: Effort normal and breath sounds normal.   Abdominal: Soft. She exhibits no distension. There is no tenderness.   Musculoskeletal: She exhibits no edema.   Neurological: She is alert and oriented to person, place, and time.   Skin: She is not diaphoretic.   Psychiatric: She has a normal mood and affect. Her behavior is normal.   Nursing note and vitals reviewed.      Significant Labs:   CBC:   Recent Labs   Lab 06/29/19 0536 06/30/19  0511   WBC 4.23 3.82*   HGB 7.5* 7.6*   HCT 25.9* 25.3*   PLT 79* 87*     CMP:   Recent Labs   Lab 06/29/19 0536 06/30/19 0511   * 132*   K 4.1 4.5    102   CO2 22* 20*   GLU 93 98   BUN 16 30*   CREATININE 2.9* 4.7*   CALCIUM 8.9 9.5   PROT 6.2 6.3   ALBUMIN 2.7* 2.7*   BILITOT 0.3 0.3   ALKPHOS 89 83   AST 18 28   ALT <5* <5*   ANIONGAP 9 10   EGFRNONAA 15.0* 8.4*        Significant Imaging: I have reviewed all pertinent imaging results/findings within the past 24 hours.      Assessment/Plan:      * Subdural hemorrhage  H/o recent bilateral acute SDH, R>L with right to left midline shift on plavix and reversed with platelets. No surgical intervention prformed per Nsgy recs. Appears plavix and ASA held since this event. Re-presented for malaise found to have enlarged SDH. 06/23/2019 s/p craniotomy by neurosurgery, SD placed. 6/25 Pt had MMA embolization. 6/27/19 SD drain removed, CT head was stable, stepped down to medicine 06/30/2019.    - NSGY signed off, will will need placement, likely rehab  - PT/OT/Rehab consult placed    Renovascular hypertension    Continue carvedilol 12.5mg bid, clonidine 0.1mg tid, losartan 100mg daily, amlodipine 10mg daily    ESRD on hemodialysis  Hyperparathyroidism, secondary renal  Anemia in ESRD (end-stage renal disease)  HD MWF via LUE AVF  - nephrology consulted for HD while admitted  - H/H stable; EPO with dialysis, PRN  - strict I/O, daily weights    Pulmonary hypertension        Chronic respiratory failure with hypoxia                                     Moderate malnutrition  - c/w beneprotein supplements    (HFpEF) heart failure with preserved ejection fraction  Nonrheumatic aortic valve stenosis  Non-rheumatic tricuspid valve insufficiency    - TTE 5/22 shows EF 53, grade II DD, severe LAE, moderate TR and MR, PASP 77, CVP 15 mm  - continue GDMT, as tolerated      Subdural hematoma  See SDH      Type 2 diabetes mellitus with chronic kidney disease on chronic dialysis, without long-term current use of insulin  - Low dose SSI  - hypoglycemic protocol  - has not required insulin        Non-rheumatic tricuspid valve insufficiency                          Pulmonary emphysema  Chronic respiratory failure with hypoxia  - c/w ICS/LABA, PRN duonebs  - c/w 2L NC O2    CAD (coronary artery disease)    - continue carvedlol, losartan    Physical  debility  PT/OT consulted  appears there are social issues and home and family can no longer care for patient who requires 24 hour care.     Blindness of right eye        Left spastic hemiparesis        Hyperparathyroidism, secondary renal        Anemia in ESRD (end-stage renal disease)            Nonrheumatic aortic valve stenosis        Pleural effusion on right  CXR reveals chronic R moderate pleural effusion (has been present since at least 2016), now appears to be more symptomatic than baseline. Stable on home 2L NC          VTE Risk Mitigation (From admission, onward)        Ordered     IP VTE HIGH RISK PATIENT  Once      06/22/19 1515     Place sequential compression device  Until discontinued      06/22/19 0017                Fauzia Alvarado DO  Department of Hospital Medicine   [facility]

## 2019-07-01 VITALS
BODY MASS INDEX: 21.25 KG/M2 | WEIGHT: 119.94 LBS | RESPIRATION RATE: 20 BRPM | DIASTOLIC BLOOD PRESSURE: 63 MMHG | OXYGEN SATURATION: 91 % | HEIGHT: 63 IN | HEART RATE: 64 BPM | TEMPERATURE: 98 F | SYSTOLIC BLOOD PRESSURE: 138 MMHG

## 2019-07-01 DIAGNOSIS — I62.9 INTRACRANIAL HEMORRHAGE: ICD-10-CM

## 2019-07-01 LAB
ALBUMIN SERPL BCP-MCNC: 2.8 G/DL (ref 3.5–5.2)
ALP SERPL-CCNC: 80 U/L (ref 55–135)
ALT SERPL W/O P-5'-P-CCNC: <5 U/L (ref 10–44)
ANION GAP SERPL CALC-SCNC: 12 MMOL/L (ref 8–16)
AST SERPL-CCNC: 16 U/L (ref 10–40)
BASOPHILS # BLD AUTO: 0.04 K/UL (ref 0–0.2)
BASOPHILS NFR BLD: 1 % (ref 0–1.9)
BILIRUB SERPL-MCNC: 0.3 MG/DL (ref 0.1–1)
BUN SERPL-MCNC: 44 MG/DL (ref 8–23)
CALCIUM SERPL-MCNC: 9 MG/DL (ref 8.7–10.5)
CHLORIDE SERPL-SCNC: 105 MMOL/L (ref 95–110)
CO2 SERPL-SCNC: 19 MMOL/L (ref 23–29)
CREAT SERPL-MCNC: 5.9 MG/DL (ref 0.5–1.4)
DIFFERENTIAL METHOD: ABNORMAL
EOSINOPHIL # BLD AUTO: 0.1 K/UL (ref 0–0.5)
EOSINOPHIL NFR BLD: 2.6 % (ref 0–8)
ERYTHROCYTE [DISTWIDTH] IN BLOOD BY AUTOMATED COUNT: 16.7 % (ref 11.5–14.5)
EST. GFR  (AFRICAN AMERICAN): 7.3 ML/MIN/1.73 M^2
EST. GFR  (NON AFRICAN AMERICAN): 6.4 ML/MIN/1.73 M^2
GLUCOSE SERPL-MCNC: 97 MG/DL (ref 70–110)
HCT VFR BLD AUTO: 26.5 % (ref 37–48.5)
HGB BLD-MCNC: 7.8 G/DL (ref 12–16)
IMM GRANULOCYTES # BLD AUTO: 0.09 K/UL (ref 0–0.04)
IMM GRANULOCYTES NFR BLD AUTO: 2.3 % (ref 0–0.5)
LYMPHOCYTES # BLD AUTO: 0.7 K/UL (ref 1–4.8)
LYMPHOCYTES NFR BLD: 18.1 % (ref 18–48)
MAGNESIUM SERPL-MCNC: 2.1 MG/DL (ref 1.6–2.6)
MCH RBC QN AUTO: 28.5 PG (ref 27–31)
MCHC RBC AUTO-ENTMCNC: 29.4 G/DL (ref 32–36)
MCV RBC AUTO: 97 FL (ref 82–98)
MONOCYTES # BLD AUTO: 0.5 K/UL (ref 0.3–1)
MONOCYTES NFR BLD: 13 % (ref 4–15)
NEUTROPHILS # BLD AUTO: 2.4 K/UL (ref 1.8–7.7)
NEUTROPHILS NFR BLD: 63 % (ref 38–73)
NRBC BLD-RTO: 0 /100 WBC
PHOSPHATE SERPL-MCNC: 6.5 MG/DL (ref 2.7–4.5)
PLATELET # BLD AUTO: 105 K/UL (ref 150–350)
PMV BLD AUTO: 12.9 FL (ref 9.2–12.9)
POCT GLUCOSE: 104 MG/DL (ref 70–110)
POCT GLUCOSE: 145 MG/DL (ref 70–110)
POCT GLUCOSE: 89 MG/DL (ref 70–110)
POTASSIUM SERPL-SCNC: 4.9 MMOL/L (ref 3.5–5.1)
PROT SERPL-MCNC: 6.1 G/DL (ref 6–8.4)
RBC # BLD AUTO: 2.74 M/UL (ref 4–5.4)
SODIUM SERPL-SCNC: 136 MMOL/L (ref 136–145)
WBC # BLD AUTO: 3.86 K/UL (ref 3.9–12.7)

## 2019-07-01 PROCEDURE — 36415 COLL VENOUS BLD VENIPUNCTURE: CPT

## 2019-07-01 PROCEDURE — 99239 PR HOSPITAL DISCHARGE DAY,>30 MIN: ICD-10-PCS | Mod: GC,,, | Performed by: INTERNAL MEDICINE

## 2019-07-01 PROCEDURE — 84100 ASSAY OF PHOSPHORUS: CPT

## 2019-07-01 PROCEDURE — 85025 COMPLETE CBC W/AUTO DIFF WBC: CPT

## 2019-07-01 PROCEDURE — 83735 ASSAY OF MAGNESIUM: CPT

## 2019-07-01 PROCEDURE — 25000003 PHARM REV CODE 250: Performed by: GENERAL PRACTICE

## 2019-07-01 PROCEDURE — 25000003 PHARM REV CODE 250: Performed by: NURSE PRACTITIONER

## 2019-07-01 PROCEDURE — 99239 HOSP IP/OBS DSCHRG MGMT >30: CPT | Mod: GC,,, | Performed by: INTERNAL MEDICINE

## 2019-07-01 PROCEDURE — 92507 TX SP LANG VOICE COMM INDIV: CPT

## 2019-07-01 PROCEDURE — 63600175 PHARM REV CODE 636 W HCPCS: Performed by: NURSE PRACTITIONER

## 2019-07-01 PROCEDURE — 80100016 HC MAINTENANCE HEMODIALYSIS

## 2019-07-01 PROCEDURE — 25000003 PHARM REV CODE 250: Performed by: PSYCHIATRY & NEUROLOGY

## 2019-07-01 PROCEDURE — 25000003 PHARM REV CODE 250: Performed by: PHYSICIAN ASSISTANT

## 2019-07-01 PROCEDURE — 80053 COMPREHEN METABOLIC PANEL: CPT

## 2019-07-01 RX ORDER — LIDOCAINE AND PRILOCAINE 25; 25 MG/G; MG/G
CREAM TOPICAL
Status: DISCONTINUED | OUTPATIENT
Start: 2019-07-01 | End: 2019-07-01 | Stop reason: HOSPADM

## 2019-07-01 RX ORDER — CLONIDINE HYDROCHLORIDE 0.1 MG/1
0.1 TABLET ORAL 3 TIMES DAILY
Qty: 90 TABLET | Refills: 11 | Status: ON HOLD | OUTPATIENT
Start: 2019-07-01 | End: 2021-03-10 | Stop reason: HOSPADM

## 2019-07-01 RX ADMIN — CLONIDINE HYDROCHLORIDE 0.1 MG: 0.1 TABLET ORAL at 03:07

## 2019-07-01 RX ADMIN — IRON SUCROSE 100 MG: 20 INJECTION, SOLUTION INTRAVENOUS at 10:07

## 2019-07-01 RX ADMIN — EPOETIN ALFA-EPBX 2490 UNITS: 3000 INJECTION, SOLUTION INTRAVENOUS; SUBCUTANEOUS at 11:07

## 2019-07-01 RX ADMIN — LIDOCAINE AND PRILOCAINE: 25; 25 CREAM TOPICAL at 08:07

## 2019-07-01 RX ADMIN — ONDANSETRON 4 MG: 8 TABLET, ORALLY DISINTEGRATING ORAL at 07:07

## 2019-07-01 RX ADMIN — SODIUM CHLORIDE 350 ML: 0.9 INJECTION, SOLUTION INTRAVENOUS at 08:07

## 2019-07-01 NOTE — PLAN OF CARE
CM at bedside to see patient .   CM team continuing to follow the patient throughout the hospital admission for discharge needs and assistance.  CM name, phone # and anticipated D/C date updated on [employer].       07/01/19 1023   Discharge Reassessment   Assessment Type Discharge Planning Reassessment   Discharge Plan A Rehab   Discharge Plan B Rehab   DME Needed Upon Discharge    (TBD)   Anticipated Discharge Disposition Rehab   Post-Acute Status   Post-Acute Authorization Placement   Post-Acute Placement Status Referrals Sent

## 2019-07-01 NOTE — PLAN OF CARE
Problem: SLP Goal  Goal: SLP Goal  Speech-Language Pathology Goals  Goals to be met by 7/5/19  1. Patient will recall 4/4 unrelated objects with a 5-minute delay and min assist.   2. Patient will answer problem solving/safety awareness questions with 80% accuracy and min assist.   3. Patient will independently follow complex commands with 75% accuracy.  4. Patient will participate in ongoing assessment of high-level cognition, language, reading, writing and visuospatial skills.  5. Educate patient regarding role of ST in her POC.   Patient seen for ongoing cognitive-linguistic therapy.     Lul Mora CCC-SLP  Speech-Language Pathology  Pager: [phone]

## 2019-07-01 NOTE — PROGRESS NOTES
PM&R consult follow up.  Please see original consult for detailed note.      Patient not available 2/2 dialysis. Recommend Inpatient Rehab per Dr. Mariano.      SHANEKA Hoffman, FNP-C  Physical Medicine & Rehabilitation   07/01/2019

## 2019-07-01 NOTE — PLAN OF CARE
Patient was accepted and received insurance auth for [facility].      07/01/19 1428   Post-Acute Status   Post-Acute Authorization Placement   Post-Acute Placement Status Awaiting Orders for Rehab     Yoli Reed LMSW   - [facility]  Ext. [phone]

## 2019-07-01 NOTE — PLAN OF CARE
LEE scheduled d/c transportation to [facility] through [facility]. Patient is scheduled to be picked up at 5:15 pm. LEE provided patient's nurse with report number [phone]. LEE is in communication with patient's CM and patient's Care Team - 3.      07/01/19 2386   Post-Acute Status   Post-Acute Authorization Placement   Post-Acute Placement Status Set-up Complete     Yoli Reed LMSW   - [facility]  Ext. [phone]

## 2019-07-01 NOTE — PT/OT/SLP PROGRESS
Physical Therapy      Patient Name:  Tea Georges   MRN:  [id number]    Patient not seen today secondary to Dialysis. Pt off floor for dialysis in AM. PT unable to return in PM. Will follow-up at next scheduled session as able.    Marcella Lopez, PT, DPT   7/1/2019  [phone]

## 2019-07-01 NOTE — PROGRESS NOTES
Maintenance dialysis (MWF)  Started. Zabrina Multani RN cannulated x2 15 gauge fistula needles to left upper arm.

## 2019-07-01 NOTE — PT/OT/SLP PROGRESS
"Speech Language Pathology Treatment    Patient Name:  Tea Georges   MRN:  [id number]  Admitting Diagnosis: Subdural hemorrhage    Recommendations:                 General Recommendations:  Cognitive-linguistic therapy  Diet recommendations:  Dental Soft, Liquid Diet Level: Thin   Aspiration Precautions: Check for pocketing/oral residue and Standard aspiration precautions   General Precautions: Standard, fall, vision impaired  Communication strategies:  go to room if call light pushed    Subjective     Patient awake and alert during session  "I think I'm doing pretty well, you tell me."   Patient with flat affect during session  Communicated with nurse prior to session     Pain/Comfort:  · Pain Rating 1: 0/10  · Pain Rating Post-Intervention 1: 0/10    Objective:     Has the patient been evaluated by SLP for swallowing?   Yes  Keep patient NPO? No   Current Respiratory Status: room air      Patient seen for ongoing cognitive-linguistic therapy. She was sitting up in bed during session. Patient was unable to recall any of the types of therapy she had been receiving while in the hospital despite max cueing from SLP. SLP educated her regarding each type of therapy and its purpose. Following a 5-minute interval, patient independently recalled 1/3, increasing to 2/3 with max assist. Patient was independently 60% accurate with problem solving questions, increasing to 80% with max assist. She correctly answered 1/3 3-step sequencing questions independently, increasing to 3/3 with min assist. SLP educated her and offered supportive listening as patient was frustrated with her family members not coming to visit her. No further questions at this time. Patient left in room with call light within reach.    Assessment:     Tea Georges is a 79 y.o. female with an SLP diagnosis of Cognitive-Linguistic Impairment.      Goals:   Multidisciplinary Problems     SLP Goals        Problem: SLP Goal    Goal Priority Disciplines Outcome "   SLP Goal     SLP    Description:  Speech-Language Pathology Goals  Goals to be met by 7/5/19  1. Patient will recall 4/4 unrelated objects with a 5-minute delay and min assist.   2. Patient will answer problem solving/safety awareness questions with 80% accuracy and min assist.   3. Patient will independently follow complex commands with 75% accuracy.  4. Patient will participate in ongoing assessment of high-level cognition, language, reading, writing and visuospatial skills.  5. Educate patient regarding role of ST in her POC.                    Plan:     · Patient to be seen:  4 x/week   · Plan of Care expires:  07/28/19  · Plan of Care reviewed with:  patient   · SLP Follow-Up:  Yes       Discharge recommendations:  rehabilitation facility   Barriers to Discharge:  Level of Skilled Assistance Needed     Time Tracking:     SLP Treatment Date:   07/01/19  Speech Start Time:  1541  Speech Stop Time:  1550     Speech Total Time (min):  9 min    Billable Minutes: Speech Therapy Individual 9    Lul Mora CCC-SLP  Speech-Language Pathology  Pager: [phone]   07/01/2019

## 2019-07-01 NOTE — DISCHARGE SUMMARY
"[facility] Hospital Medicine  Discharge Summary      Patient Name: Tea Georges  MRN: [id number]  Admission Date: 6/21/2019  Hospital Length of Stay: 7 days  Discharge Date and Time:  07/01/2019 3:24 PM  Attending Physician: Salvador Ayala MD   Discharging Provider: Elisabeth Koch MD  Primary Care Provider: Parth Posadas Ii, MD  [facility] Medicine Team: [facility]OS[facility] MED 3 Elisabeth Koch MD    HPI:   The patient is a 79 year old F with PMHx of ESRD (on HD M/W/F), HFpEF, COPD (2L O2 NC @ home), h/o SDH (w/ recent admission to [facility] on 5/21/19-w/bilateral SDH R>L s/p unwitnessed fall on plavix, no neurosurgical intervention) and right MCA stroke s/p thrombectomy in 2016 admitted to [facility] with increased R SDH.  After initial admission to [facility] on (5/21) pt was stepdown to hospital medicine, discharged to inpatient rehab/SNF and and then to home, however she failed to make it to her follow up appointment. Patient was admitted 06/21/19 to the hospital to hospital medicine with chief complaint of "not feeling right". Medicine team performed CT Head on 6/22 which demonstrated increase in size and chronicification of R convexity SDH with mass effect. Pt stated she had a fall 2 weeks prior to presentation w/LOC, not on any anticoagulants and had intermittent headaches since then however she denies any headaches at time of this presentation. 06/23/2019 s/p craniotomy by neurosurgery, SD placed. 6/25 Pt had MMA embolization. 6/27/19 SD drain removed, CT head was stable and patient stepped down to Hospital Medicine 3.     Procedure(s) (LRB):  ANGIOGRAM-CEREBRAL (N/A)      Hospital Course:   Ms.l Georges was stepped down to hospital medicine on 6/21 from neurocritical care after admission for subdural hematoma. NSGY signed off with plan to follow up outpatient for staple removal and patient awaiting rehab placement.    She has remained stable on the floor and has been accepted to [facility]. Continue PT/OT.    She will " f/u in 1 week for staple removal and f/u with Dr. Mena, Neurosurgery, in 2 weeks.   Consults:   Consults (From admission, onward)        Status Ordering Provider     Inpatient consult to Interventional Radiology  Once     Provider:  (Not yet assigned)    Completed TERRY CAR     Inpatient consult to Interventional Radiology  Once     Provider:  (Not yet assigned)    Completed CARMEN COTA     Inpatient consult to Midline team  Once     Provider:  (Not yet assigned)    Completed ISELA BAER     Inpatient consult to Nephrology  Once     Provider:  (Not yet assigned)    Completed ADEEL COPELAND     Inpatient consult to Nephrology  Once     Provider:  (Not yet assigned)    Completed RAVI NORTON     Inpatient consult to Neurosurgery  Once     Provider:  (Not yet assigned)    Acknowledged IDALMIS LEON     Inpatient consult to Physical Medicine Rehab  Once     Provider:  (Not yet assigned)    Completed LULU NICHOLE     Inpatient consult to Physical Medicine Rehab  Once     Provider:  (Not yet assigned)    Completed SHAY GORMAN     Inpatient consult to Social Work  Once     Provider:  (Not yet assigned)    Acknowledged LULU NICHOLE          No new Assessment & Plan notes have been filed under this hospital service since the last note was generated.  Service: Hospital Medicine    Final Active Diagnoses:    Diagnosis Date Noted POA    PRINCIPAL PROBLEM:  Subdural hemorrhage [I62.00] 05/24/2019 Yes    Pulmonary hypertension [I27.20] 06/28/2019 Yes    Chronic respiratory failure with hypoxia [J96.11] 06/22/2019 Yes    Moderate malnutrition [E44.0] 05/30/2019 Yes    (HFpEF) heart failure with preserved ejection fraction [I50.30] 05/21/2019 Yes    Subdural hematoma [S06.5X9A] 05/21/2019 Yes    Type 2 diabetes mellitus with chronic kidney disease on chronic dialysis, without long-term current use of insulin [E11.22, N18.6, Z99.2] 05/01/2018 Not Applicable     Non-rheumatic tricuspid valve insufficiency [I36.1] 01/15/2017 Yes    Pulmonary emphysema [J43.9] 01/15/2017 Yes    CAD (coronary artery disease) [I25.10] 12/12/2016 Yes    Physical debility [R53.81] 11/17/2016 Yes    Left spastic hemiparesis [G81.14] 11/13/2016 Yes    Blindness of right eye [H54.40] 11/12/2016 Yes     Chronic    Hyperparathyroidism, secondary renal [N25.81] 10/14/2016 Yes     Chronic    Anemia in ESRD (end-stage renal disease) [N18.6, D63.1] 05/29/2016 Yes     Chronic    Nonrheumatic aortic valve stenosis [I35.0] 02/04/2016 Yes    ESRD on hemodialysis [N18.6, Z99.2]  Not Applicable    Renovascular hypertension [I15.0] 01/08/2016 Yes    Pleural effusion on right [J90]  Yes      Problems Resolved During this Admission:    Diagnosis Date Noted Date Resolved POA    Brain compression [G93.5] 06/22/2019 06/28/2019 Yes    Hypokalemia [E87.6] 12/11/2016 06/28/2019 Yes    Right-sided cerebrovascular accident (CVA) [I63.9] 11/22/2016 06/28/2019 Yes    Shortness of breath [R06.02]  06/28/2019 Yes       Discharged Condition: fair    Disposition:     Follow Up:    Patient Instructions:   No discharge procedures on file.    Significant Diagnostic Studies: Labs: All labs within the past 24 hours have been reviewed    Pending Diagnostic Studies:     Procedure Component Value Units Date/Time    IR Angiogram Carotid Cerebral Bilateral inc Arch [[id number]]     Order Status:  Sent Lab Status:  No result          Medications:  Reconciled Home Medications:      Medication List      START taking these medications    cloNIDine 0.1 MG tablet  Commonly known as:  CATAPRES  Take 1 tablet (0.1 mg total) by mouth 3 (three) times daily.        CHANGE how you take these medications    senna-docusate 8.6-50 mg 8.6-50 mg per tablet  Commonly known as:  PERICOLACE  Take 1 tablet by mouth daily as needed for Constipation.  What changed:  Another medication with the same name was removed. Continue taking this  medication, and follow the directions you see here.        CONTINUE taking these medications    acetaminophen 325 MG tablet  Commonly known as:  TYLENOL  Take 2 tablets (650 mg total) by mouth every 6 (six) hours as needed for Pain.     albuterol 90 mcg/actuation inhaler  Commonly known as:  PROVENTIL/VENTOLIN HFA  Inhale 1-2 puffs into the lungs every 6 (six) hours as needed for Wheezing.     amLODIPine 10 MG tablet  Commonly known as:  NORVASC  Take 1 tablet (10 mg total) by mouth once daily.     artificial tears 0.5 % ophthalmic solution  Commonly known as:  ISOPTO TEARS  Place 2 drops into both eyes 4 (four) times daily as needed.     carvedilol 12.5 MG tablet  Commonly known as:  COREG  Take 1 tablet (12.5 mg total) by mouth 2 (two) times daily.     ergocalciferol 50,000 unit Cap  Commonly known as:  ERGOCALCIFEROL  Take 1 capsule (50,000 Units total) by mouth every 7 days.     fluticasone furoate-vilanterol 200-25 mcg/dose Dsdv diskus inhaler  Commonly known as:  BREO ELLIPTA  Inhale 1 puff into the lungs once daily.     hydrALAZINE 100 MG tablet  Commonly known as:  APRESOLINE  Take 1 tablet (100 mg total) by mouth every 8 (eight) hours.     losartan 50 MG tablet  Commonly known as:  COZAAR  Take 1 tablet (50 mg total) by mouth once daily.     ondansetron 8 MG Tbdl  Commonly known as:  ZOFRAN-ODT  Take 1 tablet (8 mg total) by mouth every 6 (six) hours as needed (nausea).     polyethylene glycol 17 gram Pwpk  Commonly known as:  GLYCOLAX  Take 17 g by mouth once daily.     RENVELA 800 mg Tab  Generic drug:  sevelamer carbonate  Take 1 tablet (800 mg total) by mouth 3 (three) times daily with meals.            Indwelling Lines/Drains at time of discharge:   Lines/Drains/Airways     Drain                 Hemodialysis AV Fistula Left upper arm -- days         Hemodialysis AV Fistula 05/23/16 0700 Left upper arm 1134 days                Time spent on the discharge of patient: 35 minutes  Patient was seen and  examined on the date of discharge and determined to be suitable for discharge.         Elisabeth Koch MD  Department of Hospital Medicine  [facility]

## 2019-07-01 NOTE — PLAN OF CARE
Problem: Adult Inpatient Plan of Care  Goal: Plan of Care Review  Outcome: Ongoing (interventions implemented as appropriate)  Dialysis completed,needles removed from FLORENCE AVg with pressure held for 10minutes with hemostasis achieved.Dialyzed for 3.5hrs with no fluid removal per order.Pt tolerated well.

## 2019-07-01 NOTE — PLAN OF CARE
[facility]    FACILITY TRANSFER ORDERS      Patient Name: Tea Georges  YOB: 1940    PCP: Parth Posadas Ii, MD   PCP Address: [facility] [address] / [place] [address]  PCP Phone Number: [phone]  PCP Fax: [phone]    Encounter Date: 07/01/2019    Admit to: [facility]     Vital Signs:  Routine    Diagnoses:   Active Hospital Problems    Diagnosis  POA    *Subdural hemorrhage [I62.00]  Yes    Pulmonary hypertension [I27.20]  Yes    Chronic respiratory failure with hypoxia [J96.11]  Yes    Moderate malnutrition [E44.0]  Yes    (HFpEF) heart failure with preserved ejection fraction [I50.30]  Yes    Subdural hematoma [S06.5X9A]  Yes    Type 2 diabetes mellitus with chronic kidney disease on chronic dialysis, without long-term current use of insulin [E11.22, N18.6, Z99.2]  Not Applicable    Non-rheumatic tricuspid valve insufficiency [I36.1]  Yes    Pulmonary emphysema [J43.9]  Yes    CAD (coronary artery disease) [I25.10]  Yes    Physical debility [R53.81]  Yes    Left spastic hemiparesis [G81.14]  Yes    Blindness of right eye [H54.40]  Yes     Chronic    Hyperparathyroidism, secondary renal [N25.81]  Yes     Chronic    Anemia in ESRD (end-stage renal disease) [N18.6, D63.1]  Yes     Chronic    Nonrheumatic aortic valve stenosis [I35.0]  Yes    ESRD on hemodialysis [N18.6, Z99.2]  Not Applicable    Renovascular hypertension [I15.0]  Yes    Pleural effusion on right [J90]  Yes      Resolved Hospital Problems    Diagnosis Date Resolved POA    Brain compression [G93.5] 06/28/2019 Yes    Hypokalemia [E87.6] 06/28/2019 Yes    Right-sided cerebrovascular accident (CVA) [I63.9] 06/28/2019 Yes    Shortness of breath [R06.02] 06/28/2019 Yes       Allergies:Review of patient's allergies indicates:  No Known Allergies    Diet: renal diet    Activities: Activity as tolerated    Nursing: fall precautions, vitals every shift     Labs: CBC and CMP daily.     CONSULTS:     Physical Therapy to evaluate and treat.  and Occupational Therapy to evaluate and treat.    DIALYSIS: Hemodialysis MWF, access L arm fistula.     WOUND CARE ORDERS  Staples: keep staples clean and dry. Patient will have removed at 1 week follow up upon discharge with neurosurgery.     Medications: Review discharge medications with patient and family and provide education.      Current Discharge Medication List      START taking these medications    Details   cloNIDine (CATAPRES) 0.1 MG tablet Take 1 tablet (0.1 mg total) by mouth 3 (three) times daily.  Qty: 90 tablet, Refills: 11         CONTINUE these medications which have NOT CHANGED    Details   acetaminophen (TYLENOL) 325 MG tablet Take 2 tablets (650 mg total) by mouth every 6 (six) hours as needed for Pain.  Refills: 0      albuterol (PROVENTIL/VENTOLIN HFA) 90 mcg/actuation inhaler Inhale 1-2 puffs into the lungs every 6 (six) hours as needed for Wheezing.  Qty: 1 Inhaler, Refills: 0    Associated Diagnoses: COPD with exacerbation      amLODIPine (NORVASC) 10 MG tablet Take 1 tablet (10 mg total) by mouth once daily.  Qty: 90 tablet, Refills: 3    Associated Diagnoses: Essential hypertension      artificial tears (ISOPTO TEARS) 0.5 % ophthalmic solution Place 2 drops into both eyes 4 (four) times daily as needed.      carvedilol (COREG) 12.5 MG tablet Take 1 tablet (12.5 mg total) by mouth 2 (two) times daily.  Qty: 60 tablet, Refills: 11      ergocalciferol (ERGOCALCIFEROL) 50,000 unit Cap Take 1 capsule (50,000 Units total) by mouth every 7 days.  Qty: 12 capsule, Refills: 3    Associated Diagnoses: Vitamin D deficiency      fluticasone-vilanterol (BREO ELLIPTA) 200-25 mcg/dose DsDv diskus inhaler Inhale 1 puff into the lungs once daily.  Qty: 90 each, Refills: 3    Associated Diagnoses: Chronic obstructive pulmonary disease, unspecified COPD type      hydrALAZINE (APRESOLINE) 100 MG tablet Take 1 tablet (100 mg total) by mouth every 8 (eight)  hours.  Qty: 90 tablet, Refills: 3      losartan (COZAAR) 50 MG tablet Take 1 tablet (50 mg total) by mouth once daily.  Qty: 90 tablet, Refills: 3    Associated Diagnoses: Essential hypertension      ondansetron (ZOFRAN-ODT) 8 MG TbDL Take 1 tablet (8 mg total) by mouth every 6 (six) hours as needed (nausea).  Qty: 30 tablet, Refills: 2      polyethylene glycol (GLYCOLAX) 17 gram PwPk Take 17 g by mouth once daily.  Refills: 0      RENVELA 800 mg Tab Take 1 tablet (800 mg total) by mouth 3 (three) times daily with meals.  Qty: 270 tablet, Refills: 3    Associated Diagnoses: ESRD (end stage renal disease) on dialysis      senna-docusate 8.6-50 mg (PERICOLACE) 8.6-50 mg per tablet Take 1 tablet by mouth daily as needed for Constipation.                  _________________________________  Elisabeth Koch MD  07/01/2019

## 2019-07-01 NOTE — PROGRESS NOTES
Received pt via stretcher awake and alert. Pt states that she needs lidocaine cream before the tx, Torrey AGUILAR informed. Lidocaine cream applied to FLORENCE.

## 2019-07-01 NOTE — PLAN OF CARE
07/01/19 1555   Final Note   Assessment Type Final Discharge Note   Anticipated Discharge Disposition Rehab   Right Care Referral Info   Post Acute Recommendation SNF / Sub-Acute Rehab

## 2019-07-01 NOTE — PROGRESS NOTES
[facility] HEMODIALYSIS NOTE     Patient currently on hemodialysis for removal of uremic toxins and volume.     Patient seen and evaluated on hemodialysis, tolerating treatment, see HD flowsheet for vitals and assessments.      Ultrafiltration goal is 0L (DW is 48.5kg, Pre-HD weight is 46.7kg), keep map >65     Labs have been reviewed and the dialysate bath has been adjusted.     Assessment/Plan:    -Patient seen on HD, tolerating treatment well, w/o complaints   -Reviewed outpatient HD records   -Renal diet  -Strict I/O's and daily weights  -Daily renal function panels  -Hbg 7.8, continue Epo with HD. Patient receives iron outpatient for a total of 10 doses, today would be dose 10, will order with HD today.    -Phos 3.8, will continue to monitor and consider restarting renvela as needed   -Will continue to follow while inpatient       SHANEKA Tuttle, AGNP-C  Nephrology  Pager:  [phone]

## 2019-07-02 ENCOUNTER — OUTPATIENT CASE MANAGEMENT (OUTPATIENT)
Dept: ADMINISTRATIVE | Facility: OTHER | Age: 79
End: 2019-07-02

## 2019-07-02 NOTE — PROGRESS NOTES
7/2/19  Chart reviewed.  Pt was admitted to [facility] on 6/22/19 secondary to right pleural effusion.  Pt discharged 7/1/19 to [facility].  LCSW will close case and notify OPCM RN

## 2019-07-04 ENCOUNTER — HOSPITAL ENCOUNTER (OUTPATIENT)
Dept: RADIOLOGY | Facility: HOSPITAL | Age: 79
Discharge: HOME | End: 2019-07-04
Attending: PHYSICAL MEDICINE & REHABILITATION
Payer: MEDICARE

## 2019-07-04 PROCEDURE — 74019 RADEX ABDOMEN 2 VIEWS: CPT | Mod: 26,,, | Performed by: RADIOLOGY

## 2019-07-04 PROCEDURE — 74019 XR ABDOMEN FLAT AND ERECT: ICD-10-PCS | Mod: 26,,, | Performed by: RADIOLOGY

## 2019-07-05 NOTE — PHYSICIAN QUERY
PT Name: Tea Georges  MR #: [id number]     PHYSICIAN QUERY -  ACUITY OF CONDITION CLARIFICATION      CDS/: Flores Downey RN, CDS               Contact information: [email or web address]  This form is a permanent document in the medical record.     Query Date: July 5, 2019    By submitting this query, we are merely seeking further clarification of documentation to reflect the severity of illness of your patient. Please utilize your independent clinical judgment when addressing the question(s) below.    The Medical record reflects the following:     Indicators   Supporting Clinical Findings Location in Medical Record   x Documentation of condition --CTH ordered due to history of recent SDH and generalized malaise. Alerted by Radiologist that patient's CTH sig/f worsening SDH with midline shift concerning for impending herniation.    --She was previously admitted to [facility] in May  with R acute SDH after a fall while on plavix          -performed CTH which demonstrated increase in size and chronicification of R convexity SDH with mass effect.     --Subdural hemorrhage              -Hx of Rob SDH R>L on 5/21( was admitted to [facility] on 5/21/19- no neurosurgical intervention              -pt stated she fell 2 weeks ago, w/LOC, not on anticoagulations             -Pt readmitted today to [facility] w/enlargement of R SDH              -CTH 6/22 reveals-enlargement of right subdural hematoma with associated worsening localized mass effect and leftward midline shift, the latter of which now resulting in subfalcine herniation    --POSTOPERATIVE DIAGNOSES:            - Subdural hemorrhage [I62.00], chronic         -The dura leaves were retracted using bipolar Bovie. High pressure subdural hematoma came out    --recent admission for traumatic sdh presenting with subacute sdh and brain compression   Hosp med Care Update 6/22      NeuroSx c/s 6/22          [facility] H&P 6/22                  Op Note 6/23          [facility] Note 6/23    Lab  Value(s)      Radiology Findings     x Treatment                                 Medication --Procedure(s) (LRB):           - KATE HOLES FOR SUBDURAL HEMATOMA EVACUATION (Right)     --Cerebral angiogram with embolization of the right middle meningeal artery using PVA particles Op-Note 6/23        IR Procedure Note 6/24    Other       Provider, please clarify the acuity/chronicity of _Subdural hemorrhage/hematoma___, for which the patient was admitted  and received surgical treatment for:    [   ] Acute              [  ] traumatic      [  ] nontraumatic   [   ] Chronic   [xxx   ] Subacute        [xxxx  ] traumatic      [  ] nontraumatic   [   ]  Clinically Undetermined       Please document in your progress notes daily for the duration of treatment until resolved, and include in your discharge summary.

## 2019-07-09 ENCOUNTER — CLINICAL SUPPORT (OUTPATIENT)
Dept: CARDIOLOGY | Facility: HOSPITAL | Age: 79
End: 2019-07-09
Attending: INTERNAL MEDICINE
Payer: MEDICARE

## 2019-07-09 DIAGNOSIS — Z95.818 STATUS POST PLACEMENT OF IMPLANTABLE LOOP RECORDER: ICD-10-CM

## 2019-07-09 PROCEDURE — 93299 CARDIAC DEVICE CHECK - REMOTE: CPT

## 2019-07-11 ENCOUNTER — OUTPATIENT CASE MANAGEMENT (OUTPATIENT)
Dept: ADMINISTRATIVE | Facility: OTHER | Age: 79
End: 2019-07-11

## 2019-07-22 ENCOUNTER — EXTERNAL HOME HEALTH (OUTPATIENT)
Dept: HOME HEALTH SERVICES | Facility: HOSPITAL | Age: 79
End: 2019-07-22
Payer: MEDICARE

## 2019-07-24 ENCOUNTER — TELEPHONE (OUTPATIENT)
Dept: INTERNAL MEDICINE | Facility: CLINIC | Age: 79
End: 2019-07-24

## 2019-07-24 NOTE — TELEPHONE ENCOUNTER
----- Message from Ju Blas sent at 7/24/2019  1:55 PM CDT -----  Contact: Nurses Registry [phone]  Nurses Registries is requesting a call from the office, she wants to know if MD wants to resume home health care for patient.    Please advise, thanks

## 2019-08-01 NOTE — ASSESSMENT & PLAN NOTE
--hx of HTN  ---160  --continue scheduled antihypertensive meds  --PRN hydralazine  --EF 53% (05/22/19)     Improved

## 2019-08-08 ENCOUNTER — CLINICAL SUPPORT (OUTPATIENT)
Dept: CARDIOLOGY | Facility: HOSPITAL | Age: 79
End: 2019-08-08
Attending: INTERNAL MEDICINE
Payer: MEDICARE

## 2019-08-08 DIAGNOSIS — Z46.9 FITTING AND ADJUSTMENT OF DEVICE: ICD-10-CM

## 2019-08-08 PROCEDURE — 93299 CARDIAC DEVICE CHECK - REMOTE: CPT

## 2019-08-15 PROCEDURE — G0179 PR HOME HEALTH MD RECERTIFICATION: ICD-10-PCS | Mod: ,,, | Performed by: INTERNAL MEDICINE

## 2019-08-15 PROCEDURE — G0179 MD RECERTIFICATION HHA PT: HCPCS | Mod: ,,, | Performed by: INTERNAL MEDICINE

## 2019-08-26 ENCOUNTER — CLINICAL SUPPORT (OUTPATIENT)
Dept: CARDIOLOGY | Facility: HOSPITAL | Age: 79
End: 2019-08-26
Payer: MEDICARE

## 2019-08-26 DIAGNOSIS — Z95.818 PRESENCE OF OTHER CARDIAC IMPLANTS AND GRAFTS: ICD-10-CM

## 2019-08-26 PROCEDURE — 93298 CARDIAC DEVICE CHECK - REMOTE: ICD-10-PCS | Mod: ,,, | Performed by: INTERNAL MEDICINE

## 2019-08-26 PROCEDURE — 93298 REM INTERROG DEV EVAL SCRMS: CPT | Mod: ,,, | Performed by: INTERNAL MEDICINE

## 2019-08-29 ENCOUNTER — EXTERNAL HOME HEALTH (OUTPATIENT)
Dept: HOME HEALTH SERVICES | Facility: HOSPITAL | Age: 79
End: 2019-08-29
Payer: MEDICARE

## 2019-09-05 RX ORDER — LEVETIRACETAM 500 MG/1
TABLET ORAL
Refills: 0 | COMMUNITY
Start: 2019-07-15 | End: 2019-09-05 | Stop reason: SDUPTHER

## 2019-09-05 NOTE — TELEPHONE ENCOUNTER
----- Message from Malini Sanchez sent at 9/5/2019 12:31 PM CDT -----  Contact: [facility] at [phone]  Rx Refill/Request     Is this a Refill or New Rx:  refill  Rx Name and Strength:  Keppra 500mg qty 60     And   Carvedilol 6.25mg  Qty 60  Preferred Pharmacy with phone number: [facility] at [phone]  Communication Preference:call  Additional Information: refill both meds.  Thanks

## 2019-09-09 RX ORDER — LEVETIRACETAM 500 MG/1
TABLET ORAL
Qty: 60 TABLET | Refills: 0 | Status: ON HOLD | OUTPATIENT
Start: 2019-09-09 | End: 2020-10-10 | Stop reason: HOSPADM

## 2019-09-09 RX ORDER — CARVEDILOL 12.5 MG/1
12.5 TABLET ORAL 2 TIMES DAILY
Qty: 60 TABLET | Refills: 11 | Status: ON HOLD | OUTPATIENT
Start: 2019-09-09 | End: 2019-10-31 | Stop reason: HOSPADM

## 2019-09-25 ENCOUNTER — CLINICAL SUPPORT (OUTPATIENT)
Dept: CARDIOLOGY | Facility: HOSPITAL | Age: 79
End: 2019-09-25
Payer: MEDICARE

## 2019-09-25 DIAGNOSIS — Z95.818 PRESENCE OF OTHER CARDIAC IMPLANTS AND GRAFTS: ICD-10-CM

## 2019-09-25 PROCEDURE — 93298 CARDIAC DEVICE CHECK - REMOTE: ICD-10-PCS | Mod: ,,, | Performed by: INTERNAL MEDICINE

## 2019-09-25 PROCEDURE — 93298 REM INTERROG DEV EVAL SCRMS: CPT | Mod: ,,, | Performed by: INTERNAL MEDICINE

## 2019-09-26 DIAGNOSIS — J44.9 CHRONIC OBSTRUCTIVE PULMONARY DISEASE, UNSPECIFIED COPD TYPE: ICD-10-CM

## 2019-09-27 RX ORDER — FLUTICASONE FUROATE AND VILANTEROL TRIFENATATE 200; 25 UG/1; UG/1
POWDER RESPIRATORY (INHALATION)
Qty: 180 EACH | Refills: 3 | Status: SHIPPED | OUTPATIENT
Start: 2019-09-27

## 2019-10-16 ENCOUNTER — TELEPHONE (OUTPATIENT)
Dept: INTERNAL MEDICINE | Facility: CLINIC | Age: 79
End: 2019-10-16

## 2019-10-16 NOTE — TELEPHONE ENCOUNTER
----- Message from Karishma Tubbs sent at 10/16/2019  4:00 PM CDT -----  Contact: Mia/ [phone]/ Nurses Registry  Mia/Nurses Registry called and reported that the are discharging the patient due to inability to get inside the home.  Family members are not cooperating with putting up the kaye bull dog.    EPS was called because the patient has no electricity in the home,  The family is using a generator.    Please advise, thank you.

## 2019-10-25 ENCOUNTER — CLINICAL SUPPORT (OUTPATIENT)
Dept: CARDIOLOGY | Facility: HOSPITAL | Age: 79
End: 2019-10-25
Payer: MEDICARE

## 2019-10-25 DIAGNOSIS — Z95.818 PRESENCE OF OTHER CARDIAC IMPLANTS AND GRAFTS: ICD-10-CM

## 2019-10-25 PROCEDURE — 93298 REM INTERROG DEV EVAL SCRMS: CPT | Mod: ,,, | Performed by: INTERNAL MEDICINE

## 2019-10-25 PROCEDURE — 93298 CARDIAC DEVICE CHECK - REMOTE: ICD-10-PCS | Mod: ,,, | Performed by: INTERNAL MEDICINE

## 2019-10-27 ENCOUNTER — ANESTHESIA EVENT (OUTPATIENT)
Dept: SURGERY | Facility: HOSPITAL | Age: 79
DRG: 480 | End: 2019-10-27
Payer: MEDICARE

## 2019-10-27 ENCOUNTER — HOSPITAL ENCOUNTER (INPATIENT)
Facility: HOSPITAL | Age: 79
LOS: 4 days | Discharge: NURSING, REHAB OR LONG-TERM CARE | DRG: 480 | End: 2019-10-31
Attending: EMERGENCY MEDICINE | Admitting: EMERGENCY MEDICINE
Payer: MEDICARE

## 2019-10-27 DIAGNOSIS — S72.009A HIP FRACTURE: ICD-10-CM

## 2019-10-27 DIAGNOSIS — I15.0 RENOVASCULAR HYPERTENSION: ICD-10-CM

## 2019-10-27 DIAGNOSIS — S72.142A CLOSED 2-PART INTERTROCHANTERIC FRACTURE OF LEFT FEMUR: ICD-10-CM

## 2019-10-27 DIAGNOSIS — Z01.818 PRE-OP EXAMINATION: ICD-10-CM

## 2019-10-27 DIAGNOSIS — D69.6 THROMBOCYTOPENIA, UNSPECIFIED: ICD-10-CM

## 2019-10-27 DIAGNOSIS — N39.0 COMPLICATED UTI (URINARY TRACT INFECTION): ICD-10-CM

## 2019-10-27 DIAGNOSIS — I27.29 PULMONARY HYPERTENSION DUE TO AORTIC VALVE DISEASE: ICD-10-CM

## 2019-10-27 DIAGNOSIS — N25.81 HYPERPARATHYROIDISM, SECONDARY RENAL: Chronic | ICD-10-CM

## 2019-10-27 DIAGNOSIS — D63.1 ANEMIA IN ESRD (END-STAGE RENAL DISEASE): Chronic | ICD-10-CM

## 2019-10-27 DIAGNOSIS — R53.81 PHYSICAL DEBILITY: ICD-10-CM

## 2019-10-27 DIAGNOSIS — N18.6 ANEMIA IN ESRD (END-STAGE RENAL DISEASE): Chronic | ICD-10-CM

## 2019-10-27 DIAGNOSIS — I35.0 SEVERE AORTIC VALVE STENOSIS: ICD-10-CM

## 2019-10-27 DIAGNOSIS — H54.40 BLINDNESS OF RIGHT EYE WITH NORMAL VISION IN CONTRALATERAL EYE: Chronic | ICD-10-CM

## 2019-10-27 DIAGNOSIS — N18.6 TYPE 2 DIABETES MELLITUS WITH CHRONIC KIDNEY DISEASE ON CHRONIC DIALYSIS, WITHOUT LONG-TERM CURRENT USE OF INSULIN: ICD-10-CM

## 2019-10-27 DIAGNOSIS — J43.1 PANLOBULAR EMPHYSEMA: ICD-10-CM

## 2019-10-27 DIAGNOSIS — E11.22 TYPE 2 DIABETES MELLITUS WITH CHRONIC KIDNEY DISEASE ON CHRONIC DIALYSIS, WITHOUT LONG-TERM CURRENT USE OF INSULIN: ICD-10-CM

## 2019-10-27 DIAGNOSIS — J96.11 CHRONIC RESPIRATORY FAILURE WITH HYPOXIA: ICD-10-CM

## 2019-10-27 DIAGNOSIS — S72.142A CLOSED 2-PART INTERTROCHANTERIC FRACTURE OF LEFT FEMUR, INITIAL ENCOUNTER: Primary | ICD-10-CM

## 2019-10-27 DIAGNOSIS — I50.32 CHRONIC HEART FAILURE WITH PRESERVED EJECTION FRACTION: ICD-10-CM

## 2019-10-27 DIAGNOSIS — I35.9 PULMONARY HYPERTENSION DUE TO AORTIC VALVE DISEASE: ICD-10-CM

## 2019-10-27 DIAGNOSIS — R52 PAIN: ICD-10-CM

## 2019-10-27 DIAGNOSIS — Z01.811 PRE-OP CHEST EXAM: ICD-10-CM

## 2019-10-27 DIAGNOSIS — E55.9 VITAMIN D DEFICIENCY: ICD-10-CM

## 2019-10-27 DIAGNOSIS — N18.6 ESRD ON HEMODIALYSIS: ICD-10-CM

## 2019-10-27 DIAGNOSIS — Z99.2 ESRD ON HEMODIALYSIS: ICD-10-CM

## 2019-10-27 DIAGNOSIS — Z99.2 TYPE 2 DIABETES MELLITUS WITH CHRONIC KIDNEY DISEASE ON CHRONIC DIALYSIS, WITHOUT LONG-TERM CURRENT USE OF INSULIN: ICD-10-CM

## 2019-10-27 PROBLEM — I62.00 SUBDURAL HEMORRHAGE: Status: RESOLVED | Noted: 2019-05-24 | Resolved: 2019-10-27

## 2019-10-27 PROBLEM — S06.5XAA SUBDURAL HEMATOMA: Status: RESOLVED | Noted: 2019-05-21 | Resolved: 2019-10-27

## 2019-10-27 PROBLEM — E44.0 MODERATE MALNUTRITION: Status: RESOLVED | Noted: 2019-05-30 | Resolved: 2019-10-27

## 2019-10-27 LAB
ABO + RH BLD: NORMAL
ALBUMIN SERPL BCP-MCNC: 3.5 G/DL (ref 3.5–5.2)
ALP SERPL-CCNC: 103 U/L (ref 55–135)
ALT SERPL W/O P-5'-P-CCNC: 6 U/L (ref 10–44)
ANION GAP SERPL CALC-SCNC: 14 MMOL/L (ref 8–16)
APTT BLDCRRT: 24.2 SEC (ref 21–32)
AST SERPL-CCNC: 9 U/L (ref 10–40)
BACTERIA #/AREA URNS AUTO: ABNORMAL /HPF
BASOPHILS # BLD AUTO: 0.02 K/UL (ref 0–0.2)
BASOPHILS NFR BLD: 0.2 % (ref 0–1.9)
BILIRUB SERPL-MCNC: 0.5 MG/DL (ref 0.1–1)
BILIRUB UR QL STRIP: NEGATIVE
BLD GP AB SCN CELLS X3 SERPL QL: NORMAL
BUN SERPL-MCNC: 51 MG/DL (ref 8–23)
CALCIUM SERPL-MCNC: 9.3 MG/DL (ref 8.7–10.5)
CHLORIDE SERPL-SCNC: 104 MMOL/L (ref 95–110)
CLARITY UR REFRACT.AUTO: ABNORMAL
CO2 SERPL-SCNC: 24 MMOL/L (ref 23–29)
COLOR UR AUTO: YELLOW
CREAT SERPL-MCNC: 6 MG/DL (ref 0.5–1.4)
DIFFERENTIAL METHOD: ABNORMAL
EOSINOPHIL # BLD AUTO: 0.1 K/UL (ref 0–0.5)
EOSINOPHIL NFR BLD: 0.9 % (ref 0–8)
ERYTHROCYTE [DISTWIDTH] IN BLOOD BY AUTOMATED COUNT: 15.9 % (ref 11.5–14.5)
EST. GFR  (AFRICAN AMERICAN): 7.2 ML/MIN/1.73 M^2
EST. GFR  (NON AFRICAN AMERICAN): 6.2 ML/MIN/1.73 M^2
ESTIMATED AVG GLUCOSE: 114 MG/DL (ref 68–131)
GLUCOSE SERPL-MCNC: 179 MG/DL (ref 70–110)
GLUCOSE UR QL STRIP: NEGATIVE
HBA1C MFR BLD HPLC: 5.6 % (ref 4–5.6)
HCT VFR BLD AUTO: 31.6 % (ref 37–48.5)
HGB BLD-MCNC: 9.6 G/DL (ref 12–16)
HGB UR QL STRIP: ABNORMAL
HYALINE CASTS UR QL AUTO: 0 /LPF
IMM GRANULOCYTES # BLD AUTO: 0.07 K/UL (ref 0–0.04)
IMM GRANULOCYTES NFR BLD AUTO: 0.7 % (ref 0–0.5)
INR PPP: 1.1 (ref 0.8–1.2)
KETONES UR QL STRIP: NEGATIVE
LEUKOCYTE ESTERASE UR QL STRIP: ABNORMAL
LYMPHOCYTES # BLD AUTO: 0.7 K/UL (ref 1–4.8)
LYMPHOCYTES NFR BLD: 6.9 % (ref 18–48)
MAGNESIUM SERPL-MCNC: 2 MG/DL (ref 1.6–2.6)
MCH RBC QN AUTO: 29.7 PG (ref 27–31)
MCHC RBC AUTO-ENTMCNC: 30.4 G/DL (ref 32–36)
MCV RBC AUTO: 98 FL (ref 82–98)
MICROSCOPIC COMMENT: ABNORMAL
MONOCYTES # BLD AUTO: 0.8 K/UL (ref 0.3–1)
MONOCYTES NFR BLD: 8.5 % (ref 4–15)
NEUTROPHILS # BLD AUTO: 7.9 K/UL (ref 1.8–7.7)
NEUTROPHILS NFR BLD: 82.8 % (ref 38–73)
NITRITE UR QL STRIP: NEGATIVE
NRBC BLD-RTO: 0 /100 WBC
PH UR STRIP: 6 [PH] (ref 5–8)
PHOSPHATE SERPL-MCNC: 4.5 MG/DL (ref 2.7–4.5)
PLATELET # BLD AUTO: 76 K/UL (ref 150–350)
PMV BLD AUTO: 13 FL (ref 9.2–12.9)
POCT GLUCOSE: 121 MG/DL (ref 70–110)
POCT GLUCOSE: 164 MG/DL (ref 70–110)
POTASSIUM SERPL-SCNC: 4 MMOL/L (ref 3.5–5.1)
PREALB SERPL-MCNC: 22 MG/DL (ref 20–43)
PROT SERPL-MCNC: 7.2 G/DL (ref 6–8.4)
PROT UR QL STRIP: ABNORMAL
PROTHROMBIN TIME: 11 SEC (ref 9–12.5)
RBC # BLD AUTO: 3.23 M/UL (ref 4–5.4)
RBC #/AREA URNS AUTO: 7 /HPF (ref 0–4)
SODIUM SERPL-SCNC: 142 MMOL/L (ref 136–145)
SP GR UR STRIP: 1.01 (ref 1–1.03)
SQUAMOUS #/AREA URNS AUTO: 1 /HPF
TRANSFERRIN SERPL-MCNC: 127 MG/DL (ref 200–375)
URN SPEC COLLECT METH UR: ABNORMAL
WBC # BLD AUTO: 9.56 K/UL (ref 3.9–12.7)
WBC #/AREA URNS AUTO: >100 /HPF (ref 0–5)

## 2019-10-27 PROCEDURE — 85025 COMPLETE CBC W/AUTO DIFF WBC: CPT

## 2019-10-27 PROCEDURE — 83036 HEMOGLOBIN GLYCOSYLATED A1C: CPT

## 2019-10-27 PROCEDURE — 83735 ASSAY OF MAGNESIUM: CPT

## 2019-10-27 PROCEDURE — 25000003 PHARM REV CODE 250: Performed by: EMERGENCY MEDICINE

## 2019-10-27 PROCEDURE — 93010 EKG 12-LEAD: ICD-10-PCS | Mod: ,,, | Performed by: INTERNAL MEDICINE

## 2019-10-27 PROCEDURE — 87088 URINE BACTERIA CULTURE: CPT

## 2019-10-27 PROCEDURE — 99285 EMERGENCY DEPT VISIT HI MDM: CPT | Mod: 25

## 2019-10-27 PROCEDURE — 81001 URINALYSIS AUTO W/SCOPE: CPT

## 2019-10-27 PROCEDURE — 85730 THROMBOPLASTIN TIME PARTIAL: CPT

## 2019-10-27 PROCEDURE — 99285 EMERGENCY DEPT VISIT HI MDM: CPT | Mod: ,,, | Performed by: EMERGENCY MEDICINE

## 2019-10-27 PROCEDURE — 99223 1ST HOSP IP/OBS HIGH 75: CPT | Mod: ,,, | Performed by: INTERNAL MEDICINE

## 2019-10-27 PROCEDURE — 84466 ASSAY OF TRANSFERRIN: CPT

## 2019-10-27 PROCEDURE — 87077 CULTURE AEROBIC IDENTIFY: CPT

## 2019-10-27 PROCEDURE — 87186 SC STD MICRODIL/AGAR DIL: CPT

## 2019-10-27 PROCEDURE — 63600175 PHARM REV CODE 636 W HCPCS: Performed by: EMERGENCY MEDICINE

## 2019-10-27 PROCEDURE — 86850 RBC ANTIBODY SCREEN: CPT

## 2019-10-27 PROCEDURE — 86920 COMPATIBILITY TEST SPIN: CPT

## 2019-10-27 PROCEDURE — 99285 PR EMERGENCY DEPT VISIT,LEVEL V: ICD-10-PCS | Mod: ,,, | Performed by: EMERGENCY MEDICINE

## 2019-10-27 PROCEDURE — 84100 ASSAY OF PHOSPHORUS: CPT

## 2019-10-27 PROCEDURE — 25000242 PHARM REV CODE 250 ALT 637 W/ HCPCS: Performed by: INTERNAL MEDICINE

## 2019-10-27 PROCEDURE — 99223 PR INITIAL HOSPITAL CARE,LEVL III: ICD-10-PCS | Mod: ,,, | Performed by: INTERNAL MEDICINE

## 2019-10-27 PROCEDURE — 63600175 PHARM REV CODE 636 W HCPCS: Performed by: INTERNAL MEDICINE

## 2019-10-27 PROCEDURE — 87086 URINE CULTURE/COLONY COUNT: CPT

## 2019-10-27 PROCEDURE — 84134 ASSAY OF PREALBUMIN: CPT

## 2019-10-27 PROCEDURE — 11000001 HC ACUTE MED/SURG PRIVATE ROOM

## 2019-10-27 PROCEDURE — 80053 COMPREHEN METABOLIC PANEL: CPT

## 2019-10-27 PROCEDURE — 93005 ELECTROCARDIOGRAM TRACING: CPT

## 2019-10-27 PROCEDURE — 85610 PROTHROMBIN TIME: CPT

## 2019-10-27 PROCEDURE — 27201040 HC RC 50 FILTER

## 2019-10-27 PROCEDURE — 96375 TX/PRO/DX INJ NEW DRUG ADDON: CPT

## 2019-10-27 PROCEDURE — 25000003 PHARM REV CODE 250: Performed by: INTERNAL MEDICINE

## 2019-10-27 PROCEDURE — 93010 ELECTROCARDIOGRAM REPORT: CPT | Mod: ,,, | Performed by: INTERNAL MEDICINE

## 2019-10-27 PROCEDURE — 96374 THER/PROPH/DIAG INJ IV PUSH: CPT

## 2019-10-27 RX ORDER — ONDANSETRON 2 MG/ML
4 INJECTION INTRAMUSCULAR; INTRAVENOUS
Status: COMPLETED | OUTPATIENT
Start: 2019-10-27 | End: 2019-10-27

## 2019-10-27 RX ORDER — OXYCODONE HYDROCHLORIDE 10 MG/1
10 TABLET ORAL
Status: DISCONTINUED | OUTPATIENT
Start: 2019-10-27 | End: 2019-10-27

## 2019-10-27 RX ORDER — ERGOCALCIFEROL 1.25 MG/1
50000 CAPSULE ORAL
Status: DISCONTINUED | OUTPATIENT
Start: 2019-10-29 | End: 2019-10-31 | Stop reason: HOSPADM

## 2019-10-27 RX ORDER — HYDROCODONE BITARTRATE AND ACETAMINOPHEN 500; 5 MG/1; MG/1
TABLET ORAL
Status: DISCONTINUED | OUTPATIENT
Start: 2019-10-27 | End: 2019-10-31 | Stop reason: HOSPADM

## 2019-10-27 RX ORDER — LEVETIRACETAM 500 MG/1
500 TABLET ORAL 2 TIMES DAILY
Status: DISCONTINUED | OUTPATIENT
Start: 2019-10-27 | End: 2019-10-31 | Stop reason: HOSPADM

## 2019-10-27 RX ORDER — BISACODYL 10 MG
10 SUPPOSITORY, RECTAL RECTAL DAILY PRN
Status: DISCONTINUED | OUTPATIENT
Start: 2019-10-27 | End: 2019-10-31 | Stop reason: HOSPADM

## 2019-10-27 RX ORDER — AMLODIPINE BESYLATE 10 MG/1
10 TABLET ORAL DAILY
Status: DISCONTINUED | OUTPATIENT
Start: 2019-10-27 | End: 2019-10-31 | Stop reason: HOSPADM

## 2019-10-27 RX ORDER — ONDANSETRON 2 MG/ML
4 INJECTION INTRAMUSCULAR; INTRAVENOUS EVERY 12 HOURS PRN
Status: DISCONTINUED | OUTPATIENT
Start: 2019-10-27 | End: 2019-10-31 | Stop reason: HOSPADM

## 2019-10-27 RX ORDER — CEFTRIAXONE 1 G/1
1 INJECTION, POWDER, FOR SOLUTION INTRAMUSCULAR; INTRAVENOUS
Status: DISCONTINUED | OUTPATIENT
Start: 2019-10-27 | End: 2019-10-31 | Stop reason: HOSPADM

## 2019-10-27 RX ORDER — SEVELAMER CARBONATE 800 MG/1
800 TABLET, FILM COATED ORAL
Status: DISCONTINUED | OUTPATIENT
Start: 2019-10-27 | End: 2019-10-31 | Stop reason: HOSPADM

## 2019-10-27 RX ORDER — IBUPROFEN 200 MG
16 TABLET ORAL
Status: DISCONTINUED | OUTPATIENT
Start: 2019-10-27 | End: 2019-10-31 | Stop reason: HOSPADM

## 2019-10-27 RX ORDER — IBUPROFEN 200 MG
24 TABLET ORAL
Status: DISCONTINUED | OUTPATIENT
Start: 2019-10-27 | End: 2019-10-31 | Stop reason: HOSPADM

## 2019-10-27 RX ORDER — SODIUM CHLORIDE 9 MG/ML
INJECTION, SOLUTION INTRAVENOUS ONCE
Status: DISCONTINUED | OUTPATIENT
Start: 2019-10-27 | End: 2019-10-31 | Stop reason: HOSPADM

## 2019-10-27 RX ORDER — ACETAMINOPHEN 500 MG
1000 TABLET ORAL EVERY 8 HOURS
Status: COMPLETED | OUTPATIENT
Start: 2019-10-27 | End: 2019-10-28

## 2019-10-27 RX ORDER — MORPHINE SULFATE 2 MG/ML
2 INJECTION, SOLUTION INTRAMUSCULAR; INTRAVENOUS
Status: DISCONTINUED | OUTPATIENT
Start: 2019-10-27 | End: 2019-10-27

## 2019-10-27 RX ORDER — CLONIDINE HYDROCHLORIDE 0.1 MG/1
0.1 TABLET ORAL 3 TIMES DAILY
Status: DISCONTINUED | OUTPATIENT
Start: 2019-10-27 | End: 2019-10-31 | Stop reason: HOSPADM

## 2019-10-27 RX ORDER — HYDRALAZINE HYDROCHLORIDE 50 MG/1
100 TABLET, FILM COATED ORAL EVERY 8 HOURS
Status: DISCONTINUED | OUTPATIENT
Start: 2019-10-27 | End: 2019-10-31 | Stop reason: HOSPADM

## 2019-10-27 RX ORDER — POLYETHYLENE GLYCOL 3350 17 G/17G
17 POWDER, FOR SOLUTION ORAL DAILY
Status: DISCONTINUED | OUTPATIENT
Start: 2019-10-27 | End: 2019-10-31 | Stop reason: HOSPADM

## 2019-10-27 RX ORDER — GLUCAGON 1 MG
1 KIT INJECTION
Status: DISCONTINUED | OUTPATIENT
Start: 2019-10-27 | End: 2019-10-31 | Stop reason: HOSPADM

## 2019-10-27 RX ORDER — OXYCODONE HYDROCHLORIDE 5 MG/1
5 TABLET ORAL
Status: DISCONTINUED | OUTPATIENT
Start: 2019-10-27 | End: 2019-10-30

## 2019-10-27 RX ORDER — FLUTICASONE FUROATE AND VILANTEROL 200; 25 UG/1; UG/1
1 POWDER RESPIRATORY (INHALATION) DAILY
Status: DISCONTINUED | OUTPATIENT
Start: 2019-10-27 | End: 2019-10-31 | Stop reason: HOSPADM

## 2019-10-27 RX ORDER — MORPHINE SULFATE 2 MG/ML
2 INJECTION, SOLUTION INTRAMUSCULAR; INTRAVENOUS
Status: DISCONTINUED | OUTPATIENT
Start: 2019-10-27 | End: 2019-10-30

## 2019-10-27 RX ORDER — LOSARTAN POTASSIUM 25 MG/1
50 TABLET ORAL DAILY
Status: DISCONTINUED | OUTPATIENT
Start: 2019-10-27 | End: 2019-10-31 | Stop reason: HOSPADM

## 2019-10-27 RX ORDER — RAMELTEON 8 MG/1
8 TABLET ORAL NIGHTLY PRN
Status: DISCONTINUED | OUTPATIENT
Start: 2019-10-27 | End: 2019-10-31 | Stop reason: HOSPADM

## 2019-10-27 RX ORDER — INSULIN ASPART 100 [IU]/ML
0-5 INJECTION, SOLUTION INTRAVENOUS; SUBCUTANEOUS
Status: DISCONTINUED | OUTPATIENT
Start: 2019-10-27 | End: 2019-10-31 | Stop reason: HOSPADM

## 2019-10-27 RX ORDER — HYDROMORPHONE HYDROCHLORIDE 1 MG/ML
0.2 INJECTION, SOLUTION INTRAMUSCULAR; INTRAVENOUS; SUBCUTANEOUS
Status: COMPLETED | OUTPATIENT
Start: 2019-10-27 | End: 2019-10-27

## 2019-10-27 RX ORDER — CARVEDILOL 12.5 MG/1
12.5 TABLET ORAL 2 TIMES DAILY
Status: DISCONTINUED | OUTPATIENT
Start: 2019-10-27 | End: 2019-10-27

## 2019-10-27 RX ORDER — SODIUM CHLORIDE 0.9 % (FLUSH) 0.9 %
10 SYRINGE (ML) INJECTION
Status: DISCONTINUED | OUTPATIENT
Start: 2019-10-27 | End: 2019-10-31 | Stop reason: HOSPADM

## 2019-10-27 RX ORDER — ACETAMINOPHEN 500 MG
1000 TABLET ORAL
Status: COMPLETED | OUTPATIENT
Start: 2019-10-27 | End: 2019-10-27

## 2019-10-27 RX ADMIN — ACETAMINOPHEN 1000 MG: 500 TABLET ORAL at 06:10

## 2019-10-27 RX ADMIN — ONDANSETRON 4 MG: 2 INJECTION INTRAMUSCULAR; INTRAVENOUS at 05:10

## 2019-10-27 RX ADMIN — LEVETIRACETAM 500 MG: 500 TABLET, FILM COATED ORAL at 10:10

## 2019-10-27 RX ADMIN — CARVEDILOL 12.5 MG: 12.5 TABLET, FILM COATED ORAL at 10:10

## 2019-10-27 RX ADMIN — ACETAMINOPHEN 1000 MG: 500 TABLET ORAL at 05:10

## 2019-10-27 RX ADMIN — AMLODIPINE BESYLATE 10 MG: 10 TABLET ORAL at 10:10

## 2019-10-27 RX ADMIN — OXYCODONE HYDROCHLORIDE 10 MG: 10 TABLET ORAL at 10:10

## 2019-10-27 RX ADMIN — HYDRALAZINE HYDROCHLORIDE 100 MG: 50 TABLET ORAL at 10:10

## 2019-10-27 RX ADMIN — LOSARTAN POTASSIUM 50 MG: 25 TABLET ORAL at 10:10

## 2019-10-27 RX ADMIN — SEVELAMER CARBONATE 800 MG: 800 TABLET, FILM COATED ORAL at 12:10

## 2019-10-27 RX ADMIN — CLONIDINE HYDROCHLORIDE 0.1 MG: 0.1 TABLET ORAL at 10:10

## 2019-10-27 RX ADMIN — CEFTRIAXONE SODIUM 1 G: 1 INJECTION, POWDER, FOR SOLUTION INTRAMUSCULAR; INTRAVENOUS at 11:10

## 2019-10-27 RX ADMIN — FLUTICASONE FUROATE AND VILANTEROL TRIFENATATE 1 PUFF: 200; 25 POWDER RESPIRATORY (INHALATION) at 11:10

## 2019-10-27 RX ADMIN — POLYETHYLENE GLYCOL 3350 17 G: 17 POWDER, FOR SOLUTION ORAL at 10:10

## 2019-10-27 RX ADMIN — HYDROMORPHONE HYDROCHLORIDE 0.2 MG: 1 INJECTION, SOLUTION INTRAMUSCULAR; INTRAVENOUS; SUBCUTANEOUS at 05:10

## 2019-10-27 RX ADMIN — ACETAMINOPHEN 1000 MG: 500 TABLET ORAL at 10:10

## 2019-10-27 RX ADMIN — SEVELAMER CARBONATE 800 MG: 800 TABLET, FILM COATED ORAL at 06:10

## 2019-10-27 NOTE — ASSESSMENT & PLAN NOTE
Chronic and controlled. Platelet count 76,000 on admit close to baseline. Patient with no active bleeding and no platelet transfusion needed. Monitor with daily CBC in hospital.

## 2019-10-27 NOTE — ASSESSMENT & PLAN NOTE
· Patient's blood pressure uncontrolled and elevated on admission but patient has not taken any of her home BP meds this am.   · Goal for blood pressure is SBP < 140 and DBP < 90 as patient > or = 60 years of age and patient is diabetic and has chronic kidney disease based on JNC 8 guidelines.   · Plan to continue home regimen to treat of Clonidine 0.1 mg po TID, Coreg 12.5 mg po BID, Norvasc 10 mg po daily, Hydralazine 100 mg po TID and Losartan 50 mg po daily while patient is hospitalized.   · Plan is to monitor patient's blood pressure routinely while patient is hospitalized.

## 2019-10-27 NOTE — ASSESSMENT & PLAN NOTE
Chronic and controlled. Patient with no obvious signs of volume overload on exam. Continue Coreg and Losartan to treat chronic heart failure.

## 2019-10-27 NOTE — SUBJECTIVE & OBJECTIVE
Past Medical History:   Diagnosis Date    (HFpEF) heart failure with preserved ejection fraction 5/21/2019    Anemia in ESRD (end-stage renal disease) 5/29/2016    Aneurysm of arteriovenous dialysis fistula     Aortic atherosclerosis 11/22/2016    Bilateral low back pain without sciatica 11/17/2015    Blindness of right eye 11/12/2016    Brain compression 6/22/2019    Cataract     Central retinal vein occlusion, right eye 6/3/2014    Chronic diastolic heart failure 1/8/2016    Chronic respiratory failure with hypoxia 5/29/2016    Coronary artery disease involving native coronary artery of native heart without angina pectoris 12/12/2016    Diverticulosis     Encounter for blood transfusion     Enlarged LA (left atrium) 10/7/2016    Epiretinal membrane 7/17/2012    ESRD on hemodialysis     MWF    History of GI diverticular bleed     5/22/16    Hyperparathyroidism, secondary renal 10/14/2016    Left spastic hemiparesis 11/13/2016    Moderate single current episode of major depressive disorder 2/8/2019    Non-rheumatic tricuspid valve insufficiency 1/15/2017    Peripheral vascular disease, unspecified 11/22/2016    Physical debility 11/17/2016    Pleural effusion on right     Pulmonary emphysema 1/15/2017    Pulmonary hypertension 6/28/2019    Renovascular hypertension 1/8/2016    Seizure 6/24/2019    Severe aortic valve stenosis 2/4/2016    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 11/13/2016    s/p thrombectomy of MCA    Subdural hematoma 05/21/2019    Bilateral R>L    Thrombocytopenia, unspecified 1/15/2017    Type 2 diabetes mellitus with chronic kidney disease on chronic dialysis, without long-term current use of insulin 5/1/2018    Type 2 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, without long-term current use of insulin 3/26/2013    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,  without long-term current use of insulin 3/26/2013    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       Past Surgical History:   Procedure Laterality Date    ABDOMINAL SURGERY      BREAST SURGERY      tumor removal x 2    CARDIAC SURGERY      CATARACT EXTRACTION      CEREBRAL ANGIOGRAM N/A 6/24/2019    Procedure: ANGIOGRAM-CEREBRAL;  Surgeon: Kenisha Surgeon;  Location: [facility];  Service: Anesthesiology;  Laterality: N/A;    CHOLECYSTECTOMY      COLONOSCOPY N/A 5/23/2016    Procedure: COLONOSCOPY;  Surgeon: WILLIAM Colvin MD;  Location: [facility] ENDO (2ND FLR);  Service: Endoscopy;  Laterality: N/A;    COLONOSCOPY N/A 5/30/2016    Procedure: COLONOSCOPY;  Surgeon: Sam Davis MD;  Location: [facility] ENDO (2ND FLR);  Service: Endoscopy;  Laterality: N/A;    CRANIOTOMY FOR EVACUATION OF SUBDURAL HEMATOMA Right 6/23/2019    Procedure: KATE HOLES FOR SUBDURAL HEMATOMA EVACUATION;  Surgeon: Trevor Conner MD;  Location: [address];  Service: Neurosurgery;  Laterality: Right;    EYE SURGERY      FISTULOGRAM Left 11/28/2018    Procedure: Fistulogram;  Surgeon: NADINE Blum III, MD;  Location: [facility] CATH LAB;  Service: Cardiology;  Laterality: Left;    PLACEMENT OF DUAL-LUMEN VASCULAR CATHETER Right 11/29/2018    Procedure: INSERTION, CATHETER, VASCULAR, DUAL LUMEN;  Surgeon: NADINE Blum III, MD;  [facility];  Service: Peripheral Vascular;  Laterality: Right;  Permacatheter placement     RESECTION OF ANEURYSM Left 11/29/2018    Procedure: EXCISION, ANEURYSM;  Surgeon: NADINE Blum III, MD;  [facility];  Service: Peripheral Vascular;  Laterality: Left;  Excision, L AVF aneurysm    REVISION OF ARTERIOVENOUS FISTULA Left 11/29/2018    Procedure: REVISION, AV FISTULA,;  Surgeon: NADINE Blum III, MD;  [facility];  Service: Peripheral Vascular;  Laterality: Left;  OR 11    UPPER GASTROINTESTINAL ENDOSCOPY         Review of patient's allergies  indicates:  No Known Allergies    No current facility-administered medications on file prior to encounter.      Current Outpatient Medications on File Prior to Encounter   Medication Sig    acetaminophen (TYLENOL) 325 MG tablet Take 2 tablets (650 mg total) by mouth every 6 (six) hours as needed for Pain.    albuterol (PROVENTIL/VENTOLIN HFA) 90 mcg/actuation inhaler Inhale 1-2 puffs into the lungs every 6 (six) hours as needed for Wheezing.    amLODIPine (NORVASC) 10 MG tablet Take 1 tablet (10 mg total) by mouth once daily.    artificial tears (ISOPTO TEARS) 0.5 % ophthalmic solution Place 2 drops into both eyes 4 (four) times daily as needed.    BREO ELLIPTA 200-25 mcg/dose DsDv diskus inhaler INHALE 1 PUFF INTO THE LUNGS ONCE DAILY.    carvedilol (COREG) 12.5 MG tablet Take 1 tablet (12.5 mg total) by mouth 2 (two) times daily.    cloNIDine (CATAPRES) 0.1 MG tablet Take 1 tablet (0.1 mg total) by mouth 3 (three) times daily.    ergocalciferol (ERGOCALCIFEROL) 50,000 unit Cap Take 1 capsule (50,000 Units total) by mouth every 7 days.    hydrALAZINE (APRESOLINE) 100 MG tablet Take 1 tablet (100 mg total) by mouth every 8 (eight) hours.    levETIRAcetam (KEPPRA) 500 MG Tab TAKE 1 TABLET (500 MG TOTAL) BY MOUTH 2 (TWO) TIMES A DAY FOR 30 DAYS.    losartan (COZAAR) 50 MG tablet Take 1 tablet (50 mg total) by mouth once daily.    ondansetron (ZOFRAN-ODT) 8 MG TbDL Take 1 tablet (8 mg total) by mouth every 6 (six) hours as needed (nausea).    polyethylene glycol (GLYCOLAX) 17 gram PwPk Take 17 g by mouth once daily.    RENVELA 800 mg Tab Take 1 tablet (800 mg total) by mouth 3 (three) times daily with meals.    senna-docusate 8.6-50 mg (PERICOLACE) 8.6-50 mg per tablet Take 1 tablet by mouth daily as needed for Constipation.     Family History     Problem Relation (Age of Onset)    Cancer Sister    Cataracts Mother    Esophageal cancer Sister    Glaucoma Brother, Maternal Aunt    Heart disease Brother     Heart failure Sister    Hypertension Mother, Sister, Brother, Father    No Known Problems Maternal Uncle, Paternal Aunt, Paternal Uncle, Maternal Grandmother, Maternal Grandfather, Paternal Grandmother, Paternal Grandfather    Stroke Mother        Tobacco Use    Smoking status: Never Smoker    Smokeless tobacco: Never Used   Substance and Sexual Activity    Alcohol use: No     Comment: Reports occasional 1-2 drinks     Drug use: No    Sexual activity: Not Currently     Review of Systems   Constitutional: Negative for chills, fatigue and fever.   HENT: Negative for congestion and sore throat.    Eyes: Positive for visual disturbance (Blind in right eye (chronic)).   Respiratory: Positive for shortness of breath (exertion only). Negative for cough.    Cardiovascular: Negative for chest pain, palpitations and leg swelling.   Gastrointestinal: Negative for abdominal pain, constipation, nausea and vomiting.   Genitourinary: Negative for dysuria and urgency.   Musculoskeletal: Positive for arthralgias (Left hip ). Negative for back pain and neck pain.   Skin: Negative for rash and wound.   Allergic/Immunologic: Negative for food allergies.   Neurological: Negative for dizziness, tremors, syncope, light-headedness and headaches.   Hematological: Does not bruise/bleed easily.   Psychiatric/Behavioral: Negative for agitation, confusion and hallucinations.     Objective:     Vital Signs (Most Recent):  Temp: 97.4 °F (36.3 °C) (10/27/19 0910)  Pulse: 64 (10/27/19 0910)  Resp: 18 (10/27/19 0910)  BP: (!) 177/76 (10/27/19 0910)  SpO2: 100 % (10/27/19 0910) Vital Signs (24h Range):  Temp:  [97.4 °F (36.3 °C)-97.5 °F (36.4 °C)] 97.4 °F (36.3 °C)  Pulse:  [64-70] 64  Resp:  [14-19] 18  SpO2:  [95 %-100 %] 100 %  BP: (150-180)/(61-76) 177/76        There is no height or weight on file to calculate BMI.    Physical Exam   Constitutional: She is oriented to person, place, and time. No distress.   Thin and frail, elderly female    HENT:   Head: Normocephalic and atraumatic.   Mouth/Throat: Oropharynx is clear and moist.   Eyes: Left eye exhibits no discharge.   Right eye cloudy and not reactive to light   Neck: Normal range of motion. Neck supple. No JVD present. Carotid bruit is not present.   Cardiovascular: Normal rate and regular rhythm. Exam reveals no gallop and no friction rub.   Murmur heard.   Crescendo decrescendo systolic murmur is present with a grade of 4/6.  Pulses:       Dorsalis pedis pulses are 2+ on the right side, and 2+ on the left side.        Posterior tibial pulses are 2+ on the right side, and 2+ on the left side.   Murmur heard throughout precordium    Pulmonary/Chest: Effort normal and breath sounds normal. No accessory muscle usage. No respiratory distress. She has no wheezes. She has no rales.   Abdominal: Soft. Normal appearance and bowel sounds are normal. She exhibits no distension. There is no hepatosplenomegaly. There is no tenderness. There is no rebound and no guarding.   Musculoskeletal: She exhibits no edema or deformity.   Left arm fistula in place with good thrill and bruit noted    Neurological: She is alert and oriented to person, place, and time. No cranial nerve deficit or sensory deficit.   Skin: Skin is warm. No erythema.   Psychiatric: She has a normal mood and affect. Her behavior is normal. Thought content normal.   Vitals reviewed.          Significant Labs:   A1C:   Recent Labs   Lab 05/21/19  1201 10/27/19  0543   HGBA1C 5.4 5.6     CBC:   Recent Labs   Lab 10/27/19  0543   WBC 9.56   HGB 9.6*   HCT 31.6*   PLT 76*     CMP:   Recent Labs   Lab 10/27/19  0543      K 4.0      CO2 24   *   BUN 51*   CREATININE 6.0*   CALCIUM 9.3   PROT 7.2   ALBUMIN 3.5   BILITOT 0.5   ALKPHOS 103   AST 9*   ALT 6*   ANIONGAP 14   EGFRNONAA 6.2*     Coagulation:   Recent Labs   Lab 10/27/19  0543   INR 1.1   APTT 24.2     Prealbumin:   Recent Labs   Lab 10/27/19  0543   PREALBUMIN 22      Urine Studies:   Recent Labs   Lab 10/27/19  0749   COLORU Yellow   APPEARANCEUA Cloudy*   PHUR 6.0   SPECGRAV 1.015   PROTEINUA 2+*   GLUCUA Negative   KETONESU Negative   BILIRUBINUA Negative   OCCULTUA 1+*   NITRITE Negative   LEUKOCYTESUR 3+*   RBCUA 7*   WBCUA >100*   BACTERIA Many*   SQUAMEPITHEL 1   HYALINECASTS 0       Significant Imaging: CXR: I have reviewed all pertinent results/findings within the past 24 hours and my personal findings are:  Small right pleural effusion, patchy infiltrate in both lungs in perihilar areas.    EKG: I have reviewed all pertinent results/findings within the past 24 hours and my personal findings are: Normal sinus rhythm with no acute ischemic changes. First degree AV block.     X-ray of left femur:  I have reviewed all pertinent results/findings within the past 24 hours and my personal findings are: Minimally displaced intertrochanteric femur fracture

## 2019-10-27 NOTE — ED PROVIDER NOTES
Source of History:  Patient    Chief complaint:  Hip Pain (Pt arrives via EMS c/o left him pain from falling 10 hours ago.)    HPI:  Tea Georges is a 79 y.o. female with history of ESRD on HD, severe AS, chronic respiratory failure, type 2 diabetes, left spastic hemiparesis presenting to emergency department with complaint of left hip pain after mechanical fall.  Patient states around 8:00 p.m. last evening she was walking when she fell, striking the left side of her body.  She immediately had pain in her left hip.  She was brought to her bed by a family member and rested there.  She continued to have worsening pain throughout the evening so came to the emergency department for further evaluation.  She has no numbness or tingling of the extremity.  She has no pain elsewhere.  She denies hitting her head or losing consciousness.  There was no syncopal component to the fall, was purely mechanical because she tripped.       ROS: As per HPI and below:  Review of Systems   Constitutional: Negative for fever.   HENT: Negative for sore throat.    Eyes: Negative for double vision.   Respiratory: Negative for cough and shortness of breath.    Cardiovascular: Negative for chest pain.   Gastrointestinal: Negative for abdominal pain and vomiting.   Genitourinary: Negative for dysuria.   Musculoskeletal: Positive for falls and joint pain.   Skin: Negative for rash.   Neurological: Negative for loss of consciousness and headaches.     Review of patient's allergies indicates:  No Known Allergies    No current facility-administered medications on file prior to encounter.      Current Outpatient Medications on File Prior to Encounter   Medication Sig Dispense Refill    acetaminophen (TYLENOL) 325 MG tablet Take 2 tablets (650 mg total) by mouth every 6 (six) hours as needed for Pain.  0    albuterol (PROVENTIL/VENTOLIN HFA) 90 mcg/actuation inhaler Inhale 1-2 puffs into the lungs every 6 (six) hours as needed for Wheezing. 1  Inhaler 0    amLODIPine (NORVASC) 10 MG tablet Take 1 tablet (10 mg total) by mouth once daily. 90 tablet 3    artificial tears (ISOPTO TEARS) 0.5 % ophthalmic solution Place 2 drops into both eyes 4 (four) times daily as needed.      BREO ELLIPTA 200-25 mcg/dose DsDv diskus inhaler INHALE 1 PUFF INTO THE LUNGS ONCE DAILY. 180 each 3    carvedilol (COREG) 12.5 MG tablet Take 1 tablet (12.5 mg total) by mouth 2 (two) times daily. 60 tablet 11    cloNIDine (CATAPRES) 0.1 MG tablet Take 1 tablet (0.1 mg total) by mouth 3 (three) times daily. 90 tablet 11    ergocalciferol (ERGOCALCIFEROL) 50,000 unit Cap Take 1 capsule (50,000 Units total) by mouth every 7 days. 12 capsule 3    hydrALAZINE (APRESOLINE) 100 MG tablet Take 1 tablet (100 mg total) by mouth every 8 (eight) hours. 90 tablet 3    levETIRAcetam (KEPPRA) 500 MG Tab TAKE 1 TABLET (500 MG TOTAL) BY MOUTH 2 (TWO) TIMES A DAY FOR 30 DAYS. 60 tablet 0    losartan (COZAAR) 50 MG tablet Take 1 tablet (50 mg total) by mouth once daily. 90 tablet 3    ondansetron (ZOFRAN-ODT) 8 MG TbDL Take 1 tablet (8 mg total) by mouth every 6 (six) hours as needed (nausea). 30 tablet 2    polyethylene glycol (GLYCOLAX) 17 gram PwPk Take 17 g by mouth once daily.  0    RENVELA 800 mg Tab Take 1 tablet (800 mg total) by mouth 3 (three) times daily with meals. 270 tablet 3    senna-docusate 8.6-50 mg (PERICOLACE) 8.6-50 mg per tablet Take 1 tablet by mouth daily as needed for Constipation.         PMH:  As per HPI and below:  Past Medical History:   Diagnosis Date    (HFpEF) heart failure with preserved ejection fraction 5/21/2019    Anemia in ESRD (end-stage renal disease) 5/29/2016    Aneurysm of arteriovenous dialysis fistula     Aortic atherosclerosis 11/22/2016    Bilateral low back pain without sciatica 11/17/2015    Blindness of right eye 11/12/2016    Brain compression 6/22/2019    Cataract     Central retinal vein occlusion, right eye 6/3/2014    Chronic  diastolic heart failure 1/8/2016    Chronic respiratory failure with hypoxia 5/29/2016    Coronary artery disease involving native coronary artery of native heart without angina pectoris 12/12/2016    Diverticulosis     Encounter for blood transfusion     Enlarged LA (left atrium) 10/7/2016    Epiretinal membrane 7/17/2012    ESRD on hemodialysis     MWF    History of GI diverticular bleed     5/22/16    Hyperparathyroidism, secondary renal 10/14/2016    Left spastic hemiparesis 11/13/2016    Moderate single current episode of major depressive disorder 2/8/2019    Non-rheumatic tricuspid valve insufficiency 1/15/2017    Peripheral vascular disease, unspecified 11/22/2016    Physical debility 11/17/2016    Pleural effusion on right     Pulmonary emphysema 1/15/2017    Pulmonary hypertension 6/28/2019    Renovascular hypertension 1/8/2016    Seizure 6/24/2019    Severe aortic valve stenosis 2/4/2016    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 11/13/2016    s/p thrombectomy of MCA    Subdural hematoma 05/21/2019    Bilateral R>L    Thrombocytopenia, unspecified 1/15/2017    Type 2 diabetes mellitus with chronic kidney disease on chronic dialysis, without long-term current use of insulin 5/1/2018    Type 2 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, without long-term current use of insulin 3/26/2013    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, without long-term current use of insulin 3/26/2013    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     Past Surgical History:   Procedure Laterality Date    ABDOMINAL SURGERY      BREAST SURGERY      tumor removal x 2    CARDIAC SURGERY  2016    Implantable loop recorder placed    CATARACT EXTRACTION      CEREBRAL ANGIOGRAM N/A 6/24/2019    Procedure: ANGIOGRAM-CEREBRAL;  Surgeon: Kenisha Surgeon;  [facility];  Service: Anesthesiology;   Laterality: N/A;    CHOLECYSTECTOMY      COLONOSCOPY N/A 5/23/2016    Procedure: COLONOSCOPY;  Surgeon: WILLIAM Colvin MD;  Location: [facility] ENDO (2ND FLR);  Service: Endoscopy;  Laterality: N/A;    COLONOSCOPY N/A 5/30/2016    Procedure: COLONOSCOPY;  Surgeon: Sam Davis MD;  Location: [facility] ENDO (2ND FLR);  Service: Endoscopy;  Laterality: N/A;    CRANIOTOMY FOR EVACUATION OF SUBDURAL HEMATOMA Right 6/23/2019    Procedure: AKTE HOLES FOR SUBDURAL HEMATOMA EVACUATION;  Surgeon: Trevor Conner MD;  Location: [facility] OR [facility] FLR;  Service: Neurosurgery;  Laterality: Right;    EYE SURGERY      FISTULOGRAM Left 11/28/2018    Procedure: Fistulogram;  Surgeon: NADINE Blum III, MD;  Location: [facility] CATH LAB;  Service: Cardiology;  Laterality: Left;    PLACEMENT OF DUAL-LUMEN VASCULAR CATHETER Right 11/29/2018    Procedure: INSERTION, CATHETER, VASCULAR, DUAL LUMEN;  Surgeon: NADINE Blum III, MD;  [facility];  Service: Peripheral Vascular;  Laterality: Right;  Permacatheter placement     RESECTION OF ANEURYSM Left 11/29/2018    Procedure: EXCISION, ANEURYSM;  Surgeon: NADINE Blum III, MD;  [facility];  Service: Peripheral Vascular;  Laterality: Left;  Excision, L AVF aneurysm    REVISION OF ARTERIOVENOUS FISTULA Left 11/29/2018    Procedure: REVISION, AV FISTULA,;  Surgeon: NADINE Blum III, MD;  [facility];  Service: Peripheral Vascular;  Laterality: Left;  OR 11    UPPER GASTROINTESTINAL ENDOSCOPY         Social History     Socioeconomic History    Marital status:      Spouse name: Not on file    Number of children: Not on file    Years of education: Not on file    Highest education level: Not on file   Occupational History    Occupation: Housekeeping     Employer: [employer]     Comment: Retired; 20-years there   Social Needs    Financial resource strain: Not on file    Food insecurity:     Worry: Not on file      Inability: Not on file    Transportation needs:     Medical: Not on file     Non-medical: Not on file   Tobacco Use    Smoking status: Never Smoker    Smokeless tobacco: Never Used   Substance and Sexual Activity    Alcohol use: No     Comment: Reports occasional 1-2 drinks     Drug use: No    Sexual activity: Not Currently   Lifestyle    Physical activity:     Days per week: Not on file     Minutes per session: Not on file    Stress: Not on file   Relationships    Social connections:     Talks on phone: Not on file     Gets together: Not on file     Attends Rastafarian service: Not on file     Active member of club or organization: Not on file     Attends meetings of clubs or organizations: Not on file     Relationship status: Not on file   Other Topics Concern    Not on file   Social History Narrative    Not on file       Family History   Problem Relation Age of Onset    Cataracts Mother     Stroke Mother     Hypertension Mother     Cancer Sister     Hypertension Sister     Heart failure Sister     Glaucoma Brother     Hypertension Brother     Heart disease Brother     Hypertension Father     Glaucoma Maternal Aunt     Esophageal cancer Sister     No Known Problems Maternal Uncle     No Known Problems Paternal Aunt     No Known Problems Paternal Uncle     No Known Problems Maternal Grandmother     No Known Problems Maternal Grandfather     No Known Problems Paternal Grandmother     No Known Problems Paternal Grandfather     Heart attack Neg Hx     Colon cancer Neg Hx     Stomach cancer Neg Hx     Anemia Neg Hx     Arrhythmia Neg Hx     Asthma Neg Hx     Clotting disorder Neg Hx     Fainting Neg Hx     Hyperlipidemia Neg Hx     Atrial Septal Defect Neg Hx        Physical Exam:    Vitals:    10/27/19 1943   BP: (!) 143/65   Pulse: (!) 52   Resp: 16   Temp: 96.1 °F (35.6 °C)     Gen: No acute distress.  Mental Status:  Alert and oriented x 3.  Appropriate, conversant.  Skin:  Warm, dry. No rashes seen.  Eyes: No conjunctival injection.  ENT: Oropharynx clear.    Pulm: Clear to auscultation bilaterally.  Good air movement.  No wheezes.  CV: Regular rate and rhythm.  No murmurs. No gallops. No rubs.  Abd: Soft.  Not distended.  Nontender.  No guarding.  No rebound.  MSK:  Tenderness to palpation over the lateral thigh.  There is no deformity noted. Inability to flex the thigh due to pain.  Distal neurovascular exam is intact with 2+ DP pulse.  The limb is warm and well perfused.  Compartments are soft.  Able to dorsiflex, plantar flex.  Able to wiggle toes.  Capillary refill is <2 sec.  Neuro: Motor intact.  Sensation intact.  Cerebellar intact.  Cranial nerves intact.    Laboratory Studies:  Labs Reviewed   CBC W/ AUTO DIFFERENTIAL - Abnormal; Notable for the following components:       Result Value    RBC 3.23 (*)     Hemoglobin 9.6 (*)     Hematocrit 31.6 (*)     Mean Corpuscular Hemoglobin Conc 30.4 (*)     RDW 15.9 (*)     Platelets 76 (*)     MPV 13.0 (*)     Immature Granulocytes 0.7 (*)     Gran # (ANC) 7.9 (*)     Immature Grans (Abs) 0.07 (*)     Lymph # 0.7 (*)     Gran% 82.8 (*)     Lymph% 6.9 (*)     All other components within normal limits   COMPREHENSIVE METABOLIC PANEL - Abnormal; Notable for the following components:    Glucose 179 (*)     BUN, Bld 51 (*)     Creatinine 6.0 (*)     AST 9 (*)     ALT 6 (*)     eGFR if  7.2 (*)     eGFR if non  6.2 (*)     All other components within normal limits   TRANSFERRIN - Abnormal; Notable for the following components:    Transferrin 127 (*)     All other components within normal limits   URINALYSIS, REFLEX TO URINE CULTURE - Abnormal; Notable for the following components:    Appearance, UA Cloudy (*)     Protein, UA 2+ (*)     Occult Blood UA 1+ (*)     Leukocytes, UA 3+ (*)     All other components within normal limits    Narrative:     Preferred Collection Type->Urine, Clean Catch   URINALYSIS  MICROSCOPIC - Abnormal; Notable for the following components:    RBC, UA 7 (*)     WBC, UA >100 (*)     Bacteria Many (*)     All other components within normal limits    Narrative:     Preferred Collection Type->Urine, Clean Catch   CULTURE, URINE   PROTIME-INR   APTT   PREALBUMIN   MAGNESIUM   PHOSPHORUS   HEMOGLOBIN A1C   TYPE & SCREEN     Chart reviewed.     Imaging Results          X-Ray Chest AP Portable (Final result)  Result time 10/27/19 06:48:02    Final result by Geno Pena MD (10/27/19 06:48:02)                 Impression:      Enlarged cardiac silhouette with perihilar opacities most likely related to moderate edema.    Right pleural fluid      Electronically signed by: Geno Pena MD  Date:    10/27/2019  Time:    06:48             Narrative:    EXAMINATION:  XR CHEST AP PORTABLE    CLINICAL HISTORY:  Encounter for other preprocedural examination    TECHNIQUE:  Single frontal view of the chest was performed.    COMPARISON:  Prior dated 06/24/2019    FINDINGS:  Mediastinal structures are midline.  The cardiac silhouette is enlarged and stable.  There is bi lateral perihilar ground-glass opacity in pulmonary vascular congestion consistent with edema.  Right pleural fluid is present.    There is a left subclavian stent    There is diffuse osteopenia.                               X-Ray Femur 2 View Left (Final result)  Result time 10/27/19 06:53:50    Final result by Smooth Dia MD (10/27/19 06:53:50)                 Impression:      Acute mildly displaced intertrochanteric fracture of the proximal left femur.  No definite additional acute fracture identified.      Electronically signed by: Smooth Dia MD  Date:    10/27/2019  Time:    06:53             Narrative:    EXAMINATION:  XR FEMUR 2 VIEW LEFT    CLINICAL HISTORY:  pain;    TECHNIQUE:  AP and lateral views of the left femur were performed.    COMPARISON:  Left hip radiograph series 10/27/2019    FINDINGS:  There is  redemonstration of the acute mildly displaced intertrochanteric fracture of the proximal left femur.  No definite additional acute fractures identified.  There is tricompartmental osteoarthrosis of the visualized left knee.  There are prominent vascular calcifications noted within the soft tissues.                                CT Hip Without Contrast Left (Final result)  Result time 10/27/19 05:50:02    Final result by Smooth Dia MD (10/27/19 05:50:02)                 Impression:      Acute mildly comminuted, mildly displaced intertrochanteric fracture of the proximal left femur.    This report was flagged in Epic as abnormal.      Electronically signed by: Smooth Dia MD  Date:    10/27/2019  Time:    05:50             Narrative:    EXAMINATION:  CT HIP WITHOUT CONTRAST LEFT    CLINICAL HISTORY:  Fracture, hip;Hip pain, acute, fx suspect, xray negative or indeterminate;    TECHNIQUE:  2 mm axial images were acquired of the left hip without IV contrast.  Sagittal and coronal reformatted images were reviewed.    COMPARISON:  Left hip radiograph series 10/27/2019    FINDINGS:  There is an acute mildly comminuted mildly displaced intertrochanteric fracture of the proximal left femur.  There is involvement of the lesser and greater trochanters.  There is slight medial displacement of the lesser trochanter.  The left femoral head remains appropriately seated within the acetabulum.  No definite additional acute fractures identified.  There is a chronic deformity of the left iliac crest which may relate to remote trauma versus underlying osteochondroma.    Limited views of the pelvis demonstrate colonic diverticulosis.  There are prominent atherosclerotic vascular calcifications.  There is a probable small calcified uterine fibroid.  There is diffuse soft tissue edema about the left hip, most pronounced involving the gluteal musculature.                               X-Ray Hip 2 View Left (Final result)   Result time 10/27/19 04:33:39    Final result by James Ramirez MD (10/27/19 04:33:39)                 Impression:      No acute fracture or dislocation.    No significant change from prior study.      Electronically signed by: James Ramirez MD  Date:    10/27/2019  Time:    04:33             Narrative:    EXAMINATION:  XR HIP 2 VIEW LEFT    CLINICAL HISTORY:  Pain, unspecified    TECHNIQUE:  AP view of the pelvis and frog leg lateral view of the left hip were performed.    COMPARISON:  September 1, 2015.z    FINDINGS:  No fractures or dislocations.  Osseous structures appear unchanged from prior.  Vascular calcifications present.                              Medications Given:  Medications   amLODIPine tablet 10 mg (10 mg Oral Given 10/27/19 1040)   artificial tears 0.5 % ophthalmic solution 2 drop (has no administration in time range)   fluticasone furoate-vilanterol 200-25 mcg/dose diskus inhaler 1 puff (1 puff Inhalation Given 10/27/19 1140)   carvedilol tablet 12.5 mg (12.5 mg Oral Given 10/27/19 1040)   cloNIDine tablet 0.1 mg (0 mg Oral Hold 10/27/19 1500)   ergocalciferol capsule 50,000 Units (has no administration in time range)   hydrALAZINE tablet 100 mg (0 mg Oral Hold 10/27/19 1400)   levETIRAcetam tablet 500 mg (500 mg Oral Given 10/27/19 1040)   losartan tablet 50 mg (50 mg Oral Given 10/27/19 1040)   sevelamer carbonate tablet 800 mg (800 mg Oral Given 10/27/19 1803)   sodium chloride 0.9% flush 10 mL (has no administration in time range)   acetaminophen tablet 1,000 mg (1,000 mg Oral Given 10/27/19 1803)   ramelteon tablet 8 mg (has no administration in time range)   bisacodyl suppository 10 mg (has no administration in time range)   ondansetron injection 4 mg (has no administration in time range)   promethazine (PHENERGAN) 6.25 mg in dextrose 5 % 50 mL IVPB (has no administration in time range)   oxyCODONE immediate release tablet 5 mg (has no administration in time range)   morphine  injection 2 mg (has no administration in time range)   polyethylene glycol packet 17 g (17 g Oral Given 10/27/19 1040)   insulin aspart U-100 pen 0-5 Units (has no administration in time range)   glucose chewable tablet 16 g (has no administration in time range)   glucose chewable tablet 24 g (has no administration in time range)   dextrose 10% (D10W) Bolus (has no administration in time range)   dextrose 10% (D10W) Bolus (has no administration in time range)   glucagon (human recombinant) injection 1 mg (has no administration in time range)   cefTRIAXone injection 1 g (1 g Intravenous Given 10/27/19 1139)   0.9%  NaCl infusion (has no administration in time range)   0.9%  NaCl infusion (for blood administration) (has no administration in time range)   acetaminophen tablet 1,000 mg (1,000 mg Oral Given 10/27/19 0512)   ondansetron injection 4 mg (4 mg Intravenous Given 10/27/19 0556)   HYDROmorphone injection 0.2 mg (0.2 mg Intravenous Given 10/27/19 0556)     Discussed with: ortho, internal medicine    MDM:    79 y.o. female with complex past medical history including ESRD on HD, emphysema, hypertension, diabetes presenting to emergency room with complaint of mechanical fall and left hip pain. No evidence of trauma elsewhere.    Initial x-ray was read as unremarkable, however I am suspicious for an occult hip fracture so I obtained a CT.  CT showed an intertrochanteric fracture.  Patient's pain was controlled with Dilaudid.  Preoperative labs ordered.  Case was discussed with Orthopedic surgery, and Internal Medicine.    Diagnostic Impression:    1. Closed 2-part intertrochanteric fracture of left femur, initial encounter    2. Pain    3. Pre-op chest exam    4. Pre-op examination    5. Hip fracture    6. Complicated UTI (urinary tract infection)    7. Type 2 diabetes mellitus with chronic kidney disease on chronic dialysis, without long-term current use of insulin    8. ESRD on hemodialysis    9. Renovascular  hypertension    10. Severe aortic valve stenosis    11. Pulmonary hypertension due to aortic valve disease    12. Panlobular emphysema    13. Chronic respiratory failure with hypoxia    14. Thrombocytopenia, unspecified    15. Anemia in ESRD (end-stage renal disease)    16. Hyperparathyroidism, secondary renal    17. Chronic heart failure with preserved ejection fraction    18. Vitamin D deficiency    19. Physical debility    20. Blindness of right eye with normal vision in contralateral eye      Patient and/or family understands the plan and is in agreement, verbalized understanding, questions answered    Amrita Baker MD  Emergency Medicine           Amrita Baker MD  10/28/19 2058

## 2019-10-27 NOTE — ASSESSMENT & PLAN NOTE
Orthopedic surgery consulted and plan to take patient to the OR tomorrow for surgical repair of hip fracture so will make patient NPO after midnight for surgery except medications.   Preoperative assessment: Routine labs, CXR and EKG reviewed. Patient is at high risk for perioperative cardiopulmonary complications based on the 2014 ACC/AHA Guideline on Perioperative Cardiovascular Evaluation and Management of patients undergoing noncardiac surgery. Patient has underlying active cardiopulmonary condition(s) of severe valvular heart disease of aortic stenosis. Patient with RCRI cardiac risk score of 3 with 15.0 % 30-day risk of death, MI, or cardiac arrest. Discussion made with patient about significant risk of patient undergoing surgery. Despite patient's high risk, the benefit of proceeding with surgery outweighs the risk of not proceeding with surgery at this time. Recommend proceeding to surgery as planned at high risk with the following recommendations: Need close hemodynamically monitoring during surgery. Avoid excess fluid resuscitation during surgery due to risk of cardiac decompensation due to severe AS and important for patient to be euvolemic prior to surgery thus would recommend dialysis for fluid removal prior to planned surgery. Avoid nitrates.   · Pain control as per Hip Fracture Pathway with multimodal pain regimen with scheduled Tylenol and Morphine IV and oral Oxycodone prn as per Pathway protocol. DVT prophylaxis pre-op with TEDs/SCDs.   · Check Vitamin D level to assess for Vitamin D deficiency due to concern for osteoporotic fracture.   · Plan to monitor daily electrolytes and H/H post-op.   · Start Heparin 5000 units subcutaneous 3 times daily post-op after surgery for DVT prophylaxis and will need for a total of 28 days after hip fracture surgery. Patient is a ESRD patient so cannot use Lovenox.   · Will consult PT/OT post-op for gait training and strengthening and restoration of ADLs.   · Will  consult  and case management to assist with discharge planning for this patient after surgery.

## 2019-10-27 NOTE — HPI
Tea Georges is a 79 y.o. female  with history of ESRD HD dependent last dialyzed Friday (MWF schedule), CAD with loop recorder in place, aortic stenosis follwoed by cardiology with echos every six months presents with left intertroch fx after fall from standing at 6pm yesterday. She reported immediate pain and inability to bear weight. the patient denies head trauma. Patient denies numbness or tingling in extremitiy. She denies pain anywhere else. Patient lives at homewith daughter, makes her own medical decisions, and ambulates with a walker/cane. Does not take anticoagulation medication. Patient had recent admission for subudral hematoma sp seizure in July 2019 and is followed by NSGY regarding this.

## 2019-10-27 NOTE — CONSULTS
[facility]  Orthopedics  Consult Note    Patient Name: Tea Georges  MRN: [id number]  Admission Date: 10/27/2019  Hospital Length of Stay: 0 days  Attending Provider: Amrita Baker MD  Primary Care Provider: Parth Posadas Ii, MD      Inpatient consult to Orthopedic Surgery  Consult performed by: Titi Graf MD  Consult ordered by: Amrita Baker MD        Subjective:     Principal Problem:Closed 2-part intertrochanteric fracture of left femur    Chief Complaint:   Chief Complaint   Patient presents with    Hip Pain     Pt arrives via EMS c/o left him pain from falling 10 hours ago.        HPI: Tea Georges is a 79 y.o. female  with history of ESRD HD dependent last dialyzed Friday (MWF schedule), CAD with loop recorder in place, aortic stenosis follwoed by cardiology with echos every six months presents with  left intertroch fx after fall from standing at 6pm yesterday. She reported immediate pain and inability to bear weight. the patient denies head trauma. Patient denies numbness or tingling in extremitiy.  She denies pain anywhere else. Patient lives at homewith daughter, makes her own medical decisions, and ambulates with a walker/cane. Does  not take anticoagulation medication. Patient had recent admission for subudral hematoma sp seizure in July 2019 and is followed by NSGY regarding this.       Past Medical History:   Diagnosis Date    Anemia in ESRD (end-stage renal disease) 5/29/2016    Anticoagulant long-term use     Aortic atherosclerosis 11/22/2016    Aortic stenosis, moderate 2/18/2016    Asthma in adult without complication 1/8/2016    Bilateral low back pain without sciatica 11/17/2015    Blindness of right eye 11/12/2016    Brain compression 6/22/2019    CAD (coronary artery disease) 12/12/2016    Cataract     Central retinal vein occlusion, right eye 6/3/2014    CHF (congestive heart failure)     Chronic diastolic heart failure 1/8/2016     Chronic respiratory failure with hypoxia 5/29/2016    COPD (chronic obstructive pulmonary disease) 1/15/2017    Dependence on hemodialysis     Mon-Wed-Fri    Diverticulosis     Embolic stroke involving right middle cerebral artery     Encounter for blood transfusion     Enlarged LA (left atrium) 10/7/2016    Epiretinal membrane 7/17/2012    ESRD (end stage renal disease)     Essential hypertension 1/8/2016    History of GI diverticular bleed     5/22/16    Left flaccid hemiparesis 10/1/2016    Peripheral vascular disease, unspecified 11/22/2016    Right-sided cerebrovascular accident (CVA) 11/22/2016    Seizure 6/24/2019    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 11/13/2016    Type 2 diabetes mellitus with kidney complication, without long-term current use of insulin 5/1/2018    Type 2 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, without long-term current use of insulin 3/26/2013    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, without long-term current use of insulin 3/26/2013    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       Past Surgical History:   Procedure Laterality Date    ABDOMINAL SURGERY      BREAST SURGERY      tumor removal x 2    CARDIAC SURGERY      CATARACT EXTRACTION      CEREBRAL ANGIOGRAM N/A 6/24/2019    Procedure: ANGIOGRAM-CEREBRAL;  Surgeon: Kenisha Surgeon;  [facility] [facility];  Service: Anesthesiology;  Laterality: N/A;    CHOLECYSTECTOMY      COLONOSCOPY N/A 5/23/2016    Procedure: COLONOSCOPY;  Surgeon: WILLIAM Colvin MD;  Location: [facility] ([address] [place] [address]);  Service: Endoscopy;  Laterality: N/A;    COLONOSCOPY N/A 5/30/2016    Procedure: COLONOSCOPY;  Surgeon: Sam Davis MD;  Location: [facility] ENDO ([address] [place] [address]);  Service: Endoscopy;  Laterality: N/A;    CRANIOTOMY FOR EVACUATION OF SUBDURAL HEMATOMA Right 6/23/2019    Procedure: KATE HOLES FOR SUBDURAL HEMATOMA  EVACUATION;  Surgeon: Trevor Conner MD;  Location: [facility];  Service: Neurosurgery;  Laterality: Right;    EYE SURGERY      FISTULOGRAM Left 11/28/2018    Procedure: Fistulogram;  Surgeon: NADINE Blum III, MD;  Location: [facility] CATH LAB;  Service: Cardiology;  Laterality: Left;    PLACEMENT OF DUAL-LUMEN VASCULAR CATHETER Right 11/29/2018    Procedure: INSERTION, CATHETER, VASCULAR, DUAL LUMEN;  Surgeon: NADINE Blum III, MD;  Location: [facility];  Service: Peripheral Vascular;  Laterality: Right;  Permacatheter placement     RESECTION OF ANEURYSM Left 11/29/2018    Procedure: EXCISION, ANEURYSM;  Surgeon: NADINE Blum III, MD;  Location: [facility];  Service: Peripheral Vascular;  Laterality: Left;  Excision, L AVF aneurysm    REVISION OF ARTERIOVENOUS FISTULA Left 11/29/2018    Procedure: REVISION, AV FISTULA,;  Surgeon: NADINE Blum III, MD;  [facility];  Service: Peripheral Vascular;  Laterality: Left;  OR 11    UPPER GASTROINTESTINAL ENDOSCOPY         Review of patient's allergies indicates:  No Known Allergies    No current facility-administered medications for this encounter.      Current Outpatient Medications   Medication Sig    acetaminophen (TYLENOL) 325 MG tablet Take 2 tablets (650 mg total) by mouth every 6 (six) hours as needed for Pain.    albuterol (PROVENTIL/VENTOLIN HFA) 90 mcg/actuation inhaler Inhale 1-2 puffs into the lungs every 6 (six) hours as needed for Wheezing.    amLODIPine (NORVASC) 10 MG tablet Take 1 tablet (10 mg total) by mouth once daily.    artificial tears (ISOPTO TEARS) 0.5 % ophthalmic solution Place 2 drops into both eyes 4 (four) times daily as needed.    BREO ELLIPTA 200-25 mcg/dose DsDv diskus inhaler INHALE 1 PUFF INTO THE LUNGS ONCE DAILY.    carvedilol (COREG) 12.5 MG tablet Take 1 tablet (12.5 mg total) by mouth 2 (two) times daily.    cloNIDine (CATAPRES) 0.1 MG tablet Take 1 tablet (0.1 mg total) by  mouth 3 (three) times daily.    ergocalciferol (ERGOCALCIFEROL) 50,000 unit Cap Take 1 capsule (50,000 Units total) by mouth every 7 days.    hydrALAZINE (APRESOLINE) 100 MG tablet Take 1 tablet (100 mg total) by mouth every 8 (eight) hours.    levETIRAcetam (KEPPRA) 500 MG Tab TAKE 1 TABLET (500 MG TOTAL) BY MOUTH 2 (TWO) TIMES A DAY FOR 30 DAYS.    losartan (COZAAR) 50 MG tablet Take 1 tablet (50 mg total) by mouth once daily.    ondansetron (ZOFRAN-ODT) 8 MG TbDL Take 1 tablet (8 mg total) by mouth every 6 (six) hours as needed (nausea).    polyethylene glycol (GLYCOLAX) 17 gram PwPk Take 17 g by mouth once daily.    RENVELA 800 mg Tab Take 1 tablet (800 mg total) by mouth 3 (three) times daily with meals.    senna-docusate 8.6-50 mg (PERICOLACE) 8.6-50 mg per tablet Take 1 tablet by mouth daily as needed for Constipation.     Family History     Problem Relation (Age of Onset)    Cancer Sister    Cataracts Mother    Esophageal cancer Sister    Glaucoma Brother, Maternal Aunt    Heart disease Brother    Heart failure Sister    Hypertension Mother, Sister, Brother, Father    No Known Problems Maternal Uncle, Paternal Aunt, Paternal Uncle, Maternal Grandmother, Maternal Grandfather, Paternal Grandmother, Paternal Grandfather    Stroke Mother        Tobacco Use    Smoking status: Never Smoker    Smokeless tobacco: Never Used   Substance and Sexual Activity    Alcohol use: No     Comment: Reports occasional 1-2 drinks     Drug use: No    Sexual activity: Never     ROS   Per ED ROS 10/27/19  Objective:     Vital Signs (Most Recent):  Temp: 97.5 °F (36.4 °C) (10/27/19 0340)  Pulse: 70 (10/27/19 0518)  Resp: 19 (10/27/19 0504)  BP: (!) 170/73 (10/27/19 0518)  SpO2: 96 % (10/27/19 0518) Vital Signs (24h Range):  Temp:  [97.5 °F (36.4 °C)] 97.5 °F (36.4 °C)  Pulse:  [67-70] 70  Resp:  [14-19] 19  SpO2:  [95 %-96 %] 96 %  BP: (150-170)/(61-73) 170/73           There is no height or weight on file to calculate  BMI.      Ortho/SPM Exam  LLE  Shortened, skin intact with mod swelling over lateral hip  Pain with Log roll of leg  No bony TTP throughout  Compartments soft  Painless ROM ankle   SILT Sa/Bradshaw/DP/SP/T  Motor intact TA/SP/DP  1+ DP and PT     RLE:  Skin intact, no deformity  No TTP  Compartments soft  Full painless ROM throughout lower extremity  SILT Sa/Bradshaw/DP/SP/T  Motor intact TA/SP/DP  1+ DP/PT     BUE:  Skin intact, no deformity noted, fistula in LUE  No open wounds/abrasions/crepitus  No bony TTP  FROM shoulder, elbow and wrist  SILT M/U/R  Motor intact AIN/PIN/M/U/R   Cap refill < 2s  2+ RP      Spine: No TTP along spine, no step offs palpated. no sacral decubitus ulcers      Significant Labs:   CBC:   Recent Labs   Lab 10/27/19  0543   WBC 9.56   HGB 9.6*   HCT 31.6*   PLT 76*     CMP:   Recent Labs   Lab 10/27/19  0543      K 4.0      CO2 24   *   BUN 51*   CREATININE 6.0*   CALCIUM 9.3   PROT 7.2   ALBUMIN 3.5   BILITOT 0.5   ALKPHOS 103   AST 9*   ALT 6*   ANIONGAP 14   EGFRNONAA 6.2*     All pertinent labs within the past 24 hours have been reviewed.    Significant Imaging: I have reviewed all pertinent imaging results/findings.   Xray pelvis: left intertroch fx  Xray femur, chest WNL except as above  CT pelvis: Same as above    Assessment/Plan:     * Closed 2-part intertrochanteric fracture of left femur  Tea Georges is a 79 y.o. female with Left intertrochanteric fracture, closed, NVI.  Patient was explained in detail the severity of the injury that was suffered. Patient was explained the risks/benefits/and alternatives to operative management in detail including infection, bleeding, pain, nerve and vascular damage, heterotopic ossification, leg length discrepancies, rotational deformities and they express full understanding.  Also, the patient was explained the high mortality, over 30 percent,  associated with these fractures. Furthermore, this patient is quite sick with other  comorbidities, CHF, ESRD on Dialysis, o2 dependent at home.  She expresses full understanding of the condition and expresses that they want to proceed with surgery with these risks The patient is admitted to the medicine hip fracture service for optimization of medical comorbidities. Will plan for OR likely tomorrow as will need HD prior to surgery tomorrow. No Guarantees were made, informed consent was obtained. All questions were answered to patient's and family's satisfaction. Called the daughter to discussed this with her, no answer. Daughters number is . Name is Cruz Azar.    -Admitted to medicine hip fracture service, needs dialysis preoperatively.   -NPO midnight  -Pain control per primary  -Marked, booked, and consented for surgery  -PT/OT: Bed rest  -DVT PPx: Hold anticoagulation  -Abx: Preop abx ordered  -Labs: UA 3 + leukocytes, rocephin ordered, A1c 5.6, Transferrin 127, Hgb 9.6, Creatinine 6.0, INR 1.1  -Haley: In place, haley care  -Iv: ordered for contralateral arm  Discussed with hospital medicine otpimization, awaiting this. Called nephrology and spoke regarding dialysis. Patient to be dialyzed late this evening early tomorrow morning prior to surgery.                 Titi Graf MD  Orthopedics  [facility]

## 2019-10-27 NOTE — ASSESSMENT & PLAN NOTE
Tea Georges is a 79 y.o. female with Left intertrochanteric fracture, closed, NVI.  Patient was explained in detail the severity of the injury that was suffered. Patient was explained the risks/benefits/and alternatives to operative management in detail including infection, bleeding, pain, nerve and vascular damage, heterotopic ossification, leg length discrepancies, rotational deformities and they express full understanding.  Also, the patient was explained the high mortality, over 30 percent,  associated with these fractures. Furthermore, this patient is quite sick with other comorbidities, CHF, ESRD on Dialysis, o2 dependent at home.  She expresses full understanding of the condition and expresses that they want to proceed with surgery with these risks The patient is admitted to the medicine hip fracture service for optimization of medical comorbidities. Will plan for OR likely tomorrow as will need HD prior to surgery tomorrow. No Guarantees were made, informed consent was obtained. All questions were answered to patient's and family's satisfaction. Called the daughter and discussed this with her.    -Admitted to medicine hip fracture service, needs dialysis preoperatively.   -NPO midnight  -Pain control per primary  -Marked, booked, and consented for surgery  -PT/OT: Bed rest  -DVT PPx: Hold anticoagulation  -Abx: Preop abx ordered  -Labs:  -Haley: In place, haley care  -Iv: ordered for contralateral arm

## 2019-10-27 NOTE — ASSESSMENT & PLAN NOTE
Good control on admission to the hospital.   · HgA1C 5.6% and at goal on admit. Patient on no meds at home to treat diabetes.   · Plan is to monitor POCT glucose 4 times a day with each meal and at bedtime and cover with Novolog low dose sliding scale insulin.   · 2000 calorie diabetic diet.   · Target pre-meal glucose goal is <140 with all random glucoses <180 in non-critically ill patient.

## 2019-10-27 NOTE — HPI
79 y.o.female with ESRD on HD (MWF) via LUE fistula, renovascular HTN, severe aortic stenosis with moderate to severe pulmonary HTN, HFpEF, pulmonary emphysema with chronic hypoxic respiratory failure on home oxygen 2 liters at night prn, previous right MCA CVA in 2016, implantable loop recorder in place, Type 2 diabetes with ESRD not on home insulin, recent right SDH s/p evacuation on 6/23/2019, seizures on Keppra with last seizure in 6/2019 and chronic debility with recent stay at [facility] in 7/2019 presented to the [facility] ER with complaint of left hip pain. Patient states pain is sharp/stabbing in the left groin, upper leg and is aggravated by any weight bearing and standing. She reports onset of symptoms was about 12 hour(s) after a fall while ambulating to bathroom at home. Patient reports that she was walking to bathroom last night at about 7:00 pm and tripped. Patient thinks she either tripped over oxygen on ground or her foot. Patient reports there is a lot of clutter on floor in the house. Patient states she fell backwards and landed on her left hip. Patient had severe 10/10 pain in left hip after fall and unable to get up from ground due to pain and granddaughter helped her up to chair. Patient did not think she broke her hip so took Ibuprofen and daughter gave her ointment to rub on the hip and it helped. About 3:00 am this morning, pain in left hip was worse and not improving so decision made to bring patient to ER to get evaluated. Patient denies head trauma. Patient denies to loss of consciousness, denies syncope, denies chest pain, denies dizziness prior or after fall. X-ray obtained in ER revealed left intertrochanteric hip fracture. Orthopedics and [facility] medicine service consulted and patient to be admitted to the hospital to the [facility] service.     Prior to admission patient's functional mobility was independent with uses walker or can to ambulate for home distances. Patient does  have history of gait or balance problems. Patient does not have history of previous falls or fractures. Patient does not have previous history of MI or cardiac stenting or cardiac surgery. Patient does have previous history of stroke in 2016. Patient does have previous history of compensated heart failure. Patient does have history of diabetes but is not insulin requiring. Patient does have history of advanced kidney disease with creatinine > 2. Patient currently lives with their family.

## 2019-10-27 NOTE — ED TRIAGE NOTES
"Tea Georges, a 79 y.o. female presents to the ED w/ complaint of hip pain. Pt reports trip and fall over her oxygen tank yesterday. Pt states "the muscle is so sore." Pt reports pain 8/10. Pt states she is ambulatory with walker at baseline. Pt reports pain when bearing weight on LLE. Pt denies hitting head or LOC. Pt denies CP, SOB. Pt with no other complaints at this time. Pt on MWF dialysis; last dialysis yesterday; pt received full dialysis and tolerated well.     Triage note:  Chief Complaint   Patient presents with    Hip Pain     Pt arrives via EMS c/o left him pain from falling 10 hours ago.     Review of patient's allergies indicates:  No Known Allergies  Past Medical History:   Diagnosis Date    Anemia in ESRD (end-stage renal disease) 5/29/2016    Anticoagulant long-term use     Aortic atherosclerosis 11/22/2016    Aortic stenosis, moderate 2/18/2016    Asthma in adult without complication 1/8/2016    Bilateral low back pain without sciatica 11/17/2015    Blindness of right eye 11/12/2016    Brain compression 6/22/2019    CAD (coronary artery disease) 12/12/2016    Cataract     Central retinal vein occlusion, right eye 6/3/2014    CHF (congestive heart failure)     Chronic diastolic heart failure 1/8/2016    Chronic respiratory failure with hypoxia 5/29/2016    COPD (chronic obstructive pulmonary disease) 1/15/2017    Dependence on hemodialysis     Mon-Wed-Fri    Diverticulosis     Embolic stroke involving right middle cerebral artery     Encounter for blood transfusion     Enlarged LA (left atrium) 10/7/2016    Epiretinal membrane 7/17/2012    ESRD (end stage renal disease)     Essential hypertension 1/8/2016    History of GI diverticular bleed     5/22/16    Left flaccid hemiparesis 10/1/2016    Peripheral vascular disease, unspecified 11/22/2016    Right-sided cerebrovascular accident (CVA) 11/22/2016    Seizure 6/24/2019    Stroke due to embolism of right middle " cerebral artery 11/13/2016    Type 2 diabetes mellitus with kidney complication, without long-term current use of insulin 5/1/2018    Type 2 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, without long-term current use of insulin 3/26/2013    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, without long-term current use of insulin 3/26/2013    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     Adult Physical Assessment  LOC: Tea Georges, 79 y.o. female verified via two identifiers.  The patient is awake, alert, oriented and speaking appropriately at this time.  APPEARANCE: Patient resting comfortably and appears to be in no acute distress at this time. Patient is clean and well groomed, patient's clothing is properly fastened.  SKIN:The skin is warm and dry, color consistent with ethnicity, patient has normal skin turgor; dry mucus membranes.  MUSCULOSKELETAL: Patient reports pain to left hip. Pt with severe ROM impediment to LLE; with pain when performing ROM to LLE.  RESPIRATORY: Airway is open and patent, respirations are spontaneous, patient has a normal effort and rate, no accessory muscle use noted. Denies SOB.  CARDIAC: Patient has a normal rate and rhythm, no periphreal edema noted in any extremity, capillary refill < 3 seconds in all extremities. Denies CP. Pt with dialysis site to LUE. +thrill/+bruit  ABDOMEN: Soft and non tender to palpation, no abdominal distention noted. Bowel sounds present in all four quadrants. Denies N/V/D.  NEUROLOGIC: Eyes open spontaneously, behavior appropriate to situation, follows commands, facial expression symmetrical. Pt with PMH stroke; pt with mild left sided weakness at baseline, purposeful motor response noted, normal sensation in all extremities when touched with a finger.

## 2019-10-27 NOTE — ASSESSMENT & PLAN NOTE
Pulmonary hypertension due to aortic valve disease  -Patient with known severe aortic stenosis but asymptomatic. No specific treatment needed at this time. Patient is high risk for surgery due to presence of severe AS and moderate pulmonary HTN.   -Patient needs to follow-up with Cardiology as outpatient for further evaluation of her aortic stenosis to see if candidate for TAVR.   -Last 2D echo done on 5/22/2019 so no need to repeat prior to this surgery and showed:   · Low normal left ventricular systolic function. The estimated ejection fraction is 53%  · Grade II (moderate) left ventricular diastolic dysfunction consistent with pseudonormalization.  · Severe left atrial enlargement.  · Moderate right ventricular enlargement.  · Normal right ventricular systolic function.  · Severe aortic valve stenosis.  · Aortic valve area is 0.69 cm2; peak velocity is 4.58 m/s; mean gradient is 55.02 mmHg.  · Moderate mitral sclerosis.  · Mild-to-moderate mitral regurgitation.  · Moderate to severe tricuspid regurgitation.  · The estimated PA systolic pressure is 77 mm Hg.

## 2019-10-27 NOTE — H&P
[facility] Medicine  History & Physical    Patient Name: Tea Georges  MRN: [id number]  Admission Date: 10/27/2019  Attending Physician: Janee Rolle MD  Primary Care Provider: Parth Posadas Ii, MD    [facility] Medicine Team: [facility] Janee Rolle MD     Patient information was obtained from patient, past medical records and ER records.     Subjective:     Principal Problem:Closed 2-part intertrochanteric fracture of left femur    Chief Complaint:   Chief Complaint   Patient presents with    Hip Pain     Pt arrives via EMS c/o left him pain from falling 10 hours ago.        HPI: 79 y.o.female with ESRD on HD (MWF) via LUE fistula, renovascular HTN, severe aortic stenosis with moderate to severe pulmonary HTN, HFpEF, pulmonary emphysema with chronic hypoxic respiratory failure on home oxygen 2 liters at night prn, previous right MCA CVA in 2016, implantable loop recorder in place, Type 2 diabetes with ESRD not on home insulin, recent right SDH s/p evacuation on 6/23/2019, seizures on Keppra with last seizure in 6/2019 and chronic debility with recent stay at [facility] in 7/2019 presented to the [facility] ER with complaint of left hip pain. Patient states pain is sharp/stabbing in the left groin, upper leg and is aggravated by any weight bearing and standing. She reports onset of symptoms was about 12 hour(s) after a fall while ambulating to bathroom at home. Patient reports that she was walking to bathroom last night at about 7:00 pm and tripped. Patient thinks she either tripped over oxygen on ground or her foot. Patient reports there is a lot of clutter on floor in the house. Patient states she fell backwards and landed on her left hip. Patient had severe 10/10 pain in left hip after fall and unable to get up from ground due to pain and granddaughter helped her up to chair. Patient did not think she broke her hip so took Ibuprofen and daughter gave  her ointment to rub on the hip and it helped. About 3:00 am this morning, pain in left hip was worse and not improving so decision made to bring patient to ER to get evaluated. Patient denies head trauma. Patient denies to loss of consciousness, denies syncope, denies chest pain, denies dizziness prior or after fall. X-ray obtained in ER revealed left intertrochanteric hip fracture. Orthopedics and [facility] medicine service consulted and patient to be admitted to the hospital to the [facility] service.     Prior to admission patient's functional mobility was independent with uses walker or can to ambulate for home distances. Patient does have history of gait or balance problems. Patient does not have history of previous falls or fractures. Patient does not have previous history of MI or cardiac stenting or cardiac surgery. Patient does have previous history of stroke in 2016. Patient does have previous history of compensated heart failure. Patient does have history of diabetes but is not insulin requiring. Patient does have history of advanced kidney disease with creatinine > 2. Patient currently lives with their family.      Past Medical History:   Diagnosis Date    (HFpEF) heart failure with preserved ejection fraction 5/21/2019    Anemia in ESRD (end-stage renal disease) 5/29/2016    Aneurysm of arteriovenous dialysis fistula     Aortic atherosclerosis 11/22/2016    Bilateral low back pain without sciatica 11/17/2015    Blindness of right eye 11/12/2016    Brain compression 6/22/2019    Cataract     Central retinal vein occlusion, right eye 6/3/2014    Chronic diastolic heart failure 1/8/2016    Chronic respiratory failure with hypoxia 5/29/2016    Coronary artery disease involving native coronary artery of native heart without angina pectoris 12/12/2016    Diverticulosis     Encounter for blood transfusion     Enlarged LA (left atrium) 10/7/2016    Epiretinal membrane 7/17/2012    ESRD on hemodialysis      MWF    History of GI diverticular bleed     5/22/16    Hyperparathyroidism, secondary renal 10/14/2016    Left spastic hemiparesis 11/13/2016    Moderate single current episode of major depressive disorder 2/8/2019    Non-rheumatic tricuspid valve insufficiency 1/15/2017    Peripheral vascular disease, unspecified 11/22/2016    Physical debility 11/17/2016    Pleural effusion on right     Pulmonary emphysema 1/15/2017    Pulmonary hypertension 6/28/2019    Renovascular hypertension 1/8/2016    Seizure 6/24/2019    Severe aortic valve stenosis 2/4/2016    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 11/13/2016    s/p thrombectomy of MCA    Subdural hematoma 05/21/2019    Bilateral R>L    Thrombocytopenia, unspecified 1/15/2017    Type 2 diabetes mellitus with chronic kidney disease on chronic dialysis, without long-term current use of insulin 5/1/2018    Type 2 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, without long-term current use of insulin 3/26/2013    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, without long-term current use of insulin 3/26/2013    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       Past Surgical History:   Procedure Laterality Date    ABDOMINAL SURGERY      BREAST SURGERY      tumor removal x 2    CARDIAC SURGERY      CATARACT EXTRACTION      CEREBRAL ANGIOGRAM N/A 6/24/2019    Procedure: ANGIOGRAM-CEREBRAL;  Surgeon: Kenisha Surgeon;  [facility];  Service: Anesthesiology;  Laterality: N/A;    CHOLECYSTECTOMY      COLONOSCOPY N/A 5/23/2016    Procedure: COLONOSCOPY;  Surgeon: WILLIAM Colvin MD;  Location: [facility] ([facility]);  Service: Endoscopy;  Laterality: N/A;    COLONOSCOPY N/A 5/30/2016    Procedure: COLONOSCOPY;  Surgeon: Sam Davis MD;  Location: [facility] ENDO (2ND FLR);  Service: Endoscopy;  Laterality: N/A;    CRANIOTOMY FOR EVACUATION OF SUBDURAL  HEMATOMA Right 6/23/2019    Procedure: KATE HOLES FOR SUBDURAL HEMATOMA EVACUATION;  Surgeon: Trevor Conner MD;  [facility];  Service: Neurosurgery;  Laterality: Right;    EYE SURGERY      FISTULOGRAM Left 11/28/2018    Procedure: Fistulogram;  Surgeon: NADINE Blum III, MD;  Location: [facility] CATH LAB;  Service: Cardiology;  Laterality: Left;    PLACEMENT OF DUAL-LUMEN VASCULAR CATHETER Right 11/29/2018    Procedure: INSERTION, CATHETER, VASCULAR, DUAL LUMEN;  Surgeon: NADINE Blum III, MD;  Location: [facility] OR [facility] FLR;  Service: Peripheral Vascular;  Laterality: Right;  Permacatheter placement     RESECTION OF ANEURYSM Left 11/29/2018    Procedure: EXCISION, ANEURYSM;  Surgeon: NADINE Blum III, MD;  Location: [facility] OR [facility] FLR;  Service: Peripheral Vascular;  Laterality: Left;  Excision, L AVF aneurysm    REVISION OF ARTERIOVENOUS FISTULA Left 11/29/2018    Procedure: REVISION, AV FISTULA,;  Surgeon: NADINE Blum III, MD;  [facility];  Service: Peripheral Vascular;  Laterality: Left;  OR 11    UPPER GASTROINTESTINAL ENDOSCOPY         Review of patient's allergies indicates:  No Known Allergies    No current facility-administered medications on file prior to encounter.      Current Outpatient Medications on File Prior to Encounter   Medication Sig    acetaminophen (TYLENOL) 325 MG tablet Take 2 tablets (650 mg total) by mouth every 6 (six) hours as needed for Pain.    albuterol (PROVENTIL/VENTOLIN HFA) 90 mcg/actuation inhaler Inhale 1-2 puffs into the lungs every 6 (six) hours as needed for Wheezing.    amLODIPine (NORVASC) 10 MG tablet Take 1 tablet (10 mg total) by mouth once daily.    artificial tears (ISOPTO TEARS) 0.5 % ophthalmic solution Place 2 drops into both eyes 4 (four) times daily as needed.    BREO ELLIPTA 200-25 mcg/dose DsDv diskus inhaler INHALE 1 PUFF INTO THE LUNGS ONCE DAILY.    carvedilol (COREG) 12.5 MG tablet Take 1 tablet (12.5 mg  total) by mouth 2 (two) times daily.    cloNIDine (CATAPRES) 0.1 MG tablet Take 1 tablet (0.1 mg total) by mouth 3 (three) times daily.    ergocalciferol (ERGOCALCIFEROL) 50,000 unit Cap Take 1 capsule (50,000 Units total) by mouth every 7 days.    hydrALAZINE (APRESOLINE) 100 MG tablet Take 1 tablet (100 mg total) by mouth every 8 (eight) hours.    levETIRAcetam (KEPPRA) 500 MG Tab TAKE 1 TABLET (500 MG TOTAL) BY MOUTH 2 (TWO) TIMES A DAY FOR 30 DAYS.    losartan (COZAAR) 50 MG tablet Take 1 tablet (50 mg total) by mouth once daily.    ondansetron (ZOFRAN-ODT) 8 MG TbDL Take 1 tablet (8 mg total) by mouth every 6 (six) hours as needed (nausea).    polyethylene glycol (GLYCOLAX) 17 gram PwPk Take 17 g by mouth once daily.    RENVELA 800 mg Tab Take 1 tablet (800 mg total) by mouth 3 (three) times daily with meals.    senna-docusate 8.6-50 mg (PERICOLACE) 8.6-50 mg per tablet Take 1 tablet by mouth daily as needed for Constipation.     Family History     Problem Relation (Age of Onset)    Cancer Sister    Cataracts Mother    Esophageal cancer Sister    Glaucoma Brother, Maternal Aunt    Heart disease Brother    Heart failure Sister    Hypertension Mother, Sister, Brother, Father    No Known Problems Maternal Uncle, Paternal Aunt, Paternal Uncle, Maternal Grandmother, Maternal Grandfather, Paternal Grandmother, Paternal Grandfather    Stroke Mother        Tobacco Use    Smoking status: Never Smoker    Smokeless tobacco: Never Used   Substance and Sexual Activity    Alcohol use: No     Comment: Reports occasional 1-2 drinks     Drug use: No    Sexual activity: Not Currently     Review of Systems   Constitutional: Negative for chills, fatigue and fever.   HENT: Negative for congestion and sore throat.    Eyes: Positive for visual disturbance (Blind in right eye (chronic)).   Respiratory: Positive for shortness of breath (exertion only). Negative for cough.    Cardiovascular: Negative for chest pain,  palpitations and leg swelling.   Gastrointestinal: Negative for abdominal pain, constipation, nausea and vomiting.   Genitourinary: Negative for dysuria and urgency.   Musculoskeletal: Positive for arthralgias (Left hip ). Negative for back pain and neck pain.   Skin: Negative for rash and wound.   Allergic/Immunologic: Negative for food allergies.   Neurological: Negative for dizziness, tremors, syncope, light-headedness and headaches.   Hematological: Does not bruise/bleed easily.   Psychiatric/Behavioral: Negative for agitation, confusion and hallucinations.     Objective:     Vital Signs (Most Recent):  Temp: 97.4 °F (36.3 °C) (10/27/19 0910)  Pulse: 64 (10/27/19 0910)  Resp: 18 (10/27/19 0910)  BP: (!) 177/76 (10/27/19 0910)  SpO2: 100 % (10/27/19 0910) Vital Signs (24h Range):  Temp:  [97.4 °F (36.3 °C)-97.5 °F (36.4 °C)] 97.4 °F (36.3 °C)  Pulse:  [64-70] 64  Resp:  [14-19] 18  SpO2:  [95 %-100 %] 100 %  BP: (150-180)/(61-76) 177/76        There is no height or weight on file to calculate BMI.    Physical Exam   Constitutional: She is oriented to person, place, and time. No distress.   Thin and frail, elderly female   HENT:   Head: Normocephalic and atraumatic.   Mouth/Throat: Oropharynx is clear and moist.   Eyes: Left eye exhibits no discharge.   Right eye cloudy and not reactive to light   Neck: Normal range of motion. Neck supple. No JVD present. Carotid bruit is not present.   Cardiovascular: Normal rate and regular rhythm. Exam reveals no gallop and no friction rub.   Murmur heard.   Crescendo decrescendo systolic murmur is present with a grade of 4/6.  Pulses:       Dorsalis pedis pulses are 2+ on the right side, and 2+ on the left side.        Posterior tibial pulses are 2+ on the right side, and 2+ on the left side.   Murmur heard throughout precordium    Pulmonary/Chest: Effort normal and breath sounds normal. No accessory muscle usage. No respiratory distress. She has no wheezes. She has no rales.    Abdominal: Soft. Normal appearance and bowel sounds are normal. She exhibits no distension. There is no hepatosplenomegaly. There is no tenderness. There is no rebound and no guarding.   Musculoskeletal: She exhibits no edema or deformity.   Left arm fistula in place with good thrill and bruit noted    Neurological: She is alert and oriented to person, place, and time. No cranial nerve deficit or sensory deficit.   Skin: Skin is warm. No erythema.   Psychiatric: She has a normal mood and affect. Her behavior is normal. Thought content normal.   Vitals reviewed.          Significant Labs:   A1C:   Recent Labs   Lab 05/21/19  1201 10/27/19  0543   HGBA1C 5.4 5.6     CBC:   Recent Labs   Lab 10/27/19  0543   WBC 9.56   HGB 9.6*   HCT 31.6*   PLT 76*     CMP:   Recent Labs   Lab 10/27/19  0543      K 4.0      CO2 24   *   BUN 51*   CREATININE 6.0*   CALCIUM 9.3   PROT 7.2   ALBUMIN 3.5   BILITOT 0.5   ALKPHOS 103   AST 9*   ALT 6*   ANIONGAP 14   EGFRNONAA 6.2*     Coagulation:   Recent Labs   Lab 10/27/19  0543   INR 1.1   APTT 24.2     Prealbumin:   Recent Labs   Lab 10/27/19  0543   PREALBUMIN 22     Urine Studies:   Recent Labs   Lab 10/27/19  0749   COLORU Yellow   APPEARANCEUA Cloudy*   PHUR 6.0   SPECGRAV 1.015   PROTEINUA 2+*   GLUCUA Negative   KETONESU Negative   BILIRUBINUA Negative   OCCULTUA 1+*   NITRITE Negative   LEUKOCYTESUR 3+*   RBCUA 7*   WBCUA >100*   BACTERIA Many*   SQUAMEPITHEL 1   HYALINECASTS 0       Significant Imaging: CXR: I have reviewed all pertinent results/findings within the past 24 hours and my personal findings are:  Small right pleural effusion, patchy infiltrate in both lungs in perihilar areas.    EKG: I have reviewed all pertinent results/findings within the past 24 hours and my personal findings are: Normal sinus rhythm with no acute ischemic changes. First degree AV block.     X-ray of left femur:  I have reviewed all pertinent results/findings within the  past 24 hours and my personal findings are: Minimally displaced intertrochanteric femur fracture     Assessment/Plan:     * Closed 2-part intertrochanteric fracture of left femur  Orthopedic surgery consulted and plan to take patient to the OR tomorrow for surgical repair of hip fracture so will make patient NPO after midnight for surgery except medications.   Preoperative assessment: Routine labs, CXR and EKG reviewed. Patient is at high risk for perioperative cardiopulmonary complications based on the 2014 ACC/AHA Guideline on Perioperative Cardiovascular Evaluation and Management of patients undergoing noncardiac surgery. Patient has underlying active cardiopulmonary condition(s) of severe valvular heart disease of aortic stenosis. Patient with RCRI cardiac risk score of 3 with 15.0 % 30-day risk of death, MI, or cardiac arrest. Discussion made with patient about significant risk of patient undergoing surgery. Despite patient's high risk, the benefit of proceeding with surgery outweighs the risk of not proceeding with surgery at this time. Recommend proceeding to surgery as planned at high risk with the following recommendations: Need close hemodynamically monitoring during surgery. Avoid excess fluid resuscitation during surgery due to risk of cardiac decompensation due to severe AS and important for patient to be euvolemic prior to surgery thus would recommend dialysis for fluid removal prior to planned surgery. Avoid nitrates.   · Pain control as per Hip Fracture Pathway with multimodal pain regimen with scheduled Tylenol and Morphine IV and oral Oxycodone prn as per Pathway protocol. DVT prophylaxis pre-op with TEDs/SCDs.   · Check Vitamin D level to assess for Vitamin D deficiency due to concern for osteoporotic fracture.   · Plan to monitor daily electrolytes and H/H post-op.   · Start Heparin 5000 units subcutaneous 3 times daily post-op after surgery for DVT prophylaxis and will need for a total of 28  days after hip fracture surgery. Patient is a ESRD patient so cannot use Lovenox.   · Will consult PT/OT post-op for gait training and strengthening and restoration of ADLs.   · Will consult  and case management to assist with discharge planning for this patient after surgery.      Complicated UTI (urinary tract infection)  · Present on admit.   · Patient with significant UTI on admit but no signs of sepsis or systemic infection so okay to proceed with left hip surgery.   · Will start patient on IV Rocephin 1 gram daily to treat. Urine culture sent.   · Follow-up urine culture and sensitivities and will need 5-7 days of antibiotics as complicated UTI.       Type 2 diabetes mellitus with chronic kidney disease on chronic dialysis, without long-term current use of insulin  Good control on admission to the hospital.   · HgA1C 5.6% and at goal on admit. Patient on no meds at home to treat diabetes.   · Plan is to monitor POCT glucose 4 times a day with each meal and at bedtime and cover with Novolog low dose sliding scale insulin.   · 2000 calorie diabetic diet.   · Target pre-meal glucose goal is <140 with all random glucoses <180 in non-critically ill patient.    ESRD on hemodialysis  · Consult Nephrology to manage HD while patient in hospital. Patient with no obvious clinical signs of volume overload on admit. Patient with mild pulmonary congestion on CXR but asymptomatic.   · Patient last dialyzed on 10/26 and next regular HD due to on 10/28. Patient needs HD prior to surgery tomorrow.   · Left arm AV fistula in place and has good thrill and bruit.       Renovascular hypertension  · Patient's blood pressure uncontrolled and elevated on admission but patient has not taken any of her home BP meds this am.   · Goal for blood pressure is SBP < 140 and DBP < 90 as patient > or = 60 years of age and patient is diabetic and has chronic kidney disease based on JNC 8 guidelines.   · Plan to continue home regimen  to treat of Clonidine 0.1 mg po TID, Coreg 12.5 mg po BID, Norvasc 10 mg po daily, Hydralazine 100 mg po TID and Losartan 50 mg po daily while patient is hospitalized.   · Plan is to monitor patient's blood pressure routinely while patient is hospitalized.     Severe aortic valve stenosis  Pulmonary hypertension due to aortic valve disease  -Patient with known severe aortic stenosis but asymptomatic. No specific treatment needed at this time. Patient is high risk for surgery due to presence of severe AS and moderate pulmonary HTN.   -Patient needs to follow-up with Cardiology as outpatient for further evaluation of her aortic stenosis to see if candidate for TAVR.   -Last 2D echo done on 5/22/2019 so no need to repeat prior to this surgery and showed:   · Low normal left ventricular systolic function. The estimated ejection fraction is 53%  · Grade II (moderate) left ventricular diastolic dysfunction consistent with pseudonormalization.  · Severe left atrial enlargement.  · Moderate right ventricular enlargement.  · Normal right ventricular systolic function.  · Severe aortic valve stenosis.  · Aortic valve area is 0.69 cm2; peak velocity is 4.58 m/s; mean gradient is 55.02 mmHg.  · Moderate mitral sclerosis.  · Mild-to-moderate mitral regurgitation.  · Moderate to severe tricuspid regurgitation.  · The estimated PA systolic pressure is 77 mm Hg.      Pulmonary emphysema  Chronic respiratory failure with hypoxia  · Controlled. Patient with no signs of acute exacerbation.   · Will continue Breo 1 puff daily to treat COPD.   · Continue oxygen as needed with goal of oxygen sats > 88%.    · Encourage IS pre and post op to help prevent pulmonary complications in hospital.    Thrombocytopenia, unspecified  Chronic and controlled. Platelet count 76,000 on admit close to baseline. Patient with no active bleeding and no platelet transfusion needed. Monitor with daily CBC in hospital.       Anemia in ESRD (end-stage renal  disease)  Chronic and controlled. Patient at baseline with Hgb 9.6. Patient on Epogen as outpatient with HD and will continue in hospital to treat. Monitor daily CBC.       Hyperparathyroidism, secondary renal  Chronic and controlled. Continue Renvela with meals to treat.     (HFpEF) heart failure with preserved ejection fraction  Chronic and controlled. Patient with no obvious signs of volume overload on exam. Continue Coreg and Losartan to treat chronic heart failure.       Vitamin D deficiency  Patient on Vitamin D 50,000 units po weekly as outpatient and will continue in hospital. Will check Vitamin D level on this admit.       Physical debility  Blindness of right eye  Patient reports at baseline uses cane, walker and wheelchair at times and reports decreased activity after recent brain surgery in 6/2019. Patient did have IP rehab stay at [facility] in 7/2019 and noted improvement in ambulation after IP Rehab stay. Consult PT/OT post-op to assess function but will likely require IP Rehab after left hip surgery.       VTE Risk Mitigation (From admission, onward)         Ordered     Place MADDY hose  Until discontinued      10/27/19 0951     IP VTE HIGH RISK PATIENT  Once      10/27/19 0951     Place sequential compression device  Until discontinued      10/27/19 0951                   Janee Rolle MD  Department of Hospital Medicine   [facility]

## 2019-10-27 NOTE — ASSESSMENT & PLAN NOTE
· Consult Nephrology to manage HD while patient in hospital. Patient with no obvious clinical signs of volume overload on admit. Patient with mild pulmonary congestion on CXR but asymptomatic.   · Patient last dialyzed on 10/26 and next regular HD due to on 10/28. Patient needs HD prior to surgery tomorrow.   · Left arm AV fistula in place and has good thrill and bruit.

## 2019-10-27 NOTE — ASSESSMENT & PLAN NOTE
· Present on admit.   · Patient with significant UTI on admit but no signs of sepsis or systemic infection so okay to proceed with left hip surgery.   · Will start patient on IV Rocephin 1 gram daily to treat. Urine culture sent.   · Follow-up urine culture and sensitivities and will need 5-7 days of antibiotics as complicated UTI.

## 2019-10-27 NOTE — NURSING TRANSFER
Nursing Transfer Note      10/27/2019     Transfer From: ED    Transfer via stretcher    Transfer with  to O2    Transported by escort    Medicines sent:     Chart send with patient: Yes    Notified:     Patient reassessed at: 0915 (date, time)    Upon arrival to floor: patient oriented to room, call bell in reach and bed in lowest position

## 2019-10-27 NOTE — ED NOTES
Pt complaining of nausea. Dr. Baker aware. Awaiting new orders. Pt position adjusted in bed for comfort. Call light in reach.

## 2019-10-27 NOTE — ASSESSMENT & PLAN NOTE
Chronic respiratory failure with hypoxia  · Controlled. Patient with no signs of acute exacerbation.   · Will continue Breo 1 puff daily to treat COPD.   · Continue oxygen as needed with goal of oxygen sats > 88%.    · Encourage IS pre and post op to help prevent pulmonary complications in hospital.

## 2019-10-27 NOTE — ANESTHESIA PREPROCEDURE EVALUATION
[facility]  Anesthesia Pre-Operative Evaluation         Patient Name: Tea Georges  YOB: 1940  MRN: [id number]    SUBJECTIVE:     Pre-operative evaluation for Procedure(s) (LRB):  INSERTION, INTRAMEDULLARY NELIDA, FEMUR,  Left , synthes, hana table, large C arm clock side (Left)  Scheduled for 10/28/2019/    HPI 10/27/2019:  Tea Georges is a 79 y.o. female with hx of ESRD on MWF HD via LUE fistula, HTN, severe aortic stenosis and severe pulm HTN (PASP 77), HFpEF, pulmonary emphysema with chronic hypoxic respiratory failure on h ome O2 2L/min at night PRN, previous R MCA CVA in 2016, implantable loop recorder in place, DM2, recent R SDH s/p evacuation on 6/23/2019, seizures on keppra with last seizure in 6/2019.    Patient was admitted on 10/27/2019 via ED for L closed 2-part intertrochanteric fracture of left femur.    Patient was significantly sedated after pain medication.  Daughter was at bedside and provided consent with witnessing from charge nurse Tomasa.    Patient presents for the above procedure(s).    Previous Airway:   No prior records in Epic.    Oxygen/Ventilation Requirements:  On 1L/min O2 NC satting 98%       Current LDA:        Peripheral IV - Single Lumen 10/27/19 0405 20 G Right Forearm (Active)   Site Assessment Intact;Clean 10/27/2019 10:39 AM   Line Status Blood return noted;Flushed;Saline locked 10/27/2019  4:05 AM   Dressing Status Clean;Dry;Intact 10/27/2019  4:05 AM   Dressing Intervention New dressing 10/27/2019  4:05 AM   Number of days: 0            Urethral Catheter 10/27/19 0705 Non-latex 16 Fr. (Active)   Site Assessment Intact 10/27/2019 10:39 AM   Securement Method secured to top of thigh w/ adhesive device 10/27/2019 10:39 AM   Number of days: 0            Hemodialysis AV Fistula 05/23/16 0700 Left upper arm (Active)   Number of days: 1252            Hemodialysis AV Fistula Left upper arm (Active)   Number of days:        Current Drips:  None  documented.      Patient Active Problem List   Diagnosis    Pleural effusion on right    Renovascular hypertension    ESRD on hemodialysis    Severe aortic valve stenosis    Anemia in ESRD (end-stage renal disease)    Hyperparathyroidism, secondary renal    Left spastic hemiparesis    Blindness of right eye    Physical debility    Aortic atherosclerosis    Peripheral vascular disease, unspecified    Vitamin D deficiency    Coronary artery disease involving native coronary artery of native heart without angina pectoris    Pulmonary emphysema    Thrombocytopenia, unspecified    Non-rheumatic tricuspid valve insufficiency    Type 2 diabetes mellitus with chronic kidney disease on chronic dialysis, without long-term current use of insulin    Aneurysm of arteriovenous dialysis fistula    Moderate single current episode of major depressive disorder    (HFpEF) heart failure with preserved ejection fraction    Chronic respiratory failure with hypoxia    Pulmonary hypertension due to aortic valve disease    Closed 2-part intertrochanteric fracture of left femur    Complicated UTI (urinary tract infection)       Review of patient's allergies indicates:  No Known Allergies    Outpatient Medications:  No current facility-administered medications on file prior to encounter.      Current Outpatient Medications on File Prior to Encounter   Medication Sig Dispense Refill    acetaminophen (TYLENOL) 325 MG tablet Take 2 tablets (650 mg total) by mouth every 6 (six) hours as needed for Pain.  0    albuterol (PROVENTIL/VENTOLIN HFA) 90 mcg/actuation inhaler Inhale 1-2 puffs into the lungs every 6 (six) hours as needed for Wheezing. 1 Inhaler 0    amLODIPine (NORVASC) 10 MG tablet Take 1 tablet (10 mg total) by mouth once daily. 90 tablet 3    artificial tears (ISOPTO TEARS) 0.5 % ophthalmic solution Place 2 drops into both eyes 4 (four) times daily as needed.      BREO ELLIPTA 200-25 mcg/dose DsDv diskus  inhaler INHALE 1 PUFF INTO THE LUNGS ONCE DAILY. 180 each 3    carvedilol (COREG) 12.5 MG tablet Take 1 tablet (12.5 mg total) by mouth 2 (two) times daily. 60 tablet 11    cloNIDine (CATAPRES) 0.1 MG tablet Take 1 tablet (0.1 mg total) by mouth 3 (three) times daily. 90 tablet 11    ergocalciferol (ERGOCALCIFEROL) 50,000 unit Cap Take 1 capsule (50,000 Units total) by mouth every 7 days. 12 capsule 3    hydrALAZINE (APRESOLINE) 100 MG tablet Take 1 tablet (100 mg total) by mouth every 8 (eight) hours. 90 tablet 3    levETIRAcetam (KEPPRA) 500 MG Tab TAKE 1 TABLET (500 MG TOTAL) BY MOUTH 2 (TWO) TIMES A DAY FOR 30 DAYS. 60 tablet 0    losartan (COZAAR) 50 MG tablet Take 1 tablet (50 mg total) by mouth once daily. 90 tablet 3    ondansetron (ZOFRAN-ODT) 8 MG TbDL Take 1 tablet (8 mg total) by mouth every 6 (six) hours as needed (nausea). 30 tablet 2    polyethylene glycol (GLYCOLAX) 17 gram PwPk Take 17 g by mouth once daily.  0    RENVELA 800 mg Tab Take 1 tablet (800 mg total) by mouth 3 (three) times daily with meals. 270 tablet 3    senna-docusate 8.6-50 mg (PERICOLACE) 8.6-50 mg per tablet Take 1 tablet by mouth daily as needed for Constipation.          Current Inpatient Medications:   sodium chloride 0.9%   Intravenous Once    acetaminophen  1,000 mg Oral Q8H    amLODIPine  10 mg Oral Daily    carvedilol  12.5 mg Oral BID    cefTRIAXone (ROCEPHIN) IVPB  1 g Intravenous Q24H    cloNIDine  0.1 mg Oral TID    [START ON 10/29/2019] ergocalciferol  50,000 Units Oral Q7 Days    fluticasone furoate-vilanterol  1 puff Inhalation Daily    hydrALAZINE  100 mg Oral Q8H    levETIRAcetam  500 mg Oral BID    losartan  50 mg Oral Daily    polyethylene glycol  17 g Oral Daily    sevelamer carbonate  800 mg Oral TID WM       Past Surgical History:   Procedure Laterality Date    ABDOMINAL SURGERY      BREAST SURGERY      tumor removal x 2    CARDIAC SURGERY  2016    Implantable loop recorder placed     CATARACT EXTRACTION      CEREBRAL ANGIOGRAM N/A 6/24/2019    Procedure: ANGIOGRAM-CEREBRAL;  Surgeon: Kenisha Surgeon;  Location: [facility];  Service: Anesthesiology;  Laterality: N/A;    CHOLECYSTECTOMY      COLONOSCOPY N/A 5/23/2016    Procedure: COLONOSCOPY;  Surgeon: WILLIAM Colvin MD;  Location: [facility] ENDO (2ND FLR);  Service: Endoscopy;  Laterality: N/A;    COLONOSCOPY N/A 5/30/2016    Procedure: COLONOSCOPY;  Surgeon: Sam Davis MD;  Location: [facility] ENDO (2ND FLR);  Service: Endoscopy;  Laterality: N/A;    CRANIOTOMY FOR EVACUATION OF SUBDURAL HEMATOMA Right 6/23/2019    Procedure: KATE HOLES FOR SUBDURAL HEMATOMA EVACUATION;  Surgeon: Trevor Conner MD;  [facility];  Service: Neurosurgery;  Laterality: Right;    EYE SURGERY      FISTULOGRAM Left 11/28/2018    Procedure: Fistulogram;  Surgeon: NADINE Blum III, MD;  Location: [facility] CATH LAB;  Service: Cardiology;  Laterality: Left;    PLACEMENT OF DUAL-LUMEN VASCULAR CATHETER Right 11/29/2018    Procedure: INSERTION, CATHETER, VASCULAR, DUAL LUMEN;  Surgeon: NADINE Blum III, MD;  [facility];  Service: Peripheral Vascular;  Laterality: Right;  Permacatheter placement     RESECTION OF ANEURYSM Left 11/29/2018    Procedure: EXCISION, ANEURYSM;  Surgeon: NADINE Blum III, MD;  [facility];  Service: Peripheral Vascular;  Laterality: Left;  Excision, L AVF aneurysm    REVISION OF ARTERIOVENOUS FISTULA Left 11/29/2018    Procedure: REVISION, AV FISTULA,;  Surgeon: NADINE Blum III, MD;  [facility];  Service: Peripheral Vascular;  Laterality: Left;  OR 11    UPPER GASTROINTESTINAL ENDOSCOPY         Social History     Socioeconomic History    Marital status:      Spouse name: Not on file    Number of children: Not on file    Years of education: Not on file    Highest education level: Not on file   Occupational History    Occupation: Housekeeping     Employer:  [employer]     Comment: Retired; 20-years there   Social Needs    Financial resource strain: Not on file    Food insecurity:     Worry: Not on file     Inability: Not on file    Transportation needs:     Medical: Not on file     Non-medical: Not on file   Tobacco Use    Smoking status: Never Smoker    Smokeless tobacco: Never Used   Substance and Sexual Activity    Alcohol use: No     Comment: Reports occasional 1-2 drinks     Drug use: No    Sexual activity: Not Currently   Lifestyle    Physical activity:     Days per week: Not on file     Minutes per session: Not on file    Stress: Not on file   Relationships    Social connections:     Talks on phone: Not on file     Gets together: Not on file     Attends [employer] service: Not on file     Active member of club or organization: Not on file     Attends meetings of clubs or organizations: Not on file     Relationship status: Not on file   Other Topics Concern    Not on file   Social History Narrative    Not on file       OBJECTIVE:   Weight:  Wt Readings from Last 4 Encounters:   06/30/19 54.4 kg (119 lb 14.9 oz)   06/11/19 48.7 kg (107 lb 5.8 oz)   05/22/19 53.1 kg (117 lb)   03/19/19 50.7 kg (111 lb 12.4 oz)       Vital Signs Range (Last 24H):  Temp:  [36.3 °C (97.4 °F)-36.4 °C (97.5 °F)]   Pulse:  [54-70]   Resp:  [14-19]   BP: (143-180)/(59-76)   SpO2:  [90 %-100 %]       CBC:   Lab Results   Component Value Date    WBC 9.56 10/27/2019    HGB 9.6 (L) 10/27/2019    HCT 31.6 (L) 10/27/2019    MCV 98 10/27/2019    PLT 76 (L) 10/27/2019       CMP:     Chemistry        Component Value Date/Time     10/27/2019 0543    K 4.0 10/27/2019 0543     10/27/2019 0543    CO2 24 10/27/2019 0543    BUN 51 (H) 10/27/2019 0543    CREATININE 6.0 (H) 10/27/2019 0543     (H) 10/27/2019 0543        Component Value Date/Time    CALCIUM 9.3 10/27/2019 0543    ALKPHOS 103 10/27/2019 0543    AST 9 (L) 10/27/2019 0543    ALT 6 (L) 10/27/2019  0543    BILITOT 0.5 10/27/2019 0543    ESTGFRAFRICA 7.2 (A) 10/27/2019 0543    EGFRNONAA 6.2 (A) 10/27/2019 0543            INR:  Lab Results   Component Value Date    INR 1.1 10/27/2019    INR 1.1 06/27/2019    INR 1.2 06/23/2019       Diagnostic Studies:    EKG:   Results for orders placed or performed during the hospital encounter of 06/21/19   EKG 12-lead    Collection Time: 06/23/19  2:54 AM    Narrative    Test Reason : R07.9    Vent. Rate : 063 BPM     Atrial Rate : 063 BPM     P-R Int : 272 ms          QRS Dur : 088 ms      QT Int : 442 ms       P-R-T Axes : 054 035 073 degrees     QTc Int : 452 ms    Sinus rhythm with 1st degree A-V block  Voltage criteria for left ventricular hypertrophy  Nonspecific T wave abnormality  Abnormal ECG  When compared with ECG of 21-JUN-2019 18:32,  Criteria for Septal infarct are no longer Present  Nonspecific T wave abnormality now evident in Lateral leads  Confirmed by PAULIE SCHMITZ MD ([id number]) on 6/28/2019 8:24:17 AM    Referred By: [email or web address]   SELF           Confirmed By:PAULIE SCHMITZ MD       2D Echo:  Results for orders placed or performed during the hospital encounter of 07/12/16   2D Echo w/ Color Flow Doppler   Result Value Ref Range    QEF 63 55 - 65    Mitral Valve Regurgitation TRIVIAL     Diastolic Dysfunction Yes (A)     Aortic Valve Regurgitation MILD     Aortic Valve Stenosis SEVERE (A)     Est. PA Systolic Pressure 60.46 (A)     Pericardial Effusion SMALL (A)     Mitral Valve Mobility NORMAL     Tricuspid Valve Regurgitation TRIVIAL TO MILD        Results for orders placed or performed during the hospital encounter of 05/21/19   Transthoracic echo (TTE) 2D with Color Flow   Result Value Ref Range    Ascending aorta 2.66 cm    STJ 2.27 cm    AV mean gradient 55.02 mmHg    Ao peak olga 4.58 m/s    Ao .87 cm    IVS 0.87 0.6 - 1.1 cm    LA size 4.48 cm    Left Atrium Major Axis 5.56 cm    Left Atrium Minor Axis 5.43 cm    LVIDD 4.38 3.5 - 6.0 cm    LVIDS  3.29 2.1 - 4.0 cm    LVOT diameter 1.97 cm    LVOT peak VTI 29.33 cm    PW 1.11 (A) 0.6 - 1.1 cm    MV Peak A Jared 1.55 m/s    E wave decelartion time 166.17 msec    MV Peak E Jared 1.89 m/s    RA Major Axis 4.75 cm    RA Width 3.71 cm    RVDD 5.42 cm    Sinus 2.61 cm    TAPSE 2.25 cm    TR Max Jared 3.94 m/s    TDI LATERAL 0.05     TDI SEPTAL 0.03     LA WIDTH 5.05 cm    LV Diastolic Volume 86.59 mL    LV Systolic Volume 43.96 mL    LVOT peak jared [id number] m/s    LV LATERAL E/E' RATIO 37.80     LV SEPTAL E/E' RATIO 63.00     FS 25 %    LA volume 105.66 cm3    LV mass 144.73 g    Left Ventricle Relative Wall Thickness 0.51 cm    AV valve area 0.69 cm2    AV Velocity Ratio 0.22     AV index (prosthetic) 0.23     E/A ratio 1.22     Mean e' 0.04     LVOT area 3.05 cm2    LVOT stroke volume 89.35 cm3    AV peak gradient 83.91 mmHg    E/E' ratio 47.25     LV Systolic Volume Index 28.5 mL/m2    LV Diastolic Volume Index 56.24 mL/m2    LA Volume Index 68.6 mL/m2    LV Mass Index 94.0 g/m2    Triscuspid Valve Regurgitation Peak Gradient 62.09 mmHg    BSA 1.54 m2    Right Atrial Pressure (from IVC) 15 mmHg    TV rest pulmonary artery pressure 77 mmHg    Narrative    · Low normal left ventricular systolic function. The estimated ejection   fraction is 53%  · Local segmental wall motion abnormalities.  · Concentric left ventricular remodeling.  · Grade II (moderate) left ventricular diastolic dysfunction consistent   with pseudonormalization.  · Severe left atrial enlargement.  · Elevated left atrial pressure.  · Moderate right ventricular enlargement.  · Normal right ventricular systolic function.  · Mild right atrial enlargement.  · Severe aortic valve stenosis.  · Aortic valve area is 0.69 cm2; peak velocity is 4.58 m/s; mean gradient   is 55.02 mmHg.  · Moderate mitral sclerosis.  · Mild-to-moderate mitral regurgitation.  · Moderate to severe tricuspid regurgitation.  · Mild pulmonic regurgitation.  · Elevated central  venous pressure (15 mm Hg).  · The estimated PA systolic pressure is 77 mm Hg  · Pulmonary hypertension present.          No results found. However, due to the size of the patient record, not all encounters were searched. Please check Results Review for a complete set of results.      ASSESSMENT/PLAN:         Anesthesia Evaluation    I have reviewed the Patient Summary Reports.    I have reviewed the Nursing Notes.   I have reviewed the Medications.     Review of Systems  Anesthesia Hx:  No problems with previous Anesthesia  History of prior surgery of interest to airway management or planning:  Denies Personal Hx of Anesthesia complications.   Social:  Non-Smoker, Social Alcohol Use    Hematology/Oncology:     Oncology Normal    -- Anemia:   EENT/Dental:EENT/Dental Normal   Cardiovascular:   Hypertension Valvular problems/Murmurs, AS CAD   CHF    Pulmonary:   COPD Asthma On 2L Home O2   Renal/:   Chronic Renal Disease, Dialysis, ESRD    Neurological:   CVA, residual symptoms Seizures SDH   Endocrine:   Diabetes, type 2    Psych:   depression          Physical Exam  General:  Malnutrition    Airway/Jaw/Neck:  Airway Findings: Mouth Opening: Small, but > 3cm Tongue: Normal  General Airway Assessment: Adult  Mallampati: IV  TM Distance: Normal, at least 6 cm  Jaw/Neck Findings:  Neck ROM: Normal ROM  Neck Findings: Normal    Eyes/Ears/Nose:  EYES/EARS/NOSE FINDINGS: Normal   Dental:  Dental Findings: Edentulous, Lower Dentures, Upper Dentures   Chest/Lungs:  Chest/Lungs Findings: Rhonchi     Heart/Vascular:  Heart Findings: Rate: Normal  Rhythm: Regular Rhythm  Heart Murmur  Systolic  Systolic Heart Murmur Description: Holosystolic  Systolic Heart Murmur Grade: Grade III        Mental Status:  Mental Status Findings: Normal        Anesthesia Plan  Type of Anesthesia, risks & benefits discussed:  Anesthesia Type:  general, regional, epidural  Patient's Preference:   Intra-op Monitoring Plan: standard ASA monitors and  arterial line  Intra-op Monitoring Plan Comments:   Post Op Pain Control Plan: multimodal analgesia, IV/PO Opioids PRN, per primary service following discharge from PACU, peripheral nerve block and epidural analgesia  Post Op Pain Control Plan Comments:   Induction:   IV  Beta Blocker:  Patient is on a Beta-Blocker and has received one dose within the past 24 hours (No further documentation required).       Informed Consent: Patient representative understands risks and agrees with Anesthesia plan.  Questions answered. Anesthesia consent signed with patient representative.  ASA Score: 4     Day of Surgery Review of History & Physical:  There are no significant changes.  H&P update referred to the surgeon.     Anesthesia Plan Notes:   GETA with A Line        Ready For Surgery From Anesthesia Perspective.

## 2019-10-27 NOTE — ED NOTES
Attempted to call report and was unsuccessful. Was told there is not a nurse assigned to room at this time.

## 2019-10-27 NOTE — ASSESSMENT & PLAN NOTE
Patient on Vitamin D 50,000 units po weekly as outpatient and will continue in hospital. Will check Vitamin D level on this admit.

## 2019-10-27 NOTE — CONSULTS
REASON FOR CONSULT: ESRD     REFERRING PHYSICIAN: Parth Posadas Ii, MD      HISTORY OF PRESENT ILLNESS: 79 y.o. female who is new to me  has a past medical history of (HFpEF) heart failure with preserved ejection fraction (5/21/2019), Anemia in ESRD (end-stage renal disease) (5/29/2016), Aneurysm of arteriovenous dialysis fistula, Aortic atherosclerosis (11/22/2016), Bilateral low back pain without sciatica (11/17/2015), Blindness of right eye (11/12/2016), Brain compression (6/22/2019), Cataract, Central retinal vein occlusion, right eye (6/3/2014), Chronic diastolic heart failure (1/8/2016), Chronic respiratory failure with hypoxia (5/29/2016), Coronary artery disease involving native coronary artery of native heart without angina pectoris (12/12/2016), Diverticulosis, Encounter for blood transfusion, Enlarged LA (left atrium) (10/7/2016), Epiretinal membrane (7/17/2012), ESRD on hemodialysis, History of GI diverticular bleed, Hyperparathyroidism, secondary renal (10/14/2016), Left spastic hemiparesis (11/13/2016), Moderate single current episode of major depressive disorder (2/8/2019), Non-rheumatic tricuspid valve insufficiency (1/15/2017), Peripheral vascular disease, unspecified (11/22/2016), Physical debility (11/17/2016), Pleural effusion on right, Pulmonary emphysema (1/15/2017), Pulmonary hypertension (6/28/2019), Renovascular hypertension (1/8/2016), Seizure (6/24/2019), Severe aortic valve stenosis (2/4/2016), 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 (11/13/2016), Subdural hematoma (05/21/2019), Thrombocytopenia, unspecified (1/15/2017), Type 2 diabetes mellitus with chronic kidney disease on chronic dialysis, without long-term current use of insulin (5/1/2018), Type 2 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, without long-term current use of insulin (3/26/2013), Type 2 diabetes mellitus with severe nonproliferative retinopathy of right  eye, without long-term current use of insulin (3/26/2013), and 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   Who is admitted for for L intertoch fx and plan fro ORIF tomorrow, pt reports last HD was Friday , he is still making urine.     ROS:  General: negative for chills, or fatigue  ENT: No epistaxis or headaches  Hematological and Lymphatic: No bleeding problems or blood clots.  Endocrine: No skin changes or temperature intolerance  Respiratory: No cough, shortness of breath, or wheezing  Cardiovascular: No chest pain or dyspnea   Gastrointestinal: No abdominal pain, change in bowel habits  Genito-Urinary: No dysuria, trouble voiding, or hematuria  Musculoskeletal: ROS: negative for - joint pain, joint stiffness, joint swelling, muscle pain or muscular weakness  Neurological: No focal weakness, no numbness  Dermatological: No rash or ulcers.    PAST MEDICAL HISTORY:  Past Medical History:   Diagnosis Date    (HFpEF) heart failure with preserved ejection fraction 5/21/2019    Anemia in ESRD (end-stage renal disease) 5/29/2016    Aneurysm of arteriovenous dialysis fistula     Aortic atherosclerosis 11/22/2016    Bilateral low back pain without sciatica 11/17/2015    Blindness of right eye 11/12/2016    Brain compression 6/22/2019    Cataract     Central retinal vein occlusion, right eye 6/3/2014    Chronic diastolic heart failure 1/8/2016    Chronic respiratory failure with hypoxia 5/29/2016    Coronary artery disease involving native coronary artery of native heart without angina pectoris 12/12/2016    Diverticulosis     Encounter for blood transfusion     Enlarged LA (left atrium) 10/7/2016    Epiretinal membrane 7/17/2012    ESRD on hemodialysis     MWF    History of GI diverticular bleed     5/22/16    Hyperparathyroidism, secondary renal 10/14/2016    Left spastic hemiparesis 11/13/2016    Moderate single current episode of major depressive disorder 2/8/2019    Non-rheumatic  tricuspid valve insufficiency 1/15/2017    Peripheral vascular disease, unspecified 11/22/2016    Physical debility 11/17/2016    Pleural effusion on right     Pulmonary emphysema 1/15/2017    Pulmonary hypertension 6/28/2019    Renovascular hypertension 1/8/2016    Seizure 6/24/2019    Severe aortic valve stenosis 2/4/2016    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 11/13/2016    s/p thrombectomy of MCA    Subdural hematoma 05/21/2019    Bilateral R>L    Thrombocytopenia, unspecified 1/15/2017    Type 2 diabetes mellitus with chronic kidney disease on chronic dialysis, without long-term current use of insulin 5/1/2018    Type 2 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, without long-term current use of insulin 3/26/2013    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, without long-term current use of insulin 3/26/2013    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       PAST SURGICAL HISTORY:  Past Surgical History:   Procedure Laterality Date    ABDOMINAL SURGERY      BREAST SURGERY      tumor removal x 2    CARDIAC SURGERY  2016    Implantable loop recorder placed    CATARACT EXTRACTION      CEREBRAL ANGIOGRAM N/A 6/24/2019    Procedure: ANGIOGRAM-CEREBRAL;  Surgeon: Kenisha Surgeon;  Location: [facility];  Service: Anesthesiology;  Laterality: N/A;    CHOLECYSTECTOMY      COLONOSCOPY N/A 5/23/2016    Procedure: COLONOSCOPY;  Surgeon: WILLIAM Colvin MD;  Location: [facility] ([address] [place] [address]);  Service: Endoscopy;  Laterality: N/A;    COLONOSCOPY N/A 5/30/2016    Procedure: COLONOSCOPY;  Surgeon: Sam Davis MD;  Location: [facility] ENDO (2ND FLR);  Service: Endoscopy;  Laterality: N/A;    CRANIOTOMY FOR EVACUATION OF SUBDURAL HEMATOMA Right 6/23/2019    Procedure: KATE HOLES FOR SUBDURAL HEMATOMA EVACUATION;  Surgeon: Trevor Conner MD;  [facility];  Service: Neurosurgery;   Laterality: Right;    EYE SURGERY      FISTULOGRAM Left 11/28/2018    Procedure: Fistulogram;  Surgeon: NADINE Blum III, MD;  Location: [facility] CATH LAB;  Service: Cardiology;  Laterality: Left;    PLACEMENT OF DUAL-LUMEN VASCULAR CATHETER Right 11/29/2018    Procedure: INSERTION, CATHETER, VASCULAR, DUAL LUMEN;  Surgeon: NADINE Blum III, MD;  Location: [facility] OR [facility] FLR;  Service: Peripheral Vascular;  Laterality: Right;  Permacatheter placement     RESECTION OF ANEURYSM Left 11/29/2018    Procedure: EXCISION, ANEURYSM;  Surgeon: NADINE Blum III, MD;  Location: [facility] OR [facility] FLR;  Service: Peripheral Vascular;  Laterality: Left;  Excision, L AVF aneurysm    REVISION OF ARTERIOVENOUS FISTULA Left 11/29/2018    Procedure: REVISION, AV FISTULA,;  Surgeon: NADINE Blum III, MD;  [facility];  Service: Peripheral Vascular;  Laterality: Left;  OR 11    UPPER GASTROINTESTINAL ENDOSCOPY         FAMILY HISTORY:   Family History   Problem Relation Age of Onset    Cataracts Mother     Stroke Mother     Hypertension Mother     Cancer Sister     Hypertension Sister     Heart failure Sister     Glaucoma Brother     Hypertension Brother     Heart disease Brother     Hypertension Father     Glaucoma Maternal Aunt     Esophageal cancer Sister     No Known Problems Maternal Uncle     No Known Problems Paternal Aunt     No Known Problems Paternal Uncle     No Known Problems Maternal Grandmother     No Known Problems Maternal Grandfather     No Known Problems Paternal Grandmother     No Known Problems Paternal Grandfather     Heart attack Neg Hx     Colon cancer Neg Hx     Stomach cancer Neg Hx     Anemia Neg Hx     Arrhythmia Neg Hx     Asthma Neg Hx     Clotting disorder Neg Hx     Fainting Neg Hx     Hyperlipidemia Neg Hx     Atrial Septal Defect Neg Hx        SOCIAL HISTORY:  Social History     Socioeconomic History    Marital status:      Spouse name: Not on file     Number of children: Not on file    Years of education: Not on file    Highest education level: Not on file   Occupational History    Occupation: Housekeeping     Employer: [employer]     Comment: Retired; 20-years there   Social Needs    Financial resource strain: Not on file    Food insecurity:     Worry: Not on file     Inability: Not on file    Transportation needs:     Medical: Not on file     Non-medical: Not on file   Tobacco Use    Smoking status: Never Smoker    Smokeless tobacco: Never Used   Substance and Sexual Activity    Alcohol use: No     Comment: Reports occasional 1-2 drinks     Drug use: No    Sexual activity: Not Currently   Lifestyle    Physical activity:     Days per week: Not on file     Minutes per session: Not on file    Stress: Not on file   Relationships    Social connections:     Talks on phone: Not on file     Gets together: Not on file     Attends [employer] service: Not on file     Active member of club or organization: Not on file     Attends meetings of clubs or organizations: Not on file     Relationship status: Not on file   Other Topics Concern    Not on file   Social History Narrative    Not on file       ALLERGIES:  Review of patient's allergies indicates:  No Known Allergies    MEDICATIONS:    Current Facility-Administered Medications:     0.9%  NaCl infusion, , Intravenous, Once, Dayna Menard MD    acetaminophen tablet 1,000 mg, 1,000 mg, Oral, Q8H, Janee Rolle MD    amLODIPine tablet 10 mg, 10 mg, Oral, Daily, Janee Rolle MD, 10 mg at 10/27/19 1040    artificial tears 0.5 % ophthalmic solution 2 drop, 2 drop, Both Eyes, QID PRN, Janee Rolle MD    bisacodyl suppository 10 mg, 10 mg, Rectal, Daily PRN, Janee Rolle MD    carvedilol tablet 12.5 mg, 12.5 mg, Oral, BID, Janee Rolle MD, 12.5 mg at 10/27/19 1040    cefTRIAXone injection 1 g, 1 g, Intravenous, Q24H, Janee Rolle MD, 1 g at  10/27/19 1139    cloNIDine tablet 0.1 mg, 0.1 mg, Oral, TID, Janee Rolle MD    dextrose 10% (D10W) Bolus, 12.5 g, Intravenous, PRN, Janee Rolle MD    dextrose 10% (D10W) Bolus, 25 g, Intravenous, PRN, Janee Rolle MD    [START ON 10/29/2019] ergocalciferol capsule 50,000 Units, 50,000 Units, Oral, Q7 Days, Janee Rolle MD    fluticasone furoate-vilanterol 200-25 mcg/dose diskus inhaler 1 puff, 1 puff, Inhalation, Daily, Janee Rolle MD, 1 puff at 10/27/19 1140    glucagon (human recombinant) injection 1 mg, 1 mg, Intramuscular, PRN, Janee Rolle MD    glucose chewable tablet 16 g, 16 g, Oral, PRN, Janee Rolle MD    glucose chewable tablet 24 g, 24 g, Oral, PRN, Janee Rolle MD    hydrALAZINE tablet 100 mg, 100 mg, Oral, Q8H, Janee Rolle MD    insulin aspart U-100 pen 0-5 Units, 0-5 Units, Subcutaneous, QID (AC + HS) PRN, Janee Rolle MD    levETIRAcetam tablet 500 mg, 500 mg, Oral, BID, Janee Rolle MD, 500 mg at 10/27/19 1040    losartan tablet 50 mg, 50 mg, Oral, Daily, Janee Rolle MD, 50 mg at 10/27/19 1040    morphine injection 2 mg, 2 mg, Intravenous, Q3H PRN, Janee Rolle MD    ondansetron injection 4 mg, 4 mg, Intravenous, Q12H PRN, Janee Rolle MD    oxyCODONE immediate release tablet 5 mg, 5 mg, Oral, Q3H PRN, Janee Rolle MD    oxyCODONE immediate release tablet Tab 10 mg, 10 mg, Oral, Q3H PRN, Janee Rolle MD, 10 mg at 10/27/19 1039    polyethylene glycol packet 17 g, 17 g, Oral, Daily, Janee Rolle MD, 17 g at 10/27/19 1040    promethazine (PHENERGAN) 6.25 mg in dextrose 5 % 50 mL IVPB, 6.25 mg, Intravenous, Q6H PRN, Janee Rolle MD    ramelteon tablet 8 mg, 8 mg, Oral, Nightly PRN, Janee Rolle MD    sevelamer carbonate tablet 800 mg, 800 mg, Oral, TID WM, Janee Rolle MD    sodium chloride 0.9% flush 10 mL, 10 mL, Intravenous, PRN,  Janee Rolle MD   Current Discharge Medication List      CONTINUE these medications which have NOT CHANGED    Details   acetaminophen (TYLENOL) 325 MG tablet Take 2 tablets (650 mg total) by mouth every 6 (six) hours as needed for Pain.  Refills: 0      albuterol (PROVENTIL/VENTOLIN HFA) 90 mcg/actuation inhaler Inhale 1-2 puffs into the lungs every 6 (six) hours as needed for Wheezing.  Qty: 1 Inhaler, Refills: 0    Associated Diagnoses: COPD with exacerbation      amLODIPine (NORVASC) 10 MG tablet Take 1 tablet (10 mg total) by mouth once daily.  Qty: 90 tablet, Refills: 3    Associated Diagnoses: Essential hypertension      artificial tears (ISOPTO TEARS) 0.5 % ophthalmic solution Place 2 drops into both eyes 4 (four) times daily as needed.      BREO ELLIPTA 200-25 mcg/dose DsDv diskus inhaler INHALE 1 PUFF INTO THE LUNGS ONCE DAILY.  Qty: 180 each, Refills: 3    Associated Diagnoses: Chronic obstructive pulmonary disease, unspecified COPD type      carvedilol (COREG) 12.5 MG tablet Take 1 tablet (12.5 mg total) by mouth 2 (two) times daily.  Qty: 60 tablet, Refills: 11      cloNIDine (CATAPRES) 0.1 MG tablet Take 1 tablet (0.1 mg total) by mouth 3 (three) times daily.  Qty: 90 tablet, Refills: 11      ergocalciferol (ERGOCALCIFEROL) 50,000 unit Cap Take 1 capsule (50,000 Units total) by mouth every 7 days.  Qty: 12 capsule, Refills: 3    Associated Diagnoses: Vitamin D deficiency      hydrALAZINE (APRESOLINE) 100 MG tablet Take 1 tablet (100 mg total) by mouth every 8 (eight) hours.  Qty: 90 tablet, Refills: 3      levETIRAcetam (KEPPRA) 500 MG Tab TAKE 1 TABLET (500 MG TOTAL) BY MOUTH 2 (TWO) TIMES A DAY FOR 30 DAYS.  Qty: 60 tablet, Refills: 0      losartan (COZAAR) 50 MG tablet Take 1 tablet (50 mg total) by mouth once daily.  Qty: 90 tablet, Refills: 3    Associated Diagnoses: Essential hypertension      ondansetron (ZOFRAN-ODT) 8 MG TbDL Take 1 tablet (8 mg total) by mouth every 6 (six) hours as  needed (nausea).  Qty: 30 tablet, Refills: 2      polyethylene glycol (GLYCOLAX) 17 gram PwPk Take 17 g by mouth once daily.  Refills: 0      RENVELA 800 mg Tab Take 1 tablet (800 mg total) by mouth 3 (three) times daily with meals.  Qty: 270 tablet, Refills: 3    Associated Diagnoses: ESRD (end stage renal disease) on dialysis      senna-docusate 8.6-50 mg (PERICOLACE) 8.6-50 mg per tablet Take 1 tablet by mouth daily as needed for Constipation.              PHYSICAL EXAM:  BP (!) 179/73   Pulse 65   Temp 97.5 °F (36.4 °C)   Resp 18   LMP  (LMP Unknown)   SpO2 (!) 90%   Breastfeeding? No     General: No distress, No fever or chills  Head: Normocephalic,atraumatic  Eyes: conjunctivae/corneas clear. PERRL, EOM's intact.  Nose: Nares normal. Mucosa normal. No drainage or sinus tenderness.  Neck: No adenopathy,no carotid bruit,no JVD  Lungs:Clear to auscultation bilaterally, No Crackles  Heart: Regular rate and rhythm, no murmur, gallops or rubs  Abdomen: Soft, no tenderness, bowel sounds normal  Extremities: Atraumatic, no edema in LE  Skin: Turgor normal. No rashes or ulcers  Neurologic: No focal weakness, oriented.  Dialysis Access: LAVF with good thrill         LABS:  Lab Results   Component Value Date    CREATININE 6.0 (H) 10/27/2019       Prot/Creat Ratio, Ur   Date Value Ref Range Status   01/10/2012 0.30 (H) 0.0 - 0.2 Final   07/14/2011 0.71 0.05 - 1.07 Final   04/07/2011 1.78 (H) 0.05 - 1.07 Final       Lab Results   Component Value Date     10/27/2019    K 4.0 10/27/2019    CO2 24 10/27/2019       last PTH   Lab Results   Component Value Date    .0 (H) 12/23/2016    CALCIUM 9.3 10/27/2019    PHOS 4.5 10/27/2019       Lab Results   Component Value Date    HGB 9.6 (L) 10/27/2019        Lab Results   Component Value Date    HGBA1C 5.6 10/27/2019       Lab Results   Component Value Date    LDLCALC 43.0 (L) 09/30/2016           ASSESSMENT:  1- ESRD on HD MWF  2- L intertroch fx  3- CAD  4-  AS  5- HTN   6- anemia of chronic disease       PLAN:    - pt is going for surgery early morning, we will dialyze him tonight preoperatively for 3 hours with UF 1-1.5L as tolerated   - hbg is 9.6 it has been running at 7.5 for the last few months, please get iron study , will consider SATNAM if he has enough iron store .   - PO4 4.5, will continue with sevelamer   - BP is controlled , will continue with current meds   - rest of management by primary team       Thanks for allowing me to participate in the care of this patient.     11:57 AM    Jamey Duenas MD  NEPHROLOGY ATTENDING

## 2019-10-27 NOTE — SUBJECTIVE & OBJECTIVE
Past Medical History:   Diagnosis Date    Anemia in ESRD (end-stage renal disease) 5/29/2016    Anticoagulant long-term use     Aortic atherosclerosis 11/22/2016    Aortic stenosis, moderate 2/18/2016    Asthma in adult without complication 1/8/2016    Bilateral low back pain without sciatica 11/17/2015    Blindness of right eye 11/12/2016    Brain compression 6/22/2019    CAD (coronary artery disease) 12/12/2016    Cataract     Central retinal vein occlusion, right eye 6/3/2014    CHF (congestive heart failure)     Chronic diastolic heart failure 1/8/2016    Chronic respiratory failure with hypoxia 5/29/2016    COPD (chronic obstructive pulmonary disease) 1/15/2017    Dependence on hemodialysis     Mon-Wed-Fri    Diverticulosis     Embolic stroke involving right middle cerebral artery     Encounter for blood transfusion     Enlarged LA (left atrium) 10/7/2016    Epiretinal membrane 7/17/2012    ESRD (end stage renal disease)     Essential hypertension 1/8/2016    History of GI diverticular bleed     5/22/16    Left flaccid hemiparesis 10/1/2016    Peripheral vascular disease, unspecified 11/22/2016    Right-sided cerebrovascular accident (CVA) 11/22/2016    Seizure 6/24/2019    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 11/13/2016    Type 2 diabetes mellitus with kidney complication, without long-term current use of insulin 5/1/2018    Type 2 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, without long-term current use of insulin 3/26/2013    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, without long-term current use of insulin 3/26/2013    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       Past Surgical History:   Procedure Laterality Date    ABDOMINAL SURGERY      BREAST SURGERY      tumor removal x 2    CARDIAC SURGERY      CATARACT EXTRACTION      CEREBRAL ANGIOGRAM N/A 6/24/2019     Procedure: ANGIOGRAM-CEREBRAL;  Surgeon: Kenisha Surgeon;  Location: [facility];  Service: Anesthesiology;  Laterality: N/A;    CHOLECYSTECTOMY      COLONOSCOPY N/A 5/23/2016    Procedure: COLONOSCOPY;  Surgeon: WILLIAM Colvin MD;  Location: [facility] ENDO (2ND FLR);  Service: Endoscopy;  Laterality: N/A;    COLONOSCOPY N/A 5/30/2016    Procedure: COLONOSCOPY;  Surgeon: Sam Davis MD;  Location: [facility] ENDO (2ND FLR);  Service: Endoscopy;  Laterality: N/A;    CRANIOTOMY FOR EVACUATION OF SUBDURAL HEMATOMA Right 6/23/2019    Procedure: KATE HOLES FOR SUBDURAL HEMATOMA EVACUATION;  Surgeon: Trevor Conner MD;  [facility];  Service: Neurosurgery;  Laterality: Right;    EYE SURGERY      FISTULOGRAM Left 11/28/2018    Procedure: Fistulogram;  Surgeon: NADINE Blum III, MD;  Location: [facility] CATH LAB;  Service: Cardiology;  Laterality: Left;    PLACEMENT OF DUAL-LUMEN VASCULAR CATHETER Right 11/29/2018    Procedure: INSERTION, CATHETER, VASCULAR, DUAL LUMEN;  Surgeon: NADINE Blum III, MD;  [facility];  Service: Peripheral Vascular;  Laterality: Right;  Permacatheter placement     RESECTION OF ANEURYSM Left 11/29/2018    Procedure: EXCISION, ANEURYSM;  Surgeon: NADINE Blum III, MD;  [facility];  Service: Peripheral Vascular;  Laterality: Left;  Excision, L AVF aneurysm    REVISION OF ARTERIOVENOUS FISTULA Left 11/29/2018    Procedure: REVISION, AV FISTULA,;  Surgeon: NADINE Blum III, MD;  [facility];  Service: Peripheral Vascular;  Laterality: Left;  OR 11    UPPER GASTROINTESTINAL ENDOSCOPY         Review of patient's allergies indicates:  No Known Allergies    No current facility-administered medications for this encounter.      Current Outpatient Medications   Medication Sig    acetaminophen (TYLENOL) 325 MG tablet Take 2 tablets (650 mg total) by mouth every 6 (six) hours as needed for Pain.    albuterol (PROVENTIL/VENTOLIN HFA)  90 mcg/actuation inhaler Inhale 1-2 puffs into the lungs every 6 (six) hours as needed for Wheezing.    amLODIPine (NORVASC) 10 MG tablet Take 1 tablet (10 mg total) by mouth once daily.    artificial tears (ISOPTO TEARS) 0.5 % ophthalmic solution Place 2 drops into both eyes 4 (four) times daily as needed.    BREO ELLIPTA 200-25 mcg/dose DsDv diskus inhaler INHALE 1 PUFF INTO THE LUNGS ONCE DAILY.    carvedilol (COREG) 12.5 MG tablet Take 1 tablet (12.5 mg total) by mouth 2 (two) times daily.    cloNIDine (CATAPRES) 0.1 MG tablet Take 1 tablet (0.1 mg total) by mouth 3 (three) times daily.    ergocalciferol (ERGOCALCIFEROL) 50,000 unit Cap Take 1 capsule (50,000 Units total) by mouth every 7 days.    hydrALAZINE (APRESOLINE) 100 MG tablet Take 1 tablet (100 mg total) by mouth every 8 (eight) hours.    levETIRAcetam (KEPPRA) 500 MG Tab TAKE 1 TABLET (500 MG TOTAL) BY MOUTH 2 (TWO) TIMES A DAY FOR 30 DAYS.    losartan (COZAAR) 50 MG tablet Take 1 tablet (50 mg total) by mouth once daily.    ondansetron (ZOFRAN-ODT) 8 MG TbDL Take 1 tablet (8 mg total) by mouth every 6 (six) hours as needed (nausea).    polyethylene glycol (GLYCOLAX) 17 gram PwPk Take 17 g by mouth once daily.    RENVELA 800 mg Tab Take 1 tablet (800 mg total) by mouth 3 (three) times daily with meals.    senna-docusate 8.6-50 mg (PERICOLACE) 8.6-50 mg per tablet Take 1 tablet by mouth daily as needed for Constipation.     Family History     Problem Relation (Age of Onset)    Cancer Sister    Cataracts Mother    Esophageal cancer Sister    Glaucoma Brother, Maternal Aunt    Heart disease Brother    Heart failure Sister    Hypertension Mother, Sister, Brother, Father    No Known Problems Maternal Uncle, Paternal Aunt, Paternal Uncle, Maternal Grandmother, Maternal Grandfather, Paternal Grandmother, Paternal Grandfather    Stroke Mother        Tobacco Use    Smoking status: Never Smoker    Smokeless tobacco: Never Used   Substance and  Sexual Activity    Alcohol use: No     Comment: Reports occasional 1-2 drinks     Drug use: No    Sexual activity: Never     ROS   Per ED ROS 10/27/19  Objective:     Vital Signs (Most Recent):  Temp: 97.5 °F (36.4 °C) (10/27/19 0340)  Pulse: 70 (10/27/19 0518)  Resp: 19 (10/27/19 0504)  BP: (!) 170/73 (10/27/19 0518)  SpO2: 96 % (10/27/19 0518) Vital Signs (24h Range):  Temp:  [97.5 °F (36.4 °C)] 97.5 °F (36.4 °C)  Pulse:  [67-70] 70  Resp:  [14-19] 19  SpO2:  [95 %-96 %] 96 %  BP: (150-170)/(61-73) 170/73           There is no height or weight on file to calculate BMI.      Ortho/SPM Exam  LLE  Shortened, skin intact with mod swelling over lateral hip  Pain with Log roll of leg  No bony TTP throughout  Compartments soft  Painless ROM ankle   SILT Sa/Bradshaw/DP/SP/T  Motor intact TA/SP/DP  1+ DP and PT     RLE:  Skin intact, no deformity  No TTP  Compartments soft  Full painless ROM throughout lower extremity  SILT Sa/Bradshaw/DP/SP/T  Motor intact TA/SP/DP  1+ DP/PT     BUE:  Skin intact, no deformity noted, fistula in LUE  No open wounds/abrasions/crepitus  No bony TTP  FROM shoulder, elbow and wrist  SILT M/U/R  Motor intact AIN/PIN/M/U/R   Cap refill < 2s  2+ RP      Spine: No TTP along spine, no step offs palpated. no sacral decubitus ulcers      Significant Labs:   CBC:   Recent Labs   Lab 10/27/19  0543   WBC 9.56   HGB 9.6*   HCT 31.6*   PLT 76*     CMP:   Recent Labs   Lab 10/27/19  0543      K 4.0      CO2 24   *   BUN 51*   CREATININE 6.0*   CALCIUM 9.3   PROT 7.2   ALBUMIN 3.5   BILITOT 0.5   ALKPHOS 103   AST 9*   ALT 6*   ANIONGAP 14   EGFRNONAA 6.2*     All pertinent labs within the past 24 hours have been reviewed.    Significant Imaging: I have reviewed all pertinent imaging results/findings.   Xray pelvis: left intertroch fx  Xray femur, chest WNL except as above  CT pelvis: Same as above

## 2019-10-27 NOTE — ASSESSMENT & PLAN NOTE
Blindness of right eye  Patient reports at baseline uses cane, walker and wheelchair at times and reports decreased activity after recent brain surgery in 6/2019. Patient did have IP rehab stay at [facility] in 7/2019 and noted improvement in ambulation after IP Rehab stay. Consult PT/OT post-op to assess function but will likely require IP Rehab after left hip surgery.

## 2019-10-28 ENCOUNTER — ANESTHESIA (OUTPATIENT)
Dept: SURGERY | Facility: HOSPITAL | Age: 79
DRG: 480 | End: 2019-10-28
Payer: MEDICARE

## 2019-10-28 PROBLEM — R52 PAIN: Status: ACTIVE | Noted: 2019-10-28

## 2019-10-28 LAB
25(OH)D3+25(OH)D2 SERPL-MCNC: 17 NG/ML (ref 30–96)
ANION GAP SERPL CALC-SCNC: 8 MMOL/L (ref 8–16)
BASOPHILS # BLD AUTO: 0.01 K/UL (ref 0–0.2)
BASOPHILS # BLD AUTO: 0.04 K/UL (ref 0–0.2)
BASOPHILS NFR BLD: 0.1 % (ref 0–1.9)
BASOPHILS NFR BLD: 0.4 % (ref 0–1.9)
BUN SERPL-MCNC: 18 MG/DL (ref 8–23)
CALCIUM SERPL-MCNC: 9.2 MG/DL (ref 8.7–10.5)
CHLORIDE SERPL-SCNC: 105 MMOL/L (ref 95–110)
CO2 SERPL-SCNC: 26 MMOL/L (ref 23–29)
CREAT SERPL-MCNC: 3 MG/DL (ref 0.5–1.4)
DIFFERENTIAL METHOD: ABNORMAL
DIFFERENTIAL METHOD: ABNORMAL
EOSINOPHIL # BLD AUTO: 0 K/UL (ref 0–0.5)
EOSINOPHIL # BLD AUTO: 0.1 K/UL (ref 0–0.5)
EOSINOPHIL NFR BLD: 0.3 % (ref 0–8)
EOSINOPHIL NFR BLD: 1.1 % (ref 0–8)
ERYTHROCYTE [DISTWIDTH] IN BLOOD BY AUTOMATED COUNT: 16 % (ref 11.5–14.5)
ERYTHROCYTE [DISTWIDTH] IN BLOOD BY AUTOMATED COUNT: 16.1 % (ref 11.5–14.5)
EST. GFR  (AFRICAN AMERICAN): 16.6 ML/MIN/1.73 M^2
EST. GFR  (NON AFRICAN AMERICAN): 14.4 ML/MIN/1.73 M^2
GLUCOSE SERPL-MCNC: 131 MG/DL (ref 70–110)
HCT VFR BLD AUTO: 27.5 % (ref 37–48.5)
HCT VFR BLD AUTO: 32 % (ref 37–48.5)
HGB BLD-MCNC: 7.9 G/DL (ref 12–16)
HGB BLD-MCNC: 9.4 G/DL (ref 12–16)
IMM GRANULOCYTES # BLD AUTO: 0.04 K/UL (ref 0–0.04)
IMM GRANULOCYTES # BLD AUTO: 0.06 K/UL (ref 0–0.04)
IMM GRANULOCYTES NFR BLD AUTO: 0.4 % (ref 0–0.5)
IMM GRANULOCYTES NFR BLD AUTO: 0.8 % (ref 0–0.5)
LYMPHOCYTES # BLD AUTO: 0.2 K/UL (ref 1–4.8)
LYMPHOCYTES # BLD AUTO: 0.7 K/UL (ref 1–4.8)
LYMPHOCYTES NFR BLD: 3 % (ref 18–48)
LYMPHOCYTES NFR BLD: 7.3 % (ref 18–48)
MCH RBC QN AUTO: 29.5 PG (ref 27–31)
MCH RBC QN AUTO: 29.6 PG (ref 27–31)
MCHC RBC AUTO-ENTMCNC: 28.7 G/DL (ref 32–36)
MCHC RBC AUTO-ENTMCNC: 29.4 G/DL (ref 32–36)
MCV RBC AUTO: 100 FL (ref 82–98)
MCV RBC AUTO: 103 FL (ref 82–98)
MONOCYTES # BLD AUTO: 0.1 K/UL (ref 0.3–1)
MONOCYTES # BLD AUTO: 0.7 K/UL (ref 0.3–1)
MONOCYTES NFR BLD: 1.8 % (ref 4–15)
MONOCYTES NFR BLD: 7.4 % (ref 4–15)
NEUTROPHILS # BLD AUTO: 6.9 K/UL (ref 1.8–7.7)
NEUTROPHILS # BLD AUTO: 7.4 K/UL (ref 1.8–7.7)
NEUTROPHILS NFR BLD: 83.4 % (ref 38–73)
NEUTROPHILS NFR BLD: 94 % (ref 38–73)
NRBC BLD-RTO: 0 /100 WBC
NRBC BLD-RTO: 0 /100 WBC
PHOSPHATE SERPL-MCNC: 3.6 MG/DL (ref 2.7–4.5)
PLATELET # BLD AUTO: 59 K/UL (ref 150–350)
PLATELET # BLD AUTO: 79 K/UL (ref 150–350)
PMV BLD AUTO: 13.6 FL (ref 9.2–12.9)
PMV BLD AUTO: 13.8 FL (ref 9.2–12.9)
POCT GLUCOSE: 100 MG/DL (ref 70–110)
POCT GLUCOSE: 140 MG/DL (ref 70–110)
POTASSIUM SERPL-SCNC: 4.3 MMOL/L (ref 3.5–5.1)
RBC # BLD AUTO: 2.67 M/UL (ref 4–5.4)
RBC # BLD AUTO: 3.19 M/UL (ref 4–5.4)
SODIUM SERPL-SCNC: 139 MMOL/L (ref 136–145)
WBC # BLD AUTO: 7.29 K/UL (ref 3.9–12.7)
WBC # BLD AUTO: 8.9 K/UL (ref 3.9–12.7)

## 2019-10-28 PROCEDURE — 76942 ECHO GUIDE FOR BIOPSY: CPT | Mod: 26,,, | Performed by: ANESTHESIOLOGY

## 2019-10-28 PROCEDURE — 27000221 HC OXYGEN, UP TO 24 HOURS

## 2019-10-28 PROCEDURE — 71000039 HC RECOVERY, EACH ADD'L HOUR: Performed by: ORTHOPAEDIC SURGERY

## 2019-10-28 PROCEDURE — 64448 SIFI CATHETER: ICD-10-PCS | Mod: 59,LT,, | Performed by: ANESTHESIOLOGY

## 2019-10-28 PROCEDURE — 25000003 PHARM REV CODE 250: Performed by: ANESTHESIOLOGY

## 2019-10-28 PROCEDURE — C1713 ANCHOR/SCREW BN/BN,TIS/BN: HCPCS | Performed by: ORTHOPAEDIC SURGERY

## 2019-10-28 PROCEDURE — 36000710: Performed by: ORTHOPAEDIC SURGERY

## 2019-10-28 PROCEDURE — 63600175 PHARM REV CODE 636 W HCPCS: Performed by: STUDENT IN AN ORGANIZED HEALTH CARE EDUCATION/TRAINING PROGRAM

## 2019-10-28 PROCEDURE — 27245 TREAT THIGH FRACTURE: CPT | Mod: LT,GC,, | Performed by: ORTHOPAEDIC SURGERY

## 2019-10-28 PROCEDURE — 85025 COMPLETE CBC W/AUTO DIFF WBC: CPT

## 2019-10-28 PROCEDURE — 27245 PR OPEN FIX INTER/SUBTROCH FX,IMPLNT: ICD-10-PCS | Mod: LT,GC,, | Performed by: ORTHOPAEDIC SURGERY

## 2019-10-28 PROCEDURE — D9220A PRA ANESTHESIA: ICD-10-PCS | Mod: ANES,,, | Performed by: ANESTHESIOLOGY

## 2019-10-28 PROCEDURE — 37000008 HC ANESTHESIA 1ST 15 MINUTES: Performed by: ORTHOPAEDIC SURGERY

## 2019-10-28 PROCEDURE — 76942 SIFI CATHETER: ICD-10-PCS | Mod: 26,,, | Performed by: ANESTHESIOLOGY

## 2019-10-28 PROCEDURE — 71000033 HC RECOVERY, INTIAL HOUR: Performed by: ORTHOPAEDIC SURGERY

## 2019-10-28 PROCEDURE — 64448 NJX AA&/STRD FEM NRV NFS IMG: CPT | Mod: 59,LT,, | Performed by: ANESTHESIOLOGY

## 2019-10-28 PROCEDURE — 82306 VITAMIN D 25 HYDROXY: CPT

## 2019-10-28 PROCEDURE — 64448 NJX AA&/STRD FEM NRV NFS IMG: CPT | Performed by: STUDENT IN AN ORGANIZED HEALTH CARE EDUCATION/TRAINING PROGRAM

## 2019-10-28 PROCEDURE — 99223 1ST HOSP IP/OBS HIGH 75: CPT | Mod: 57,GC,, | Performed by: ORTHOPAEDIC SURGERY

## 2019-10-28 PROCEDURE — 82962 GLUCOSE BLOOD TEST: CPT | Performed by: ORTHOPAEDIC SURGERY

## 2019-10-28 PROCEDURE — 25000003 PHARM REV CODE 250: Performed by: NURSE ANESTHETIST, CERTIFIED REGISTERED

## 2019-10-28 PROCEDURE — 99223 PR INITIAL HOSPITAL CARE,LEVL III: ICD-10-PCS | Mod: 57,GC,, | Performed by: ORTHOPAEDIC SURGERY

## 2019-10-28 PROCEDURE — D9220A PRA ANESTHESIA: Mod: ANES,,, | Performed by: ANESTHESIOLOGY

## 2019-10-28 PROCEDURE — C1751 CATH, INF, PER/CENT/MIDLINE: HCPCS | Performed by: STUDENT IN AN ORGANIZED HEALTH CARE EDUCATION/TRAINING PROGRAM

## 2019-10-28 PROCEDURE — 99232 SBSQ HOSP IP/OBS MODERATE 35: CPT | Mod: ,,, | Performed by: INTERNAL MEDICINE

## 2019-10-28 PROCEDURE — 25000003 PHARM REV CODE 250: Performed by: INTERNAL MEDICINE

## 2019-10-28 PROCEDURE — 80048 BASIC METABOLIC PNL TOTAL CA: CPT

## 2019-10-28 PROCEDURE — 76942 ECHO GUIDE FOR BIOPSY: CPT | Performed by: STUDENT IN AN ORGANIZED HEALTH CARE EDUCATION/TRAINING PROGRAM

## 2019-10-28 PROCEDURE — 27201423 OPTIME MED/SURG SUP & DEVICES STERILE SUPPLY: Performed by: ORTHOPAEDIC SURGERY

## 2019-10-28 PROCEDURE — 94761 N-INVAS EAR/PLS OXIMETRY MLT: CPT

## 2019-10-28 PROCEDURE — D9220A PRA ANESTHESIA: ICD-10-PCS | Mod: CRNA,,, | Performed by: NURSE ANESTHETIST, CERTIFIED REGISTERED

## 2019-10-28 PROCEDURE — 63600175 PHARM REV CODE 636 W HCPCS: Performed by: INTERNAL MEDICINE

## 2019-10-28 PROCEDURE — 84100 ASSAY OF PHOSPHORUS: CPT

## 2019-10-28 PROCEDURE — 25000003 PHARM REV CODE 250: Performed by: STUDENT IN AN ORGANIZED HEALTH CARE EDUCATION/TRAINING PROGRAM

## 2019-10-28 PROCEDURE — 37000009 HC ANESTHESIA EA ADD 15 MINS: Performed by: ORTHOPAEDIC SURGERY

## 2019-10-28 PROCEDURE — C1769 GUIDE WIRE: HCPCS | Performed by: ORTHOPAEDIC SURGERY

## 2019-10-28 PROCEDURE — 80100014 HC HEMODIALYSIS 1:1

## 2019-10-28 PROCEDURE — 36000711: Performed by: ORTHOPAEDIC SURGERY

## 2019-10-28 PROCEDURE — 11000001 HC ACUTE MED/SURG PRIVATE ROOM

## 2019-10-28 PROCEDURE — 99232 PR SUBSEQUENT HOSPITAL CARE,LEVL II: ICD-10-PCS | Mod: ,,, | Performed by: INTERNAL MEDICINE

## 2019-10-28 PROCEDURE — 63600175 PHARM REV CODE 636 W HCPCS: Performed by: NURSE ANESTHETIST, CERTIFIED REGISTERED

## 2019-10-28 PROCEDURE — D9220A PRA ANESTHESIA: Mod: CRNA,,, | Performed by: NURSE ANESTHETIST, CERTIFIED REGISTERED

## 2019-10-28 DEVICE — SCREW LOCK T25 5.0X38MM: Type: IMPLANTABLE DEVICE | Site: FEMUR | Status: FUNCTIONAL

## 2019-10-28 DEVICE — NAIL IM CANN 130 DEG 11X400 L: Type: IMPLANTABLE DEVICE | Site: FEMUR | Status: FUNCTIONAL

## 2019-10-28 DEVICE — SCREW TFN ADVANCE 90MM: Type: IMPLANTABLE DEVICE | Site: FEMUR | Status: FUNCTIONAL

## 2019-10-28 RX ORDER — MUPIROCIN 20 MG/G
1 OINTMENT TOPICAL 2 TIMES DAILY
Status: DISCONTINUED | OUTPATIENT
Start: 2019-10-28 | End: 2019-10-31 | Stop reason: HOSPADM

## 2019-10-28 RX ORDER — MUPIROCIN 20 MG/G
OINTMENT TOPICAL
Status: DISCONTINUED | OUTPATIENT
Start: 2019-10-28 | End: 2019-10-28 | Stop reason: HOSPADM

## 2019-10-28 RX ORDER — ROCURONIUM BROMIDE 10 MG/ML
INJECTION, SOLUTION INTRAVENOUS
Status: DISCONTINUED | OUTPATIENT
Start: 2019-10-28 | End: 2019-10-28

## 2019-10-28 RX ORDER — MIDAZOLAM HYDROCHLORIDE 1 MG/ML
0.5 INJECTION INTRAMUSCULAR; INTRAVENOUS
Status: DISCONTINUED | OUTPATIENT
Start: 2019-10-28 | End: 2019-10-28 | Stop reason: HOSPADM

## 2019-10-28 RX ORDER — DEXAMETHASONE SODIUM PHOSPHATE 4 MG/ML
INJECTION, SOLUTION INTRA-ARTICULAR; INTRALESIONAL; INTRAMUSCULAR; INTRAVENOUS; SOFT TISSUE
Status: DISCONTINUED | OUTPATIENT
Start: 2019-10-28 | End: 2019-10-28

## 2019-10-28 RX ORDER — CEFAZOLIN SODIUM 1 G/3ML
2 INJECTION, POWDER, FOR SOLUTION INTRAMUSCULAR; INTRAVENOUS
Status: DISCONTINUED | OUTPATIENT
Start: 2019-10-28 | End: 2019-10-28

## 2019-10-28 RX ORDER — ROPIVACAINE HYDROCHLORIDE 2 MG/ML
2 INJECTION, SOLUTION EPIDURAL; INFILTRATION; PERINEURAL CONTINUOUS
Status: DISCONTINUED | OUTPATIENT
Start: 2019-10-28 | End: 2019-10-31

## 2019-10-28 RX ORDER — FENTANYL CITRATE 50 UG/ML
25 INJECTION, SOLUTION INTRAMUSCULAR; INTRAVENOUS EVERY 5 MIN PRN
Status: DISCONTINUED | OUTPATIENT
Start: 2019-10-28 | End: 2019-10-28 | Stop reason: HOSPADM

## 2019-10-28 RX ORDER — CEFAZOLIN SODIUM 1 G/3ML
1 INJECTION, POWDER, FOR SOLUTION INTRAMUSCULAR; INTRAVENOUS
Status: COMPLETED | OUTPATIENT
Start: 2019-10-28 | End: 2019-10-28

## 2019-10-28 RX ORDER — LIDOCAINE HYDROCHLORIDE 10 MG/ML
1 INJECTION, SOLUTION EPIDURAL; INFILTRATION; INTRACAUDAL; PERINEURAL
Status: DISCONTINUED | OUTPATIENT
Start: 2019-10-28 | End: 2019-10-28 | Stop reason: HOSPADM

## 2019-10-28 RX ORDER — EPHEDRINE SULFATE 50 MG/ML
INJECTION, SOLUTION INTRAVENOUS
Status: DISCONTINUED | OUTPATIENT
Start: 2019-10-28 | End: 2019-10-28

## 2019-10-28 RX ORDER — LIDOCAINE HCL/PF 100 MG/5ML
SYRINGE (ML) INTRAVENOUS
Status: DISCONTINUED | OUTPATIENT
Start: 2019-10-28 | End: 2019-10-28

## 2019-10-28 RX ORDER — SODIUM CHLORIDE 0.9 % (FLUSH) 0.9 %
10 SYRINGE (ML) INJECTION
Status: DISCONTINUED | OUTPATIENT
Start: 2019-10-28 | End: 2019-10-31 | Stop reason: HOSPADM

## 2019-10-28 RX ORDER — SODIUM CHLORIDE 9 MG/ML
INJECTION, SOLUTION INTRAVENOUS CONTINUOUS PRN
Status: DISCONTINUED | OUTPATIENT
Start: 2019-10-28 | End: 2019-10-28

## 2019-10-28 RX ORDER — CEFAZOLIN SODIUM 1 G/3ML
2 INJECTION, POWDER, FOR SOLUTION INTRAMUSCULAR; INTRAVENOUS
Status: DISCONTINUED | OUTPATIENT
Start: 2019-10-28 | End: 2019-10-28 | Stop reason: HOSPADM

## 2019-10-28 RX ORDER — ONDANSETRON 2 MG/ML
INJECTION INTRAMUSCULAR; INTRAVENOUS
Status: DISCONTINUED | OUTPATIENT
Start: 2019-10-28 | End: 2019-10-28

## 2019-10-28 RX ORDER — ACETAMINOPHEN 500 MG
1000 TABLET ORAL EVERY 6 HOURS
Status: COMPLETED | OUTPATIENT
Start: 2019-10-28 | End: 2019-10-30

## 2019-10-28 RX ORDER — FENTANYL CITRATE 50 UG/ML
INJECTION, SOLUTION INTRAMUSCULAR; INTRAVENOUS
Status: DISCONTINUED | OUTPATIENT
Start: 2019-10-28 | End: 2019-10-28

## 2019-10-28 RX ORDER — METHOCARBAMOL 500 MG/1
1000 TABLET, FILM COATED ORAL EVERY 6 HOURS PRN
Status: DISCONTINUED | OUTPATIENT
Start: 2019-10-28 | End: 2019-10-30

## 2019-10-28 RX ORDER — BUPIVACAINE HYDROCHLORIDE AND EPINEPHRINE 2.5; 5 MG/ML; UG/ML
INJECTION, SOLUTION EPIDURAL; INFILTRATION; INTRACAUDAL; PERINEURAL
Status: COMPLETED | OUTPATIENT
Start: 2019-10-28 | End: 2019-10-28

## 2019-10-28 RX ORDER — FENTANYL CITRATE 50 UG/ML
25 INJECTION, SOLUTION INTRAMUSCULAR; INTRAVENOUS EVERY 5 MIN PRN
Status: COMPLETED | OUTPATIENT
Start: 2019-10-28 | End: 2019-10-28

## 2019-10-28 RX ORDER — ETOMIDATE 2 MG/ML
INJECTION INTRAVENOUS
Status: DISCONTINUED | OUTPATIENT
Start: 2019-10-28 | End: 2019-10-28

## 2019-10-28 RX ORDER — MUPIROCIN 20 MG/G
1 OINTMENT TOPICAL
Status: DISCONTINUED | OUTPATIENT
Start: 2019-10-28 | End: 2019-10-28 | Stop reason: SDUPTHER

## 2019-10-28 RX ORDER — ACETAMINOPHEN 10 MG/ML
1000 INJECTION, SOLUTION INTRAVENOUS ONCE
Status: COMPLETED | OUTPATIENT
Start: 2019-10-28 | End: 2019-10-28

## 2019-10-28 RX ORDER — CEFAZOLIN SODIUM 1 G/3ML
2 INJECTION, POWDER, FOR SOLUTION INTRAMUSCULAR; INTRAVENOUS
Status: DISCONTINUED | OUTPATIENT
Start: 2019-10-28 | End: 2019-10-28 | Stop reason: SDUPTHER

## 2019-10-28 RX ORDER — HEPARIN SODIUM 5000 [USP'U]/ML
5000 INJECTION, SOLUTION INTRAVENOUS; SUBCUTANEOUS EVERY 8 HOURS
Status: DISCONTINUED | OUTPATIENT
Start: 2019-10-29 | End: 2019-10-31 | Stop reason: HOSPADM

## 2019-10-28 RX ADMIN — ONDANSETRON 4 MG: 2 INJECTION INTRAMUSCULAR; INTRAVENOUS at 07:10

## 2019-10-28 RX ADMIN — LEVETIRACETAM 500 MG: 500 TABLET, FILM COATED ORAL at 10:10

## 2019-10-28 RX ADMIN — FENTANYL CITRATE 12.5 MCG: 50 INJECTION INTRAMUSCULAR; INTRAVENOUS at 10:10

## 2019-10-28 RX ADMIN — MUPIROCIN: 20 OINTMENT TOPICAL at 09:10

## 2019-10-28 RX ADMIN — FENTANYL CITRATE 25 MCG: 50 INJECTION INTRAMUSCULAR; INTRAVENOUS at 10:10

## 2019-10-28 RX ADMIN — TRANEXAMIC ACID 500 MG: 100 INJECTION, SOLUTION INTRAVENOUS at 11:10

## 2019-10-28 RX ADMIN — SEVELAMER CARBONATE 800 MG: 800 TABLET, FILM COATED ORAL at 06:10

## 2019-10-28 RX ADMIN — CEFAZOLIN 1 G: 1 INJECTION, POWDER, FOR SOLUTION INTRAMUSCULAR; INTRAVENOUS at 10:10

## 2019-10-28 RX ADMIN — ACETAMINOPHEN 1000 MG: 500 TABLET ORAL at 10:10

## 2019-10-28 RX ADMIN — DEXAMETHASONE SODIUM PHOSPHATE 8 MG: 4 INJECTION, SOLUTION INTRAMUSCULAR; INTRAVENOUS at 11:10

## 2019-10-28 RX ADMIN — LIDOCAINE HYDROCHLORIDE 80 MG: 20 INJECTION, SOLUTION INTRAVENOUS at 10:10

## 2019-10-28 RX ADMIN — EPHEDRINE SULFATE 10 MG: 50 INJECTION, SOLUTION INTRAMUSCULAR; INTRAVENOUS; SUBCUTANEOUS at 12:10

## 2019-10-28 RX ADMIN — OXYCODONE HYDROCHLORIDE 5 MG: 5 TABLET ORAL at 08:10

## 2019-10-28 RX ADMIN — SUGAMMADEX 200 MG: 100 INJECTION, SOLUTION INTRAVENOUS at 12:10

## 2019-10-28 RX ADMIN — ONDANSETRON 4 MG: 2 INJECTION INTRAMUSCULAR; INTRAVENOUS at 12:10

## 2019-10-28 RX ADMIN — ROPIVACAINE HYDROCHLORIDE 2 ML/HR: 2 INJECTION, SOLUTION EPIDURAL; INFILTRATION at 01:10

## 2019-10-28 RX ADMIN — ACETAMINOPHEN 1000 MG: 10 INJECTION, SOLUTION INTRAVENOUS at 03:10

## 2019-10-28 RX ADMIN — HYDRALAZINE HYDROCHLORIDE 100 MG: 50 TABLET ORAL at 03:10

## 2019-10-28 RX ADMIN — EPHEDRINE SULFATE 5 MG: 50 INJECTION, SOLUTION INTRAMUSCULAR; INTRAVENOUS; SUBCUTANEOUS at 11:10

## 2019-10-28 RX ADMIN — LOSARTAN POTASSIUM 50 MG: 25 TABLET ORAL at 05:10

## 2019-10-28 RX ADMIN — TRANEXAMIC ACID 500 MG: 100 INJECTION, SOLUTION INTRAVENOUS at 12:10

## 2019-10-28 RX ADMIN — HYDRALAZINE HYDROCHLORIDE 100 MG: 50 TABLET ORAL at 10:10

## 2019-10-28 RX ADMIN — ETOMIDATE 12 MG: 2 INJECTION, SOLUTION INTRAVENOUS at 10:10

## 2019-10-28 RX ADMIN — CEFAZOLIN 2 G: 1 INJECTION, POWDER, FOR SOLUTION INTRAMUSCULAR; INTRAVENOUS at 01:10

## 2019-10-28 RX ADMIN — OXYCODONE HYDROCHLORIDE 5 MG: 5 TABLET ORAL at 05:10

## 2019-10-28 RX ADMIN — AMLODIPINE BESYLATE 10 MG: 10 TABLET ORAL at 05:10

## 2019-10-28 RX ADMIN — CEFTRIAXONE SODIUM 1 G: 1 INJECTION, POWDER, FOR SOLUTION INTRAMUSCULAR; INTRAVENOUS at 11:10

## 2019-10-28 RX ADMIN — ACETAMINOPHEN 1000 MG: 500 TABLET ORAL at 06:10

## 2019-10-28 RX ADMIN — ROCURONIUM BROMIDE 20 MG: 10 INJECTION, SOLUTION INTRAVENOUS at 11:10

## 2019-10-28 RX ADMIN — FENTANYL CITRATE 100 MCG: 50 INJECTION, SOLUTION INTRAMUSCULAR; INTRAVENOUS at 10:10

## 2019-10-28 RX ADMIN — BUPIVACAINE HYDROCHLORIDE AND EPINEPHRINE BITARTRATE 40 ML: 2.5; .0091 INJECTION, SOLUTION EPIDURAL; INFILTRATION; INTRACAUDAL; PERINEURAL at 10:10

## 2019-10-28 RX ADMIN — ROCURONIUM BROMIDE 30 MG: 10 INJECTION, SOLUTION INTRAVENOUS at 10:10

## 2019-10-28 RX ADMIN — LEVETIRACETAM 500 MG: 500 TABLET, FILM COATED ORAL at 08:10

## 2019-10-28 RX ADMIN — CLONIDINE HYDROCHLORIDE 0.1 MG: 0.1 TABLET ORAL at 10:10

## 2019-10-28 RX ADMIN — MUPIROCIN 1 G: 20 OINTMENT TOPICAL at 10:10

## 2019-10-28 RX ADMIN — CLONIDINE HYDROCHLORIDE 0.1 MG: 0.1 TABLET ORAL at 03:10

## 2019-10-28 RX ADMIN — ROPIVACAINE HYDROCHLORIDE 2 ML/HR: 2 INJECTION, SOLUTION EPIDURAL; INFILTRATION at 10:10

## 2019-10-28 RX ADMIN — SODIUM CHLORIDE: 0.9 INJECTION, SOLUTION INTRAVENOUS at 10:10

## 2019-10-28 NOTE — PROGRESS NOTES
[facility] Hospital Medicine  Progress Note    Patient Name: Tea Georges  MRN: [id number]  Patient Class: IP- Inpatient   Admission Date: 10/27/2019  Length of Stay: 1 days  Attending Physician: Janee Rolle MD  Primary Care Provider: Parth Posadas Ii, MD    [facility] Medicine Team: [facility]  Janee Rolle MD    Subjective:     Principal Problem:Closed 2-part intertrochanteric fracture of left femur        HPI:  79 y.o.female with ESRD on HD (MWF) via LUE fistula, renovascular HTN, severe aortic stenosis with moderate to severe pulmonary HTN, HFpEF, pulmonary emphysema with chronic hypoxic respiratory failure on home oxygen 2 liters at night prn, previous right MCA CVA in 2016, implantable loop recorder in place, Type 2 diabetes with ESRD not on home insulin, recent right SDH s/p evacuation on 6/23/2019, seizures on Keppra with last seizure in 6/2019 and chronic debility with recent stay at [facility] IP rehab in 7/2019 presented to the [facility] ER with complaint of left hip pain. Patient states pain is sharp/stabbing in the left groin, upper leg and is aggravated by any weight bearing and standing. She reports onset of symptoms was about 12 hour(s) after a fall while ambulating to bathroom at home. Patient reports that she was walking to bathroom last night at about 7:00 pm and tripped. Patient thinks she either tripped over oxygen on ground or her foot. Patient reports there is a lot of clutter on floor in the house. Patient states she fell backwards and landed on her left hip. Patient had severe 10/10 pain in left hip after fall and unable to get up from ground due to pain and granddaughter helped her up to chair. Patient did not think she broke her hip so took Ibuprofen and daughter gave her ointment to rub on the hip and it helped. About 3:00 am this morning, pain in left hip was worse and not improving so decision made to bring patient to ER to get evaluated.  Patient denies head trauma. Patient denies to loss of consciousness, denies syncope, denies chest pain, denies dizziness prior or after fall. X-ray obtained in ER revealed left intertrochanteric hip fracture. Orthopedics and [facility] medicine service consulted and patient to be admitted to the hospital to the [facility] service.     Prior to admission patient's functional mobility was independent with uses walker or can to ambulate for home distances. Patient does have history of gait or balance problems. Patient does not have history of previous falls or fractures. Patient does not have previous history of MI or cardiac stenting or cardiac surgery. Patient does have previous history of stroke in 2016. Patient does have previous history of compensated heart failure. Patient does have history of diabetes but is not insulin requiring. Patient does have history of advanced kidney disease with creatinine > 2. Patient currently lives with their family.      Overview/Hospital Course:  10/27: Patient admitted to [facility] Medicine Team H: Hip Fracture team and started on Hip Fracture Pathway with Orthopedic surgery consult for hip fracture. Patient was seen and evaluated by Orthopedic surgery who recommended operative repair of hip fracture. Nephrology consulted on admit and Patient dialyzed by Nephrology for 3 hours in room for pre-op optimization of volume status prior to surgery. Coreg (home med) not started on admit due to HR 50-55).  10/28: Patient was medically optimized prior to surgery with HD. Patient considered high risk surgical candidate due to multiple co-morbidities including severe aortic stenosis and risk and benefits of surgery discussed with patient who wished to proceed with operative repair of hip. Patient to go to OR today for CMN fixation by Dr. Torey Aguilar. Perineural pain catheter placed pre-op for pain control.       Interval History: Coreg discontinued overnight as patient with HR in 50-55 range but  asymptomatic. Patient dialyzed for 3 hours in room last night by Nephrology in preparation for left hip surgery early this am. Patient currently in holding area awaiting surgery and Anesthesia placing perineural pain catheter. Patient reporting 7/10 pain to left hip pre-op.     Review of Systems   Constitutional: Negative for chills and fever.   Respiratory: Negative for cough and shortness of breath.    Cardiovascular: Negative for chest pain, palpitations and leg swelling.   Gastrointestinal: Negative for abdominal pain, constipation, nausea and vomiting.   Musculoskeletal: Positive for arthralgias (Left hip ). Negative for back pain.   Skin: Negative for rash.   Neurological: Negative for dizziness and light-headedness.   Psychiatric/Behavioral: Negative for agitation, confusion and hallucinations.     Objective:     Vital Signs (Most Recent):  Temp: 98 °F (36.7 °C) (10/28/19 0959)  Pulse: 64 (10/28/19 1025)  Resp: 14 (10/28/19 1025)  BP: 171/76 (10/28/19 1025)  SpO2: 100 % (10/28/19 1015) Vital Signs (24h Range):  Temp:  [96.1 °F (35.6 °C)-98 °F (36.7 °C)] 98 °F (36.7 °C)  Pulse:  [50-66] 64  Resp:  [12-18] 14  SpO2:  [90 %-100 %] 100 %  BP: (103-185)/(52-77) 171/76     Weight: 50.8 kg (111 lb 15.9 oz)  Body mass index is 19.84 kg/m².    Intake/Output Summary (Last 24 hours) at 10/28/2019 1057  Last data filed at 10/28/2019 0555  Gross per 24 hour   Intake 860 ml   Output 1510 ml   Net -650 ml      Physical Exam   Constitutional: She is oriented to person, place, and time. No distress.   Thin and frail, elderly female    HENT:   Mouth/Throat: Oropharynx is clear and moist.   Eyes:   Right eye cloudy and not reactive to light. 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    Neck: Neck supple. No JVD present.   Cardiovascular: Normal rate and regular rhythm. Exam reveals no gallop and no friction rub.   Murmur heard.   Crescendo decrescendo systolic murmur is present with a grade of 4/6.  Murmur heard throughout  precordium. Harsh systolic murmur.   Pulmonary/Chest: Effort normal and breath sounds normal. No respiratory distress. She has no wheezes.   Abdominal: Soft. Bowel sounds are normal. She exhibits no distension. There is no tenderness. There is no guarding.   Musculoskeletal: She exhibits no edema.   Left AV fistula in place with good thrill and bruit   Neurological: She is alert and oriented to person, place, and time.   Skin: Skin is warm. No erythema.   Psychiatric: She has a normal mood and affect. Her behavior is normal. Thought content normal.   Nursing note and vitals reviewed.      Significant Labs:   CBC:   Recent Labs   Lab 10/27/19  0543 10/28/19  0944   WBC 9.56 8.90   HGB 9.6* 9.4*   HCT 31.6* 32.0*   PLT 76* 79*     CMP:   Recent Labs   Lab 10/27/19  0543 10/28/19  0944    139   K 4.0 4.3    105   CO2 24 26   * 131*   BUN 51* 18   CREATININE 6.0* 3.0*   CALCIUM 9.3 9.2   PROT 7.2  --    ALBUMIN 3.5  --    BILITOT 0.5  --    ALKPHOS 103  --    AST 9*  --    ALT 6*  --    ANIONGAP 14 8   EGFRNONAA 6.2* 14.4*     Magnesium:   Recent Labs   Lab 10/27/19  0543   MG 2.0     Lab Results   Component Value Date    [id number] 17 (L) 10/28/2019       Blood Sugars (AccuCheck):  Recent Labs     10/27/19  1203 10/27/19  1801 10/28/19  0744   POCTGLUCOSE 164* 121* 100        Significant Imaging: I have reviewed all pertinent imaging results/findings within the past 24 hours.      Assessment/Plan:      * Closed 2-part intertrochanteric fracture of left femur - CMN fixation 10/28/19  Patient is day of surgery for surgical repair of left hip fracture. Patient reports significant pain in left hip. Patient to go to OR today by Orthopedics for operative repair of hip fracture.   · Patient will start PT/OT in the am tomorrow for gait training and strengthening and restoration of ADL's.   · Patient is NWB: left lower extremity, posterior hip precautions as per Orthopedic recommendations post-op.   · Plan  is to start Heparin 5000 units subcutaneous 3 times daily post-op and continue MADDY/SCDs for DVT prophylaxis for now. Cannot use Lovenox as she is a dialysis patient with ESRD and Lovenox is contra-indicated.   · Perineural pain catheter in place with continuous Ropivacaine for pain control and being managed by Anesthesia Pain Service.   · Continue Iraheta to gravity and remove on POD #1.   · Preoperative assessment: Patient is at high risk for perioperative cardiopulmonary complications based on the 2014 ACC/AHA Guideline on Perioperative Cardiovascular Evaluation and Management of patients undergoing noncardiac surgery. Patient has underlying active cardiopulmonary condition(s) of severe valvular heart disease of aortic stenosis. Patient with RCRI cardiac risk score of 3 with 15.0 % 30-day risk of death, MI, or cardiac arrest. Discussion made with patient about significant risk of patient undergoing surgery. Despite patient's high risk, the benefit of proceeding with surgery outweighs the risk of not proceeding with surgery at this time. Recommend proceeding to surgery as planned at high risk with the following recommendations: Need close hemodynamically monitoring during surgery. Avoid excess fluid resuscitation during surgery due to risk of cardiac decompensation due to severe AS and important for patient to be euvolemic prior to surgery. Avoid nitrates.       Complicated UTI (urinary tract infection)  · Present on admit.   · Patient with significant UTI on admit but no signs of sepsis or systemic infection so okay to proceed with left hip surgery.   · Will continue patient on IV Rocephin 1 gram daily to treat (patient on day #2). Urine culture sent and results pending.   · Follow-up urine culture and sensitivities and will need 5-7 days of antibiotics as complicated UTI.       Type 2 diabetes mellitus with chronic kidney disease on chronic dialysis, without long-term current use of insulin  Good control in  hospital in past 24 hours.   · HgA1C 5.6% and at goal on admit. Patient on no meds at home to treat diabetes.   · Plan is to monitor POCT glucose 4 times a day with each meal and at bedtime and cover with Novolog low dose sliding scale insulin.   · 2000 calorie diabetic diet.   · Target pre-meal glucose goal is <140 with all random glucoses <180 in non-critically ill patient.    ESRD on hemodialysis  · Chronic and controlled. Nephrology consulted to manage HD while patient in hospital. Patient dialyzed on 10/27 by Nephrology for pre-op optimization prior to surgery.   · Left arm AV fistula in place and has good thrill and bruit. Patient [facility]yzes [facility] as outpatient.     Renovascular hypertension  · Patient's blood pressure is relatively controlled here in the hospital over past 24 hours. BP mildly elevated this am, 10/28 but likely related to pain.  · Goal for blood pressure is SBP < 140 and DBP < 90 as patient > or = 60 years of age and patient is diabetic and has chronic kidney disease based on JNC 8 guidelines.   · Plan to continue home regimen to treat of Clonidine 0.1 mg po TID, Norvasc 10 mg po daily, Hydralazine 100 mg po TID and Losartan 50 mg po daily while patient is hospitalized. Home medication of Coreg on hold due to bradycardia.   · Plan is to monitor patient's blood pressure routinely while patient is hospitalized.     Severe aortic valve stenosis  Pulmonary hypertension due to aortic valve disease  · Patient currently euvolemic.   · Patient with known severe aortic stenosis but asymptomatic. No specific treatment needed at this time. Patient is high risk for surgery due to presence of severe AS and moderate pulmonary HTN.   · Patient needs to follow-up with Interventional Cardiology as outpatient for further evaluation of her aortic stenosis to see if candidate for TAVR.   · Last 2D echo done on 5/22/2019 so no need to repeat prior to this surgery and showed:   · Low normal left ventricular  systolic function. The estimated ejection fraction is 53%  · Grade II (moderate) left ventricular diastolic dysfunction consistent with pseudonormalization.  · Severe left atrial enlargement.  · Moderate right ventricular enlargement.  · Normal right ventricular systolic function.  · Severe aortic valve stenosis.  · Aortic valve area is 0.69 cm2; peak velocity is 4.58 m/s; mean gradient is 55.02 mmHg.  · Moderate mitral sclerosis.  · Mild-to-moderate mitral regurgitation.  · Moderate to severe tricuspid regurgitation.  · The estimated PA systolic pressure is 77 mm Hg.      Pulmonary emphysema  Chronic respiratory failure with hypoxia  · Controlled. Patient with no signs of acute exacerbation.   · Will continue Breo 1 puff daily to treat COPD.   · Continue oxygen as needed with goal of oxygen sats > 88%.    · Encourage IS post op to help prevent pulmonary complications in hospital.    Thrombocytopenia, unspecified  Chronic and controlled. Platelet count 76,000 on admit close to baseline and 79,000 on 10/28. Patient with no active bleeding and no platelet transfusion needed. Monitor with daily CBC in hospital.       Anemia in ESRD (end-stage renal disease)  Chronic and controlled. Patient at baseline with Hgb 9.6 on 10/27 and 9.4 on 10/28. Patient on Epogen as outpatient with HD and will continue in hospital to treat. Monitor daily CBC.       Hyperparathyroidism, secondary renal  Chronic and controlled. Continue Renvela with meals to treat.     (HFpEF) heart failure with preserved ejection fraction  Chronic and controlled. Patient with no obvious signs of volume overload on exam. Continue and Losartan to treat chronic heart failure. Coreg on hold due to bradycardia.       Vitamin D deficiency  Patient on Vitamin D 50,000 units po weekly as outpatient and will continue in hospital. Vit D on this admit 17 and patient still with moderate deficiency and need to continue replacement therapy.       Physical  debility  Blindness of right eye  Patient reports at baseline uses cane, walker and wheelchair at times and reports decreased activity after recent brain surgery in 6/2019. Patient did have IP rehab stay at [facility] in 7/2019 and noted improvement in ambulation after IP Rehab stay. Consult PT/OT post-op to assess function but will likely require IP Rehab after left hip surgery.   [facility] IP [facility]hab consult placed.       VTE Risk Mitigation (From admission, onward)         Ordered     Place MADDY hose  Until discontinued      10/27/19 0951     IP VTE HIGH RISK PATIENT  Once      10/27/19 0951     Place sequential compression device  Until discontinued      10/27/19 0951                Discharge Disposition: Patient going to OR today for operative repair of left hip fracture. Would anticipate patient will need [facility] IP Rehab placement on hospital discharge pending medical stability.         Janee Rolle MD  Department of Hospital Medicine   [facility]

## 2019-10-28 NOTE — ASSESSMENT & PLAN NOTE
Patient is day of surgery for surgical repair of left hip fracture. Patient reports significant pain in left hip. Patient to go to OR today by Orthopedics for operative repair of hip fracture.   · Patient will start PT/OT in the am tomorrow for gait training and strengthening and restoration of ADL's.   · Patient is NWB: left lower extremity, posterior hip precautions as per Orthopedic recommendations post-op.   · Plan is to start Heparin 5000 units subcutaneous 3 times daily post-op and continue MADDY/SCDs for DVT prophylaxis for now. Cannot use Lovenox as she is a dialysis patient with ESRD and Lovenox is contra-indicated.   · Perineural pain catheter in place with continuous Ropivacaine for pain control and being managed by Anesthesia Pain Service.   · Continue Iraheta to gravity and remove on POD #1.   · Preoperative assessment: Patient is at high risk for perioperative cardiopulmonary complications based on the 2014 ACC/AHA Guideline on Perioperative Cardiovascular Evaluation and Management of patients undergoing noncardiac surgery. Patient has underlying active cardiopulmonary condition(s) of severe valvular heart disease of aortic stenosis. Patient with RCRI cardiac risk score of 3 with 15.0 % 30-day risk of death, MI, or cardiac arrest. Discussion made with patient about significant risk of patient undergoing surgery. Despite patient's high risk, the benefit of proceeding with surgery outweighs the risk of not proceeding with surgery at this time. Recommend proceeding to surgery as planned at high risk with the following recommendations: Need close hemodynamically monitoring during surgery. Avoid excess fluid resuscitation during surgery due to risk of cardiac decompensation due to severe AS and important for patient to be euvolemic prior to surgery. Avoid nitrates.

## 2019-10-28 NOTE — PROGRESS NOTES
Reported to floor nurse and also waiting on patient transport with ticket to ride and called telmetry room to let them know patient on monitor going back to her room.

## 2019-10-28 NOTE — PROGRESS NOTES
"Spoke to Fe in [facility] regarding patients loop recorder, Fe states "nothing needs to be done."  "

## 2019-10-28 NOTE — ASSESSMENT & PLAN NOTE
Pulmonary hypertension due to aortic valve disease  · Patient currently euvolemic.   · Patient with known severe aortic stenosis but asymptomatic. No specific treatment needed at this time. Patient is high risk for surgery due to presence of severe AS and moderate pulmonary HTN.   · Patient needs to follow-up with Interventional Cardiology as outpatient for further evaluation of her aortic stenosis to see if candidate for TAVR.   · Last 2D echo done on 5/22/2019 so no need to repeat prior to this surgery and showed:   · Low normal left ventricular systolic function. The estimated ejection fraction is 53%  · Grade II (moderate) left ventricular diastolic dysfunction consistent with pseudonormalization.  · Severe left atrial enlargement.  · Moderate right ventricular enlargement.  · Normal right ventricular systolic function.  · Severe aortic valve stenosis.  · Aortic valve area is 0.69 cm2; peak velocity is 4.58 m/s; mean gradient is 55.02 mmHg.  · Moderate mitral sclerosis.  · Mild-to-moderate mitral regurgitation.  · Moderate to severe tricuspid regurgitation.  · The estimated PA systolic pressure is 77 mm Hg.

## 2019-10-28 NOTE — ASSESSMENT & PLAN NOTE
Tea Georges is a 79 y.o. female with Left intertrochanteric fracture, closed, NVI. Plan for CMN today in OR.    -Admitted to medicine hip fracture service, needs dialysis preoperatively.   -NPO  -Pain control per primary  -Marked, booked, and consented for surgery  -PT/OT: Bed rest  -DVT PPx: Hold anticoagulation  -Abx: Preop abx ordered  -Labs:  -Haley: In place, haley care  -Iv: ordered for contralateral arm

## 2019-10-28 NOTE — ASSESSMENT & PLAN NOTE
· Present on admit.   · Patient with significant UTI on admit but no signs of sepsis or systemic infection so okay to proceed with left hip surgery.   · Will continue patient on IV Rocephin 1 gram daily to treat (patient on day #2). Urine culture sent and results pending.   · Follow-up urine culture and sensitivities and will need 5-7 days of antibiotics as complicated UTI.

## 2019-10-28 NOTE — ANESTHESIA PROCEDURE NOTES
SIFI Catheter     Patient location during procedure: pre-op   Block not for primary anesthetic.  Reason for block: at surgeon's request and post-op pain management   Post-op Pain Location: Left leg pain   Start time: 10/28/2019 10:00 AM  Timeout: 10/28/2019 9:59 AM   End time: 10/28/2019 10:15 AM    Staffing  Authorizing Provider: Nida Patterson MD  Performing Provider: Phillip Srivastava MD    Preanesthetic Checklist  Completed: patient identified, site marked, surgical consent, pre-op evaluation, timeout performed, IV checked, risks and benefits discussed and monitors and equipment checked  Peripheral Block  Patient position: supine  Prep: ChloraPrep and site prepped and draped  Patient monitoring: heart rate, cardiac monitor, continuous pulse ox, continuous capnometry and frequent blood pressure checks  Block type: fascia iliaca (Suprainguinal fascia iliaca)  Laterality: left  Injection technique: continuous  Needle  Needle type: Tuohy   Needle gauge: 17 G  Needle length: 3.5 in  Needle localization: anatomical landmarks and ultrasound guidance  Catheter type: spring wound  Catheter size: 19 G  Test dose: lidocaine 1.5% with Epi 1-to-200,000 and negative   -ultrasound image captured on disc.  Assessment  Injection assessment: negative aspiration, negative parasthesia and local visualized surrounding nerve  Paresthesia pain: none  Heart rate change: no  Slow fractionated injection: yes  Additional Notes  VSS.  D[facility] RN monitoring vitals throughout procedure.  Patient tolerated procedure well.

## 2019-10-28 NOTE — NURSING
Spoke with MD on call KOFI Scott.Given update on pt vitals. Received ok to hold Carvedilol and give all bp medications as ordered. Condition stable.

## 2019-10-28 NOTE — PLAN OF CARE
"CM to bedside - pt provided assessment info. Pt w/ DME in place including home o2, lives w/ dgtr and 2 grandchildren. Pt is going to OR today and expected to work w/ therapy tmw. Pt will need some type on placement - CM placing PMR and initiating referral to [facility]. Dr Rolle updated CM stating that pt's h/h company, [facility] discharged pt d/t large dog that family would not restrain or put up. MD stated that chart review reflected that h/h was placing a call to EPS b/c pt has no electricity and has a generator in place.     CM provided patient anticipated GORDY which will be update by nursing staff. Patient was not provided a Blue "My Health Packet" for d/c planning and health tool. Patient verbalized understanding.    Ortho ABI PEDRO [id number]     10/28/19 1021   Discharge Assessment   Assessment Type Discharge Planning Assessment   Confirmed/corrected address and phone number on facesheet? Yes   Expected Length of Stay (days) 5   Communicated expected length of stay with patient/caregiver yes   Prior to hospitilization cognitive status: Alert/Oriented   Prior to hospitalization functional status: Assistive Equipment;Needs Assistance   Current cognitive status: Alert/Oriented   Current Functional Status: Assistive Equipment;Needs Assistance   Facility Arrived From: N/A   Lives With child(li), adult;grandchild(li)   Is patient able to care for self after discharge? Unable to determine at this time (comments)   Who are your caregiver(s) and their [phone]   Patient's perception of discharge disposition rehab facility   Readmission Within the Last 30 Days no previous admission in last 30 days   Patient currently being followed by outpatient case management? No   Patient currently receives any other outside agency services? No  (recently discharged by [facility] h/h)   Equipment Currently Used at Home wheelchair;walker, rolling;bedside " commode;oxygen;nebulizer;glucometer   Do you have any problems affording any of your prescribed medications? No   Is the patient taking medications as prescribed? yes   Does the patient have transportation home? Yes  (pt may need assistance w/ ride home)   Transportation Anticipated public transportation  (RTA)   Dialysis Name and Scheduled days [facility] MWF @ 11am. approx 4 hrs   Does the patient receive services at the [facility]? No   Discharge Plan A Rehab   Discharge Plan B Skilled Nursing Facility   DME Needed Upon Discharge  other (see comments)  (TBD)   Patient/Family in Agreement with Plan yes

## 2019-10-28 NOTE — ASSESSMENT & PLAN NOTE
Good control in hospital in past 24 hours.   · HgA1C 5.6% and at goal on admit. Patient on no meds at home to treat diabetes.   · Plan is to monitor POCT glucose 4 times a day with each meal and at bedtime and cover with Novolog low dose sliding scale insulin.   · 2000 calorie diabetic diet.   · Target pre-meal glucose goal is <140 with all random glucoses <180 in non-critically ill patient.

## 2019-10-28 NOTE — PROGRESS NOTES
Priyanka RN on 5th floor questioned whether or not to give patient her morning blood pressure medications. Priyanka reported that patient had exaggerated response to BP meds yesterday, and had dialysis this morning. Called Dr. Chatman to ask which medications to give. Per Dr. Chatman patient is only to get BP medication if it is a beta-blocker. Dr. Chatman also asked that patient be given her morning dose of Keppra. Priyanka notified
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
## 2019-10-28 NOTE — PROGRESS NOTES
Patient admitted to recovery see [facility] for complete assessment pacu bcg's maintained safety measures verified patient very sedated at this time also called for xray and patient's blood sugar 150. No distress noted. Also called out to waiting room but no family at this time.

## 2019-10-28 NOTE — ASSESSMENT & PLAN NOTE
Patient on Vitamin D 50,000 units po weekly as outpatient and will continue in hospital. Vit D on this admit 17 and patient still with moderate deficiency and need to continue replacement therapy.

## 2019-10-28 NOTE — ASSESSMENT & PLAN NOTE
Chronic respiratory failure with hypoxia  · Controlled. Patient with no signs of acute exacerbation.   · Will continue Breo 1 puff daily to treat COPD.   · Continue oxygen as needed with goal of oxygen sats > 88%.    · Encourage IS post op to help prevent pulmonary complications in hospital.

## 2019-10-28 NOTE — TRANSFER OF CARE
"Anesthesia Transfer of Care Note    Patient: Tea Georges    Procedure(s) Performed: Procedure(s) (LRB):  INSERTION, INTRAMEDULLARY NELIDA, FEMUR,  Left , synthes, hana table, large C arm clock side (Left)    Patient location: PACU    Anesthesia Type: general    Transport from OR: Transported from OR on 6-10 L/min O2 by face mask with adequate spontaneous ventilation    Post pain: adequate analgesia    Post assessment: no apparent anesthetic complications and tolerated procedure well    Post vital signs: stable    Level of consciousness: responds to stimulation and awake    Nausea/Vomiting: no nausea/vomiting    Complications: none    Transfer of care protocol was followed      Last vitals:   Visit Vitals  BP (!) 171/76 (BP Location: Right arm, Patient Position: Lying)   Pulse 64   Temp 36.7 °C (98 °F) (Oral)   Resp 14   Ht 5' 3" (1.6 m)   Wt 50.8 kg (111 lb 15.9 oz)   LMP  (LMP Unknown)   SpO2 100%   Breastfeeding? No   BMI 19.84 kg/m²     "

## 2019-10-28 NOTE — OP NOTE
OPERATIVE NOTE    DOS:  10/28/2019    Preop Dx: Left intertrochanteric femur fracture    Postop Dx: Left intertrochanteric femur fracture    Procedure: Cephalomedullary nail fixation of left intertrochanteric femur fracture - 28032    Surgeon: Torey Aguilar M.D.    Asst:  Juan Venegas M.D    Anesthesia: GETA    EBL:  100cc    IVF:  300cc crystalloid    Implants:   Implant Name Type Inv. Item Serial No.  Lot No. LRB No. Used   NAIL IM  DEG 11X400 L - [id number]  NAIL IM  DEG 11X400 L  [employer] [id number] Left 1   WIRE GUIDE 3.2MM 400MM - [id number]  WIRE GUIDE 3.2MM 400MM  [id number]  Left 2   SCREW TFN ADVANCE 90MM - [id number]  SCREW TFN ADVANCE 90MM  [employer] [id number] Left 1   SCREW LOCK T25 5.0X38MM - [id number]  SCREW LOCK T25 5.0X38MM  SYNTHES [id number] Left 1         Specimens: None    Findings: Poor bone.  Stable pattern.  Good compression.  WBAT    Dispo:  To PACU extubated/stable      Indications for Procedure:    The patient is a 80 yo F who sustained a ground level fall resulting in a left intertrochanteric femur fracture.  The risks, benefits and alternatives to surgery were discussed with the patient and family at length prior to going to the operating room.  Informed consent was obtained for fixation.  The patient was optimized by Internal Medicine prior to going to the operating room.    Procedure in Detail:    The patient was identified in the preoperative holding area and the site was marked.  Regional analgesia was performed in the form of super inguinal fascia iliaca catheter.  Patient was wheeled into the operating room and general endotracheal anesthesia was induced on the patient's hospital bed.  Preoperative antibiotics were administered.  The patient was placed onto the [place] fracture table with all bony prominences well padded and both lower extremities in traction boots.  A time-out was undertaken to confirm patient, side, site, surgery, surgeon and the  administration of preoperative antibiotics.  All agreed and we proceeded.    Closed reduction maneuvers were performed on the table gaining good alignment.  The left hip and lower extremity were prepped and draped in sterile fashion.  A starting incision was made proximal to the greater trochanter and the guidewire for the entry reamer was placed in the appropriate position.  Entry reamer was then used.  A guide brianne was passed distally and measurement undertaken.  The canal was then reamed with a 12-1/2 mm reamer through the isthmus.  A [id number] nail was placed in direct distal lateral visualization to ensure good central position in the canal.    Attention was then turned proximally to the hip and the nail seated at its final position.  A guidewire for the hip screw was then placed in a center center position in the femoral head under fluoroscopic guidance.  This was then reamed and measured and a 90mm hip screw was placed gaining good compression through the insertion handle.  Final position was confirmed under fluoroscopy and insertion and was removed.    Attention was then turned distally and a single distal interlocking screw was placed without difficulty.  The wounds were then copiously irrigated with normal saline solution and the proximal wound closed with 0 Vicryl suture the fascia, and all wounds closed with 3-0 Vicryl suture in the subcutaneous tissue, followed by 3-0 nylon suture in the skin.  Sterile dressings were applied.    All instrument and sponge counts were reported correct at the end of the case.  There were no complications.  The patient was returned to the hospital bed, extubated, awakened and taken to the recovery room in stable condition.    Plan for the Patient:    Immediate physical and occupational therapy with WBAT.    Torey Aguilar MD

## 2019-10-28 NOTE — PROGRESS NOTES
Hemodialysis    Bedside HD treatment in 511A. Patient is on oxygen via nasal cannula. Saturating well    ACCESS: left upper arm AV fistula with good bruit and thrill, venous site has hematoma and slightly swollen.  Cannulated using gauge 15 needles; venous site cannulated above the swollen part.    HD started

## 2019-10-28 NOTE — SUBJECTIVE & OBJECTIVE
Interval History: Coreg discontinued overnight as patient with HR in 50-55 range but asymptomatic. Patient dialyzed for 3 hours in room last night by Nephrology in preparation for left hip surgery early this am. Patient currently in holding area awaiting surgery and Anesthesia placing perineural pain catheter. Patient reporting 7/10 pain to left hip pre-op.     Review of Systems   Constitutional: Negative for chills and fever.   Respiratory: Negative for cough and shortness of breath.    Cardiovascular: Negative for chest pain, palpitations and leg swelling.   Gastrointestinal: Negative for abdominal pain, constipation, nausea and vomiting.   Musculoskeletal: Positive for arthralgias (Left hip ). Negative for back pain.   Skin: Negative for rash.   Neurological: Negative for dizziness and light-headedness.   Psychiatric/Behavioral: Negative for agitation, confusion and hallucinations.     Objective:     Vital Signs (Most Recent):  Temp: 98 °F (36.7 °C) (10/28/19 0959)  Pulse: 64 (10/28/19 1025)  Resp: 14 (10/28/19 1025)  BP: 171/76 (10/28/19 1025)  SpO2: 100 % (10/28/19 1015) Vital Signs (24h Range):  Temp:  [96.1 °F (35.6 °C)-98 °F (36.7 °C)] 98 °F (36.7 °C)  Pulse:  [50-66] 64  Resp:  [12-18] 14  SpO2:  [90 %-100 %] 100 %  BP: (103-185)/(52-77) 171/76     Weight: 50.8 kg (111 lb 15.9 oz)  Body mass index is 19.84 kg/m².    Intake/Output Summary (Last 24 hours) at 10/28/2019 1057  Last data filed at 10/28/2019 0555  Gross per 24 hour   Intake 860 ml   Output 1510 ml   Net -650 ml      Physical Exam   Constitutional: She is oriented to person, place, and time. No distress.   Thin and frail, elderly female    HENT:   Mouth/Throat: Oropharynx is clear and moist.   Eyes:   Right eye cloudy and not reactive to light. 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    Neck: Neck supple. No JVD present.   Cardiovascular: Normal rate and regular rhythm. Exam reveals no gallop and no friction rub.   Murmur heard.   Crescendo  decrescendo systolic murmur is present with a grade of 4/6.  Murmur heard throughout precordium. Harsh systolic murmur.   Pulmonary/Chest: Effort normal and breath sounds normal. No respiratory distress. She has no wheezes.   Abdominal: Soft. Bowel sounds are normal. She exhibits no distension. There is no tenderness. There is no guarding.   Musculoskeletal: She exhibits no edema.   Left AV fistula in place with good thrill and bruit   Neurological: She is alert and oriented to person, place, and time.   Skin: Skin is warm. No erythema.   Psychiatric: She has a normal mood and affect. Her behavior is normal. Thought content normal.   Nursing note and vitals reviewed.      Significant Labs:   CBC:   Recent Labs   Lab 10/27/19  0543 10/28/19  0944   WBC 9.56 8.90   HGB 9.6* 9.4*   HCT 31.6* 32.0*   PLT 76* 79*     CMP:   Recent Labs   Lab 10/27/19  0543 10/28/19  0944    139   K 4.0 4.3    105   CO2 24 26   * 131*   BUN 51* 18   CREATININE 6.0* 3.0*   CALCIUM 9.3 9.2   PROT 7.2  --    ALBUMIN 3.5  --    BILITOT 0.5  --    ALKPHOS 103  --    AST 9*  --    ALT 6*  --    ANIONGAP 14 8   EGFRNONAA 6.2* 14.4*     Magnesium:   Recent Labs   Lab 10/27/19  0543   MG 2.0     Lab Results   Component Value Date    MFRDVEBV19UZ 17 (L) 10/28/2019       Blood Sugars (AccuCheck):  Recent Labs     10/27/19  1203 10/27/19  1801 10/28/19  0744   POCTGLUCOSE 164* 121* 100        Significant Imaging: I have reviewed all pertinent imaging results/findings within the past 24 hours.

## 2019-10-28 NOTE — PROGRESS NOTES
Hemodialysis    3-hour HD completed, patient tolerated well,   Decannulation done, pressure held for 5 mins. Hemostasis achieved. Left AV fistula with bruit and thrill, gauzed and taped.    NET UF REMOVED: 1 liter

## 2019-10-28 NOTE — ASSESSMENT & PLAN NOTE
· Chronic and controlled. Nephrology consulted to manage HD while patient in hospital. Patient dialyzed on 10/27 by Nephrology for pre-op optimization prior to surgery.   · Left arm AV fistula in place and has good thrill and bruit. Patient dialyzes at [facility] as outpatient.

## 2019-10-28 NOTE — NURSING
Patient return from PACU via bed aquacel dsg x 3 to right hip area PNC to right area ar 2 ml /hr alert oriented x 3. Oxygen intact at 2 liter per nc, scd applied. HOB elevated

## 2019-10-28 NOTE — ASSESSMENT & PLAN NOTE
Chronic and controlled. Platelet count 76,000 on admit close to baseline and 79,000 on 10/28. Patient with no active bleeding and no platelet transfusion needed. Monitor with daily CBC in hospital.

## 2019-10-28 NOTE — NURSING TRANSFER
Nursing Transfer Note      10/28/2019     Transfer To: back to her room    Transfer via stretcher    Transfer with 2liters nc to O2, cardiac monitoring    Transported by escort x2 with ticket to ride    Medicines sent:  [facility] infusing    Chart send with patient: Yes    Notified: reported to floor nurse    Patient reassessed at: see epic (date, time)    Upon arrival to floor: to room no complaints no distress noted.

## 2019-10-28 NOTE — PROGRESS NOTES
[facility]  Orthopedics  Progress Note    Patient Name: Tea Georges  MRN: [id number]  Admission Date: 10/27/2019  Hospital Length of Stay: 1 days  Attending Provider: Janee Rolle MD  Primary Care Provider: Parth Posadas Ii, MD  Follow-up For: Procedure(s) (LRB):  INSERTION, INTRAMEDULLARY NELIDA, FEMUR,  Left , synthes, hana table, large C arm clock side (Left)    Post-Operative Day:    Subjective:     Principal Problem:Closed 2-part intertrochanteric fracture of left femur    Principal Orthopedic Problem: same    Interval History: Pt seen and examined at bedside. NAEO. Reports pain in hip. Plan for OR today. Dialysis completed this morning.      Review of patient's allergies indicates:  No Known Allergies    Current Facility-Administered Medications   Medication    0.9%  NaCl infusion (for blood administration)    0.9%  NaCl infusion    amLODIPine tablet 10 mg    artificial tears 0.5 % ophthalmic solution 2 drop    bisacodyl suppository 10 mg    cefTRIAXone injection 1 g    cloNIDine tablet 0.1 mg    dextrose 10% (D10W) Bolus    dextrose 10% (D10W) Bolus    [START ON 10/29/2019] ergocalciferol capsule 50,000 Units    fluticasone furoate-vilanterol 200-25 mcg/dose diskus inhaler 1 puff    glucagon (human recombinant) injection 1 mg    glucose chewable tablet 16 g    glucose chewable tablet 24 g    hydrALAZINE tablet 100 mg    insulin aspart U-100 pen 0-5 Units    levETIRAcetam tablet 500 mg    losartan tablet 50 mg    morphine injection 2 mg    ondansetron injection 4 mg    oxyCODONE immediate release tablet 5 mg    polyethylene glycol packet 17 g    promethazine (PHENERGAN) 6.25 mg in dextrose 5 % 50 mL IVPB    ramelteon tablet 8 mg    sevelamer carbonate tablet 800 mg    sodium chloride 0.9% flush 10 mL     Objective:     Vital Signs (Most Recent):  Temp: 97.8 °F (36.6 °C) (10/28/19 0033)  Pulse: 60 (10/28/19 0530)  Resp: 18 (10/28/19 0530)  BP: (!) 141/63  (10/28/19 9911)  SpO2: 98 % (10/28/19 7962) Vital Signs (24h Range):  Temp:  [96.1 °F (35.6 °C)-97.8 °F (36.6 °C)] 97.8 °F (36.6 °C)  Pulse:  [50-65] 60  Resp:  [16-18] 18  SpO2:  [90 %-100 %] 98 %  BP: (103-180)/(52-76) 141/63           There is no height or weight on file to calculate BMI.      Intake/Output Summary (Last 24 hours) at 10/28/2019 0612  Last data filed at 10/28/2019 0411  Gross per 24 hour   Intake 360 ml   Output 10 ml   Net 350 ml       Ortho/SPM Exam  Ortho/SPM Exam  LLE  Shortened, skin intact with mod swelling over lateral hip  Pain with Log roll of leg  No bony TTP throughout  Compartments soft  Painless ROM ankle   SILT Sa/Bradshaw/DP/SP/T  Motor intact TA/SP/DP  1+ DP and PT     RLE:  Skin intact, no deformity  No TTP  Compartments soft  Full painless ROM throughout lower extremity  SILT Sa/Bradshaw/DP/SP/T  Motor intact TA/SP/DP  1+ DP/PT     BUE:  Skin intact, no deformity noted, fistula in LUE  No open wounds/abrasions/crepitus  No bony TTP  FROM shoulder, elbow and wrist  SILT M/U/R  Motor intact AIN/PIN/M/U/R   Cap refill < 2s  2+ RP    Significant Labs:   CBC:   Recent Labs   Lab 10/27/19  0543   WBC 9.56   HGB 9.6*   HCT 31.6*   PLT 76*     All pertinent labs within the past 24 hours have been reviewed.    Significant Imaging: I have reviewed and interpreted all pertinent imaging results/findings.    Assessment/Plan:     * Closed 2-part intertrochanteric fracture of left femur  Tea Georges is a 79 y.o. female with Left intertrochanteric fracture, closed, NVI. Plan for CMN today in OR.    -Admitted to medicine hip fracture service, dialysis completed this morning preoperatively.   -NPO  -Pain control per primary  -Marked, booked, and consented for surgery  -PT/OT: Bed rest  -DVT PPx: Hold anticoagulation  -Abx: Preop abx ordered  -Labs:  -Haley: In place, haley care  -Iv: ordered for contralateral arm                  Booker Roque MD  Orthopedics  [facility]

## 2019-10-28 NOTE — SUBJECTIVE & OBJECTIVE
Principal Problem:Closed 2-part intertrochanteric fracture of left femur    Principal Orthopedic Problem: same    Interval History: Pt seen and examined at bedside. NAEO. Reports pain in hip. Plan for OR today.      Review of patient's allergies indicates:  No Known Allergies    Current Facility-Administered Medications   Medication    0.9%  NaCl infusion (for blood administration)    0.9%  NaCl infusion    amLODIPine tablet 10 mg    artificial tears 0.5 % ophthalmic solution 2 drop    bisacodyl suppository 10 mg    cefTRIAXone injection 1 g    cloNIDine tablet 0.1 mg    dextrose 10% (D10W) Bolus    dextrose 10% (D10W) Bolus    [START ON 10/29/2019] ergocalciferol capsule 50,000 Units    fluticasone furoate-vilanterol 200-25 mcg/dose diskus inhaler 1 puff    glucagon (human recombinant) injection 1 mg    glucose chewable tablet 16 g    glucose chewable tablet 24 g    hydrALAZINE tablet 100 mg    insulin aspart U-100 pen 0-5 Units    levETIRAcetam tablet 500 mg    losartan tablet 50 mg    morphine injection 2 mg    ondansetron injection 4 mg    oxyCODONE immediate release tablet 5 mg    polyethylene glycol packet 17 g    promethazine (PHENERGAN) 6.25 mg in dextrose 5 % 50 mL IVPB    ramelteon tablet 8 mg    sevelamer carbonate tablet 800 mg    sodium chloride 0.9% flush 10 mL     Objective:     Vital Signs (Most Recent):  Temp: 97.8 °F (36.6 °C) (10/28/19 0033)  Pulse: 60 (10/28/19 0530)  Resp: 18 (10/28/19 0530)  BP: (!) 141/63 (10/28/19 0530)  SpO2: 98 % (10/28/19 0334) Vital Signs (24h Range):  Temp:  [96.1 °F (35.6 °C)-97.8 °F (36.6 °C)] 97.8 °F (36.6 °C)  Pulse:  [50-65] 60  Resp:  [16-18] 18  SpO2:  [90 %-100 %] 98 %  BP: (103-180)/(52-76) 141/63           There is no height or weight on file to calculate BMI.      Intake/Output Summary (Last 24 hours) at 10/28/2019 0612  Last data filed at 10/28/2019 0411  Gross per 24 hour   Intake 360 ml   Output 10 ml   Net 350 ml       Ortho/SPM  Exam  Ortho/SPM Exam  LLE  Shortened, skin intact with mod swelling over lateral hip  Pain with Log roll of leg  No bony TTP throughout  Compartments soft  Painless ROM ankle   SILT Sa/Bradshaw/DP/SP/T  Motor intact TA/SP/DP  1+ DP and PT     RLE:  Skin intact, no deformity  No TTP  Compartments soft  Full painless ROM throughout lower extremity  SILT Sa/Bradshaw/DP/SP/T  Motor intact TA/SP/DP  1+ DP/PT     BUE:  Skin intact, no deformity noted, fistula in LUE  No open wounds/abrasions/crepitus  No bony TTP  FROM shoulder, elbow and wrist  SILT M/U/R  Motor intact AIN/PIN/M/U/R   Cap refill < 2s  2+ RP    Significant Labs:   CBC:   Recent Labs   Lab 10/27/19  0543   WBC 9.56   HGB 9.6*   HCT 31.6*   PLT 76*     All pertinent labs within the past 24 hours have been reviewed.    Significant Imaging: I have reviewed and interpreted all pertinent imaging results/findings.

## 2019-10-28 NOTE — ASSESSMENT & PLAN NOTE
Chronic and controlled. Patient with no obvious signs of volume overload on exam. Continue and Losartan to treat chronic heart failure. Coreg on hold due to bradycardia.

## 2019-10-28 NOTE — HOSPITAL COURSE
10/27: Patient admitted to [facility] Medicine Team H: Hip Fracture team and started on Hip Fracture Pathway with Orthopedic surgery consult for hip fracture. Patient was seen and evaluated by Orthopedic surgery who recommended operative repair of hip fracture. Nephrology consulted on admit and Patient dialyzed by Nephrology for 3 hours in room for pre-op optimization of volume status prior to surgery. Coreg (home med) not started on admit due to HR 50-55).  10/28: Patient was medically optimized prior to surgery with HD. Patient considered high risk surgical candidate due to multiple co-morbidities including severe aortic stenosis and risk and benefits of surgery discussed with patient who wished to proceed with operative repair of hip. Patient to go to OR today for CMN fixation by Dr. Torey Aguilar. Perineural pain catheter placed pre-op for pain control.

## 2019-10-28 NOTE — CONSULTS
Inpatient consult to Physical Medicine Rehab  Consult performed by: Geno Boudreaux NP  Consult ordered by: Janee Rolle MD  Reason for consult: assess rehab needs        Reviewed patient history and current admission.  Rehab team following.  Full consult to follow.    SHANEKA Hoffman, FNP-C  Physical Medicine & Rehabilitation   10/28/2019

## 2019-10-28 NOTE — ASSESSMENT & PLAN NOTE
Blindness of right eye  Patient reports at baseline uses cane, walker and wheelchair at times and reports decreased activity after recent brain surgery in 6/2019. Patient did have IP rehab stay at [facility] in 7/2019 and noted improvement in ambulation after IP Rehab stay. Consult PT/OT post-op to assess function but will likely require IP Rehab after left hip surgery.   [facility] IP Rehab consult placed.

## 2019-10-28 NOTE — ASSESSMENT & PLAN NOTE
Chronic and controlled. Patient at baseline with Hgb 9.6 on 10/27 and 9.4 on 10/28. Patient on Epogen as outpatient with HD and will continue in hospital to treat. Monitor daily CBC.

## 2019-10-28 NOTE — PLAN OF CARE
10/28/19 1518   Post-Acute Status   Post-Acute Authorization Placement   Post-Acute Placement Status Awaiting Internal Medical Clearance

## 2019-10-28 NOTE — ASSESSMENT & PLAN NOTE
· Patient's blood pressure is relatively controlled here in the hospital over past 24 hours. BP mildly elevated this am, 10/28 but likely related to pain.  · Goal for blood pressure is SBP < 140 and DBP < 90 as patient > or = 60 years of age and patient is diabetic and has chronic kidney disease based on JNC 8 guidelines.   · Plan to continue home regimen to treat of Clonidine 0.1 mg po TID, Norvasc 10 mg po daily, Hydralazine 100 mg po TID and Losartan 50 mg po daily while patient is hospitalized. Home medication of Coreg on hold due to bradycardia.   · Plan is to monitor patient's blood pressure routinely while patient is hospitalized.

## 2019-10-29 LAB
ANION GAP SERPL CALC-SCNC: 12 MMOL/L (ref 8–16)
BACTERIA UR CULT: ABNORMAL
BASOPHILS # BLD AUTO: 0.02 K/UL (ref 0–0.2)
BASOPHILS NFR BLD: 0.3 % (ref 0–1.9)
BUN SERPL-MCNC: 29 MG/DL (ref 8–23)
CALCIUM SERPL-MCNC: 8.6 MG/DL (ref 8.7–10.5)
CHLORIDE SERPL-SCNC: 104 MMOL/L (ref 95–110)
CO2 SERPL-SCNC: 23 MMOL/L (ref 23–29)
CREAT SERPL-MCNC: 4.1 MG/DL (ref 0.5–1.4)
DIFFERENTIAL METHOD: ABNORMAL
EOSINOPHIL # BLD AUTO: 0 K/UL (ref 0–0.5)
EOSINOPHIL NFR BLD: 0.1 % (ref 0–8)
ERYTHROCYTE [DISTWIDTH] IN BLOOD BY AUTOMATED COUNT: 16.2 % (ref 11.5–14.5)
EST. GFR  (AFRICAN AMERICAN): 11.4 ML/MIN/1.73 M^2
EST. GFR  (NON AFRICAN AMERICAN): 9.9 ML/MIN/1.73 M^2
FERRITIN SERPL-MCNC: 1739 NG/ML (ref 20–300)
GLUCOSE SERPL-MCNC: 98 MG/DL (ref 70–110)
HCT VFR BLD AUTO: 28.8 % (ref 37–48.5)
HGB BLD-MCNC: 8.6 G/DL (ref 12–16)
IMM GRANULOCYTES # BLD AUTO: 0.04 K/UL (ref 0–0.04)
IMM GRANULOCYTES NFR BLD AUTO: 0.5 % (ref 0–0.5)
IRON SERPL-MCNC: 29 UG/DL (ref 30–160)
LYMPHOCYTES # BLD AUTO: 0.6 K/UL (ref 1–4.8)
LYMPHOCYTES NFR BLD: 8.2 % (ref 18–48)
MCH RBC QN AUTO: 29.4 PG (ref 27–31)
MCHC RBC AUTO-ENTMCNC: 29.9 G/DL (ref 32–36)
MCV RBC AUTO: 98 FL (ref 82–98)
MONOCYTES # BLD AUTO: 0.9 K/UL (ref 0.3–1)
MONOCYTES NFR BLD: 11.1 % (ref 4–15)
NEUTROPHILS # BLD AUTO: 6.1 K/UL (ref 1.8–7.7)
NEUTROPHILS NFR BLD: 79.8 % (ref 38–73)
NRBC BLD-RTO: 0 /100 WBC
PHOSPHATE SERPL-MCNC: 6.5 MG/DL (ref 2.7–4.5)
PLATELET # BLD AUTO: 81 K/UL (ref 150–350)
PMV BLD AUTO: 14 FL (ref 9.2–12.9)
POCT GLUCOSE: 113 MG/DL (ref 70–110)
POCT GLUCOSE: 150 MG/DL (ref 70–110)
POCT GLUCOSE: 74 MG/DL (ref 70–110)
POCT GLUCOSE: 80 MG/DL (ref 70–110)
POCT GLUCOSE: 84 MG/DL (ref 70–110)
POCT GLUCOSE: 91 MG/DL (ref 70–110)
POCT GLUCOSE: 93 MG/DL (ref 70–110)
POTASSIUM SERPL-SCNC: 4.8 MMOL/L (ref 3.5–5.1)
RBC # BLD AUTO: 2.93 M/UL (ref 4–5.4)
SATURATED IRON: 18 % (ref 20–50)
SODIUM SERPL-SCNC: 139 MMOL/L (ref 136–145)
TOTAL IRON BINDING CAPACITY: 161 UG/DL (ref 250–450)
TRANSFERRIN SERPL-MCNC: 109 MG/DL (ref 200–375)
WBC # BLD AUTO: 7.67 K/UL (ref 3.9–12.7)

## 2019-10-29 PROCEDURE — 99233 SBSQ HOSP IP/OBS HIGH 50: CPT | Mod: ,,, | Performed by: HOSPITALIST

## 2019-10-29 PROCEDURE — 25000003 PHARM REV CODE 250: Performed by: INTERNAL MEDICINE

## 2019-10-29 PROCEDURE — 83540 ASSAY OF IRON: CPT

## 2019-10-29 PROCEDURE — 99231 SBSQ HOSP IP/OBS SF/LOW 25: CPT | Mod: ,,, | Performed by: ANESTHESIOLOGY

## 2019-10-29 PROCEDURE — 99231 PR SUBSEQUENT HOSPITAL CARE,LEVL I: ICD-10-PCS | Mod: ,,, | Performed by: ANESTHESIOLOGY

## 2019-10-29 PROCEDURE — 80048 BASIC METABOLIC PNL TOTAL CA: CPT

## 2019-10-29 PROCEDURE — 82728 ASSAY OF FERRITIN: CPT

## 2019-10-29 PROCEDURE — 11000001 HC ACUTE MED/SURG PRIVATE ROOM

## 2019-10-29 PROCEDURE — 63600175 PHARM REV CODE 636 W HCPCS: Performed by: INTERNAL MEDICINE

## 2019-10-29 PROCEDURE — 63600175 PHARM REV CODE 636 W HCPCS: Performed by: STUDENT IN AN ORGANIZED HEALTH CARE EDUCATION/TRAINING PROGRAM

## 2019-10-29 PROCEDURE — 90935 HEMODIALYSIS ONE EVALUATION: CPT | Mod: ,,, | Performed by: INTERNAL MEDICINE

## 2019-10-29 PROCEDURE — 99233 PR SUBSEQUENT HOSPITAL CARE,LEVL III: ICD-10-PCS | Mod: ,,, | Performed by: HOSPITALIST

## 2019-10-29 PROCEDURE — 94761 N-INVAS EAR/PLS OXIMETRY MLT: CPT

## 2019-10-29 PROCEDURE — 85025 COMPLETE CBC W/AUTO DIFF WBC: CPT

## 2019-10-29 PROCEDURE — 84100 ASSAY OF PHOSPHORUS: CPT

## 2019-10-29 PROCEDURE — 90935 PR HEMODIALYSIS, ONE EVALUATION: ICD-10-PCS | Mod: ,,, | Performed by: INTERNAL MEDICINE

## 2019-10-29 PROCEDURE — 90935 HEMODIALYSIS ONE EVALUATION: CPT

## 2019-10-29 PROCEDURE — 36415 COLL VENOUS BLD VENIPUNCTURE: CPT

## 2019-10-29 RX ORDER — SODIUM CHLORIDE 9 MG/ML
INJECTION, SOLUTION INTRAVENOUS
Status: DISCONTINUED | OUTPATIENT
Start: 2019-10-29 | End: 2019-10-31 | Stop reason: HOSPADM

## 2019-10-29 RX ORDER — LIDOCAINE AND PRILOCAINE 25; 25 MG/G; MG/G
CREAM TOPICAL
Status: DISCONTINUED | OUTPATIENT
Start: 2019-10-29 | End: 2019-10-31 | Stop reason: HOSPADM

## 2019-10-29 RX ORDER — SODIUM CHLORIDE 9 MG/ML
INJECTION, SOLUTION INTRAVENOUS ONCE
Status: COMPLETED | OUTPATIENT
Start: 2019-10-29 | End: 2019-10-29

## 2019-10-29 RX ADMIN — SEVELAMER CARBONATE 800 MG: 800 TABLET, FILM COATED ORAL at 12:10

## 2019-10-29 RX ADMIN — LEVETIRACETAM 500 MG: 500 TABLET, FILM COATED ORAL at 03:10

## 2019-10-29 RX ADMIN — ACETAMINOPHEN 1000 MG: 500 TABLET ORAL at 05:10

## 2019-10-29 RX ADMIN — ROPIVACAINE HYDROCHLORIDE 2 ML/HR: 2 INJECTION, SOLUTION EPIDURAL; INFILTRATION at 03:10

## 2019-10-29 RX ADMIN — HYDRALAZINE HYDROCHLORIDE 100 MG: 50 TABLET ORAL at 06:10

## 2019-10-29 RX ADMIN — OXYCODONE HYDROCHLORIDE 5 MG: 5 TABLET ORAL at 06:10

## 2019-10-29 RX ADMIN — EPOETIN ALFA-EPBX 4000 UNITS: 4000 INJECTION, SOLUTION INTRAVENOUS; SUBCUTANEOUS at 10:10

## 2019-10-29 RX ADMIN — LEVETIRACETAM 500 MG: 500 TABLET, FILM COATED ORAL at 09:10

## 2019-10-29 RX ADMIN — HEPARIN SODIUM 5000 UNITS: 5000 INJECTION, SOLUTION INTRAVENOUS; SUBCUTANEOUS at 09:10

## 2019-10-29 RX ADMIN — ACETAMINOPHEN 1000 MG: 500 TABLET ORAL at 06:10

## 2019-10-29 RX ADMIN — OXYCODONE HYDROCHLORIDE 5 MG: 5 TABLET ORAL at 11:10

## 2019-10-29 RX ADMIN — LOSARTAN POTASSIUM 50 MG: 25 TABLET ORAL at 03:10

## 2019-10-29 RX ADMIN — HEPARIN SODIUM 5000 UNITS: 5000 INJECTION, SOLUTION INTRAVENOUS; SUBCUTANEOUS at 03:10

## 2019-10-29 RX ADMIN — HEPARIN SODIUM 5000 UNITS: 5000 INJECTION, SOLUTION INTRAVENOUS; SUBCUTANEOUS at 06:10

## 2019-10-29 RX ADMIN — MUPIROCIN 1 G: 20 OINTMENT TOPICAL at 09:10

## 2019-10-29 RX ADMIN — CEFTRIAXONE SODIUM 1 G: 1 INJECTION, POWDER, FOR SOLUTION INTRAMUSCULAR; INTRAVENOUS at 12:10

## 2019-10-29 RX ADMIN — ACETAMINOPHEN 1000 MG: 500 TABLET ORAL at 12:10

## 2019-10-29 RX ADMIN — SEVELAMER CARBONATE 800 MG: 800 TABLET, FILM COATED ORAL at 05:10

## 2019-10-29 RX ADMIN — AMLODIPINE BESYLATE 10 MG: 10 TABLET ORAL at 03:10

## 2019-10-29 RX ADMIN — SODIUM CHLORIDE 300 ML: 0.9 INJECTION, SOLUTION INTRAVENOUS at 10:10

## 2019-10-29 RX ADMIN — ERGOCALCIFEROL 50000 UNITS: 1.25 CAPSULE ORAL at 03:10

## 2019-10-29 RX ADMIN — ROPIVACAINE HYDROCHLORIDE 2 ML/HR: 2 INJECTION, SOLUTION EPIDURAL; INFILTRATION at 06:10

## 2019-10-29 NOTE — PROGRESS NOTES
[facility]  Orthopedics  Progress Note    Patient Name: Tea Georges  MRN: [id number]  Admission Date: 10/27/2019  Hospital Length of Stay: 2 days  Attending Provider: Desi Potter MD  Primary Care Provider: Parth Posadas Ii, MD  Follow-up For: Procedure(s) (LRB):  INSERTION, INTRAMEDULLARY NELIDA, FEMUR,  Left , synthes, hana table, large C arm clock side (Left)    Post-Operative Day: 1 Day Post-Op  Subjective:     Principal Problem:Closed 2-part intertrochanteric fracture of left femur    Principal Orthopedic Problem: same    Interval History: Pt seen and examined at bedside. NAEO. Pt reports pain is controlled. Plans to work with PT today.    Review of patient's allergies indicates:  No Known Allergies    Current Facility-Administered Medications   Medication    0.9%  NaCl infusion (for blood administration)    0.9%  NaCl infusion    0.9%  NaCl infusion    0.9%  NaCl infusion    acetaminophen tablet 1,000 mg    amLODIPine tablet 10 mg    artificial tears 0.5 % ophthalmic solution 2 drop    bisacodyl suppository 10 mg    cefTRIAXone injection 1 g    cloNIDine tablet 0.1 mg    dextrose 10% (D10W) Bolus    dextrose 10% (D10W) Bolus    ergocalciferol capsule 50,000 Units    fluticasone furoate-vilanterol 200-25 mcg/dose diskus inhaler 1 puff    glucagon (human recombinant) injection 1 mg    glucose chewable tablet 16 g    glucose chewable tablet 24 g    heparin (porcine) injection 5,000 Units    hydrALAZINE tablet 100 mg    insulin aspart U-100 pen 0-5 Units    levETIRAcetam tablet 500 mg    lidocaine-prilocaine cream    losartan tablet 50 mg    methocarbamol tablet 1,000 mg    morphine injection 2 mg    mupirocin 2 % ointment 1 g    ondansetron injection 4 mg    oxyCODONE immediate release tablet 5 mg    polyethylene glycol packet 17 g    promethazine (PHENERGAN) 6.25 mg in dextrose 5 % 50 mL IVPB    ramelteon tablet 8 mg    ropivacaine (PF) 2 mg/ml (0.2%)  "infusion    sevelamer carbonate tablet 800 mg    sodium chloride 0.9% flush 10 mL    sodium chloride 0.9% flush 10 mL     Objective:     Vital Signs (Most Recent):  Temp: 97.9 °F (36.6 °C) (10/29/19 0815)  Pulse: (!) 55 (10/29/19 0930)  Resp: 18 (10/29/19 0930)  BP: (!) 133/39 (10/29/19 0930)  SpO2: 100 % (10/29/19 0815) Vital Signs (24h Range):  Temp:  [96.1 °F (35.6 °C)-98.2 °F (36.8 °C)] 97.9 °F (36.6 °C)  Pulse:  [55-77] 55  Resp:  [12-18] 18  SpO2:  [99 %-100 %] 100 %  BP: (123-185)/(39-90) 133/39  Arterial Line BP: (108-125)/(36-98) 120/80     Weight: 50.8 kg (111 lb 15.9 oz)  Height: 5' 3" (160 cm)  Body mass index is 19.84 kg/m².      Intake/Output Summary (Last 24 hours) at 10/29/2019 0954  Last data filed at 10/28/2019 1236  Gross per 24 hour   Intake 300 ml   Output 50 ml   Net 250 ml       Ortho/SPM Exam    AAOx4  NAD  Reg rate  No increased WOB  Dressing c/d/i  SILT T/SP/DP/Bradshaw/Sa  Motor intact T/SP/DP  WWP extremities  FCDs in place and functioning      Significant Labs: All pertinent labs within the past 24 hours have been reviewed.  Recent Labs   Lab 10/29/19  0435   WBC 7.67   RBC 2.93*   HGB 8.6*   HCT 28.8*   PLT 81*   MCV 98   MCH 29.4   MCHC 29.9*         Significant Imaging: I have reviewed and interpreted all pertinent imaging results/findings.    Assessment/Plan:     * Closed 2-part intertrochanteric fracture of left femur - CMN fixation 10/28/19  Tea Georges is a 79 y.o. female with Left intertrochanteric fracture s/p CMN on 10/28/19      Pain control: multimodal  PT/OT: WBAT LLE  DVT PPx: RONALD 5000U tid, no lovenox due to ESRD, FCDs at all times when not ambulating  Abx: postop Ancef  Labs: Hgb 8.6  Drain: none  Iraheta: dc this morning                Booker Roque MD  Orthopedics  [facility] Note:  Patient seen and examined.  I agree with the resident's assessment and plan.      Torey Aguilar MD    "

## 2019-10-29 NOTE — SUBJECTIVE & OBJECTIVE
Principal Problem:Closed 2-part intertrochanteric fracture of left femur    Principal Orthopedic Problem: same    Interval History: Pt seen and examined at bedside. CHELSIE. Pt reports pain is controlled. Plans to work with PT today.    Review of patient's allergies indicates:  No Known Allergies    Current Facility-Administered Medications   Medication    0.9%  NaCl infusion (for blood administration)    0.9%  NaCl infusion    0.9%  NaCl infusion    0.9%  NaCl infusion    acetaminophen tablet 1,000 mg    amLODIPine tablet 10 mg    artificial tears 0.5 % ophthalmic solution 2 drop    bisacodyl suppository 10 mg    cefTRIAXone injection 1 g    cloNIDine tablet 0.1 mg    dextrose 10% (D10W) Bolus    dextrose 10% (D10W) Bolus    ergocalciferol capsule 50,000 Units    fluticasone furoate-vilanterol 200-25 mcg/dose diskus inhaler 1 puff    glucagon (human recombinant) injection 1 mg    glucose chewable tablet 16 g    glucose chewable tablet 24 g    heparin (porcine) injection 5,000 Units    hydrALAZINE tablet 100 mg    insulin aspart U-100 pen 0-5 Units    levETIRAcetam tablet 500 mg    lidocaine-prilocaine cream    losartan tablet 50 mg    methocarbamol tablet 1,000 mg    morphine injection 2 mg    mupirocin 2 % ointment 1 g    ondansetron injection 4 mg    oxyCODONE immediate release tablet 5 mg    polyethylene glycol packet 17 g    promethazine (PHENERGAN) 6.25 mg in dextrose 5 % 50 mL IVPB    ramelteon tablet 8 mg    ropivacaine (PF) 2 mg/ml (0.2%) infusion    sevelamer carbonate tablet 800 mg    sodium chloride 0.9% flush 10 mL    sodium chloride 0.9% flush 10 mL     Objective:     Vital Signs (Most Recent):  Temp: 97.9 °F (36.6 °C) (10/29/19 0815)  Pulse: (!) 55 (10/29/19 0930)  Resp: 18 (10/29/19 0930)  BP: (!) 133/39 (10/29/19 0930)  SpO2: 100 % (10/29/19 0815) Vital Signs (24h Range):  Temp:  [96.1 °F (35.6 °C)-98.2 °F (36.8 °C)] 97.9 °F (36.6 °C)  Pulse:  [55-77] 55  Resp:   "[12-18] 18  SpO2:  [99 %-100 %] 100 %  BP: (123-185)/(39-90) 133/39  Arterial Line BP: (108-125)/(36-98) 120/80     Weight: 50.8 kg (111 lb 15.9 oz)  Height: 5' 3" (160 cm)  Body mass index is 19.84 kg/m².      Intake/Output Summary (Last 24 hours) at 10/29/2019 0954  Last data filed at 10/28/2019 1236  Gross per 24 hour   Intake 300 ml   Output 50 ml   Net 250 ml       Ortho/SPM Exam    AAOx4  NAD  Reg rate  No increased WOB  Dressing c/d/i  SILT T/SP/DP/Bradshaw/Sa  Motor intact T/SP/DP  WWP extremities  FCDs in place and functioning      Significant Labs: All pertinent labs within the past 24 hours have been reviewed.  Recent Labs   Lab 10/29/19  0435   WBC 7.67   RBC 2.93*   HGB 8.6*   HCT 28.8*   PLT 81*   MCV 98   MCH 29.4   MCHC 29.9*         Significant Imaging: I have reviewed and interpreted all pertinent imaging results/findings.  "

## 2019-10-29 NOTE — ASSESSMENT & PLAN NOTE
Chronic and controlled. Platelet count between 70s-90s at baseline. At baseline now on admit and throughout stay so far. Patient with no active bleeding and no platelet transfusion needed. Monitor with daily CBC in hospital.

## 2019-10-29 NOTE — PT/OT/SLP PROGRESS
Physical Therapy      Patient Name:  Tea Georges   MRN:  [id number]    Patient not seen today secondary to Dialysis (first attempt), and fatigue/nausea (second attempt), adamantly declining. Will follow-up as time allows.     Chula Daniel, PT, DPT

## 2019-10-29 NOTE — ASSESSMENT & PLAN NOTE
Chronic and controlled.  -baseline Hg 9s, at 9 prior to surgery. On Epo outpatient with HD.  -Hg 8.6 on 10/29 POD 1. Continue to trend. Expect post op acute blood loss anemia  -transfuse if under 7

## 2019-10-29 NOTE — ASSESSMENT & PLAN NOTE
Chronic and controlled. Patient with no obvious signs of volume overload on exam.   - as EF is preserved, there is no indication to continue BB chronically from this standpoint and given risk of bradycardia on it (present on admit) the risk of falls with bradycardia and no glaring indication given a preserved EF from a HF guidelines standpoint, we will likely stop it chronically as risk outweighs any benefit

## 2019-10-29 NOTE — SUBJECTIVE & OBJECTIVE
Interval History: seen in HD and patient reports doing well overnight, no pain at rest but reports pain with movement but controlled with medications. Reports PT came to see her but she had to go to HD so has not worked with them yet post op, discussed will see how she does and to ask for medications if having some pain after therapy sessions. She hasnt eating much this morning as doesn't like to eat in HD but plans to eat lunch when back in her room. Denies chest pain, SOB. HR in 60s after holding coreg, BP 120s-150s overnight, if still elevated later after HD have room to titrate up losartan further, but likely will cont to hold coreg due to bradycardia with it. Urine Cx growing Ecoli, sensitivity pending. Discussed further with her plans for SNF vs rehab pending PT/OT recs today. We talked about her house and concerns there, she confirmed there is currently no electricity or working water. Discussed needs placement for restoration of ADLs and this will give family time to ensure safety of house for long term, she was ambulatory before this with walker. No other new issues today per patient.     Review of Systems   Constitutional: Negative for chills and fever.   Respiratory: Negative for cough and shortness of breath.    Cardiovascular: Negative for chest pain, palpitations and leg swelling.   Gastrointestinal: Negative for abdominal pain, constipation, nausea and vomiting.   Musculoskeletal: Positive for arthralgias (Left hip ). Negative for back pain.   Skin: Negative for rash.   Neurological: Negative for dizziness and light-headedness.   Psychiatric/Behavioral: Negative for agitation, confusion and hallucinations.     Objective:     Vital Signs (Most Recent):  Temp: 98 °F (36.7 °C) (10/28/19 0959)  Pulse: 64 (10/28/19 1025)  Resp: 14 (10/28/19 1025)  BP: 171/76 (10/28/19 1025)  SpO2: 100 % (10/28/19 1015) Vital Signs (24h Range):  Temp:  [96.1 °F (35.6 °C)-98.2 °F (36.8 °C)] 97.9 °F (36.6 °C)  Pulse:  [55-77]  58  Resp:  [12-18] 18  SpO2:  [99 %-100 %] 100 %  BP: (123-166)/(39-90) 125/39  Arterial Line BP: (108-125)/(36-98) 120/80     Weight: 50.8 kg (111 lb 15.9 oz)  Body mass index is 19.84 kg/m².    Intake/Output Summary (Last 24 hours) at 10/29/2019 1030  Last data filed at 10/28/2019 1236  Gross per 24 hour   Intake 300 ml   Output 50 ml   Net 250 ml      Physical Exam   Constitutional: She is oriented to person, place, and time. No distress.   Thin and frail, elderly female    HENT:   Mouth/Throat: Oropharynx is clear and moist.   Eyes:   Right eye cloudy and not reactive to light. 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    Neck: Neck supple. No JVD present.   Cardiovascular: Normal rate and regular rhythm. Exam reveals no gallop and no friction rub.   Murmur heard.   Crescendo decrescendo systolic murmur is present with a grade of 4/6.  Murmur heard throughout precordium. Harsh systolic murmur.   Pulmonary/Chest: Effort normal and breath sounds normal. No respiratory distress. She has no wheezes.   Abdominal: Soft. Bowel sounds are normal. She exhibits no distension. There is no tenderness. There is no guarding.   Musculoskeletal: She exhibits no edema.   Left AV fistula in place with good thrill and bruit   Neurological: She is alert and oriented to person, place, and time.   Skin: Skin is warm. No erythema.   Psychiatric: She has a normal mood and affect. Her behavior is normal. Thought content normal.   Nursing note and vitals reviewed.      Significant Labs:   CBC:   Recent Labs   Lab 10/28/19  0944 10/28/19  1446 10/29/19  0435   WBC 8.90 7.29 7.67   HGB 9.4* 7.9* 8.6*   HCT 32.0* 27.5* 28.8*   PLT 79* 59* 81*     CMP:   Recent Labs   Lab 10/28/19  0944 10/29/19  0435    139   K 4.3 4.8    104   CO2 26 23   * 98   BUN 18 29*   CREATININE 3.0* 4.1*   CALCIUM 9.2 8.6*   ANIONGAP 8 12   EGFRNONAA 14.4* 9.9*     Magnesium:   No results for input(s): MG in the last 48 hours.  Lab Results   Component  Value Date    [id number] 17 (L) 10/28/2019       Blood Sugars (AccuCheck):    Recent Labs     10/28/19  0744 10/28/19  1406 10/28/19  1720 10/29/19  0059 10/29/19  0738 10/29/19  0834   POCTGLUCOSE 100 150* 140* 113* 93 80        Significant Imaging: I have reviewed all pertinent imaging results/findings within the past 24 hours.

## 2019-10-29 NOTE — PROGRESS NOTES
[facility] Hospital Medicine  Progress Note    Patient Name: Tea Georges  MRN: [id number]  Patient Class: IP- Inpatient   Admission Date: 10/27/2019  Length of Stay: 2 days  Attending Physician: Desi Potter MD  Primary Care Provider: Parth Posadas Ii, MD    [facility] Medicine Team: [facility]  Desi Potter MD    Subjective:     Principal Problem:Closed 2-part intertrochanteric fracture of left femur        HPI:  79 y.o.female with ESRD on HD (MWF) via LUE fistula, renovascular HTN, severe aortic stenosis with moderate to severe pulmonary HTN, HFpEF, pulmonary emphysema with chronic hypoxic respiratory failure on home oxygen 2 liters at night prn, previous right MCA CVA in 2016, implantable loop recorder in place, Type 2 diabetes with ESRD not on home insulin, recent right SDH s/p evacuation on 6/23/2019, seizures on Keppra with last seizure in 6/2019 and chronic debility with recent stay at [facility] rehab in 7/2019 presented to the [facility] ER with complaint of left hip pain. Patient states pain is sharp/stabbing in the left groin, upper leg and is aggravated by any weight bearing and standing. She reports onset of symptoms was about 12 hour(s) after a fall while ambulating to bathroom at home. Patient reports that she was walking to bathroom last night at about 7:00 pm and tripped. Patient thinks she either tripped over oxygen on ground or her foot. Patient reports there is a lot of clutter on floor in the house. Patient states she fell backwards and landed on her left hip. Patient had severe 10/10 pain in left hip after fall and unable to get up from ground due to pain and granddaughter helped her up to chair. Patient did not think she broke her hip so took Ibuprofen and daughter gave her ointment to rub on the hip and it helped. About 3:00 am this morning, pain in left hip was worse and not improving so decision made to bring patient to ER to get evaluated. Patient  denies head trauma. Patient denies to loss of consciousness, denies syncope, denies chest pain, denies dizziness prior or after fall. X-ray obtained in ER revealed left intertrochanteric hip fracture. Orthopedics and [facility] medicine service consulted and patient to be admitted to the hospital to the [facility] service.     Prior to admission patient's functional mobility was independent with uses walker or can to ambulate for home distances. Patient does have history of gait or balance problems. Patient does not have history of previous falls or fractures. Patient does not have previous history of MI or cardiac stenting or cardiac surgery. Patient does have previous history of stroke in 2016. Patient does have previous history of compensated heart failure. Patient does have history of diabetes but is not insulin requiring. Patient does have history of advanced kidney disease with creatinine > 2. Patient currently lives with their family.      Overview/Hospital Course:  10/27: Patient admitted to [facility] Medicine Team H: Hip Fracture team and started on Hip Fracture Pathway with Orthopedic surgery consult for hip fracture. Patient was seen and evaluated by Orthopedic surgery who recommended operative repair of hip fracture. Nephrology consulted on admit and Patient dialyzed by Nephrology for 3 hours in room for pre-op optimization of volume status prior to surgery. Coreg (home med) not started on admit due to HR 50-55).  10/28: Patient was medically optimized prior to surgery with HD. Patient considered high risk surgical candidate due to multiple co-morbidities including severe aortic stenosis and risk and benefits of surgery discussed with patient who wished to proceed with operative repair of hip. Patient to go to OR today for CMN fixation by Dr. Torey Aguilar. Perineural pain catheter placed pre-op for pain control.       Interval History: seen in HD and patient reports doing well overnight, no pain at rest but  reports pain with movement but controlled with medications. Reports PT came to see her but she had to go to HD so has not worked with them yet post op, discussed will see how she does and to ask for medications if having some pain after therapy sessions. She hasnt eating much this morning as doesn't like to eat in HD but plans to eat lunch when back in her room. Denies chest pain, SOB. HR in 60s after holding coreg, BP 120s-150s overnight, if still elevated later after HD have room to titrate up losartan further, but likely will cont to hold coreg due to bradycardia with it. Urine Cx growing Ecoli, sensitivity pending. Discussed further with her plans for SNF vs rehab pending PT/OT recs today. We talked about her house and concerns there, she confirmed there is currently no electricity or working water. Discussed needs placement for restoration of ADLs and this will give family time to ensure safety of house for long term, she was ambulatory before this with walker. No other new issues today per patient.     Review of Systems   Constitutional: Negative for chills and fever.   Respiratory: Negative for cough and shortness of breath.    Cardiovascular: Negative for chest pain, palpitations and leg swelling.   Gastrointestinal: Negative for abdominal pain, constipation, nausea and vomiting.   Musculoskeletal: Positive for arthralgias (Left hip ). Negative for back pain.   Skin: Negative for rash.   Neurological: Negative for dizziness and light-headedness.   Psychiatric/Behavioral: Negative for agitation, confusion and hallucinations.     Objective:     Vital Signs (Most Recent):  Temp: 98 °F (36.7 °C) (10/28/19 0959)  Pulse: 64 (10/28/19 1025)  Resp: 14 (10/28/19 1025)  BP: 171/76 (10/28/19 1025)  SpO2: 100 % (10/28/19 1015) Vital Signs (24h Range):  Temp:  [96.1 °F (35.6 °C)-98.2 °F (36.8 °C)] 97.9 °F (36.6 °C)  Pulse:  [55-77] 58  Resp:  [12-18] 18  SpO2:  [99 %-100 %] 100 %  BP: (123-166)/(39-90) 125/39  Arterial  Line BP: (108-125)/(36-98) 120/80     Weight: 50.8 kg (111 lb 15.9 oz)  Body mass index is 19.84 kg/m².    Intake/Output Summary (Last 24 hours) at 10/29/2019 1030  Last data filed at 10/28/2019 1236  Gross per 24 hour   Intake 300 ml   Output 50 ml   Net 250 ml      Physical Exam   Constitutional: She is oriented to person, place, and time. No distress.   Thin and frail, elderly female    HENT:   Mouth/Throat: Oropharynx is clear and moist.   Eyes:   Right eye cloudy and not reactive to light. 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    Neck: Neck supple. No JVD present.   Cardiovascular: Normal rate and regular rhythm. Exam reveals no gallop and no friction rub.   Murmur heard.   Crescendo decrescendo systolic murmur is present with a grade of 4/6.  Murmur heard throughout precordium. Harsh systolic murmur.   Pulmonary/Chest: Effort normal and breath sounds normal. No respiratory distress. She has no wheezes.   Abdominal: Soft. Bowel sounds are normal. She exhibits no distension. There is no tenderness. There is no guarding.   Musculoskeletal: She exhibits no edema.   Left AV fistula in place with good thrill and bruit   Neurological: She is alert and oriented to person, place, and time.   Skin: Skin is warm. No erythema.   Psychiatric: She has a normal mood and affect. Her behavior is normal. Thought content normal.   Nursing note and vitals reviewed.      Significant Labs:   CBC:   Recent Labs   Lab 10/28/19  0944 10/28/19  1446 10/29/19  0435   WBC 8.90 7.29 7.67   HGB 9.4* 7.9* 8.6*   HCT 32.0* 27.5* 28.8*   PLT 79* 59* 81*     CMP:   Recent Labs   Lab 10/28/19  0944 10/29/19  0435    139   K 4.3 4.8    104   CO2 26 23   * 98   BUN 18 29*   CREATININE 3.0* 4.1*   CALCIUM 9.2 8.6*   ANIONGAP 8 12   EGFRNONAA 14.4* 9.9*     Magnesium:   No results for input(s): MG in the last 48 hours.  Lab Results   Component Value Date    MCGPEVBI23TA 17 (L) 10/28/2019       Blood Sugars (AccuCheck):    Recent  Labs     10/28/19  0744 10/28/19  1406 10/28/19  1720 10/29/19  0059 10/29/19  0738 10/29/19  0834   POCTGLUCOSE 100 150* 140* 113* 93 80        Significant Imaging: I have reviewed all pertinent imaging results/findings within the past 24 hours.      Assessment/Plan:      * Closed 2-part intertrochanteric fracture of left femur - CMN fixation 10/28/19  -admitted for fall, s/p cephalomeduallary nail on 10/28, WBAT, PT/OT to start today with recs pending- likely SNF vs rehab for strengthening post op  -Heparin 5000 units subcutaneous 3 times daily ppx post-op and MADDY/SCDs for DVT prophylaxis due to ESRD  · Perineural pain catheter in place with continuous Ropivacaine for pain control and being managed by Anesthesia Pain Service.   · Remove haley POD 1  · Monitor closely post op due to comorbidities but doing well so far. HD today, denies SOB, chest pain.  · Hg stable post op at 8.6, no signs of bleeding. Pain controlled       Pain    -secondary to fracture and repair, controlled on exam this AM, will see how does with PT/OT later today.    Complicated UTI (urinary tract infection)  · Present on admit.   · Patient with significant UTI on admit but no signs of sepsis or systemic infection so okay to proceed with left hip surgery.   · Will continue patient on IV Rocephin 1 gram daily to treat (patient on day #3). Urine culture sent  With Ecoli, sensitivity pending   · Follow-up urine culture and sensitivities and will need likely 5-7 days of antibiotics as complicated UTI.       Chronic respiratory failure with hypoxia  -on 2L O2 chronically prior to admit. stable      (HFpEF) heart failure with preserved ejection fraction  Chronic and controlled. Patient with no obvious signs of volume overload on exam.   - as EF is preserved, there is no indication to continue BB chronically from this standpoint and given risk of bradycardia on it (present on admit) the risk of falls with bradycardia and no glaring indication given a  preserved EF from a HF guidelines standpoint, we will likely stop it chronically as risk outweighs any benefit       Type 2 diabetes mellitus with chronic kidney disease on chronic dialysis, without long-term current use of insulin  Good control in hospital in past 24 hours.   · HgA1C 5.6% and at goal on admit. Patient on no meds at home to treat diabetes.   · Plan is to monitor POCT glucose 4 times a day with each meal and at bedtime and cover with Novolog low dose sliding scale insulin.   · 2000 calorie diabetic diet.   · Target pre-meal glucose goal is <140 with all random glucoses <180 in non-critically ill patient.    Thrombocytopenia, unspecified  Chronic and controlled. Platelet count between 70s-90s at baseline. At baseline now on admit and throughout stay so far. Patient with no active bleeding and no platelet transfusion needed. Monitor with daily CBC in hospital.       Pulmonary emphysema  Chronic respiratory failure with hypoxia  · Controlled. Patient with no signs of acute exacerbation.   · Will continue Breo 1 puff daily to treat COPD.   · Continue oxygen as needed with goal of oxygen sats > 88%.    · Encourage IS post op to help prevent pulmonary complications in hospital.    Vitamin D deficiency  Patient on Vitamin D 50,000 units po weekly as outpatient and will continue in hospital. Vit D on this admit 17 and patient still with moderate deficiency and need to continue replacement therapy.       Physical debility  Blindness of right eye  Patient reports at baseline uses cane, walker and wheelchair at times and reports decreased activity after recent brain surgery in 6/2019. Patient did have IP rehab stay at [facility] in 7/2019 and noted improvement in ambulation after IP Rehab stay. Consult PT/OT post-op to assess function but will likely require IP Rehab after left hip surgery.   [facility] consult placed.     Blindness of right eye  -chronic, sequelae on exam with cloudiness in right  eye      Hyperparathyroidism, secondary renal  Chronic and controlled. Continue Renvela with meals to treat.   -daily phos, renal diet    Anemia in ESRD (end-stage renal disease)  Chronic and controlled.  -baseline Hg 9s, at 9 prior to surgery. On Epo outpatient with HD.  -Hg 8.6 on 10/29 POD 1. Continue to trend. Expect post op acute blood loss anemia  -transfuse if under 7      Severe aortic valve stenosis  Pulmonary hypertension due to aortic valve disease  · Patient currently euvolemic.   · Patient with known severe aortic stenosis but asymptomatic. No specific treatment needed at this time. Patient is high risk for surgery due to presence of severe AS and moderate pulmonary HTN.   · Patient needs to follow-up with Interventional Cardiology as outpatient for further evaluation of her aortic stenosis to see if candidate for TAVR.   · Last 2D echo done on 5/22/2019 so no need to repeat prior to this surgery and showed:   · Low normal left ventricular systolic function. The estimated ejection fraction is 53%  · Grade II (moderate) left ventricular diastolic dysfunction consistent with pseudonormalization.  · Severe left atrial enlargement.  · Moderate right ventricular enlargement.  · Normal right ventricular systolic function.  · Severe aortic valve stenosis.  · Aortic valve area is 0.69 cm2; peak velocity is 4.58 m/s; mean gradient is 55.02 mmHg.  · Moderate mitral sclerosis.  · Mild-to-moderate mitral regurgitation.  · Moderate to severe tricuspid regurgitation.  · The estimated PA systolic pressure is 77 mm Hg.      ESRD on hemodialysis  · Chronic and controlled. Nephrology consulted to manage HD while patient in hospital. Patient dialyzed on 10/27 by Nephrology for pre-op optimization prior to surgery. HD 10/29  · Left arm AV fistula in place and has good thrill and bruit. Patient dialyzes at [facility] as outpatient.     Renovascular hypertension  · Patient's blood pressure is relatively controlled here in  the hospital over past 24 hours. BP mildly elevated this am, at 120s-150s systolic. Trend, if continues uptrend can increase losartan. Hold off on BB due to bradycardia, likely will hold on discharge also  · Goal for blood pressure is SBP < 140 and DBP < 90 as patient > or = 60 years of age and patient is diabetic and has chronic kidney disease based on JNC 8 guidelines.   · Plan to continue home regimen to treat of Clonidine 0.1 mg po TID, Norvasc 10 mg po daily, Hydralazine 100 mg po TID and Losartan 50 mg po daily while patient is hospitalized. Home medication of Coreg on hold due to bradycardia, continue now.   · Plan is to monitor patient's blood pressure routinely while patient is hospitalized.       VTE Risk Mitigation (From admission, onward)         Ordered     heparin (porcine) injection 5,000 Units  Every 8 hours      10/28/19 1333     Place MADDY hose  Until discontinued      10/27/19 0951     IP VTE HIGH RISK PATIENT  Once      10/27/19 0951     Place sequential compression device  Until discontinued      10/27/19 0951                Dispo: PT/OT recs. Monitor BP, pain control. SNF vs rehab. Urine Cx results pending. Needs ortho f/u, referral to TAVR service outpatient    Desi Potter MD  Department of Hospital Medicine   [facility]

## 2019-10-29 NOTE — PLAN OF CARE
EPS  Sanaz Salas p [phone] presented to 5th floor unit - pt is in dialysis. Sanaz confirmed previous allegations r/t no running water, no electricity (pt on home o2 and uses a generator), h/h unable to complete visits d/t unsecure dog. Sanaz stated that she will go to dialysis unit and speak w/ pt.     Update: CM to bedside - pt confirmed that Sanaz did see her in dialysis. Pt is adamant that she will not go to a NH d/t the facility taking her check. CM explained to pt that plan from hospital is to d/c to rehab - pt agreeable. CM explained that the d/c plan from rehab to home is the concern d/t issues w/ water, electricity and h/h not having access to pt. CM inquired if pt had a family member she could d/c to instead of her home. Pt stated she could d/c to her brother's home - Ricardo Pena and sister in law Zohra Pena but she doesn't know their numbers. CM noted brother and sister in law's number in chart under emergency contacts - Ricardo [phone] and Zohra [phone]. CM left msg for Ricardo to discuss d/c plans - awaiting callback.    Update: CM received callback from brotherRicardo - he stated that pt's peaceCruz galvan has everything for and will take care of her after d/c. Hospital needs to call dgtr, not him for anything r/t pt. CM attempted to clarify dgtr's number as pt didn't know number - Ricardo stated he didn't know [place]'s number. He states he only knows Tea's number b/c he only talks to pt. Brother stated again that he should not be contacted to assist pt, Cruz mcdonough needs to be contacted.

## 2019-10-29 NOTE — PROGRESS NOTES
Dialysis completed. Needles removed from left upper arm graft with pressure held for 10 minutes with hemostasis achieved. Gauze and tape applied. Patient dialyzed for 3 hours with fluid removal of 1 liter. Tolerated well with stable vital signs. Patient left dialysis unit via bed to room per transport.

## 2019-10-29 NOTE — PROGRESS NOTES
Patient currently on hemodialysis for removal of uremic toxins and volume control.   I personally saw and examined the patient on hemodialysis.  The patient is tolerating the treatment, see hemodyalisis flow sheet for vitals and assessemts. I also reviewed the chart and current medication. The dialysis bath was adjusted.     Volume management/HTN: 1 liter  Anemia (target 10-12 mg/dl): need epo and iron studies

## 2019-10-29 NOTE — ASSESSMENT & PLAN NOTE
-admitted for fall, s/p cephalomeduallary nail on 10/28, WBAT, PT/OT to start today with recs pending- likely SNF vs rehab for strengthening post op  -Heparin 5000 units subcutaneous 3 times daily ppx post-op and MADDY/SCDs for DVT prophylaxis due to ESRD  · Perineural pain catheter in place with continuous Ropivacaine for pain control and being managed by Anesthesia Pain Service.   · Remove haley POD 1  · Monitor closely post op due to comorbidities but doing well so far. HD today, denies SOB, chest pain.  · Hg stable post op at 8.6, no signs of bleeding. Pain controlled

## 2019-10-29 NOTE — HPI
Tea Georges is a 79-year-old female with PMHx of 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  Patient presented to [facility] on 10/27 s/p fall w/  L hip pain.  X-ray revealed L intertrochanteric hip fracture. Hospital medicine consulted for medical optimization for surgery. Orthopedics consulted and now s/p IM brianne in L femur on 10/28. PNA catheter infusing.  WBAT LLE. Hospital course complicated by UTI (haley in place), thrombocytopenia & anemia.     Functional History: Patient lives in [place].  Prior to admission, (I) with use of walker or cane to ambulate in home.

## 2019-10-29 NOTE — ASSESSMENT & PLAN NOTE
Tea Georges is a 79 y.o. female with Left intertrochanteric fracture s/p CMN on 10/28/19      Pain control: multimodal  PT/OT: WBAT LLE  DVT PPx: RONALD 5000U tid, no lovenox due to ESRD, FCDs at all times when not ambulating  Abx: postop Ancef  Labs: Hgb 8.6  Drain: none  Iraheta: dc this morning

## 2019-10-29 NOTE — ASSESSMENT & PLAN NOTE
-s/p fall w/  L hip pain  - X-ray revealed L intertrochanteric hip fracture  -Orthopedics consulted and now s/p IM brianne in L femur on 10/28  -PNA catheter infusing  -PT/OT eval & treat     - Related to prolonged/acute hospital course.     Recommendations  -  Encourage mobility, OOB in chair at least 3 hours per day, and early ambulation as appropriate  -  PT/OT evaluate and treat  -  Pain management  -  Monitor for and prevent skin breakdown and pressure ulcers  · Early mobility, repositioning/weight shifting every 20-30 minutes when sitting, turn patient every 2 hours, proper mattress/overlay and chair cushioning, pressure relief/heel protector boots  -  DVT prophylaxis    -  Reviewed discharge options (IP rehab, SNF, HH therapy, and OP therapy)

## 2019-10-29 NOTE — ASSESSMENT & PLAN NOTE
· Chronic and controlled. Nephrology consulted to manage HD while patient in hospital. Patient dialyzed on 10/27 by Nephrology for pre-op optimization prior to surgery. HD 10/29  · Left arm AV fistula in place and has good thrill and bruit. Patient dialyzes at [facility] as outpatient.

## 2019-10-29 NOTE — PT/OT/SLP PROGRESS
Occupational Therapy      Patient Name:  Tea Georges   MRN:  [id number]    Patient not seen today secondary to pt. Off floor in dialysis (1st attempt). Pt. refusal due to fatigue and nausea (2nd attempt). Will follow-up 1030/19.    Elma Adams OT  10/29/2019

## 2019-10-29 NOTE — PLAN OF CARE
Problem: Electrolyte Imbalance (Chronic Kidney Disease)  Goal: Electrolyte Balance  Outcome: Ongoing, Progressing

## 2019-10-29 NOTE — ASSESSMENT & PLAN NOTE
· Present on admit.   · Patient with significant UTI on admit but no signs of sepsis or systemic infection so okay to proceed with left hip surgery.   · Will continue patient on IV Rocephin 1 gram daily to treat (patient on day #3). Urine culture sent  With Ecoli, sensitivity pending   · Follow-up urine culture and sensitivities and will need likely 5-7 days of antibiotics as complicated UTI.

## 2019-10-29 NOTE — ANESTHESIA POST-OP PAIN MANAGEMENT
Acute Pain Service Progress Note    Tea Georges is a 79 y.o., female, [id number].    Surgery:  IM Francisco L    Post Op Day #: 1    Catheter type: perineural  SIFI    Infusion type: Ropivacaine 0.2%  2cc/hr basal 10cc/hr IB    Problem List:    Active Hospital Problems    Diagnosis  POA    *Closed 2-part intertrochanteric fracture of left femur - CMN fixation 10/28/19 [S72.142A]  Yes    Pain [R52]  Yes    Complicated UTI (urinary tract infection) [N39.0]  Yes    Pulmonary hypertension due to aortic valve disease [I27.29, I35.9]  Yes    Chronic respiratory failure with hypoxia [J96.11]  Yes    (HFpEF) heart failure with preserved ejection fraction [I50.30]  Yes    Type 2 diabetes mellitus with chronic kidney disease on chronic dialysis, without long-term current use of insulin [E11.22, N18.6, Z99.2]  Not Applicable    Pulmonary emphysema [J43.9]  Yes    Thrombocytopenia, unspecified [D69.6]  Yes    Vitamin D deficiency [E55.9]  Yes    Physical debility [R53.81]  Yes    Blindness of right eye [H54.40]  Yes     Chronic    Hyperparathyroidism, secondary renal [N25.81]  Yes     Chronic    Anemia in ESRD (end-stage renal disease) [N18.6, D63.1]  Yes     Chronic    Severe aortic valve stenosis [I35.0]  Yes    ESRD on hemodialysis [N18.6, Z99.2]  Not Applicable    Renovascular hypertension [I15.0]  Yes      Resolved Hospital Problems   No resolved problems to display.       Subjective:     General appearance of alert, oriented, no complaints    Pain with rest: 5    Numbers   Pain with movement: 7    Numbers   Side Effects    1. Pruritis No    2. Nausea No    3. Motor Blockade No, 1=Ability to bend knees and ankles    4. Sedation No, 1=awake and alert    Objective:     Catheter site clean, dry, intact      Vitals   Vitals:    10/29/19 0737   BP: 131/63   Pulse: (!) 56   Resp: 18   Temp: 36.4 °C (97.6 °F)        Labs    Admission on 10/27/2019   Component Date Value Ref Range Status    Prothrombin Time 10/27/2019  11.0  9.0 - 12.5 sec Final    INR 10/27/2019 1.1  0.8 - 1.2 Final    WBC 10/27/2019 9.56  3.90 - 12.70 K/uL Final    RBC 10/27/2019 3.23* 4.00 - 5.40 M/uL Final    Hemoglobin 10/27/2019 9.6* 12.0 - 16.0 g/dL Final    Hematocrit 10/27/2019 31.6* 37.0 - 48.5 % Final    Mean Corpuscular Volume 10/27/2019 98  82 - 98 fL Final    Mean Corpuscular Hemoglobin 10/27/2019 29.7  27.0 - 31.0 pg Final    Mean Corpuscular Hemoglobin Conc 10/27/2019 30.4* 32.0 - 36.0 g/dL Final    RDW 10/27/2019 15.9* 11.5 - 14.5 % Final    Platelets 10/27/2019 76* 150 - 350 K/uL Final    MPV 10/27/2019 13.0* 9.2 - 12.9 fL Final    Immature Granulocytes 10/27/2019 0.7* 0.0 - 0.5 % Final    Gran # (ANC) 10/27/2019 7.9* 1.8 - 7.7 K/uL Final    Immature Grans (Abs) 10/27/2019 0.07* 0.00 - 0.04 K/uL Final    Lymph # 10/27/2019 0.7* 1.0 - 4.8 K/uL Final    Mono # 10/27/2019 0.8  0.3 - 1.0 K/uL Final    Eos # 10/27/2019 0.1  0.0 - 0.5 K/uL Final    Baso # 10/27/2019 0.02  0.00 - 0.20 K/uL Final    nRBC 10/27/2019 0  0 /100 WBC Final    Gran% 10/27/2019 82.8* 38.0 - 73.0 % Final    Lymph% 10/27/2019 6.9* 18.0 - 48.0 % Final    Mono% 10/27/2019 8.5  4.0 - 15.0 % Final    Eosinophil% 10/27/2019 0.9  0.0 - 8.0 % Final    Basophil% 10/27/2019 0.2  0.0 - 1.9 % Final    Differential Method 10/27/2019 Automated   Final    Sodium 10/27/2019 142  136 - 145 mmol/L Final    Potassium 10/27/2019 4.0  3.5 - 5.1 mmol/L Final    Chloride 10/27/2019 104  95 - 110 mmol/L Final    CO2 10/27/2019 24  23 - 29 mmol/L Final    Glucose 10/27/2019 179* 70 - 110 mg/dL Final    BUN, Bld 10/27/2019 51* 8 - 23 mg/dL Final    Creatinine 10/27/2019 6.0* 0.5 - 1.4 mg/dL Final    Calcium 10/27/2019 9.3  8.7 - 10.5 mg/dL Final    Total Protein 10/27/2019 7.2  6.0 - 8.4 g/dL Final    Albumin 10/27/2019 3.5  3.5 - 5.2 g/dL Final    Total Bilirubin 10/27/2019 0.5  0.1 - 1.0 mg/dL Final    Alkaline Phosphatase 10/27/2019 103  55 - 135 U/L Final    AST  10/27/2019 9* 10 - 40 U/L Final    ALT 10/27/2019 6* 10 - 44 U/L Final    Anion Gap 10/27/2019 14  8 - 16 mmol/L Final    eGFR if African American 10/27/2019 7.2* >60 mL/min/1.73 m^2 Final    eGFR if non African American 10/27/2019 6.2* >60 mL/min/1.73 m^2 Final    aPTT 10/27/2019 24.2  21.0 - 32.0 sec Final    Prealbumin 10/27/2019 22  20 - 43 mg/dL Final    Transferrin 10/27/2019 127* 200 - 375 mg/dL Final    Magnesium 10/27/2019 2.0  1.6 - 2.6 mg/dL Final    Phosphorus 10/27/2019 4.5  2.7 - 4.5 mg/dL Final    Specimen UA 10/27/2019 Urine, Catheterized   Final    Color, UA 10/27/2019 Yellow  Yellow, Straw, Brittni Final    Appearance, UA 10/27/2019 Cloudy* Clear Final    pH, UA 10/27/2019 6.0  5.0 - 8.0 Final    Specific [place], UA 10/27/2019 1.015  1.005 - 1.030 Final    Protein, UA 10/27/2019 2+* Negative Final    Glucose, UA 10/27/2019 Negative  Negative Final    Ketones, UA 10/27/2019 Negative  Negative Final    Bilirubin (UA) 10/27/2019 Negative  Negative Final    Occult Blood UA 10/27/2019 1+* Negative Final    Nitrite, UA 10/27/2019 Negative  Negative Final    Leukocytes, UA 10/27/2019 3+* Negative Final    Hemoglobin A1C 10/27/2019 5.6  4.0 - 5.6 % Final    Estimated Avg Glucose 10/27/2019 114  68 - 131 mg/dL Final    Group & Rh 10/27/2019 A POS   Final    Indirect Chris 10/27/2019 NEG   Final    RBC, UA 10/27/2019 7* 0 - 4 /hpf Final    WBC, UA 10/27/2019 >100* 0 - 5 /hpf Final    Bacteria 10/27/2019 Many* None-Occ /hpf Final    Squam Epithel, UA 10/27/2019 1  /hpf Final    Hyaline Casts, UA 10/27/2019 0  0-1/lpf /lpf Final    Microscopic Comment 10/27/2019 SEE COMMENT   Final    Urine Culture, Routine 10/27/2019 *  Preliminary                    Value:ESCHERICHIA COLI  >100,000 cfu/ml  Susceptibility pending      POCT Glucose 10/27/2019 164* 70 - 110 mg/dL Final    POCT Glucose 10/27/2019 121* 70 - 110 mg/dL Final    UNIT NUMBER 10/27/2019 [id number]   Preliminary     Product Code 10/27/2019 [id number]   Preliminary    DISPENSE STATUS 10/27/2019 CROSSMATCHED   Preliminary    CODING SYSTEM 10/27/2019 [id number]   Preliminary    Unit Blood Type Code 10/27/2019 6200   Preliminary    Unit Blood Type 10/27/2019 A POS   Preliminary    Unit Expiration 10/27/2019 [id number]   Preliminary    UNIT NUMBER 10/27/2019 [id number]   Preliminary    Product Code 10/27/2019 [id number]   Preliminary    DISPENSE STATUS 10/27/2019 CROSSMATCHED   Preliminary    CODING SYSTEM 10/27/2019 [id number]   Preliminary    Unit Blood Type Code 10/27/2019 6200   Preliminary    Unit Blood Type 10/27/2019 A POS   Preliminary    Unit Expiration 10/27/2019 [id number]   Preliminary    POCT Glucose 10/28/2019 100  70 - 110 mg/dL Final    WBC 10/28/2019 8.90  3.90 - 12.70 K/uL Final    RBC 10/28/2019 3.19* 4.00 - 5.40 M/uL Final    Hemoglobin 10/28/2019 9.4* 12.0 - 16.0 g/dL Final    Hematocrit 10/28/2019 32.0* 37.0 - 48.5 % Final    Mean Corpuscular Volume 10/28/2019 100* 82 - 98 fL Final    Mean Corpuscular Hemoglobin 10/28/2019 29.5  27.0 - 31.0 pg Final    Mean Corpuscular Hemoglobin Conc 10/28/2019 29.4* 32.0 - 36.0 g/dL Final    RDW 10/28/2019 16.0* 11.5 - 14.5 % Final    Platelets 10/28/2019 79* 150 - 350 K/uL Final    MPV 10/28/2019 13.6* 9.2 - 12.9 fL Final    Immature Granulocytes 10/28/2019 0.4  0.0 - 0.5 % Final    Gran # (ANC) 10/28/2019 7.4  1.8 - 7.7 K/uL Final    Immature Grans (Abs) 10/28/2019 0.04  0.00 - 0.04 K/uL Final    Lymph # 10/28/2019 0.7* 1.0 - 4.8 K/uL Final    Mono # 10/28/2019 0.7  0.3 - 1.0 K/uL Final    Eos # 10/28/2019 0.1  0.0 - 0.5 K/uL Final    Baso # 10/28/2019 0.04  0.00 - 0.20 K/uL Final    nRBC 10/28/2019 0  0 /100 WBC Final    Gran% 10/28/2019 83.4* 38.0 - 73.0 % Final    Lymph% 10/28/2019 7.3* 18.0 - 48.0 % Final    Mono% 10/28/2019 7.4  4.0 - 15.0 % Final    Eosinophil% 10/28/2019 1.1  0.0 - 8.0 % Final    Basophil% 10/28/2019 0.4  0.0 -  1.9 % Final    Differential Method 10/28/2019 Automated   Final    Sodium 10/28/2019 139  136 - 145 mmol/L Final    Potassium 10/28/2019 4.3  3.5 - 5.1 mmol/L Final    Chloride 10/28/2019 105  95 - 110 mmol/L Final    CO2 10/28/2019 26  23 - 29 mmol/L Final    Glucose 10/28/2019 131* 70 - 110 mg/dL Final    BUN, Bld 10/28/2019 18  8 - 23 mg/dL Final    Creatinine 10/28/2019 3.0* 0.5 - 1.4 mg/dL Final    Calcium 10/28/2019 9.2  8.7 - 10.5 mg/dL Final    Anion Gap 10/28/2019 8  8 - 16 mmol/L Final    eGFR if  10/28/2019 16.6* >60 mL/min/1.73 m^2 Final    eGFR if non African American 10/28/2019 14.4* >60 mL/min/1.73 m^2 Final    Phosphorus 10/28/2019 3.6  2.7 - 4.5 mg/dL Final    Vit D, 25-Hydroxy 10/28/2019 17* 30 - 96 ng/mL Final    WBC 10/28/2019 7.29  3.90 - 12.70 K/uL Final    RBC 10/28/2019 2.67* 4.00 - 5.40 M/uL Final    Hemoglobin 10/28/2019 7.9* 12.0 - 16.0 g/dL Final    Hematocrit 10/28/2019 27.5* 37.0 - 48.5 % Final    Mean Corpuscular Volume 10/28/2019 103* 82 - 98 fL Final    Mean Corpuscular Hemoglobin 10/28/2019 29.6  27.0 - 31.0 pg Final    Mean Corpuscular Hemoglobin Conc 10/28/2019 28.7* 32.0 - 36.0 g/dL Final    RDW 10/28/2019 16.1* 11.5 - 14.5 % Final    Platelets 10/28/2019 59* 150 - 350 K/uL Final    MPV 10/28/2019 13.8* 9.2 - 12.9 fL Final    Immature Granulocytes 10/28/2019 0.8* 0.0 - 0.5 % Final    Gran # (ANC) 10/28/2019 6.9  1.8 - 7.7 K/uL Final    Immature Grans (Abs) 10/28/2019 0.06* 0.00 - 0.04 K/uL Final    Lymph # 10/28/2019 0.2* 1.0 - 4.8 K/uL Final    Mono # 10/28/2019 0.1* 0.3 - 1.0 K/uL Final    Eos # 10/28/2019 0.0  0.0 - 0.5 K/uL Final    Baso # 10/28/2019 0.01  0.00 - 0.20 K/uL Final    nRBC 10/28/2019 0  0 /100 WBC Final    Gran% 10/28/2019 94.0* 38.0 - 73.0 % Final    Lymph% 10/28/2019 3.0* 18.0 - 48.0 % Final    Mono% 10/28/2019 1.8* 4.0 - 15.0 % Final    Eosinophil% 10/28/2019 0.3  0.0 - 8.0 % Final    Basophil% 10/28/2019  0.1  0.0 - 1.9 % Final    Differential Method 10/28/2019 Automated   Final    POCT Glucose 10/28/2019 140* 70 - 110 mg/dL Final    POCT Glucose 10/29/2019 113* 70 - 110 mg/dL Final    WBC 10/29/2019 7.67  3.90 - 12.70 K/uL Final    RBC 10/29/2019 2.93* 4.00 - 5.40 M/uL Final    Hemoglobin 10/29/2019 8.6* 12.0 - 16.0 g/dL Final    Hematocrit 10/29/2019 28.8* 37.0 - 48.5 % Final    Mean Corpuscular Volume 10/29/2019 98  82 - 98 fL Final    Mean Corpuscular Hemoglobin 10/29/2019 29.4  27.0 - 31.0 pg Final    Mean Corpuscular Hemoglobin Conc 10/29/2019 29.9* 32.0 - 36.0 g/dL Final    RDW 10/29/2019 16.2* 11.5 - 14.5 % Final    Platelets 10/29/2019 81* 150 - 350 K/uL Final    MPV 10/29/2019 14.0* 9.2 - 12.9 fL Final    Immature Granulocytes 10/29/2019 0.5  0.0 - 0.5 % Final    Gran # (ANC) 10/29/2019 6.1  1.8 - 7.7 K/uL Final    Immature Grans (Abs) 10/29/2019 0.04  0.00 - 0.04 K/uL Final    Lymph # 10/29/2019 0.6* 1.0 - 4.8 K/uL Final    Mono # 10/29/2019 0.9  0.3 - 1.0 K/uL Final    Eos # 10/29/2019 0.0  0.0 - 0.5 K/uL Final    Baso # 10/29/2019 0.02  0.00 - 0.20 K/uL Final    nRBC 10/29/2019 0  0 /100 WBC Final    Gran% 10/29/2019 79.8* 38.0 - 73.0 % Final    Lymph% 10/29/2019 8.2* 18.0 - 48.0 % Final    Mono% 10/29/2019 11.1  4.0 - 15.0 % Final    Eosinophil% 10/29/2019 0.1  0.0 - 8.0 % Final    Basophil% 10/29/2019 0.3  0.0 - 1.9 % Final    Differential Method 10/29/2019 Automated   Final    Sodium 10/29/2019 139  136 - 145 mmol/L Final    Potassium 10/29/2019 4.8  3.5 - 5.1 mmol/L Final    Chloride 10/29/2019 104  95 - 110 mmol/L Final    CO2 10/29/2019 23  23 - 29 mmol/L Final    Glucose 10/29/2019 98  70 - 110 mg/dL Final    BUN, Bld 10/29/2019 29* 8 - 23 mg/dL Final    Creatinine 10/29/2019 4.1* 0.5 - 1.4 mg/dL Final    Calcium 10/29/2019 8.6* 8.7 - 10.5 mg/dL Final    Anion Gap 10/29/2019 12  8 - 16 mmol/L Final    eGFR if African American 10/29/2019 11.4* >60 mL/min/1.73  m^2 Final    eGFR if non African American 10/29/2019 9.9* >60 mL/min/1.73 m^2 Final    Phosphorus 10/29/2019 6.5* 2.7 - 4.5 mg/dL Final    POCT Glucose 10/29/2019 93  70 - 110 mg/dL Final        Meds   Current Facility-Administered Medications   Medication Dose Route Frequency Provider Last Rate Last Dose    0.9%  NaCl infusion (for blood administration)   Intravenous Q24H PRN Titi Graf MD        0.9%  NaCl infusion   Intravenous Once Dayna Menard MD        0.9%  NaCl infusion   Intravenous PRN Art Maciel MD        0.9%  NaCl infusion   Intravenous Once Art Maciel MD        acetaminophen tablet 1,000 mg  1,000 mg Oral Q6H Janee Rolle MD   1,000 mg at 10/29/19 0610    amLODIPine tablet 10 mg  10 mg Oral Daily Janee Rolle MD   10 mg at 10/28/19 1758    artificial tears 0.5 % ophthalmic solution 2 drop  2 drop Both Eyes QID PRN Janee Rolle MD        bisacodyl suppository 10 mg  10 mg Rectal Daily PRN Janee Rolle MD        cefTRIAXone injection 1 g  1 g Intravenous Q24H Janee Rolle MD   1 g at 10/27/19 1139    cloNIDine tablet 0.1 mg  0.1 mg Oral TID Janee Rolle MD   0.1 mg at 10/28/19 2207    dextrose 10% (D10W) Bolus  12.5 g Intravenous PRN Janee Rolle MD        dextrose 10% (D10W) Bolus  25 g Intravenous PRN Janee Rolle MD        ergocalciferol capsule 50,000 Units  50,000 Units Oral Q7 Days Janee Rolle MD        fluticasone furoate-vilanterol 200-25 mcg/dose diskus inhaler 1 puff  1 puff Inhalation Daily Janee Rolle MD   1 puff at 10/27/19 1140    glucagon (human recombinant) injection 1 mg  1 mg Intramuscular PRN Janee Rolle MD        glucose chewable tablet 16 g  16 g Oral PRN Janee Rolle MD        glucose chewable tablet 24 g  24 g Oral PRN Janee Rolle MD        heparin (porcine) injection 5,000 Units  5,000 Units Subcutaneous Q8H Juan Venegas,  MD   5,000 Units at 10/29/19 0610    hydrALAZINE tablet 100 mg  100 mg Oral Q8H Janee Rolle MD   100 mg at 10/29/19 0636    insulin aspart U-100 pen 0-5 Units  0-5 Units Subcutaneous QID (AC + HS) PRN Janee Rolle MD        levETIRAcetam tablet 500 mg  500 mg Oral BID Janee Rolle MD   500 mg at 10/28/19 2206    losartan tablet 50 mg  50 mg Oral Daily Janee Rolle MD   50 mg at 10/28/19 1758    methocarbamol tablet 1,000 mg  1,000 mg Oral Q6H PRN Janee Rolle MD        morphine injection 2 mg  2 mg Intravenous Q3H PRN Janee Rolle MD        mupirocin 2 % ointment 1 g  1 g Nasal BID Janee Rolle MD   1 g at 10/28/19 2207    ondansetron injection 4 mg  4 mg Intravenous Q12H PRN Janee Rolle MD   4 mg at 10/28/19 1945    oxyCODONE immediate release tablet 5 mg  5 mg Oral Q3H PRN Janee Rolle MD   5 mg at 10/29/19 0610    polyethylene glycol packet 17 g  17 g Oral Daily Janee Rolle MD   17 g at 10/27/19 1040    promethazine (PHENERGAN) 6.25 mg in dextrose 5 % 50 mL IVPB  6.25 mg Intravenous Q6H PRN Janee Rolle MD        ramelteon tablet 8 mg  8 mg Oral Nightly PRN Janee Rolle MD        ropivacaine (PF) 2 mg/ml (0.2%) infusion  2 mL/hr Perineural Continuous Phillip Srivastava MD 2 mL/hr at 10/29/19 0610 2 mL/hr at 10/29/19 0610    sevelamer carbonate tablet 800 mg  800 mg Oral TID WM Janee Rolle MD   800 mg at 10/28/19 1846    sodium chloride 0.9% flush 10 mL  10 mL Intravenous PRN Titi Graf MD        sodium chloride 0.9% flush 10 mL  10 mL Intravenous PRN Janee Rolle MD            Anticoagulant dose SQH per ortho    Assessment:     Pain control adequate    Plan:     Patient doing well, continue present treatment. Minimize opioid use.      Evaluator Tamara Elias

## 2019-10-29 NOTE — ASSESSMENT & PLAN NOTE
-secondary to fracture and repair, controlled on exam this AM, will see how does with PT/OT later today.

## 2019-10-29 NOTE — PLAN OF CARE
Plan of care reviewed and updated . Pt AA+O . Pt's pain is managed with medication ordered at this timed . Pt's V/S are as charted . No falls this shift. . Pt is oriented to room and call system . Will continue to monitor .

## 2019-10-29 NOTE — ASSESSMENT & PLAN NOTE
· Patient's blood pressure is relatively controlled here in the hospital over past 24 hours. BP mildly elevated this am, at 120s-150s systolic. Trend, if continues uptrend can increase losartan. Hold off on BB due to bradycardia, likely will hold on discharge also  · Goal for blood pressure is SBP < 140 and DBP < 90 as patient > or = 60 years of age and patient is diabetic and has chronic kidney disease based on JNC 8 guidelines.   · Plan to continue home regimen to treat of Clonidine 0.1 mg po TID, Norvasc 10 mg po daily, Hydralazine 100 mg po TID and Losartan 50 mg po daily while patient is hospitalized. Home medication of Coreg on hold due to bradycardia, continue now.   · Plan is to monitor patient's blood pressure routinely while patient is hospitalized.

## 2019-10-29 NOTE — NURSING
Patient leaving via bed to dialysis . Bs checked 93 . V/s obtained .report giving to dialysis nurse.

## 2019-10-29 NOTE — PROGRESS NOTES
Patient received in dialysis from floor in own bed. Maintenance dialysis (off day, MWF) began per orders via 15 gauge fistula needles to left upper arm graft.

## 2019-10-30 LAB
ANION GAP SERPL CALC-SCNC: 11 MMOL/L (ref 8–16)
BASOPHILS # BLD AUTO: 0.03 K/UL (ref 0–0.2)
BASOPHILS NFR BLD: 0.5 % (ref 0–1.9)
BUN SERPL-MCNC: 20 MG/DL (ref 8–23)
CALCIUM SERPL-MCNC: 8.5 MG/DL (ref 8.7–10.5)
CHLORIDE SERPL-SCNC: 105 MMOL/L (ref 95–110)
CO2 SERPL-SCNC: 25 MMOL/L (ref 23–29)
CREAT SERPL-MCNC: 2.9 MG/DL (ref 0.5–1.4)
DIFFERENTIAL METHOD: ABNORMAL
EOSINOPHIL # BLD AUTO: 0.1 K/UL (ref 0–0.5)
EOSINOPHIL NFR BLD: 2 % (ref 0–8)
ERYTHROCYTE [DISTWIDTH] IN BLOOD BY AUTOMATED COUNT: 16.3 % (ref 11.5–14.5)
EST. GFR  (AFRICAN AMERICAN): 17.3 ML/MIN/1.73 M^2
EST. GFR  (NON AFRICAN AMERICAN): 15 ML/MIN/1.73 M^2
GLUCOSE SERPL-MCNC: 72 MG/DL (ref 70–110)
HCT VFR BLD AUTO: 28.7 % (ref 37–48.5)
HGB BLD-MCNC: 8.2 G/DL (ref 12–16)
IMM GRANULOCYTES # BLD AUTO: 0.02 K/UL (ref 0–0.04)
IMM GRANULOCYTES NFR BLD AUTO: 0.4 % (ref 0–0.5)
LYMPHOCYTES # BLD AUTO: 0.6 K/UL (ref 1–4.8)
LYMPHOCYTES NFR BLD: 11.3 % (ref 18–48)
MCH RBC QN AUTO: 29.4 PG (ref 27–31)
MCHC RBC AUTO-ENTMCNC: 28.6 G/DL (ref 32–36)
MCV RBC AUTO: 103 FL (ref 82–98)
MONOCYTES # BLD AUTO: 0.6 K/UL (ref 0.3–1)
MONOCYTES NFR BLD: 10.4 % (ref 4–15)
NEUTROPHILS # BLD AUTO: 4.1 K/UL (ref 1.8–7.7)
NEUTROPHILS NFR BLD: 75.4 % (ref 38–73)
NRBC BLD-RTO: 0 /100 WBC
PHOSPHATE SERPL-MCNC: 4.3 MG/DL (ref 2.7–4.5)
PLATELET # BLD AUTO: 77 K/UL (ref 150–350)
PMV BLD AUTO: 13.2 FL (ref 9.2–12.9)
POCT GLUCOSE: 70 MG/DL (ref 70–110)
POCT GLUCOSE: 81 MG/DL (ref 70–110)
POCT GLUCOSE: 88 MG/DL (ref 70–110)
POTASSIUM SERPL-SCNC: 4.1 MMOL/L (ref 3.5–5.1)
RBC # BLD AUTO: 2.79 M/UL (ref 4–5.4)
SODIUM SERPL-SCNC: 141 MMOL/L (ref 136–145)
WBC # BLD AUTO: 5.47 K/UL (ref 3.9–12.7)

## 2019-10-30 PROCEDURE — 63600175 PHARM REV CODE 636 W HCPCS: Performed by: STUDENT IN AN ORGANIZED HEALTH CARE EDUCATION/TRAINING PROGRAM

## 2019-10-30 PROCEDURE — 99232 PR SUBSEQUENT HOSPITAL CARE,LEVL II: ICD-10-PCS | Mod: ,,, | Performed by: HOSPITALIST

## 2019-10-30 PROCEDURE — 99231 PR SUBSEQUENT HOSPITAL CARE,LEVL I: ICD-10-PCS | Mod: ,,, | Performed by: ANESTHESIOLOGY

## 2019-10-30 PROCEDURE — 99232 SBSQ HOSP IP/OBS MODERATE 35: CPT | Mod: ,,, | Performed by: HOSPITALIST

## 2019-10-30 PROCEDURE — 97116 GAIT TRAINING THERAPY: CPT

## 2019-10-30 PROCEDURE — 80048 BASIC METABOLIC PNL TOTAL CA: CPT

## 2019-10-30 PROCEDURE — 36415 COLL VENOUS BLD VENIPUNCTURE: CPT

## 2019-10-30 PROCEDURE — 11000001 HC ACUTE MED/SURG PRIVATE ROOM

## 2019-10-30 PROCEDURE — 99222 PR INITIAL HOSPITAL CARE,LEVL II: ICD-10-PCS | Mod: ,,, | Performed by: NURSE PRACTITIONER

## 2019-10-30 PROCEDURE — 84100 ASSAY OF PHOSPHORUS: CPT

## 2019-10-30 PROCEDURE — 85025 COMPLETE CBC W/AUTO DIFF WBC: CPT

## 2019-10-30 PROCEDURE — 25000003 PHARM REV CODE 250: Performed by: STUDENT IN AN ORGANIZED HEALTH CARE EDUCATION/TRAINING PROGRAM

## 2019-10-30 PROCEDURE — 63600175 PHARM REV CODE 636 W HCPCS: Performed by: INTERNAL MEDICINE

## 2019-10-30 PROCEDURE — 99222 1ST HOSP IP/OBS MODERATE 55: CPT | Mod: ,,, | Performed by: NURSE PRACTITIONER

## 2019-10-30 PROCEDURE — 97165 OT EVAL LOW COMPLEX 30 MIN: CPT

## 2019-10-30 PROCEDURE — 97530 THERAPEUTIC ACTIVITIES: CPT

## 2019-10-30 PROCEDURE — 25000003 PHARM REV CODE 250: Performed by: INTERNAL MEDICINE

## 2019-10-30 PROCEDURE — 97161 PT EVAL LOW COMPLEX 20 MIN: CPT

## 2019-10-30 PROCEDURE — 99231 SBSQ HOSP IP/OBS SF/LOW 25: CPT | Mod: ,,, | Performed by: ANESTHESIOLOGY

## 2019-10-30 RX ORDER — ACETAMINOPHEN 500 MG
1000 TABLET ORAL EVERY 8 HOURS
Status: DISCONTINUED | OUTPATIENT
Start: 2019-10-30 | End: 2019-10-31 | Stop reason: HOSPADM

## 2019-10-30 RX ORDER — METHOCARBAMOL 500 MG/1
500 TABLET, FILM COATED ORAL 4 TIMES DAILY
Status: DISCONTINUED | OUTPATIENT
Start: 2019-10-30 | End: 2019-10-31 | Stop reason: HOSPADM

## 2019-10-30 RX ORDER — HALOPERIDOL 5 MG/ML
0.5 INJECTION INTRAMUSCULAR ONCE
Status: COMPLETED | OUTPATIENT
Start: 2019-10-30 | End: 2019-10-30

## 2019-10-30 RX ORDER — SODIUM CHLORIDE 9 MG/ML
INJECTION, SOLUTION INTRAVENOUS ONCE
Status: COMPLETED | OUTPATIENT
Start: 2019-10-30 | End: 2019-10-31

## 2019-10-30 RX ORDER — TRAMADOL HYDROCHLORIDE 50 MG/1
50 TABLET ORAL EVERY 8 HOURS PRN
Status: DISCONTINUED | OUTPATIENT
Start: 2019-10-30 | End: 2019-10-30

## 2019-10-30 RX ORDER — TRAMADOL HYDROCHLORIDE 50 MG/1
50 TABLET ORAL EVERY 6 HOURS PRN
Status: DISCONTINUED | OUTPATIENT
Start: 2019-10-30 | End: 2019-10-31 | Stop reason: HOSPADM

## 2019-10-30 RX ADMIN — OXYCODONE HYDROCHLORIDE 5 MG: 5 TABLET ORAL at 07:10

## 2019-10-30 RX ADMIN — ACETAMINOPHEN 1000 MG: 500 TABLET ORAL at 09:10

## 2019-10-30 RX ADMIN — ACETAMINOPHEN 1000 MG: 500 TABLET ORAL at 12:10

## 2019-10-30 RX ADMIN — HEPARIN SODIUM 5000 UNITS: 5000 INJECTION, SOLUTION INTRAVENOUS; SUBCUTANEOUS at 02:10

## 2019-10-30 RX ADMIN — ONDANSETRON 4 MG: 2 INJECTION INTRAMUSCULAR; INTRAVENOUS at 07:10

## 2019-10-30 RX ADMIN — CLONIDINE HYDROCHLORIDE 0.1 MG: 0.1 TABLET ORAL at 04:10

## 2019-10-30 RX ADMIN — SEVELAMER CARBONATE 800 MG: 800 TABLET, FILM COATED ORAL at 12:10

## 2019-10-30 RX ADMIN — MUPIROCIN 1 G: 20 OINTMENT TOPICAL at 09:10

## 2019-10-30 RX ADMIN — HALOPERIDOL LACTATE 0.5 MG: 5 INJECTION INTRAMUSCULAR at 01:10

## 2019-10-30 RX ADMIN — METHOCARBAMOL TABLETS 500 MG: 500 TABLET, COATED ORAL at 09:10

## 2019-10-30 RX ADMIN — HYDRALAZINE HYDROCHLORIDE 100 MG: 50 TABLET ORAL at 06:10

## 2019-10-30 RX ADMIN — METHOCARBAMOL TABLETS 500 MG: 500 TABLET, COATED ORAL at 08:10

## 2019-10-30 RX ADMIN — SEVELAMER CARBONATE 800 MG: 800 TABLET, FILM COATED ORAL at 08:10

## 2019-10-30 RX ADMIN — CLONIDINE HYDROCHLORIDE 0.1 MG: 0.1 TABLET ORAL at 08:10

## 2019-10-30 RX ADMIN — LEVETIRACETAM 500 MG: 500 TABLET, FILM COATED ORAL at 08:10

## 2019-10-30 RX ADMIN — POLYETHYLENE GLYCOL 3350 17 G: 17 POWDER, FOR SOLUTION ORAL at 08:10

## 2019-10-30 RX ADMIN — AMLODIPINE BESYLATE 10 MG: 10 TABLET ORAL at 08:10

## 2019-10-30 RX ADMIN — ROPIVACAINE HYDROCHLORIDE 2 ML/HR: 2 INJECTION, SOLUTION EPIDURAL; INFILTRATION at 08:10

## 2019-10-30 RX ADMIN — CEFTRIAXONE SODIUM 1 G: 1 INJECTION, POWDER, FOR SOLUTION INTRAMUSCULAR; INTRAVENOUS at 11:10

## 2019-10-30 RX ADMIN — FLUTICASONE FUROATE AND VILANTEROL TRIFENATATE 1 PUFF: 200; 25 POWDER RESPIRATORY (INHALATION) at 09:10

## 2019-10-30 RX ADMIN — ONDANSETRON 4 MG: 2 INJECTION INTRAMUSCULAR; INTRAVENOUS at 10:10

## 2019-10-30 RX ADMIN — METHOCARBAMOL TABLETS 500 MG: 500 TABLET, COATED ORAL at 12:10

## 2019-10-30 RX ADMIN — LOSARTAN POTASSIUM 50 MG: 25 TABLET ORAL at 08:10

## 2019-10-30 RX ADMIN — HYDRALAZINE HYDROCHLORIDE 100 MG: 50 TABLET ORAL at 02:10

## 2019-10-30 RX ADMIN — ROPIVACAINE HYDROCHLORIDE 2 ML/HR: 2 INJECTION, SOLUTION EPIDURAL; INFILTRATION at 04:10

## 2019-10-30 RX ADMIN — ROPIVACAINE HYDROCHLORIDE 2 ML/HR: 2 INJECTION, SOLUTION EPIDURAL; INFILTRATION at 12:10

## 2019-10-30 RX ADMIN — LEVETIRACETAM 500 MG: 500 TABLET, FILM COATED ORAL at 09:10

## 2019-10-30 RX ADMIN — ACETAMINOPHEN 1000 MG: 500 TABLET ORAL at 06:10

## 2019-10-30 RX ADMIN — HEPARIN SODIUM 5000 UNITS: 5000 INJECTION, SOLUTION INTRAVENOUS; SUBCUTANEOUS at 06:10

## 2019-10-30 RX ADMIN — HEPARIN SODIUM 5000 UNITS: 5000 INJECTION, SOLUTION INTRAVENOUS; SUBCUTANEOUS at 09:10

## 2019-10-30 NOTE — PLAN OF CARE
Problem: Physical Therapy Goal  Goal: Physical Therapy Goal  Description  Goals to be met by: 19     Patient will increase functional independence with mobility by performin. Supine to sit with Stand-by Assistance  2. Sit to supine with Stand-by Assistance  3. Sit to stand transfer with Stand-by Assistance  4. Bed to chair transfer with Stand-by Assistance using Rolling Walker  5. Gait  x 100 feet with Stand-by Assistance using Rolling Walker.   6. Ascend/descend 3 stair with right Handrails Minimal Assistance .      Outcome: Ongoing, Progressing

## 2019-10-30 NOTE — PT/OT/SLP EVAL
Occupational Therapy   Evaluation/ Treatment    Name: Tea Georges  MRN: [id number]  Admitting Diagnosis:  Closed 2-part intertrochanteric fracture of left femur 2 Days Post-Op    Recommendations:     Discharge Recommendations: nursing facility, skilled  Discharge Equipment Recommendations:  walker, rolling, bath bench  Barriers to discharge:  Inaccessible home environment, Decreased caregiver support    Assessment:     Tea Georges is a 79 y.o. female with a medical diagnosis of Closed 2-part intertrochanteric fracture of left femur.  She presents with the following performance deficits affecting function: weakness, impaired endurance, gait instability, impaired functional mobilty, impaired self care skills, impaired balance, decreased lower extremity function, pain, visual deficits, orthopedic precautions, impaired skin.      OT eval complete. Pt tolerated session well. She presents w/ B UE strength and ROM WFL to safely participate in and perform functional activities. Pt currently requires increased assistance for all functional mobility and self-care tasks. She is primarily limited by pain requiring cues and increased time to perform tasks. Pt will continue to benefit from skilled OT to address the above listed impairments.     Rehab Prognosis: Good; patient would benefit from acute skilled OT services to address these deficits and reach maximum level of function.       Plan:     Patient to be seen daily to address the above listed problems via self-care/home management, therapeutic activities, therapeutic exercises  · Plan of Care Expires: 11/28/19  · Plan of Care Reviewed with: patient    Subjective     Chief Complaint: pain  Patient/Family Comments/goals: to get better    Occupational Profile:  Living Environment: Pt lives with her daughter and two granddaughters in [place] w/ 3 MARY and no HR support thru front; 3 MARY and R HR support thru back. Pt uses a tub/shower combo  Previous level of function: PTA, pt  reports she required assistance from family for ADLs and used her rollator for functional mobility.   Roles and Routines: mother, grandmother  Equipment Used at Home:  wheelchair, bedside commode, nebulizer, glucometer, oxygen, rollator, cane, straight  Assistance upon Discharge: pt will not have adequate assistance at home; daughter can sometimes assist but both granddaughters are either working or in school during the day    Pain/Comfort:  · Pain Rating 1: (20/10)  · Location - Side 1: Left  · Location - Orientation 1: generalized  · Location 1: leg  · Pain Addressed 1: Pre-medicate for activity, Reposition, Distraction, Cessation of Activity, Nurse notified  · Pain Rating Post-Intervention 1: 10/10    Patients cultural, spiritual, Episcopal conflicts given the current situation: no    Objective:     Communicated with: RN prior to session.  Patient found HOB elevated with peripheral IV, perineural catheter, SCD, oxygen, telemetry upon OT entry to room.    General Precautions: Standard, fall   Orthopedic Precautions:LLE weight bearing as tolerated   Braces: N/A     Occupational Performance:    Bed Mobility:    · Patient completed Scooting/Bridging with moderate assistance  · Patient completed Supine to Sit with moderate assistance    Functional Mobility/Transfers:  · Patient completed Sit <> Stand Transfer with moderate assistance and of 2 persons  with  rolling walker   · Functional Mobility: Pt ambulated 10ft w/ Min/mod A and RW for increased stability and safety. Pt w/ short stride length in L LE and increased time allowed 2/2 pain and weakness.     Activities of Daily Living:  · Upper Body Dressing: minimum assistance donning back gown simulating a jacket - pt nervous of removing B UE support to thread UE into gown     Cognitive/Visual Perceptual:  Cognitive/Psychosocial Skills:     -       Oriented to: Person, Place and Time   -       Follows Commands/attention:Follows multistep  commands  -        Communication: clear/fluent    Physical Exam:  Upper Extremity Range of Motion:     -       Right Upper Extremity: WFL except limited shoulder flexion to 100 degrees  -       Left Upper Extremity: WFL except shoulder flexion limited to 90  Upper Extremity Strength:    -       Right Upper Extremity: 4/5  -       Left Upper Extremity: 4/5   Strength:    -       Right Upper Extremity: WFL  -       Left Upper Extremity: WFL  Fine Motor Coordination:    -       Intact    AMPAC 6 Click ADL:  AMPAC Total Score: 14    Treatment & Education:  - OT role/POC  - Importance of OOB activity to maximize recovery  - Safety w/ functional mobility; hand placement to ensure safe transfers to/from various surfaces  - Cues for bed mobility and scooting to facilitate and promote functional independence  - Gait sequence w/ RW management  Education:    Patient left up in chair with all lines intact, call button in reach and RN notified    GOALS:   Multidisciplinary Problems     Occupational Therapy Goals        Problem: Occupational Therapy Goal    Goal Priority Disciplines Outcome Interventions   Occupational Therapy Goal     OT, PT/OT Ongoing, Progressing    Description:  Goals to be met by: 11/8/2019     Patient will increase functional independence with ADLs by performing:    UE Dressing with [place].  LE Dressing with Minimal Assistance.  Grooming while standing with Minimal Assistance.  Toileting from bedside commode with Minimal Assistance for hygiene and clothing management.   Supine to sit with Stand-by Assistance.  Toilet transfer to bedside commode with Contact Guard Assistance.                      History:     Past Medical History:   Diagnosis Date    (HFpEF) heart failure with preserved ejection fraction 5/21/2019    Anemia in ESRD (end-stage renal disease) 5/29/2016    Aneurysm of arteriovenous dialysis fistula     Aortic atherosclerosis 11/22/2016    Bilateral low back pain without sciatica 11/17/2015     Blindness of right eye 11/12/2016    Brain compression 6/22/2019    Cataract     Central retinal vein occlusion, right eye 6/3/2014    Chronic diastolic heart failure 1/8/2016    Chronic respiratory failure with hypoxia 5/29/2016    Coronary artery disease involving native coronary artery of native heart without angina pectoris 12/12/2016    Diverticulosis     Encounter for blood transfusion     Enlarged LA (left atrium) 10/7/2016    Epiretinal membrane 7/17/2012    ESRD on hemodialysis     MWF    History of GI diverticular bleed     5/22/16    Hyperparathyroidism, secondary renal 10/14/2016    Left spastic hemiparesis 11/13/2016    Moderate single current episode of major depressive disorder 2/8/2019    Non-rheumatic tricuspid valve insufficiency 1/15/2017    Peripheral vascular disease, unspecified 11/22/2016    Physical debility 11/17/2016    Pleural effusion on right     Pulmonary emphysema 1/15/2017    Pulmonary hypertension 6/28/2019    Renovascular hypertension 1/8/2016    Seizure 6/24/2019    Severe aortic valve stenosis 2/4/2016    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 11/13/2016    s/p thrombectomy of MCA    Subdural hematoma 05/21/2019    Bilateral R>L    Thrombocytopenia, unspecified 1/15/2017    Type 2 diabetes mellitus with chronic kidney disease on chronic dialysis, without long-term current use of insulin 5/1/2018    Type 2 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, without long-term current use of insulin 3/26/2013    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, without long-term current use of insulin 3/26/2013    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       Past Surgical History:   Procedure Laterality Date    ABDOMINAL SURGERY      BREAST SURGERY      tumor removal x 2    CARDIAC SURGERY  2016    Implantable loop recorder placed    CATARACT EXTRACTION       CEREBRAL ANGIOGRAM N/A 6/24/2019    Procedure: ANGIOGRAM-CEREBRAL;  Surgeon: Kenisha Surgeon;  Location: [facility];  Service: Anesthesiology;  Laterality: N/A;    CHOLECYSTECTOMY      COLONOSCOPY N/A 5/23/2016    Procedure: COLONOSCOPY;  Surgeon: WILLIAM Colvin MD;  Location: [facility] ENDO (2ND FLR);  Service: Endoscopy;  Laterality: N/A;    COLONOSCOPY N/A 5/30/2016    Procedure: COLONOSCOPY;  Surgeon: Sam Davis MD;  Location: [facility] ENDO (2ND FLR);  Service: Endoscopy;  Laterality: N/A;    CRANIOTOMY FOR EVACUATION OF SUBDURAL HEMATOMA Right 6/23/2019    Procedure: KATE HOLES FOR SUBDURAL HEMATOMA EVACUATION;  Surgeon: Trevor Conner MD;  [facility];  Service: Neurosurgery;  Laterality: Right;    EYE SURGERY      FISTULOGRAM Left 11/28/2018    Procedure: Fistulogram;  Surgeon: NADINE Blum III, MD;  Location: [facility] CATH LAB;  Service: Cardiology;  Laterality: Left;    INTRAMEDULLARY RODDING OF FEMUR Left 10/28/2019    Procedure: INSERTION, INTRAMEDULLARY NELIDA, FEMUR,  Left , synthes, hana table, large C arm clock side;  Surgeon: Torey Aguilar MD;  [facility];  Service: Orthopedics;  Laterality: Left;  general        PLACEMENT OF DUAL-LUMEN VASCULAR CATHETER Right 11/29/2018    Procedure: INSERTION, CATHETER, VASCULAR, DUAL LUMEN;  Surgeon: NADINE Blum III, MD;  [facility];  Service: Peripheral Vascular;  Laterality: Right;  Permacatheter placement     RESECTION OF ANEURYSM Left 11/29/2018    Procedure: EXCISION, ANEURYSM;  Surgeon: NADINE Blum III, MD;  Location: [facility] OR [facility] FLR;  Service: Peripheral Vascular;  Laterality: Left;  Excision, L AVF aneurysm    REVISION OF ARTERIOVENOUS FISTULA Left 11/29/2018    Procedure: REVISION, AV FISTULA,;  Surgeon: NADINE Blum III, MD;  Location: [facility] OR [facility] FLR;  Service: Peripheral Vascular;  Laterality: Left;  OR 11    UPPER GASTROINTESTINAL ENDOSCOPY         Time Tracking:     OT Date of  Treatment: 10/30/19  OT Start Time: 0845  OT Stop Time: 0920  OT Total Time (min): 35 min    Billable Minutes:Evaluation 10  Therapeutic Activity 25    Lucinda Fox OT  10/30/2019

## 2019-10-30 NOTE — SUBJECTIVE & OBJECTIVE
Principal Problem:Closed 2-part intertrochanteric fracture of left femur    Principal Orthopedic Problem: same    Interval History: Pt seen and examined at bedside. NAEO. Pt reports pain is controlled. Declined PT yesterday.    Review of patient's allergies indicates:  No Known Allergies    Current Facility-Administered Medications   Medication    0.9%  NaCl infusion (for blood administration)    0.9%  NaCl infusion    0.9%  NaCl infusion    acetaminophen tablet 1,000 mg    amLODIPine tablet 10 mg    artificial tears 0.5 % ophthalmic solution 2 drop    bisacodyl suppository 10 mg    cefTRIAXone injection 1 g    cloNIDine tablet 0.1 mg    dextrose 10% (D10W) Bolus    dextrose 10% (D10W) Bolus    epoetin shon-epbx injection 4,000 Units    ergocalciferol capsule 50,000 Units    fluticasone furoate-vilanterol 200-25 mcg/dose diskus inhaler 1 puff    glucagon (human recombinant) injection 1 mg    glucose chewable tablet 16 g    glucose chewable tablet 24 g    heparin (porcine) injection 5,000 Units    hydrALAZINE tablet 100 mg    insulin aspart U-100 pen 0-5 Units    levETIRAcetam tablet 500 mg    lidocaine-prilocaine cream    losartan tablet 50 mg    methocarbamol tablet 1,000 mg    morphine injection 2 mg    mupirocin 2 % ointment 1 g    ondansetron injection 4 mg    oxyCODONE immediate release tablet 5 mg    polyethylene glycol packet 17 g    promethazine (PHENERGAN) 6.25 mg in dextrose 5 % 50 mL IVPB    ramelteon tablet 8 mg    ropivacaine (PF) 2 mg/ml (0.2%) infusion    sevelamer carbonate tablet 800 mg    sodium chloride 0.9% flush 10 mL    sodium chloride 0.9% flush 10 mL     Objective:     Vital Signs (Most Recent):  Temp: 97.7 °F (36.5 °C) (10/30/19 0805)  Pulse: (!) 59 (10/30/19 0805)  Resp: 18 (10/30/19 0805)  BP: (!) 160/84 (10/30/19 0805)  SpO2: 100 % (10/30/19 0805) Vital Signs (24h Range):  Temp:  [96.6 °F (35.9 °C)-98 °F (36.7 °C)] 97.7 °F (36.5 °C)  Pulse:  [54-64]  "59  Resp:  [18] 18  SpO2:  [99 %-100 %] 100 %  BP: (117-179)/(34-84) 160/84     Weight: 50.8 kg (111 lb 15.9 oz)  Height: 5' 3" (160 cm)  Body mass index is 19.84 kg/m².      Intake/Output Summary (Last 24 hours) at 10/30/2019 0827  Last data filed at 10/29/2019 1800  Gross per 24 hour   Intake 1040 ml   Output 1600 ml   Net -560 ml       Ortho/SPM Exam      AAOx4  NAD  Reg rate  No increased WOB  Dressing c/d/i  SILT T/SP/DP/[place]/Sa  Motor intact T/SP/DP  WWP extremities  FCDs in place and functioning      Significant Labs: All pertinent labs within the past 24 hours have been reviewed.  Recent Labs   Lab 10/30/19  0358   WBC 5.47   RBC 2.79*   HGB 8.2*   HCT 28.7*   PLT 77*   *   MCH 29.4   MCHC 28.6*         Significant Imaging: I have reviewed and interpreted all pertinent imaging results/findings.  "

## 2019-10-30 NOTE — SUBJECTIVE & OBJECTIVE
Past Medical History:   Diagnosis Date    (HFpEF) heart failure with preserved ejection fraction 5/21/2019    Anemia in ESRD (end-stage renal disease) 5/29/2016    Aneurysm of arteriovenous dialysis fistula     Aortic atherosclerosis 11/22/2016    Bilateral low back pain without sciatica 11/17/2015    Blindness of right eye 11/12/2016    Brain compression 6/22/2019    Cataract     Central retinal vein occlusion, right eye 6/3/2014    Chronic diastolic heart failure 1/8/2016    Chronic respiratory failure with hypoxia 5/29/2016    Coronary artery disease involving native coronary artery of native heart without angina pectoris 12/12/2016    Diverticulosis     Encounter for blood transfusion     Enlarged LA (left atrium) 10/7/2016    Epiretinal membrane 7/17/2012    ESRD on hemodialysis     MWF    History of GI diverticular bleed     5/22/16    Hyperparathyroidism, secondary renal 10/14/2016    Left spastic hemiparesis 11/13/2016    Moderate single current episode of major depressive disorder 2/8/2019    Non-rheumatic tricuspid valve insufficiency 1/15/2017    Peripheral vascular disease, unspecified 11/22/2016    Physical debility 11/17/2016    Pleural effusion on right     Pulmonary emphysema 1/15/2017    Pulmonary hypertension 6/28/2019    Renovascular hypertension 1/8/2016    Seizure 6/24/2019    Severe aortic valve stenosis 2/4/2016    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 11/13/2016    s/p thrombectomy of MCA    Subdural hematoma 05/21/2019    Bilateral R>L    Thrombocytopenia, unspecified 1/15/2017    Type 2 diabetes mellitus with chronic kidney disease on chronic dialysis, without long-term current use of insulin 5/1/2018    Type 2 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, without long-term current use of insulin 3/26/2013    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,  without long-term current use of insulin 3/26/2013    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     Past Surgical History:   Procedure Laterality Date    ABDOMINAL SURGERY      BREAST SURGERY      tumor removal x 2    CARDIAC SURGERY  2016    Implantable loop recorder placed    CATARACT EXTRACTION      CEREBRAL ANGIOGRAM N/A 6/24/2019    Procedure: ANGIOGRAM-CEREBRAL;  Surgeon: Kenisha Surgeon;  Location: [facility];  Service: Anesthesiology;  Laterality: N/A;    CHOLECYSTECTOMY      COLONOSCOPY N/A 5/23/2016    Procedure: COLONOSCOPY;  Surgeon: WILLIAM Colvin MD;  Location: [facility] ENDO (2ND FLR);  Service: Endoscopy;  Laterality: N/A;    COLONOSCOPY N/A 5/30/2016    Procedure: COLONOSCOPY;  Surgeon: Sam Davis MD;  [facility]  Service: Endoscopy;  Laterality: N/A;    CRANIOTOMY FOR EVACUATION OF SUBDURAL HEMATOMA Right 6/23/2019    Procedure: KATE HOLES FOR SUBDURAL HEMATOMA EVACUATION;  Surgeon: Trevor Conner MD;  Location: [address];  Service: Neurosurgery;  Laterality: Right;    EYE SURGERY      FISTULOGRAM Left 11/28/2018    Procedure: Fistulogram;  Surgeon: NADINE Blum III, MD;  Location: [facility] CATH LAB;  Service: Cardiology;  Laterality: Left;    INTRAMEDULLARY RODDING OF FEMUR Left 10/28/2019    Procedure: INSERTION, INTRAMEDULLARY NELIDA, FEMUR,  Left , synthes, hana table, large C arm clock side;  Surgeon: Torey Aguilar MD;  Location: [address];  Service: Orthopedics;  Laterality: Left;  general        PLACEMENT OF DUAL-LUMEN VASCULAR CATHETER Right 11/29/2018    Procedure: INSERTION, CATHETER, VASCULAR, DUAL LUMEN;  Surgeon: NADINE Blum III, MD;  Location: [facility] OR [address] [place] [address];  Service: Peripheral Vascular;  Laterality: Right;  Permacatheter placement     RESECTION OF ANEURYSM Left 11/29/2018    Procedure: EXCISION, ANEURYSM;  Surgeon: NADINE Blum III, MD;  [facility];  Service: Peripheral Vascular;  Laterality: Left;   Excision, L AVF aneurysm    REVISION OF ARTERIOVENOUS FISTULA Left 11/29/2018    Procedure: REVISION, AV FISTULA,;  Surgeon: NADINE Blum III, MD;  Location: [facility] OR [address] [place] [address];  Service: Peripheral Vascular;  Laterality: Left;  OR 11    UPPER GASTROINTESTINAL ENDOSCOPY       Review of patient's allergies indicates:  No Known Allergies    Scheduled Medications:    sodium chloride 0.9%   Intravenous Once    acetaminophen  1,000 mg Oral Q6H    amLODIPine  10 mg Oral Daily    cefTRIAXone (ROCEPHIN) IVPB  1 g Intravenous Q24H    cloNIDine  0.1 mg Oral TID    epoetin shon-ebpx (RETACRIT) injection  4,000 Units Intravenous Every Tues, Thurs, Sat    ergocalciferol  50,000 Units Oral Q7 Days    fluticasone furoate-vilanterol  1 puff Inhalation Daily    heparin (porcine)  5,000 Units Subcutaneous Q8H    hydrALAZINE  100 mg Oral Q8H    levETIRAcetam  500 mg Oral BID    losartan  50 mg Oral Daily    methocarbamol  500 mg Oral QID    mupirocin  1 g Nasal BID    polyethylene glycol  17 g Oral Daily    sevelamer carbonate  800 mg Oral TID WM       PRN Medications: sodium chloride, sodium chloride 0.9%, artificial tears, bisacodyl, Dextrose 10% Bolus, Dextrose 10% Bolus, glucagon (human recombinant), glucose, glucose, insulin aspart U-100, lidocaine-prilocaine, morphine, ondansetron, oxyCODONE, promethazine (PHENERGAN) IVPB, ramelteon, sodium chloride 0.9%, sodium chloride 0.9%, traMADol    Family History     Problem Relation (Age of Onset)    Cancer Sister    Cataracts Mother    Esophageal cancer Sister    Glaucoma Brother, Maternal Aunt    Heart disease Brother    Heart failure Sister    Hypertension Mother, Sister, Brother, Father    No Known Problems Maternal Uncle, Paternal Aunt, Paternal Uncle, Maternal Grandmother, Maternal Grandfather, Paternal Grandmother, Paternal Grandfather    Stroke Mother        Tobacco Use    Smoking status: Never Smoker    Smokeless tobacco: Never Used   Substance and Sexual  Activity    Alcohol use: No     Comment: Reports occasional 1-2 drinks     Drug use: No    Sexual activity: Not Currently     Review of Systems   Reason unable to perform ROS: unable to fully assess 2/2 did not want to participate in exam due to pain    Constitutional: Positive for activity change.   Musculoskeletal: Positive for gait problem.   Skin: Positive for wound.   Neurological: Positive for weakness.   Psychiatric/Behavioral: Positive for agitation.     Objective:     Vital Signs (Most Recent):  Temp: 97.7 °F (36.5 °C) (10/30/19 0805)  Pulse: (!) 57 (10/30/19 1114)  Resp: 18 (10/30/19 1114)  BP: (!) 146/66 (10/30/19 1114)  SpO2: 100 % (10/30/19 1114)    Vital Signs (24h Range):  Temp:  [96.6 °F (35.9 °C)-97.7 °F (36.5 °C)] 97.7 °F (36.5 °C)  Pulse:  [54-61] 57  Resp:  [18] 18  SpO2:  [99 %-100 %] 100 %  BP: (117-179)/(54-84) 146/66     Body mass index is 19.84 kg/m².    Physical Exam   Constitutional: She is oriented to person, place, and time. She appears well-developed and well-nourished.   HENT:   Head: Normocephalic and atraumatic.   Neck: Neck supple.   Pulmonary/Chest: Effort normal. No respiratory distress.   Musculoskeletal:   Unable to assess muscle strength 2/2 did not participate    Neurological: She is oriented to person, place, and time.   - not opening eyes or following commands during exam     Psychiatric: She is agitated.   Nursing note and vitals reviewed.    NEUROLOGICAL EXAMINATION:     MENTAL STATUS   Oriented to person, place, and time.       Diagnostic Results:   Labs: Reviewed  ECG: Reviewed  X-Ray: Reviewed

## 2019-10-30 NOTE — SUBJECTIVE & OBJECTIVE
"Interval History: sitting in chair, reports still having significant pain in leg and she had her infusion adjusted this morning for better control. Says thinks her BP is higher today because of her pain, says her BP was one time in "300 systolic in [facility]" and she almost  but has been much better on BP meds long term and never that high again. Discussed working more with PT later and placement for strengthetning after her hospital stay which shes amenable to. She said she has to have a BM but wants to be put on the commode after lunch because she doesn't want to have a BM in bed.  No other issues reported today, denies SOB/ chest pain.    Review of Systems   Constitutional: Negative for chills and fever.   Respiratory: Negative for cough and shortness of breath.    Cardiovascular: Negative for chest pain, palpitations and leg swelling.   Gastrointestinal: Negative for abdominal pain, constipation, nausea and vomiting.   Musculoskeletal: Positive for arthralgias (Left hip ). Negative for back pain.   Skin: Negative for rash.   Neurological: Negative for dizziness and light-headedness.   Psychiatric/Behavioral: Negative for agitation, confusion and hallucinations.     Objective:     Vital Signs (Most Recent):  Temp: 98 °F (36.7 °C) (10/28/19 0959)  Pulse: 64 (10/28/19 1025)  Resp: 14 (10/28/19 1025)  BP: 171/76 (10/28/19 1025)  SpO2: 100 % (10/28/19 1015) Vital Signs (24h Range):  Temp:  [96.6 °F (35.9 °C)-97.7 °F (36.5 °C)] 97.7 °F (36.5 °C)  Pulse:  [54-61] 57  Resp:  [18] 18  SpO2:  [99 %-100 %] 100 %  BP: (117-179)/(54-84) 146/66     Weight: 50.8 kg (111 lb 15.9 oz)  Body mass index is 19.84 kg/m².    Intake/Output Summary (Last 24 hours) at 10/30/2019 1128  Last data filed at 10/29/2019 1800  Gross per 24 hour   Intake 440 ml   Output 0 ml   Net 440 ml      Physical Exam   Constitutional: She is oriented to person, place, and time. No distress.   Thin and frail, elderly female    HENT: "   Mouth/Throat: Oropharynx is clear and moist.   Eyes:   Right eye cloudy and not reactive to light. 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    Neck: Neck supple. No JVD present.   Cardiovascular: Normal rate and regular rhythm. Exam reveals no gallop and no friction rub.   Murmur heard.   Crescendo decrescendo systolic murmur is present with a grade of 4/6.  Murmur heard throughout precordium. Harsh systolic murmur.   Pulmonary/Chest: Effort normal and breath sounds normal. No respiratory distress. She has no wheezes.   Abdominal: Soft. Bowel sounds are normal. She exhibits no distension. There is no tenderness. There is no guarding.   Musculoskeletal: She exhibits no edema.   Left AV fistula in place with good thrill and bruit   Neurological: She is alert and oriented to person, place, and time.   Skin: Skin is warm. No erythema.   Psychiatric: She has a normal mood and affect. Her behavior is normal. Thought content normal.   Nursing note and vitals reviewed.      Significant Labs:   CBC:   Recent Labs   Lab 10/28/19  1446 10/29/19  0435 10/30/19  0358   WBC 7.29 7.67 5.47   HGB 7.9* 8.6* 8.2*   HCT 27.5* 28.8* 28.7*   PLT 59* 81* 77*     CMP:   Recent Labs   Lab 10/29/19  0435 10/30/19  0358    141   K 4.8 4.1    105   CO2 23 25   GLU 98 72   BUN 29* 20   CREATININE 4.1* 2.9*   CALCIUM 8.6* 8.5*   ANIONGAP 12 11   EGFRNONAA 9.9* 15.0*     Magnesium:   No results for input(s): MG in the last 48 hours.  Lab Results   Component Value Date    FNLKPBCU36KN 17 (L) 10/28/2019       Blood Sugars (AccuCheck):    Recent Labs     10/29/19  0834 10/29/19  1111 10/29/19  1242 10/29/19  1726 10/30/19  0018 10/30/19  0803   POCTGLUCOSE 80 84 74 91 88 70        Significant Imaging: I have reviewed all pertinent imaging results/findings within the past 24 hours.

## 2019-10-30 NOTE — CONSULTS
[facility]  Physical Medicine & Rehab  Consult Note    Patient Name: Tea Georges  MRN: [id number]  Admission Date: 10/27/2019  Hospital Length of Stay: 3 days  Attending Physician: Desi Potter MD     Inpatient consult to Physical Medicine & Rehabilitation  Consult performed by: Jasmyne De Los Santos NP  Consult requested by:  Desi Potter MD    Collaborating Physician: Lola Benton MD  Reason for Consult:  Assess rehabilitation needs     Consults  Subjective:     Principal Problem: Closed 2-part intertrochanteric fracture of left femur    HPI: Tea Georges is a 79-year-old female with PMHx of 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  Patient presented to [facility] on 10/27 s/p fall w/  L hip pain.  X-ray revealed L intertrochanteric hip fracture. Hospital medicine consulted for medical optimization for surgery. Orthopedics consulted and now s/p IM brianne in L femur on 10/28. PNA catheter infusing.  WBAT LLE. Hospital course complicated by UTI (haley in place), thrombocytopenia & anemia.     Functional History: Patient lives in [place].  Prior to admission, (I) with use of walker or cane to ambulate in home.       Hospital Course:   10/29/2019: Therapy evaluations pending.     Past Medical History:   Diagnosis Date    (HFpEF) heart failure with preserved ejection fraction 5/21/2019    Anemia in ESRD (end-stage renal disease) 5/29/2016    Aneurysm of arteriovenous dialysis fistula     Aortic atherosclerosis  11/22/2016    Bilateral low back pain without sciatica 11/17/2015    Blindness of right eye 11/12/2016    Brain compression 6/22/2019    Cataract     Central retinal vein occlusion, right eye 6/3/2014    Chronic diastolic heart failure 1/8/2016    Chronic respiratory failure with hypoxia 5/29/2016    Coronary artery disease involving native coronary artery of native heart without angina pectoris 12/12/2016    Diverticulosis     Encounter for blood transfusion     Enlarged LA (left atrium) 10/7/2016    Epiretinal membrane 7/17/2012    ESRD on hemodialysis     MWF    History of GI diverticular bleed     5/22/16    Hyperparathyroidism, secondary renal 10/14/2016    Left spastic hemiparesis 11/13/2016    Moderate single current episode of major depressive disorder 2/8/2019    Non-rheumatic tricuspid valve insufficiency 1/15/2017    Peripheral vascular disease, unspecified 11/22/2016    Physical debility 11/17/2016    Pleural effusion on right     Pulmonary emphysema 1/15/2017    Pulmonary hypertension 6/28/2019    Renovascular hypertension 1/8/2016    Seizure 6/24/2019    Severe aortic valve stenosis 2/4/2016    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 11/13/2016    s/p thrombectomy of MCA    Subdural hematoma 05/21/2019    Bilateral R>L    Thrombocytopenia, unspecified 1/15/2017    Type 2 diabetes mellitus with chronic kidney disease on chronic dialysis, without long-term current use of insulin 5/1/2018    Type 2 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, without long-term current use of insulin 3/26/2013    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, without long-term current use of insulin 3/26/2013    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     Past Surgical History:   Procedure Laterality Date    ABDOMINAL SURGERY      BREAST SURGERY      tumor removal x 2    CARDIAC SURGERY   2016    Implantable loop recorder placed    CATARACT EXTRACTION      CEREBRAL ANGIOGRAM N/A 6/24/2019    Procedure: ANGIOGRAM-CEREBRAL;  Surgeon: Kenisha Surgeon;  Location: [facility];  Service: Anesthesiology;  Laterality: N/A;    CHOLECYSTECTOMY      COLONOSCOPY N/A 5/23/2016    Procedure: COLONOSCOPY;  Surgeon: WILLIAM Covlin MD;  Location: [facility] ENDO (2ND FLR);  Service: Endoscopy;  Laterality: N/A;    COLONOSCOPY N/A 5/30/2016    Procedure: COLONOSCOPY;  Surgeon: Sam Davis MD;  Location: [facility] ENDO (2ND FLR);  Service: Endoscopy;  Laterality: N/A;    CRANIOTOMY FOR EVACUATION OF SUBDURAL HEMATOMA Right 6/23/2019    Procedure: KATE HOLES FOR SUBDURAL HEMATOMA EVACUATION;  Surgeon: Trevor Conner MD;  [facility];  Service: Neurosurgery;  Laterality: Right;    EYE SURGERY      FISTULOGRAM Left 11/28/2018    Procedure: Fistulogram;  Surgeon: NADINE Blum III, MD;  Location: [facility] CATH LAB;  Service: Cardiology;  Laterality: Left;    INTRAMEDULLARY RODDING OF FEMUR Left 10/28/2019    Procedure: INSERTION, INTRAMEDULLARY NELIDA, FEMUR,  Left , synthes, hana table, large C arm clock side;  Surgeon: Torey Aguilar MD;  [facility];  Service: Orthopedics;  Laterality: Left;  general        PLACEMENT OF DUAL-LUMEN VASCULAR CATHETER Right 11/29/2018    Procedure: INSERTION, CATHETER, VASCULAR, DUAL LUMEN;  Surgeon: NADINE Blum III, MD;  [facility];  Service: Peripheral Vascular;  Laterality: Right;  Permacatheter placement     RESECTION OF ANEURYSM Left 11/29/2018    Procedure: EXCISION, ANEURYSM;  Surgeon: NADINE Blum III, MD;  Location: [facility] FLR;  Service: Peripheral Vascular;  Laterality: Left;  Excision, L AVF aneurysm    REVISION OF ARTERIOVENOUS FISTULA Left 11/29/2018    Procedure: REVISION, AV FISTULA,;  Surgeon: NADINE Blum III, MD;  [facility];  Service: Peripheral Vascular;  Laterality: Left;  OR 11     UPPER GASTROINTESTINAL ENDOSCOPY       Review of patient's allergies indicates:  No Known Allergies    Scheduled Medications:    sodium chloride 0.9%   Intravenous Once    acetaminophen  1,000 mg Oral Q6H    amLODIPine  10 mg Oral Daily    cefTRIAXone (ROCEPHIN) IVPB  1 g Intravenous Q24H    cloNIDine  0.1 mg Oral TID    epoetin shon-ebpx (RETACRIT) injection  4,000 Units Intravenous Every Tues, Thurs, Sat    ergocalciferol  50,000 Units Oral Q7 Days    fluticasone furoate-vilanterol  1 puff Inhalation Daily    heparin (porcine)  5,000 Units Subcutaneous Q8H    hydrALAZINE  100 mg Oral Q8H    levETIRAcetam  500 mg Oral BID    losartan  50 mg Oral Daily    methocarbamol  500 mg Oral QID    mupirocin  1 g Nasal BID    polyethylene glycol  17 g Oral Daily    sevelamer carbonate  800 mg Oral TID WM       PRN Medications: sodium chloride, sodium chloride 0.9%, artificial tears, bisacodyl, Dextrose 10% Bolus, Dextrose 10% Bolus, glucagon (human recombinant), glucose, glucose, insulin aspart U-100, lidocaine-prilocaine, morphine, ondansetron, oxyCODONE, promethazine (PHENERGAN) IVPB, ramelteon, sodium chloride 0.9%, sodium chloride 0.9%, traMADol    Family History     Problem Relation (Age of Onset)    Cancer Sister    Cataracts Mother    Esophageal cancer Sister    Glaucoma Brother, Maternal Aunt    Heart disease Brother    Heart failure Sister    Hypertension Mother, Sister, Brother, Father    No Known Problems Maternal Uncle, Paternal Aunt, Paternal Uncle, Maternal Grandmother, Maternal Grandfather, Paternal Grandmother, Paternal Grandfather    Stroke Mother        Tobacco Use    Smoking status: Never Smoker    Smokeless tobacco: Never Used   Substance and Sexual Activity    Alcohol use: No     Comment: Reports occasional 1-2 drinks     Drug use: No    Sexual activity: Not Currently     Review of Systems   Reason unable to perform ROS: unable to fully assess 2/2 did not want to participate in exam  due to pain    Constitutional: Positive for activity change.   Musculoskeletal: Positive for gait problem.   Skin: Positive for wound.   Neurological: Positive for weakness.   Psychiatric/Behavioral: Positive for agitation.     Objective:     Vital Signs (Most Recent):  Temp: 97.7 °F (36.5 °C) (10/30/19 0805)  Pulse: (!) 57 (10/30/19 1114)  Resp: 18 (10/30/19 1114)  BP: (!) 146/66 (10/30/19 1114)  SpO2: 100 % (10/30/19 1114)    Vital Signs (24h Range):  Temp:  [96.6 °F (35.9 °C)-97.7 °F (36.5 °C)] 97.7 °F (36.5 °C)  Pulse:  [54-61] 57  Resp:  [18] 18  SpO2:  [99 %-100 %] 100 %  BP: (117-179)/(54-84) 146/66     Body mass index is 19.84 kg/m².    Physical Exam   Constitutional: She is oriented to person, place, and time. She appears well-developed and well-nourished.   HENT:   Head: Normocephalic and atraumatic.   Neck: Neck supple.   Pulmonary/Chest: Effort normal. No respiratory distress.   Musculoskeletal:   Unable to assess muscle strength 2/2 did not participate    Neurological: She is oriented to person, place, and time.   - not opening eyes or following commands during exam   Psychiatric: She is agitated.   Nursing note and vitals reviewed.    Diagnostic Results:   Labs: Reviewed  ECG: Reviewed  X-Ray: Reviewed    Assessment/Plan:     * Closed 2-part intertrochanteric fracture of left femur - CMN fixation 10/28/19  -s/p fall w/  L hip pain  - X-ray revealed L intertrochanteric hip fracture  -Orthopedics consulted and now s/p IM brianne in L femur on 10/28  -PNA catheter infusing  -PT/OT eval & treat     - Related to prolonged/acute hospital course.     Recommendations  -  Encourage mobility, OOB in chair at least 3 hours per day, and early ambulation as appropriate  -  PT/OT evaluate and treat  -  Pain management  -  Monitor for and prevent skin breakdown and pressure ulcers  · Early mobility, repositioning/weight shifting every 20-30 minutes when sitting, turn patient every 2 hours, proper mattress/overlay and  chair cushioning, pressure relief/heel protector boots  -  DVT prophylaxis    -  Reviewed discharge options (IP rehab, SNF, HH therapy, and OP therapy)      Complicated UTI (urinary tract infection)  - Rocephin     Physical debility  -previously at [facility] 06/2019 for s/p evac of SDH    ESRD on hemodialysis  -nephrology following    PT and OT evals pending. Will follow progress and discuss with rehab team for post acute care/rehab recommendation.    Thank you for your consult.     Jasmyne De Los Santos NP  Department of Physical Medicine & Rehab  [facility]

## 2019-10-30 NOTE — ASSESSMENT & PLAN NOTE
-admitted for fall, s/p cephalomeduallary nail on 10/28, WBAT, PT/OT on POD 1 with patient in HD so missed her in AM and in PM she declined due to pain. She said she will work further today on POD 2.  - likely SNF vs rehab for strengthening post op  -Heparin 5000 units subcutaneous 3 times daily ppx post-op and MADDY/SCDs for DVT prophylaxis due to ESRD, end date November 26th  · Perineural pain catheter in place with continuous Ropivacaine for pain control and being managed by Anesthesia Pain Service. Reports pain still present today but improved from yesterday  · Monitor closely post op due to comorbidities but doing well so far. HD today, denies SOB, chest pain.  · Hg stable post op in mid 8's, no signs of bleeding. Pain controlled

## 2019-10-30 NOTE — PT/OT/SLP EVAL
Physical Therapy Evaluation    Patient Name:  Tea Georges   MRN:  [id number]    Recommendations:     Discharge Recommendations:  nursing facility, skilled   Discharge Equipment Recommendations: walker, rolling, bath bench   Barriers to discharge: Inaccessible home and Decreased caregiver support    Assessment:     Tea Georges is a 79 y.o. female admitted with a medical diagnosis of Closed 2-part intertrochanteric fracture of left femur.  She presents with the following impairments/functional limitations:  weakness, impaired endurance, gait instability, impaired functional mobilty, impaired balance, decreased lower extremity function, pain, orthopedic precautions, impaired joint extensibility, visual deficits . Patient limited by high level of pain which improved as session progressed. She was able to stand w/ RW and min/mod assistance and walk 6 feet w/ RW and min/mod assistance w/ chair follow.    Rehab Prognosis: Good; patient would benefit from acute skilled PT services to address these deficits and reach maximum level of function.    Recent Surgery: Procedure(s) (LRB):  INSERTION, INTRAMEDULLARY NELIDA, FEMUR,  Left , synthes, hana table, large C arm clock side (Left) 2 Days Post-Op    Plan:     During this hospitalization, patient to be seen daily to address the identified rehab impairments via gait training, therapeutic activities, therapeutic exercises and progress toward the following goals:    · Plan of Care Expires:  11/29/19    Subjective     Chief Complaint: pain  Patient/Family Comments/goals: return to PLOF  Pain/Comfort:  · Pain Rating 1: other (see comments)(20/10)  · Location - Side 1: Left  · Location - Orientation 1: generalized  · Location 1: leg  · Pain Addressed 1: Pre-medicate for activity, Distraction, Reposition, Nurse notified, Cessation of Activity  · Pain Rating Post-Intervention 1: 10/10    Patients cultural, spiritual, [employer]ian conflicts given the current situation:      Living  Environment:  Patient lives w/ dtr and 2 adult grand dtrs in [facility] w/ 3 MARY at front w/o HR and at back w/ 3 MARY and right HR. She will be home alone during the day.   Prior to admission, patients level of function was mod(I) household level w/ rollator.  Equipment used at home: rollator, wheelchair, bedside commode, cane, straight.  DME owned (not currently used): none.  Upon discharge, patient will have assistance from family in evening/night only.    Objective:     Communicated with nurse prior to session.  Patient found HOB elevated with perineural catheter, peripheral IV, SCD, oxygen, telemetry  upon PT entry to room.    General Precautions: Standard, fall   Orthopedic Precautions:LLE weight bearing as tolerated   Braces: N/A     Exams:  · Cognitive Exam:  Patient is oriented to Person, Place, Time and Situation  · Gross Motor Coordination:  WFL  · Postural Exam:  Patient presented with the following abnormalities:    · -       Rounded shoulders  · -       Posterior pelvic tilt  · RLE ROM: WFL   · RLE Strength: WFL  · LLE ROM: Hip NT 2/2 pain/sx; knee, ankle WFL  · LLE Strength: WFL except hip NT,  Can initiate HF    Functional Mobility:  · Bed Mobility:     · Supine to Sit: moderate assistance  · Transfers:     · Sit to Stand:  minimum assistance and moderate assistance with rolling walker  · Bed to Chair: minimum assistance and moderate assistance with  rolling walker  using  Step Transfer  · Gait: amublates w/ RW and min/mod assistance and chair follow. 2 steps needed assistance to advance LLE, then able to advance, extra time, assist to manage, RW, step to gait, slow pace      Therapeutic Activities and Exercises:   Patient educated in PT POC, gait and trf technique    AM-PAC 6 CLICK MOBILITY  Total Score:15     Patient left up in chair with all lines intact, call button in reach and nurse notified.    GOALS:   Multidisciplinary Problems     Physical Therapy Goals        Problem: Physical Therapy Goal     Goal Priority Disciplines Outcome Goal Variances Interventions   Physical Therapy Goal     PT, PT/OT Ongoing, Progressing     Description:  Goals to be met by: 19     Patient will increase functional independence with mobility by performin. Supine to sit with Stand-by Assistance  2. Sit to supine with Stand-by Assistance  3. Sit to stand transfer with Stand-by Assistance  4. Bed to chair transfer with Stand-by Assistance using Rolling Walker  5. Gait  x 100 feet with Stand-by Assistance using Rolling Walker.   6. Ascend/descend 3 stair with right Handrails Minimal Assistance .                       History:     Past Medical History:   Diagnosis Date    (HFpEF) heart failure with preserved ejection fraction 2019    Anemia in ESRD (end-stage renal disease) 2016    Aneurysm of arteriovenous dialysis fistula     Aortic atherosclerosis 2016    Bilateral low back pain without sciatica 2015    Blindness of right eye 2016    Brain compression 2019    Cataract     Central retinal vein occlusion, right eye 6/3/2014    Chronic diastolic heart failure 2016    Chronic respiratory failure with hypoxia 2016    Coronary artery disease involving native coronary artery of native heart without angina pectoris 2016    Diverticulosis     Encounter for blood transfusion     Enlarged LA (left atrium) 10/7/2016    Epiretinal membrane 2012    ESRD on hemodialysis     MWF    History of GI diverticular bleed     16    Hyperparathyroidism, secondary renal 10/14/2016    Left spastic hemiparesis 2016    Moderate single current episode of major depressive disorder 2019    Non-rheumatic tricuspid valve insufficiency 1/15/2017    Peripheral vascular disease, unspecified 2016    Physical debility 2016    Pleural effusion on right     Pulmonary emphysema 1/15/2017    Pulmonary hypertension 2019    Renovascular hypertension  1/8/2016    Seizure 6/24/2019    Severe aortic valve stenosis 2/4/2016    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 11/13/2016    s/p thrombectomy of MCA    Subdural hematoma 05/21/2019    Bilateral R>L    Thrombocytopenia, unspecified 1/15/2017    Type 2 diabetes mellitus with chronic kidney disease on chronic dialysis, without long-term current use of insulin 5/1/2018    Type 2 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, without long-term current use of insulin 3/26/2013    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, without long-term current use of insulin 3/26/2013    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       Past Surgical History:   Procedure Laterality Date    ABDOMINAL SURGERY      BREAST SURGERY      tumor removal x 2    CARDIAC SURGERY  2016    Implantable loop recorder placed    CATARACT EXTRACTION      CEREBRAL ANGIOGRAM N/A 6/24/2019    Procedure: ANGIOGRAM-CEREBRAL;  Surgeon: Kenisha Surgeon;  Location: [facility];  Service: Anesthesiology;  Laterality: N/A;    CHOLECYSTECTOMY      COLONOSCOPY N/A 5/23/2016    Procedure: COLONOSCOPY;  Surgeon: WILLIAM Colvin MD;  Location: [facility] ENDO ([address] [place] [address]);  Service: Endoscopy;  Laterality: N/A;    COLONOSCOPY N/A 5/30/2016    Procedure: COLONOSCOPY;  Surgeon: Sam Davis MD;  [facility]  Service: Endoscopy;  Laterality: N/A;    CRANIOTOMY FOR EVACUATION OF SUBDURAL HEMATOMA Right 6/23/2019    Procedure: KATE HOLES FOR SUBDURAL HEMATOMA EVACUATION;  Surgeon: Trevor Conner MD;  [facility];  Service: Neurosurgery;  Laterality: Right;    EYE SURGERY      FISTULOGRAM Left 11/28/2018    Procedure: Fistulogram;  Surgeon: NADINE Blum III, MD;  Location: [facility] CATH LAB;  Service: Cardiology;  Laterality: Left;    INTRAMEDULLARY RODDING OF FEMUR Left 10/28/2019    Procedure: INSERTION,  INTRAMEDULLARY NELIDA, FEMUR,  Left , synthes, hana table, large C arm clock side;  Surgeon: Torey Aguilar MD;  Location: [facility] OR 2ND FLR;  Service: Orthopedics;  Laterality: Left;  general        PLACEMENT OF DUAL-LUMEN VASCULAR CATHETER Right 11/29/2018    Procedure: INSERTION, CATHETER, VASCULAR, DUAL LUMEN;  Surgeon: NADINE Blum III, MD;  Location: [facility] OR 2ND FLR;  Service: Peripheral Vascular;  Laterality: Right;  Permacatheter placement     RESECTION OF ANEURYSM Left 11/29/2018    Procedure: EXCISION, ANEURYSM;  Surgeon: NADINE Blum III, MD;  Location: [facility] OR 2ND FLR;  Service: Peripheral Vascular;  Laterality: Left;  Excision, L AVF aneurysm    REVISION OF ARTERIOVENOUS FISTULA Left 11/29/2018    Procedure: REVISION, AV FISTULA,;  Surgeon: NADINE Blum III, MD;  Location: [facility] OR 2ND FLR;  Service: Peripheral Vascular;  Laterality: Left;  OR 11    UPPER GASTROINTESTINAL ENDOSCOPY         Time Tracking:     PT Received On: 10/30/19  PT Start Time: 0846     PT Stop Time: 0920  PT Total Time (min): 34 min     Billable Minutes: Evaluation 15 and Gait Training 10      Windy De La Cruz, PT  10/30/2019

## 2019-10-30 NOTE — ADDENDUM NOTE
Addendum  created 10/30/19 1230 by Marcela Monson MD    Charge Capture section accepted, Cosign clinical note with attestation

## 2019-10-30 NOTE — PROGRESS NOTES
[facility] Hospital Medicine  Progress Note    Patient Name: Tea Georges  MRN: [id number]  Patient Class: IP- Inpatient   Admission Date: 10/27/2019  Length of Stay: 3 days  Attending Physician: Desi Potter MD  Primary Care Provider: Parth Posadas Ii, MD    [facility] Medicine Team: [facility]OSP MED  Desi Potter MD    Subjective:     Principal Problem:Closed 2-part intertrochanteric fracture of left femur        HPI:  79 y.o.female with ESRD on HD (MWF) via LUE fistula, renovascular HTN, severe aortic stenosis with moderate to severe pulmonary HTN, HFpEF, pulmonary emphysema with chronic hypoxic respiratory failure on home oxygen 2 liters at night prn, previous right MCA CVA in 2016, implantable loop recorder in place, Type 2 diabetes with ESRD not on home insulin, recent right SDH s/p evacuation on 6/23/2019, seizures on Keppra with last seizure in 6/2019 and chronic debility with recent stay at [facility] IP rehab in 7/2019 presented to the [facility] ER with complaint of left hip pain. Patient states pain is sharp/stabbing in the left groin, upper leg and is aggravated by any weight bearing and standing. She reports onset of symptoms was about 12 hour(s) after a fall while ambulating to bathroom at home. Patient reports that she was walking to bathroom last night at about 7:00 pm and tripped. Patient thinks she either tripped over oxygen on ground or her foot. Patient reports there is a lot of clutter on floor in the house. Patient states she fell backwards and landed on her left hip. Patient had severe 10/10 pain in left hip after fall and unable to get up from ground due to pain and granddaughter helped her up to chair. Patient did not think she broke her hip so took Ibuprofen and daughter gave her ointment to rub on the hip and it helped. About 3:00 am this morning, pain in left hip was worse and not improving so decision made to bring patient to ER to get evaluated. Patient  denies head trauma. Patient denies to loss of consciousness, denies syncope, denies chest pain, denies dizziness prior or after fall. X-ray obtained in ER revealed left intertrochanteric hip fracture. Orthopedics and [facility] medicine service consulted and patient to be admitted to the hospital to the [facility] service.     Prior to admission patient's functional mobility was independent with uses walker or can to ambulate for home distances. Patient does have history of gait or balance problems. Patient does not have history of previous falls or fractures. Patient does not have previous history of MI or cardiac stenting or cardiac surgery. Patient does have previous history of stroke in 2016. Patient does have previous history of compensated heart failure. Patient does have history of diabetes but is not insulin requiring. Patient does have history of advanced kidney disease with creatinine > 2. Patient currently lives with their family.      Overview/Hospital Course:  10/27: Patient admitted to [facility] Medicine Team H: Hip Fracture team and started on Hip Fracture Pathway with Orthopedic surgery consult for hip fracture. Patient was seen and evaluated by Orthopedic surgery who recommended operative repair of hip fracture. Nephrology consulted on admit and Patient dialyzed by Nephrology for 3 hours in room for pre-op optimization of volume status prior to surgery. Coreg (home med) not started on admit due to HR 50-55).  10/28: Patient was medically optimized prior to surgery with HD. Patient considered high risk surgical candidate due to multiple co-morbidities including severe aortic stenosis and risk and benefits of surgery discussed with patient who wished to proceed with operative repair of hip. Patient to go to OR today for CMN fixation by Dr. Torey Aguilar. Perineural pain catheter placed pre-op for pain control.       Interval History: sitting in chair, reports still having significant pain in leg and she had her  "infusion adjusted this morning for better control. Says thinks her BP is higher today because of her pain, says her BP was one time in "300 systolic in [facility]" and she almost  but has been much better on BP meds long term and never that high again. Discussed working more with PT later and placement for strengthetning after her hospital stay which shes amenable to. She said she has to have a BM but wants to be put on the commode after lunch because she doesn't want to have a BM in bed.  No other issues reported today, denies SOB/ chest pain.    Review of Systems   Constitutional: Negative for chills and fever.   Respiratory: Negative for cough and shortness of breath.    Cardiovascular: Negative for chest pain, palpitations and leg swelling.   Gastrointestinal: Negative for abdominal pain, constipation, nausea and vomiting.   Musculoskeletal: Positive for arthralgias (Left hip ). Negative for back pain.   Skin: Negative for rash.   Neurological: Negative for dizziness and light-headedness.   Psychiatric/Behavioral: Negative for agitation, confusion and hallucinations.     Objective:     Vital Signs (Most Recent):  Temp: 98 °F (36.7 °C) (10/28/19 0959)  Pulse: 64 (10/28/19 1025)  Resp: 14 (10/28/19 1025)  BP: 171/76 (10/28/19 1025)  SpO2: 100 % (10/28/19 1015) Vital Signs (24h Range):  Temp:  [96.6 °F (35.9 °C)-97.7 °F (36.5 °C)] 97.7 °F (36.5 °C)  Pulse:  [54-61] 57  Resp:  [18] 18  SpO2:  [99 %-100 %] 100 %  BP: (117-179)/(54-84) 146/66     Weight: 50.8 kg (111 lb 15.9 oz)  Body mass index is 19.84 kg/m².    Intake/Output Summary (Last 24 hours) at 10/30/2019 1128  Last data filed at 10/29/2019 1800  Gross per 24 hour   Intake 440 ml   Output 0 ml   Net 440 ml      Physical Exam   Constitutional: She is oriented to person, place, and time. No distress.   Thin and frail, elderly female    HENT:   Mouth/Throat: Oropharynx is clear and moist.   Eyes:   Right eye cloudy and not reactive to light. Patient " with blindness to right eye.    Neck: Neck supple. No JVD present.   Cardiovascular: Normal rate and regular rhythm. Exam reveals no gallop and no friction rub.   Murmur heard.   Crescendo decrescendo systolic murmur is present with a grade of 4/6.  Murmur heard throughout precordium. Harsh systolic murmur.   Pulmonary/Chest: Effort normal and breath sounds normal. No respiratory distress. She has no wheezes.   Abdominal: Soft. Bowel sounds are normal. She exhibits no distension. There is no tenderness. There is no guarding.   Musculoskeletal: She exhibits no edema.   Left AV fistula in place with good thrill and bruit   Neurological: She is alert and oriented to person, place, and time.   Skin: Skin is warm. No erythema.   Psychiatric: She has a normal mood and affect. Her behavior is normal. Thought content normal.   Nursing note and vitals reviewed.      Significant Labs:   CBC:   Recent Labs   Lab 10/28/19  1446 10/29/19  0435 10/30/19  0358   WBC 7.29 7.67 5.47   HGB 7.9* 8.6* 8.2*   HCT 27.5* 28.8* 28.7*   PLT 59* 81* 77*     CMP:   Recent Labs   Lab 10/29/19  0435 10/30/19  0358    141   K 4.8 4.1    105   CO2 23 25   GLU 98 72   BUN 29* 20   CREATININE 4.1* 2.9*   CALCIUM 8.6* 8.5*   ANIONGAP 12 11   EGFRNONAA 9.9* 15.0*     Magnesium:   No results for input(s): MG in the last 48 hours.  Lab Results   Component Value Date    [id number] 17 (L) 10/28/2019       Blood Sugars (AccuCheck):    Recent Labs     10/29/19  0834 10/29/19  1111 10/29/19  1242 10/29/19  1726 10/30/19  0018 10/30/19  0803   POCTGLUCOSE 80 84 74 91 88 70        Significant Imaging: I have reviewed all pertinent imaging results/findings within the past 24 hours.      Assessment/Plan:      * Closed 2-part intertrochanteric fracture of left femur - CMN fixation 10/28/19  -admitted for fall, s/p cephalomeduallary nail on 10/28, WBAT, PT/OT on POD 1 with patient in HD so missed her in AM and in PM she declined due to pain. She  said she will work further today on POD 2.  - likely SNF vs rehab for strengthening post op  -Heparin 5000 units subcutaneous 3 times daily ppx post-op and MADDY/SCDs for DVT prophylaxis due to ESRD, end date November 26th  · Perineural pain catheter in place with continuous Ropivacaine for pain control and being managed by Anesthesia Pain Service. Reports pain still present today but improved from yesterday  · Monitor closely post op due to comorbidities but doing well so far. HD today, denies SOB, chest pain.  · Hg stable post op in mid 8's, no signs of bleeding. Pain controlled       Pain    -secondary to fracture and repair, controlled on exam this AM, will see how does with PT/OT later today.    Complicated UTI (urinary tract infection)  · Present on admit.   · Patient with significant UTI on admit but no signs of sepsis or systemic infection so okay to proceed with left hip surgery.   · Will continue patient on IV Rocephin 1 gram daily to treat (patient on day #4). Urine culture sent  With Ecoli, sensitive to most medications now, rocephin included  · Follow-up urine culture and sensitivities and will need  5 days of antibiotics as complicated UTI.       Chronic respiratory failure with hypoxia  -on 2L O2 chronically prior to admit. stable      (HFpEF) heart failure with preserved ejection fraction  Chronic and controlled. Patient with no obvious signs of volume overload on exam.   - as EF is preserved, there is no indication to continue BB chronically from this standpoint and given risk of bradycardia on it (present on admit) the risk of falls with bradycardia and no glaring indication given a preserved EF from a HF guidelines standpoint, we will likely stop it chronically as risk outweighs any benefit       Type 2 diabetes mellitus with chronic kidney disease on chronic dialysis, without long-term current use of insulin  Good control in hospital in past 24 hours.   · HgA1C 5.6% and at goal on admit. Patient on  no meds at home to treat diabetes.   · Plan is to monitor POCT glucose 4 times a day with each meal and at bedtime and cover with Novolog low dose sliding scale insulin.   · 2000 calorie diabetic diet.   · Target pre-meal glucose goal is <140 with all random glucoses <180 in non-critically ill patient.    Thrombocytopenia, unspecified  Chronic and controlled. Platelet count between 70s-90s at baseline. At baseline now on admit and throughout stay so far. Patient with no active bleeding and no platelet transfusion needed. Monitor with daily CBC in hospital.       Pulmonary emphysema  Chronic respiratory failure with hypoxia  · Controlled. Patient with no signs of acute exacerbation.   · Will continue Breo 1 puff daily to treat COPD.   · Continue oxygen as needed with goal of oxygen sats > 88%.    · Encourage IS post op to help prevent pulmonary complications in hospital.    Vitamin D deficiency  Patient on Vitamin D 50,000 units po weekly as outpatient and will continue in hospital. Vit D on this admit 17 and patient still with moderate deficiency and need to continue replacement therapy.       Physical debility  Blindness of right eye  Patient reports at baseline uses cane, walker and wheelchair at times and reports decreased activity after recent brain surgery in 6/2019. Patient did have IP rehab stay at [facility] in 7/2019 and noted improvement in ambulation after IP Rehab stay. Consult PT/OT post-op to assess function but will likely require IP Rehab after left hip surgery.   [facility] consult placed.     Blindness of right eye  -chronic, sequelae on exam with cloudiness in right eye      Hyperparathyroidism, secondary renal  Chronic and controlled. Continue Renvela with meals to treat.   -daily phos, renal diet    Anemia in ESRD (end-stage renal disease)  Chronic and controlled.  -baseline Hg 9s, at 9 prior to surgery. On Epo outpatient with HD.  -Hg stable in 8's. Continue to trend. Expect post op acute  blood loss anemia  -transfuse if under 7      Severe aortic valve stenosis  Pulmonary hypertension due to aortic valve disease  · Patient currently euvolemic.   · Patient with known severe aortic stenosis but asymptomatic. No specific treatment needed at this time. Patient is high risk for surgery due to presence of severe AS and moderate pulmonary HTN.   · Patient needs to follow-up with Interventional Cardiology as outpatient for further evaluation of her aortic stenosis to see if candidate for TAVR.   · Last 2D echo done on 5/22/2019 so no need to repeat prior to this surgery and showed:   · Low normal left ventricular systolic function. The estimated ejection fraction is 53%  · Grade II (moderate) left ventricular diastolic dysfunction consistent with pseudonormalization.  · Severe left atrial enlargement.  · Moderate right ventricular enlargement.  · Normal right ventricular systolic function.  · Severe aortic valve stenosis.  · Aortic valve area is 0.69 cm2; peak velocity is 4.58 m/s; mean gradient is 55.02 mmHg.  · Moderate mitral sclerosis.  · Mild-to-moderate mitral regurgitation.  · Moderate to severe tricuspid regurgitation.  · The estimated PA systolic pressure is 77 mm Hg.      ESRD on hemodialysis  · Chronic and controlled. Nephrology consulted to manage HD while patient in hospital. Patient dialyzed on 10/27 by Nephrology for pre-op optimization prior to surgery. HD 10/29  · Left arm AV fistula in place and has good thrill and bruit. Patient dialyzes at [facility] as outpatient.     Renovascular hypertension  · Patient's blood pressure is relatively controlled here in the hospital over past 24 hours. BP mildly elevated after holding 2 of home meds yesterday BP for some mild lower BPS. Resume home meds today.Trend, if continues uptrend can increase losartan. Hold off on BB due to bradycardia, likely will hold on discharge also  · Goal for blood pressure is SBP < 140 and DBP < 90 as patient > or = 60  years of age and patient is diabetic and has chronic kidney disease based on JNC 8 guidelines.   · Plan to continue home regimen to treat of Clonidine 0.1 mg po TID, Norvasc 10 mg po daily, Hydralazine 100 mg po TID and Losartan 50 mg po daily while patient is hospitalized. Home medication of Coreg on hold due to bradycardia, continue now.   · Plan is to monitor patient's blood pressure routinely while patient is hospitalized.       VTE Risk Mitigation (From admission, onward)         Ordered     heparin (porcine) injection 5,000 Units  Every 8 hours      10/28/19 1333     Place MADDY hose  Until discontinued      10/27/19 0951     IP VTE HIGH RISK PATIENT  Once      10/27/19 0951     Place sequential compression device  Until discontinued      10/27/19 0951                Dispo: SNF vs rehab, likely ready medically tomorrow, needs ortho f/u, referral to TAVR service outpatient      Desi Potter MD  Department of Hospital Medicine   [facility]

## 2019-10-30 NOTE — ASSESSMENT & PLAN NOTE
· Present on admit.   · Patient with significant UTI on admit but no signs of sepsis or systemic infection so okay to proceed with left hip surgery.   · Will continue patient on IV Rocephin 1 gram daily to treat (patient on day #4). Urine culture sent  With Crescencio, sensitive to most medications now, rocephin included  · Follow-up urine culture and sensitivities and will need  5 days of antibiotics as complicated UTI.

## 2019-10-30 NOTE — ASSESSMENT & PLAN NOTE
Chronic and controlled.  -baseline Hg 9s, at 9 prior to surgery. On Epo outpatient with HD.  -Hg stable in 8's. Continue to trend. Expect post op acute blood loss anemia  -transfuse if under 7

## 2019-10-30 NOTE — ASSESSMENT & PLAN NOTE
· Patient's blood pressure is relatively controlled here in the hospital over past 24 hours. BP mildly elevated after holding 2 of home meds yesterday BP for some mild lower BPS. Resume home meds today.Trend, if continues uptrend can increase losartan. Hold off on BB due to bradycardia, likely will hold on discharge also  · Goal for blood pressure is SBP < 140 and DBP < 90 as patient > or = 60 years of age and patient is diabetic and has chronic kidney disease based on JNC 8 guidelines.   · Plan to continue home regimen to treat of Clonidine 0.1 mg po TID, Norvasc 10 mg po daily, Hydralazine 100 mg po TID and Losartan 50 mg po daily while patient is hospitalized. Home medication of Coreg on hold due to bradycardia, continue now.   · Plan is to monitor patient's blood pressure routinely while patient is hospitalized.

## 2019-10-30 NOTE — PROGRESS NOTES
[facility]  Orthopedics  Progress Note    Patient Name: Tea Georges  MRN: [id number]  Admission Date: 10/27/2019  Hospital Length of Stay: 3 days  Attending Provider: Desi Potter MD  Primary Care Provider: Parth Posadas Ii, MD  Follow-up For: Procedure(s) (LRB):  INSERTION, INTRAMEDULLARY NELIDA, FEMUR,  Left , synthes, hana table, large C arm clock side (Left)    Post-Operative Day: 2 Days Post-Op  Subjective:     Principal Problem:Closed 2-part intertrochanteric fracture of left femur    Principal Orthopedic Problem: same    Interval History: Pt seen and examined at bedside. NAEO. Pt reports pain is controlled. Declined PT yesterday.    Review of patient's allergies indicates:  No Known Allergies    Current Facility-Administered Medications   Medication    0.9%  NaCl infusion (for blood administration)    0.9%  NaCl infusion    0.9%  NaCl infusion    acetaminophen tablet 1,000 mg    amLODIPine tablet 10 mg    artificial tears 0.5 % ophthalmic solution 2 drop    bisacodyl suppository 10 mg    cefTRIAXone injection 1 g    cloNIDine tablet 0.1 mg    dextrose 10% (D10W) Bolus    dextrose 10% (D10W) Bolus    epoetin shon-epbx injection 4,000 Units    ergocalciferol capsule 50,000 Units    fluticasone furoate-vilanterol 200-25 mcg/dose diskus inhaler 1 puff    glucagon (human recombinant) injection 1 mg    glucose chewable tablet 16 g    glucose chewable tablet 24 g    heparin (porcine) injection 5,000 Units    hydrALAZINE tablet 100 mg    insulin aspart U-100 pen 0-5 Units    levETIRAcetam tablet 500 mg    lidocaine-prilocaine cream    losartan tablet 50 mg    methocarbamol tablet 1,000 mg    morphine injection 2 mg    mupirocin 2 % ointment 1 g    ondansetron injection 4 mg    oxyCODONE immediate release tablet 5 mg    polyethylene glycol packet 17 g    promethazine (PHENERGAN) 6.25 mg in dextrose 5 % 50 mL IVPB    ramelteon tablet 8 mg    ropivacaine (PF) 2  "mg/ml (0.2%) infusion    sevelamer carbonate tablet 800 mg    sodium chloride 0.9% flush 10 mL    sodium chloride 0.9% flush 10 mL     Objective:     Vital Signs (Most Recent):  Temp: 97.7 °F (36.5 °C) (10/30/19 0805)  Pulse: (!) 59 (10/30/19 0805)  Resp: 18 (10/30/19 0805)  BP: (!) 160/84 (10/30/19 0805)  SpO2: 100 % (10/30/19 0805) Vital Signs (24h Range):  Temp:  [96.6 °F (35.9 °C)-98 °F (36.7 °C)] 97.7 °F (36.5 °C)  Pulse:  [54-64] 59  Resp:  [18] 18  SpO2:  [99 %-100 %] 100 %  BP: (117-179)/(34-84) 160/84     Weight: 50.8 kg (111 lb 15.9 oz)  Height: 5' 3" (160 cm)  Body mass index is 19.84 kg/m².      Intake/Output Summary (Last 24 hours) at 10/30/2019 0827  Last data filed at 10/29/2019 1800  Gross per 24 hour   Intake 1040 ml   Output 1600 ml   Net -560 ml       Ortho/SPM Exam      AAOx4  NAD  Reg rate  No increased WOB  Dressing c/d/i  SILT T/SP/DP/Bradshaw/Sa  Motor intact T/SP/DP  WWP extremities  FCDs in place and functioning      Significant Labs: All pertinent labs within the past 24 hours have been reviewed.  Recent Labs   Lab 10/30/19  0358   WBC 5.47   RBC 2.79*   HGB 8.2*   HCT 28.7*   PLT 77*   *   MCH 29.4   MCHC 28.6*         Significant Imaging: I have reviewed and interpreted all pertinent imaging results/findings.    Assessment/Plan:     * Closed 2-part intertrochanteric fracture of left femur - CMN fixation 10/28/19  Tea Georges is a 79 y.o. female with Left intertrochanteric fracture s/p CMN on 10/28/19      Pain control: multimodal  PT/OT: WBAT LLE  DVT PPx: RONALD 5000U tid, no lovenox due to ESRD, FCDs at all times when not ambulating  Abx: postop Ancef  Labs: Hgb 8.2  Drain: none  Iraheta: none                Booker Roque MD  Orthopedics  [facility]    Attg Note:  I agree with the resident's assessment and plan.    Torey Aguilar MD    "

## 2019-10-30 NOTE — ANESTHESIA POST-OP PAIN MANAGEMENT
Acute Pain Service Progress Note    Tea Georges is a 79 y.o., female, [id number].    Surgery:  IM Francisco L    Post Op Day #: 2    Catheter type: perineural  SIFI    Infusion type: Ropivacaine 0.2%  2cc/hr basal 10cc/hr IB    Interval Hx: Patient did well overnight. Slept. Pain this am on movement worsened. Gave additional 10cc clinician bolus through catheter. Change prn robaxin to scheduled 500 QID. Also added tramadol 50mg q8hr prn.    Problem List:    There are no hospital problems to display for this patient.      Subjective:     General appearance of alert, oriented, no complaints    Pain with rest: 8    Numbers   Pain with movement: 9    Numbers   Side Effects    1. Pruritis No    2. Nausea No    3. Motor Blockade No, 1=Ability to bend knees and ankles    4. Sedation No, 1=awake and alert    Objective:     Catheter site clean, dry, intact      Vitals   There were no vitals filed for this visit.     Labs    No results displayed because visit has over 200 results.           Meds   No current facility-administered medications for this visit.      No current outpatient medications on file.     Facility-Administered Medications Ordered in Other Visits   Medication Dose Route Frequency Provider Last Rate Last Dose    0.9%  NaCl infusion (for blood administration)   Intravenous Q24H PRN Titi Graf MD        0.9%  NaCl infusion   Intravenous Once Dayna Menard MD        0.9%  NaCl infusion   Intravenous PRN Art Maciel MD        acetaminophen tablet 1,000 mg  1,000 mg Oral Q6H Janee Rolle MD   1,000 mg at 10/30/19 0634    amLODIPine tablet 10 mg  10 mg Oral Daily Janee Rolle MD   10 mg at 10/29/19 1531    artificial tears 0.5 % ophthalmic solution 2 drop  2 drop Both Eyes QID PRN Janee Rolle MD        bisacodyl suppository 10 mg  10 mg Rectal Daily PRN Janee Rolle MD        cefTRIAXone injection 1 g  1 g Intravenous Q24H Janee Rolle MD   1 g at  10/29/19 1225    cloNIDine tablet 0.1 mg  0.1 mg Oral TID Janee Rolle MD   Stopped at 10/29/19 2100    dextrose 10% (D10W) Bolus  12.5 g Intravenous PRN Janee Rolle MD        dextrose 10% (D10W) Bolus  25 g Intravenous PRN Janee Rolle MD        epoetin shon-epbx injection 4,000 Units  4,000 Units Intravenous Every Tues, Thurs, Sat Faby Mello MD   4,000 Units at 10/29/19 1049    ergocalciferol capsule 50,000 Units  50,000 Units Oral Q7 Days Janee Rolle MD   50,000 Units at 10/29/19 1522    fluticasone furoate-vilanterol 200-25 mcg/dose diskus inhaler 1 puff  1 puff Inhalation Daily Janee Rolle MD   1 puff at 10/27/19 1140    glucagon (human recombinant) injection 1 mg  1 mg Intramuscular PRN Janee Rolle MD        glucose chewable tablet 16 g  16 g Oral PRN Janee Rolle MD        glucose chewable tablet 24 g  24 g Oral PRN Janee Rolle MD        heparin (porcine) injection 5,000 Units  5,000 Units Subcutaneous Q8H Juan Venegas MD   5,000 Units at 10/30/19 0634    hydrALAZINE tablet 100 mg  100 mg Oral Q8H Janee Rolle MD   100 mg at 10/30/19 0634    insulin aspart U-100 pen 0-5 Units  0-5 Units Subcutaneous QID (AC + HS) PRN Janee Rolle MD        levETIRAcetam tablet 500 mg  500 mg Oral BID Janee Rolle MD   500 mg at 10/29/19 2114    lidocaine-prilocaine cream   Topical (Top) PRN Art Maciel MD        losartan tablet 50 mg  50 mg Oral Daily Janee Rolle MD   50 mg at 10/29/19 1530    methocarbamol tablet 1,000 mg  1,000 mg Oral Q6H PRN Janee Rolle MD        morphine injection 2 mg  2 mg Intravenous Q3H PRN Janee Rolle MD        mupirocin 2 % ointment 1 g  1 g Nasal BID Janee Rolle MD   1 g at 10/29/19 2114    ondansetron injection 4 mg  4 mg Intravenous Q12H PRN Janee Rolle MD   4 mg at 10/30/19 0741    oxyCODONE immediate release tablet 5 mg  5 mg Oral Q3H  PRN Janee Rolle MD   5 mg at 10/30/19 0741    polyethylene glycol packet 17 g  17 g Oral Daily Janee Rolle MD   17 g at 10/27/19 1040    promethazine (PHENERGAN) 6.25 mg in dextrose 5 % 50 mL IVPB  6.25 mg Intravenous Q6H PRN Janee Rolle MD        ramelteon tablet 8 mg  8 mg Oral Nightly PRN Janee Rolle MD        ropivacaine (PF) 2 mg/ml (0.2%) infusion  2 mL/hr Perineural Continuous Phillip Srivastava MD 2 mL/hr at 10/30/19 0019 2 mL/hr at 10/30/19 0019    sevelamer carbonate tablet 800 mg  800 mg Oral TID WM Janee Rolle MD   800 mg at 10/29/19 1750    sodium chloride 0.9% flush 10 mL  10 mL Intravenous PRN Titi Graf MD        sodium chloride 0.9% flush 10 mL  10 mL Intravenous PRN Janee Rolle MD            Anticoagulant dose SQH per ortho    Assessment:     Pain control adequate, pain worsens on movement.     Plan:     1) Continue PNC and multimodal pain regimen; prn robaxin changed to 500 QID scheduled. Clinician 10cc bolus given this am   2) Ordered tramadol q8hr prn and Minimize narcotics if possible.    Evaluator Tamara Elias

## 2019-10-30 NOTE — PLAN OF CARE
OT axvi complete. Pt tolerated session well. Pt's functional outcomes have been established today based on her presenting functional level.       Problem: Occupational Therapy Goal  Goal: Occupational Therapy Goal  Description  Goals to be met by: 11/8/2019     Patient will increase functional independence with ADLs by performing:    UE Dressing with [place].  LE Dressing with Minimal Assistance.  Grooming while standing with Minimal Assistance.  Toileting from bedside commode with Minimal Assistance for hygiene and clothing management.   Supine to sit with Stand-by Assistance.  Toilet transfer to bedside commode with Contact Guard Assistance.     Outcome: Ongoing, Progressing

## 2019-10-31 VITALS
WEIGHT: 112 LBS | SYSTOLIC BLOOD PRESSURE: 154 MMHG | TEMPERATURE: 97 F | DIASTOLIC BLOOD PRESSURE: 70 MMHG | OXYGEN SATURATION: 99 % | BODY MASS INDEX: 19.84 KG/M2 | RESPIRATION RATE: 18 BRPM | HEART RATE: 64 BPM | HEIGHT: 63 IN

## 2019-10-31 LAB
ANION GAP SERPL CALC-SCNC: 13 MMOL/L (ref 8–16)
ANISOCYTOSIS BLD QL SMEAR: SLIGHT
BASOPHILS # BLD AUTO: 0.04 K/UL (ref 0–0.2)
BASOPHILS NFR BLD: 0.8 % (ref 0–1.9)
BLD PROD TYP BPU: NORMAL
BLD PROD TYP BPU: NORMAL
BLOOD UNIT EXPIRATION DATE: NORMAL
BLOOD UNIT EXPIRATION DATE: NORMAL
BLOOD UNIT TYPE CODE: 6200
BLOOD UNIT TYPE CODE: 6200
BLOOD UNIT TYPE: NORMAL
BLOOD UNIT TYPE: NORMAL
BUN SERPL-MCNC: 37 MG/DL (ref 8–23)
CALCIUM SERPL-MCNC: 8.5 MG/DL (ref 8.7–10.5)
CHLORIDE SERPL-SCNC: 103 MMOL/L (ref 95–110)
CO2 SERPL-SCNC: 22 MMOL/L (ref 23–29)
CODING SYSTEM: NORMAL
CODING SYSTEM: NORMAL
CREAT SERPL-MCNC: 4.3 MG/DL (ref 0.5–1.4)
DIFFERENTIAL METHOD: ABNORMAL
DISPENSE STATUS: NORMAL
DISPENSE STATUS: NORMAL
EOSINOPHIL # BLD AUTO: 0.1 K/UL (ref 0–0.5)
EOSINOPHIL NFR BLD: 2.4 % (ref 0–8)
ERYTHROCYTE [DISTWIDTH] IN BLOOD BY AUTOMATED COUNT: 16.3 % (ref 11.5–14.5)
EST. GFR  (AFRICAN AMERICAN): 10.8 ML/MIN/1.73 M^2
EST. GFR  (NON AFRICAN AMERICAN): 9.3 ML/MIN/1.73 M^2
GLUCOSE SERPL-MCNC: 73 MG/DL (ref 70–110)
HCT VFR BLD AUTO: 29.6 % (ref 37–48.5)
HGB BLD-MCNC: 8.5 G/DL (ref 12–16)
HYPOCHROMIA BLD QL SMEAR: ABNORMAL
IMM GRANULOCYTES # BLD AUTO: 0.04 K/UL (ref 0–0.04)
IMM GRANULOCYTES NFR BLD AUTO: 0.8 % (ref 0–0.5)
LYMPHOCYTES # BLD AUTO: 0.5 K/UL (ref 1–4.8)
LYMPHOCYTES NFR BLD: 9.5 % (ref 18–48)
MCH RBC QN AUTO: 29.9 PG (ref 27–31)
MCHC RBC AUTO-ENTMCNC: 28.7 G/DL (ref 32–36)
MCV RBC AUTO: 104 FL (ref 82–98)
MONOCYTES # BLD AUTO: 0.5 K/UL (ref 0.3–1)
MONOCYTES NFR BLD: 10.3 % (ref 4–15)
NEUTROPHILS # BLD AUTO: 3.8 K/UL (ref 1.8–7.7)
NEUTROPHILS NFR BLD: 76.2 % (ref 38–73)
NRBC BLD-RTO: 0 /100 WBC
OVALOCYTES BLD QL SMEAR: ABNORMAL
PHOSPHATE SERPL-MCNC: 5.1 MG/DL (ref 2.7–4.5)
PLATELET # BLD AUTO: 87 K/UL (ref 150–350)
PLATELET BLD QL SMEAR: ABNORMAL
PMV BLD AUTO: 13.4 FL (ref 9.2–12.9)
POCT GLUCOSE: 77 MG/DL (ref 70–110)
POCT GLUCOSE: 89 MG/DL (ref 70–110)
POIKILOCYTOSIS BLD QL SMEAR: SLIGHT
POTASSIUM SERPL-SCNC: 4.4 MMOL/L (ref 3.5–5.1)
RBC # BLD AUTO: 2.84 M/UL (ref 4–5.4)
SODIUM SERPL-SCNC: 138 MMOL/L (ref 136–145)
TRANS ERYTHROCYTES VOL PATIENT: NORMAL ML
TRANS ERYTHROCYTES VOL PATIENT: NORMAL ML
WBC # BLD AUTO: 5.03 K/UL (ref 3.9–12.7)

## 2019-10-31 PROCEDURE — 99231 SBSQ HOSP IP/OBS SF/LOW 25: CPT | Mod: ,,, | Performed by: ANESTHESIOLOGY

## 2019-10-31 PROCEDURE — 99239 HOSP IP/OBS DSCHRG MGMT >30: CPT | Mod: ,,, | Performed by: HOSPITALIST

## 2019-10-31 PROCEDURE — 80048 BASIC METABOLIC PNL TOTAL CA: CPT

## 2019-10-31 PROCEDURE — 97530 THERAPEUTIC ACTIVITIES: CPT

## 2019-10-31 PROCEDURE — 90935 HEMODIALYSIS ONE EVALUATION: CPT

## 2019-10-31 PROCEDURE — 90935 PR HEMODIALYSIS, ONE EVALUATION: ICD-10-PCS | Mod: ,,, | Performed by: INTERNAL MEDICINE

## 2019-10-31 PROCEDURE — 63600175 PHARM REV CODE 636 W HCPCS: Performed by: STUDENT IN AN ORGANIZED HEALTH CARE EDUCATION/TRAINING PROGRAM

## 2019-10-31 PROCEDURE — 85025 COMPLETE CBC W/AUTO DIFF WBC: CPT

## 2019-10-31 PROCEDURE — 97110 THERAPEUTIC EXERCISES: CPT

## 2019-10-31 PROCEDURE — 25000003 PHARM REV CODE 250: Performed by: STUDENT IN AN ORGANIZED HEALTH CARE EDUCATION/TRAINING PROGRAM

## 2019-10-31 PROCEDURE — 99231 PR SUBSEQUENT HOSPITAL CARE,LEVL I: ICD-10-PCS | Mod: ,,, | Performed by: ANESTHESIOLOGY

## 2019-10-31 PROCEDURE — 84100 ASSAY OF PHOSPHORUS: CPT

## 2019-10-31 PROCEDURE — 25000003 PHARM REV CODE 250: Performed by: INTERNAL MEDICINE

## 2019-10-31 PROCEDURE — 63600175 PHARM REV CODE 636 W HCPCS: Performed by: INTERNAL MEDICINE

## 2019-10-31 PROCEDURE — 36415 COLL VENOUS BLD VENIPUNCTURE: CPT

## 2019-10-31 PROCEDURE — 99239 PR HOSPITAL DISCHARGE DAY,>30 MIN: ICD-10-PCS | Mod: ,,, | Performed by: HOSPITALIST

## 2019-10-31 PROCEDURE — 90935 HEMODIALYSIS ONE EVALUATION: CPT | Mod: ,,, | Performed by: INTERNAL MEDICINE

## 2019-10-31 RX ORDER — RAMELTEON 8 MG/1
8 TABLET ORAL NIGHTLY PRN
Start: 2019-10-31

## 2019-10-31 RX ORDER — BISACODYL 10 MG
10 SUPPOSITORY, RECTAL RECTAL DAILY PRN
Refills: 0 | COMMUNITY
Start: 2019-10-31

## 2019-10-31 RX ORDER — TRAMADOL HYDROCHLORIDE 50 MG/1
50 TABLET ORAL EVERY 6 HOURS PRN
Status: ON HOLD
Start: 2019-10-31 | End: 2021-03-26 | Stop reason: SDUPTHER

## 2019-10-31 RX ORDER — INSULIN ASPART 100 [IU]/ML
0-5 INJECTION, SOLUTION INTRAVENOUS; SUBCUTANEOUS
Refills: 0 | Status: ON HOLD
Start: 2019-10-31 | End: 2021-03-10 | Stop reason: HOSPADM

## 2019-10-31 RX ORDER — METHOCARBAMOL 500 MG/1
500 TABLET, FILM COATED ORAL 4 TIMES DAILY
Qty: 40 TABLET | Refills: 0
Start: 2019-10-31 | End: 2019-11-10

## 2019-10-31 RX ORDER — HEPARIN SODIUM 5000 [USP'U]/ML
5000 INJECTION, SOLUTION INTRAVENOUS; SUBCUTANEOUS EVERY 8 HOURS
Qty: 78 ML | Refills: 0
Start: 2019-10-31 | End: 2019-11-26

## 2019-10-31 RX ADMIN — EPOETIN ALFA-EPBX 4000 UNITS: 4000 INJECTION, SOLUTION INTRAVENOUS; SUBCUTANEOUS at 11:10

## 2019-10-31 RX ADMIN — TRAMADOL HYDROCHLORIDE 50 MG: 50 TABLET, FILM COATED ORAL at 06:10

## 2019-10-31 RX ADMIN — HEPARIN SODIUM 5000 UNITS: 5000 INJECTION, SOLUTION INTRAVENOUS; SUBCUTANEOUS at 06:10

## 2019-10-31 RX ADMIN — HEPARIN SODIUM 5000 UNITS: 5000 INJECTION, SOLUTION INTRAVENOUS; SUBCUTANEOUS at 02:10

## 2019-10-31 RX ADMIN — CLONIDINE HYDROCHLORIDE 0.1 MG: 0.1 TABLET ORAL at 02:10

## 2019-10-31 RX ADMIN — METHOCARBAMOL TABLETS 500 MG: 500 TABLET, COATED ORAL at 01:10

## 2019-10-31 RX ADMIN — ROPIVACAINE HYDROCHLORIDE 2 ML/HR: 2 INJECTION, SOLUTION EPIDURAL; INFILTRATION at 12:10

## 2019-10-31 RX ADMIN — ROPIVACAINE HYDROCHLORIDE 2 ML/HR: 2 INJECTION, SOLUTION EPIDURAL; INFILTRATION at 10:10

## 2019-10-31 RX ADMIN — SEVELAMER CARBONATE 800 MG: 800 TABLET, FILM COATED ORAL at 04:10

## 2019-10-31 RX ADMIN — SODIUM CHLORIDE: 0.9 INJECTION, SOLUTION INTRAVENOUS at 08:10

## 2019-10-31 RX ADMIN — ACETAMINOPHEN 1000 MG: 500 TABLET ORAL at 06:10

## 2019-10-31 RX ADMIN — TRAMADOL HYDROCHLORIDE 50 MG: 50 TABLET, FILM COATED ORAL at 04:10

## 2019-10-31 RX ADMIN — HYDRALAZINE HYDROCHLORIDE 100 MG: 50 TABLET ORAL at 02:10

## 2019-10-31 RX ADMIN — IRON SUCROSE 100 MG: 20 INJECTION, SOLUTION INTRAVENOUS at 08:10

## 2019-10-31 RX ADMIN — HYDRALAZINE HYDROCHLORIDE 100 MG: 50 TABLET ORAL at 06:10

## 2019-10-31 RX ADMIN — ACETAMINOPHEN 1000 MG: 500 TABLET ORAL at 02:10

## 2019-10-31 NOTE — PT/OT/SLP PROGRESS
Physical Therapy      Patient Name:  Tea Georges   MRN:  [id number]    Patient not seen today secondary to Patient fatigue(pt recently returned from dialysis). Will follow-up at a later date.    Eneida King, SPT

## 2019-10-31 NOTE — PROGRESS NOTES
HD Tx complete. 1L removed during 3.5hr Tx. Tolerated well. 15g needles removed, gauze and tape applied, pressure held for 5mins. Hemostasis achieved.

## 2019-10-31 NOTE — PLAN OF CARE
Pt continues to progress well towards functional outcomes.      Problem: Occupational Therapy Goal  Goal: Occupational Therapy Goal  Description  Goals to be met by: 11/8/2019     Patient will increase functional independence with ADLs by performing:    UE Dressing with [place].  LE Dressing with Minimal Assistance.  Grooming while standing with Minimal Assistance.  Toileting from bedside commode with Minimal Assistance for hygiene and clothing management.   Supine to sit with Stand-by Assistance.  Toilet transfer to bedside commode with Contact Guard Assistance.     Outcome: Ongoing, Progressing

## 2019-10-31 NOTE — ADDENDUM NOTE
Addendum  created 10/31/19 0904 by Judi Blair MD    Charge Capture section accepted, Cosign clinical note with attestation

## 2019-10-31 NOTE — PT/OT/SLP PROGRESS
Occupational Therapy   Treatment    Name: Tea Georges  MRN: [id number]  Admitting Diagnosis:  Closed 2-part intertrochanteric fracture of left femur  3 Days Post-Op    Recommendations:     Discharge Recommendations: nursing facility, skilled  Discharge Equipment Recommendations:  walker, rolling, bath bench  Barriers to discharge:  Inaccessible home environment, Decreased caregiver support    Assessment:     Tea Georges is a 79 y.o. female with a medical diagnosis of Closed 2-part intertrochanteric fracture of left femur.  She presents with the following performance deficits affecting function:  weakness, impaired endurance, impaired self care skills, impaired functional mobilty, gait instability, impaired balance, decreased lower extremity function, pain, orthopedic precautions.     Pt tolerated session well. Pt very fatigued from dialysis but motivated and willing to participate in therapy. Pt required Min A for bed mobility and Mod A for sit<>stand transfer using RW. Pt able to tolerate a total of 4 steps and requested to sit EOB due to increased L LE pain. Pt educated and performed B LE strengthening exercises sitting EOB w/ good tolerance and technique. Pt continues to benefit from skilled OT to address the above listed impairments.     Rehab Prognosis:  Good; patient would benefit from acute skilled OT services to address these deficits and reach maximum level of function.       Plan:     Patient to be seen daily to address the above listed problems via self-care/home management, therapeutic activities, therapeutic exercises  · Plan of Care Expires: 11/28/19  · Plan of Care Reviewed with: patient    Subjective     Pain/Comfort:  · Pain Rating 1: (did not rate)  · Location - Side 1: Left  · Location - Orientation 1: generalized  · Location 1: leg  · Pain Addressed 1: Reposition, Distraction, Cessation of Activity  · Pain Rating Post-Intervention 1: (did not rate)    Objective:     Communicated with: RN prior to  session.  Patient found HOB elevated with FCD, oxygen, perineural catheter upon OT entry to room.    General Precautions: Standard, fall   Orthopedic Precautions:LLE weight bearing as tolerated   Braces: N/A     Occupational Performance:     Bed Mobility:    · Patient completed Scooting/Bridging with minimum assistance  · Patient completed Supine to Sit with minimum assistance  · Patient completed Sit to Supine with moderate assistance     Functional Mobility/Transfers:  · Patient completed Sit <> Stand Transfer with moderate assistance  with  rolling walker   · Functional Mobility: Pt ambulated 4 steps w/ Mod A and RW; distance limited by increase in pain and generalized fatigue      [facility] 6 Click ADL: 14    Treatment & Education:  - OT POC  - Importance of OOB activity to maximize recovery   - safety w/ functional mobility; hand placement to ensure safe transfers  - Reviewed WBAT on L LE  - Educated on B LE strengthening exercises sitting EOB  - once returned to supine w/ HOB elevated, pt educated on B LE exercises she can perform in bed to continue improving her strength    Patient left HOB elevated with all lines intact, call button in reach, RN notified and PCT presentEducation:      GOALS:   Multidisciplinary Problems     Occupational Therapy Goals        Problem: Occupational Therapy Goal    Goal Priority Disciplines Outcome Interventions   Occupational Therapy Goal     OT, PT/OT Ongoing, Progressing    Description:  Goals to be met by: 11/8/2019     Patient will increase functional independence with ADLs by performing:    UE Dressing with [place].  LE Dressing with Minimal Assistance.  Grooming while standing with Minimal Assistance.  Toileting from bedside commode with Minimal Assistance for hygiene and clothing management.   Supine to sit with Stand-by Assistance.  Toilet transfer to bedside commode with Contact Guard Assistance.                      Time Tracking:     OT Date of Treatment:  10/31/19  OT Start Time: 1541  OT Stop Time: 1605  OT Total Time (min): 24 min    Billable Minutes:Therapeutic Activity 12  Therapeutic Exercise 12    Lucinda Fox, OT  10/31/2019

## 2019-10-31 NOTE — PLAN OF CARE
10/31/19 1618   Post-Acute Status   Post-Acute Authorization Placement;Other   Other Status See Comments     LEE spoke with charge nurse and he reported that patient transport time needed to be pushed back to 6:00pm.  LEE informed Gutierrez with [facility] of the requested transfer and time of 6:00 and she reports that it will be pushed back.    Fern Leong, LISA  [facility]   [id number]

## 2019-10-31 NOTE — PLAN OF CARE
CM updated by [facility] that pt can transfer. CM setup w/c van transport via [facility] - requested pickup time is 4:15pm. Requested pickup time is not a guarantee of actual pickup time. Pt's nurse can call report at or after 3:30pm to rehab charge nurse [phone]. CM updated pt's nurse on d/c plans. CM updated EPS  Sanaz Salas suri [phone] of pt's d/c to rehab - provided her the  contact number for rehab.     10/31/19 1505   Final Note   Assessment Type Final Discharge Note   Anticipated Discharge Disposition Rehab   Right Care Referral Info   Post Acute Recommendation IRF   Facility Name [facility]

## 2019-10-31 NOTE — ANESTHESIA POST-OP PAIN MANAGEMENT
Acute Pain Service Progress Note    Tea Georges is a 79 y.o., female, [id number].    Surgery:  IM Francisco L    Post Op Day #: 2    Catheter type: perineural  SIFI    Infusion type: Ropivacaine 0.2%  2cc/hr basal 10cc/hr IB    Interval Hx: Patient did well overnight. Slept. Is having no pain this am    Problem List:    There are no hospital problems to display for this patient.      Subjective:     General appearance of alert, oriented, no complaints    Pain with rest: 1    Numbers   Pain with movement: 2    Numbers   Side Effects    1. Pruritis No    2. Nausea No    3. Motor Blockade No, 1=Ability to bend knees and ankles    4. Sedation No, 1=awake and alert    Objective:     Catheter site clean, dry, intact      Vitals   There were no vitals filed for this visit.     Labs    No results displayed because visit has over 200 results.           Meds   No current facility-administered medications for this visit.      No current outpatient medications on file.     Facility-Administered Medications Ordered in Other Visits   Medication Dose Route Frequency Provider Last Rate Last Dose    0.9%  NaCl infusion (for blood administration)   Intravenous Q24H PRN Titi Graf MD        0.9%  NaCl infusion   Intravenous Once Dayna Menard MD        0.9%  NaCl infusion   Intravenous PRN Art Maciel MD        0.9%  NaCl infusion   Intravenous Once Art Maciel  mL/hr at 10/31/19 0809      acetaminophen tablet 1,000 mg  1,000 mg Oral Q8H Tamara Elias MD   1,000 mg at 10/31/19 0622    amLODIPine tablet 10 mg  10 mg Oral Daily Janee Rolle MD   10 mg at 10/30/19 0850    artificial tears 0.5 % ophthalmic solution 2 drop  2 drop Both Eyes QID PRN Janee Rolle MD        bisacodyl suppository 10 mg  10 mg Rectal Daily PRN Janee Rolle MD        cefTRIAXone injection 1 g  1 g Intravenous Q24H Janee Rolle MD   1 g at 10/30/19 1133    cloNIDine tablet 0.1 mg  0.1 mg  Oral TID Janee Rolle MD   Stopped at 10/30/19 2100    dextrose 10% (D10W) Bolus  12.5 g Intravenous PRN Janee Rolle MD        dextrose 10% (D10W) Bolus  25 g Intravenous PRN Janee Rolle MD        epoetin shon-epbx injection 4,000 Units  4,000 Units Intravenous Every Tues, Thurs, Sat Faby Mello MD   4,000 Units at 10/29/19 1049    ergocalciferol capsule 50,000 Units  50,000 Units Oral Q7 Days Janee Rolle MD   50,000 Units at 10/29/19 1522    fluticasone furoate-vilanterol 200-25 mcg/dose diskus inhaler 1 puff  1 puff Inhalation Daily Janee Rolle MD   1 puff at 10/30/19 0900    glucagon (human recombinant) injection 1 mg  1 mg Intramuscular PRN Janee Rolle MD        glucose chewable tablet 16 g  16 g Oral PRN Janee Rolle MD        glucose chewable tablet 24 g  24 g Oral PRN Janee Rolle MD        heparin (porcine) injection 5,000 Units  5,000 Units Subcutaneous Q8H Juan Venegas MD   5,000 Units at 10/31/19 0623    hydrALAZINE tablet 100 mg  100 mg Oral Q8H Janee Rolle MD   100 mg at 10/31/19 0622    insulin aspart U-100 pen 0-5 Units  0-5 Units Subcutaneous QID (AC + HS) PRN Janee Rolle MD        iron sucrose (VENOFER) 100 mg in sodium chloride 0.9% 100 mL IVPB  100 mg Intravenous Once rAt Maciel MD        levETIRAcetam tablet 500 mg  500 mg Oral BID Janee Rolle MD   500 mg at 10/30/19 2159    lidocaine-prilocaine cream   Topical (Top) PRN Art Maciel MD        losartan tablet 50 mg  50 mg Oral Daily Janee Rolle MD   50 mg at 10/30/19 0851    methocarbamol tablet 500 mg  500 mg Oral QID Tamara Elias MD   500 mg at 10/30/19 2159    mupirocin 2 % ointment 1 g  1 g Nasal BID Janee Rolle MD   1 g at 10/30/19 2159    ondansetron injection 4 mg  4 mg Intravenous Q12H PRN Janee Rolle MD   4 mg at 10/30/19 2258    polyethylene glycol packet 17 g  17 g Oral Daily  Janee Rolle MD   17 g at 10/30/19 0850    promethazine (PHENERGAN) 6.25 mg in dextrose 5 % 50 mL IVPB  6.25 mg Intravenous Q6H PRN Janee Rolle MD        ramelteon tablet 8 mg  8 mg Oral Nightly PRN Janee Rolle MD        ropivacaine (PF) 2 mg/ml (0.2%) infusion  2 mL/hr Perineural Continuous Phillip Srivastava MD 2 mL/hr at 10/31/19 0037 2 mL/hr at 10/31/19 0037    sevelamer carbonate tablet 800 mg  800 mg Oral TID WM Janee Rolle MD   800 mg at 10/30/19 1232    sodium chloride 0.9% flush 10 mL  10 mL Intravenous PRN Titi Graf MD        sodium chloride 0.9% flush 10 mL  10 mL Intravenous PRN Janee Rolle MD        traMADol tablet 50 mg  50 mg Oral Q6H PRN Tamara Elias MD   50 mg at 10/31/19 0623        Anticoagulant dose SQH per ortho    Assessment:     Pain control adequate    Plan:     1) Continue PNC and multimodal pain regimen; continue scheduled tylenol and prn robaxin changed to 500 QID scheduled 10/30   2) Ordered tramadol q8hr prn and Minimize narcotics if possible.   3) Dispo: pending SW placement, SNF vs Rehab. Will likely pause and pull catheter in am.      Evaluator Tamara Elias   PGY-3, CA-2 Anesthesia Resident  Spectra [phone]

## 2019-10-31 NOTE — PLAN OF CARE
[facility]    FACILITY TRANSFER ORDERS      Patient Name: Tea Georges  YOB: 1940    PCP: Parth Posadas Ii, MD   PCP Address: [address] / [place] [address]  PCP Phone Number: [phone]  PCP Fax: [phone]    Encounter Date: 10/31/2019    Admit to: inpatient rehab    Vital Signs:  Routine    Diagnoses:   Active Hospital Problems    Diagnosis  POA    *Closed 2-part intertrochanteric fracture of left femur - CMN fixation 10/28/19 [S72.142A]  Yes    Pain [R52]  Yes    Complicated UTI (urinary tract infection) [N39.0]  Yes    Pulmonary hypertension due to aortic valve disease [I27.29, I35.9]  Yes    Chronic respiratory failure with hypoxia [J96.11]  Yes    (HFpEF) heart failure with preserved ejection fraction [I50.30]  Yes    Type 2 diabetes mellitus with chronic kidney disease on chronic dialysis, without long-term current use of insulin [E11.22, N18.6, Z99.2]  Not Applicable    Pulmonary emphysema [J43.9]  Yes    Thrombocytopenia, unspecified [D69.6]  Yes    Vitamin D deficiency [E55.9]  Yes    Physical debility [R53.81]  Yes    Blindness of right eye [H54.40]  Yes     Chronic    Hyperparathyroidism, secondary renal [N25.81]  Yes     Chronic    Anemia in ESRD (end-stage renal disease) [N18.6, D63.1]  Yes     Chronic    Severe aortic valve stenosis [I35.0]  Yes    ESRD on hemodialysis [N18.6, Z99.2]  Not Applicable    Renovascular hypertension [I15.0]  Yes      Resolved Hospital Problems   No resolved problems to display.       Allergies:Review of patient's allergies indicates:  No Known Allergies     Diet: renal diet     Boost glucose control TID with meals    Activities: Weight bearing as tolerated to LLE      Labs: CBC and CMP every 3 days     CONSULTS:    Physical Therapy to evaluate and treat.  and Occupational Therapy to evaluate and treat.    MISCELLANEOUS CARE:  Routine Skin for Bedridden Patients: Apply moisture barrier cream to all skin folds  and wet areas in perineal area daily and after baths and all bowel movements. and Diabetes Care:   SN to perform and educate Diabetic management with blood glucose monitoring:, Fingerstick blood sugar AC and HS and Report CBG < 60 or > 350 to physician.         Hemodialysis every Monday, Wednesday, Friday     WOUND CARE ORDERS  None    Medications: Review discharge medications with patient and family and provide education.      Current Discharge Medication List      START taking these medications    Details   bisacodyl (DULCOLAX) 10 mg Supp Place 1 suppository (10 mg total) rectally daily as needed (Until bowel movement if patient has no bowel movement for 2 days).  Refills: 0      heparin sodium,porcine (HEPARIN, PORCINE,) 5,000 unit/mL injection Inject 1 mL (5,000 Units total) into the skin every 8 (eight) hours. for 26 days  Qty: 78 mL, Refills: 0   End date November 26th 2019   insulin aspart U-100 (NOVOLOG) 100 unit/mL (3 mL) InPn pen Inject 0-5 Units into the skin before meals and at bedtime as needed (Hyperglycemia).  Refills: 0      methocarbamol (ROBAXIN) 500 MG Tab Take 1 tablet (500 mg total) by mouth 4 (four) times daily. for 10 days  Qty: 40 tablet, Refills: 0      ramelteon (ROZEREM) 8 mg tablet Take 1 tablet (8 mg total) by mouth nightly as needed for Insomnia.      traMADol (ULTRAM) 50 mg tablet Take 1 tablet (50 mg total) by mouth every 6 (six) hours as needed.    Comments: Quantity prescribed more than 7 day supply? No         CONTINUE these medications which have NOT CHANGED    Details   acetaminophen (TYLENOL) 325 MG tablet Take 2 tablets (650 mg total) by mouth every 6 (six) hours as needed for Pain.  Refills: 0      albuterol (PROVENTIL/VENTOLIN HFA) 90 mcg/actuation inhaler Inhale 1-2 puffs into the lungs every 6 (six) hours as needed for Wheezing.  Qty: 1 Inhaler, Refills: 0    Associated Diagnoses: COPD with exacerbation      amLODIPine (NORVASC) 10 MG tablet Take 1 tablet (10 mg total) by  mouth once daily.  Qty: 90 tablet, Refills: 3    Associated Diagnoses: Essential hypertension      artificial tears (ISOPTO TEARS) 0.5 % ophthalmic solution Place 2 drops into both eyes 4 (four) times daily as needed.      BREO ELLIPTA 200-25 mcg/dose DsDv diskus inhaler INHALE 1 PUFF INTO THE LUNGS ONCE DAILY.  Qty: 180 each, Refills: 3    Associated Diagnoses: Chronic obstructive pulmonary disease, unspecified COPD type      cloNIDine (CATAPRES) 0.1 MG tablet Take 1 tablet (0.1 mg total) by mouth 3 (three) times daily.  Qty: 90 tablet, Refills: 11      ergocalciferol (ERGOCALCIFEROL) 50,000 unit Cap Take 1 capsule (50,000 Units total) by mouth every 7 days.  Qty: 12 capsule, Refills: 3    Associated Diagnoses: Vitamin D deficiency      hydrALAZINE (APRESOLINE) 100 MG tablet Take 1 tablet (100 mg total) by mouth every 8 (eight) hours.  Qty: 90 tablet, Refills: 3      levETIRAcetam (KEPPRA) 500 MG Tab TAKE 1 TABLET (500 MG TOTAL) BY MOUTH 2 (TWO) TIMES A DAY FOR 30 DAYS.  Qty: 60 tablet, Refills: 0      losartan (COZAAR) 50 MG tablet Take 1 tablet (50 mg total) by mouth once daily.  Qty: 90 tablet, Refills: 3    Associated Diagnoses: Essential hypertension      ondansetron (ZOFRAN-ODT) 8 MG TbDL Take 1 tablet (8 mg total) by mouth every 6 (six) hours as needed (nausea).  Qty: 30 tablet, Refills: 2      polyethylene glycol (GLYCOLAX) 17 gram PwPk Take 17 g by mouth once daily.  Refills: 0      RENVELA 800 mg Tab Take 1 tablet (800 mg total) by mouth 3 (three) times daily with meals.  Qty: 270 tablet, Refills: 3    Associated Diagnoses: ESRD (end stage renal disease) on dialysis      senna-docusate 8.6-50 mg (PERICOLACE) 8.6-50 mg per tablet Take 1 tablet by mouth daily as needed for Constipation.         STOP taking these medications       carvedilol (COREG) 12.5 MG tablet Comments:   Reason for Stopping:                    _________________________________  Desi Potter MD  10/31/2019

## 2019-10-31 NOTE — PROGRESS NOTES
NEPHROLOGY HEMODIALYSIS NOTE    Tea Georges is a 79 y.o. female currently on hemodialysis for removal of uremic toxins and volume management.     Patient seen and evaluated on hemodialysis, tolerating treatment, see HD flowsheet for vitals and assessments.    No Hypotension, chest pain, shortness of breath, cramping, nausea or vomiting.      Labs have been reviewed and the dialysate bath has been adjusted.    Labs:      Recent Labs   Lab 10/29/19  0435 10/30/19  0358 10/31/19  0556    141 138   K 4.8 4.1 4.4    105 103   CO2 23 25 22*   BUN 29* 20 37*   CREATININE 4.1* 2.9* 4.3*   CALCIUM 8.6* 8.5* 8.5*   PHOS 6.5* 4.3 5.1*       Recent Labs   Lab 10/29/19  0435 10/30/19  0358 10/31/19  0557   WBC 7.67 5.47 5.03   HGB 8.6* 8.2* 8.5*   HCT 28.8* 28.7* 29.6*   PLT 81* 77* 87*          Assessment/Plan:  - Seen on dialysis this morning, tolerating session with current UFR, no complications.    - Ultrafiltration goal: 1-2L in 3.5hrs  - Will continue dialysis treatments while in-patient  - Continue to monitor intake and output   - Renally dose medications  - Pre/Post dialysis treatment weights  - Continue SATNAM with dialysis treatments: 4K Units  - Venofer 100 mg IV ordered. Iron stores not optimal.   - Will review chronic care plan from outpatient dialysis center for SATNAM dosing   - Renal diet  - Will continue to follow closely.      Art Maciel MD  Nephrology Fellow  [facility]

## 2019-10-31 NOTE — DISCHARGE SUMMARY
DISCHARGE SUMMARY  Hospital Medicine    Team: [facility]OSP MED H    Patient Name: Tea Georges  YOB: 1940    Admit Date: 10/27/2019    Discharge Date: 10/31/2019    Discharge Attending Physician: Desi Potter MD     Principal Diagnoses:  Active Hospital Problems    Diagnosis  POA    *Closed 2-part intertrochanteric fracture of left femur - CMN fixation 10/28/19 [S72.142A]  Yes    Pain [R52]  Yes    Complicated UTI (urinary tract infection) [N39.0]  Yes    Pulmonary hypertension due to aortic valve disease [I27.29, I35.9]  Yes    Chronic respiratory failure with hypoxia [J96.11]  Yes    (HFpEF) heart failure with preserved ejection fraction [I50.30]  Yes    Type 2 diabetes mellitus with chronic kidney disease on chronic dialysis, without long-term current use of insulin [E11.22, N18.6, Z99.2]  Not Applicable    Pulmonary emphysema [J43.9]  Yes    Thrombocytopenia, unspecified [D69.6]  Yes    Vitamin D deficiency [E55.9]  Yes    Physical debility [R53.81]  Yes    Blindness of right eye [H54.40]  Yes     Chronic    Hyperparathyroidism, secondary renal [N25.81]  Yes     Chronic    Anemia in ESRD (end-stage renal disease) [N18.6, D63.1]  Yes     Chronic    Severe aortic valve stenosis [I35.0]  Yes    ESRD on hemodialysis [N18.6, Z99.2]  Not Applicable    Renovascular hypertension [I15.0]  Yes      Resolved Hospital Problems   No resolved problems to display.       Discharged Condition: fair     Interval History: seen in HD, reports still having pain to the left leg, tramadol started by acute pain sevice and scheduled robaxin with some improvements overnight reported. Worked with PT/OT yesterday. BM yesterday per patient. BP improving after pain starting to have better control and home meds resuming as well, continue to hold home BB as no glaring indicatoin and lower HRs in higher 50s-60s even without it. Denies dyspnea, chest pain, discussed stable Hg post op discussed plans for rehab  for strengthetning and restoraton of ADLS in order to be able to walk again with DME in the future. She has no other questions for me this AM.    Temp:  [97 °F (36.1 °C)-98 °F (36.7 °C)]   Pulse:  [54-66]   Resp:  [18]   BP: (114-152)/(56-68)   SpO2:  [99 %-100 %]      Physical Exam   Constitutional: She is oriented to person, place, and time. No distress.   Thin and frail, elderly female    HENT:   Mouth/Throat: Oropharynx is clear and moist.   Eyes:   Right eye cloudy and not reactive to light. 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    Neck: Neck supple. No JVD present.   Cardiovascular: Normal rate and regular rhythm. Exam reveals no gallop and no friction rub.   Murmur heard.   Crescendo decrescendo systolic murmur is present with a grade of 4/6.  Murmur heard throughout precordium. Harsh systolic murmur.   Pulmonary/Chest: Effort normal and breath sounds normal. No respiratory distress. She has no wheezes.   Abdominal: Soft. Bowel sounds are normal. She exhibits no distension. There is no tenderness. There is no guarding.   Musculoskeletal: She exhibits no edema. bandages in place to LLE without exudate or blood. Clean and intact.  Left AV fistula in place with good thrill and bruit   Neurological: She is alert and oriented to person, place, and time.   Skin: Skin is warm. No erythema.   Psychiatric: She has a normal mood and affect. Her behavior is normal. Thought content normal.   Nursing note and vitals reviewed.      HOSPITAL COURSE:      Initial Presentation:    79 y.o.female with ESRD on HD (MWF) via LUE fistula, renovascular HTN, severe aortic stenosis with moderate to severe pulmonary HTN, HFpEF, pulmonary emphysema with chronic hypoxic respiratory failure on home oxygen 2 liters at night prn, previous right MCA CVA in 2016, implantable loop recorder in place, Type 2 diabetes with ESRD not on home insulin, recent right SDH s/p evacuation on 6/23/2019, seizures on Keppra with last seizure in 6/2019 and  chronic debility with recent stay at [facility] IP rehab in 7/2019 presented to the [facility] ER with complaint of left hip pain. Patient states pain is sharp/stabbing in the left groin, upper leg and is aggravated by any weight bearing and standing. She reports onset of symptoms was about 12 hour(s) after a fall while ambulating to bathroom at home. Patient reports that she was walking to bathroom last night at about 7:00 pm and tripped. Patient thinks she either tripped over oxygen on ground or her foot. Patient reports there is a lot of clutter on floor in the house. Patient states she fell backwards and landed on her left hip. Patient had severe 10/10 pain in left hip after fall and unable to get up from ground due to pain and granddaughter helped her up to chair. Patient did not think she broke her hip so took Ibuprofen and daughter gave her ointment to rub on the hip and it helped. About 3:00 am this morning, pain in left hip was worse and not improving so decision made to bring patient to ER to get evaluated. Patient denies head trauma. Patient denies to loss of consciousness, denies syncope, denies chest pain, denies dizziness prior or after fall. X-ray obtained in ER revealed left intertrochanteric hip fracture. Orthopedics and [facility] medicine service consulted and patient to be admitted to the hospital to the [facility] service.      Prior to admission patient's functional mobility was independent with uses walker or can to ambulate for home distances. Patient does have history of gait or balance problems. Patient does not have history of previous falls or fractures. Patient does not have previous history of MI or cardiac stenting or cardiac surgery. Patient does have previous history of stroke in 2016. Patient does have previous history of compensated heart failure. Patient does have history of diabetes but is not insulin requiring. Patient     Course of Principle Problem for Admission:    Closed 2-part  intertrochanteric fracture of left femur - CMN fixation 10/28/19  -admitted for fall, s/p cephalomeduallary nail on 10/28, WBAT, -continue PT/OT for restoration of ADL, placed in O-Rehab for this, may need transition after depending on progress at rehab as also has iffy home situation with no working water/AC  -Heparin 5000 units subcutaneous 3 times daily ppx post-op and MADDY/SCDs for DVT prophylaxis due to ESRD, end date November 26th  · Perineural pain catheter in place post op with transition to oral pain meds per acute pain service recommendations with tylenol, scheduled robaxin and tramadol for severe pain- will continue on discharge to rehab. Reports pain still present today but improving every day and localized to left leg. Bandages clean and intact at site.  · Hg stable post op in mid 8's, no signs of bleeding post op.     Other Medical Problems Addressed in the Hospital:    Complicated UTI (urinary tract infection)  · Present on admit.   · Patient with significant UTI on admit but no signs of sepsis or systemic infection  · given IV Rocephin 1 gram daily to treat x 5 days total- end date 10/31 day of discharge. Urine culture sent  With Ecoli, sensitive to above.          Chronic respiratory failure with hypoxia  -on 2L O2 chronically prior to admit. Stable, continue on discharge.        (HFpEF) heart failure with preserved ejection fraction  Chronic and controlled. Patient with no obvious signs of volume overload on exam.   - as EF is preserved, there is no indication to continue BB chronically from this standpoint and given risk of bradycardia on it (present on admit) the risk of falls with bradycardia and no glaring indication given a preserved EF from a HF guidelines standpoint, we  stop it chronically as risk outweighs any benefit         Type 2 diabetes mellitus with chronic kidney disease on chronic dialysis, without long-term current use of insulin  Good control in hospital during stay  · HgA1C 5.6%  and at goal on admit. Patient on no meds at home to treat diabetes.   · Plan is to monitor POCT glucose 4 times a day with each meal and at bedtime and cover with Novolog low dose sliding scale insulin on transfer to rehab.   · Target pre-meal glucose goal is <140 with all random glucoses <180 in non-critically ill patient.     Thrombocytopenia, unspecified  Chronic and controlled. Platelet count between 70s-90s at baseline. At baseline now on admit and throughout stay so far. Patient with no active bleeding and no platelet transfusion needed.        Pulmonary emphysema  Chronic respiratory failure with hypoxia  · Controlled. Patient with no signs of acute exacerbation.   · Will continue daily maintenance inhalers on discharge   · Continue oxygen as needed with goal of oxygen sats > 88%.    · continue IS post op to help prevent pulmonary complications post op     Vitamin D deficiency  Patient on Vitamin D 50,000 units po weekly as outpatient and will continue in hospital. Vit D on this admit 17 and patient still with moderate deficiency and need to continue replacement therapy.         Physical debility  Blindness of right eye  Patient reports at baseline uses cane, walker and wheelchair at times and reports decreased activity after recent brain surgery in 6/2019. Patient did have IP rehab stay at [facility] in 7/2019 and noted improvement in ambulation after IP Rehab stay.   -will discharge to inipatient rehab for further strengthening      Blindness of right eye  -chronic, sequelae on exam with cloudiness in right eye        Hyperparathyroidism, secondary renal  Chronic and controlled. Continue Renvela with meals to treat.   -daily phos, renal diet     Anemia in ESRD (end-stage renal disease)  Chronic and controlled.  -baseline Hg 9s, at 9 prior to surgery. On Epo outpatient with HD.  -Hg stable in 8's. Continue to trend. Expect post op acute blood loss anemia  -given IV iron x 1 here. Will continue EPO on MWF HD  chronically on discharge per nephro discretion          Severe aortic valve stenosis  Pulmonary hypertension due to aortic valve disease  · Patient currently euvolemic.   · Patient with known severe aortic stenosis but asymptomatic. No specific treatment needed at this time. Patient is high risk for surgery due to presence of severe AS and moderate pulmonary HTN.   · Patient needs to follow-up with Interventional Cardiology as outpatient for further evaluation of her aortic stenosis to see if candidate for TAVR.   · Last 2D echo done on 5/22/2019 so no need to repeat prior to this surgery and showed:   · Low normal left ventricular systolic function. The estimated ejection fraction is 53%  · Grade II (moderate) left ventricular diastolic dysfunction consistent with pseudonormalization.  · Severe left atrial enlargement.  · Moderate right ventricular enlargement.  · Normal right ventricular systolic function.  · Severe aortic valve stenosis.  · Aortic valve area is 0.69 cm2; peak velocity is 4.58 m/s; mean gradient is 55.02 mmHg.  · Moderate mitral sclerosis.  · Mild-to-moderate mitral regurgitation.  · Moderate to severe tricuspid regurgitation.  · The estimated PA systolic pressure is 77 mm Hg    -will refer to cards as outpatient to assess        ESRD on hemodialysis  · Chronic and controlled. Nephrology managed HD while patient in hospital. Patient dialyzed on 10/31 without issue  · Left arm AV fistula in place and has good thrill and bruit. Patient dialyzes at [facility] as outpatient.   · Resume home MWF schedule as outpatient on disharge     Renovascular hypertension  · Patient's blood pressure is relatively controlled here in the hospital wiith occasional low/normotensive values requiring holding 1-2 doses of home meds.   · . Hold off on BB due to bradycardia throughout stay and stop on dc.  · Goal for blood pressure is SBP < 140 and DBP < 90 as patient > or = 60 years of age and patient is diabetic and has  chronic kidney disease based on JNC 8 guidelines.   · Plan to continue home regimen to treat of Clonidine 0.1 mg po TID, Norvasc 10 mg po daily, Hydralazine 100 mg po TID and Losartan 50 mg po daily while patient is hospitalized. Home medication of Coreg on hold due to bradycardia, continue now.          Consults: Renal, Ortho, PM and R    Last CBC/BMP:    CBC/Anemia Labs: Coags:    Recent Labs   Lab 10/29/19  0435 10/29/19  1010 10/30/19  0358 10/31/19  0557   WBC 7.67  --  5.47 5.03   HGB 8.6*  --  8.2* 8.5*   HCT 28.8*  --  28.7* 29.6*   PLT 81*  --  77* 87*   MCV 98  --  103* 104*   RDW 16.2*  --  16.3* 16.3*   IRON  --  29*  --   --    FERRITIN  --  1,739*  --   --     Recent Labs   Lab 10/27/19  0543   INR 1.1   APTT 24.2        Chemistries:   Recent Labs   Lab 10/27/19  0543  10/29/19  0435 10/30/19  0358 10/31/19  0556      < > 139 141 138   K 4.0   < > 4.8 4.1 4.4      < > 104 105 103   CO2 24   < > 23 25 22*   BUN 51*   < > 29* 20 37*   CREATININE 6.0*   < > 4.1* 2.9* 4.3*   CALCIUM 9.3   < > 8.6* 8.5* 8.5*   PROT 7.2  --   --   --   --    BILITOT 0.5  --   --   --   --    ALKPHOS 103  --   --   --   --    ALT 6*  --   --   --   --    AST 9*  --   --   --   --    MG 2.0  --   --   --   --    PHOS 4.5   < > 6.5* 4.3 5.1*    < > = values in this interval not displayed.              Special Treatments/Procedures:   Procedure(s) (LRB):  INSERTION, INTRAMEDULLARY NELIDA, FEMUR,  Left , synthes, hana table, large C arm clock side (Left)     Disposition: Rehab Facility      Future Scheduled Appointments:  Future Appointments   Date Time Provider Department Center   11/12/2019  8:45 AM Viji Estrada PA-C [facility] [place]   11/24/2019 11:00 AM HOME MONITOR DEVICE CHECK, [facility] [place]       \    Discharge Medication List:       Tea Georges   Home Medication Instructions RADHA:[id number]    Printed on:10/31/19 1340   Medication Information                      acetaminophen (TYLENOL) 325  MG tablet  Take 2 tablets (650 mg total) by mouth every 6 (six) hours as needed for Pain.             albuterol (PROVENTIL/VENTOLIN HFA) 90 mcg/actuation inhaler  Inhale 1-2 puffs into the lungs every 6 (six) hours as needed for Wheezing.             amLODIPine (NORVASC) 10 MG tablet  Take 1 tablet (10 mg total) by mouth once daily.             artificial tears (ISOPTO TEARS) 0.5 % ophthalmic solution  Place 2 drops into both eyes 4 (four) times daily as needed.             bisacodyl (DULCOLAX) 10 mg Supp  Place 1 suppository (10 mg total) rectally daily as needed (Until bowel movement if patient has no bowel movement for 2 days).             BREO ELLIPTA 200-25 mcg/dose DsDv diskus inhaler  INHALE 1 PUFF INTO THE LUNGS ONCE DAILY.             cloNIDine (CATAPRES) 0.1 MG tablet  Take 1 tablet (0.1 mg total) by mouth 3 (three) times daily.             ergocalciferol (ERGOCALCIFEROL) 50,000 unit Cap  Take 1 capsule (50,000 Units total) by mouth every 7 days.             heparin sodium,porcine (HEPARIN, PORCINE,) 5,000 unit/mL injection  Inject 1 mL (5,000 Units total) into the skin every 8 (eight) hours. for 26 days             hydrALAZINE (APRESOLINE) 100 MG tablet  Take 1 tablet (100 mg total) by mouth every 8 (eight) hours.             insulin aspart U-100 (NOVOLOG) 100 unit/mL (3 mL) InPn pen  Inject 0-5 Units into the skin before meals and at bedtime as needed (Hyperglycemia).             levETIRAcetam (KEPPRA) 500 MG Tab  TAKE 1 TABLET (500 MG TOTAL) BY MOUTH 2 (TWO) TIMES A DAY FOR 30 DAYS.             losartan (COZAAR) 50 MG tablet  Take 1 tablet (50 mg total) by mouth once daily.             methocarbamol (ROBAXIN) 500 MG Tab  Take 1 tablet (500 mg total) by mouth 4 (four) times daily. for 10 days             ondansetron (ZOFRAN-ODT) 8 MG TbDL  Take 1 tablet (8 mg total) by mouth every 6 (six) hours as needed (nausea).             polyethylene glycol (GLYCOLAX) 17 gram PwPk  Take 17 g by mouth once daily.              ramelteon (ROZEREM) 8 mg tablet  Take 1 tablet (8 mg total) by mouth nightly as needed for Insomnia.             RENVELA 800 mg Tab  Take 1 tablet (800 mg total) by mouth 3 (three) times daily with meals.             senna-docusate 8.6-50 mg (PERICOLACE) 8.6-50 mg per tablet  Take 1 tablet by mouth daily as needed for Constipation.             traMADol (ULTRAM) 50 mg tablet  Take 1 tablet (50 mg total) by mouth every 6 (six) hours as needed.                 Patient Instructions:  Discharge Procedure Orders   Ambulatory referral to Orthopedics   Standing Status: Future   Referral Priority: Routine Referral Type: Consultation   Requested Specialty: Orthopedic Surgery   Number of Visits Requested: 1     Ambulatory referral to ORT Fracture Care   Standing Status: Future   Referral Priority: Routine Referral Type: Consultation   Requested Specialty: Orthopedic Surgery   Number of Visits Requested: 1     Diet renal     Notify your health care provider if you experience any of the following:  temperature >100.4     Notify your health care provider if you experience any of the following:  difficulty breathing or increased cough     Activity as tolerated       At the time of discharge patient was told to take all medications as prescribed, to keep all followup appointments, and to call their primary care physician or return to the emergency room if they have any worsening or concerning symptoms.    Signing Physician:  Desi Potter MD

## 2019-10-31 NOTE — PROGRESS NOTES
[facility]    Report given to Porter Casas

## 2019-10-31 NOTE — PHYSICIAN QUERY
PT Name: Tea Georges  MR #: [id number]    Physician Query Form - Consultant Diagnosis Clarification     CDS/: Mayela Osei RN               Contact information:[email or web address]  This form is a permanent document in the medical record.     Query Date: October 31, 2019      By submitting this query, we are merely seeking further clarification of documentation.  Please utilize your independent clinical judgment when addressing the question(s) below.      The Medical record contains the following:   Diagnosis Supporting Clinical Information Location in Medical Record   malnutrition     --Thin and frail  -- past medical history: chronic diastolic heart failure, Chronic respiratory failure with hypoxia, anemia in ESRD, R eye blindness, Diverticulosis, physical debility, pulmonary emphysema, pulmonary hypertension, Left spastic hemiparesis, DM2  --Complicated UTI present on admit, Closed 2-part intertrochanteric fracture of left femur    Body mass index is 19.84 kg/m².  She appears well-developed and well-nourished.     25% oral intake @ 1803  No oral intake percentage documented  25% oral intake @ 1300; 50% oral intake @1800  75% oral intake @ 0805   Anesthesia record 10/28    Hospital Medicine H&P                  Physical Medicine and Rehabilitation consult    Intake/output record 10/27  Intake/output record 10/28  Intake/output record 10/29  Intake/output record 10/30         Do you agree with the Consultants diagnosis of ___malnutrition_____?    [  X] Yes   [  ] No   [  ] Clinically insignificant   [  ] Other/Clarification of findings:   [  ] Clinically undetermined

## 2019-10-31 NOTE — ADDENDUM NOTE
Addendum  created 10/31/19 1859 by Tamara Elias MD    Intraprocedure LDAs edited, LDA properties accepted, Order list changed

## 2019-10-31 NOTE — PROGRESS NOTES
Attempted to see patient at this time and in HD. Recommend Inpatient Rehab once medically stable.    SHANEKA Jones, LAURENP-C  Physical Medicine & Rehabilitation   10/31/2019  Spectralink: [id number]

## 2019-11-04 NOTE — PHYSICIAN QUERY
PT Name: Tea Georges  MR #: [id number]    Physician Query Form - Nutrition Clarification     CDS/: Mayela Osei RN               Contact information: [email or web address]    This form is a permanent document in the medical record.     Query Date: November 4, 2019    By submitting this query, we are merely seeking further clarification of documentation.. Please utilize your independent clinical judgment when addressing the question(s) below.    The Medical record contains the following:   Indicators  Supporting Clinical Findings Location in Medical Record   x % of Estimated Energy Intake over a time frame from p.o., TF, or TPN 25% oral intake @ 1803  No oral intake percentage documented  25% oral intake @ 1300; 50% oral intake @1800  75% oral intake @ 0805   Intake/output record 10/27  Intake/output record 10/28  Intake/output record 10/29  Intake/output record 10/30    Weight Status over a time frame     x Subcutaneous Fat and/or Muscle Loss Thin and frail  She appears well-developed and well-nourished.    Hospital Medicine H&P  Physical Medicine and Rehabilitation consult      Fluid Accumulation or Edema      Reduced  Strength     x Wt / BMI / Usual Body Weight Body mass index is 19.84 kg/m².   Physical Medicine and Rehabilitation consult      Delayed Wound Healing / Failure to Thrive     x Acute or Chronic Illness -- past medical history: chronic diastolic heart failure, Chronic respiratory failure with hypoxia, anemia in ESRD, R eye blindness, Diverticulosis, physical debility, pulmonary emphysema, pulmonary hypertension, Left spastic hemiparesis, DM2  --Complicated UTI present on admit, Closed 2-part intertrochanteric fracture of left femur   Hospital Medicine H&P      Medication      Treatment     x Other Malnutrition  Hospital Medicine Provider query response 10/31     AND / ASPEN Clinical Characteristics (October 2011)  A minimum of two characteristics is recommended for diagnosing either  moderate or severe malnutrition   Mild Malnutrition Moderate Malnutrition Severe Malnutrition   Energy Intake from p.o., TF or TPN. < 75% intake of estimated energy needs for less than 7 days < 75% intake of estimated energy needs for greater than 7 days < 50% intake of estimated energy needs for > 5 days   Weight Loss 1-2% in 1 month  5% in 3 months  7.5% in 6 months  10% in 1 year 1-2 % in 1 week  5% in 1 month  7.5% in 3 months  10% in 6 months  20% in 1 year > 2% in 1 week  > 5% in 1 month  > 7.5% in 3 months  > 10% in 6 months  > 20% in 1 year   Physical Findings     None *Mild subcutaneous fat and/or muscle loss  *Mild fluid accumulation  *Stage II decubitus  *Surgical wound or non-healing wound *Mod/severe subcutaneous fat and/or muscle loss  *Mod/severe fluid accumulation  *Stage III or IV decubitus  *Non-healing surgical wound     Provider, please specify diagnosis or diagnoses associated with above clinical findings.    [ X ] Mild Protein-Calorie Malnutrition   [  ] Other Nutritional Diagnosis (please specify):    [  ] Other:    [  ] Clinically Undetermined       Please document in your progress notes daily for the duration of treatment until resolved and include in your discharge summary.

## 2019-11-07 ENCOUNTER — HOSPITAL ENCOUNTER (OUTPATIENT)
Dept: RADIOLOGY | Facility: HOSPITAL | Age: 79
Discharge: HOME | End: 2019-11-07
Attending: PHYSICAL MEDICINE & REHABILITATION
Payer: MEDICARE

## 2019-11-07 DIAGNOSIS — M25.552 PAIN OF LEFT HIP JOINT: ICD-10-CM

## 2019-11-07 PROCEDURE — 71045 XR CHEST 1 VIEW: ICD-10-PCS | Mod: 26,,, | Performed by: RADIOLOGY

## 2019-11-07 PROCEDURE — 73502 X-RAY EXAM HIP UNI 2-3 VIEWS: CPT | Mod: 26,LT,, | Performed by: RADIOLOGY

## 2019-11-07 PROCEDURE — 71045 X-RAY EXAM CHEST 1 VIEW: CPT | Mod: 26,,, | Performed by: RADIOLOGY

## 2019-11-07 PROCEDURE — 73502 XR HIP 2 VIEW LEFT: ICD-10-PCS | Mod: 26,LT,, | Performed by: RADIOLOGY

## 2019-11-11 ENCOUNTER — HOSPITAL ENCOUNTER (OUTPATIENT)
Dept: RADIOLOGY | Facility: HOSPITAL | Age: 79
Discharge: HOME | End: 2019-11-11
Attending: PHYSICAL MEDICINE & REHABILITATION
Payer: MEDICARE

## 2019-11-11 PROCEDURE — 74018 XR KUB: ICD-10-PCS | Mod: 26,,, | Performed by: RADIOLOGY

## 2019-11-11 PROCEDURE — 74018 RADEX ABDOMEN 1 VIEW: CPT | Mod: 26,,, | Performed by: RADIOLOGY

## 2019-11-12 ENCOUNTER — TELEPHONE (OUTPATIENT)
Dept: ORTHOPEDICS | Facility: CLINIC | Age: 79
End: 2019-11-12

## 2019-11-12 NOTE — TELEPHONE ENCOUNTER
Pt updated # [phone]. Notified Lucinda with rehab [phone] or [phone]. Pt in room 513. Regarding pt scheduled appointment with KERLINE Estrada PA-C 11/12/19 at 8:45 am. Lucinda stated that she will speak pt case manger; regarding pt appointment.

## 2019-11-18 ENCOUNTER — HOSPITAL ENCOUNTER (OUTPATIENT)
Dept: RADIOLOGY | Facility: HOSPITAL | Age: 79
Discharge: HOME | End: 2019-11-18
Attending: PHYSICAL MEDICINE & REHABILITATION
Payer: MEDICARE

## 2019-11-18 PROCEDURE — 73650 XR CALCANEUS 2 VIEW LEFT: ICD-10-PCS | Mod: 26,LT,, | Performed by: RADIOLOGY

## 2019-11-18 PROCEDURE — 73650 X-RAY EXAM OF HEEL: CPT | Mod: 26,LT,, | Performed by: RADIOLOGY

## 2019-11-20 NOTE — PROGRESS NOTES
No changes to H&P.   Report given to Courtney at [facility]. Informed that transport is scheduled for 16:15.

## 2020-09-16 ENCOUNTER — HOSPITAL ENCOUNTER (OUTPATIENT)
Facility: HOSPITAL | Age: 80
Discharge: HOME | End: 2020-09-18
Attending: EMERGENCY MEDICINE | Admitting: HOSPITALIST
Payer: MEDICARE

## 2020-09-16 DIAGNOSIS — R53.81 PHYSICAL DEBILITY: ICD-10-CM

## 2020-09-16 DIAGNOSIS — J90 PLEURAL EFFUSION: ICD-10-CM

## 2020-09-16 DIAGNOSIS — I35.0 SEVERE AORTIC STENOSIS: ICD-10-CM

## 2020-09-16 DIAGNOSIS — I20.89 ANGINA AT REST: Primary | ICD-10-CM

## 2020-09-16 DIAGNOSIS — I35.0 SEVERE AORTIC VALVE STENOSIS: ICD-10-CM

## 2020-09-16 DIAGNOSIS — Z99.2 ESRD ON HEMODIALYSIS: ICD-10-CM

## 2020-09-16 DIAGNOSIS — E87.70 HYPERVOLEMIA, UNSPECIFIED HYPERVOLEMIA TYPE: ICD-10-CM

## 2020-09-16 DIAGNOSIS — N18.6 ESRD ON HEMODIALYSIS: ICD-10-CM

## 2020-09-16 DIAGNOSIS — I50.32 CHRONIC HEART FAILURE WITH PRESERVED EJECTION FRACTION: ICD-10-CM

## 2020-09-16 DIAGNOSIS — R07.9 CHEST PAIN: ICD-10-CM

## 2020-09-16 DIAGNOSIS — I25.10 CORONARY ARTERY DISEASE, ANGINA PRESENCE UNSPECIFIED, UNSPECIFIED VESSEL OR LESION TYPE, UNSPECIFIED WHETHER NATIVE OR TRANSPLANTED HEART: ICD-10-CM

## 2020-09-16 LAB
ALBUMIN SERPL BCP-MCNC: 2.4 G/DL (ref 3.5–5.2)
ALP SERPL-CCNC: 75 U/L (ref 55–135)
ALT SERPL W/O P-5'-P-CCNC: 7 U/L (ref 10–44)
ANION GAP SERPL CALC-SCNC: 14 MMOL/L (ref 8–16)
AST SERPL-CCNC: 10 U/L (ref 10–40)
BASOPHILS # BLD AUTO: 0.06 K/UL (ref 0–0.2)
BASOPHILS NFR BLD: 0.6 % (ref 0–1.9)
BILIRUB SERPL-MCNC: 0.5 MG/DL (ref 0.1–1)
BNP SERPL-MCNC: >4900 PG/ML (ref 0–99)
BUN SERPL-MCNC: 59 MG/DL (ref 8–23)
CALCIUM SERPL-MCNC: 9.2 MG/DL (ref 8.7–10.5)
CHLORIDE SERPL-SCNC: 104 MMOL/L (ref 95–110)
CO2 SERPL-SCNC: 22 MMOL/L (ref 23–29)
CREAT SERPL-MCNC: 3.9 MG/DL (ref 0.5–1.4)
DIFFERENTIAL METHOD: ABNORMAL
EOSINOPHIL # BLD AUTO: 0.1 K/UL (ref 0–0.5)
EOSINOPHIL NFR BLD: 1.2 % (ref 0–8)
ERYTHROCYTE [DISTWIDTH] IN BLOOD BY AUTOMATED COUNT: 19 % (ref 11.5–14.5)
EST. GFR  (AFRICAN AMERICAN): 12 ML/MIN/1.73 M^2
EST. GFR  (NON AFRICAN AMERICAN): 10.4 ML/MIN/1.73 M^2
GLUCOSE SERPL-MCNC: 179 MG/DL (ref 70–110)
HCT VFR BLD AUTO: 30.9 % (ref 37–48.5)
HGB BLD-MCNC: 9 G/DL (ref 12–16)
IMM GRANULOCYTES # BLD AUTO: 0.09 K/UL (ref 0–0.04)
IMM GRANULOCYTES NFR BLD AUTO: 0.9 % (ref 0–0.5)
LYMPHOCYTES # BLD AUTO: 0.6 K/UL (ref 1–4.8)
LYMPHOCYTES NFR BLD: 6.5 % (ref 18–48)
MCH RBC QN AUTO: 28.5 PG (ref 27–31)
MCHC RBC AUTO-ENTMCNC: 29.1 G/DL (ref 32–36)
MCV RBC AUTO: 98 FL (ref 82–98)
MONOCYTES # BLD AUTO: 0.7 K/UL (ref 0.3–1)
MONOCYTES NFR BLD: 7.4 % (ref 4–15)
NEUTROPHILS # BLD AUTO: 8.1 K/UL (ref 1.8–7.7)
NEUTROPHILS NFR BLD: 83.4 % (ref 38–73)
NRBC BLD-RTO: 0 /100 WBC
PLATELET # BLD AUTO: 142 K/UL (ref 150–350)
PMV BLD AUTO: 13 FL (ref 9.2–12.9)
POCT GLUCOSE: 161 MG/DL (ref 70–110)
POTASSIUM SERPL-SCNC: 4.2 MMOL/L (ref 3.5–5.1)
PROT SERPL-MCNC: 6.9 G/DL (ref 6–8.4)
RBC # BLD AUTO: 3.16 M/UL (ref 4–5.4)
SARS-COV-2 RDRP RESP QL NAA+PROBE: NEGATIVE
SODIUM SERPL-SCNC: 140 MMOL/L (ref 136–145)
TROPONIN I SERPL DL<=0.01 NG/ML-MCNC: 0.13 NG/ML (ref 0–0.03)
TROPONIN I SERPL DL<=0.01 NG/ML-MCNC: 0.15 NG/ML (ref 0–0.03)
WBC # BLD AUTO: 9.7 K/UL (ref 3.9–12.7)

## 2020-09-16 PROCEDURE — 93005 ELECTROCARDIOGRAM TRACING: CPT

## 2020-09-16 PROCEDURE — 93010 ELECTROCARDIOGRAM REPORT: CPT | Mod: ,,, | Performed by: INTERNAL MEDICINE

## 2020-09-16 PROCEDURE — G0378 HOSPITAL OBSERVATION PER HR: HCPCS

## 2020-09-16 PROCEDURE — 83880 ASSAY OF NATRIURETIC PEPTIDE: CPT

## 2020-09-16 PROCEDURE — 96372 THER/PROPH/DIAG INJ SC/IM: CPT

## 2020-09-16 PROCEDURE — U0002 COVID-19 LAB TEST NON-CDC: HCPCS

## 2020-09-16 PROCEDURE — 99285 PR EMERGENCY DEPT VISIT,LEVEL V: ICD-10-PCS | Mod: ,,, | Performed by: PHYSICIAN ASSISTANT

## 2020-09-16 PROCEDURE — 82962 GLUCOSE BLOOD TEST: CPT

## 2020-09-16 PROCEDURE — G0378 HOSPITAL OBSERVATION PER HR: HCPCS | Mod: CS

## 2020-09-16 PROCEDURE — 93010 EKG 12-LEAD: ICD-10-PCS | Mod: ,,, | Performed by: INTERNAL MEDICINE

## 2020-09-16 PROCEDURE — 99220 PR INITIAL OBSERVATION CARE,LEVL III: ICD-10-PCS | Mod: ,,, | Performed by: HOSPITALIST

## 2020-09-16 PROCEDURE — 99220 PR INITIAL OBSERVATION CARE,LEVL III: CPT | Mod: ,,, | Performed by: HOSPITALIST

## 2020-09-16 PROCEDURE — 99285 EMERGENCY DEPT VISIT HI MDM: CPT | Mod: 25

## 2020-09-16 PROCEDURE — 99285 EMERGENCY DEPT VISIT HI MDM: CPT | Mod: ,,, | Performed by: PHYSICIAN ASSISTANT

## 2020-09-16 PROCEDURE — 85025 COMPLETE CBC W/AUTO DIFF WBC: CPT

## 2020-09-16 PROCEDURE — 84484 ASSAY OF TROPONIN QUANT: CPT

## 2020-09-16 PROCEDURE — 25000003 PHARM REV CODE 250: Performed by: HOSPITALIST

## 2020-09-16 PROCEDURE — 63600175 PHARM REV CODE 636 W HCPCS: Performed by: HOSPITALIST

## 2020-09-16 PROCEDURE — 80053 COMPREHEN METABOLIC PANEL: CPT

## 2020-09-16 RX ORDER — PROCHLORPERAZINE EDISYLATE 5 MG/ML
5 INJECTION INTRAMUSCULAR; INTRAVENOUS EVERY 6 HOURS PRN
Status: DISCONTINUED | OUTPATIENT
Start: 2020-09-16 | End: 2020-09-18 | Stop reason: HOSPADM

## 2020-09-16 RX ORDER — SODIUM CHLORIDE 9 MG/ML
INJECTION, SOLUTION INTRAVENOUS ONCE
Status: DISCONTINUED | OUTPATIENT
Start: 2020-09-16 | End: 2020-09-18 | Stop reason: HOSPADM

## 2020-09-16 RX ORDER — SODIUM CHLORIDE 0.9 % (FLUSH) 0.9 %
10 SYRINGE (ML) INJECTION
Status: DISCONTINUED | OUTPATIENT
Start: 2020-09-16 | End: 2020-09-18 | Stop reason: HOSPADM

## 2020-09-16 RX ORDER — SEVELAMER CARBONATE 800 MG/1
800 TABLET, FILM COATED ORAL
Status: DISCONTINUED | OUTPATIENT
Start: 2020-09-17 | End: 2020-09-18 | Stop reason: HOSPADM

## 2020-09-16 RX ORDER — ATORVASTATIN CALCIUM 20 MG/1
20 TABLET, FILM COATED ORAL NIGHTLY
Status: DISCONTINUED | OUTPATIENT
Start: 2020-09-16 | End: 2020-09-18 | Stop reason: HOSPADM

## 2020-09-16 RX ORDER — IBUPROFEN 200 MG
16 TABLET ORAL
Status: DISCONTINUED | OUTPATIENT
Start: 2020-09-16 | End: 2020-09-18 | Stop reason: HOSPADM

## 2020-09-16 RX ORDER — SODIUM CHLORIDE 0.9 % (FLUSH) 0.9 %
10 SYRINGE (ML) INJECTION
Status: DISCONTINUED | OUTPATIENT
Start: 2020-09-16 | End: 2020-09-17

## 2020-09-16 RX ORDER — ACETAMINOPHEN 325 MG/1
650 TABLET ORAL EVERY 4 HOURS PRN
Status: DISCONTINUED | OUTPATIENT
Start: 2020-09-16 | End: 2020-09-18 | Stop reason: HOSPADM

## 2020-09-16 RX ORDER — IBUPROFEN 200 MG
24 TABLET ORAL
Status: DISCONTINUED | OUTPATIENT
Start: 2020-09-16 | End: 2020-09-18 | Stop reason: HOSPADM

## 2020-09-16 RX ORDER — LEVETIRACETAM 500 MG/1
500 TABLET ORAL 2 TIMES DAILY
Status: DISCONTINUED | OUTPATIENT
Start: 2020-09-16 | End: 2020-09-18 | Stop reason: HOSPADM

## 2020-09-16 RX ORDER — TALC
6 POWDER (GRAM) TOPICAL NIGHTLY PRN
Status: DISCONTINUED | OUTPATIENT
Start: 2020-09-16 | End: 2020-09-18 | Stop reason: HOSPADM

## 2020-09-16 RX ORDER — NAPROXEN SODIUM 220 MG/1
81 TABLET, FILM COATED ORAL DAILY
Status: DISCONTINUED | OUTPATIENT
Start: 2020-09-17 | End: 2020-09-18 | Stop reason: HOSPADM

## 2020-09-16 RX ORDER — AMLODIPINE BESYLATE 10 MG/1
10 TABLET ORAL DAILY
Status: DISCONTINUED | OUTPATIENT
Start: 2020-09-17 | End: 2020-09-18 | Stop reason: HOSPADM

## 2020-09-16 RX ORDER — IPRATROPIUM BROMIDE AND ALBUTEROL SULFATE 2.5; .5 MG/3ML; MG/3ML
3 SOLUTION RESPIRATORY (INHALATION) EVERY 6 HOURS PRN
Status: DISCONTINUED | OUTPATIENT
Start: 2020-09-16 | End: 2020-09-18 | Stop reason: HOSPADM

## 2020-09-16 RX ORDER — FLUTICASONE FUROATE AND VILANTEROL 200; 25 UG/1; UG/1
1 POWDER RESPIRATORY (INHALATION) DAILY
Status: DISCONTINUED | OUTPATIENT
Start: 2020-09-17 | End: 2020-09-18 | Stop reason: HOSPADM

## 2020-09-16 RX ORDER — HYDRALAZINE HYDROCHLORIDE 50 MG/1
50 TABLET, FILM COATED ORAL EVERY 8 HOURS
Status: DISCONTINUED | OUTPATIENT
Start: 2020-09-16 | End: 2020-09-18 | Stop reason: HOSPADM

## 2020-09-16 RX ORDER — ASPIRIN 81 MG/1
81 TABLET ORAL DAILY
Status: ON HOLD | COMMUNITY
End: 2021-06-23 | Stop reason: HOSPADM

## 2020-09-16 RX ORDER — ONDANSETRON 2 MG/ML
4 INJECTION INTRAMUSCULAR; INTRAVENOUS EVERY 8 HOURS PRN
Status: DISCONTINUED | OUTPATIENT
Start: 2020-09-16 | End: 2020-09-18 | Stop reason: HOSPADM

## 2020-09-16 RX ORDER — HEPARIN SODIUM 5000 [USP'U]/ML
5000 INJECTION, SOLUTION INTRAVENOUS; SUBCUTANEOUS EVERY 8 HOURS
Status: DISCONTINUED | OUTPATIENT
Start: 2020-09-16 | End: 2020-09-18 | Stop reason: HOSPADM

## 2020-09-16 RX ADMIN — HEPARIN SODIUM 5000 UNITS: 5000 INJECTION INTRAVENOUS; SUBCUTANEOUS at 10:09

## 2020-09-16 RX ADMIN — ATORVASTATIN CALCIUM 20 MG: 20 TABLET, FILM COATED ORAL at 10:09

## 2020-09-16 RX ADMIN — LEVETIRACETAM 500 MG: 500 TABLET ORAL at 10:09
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
## 2020-09-16 NOTE — ED PROVIDER NOTES
Encounter Date: 9/16/2020       History     Chief Complaint   Patient presents with    Chest Pain     pt c/o chest pain at dialysis, did not receive treatment, M/W/F schedule.     Ms Georges is a 80yoF who presents for retrosternal CP; pertinent PMHx ESRD on HD (MWF), severe AS, h/o CAD, blindness OD, DM 2, h/o seizures, h/o SDH.  Patient reports onset lastnight of  central retrosternal chest pain that also made her feel nauseated.  Symptoms continued into this morning, endorsed prior to HD this morning where staff called 911, where she was given 2 sublingual nitroglycerin which resolved pain.  Pain resolution on arrival.  Reports mild cough over the past few days. Stable on home oxygen. Lives at NH d/t debility. Chronic L heel wound. Denies fever/chills, SOB, abd pain, V/C/D.  The patients available PMH, PSH, Social History, medications, allergies, and triage vital signs were reviewed just prior to their medical evaluation.  A ten point review of systems was completed and is negative except as documented above.  Patient denies any other acute medical complaint.    .Please be advised this text was dictated with [employer] software and may contain errors due to translation.           Review of patient's allergies indicates:  No Known Allergies  Past Medical History:   Diagnosis Date    (HFpEF) heart failure with preserved ejection fraction 5/21/2019    Anemia in ESRD (end-stage renal disease) 5/29/2016    Aneurysm of arteriovenous dialysis fistula     Aortic atherosclerosis 11/22/2016    Bilateral low back pain without sciatica 11/17/2015    Blindness of right eye 11/12/2016    Brain compression 6/22/2019    Cataract     Central retinal vein occlusion, right eye 6/3/2014    Chronic diastolic heart failure 1/8/2016    Chronic respiratory failure with hypoxia 5/29/2016    Coronary artery disease involving native coronary artery of native heart without angina pectoris 12/12/2016    Diverticulosis      Encounter for blood transfusion     Enlarged LA (left atrium) 10/7/2016    Epiretinal membrane 7/17/2012    ESRD on hemodialysis     MWF    History of GI diverticular bleed     5/22/16    Hyperparathyroidism, secondary renal 10/14/2016    Left spastic hemiparesis 11/13/2016    Moderate single current episode of major depressive disorder 2/8/2019    Non-rheumatic tricuspid valve insufficiency 1/15/2017    Peripheral vascular disease, unspecified 11/22/2016    Physical debility 11/17/2016    Pleural effusion on right     Pulmonary emphysema 1/15/2017    Pulmonary hypertension 6/28/2019    Renovascular hypertension 1/8/2016    Seizure 6/24/2019    Severe aortic valve stenosis 2/4/2016    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 11/13/2016    s/p thrombectomy of MCA    Subdural hematoma 05/21/2019    Bilateral R>L    Thrombocytopenia, unspecified 1/15/2017    Type 2 diabetes mellitus with chronic kidney disease on chronic dialysis, without long-term current use of insulin 5/1/2018    Type 2 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, without long-term current use of insulin 3/26/2013    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, without long-term current use of insulin 3/26/2013    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     Past Surgical History:   Procedure Laterality Date    ABDOMINAL SURGERY      BREAST SURGERY      tumor removal x 2    CARDIAC SURGERY  2016    Implantable loop recorder placed    CATARACT EXTRACTION      CEREBRAL ANGIOGRAM N/A 6/24/2019    Procedure: ANGIOGRAM-CEREBRAL;  Surgeon: Kenisha Surgeon;  [facility];  Service: Anesthesiology;  Laterality: N/A;    CHOLECYSTECTOMY      COLONOSCOPY N/A 5/23/2016    Procedure: COLONOSCOPY;  Surgeon: WILLIAM Colvin MD;  Location: [facility] RADHA ([address] [place] [address]);  Service: Endoscopy;  Laterality: N/A;    COLONOSCOPY N/A 5/30/2016     Procedure: COLONOSCOPY;  Surgeon: Sam Davis MD;  Location: [facility] ENDO (2ND FLR);  Service: Endoscopy;  Laterality: N/A;    CRANIOTOMY FOR EVACUATION OF SUBDURAL HEMATOMA Right 6/23/2019    Procedure: KATE HOLES FOR SUBDURAL HEMATOMA EVACUATION;  Surgeon: Trevor Conner MD;  [facility] [place] [facility];  Service: Neurosurgery;  Laterality: Right;    EYE SURGERY      FISTULOGRAM Left 11/28/2018    Procedure: Fistulogram;  Surgeon: NADINE Blum III, MD;  Location: [facility] CATH LAB;  Service: Cardiology;  Laterality: Left;    INTRAMEDULLARY RODDING OF FEMUR Left 10/28/2019    Procedure: INSERTION, INTRAMEDULLARY NELIDA, FEMUR,  Left , synthes, hana table, large C arm clock side;  Surgeon: Torey Aguilar MD;  [facility] [facility];  Service: Orthopedics;  Laterality: Left;  general        PLACEMENT OF DUAL-LUMEN VASCULAR CATHETER Right 11/29/2018    Procedure: INSERTION, CATHETER, VASCULAR, DUAL LUMEN;  Surgeon: NADINE Blum III, MD;  [facility] [place] [facility];  Service: Peripheral Vascular;  Laterality: Right;  Permacatheter placement     RESECTION OF ANEURYSM Left 11/29/2018    Procedure: EXCISION, ANEURYSM;  Surgeon: NADINE Blum III, MD;  [facility] [facility];  Service: Peripheral Vascular;  Laterality: Left;  Excision, L AVF aneurysm    REVISION OF ARTERIOVENOUS FISTULA Left 11/29/2018    Procedure: REVISION, AV FISTULA,;  Surgeon: NADINE Blum III, MD;  [facility];  Service: Peripheral Vascular;  Laterality: Left;  OR 11    UPPER GASTROINTESTINAL ENDOSCOPY       Family History   Problem Relation Age of Onset    Cataracts Mother     Stroke Mother     Hypertension Mother     Cancer Sister     Hypertension Sister     Heart failure Sister     Glaucoma Brother     Hypertension Brother     Heart disease Brother     Hypertension Father     Glaucoma Maternal Aunt     Esophageal cancer Sister     No Known Problems Maternal Uncle     No Known Problems  Paternal Aunt     No Known Problems Paternal Uncle     No Known Problems Maternal Grandmother     No Known Problems Maternal Grandfather     No Known Problems Paternal Grandmother     No Known Problems Paternal Grandfather     Heart attack Neg Hx     Colon cancer Neg Hx     Stomach cancer Neg Hx     Anemia Neg Hx     Arrhythmia Neg Hx     Asthma Neg Hx     Clotting disorder Neg Hx     Fainting Neg Hx     Hyperlipidemia Neg Hx     Atrial Septal Defect Neg Hx      Social History     Tobacco Use    Smoking status: Never Smoker    Smokeless tobacco: Never Used   Substance Use Topics    Alcohol use: No     Comment: Reports occasional 1-2 drinks     Drug use: No     Review of Systems   Constitutional: Negative for chills, fatigue and fever.   HENT: Negative for congestion, sore throat and trouble swallowing.    Eyes: Negative for visual disturbance.   Respiratory: Positive for cough. Negative for chest tightness and shortness of breath.    Cardiovascular: Positive for chest pain. Negative for palpitations and leg swelling.   Gastrointestinal: Positive for nausea. Negative for abdominal pain, diarrhea and vomiting.   Genitourinary: Negative for dysuria, flank pain and frequency.   Musculoskeletal: Negative for myalgias, neck pain and neck stiffness.   Skin: Negative for pallor and rash.   Neurological: Negative for dizziness and headaches.   Psychiatric/Behavioral: Negative for confusion.       Physical Exam     Initial Vitals [09/16/20 1448]   BP Pulse Resp Temp SpO2   138/64 75 18 98.3 °F (36.8 °C) 100 %      MAP       --         Physical Exam    Nursing note and vitals reviewed.  Constitutional: She appears well-developed. She is not diaphoretic. No distress.   Chronically ill-appearing, extremely cachectic   HENT:   Head: Normocephalic and atraumatic.   Right Ear: External ear normal.   Left Ear: External ear normal.   Mouth/Throat: Oropharynx is clear and moist.   Eyes: Conjunctivae and EOM are  normal.   Scarring and clouding of cornea OD, baseline   Cardiovascular: Normal rate, regular rhythm and intact distal pulses.   Murmur heard.  Pulmonary/Chest: No respiratory distress. She has no wheezes. She has no rhonchi. She exhibits no tenderness.   Decreased breath sounds bilaterally  Stable on home O2, no dyspnea or tachypnea   Abdominal: Soft. Bowel sounds are normal. There is no abdominal tenderness. There is no rebound and no guarding.   Musculoskeletal: No edema.      Comments: AV fistula LUE, thrill and bruit  Left foot wrapped from wound care   Neurological: She is alert and oriented to person, place, and time. She has normal strength. No cranial nerve deficit or sensory deficit.   Skin: Skin is warm and dry. Capillary refill takes less than 2 seconds. No rash noted. No erythema. No pallor.   Psychiatric: She has a normal mood and affect. Her behavior is normal. Judgment and thought content normal.         ED Course   Procedures  Labs Reviewed   CBC W/ AUTO DIFFERENTIAL - Abnormal; Notable for the following components:       Result Value    RBC 3.16 (*)     Hemoglobin 9.0 (*)     Hematocrit 30.9 (*)     Mean Corpuscular Hemoglobin Conc 29.1 (*)     RDW 19.0 (*)     Platelets 142 (*)     MPV 13.0 (*)     Immature Granulocytes 0.9 (*)     Gran # (ANC) 8.1 (*)     Immature Grans (Abs) 0.09 (*)     Lymph # 0.6 (*)     Gran% 83.4 (*)     Lymph% 6.5 (*)     All other components within normal limits   COMPREHENSIVE METABOLIC PANEL - Abnormal; Notable for the following components:    CO2 22 (*)     Glucose 179 (*)     BUN, Bld 59 (*)     Creatinine 3.9 (*)     Albumin 2.4 (*)     ALT 7 (*)     eGFR if  12.0 (*)     eGFR if non  10.4 (*)     All other components within normal limits   TROPONIN I - Abnormal; Notable for the following components:    Troponin I 0.148 (*)     All other components within normal limits   B-TYPE NATRIURETIC PEPTIDE - Abnormal; Notable for the following  components:    BNP >4,900 (*)     All other components within normal limits   SARS-COV-2 RNA AMPLIFICATION, QUAL        ECG Results          EKG 12-lead (Final result)  Result time 09/16/20 16:00:04    Final result by Interface, Lab In [facility] (09/16/20 16:00:04)                 Narrative:    Test Reason :     Vent. Rate : 077 BPM     Atrial Rate : 077 BPM     P-R Int : 192 ms          QRS Dur : 102 ms      QT Int : 418 ms       P-R-T Axes : -86 -07 160 degrees     QTc Int : 473 ms    Unusual P axis, possible ectopic atrial rhythm with Premature atrial  complexes  Nonspecific ST and T wave abnormality  Prolonged QT  Abnormal ECG  When compared with ECG of 27-OCT-2019 06:12,  Ectopic atrial rhythm has replaced Sinus rhythm  T wave inversion is more pronounced in the lateral leads  Confirmed by Juan Ernandez MD ([id number]) on 9/16/2020 3:59:51 PM    Referred By:             Confirmed By:Juan Ernandez MD                            Imaging Results          X-Ray Chest PA And Lateral (Final result)  Result time 09/16/20 16:54:05    Final result by Desi Alvarez MD (09/16/20 16:54:05)                 Impression:      Cardiomegaly with pulmonary edema, including large right and moderate left pleural effusions.      Electronically signed by: Desi Alvarez  Date:    09/16/2020  Time:    16:54             Narrative:    EXAMINATION:  XR CHEST PA AND LATERAL    CLINICAL HISTORY:  Chest Pain;    TECHNIQUE:  PA and lateral views of the chest were performed.    COMPARISON:  11/07/2019    FINDINGS:  The lungs are well expanded.  Large right and moderate left pleural effusions with adjacent atelectasis.  Cardiac silhouette is enlarged with perihilar pulmonary vascular congestion.  Calcified plaque lines arch.  Left subclavian vascular stent.                                 Medical Decision Making:   History:   Old Medical Records: I decided to obtain old medical records.  Old Records Summarized: records from clinic visits,  records from previous admission(s) and records from another hospital.       <> Summary of Records: Cardiology from outside EMR: severe AS, planned w/u for TAVR. Recent LHC with 70% RCA disease. Most recent EF 20-25%  Initial Assessment:   Patient presents with retrosternal CP x 12 hrs, resolved after SL NG PTA. Known CAD/severe AS, mild cough, VSS, afebrile. Lives at NH for severe debility  Differential Diagnosis:   DDx includes angina/ACS, less likely GERD, pericarditis. Physical exam and history taking lower clinical suspicion for aortic dissection, septic shock, PE.   Independently Interpreted Test(s):   I have ordered and independently interpreted X-rays - see prior notes.  I have ordered and independently interpreted EKG Reading(s) - see prior notes  Clinical Tests:   Lab Tests: Ordered and Reviewed  Radiological Study: Ordered and Reviewed  Medical Tests: Ordered and Reviewed  ED Management:  EKG shows new/progressed T wave inversions in lateral leads with ST elevation in V3 (no elevation in contiguous leads), unusual P axis.  Patient is chest pain-free.   Labs reveal continued severe BNP elevation with new troponin elevation of 0.148, higher than baseline, the patient is currently due for dialysis.  No severe electrolyte disturbance, stable anemia.  Chest x-ray reveals serial enlargement of bilateral pleural effusions.  No coughing in ED. No new oxygen demand.  No emergent thoracentesis indicated at this time.  Patient has been chest pain-free and has not required nitroglycerin in the ED. Vitals have remained stable.  I discussed the case with Cardiology given structural and coronary heart disease.  They do not suspect ACS at this time, do not recommend heparin at this time.  Do request HD this evening, IC consult for further recs. Admitted to  for continued w/u.  I discussed code status with patient, confirmed full code.  Patient agreed to plan of care and voiced understanding.    Meghan Goodson,  MILLA  09/17/2020    I discussed the following case, diagnosis and plan of care with attending physician.      Additional MDM:     Well's Criteria Score:  -Clinical symptoms of DVT (leg swelling, pain with palpation) = 0.0  -Other diagnosis less likely than pulmonary embolism =            0.0  -Heart Rate >100 =   0.0  -Immobilization (= or > than 3 days) or surgery in the previous 4 weeks = 1.5  -Previous DVT/PE = 0.0  -Hemoptysis =          0.0  -Malignancy =           0.0  Well's Probability Score =    1.5      Heart Score:    History:          Highly suspicious.  ECG:             Nonspecific repolarisation disturbance  Age:               >65 years  Risk factors: >= 3 risk factors or history of atherosclerotic disease  Troponin:       >2x normal limit  Final Score: 9                              Clinical Impression:       ICD-10-CM ICD-9-CM   1. Angina at rest  I20.8 413.9   2. Chest pain  R07.9 786.50   3. ESRD on hemodialysis  N18.6 585.6    Z99.2 V45.11   4. Pleural effusion  J90 511.9   5. Hypervolemia, unspecified hypervolemia type  E87.70 276.69   6. Severe aortic stenosis  I35.0 424.1                      Disposition:   Disposition: Placed in Observation  Condition: Fair     ED Disposition Condition    Observation                           Meghan Goodson PA-C  09/17/20 0956

## 2020-09-16 NOTE — ED NOTES
Patient sitting up on stretcher, AAOX4, denies any needs.  VSS.  Side rails up x 2.  Call button in reach.

## 2020-09-16 NOTE — ANESTHESIA POSTPROCEDURE EVALUATION
Anesthesia Post Evaluation    Patient: Tea Georges    Procedure(s) Performed: Procedure(s) (LRB):  INSERTION, INTRAMEDULLARY NELIDA, FEMUR,  Left , synthes, hana table, large C arm clock side (Left)    Final Anesthesia Type: general  Patient location during evaluation: PACU  Patient participation: Yes- Able to Participate  Level of consciousness: awake and alert and oriented  Post-procedure vital signs: reviewed and stable  Pain management: adequate  Airway patency: patent  PONV status at discharge: No PONV  Anesthetic complications: no      Cardiovascular status: blood pressure returned to baseline and hemodynamically stable  Respiratory status: unassisted, spontaneous ventilation and nasal cannula  Hydration status: euvolemic  Follow-up needed           Vitals Value Taken Time   /34 10/29/2019 11:00 AM   Temp 36.6 °C (97.9 °F) 10/29/2019  8:15 AM   Pulse 55 10/29/2019 11:26 AM   Resp 18 10/29/2019 11:00 AM   SpO2 100 % 10/29/2019  8:15 AM         Event Time     Out of Recovery 10/28/2019 14:30:00          Pain/Nika Score: Pain Rating Prior to Med Admin: 7 (10/29/2019  6:10 AM)  Pain Rating Post Med Admin: 0 (10/28/2019  4:30 PM)  Nika Score: 9 (10/28/2019  4:30 PM)         Rx faxed to the pt's group home at the number below.  Tejal Ng,

## 2020-09-16 NOTE — ED NOTES
Patient presents to ED from [facility] \[facility].\"" for chest pain at Dialysis.  Patient did not receive treatment.  Patient stays at [facility].  Patient received 2 SL nitro and a full dose aspirin with EMS.  Patient denies CP currently.

## 2020-09-17 PROBLEM — N18.9 CHRONIC KIDNEY DISEASE-MINERAL AND BONE DISORDER: Status: ACTIVE | Noted: 2020-09-17

## 2020-09-17 PROBLEM — Z91.89: Status: ACTIVE | Noted: 2020-09-17

## 2020-09-17 PROBLEM — M89.9 CHRONIC KIDNEY DISEASE-MINERAL AND BONE DISORDER: Status: ACTIVE | Noted: 2020-09-17

## 2020-09-17 PROBLEM — E83.9 CHRONIC KIDNEY DISEASE-MINERAL AND BONE DISORDER: Status: ACTIVE | Noted: 2020-09-17

## 2020-09-17 PROBLEM — I25.10 CAD (CORONARY ARTERY DISEASE): Status: ACTIVE | Noted: 2020-09-17

## 2020-09-17 LAB
ANION GAP SERPL CALC-SCNC: 17 MMOL/L (ref 8–16)
ASCENDING AORTA: 2.88 CM
AV INDEX (PROSTH): 0.14
AV MEAN GRADIENT: 60 MMHG
AV PEAK GRADIENT: 101 MMHG
AV VALVE AREA: 0.4 CM2
AV VELOCITY RATIO: 0.13
BASOPHILS # BLD AUTO: 0.07 K/UL (ref 0–0.2)
BASOPHILS NFR BLD: 0.9 % (ref 0–1.9)
BSA FOR ECHO PROCEDURE: 1.31 M2
BUN SERPL-MCNC: 67 MG/DL (ref 8–23)
CALCIUM SERPL-MCNC: 9 MG/DL (ref 8.7–10.5)
CHLORIDE SERPL-SCNC: 104 MMOL/L (ref 95–110)
CO2 SERPL-SCNC: 19 MMOL/L (ref 23–29)
CREAT SERPL-MCNC: 4.2 MG/DL (ref 0.5–1.4)
CV ECHO LV RWT: 0.34 CM
DIFFERENTIAL METHOD: ABNORMAL
DOP CALC AO PEAK VEL: 5.03 M/S
DOP CALC AO VTI: 119.63 CM
DOP CALC LVOT AREA: 2.9 CM2
DOP CALC LVOT DIAMETER: 1.91 CM
DOP CALC LVOT PEAK VEL: 0.67 M/S
DOP CALC LVOT STROKE VOLUME: 47.74 CM3
DOP CALCLVOT PEAK VEL VTI: 16.67 CM
E WAVE DECELERATION TIME: 196.7 MSEC
E/A RATIO: 0.88
E/E' RATIO: 28 M/S
ECHO LV POSTERIOR WALL: 0.73 CM (ref 0.6–1.1)
EOSINOPHIL # BLD AUTO: 0.2 K/UL (ref 0–0.5)
EOSINOPHIL NFR BLD: 2.6 % (ref 0–8)
ERYTHROCYTE [DISTWIDTH] IN BLOOD BY AUTOMATED COUNT: 19.2 % (ref 11.5–14.5)
EST. GFR  (AFRICAN AMERICAN): 11 ML/MIN/1.73 M^2
EST. GFR  (NON AFRICAN AMERICAN): 9.5 ML/MIN/1.73 M^2
FRACTIONAL SHORTENING: 11 % (ref 28–44)
GLUCOSE SERPL-MCNC: 111 MG/DL (ref 70–110)
HCT VFR BLD AUTO: 30.9 % (ref 37–48.5)
HGB BLD-MCNC: 8.9 G/DL (ref 12–16)
IMM GRANULOCYTES # BLD AUTO: 0.06 K/UL (ref 0–0.04)
IMM GRANULOCYTES NFR BLD AUTO: 0.7 % (ref 0–0.5)
INTERVENTRICULAR SEPTUM: 0.73 CM (ref 0.6–1.1)
IVRT: 65.65 MSEC
LA MAJOR: 6.22 CM
LA MINOR: 6.11 CM
LA WIDTH: 4.54 CM
LEFT ATRIUM SIZE: 4.14 CM
LEFT ATRIUM VOLUME INDEX MOD: 70.7 ML/M2
LEFT ATRIUM VOLUME INDEX: 73.3 ML/M2
LEFT ATRIUM VOLUME MOD: 95 CM3
LEFT ATRIUM VOLUME: 98.49 CM3
LEFT INTERNAL DIMENSION IN SYSTOLE: 3.81 CM (ref 2.1–4)
LEFT VENTRICLE DIASTOLIC VOLUME INDEX: 61.68 ML/M2
LEFT VENTRICLE DIASTOLIC VOLUME: 82.91 ML
LEFT VENTRICLE MASS INDEX: 70 G/M2
LEFT VENTRICLE SYSTOLIC VOLUME INDEX: 46.4 ML/M2
LEFT VENTRICLE SYSTOLIC VOLUME: 62.42 ML
LEFT VENTRICULAR INTERNAL DIMENSION IN DIASTOLE: 4.3 CM (ref 3.5–6)
LEFT VENTRICULAR MASS: 93.45 G
LV LATERAL E/E' RATIO: 21 M/S
LV SEPTAL E/E' RATIO: 42 M/S
LYMPHOCYTES # BLD AUTO: 0.5 K/UL (ref 1–4.8)
LYMPHOCYTES NFR BLD: 6.6 % (ref 18–48)
MAGNESIUM SERPL-MCNC: 2.3 MG/DL (ref 1.6–2.6)
MCH RBC QN AUTO: 27.9 PG (ref 27–31)
MCHC RBC AUTO-ENTMCNC: 28.8 G/DL (ref 32–36)
MCV RBC AUTO: 97 FL (ref 82–98)
MONOCYTES # BLD AUTO: 0.6 K/UL (ref 0.3–1)
MONOCYTES NFR BLD: 7.1 % (ref 4–15)
MV PEAK A VEL: 1.43 M/S
MV PEAK E VEL: 1.26 M/S
MV STENOSIS PRESSURE HALF TIME: 43.68 MS
MV VALVE AREA P 1/2 METHOD: 5.04 CM2
NEUTROPHILS # BLD AUTO: 6.8 K/UL (ref 1.8–7.7)
NEUTROPHILS NFR BLD: 82.1 % (ref 38–73)
NRBC BLD-RTO: 0 /100 WBC
PHOSPHATE SERPL-MCNC: 4.3 MG/DL (ref 2.7–4.5)
PISA MRMAX VEL: 0.06 M/S
PISA TR MAX VEL: 4.09 M/S
PLATELET # BLD AUTO: 136 K/UL (ref 150–350)
PMV BLD AUTO: 13.9 FL (ref 9.2–12.9)
POCT GLUCOSE: 113 MG/DL (ref 70–110)
POCT GLUCOSE: 120 MG/DL (ref 70–110)
POCT GLUCOSE: 146 MG/DL (ref 70–110)
POCT GLUCOSE: 85 MG/DL (ref 70–110)
POTASSIUM SERPL-SCNC: 5 MMOL/L (ref 3.5–5.1)
PULM VEIN S/D RATIO: 0.96
PV PEAK D VEL: 0.28 M/S
PV PEAK S VEL: 0.27 M/S
RA MAJOR: 5.1 CM
RA PRESSURE: 15 MMHG
RA WIDTH: 4.3 CM
RBC # BLD AUTO: 3.19 M/UL (ref 4–5.4)
RIGHT VENTRICULAR END-DIASTOLIC DIMENSION: 5.26 CM
RV TISSUE DOPPLER FREE WALL SYSTOLIC VELOCITY 1 (APICAL 4 CHAMBER VIEW): 8.28 CM/S
SINUS: 2.37 CM
SODIUM SERPL-SCNC: 140 MMOL/L (ref 136–145)
STJ: 2.01 CM
TDI LATERAL: 0.06 M/S
TDI SEPTAL: 0.03 M/S
TDI: 0.05 M/S
TR MAX PG: 67 MMHG
TRICUSPID ANNULAR PLANE SYSTOLIC EXCURSION: 1.41 CM
TV REST PULMONARY ARTERY PRESSURE: 82 MMHG
WBC # BLD AUTO: 8.21 K/UL (ref 3.9–12.7)

## 2020-09-17 PROCEDURE — 97165 OT EVAL LOW COMPLEX 30 MIN: CPT

## 2020-09-17 PROCEDURE — 99214 OFFICE O/P EST MOD 30 MIN: CPT | Mod: ,,, | Performed by: NURSE PRACTITIONER

## 2020-09-17 PROCEDURE — 93005 ELECTROCARDIOGRAM TRACING: CPT

## 2020-09-17 PROCEDURE — 63600175 PHARM REV CODE 636 W HCPCS: Performed by: HOSPITALIST

## 2020-09-17 PROCEDURE — 84100 ASSAY OF PHOSPHORUS: CPT

## 2020-09-17 PROCEDURE — 96372 THER/PROPH/DIAG INJ SC/IM: CPT | Mod: 59

## 2020-09-17 PROCEDURE — 99226 PR SUBSEQUENT OBSERVATION CARE,LEVEL III: CPT | Mod: ,,, | Performed by: PHYSICIAN ASSISTANT

## 2020-09-17 PROCEDURE — 99214 PR OFFICE/OUTPT VISIT, EST, LEVL IV, 30-39 MIN: ICD-10-PCS | Mod: ,,, | Performed by: NURSE PRACTITIONER

## 2020-09-17 PROCEDURE — 93010 ELECTROCARDIOGRAM REPORT: CPT | Mod: ,,, | Performed by: INTERNAL MEDICINE

## 2020-09-17 PROCEDURE — 36415 COLL VENOUS BLD VENIPUNCTURE: CPT

## 2020-09-17 PROCEDURE — 99220 PR INITIAL OBSERVATION CARE,LEVL III: CPT | Mod: ,,, | Performed by: PHYSICIAN ASSISTANT

## 2020-09-17 PROCEDURE — 85025 COMPLETE CBC W/AUTO DIFF WBC: CPT

## 2020-09-17 PROCEDURE — 99226 PR SUBSEQUENT OBSERVATION CARE,LEVEL III: ICD-10-PCS | Mod: ,,, | Performed by: PHYSICIAN ASSISTANT

## 2020-09-17 PROCEDURE — G0378 HOSPITAL OBSERVATION PER HR: HCPCS | Mod: CS

## 2020-09-17 PROCEDURE — 99220 PR INITIAL OBSERVATION CARE,LEVL III: ICD-10-PCS | Mod: ,,, | Performed by: PHYSICIAN ASSISTANT

## 2020-09-17 PROCEDURE — G0257 UNSCHED DIALYSIS ESRD PT HOS: HCPCS

## 2020-09-17 PROCEDURE — 25000003 PHARM REV CODE 250: Performed by: HOSPITALIST

## 2020-09-17 PROCEDURE — 97161 PT EVAL LOW COMPLEX 20 MIN: CPT

## 2020-09-17 PROCEDURE — 25000003 PHARM REV CODE 250: Performed by: NURSE PRACTITIONER

## 2020-09-17 PROCEDURE — 83735 ASSAY OF MAGNESIUM: CPT

## 2020-09-17 PROCEDURE — 93010 EKG 12-LEAD: ICD-10-PCS | Mod: ,,, | Performed by: INTERNAL MEDICINE

## 2020-09-17 PROCEDURE — 80048 BASIC METABOLIC PNL TOTAL CA: CPT

## 2020-09-17 RX ORDER — LIDOCAINE AND PRILOCAINE 25; 25 MG/G; MG/G
CREAM TOPICAL
Status: DISCONTINUED | OUTPATIENT
Start: 2020-09-17 | End: 2020-09-18 | Stop reason: HOSPADM

## 2020-09-17 RX ADMIN — LEVETIRACETAM 500 MG: 500 TABLET ORAL at 09:09

## 2020-09-17 RX ADMIN — HYDRALAZINE HYDROCHLORIDE 50 MG: 50 TABLET, FILM COATED ORAL at 10:09

## 2020-09-17 RX ADMIN — ATORVASTATIN CALCIUM 20 MG: 20 TABLET, FILM COATED ORAL at 09:09

## 2020-09-17 RX ADMIN — HEPARIN SODIUM 5000 UNITS: 5000 INJECTION INTRAVENOUS; SUBCUTANEOUS at 02:09

## 2020-09-17 RX ADMIN — LEVETIRACETAM 500 MG: 500 TABLET ORAL at 02:09

## 2020-09-17 RX ADMIN — AMLODIPINE BESYLATE 10 MG: 10 TABLET ORAL at 02:09

## 2020-09-17 RX ADMIN — SEVELAMER CARBONATE 800 MG: 800 TABLET, FILM COATED ORAL at 02:09

## 2020-09-17 RX ADMIN — ASPIRIN 81 MG CHEWABLE TABLET 81 MG: 81 TABLET CHEWABLE at 02:09

## 2020-09-17 RX ADMIN — HEPARIN SODIUM 5000 UNITS: 5000 INJECTION INTRAVENOUS; SUBCUTANEOUS at 10:09

## 2020-09-17 RX ADMIN — LIDOCAINE AND PRILOCAINE: 25; 25 CREAM TOPICAL at 09:09

## 2020-09-17 NOTE — SUBJECTIVE & OBJECTIVE
Past Medical History:   Diagnosis Date    (HFpEF) heart failure with preserved ejection fraction 5/21/2019    Anemia in ESRD (end-stage renal disease) 5/29/2016    Aneurysm of arteriovenous dialysis fistula     Aortic atherosclerosis 11/22/2016    Bilateral low back pain without sciatica 11/17/2015    Blindness of right eye 11/12/2016    Brain compression 6/22/2019    Cataract     Central retinal vein occlusion, right eye 6/3/2014    Chronic diastolic heart failure 1/8/2016    Chronic respiratory failure with hypoxia 5/29/2016    Coronary artery disease involving native coronary artery of native heart without angina pectoris 12/12/2016    Diverticulosis     Encounter for blood transfusion     Enlarged LA (left atrium) 10/7/2016    Epiretinal membrane 7/17/2012    ESRD on hemodialysis     MWF    History of GI diverticular bleed     5/22/16    Hyperparathyroidism, secondary renal 10/14/2016    Left spastic hemiparesis 11/13/2016    Moderate single current episode of major depressive disorder 2/8/2019    Non-rheumatic tricuspid valve insufficiency 1/15/2017    Peripheral vascular disease, unspecified 11/22/2016    Physical debility 11/17/2016    Pleural effusion on right     Pulmonary emphysema 1/15/2017    Pulmonary hypertension 6/28/2019    Renovascular hypertension 1/8/2016    Seizure 6/24/2019    Severe aortic valve stenosis 2/4/2016    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 11/13/2016    s/p thrombectomy of MCA    Subdural hematoma 05/21/2019    Bilateral R>L    Thrombocytopenia, unspecified 1/15/2017    Type 2 diabetes mellitus with chronic kidney disease on chronic dialysis, without long-term current use of insulin 5/1/2018    Type 2 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, without long-term current use of insulin 3/26/2013    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,  without long-term current use of insulin 3/26/2013    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       Past Surgical History:   Procedure Laterality Date    ABDOMINAL SURGERY      BREAST SURGERY      tumor removal x 2    CARDIAC SURGERY  2016    Implantable loop recorder placed    CATARACT EXTRACTION      CEREBRAL ANGIOGRAM N/A 6/24/2019    Procedure: ANGIOGRAM-CEREBRAL;  Surgeon: Kenisha Surgeon;  Location: [facility];  Service: Anesthesiology;  Laterality: N/A;    CHOLECYSTECTOMY      COLONOSCOPY N/A 5/23/2016    Procedure: COLONOSCOPY;  Surgeon: WILLIAM Colvin MD;  Location: [facility] ENDO (2ND FLR);  Service: Endoscopy;  Laterality: N/A;    COLONOSCOPY N/A 5/30/2016    Procedure: COLONOSCOPY;  Surgeon: Sam Davis MD;  [facility]  Service: Endoscopy;  Laterality: N/A;    CRANIOTOMY FOR EVACUATION OF SUBDURAL HEMATOMA Right 6/23/2019    Procedure: KATE HOLES FOR SUBDURAL HEMATOMA EVACUATION;  Surgeon: Trevor Conner MD;  Location: [address];  Service: Neurosurgery;  Laterality: Right;    EYE SURGERY      FISTULOGRAM Left 11/28/2018    Procedure: Fistulogram;  Surgeon: NADINE Blum III, MD;  Location: [facility] CATH LAB;  Service: Cardiology;  Laterality: Left;    INTRAMEDULLARY RODDING OF FEMUR Left 10/28/2019    Procedure: INSERTION, INTRAMEDULLARY NELIDA, FEMUR,  Left , synthes, hana table, large C arm clock side;  Surgeon: Torey Aguilar MD;  Location: [address];  Service: Orthopedics;  Laterality: Left;  general        PLACEMENT OF DUAL-LUMEN VASCULAR CATHETER Right 11/29/2018    Procedure: INSERTION, CATHETER, VASCULAR, DUAL LUMEN;  Surgeon: NADINE Blum III, MD;  Location: [facility] OR [address] [place] [address];  Service: Peripheral Vascular;  Laterality: Right;  Permacatheter placement     RESECTION OF ANEURYSM Left 11/29/2018    Procedure: EXCISION, ANEURYSM;  Surgeon: NADINE Blum III, MD;  [facility];  Service: Peripheral Vascular;  Laterality: Left;   Excision, L AVF aneurysm    REVISION OF ARTERIOVENOUS FISTULA Left 11/29/2018    Procedure: REVISION, AV FISTULA,;  Surgeon: NADINE Blum III, MD;  Location: [facility] OR [address] [place] [address];  Service: Peripheral Vascular;  Laterality: Left;  OR 11    UPPER GASTROINTESTINAL ENDOSCOPY         Review of patient's allergies indicates:  No Known Allergies  Current Facility-Administered Medications   Medication Frequency    0.9%  NaCl infusion Once    acetaminophen tablet 650 mg Q4H PRN    albuterol-ipratropium 2.5 mg-0.5 mg/3 mL nebulizer solution 3 mL Q6H PRN    amLODIPine tablet 10 mg Daily    aspirin chewable tablet 81 mg Daily    atorvastatin tablet 20 mg QHS    fluticasone furoate-vilanteroL 200-25 mcg/dose diskus inhaler 1 puff Daily    glucose chewable tablet 16 g PRN    glucose chewable tablet 24 g PRN    heparin (porcine) injection 5,000 Units Q8H    hydrALAZINE tablet 50 mg Q8H    levETIRAcetam tablet 500 mg BID    lidocaine-prilocaine cream PRN    melatonin tablet 6 mg Nightly PRN    ondansetron injection 4 mg Q8H PRN    prochlorperazine injection Soln 5 mg Q6H PRN    sevelamer carbonate tablet 800 mg TID WM    sodium chloride 0.9% flush 10 mL PRN    sodium chloride 0.9% flush 10 mL PRN     Family History     Problem Relation (Age of Onset)    Cancer Sister    Cataracts Mother    Esophageal cancer Sister    Glaucoma Brother, Maternal Aunt    Heart disease Brother    Heart failure Sister    Hypertension Mother, Sister, Brother, Father    No Known Problems Maternal Uncle, Paternal Aunt, Paternal Uncle, Maternal Grandmother, Maternal Grandfather, Paternal Grandmother, Paternal Grandfather    Stroke Mother        Tobacco Use    Smoking status: Never Smoker    Smokeless tobacco: Never Used   Substance and Sexual Activity    Alcohol use: No     Comment: Reports occasional 1-2 drinks     Drug use: No    Sexual activity: Not Currently     Review of Systems   Constitutional: Positive for activity change.  Negative for fatigue and fever.   HENT: Negative for hearing loss.    Eyes: Positive for visual disturbance.   Respiratory: Negative for shortness of breath.    Cardiovascular: Negative for chest pain.   Gastrointestinal: Negative for diarrhea, nausea and vomiting.   Genitourinary: Positive for decreased urine volume.   Musculoskeletal: Positive for gait problem.   Skin: Negative for wound.   Neurological: Negative for headaches.   Psychiatric/Behavioral: Positive for decreased concentration. Negative for confusion.     Objective:     Vital Signs (Most Recent):  Temp: 99 °F (37.2 °C) (09/17/20 0937)  Pulse: 78 (09/17/20 1230)  Resp: 16 (09/17/20 0800)  BP: (!) 142/64 (09/17/20 1230)  SpO2: (!) 92 % (09/17/20 0800)  O2 Device (Oxygen Therapy): nasal cannula (09/17/20 0937) Vital Signs (24h Range):  Temp:  [96.1 °F (35.6 °C)-99 °F (37.2 °C)] 99 °F (37.2 °C)  Pulse:  [67-87] 78  Resp:  [16-18] 16  SpO2:  [92 %-100 %] 92 %  BP: (124-153)/(58-84) 142/64     Weight: 38.6 kg (85 lb 1.6 oz) (09/16/20 2132)  Body mass index is 15.07 kg/m².  Body surface area is 1.31 meters squared.    I/O last 3 completed shifts:  In: 120 [P.O.:120]  Out: -     Physical Exam  Vitals signs and nursing note reviewed.   Constitutional:       Appearance: She is cachectic. She is not toxic-appearing.   HENT:      Head: Normocephalic and atraumatic.      Nose: Nose normal.      Mouth/Throat:      Mouth: Mucous membranes are dry.   Eyes:      General:         Right eye: No discharge.         Left eye: No discharge.   Cardiovascular:      Rate and Rhythm: Normal rate.   Pulmonary:      Effort: Pulmonary effort is normal.      Breath sounds: Rales present.   Abdominal:      General: Abdomen is flat.   Musculoskeletal:         General: No swelling.   Skin:     General: Skin is warm and dry.   Neurological:      Mental Status: She is alert. Mental status is at baseline.   Psychiatric:         Mood and Affect: Mood normal.         Behavior: Behavior  normal. Behavior is cooperative.         Significant Labs:  CBC:   Recent Labs   Lab 09/17/20  0450   WBC 8.21   RBC 3.19*   HGB 8.9*   HCT 30.9*   *   MCV 97   MCH 27.9   MCHC 28.8*     CMP:   Recent Labs   Lab 09/16/20  1520 09/17/20  0450   * 111*   CALCIUM 9.2 9.0   ALBUMIN 2.4*  --    PROT 6.9  --     140   K 4.2 5.0   CO2 22* 19*    104   BUN 59* 67*   CREATININE 3.9* 4.2*   ALKPHOS 75  --    ALT 7*  --    AST 10  --    BILITOT 0.5  --      All labs within the past 24 hours have been reviewed.

## 2020-09-17 NOTE — PT/OT/SLP EVAL
Occupational Therapy   Evaluation/d/c    Name: Tea Georges  MRN: [id number]  Admitting Diagnosis:  Chest pain    Co-eval with PT  Recommendations:     Discharge Recommendations: nursing facility, basic  Discharge Equipment Recommendations:  none  Barriers to discharge:  None    Assessment:     Tea Georges is a 80 y.o. female with a medical diagnosis of Chest pain.  She presents with deficits in self-care skills, functional mobility and endurance. Pt. Has multiple medical impairments from previous stroke as well as hip sx. Pt. Required Total A for all mobility and also requires Total A at NH for all self-care other than feeding. Pt. Is not appropriate for continued oT services. Pt. Is recommended to return to NH.    Rehab Prognosis: Poor; patient would benefit from acute skilled OT services to address these deficits and reach maximum level of function.       Plan:     Patient to be seen   to address the above listed problems via    · Plan of Care Expires:    · Plan of Care Reviewed with: patient    Subjective     Chief Complaint: no specific complaints  Patient/Family Comments/goals: no specific goals stated    Occupational Profile:  Living Environment: NH ([facility])  Previous level of function: required assist for all ADL tasks and mobility other than feeding  Roles and Routines: needs assist for self-care   Equipment Used at Home:  wheelchair  Assistance upon Discharge: NH assist from staff    Pain/Comfort:  Pain Rating 1: 0/10  Pain Rating Post-Intervention 1: 0/10    Patients cultural, spiritual, Latter day conflicts given the current situation:      Objective:     Communicated with: nurse prior to session.  Patient found supine with telemetry, oxygen(supine in bed) upon OT entry to room.    General Precautions: Standard, vision impaired, diabetic, fall   Orthopedic Precautions:N/A   Braces: N/A     Occupational Performance:    Bed Mobility:    · Patient completed Supine to Sit with total  assistance  · Patient completed Sit to Supine with total assistance    Functional Mobility/Transfers:  · drawsheet to HOB  ·     Activities of Daily Living:  · Feeding:  set up A for breakfast    Cognitive/Visual Perceptual:  Cognitive/Psychosocial Skills:     -       Oriented to: Person, Place and Situation   -       Follows Commands/attention:Follows two-step commands  -       Communication: clear/fluent  -       Memory: No Deficits noted  -       Safety awareness/insight to disability: intact   -       Mood/Affect/Coping skills/emotional control: Appropriate to situation  Visual/Perceptual:  Blind in right eye    Physical Exam:  Balance: -       Total A to sit EOB  Postural examination/scapula alignment:    -       Rounded shoulders  -       Forward head  -       Posterior pelvic tilt  Skin integrity: Thin and Dry  Dominant hand: -       right  Upper Extremity Range of Motion:     -       Right Upper Extremity: WFL  -       Left Upper Extremity: Deficits: deficits noted at elbow and shoulder from previous CVA  Upper Extremity Strength: not tested   Strength:    -       Right Upper Extremity: WFL  Gross motor coordination:   Limited in LUE    AMPAC 6 Click ADL:  AMPAC Total Score: 10    Treatment & Education:  Pt. Educated on role of OT  Education:    Patient left supine with call button in reach, nurse notified  GOALS:   Multidisciplinary Problems     Occupational Therapy Goals        Problem: Occupational Therapy Goal    Goal Priority Disciplines Outcome Interventions   Occupational Therapy Goal     OT, PT/OT     Description: Goals to be met by: No goals set                     History:     Past Medical History:   Diagnosis Date    (HFpEF) heart failure with preserved ejection fraction 5/21/2019    Anemia in ESRD (end-stage renal disease) 5/29/2016    Aneurysm of arteriovenous dialysis fistula     Aortic atherosclerosis 11/22/2016    Bilateral low back pain without sciatica 11/17/2015    Blindness of  right eye 11/12/2016    Brain compression 6/22/2019    Cataract     Central retinal vein occlusion, right eye 6/3/2014    Chronic diastolic heart failure 1/8/2016    Chronic respiratory failure with hypoxia 5/29/2016    Coronary artery disease involving native coronary artery of native heart without angina pectoris 12/12/2016    Diverticulosis     Encounter for blood transfusion     Enlarged LA (left atrium) 10/7/2016    Epiretinal membrane 7/17/2012    ESRD on hemodialysis     MWF    History of GI diverticular bleed     5/22/16    Hyperparathyroidism, secondary renal 10/14/2016    Left spastic hemiparesis 11/13/2016    Moderate single current episode of major depressive disorder 2/8/2019    Non-rheumatic tricuspid valve insufficiency 1/15/2017    Peripheral vascular disease, unspecified 11/22/2016    Physical debility 11/17/2016    Pleural effusion on right     Pulmonary emphysema 1/15/2017    Pulmonary hypertension 6/28/2019    Renovascular hypertension 1/8/2016    Seizure 6/24/2019    Severe aortic valve stenosis 2/4/2016    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 11/13/2016    s/p thrombectomy of MCA    Subdural hematoma 05/21/2019    Bilateral R>L    Thrombocytopenia, unspecified 1/15/2017    Type 2 diabetes mellitus with chronic kidney disease on chronic dialysis, without long-term current use of insulin 5/1/2018    Type 2 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, without long-term current use of insulin 3/26/2013    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, without long-term current use of insulin 3/26/2013    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         Past Surgical History:   Procedure Laterality Date    ABDOMINAL SURGERY      BREAST SURGERY      tumor removal x 2    CARDIAC SURGERY  2016    Implantable loop recorder placed    CATARACT EXTRACTION      CEREBRAL  ANGIOGRAM N/A 6/24/2019    Procedure: ANGIOGRAM-CEREBRAL;  Surgeon: Kenisha Surgeon;  Location: [facility];  Service: Anesthesiology;  Laterality: N/A;    CHOLECYSTECTOMY      COLONOSCOPY N/A 5/23/2016    Procedure: COLONOSCOPY;  Surgeon: WILLIAM Colvin MD;  Location: [facility] ENDO (2ND FLR);  Service: Endoscopy;  Laterality: N/A;    COLONOSCOPY N/A 5/30/2016    Procedure: COLONOSCOPY;  Surgeon: Sam Davis MD;  Location: [facility] ENDO (2ND FLR);  Service: Endoscopy;  Laterality: N/A;    CRANIOTOMY FOR EVACUATION OF SUBDURAL HEMATOMA Right 6/23/2019    Procedure: KATE HOLES FOR SUBDURAL HEMATOMA EVACUATION;  Surgeon: Trevor Conner MD;  Location: [facility] OR [facility] FLR;  Service: Neurosurgery;  Laterality: Right;    EYE SURGERY      FISTULOGRAM Left 11/28/2018    Procedure: Fistulogram;  Surgeon: NADINE Blum III, MD;  Location: [facility] CATH LAB;  Service: Cardiology;  Laterality: Left;    INTRAMEDULLARY RODDING OF FEMUR Left 10/28/2019    Procedure: INSERTION, INTRAMEDULLARY NELIDA, FEMUR,  Left , synthes, hana table, large C arm clock side;  Surgeon: Torey Aguilar MD;  [facility];  Service: Orthopedics;  Laterality: Left;  general        PLACEMENT OF DUAL-LUMEN VASCULAR CATHETER Right 11/29/2018    Procedure: INSERTION, CATHETER, VASCULAR, DUAL LUMEN;  Surgeon: NADINE Blum III, MD;  [facility];  Service: Peripheral Vascular;  Laterality: Right;  Permacatheter placement     RESECTION OF ANEURYSM Left 11/29/2018    Procedure: EXCISION, ANEURYSM;  Surgeon: NADINE Blum III, MD;  Location: [facility] OR [facility] FLR;  Service: Peripheral Vascular;  Laterality: Left;  Excision, L AVF aneurysm    REVISION OF ARTERIOVENOUS FISTULA Left 11/29/2018    Procedure: REVISION, AV FISTULA,;  Surgeon: NADINE Blum III, MD;  [facility];  Service: Peripheral Vascular;  Laterality: Left;  OR 11    UPPER GASTROINTESTINAL ENDOSCOPY         Time Tracking:     OT Date of Treatment:  09/17/20  OT Start Time: 0742  OT Stop Time: 0755  OT Total Time (min): 13 min    Billable Minutes:Evaluation 13    SHERI Chatman  9/17/2020

## 2020-09-17 NOTE — ASSESSMENT & PLAN NOTE
- Hgb today 8.9. Target Hgb 10-11 gm/dL.  - Plans for discharge today; likely on SATNAM therapy at her OP unit.

## 2020-09-17 NOTE — ASSESSMENT & PLAN NOTE
She is 3/4 frail and STS 22.3%. She currently resides at [facility] and is majority bed ridden due to multiple falls in the past.

## 2020-09-17 NOTE — PLAN OF CARE
Pt. Resides in NH and is assisted with all self-care. Pt. Not appropriate for continued OT services.
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
## 2020-09-17 NOTE — ASSESSMENT & PLAN NOTE
"(HFpEF) heart failure with preserved ejection fraction  -Last TTE 5/22, severe aortic stenosis, Aortic valve area is 0.69 cm2, EF 53%, grade II diastolic dysfunction  -Suspected volume overload as above, dialysis today  -Continue to monitor volume status, repeat TTE ordered  -F/U cards recommendations regarding valvular disease. As above, pt. Followed by [facility] cardiology and per pt. Cardiologist at [facility] in 8/2020 note, (see care everywhere) she is curretnly under evaluation for valvular repair, but she is "inoperable to conventional AVR and high risk for TAVR"  "

## 2020-09-17 NOTE — PLAN OF CARE
SW met with patient at bedside. Patient verified information on FS. Patient was calm and cooperative during dc planning assessment. Patient denies use of illicit drugs/ETOH. Patient lives at [facility]. There are no steps to enter the facility. Patient has supports at NH. Patient needs transportation at MD. SW/CM to follow and assist with needs as indicated.     Parth Posadas Ii, MD    [id number] - [place], [place] - [address]  [address]  [place] [address]  Phone: [phone] Fax: [phone]       09/17/20 1226   Discharge Assessment   Assessment Type Discharge Planning Assessment   Confirmed/corrected address and phone number on facesheet? Yes   Assessment information obtained from? Patient;Caregiver;Medical Record   Expected Length of Stay (days) 1   Communicated expected length of stay with patient/caregiver yes   Prior to hospitilization cognitive status: Alert/Oriented   Prior to hospitalization functional status: Assistive Equipment;Needs Assistance   Current cognitive status: Alert/Oriented   Current Functional Status: Assistive Equipment;Needs Assistance   Facility Arrived From: [facility]   Lives With facility resident   Able to Return to Prior Arrangements yes   Is patient able to care for self after discharge? Yes   Patient's perception of discharge disposition [facility] care facility   Patient currently being followed by outpatient case management? No   Patient currently receives any other outside agency services? No   Equipment Currently Used at Home wheelchair   Do you have any problems affording any of your prescribed medications? No   Is the patient taking medications as prescribed? yes   Does the patient have transportation home? No  (SW/CM to arrange)   Discharge Plan A Return to nursing home   Discharge Plan B Return to Nursing Home   DME Needed Upon Discharge  none   Patient/Family in Agreement with Plan yes   Does the patient have  transportation to healthcare appointments? Yes       Loyda Mcgee, LISA  [facility]  [id number]

## 2020-09-17 NOTE — ASSESSMENT & PLAN NOTE
Severe, symptomatic aortic stenosis with depressed LV systolic function. She is currently undergoing TAVR evaluation at [facility] with Dr. Rene Chen (last seen 8/11/20), and has already completed the following workup: TTE showed PAULINE 0.5 cm2, MG 63 mmHg, PV 5.05 m/s, and EF 20-25% (unknown date); [facility] on 7/27/20 with Dr. Yan at [facility] which showed a possible 70% RCA lesion that was hard to assess due to overlap. CTA has already been done, pending review.     - Recommend medical management per primary team then completing outpatient TAVR workup with Dr. Chen at [facility].

## 2020-09-17 NOTE — PT/OT/SLP EVAL
Physical Therapy Evaluation/Discharge    Patient Name:  Tea Georges   MRN:  [id number]    Recommendations:     Discharge Recommendations:  nursing facility, basic   Discharge Equipment Recommendations: none   Barriers to discharge: None    Assessment:     Tea Georges is a 80 y.o. female admitted with a medical diagnosis of Severe aortic valve stenosis.  She presents with the following impairments/functional limitations:  (no rehab needs) pt tolerated treatment fair but does not need skilled PT. Pt will be able to discharge home when medically stable.     Rehab Prognosis: Poor; patient would benefit from acute skilled PT services to address these deficits and reach maximum level of function.    Recent Surgery: * No surgery found *      Plan:     During this hospitalization, patient to be seen (pt is being discharged from PT.) to address the identified rehab impairments via (pt is being discharge from PT.) and progress toward the following goals:    · Plan of Care Expires:  09/17/20    Subjective     Chief Complaint: pt stated that she was afraid of falling.   Patient/Family Comments/goals: pt has no goals.  Pain/Comfort:  · Pain Rating 1: 0/10  · Pain Rating Post-Intervention 1: 0/10    Patients cultural, spiritual, Faith conflicts given the current situation: no    Living Environment:  Pt lives in NH and is total assist for all activities.  Prior to admission, patients level of function was total assist. .  Equipment used at home: wheelchair.  DME owned (not currently used): none.  Upon discharge, patient will have assistance from staff at NH.     Objective:     Communicated with nurse prior to session.  Patient found supine with oxygen, bed alarm(hep lock IV)  upon PT entry to room.    General Precautions: Standard, fall(decreased vision)   Orthopedic Precautions:    Braces:       Exams:  · Cognitive Exam:  Patient is oriented to Person    Functional Mobility:  · Bed Mobility:  Pt needed verbal cues for hand  placement and sequencing for functional mobility.    · Rolling Right: total assistance  · Supine to Sit: total assistance  · Sit to Supine: total assistance  ·   · Balance: pt was total assist sitting balance on EOB.     Therapeutic Activities and Exercises:   pt received verbal instructions for role of PT and POC. Pt did not express understanding of such.     AM-PAC 6 CLICK MOBILITY  Total Score:8     Patient left supine with all lines intact and call button in reach.    GOALS:   Multidisciplinary Problems     Physical Therapy Goals     Not on file                History:     Past Medical History:   Diagnosis Date    (HFpEF) heart failure with preserved ejection fraction 5/21/2019    Anemia in ESRD (end-stage renal disease) 5/29/2016    Aneurysm of arteriovenous dialysis fistula     Aortic atherosclerosis 11/22/2016    Bilateral low back pain without sciatica 11/17/2015    Blindness of right eye 11/12/2016    Brain compression 6/22/2019    Cataract     Central retinal vein occlusion, right eye 6/3/2014    Chronic diastolic heart failure 1/8/2016    Chronic respiratory failure with hypoxia 5/29/2016    Coronary artery disease involving native coronary artery of native heart without angina pectoris 12/12/2016    Diverticulosis     Encounter for blood transfusion     Enlarged LA (left atrium) 10/7/2016    Epiretinal membrane 7/17/2012    ESRD on hemodialysis     MWF    History of GI diverticular bleed     5/22/16    Hyperparathyroidism, secondary renal 10/14/2016    Left spastic hemiparesis 11/13/2016    Moderate single current episode of major depressive disorder 2/8/2019    Non-rheumatic tricuspid valve insufficiency 1/15/2017    Peripheral vascular disease, unspecified 11/22/2016    Physical debility 11/17/2016    Pleural effusion on right     Pulmonary emphysema 1/15/2017    Pulmonary hypertension 6/28/2019    Renovascular hypertension 1/8/2016    Seizure 6/24/2019    Severe aortic  valve stenosis 2/4/2016    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 11/13/2016    s/p thrombectomy of MCA    Subdural hematoma 05/21/2019    Bilateral R>L    Thrombocytopenia, unspecified 1/15/2017    Type 2 diabetes mellitus with chronic kidney disease on chronic dialysis, without long-term current use of insulin 5/1/2018    Type 2 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, without long-term current use of insulin 3/26/2013    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, without long-term current use of insulin 3/26/2013    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       Past Surgical History:   Procedure Laterality Date    ABDOMINAL SURGERY      BREAST SURGERY      tumor removal x 2    CARDIAC SURGERY  2016    Implantable loop recorder placed    CATARACT EXTRACTION      CEREBRAL ANGIOGRAM N/A 6/24/2019    Procedure: ANGIOGRAM-CEREBRAL;  Surgeon: Kenisha Surgeon;  [facility];  Service: Anesthesiology;  Laterality: N/A;    CHOLECYSTECTOMY      COLONOSCOPY N/A 5/23/2016    Procedure: COLONOSCOPY;  Surgeon: WILLIAM Colvin MD;  Location: [facility] ENDO (2ND FLR);  Service: Endoscopy;  Laterality: N/A;    COLONOSCOPY N/A 5/30/2016    Procedure: COLONOSCOPY;  Surgeon: Sam Davis MD;  Location: [facility] ENDO (2ND FLR);  Service: Endoscopy;  Laterality: N/A;    CRANIOTOMY FOR EVACUATION OF SUBDURAL HEMATOMA Right 6/23/2019    Procedure: KATE HOLES FOR SUBDURAL HEMATOMA EVACUATION;  Surgeon: Trevor Conner MD;  [facility];  Service: Neurosurgery;  Laterality: Right;    EYE SURGERY      FISTULOGRAM Left 11/28/2018    Procedure: Fistulogram;  Surgeon: NADINE Blum III, MD;  Location: [facility] CATH LAB;  Service: Cardiology;  Laterality: Left;    INTRAMEDULLARY RODDING OF FEMUR Left 10/28/2019    Procedure: INSERTION, INTRAMEDULLARY NELIDA, FEMUR,  Left , synthes, keven welch, large  C arm clock side;  Surgeon: Torey Aguilar MD;  Location: [facility] OR 2ND FLR;  Service: Orthopedics;  Laterality: Left;  general        PLACEMENT OF DUAL-LUMEN VASCULAR CATHETER Right 11/29/2018    Procedure: INSERTION, CATHETER, VASCULAR, DUAL LUMEN;  Surgeon: NADINE Blum III, MD;  Location: [facility] OR 2ND FLR;  Service: Peripheral Vascular;  Laterality: Right;  Permacatheter placement     RESECTION OF ANEURYSM Left 11/29/2018    Procedure: EXCISION, ANEURYSM;  Surgeon: NADINE Blum III, MD;  Location: [facility] OR 2ND FLR;  Service: Peripheral Vascular;  Laterality: Left;  Excision, L AVF aneurysm    REVISION OF ARTERIOVENOUS FISTULA Left 11/29/2018    Procedure: REVISION, AV FISTULA,;  Surgeon: NADINE Blum III, MD;  [facility]: [facility] OR 2ND FLR;  Service: Peripheral Vascular;  Laterality: Left;  OR 11    UPPER GASTROINTESTINAL ENDOSCOPY         Time Tracking:     PT Received On: 09/17/20  PT Start Time: 0742     PT Stop Time: 0754  PT Total Time (min): 12 min     Billable Minutes: Evaluation 12 min      Wanda Gupta, PT  09/17/2020

## 2020-09-17 NOTE — ASSESSMENT & PLAN NOTE
-Pt. Uncertain of current medication regimen. Reports no longer on clonidine or losartan, but recent med rec from discharge at [facility] shows she is on losartan 50 mg  -BP normal on arrival, Ordered hydralazine 50 mg TID (both 50 and 100 reported) amlodipine, and losartan. Add additional meds as needed

## 2020-09-17 NOTE — ASSESSMENT & PLAN NOTE
C on 7/27/20 with Dr. Yan at [facility] which showed a possible 70% RCA lesion that was hard to assess due to overlap.

## 2020-09-17 NOTE — HPI
81 yo F with PMHx ESRD on HD (MWF) via LUE fistula, HTN, severe aortic stenosis with moderate to severe pulmonary HTN, HFpEF, pulmonary emphysema with chronic hypoxic respiratory failure on home oxygen 2 liters at night prn, previous right MCA CVA in 2016, Type 2 diabetes with ESRD not on home insulin, SDH s/p evacuation on 6/23/2019, and seizures on Keppra who presented to the ED complaining of chest pain while at dialysis. Pt. Is a poor historian. She states that she has been coughing a lot and got some midsternal chest pain last night. She is unable to characterize the pain other than stating that it hurt. She has had similar pains in the past, but she thinks this time might be related to her cough. She told the dialysis nurse that she felt like she needed to go to the hospital because of the pain and EMS was called. She said that the pain got better when she got to the hospital. Per chart review, she was given 2 NG SL tablets.     In regards to her severe aortic stenosis, she is currently undergoing TAVR evaluation at [facility] with Dr. Rene Chen. Per chart review, she was last seen in their clinic on 8/11/20. TTE showed PAULINE 0.5 cm2, MG 63 mmHg, PV 5.05 m/s, and EF 20-25% (unknown date). She underwent a LHC on 7/27/20 with Dr. Yan at [facility] which showed a possible 70% RCA lesion that was hard to assess due to overlap. She is inoperable for SAVR (don't see CTS note) and high risk for TAVR. CTA has already been done, pending review. She was sent to Dr. Chen by Dr. Moon.

## 2020-09-17 NOTE — ASSESSMENT & PLAN NOTE
The patient is a 79 yo AAF admitted with complaints of chest pain prior to hemodialysis on yesterday which resolved upon arrival to the ED. Cardiology not concern for ACS.       Out patient HD Rx:     Modality: iHD  Days: MWF  Access: LUE AVF  Unit: [facility]  Nephrologist: Dr Jared Martinez Duration: 3-4  EDW: 50kg  UF : 2-3      - Will provide dialysis for metabolic clearance and volume management today.   - Seen on HD. No complaints.   - Dialysate adjusted to current labs   - Plans for dc today per records   - Continue to monitor intake and output, daily weights   - Avoid nephrotoxic medication and renal dose medications to GFR  - Will continue to monitor

## 2020-09-17 NOTE — SUBJECTIVE & OBJECTIVE
Past Medical History:   Diagnosis Date    (HFpEF) heart failure with preserved ejection fraction 5/21/2019    Anemia in ESRD (end-stage renal disease) 5/29/2016    Aneurysm of arteriovenous dialysis fistula     Aortic atherosclerosis 11/22/2016    Bilateral low back pain without sciatica 11/17/2015    Blindness of right eye 11/12/2016    Brain compression 6/22/2019    Cataract     Central retinal vein occlusion, right eye 6/3/2014    Chronic diastolic heart failure 1/8/2016    Chronic respiratory failure with hypoxia 5/29/2016    Coronary artery disease involving native coronary artery of native heart without angina pectoris 12/12/2016    Diverticulosis     Encounter for blood transfusion     Enlarged LA (left atrium) 10/7/2016    Epiretinal membrane 7/17/2012    ESRD on hemodialysis     MWF    History of GI diverticular bleed     5/22/16    Hyperparathyroidism, secondary renal 10/14/2016    Left spastic hemiparesis 11/13/2016    Moderate single current episode of major depressive disorder 2/8/2019    Non-rheumatic tricuspid valve insufficiency 1/15/2017    Peripheral vascular disease, unspecified 11/22/2016    Physical debility 11/17/2016    Pleural effusion on right     Pulmonary emphysema 1/15/2017    Pulmonary hypertension 6/28/2019    Renovascular hypertension 1/8/2016    Seizure 6/24/2019    Severe aortic valve stenosis 2/4/2016    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 11/13/2016    s/p thrombectomy of MCA    Subdural hematoma 05/21/2019    Bilateral R>L    Thrombocytopenia, unspecified 1/15/2017    Type 2 diabetes mellitus with chronic kidney disease on chronic dialysis, without long-term current use of insulin 5/1/2018    Type 2 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, without long-term current use of insulin 3/26/2013    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,  without long-term current use of insulin 3/26/2013    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       Past Surgical History:   Procedure Laterality Date    ABDOMINAL SURGERY      BREAST SURGERY      tumor removal x 2    CARDIAC SURGERY  2016    Implantable loop recorder placed    CATARACT EXTRACTION      CEREBRAL ANGIOGRAM N/A 6/24/2019    Procedure: ANGIOGRAM-CEREBRAL;  Surgeon: Kenisha Surgeon;  Location: [facility];  Service: Anesthesiology;  Laterality: N/A;    CHOLECYSTECTOMY      COLONOSCOPY N/A 5/23/2016    Procedure: COLONOSCOPY;  Surgeon: WILLIAM Colvin MD;  Location: [facility] ENDO (2ND FLR);  Service: Endoscopy;  Laterality: N/A;    COLONOSCOPY N/A 5/30/2016    Procedure: COLONOSCOPY;  Surgeon: Sam Davis MD;  [facility]  Service: Endoscopy;  Laterality: N/A;    CRANIOTOMY FOR EVACUATION OF SUBDURAL HEMATOMA Right 6/23/2019    Procedure: KATE HOLES FOR SUBDURAL HEMATOMA EVACUATION;  Surgeon: Trevor Conner MD;  Location: [address];  Service: Neurosurgery;  Laterality: Right;    EYE SURGERY      FISTULOGRAM Left 11/28/2018    Procedure: Fistulogram;  Surgeon: NADINE Blum III, MD;  Location: [facility] CATH LAB;  Service: Cardiology;  Laterality: Left;    INTRAMEDULLARY RODDING OF FEMUR Left 10/28/2019    Procedure: INSERTION, INTRAMEDULLARY NELIDA, FEMUR,  Left , synthes, hana table, large C arm clock side;  Surgeon: Torey Aguilar MD;  Location: [address];  Service: Orthopedics;  Laterality: Left;  general        PLACEMENT OF DUAL-LUMEN VASCULAR CATHETER Right 11/29/2018    Procedure: INSERTION, CATHETER, VASCULAR, DUAL LUMEN;  Surgeon: NADINE Blum III, MD;  Location: [facility] OR [address] [place] [address];  Service: Peripheral Vascular;  Laterality: Right;  Permacatheter placement     RESECTION OF ANEURYSM Left 11/29/2018    Procedure: EXCISION, ANEURYSM;  Surgeon: NADINE Blum III, MD;  [facility];  Service: Peripheral Vascular;  Laterality: Left;   Excision, L AVF aneurysm    REVISION OF ARTERIOVENOUS FISTULA Left 11/29/2018    Procedure: REVISION, AV FISTULA,;  Surgeon: NADINE Blum III, MD;  Location: [facility] OR [address] [place] [address];  Service: Peripheral Vascular;  Laterality: Left;  OR 11    UPPER GASTROINTESTINAL ENDOSCOPY         Review of patient's allergies indicates:  No Known Allergies    PTA Medications   Medication Sig    acetaminophen (TYLENOL) 325 MG tablet Take 2 tablets (650 mg total) by mouth every 6 (six) hours as needed for Pain.    albuterol (PROVENTIL/VENTOLIN HFA) 90 mcg/actuation inhaler Inhale 1-2 puffs into the lungs every 6 (six) hours as needed for Wheezing.    artificial tears (ISOPTO TEARS) 0.5 % ophthalmic solution Place 2 drops into both eyes 4 (four) times daily as needed.    aspirin (ECOTRIN) 81 MG EC tablet Take 81 mg by mouth once daily.    BREO ELLIPTA 200-25 mcg/dose DsDv diskus inhaler INHALE 1 PUFF INTO THE LUNGS ONCE DAILY.    ergocalciferol (ERGOCALCIFEROL) 50,000 unit Cap Take 1 capsule (50,000 Units total) by mouth every 7 days.    hydrALAZINE (APRESOLINE) 100 MG tablet Take 1 tablet (100 mg total) by mouth every 8 (eight) hours.    levETIRAcetam (KEPPRA) 500 MG Tab TAKE 1 TABLET (500 MG TOTAL) BY MOUTH 2 (TWO) TIMES A DAY FOR 30 DAYS.    ondansetron (ZOFRAN-ODT) 8 MG TbDL Take 1 tablet (8 mg total) by mouth every 6 (six) hours as needed (nausea).    RENVELA 800 mg Tab Take 1 tablet (800 mg total) by mouth 3 (three) times daily with meals.    traMADol (ULTRAM) 50 mg tablet Take 1 tablet (50 mg total) by mouth every 6 (six) hours as needed.    amLODIPine (NORVASC) 10 MG tablet Take 1 tablet (10 mg total) by mouth once daily.    bisacodyl (DULCOLAX) 10 mg Supp Place 1 suppository (10 mg total) rectally daily as needed (Until bowel movement if patient has no bowel movement for 2 days).    cloNIDine (CATAPRES) 0.1 MG tablet Take 1 tablet (0.1 mg total) by mouth 3 (three) times daily.    insulin aspart U-100 (NOVOLOG)  100 unit/mL (3 mL) InPn pen Inject 0-5 Units into the skin before meals and at bedtime as needed (Hyperglycemia).    losartan (COZAAR) 50 MG tablet Take 1 tablet (50 mg total) by mouth once daily.    polyethylene glycol (GLYCOLAX) 17 gram PwPk Take 17 g by mouth once daily.    ramelteon (ROZEREM) 8 mg tablet Take 1 tablet (8 mg total) by mouth nightly as needed for Insomnia.    senna-docusate 8.6-50 mg (PERICOLACE) 8.6-50 mg per tablet Take 1 tablet by mouth daily as needed for Constipation.     Family History     Problem Relation (Age of Onset)    Cancer Sister    Cataracts Mother    Esophageal cancer Sister    Glaucoma Brother, Maternal Aunt    Heart disease Brother    Heart failure Sister    Hypertension Mother, Sister, Brother, Father    No Known Problems Maternal Uncle, Paternal Aunt, Paternal Uncle, Maternal Grandmother, Maternal Grandfather, Paternal Grandmother, Paternal Grandfather    Stroke Mother        Tobacco Use    Smoking status: Never Smoker    Smokeless tobacco: Never Used   Substance and Sexual Activity    Alcohol use: No     Comment: Reports occasional 1-2 drinks     Drug use: No    Sexual activity: Not Currently     Review of Systems   Constitution: Negative for chills and fever.   HENT: Negative for sore throat.    Eyes: Negative for blurred vision.   Cardiovascular: Negative for chest pain, claudication, cyanosis, dyspnea on exertion, irregular heartbeat, leg swelling, near-syncope, orthopnea, palpitations, paroxysmal nocturnal dyspnea and syncope.   Respiratory: Negative for cough and sputum production.    Hematologic/Lymphatic: Does not bruise/bleed easily.   Skin: Negative for itching, rash and suspicious lesions.   Musculoskeletal: Negative for falls.   Gastrointestinal: Negative for abdominal pain and change in bowel habit.   Genitourinary: Negative for dysuria.   Neurological: Positive for disturbances in coordination and weakness.   Psychiatric/Behavioral: Negative for altered  mental status.     Objective:     Vital Signs (Most Recent):  Temp: 97.6 °F (36.4 °C) (09/17/20 1311)  Pulse: 75 (09/17/20 1557)  Resp: 18 (09/17/20 1436)  BP: (!) 178/72 (09/17/20 1557)  SpO2: (!) 94 % (09/17/20 1436) Vital Signs (24h Range):  Temp:  [96.1 °F (35.6 °C)-99 °F (37.2 °C)] 97.6 °F (36.4 °C)  Pulse:  [67-87] 75  Resp:  [16-18] 18  SpO2:  [92 %-100 %] 94 %  BP: (124-178)/(58-84) 178/72     Weight: 38.6 kg (85 lb)  Body mass index is 15.06 kg/m².    SpO2: (!) 94 %  O2 Device (Oxygen Therapy): nasal cannula      Intake/Output Summary (Last 24 hours) at 9/17/2020 1628  Last data filed at 9/17/2020 1311  Gross per 24 hour   Intake 720 ml   Output 2600 ml   Net -1880 ml       Lines/Drains/Airways     Drain                 Hemodialysis AV Fistula Left upper arm -- days         Hemodialysis AV Fistula Left upper arm -- days          Peripheral Intravenous Line                 Peripheral IV - Single Lumen 09/16/20 20 G Right Antecubital 1 day                Physical Exam   Constitutional: She is oriented to person, place, and time. No distress.   frail   HENT:   Head: Normocephalic and atraumatic.   Eyes: EOM are normal.   Neck: Normal range of motion. No JVD present.   Cardiovascular: Normal rate, regular rhythm and intact distal pulses.   Murmur heard.   Harsh midsystolic murmur is present at the upper right sternal border radiating to the neck.  Pulmonary/Chest: Effort normal and breath sounds normal. No respiratory distress.   Abdominal: Soft. She exhibits no distension.   Musculoskeletal:         General: No edema.   Neurological: She is alert and oriented to person, place, and time.   Skin: Skin is warm and dry. She is not diaphoretic.   Vitals reviewed.      Significant Labs:   BMP:   Recent Labs   Lab 09/16/20  1520 09/17/20  0450   * 111*    140   K 4.2 5.0    104   CO2 22* 19*   BUN 59* 67*   CREATININE 3.9* 4.2*   CALCIUM 9.2 9.0   MG  --  2.3    and CBC   Recent Labs   Lab  09/16/20  1520 09/17/20  0450   WBC 9.70 8.21   HGB 9.0* 8.9*   HCT 30.9* 30.9*   * 136*       Significant Imaging: Echocardiogram:   2D echo with color flow doppler:  and Transthoracic echo (TTE) complete (Cupid Only):   Results for orders placed or performed during the hospital encounter of 09/16/20   Echo Color Flow Doppler? Yes   Result Value Ref Range    Ascending aorta 2.88 cm    STJ 2.01 cm    AV mean gradient 60 mmHg    Ao peak jared 5.03 m/s    Ao .63 cm    IVRT 65.65 msec    IVS 0.73 0.6 - 1.1 cm    LA size 4.14 cm    Left Atrium Major Axis 6.22 cm    Left Atrium Minor Axis 6.11 cm    LVIDD 4.30 3.5 - 6.0 cm    LVIDS 3.81 2.1 - 4.0 cm    LVOT diameter 1.91 cm    LVOT peak VTI 16.67 cm    PW 0.73 0.6 - 1.1 cm    MV Peak A Jared 1.43 m/s    E wave decelartion time 196.70 msec    MV Peak E Jared 1.26 m/s    PV Peak D Jared 0.28 m/s    PV Peak S Jared 0.27 m/s    RA Major Axis 5.10 cm    RA Width 4.30 cm    RVDD 5.26 cm    Sinus 2.37 cm    TAPSE 1.41 cm    TR Max Jared 4.09 m/s    TDI LATERAL 0.06 m/s    TDI SEPTAL 0.03 m/s    LA WIDTH 4.54 cm    MV stenosis pressure 1/2 time 43.68 ms    LV Diastolic Volume 82.91 mL    LV Systolic Volume 62.42 mL    RV S' 8.28 cm/s    LVOT peak jared 0.67 m/s    Mr max jared 0.06 m/s    LV LATERAL E/E' RATIO 21.00 m/s    LV SEPTAL E/E' RATIO 42.00 m/s    FS 11 %    LA volume 98.49 cm3    LV mass 93.45 g    Left Ventricle Relative Wall Thickness 0.34 cm    AV valve area 0.40 cm2    AV Velocity Ratio 0.13     AV index (prosthetic) 0.14     MV valve area p 1/2 method 5.04 cm2    E/A ratio 0.88     Mean e' 0.05 m/s    Pulm vein S/D ratio 0.96     LVOT area 2.9 cm2    LVOT stroke volume 47.74 cm3    AV peak gradient 101 mmHg    E/E' ratio 28.00 m/s    LV Systolic Volume Index 46.4 mL/m2    LV Diastolic Volume Index 61.68 mL/m2    LA Volume Index 73.3 mL/m2    LV Mass Index 70 g/m2    Triscuspid Valve Regurgitation Peak Gradient 67 mmHg    BSA 1.31 m2    Right Atrial Pressure (from  IVC) 15 mmHg    TV rest pulmonary artery pressure 82 mmHg    LA Volume Index (Mod) 70.7 mL/m2    LA volume (mod) 95.00 cm3    Narrative    · Moderately decreased left ventricular systolic function. The estimated   ejection fraction is 25-30%. Global hypokinetic wall motion.  · Grade II (moderate) left ventricular diastolic dysfunction consistent   with pseudonormalization.  · Severe/critical aortic valve stenosis. Aortic valve area is 0.40 cm2;   peak velocity is 5.03 m/s; mean gradient is 60 mmHg.  · Mild-to-moderate aortic regurgitation.  · Mild mitral regurgitation.  · Severe right ventricular enlargement. Mildly to moderately reduced right   ventricular systolic function.  · Mild to moderate tricuspid regurgitation.  · Severe left atrial enlargement.  · There is a large left pleural effusion.  · The estimated PA systolic pressure is 82 mmHg.  · Elevated central venous pressure (15 mmHg).  · Pulmonary hypertension present.
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
## 2020-09-17 NOTE — PROGRESS NOTES
3 hour dialysis complete.  Blood returned.  Needles removed from [place]OR[place].  Pressure held x 10 minutes.  Hemostasis achieved.  Covered with gauze and paper tape.  +thrill  +bruit.  Net UF 2L.  Tolerated well.  Transported from [facility] to room 726 via stretcher by transporter.

## 2020-09-17 NOTE — PLAN OF CARE
Pt admitted and care plan initiated.continue Telemetry,NSR.denies further chest pain.Scheduled for Dialysis in am.safety precautions implemented.bed in low position.call bell in reach.bed alarm in use for pt safety.continue plan of care.

## 2020-09-17 NOTE — ASSESSMENT & PLAN NOTE
"Chest Pain  Elevated Troponin  -Pt. With reported chest pain a rest, EKG shows T-wave inversions in lateral leads, troponin elevated at 0.148  -BNP >4900, CXR with pulmonary edema and pt. Has not received dialysis since Sunday, suspect demand ischemia in setting of volume overload with known severe aortic stenosis and HFpEF. Discussed with cardiology who also do not suspect ACS at this time  -Nephrology consulted for urgent dialysis which will be done tonight. Continue to trend troponin/EKG and reevaluate pt. After volume removal  -Interventional cardiology consult placed as recommended by cardiology in ED for further evaluation of valvular/ischemic intervention. Per pt. Cardiologist at [facility] in 8/2020 note, (see care everywhere) she is currently under evaluation for valvular repair, but she is "inoperable to conventional AVR and high risk for TAVR"  - defering additional intervention to outpatient Cardiologist  - likely discharge in AM  "

## 2020-09-17 NOTE — ASSESSMENT & PLAN NOTE
Chronic respiratory failure with hypoxia  -Pt. Stable on home O2  Will continue Breo 1 puff daily to treat COPD.   Continue oxygen as needed with goal of oxygen sats > 88%.

## 2020-09-17 NOTE — NURSING
Pt is back from dialysis, VSS. Morning medications given, pt provided pudding, pt ordered a tray from the kitchen, pt is now going to echo.
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
## 2020-09-17 NOTE — SUBJECTIVE & OBJECTIVE
Interval History: No events overnight. Chest pain resolved. HD completed this morning with improvement. Interventional Cardiology evaluated, defering workup to outpatient Cardiologist.    Review of Systems   Constitutional: Negative for chills and fever.   Eyes: Positive for visual disturbance.   Respiratory: Positive for cough, chest tightness and shortness of breath.    Cardiovascular: Positive for chest pain. Negative for leg swelling.   Gastrointestinal: Negative for abdominal pain and nausea.   Neurological: Negative for dizziness and weakness.     Objective:     Vital Signs (Most Recent):  Temp: 97.3 °F (36.3 °C) (09/17/20 1630)  Pulse: 76 (09/17/20 1630)  Resp: 18 (09/17/20 1630)  BP: 135/60 (09/17/20 1630)  SpO2: (!) 94 % (09/17/20 1630) Vital Signs (24h Range):  Temp:  [96.1 °F (35.6 °C)-99 °F (37.2 °C)] 97.3 °F (36.3 °C)  Pulse:  [67-87] 76  Resp:  [16-18] 18  SpO2:  [92 %-100 %] 94 %  BP: (124-178)/(58-84) 135/60     Weight: 38.6 kg (85 lb)  Body mass index is 15.06 kg/m².    Intake/Output Summary (Last 24 hours) at 9/17/2020 1641  Last data filed at 9/17/2020 1311  Gross per 24 hour   Intake 720 ml   Output 2600 ml   Net -1880 ml      Physical Exam  Vitals signs and nursing note reviewed.   Constitutional:       Appearance: She is cachectic.      Interventions: Nasal cannula in place.   Eyes:      Comments: Right eye blindness   Cardiovascular:      Rate and Rhythm: Normal rate and regular rhythm.      Heart sounds: Murmur present.   Pulmonary:      Effort: Pulmonary effort is normal.      Breath sounds: Rales present.   Abdominal:      Palpations: Abdomen is soft.      Tenderness: There is no abdominal tenderness.   Musculoskeletal:         General: No tenderness.   Skin:     General: Skin is warm and dry.   Neurological:      Mental Status: She is alert and oriented to person, place, and time.   Psychiatric:         Behavior: Behavior normal.         Significant Labs:   BMP:   Recent Labs   Lab  09/17/20  0450   *      K 5.0      CO2 19*   BUN 67*   CREATININE 4.2*   CALCIUM 9.0   MG 2.3     CBC:   Recent Labs   Lab 09/16/20  1520 09/17/20  0450   WBC 9.70 8.21   HGB 9.0* 8.9*   HCT 30.9* 30.9*   * 136*     Troponin:   Recent Labs   Lab 09/16/20  1520 09/16/20  1824   TROPONINI 0.148* 0.127*     All pertinent labs within the past 24 hours have been reviewed.    Significant Imaging: I have reviewed all pertinent imaging results/findings within the past 24 hours.

## 2020-09-17 NOTE — ED NOTES
Telemetry Verification   Patient placed on Telemetry Box  Verified with War Room  Box # [id number]   Monitor Tech    Rate 70   Rhythm NSR

## 2020-09-17 NOTE — NURSING TRANSFER
Nursing Transfer Note    Pt arrived from the er via stretcher accompanied by transporter.arrived with telemetry,NSR.aaox4.resp even and non labored.marvin breath sounds clear,diminished.incontinent of urine and stool upon arrival.pt cleaned upon arrival.skin to backside with a small healed ulceration.barrier cream applied.drsg to left foot clean,dry and intact.heels floated on a pillow.o2 in progress at 2l nc.fistula to left arm with + thrill and bruit.vss.safety precautions implemented.will monitor.rails padded.suction at bedside for seizure precautions.will monitor.

## 2020-09-17 NOTE — PROGRESS NOTES
Wound care consult received from nursing for assessment of bilateral feet and sacrum. Attempted to see patient twice today. Patient off the floor for dialysis in the AM and US in the afternoon. Will re-attempt at a another time [id number]

## 2020-09-17 NOTE — ASSESSMENT & PLAN NOTE
"Of note, she has a self reported history of trouble with anesthesia stating she has "trouble waking up".   "
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
## 2020-09-17 NOTE — CONSULTS
[facility]  Interventional Cardiology  Consult Note    Patient Name: Tea Georges  MRN: [id number]  Admission Date: 9/16/2020  Hospital Length of Stay: 0 days  Code Status: Full Code   Attending Provider: Francesco Fritz MD  Consulting Provider: Alyssa Velazquez PA-C  Primary Care Physician: Parth Posadas Ii, MD  Principal Problem:Severe aortic valve stenosis    Patient information was obtained from patient, past medical records and ER records.     Inpatient consult to Interventional Cardiology  Consult performed by: Alyssa Velazquez PA-C  Consult ordered by: Alejandrina Sandoval Jr., PA-C        Subjective:     Chief Complaint:  Chest Pain     HPI:  81 yo F with PMHx ESRD on HD (MWF) via LUE fistula, HTN, severe aortic stenosis with moderate to severe pulmonary HTN, HFpEF, pulmonary emphysema with chronic hypoxic respiratory failure on home oxygen 2 liters at night prn, previous right MCA CVA in 2016, Type 2 diabetes with ESRD not on home insulin, SDH s/p evacuation on 6/23/2019, and seizures on Keppra who presented to the ED complaining of chest pain while at dialysis. Pt. Is a poor historian. She states that she has been coughing a lot and got some midsternal chest pain last night. She is unable to characterize the pain other than stating that it hurt. She has had similar pains in the past, but she thinks this time might be related to her cough. She told the dialysis nurse that she felt like she needed to go to the hospital because of the pain and EMS was called. She said that the pain got better when she got to the hospital. Per chart review, she was given 2 NG SL tablets.     In regards to her severe aortic stenosis, she is currently undergoing TAVR evaluation at [facility] with Dr. Rene Chen. Per chart review, she was last seen in their clinic on 8/11/20. TTE showed PAULINE 0.5 cm2, MG 63 mmHg, PV 5.05 m/s, and EF 20-25% (unknown date). She underwent a LHC on 7/27/20 with Dr. Yan at [facility] which  "showed a possible 70% RCA lesion that was hard to assess due to overlap. She is inoperable for SAVR (don't see CTS note) and high risk for TAVR. CTA has already been done, pending review. She was sent to Dr. Chen by Dr. Moon.     No new subjective & objective note has been filed under this hospital service since the last note was generated.    Assessment and Plan:     * Severe aortic valve stenosis  Severe, symptomatic aortic stenosis with depressed LV systolic function. She is currently undergoing TAVR evaluation at [facility] with Dr. Rene Chen (last seen 8/11/20), and has already completed the following workup: TTE showed PAULINE 0.5 cm2, MG 63 mmHg, PV 5.05 m/s, and EF 20-25% (unknown date); [facility] on 7/27/20 with Dr. Yan at [facility] which showed a possible 70% RCA lesion that was hard to assess due to overlap. CTA has already been done, pending review.     - Recommend medical management per primary team then completing outpatient TAVR workup with Dr. Chen at [facility].     CAD (coronary artery disease)   [facility] on 7/27/20 with Dr. Farrah merritt [facility] which showed a possible 70% RCA lesion that was hard to assess due to overlap.    H/O complications due to general anesthesia  Of note, she has a self reported history of trouble with anesthesia stating she has "trouble waking up".     Chronic respiratory failure with hypoxia  On 2L home O2 at night.     Physical debility  She is 3/4 frail and STS 22.3%. She currently resides at [facility] and is majority bed ridden due to multiple falls in the past.     Anemia in ESRD (end-stage renal disease)  Chronic and controlled. Patient at baseline with Hgb 9.0.        ESRD on hemodialysis  Usually M,W,F HD through left AV fistula.        VTE Risk Mitigation (From admission, onward)         Ordered     heparin (porcine) injection 5,000 Units  Every 8 hours      09/16/20 1820     IP VTE HIGH RISK PATIENT  Once      09/16/20 1936     Place sequential compression device  Until " discontinued      09/16/20 1936                Thank you for your consult. I will sign off. Please contact us if you have any additional questions.    Alyssa Velazquez PA-C  Interventional Cardiology   [facility]  [id number]    Staff:  I have personally taken the history and examined this patient and agree with the fellow's note as stated above and amended it accordingly :-)  This patient has severe, symptomatic AS and NYHA Class IV CHF.  She is frail and has ESRD on HD for 8 years.  She is bedridden.  STS score is 22.  There is good data from the Partner trials that survival with STS scores above 15 is not good.  She is having TAVR evaluation with Dr. Chen at [facility].  We do not think TAVR is an option here.

## 2020-09-17 NOTE — ASSESSMENT & PLAN NOTE
- Renal Function Panel for electrolytes monitoring  - Recommend renal diet with 1 L fluid restriction    - Novasource with meals  - Already on binders as outpatient.  - Daily renal panel so that phos and albumin is monitored daily.

## 2020-09-17 NOTE — PROGRESS NOTES
Arrived in [place].  EMLA cream applied to FLROENCE arm.  After 30 minutes, EMLA cream removed and dialysis (MWF off schedule and observation patient) started with 15 gauge needles.  Tolerated well.

## 2020-09-18 VITALS
SYSTOLIC BLOOD PRESSURE: 119 MMHG | BODY MASS INDEX: 15.06 KG/M2 | HEIGHT: 63 IN | HEART RATE: 75 BPM | DIASTOLIC BLOOD PRESSURE: 54 MMHG | TEMPERATURE: 98 F | RESPIRATION RATE: 18 BRPM | WEIGHT: 85 LBS | OXYGEN SATURATION: 97 %

## 2020-09-18 LAB
ANION GAP SERPL CALC-SCNC: 15 MMOL/L (ref 8–16)
BASOPHILS # BLD AUTO: 0.05 K/UL (ref 0–0.2)
BASOPHILS NFR BLD: 0.6 % (ref 0–1.9)
BUN SERPL-MCNC: 34 MG/DL (ref 8–23)
CALCIUM SERPL-MCNC: 9.2 MG/DL (ref 8.7–10.5)
CHLORIDE SERPL-SCNC: 99 MMOL/L (ref 95–110)
CO2 SERPL-SCNC: 24 MMOL/L (ref 23–29)
CREAT SERPL-MCNC: 2.7 MG/DL (ref 0.5–1.4)
DIFFERENTIAL METHOD: ABNORMAL
EOSINOPHIL # BLD AUTO: 0.2 K/UL (ref 0–0.5)
EOSINOPHIL NFR BLD: 1.9 % (ref 0–8)
ERYTHROCYTE [DISTWIDTH] IN BLOOD BY AUTOMATED COUNT: 18.9 % (ref 11.5–14.5)
EST. GFR  (AFRICAN AMERICAN): 18.8 ML/MIN/1.73 M^2
EST. GFR  (NON AFRICAN AMERICAN): 16.3 ML/MIN/1.73 M^2
GLUCOSE SERPL-MCNC: 111 MG/DL (ref 70–110)
HCT VFR BLD AUTO: 31.5 % (ref 37–48.5)
HGB BLD-MCNC: 9.3 G/DL (ref 12–16)
IMM GRANULOCYTES # BLD AUTO: 0.06 K/UL (ref 0–0.04)
IMM GRANULOCYTES NFR BLD AUTO: 0.8 % (ref 0–0.5)
LYMPHOCYTES # BLD AUTO: 0.5 K/UL (ref 1–4.8)
LYMPHOCYTES NFR BLD: 5.8 % (ref 18–48)
MAGNESIUM SERPL-MCNC: 2.1 MG/DL (ref 1.6–2.6)
MCH RBC QN AUTO: 28.2 PG (ref 27–31)
MCHC RBC AUTO-ENTMCNC: 29.5 G/DL (ref 32–36)
MCV RBC AUTO: 96 FL (ref 82–98)
MONOCYTES # BLD AUTO: 0.6 K/UL (ref 0.3–1)
MONOCYTES NFR BLD: 6.9 % (ref 4–15)
NEUTROPHILS # BLD AUTO: 6.7 K/UL (ref 1.8–7.7)
NEUTROPHILS NFR BLD: 84 % (ref 38–73)
NRBC BLD-RTO: 0 /100 WBC
PLATELET # BLD AUTO: 158 K/UL (ref 150–350)
PMV BLD AUTO: 13.9 FL (ref 9.2–12.9)
POCT GLUCOSE: 105 MG/DL (ref 70–110)
POCT GLUCOSE: 133 MG/DL (ref 70–110)
POCT GLUCOSE: 136 MG/DL (ref 70–110)
POTASSIUM SERPL-SCNC: 3.8 MMOL/L (ref 3.5–5.1)
RBC # BLD AUTO: 3.3 M/UL (ref 4–5.4)
SARS-COV-2 RNA RESP QL NAA+PROBE: NOT DETECTED
SODIUM SERPL-SCNC: 138 MMOL/L (ref 136–145)
WBC # BLD AUTO: 7.96 K/UL (ref 3.9–12.7)

## 2020-09-18 PROCEDURE — 25000003 PHARM REV CODE 250: Performed by: NURSE PRACTITIONER

## 2020-09-18 PROCEDURE — 80048 BASIC METABOLIC PNL TOTAL CA: CPT

## 2020-09-18 PROCEDURE — 63600175 PHARM REV CODE 636 W HCPCS: Performed by: HOSPITALIST

## 2020-09-18 PROCEDURE — 99217 PR OBSERVATION CARE DISCHARGE: ICD-10-PCS | Mod: ,,, | Performed by: PHYSICIAN ASSISTANT

## 2020-09-18 PROCEDURE — U0003 INFECTIOUS AGENT DETECTION BY NUCLEIC ACID (DNA OR RNA); SEVERE ACUTE RESPIRATORY SYNDROME CORONAVIRUS 2 (SARS-COV-2) (CORONAVIRUS DISEASE [COVID-19]), AMPLIFIED PROBE TECHNIQUE, MAKING USE OF HIGH THROUGHPUT TECHNOLOGIES AS DESCRIBED BY CMS-2020-01-R: HCPCS

## 2020-09-18 PROCEDURE — 85025 COMPLETE CBC W/AUTO DIFF WBC: CPT

## 2020-09-18 PROCEDURE — 99214 PR OFFICE/OUTPT VISIT, EST, LEVL IV, 30-39 MIN: ICD-10-PCS | Mod: ,,, | Performed by: NURSE PRACTITIONER

## 2020-09-18 PROCEDURE — 96372 THER/PROPH/DIAG INJ SC/IM: CPT | Mod: 59

## 2020-09-18 PROCEDURE — 25000003 PHARM REV CODE 250: Performed by: HOSPITALIST

## 2020-09-18 PROCEDURE — 99900035 HC TECH TIME PER 15 MIN (STAT)

## 2020-09-18 PROCEDURE — 36415 COLL VENOUS BLD VENIPUNCTURE: CPT

## 2020-09-18 PROCEDURE — 96374 THER/PROPH/DIAG INJ IV PUSH: CPT | Mod: 59

## 2020-09-18 PROCEDURE — 94640 AIRWAY INHALATION TREATMENT: CPT

## 2020-09-18 PROCEDURE — 99214 OFFICE O/P EST MOD 30 MIN: CPT | Mod: ,,, | Performed by: NURSE PRACTITIONER

## 2020-09-18 PROCEDURE — 25000242 PHARM REV CODE 250 ALT 637 W/ HCPCS: Performed by: HOSPITALIST

## 2020-09-18 PROCEDURE — G0257 UNSCHED DIALYSIS ESRD PT HOS: HCPCS

## 2020-09-18 PROCEDURE — 27000221 HC OXYGEN, UP TO 24 HOURS

## 2020-09-18 PROCEDURE — G0378 HOSPITAL OBSERVATION PER HR: HCPCS

## 2020-09-18 PROCEDURE — 83735 ASSAY OF MAGNESIUM: CPT

## 2020-09-18 PROCEDURE — 25000003 PHARM REV CODE 250: Performed by: PHYSICIAN ASSISTANT

## 2020-09-18 PROCEDURE — 99217 PR OBSERVATION CARE DISCHARGE: CPT | Mod: ,,, | Performed by: PHYSICIAN ASSISTANT

## 2020-09-18 RX ORDER — GUAIFENESIN 600 MG/1
1200 TABLET, EXTENDED RELEASE ORAL ONCE
Status: COMPLETED | OUTPATIENT
Start: 2020-09-18 | End: 2020-09-18

## 2020-09-18 RX ORDER — ATORVASTATIN CALCIUM 20 MG/1
20 TABLET, FILM COATED ORAL NIGHTLY
Qty: 90 TABLET | Refills: 3 | Status: ON HOLD
Start: 2020-09-18 | End: 2021-07-26 | Stop reason: HOSPADM

## 2020-09-18 RX ORDER — SODIUM CHLORIDE 9 MG/ML
INJECTION, SOLUTION INTRAVENOUS ONCE
Status: COMPLETED | OUTPATIENT
Start: 2020-09-18 | End: 2020-09-18

## 2020-09-18 RX ORDER — BENZONATATE 100 MG/1
200 CAPSULE ORAL 3 TIMES DAILY PRN
Status: DISCONTINUED | OUTPATIENT
Start: 2020-09-18 | End: 2020-09-18 | Stop reason: HOSPADM

## 2020-09-18 RX ADMIN — SEVELAMER CARBONATE 800 MG: 800 TABLET, FILM COATED ORAL at 09:09

## 2020-09-18 RX ADMIN — LEVETIRACETAM 500 MG: 500 TABLET ORAL at 09:09

## 2020-09-18 RX ADMIN — SODIUM CHLORIDE: 0.9 INJECTION, SOLUTION INTRAVENOUS at 02:09

## 2020-09-18 RX ADMIN — ASPIRIN 81 MG CHEWABLE TABLET 81 MG: 81 TABLET CHEWABLE at 09:09

## 2020-09-18 RX ADMIN — AMLODIPINE BESYLATE 10 MG: 10 TABLET ORAL at 09:09

## 2020-09-18 RX ADMIN — FLUTICASONE FUROATE AND VILANTEROL TRIFENATATE 1 PUFF: 200; 25 POWDER RESPIRATORY (INHALATION) at 12:09

## 2020-09-18 RX ADMIN — SEVELAMER CARBONATE 800 MG: 800 TABLET, FILM COATED ORAL at 05:09

## 2020-09-18 RX ADMIN — LIDOCAINE AND PRILOCAINE: 25; 25 CREAM TOPICAL at 02:09

## 2020-09-18 RX ADMIN — IPRATROPIUM BROMIDE AND ALBUTEROL SULFATE 3 ML: .5; 2.5 SOLUTION RESPIRATORY (INHALATION) at 09:09

## 2020-09-18 RX ADMIN — BENZONATATE 200 MG: 100 CAPSULE ORAL at 05:09

## 2020-09-18 RX ADMIN — ONDANSETRON 4 MG: 2 INJECTION INTRAMUSCULAR; INTRAVENOUS at 12:09

## 2020-09-18 RX ADMIN — HEPARIN SODIUM 5000 UNITS: 5000 INJECTION INTRAVENOUS; SUBCUTANEOUS at 06:09

## 2020-09-18 RX ADMIN — GUAIFENESIN 1200 MG: 600 TABLET, EXTENDED RELEASE ORAL at 05:09

## 2020-09-18 RX ADMIN — HYDRALAZINE HYDROCHLORIDE 50 MG: 50 TABLET, FILM COATED ORAL at 06:09

## 2020-09-18 RX ADMIN — SEVELAMER CARBONATE 800 MG: 800 TABLET, FILM COATED ORAL at 12:09

## 2020-09-18 NOTE — PLAN OF CARE
Second covid test sent to facility.         09/18/20 1520   Post-Acute Status   Post-Acute Authorization Placement       Loyda Mcgee LMSW  [facility]  [id number]

## 2020-09-18 NOTE — NURSING
Called to give report, spoke with CASSIDY Brewer @ [facility] in [facility]. RN familiar with pt, gave updates on pt's status. Tele box maintained and monitored. Blood glucose maintained and monitored, weight adjusted and turn Q2 as tolerated throughout shift, heels elevated. Awaiting patient to return from HD, transportation scheduled for 1800 per [facility]. Discharge paperwork, meds, and personal belongings discharged with patient. IV removed, catheter intact with no complications. Tele box removed and returned to Nursing station. Pt left with Jeanne's transport via wheelchair.

## 2020-09-18 NOTE — ASSESSMENT & PLAN NOTE
Pt is not a surgical candidate due to being very debilited with ESRD. Continue with TAVR work up and medical management. Dr. Stanley to review and give final recommendations.

## 2020-09-18 NOTE — SUBJECTIVE & OBJECTIVE
No current facility-administered medications on file prior to encounter.      Current Outpatient Medications on File Prior to Encounter   Medication Sig    acetaminophen (TYLENOL) 325 MG tablet Take 2 tablets (650 mg total) by mouth every 6 (six) hours as needed for Pain.    albuterol (PROVENTIL/VENTOLIN HFA) 90 mcg/actuation inhaler Inhale 1-2 puffs into the lungs every 6 (six) hours as needed for Wheezing.    artificial tears (ISOPTO TEARS) 0.5 % ophthalmic solution Place 2 drops into both eyes 4 (four) times daily as needed.    aspirin (ECOTRIN) 81 MG EC tablet Take 81 mg by mouth once daily.    BREO ELLIPTA 200-25 mcg/dose DsDv diskus inhaler INHALE 1 PUFF INTO THE LUNGS ONCE DAILY.    ergocalciferol (ERGOCALCIFEROL) 50,000 unit Cap Take 1 capsule (50,000 Units total) by mouth every 7 days.    hydrALAZINE (APRESOLINE) 100 MG tablet Take 1 tablet (100 mg total) by mouth every 8 (eight) hours.    levETIRAcetam (KEPPRA) 500 MG Tab TAKE 1 TABLET (500 MG TOTAL) BY MOUTH 2 (TWO) TIMES A DAY FOR 30 DAYS.    ondansetron (ZOFRAN-ODT) 8 MG TbDL Take 1 tablet (8 mg total) by mouth every 6 (six) hours as needed (nausea).    RENVELA 800 mg Tab Take 1 tablet (800 mg total) by mouth 3 (three) times daily with meals.    traMADol (ULTRAM) 50 mg tablet Take 1 tablet (50 mg total) by mouth every 6 (six) hours as needed.    amLODIPine (NORVASC) 10 MG tablet Take 1 tablet (10 mg total) by mouth once daily.    bisacodyl (DULCOLAX) 10 mg Supp Place 1 suppository (10 mg total) rectally daily as needed (Until bowel movement if patient has no bowel movement for 2 days).    cloNIDine (CATAPRES) 0.1 MG tablet Take 1 tablet (0.1 mg total) by mouth 3 (three) times daily.    insulin aspart U-100 (NOVOLOG) 100 unit/mL (3 mL) InPn pen Inject 0-5 Units into the skin before meals and at bedtime as needed (Hyperglycemia).    losartan (COZAAR) 50 MG tablet Take 1 tablet (50 mg total) by mouth once daily.    polyethylene glycol  (GLYCOLAX) 17 gram PwPk Take 17 g by mouth once daily.    ramelteon (ROZEREM) 8 mg tablet Take 1 tablet (8 mg total) by mouth nightly as needed for Insomnia.    senna-docusate 8.6-50 mg (PERICOLACE) 8.6-50 mg per tablet Take 1 tablet by mouth daily as needed for Constipation.       Review of patient's allergies indicates:  No Known Allergies    Past Medical History:   Diagnosis Date    (HFpEF) heart failure with preserved ejection fraction 5/21/2019    Anemia in ESRD (end-stage renal disease) 5/29/2016    Aneurysm of arteriovenous dialysis fistula     Aortic atherosclerosis 11/22/2016    Bilateral low back pain without sciatica 11/17/2015    Blindness of right eye 11/12/2016    Brain compression 6/22/2019    Cataract     Central retinal vein occlusion, right eye 6/3/2014    Chronic diastolic heart failure 1/8/2016    Chronic respiratory failure with hypoxia 5/29/2016    Coronary artery disease involving native coronary artery of native heart without angina pectoris 12/12/2016    Diverticulosis     Encounter for blood transfusion     Enlarged LA (left atrium) 10/7/2016    Epiretinal membrane 7/17/2012    ESRD on hemodialysis     MWF    History of GI diverticular bleed     5/22/16    Hyperparathyroidism, secondary renal 10/14/2016    Left spastic hemiparesis 11/13/2016    Moderate single current episode of major depressive disorder 2/8/2019    Non-rheumatic tricuspid valve insufficiency 1/15/2017    Peripheral vascular disease, unspecified 11/22/2016    Physical debility 11/17/2016    Pleural effusion on right     Pulmonary emphysema 1/15/2017    Pulmonary hypertension 6/28/2019    Renovascular hypertension 1/8/2016    Seizure 6/24/2019    Severe aortic valve stenosis 2/4/2016    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 11/13/2016    s/p thrombectomy of MCA    Subdural hematoma 05/21/2019    Bilateral R>L    Thrombocytopenia, unspecified 1/15/2017    Type 2 diabetes  mellitus with chronic kidney disease on chronic dialysis, without long-term current use of insulin 5/1/2018    Type 2 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, without long-term current use of insulin 3/26/2013    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, without long-term current use of insulin 3/26/2013    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     Past Surgical History:   Procedure Laterality Date    ABDOMINAL SURGERY      BREAST SURGERY      tumor removal x 2    CARDIAC SURGERY  2016    Implantable loop recorder placed    CATARACT EXTRACTION      CEREBRAL ANGIOGRAM N/A 6/24/2019    Procedure: ANGIOGRAM-CEREBRAL;  Surgeon: Kenisha Surgeon;  Location: [facility];  Service: Anesthesiology;  Laterality: N/A;    CHOLECYSTECTOMY      COLONOSCOPY N/A 5/23/2016    Procedure: COLONOSCOPY;  Surgeon: WILLIAM Colvin MD;  Location: [facility] ENDO (2ND FLR);  Service: Endoscopy;  Laterality: N/A;    COLONOSCOPY N/A 5/30/2016    Procedure: COLONOSCOPY;  Surgeon: Sam Davis MD;  Location: [facility] ENDO (2ND FLR);  Service: Endoscopy;  Laterality: N/A;    CRANIOTOMY FOR EVACUATION OF SUBDURAL HEMATOMA Right 6/23/2019    Procedure: KATE HOLES FOR SUBDURAL HEMATOMA EVACUATION;  Surgeon: Trevor Conner MD;  [facility];  Service: Neurosurgery;  Laterality: Right;    EYE SURGERY      FISTULOGRAM Left 11/28/2018    Procedure: Fistulogram;  Surgeon: NADINE Blum III, MD;  Location: [facility] CATH LAB;  Service: Cardiology;  Laterality: Left;    INTRAMEDULLARY RODDING OF FEMUR Left 10/28/2019    Procedure: INSERTION, INTRAMEDULLARY NELIDA, FEMUR,  Left , synthes, hana table, large C arm clock side;  Surgeon: Torey Aguilar MD;  [facility];  Service: Orthopedics;  Laterality: Left;  general        PLACEMENT OF DUAL-LUMEN VASCULAR CATHETER Right 11/29/2018    Procedure: INSERTION, CATHETER,  VASCULAR, DUAL LUMEN;  Surgeon: NADINE Blum III, MD;  Location: [facility] OR 2ND FLR;  Service: Peripheral Vascular;  Laterality: Right;  Permacatheter placement     RESECTION OF ANEURYSM Left 11/29/2018    Procedure: EXCISION, ANEURYSM;  Surgeon: NADINE Blum III, MD;  Location: [facility] OR 2ND FLR;  Service: Peripheral Vascular;  Laterality: Left;  Excision, L AVF aneurysm    REVISION OF ARTERIOVENOUS FISTULA Left 11/29/2018    Procedure: REVISION, AV FISTULA,;  Surgeon: NADINE Blum III, MD;  Location: [facility] OR 2ND FLR;  Service: Peripheral Vascular;  Laterality: Left;  OR 11    UPPER GASTROINTESTINAL ENDOSCOPY       Family History     Problem Relation (Age of Onset)    Cancer Sister    Cataracts Mother    Esophageal cancer Sister    Glaucoma Brother, Maternal Aunt    Heart disease Brother    Heart failure Sister    Hypertension Mother, Sister, Brother, Father    No Known Problems Maternal Uncle, Paternal Aunt, Paternal Uncle, Maternal Grandmother, Maternal Grandfather, Paternal Grandmother, Paternal Grandfather    Stroke Mother        Tobacco Use    Smoking status: Never Smoker    Smokeless tobacco: Never Used   Substance and Sexual Activity    Alcohol use: No     Comment: Reports occasional 1-2 drinks     Drug use: No    Sexual activity: Not Currently     Review of Systems   Constitutional: Negative for activity change and fatigue.   Respiratory: Positive for cough, chest tightness and shortness of breath.    Cardiovascular: Negative for chest pain, palpitations and leg swelling.   Gastrointestinal: Negative for abdominal pain, nausea and vomiting.   Endocrine: Negative for polydipsia, polyphagia and polyuria.   Genitourinary: Negative for dysuria.   Musculoskeletal: Negative for gait problem.   Skin: Negative for rash.   Allergic/Immunologic: Negative for immunocompromised state.   Neurological: Negative for dizziness, syncope and weakness.   Hematological: Does not bruise/bleed easily.    Psychiatric/Behavioral: Negative for behavioral problems.     Objective:     Vital Signs (Most Recent):  Temp: 97.3 °F (36.3 °C) (09/18/20 1103)  Pulse: 80 (09/18/20 1103)  Resp: (!) 32 (09/18/20 1103)  BP: (!) 120/58 (09/18/20 1103)  SpO2: 97 % (09/18/20 1103) Vital Signs (24h Range):  Temp:  [96.3 °F (35.7 °C)-97.6 °F (36.4 °C)] 97.3 °F (36.3 °C)  Pulse:  [73-84] 80  Resp:  [16-32] 32  SpO2:  [94 %-99 %] 97 %  BP: (120-178)/(58-72) 120/58     Weight: 38.6 kg (85 lb)  Body mass index is 15.06 kg/m².    SpO2: 97 %  O2 Device (Oxygen Therapy): nasal cannula     Intake/Output - Last 3 Shifts       09/16 0700 - 09/17 0659 09/17 0700 - 09/18 0659 09/18 0700 - 09/19 0659    P.O. 120 400 60    Other  600     Total Intake(mL/kg) 120 (3.1) 1000 (25.9) 60 (1.6)    Other  2600     Total Output  2600     Net +120 -1600 +60           Urine Occurrence 2 x      Stool Occurrence 0 x      Emesis Occurrence 0 x             Lines/Drains/Airways     Drain                 Hemodialysis AV Fistula Left upper arm -- days         Hemodialysis AV Fistula Left upper arm -- days          Peripheral Intravenous Line                 Peripheral IV - Single Lumen 09/16/20 20 G Right Antecubital 2 days                 STS Risk Score: 5%    Physical Exam  Constitutional:       Appearance: She is cachectic.   HENT:      Head: Normocephalic.   Eyes:      Extraocular Movements: Extraocular movements intact.   Neck:      Musculoskeletal: Normal range of motion.   Cardiovascular:      Rate and Rhythm: Normal rate and regular rhythm.      Heart sounds: Normal heart sounds.   Pulmonary:      Effort: Pulmonary effort is normal.      Breath sounds: Normal breath sounds.   Abdominal:      Palpations: Abdomen is soft.   Musculoskeletal: Normal range of motion.   Skin:     General: Skin is warm and dry.      Capillary Refill: Capillary refill takes 2 to 3 seconds.   Neurological:      Mental Status: She is alert and oriented to person, place, and time.    Psychiatric:         Attention and Perception: She is inattentive.         Significant Labs:  BMP:   Recent Labs   Lab 09/18/20  0541   *      K 3.8   CL 99   CO2 24   BUN 34*   CREATININE 2.7*   CALCIUM 9.2   MG 2.1     CBC:   Recent Labs   Lab 09/18/20  0541   WBC 7.96   RBC 3.30*   HGB 9.3*   HCT 31.5*      MCV 96   MCH 28.2   MCHC 29.5*       Significant Diagnostics:  ECHO:   · Moderately decreased left ventricular systolic function. The estimated ejection fraction is 25-30%. Global hypokinetic wall motion.  · Grade II (moderate) left ventricular diastolic dysfunction consistent with pseudonormalization.  · Severe/critical aortic valve stenosis. Aortic valve area is 0.40 cm2; peak velocity is 5.03 m/s; mean gradient is 60 mmHg.  · Mild-to-moderate aortic regurgitation.  · Mild mitral regurgitation.  · Severe right ventricular enlargement. Mildly to moderately reduced right ventricular systolic function.  · Mild to moderate tricuspid regurgitation.  · Severe left atrial enlargement.  · There is a large left pleural effusion.  · The estimated PA systolic pressure is 82 mmHg.  · Elevated central venous pressure (15 mmHg).  · Pulmonary hypertension present.

## 2020-09-18 NOTE — PLAN OF CARE
[facility]     Department of Hospital Medicine     [address] [place] [address]     [phone] after hours  [phone] fax       NURSING HOME ORDERS    09/18/2020    Admit to Nursing Home:  Regular Bed         Diagnoses:  Active Hospital Problems    Diagnosis  POA    *Chest pain [R07.9]  Yes     Priority: 1 - High    Severe aortic valve stenosis [I35.0]  Yes     Priority: 2     Pulmonary emphysema [J43.9]  Yes     Priority: 3     Renovascular hypertension [I15.0]  Yes     Priority: 4     CAD (coronary artery disease) [I25.10]  Yes    H/O complications due to general anesthesia [Z91.89]  Not Applicable    Chronic kidney disease-mineral and bone disorder [N18.9, E83.9, M89.9]  Yes    Chronic respiratory failure with hypoxia [J96.11]  Yes    (HFpEF) heart failure with preserved ejection fraction [I50.30]  Yes    Type 2 diabetes mellitus with chronic kidney disease on chronic dialysis, without long-term current use of insulin [E11.22, N18.6, Z99.2]  Not Applicable    Physical debility [R53.81]  Yes    Anemia in ESRD (end-stage renal disease) [N18.6, D63.1]  Yes     Chronic    ESRD on hemodialysis [N18.6, Z99.2]  Not Applicable      Resolved Hospital Problems   No resolved problems to display.       Patient is homebound due to:  Chest pain    Allergies:Review of patient's allergies indicates:  No Known Allergies    Vitals:       Routine    Diet: Cardiac Renal Diet, 1.5L fluid restriction    Supplement:  1 can every three times a day with meals                         Type:   Nepro     Acitivities:    - Up in a chair each morning as tolerated   - May use self-propelled wheelchair    LABS:  Per facility protocol    Nursing Precautions:    - Aspiration precautions:             - Total assistance with meals            -  Upright 90 degrees befor during and after meals             -  Suction at bedside          - Fall precautions per nursing home  protocol   - Seizure precaution per [facility] protocol   - Decubitus precautions:        -  for positioning   - Pressure reducing foam mattress   - Turn patient every two hours. Use wedge pillows to anchor patient    -Nursing to cleanse L heel with sterile normal saline and apply betadine BID  to affected area to keep it dry and prevent wet gangrene. Leave open to air.      -Nursing to cleanse sacrum with cleansing cloths and apply clear critic-aid moisture barrier ointment (purple top) BID and prn cleaning for moisture management and prevent breakdown.      -Nursing to continue with pressure injury prevention interventions: waffle, heel boots, Q2 turns        Sacrum/buttocks  Upon assessment noted intact, dry peeling skin with no breakdown noted. No odor. No drainage.     CONSULTS:       Nutrition to evaluate and recommend diet    MISCELLANEOUS CARE:       Routine Skin for Bedridden Patients:  Apply moisture barrier cream to all    skin folds and wet areas in perineal area daily and after baths and                           all bowel movements.    DIABETES CARE:      Check blood sugar:       Fingerstick blood sugar AC and HS   Fingerstick blood sugar every 6 hours if unable to eat      Report CBG < 60 or > 400 to physician.                                          Insulin Sliding Scale          Glucose  Novolog Insulin Subcutaneous        0 - 60   Orange juice or glucose tablet, hold insulin      No insulin   201-250  2 units   251-300  4 units   301-350  6 units   351-400  8 units   >400   10 units then call physician      Medications: Discontinue all previous medication orders, if any. See new list below.     Tea Georges   Home Medication Instructions RADHA:[id number]    Printed on:09/18/20 1666   Medication Information                      acetaminophen (TYLENOL) 325 MG tablet  Take 2 tablets (650 mg total) by mouth every 6 (six) hours as needed for Pain.             albuterol  (PROVENTIL/VENTOLIN HFA) 90 mcg/actuation inhaler  Inhale 1-2 puffs into the lungs every 6 (six) hours as needed for Wheezing.             amLODIPine (NORVASC) 10 MG tablet  Take 1 tablet (10 mg total) by mouth once daily.             artificial tears (ISOPTO TEARS) 0.5 % ophthalmic solution  Place 2 drops into both eyes 4 (four) times daily as needed.             aspirin (ECOTRIN) 81 MG EC tablet  Take 81 mg by mouth once daily.             atorvastatin (LIPITOR) 20 MG tablet  Take 1 tablet (20 mg total) by mouth every evening.             bisacodyl (DULCOLAX) 10 mg Supp  Place 1 suppository (10 mg total) rectally daily as needed (Until bowel movement if patient has no bowel movement for 2 days).             BREO ELLIPTA 200-25 mcg/dose DsDv diskus inhaler  INHALE 1 PUFF INTO THE LUNGS ONCE DAILY.             ergocalciferol (ERGOCALCIFEROL) 50,000 unit Cap  Take 1 capsule (50,000 Units total) by mouth every 7 days.             hydrALAZINE (APRESOLINE) 100 MG tablet  Take 1 tablet (100 mg total) by mouth every 8 (eight) hours.             insulin aspart U-100 (NOVOLOG) 100 unit/mL (3 mL) InPn pen  Inject 0-5 Units into the skin before meals and at bedtime as needed (Hyperglycemia).             levETIRAcetam (KEPPRA) 500 MG Tab  TAKE 1 TABLET (500 MG TOTAL) BY MOUTH 2 (TWO) TIMES A DAY FOR 30 DAYS.             losartan (COZAAR) 50 MG tablet  Take 1 tablet (50 mg total) by mouth once daily.             ondansetron (ZOFRAN-ODT) 8 MG TbDL  Take 1 tablet (8 mg total) by mouth every 6 (six) hours as needed (nausea).             polyethylene glycol (GLYCOLAX) 17 gram PwPk  Take 17 g by mouth once daily.             ramelteon (ROZEREM) 8 mg tablet  Take 1 tablet (8 mg total) by mouth nightly as needed for Insomnia.             RENVELA 800 mg Tab  Take 1 tablet (800 mg total) by mouth 3 (three) times daily with meals.             senna-docusate 8.6-50 mg (PERICOLACE) 8.6-50 mg per tablet  Take 1 tablet by mouth daily as  needed for Constipation.                       _________________________________  Alejandrina Sandoval PA-C  09/18/2020

## 2020-09-18 NOTE — CARE UPDATE
Rapid Response Nurse Chart Check     Chart check completed, abnormal VS noted. Call [phone] for further concerns or assistance.

## 2020-09-18 NOTE — PROGRESS NOTES
HD tx complete, 1L removed in a tx of 2 hours, tolerated well. Blood returned via [facility] AVF, 15g needles removed x2, gauze and tape applied, pressure held to each site for 10 minutes, hemostasis achieved. Report given to primary nurse Dipti. Transferred from unit via hospital bed by transport back to room

## 2020-09-18 NOTE — NURSING
Patient off OBS floor in pt's bed with transport. AAO, VSS, tele box maintained and monitored. Side rails padded and seizure precautions continued.

## 2020-09-18 NOTE — CONSULTS
Wound care consult received from nursing for assessment of bilateral feet and sacrum. Patient is a 79 year old female with PMH of  ESRD on HD (MWF) via LUE fistula, HTN, severe aortic stenosis with moderate to severe pulmonary HTN, pulmonary emphysema with chronic hypoxic respiratory failure on home oxygen 2 liters at night prn, previous right MCA CVA in 2016, Type 2 diabetes with ESRD not on home insulin, and seizures on Keppra who presents to the ED complaining of chest pain while at dialysis.     Assessments and pictures listed below.     Recommendations:   -Nursing to cleanse L heel with sterile normal saline and apply betadine BID  to affected area to keep it dry and prevent wet gangrene. Leave open to air.     -Nursing to cleanse sacrum with cleansing cloths and apply clear critic-aid moisture barrier ointment (purple top) BID and prn cleaning for moisture management and prevent breakdown.     -Nursing to continue with pressure injury prevention interventions: waffle, heel boots, Q2 turns    Nursing and MD team notified with recommendations.   Wound care to continue to follow pt PRN [id number]    Sacrum/buttocks  Upon assessment noted intact, dry peeling skin with no breakdown noted. No odor. No drainage.       L heel: 1.5 x 3 x 0.3 cm   Upon assessment noted present on admit unstageable full thickness ulceration with milky tan slough attached to wound bed and maceration to wound edges. No odor. Minimal yellow drainage. Patient complain of 8/10 pain at the site.
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
## 2020-09-18 NOTE — PROGRESS NOTES
Maintenance HD tx initiated via [facility] AVF, tolerated well, flows good. Dialysis days are MWF. UF goal 1L as tolerated
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
## 2020-09-18 NOTE — PLAN OF CARE
[facility] W/C transportation requested for 6:00 PM. Requested time is not a guarantee of arrival time. Nurse can assess scheduled time by accessing Chart Review--> Intake tab . This allows Nurse to review accurate information of scheduled transportation time. If transportation is delayed outside of that window, on call CM staff should be contacted to assess the transportation delay. On Call Pager: [phone]    Nurse can call report to Mounika at [phone]; Room 100    Nurse has been informed of above.    Patient and family notified of above.     09/18/20 1552   Final Note   Assessment Type Final Discharge Note   Anticipated Discharge Disposition long-term Nu   Right Care Referral Info   Post Acute Recommendation Other   Referral Type long-term NH   Facility Name [facility] [id number]   Post-Acute Status   Post-Acute Authorization Placement   Post-Acute Placement Status Set-up Complete       Loyda Mcgee LMSW  [facility]  [id number]

## 2020-09-18 NOTE — PROGRESS NOTES
[facility] H[facility]IS NOTE     Patient currently on hemodialysis for removal of uremic toxins .     Patient seen and evaluated on hemodialysis, tolerating treatment, see HD flowsheet for vitals and assessments.      No Hypotension, chest pain, shortness of breath, cramping, nausea or vomiting.      Target UF 1 L as tolerated, keep MAP >65.      D. NOELLE Henley, APRN, FNP-C  Department of Nephrology  [facility]  Pager: [phone]

## 2020-09-19 NOTE — NURSING
Received updated report from CASSIDY Calero. Pt completed HD, tolerated well, returned to OBS floor AAO, VSS, tele box maintained and monitored. Blood glucose monitoring continued.
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
## 2020-09-19 NOTE — ASSESSMENT & PLAN NOTE
-Pt. Uncertain of current medication regimen. Reports no longer on clonidine or losartan, but recent med rec from discharge at [facility] shows she is on losartan 50 mg  -BP normal on arrival, Ordered hydralazine 50 mg TID (both 50 and 100 reported) amlodipine, and losartan. Add additional meds as needed

## 2020-09-19 NOTE — HOSPITAL COURSE
Patient admitted to observation for chest pain likely secondary to missed HD. Troponins stable 0.127. Nephrology consulted for HD, 2 sessions completed. Interventional Cardiology consulted given severe AS and elevated troponin. Recommmend patient continue her ongoing outpatient workup for TAVR at [facility]. Patient stable for discharge back to NH.
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
## 2020-09-19 NOTE — ASSESSMENT & PLAN NOTE
"(HFpEF) heart failure with preserved ejection fraction  -Last TTE 5/22, severe aortic stenosis, Aortic valve area is 0.69 cm2, EF 53%, grade II diastolic dysfunction  -Suspected volume overload as above  -Continue to monitor volume status, repeat TTE with EF 15%  -F/U cards recommendations regarding valvular disease. As above, pt. Followed by [facility] cardiology and per pt. Cardiologist at [facility] in 8/2020 note, (see care everywhere) she is curretnly under evaluation for valvular repair, but she is "inoperable to conventional AVR and high risk for TAVR"  - see above  "

## 2020-09-25 ENCOUNTER — HOSPITAL ENCOUNTER (OUTPATIENT)
Facility: HOSPITAL | Age: 80
Discharge: NURSING, REHAB OR LONG-TERM CARE | End: 2020-09-29
Attending: EMERGENCY MEDICINE | Admitting: EMERGENCY MEDICINE
Payer: MEDICARE

## 2020-09-25 DIAGNOSIS — J96.21 ACUTE ON CHRONIC RESPIRATORY FAILURE WITH HYPOXIA: Primary | ICD-10-CM

## 2020-09-25 DIAGNOSIS — N18.6 ESRD (END STAGE RENAL DISEASE): ICD-10-CM

## 2020-09-25 DIAGNOSIS — D64.9 ANEMIA, UNSPECIFIED TYPE: ICD-10-CM

## 2020-09-25 DIAGNOSIS — N18.6 ESRD ON HEMODIALYSIS: ICD-10-CM

## 2020-09-25 DIAGNOSIS — J90 BILATERAL PLEURAL EFFUSION: ICD-10-CM

## 2020-09-25 DIAGNOSIS — R06.02 SHORTNESS OF BREATH: ICD-10-CM

## 2020-09-25 DIAGNOSIS — Z99.2 ESRD ON HEMODIALYSIS: ICD-10-CM

## 2020-09-25 DIAGNOSIS — E87.70 HYPERVOLEMIA: ICD-10-CM

## 2020-09-25 LAB
ALBUMIN SERPL BCP-MCNC: 2.5 G/DL (ref 3.5–5.2)
ALLENS TEST: NORMAL
ALP SERPL-CCNC: 83 U/L (ref 55–135)
ALT SERPL W/O P-5'-P-CCNC: 7 U/L (ref 10–44)
ANION GAP SERPL CALC-SCNC: 11 MMOL/L (ref 8–16)
AST SERPL-CCNC: 10 U/L (ref 10–40)
BASOPHILS # BLD AUTO: 0.05 K/UL (ref 0–0.2)
BASOPHILS NFR BLD: 0.5 % (ref 0–1.9)
BILIRUB SERPL-MCNC: 0.5 MG/DL (ref 0.1–1)
BNP SERPL-MCNC: >4900 PG/ML (ref 0–99)
BUN SERPL-MCNC: 23 MG/DL (ref 8–23)
CALCIUM SERPL-MCNC: 9.2 MG/DL (ref 8.7–10.5)
CHLORIDE SERPL-SCNC: 104 MMOL/L (ref 95–110)
CO2 SERPL-SCNC: 26 MMOL/L (ref 23–29)
CREAT SERPL-MCNC: 1.9 MG/DL (ref 0.5–1.4)
CTP QC/QA: YES
DELSYS: NORMAL
DIFFERENTIAL METHOD: ABNORMAL
EOSINOPHIL # BLD AUTO: 0.2 K/UL (ref 0–0.5)
EOSINOPHIL NFR BLD: 2.1 % (ref 0–8)
ERYTHROCYTE [DISTWIDTH] IN BLOOD BY AUTOMATED COUNT: 19.3 % (ref 11.5–14.5)
ERYTHROCYTE [SEDIMENTATION RATE] IN BLOOD BY WESTERGREN METHOD: 20 MM/H
EST. GFR  (AFRICAN AMERICAN): 28.7 ML/MIN/1.73 M^2
EST. GFR  (NON AFRICAN AMERICAN): 24.9 ML/MIN/1.73 M^2
FIO2: 34
FLOW: 4
GLUCOSE SERPL-MCNC: 122 MG/DL (ref 70–110)
HCO3 UR-SCNC: 25 MMOL/L (ref 24–28)
HCT VFR BLD AUTO: 29.6 % (ref 37–48.5)
HGB BLD-MCNC: 8.6 G/DL (ref 12–16)
IMM GRANULOCYTES # BLD AUTO: 0.07 K/UL (ref 0–0.04)
IMM GRANULOCYTES NFR BLD AUTO: 0.7 % (ref 0–0.5)
LACTATE SERPL-SCNC: 0.7 MMOL/L (ref 0.5–2.2)
LYMPHOCYTES # BLD AUTO: 0.6 K/UL (ref 1–4.8)
LYMPHOCYTES NFR BLD: 5.1 % (ref 18–48)
MCH RBC QN AUTO: 28.5 PG (ref 27–31)
MCHC RBC AUTO-ENTMCNC: 29.1 G/DL (ref 32–36)
MCV RBC AUTO: 98 FL (ref 82–98)
MODE: NORMAL
MONOCYTES # BLD AUTO: 0.6 K/UL (ref 0.3–1)
MONOCYTES NFR BLD: 6 % (ref 4–15)
NEUTROPHILS # BLD AUTO: 9.2 K/UL (ref 1.8–7.7)
NEUTROPHILS NFR BLD: 85.6 % (ref 38–73)
NRBC BLD-RTO: 0 /100 WBC
PCO2 BLDA: 37.4 MMHG (ref 35–45)
PH SMN: 7.43 [PH] (ref 7.35–7.45)
PHOSPHATE SERPL-MCNC: 2 MG/DL (ref 2.7–4.5)
PLATELET # BLD AUTO: 135 K/UL (ref 150–350)
PMV BLD AUTO: 12.6 FL (ref 9.2–12.9)
PO2 BLDA: 80 MMHG (ref 80–100)
POC BE: 1 MMOL/L
POC SATURATED O2: 96 % (ref 95–100)
POC TCO2: 26 MMOL/L (ref 23–27)
POTASSIUM SERPL-SCNC: 3.4 MMOL/L (ref 3.5–5.1)
PROT SERPL-MCNC: 7.3 G/DL (ref 6–8.4)
RBC # BLD AUTO: 3.02 M/UL (ref 4–5.4)
SAMPLE: NORMAL
SARS-COV-2 RDRP RESP QL NAA+PROBE: NEGATIVE
SITE: NORMAL
SODIUM SERPL-SCNC: 141 MMOL/L (ref 136–145)
SP02: 94
TROPONIN I SERPL DL<=0.01 NG/ML-MCNC: 0.37 NG/ML (ref 0–0.03)
WBC # BLD AUTO: 10.68 K/UL (ref 3.9–12.7)

## 2020-09-25 PROCEDURE — 83605 ASSAY OF LACTIC ACID: CPT

## 2020-09-25 PROCEDURE — G0378 HOSPITAL OBSERVATION PER HR: HCPCS

## 2020-09-25 PROCEDURE — U0002 COVID-19 LAB TEST NON-CDC: HCPCS | Performed by: EMERGENCY MEDICINE

## 2020-09-25 PROCEDURE — 83880 ASSAY OF NATRIURETIC PEPTIDE: CPT

## 2020-09-25 PROCEDURE — 85025 COMPLETE CBC W/AUTO DIFF WBC: CPT

## 2020-09-25 PROCEDURE — 84100 ASSAY OF PHOSPHORUS: CPT

## 2020-09-25 PROCEDURE — 84484 ASSAY OF TROPONIN QUANT: CPT

## 2020-09-25 PROCEDURE — 99285 EMERGENCY DEPT VISIT HI MDM: CPT | Mod: ,,, | Performed by: EMERGENCY MEDICINE

## 2020-09-25 PROCEDURE — 80053 COMPREHEN METABOLIC PANEL: CPT

## 2020-09-25 PROCEDURE — 99900035 HC TECH TIME PER 15 MIN (STAT)

## 2020-09-25 PROCEDURE — 36600 WITHDRAWAL OF ARTERIAL BLOOD: CPT

## 2020-09-25 PROCEDURE — 82803 BLOOD GASES ANY COMBINATION: CPT

## 2020-09-25 PROCEDURE — 99285 EMERGENCY DEPT VISIT HI MDM: CPT | Mod: 25

## 2020-09-25 PROCEDURE — 99285 PR EMERGENCY DEPT VISIT,LEVEL V: ICD-10-PCS | Mod: ,,, | Performed by: EMERGENCY MEDICINE

## 2020-09-25 NOTE — ED PROVIDER NOTES
Encounter Date: 9/25/2020       History     Chief Complaint   Patient presents with    Shortness of Breath     [facility] dialysis patient here for shortness of breath.  Labored breathing at 38 per minute per EMS.  Diminished lung sounds bilaterally.  Almost finished dialysis (had 30 minutes left)     HPI   Ms. Georges is a 80 y.o. female with ESRD (iHD MWF), cCHF (25% EF), DM, blindness right eye, pHTN (on 2 L NC at home), h/o SDH w/ evacuation here today with shortness of breath. Reports she was at dialysis today when she began to have shortness of breath. Had only about 30 min left. Is chronically on 2 L NC at home, but was hypoxic and required increased O2 to 3L with EMS. On my evaluation in the room, she is on 2 L with O2 sats 85-88%.  Has chronic cough. Denies fevers, chills, n/v, abd pain, chest pain, leg swelling.     Review of patient's allergies indicates:  No Known Allergies  Past Medical History:   Diagnosis Date    (HFpEF) heart failure with preserved ejection fraction 5/21/2019    Anemia in ESRD (end-stage renal disease) 5/29/2016    Aneurysm of arteriovenous dialysis fistula     Aortic atherosclerosis 11/22/2016    Bilateral low back pain without sciatica 11/17/2015    Blindness of right eye 11/12/2016    Brain compression 6/22/2019    Cataract     Central retinal vein occlusion, right eye 6/3/2014    Chronic diastolic heart failure 1/8/2016    Chronic respiratory failure with hypoxia 5/29/2016    Coronary artery disease involving native coronary artery of native heart without angina pectoris 12/12/2016    Diverticulosis     Encounter for blood transfusion     Enlarged LA (left atrium) 10/7/2016    Epiretinal membrane 7/17/2012    ESRD on hemodialysis     MWF    History of GI diverticular bleed     5/22/16    Hyperparathyroidism, secondary renal 10/14/2016    Left spastic hemiparesis 11/13/2016    Moderate single current episode of major depressive disorder 2/8/2019     Non-rheumatic tricuspid valve insufficiency 1/15/2017    Peripheral vascular disease, unspecified 11/22/2016    Physical debility 11/17/2016    Pleural effusion on right     Pulmonary emphysema 1/15/2017    Pulmonary hypertension 6/28/2019    Renovascular hypertension 1/8/2016    Seizure 6/24/2019    Severe aortic valve stenosis 2/4/2016    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 11/13/2016    s/p thrombectomy of MCA    Subdural hematoma 05/21/2019    Bilateral R>L    Thrombocytopenia, unspecified 1/15/2017    Type 2 diabetes mellitus with chronic kidney disease on chronic dialysis, without long-term current use of insulin 5/1/2018    Type 2 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, without long-term current use of insulin 3/26/2013    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, without long-term current use of insulin 3/26/2013    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     Past Surgical History:   Procedure Laterality Date    ABDOMINAL SURGERY      BREAST SURGERY      tumor removal x 2    CARDIAC SURGERY  2016    Implantable loop recorder placed    CATARACT EXTRACTION      CEREBRAL ANGIOGRAM N/A 6/24/2019    Procedure: ANGIOGRAM-CEREBRAL;  Surgeon: Kenisha Surgeon;  [facility];  Service: Anesthesiology;  Laterality: N/A;    CHOLECYSTECTOMY      COLONOSCOPY N/A 5/23/2016    Procedure: COLONOSCOPY;  Surgeon: WILLIAM Colvin MD;  Location: [facility] ([address] [place] [address]);  Service: Endoscopy;  Laterality: N/A;    COLONOSCOPY N/A 5/30/2016    Procedure: COLONOSCOPY;  Surgeon: Sam Davis MD;  Location: [facility] ENDO (2ND FLR);  Service: Endoscopy;  Laterality: N/A;    CRANIOTOMY FOR EVACUATION OF SUBDURAL HEMATOMA Right 6/23/2019    Procedure: KATE HOLES FOR SUBDURAL HEMATOMA EVACUATION;  Surgeon: Trevor Conner MD;  [facility];  Service: Neurosurgery;  Laterality:  Right;    EYE SURGERY      FISTULOGRAM Left 11/28/2018    Procedure: Fistulogram;  Surgeon: NADINE Blum III, MD;  Location: [facility] CATH LAB;  Service: Cardiology;  Laterality: Left;    INTRAMEDULLARY RODDING OF FEMUR Left 10/28/2019    Procedure: INSERTION, INTRAMEDULLARY NELIDA, FEMUR,  Left , synthes, hana table, large C arm clock side;  Surgeon: Torey Aguilar MD;  Location: [facility] OR [facility] FLR;  Service: Orthopedics;  Laterality: Left;  general        PLACEMENT OF DUAL-LUMEN VASCULAR CATHETER Right 11/29/2018    Procedure: INSERTION, CATHETER, VASCULAR, DUAL LUMEN;  Surgeon: NADINE Blum III, MD;  Location: [facility] OR [facility] FLR;  Service: Peripheral Vascular;  Laterality: Right;  Permacatheter placement     RESECTION OF ANEURYSM Left 11/29/2018    Procedure: EXCISION, ANEURYSM;  Surgeon: NADINE Blum III, MD;  Location: [facility] OR [facility] FLR;  Service: Peripheral Vascular;  Laterality: Left;  Excision, L AVF aneurysm    REVISION OF ARTERIOVENOUS FISTULA Left 11/29/2018    Procedure: REVISION, AV FISTULA,;  Surgeon: NADINE Blum III, MD;  Location: [facility] OR 2ND FLR;  Service: Peripheral Vascular;  Laterality: Left;  OR 11    UPPER GASTROINTESTINAL ENDOSCOPY       Family History   Problem Relation Age of Onset    Cataracts Mother     Stroke Mother     Hypertension Mother     Cancer Sister     Hypertension Sister     Heart failure Sister     Glaucoma Brother     Hypertension Brother     Heart disease Brother     Hypertension Father     Glaucoma Maternal Aunt     Esophageal cancer Sister     No Known Problems Maternal Uncle     No Known Problems Paternal Aunt     No Known Problems Paternal Uncle     No Known Problems Maternal Grandmother     No Known Problems Maternal Grandfather     No Known Problems Paternal Grandmother     No Known Problems Paternal Grandfather     Heart attack Neg Hx     Colon cancer Neg Hx     Stomach cancer Neg Hx     Anemia Neg Hx     Arrhythmia Neg Hx     Asthma  Neg Hx     Clotting disorder Neg Hx     Fainting Neg Hx     Hyperlipidemia Neg Hx     Atrial Septal Defect Neg Hx      Social History     Tobacco Use    Smoking status: Never Smoker    Smokeless tobacco: Never Used   Substance Use Topics    Alcohol use: No     Comment: Reports occasional 1-2 drinks     Drug use: No     Review of Systems   Constitutional: Negative for diaphoresis, fatigue and fever.   HENT: Negative for sore throat.    Respiratory: Positive for cough and shortness of breath. Negative for choking and chest tightness.    Cardiovascular: Negative for chest pain, palpitations and leg swelling.   Gastrointestinal: Negative for abdominal distention, abdominal pain, nausea and vomiting.   Genitourinary: Negative for dysuria.   Musculoskeletal: Negative for back pain.   Skin: Negative for rash.   Neurological: Negative for weakness.   Hematological: Does not bruise/bleed easily.       Physical Exam     Initial Vitals [09/25/20 1644]   BP Pulse Resp Temp SpO2   116/70 84 (!) 30 97.1 °F (36.2 °C) 97 %      MAP       --         Physical Exam    Nursing note and vitals reviewed.  Constitutional: She appears well-developed and well-nourished. She is not diaphoretic. No distress.   HENT:   Head: Normocephalic and atraumatic.   Right Ear: External ear normal.   Left Ear: External ear normal.   Eyes:   Right eye with white out pupil   Neck: Neck supple.   Cardiovascular: Normal rate, regular rhythm, normal heart sounds and intact distal pulses.   Pulmonary/Chest: No respiratory distress. She has no wheezes. She has no rhonchi. She has rales (diffuse).   Decreased breath sounds on bases.   Abdominal: Soft. She exhibits no distension. There is no abdominal tenderness. There is no rebound and no guarding.   Musculoskeletal: No edema.   Neurological: She is alert and oriented to person, place, and time. GCS score is 15. GCS eye subscore is 4. GCS verbal subscore is 5. GCS motor subscore is 6.   Skin: Skin is  warm. Capillary refill takes less than 2 seconds. No rash noted.   LUE fistula in place with palpable thrill   Psychiatric: She has a normal mood and affect.         ED Course   Procedures  Labs Reviewed   CBC W/ AUTO DIFFERENTIAL - Abnormal; Notable for the following components:       Result Value    RBC 3.02 (*)     Hemoglobin 8.6 (*)     Hematocrit 29.6 (*)     Mean Corpuscular Hemoglobin Conc 29.1 (*)     RDW 19.3 (*)     Platelets 135 (*)     Immature Granulocytes 0.7 (*)     Gran # (ANC) 9.2 (*)     Immature Grans (Abs) 0.07 (*)     Lymph # 0.6 (*)     Gran% 85.6 (*)     Lymph% 5.1 (*)     All other components within normal limits   COMPREHENSIVE METABOLIC PANEL - Abnormal; Notable for the following components:    Potassium 3.4 (*)     Glucose 122 (*)     Creatinine 1.9 (*)     Albumin 2.5 (*)     ALT 7 (*)     eGFR if  28.7 (*)     eGFR if non  24.9 (*)     All other components within normal limits   TROPONIN I - Abnormal; Notable for the following components:    Troponin I 0.373 (*)     All other components within normal limits   B-TYPE NATRIURETIC PEPTIDE - Abnormal; Notable for the following components:    BNP >4,900 (*)     All other components within normal limits   PHOSPHORUS - Abnormal; Notable for the following components:    Phosphorus 2.0 (*)     All other components within normal limits   SARS-COV-2 RDRP GENE - Normal   LACTIC ACID, PLASMA   ISTAT PROCEDURE          Imaging Results           X-Ray Chest AP Portable (Final result)  Result time 09/25/20 18:39:52    Final result by Rj Woodard MD (09/25/20 18:39:52)                 Impression:      Cardiomegaly with probable pulmonary edema and bibasilar pleural effusions.  Bibasilar airspace disease also right greater than left.  Infection not excluded.  Recommend follow-up.    This report was flagged in Epic as abnormal.      Electronically signed by: Rj Woodard  Date:    09/25/2020  Time:    18:39              Narrative:    EXAMINATION:  XR CHEST AP PORTABLE    CLINICAL HISTORY:  Shortness of breath    TECHNIQUE:  Single frontal view of the chest was performed.    COMPARISON:  09/16/2020    FINDINGS:  Cardiac size is enlarged with obscuration right adjacent pleural fluid and infiltrate.    Ground-glass changes bilaterally suggesting pulmonary edema.    Aortic atherosclerosis.    Bibasilar pleural effusions with probable associated infiltrate and atelectasis, right greater than left.    Left subclavian vascular stent.    Findings are similar to the prior study.  Recommend follow-up.                                 Medical Decision Making:   History:   Old Medical Records: I decided to obtain old medical records.  Old Records Summarized: other records.       <> Summary of Records: Admitted last week for chest pain  Independently Interpreted Test(s):   I have ordered and independently interpreted X-rays - see summary below.       <> Summary of X-Ray Reading(s): Cardiomegaly with bilateral pleural effusion on my read of CXR.  Clinical Tests:   Lab Tests: Ordered and Reviewed  Radiological Study: Ordered and Reviewed  ED Management:  Vitals normal except hypoxic to 85-88% on home 2 L nasal cannula.  Improves to 93% with 3 L nasal cannula.  Here with acute on chronic shortness of breath.  Did not complete dialysis today but finished most of her session.  Peripherally does not appear hypervolemic, but sounds as such on lung exam.  Will obtain CBC, CMP, phosphorus, troponin, BNP, CXR.    CXR with bilateral pleural effusions worsening and cardiomegaly.  Labs consistent with elevated troponin and significantly elevated BNP.  I do not feel this represents ACS but rather a hypervolemic state possibly CHF discussed patient.  As she does not make urine, she will need more dialysis to improve this situation.    Also has stable anemia.    Discussed with hospital medicine who placed in observation.  Other:   I have discussed this case  with another health care provider.       <> Summary of the Discussion: Hospital medicine                             Clinical Impression:     ICD-10-CM ICD-9-CM   1. Acute on chronic respiratory failure with hypoxia  J96.21 518.84     799.02   2. Shortness of breath  R06.02 786.05   3. Hypervolemia  E87.70 276.69   4. Anemia, unspecified type  D64.9 285.9   5. Bilateral pleural effusion  J90 511.9   6. ESRD (end stage renal disease)  N18.6 585.6                          ED Disposition Condition    Observation                             Chadwick Chan MD  09/25/20 4591

## 2020-09-26 PROBLEM — J96.21 ACUTE ON CHRONIC RESPIRATORY FAILURE WITH HYPOXIA: Status: ACTIVE | Noted: 2019-06-22

## 2020-09-26 PROBLEM — R07.9 CHEST PAIN: Status: RESOLVED | Noted: 2020-09-16 | Resolved: 2020-09-26

## 2020-09-26 PROBLEM — N39.0 COMPLICATED UTI (URINARY TRACT INFECTION): Status: RESOLVED | Noted: 2019-10-27 | Resolved: 2020-09-26

## 2020-09-26 LAB
ANION GAP SERPL CALC-SCNC: 11 MMOL/L (ref 8–16)
BASOPHILS # BLD AUTO: 0.06 K/UL (ref 0–0.2)
BASOPHILS NFR BLD: 0.8 % (ref 0–1.9)
BUN SERPL-MCNC: 27 MG/DL (ref 8–23)
CALCIUM SERPL-MCNC: 9.2 MG/DL (ref 8.7–10.5)
CHLORIDE SERPL-SCNC: 104 MMOL/L (ref 95–110)
CO2 SERPL-SCNC: 26 MMOL/L (ref 23–29)
CREAT SERPL-MCNC: 2.3 MG/DL (ref 0.5–1.4)
DIFFERENTIAL METHOD: ABNORMAL
EOSINOPHIL # BLD AUTO: 0.2 K/UL (ref 0–0.5)
EOSINOPHIL NFR BLD: 2.1 % (ref 0–8)
ERYTHROCYTE [DISTWIDTH] IN BLOOD BY AUTOMATED COUNT: 19 % (ref 11.5–14.5)
EST. GFR  (AFRICAN AMERICAN): 22.8 ML/MIN/1.73 M^2
EST. GFR  (NON AFRICAN AMERICAN): 19.8 ML/MIN/1.73 M^2
GLUCOSE SERPL-MCNC: 133 MG/DL (ref 70–110)
HCT VFR BLD AUTO: 32 % (ref 37–48.5)
HGB BLD-MCNC: 8.8 G/DL (ref 12–16)
IMM GRANULOCYTES # BLD AUTO: 0.06 K/UL (ref 0–0.04)
IMM GRANULOCYTES NFR BLD AUTO: 0.8 % (ref 0–0.5)
LYMPHOCYTES # BLD AUTO: 0.6 K/UL (ref 1–4.8)
LYMPHOCYTES NFR BLD: 7.8 % (ref 18–48)
MAGNESIUM SERPL-MCNC: 2.2 MG/DL (ref 1.6–2.6)
MCH RBC QN AUTO: 27.9 PG (ref 27–31)
MCHC RBC AUTO-ENTMCNC: 27.5 G/DL (ref 32–36)
MCV RBC AUTO: 102 FL (ref 82–98)
MONOCYTES # BLD AUTO: 0.6 K/UL (ref 0.3–1)
MONOCYTES NFR BLD: 7.6 % (ref 4–15)
NEUTROPHILS # BLD AUTO: 6.3 K/UL (ref 1.8–7.7)
NEUTROPHILS NFR BLD: 80.9 % (ref 38–73)
NRBC BLD-RTO: 0 /100 WBC
PHOSPHATE SERPL-MCNC: 2.9 MG/DL (ref 2.7–4.5)
PLATELET # BLD AUTO: 128 K/UL (ref 150–350)
PMV BLD AUTO: 12.8 FL (ref 9.2–12.9)
POTASSIUM SERPL-SCNC: 3.5 MMOL/L (ref 3.5–5.1)
RBC # BLD AUTO: 3.15 M/UL (ref 4–5.4)
SODIUM SERPL-SCNC: 141 MMOL/L (ref 136–145)
WBC # BLD AUTO: 7.72 K/UL (ref 3.9–12.7)

## 2020-09-26 PROCEDURE — 99219 PR INITIAL OBSERVATION CARE,LEVL II: ICD-10-PCS | Mod: ,,, | Performed by: HOSPITALIST

## 2020-09-26 PROCEDURE — 36415 COLL VENOUS BLD VENIPUNCTURE: CPT

## 2020-09-26 PROCEDURE — 99219 PR INITIAL OBSERVATION CARE,LEVL II: CPT | Mod: ,,, | Performed by: HOSPITALIST

## 2020-09-26 PROCEDURE — G0257 UNSCHED DIALYSIS ESRD PT HOS: HCPCS

## 2020-09-26 PROCEDURE — 99900035 HC TECH TIME PER 15 MIN (STAT)

## 2020-09-26 PROCEDURE — 90935 HEMODIALYSIS ONE EVALUATION: CPT

## 2020-09-26 PROCEDURE — 25000003 PHARM REV CODE 250: Performed by: NURSE PRACTITIONER

## 2020-09-26 PROCEDURE — 96374 THER/PROPH/DIAG INJ IV PUSH: CPT

## 2020-09-26 PROCEDURE — 83735 ASSAY OF MAGNESIUM: CPT

## 2020-09-26 PROCEDURE — 25000003 PHARM REV CODE 250: Performed by: HOSPITALIST

## 2020-09-26 PROCEDURE — 63600175 PHARM REV CODE 636 W HCPCS: Performed by: HOSPITALIST

## 2020-09-26 PROCEDURE — 96361 HYDRATE IV INFUSION ADD-ON: CPT

## 2020-09-26 PROCEDURE — 99214 OFFICE O/P EST MOD 30 MIN: CPT | Mod: ,,, | Performed by: NURSE PRACTITIONER

## 2020-09-26 PROCEDURE — G0378 HOSPITAL OBSERVATION PER HR: HCPCS

## 2020-09-26 PROCEDURE — 80048 BASIC METABOLIC PNL TOTAL CA: CPT

## 2020-09-26 PROCEDURE — 96372 THER/PROPH/DIAG INJ SC/IM: CPT | Mod: 59

## 2020-09-26 PROCEDURE — 85025 COMPLETE CBC W/AUTO DIFF WBC: CPT

## 2020-09-26 PROCEDURE — 63600175 PHARM REV CODE 636 W HCPCS: Performed by: NURSE PRACTITIONER

## 2020-09-26 PROCEDURE — 99214 PR OFFICE/OUTPT VISIT, EST, LEVL IV, 30-39 MIN: ICD-10-PCS | Mod: ,,, | Performed by: NURSE PRACTITIONER

## 2020-09-26 PROCEDURE — 84100 ASSAY OF PHOSPHORUS: CPT

## 2020-09-26 RX ORDER — CLONIDINE HYDROCHLORIDE 0.1 MG/1
0.1 TABLET ORAL 3 TIMES DAILY
Status: DISCONTINUED | OUTPATIENT
Start: 2020-09-26 | End: 2020-09-29 | Stop reason: HOSPADM

## 2020-09-26 RX ORDER — SODIUM CHLORIDE 9 MG/ML
INJECTION, SOLUTION INTRAVENOUS ONCE
Status: COMPLETED | OUTPATIENT
Start: 2020-09-26 | End: 2020-09-27

## 2020-09-26 RX ORDER — HYDRALAZINE HYDROCHLORIDE 50 MG/1
100 TABLET, FILM COATED ORAL EVERY 8 HOURS
Status: DISCONTINUED | OUTPATIENT
Start: 2020-09-26 | End: 2020-09-29 | Stop reason: HOSPADM

## 2020-09-26 RX ORDER — IBUPROFEN 200 MG
16 TABLET ORAL
Status: DISCONTINUED | OUTPATIENT
Start: 2020-09-26 | End: 2020-09-29 | Stop reason: HOSPADM

## 2020-09-26 RX ORDER — SODIUM CHLORIDE 0.9 % (FLUSH) 0.9 %
10 SYRINGE (ML) INJECTION
Status: DISCONTINUED | OUTPATIENT
Start: 2020-09-26 | End: 2020-09-29 | Stop reason: HOSPADM

## 2020-09-26 RX ORDER — ATORVASTATIN CALCIUM 20 MG/1
20 TABLET, FILM COATED ORAL NIGHTLY
Status: DISCONTINUED | OUTPATIENT
Start: 2020-09-26 | End: 2020-09-29 | Stop reason: HOSPADM

## 2020-09-26 RX ORDER — POLYETHYLENE GLYCOL 3350 17 G/17G
17 POWDER, FOR SOLUTION ORAL DAILY
Status: DISCONTINUED | OUTPATIENT
Start: 2020-09-26 | End: 2020-09-29 | Stop reason: HOSPADM

## 2020-09-26 RX ORDER — AMLODIPINE BESYLATE 10 MG/1
10 TABLET ORAL DAILY
Status: DISCONTINUED | OUTPATIENT
Start: 2020-09-26 | End: 2020-09-27

## 2020-09-26 RX ORDER — LOSARTAN POTASSIUM 50 MG/1
50 TABLET ORAL DAILY
Status: DISCONTINUED | OUTPATIENT
Start: 2020-09-26 | End: 2020-09-29 | Stop reason: HOSPADM

## 2020-09-26 RX ORDER — ASPIRIN 81 MG/1
81 TABLET ORAL DAILY
Status: DISCONTINUED | OUTPATIENT
Start: 2020-09-26 | End: 2020-09-29 | Stop reason: HOSPADM

## 2020-09-26 RX ORDER — ACETAMINOPHEN 325 MG/1
650 TABLET ORAL EVERY 8 HOURS PRN
Status: DISCONTINUED | OUTPATIENT
Start: 2020-09-26 | End: 2020-09-29 | Stop reason: HOSPADM

## 2020-09-26 RX ORDER — LIDOCAINE AND PRILOCAINE 25; 25 MG/G; MG/G
CREAM TOPICAL
Status: DISCONTINUED | OUTPATIENT
Start: 2020-09-26 | End: 2020-09-29 | Stop reason: HOSPADM

## 2020-09-26 RX ORDER — ONDANSETRON 2 MG/ML
4 INJECTION INTRAMUSCULAR; INTRAVENOUS EVERY 6 HOURS PRN
Status: DISCONTINUED | OUTPATIENT
Start: 2020-09-26 | End: 2020-09-29 | Stop reason: HOSPADM

## 2020-09-26 RX ORDER — HEPARIN SODIUM 5000 [USP'U]/ML
5000 INJECTION, SOLUTION INTRAVENOUS; SUBCUTANEOUS EVERY 8 HOURS
Status: DISCONTINUED | OUTPATIENT
Start: 2020-09-26 | End: 2020-09-29 | Stop reason: HOSPADM

## 2020-09-26 RX ORDER — IBUPROFEN 200 MG
24 TABLET ORAL
Status: DISCONTINUED | OUTPATIENT
Start: 2020-09-26 | End: 2020-09-29 | Stop reason: HOSPADM

## 2020-09-26 RX ORDER — TALC
6 POWDER (GRAM) TOPICAL NIGHTLY PRN
Status: DISCONTINUED | OUTPATIENT
Start: 2020-09-26 | End: 2020-09-29 | Stop reason: HOSPADM

## 2020-09-26 RX ORDER — HYDRALAZINE HYDROCHLORIDE 100 MG/1
50 TABLET, FILM COATED ORAL EVERY 8 HOURS
Qty: 90 TABLET | Refills: 1 | Status: ON HOLD | OUTPATIENT
Start: 2020-09-26 | End: 2021-06-15 | Stop reason: HOSPADM

## 2020-09-26 RX ORDER — TRAMADOL HYDROCHLORIDE 50 MG/1
50 TABLET ORAL EVERY 8 HOURS PRN
Status: DISCONTINUED | OUTPATIENT
Start: 2020-09-26 | End: 2020-09-29 | Stop reason: HOSPADM

## 2020-09-26 RX ORDER — AMOXICILLIN 250 MG
1 CAPSULE ORAL DAILY PRN
Status: DISCONTINUED | OUTPATIENT
Start: 2020-09-26 | End: 2020-09-29 | Stop reason: HOSPADM

## 2020-09-26 RX ORDER — SEVELAMER CARBONATE 800 MG/1
800 TABLET, FILM COATED ORAL
Status: DISCONTINUED | OUTPATIENT
Start: 2020-09-26 | End: 2020-09-29 | Stop reason: HOSPADM

## 2020-09-26 RX ORDER — BISACODYL 10 MG
10 SUPPOSITORY, RECTAL RECTAL DAILY PRN
Status: DISCONTINUED | OUTPATIENT
Start: 2020-09-26 | End: 2020-09-29 | Stop reason: HOSPADM

## 2020-09-26 RX ORDER — FLUTICASONE FUROATE AND VILANTEROL 200; 25 UG/1; UG/1
1 POWDER RESPIRATORY (INHALATION) DAILY
Status: DISCONTINUED | OUTPATIENT
Start: 2020-09-26 | End: 2020-09-29 | Stop reason: HOSPADM

## 2020-09-26 RX ORDER — ALBUTEROL SULFATE 2.5 MG/.5ML
2.5 SOLUTION RESPIRATORY (INHALATION) EVERY 4 HOURS PRN
Status: DISCONTINUED | OUTPATIENT
Start: 2020-09-26 | End: 2020-09-29 | Stop reason: HOSPADM

## 2020-09-26 RX ORDER — GLUCAGON 1 MG
1 KIT INJECTION
Status: DISCONTINUED | OUTPATIENT
Start: 2020-09-26 | End: 2020-09-29 | Stop reason: HOSPADM

## 2020-09-26 RX ADMIN — HEPARIN SODIUM 5000 UNITS: 5000 INJECTION INTRAVENOUS; SUBCUTANEOUS at 04:09

## 2020-09-26 RX ADMIN — HYDRALAZINE HYDROCHLORIDE 100 MG: 50 TABLET ORAL at 05:09

## 2020-09-26 RX ADMIN — SEVELAMER CARBONATE 800 MG: 800 TABLET, FILM COATED ORAL at 10:09

## 2020-09-26 RX ADMIN — SEVELAMER CARBONATE 800 MG: 800 TABLET, FILM COATED ORAL at 04:09

## 2020-09-26 RX ADMIN — ATORVASTATIN CALCIUM 20 MG: 20 TABLET, FILM COATED ORAL at 09:09

## 2020-09-26 RX ADMIN — SODIUM CHLORIDE: 0.9 INJECTION, SOLUTION INTRAVENOUS at 01:09

## 2020-09-26 RX ADMIN — HEPARIN SODIUM 5000 UNITS: 5000 INJECTION INTRAVENOUS; SUBCUTANEOUS at 05:09

## 2020-09-26 RX ADMIN — ASPIRIN 81 MG: 81 TABLET, COATED ORAL at 09:09

## 2020-09-26 RX ADMIN — HEPARIN SODIUM 5000 UNITS: 5000 INJECTION INTRAVENOUS; SUBCUTANEOUS at 09:09

## 2020-09-26 RX ADMIN — ATORVASTATIN CALCIUM 20 MG: 20 TABLET, FILM COATED ORAL at 04:09

## 2020-09-26 RX ADMIN — ONDANSETRON 4 MG: 2 INJECTION INTRAMUSCULAR; INTRAVENOUS at 04:09

## 2020-09-26 RX ADMIN — SEVELAMER CARBONATE 800 MG: 800 TABLET, FILM COATED ORAL at 12:09

## 2020-09-26 RX ADMIN — LIDOCAINE AND PRILOCAINE: 25; 25 CREAM TOPICAL at 12:09

## 2020-09-26 RX ADMIN — POLYETHYLENE GLYCOL 3350 17 G: 17 POWDER, FOR SOLUTION ORAL at 10:09

## 2020-09-26 NOTE — SUBJECTIVE & OBJECTIVE
Past Medical History:   Diagnosis Date    (HFpEF) heart failure with preserved ejection fraction 5/21/2019    Anemia in ESRD (end-stage renal disease) 5/29/2016    Aneurysm of arteriovenous dialysis fistula     Aortic atherosclerosis 11/22/2016    Bilateral low back pain without sciatica 11/17/2015    Blindness of right eye 11/12/2016    Brain compression 6/22/2019    Cataract     Central retinal vein occlusion, right eye 6/3/2014    Chronic diastolic heart failure 1/8/2016    Chronic respiratory failure with hypoxia 5/29/2016    Coronary artery disease involving native coronary artery of native heart without angina pectoris 12/12/2016    Diverticulosis     Encounter for blood transfusion     Enlarged LA (left atrium) 10/7/2016    Epiretinal membrane 7/17/2012    ESRD on hemodialysis     MWF    History of GI diverticular bleed     5/22/16    Hyperparathyroidism, secondary renal 10/14/2016    Left spastic hemiparesis 11/13/2016    Moderate single current episode of major depressive disorder 2/8/2019    Non-rheumatic tricuspid valve insufficiency 1/15/2017    Peripheral vascular disease, unspecified 11/22/2016    Physical debility 11/17/2016    Pleural effusion on right     Pulmonary emphysema 1/15/2017    Pulmonary hypertension 6/28/2019    Renovascular hypertension 1/8/2016    Seizure 6/24/2019    Severe aortic valve stenosis 2/4/2016    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 11/13/2016    s/p thrombectomy of MCA    Subdural hematoma 05/21/2019    Bilateral R>L    Thrombocytopenia, unspecified 1/15/2017    Type 2 diabetes mellitus with chronic kidney disease on chronic dialysis, without long-term current use of insulin 5/1/2018    Type 2 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, without long-term current use of insulin 3/26/2013    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,  without long-term current use of insulin 3/26/2013    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       Past Surgical History:   Procedure Laterality Date    ABDOMINAL SURGERY      BREAST SURGERY      tumor removal x 2    CARDIAC SURGERY  2016    Implantable loop recorder placed    CATARACT EXTRACTION      CEREBRAL ANGIOGRAM N/A 6/24/2019    Procedure: ANGIOGRAM-CEREBRAL;  Surgeon: Kenisha Surgeon;  Location: [facility];  Service: Anesthesiology;  Laterality: N/A;    CHOLECYSTECTOMY      COLONOSCOPY N/A 5/23/2016    Procedure: COLONOSCOPY;  Surgeon: WILLIAM Colvin MD;  Location: [facility] ENDO (2ND FLR);  Service: Endoscopy;  Laterality: N/A;    COLONOSCOPY N/A 5/30/2016    Procedure: COLONOSCOPY;  Surgeon: Sam Davis MD;  [facility]  Service: Endoscopy;  Laterality: N/A;    CRANIOTOMY FOR EVACUATION OF SUBDURAL HEMATOMA Right 6/23/2019    Procedure: KATE HOLES FOR SUBDURAL HEMATOMA EVACUATION;  Surgeon: Trevor Conner MD;  Location: [address];  Service: Neurosurgery;  Laterality: Right;    EYE SURGERY      FISTULOGRAM Left 11/28/2018    Procedure: Fistulogram;  Surgeon: NADINE Blum III, MD;  Location: [facility] CATH LAB;  Service: Cardiology;  Laterality: Left;    INTRAMEDULLARY RODDING OF FEMUR Left 10/28/2019    Procedure: INSERTION, INTRAMEDULLARY NELIDA, FEMUR,  Left , synthes, hana table, large C arm clock side;  Surgeon: Torey Aguilar MD;  Location: [address];  Service: Orthopedics;  Laterality: Left;  general        PLACEMENT OF DUAL-LUMEN VASCULAR CATHETER Right 11/29/2018    Procedure: INSERTION, CATHETER, VASCULAR, DUAL LUMEN;  Surgeon: NADINE Blum III, MD;  Location: [facility] OR [address] [place] [address];  Service: Peripheral Vascular;  Laterality: Right;  Permacatheter placement     RESECTION OF ANEURYSM Left 11/29/2018    Procedure: EXCISION, ANEURYSM;  Surgeon: NADINE Blum III, MD;  [facility];  Service: Peripheral Vascular;  Laterality: Left;   Excision, L AVF aneurysm    REVISION OF ARTERIOVENOUS FISTULA Left 11/29/2018    Procedure: REVISION, AV FISTULA,;  Surgeon: NADINE Blum III, MD;  Location: [facility] OR [address] [place] [address];  Service: Peripheral Vascular;  Laterality: Left;  OR 11    UPPER GASTROINTESTINAL ENDOSCOPY         Review of patient's allergies indicates:  No Known Allergies  Current Facility-Administered Medications   Medication Frequency    0.9%  NaCl infusion Once    acetaminophen tablet 650 mg Q8H PRN    albuterol sulfate nebulizer solution 2.5 mg Q4H PRN    amLODIPine tablet 10 mg Daily    artificial tears 0.5 % ophthalmic solution 2 drop QID PRN    aspirin EC tablet 81 mg Daily    atorvastatin tablet 20 mg QHS    bisacodyL suppository 10 mg Daily PRN    cloNIDine tablet 0.1 mg TID    dextrose 50% injection 12.5 g PRN    dextrose 50% injection 25 g PRN    fluticasone furoate-vilanteroL 200-25 mcg/dose diskus inhaler 1 puff Daily    glucagon (human recombinant) injection 1 mg PRN    glucose chewable tablet 16 g PRN    glucose chewable tablet 24 g PRN    heparin (porcine) injection 5,000 Units Q8H    hydrALAZINE tablet 100 mg Q8H    lidocaine-prilocaine cream PRN    losartan tablet 50 mg Daily    melatonin tablet 6 mg Nightly PRN    ondansetron injection 4 mg Q6H PRN    polyethylene glycol packet 17 g Daily    senna-docusate 8.6-50 mg per tablet 1 tablet Daily PRN    sevelamer carbonate tablet 800 mg TID WM    sodium chloride 0.9% flush 10 mL PRN    traMADoL tablet 50 mg Q8H PRN     Family History     Problem Relation (Age of Onset)    Cancer Sister    Cataracts Mother    Esophageal cancer Sister    Glaucoma Brother, Maternal Aunt    Heart disease Brother    Heart failure Sister    Hypertension Mother, Sister, Brother, Father    No Known Problems Maternal Uncle, Paternal Aunt, Paternal Uncle, Maternal Grandmother, Maternal Grandfather, Paternal Grandmother, Paternal Grandfather    Stroke Mother        Tobacco Use     Smoking status: Never Smoker    Smokeless tobacco: Never Used   Substance and Sexual Activity    Alcohol use: No     Comment: Reports occasional 1-2 drinks     Drug use: No    Sexual activity: Not Currently     Review of Systems   Constitutional: Positive for fatigue. Negative for activity change.   HENT: Negative for congestion and rhinorrhea.    Eyes: Positive for photophobia. Negative for visual disturbance.   Respiratory: Positive for shortness of breath. Negative for choking.    Cardiovascular: Negative for chest pain and leg swelling.   Gastrointestinal: Negative for abdominal pain, nausea and vomiting.   Genitourinary: Positive for decreased urine volume.   Musculoskeletal: Positive for gait problem. Negative for joint swelling.   Skin: Negative for color change and rash.   Neurological: Positive for weakness. Negative for syncope.   Psychiatric/Behavioral: Positive for decreased concentration. Negative for agitation.     Objective:     Vital Signs (Most Recent):  Temp: 97.6 °F (36.4 °C) (09/26/20 0338)  Pulse: 74 (09/26/20 0922)  Resp: 18 (09/26/20 0922)  BP: (!) 107/53 (09/26/20 0801)  SpO2: (!) 93 % (09/26/20 0922)  O2 Device (Oxygen Therapy): nasal cannula (09/26/20 0922) Vital Signs (24h Range):  Temp:  [97.1 °F (36.2 °C)-97.6 °F (36.4 °C)] 97.6 °F (36.4 °C)  Pulse:  [68-84] 74  Resp:  [18-30] 18  SpO2:  [93 %-100 %] 93 %  BP: (107-145)/(53-78) 107/53     Weight: 41.9 kg (92 lb 6 oz) (09/26/20 0338)  Body mass index is 16.36 kg/m².  Body surface area is 1.36 meters squared.    No intake/output data recorded.    Physical Exam  Vitals signs and nursing note reviewed.   Constitutional:       General: She is not in acute distress.     Appearance: She is cachectic.      Interventions: Nasal cannula in place.   HENT:      Head: Normocephalic and atraumatic.      Mouth/Throat:      Mouth: Mucous membranes are moist.      Pharynx: Oropharynx is clear.   Eyes:      Comments: Blind in R eye   Neck:       Musculoskeletal: Normal range of motion and neck supple. No neck rigidity.   Cardiovascular:      Rate and Rhythm: Normal rate and regular rhythm.      Pulses: Normal pulses.      Heart sounds: No murmur.   Pulmonary:      Effort: Pulmonary effort is normal. No respiratory distress.      Breath sounds: Rales present.   Abdominal:      General: Abdomen is flat. There is no distension.      Tenderness: There is no abdominal tenderness. There is no guarding.   Musculoskeletal:      Right lower leg: No edema.      Left lower leg: No edema.      Comments: Spastic L hemiplegia, L foot bandaged   Skin:     General: Skin is warm and dry.      Findings: No erythema.   Neurological:      Mental Status: She is alert and oriented to person, place, and time.      Motor: Weakness (spastic L hemiplegia) present.   Psychiatric:         Mood and Affect: Mood normal.         Behavior: Behavior normal. Behavior is cooperative.         Significant Labs:  CBC:   Recent Labs   Lab 09/26/20  0411   WBC 7.72   RBC 3.15*   HGB 8.8*   HCT 32.0*   *   *   MCH 27.9   MCHC 27.5*     CMP:   Recent Labs   Lab 09/25/20  1828 09/26/20  0410   * 133*   CALCIUM 9.2 9.2   ALBUMIN 2.5*  --    PROT 7.3  --     141   K 3.4* 3.5   CO2 26 26    104   BUN 23 27*   CREATININE 1.9* 2.3*   ALKPHOS 83  --    ALT 7*  --    AST 10  --    BILITOT 0.5  --      All labs within the past 24 hours have been reviewed.

## 2020-09-26 NOTE — ASSESSMENT & PLAN NOTE
- Renal Function Panel for electrolytes monitoring  - Liberalize diet due to low phos   - 1 L fluid restriction    - Novasource with meals  - CoCa   - Phos 2.9  - Daily renal panel so that phos and albumin is monitored daily.

## 2020-09-26 NOTE — ASSESSMENT & PLAN NOTE
- Hgb today 8.8. Target Hgb 10-11 gm/dL.  - Will review chronic care plan from outpatient dialysis center for SATNAM dosing.

## 2020-09-26 NOTE — SUBJECTIVE & OBJECTIVE
Past Medical History:   Diagnosis Date    (HFpEF) heart failure with preserved ejection fraction 5/21/2019    Anemia in ESRD (end-stage renal disease) 5/29/2016    Aneurysm of arteriovenous dialysis fistula     Aortic atherosclerosis 11/22/2016    Bilateral low back pain without sciatica 11/17/2015    Blindness of right eye 11/12/2016    Brain compression 6/22/2019    Cataract     Central retinal vein occlusion, right eye 6/3/2014    Chronic diastolic heart failure 1/8/2016    Chronic respiratory failure with hypoxia 5/29/2016    Coronary artery disease involving native coronary artery of native heart without angina pectoris 12/12/2016    Diverticulosis     Encounter for blood transfusion     Enlarged LA (left atrium) 10/7/2016    Epiretinal membrane 7/17/2012    ESRD on hemodialysis     MWF    History of GI diverticular bleed     5/22/16    Hyperparathyroidism, secondary renal 10/14/2016    Left spastic hemiparesis 11/13/2016    Moderate single current episode of major depressive disorder 2/8/2019    Non-rheumatic tricuspid valve insufficiency 1/15/2017    Peripheral vascular disease, unspecified 11/22/2016    Physical debility 11/17/2016    Pleural effusion on right     Pulmonary emphysema 1/15/2017    Pulmonary hypertension 6/28/2019    Renovascular hypertension 1/8/2016    Seizure 6/24/2019    Severe aortic valve stenosis 2/4/2016    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 11/13/2016    s/p thrombectomy of MCA    Subdural hematoma 05/21/2019    Bilateral R>L    Thrombocytopenia, unspecified 1/15/2017    Type 2 diabetes mellitus with chronic kidney disease on chronic dialysis, without long-term current use of insulin 5/1/2018    Type 2 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, without long-term current use of insulin 3/26/2013    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,  without long-term current use of insulin 3/26/2013    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       Past Surgical History:   Procedure Laterality Date    ABDOMINAL SURGERY      BREAST SURGERY      tumor removal x 2    CARDIAC SURGERY  2016    Implantable loop recorder placed    CATARACT EXTRACTION      CEREBRAL ANGIOGRAM N/A 6/24/2019    Procedure: ANGIOGRAM-CEREBRAL;  Surgeon: Kenisha Surgeon;  Location: [facility];  Service: Anesthesiology;  Laterality: N/A;    CHOLECYSTECTOMY      COLONOSCOPY N/A 5/23/2016    Procedure: COLONOSCOPY;  Surgeon: WILLIAM Colvin MD;  Location: [facility] ENDO (2ND FLR);  Service: Endoscopy;  Laterality: N/A;    COLONOSCOPY N/A 5/30/2016    Procedure: COLONOSCOPY;  Surgeon: Sam Davis MD;  [facility]  Service: Endoscopy;  Laterality: N/A;    CRANIOTOMY FOR EVACUATION OF SUBDURAL HEMATOMA Right 6/23/2019    Procedure: KATE HOLES FOR SUBDURAL HEMATOMA EVACUATION;  Surgeon: Trevor Conner MD;  Location: [address];  Service: Neurosurgery;  Laterality: Right;    EYE SURGERY      FISTULOGRAM Left 11/28/2018    Procedure: Fistulogram;  Surgeon: NADINE Blum III, MD;  Location: [facility] CATH LAB;  Service: Cardiology;  Laterality: Left;    INTRAMEDULLARY RODDING OF FEMUR Left 10/28/2019    Procedure: INSERTION, INTRAMEDULLARY NELIDA, FEMUR,  Left , synthes, hana table, large C arm clock side;  Surgeon: Torey Aguilar MD;  Location: [address];  Service: Orthopedics;  Laterality: Left;  general        PLACEMENT OF DUAL-LUMEN VASCULAR CATHETER Right 11/29/2018    Procedure: INSERTION, CATHETER, VASCULAR, DUAL LUMEN;  Surgeon: NADINE Blum III, MD;  Location: [facility] OR [address] [place] [address];  Service: Peripheral Vascular;  Laterality: Right;  Permacatheter placement     RESECTION OF ANEURYSM Left 11/29/2018    Procedure: EXCISION, ANEURYSM;  Surgeon: NADINE Blum III, MD;  [facility];  Service: Peripheral Vascular;  Laterality: Left;   Excision, L AVF aneurysm    REVISION OF ARTERIOVENOUS FISTULA Left 11/29/2018    Procedure: REVISION, AV FISTULA,;  Surgeon: NADINE Blum III, MD;  Location: [facility] OR [address] [place] [address];  Service: Peripheral Vascular;  Laterality: Left;  OR 11    UPPER GASTROINTESTINAL ENDOSCOPY         Review of patient's allergies indicates:  No Known Allergies    No current facility-administered medications on file prior to encounter.      Current Outpatient Medications on File Prior to Encounter   Medication Sig    acetaminophen (TYLENOL) 325 MG tablet Take 2 tablets (650 mg total) by mouth every 6 (six) hours as needed for Pain.    albuterol (PROVENTIL/VENTOLIN HFA) 90 mcg/actuation inhaler Inhale 1-2 puffs into the lungs every 6 (six) hours as needed for Wheezing.    amLODIPine (NORVASC) 10 MG tablet Take 1 tablet (10 mg total) by mouth once daily.    artificial tears (ISOPTO TEARS) 0.5 % ophthalmic solution Place 2 drops into both eyes 4 (four) times daily as needed.    aspirin (ECOTRIN) 81 MG EC tablet Take 81 mg by mouth once daily.    atorvastatin (LIPITOR) 20 MG tablet Take 1 tablet (20 mg total) by mouth every evening.    bisacodyl (DULCOLAX) 10 mg Supp Place 1 suppository (10 mg total) rectally daily as needed (Until bowel movement if patient has no bowel movement for 2 days).    BREO ELLIPTA 200-25 mcg/dose DsDv diskus inhaler INHALE 1 PUFF INTO THE LUNGS ONCE DAILY.    cloNIDine (CATAPRES) 0.1 MG tablet Take 1 tablet (0.1 mg total) by mouth 3 (three) times daily.    ergocalciferol (ERGOCALCIFEROL) 50,000 unit Cap Take 1 capsule (50,000 Units total) by mouth every 7 days.    hydrALAZINE (APRESOLINE) 100 MG tablet Take 1 tablet (100 mg total) by mouth every 8 (eight) hours.    insulin aspart U-100 (NOVOLOG) 100 unit/mL (3 mL) InPn pen Inject 0-5 Units into the skin before meals and at bedtime as needed (Hyperglycemia).    levETIRAcetam (KEPPRA) 500 MG Tab TAKE 1 TABLET (500 MG TOTAL) BY MOUTH 2 (TWO) TIMES A DAY  FOR 30 DAYS.    losartan (COZAAR) 50 MG tablet Take 1 tablet (50 mg total) by mouth once daily.    ondansetron (ZOFRAN-ODT) 8 MG TbDL Take 1 tablet (8 mg total) by mouth every 6 (six) hours as needed (nausea).    polyethylene glycol (GLYCOLAX) 17 gram PwPk Take 17 g by mouth once daily.    ramelteon (ROZEREM) 8 mg tablet Take 1 tablet (8 mg total) by mouth nightly as needed for Insomnia.    RENVELA 800 mg Tab Take 1 tablet (800 mg total) by mouth 3 (three) times daily with meals.    senna-docusate 8.6-50 mg (PERICOLACE) 8.6-50 mg per tablet Take 1 tablet by mouth daily as needed for Constipation.    traMADol (ULTRAM) 50 mg tablet Take 1 tablet (50 mg total) by mouth every 6 (six) hours as needed.     Family History     Problem Relation (Age of Onset)    Cancer Sister    Cataracts Mother    Esophageal cancer Sister    Glaucoma Brother, Maternal Aunt    Heart disease Brother    Heart failure Sister    Hypertension Mother, Sister, Brother, Father    No Known Problems Maternal Uncle, Paternal Aunt, Paternal Uncle, Maternal Grandmother, Maternal Grandfather, Paternal Grandmother, Paternal Grandfather    Stroke Mother        Tobacco Use    Smoking status: Never Smoker    Smokeless tobacco: Never Used   Substance and Sexual Activity    Alcohol use: No     Comment: Reports occasional 1-2 drinks     Drug use: No    Sexual activity: Not Currently     Review of Systems   Constitutional: Positive for fatigue. Negative for activity change.   HENT: Negative for congestion and rhinorrhea.    Eyes: Negative for photophobia and visual disturbance.   Respiratory: Positive for shortness of breath. Negative for choking.    Cardiovascular: Negative for chest pain and leg swelling.   Gastrointestinal: Negative for abdominal pain, nausea and vomiting.   Endocrine: Negative for polydipsia and polyphagia.   Musculoskeletal: Positive for gait problem. Negative for joint swelling.   Skin: Negative for color change and rash.    Neurological: Positive for weakness. Negative for syncope.     Objective:     Vital Signs (Most Recent):  Temp: 97.2 °F (36.2 °C) (09/25/20 2229)  Pulse: 68 (09/25/20 2336)  Resp: (!) 24 (09/25/20 2229)  BP: 132/63 (09/25/20 2229)  SpO2: 97 % (09/26/20 0151) Vital Signs (24h Range):  Temp:  [97.1 °F (36.2 °C)-97.2 °F (36.2 °C)] 97.2 °F (36.2 °C)  Pulse:  [68-84] 68  Resp:  [24-30] 24  SpO2:  [97 %-100 %] 97 %  BP: (116-145)/(60-78) 132/63     Weight: 41.9 kg (92 lb 6 oz)  Body mass index is 16.36 kg/m².    Physical Exam  Vitals signs and nursing note reviewed.   Constitutional:       General: She is not in acute distress.     Appearance: She is cachectic.      Interventions: Nasal cannula in place.   HENT:      Head: Normocephalic and atraumatic.      Mouth/Throat:      Mouth: Mucous membranes are moist.      Pharynx: Oropharynx is clear.   Eyes:      Comments: Blind in R eye   Neck:      Musculoskeletal: Normal range of motion and neck supple. No neck rigidity.   Cardiovascular:      Rate and Rhythm: Normal rate and regular rhythm.      Pulses: Normal pulses.      Heart sounds: No murmur. No gallop.    Pulmonary:      Effort: Pulmonary effort is normal.      Breath sounds: Rales present.   Abdominal:      General: Abdomen is flat. There is no distension.      Tenderness: There is no abdominal tenderness. There is no guarding.   Musculoskeletal:      Right lower leg: No edema.      Left lower leg: No edema.      Comments: Spastic L hemiplegia, L foot bandaged   Skin:     General: Skin is warm and dry.      Findings: No erythema.   Neurological:      Mental Status: She is alert and oriented to person, place, and time.      Motor: Weakness (spastic L hemiplegia) present.   Psychiatric:         Behavior: Behavior is cooperative.             Significant Labs:   CBC:   Recent Labs   Lab 09/25/20 1828   WBC 10.68   HGB 8.6*   HCT 29.6*   *     CMP:   Recent Labs   Lab 09/25/20 1828      K 3.4*       CO2 26   *   BUN 23   CREATININE 1.9*   CALCIUM 9.2   PROT 7.3   ALBUMIN 2.5*   BILITOT 0.5   ALKPHOS 83   AST 10   ALT 7*   ANIONGAP 11   EGFRNONAA 24.9*       Significant Imaging: CXR: I have reviewed all pertinent results/findings within the past 24 hours and my personal findings are:  cardiomegaly and bilateral pulmonary edema present

## 2020-09-26 NOTE — PROGRESS NOTES
HD tx complete, 3L removed in a tx of 3 hours of uf only, tolerated well. Blood returned via [place] AVF, 15g needles removed x2, gauze and tape applied, pressure held to each site for 10 minutes, hemostasis achieved. Report given to primary nurse Denise. Transferred from unit via hospital bed by transport back to room

## 2020-09-26 NOTE — ASSESSMENT & PLAN NOTE
Secondary to pulmonary edema likely from missed dialysis.  Nephrology consulted for extra dialysis session in the morning, after which she can likely be discharged.

## 2020-09-26 NOTE — H&P
[facility] Hospital Medicine  History & Physical    Patient Name: Tea Georges  MRN: [id number]  Admission Date: 9/25/2020  Attending Physician: Salvador Solorzano MD   Primary Care Provider: Parth Posadas Ii, MD    [facility] Medicine Team: [facility] HOSP MED  Smooth Bass MD     Patient information was obtained from patient, past medical records and ER records.     Subjective:     Principal Problem:Acute on chronic respiratory failure with hypoxia    Chief Complaint:   Chief Complaint   Patient presents with    Shortness of Breath     [facility] dialysis patient here for shortness of breath.  Labored breathing at 38 per minute per EMS.  Diminished lung sounds bilaterally.  Almost finished dialysis (had 30 minutes left)        HPI: 80-year-old woman brought to the ED for shortness of breath while on dialysis, which resulted in her dialysis session being cut short in order to send her to the emergency department.  She does state that she missed dialysis on Wednesday due to not feeling well, but denies feeling like she was retaining more fluid.  She reports feeling better now.  She has baseline limited mobility due to spastic hemiplegia of the left side due to a prior stroke and spends most of her time in bed or in a wheelchair.    Past Medical History:   Diagnosis Date    (HFpEF) heart failure with preserved ejection fraction 5/21/2019    Anemia in ESRD (end-stage renal disease) 5/29/2016    Aneurysm of arteriovenous dialysis fistula     Aortic atherosclerosis 11/22/2016    Bilateral low back pain without sciatica 11/17/2015    Blindness of right eye 11/12/2016    Brain compression 6/22/2019    Cataract     Central retinal vein occlusion, right eye 6/3/2014    Chronic diastolic heart failure 1/8/2016    Chronic respiratory failure with hypoxia 5/29/2016    Coronary artery disease involving native coronary artery of native heart without angina pectoris 12/12/2016    Diverticulosis      Encounter for blood transfusion     Enlarged LA (left atrium) 10/7/2016    Epiretinal membrane 7/17/2012    ESRD on hemodialysis     MWF    History of GI diverticular bleed     5/22/16    Hyperparathyroidism, secondary renal 10/14/2016    Left spastic hemiparesis 11/13/2016    Moderate single current episode of major depressive disorder 2/8/2019    Non-rheumatic tricuspid valve insufficiency 1/15/2017    Peripheral vascular disease, unspecified 11/22/2016    Physical debility 11/17/2016    Pleural effusion on right     Pulmonary emphysema 1/15/2017    Pulmonary hypertension 6/28/2019    Renovascular hypertension 1/8/2016    Seizure 6/24/2019    Severe aortic valve stenosis 2/4/2016    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 11/13/2016    s/p thrombectomy of MCA    Subdural hematoma 05/21/2019    Bilateral R>L    Thrombocytopenia, unspecified 1/15/2017    Type 2 diabetes mellitus with chronic kidney disease on chronic dialysis, without long-term current use of insulin 5/1/2018    Type 2 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, without long-term current use of insulin 3/26/2013    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, without long-term current use of insulin 3/26/2013    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       Past Surgical History:   Procedure Laterality Date    ABDOMINAL SURGERY      BREAST SURGERY      tumor removal x 2    CARDIAC SURGERY  2016    Implantable loop recorder placed    CATARACT EXTRACTION      CEREBRAL ANGIOGRAM N/A 6/24/2019    Procedure: ANGIOGRAM-CEREBRAL;  Surgeon: Kenisha Surgeon;  Location: [facility];  Service: Anesthesiology;  Laterality: N/A;    CHOLECYSTECTOMY      COLONOSCOPY N/A 5/23/2016    Procedure: COLONOSCOPY;  Surgeon: WILLIAM Colvin MD;  Location: [facility] ([address] [place] [address]);  Service: Endoscopy;  Laterality: N/A;    COLONOSCOPY N/A  5/30/2016    Procedure: COLONOSCOPY;  Surgeon: Sam Davis MD;  Location: [facility] ENDO (2ND FLR);  Service: Endoscopy;  Laterality: N/A;    CRANIOTOMY FOR EVACUATION OF SUBDURAL HEMATOMA Right 6/23/2019    Procedure: KATE HOLES FOR SUBDURAL HEMATOMA EVACUATION;  Surgeon: Trevor Conner MD;  [facility];  Service: Neurosurgery;  Laterality: Right;    EYE SURGERY      FISTULOGRAM Left 11/28/2018    Procedure: Fistulogram;  Surgeon: NADINE Blum III, MD;  Location: [facility] CATH LAB;  Service: Cardiology;  Laterality: Left;    INTRAMEDULLARY RODDING OF FEMUR Left 10/28/2019    Procedure: INSERTION, INTRAMEDULLARY NELIDA, FEMUR,  Left , synthes, hana table, large C arm clock side;  Surgeon: Torey Aguilar MD;  [facility];  Service: Orthopedics;  Laterality: Left;  general        PLACEMENT OF DUAL-LUMEN VASCULAR CATHETER Right 11/29/2018    Procedure: INSERTION, CATHETER, VASCULAR, DUAL LUMEN;  Surgeon: NADINE Blum III, MD;  [facility];  Service: Peripheral Vascular;  Laterality: Right;  Permacatheter placement     RESECTION OF ANEURYSM Left 11/29/2018    Procedure: EXCISION, ANEURYSM;  Surgeon: NADINE Blum III, MD;  [facility];  Service: Peripheral Vascular;  Laterality: Left;  Excision, L AVF aneurysm    REVISION OF ARTERIOVENOUS FISTULA Left 11/29/2018    Procedure: REVISION, AV FISTULA,;  Surgeon: NADINE Blum III, MD;  [facility];  Service: Peripheral Vascular;  Laterality: Left;  OR 11    UPPER GASTROINTESTINAL ENDOSCOPY         Review of patient's allergies indicates:  No Known Allergies    No current facility-administered medications on file prior to encounter.      Current Outpatient Medications on File Prior to Encounter   Medication Sig    acetaminophen (TYLENOL) 325 MG tablet Take 2 tablets (650 mg total) by mouth every 6 (six) hours as needed for Pain.    albuterol (PROVENTIL/VENTOLIN HFA) 90  mcg/actuation inhaler Inhale 1-2 puffs into the lungs every 6 (six) hours as needed for Wheezing.    amLODIPine (NORVASC) 10 MG tablet Take 1 tablet (10 mg total) by mouth once daily.    artificial tears (ISOPTO TEARS) 0.5 % ophthalmic solution Place 2 drops into both eyes 4 (four) times daily as needed.    aspirin (ECOTRIN) 81 MG EC tablet Take 81 mg by mouth once daily.    atorvastatin (LIPITOR) 20 MG tablet Take 1 tablet (20 mg total) by mouth every evening.    bisacodyl (DULCOLAX) 10 mg Supp Place 1 suppository (10 mg total) rectally daily as needed (Until bowel movement if patient has no bowel movement for 2 days).    BREO ELLIPTA 200-25 mcg/dose DsDv diskus inhaler INHALE 1 PUFF INTO THE LUNGS ONCE DAILY.    cloNIDine (CATAPRES) 0.1 MG tablet Take 1 tablet (0.1 mg total) by mouth 3 (three) times daily.    ergocalciferol (ERGOCALCIFEROL) 50,000 unit Cap Take 1 capsule (50,000 Units total) by mouth every 7 days.    hydrALAZINE (APRESOLINE) 100 MG tablet Take 1 tablet (100 mg total) by mouth every 8 (eight) hours.    insulin aspart U-100 (NOVOLOG) 100 unit/mL (3 mL) InPn pen Inject 0-5 Units into the skin before meals and at bedtime as needed (Hyperglycemia).    levETIRAcetam (KEPPRA) 500 MG Tab TAKE 1 TABLET (500 MG TOTAL) BY MOUTH 2 (TWO) TIMES A DAY FOR 30 DAYS.    losartan (COZAAR) 50 MG tablet Take 1 tablet (50 mg total) by mouth once daily.    ondansetron (ZOFRAN-ODT) 8 MG TbDL Take 1 tablet (8 mg total) by mouth every 6 (six) hours as needed (nausea).    polyethylene glycol (GLYCOLAX) 17 gram PwPk Take 17 g by mouth once daily.    ramelteon (ROZEREM) 8 mg tablet Take 1 tablet (8 mg total) by mouth nightly as needed for Insomnia.    RENVELA 800 mg Tab Take 1 tablet (800 mg total) by mouth 3 (three) times daily with meals.    senna-docusate 8.6-50 mg (PERICOLACE) 8.6-50 mg per tablet Take 1 tablet by mouth daily as needed for Constipation.    traMADol (ULTRAM) 50 mg tablet Take 1 tablet (50  mg total) by mouth every 6 (six) hours as needed.     Family History     Problem Relation (Age of Onset)    Cancer Sister    Cataracts Mother    Esophageal cancer Sister    Glaucoma Brother, Maternal Aunt    Heart disease Brother    Heart failure Sister    Hypertension Mother, Sister, Brother, Father    No Known Problems Maternal Uncle, Paternal Aunt, Paternal Uncle, Maternal Grandmother, Maternal Grandfather, Paternal Grandmother, Paternal Grandfather    Stroke Mother        Tobacco Use    Smoking status: Never Smoker    Smokeless tobacco: Never Used   Substance and Sexual Activity    Alcohol use: No     Comment: Reports occasional 1-2 drinks     Drug use: No    Sexual activity: Not Currently     Review of Systems   Constitutional: Positive for fatigue. Negative for activity change.   HENT: Negative for congestion and rhinorrhea.    Eyes: Negative for photophobia and visual disturbance.   Respiratory: Positive for shortness of breath. Negative for choking.    Cardiovascular: Negative for chest pain and leg swelling.   Gastrointestinal: Negative for abdominal pain, nausea and vomiting.   Endocrine: Negative for polydipsia and polyphagia.   Musculoskeletal: Positive for gait problem. Negative for joint swelling.   Skin: Negative for color change and rash.   Neurological: Positive for weakness. Negative for syncope.     Objective:     Vital Signs (Most Recent):  Temp: 97.2 °F (36.2 °C) (09/25/20 2229)  Pulse: 68 (09/25/20 2336)  Resp: (!) 24 (09/25/20 2229)  BP: 132/63 (09/25/20 2229)  SpO2: 97 % (09/26/20 0151) Vital Signs (24h Range):  Temp:  [97.1 °F (36.2 °C)-97.2 °F (36.2 °C)] 97.2 °F (36.2 °C)  Pulse:  [68-84] 68  Resp:  [24-30] 24  SpO2:  [97 %-100 %] 97 %  BP: (116-145)/(60-78) 132/63     Weight: 41.9 kg (92 lb 6 oz)  Body mass index is 16.36 kg/m².    Physical Exam  Vitals signs and nursing note reviewed.   Constitutional:       General: She is not in acute distress.     Appearance: She is cachectic.       Interventions: Nasal cannula in place.   HENT:      Head: Normocephalic and atraumatic.      Mouth/Throat:      Mouth: Mucous membranes are moist.      Pharynx: Oropharynx is clear.   Eyes:      Comments: Blind in R eye   Neck:      Musculoskeletal: Normal range of motion and neck supple. No neck rigidity.   Cardiovascular:      Rate and Rhythm: Normal rate and regular rhythm.      Pulses: Normal pulses.      Heart sounds: No murmur. No gallop.    Pulmonary:      Effort: Pulmonary effort is normal.      Breath sounds: Rales present.   Abdominal:      General: Abdomen is flat. There is no distension.      Tenderness: There is no abdominal tenderness. There is no guarding.   Musculoskeletal:      Right lower leg: No edema.      Left lower leg: No edema.      Comments: Spastic L hemiplegia, L foot bandaged   Skin:     General: Skin is warm and dry.      Findings: No erythema.   Neurological:      Mental Status: She is alert and oriented to person, place, and time.      Motor: Weakness (spastic L hemiplegia) present.   Psychiatric:         Behavior: Behavior is cooperative.             Significant Labs:   CBC:   Recent Labs   Lab 09/25/20  1828   WBC 10.68   HGB 8.6*   HCT 29.6*   *     CMP:   Recent Labs   Lab 09/25/20  1828      K 3.4*      CO2 26   *   BUN 23   CREATININE 1.9*   CALCIUM 9.2   PROT 7.3   ALBUMIN 2.5*   BILITOT 0.5   ALKPHOS 83   AST 10   ALT 7*   ANIONGAP 11   EGFRNONAA 24.9*       Significant Imaging: CXR: I have reviewed all pertinent results/findings within the past 24 hours and my personal findings are:  cardiomegaly and bilateral pulmonary edema present    Assessment/Plan:     * Acute on chronic respiratory failure with hypoxia  Secondary to pulmonary edema likely from missed dialysis.  Nephrology consulted for extra dialysis session in the morning, after which she can likely be discharged.      ESRD on hemodialysis  MWF; likely needs extra session due to missed HD  Wednesday.  Nephrology consulted.  Continue phosphate binder with meals.      Renovascular hypertension  Will continue home regimen of amlodipine, losartan, clonidine, and hydralazine.          VTE Risk Mitigation (From admission, onward)         Ordered     heparin (porcine) injection 5,000 Units  Every 8 hours      09/26/20 0222     IP VTE HIGH RISK PATIENT  Once      09/26/20 0222     Place sequential compression device  Until discontinued      09/26/20 0222                   Smooth Bass MD  Department of Hospital Medicine   [facility]

## 2020-09-26 NOTE — CONSULTS
"[facility]  Nephrology  Consult Note    Patient Name: Tea Georges  MRN: [id number]  Admission Date: 9/25/2020  Hospital Length of Stay: 0 days  Attending Provider: Salvador Solorzano MD   Primary Care Physician: Parth Posadas Ii, MD  Principal Problem:Acute on chronic respiratory failure with hypoxia    Inpatient consult to Nephrology  Consult performed by: Josee Henley DNP, FNP-C  Consult ordered by: Smooth Bass MD  Reason for consult: ESRD Management        Subjective:     HPI: The patient is a 79 yo AAF with a history of ESRD on HD MWF, HTN, severe AS, moderate-severe pHTN, HFpEF, chronic hypoxic respiratory failure 2/2 emphysema (on home O2), CVA, DM2, SDH s/p evacuation 6/2019, seizures, and chronic debility who was recently admitted 9/16-9/19 for chest pain. She presented yesterday with complaints of shortness of breath while on dialysis. She was also noted to be tachypneic. She subsequently cut her HD treatment short and presented to the ED for management and treatment. Per records, she missed her HD treatment on Wednesday. IRWIN revealed a CXR significant for "cardiomegaly with probable pulmonary edema and bibasilar pleural effusions." No acute derangements noted on chemistry this AM. Nephrology consulted for management of ESRD and HD treatment.    Past Medical History:   Diagnosis Date    (HFpEF) heart failure with preserved ejection fraction 5/21/2019    Anemia in ESRD (end-stage renal disease) 5/29/2016    Aneurysm of arteriovenous dialysis fistula     Aortic atherosclerosis 11/22/2016    Bilateral low back pain without sciatica 11/17/2015    Blindness of right eye 11/12/2016    Brain compression 6/22/2019    Cataract     Central retinal vein occlusion, right eye 6/3/2014    Chronic diastolic heart failure 1/8/2016    Chronic respiratory failure with hypoxia 5/29/2016    Coronary artery disease involving native coronary artery of native heart without angina " pectoris 12/12/2016    Diverticulosis     Encounter for blood transfusion     Enlarged LA (left atrium) 10/7/2016    Epiretinal membrane 7/17/2012    ESRD on hemodialysis     MWF    History of GI diverticular bleed     5/22/16    Hyperparathyroidism, secondary renal 10/14/2016    Left spastic hemiparesis 11/13/2016    Moderate single current episode of major depressive disorder 2/8/2019    Non-rheumatic tricuspid valve insufficiency 1/15/2017    Peripheral vascular disease, unspecified 11/22/2016    Physical debility 11/17/2016    Pleural effusion on right     Pulmonary emphysema 1/15/2017    Pulmonary hypertension 6/28/2019    Renovascular hypertension 1/8/2016    Seizure 6/24/2019    Severe aortic valve stenosis 2/4/2016    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 11/13/2016    s/p thrombectomy of MCA    Subdural hematoma 05/21/2019    Bilateral R>L    Thrombocytopenia, unspecified 1/15/2017    Type 2 diabetes mellitus with chronic kidney disease on chronic dialysis, without long-term current use of insulin 5/1/2018    Type 2 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, without long-term current use of insulin 3/26/2013    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, without long-term current use of insulin 3/26/2013    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       Past Surgical History:   Procedure Laterality Date    ABDOMINAL SURGERY      BREAST SURGERY      tumor removal x 2    CARDIAC SURGERY  2016    Implantable loop recorder placed    CATARACT EXTRACTION      CEREBRAL ANGIOGRAM N/A 6/24/2019    Procedure: ANGIOGRAM-CEREBRAL;  Surgeon: Kenisha Surgeon;  Location: [facility];  Service: Anesthesiology;  Laterality: N/A;    CHOLECYSTECTOMY      COLONOSCOPY N/A 5/23/2016    Procedure: COLONOSCOPY;  Surgeon: WILLIAM Colvin MD;  Location: [facility] ([address] [place] [address]);  Service: Endoscopy;   Laterality: N/A;    COLONOSCOPY N/A 5/30/2016    Procedure: COLONOSCOPY;  Surgeon: Sam Davis MD;  Location: [facility] ENDO (2ND FLR);  Service: Endoscopy;  Laterality: N/A;    CRANIOTOMY FOR EVACUATION OF SUBDURAL HEMATOMA Right 6/23/2019    Procedure: KATE HOLES FOR SUBDURAL HEMATOMA EVACUATION;  Surgeon: Trevor Conner MD;  [facility];  Service: Neurosurgery;  Laterality: Right;    EYE SURGERY      FISTULOGRAM Left 11/28/2018    Procedure: Fistulogram;  Surgeon: NADINE Bulm III, MD;  Location: [facility] CATH LAB;  Service: Cardiology;  Laterality: Left;    INTRAMEDULLARY RODDING OF FEMUR Left 10/28/2019    Procedure: INSERTION, INTRAMEDULLARY NELIDA, FEMUR,  Left , synthes, hana table, large C arm clock side;  Surgeon: Torey Aguilar MD;  [facility];  Service: Orthopedics;  Laterality: Left;  general        PLACEMENT OF DUAL-LUMEN VASCULAR CATHETER Right 11/29/2018    Procedure: INSERTION, CATHETER, VASCULAR, DUAL LUMEN;  Surgeon: NADINE Blum III, MD;  [facility];  Service: Peripheral Vascular;  Laterality: Right;  Permacatheter placement     RESECTION OF ANEURYSM Left 11/29/2018    Procedure: EXCISION, ANEURYSM;  Surgeon: NADINE Blum III, MD;  [facility];  Service: Peripheral Vascular;  Laterality: Left;  Excision, L AVF aneurysm    REVISION OF ARTERIOVENOUS FISTULA Left 11/29/2018    Procedure: REVISION, AV FISTULA,;  Surgeon: NADINE Blum III, MD;  [facility];  Service: Peripheral Vascular;  Laterality: Left;  OR 11    UPPER GASTROINTESTINAL ENDOSCOPY         Review of patient's allergies indicates:  No Known Allergies  Current Facility-Administered Medications   Medication Frequency    0.9%  NaCl infusion Once    acetaminophen tablet 650 mg Q8H PRN    albuterol sulfate nebulizer solution 2.5 mg Q4H PRN    amLODIPine tablet 10 mg Daily    artificial tears 0.5 % ophthalmic solution 2 drop QID PRN     aspirin EC tablet 81 mg Daily    atorvastatin tablet 20 mg QHS    bisacodyL suppository 10 mg Daily PRN    cloNIDine tablet 0.1 mg TID    dextrose 50% injection 12.5 g PRN    dextrose 50% injection 25 g PRN    fluticasone furoate-vilanteroL 200-25 mcg/dose diskus inhaler 1 puff Daily    glucagon (human recombinant) injection 1 mg PRN    glucose chewable tablet 16 g PRN    glucose chewable tablet 24 g PRN    heparin (porcine) injection 5,000 Units Q8H    hydrALAZINE tablet 100 mg Q8H    lidocaine-prilocaine cream PRN    losartan tablet 50 mg Daily    melatonin tablet 6 mg Nightly PRN    ondansetron injection 4 mg Q6H PRN    polyethylene glycol packet 17 g Daily    senna-docusate 8.6-50 mg per tablet 1 tablet Daily PRN    sevelamer carbonate tablet 800 mg TID WM    sodium chloride 0.9% flush 10 mL PRN    traMADoL tablet 50 mg Q8H PRN     Family History     Problem Relation (Age of Onset)    Cancer Sister    Cataracts Mother    Esophageal cancer Sister    Glaucoma Brother, Maternal Aunt    Heart disease Brother    Heart failure Sister    Hypertension Mother, Sister, Brother, Father    No Known Problems Maternal Uncle, Paternal Aunt, Paternal Uncle, Maternal Grandmother, Maternal Grandfather, Paternal Grandmother, Paternal Grandfather    Stroke Mother        Tobacco Use    Smoking status: Never Smoker    Smokeless tobacco: Never Used   Substance and Sexual Activity    Alcohol use: No     Comment: Reports occasional 1-2 drinks     Drug use: No    Sexual activity: Not Currently     Review of Systems   Constitutional: Positive for fatigue. Negative for activity change.   HENT: Negative for congestion and rhinorrhea.    Eyes: Positive for photophobia. Negative for visual disturbance.   Respiratory: Positive for shortness of breath. Negative for choking.    Cardiovascular: Negative for chest pain and leg swelling.   Gastrointestinal: Negative for abdominal pain, nausea and vomiting.   Genitourinary:  Positive for decreased urine volume.   Musculoskeletal: Positive for gait problem. Negative for joint swelling.   Skin: Negative for color change and rash.   Neurological: Positive for weakness. Negative for syncope.   Psychiatric/Behavioral: Positive for decreased concentration. Negative for agitation.     Objective:     Vital Signs (Most Recent):  Temp: 97.6 °F (36.4 °C) (09/26/20 0338)  Pulse: 74 (09/26/20 0922)  Resp: 18 (09/26/20 0922)  BP: (!) 107/53 (09/26/20 0801)  SpO2: (!) 93 % (09/26/20 0922)  O2 Device (Oxygen Therapy): nasal cannula (09/26/20 0922) Vital Signs (24h Range):  Temp:  [97.1 °F (36.2 °C)-97.6 °F (36.4 °C)] 97.6 °F (36.4 °C)  Pulse:  [68-84] 74  Resp:  [18-30] 18  SpO2:  [93 %-100 %] 93 %  BP: (107-145)/(53-78) 107/53     Weight: 41.9 kg (92 lb 6 oz) (09/26/20 0338)  Body mass index is 16.36 kg/m².  Body surface area is 1.36 meters squared.    No intake/output data recorded.    Physical Exam  Vitals signs and nursing note reviewed.   Constitutional:       General: She is not in acute distress.     Appearance: She is cachectic.      Interventions: Nasal cannula in place.   HENT:      Head: Normocephalic and atraumatic.      Mouth/Throat:      Mouth: Mucous membranes are moist.      Pharynx: Oropharynx is clear.   Eyes:      Comments: Blind in R eye   Neck:      Musculoskeletal: Normal range of motion and neck supple. No neck rigidity.   Cardiovascular:      Rate and Rhythm: Normal rate and regular rhythm.      Pulses: Normal pulses.      Heart sounds: No murmur.   Pulmonary:      Effort: Pulmonary effort is normal. No respiratory distress.      Breath sounds: Rales present.   Abdominal:      General: Abdomen is flat. There is no distension.      Tenderness: There is no abdominal tenderness. There is no guarding.   Musculoskeletal:      Right lower leg: No edema.      Left lower leg: No edema.      Comments: Spastic L hemiplegia, L foot bandaged   Skin:     General: Skin is warm and dry.       Findings: No erythema.   Neurological:      Mental Status: She is alert and oriented to person, place, and time.      Motor: Weakness (spastic L hemiplegia) present.   Psychiatric:         Mood and Affect: Mood normal.         Behavior: Behavior normal. Behavior is cooperative.         Significant Labs:  CBC:   Recent Labs   Lab 09/26/20  0411   WBC 7.72   RBC 3.15*   HGB 8.8*   HCT 32.0*   *   *   MCH 27.9   MCHC 27.5*     CMP:   Recent Labs   Lab 09/25/20  1828 09/26/20  0410   * 133*   CALCIUM 9.2 9.2   ALBUMIN 2.5*  --    PROT 7.3  --     141   K 3.4* 3.5   CO2 26 26    104   BUN 23 27*   CREATININE 1.9* 2.3*   ALKPHOS 83  --    ALT 7*  --    AST 10  --    BILITOT 0.5  --      All labs within the past 24 hours have been reviewed.      Assessment/Plan:     * Acute on chronic respiratory failure with hypoxia  - management per primary care team   - additional UF only treatment today for volume management      Chronic kidney disease-mineral and bone disorder  - Renal Function Panel for electrolytes monitoring  - Liberalize diet due to low phos   - 1 L fluid restriction    - Novasource with meals  - CoCa   - Phos 2.9  - Daily renal panel so that phos and albumin is monitored daily.       Anemia in ESRD (end-stage renal disease)  - Hgb today 8.8. Target Hgb 10-11 gm/dL.  - Will review chronic care plan from outpatient dialysis center for SATNAM dosing.    ESRD on hemodialysis  The patient is a 81 yo AAF admitted with complaints of shortness of breath during hemodialysis on yesterday which resolved upon arrival to the ED.         Out patient HD Rx:     Modality: iHD  Days: MWF  Access: LUE AVF  Unit: [facility]  Nephrologist: Dr Jared Martinez Duration: 3-4  EDW: 50kg  UF : 2-3        - Will provide dialysis for volume management only today.   - Plans for dc today per records   - Continue to monitor intake and output, daily weights   - Avoid nephrotoxic medication and renal dose medications  to GFR  - Will continue to monitor      Renovascular hypertension  - Goal for BP is <140 mmHg SBP and BDP <90 mmHg, maintain MAP > 65.        Thank you for your consult. I will follow-up with patient. Please contact us if you have any additional questions.    Josee Henley DNP, FNP-C  Nephrology  [facility]

## 2020-09-26 NOTE — PLAN OF CARE
Plan of care reviewed with the patient upon arrival to floor. The patient is alert and oriented. GCS 15. Denying complaints at this time. The patient is on 4lpm O2. Maintaining SPO2. NAEON. Remained free from falls and injuries throughout shift. VSS. Bed in low locked position. Call bell and personal items within reach. Will continue to monitor.

## 2020-09-26 NOTE — HPI
80-year-old woman brought to the ED for shortness of breath while on dialysis, which resulted in her dialysis session being cut short in order to send her to the emergency department.  She does state that she missed dialysis on Wednesday due to not feeling well, but denies feeling like she was retaining more fluid.  She reports feeling better now.  She has baseline limited mobility due to spastic hemiplegia of the left side due to a prior stroke and spends most of her time in bed or in a wheelchair.

## 2020-09-26 NOTE — ASSESSMENT & PLAN NOTE
MWF; likely needs extra session due to missed HD Wednesday.  Nephrology consulted.  Continue phosphate binder with meals.

## 2020-09-26 NOTE — ASSESSMENT & PLAN NOTE
The patient is a 81 yo AAF admitted with complaints of shortness of breath during hemodialysis on yesterday which resolved upon arrival to the ED.         Out patient HD Rx:     Modality: iHD  Days: MWF  Access: LUE AVF  [facility]  Nephrologist: Dr Jared Martinez Duration: 3-4  EDW: 50kg  UF : 2-3        - Will provide dialysis for volume management only today.   - Plans for dc today per records   - Continue to monitor intake and output, daily weights   - Avoid nephrotoxic medication and renal dose medications to GFR  - Will continue to monitor

## 2020-09-26 NOTE — HPI
"The patient is a 81 yo AAF with a history of ESRD on HD MWF, HTN, severe AS, moderate-severe pHTN, HFpEF, chronic hypoxic respiratory failure 2/2 emphysema (on home O2), CVA, DM2, SDH s/p evacuation 6/2019, seizures, and chronic debility who was recently admitted 9/16-9/19 for chest pain. She presented yesterday with complaints of shortness of breath while on dialysis. She was also noted to be tachypneic. She subsequently cut her HD treatment short and presented to the ED for management and treatment. Per records, she missed her HD treatment on Wednesday. IRWIN revealed a CXR significant for "cardiomegaly with probable pulmonary edema and bibasilar pleural effusions." No acute derangements noted on chemistry this AM. Nephrology consulted for management of ESRD and HD treatment.  "

## 2020-09-26 NOTE — PROGRESS NOTES
[facility] NOTE     Patient currently on hemodialysis for removal of uremic toxins .     Patient seen and evaluated on hemodialysis, tolerating treatment, see HD flowsheet for vitals and assessments.      No Hypotension, chest pain, shortness of breath, cramping, nausea or vomiting.      Target UF 3 L as tolerated, keep MAP >65. UF only treatment today for volume removal.     APRIL Henley, NP-C    APRIL Henley DNP, APRN, FNP-C  Department of Nephrology  [facility]  Pager: [phone]

## 2020-09-26 NOTE — NURSING TRANSFER
Nursing Transfer Note      9/26/2020     Transfer [facility] ED to [facility] OBS    Transfer via Stretcher    Transfer with cardiac monitoring    Transported by tech    Medicines sent: N/A    Chart send with patient: In Epic    Notified: MD on call    Patient reassessed at: 9/26/20 22:35    Upon arrival to floor: The patient was found to be alert and oriented. GCS 15. Pt moved to bed via draw sheet. The patient is currently denying complaints. No distress noted. Maintaining O2 sats on 4lpm. VSS. Med team notified. Bed in low locked position. Call bell and personal items within reach. Will continue to monitor.

## 2020-09-27 LAB
ANION GAP SERPL CALC-SCNC: 13 MMOL/L (ref 8–16)
BASOPHILS # BLD AUTO: 0.06 K/UL (ref 0–0.2)
BASOPHILS NFR BLD: 0.7 % (ref 0–1.9)
BUN SERPL-MCNC: 45 MG/DL (ref 8–23)
CALCIUM SERPL-MCNC: 10.3 MG/DL (ref 8.7–10.5)
CHLORIDE SERPL-SCNC: 103 MMOL/L (ref 95–110)
CO2 SERPL-SCNC: 26 MMOL/L (ref 23–29)
CREAT SERPL-MCNC: 3.1 MG/DL (ref 0.5–1.4)
DIFFERENTIAL METHOD: ABNORMAL
EOSINOPHIL # BLD AUTO: 0.2 K/UL (ref 0–0.5)
EOSINOPHIL NFR BLD: 2.3 % (ref 0–8)
ERYTHROCYTE [DISTWIDTH] IN BLOOD BY AUTOMATED COUNT: 19 % (ref 11.5–14.5)
EST. GFR  (AFRICAN AMERICAN): 15.9 ML/MIN/1.73 M^2
EST. GFR  (NON AFRICAN AMERICAN): 13.8 ML/MIN/1.73 M^2
GLUCOSE SERPL-MCNC: 163 MG/DL (ref 70–110)
HCT VFR BLD AUTO: 31.2 % (ref 37–48.5)
HGB BLD-MCNC: 9.1 G/DL (ref 12–16)
IMM GRANULOCYTES # BLD AUTO: 0.04 K/UL (ref 0–0.04)
IMM GRANULOCYTES NFR BLD AUTO: 0.4 % (ref 0–0.5)
LYMPHOCYTES # BLD AUTO: 0.5 K/UL (ref 1–4.8)
LYMPHOCYTES NFR BLD: 5.7 % (ref 18–48)
MAGNESIUM SERPL-MCNC: 2.2 MG/DL (ref 1.6–2.6)
MCH RBC QN AUTO: 28 PG (ref 27–31)
MCHC RBC AUTO-ENTMCNC: 29.2 G/DL (ref 32–36)
MCV RBC AUTO: 96 FL (ref 82–98)
MONOCYTES # BLD AUTO: 0.7 K/UL (ref 0.3–1)
MONOCYTES NFR BLD: 7.2 % (ref 4–15)
NEUTROPHILS # BLD AUTO: 7.5 K/UL (ref 1.8–7.7)
NEUTROPHILS NFR BLD: 83.7 % (ref 38–73)
NRBC BLD-RTO: 0 /100 WBC
PHOSPHATE SERPL-MCNC: 3.8 MG/DL (ref 2.7–4.5)
PLATELET # BLD AUTO: 143 K/UL (ref 150–350)
PMV BLD AUTO: 13.5 FL (ref 9.2–12.9)
POCT GLUCOSE: 140 MG/DL (ref 70–110)
POCT GLUCOSE: 141 MG/DL (ref 70–110)
POCT GLUCOSE: 150 MG/DL (ref 70–110)
POTASSIUM SERPL-SCNC: 3.8 MMOL/L (ref 3.5–5.1)
RBC # BLD AUTO: 3.25 M/UL (ref 4–5.4)
SODIUM SERPL-SCNC: 142 MMOL/L (ref 136–145)
WBC # BLD AUTO: 8.99 K/UL (ref 3.9–12.7)

## 2020-09-27 PROCEDURE — 85025 COMPLETE CBC W/AUTO DIFF WBC: CPT

## 2020-09-27 PROCEDURE — 25000003 PHARM REV CODE 250: Performed by: HOSPITALIST

## 2020-09-27 PROCEDURE — 25000242 PHARM REV CODE 250 ALT 637 W/ HCPCS: Performed by: HOSPITALIST

## 2020-09-27 PROCEDURE — 99225 PR SUBSEQUENT OBSERVATION CARE,LEVEL II: CPT | Mod: ,,, | Performed by: PHYSICIAN ASSISTANT

## 2020-09-27 PROCEDURE — 80048 BASIC METABOLIC PNL TOTAL CA: CPT

## 2020-09-27 PROCEDURE — 36415 COLL VENOUS BLD VENIPUNCTURE: CPT

## 2020-09-27 PROCEDURE — 63600175 PHARM REV CODE 636 W HCPCS: Performed by: HOSPITALIST

## 2020-09-27 PROCEDURE — 94761 N-INVAS EAR/PLS OXIMETRY MLT: CPT

## 2020-09-27 PROCEDURE — 84100 ASSAY OF PHOSPHORUS: CPT

## 2020-09-27 PROCEDURE — 27000221 HC OXYGEN, UP TO 24 HOURS

## 2020-09-27 PROCEDURE — 96361 HYDRATE IV INFUSION ADD-ON: CPT

## 2020-09-27 PROCEDURE — 99900035 HC TECH TIME PER 15 MIN (STAT)

## 2020-09-27 PROCEDURE — 83735 ASSAY OF MAGNESIUM: CPT

## 2020-09-27 PROCEDURE — G0378 HOSPITAL OBSERVATION PER HR: HCPCS

## 2020-09-27 PROCEDURE — 96372 THER/PROPH/DIAG INJ SC/IM: CPT | Mod: 59

## 2020-09-27 PROCEDURE — 99225 PR SUBSEQUENT OBSERVATION CARE,LEVEL II: ICD-10-PCS | Mod: ,,, | Performed by: PHYSICIAN ASSISTANT

## 2020-09-27 RX ORDER — BENZONATATE 100 MG/1
100 CAPSULE ORAL 3 TIMES DAILY PRN
Status: DISCONTINUED | OUTPATIENT
Start: 2020-09-27 | End: 2020-09-29 | Stop reason: HOSPADM

## 2020-09-27 RX ADMIN — CLONIDINE HYDROCHLORIDE 0.1 MG: 0.1 TABLET ORAL at 09:09

## 2020-09-27 RX ADMIN — ATORVASTATIN CALCIUM 20 MG: 20 TABLET, FILM COATED ORAL at 09:09

## 2020-09-27 RX ADMIN — ASPIRIN 81 MG: 81 TABLET, COATED ORAL at 09:09

## 2020-09-27 RX ADMIN — HEPARIN SODIUM 5000 UNITS: 5000 INJECTION INTRAVENOUS; SUBCUTANEOUS at 05:09

## 2020-09-27 RX ADMIN — SEVELAMER CARBONATE 800 MG: 800 TABLET, FILM COATED ORAL at 12:09

## 2020-09-27 RX ADMIN — TRAMADOL HYDROCHLORIDE 50 MG: 50 TABLET, FILM COATED ORAL at 09:09

## 2020-09-27 RX ADMIN — POLYETHYLENE GLYCOL 3350 17 G: 17 POWDER, FOR SOLUTION ORAL at 09:09

## 2020-09-27 RX ADMIN — HYDRALAZINE HYDROCHLORIDE 100 MG: 50 TABLET ORAL at 09:09

## 2020-09-27 RX ADMIN — FLUTICASONE FUROATE AND VILANTEROL TRIFENATATE 1 PUFF: 200; 25 POWDER RESPIRATORY (INHALATION) at 09:09

## 2020-09-27 RX ADMIN — HEPARIN SODIUM 5000 UNITS: 5000 INJECTION INTRAVENOUS; SUBCUTANEOUS at 03:09

## 2020-09-27 RX ADMIN — SEVELAMER CARBONATE 800 MG: 800 TABLET, FILM COATED ORAL at 09:09

## 2020-09-27 RX ADMIN — AMLODIPINE BESYLATE 10 MG: 10 TABLET ORAL at 09:09

## 2020-09-27 RX ADMIN — HEPARIN SODIUM 5000 UNITS: 5000 INJECTION INTRAVENOUS; SUBCUTANEOUS at 09:09

## 2020-09-27 NOTE — PLAN OF CARE
Pt AAOx4 and VSS. Pt is progressing with plan of care. Free of skin breakdown as the pt positioned/repositioned with minimum assistance. Brief on and checked frequently. Clean, dry, and intact dressing noted on L foot. L UA AV fistula present, thrill, and bruit.No complain of pain at this time. Frequent rounds made to assess pain and safety and no complaints at this time noted. Side rails up x 2. Bed locked. Call light within reach. No falls noted. Will continue to monitor.

## 2020-09-27 NOTE — PLAN OF CARE
[facility]     Department of Hospital Medicine     [address] [place] [address]     [phone] after hours  [phone] fax       NURSING HOME ORDERS    09/29/2020    Admit to Nursing Home:  Regular Bed        Diagnoses:  Active Hospital Problems    Diagnosis  POA    *Acute on chronic respiratory failure with hypoxia [J96.21]  Yes    Chronic kidney disease-mineral and bone disorder [N18.9, E83.9, M89.9]  Yes    Anemia in ESRD (end-stage renal disease) [N18.6, D63.1]  Yes     Chronic    ESRD on hemodialysis [N18.6, Z99.2]  Not Applicable    Renovascular hypertension [I15.0]  Yes      Resolved Hospital Problems   No resolved problems to display.       Patient is homebound due to:  Acute on chronic respiratory failure with hypoxia    Allergies:Review of patient's allergies indicates:  No Known Allergies    Vitals:   Per facility protocol    Diet: renal/diabetic diet, 1500mL fluid    Acitivities:      - Up in a chair each morning as tolerated   - Ambulate with assistance to bathroom   - Scheduled walks once each shift (every 8 hours)    - May use walker, cane, or self-propelled wheelchair    LABS:  Per facility protocol    Nursing Precautions:     - Aspiration precautions:             - Total assistance with meals            -  Upright 90 degrees befor during and after meals             -  Suction at bedside          - Fall precautions per nursing home protocol   - Seizure precaution per intermediate protocol   - Decubitus precautions:        -  for positioning   - Pressure reducing foam mattress   - Turn patient every two hours. Use wedge pillows to anchor patient    CONSULTS:        Nutrition to evaluate and recommend diet      MISCELLANEOUS CARE:     Routine Skin for Bedridden Patients:  Apply moisture barrier cream to all    skin folds and wet areas in perineal area daily and after baths and                           all bowel  movements.                    DIABETES CARE:      Check blood sugar:        Fingerstick blood sugar AC and HS      Report CBG < 60 or > 400 to physician.                                          Insulin Sliding Scale          Glucose  Novolog Insulin Subcutaneous        0 - 60   Orange juice or glucose tablet, hold insulin      No insulin   201-250  2 units   251-300  4 units   301-350  6 units   351-400  8 units   >400   10 units then call physician        Medications: Discontinue all previous medication orders, if any. See new list below.     Tea Georges   Home Medication Instructions [facility]:[id number]    Printed on:09/27/20 1207   Medication Information                      acetaminophen (TYLENOL) 325 MG tablet  Take 2 tablets (650 mg total) by mouth every 6 (six) hours as needed for Pain.             albuterol (PROVENTIL/VENTOLIN HFA) 90 mcg/actuation inhaler  Inhale 1-2 puffs into the lungs every 6 (six) hours as needed for Wheezing.             artificial tears (ISOPTO TEARS) 0.5 % ophthalmic solution  Place 2 drops into both eyes 4 (four) times daily as needed.             aspirin (ECOTRIN) 81 MG EC tablet  Take 81 mg by mouth once daily.             atorvastatin (LIPITOR) 20 MG tablet  Take 1 tablet (20 mg total) by mouth every evening.             bisacodyl (DULCOLAX) 10 mg Supp  Place 1 suppository (10 mg total) rectally daily as needed (Until bowel movement if patient has no bowel movement for 2 days).             BREO ELLIPTA 200-25 mcg/dose DsDv diskus inhaler  INHALE 1 PUFF INTO THE LUNGS ONCE DAILY.             cloNIDine (CATAPRES) 0.1 MG tablet  Take 1 tablet (0.1 mg total) by mouth 3 (three) times daily.             ergocalciferol (ERGOCALCIFEROL) 50,000 unit Cap  Take 1 capsule (50,000 Units total) by mouth every 7 days.             hydrALAZINE (APRESOLINE) 100 MG tablet  Take 0.5 tablets (50 mg total) by mouth every 8 (eight) hours.             insulin aspart U-100 (NOVOLOG) 100 unit/mL (3  "mL) InPn pen  Inject 0-5 Units into the skin before meals and at bedtime as needed (Hyperglycemia).  ,"Per sliding scale orders             levETIRAcetam (KEPPRA) 500 MG Tab  TAKE 1 TABLET (500 MG TOTAL) BY MOUTH 2 (TWO) TIMES A DAY FOR 30 DAYS.             losartan (COZAAR) 50 MG tablet  Take 1 tablet (50 mg total) by mouth once daily.             ondansetron (ZOFRAN-ODT) 8 MG TbDL  Take 1 tablet (8 mg total) by mouth every 6 (six) hours as needed (nausea).             polyethylene glycol (GLYCOLAX) 17 gram PwPk  Take 17 g by mouth once daily.             ramelteon (ROZEREM) 8 mg tablet  Take 1 tablet (8 mg total) by mouth nightly as needed for Insomnia.             RENVELA 800 mg Tab  Take 1 tablet (800 mg total) by mouth 3 (three) times daily with meals.             senna-docusate 8.6-50 mg (PERICOLACE) 8.6-50 mg per tablet  Take 1 tablet by mouth daily as needed for Constipation.                                    Luicnda Wolff PA-C  09/27/2020    "

## 2020-09-27 NOTE — SUBJECTIVE & OBJECTIVE
Interval History: pt had 3L removed in HD yesterday, feeling much better. Discharge order placed however patient is a NH resident, Cm/SW reaching out today to see if pt can return. Pt seen at bedside resting in NAD, continues to report imprvement in sxs    Review of Systems   Constitutional: Positive for fatigue. Negative for activity change.   HENT: Negative for congestion.    Eyes: Negative for photophobia and visual disturbance.   Respiratory: Positive for shortness of breath (resolved).    Cardiovascular: Negative for chest pain and leg swelling.   Gastrointestinal: Negative for abdominal pain and nausea.   Musculoskeletal: Positive for gait problem. Negative for joint swelling.   Neurological: Positive for weakness.     Objective:     Vital Signs (Most Recent):  Temp: 97.4 °F (36.3 °C) (09/27/20 1222)  Pulse: 77 (09/27/20 1222)  Resp: 18 (09/27/20 1222)  BP: 128/60 (09/27/20 1222)  SpO2: 99 % (09/27/20 1222) Vital Signs (24h Range):  Temp:  [96 °F (35.6 °C)-98.2 °F (36.8 °C)] 97.4 °F (36.3 °C)  Pulse:  [68-77] 77  Resp:  [16-18] 18  SpO2:  [97 %-100 %] 99 %  BP: (100-129)/(52-66) 128/60     Weight: 41.9 kg (92 lb 6 oz)  Body mass index is 16.36 kg/m².    Intake/Output Summary (Last 24 hours) at 9/27/2020 1358  Last data filed at 9/26/2020 1610  Gross per 24 hour   Intake 600 ml   Output 3600 ml   Net -3000 ml      Physical Exam  Vitals signs and nursing note reviewed.   Constitutional:       General: She is not in acute distress.     Appearance: She is cachectic.      Interventions: Nasal cannula in place.   HENT:      Head: Normocephalic and atraumatic.   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   Cardiovascular:      Rate and Rhythm: Normal rate and regular rhythm.      Pulses: Normal pulses.   Pulmonary:      Effort: Pulmonary effort is normal. No respiratory distress.      Breath sounds: No rales.   Musculoskeletal:      Right lower leg: No edema.      Left  lower leg: No edema.      Comments: Spastic L hemiplegia, L foot bandaged   Skin:     General: Skin is warm and dry.      Findings: No erythema.   Neurological:      Mental Status: She is alert and oriented to person, place, and time.      Motor: Weakness (spastic L hemiplegia) present.   Psychiatric:         Behavior: Behavior is cooperative.         Significant Labs: All pertinent labs within the past 24 hours have been reviewed.    Significant Imaging: I have reviewed all pertinent imaging results/findings within the past 24 hours.

## 2020-09-27 NOTE — ASSESSMENT & PLAN NOTE
Will continue home regimen of amlodipine, losartan, clonidine, and hydralazine.    BP low normal> d/c amlodipine

## 2020-09-27 NOTE — ASSESSMENT & PLAN NOTE
Secondary to pulmonary edema likely from missed dialysis.  Nephrology consulted for extra dialysis session in the morning, after which she can likely be discharged.  Improved significantly after 3L removed in HD  Able to be weaned back to home 2L  Stable for dc once NH accepts pt back

## 2020-09-27 NOTE — PROGRESS NOTES
[facility] Hospital Medicine  Progress Note    Patient Name: Tea Georges  MRN: [id number]  Patient Class: OP- Observation   Admission Date: 9/25/2020  Length of Stay: 0 days  Attending Physician: Salvador Solorzano MD  Primary Care Provider: Parth Posadas Ii, MD    [facility] Medicine Team: [facility]OSP MED F Lucinda Wolff PA-C    Subjective:     Principal Problem:Acute on chronic respiratory failure with hypoxia        HPI:  80-year-old woman brought to the ED for shortness of breath while on dialysis, which resulted in her dialysis session being cut short in order to send her to the emergency department.  She does state that she missed dialysis on Wednesday due to not feeling well, but denies feeling like she was retaining more fluid.  She reports feeling better now.  She has baseline limited mobility due to spastic hemiplegia of the left side due to a prior stroke and spends most of her time in bed or in a wheelchair.    Overview/Hospital Course:  Patient admitted for acute on chronic respiratory failure after missing a HD session. Afebrile without leukocytosis. Requiring 4L NC as opposed to home 2L. BNP almost 5000 and CXR evident of pulm edema. Pt underwent HD and had 3L removed, subsequently feeling better and weaned back down to her home O2 requirement of 2L. D/Cd norvasc as BP has been well controlled.    Stable for dc back to NH with PCP fu    Interval History: pt had 3L removed in HD yesterday, feeling much better. Discharge order placed however patient is a NH resident, Cm/SW reaching out today to see if pt can return. Pt seen at bedside resting in NAD, continues to report imprvement in sxs    Review of Systems   Constitutional: Positive for fatigue. Negative for activity change.   HENT: Negative for congestion.    Eyes: Negative for photophobia and visual disturbance.   Respiratory: Positive for shortness of breath (resolved).    Cardiovascular: Negative for chest pain and leg  swelling.   Gastrointestinal: Negative for abdominal pain and nausea.   Musculoskeletal: Positive for gait problem. Negative for joint swelling.   Neurological: Positive for weakness.     Objective:     Vital Signs (Most Recent):  Temp: 97.4 °F (36.3 °C) (09/27/20 1222)  Pulse: 77 (09/27/20 1222)  Resp: 18 (09/27/20 1222)  BP: 128/60 (09/27/20 1222)  SpO2: 99 % (09/27/20 1222) Vital Signs (24h Range):  Temp:  [96 °F (35.6 °C)-98.2 °F (36.8 °C)] 97.4 °F (36.3 °C)  Pulse:  [68-77] 77  Resp:  [16-18] 18  SpO2:  [97 %-100 %] 99 %  BP: (100-129)/(52-66) 128/60     Weight: 41.9 kg (92 lb 6 oz)  Body mass index is 16.36 kg/m².    Intake/Output Summary (Last 24 hours) at 9/27/2020 1358  Last data filed at 9/26/2020 1610  Gross per 24 hour   Intake 600 ml   Output 3600 ml   Net -3000 ml      Physical Exam  Vitals signs and nursing note reviewed.   Constitutional:       General: She is not in acute distress.     Appearance: She is cachectic.      Interventions: Nasal cannula in place.   HENT:      Head: Normocephalic and atraumatic.   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   Cardiovascular:      Rate and Rhythm: Normal rate and regular rhythm.      Pulses: Normal pulses.   Pulmonary:      Effort: Pulmonary effort is normal. No respiratory distress.      Breath sounds: No rales.   Musculoskeletal:      Right lower leg: No edema.      Left lower leg: No edema.      Comments: Spastic L hemiplegia, L foot bandaged   Skin:     General: Skin is warm and dry.      Findings: No erythema.   Neurological:      Mental Status: She is alert and oriented to person, place, and time.      Motor: Weakness (spastic L hemiplegia) present.   Psychiatric:         Behavior: Behavior is cooperative.         Significant Labs: All pertinent labs within the past 24 hours have been reviewed.    Significant Imaging: I have reviewed all pertinent imaging results/findings within the past 24  hours.      Assessment/Plan:      * Acute on chronic respiratory failure with hypoxia  Secondary to pulmonary edema likely from missed dialysis.  Nephrology consulted for extra dialysis session in the morning, after which she can likely be discharged.  Improved significantly after 3L removed in HD  Able to be weaned back to home 2L  Stable for dc once NH accepts pt back    ESRD on hemodialysis  MWF; likely needs extra session due to missed HD Wednesday.  Nephrology consulted.  Continue phosphate binder with meals.  Dialyzed sat with 3L removed    Renovascular hypertension  Will continue home regimen of amlodipine, losartan, clonidine, and hydralazine.    BP low normal> d/c amlodipine        VTE Risk Mitigation (From admission, onward)         Ordered     heparin (porcine) injection 5,000 Units  Every 8 hours      09/26/20 0222     IP VTE HIGH RISK PATIENT  Once      09/26/20 0222     Place sequential compression device  Until discontinued      09/26/20 0222                Discharge Planning   GORDY: 9/26/2020     Code Status: Full Code   Is the patient medically ready for discharge?:     Reason for patient still in hospital (select all that apply): Pending disposition                     Lucinda Wolff PA-C  Department of Hospital Medicine   [facility]

## 2020-09-27 NOTE — HOSPITAL COURSE
Patient admitted for acute on chronic respiratory failure after missing a HD session. Afebrile without leukocytosis. Requiring 4L NC as opposed to home 2L. BNP almost 5000 and CXR evident of pulm edema. Pt underwent HD and had 3L removed, subsequently feeling better and weaned back down to her home O2 requirement of 2L. D/Cd norvasc as BP has been well controlled.    Stable for dc back to NH with PCP fu   yes

## 2020-09-27 NOTE — ASSESSMENT & PLAN NOTE
MWF; likely needs extra session due to missed HD Wednesday.  Nephrology consulted.  Continue phosphate binder with meals.  Dialyzed sat with 3L removed

## 2020-09-27 NOTE — PLAN OF CARE
Sw informed that Pt is stable to dc and return to [facility] today. Sw to reach out to facility and see if pt can return. PA to place NH orders shortly. Sw left message for ISAURA Santiago at  to see if Pt is able to return to NH today.

## 2020-09-28 LAB
ALBUMIN SERPL BCP-MCNC: 2.5 G/DL (ref 3.5–5.2)
ANION GAP SERPL CALC-SCNC: 14 MMOL/L (ref 8–16)
BASOPHILS # BLD AUTO: 0.07 K/UL (ref 0–0.2)
BASOPHILS NFR BLD: 0.7 % (ref 0–1.9)
BUN SERPL-MCNC: 73 MG/DL (ref 8–23)
CALCIUM SERPL-MCNC: 9.8 MG/DL (ref 8.7–10.5)
CHLORIDE SERPL-SCNC: 99 MMOL/L (ref 95–110)
CO2 SERPL-SCNC: 22 MMOL/L (ref 23–29)
CREAT SERPL-MCNC: 4.3 MG/DL (ref 0.5–1.4)
DIFFERENTIAL METHOD: ABNORMAL
EOSINOPHIL # BLD AUTO: 0.2 K/UL (ref 0–0.5)
EOSINOPHIL NFR BLD: 1.8 % (ref 0–8)
ERYTHROCYTE [DISTWIDTH] IN BLOOD BY AUTOMATED COUNT: 19.2 % (ref 11.5–14.5)
EST. GFR  (AFRICAN AMERICAN): 10.7 ML/MIN/1.73 M^2
EST. GFR  (NON AFRICAN AMERICAN): 9.3 ML/MIN/1.73 M^2
GLUCOSE SERPL-MCNC: 183 MG/DL (ref 70–110)
HCT VFR BLD AUTO: 29.5 % (ref 37–48.5)
HGB BLD-MCNC: 8.5 G/DL (ref 12–16)
IMM GRANULOCYTES # BLD AUTO: 0.08 K/UL (ref 0–0.04)
IMM GRANULOCYTES NFR BLD AUTO: 0.8 % (ref 0–0.5)
LYMPHOCYTES # BLD AUTO: 0.6 K/UL (ref 1–4.8)
LYMPHOCYTES NFR BLD: 6.3 % (ref 18–48)
MCH RBC QN AUTO: 28.1 PG (ref 27–31)
MCHC RBC AUTO-ENTMCNC: 28.8 G/DL (ref 32–36)
MCV RBC AUTO: 98 FL (ref 82–98)
MONOCYTES # BLD AUTO: 0.7 K/UL (ref 0.3–1)
MONOCYTES NFR BLD: 6.7 % (ref 4–15)
NEUTROPHILS # BLD AUTO: 8.4 K/UL (ref 1.8–7.7)
NEUTROPHILS NFR BLD: 83.7 % (ref 38–73)
NRBC BLD-RTO: 0 /100 WBC
PHOSPHATE SERPL-MCNC: 4 MG/DL (ref 2.7–4.5)
PLATELET # BLD AUTO: 141 K/UL (ref 150–350)
PMV BLD AUTO: 13.8 FL (ref 9.2–12.9)
POTASSIUM SERPL-SCNC: 4.6 MMOL/L (ref 3.5–5.1)
RBC # BLD AUTO: 3.02 M/UL (ref 4–5.4)
SARS-COV-2 RNA RESP QL NAA+PROBE: NOT DETECTED
SODIUM SERPL-SCNC: 135 MMOL/L (ref 136–145)
WBC # BLD AUTO: 10.07 K/UL (ref 3.9–12.7)

## 2020-09-28 PROCEDURE — 25000003 PHARM REV CODE 250: Performed by: HOSPITALIST

## 2020-09-28 PROCEDURE — G0378 HOSPITAL OBSERVATION PER HR: HCPCS

## 2020-09-28 PROCEDURE — 36415 COLL VENOUS BLD VENIPUNCTURE: CPT

## 2020-09-28 PROCEDURE — 25000003 PHARM REV CODE 250: Performed by: NURSE PRACTITIONER

## 2020-09-28 PROCEDURE — G0257 UNSCHED DIALYSIS ESRD PT HOS: HCPCS

## 2020-09-28 PROCEDURE — 63600175 PHARM REV CODE 636 W HCPCS: Performed by: HOSPITALIST

## 2020-09-28 PROCEDURE — 96361 HYDRATE IV INFUSION ADD-ON: CPT

## 2020-09-28 PROCEDURE — 96372 THER/PROPH/DIAG INJ SC/IM: CPT | Mod: 59

## 2020-09-28 PROCEDURE — 85025 COMPLETE CBC W/AUTO DIFF WBC: CPT

## 2020-09-28 PROCEDURE — U0003 INFECTIOUS AGENT DETECTION BY NUCLEIC ACID (DNA OR RNA); SEVERE ACUTE RESPIRATORY SYNDROME CORONAVIRUS 2 (SARS-COV-2) (CORONAVIRUS DISEASE [COVID-19]), AMPLIFIED PROBE TECHNIQUE, MAKING USE OF HIGH THROUGHPUT TECHNOLOGIES AS DESCRIBED BY CMS-2020-01-R: HCPCS

## 2020-09-28 PROCEDURE — 80069 RENAL FUNCTION PANEL: CPT

## 2020-09-28 RX ORDER — SODIUM CHLORIDE 9 MG/ML
INJECTION, SOLUTION INTRAVENOUS ONCE
Status: COMPLETED | OUTPATIENT
Start: 2020-09-28 | End: 2020-09-28

## 2020-09-28 RX ADMIN — TRAMADOL HYDROCHLORIDE 50 MG: 50 TABLET, FILM COATED ORAL at 05:09

## 2020-09-28 RX ADMIN — HEPARIN SODIUM 5000 UNITS: 5000 INJECTION INTRAVENOUS; SUBCUTANEOUS at 05:09

## 2020-09-28 RX ADMIN — ASPIRIN 81 MG: 81 TABLET, COATED ORAL at 08:09

## 2020-09-28 RX ADMIN — HEPARIN SODIUM 5000 UNITS: 5000 INJECTION INTRAVENOUS; SUBCUTANEOUS at 09:09

## 2020-09-28 RX ADMIN — LIDOCAINE AND PRILOCAINE: 25; 25 CREAM TOPICAL at 11:09

## 2020-09-28 RX ADMIN — POLYETHYLENE GLYCOL 3350 17 G: 17 POWDER, FOR SOLUTION ORAL at 09:09

## 2020-09-28 RX ADMIN — SODIUM CHLORIDE 350 ML: 0.9 INJECTION, SOLUTION INTRAVENOUS at 02:09

## 2020-09-28 RX ADMIN — ATORVASTATIN CALCIUM 20 MG: 20 TABLET, FILM COATED ORAL at 09:09

## 2020-09-28 RX ADMIN — HYDRALAZINE HYDROCHLORIDE 100 MG: 50 TABLET ORAL at 05:09

## 2020-09-28 NOTE — PLAN OF CARE
Pending Covid screening.       09/28/20 1236   Post-Acute Status   Post-Acute Authorization Placement   Post-Acute Placement Status Pending Required Testing for Post-Acute Clearance       Loyda Mcgee LMSW  [facility]  [id number]

## 2020-09-28 NOTE — PROGRESS NOTES
Dialysis completed. Needles removed from left upper arm fistula with pressure held to sites for 10 minutes with hemostasis achieved. Gauze and tape applied. Patient dialyzed for 3.5 hours with fluid removal of 1.5 liters. Tolerated well with stable vital signs. Patient returned to her room via bed by hospital transport.

## 2020-09-28 NOTE — NURSING
Stable throughout day, though refused most of her meds and lab work. Sent to  approx 130pm. Report given to Torrey for [id number].

## 2020-09-28 NOTE — PLAN OF CARE
CM reviewed Medical records to determine discharge planning assessment.  Pt lives in [facility] and will need transportation home at discharge.  Pt verified PCP and Pharmacy. CM will continue to follow for discharge needs.      09/28/20 1529   Discharge Assessment   Assessment Type Discharge Planning Assessment   Confirmed/corrected address and phone number on facesheet? Yes   Assessment information obtained from? Patient;Caregiver;Medical Record   Expected Length of Stay (days) 1   Communicated expected length of stay with patient/caregiver yes   Prior to hospitilization cognitive status: Alert/Oriented   Prior to hospitalization functional status: Assistive Equipment;Needs Assistance   Current cognitive status: Alert/Oriented   Current Functional Status: Assistive Equipment;Needs Assistance   Facility Arrived From: [facility]   Lives With facility resident   Able to Return to Prior Arrangements yes   Is patient able to care for self after discharge? Yes   Patient's perception of discharge disposition assisted care facility   Patient currently being followed by outpatient case management? No   Patient currently receives any other outside agency services? No   Equipment Currently Used at Home wheelchair   Do you have any problems affording any of your prescribed medications? No   Is the patient taking medications as prescribed? yes   Does the patient have transportation home? No  (SW/CM to arrange)   Discharge Plan A Return to nursing home   Discharge Plan B Return to Nursing Home   DME Needed Upon Discharge  none   Patient/Family in Agreement with Plan yes   Does the patient have transportation to healthcare appointments? Yes

## 2020-09-28 NOTE — PROGRESS NOTES
NEPHROLOGY HEMODIALYSIS NOTE    Tea Georges is a 80 y.o. female currently on hemodialysis for removal of uremic toxins and volume management.     Patient seen and evaluated on hemodialysis, tolerating treatment, see HD flowsheet for vitals and assessments.    No Hypotension, chest pain, shortness of breath, cramping, nausea or vomiting.      Labs have been reviewed and the dialysate bath has been adjusted.    Labs:      Recent Labs   Lab 09/26/20 0410 09/27/20 0240 09/28/20  1034    142 135*   K 3.5 3.8 4.6    103 99   CO2 26 26 22*   BUN 27* 45* 73*   CREATININE 2.3* 3.1* 4.3*   CALCIUM 9.2 10.3 9.8   PHOS 2.9 3.8 4.0       Recent Labs   Lab 09/26/20 0411 09/27/20  0240 09/28/20  0748   WBC 7.72 8.99 10.07   HGB 8.8* 9.1* 8.5*   HCT 32.0* 31.2* 29.5*   * 143* 141*          Assessment/Plan:  - Seen on dialysis this morning, tolerating session with current UFR, no complications.    - BFR at 300/DFR at 800  - Ultrafiltration goal: 1.5  - Last HD, UF only removed 3 liters on 09/26 during 3 hours  - Will continue dialysis treatments while in-patient  - Continue to monitor intake and output   - Renally dose medications  - Pre/Post dialysis treatment weights  - Renal diet  - Will continue to follow closely.    Chepe Melara MD  Nephrology and Hypertension Fellow

## 2020-09-28 NOTE — PROGRESS NOTES
Patient received in dialysis via bed. Observation outpatient dialysis began via 15 gauge fistula needles to left upper arm graft fistula.

## 2020-09-28 NOTE — NURSING
Received pt via stretcher from ED to room 749. Pt answerers questions appropriately. No c/o pain at this time. Oriented to room and call system. purewick in place, bed straightened. Bed alarm on

## 2020-09-28 NOTE — PLAN OF CARE
SW sent NH orders for facility review at [facility].       09/28/20 1055   Post-Acute Status   Post-Acute Authorization Placement   Post-Acute Placement Status Referrals Sent   Discharge Plan   Discharge Plan A Return to nursing home   Discharge Plan B Return to Nursing Home       Loyda Mcgee LMSW  [facility]  [phone]

## 2020-09-28 NOTE — PLAN OF CARE
Pt resting in bed, VSS on 3L oxygen via nasal cannula. NAD. Pt was assisted with repositioning during the night using pillows. Pt had no complaints of pain. No falls or injuries occurred. Will continue to monitor.

## 2020-09-29 VITALS
RESPIRATION RATE: 18 BRPM | BODY MASS INDEX: 16.37 KG/M2 | OXYGEN SATURATION: 98 % | HEIGHT: 63 IN | DIASTOLIC BLOOD PRESSURE: 56 MMHG | WEIGHT: 92.38 LBS | HEART RATE: 90 BPM | SYSTOLIC BLOOD PRESSURE: 90 MMHG | TEMPERATURE: 98 F

## 2020-09-29 LAB
ANION GAP SERPL CALC-SCNC: 12 MMOL/L (ref 8–16)
BASOPHILS # BLD AUTO: 0.08 K/UL (ref 0–0.2)
BASOPHILS NFR BLD: 1 % (ref 0–1.9)
BUN SERPL-MCNC: 24 MG/DL (ref 8–23)
CALCIUM SERPL-MCNC: 9.1 MG/DL (ref 8.7–10.5)
CHLORIDE SERPL-SCNC: 103 MMOL/L (ref 95–110)
CO2 SERPL-SCNC: 25 MMOL/L (ref 23–29)
CREAT SERPL-MCNC: 2.1 MG/DL (ref 0.5–1.4)
DIFFERENTIAL METHOD: ABNORMAL
EOSINOPHIL # BLD AUTO: 0.2 K/UL (ref 0–0.5)
EOSINOPHIL NFR BLD: 1.9 % (ref 0–8)
ERYTHROCYTE [DISTWIDTH] IN BLOOD BY AUTOMATED COUNT: 18.7 % (ref 11.5–14.5)
EST. GFR  (AFRICAN AMERICAN): 25.4 ML/MIN/1.73 M^2
EST. GFR  (NON AFRICAN AMERICAN): 22.1 ML/MIN/1.73 M^2
GLUCOSE SERPL-MCNC: 97 MG/DL (ref 70–110)
HCT VFR BLD AUTO: 29.6 % (ref 37–48.5)
HGB BLD-MCNC: 8.3 G/DL (ref 12–16)
IMM GRANULOCYTES # BLD AUTO: 0.05 K/UL (ref 0–0.04)
IMM GRANULOCYTES NFR BLD AUTO: 0.6 % (ref 0–0.5)
LYMPHOCYTES # BLD AUTO: 0.6 K/UL (ref 1–4.8)
LYMPHOCYTES NFR BLD: 7.3 % (ref 18–48)
MAGNESIUM SERPL-MCNC: 2.1 MG/DL (ref 1.6–2.6)
MCH RBC QN AUTO: 27.3 PG (ref 27–31)
MCHC RBC AUTO-ENTMCNC: 28 G/DL (ref 32–36)
MCV RBC AUTO: 97 FL (ref 82–98)
MONOCYTES # BLD AUTO: 0.6 K/UL (ref 0.3–1)
MONOCYTES NFR BLD: 7.7 % (ref 4–15)
NEUTROPHILS # BLD AUTO: 6.3 K/UL (ref 1.8–7.7)
NEUTROPHILS NFR BLD: 81.5 % (ref 38–73)
NRBC BLD-RTO: 0 /100 WBC
PHOSPHATE SERPL-MCNC: 3.1 MG/DL (ref 2.7–4.5)
PLATELET # BLD AUTO: 136 K/UL (ref 150–350)
PMV BLD AUTO: 12.9 FL (ref 9.2–12.9)
POTASSIUM SERPL-SCNC: 4.4 MMOL/L (ref 3.5–5.1)
RBC # BLD AUTO: 3.04 M/UL (ref 4–5.4)
SODIUM SERPL-SCNC: 140 MMOL/L (ref 136–145)
WBC # BLD AUTO: 7.71 K/UL (ref 3.9–12.7)

## 2020-09-29 PROCEDURE — 25000003 PHARM REV CODE 250: Performed by: HOSPITALIST

## 2020-09-29 PROCEDURE — 96372 THER/PROPH/DIAG INJ SC/IM: CPT

## 2020-09-29 PROCEDURE — 36415 COLL VENOUS BLD VENIPUNCTURE: CPT

## 2020-09-29 PROCEDURE — 83735 ASSAY OF MAGNESIUM: CPT

## 2020-09-29 PROCEDURE — 84100 ASSAY OF PHOSPHORUS: CPT

## 2020-09-29 PROCEDURE — G0378 HOSPITAL OBSERVATION PER HR: HCPCS

## 2020-09-29 PROCEDURE — 80048 BASIC METABOLIC PNL TOTAL CA: CPT

## 2020-09-29 PROCEDURE — 94640 AIRWAY INHALATION TREATMENT: CPT

## 2020-09-29 PROCEDURE — 85025 COMPLETE CBC W/AUTO DIFF WBC: CPT

## 2020-09-29 PROCEDURE — 99217 PR OBSERVATION CARE DISCHARGE: CPT | Mod: ,,, | Performed by: PHYSICIAN ASSISTANT

## 2020-09-29 PROCEDURE — 99217 PR OBSERVATION CARE DISCHARGE: ICD-10-PCS | Mod: ,,, | Performed by: PHYSICIAN ASSISTANT

## 2020-09-29 PROCEDURE — 99900035 HC TECH TIME PER 15 MIN (STAT)

## 2020-09-29 PROCEDURE — 63600175 PHARM REV CODE 636 W HCPCS: Performed by: HOSPITALIST

## 2020-09-29 PROCEDURE — 27000221 HC OXYGEN, UP TO 24 HOURS

## 2020-09-29 PROCEDURE — 94761 N-INVAS EAR/PLS OXIMETRY MLT: CPT

## 2020-09-29 RX ADMIN — FLUTICASONE FUROATE AND VILANTEROL TRIFENATATE 1 PUFF: 200; 25 POWDER RESPIRATORY (INHALATION) at 12:09

## 2020-09-29 RX ADMIN — POLYETHYLENE GLYCOL 3350 17 G: 17 POWDER, FOR SOLUTION ORAL at 09:09

## 2020-09-29 RX ADMIN — ASPIRIN 81 MG: 81 TABLET, COATED ORAL at 09:09

## 2020-09-29 RX ADMIN — HYDRALAZINE HYDROCHLORIDE 100 MG: 50 TABLET ORAL at 06:09

## 2020-09-29 RX ADMIN — HEPARIN SODIUM 5000 UNITS: 5000 INJECTION INTRAVENOUS; SUBCUTANEOUS at 06:09

## 2020-09-29 NOTE — ASSESSMENT & PLAN NOTE
Secondary to pulmonary edema likely from missed dialysis.  Nephrology consulted for extra dialysis session in the morning, after which she can likely be discharged.  Improved significantly after 3L removed in HD  Able to be weaned back to home 2L  Stable for dc once NH accepts pt back, COVID neg

## 2020-09-29 NOTE — PROGRESS NOTES
Pt transported to [facility] via [facility]  transportation. Pt transported by pts own WC and personal belongings. PIV removed catheter tip intact. VSS.

## 2020-09-29 NOTE — DISCHARGE SUMMARY
[facility] Hospital Medicine  Discharge Summary      Patient Name: Tea Georges  MRN: [id number]  Admission Date: 9/25/2020  Hospital Length of Stay: 0 days  Discharge Date and Time: 9/29/2020  1:10 PM  Attending Physician: No att. providers found   Discharging Provider: Lucinda Wolff PA-C  Primary Care Provider: Parth Posadas Ii, MD  [facility] Medicine Team: [facility] MED F Lucinda Wolff PA-C    HPI:   80-year-old woman brought to the ED for shortness of breath while on dialysis, which resulted in her dialysis session being cut short in order to send her to the emergency department.  She does state that she missed dialysis on Wednesday due to not feeling well, but denies feeling like she was retaining more fluid.  She reports feeling better now.  She has baseline limited mobility due to spastic hemiplegia of the left side due to a prior stroke and spends most of her time in bed or in a wheelchair.    * No surgery found *      Hospital Course:   Patient admitted for acute on chronic respiratory failure after missing a HD session. Afebrile without leukocytosis. Requiring 4L NC as opposed to home 2L. BNP almost 5000 and CXR evident of pulm edema. Pt underwent HD and had 3L removed, subsequently feeling better and weaned back down to her home O2 requirement of 2L. D/Cd norvasc as BP has been well controlled.    Stable for dc back to NH with PCP fu     Consults:   Consults (From admission, onward)        Status Ordering Provider     Inpatient consult to Nephrology  Once     Provider:  (Not yet assigned)    Completed SHILPA COLEY          * Acute on chronic respiratory failure with hypoxia  Secondary to pulmonary edema likely from missed dialysis.  Nephrology consulted for extra dialysis session in the morning, after which she can likely be discharged.  Improved significantly after 3L removed in HD  Able to be weaned back to home 2L  Stable for dc once NH accepts pt back, COVID  neg    ESRD on hemodialysis  MWF; likely needs extra session due to missed HD Wednesday.  Nephrology consulted.  Continue phosphate binder with meals.  Dialyzed sat with 3L removed    Renovascular hypertension  Will continue home regimen of amlodipine, losartan, clonidine, and hydralazine.    BP low normal> d/c amlodipine  BP check with pcp      Final Active Diagnoses:    Diagnosis Date Noted POA    PRINCIPAL PROBLEM:  Acute on chronic respiratory failure with hypoxia [J96.21] 06/22/2019 Yes    ESRD on hemodialysis [N18.6, Z99.2]  Not Applicable    Renovascular hypertension [I15.0] 01/08/2016 Yes    Chronic kidney disease-mineral and bone disorder [N18.9, E83.9, M89.9] 09/17/2020 Yes    Anemia in ESRD (end-stage renal disease) [N18.6, D63.1] 05/29/2016 Yes     Chronic      Problems Resolved During this Admission:       Discharged Condition: stable    Disposition: Long Term Care    Follow Up:  Follow-up Information     Parth Posadas Ii, MD.    Specialty: Internal Medicine  Contact information:  [address]  [facility] [address]  [phone]             [facility].    Specialties: Nursing Home Agency, Rehabilitation  Contact information:  [address]  [facility] [address]  [phone]                 Patient Instructions:      Notify your health care provider if you experience any of the following:  temperature >100.4     Notify your health care provider if you experience any of the following:  severe uncontrolled pain     Notify your health care provider if you experience any of the following:  redness, tenderness, or signs of infection (pain, swelling, redness, odor or green/yellow discharge around incision site)     Notify your health care provider if you experience any of the following:  worsening rash     Notify your health care provider if you experience any of the following:  severe persistent headache     Notify your health care provider if you  experience any of the following:  increased confusion or weakness     Notify your health care provider if you experience any of the following:  persistent dizziness, light-headedness, or visual disturbances     Notify your health care provider if you experience any of the following:  temperature >100.4     Notify your health care provider if you experience any of the following:  redness, tenderness, or signs of infection (pain, swelling, redness, odor or green/yellow discharge around incision site)     Notify your health care provider if you experience any of the following:  difficulty breathing or increased cough     Notify your health care provider if you experience any of the following:  worsening rash     Notify your health care provider if you experience any of the following:  increased confusion or weakness     Activity as tolerated     Activity as tolerated       Significant Diagnostic Studies: Labs: BNP 4800  Radiology: X-Ray: CXR: X-Ray Chest 1 View (CXR):Cardiomegaly with probable pulmonary edema and bibasilar pleural effusions.    Pending Diagnostic Studies:     None         Medications:  Reconciled Home Medications:      Medication List      CHANGE how you take these medications    hydrALAZINE 100 MG tablet  Commonly known as: APRESOLINE  Take 0.5 tablets (50 mg total) by mouth every 8 (eight) hours.  What changed: how much to take        CONTINUE taking these medications    acetaminophen 325 MG tablet  Commonly known as: TYLENOL  Take 2 tablets (650 mg total) by mouth every 6 (six) hours as needed for Pain.     albuterol 90 mcg/actuation inhaler  Commonly known as: PROVENTIL/VENTOLIN HFA  Inhale 1-2 puffs into the lungs every 6 (six) hours as needed for Wheezing.     artificial tears 0.5 % ophthalmic solution  Commonly known as: ISOPTO TEARS  Place 2 drops into both eyes 4 (four) times daily as needed.     aspirin 81 MG EC tablet  Commonly known as: ECOTRIN  Take 81 mg by mouth once daily.      atorvastatin 20 MG tablet  Commonly known as: LIPITOR  Take 1 tablet (20 mg total) by mouth every evening.     bisacodyL 10 mg Supp  Commonly known as: DULCOLAX  Place 1 suppository (10 mg total) rectally daily as needed (Until bowel movement if patient has no bowel movement for 2 days).     BREO ELLIPTA 200-25 mcg/dose Dsdv diskus inhaler  Generic drug: fluticasone furoate-vilanteroL  INHALE 1 PUFF INTO THE LUNGS ONCE DAILY.     cloNIDine 0.1 MG tablet  Commonly known as: CATAPRES  Take 1 tablet (0.1 mg total) by mouth 3 (three) times daily.     ergocalciferol 50,000 unit Cap  Commonly known as: ERGOCALCIFEROL  Take 1 capsule (50,000 Units total) by mouth every 7 days.     insulin aspart U-100 100 unit/mL (3 mL) Inpn pen  Commonly known as: NovoLOG  Inject 0-5 Units into the skin before meals and at bedtime as needed (Hyperglycemia).     levETIRAcetam 500 MG Tab  Commonly known as: KEPPRA  TAKE 1 TABLET (500 MG TOTAL) BY MOUTH 2 (TWO) TIMES A DAY FOR 30 DAYS.     losartan 50 MG tablet  Commonly known as: COZAAR  Take 1 tablet (50 mg total) by mouth once daily.     ondansetron 8 MG Tbdl  Commonly known as: ZOFRAN-ODT  Take 1 tablet (8 mg total) by mouth every 6 (six) hours as needed (nausea).     polyethylene glycol 17 gram Pwpk  Commonly known as: GLYCOLAX  Take 17 g by mouth once daily.     ramelteon 8 mg tablet  Commonly known as: ROZEREM  Take 1 tablet (8 mg total) by mouth nightly as needed for Insomnia.     RENVELA 800 mg Tab  Generic drug: sevelamer carbonate  Take 1 tablet (800 mg total) by mouth 3 (three) times daily with meals.     senna-docusate 8.6-50 mg 8.6-50 mg per tablet  Commonly known as: PERICOLACE  Take 1 tablet by mouth daily as needed for Constipation.     traMADoL 50 mg tablet  Commonly known as: ULTRAM  Take 1 tablet (50 mg total) by mouth every 6 (six) hours as needed.        STOP taking these medications    amLODIPine 10 MG tablet  Commonly known as: NORVASC            Indwelling  Lines/Drains at time of discharge:   Lines/Drains/Airways     Drain                 Hemodialysis AV Fistula Left upper arm -- days                Time spent on the discharge of patient: >35 minutes  Patient was seen and examined on the date of discharge and determined to be suitable for discharge.       Discussed with staff  Lucinda Wolff PA-C  Department of Hospital Medicine  [facility]

## 2020-09-29 NOTE — PLAN OF CARE
Cardiac meds held last night due to patient slow to arouse and lethargic. Bp was 112/51. Patient states that her blood pressure runs low sometimes after dialysis and asked if she could skip the pm doses of blood pressure meds. Patient did receive scheduled meds this morning.      Problem: Adult Inpatient Plan of Care  Goal: Plan of Care Review  Outcome: Ongoing, Progressing     Problem: Fall Injury Risk  Goal: Absence of Fall and Fall-Related Injury  Outcome: Ongoing, Progressing     Problem: Wound  Goal: Optimal Wound Healing  Outcome: Ongoing, Progressing     Problem: Hemodynamic Instability (Hemodialysis)  Goal: Vital Signs Remain in Desired Range  Outcome: Ongoing, Progressing

## 2020-09-29 NOTE — ASSESSMENT & PLAN NOTE
Will continue home regimen of amlodipine, losartan, clonidine, and hydralazine.    BP low normal> d/c amlodipine  BP check with pcp

## 2020-09-29 NOTE — NURSING
Result of COVID Test negative.just resulted,therefore, pt will be dced back to [facility] in am.

## 2020-09-29 NOTE — CONSULTS
Wound care consulted for left heel  PMH:  Acute on chronic respiratory failure with hypoxia, ESRD on hemodialysis, renovascular hypertension, CAD, pulmonary emphysema    Assessment:  Left heel  1 cm L x 1 cm W x 0.5 cm D  The left heel is malodorous, painful, has a small amount of creamy drainage.  The wound bed is white/pale pink with maceration around the wound edges.      Buttocks= shearing - partial thickness skin loss over the buttocks. The patient turns herself in bed, dragging self due to left hemiparesis      Recommendations:  Consult podiatry for left heel  Left heel- aquacel Ag rope to wound bed, cover with padded kerlix daily  Buttocks- Triad ointment BID/prn- incontinent of urine, feces.    Pressure prevention measures.   Nursing to continue care.   Patient to be discharged to nursing home.   WILLY Camacho RN, [facility]  [phone]

## 2020-10-07 ENCOUNTER — HOSPITAL ENCOUNTER (OUTPATIENT)
Facility: HOSPITAL | Age: 80
Discharge: NURSING, REHAB OR LONG-TERM CARE | End: 2020-10-10
Attending: EMERGENCY MEDICINE | Admitting: INTERNAL MEDICINE
Payer: MEDICARE

## 2020-10-07 DIAGNOSIS — R07.9 CHEST PAIN: ICD-10-CM

## 2020-10-07 DIAGNOSIS — I50.42 CHRONIC COMBINED SYSTOLIC AND DIASTOLIC HEART FAILURE: ICD-10-CM

## 2020-10-07 DIAGNOSIS — Z99.2 ESRD ON HEMODIALYSIS: ICD-10-CM

## 2020-10-07 DIAGNOSIS — R06.02 SOB (SHORTNESS OF BREATH): ICD-10-CM

## 2020-10-07 DIAGNOSIS — L97.529 ULCER OF LEFT FOOT, UNSPECIFIED ULCER STAGE: ICD-10-CM

## 2020-10-07 DIAGNOSIS — N18.6 ESRD ON HEMODIALYSIS: ICD-10-CM

## 2020-10-07 DIAGNOSIS — J96.21 ACUTE ON CHRONIC RESPIRATORY FAILURE WITH HYPOXIA: ICD-10-CM

## 2020-10-07 DIAGNOSIS — I35.0 SEVERE AORTIC VALVE STENOSIS: Primary | ICD-10-CM

## 2020-10-07 DIAGNOSIS — R07.9 CHEST PAIN, RULE OUT ACUTE MYOCARDIAL INFARCTION: ICD-10-CM

## 2020-10-07 PROBLEM — I13.2 BENIGN HYPERTENSIVE HEART AND KIDNEY DISEASE WITH HF AND ESRD: Status: ACTIVE | Noted: 2020-10-07

## 2020-10-07 PROBLEM — R63.0 ANOREXIA: Status: ACTIVE | Noted: 2020-10-07

## 2020-10-07 PROBLEM — I50.32 CHRONIC DIASTOLIC HEART FAILURE: Status: ACTIVE | Noted: 2019-05-21

## 2020-10-07 LAB
ALBUMIN SERPL BCP-MCNC: 2.5 G/DL (ref 3.5–5.2)
ALP SERPL-CCNC: 85 U/L (ref 55–135)
ALT SERPL W/O P-5'-P-CCNC: 9 U/L (ref 10–44)
ANION GAP SERPL CALC-SCNC: 13 MMOL/L (ref 8–16)
AST SERPL-CCNC: 16 U/L (ref 10–40)
BASOPHILS # BLD AUTO: 0.05 K/UL (ref 0–0.2)
BASOPHILS NFR BLD: 0.6 % (ref 0–1.9)
BILIRUB SERPL-MCNC: 0.5 MG/DL (ref 0.1–1)
BNP SERPL-MCNC: >4900 PG/ML (ref 0–99)
BUN SERPL-MCNC: 45 MG/DL (ref 8–23)
BUN SERPL-MCNC: 49 MG/DL (ref 6–30)
CALCIUM SERPL-MCNC: 9.1 MG/DL (ref 8.7–10.5)
CHLORIDE SERPL-SCNC: 105 MMOL/L (ref 95–110)
CHLORIDE SERPL-SCNC: 107 MMOL/L (ref 95–110)
CO2 SERPL-SCNC: 22 MMOL/L (ref 23–29)
CREAT SERPL-MCNC: 3.5 MG/DL (ref 0.5–1.4)
CREAT SERPL-MCNC: 3.6 MG/DL (ref 0.5–1.4)
CTP QC/QA: YES
DIFFERENTIAL METHOD: ABNORMAL
EOSINOPHIL # BLD AUTO: 0.1 K/UL (ref 0–0.5)
EOSINOPHIL NFR BLD: 0.8 % (ref 0–8)
ERYTHROCYTE [DISTWIDTH] IN BLOOD BY AUTOMATED COUNT: 21.5 % (ref 11.5–14.5)
EST. GFR  (AFRICAN AMERICAN): 13.7 ML/MIN/1.73 M^2
EST. GFR  (NON AFRICAN AMERICAN): 11.9 ML/MIN/1.73 M^2
GLUCOSE SERPL-MCNC: 133 MG/DL (ref 70–110)
GLUCOSE SERPL-MCNC: 165 MG/DL (ref 70–110)
HCT VFR BLD AUTO: 31.9 % (ref 37–48.5)
HCT VFR BLD CALC: 32 %PCV (ref 36–54)
HGB BLD-MCNC: 9.5 G/DL (ref 12–16)
IMM GRANULOCYTES # BLD AUTO: 0.05 K/UL (ref 0–0.04)
IMM GRANULOCYTES NFR BLD AUTO: 0.6 % (ref 0–0.5)
LYMPHOCYTES # BLD AUTO: 0.5 K/UL (ref 1–4.8)
LYMPHOCYTES NFR BLD: 5.7 % (ref 18–48)
MCH RBC QN AUTO: 28.6 PG (ref 27–31)
MCHC RBC AUTO-ENTMCNC: 29.8 G/DL (ref 32–36)
MCV RBC AUTO: 96 FL (ref 82–98)
MONOCYTES # BLD AUTO: 0.4 K/UL (ref 0.3–1)
MONOCYTES NFR BLD: 4.7 % (ref 4–15)
NEUTROPHILS # BLD AUTO: 6.9 K/UL (ref 1.8–7.7)
NEUTROPHILS NFR BLD: 87.6 % (ref 38–73)
NRBC BLD-RTO: 0 /100 WBC
PLATELET # BLD AUTO: 123 K/UL (ref 150–350)
PMV BLD AUTO: ABNORMAL FL (ref 9.2–12.9)
POC IONIZED CALCIUM: 1.2 MMOL/L (ref 1.06–1.42)
POC TCO2 (MEASURED): 25 MMOL/L (ref 23–29)
POTASSIUM BLD-SCNC: 4 MMOL/L (ref 3.5–5.1)
POTASSIUM SERPL-SCNC: 4.2 MMOL/L (ref 3.5–5.1)
PROT SERPL-MCNC: 6.6 G/DL (ref 6–8.4)
RBC # BLD AUTO: 3.32 M/UL (ref 4–5.4)
SAMPLE: ABNORMAL
SARS-COV-2 RDRP RESP QL NAA+PROBE: NEGATIVE
SODIUM BLD-SCNC: 140 MMOL/L (ref 136–145)
SODIUM SERPL-SCNC: 142 MMOL/L (ref 136–145)
TROPONIN I SERPL DL<=0.01 NG/ML-MCNC: 0.01 NG/ML (ref 0–0.03)
TROPONIN I SERPL DL<=0.01 NG/ML-MCNC: 0.06 NG/ML (ref 0–0.03)
WBC # BLD AUTO: 7.92 K/UL (ref 3.9–12.7)

## 2020-10-07 PROCEDURE — 99285 PR EMERGENCY DEPT VISIT,LEVEL V: ICD-10-PCS | Mod: ,,, | Performed by: EMERGENCY MEDICINE

## 2020-10-07 PROCEDURE — 84484 ASSAY OF TROPONIN QUANT: CPT

## 2020-10-07 PROCEDURE — 94761 N-INVAS EAR/PLS OXIMETRY MLT: CPT

## 2020-10-07 PROCEDURE — 82330 ASSAY OF CALCIUM: CPT

## 2020-10-07 PROCEDURE — 84484 ASSAY OF TROPONIN QUANT: CPT | Mod: 91

## 2020-10-07 PROCEDURE — 63600175 PHARM REV CODE 636 W HCPCS: Performed by: HOSPITALIST

## 2020-10-07 PROCEDURE — G0378 HOSPITAL OBSERVATION PER HR: HCPCS

## 2020-10-07 PROCEDURE — 93010 ELECTROCARDIOGRAM REPORT: CPT | Mod: ,,, | Performed by: INTERNAL MEDICINE

## 2020-10-07 PROCEDURE — 99285 EMERGENCY DEPT VISIT HI MDM: CPT | Mod: ,,, | Performed by: EMERGENCY MEDICINE

## 2020-10-07 PROCEDURE — 99220 PR INITIAL OBSERVATION CARE,LEVL III: ICD-10-PCS | Mod: ,,, | Performed by: HOSPITALIST

## 2020-10-07 PROCEDURE — 93010 ELECTROCARDIOGRAM REPORT: CPT | Mod: 76,,, | Performed by: INTERNAL MEDICINE

## 2020-10-07 PROCEDURE — 25000003 PHARM REV CODE 250: Performed by: HOSPITALIST

## 2020-10-07 PROCEDURE — 93010 EKG 12-LEAD: ICD-10-PCS | Mod: 76,,, | Performed by: INTERNAL MEDICINE

## 2020-10-07 PROCEDURE — 27000221 HC OXYGEN, UP TO 24 HOURS

## 2020-10-07 PROCEDURE — 99220 PR INITIAL OBSERVATION CARE,LEVL III: CPT | Mod: ,,, | Performed by: HOSPITALIST

## 2020-10-07 PROCEDURE — U0002 COVID-19 LAB TEST NON-CDC: HCPCS | Performed by: STUDENT IN AN ORGANIZED HEALTH CARE EDUCATION/TRAINING PROGRAM

## 2020-10-07 PROCEDURE — 83880 ASSAY OF NATRIURETIC PEPTIDE: CPT

## 2020-10-07 PROCEDURE — 93005 ELECTROCARDIOGRAM TRACING: CPT

## 2020-10-07 PROCEDURE — 96372 THER/PROPH/DIAG INJ SC/IM: CPT | Mod: 59

## 2020-10-07 PROCEDURE — 85025 COMPLETE CBC W/AUTO DIFF WBC: CPT

## 2020-10-07 PROCEDURE — 99285 EMERGENCY DEPT VISIT HI MDM: CPT | Mod: 25

## 2020-10-07 PROCEDURE — 80047 BASIC METABLC PNL IONIZED CA: CPT

## 2020-10-07 PROCEDURE — 80053 COMPREHEN METABOLIC PANEL: CPT

## 2020-10-07 RX ORDER — LOSARTAN POTASSIUM 50 MG/1
50 TABLET ORAL DAILY
Status: DISCONTINUED | OUTPATIENT
Start: 2020-10-08 | End: 2020-10-10 | Stop reason: HOSPADM

## 2020-10-07 RX ORDER — ACETAMINOPHEN 325 MG/1
650 TABLET ORAL EVERY 8 HOURS PRN
Status: DISCONTINUED | OUTPATIENT
Start: 2020-10-07 | End: 2020-10-10 | Stop reason: HOSPADM

## 2020-10-07 RX ORDER — FLUTICASONE FUROATE AND VILANTEROL 200; 25 UG/1; UG/1
1 POWDER RESPIRATORY (INHALATION) DAILY
Status: DISCONTINUED | OUTPATIENT
Start: 2020-10-08 | End: 2020-10-10 | Stop reason: HOSPADM

## 2020-10-07 RX ORDER — TRAMADOL HYDROCHLORIDE 50 MG/1
50 TABLET ORAL EVERY 8 HOURS PRN
Status: DISCONTINUED | OUTPATIENT
Start: 2020-10-07 | End: 2020-10-10 | Stop reason: HOSPADM

## 2020-10-07 RX ORDER — IBUPROFEN 200 MG
16 TABLET ORAL
Status: DISCONTINUED | OUTPATIENT
Start: 2020-10-07 | End: 2020-10-10 | Stop reason: HOSPADM

## 2020-10-07 RX ORDER — CLONIDINE HYDROCHLORIDE 0.1 MG/1
0.1 TABLET ORAL 3 TIMES DAILY
Status: DISCONTINUED | OUTPATIENT
Start: 2020-10-07 | End: 2020-10-10 | Stop reason: HOSPADM

## 2020-10-07 RX ORDER — HYDRALAZINE HYDROCHLORIDE 50 MG/1
100 TABLET, FILM COATED ORAL EVERY 8 HOURS
Status: DISCONTINUED | OUTPATIENT
Start: 2020-10-07 | End: 2020-10-09

## 2020-10-07 RX ORDER — BENZONATATE 100 MG/1
100 CAPSULE ORAL 3 TIMES DAILY PRN
Status: DISCONTINUED | OUTPATIENT
Start: 2020-10-07 | End: 2020-10-10 | Stop reason: HOSPADM

## 2020-10-07 RX ORDER — ALBUTEROL SULFATE 2.5 MG/.5ML
2.5 SOLUTION RESPIRATORY (INHALATION) EVERY 4 HOURS PRN
Status: DISCONTINUED | OUTPATIENT
Start: 2020-10-07 | End: 2020-10-10 | Stop reason: HOSPADM

## 2020-10-07 RX ORDER — AMOXICILLIN 250 MG
1 CAPSULE ORAL DAILY PRN
Status: DISCONTINUED | OUTPATIENT
Start: 2020-10-07 | End: 2020-10-10 | Stop reason: HOSPADM

## 2020-10-07 RX ORDER — TALC
6 POWDER (GRAM) TOPICAL NIGHTLY PRN
Status: DISCONTINUED | OUTPATIENT
Start: 2020-10-07 | End: 2020-10-10 | Stop reason: HOSPADM

## 2020-10-07 RX ORDER — HEPARIN SODIUM 5000 [USP'U]/ML
5000 INJECTION, SOLUTION INTRAVENOUS; SUBCUTANEOUS EVERY 8 HOURS
Status: DISCONTINUED | OUTPATIENT
Start: 2020-10-07 | End: 2020-10-09

## 2020-10-07 RX ORDER — ATORVASTATIN CALCIUM 20 MG/1
20 TABLET, FILM COATED ORAL NIGHTLY
Status: DISCONTINUED | OUTPATIENT
Start: 2020-10-07 | End: 2020-10-10 | Stop reason: HOSPADM

## 2020-10-07 RX ORDER — SODIUM CHLORIDE 0.9 % (FLUSH) 0.9 %
10 SYRINGE (ML) INJECTION
Status: DISCONTINUED | OUTPATIENT
Start: 2020-10-07 | End: 2020-10-10 | Stop reason: HOSPADM

## 2020-10-07 RX ORDER — ASPIRIN 81 MG/1
81 TABLET ORAL DAILY
Status: DISCONTINUED | OUTPATIENT
Start: 2020-10-08 | End: 2020-10-10 | Stop reason: HOSPADM

## 2020-10-07 RX ORDER — IBUPROFEN 200 MG
24 TABLET ORAL
Status: DISCONTINUED | OUTPATIENT
Start: 2020-10-07 | End: 2020-10-10 | Stop reason: HOSPADM

## 2020-10-07 RX ORDER — ONDANSETRON 2 MG/ML
4 INJECTION INTRAMUSCULAR; INTRAVENOUS EVERY 6 HOURS PRN
Status: DISCONTINUED | OUTPATIENT
Start: 2020-10-07 | End: 2020-10-10 | Stop reason: HOSPADM

## 2020-10-07 RX ORDER — GLUCAGON 1 MG
1 KIT INJECTION
Status: DISCONTINUED | OUTPATIENT
Start: 2020-10-07 | End: 2020-10-10 | Stop reason: HOSPADM

## 2020-10-07 RX ORDER — SEVELAMER CARBONATE 800 MG/1
800 TABLET, FILM COATED ORAL
Status: DISCONTINUED | OUTPATIENT
Start: 2020-10-08 | End: 2020-10-10 | Stop reason: HOSPADM

## 2020-10-07 RX ORDER — LIDOCAINE AND PRILOCAINE 25; 25 MG/G; MG/G
CREAM TOPICAL
Status: DISCONTINUED | OUTPATIENT
Start: 2020-10-07 | End: 2020-10-10 | Stop reason: HOSPADM

## 2020-10-07 RX ORDER — POLYETHYLENE GLYCOL 3350 17 G/17G
17 POWDER, FOR SOLUTION ORAL DAILY
Status: DISCONTINUED | OUTPATIENT
Start: 2020-10-08 | End: 2020-10-10 | Stop reason: HOSPADM

## 2020-10-07 RX ADMIN — HYDRALAZINE HYDROCHLORIDE 100 MG: 50 TABLET, FILM COATED ORAL at 11:10

## 2020-10-07 RX ADMIN — ATORVASTATIN CALCIUM 20 MG: 20 TABLET, FILM COATED ORAL at 08:10

## 2020-10-07 RX ADMIN — HEPARIN SODIUM 5000 UNITS: 5000 INJECTION INTRAVENOUS; SUBCUTANEOUS at 11:10

## 2020-10-07 RX ADMIN — CLONIDINE HYDROCHLORIDE 0.1 MG: 0.1 TABLET ORAL at 08:10

## 2020-10-07 NOTE — ED TRIAGE NOTES
Pt reports feeling sob for the past day. Pt wears o2 at home 2l. Pt is sating 100 on 3l. Pt is aox4. Pt ekg showed a fib. Denies chest pain at this time. Reports chest pain at home, in her upper stomach and did not radiate.

## 2020-10-07 NOTE — ED PROVIDER NOTES
Encounter Date: 10/7/2020       History     Chief Complaint   Patient presents with    Chest Pain     symptoms started 4 hours ago    Shortness of Breath    Anxiety     Ms. Tea Georges is a 80 y.o. female with a PMHx of ESRD (HD MWF), cCHF (25% EF), DM, pHTN (on 2 L NC at home), and h/o SDH who presents with SOB. This morning while resting at her nursing home the patient gradually became more short of breath. She is usually on 2L of O2 and had to increase it to 3L. She also reports an epigastric/chest pressure that did not radiate and lasted about 15 minutes around the onset of the SOB. She went to dialysis this morning and it was ended early due to the patient being SOB. She was given a breathing treatment at dialysis and sent to the ED. Since being in the ED the patient feels less SOB and feels almost back to her baseline.         Review of patient's allergies indicates:  No Known Allergies  Past Medical History:   Diagnosis Date    (HFpEF) heart failure with preserved ejection fraction 5/21/2019    Anemia in ESRD (end-stage renal disease) 5/29/2016    Aneurysm of arteriovenous dialysis fistula     Aortic atherosclerosis 11/22/2016    Bilateral low back pain without sciatica 11/17/2015    Blindness of right eye 11/12/2016    Brain compression 6/22/2019    Cataract     Central retinal vein occlusion, right eye 6/3/2014    Chronic diastolic heart failure 1/8/2016    Chronic respiratory failure with hypoxia 5/29/2016    Coronary artery disease involving native coronary artery of native heart without angina pectoris 12/12/2016    Diverticulosis     Encounter for blood transfusion     Enlarged LA (left atrium) 10/7/2016    Epiretinal membrane 7/17/2012    ESRD on hemodialysis     MWF    History of GI diverticular bleed     5/22/16    Hyperparathyroidism, secondary renal 10/14/2016    Left spastic hemiparesis 11/13/2016    Moderate single current episode of major depressive disorder 2/8/2019     Non-rheumatic tricuspid valve insufficiency 1/15/2017    Peripheral vascular disease, unspecified 11/22/2016    Physical debility 11/17/2016    Pleural effusion on right     Pulmonary emphysema 1/15/2017    Pulmonary hypertension 6/28/2019    Renovascular hypertension 1/8/2016    Seizure 6/24/2019    Severe aortic valve stenosis 2/4/2016    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 11/13/2016    s/p thrombectomy of MCA    Subdural hematoma 05/21/2019    Bilateral R>L    Thrombocytopenia, unspecified 1/15/2017    Type 2 diabetes mellitus with chronic kidney disease on chronic dialysis, without long-term current use of insulin 5/1/2018    Type 2 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, without long-term current use of insulin 3/26/2013    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, without long-term current use of insulin 3/26/2013    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     Past Surgical History:   Procedure Laterality Date    ABDOMINAL SURGERY      BREAST SURGERY      tumor removal x 2    CARDIAC SURGERY  2016    Implantable loop recorder placed    CATARACT EXTRACTION      CEREBRAL ANGIOGRAM N/A 6/24/2019    Procedure: ANGIOGRAM-CEREBRAL;  Surgeon: Kenisha Surgeon;  Location: [facility];  Service: Anesthesiology;  Laterality: N/A;    CHOLECYSTECTOMY      COLONOSCOPY N/A 5/23/2016    Procedure: COLONOSCOPY;  Surgeon: WILLIAM Colvin MD;  Location: [facility] ([address] [place] [address]);  Service: Endoscopy;  Laterality: N/A;    COLONOSCOPY N/A 5/30/2016    Procedure: COLONOSCOPY;  Surgeon: Sam Davis MD;  Location: [facility] ENDO (2ND FLR);  Service: Endoscopy;  Laterality: N/A;    CRANIOTOMY FOR EVACUATION OF SUBDURAL HEMATOMA Right 6/23/2019    Procedure: KATE HOLES FOR SUBDURAL HEMATOMA EVACUATION;  Surgeon: Trevor Conner MD;  [facility];  Service: Neurosurgery;  Laterality:  Right;    EYE SURGERY      FISTULOGRAM Left 11/28/2018    Procedure: Fistulogram;  Surgeon: NADINE Blum III, MD;  Location: [facility] CATH LAB;  Service: Cardiology;  Laterality: Left;    INTRAMEDULLARY RODDING OF FEMUR Left 10/28/2019    Procedure: INSERTION, INTRAMEDULLARY NELIDA, FEMUR,  Left , synthes, hana table, large C arm clock side;  Surgeon: Torey Aguilar MD;  Location: [facility] OR [facility] FLR;  Service: Orthopedics;  Laterality: Left;  general        PLACEMENT OF DUAL-LUMEN VASCULAR CATHETER Right 11/29/2018    Procedure: INSERTION, CATHETER, VASCULAR, DUAL LUMEN;  Surgeon: NADINE Blum III, MD;  Location: [facility] OR [facility] FLR;  Service: Peripheral Vascular;  Laterality: Right;  Permacatheter placement     RESECTION OF ANEURYSM Left 11/29/2018    Procedure: EXCISION, ANEURYSM;  Surgeon: NADINE Blum III, MD;  Location: [facility] OR [facility] FLR;  Service: Peripheral Vascular;  Laterality: Left;  Excision, L AVF aneurysm    REVISION OF ARTERIOVENOUS FISTULA Left 11/29/2018    Procedure: REVISION, AV FISTULA,;  Surgeon: NADINE Blum III, MD;  Location: [facility] OR 2ND FLR;  Service: Peripheral Vascular;  Laterality: Left;  OR 11    UPPER GASTROINTESTINAL ENDOSCOPY       Family History   Problem Relation Age of Onset    Cataracts Mother     Stroke Mother     Hypertension Mother     Cancer Sister     Hypertension Sister     Heart failure Sister     Glaucoma Brother     Hypertension Brother     Heart disease Brother     Hypertension Father     Glaucoma Maternal Aunt     Esophageal cancer Sister     No Known Problems Maternal Uncle     No Known Problems Paternal Aunt     No Known Problems Paternal Uncle     No Known Problems Maternal Grandmother     No Known Problems Maternal Grandfather     No Known Problems Paternal Grandmother     No Known Problems Paternal Grandfather     Heart attack Neg Hx     Colon cancer Neg Hx     Stomach cancer Neg Hx     Anemia Neg Hx     Arrhythmia Neg Hx     Asthma  Neg Hx     Clotting disorder Neg Hx     Fainting Neg Hx     Hyperlipidemia Neg Hx     Atrial Septal Defect Neg Hx      Social History     Tobacco Use    Smoking status: Never Smoker    Smokeless tobacco: Never Used   Substance Use Topics    Alcohol use: No     Comment: Reports occasional 1-2 drinks     Drug use: No     Review of Systems   Constitutional: Positive for fatigue. Negative for chills and fever.   HENT: Negative for congestion and sore throat.    Eyes: Negative for pain.   Respiratory: Positive for cough and shortness of breath.    Cardiovascular: Positive for chest pain. Negative for palpitations and leg swelling.   Gastrointestinal: Negative for abdominal pain, constipation, diarrhea, nausea and vomiting.   Genitourinary: Negative for difficulty urinating, dysuria and hematuria.   Musculoskeletal: Negative for back pain.   Skin: Negative for rash.   Neurological: Positive for weakness. Negative for light-headedness and headaches.   Hematological: Does not bruise/bleed easily.   Psychiatric/Behavioral: Negative for agitation.       Physical Exam     Initial Vitals [10/07/20 1502]   BP Pulse Resp Temp SpO2   128/62 88 15 97.6 °F (36.4 °C) 97 %      MAP       --         Physical Exam    Constitutional: She has a sickly appearance.   HENT:   Head: Normocephalic and atraumatic.   Eyes: EOM are normal. Pupils are equal, round, and reactive to light.   Blind in right eye    Neck: Normal range of motion.   Cardiovascular: Normal rate.   No murmur heard.  Pulmonary/Chest: Breath sounds normal.   Abdominal: Soft. There is no abdominal tenderness.   Musculoskeletal:      Comments: Left foot bandaged   Neurological: She is alert and oriented to person, place, and time.   Left sided weakness   Skin: Skin is warm and dry.   Psychiatric: She has a normal mood and affect.         ED Course   Procedures  Labs Reviewed   CBC W/ AUTO DIFFERENTIAL - Abnormal; Notable for the following components:       Result Value     RBC 3.32 (*)     Hemoglobin 9.5 (*)     Hematocrit 31.9 (*)     Mean Corpuscular Hemoglobin Conc 29.8 (*)     RDW 21.5 (*)     Platelets 123 (*)     Immature Granulocytes 0.6 (*)     Immature Grans (Abs) 0.05 (*)     Lymph # 0.5 (*)     Gran% 87.6 (*)     Lymph% 5.7 (*)     All other components within normal limits   B-TYPE NATRIURETIC PEPTIDE - Abnormal; Notable for the following components:    BNP >4,900 (*)     All other components within normal limits   TROPONIN I - Abnormal; Notable for the following components:    Troponin I 0.056 (*)     All other components within normal limits   ISTAT PROCEDURE - Abnormal; Notable for the following components:    POC Glucose 165 (*)     POC BUN 49 (*)     POC Creatinine 3.6 (*)     POC Hematocrit 32 (*)     All other components within normal limits   COMPREHENSIVE METABOLIC PANEL   TROPONIN I   SARS-COV-2 RDRP GENE   ISTAT CHEM8     EKG Readings: (Independently Interpreted)   Sinus rhythm, rate 73, 1 deg AVB, non-specific st-t wave  Ablty, no acute ischemic changes       Imaging Results          X-Ray Chest AP Portable (Final result)  Result time 10/07/20 17:09:18    Final result by Didier Zuluaga MD (10/07/20 17:09:18)                 Impression:      Possible increase of repositioning of right pleural effusion, CHF changes otherwise stable.      Electronically signed by: Didier Zuluaga MD  Date:    10/07/2020  Time:    17:09             Narrative:    EXAMINATION:  XR CHEST AP PORTABLE    CLINICAL HISTORY:  SOB;    TECHNIQUE:  Single frontal view of the chest was performed.    COMPARISON:  09/25/2020    FINDINGS:  Cardiomegaly, CHF, with pulmonary edema, moderate right pleural effusion, mom base compression atelectasis pleural reaction.  Left axillary vessel stent, osteopenia, additional stent versus calcified plaque mid abdominal aorta.  Subcutaneous pacing device left anterior chest wall.                                 Medical Decision Making:   History:    Old Medical Records: I decided to obtain old medical records.  Initial Assessment:   81yo F who presents from dialysis with SOB. She is on 2L of oxygen at home and O2 sats are currently at 100% on 3L.   Differential Diagnosis:   Fluid overload   Acute on chronic respiratory failure   Acute on chronic heart failure   ACS   Independently Interpreted Test(s):   I have ordered and independently interpreted EKG Reading(s) - see prior notes  Clinical Tests:   Lab Tests: Ordered and Reviewed  Radiological Study: Ordered and Reviewed  Medical Tests: Ordered and Reviewed  ED Management:  WBC 7.9, Hg 9.5. BUN 45, Cr 3.5. Troponin 0.056. BNP >5K (normal for her). CXR revealed a right sided pleural effusion. ECG revealed normal sinus rhythm with 1st degree AV block, no ST changes. Nephrology consulted as patient did not complete HD today.     Patient is being admitted for observation and will likely need dialysis.   Other:   I have discussed this case with another health care provider.       <> Summary of the Discussion: Hospital medicine              Attending Attestation:   Physician Attestation Statement for Resident:  As the supervising MD   Physician Attestation Statement: I have personally seen and examined this patient.   I agree with the above history. -:   As the supervising MD I agree with the above PE.    As the supervising MD I agree with the above treatment, course, plan, and disposition.   -: 81 y/o F with HFpEF, ESRD on HD presents, chronic respiratory failute with hypoxia on home O2 2L presents with SOB. Woke up this morning with worse sob than usual and a cough. NO fevers. Unable to complete HD because of SOB and transferred to ED. In ED afebrile appears somewhat volume overloaded. ACS rule out and admit for HD  I have reviewed and agree with the residents interpretation of the following: lab data, x-rays and EKG.                              Clinical Impression:       ICD-10-CM ICD-9-CM   1. SOB (shortness  of breath)  R06.02 786.05   2. Chest pain  R07.9 786.50                      Disposition:   Disposition: Placed in Observation  Condition: Fair     ED Disposition Condition    Observation              Ryan Berry MD  Resident  10/07/20 2231       Tatianna Jaimes MD  10/09/20 0018

## 2020-10-07 NOTE — ED NOTES
Pt is a resident at [facility] and was at [facility] for dialysis. Per dialysis nurse, pt received 30 minutes today and missed dialysis on Monday. She became anxious with chest pain and SOB.

## 2020-10-08 PROBLEM — I50.42 CHRONIC COMBINED SYSTOLIC AND DIASTOLIC HEART FAILURE: Status: ACTIVE | Noted: 2019-05-21

## 2020-10-08 LAB
ANION GAP SERPL CALC-SCNC: 18 MMOL/L (ref 8–16)
BASOPHILS # BLD AUTO: 0.05 K/UL (ref 0–0.2)
BASOPHILS NFR BLD: 0.7 % (ref 0–1.9)
BUN SERPL-MCNC: 53 MG/DL (ref 8–23)
CALCIUM SERPL-MCNC: 9.5 MG/DL (ref 8.7–10.5)
CHLORIDE SERPL-SCNC: 105 MMOL/L (ref 95–110)
CO2 SERPL-SCNC: 19 MMOL/L (ref 23–29)
CREAT SERPL-MCNC: 4.1 MG/DL (ref 0.5–1.4)
DIFFERENTIAL METHOD: ABNORMAL
EOSINOPHIL # BLD AUTO: 0.2 K/UL (ref 0–0.5)
EOSINOPHIL NFR BLD: 2.2 % (ref 0–8)
ERYTHROCYTE [DISTWIDTH] IN BLOOD BY AUTOMATED COUNT: 20.3 % (ref 11.5–14.5)
EST. GFR  (AFRICAN AMERICAN): 11.3 ML/MIN/1.73 M^2
EST. GFR  (NON AFRICAN AMERICAN): 9.8 ML/MIN/1.73 M^2
GLUCOSE SERPL-MCNC: 98 MG/DL (ref 70–110)
HCT VFR BLD AUTO: 29.5 % (ref 37–48.5)
HGB BLD-MCNC: 8.3 G/DL (ref 12–16)
IMM GRANULOCYTES # BLD AUTO: 0.04 K/UL (ref 0–0.04)
IMM GRANULOCYTES NFR BLD AUTO: 0.6 % (ref 0–0.5)
LYMPHOCYTES # BLD AUTO: 0.7 K/UL (ref 1–4.8)
LYMPHOCYTES NFR BLD: 9.4 % (ref 18–48)
MAGNESIUM SERPL-MCNC: 2.3 MG/DL (ref 1.6–2.6)
MCH RBC QN AUTO: 28 PG (ref 27–31)
MCHC RBC AUTO-ENTMCNC: 28.1 G/DL (ref 32–36)
MCV RBC AUTO: 100 FL (ref 82–98)
MONOCYTES # BLD AUTO: 0.6 K/UL (ref 0.3–1)
MONOCYTES NFR BLD: 7.9 % (ref 4–15)
NEUTROPHILS # BLD AUTO: 5.5 K/UL (ref 1.8–7.7)
NEUTROPHILS NFR BLD: 79.2 % (ref 38–73)
NRBC BLD-RTO: 0 /100 WBC
PLATELET # BLD AUTO: 122 K/UL (ref 150–350)
PMV BLD AUTO: 13.8 FL (ref 9.2–12.9)
POTASSIUM SERPL-SCNC: 4.5 MMOL/L (ref 3.5–5.1)
RBC # BLD AUTO: 2.96 M/UL (ref 4–5.4)
SODIUM SERPL-SCNC: 142 MMOL/L (ref 136–145)
WBC # BLD AUTO: 6.93 K/UL (ref 3.9–12.7)

## 2020-10-08 PROCEDURE — 27000221 HC OXYGEN, UP TO 24 HOURS

## 2020-10-08 PROCEDURE — 94640 AIRWAY INHALATION TREATMENT: CPT

## 2020-10-08 PROCEDURE — 25000003 PHARM REV CODE 250: Performed by: HOSPITALIST

## 2020-10-08 PROCEDURE — G0378 HOSPITAL OBSERVATION PER HR: HCPCS

## 2020-10-08 PROCEDURE — 99215 PR OFFICE/OUTPT VISIT, EST, LEVL V, 40-54 MIN: ICD-10-PCS | Mod: ,,, | Performed by: NURSE PRACTITIONER

## 2020-10-08 PROCEDURE — 94761 N-INVAS EAR/PLS OXIMETRY MLT: CPT

## 2020-10-08 PROCEDURE — 96372 THER/PROPH/DIAG INJ SC/IM: CPT | Mod: 59

## 2020-10-08 PROCEDURE — 80048 BASIC METABOLIC PNL TOTAL CA: CPT

## 2020-10-08 PROCEDURE — 36415 COLL VENOUS BLD VENIPUNCTURE: CPT

## 2020-10-08 PROCEDURE — 83735 ASSAY OF MAGNESIUM: CPT

## 2020-10-08 PROCEDURE — 99226 PR SUBSEQUENT OBSERVATION CARE,LEVEL III: ICD-10-PCS | Mod: ,,, | Performed by: PHYSICIAN ASSISTANT

## 2020-10-08 PROCEDURE — 85025 COMPLETE CBC W/AUTO DIFF WBC: CPT

## 2020-10-08 PROCEDURE — 99226 PR SUBSEQUENT OBSERVATION CARE,LEVEL III: CPT | Mod: ,,, | Performed by: PHYSICIAN ASSISTANT

## 2020-10-08 PROCEDURE — 63600175 PHARM REV CODE 636 W HCPCS: Performed by: HOSPITALIST

## 2020-10-08 PROCEDURE — 99215 OFFICE O/P EST HI 40 MIN: CPT | Mod: ,,, | Performed by: NURSE PRACTITIONER

## 2020-10-08 PROCEDURE — G0257 UNSCHED DIALYSIS ESRD PT HOS: HCPCS

## 2020-10-08 PROCEDURE — 25000242 PHARM REV CODE 250 ALT 637 W/ HCPCS: Performed by: PHYSICIAN ASSISTANT

## 2020-10-08 RX ORDER — IPRATROPIUM BROMIDE AND ALBUTEROL SULFATE 2.5; .5 MG/3ML; MG/3ML
3 SOLUTION RESPIRATORY (INHALATION)
Status: DISCONTINUED | OUTPATIENT
Start: 2020-10-08 | End: 2020-10-10 | Stop reason: HOSPADM

## 2020-10-08 RX ORDER — SODIUM CHLORIDE 9 MG/ML
INJECTION, SOLUTION INTRAVENOUS
Status: CANCELLED | OUTPATIENT
Start: 2020-10-08

## 2020-10-08 RX ORDER — SODIUM CHLORIDE 9 MG/ML
INJECTION, SOLUTION INTRAVENOUS ONCE
Status: CANCELLED | OUTPATIENT
Start: 2020-10-08 | End: 2020-10-08

## 2020-10-08 RX ADMIN — CLONIDINE HYDROCHLORIDE 0.1 MG: 0.1 TABLET ORAL at 09:10

## 2020-10-08 RX ADMIN — MELATONIN TAB 3 MG 6 MG: 3 TAB at 11:10

## 2020-10-08 RX ADMIN — LOSARTAN POTASSIUM 50 MG: 50 TABLET, FILM COATED ORAL at 08:10

## 2020-10-08 RX ADMIN — HEPARIN SODIUM 5000 UNITS: 5000 INJECTION INTRAVENOUS; SUBCUTANEOUS at 05:10

## 2020-10-08 RX ADMIN — CLONIDINE HYDROCHLORIDE 0.1 MG: 0.1 TABLET ORAL at 08:10

## 2020-10-08 RX ADMIN — TRAMADOL HYDROCHLORIDE 50 MG: 50 TABLET, FILM COATED ORAL at 04:10

## 2020-10-08 RX ADMIN — ASPIRIN 81 MG: 81 TABLET, COATED ORAL at 08:10

## 2020-10-08 RX ADMIN — HYDRALAZINE HYDROCHLORIDE 100 MG: 50 TABLET, FILM COATED ORAL at 05:10

## 2020-10-08 RX ADMIN — ATORVASTATIN CALCIUM 20 MG: 20 TABLET, FILM COATED ORAL at 09:10

## 2020-10-08 RX ADMIN — IPRATROPIUM BROMIDE AND ALBUTEROL SULFATE 3 ML: .5; 2.5 SOLUTION RESPIRATORY (INHALATION) at 04:10

## 2020-10-08 RX ADMIN — HYDRALAZINE HYDROCHLORIDE 100 MG: 50 TABLET, FILM COATED ORAL at 09:10

## 2020-10-08 RX ADMIN — HEPARIN SODIUM 5000 UNITS: 5000 INJECTION INTRAVENOUS; SUBCUTANEOUS at 11:10

## 2020-10-08 RX ADMIN — SEVELAMER CARBONATE 800 MG: 800 TABLET, FILM COATED ORAL at 07:10

## 2020-10-08 RX ADMIN — POLYETHYLENE GLYCOL 3350 17 G: 17 POWDER, FOR SOLUTION ORAL at 08:10

## 2020-10-08 RX ADMIN — IPRATROPIUM BROMIDE AND ALBUTEROL SULFATE 3 ML: .5; 2.5 SOLUTION RESPIRATORY (INHALATION) at 08:10

## 2020-10-08 RX ADMIN — SEVELAMER CARBONATE 800 MG: 800 TABLET, FILM COATED ORAL at 04:10

## 2020-10-08 NOTE — SUBJECTIVE & OBJECTIVE
Past Medical History:   Diagnosis Date    (HFpEF) heart failure with preserved ejection fraction 5/21/2019    Anemia in ESRD (end-stage renal disease) 5/29/2016    Aneurysm of arteriovenous dialysis fistula     Aortic atherosclerosis 11/22/2016    Bilateral low back pain without sciatica 11/17/2015    Blindness of right eye 11/12/2016    Brain compression 6/22/2019    Cataract     Central retinal vein occlusion, right eye 6/3/2014    Chronic diastolic heart failure 1/8/2016    Chronic respiratory failure with hypoxia 5/29/2016    Coronary artery disease involving native coronary artery of native heart without angina pectoris 12/12/2016    Diverticulosis     Encounter for blood transfusion     Enlarged LA (left atrium) 10/7/2016    Epiretinal membrane 7/17/2012    ESRD on hemodialysis     MWF    History of GI diverticular bleed     5/22/16    Hyperparathyroidism, secondary renal 10/14/2016    Left spastic hemiparesis 11/13/2016    Moderate single current episode of major depressive disorder 2/8/2019    Non-rheumatic tricuspid valve insufficiency 1/15/2017    Peripheral vascular disease, unspecified 11/22/2016    Physical debility 11/17/2016    Pleural effusion on right     Pulmonary emphysema 1/15/2017    Pulmonary hypertension 6/28/2019    Renovascular hypertension 1/8/2016    Seizure 6/24/2019    Severe aortic valve stenosis 2/4/2016    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 11/13/2016    s/p thrombectomy of MCA    Subdural hematoma 05/21/2019    Bilateral R>L    Thrombocytopenia, unspecified 1/15/2017    Type 2 diabetes mellitus with chronic kidney disease on chronic dialysis, without long-term current use of insulin 5/1/2018    Type 2 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, without long-term current use of insulin 3/26/2013    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,  without long-term current use of insulin 3/26/2013    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       Past Surgical History:   Procedure Laterality Date    ABDOMINAL SURGERY      BREAST SURGERY      tumor removal x 2    CARDIAC SURGERY  2016    Implantable loop recorder placed    CATARACT EXTRACTION      CEREBRAL ANGIOGRAM N/A 6/24/2019    Procedure: ANGIOGRAM-CEREBRAL;  Surgeon: Kenisha Surgeon;  Location: [facility];  Service: Anesthesiology;  Laterality: N/A;    CHOLECYSTECTOMY      COLONOSCOPY N/A 5/23/2016    Procedure: COLONOSCOPY;  Surgeon: WILLIAM Colvin MD;  Location: [facility] ENDO (2ND FLR);  Service: Endoscopy;  Laterality: N/A;    COLONOSCOPY N/A 5/30/2016    Procedure: COLONOSCOPY;  Surgeon: Sam Davis MD;  [facility]  Service: Endoscopy;  Laterality: N/A;    CRANIOTOMY FOR EVACUATION OF SUBDURAL HEMATOMA Right 6/23/2019    Procedure: KATE HOLES FOR SUBDURAL HEMATOMA EVACUATION;  Surgeon: Trevor Conner MD;  Location: [address];  Service: Neurosurgery;  Laterality: Right;    EYE SURGERY      FISTULOGRAM Left 11/28/2018    Procedure: Fistulogram;  Surgeon: NADINE Blum III, MD;  Location: [facility] CATH LAB;  Service: Cardiology;  Laterality: Left;    INTRAMEDULLARY RODDING OF FEMUR Left 10/28/2019    Procedure: INSERTION, INTRAMEDULLARY NELIDA, FEMUR,  Left , synthes, hana table, large C arm clock side;  Surgeon: Torey Aguilar MD;  Location: [address];  Service: Orthopedics;  Laterality: Left;  general        PLACEMENT OF DUAL-LUMEN VASCULAR CATHETER Right 11/29/2018    Procedure: INSERTION, CATHETER, VASCULAR, DUAL LUMEN;  Surgeon: NADINE Blum III, MD;  Location: [facility] OR [address] [place] [address];  Service: Peripheral Vascular;  Laterality: Right;  Permacatheter placement     RESECTION OF ANEURYSM Left 11/29/2018    Procedure: EXCISION, ANEURYSM;  Surgeon: NADINE Blum III, MD;  [facility];  Service: Peripheral Vascular;  Laterality: Left;   Excision, L AVF aneurysm    REVISION OF ARTERIOVENOUS FISTULA Left 11/29/2018    Procedure: REVISION, AV FISTULA,;  Surgeon: NADINE Blum III, MD;  Location: [facility] OR [address] [place] [address];  Service: Peripheral Vascular;  Laterality: Left;  OR 11    UPPER GASTROINTESTINAL ENDOSCOPY         Review of patient's allergies indicates:  No Known Allergies  Current Facility-Administered Medications   Medication Frequency    acetaminophen tablet 650 mg Q8H PRN    albuterol sulfate nebulizer solution 2.5 mg Q4H PRN    artificial tears 0.5 % ophthalmic solution 2 drop QID PRN    aspirin EC tablet 81 mg Daily    atorvastatin tablet 20 mg QHS    benzonatate capsule 100 mg TID PRN    cloNIDine tablet 0.1 mg TID    dextrose 50% injection 12.5 g PRN    dextrose 50% injection 25 g PRN    fluticasone furoate-vilanteroL 200-25 mcg/dose diskus inhaler 1 puff Daily    glucagon (human recombinant) injection 1 mg PRN    glucose chewable tablet 16 g PRN    glucose chewable tablet 24 g PRN    heparin (porcine) injection 5,000 Units Q8H    hydrALAZINE tablet 100 mg Q8H    lidocaine-prilocaine cream PRN    losartan tablet 50 mg Daily    melatonin tablet 6 mg Nightly PRN    ondansetron injection 4 mg Q6H PRN    polyethylene glycol packet 17 g Daily    senna-docusate 8.6-50 mg per tablet 1 tablet Daily PRN    sevelamer carbonate tablet 800 mg TID WM    sodium chloride 0.9% flush 10 mL PRN    traMADoL tablet 50 mg Q8H PRN     Family History     Problem Relation (Age of Onset)    Cancer Sister    Cataracts Mother    Esophageal cancer Sister    Glaucoma Brother, Maternal Aunt    Heart disease Brother    Heart failure Sister    Hypertension Mother, Sister, Brother, Father    No Known Problems Maternal Uncle, Paternal Aunt, Paternal Uncle, Maternal Grandmother, Maternal Grandfather, Paternal Grandmother, Paternal Grandfather    Stroke Mother        Tobacco Use    Smoking status: Never Smoker    Smokeless tobacco: Never Used    Substance and Sexual Activity    Alcohol use: No     Comment: Reports occasional 1-2 drinks     Drug use: No    Sexual activity: Not Currently     Review of Systems   Constitutional: Positive for activity change and fatigue. Negative for appetite change.   HENT: Positive for hearing loss. Negative for congestion.    Eyes: Positive for visual disturbance.   Respiratory: Positive for shortness of breath.    Cardiovascular: Negative for chest pain, palpitations and leg swelling.   Gastrointestinal: Negative for diarrhea, nausea and vomiting.   Genitourinary: Positive for decreased urine volume.   Musculoskeletal: Positive for gait problem.   Skin: Negative for wound.   Neurological: Positive for weakness.   Psychiatric/Behavioral: Negative for agitation, behavioral problems and confusion.     Objective:     Vital Signs (Most Recent):  Temp: 96.1 °F (35.6 °C) (10/08/20 0707)  Pulse: 67 (10/08/20 0707)  Resp: 16 (10/08/20 0707)  BP: (!) 116/59 (10/08/20 0707)  SpO2: 98 % (10/08/20 0707)  O2 Device (Oxygen Therapy): nasal cannula (10/07/20 2241) Vital Signs (24h Range):  Temp:  [96.1 °F (35.6 °C)-97.6 °F (36.4 °C)] 96.1 °F (35.6 °C)  Pulse:  [63-88] 67  Resp:  [15-20] 16  SpO2:  [90 %-100 %] 98 %  BP: (114-154)/(56-67) 116/59     Weight: 37.5 kg (82 lb 10.8 oz) (10/08/20 0400)  Body mass index is 14.64 kg/m².  Body surface area is 1.29 meters squared.    No intake/output data recorded.    Physical Exam  Vitals signs and nursing note reviewed.   HENT:      Nose: No congestion.      Mouth/Throat:      Mouth: Mucous membranes are dry.   Cardiovascular:      Rate and Rhythm: Normal rate.   Pulmonary:      Effort: Pulmonary effort is normal. No respiratory distress.      Breath sounds: Wheezing and rales present.   Abdominal:      General: Abdomen is flat. There is no distension.   Musculoskeletal: Normal range of motion.         General: No swelling.      Right lower leg: No edema.      Left lower leg: No edema.    Skin:     General: Skin is warm.   Neurological:      General: No focal deficit present.      Mental Status: She is alert and oriented to person, place, and time.   Psychiatric:         Mood and Affect: Mood normal.         Behavior: Behavior normal.         Thought Content: Thought content normal.         Judgment: Judgment normal.         Significant Labs:  CBC:   Recent Labs   Lab 10/07/20  1637 10/07/20  1653   WBC 7.92  --    RBC 3.32*  --    HGB 9.5*  --    HCT 31.9* 32*   *  --    MCV 96  --    MCH 28.6  --    MCHC 29.8*  --      CMP:   Recent Labs   Lab 10/07/20  1754 10/08/20  0452   * 98   CALCIUM 9.1 9.5   ALBUMIN 2.5*  --    PROT 6.6  --     142   K 4.2 4.5   CO2 22* 19*    105   BUN 45* 53*   CREATININE 3.5* 4.1*   ALKPHOS 85  --    ALT 9*  --    AST 16  --    BILITOT 0.5  --      All labs within the past 24 hours have been reviewed.

## 2020-10-08 NOTE — PROGRESS NOTES
Consult received per MD for wound to left heel. Pt is known to wound care team during previous admission in September 2020 with wound care assessing pt's left heel on 9/29/20.    Pt was admitted on 10/7/20 with chest pain, sob and anxiety. Pt has a PMHx of ESRD (HD MWF), cCHF (25% EF), DM, pHTN (on 2 L NC at home), and h/o SDH.    Attempted to see pt; however, pt was off the unit. Chart reflects that pt is receiving dialysis treatment.    Wound care nurse to order waffle overlay and heel lift boots as a pressure prevention intervention with plans to follow up with wound care consult today or tomorrow. [id number]

## 2020-10-08 NOTE — PROGRESS NOTES
Received pt via bed awake and alert. POC discussed with the pt. Maintenance off day HD tx initiated via LUE AVF without difficulty. UF goal of 3L as tolerated .

## 2020-10-08 NOTE — SUBJECTIVE & OBJECTIVE
Interval History: Patient reports continued SOB despite HD. Currently on 4L NC for comfort, satting 95% (home O2 2L). Some abdominal breathing noted. Suspect SOB due to severe AS.     Review of Systems   Constitutional: Positive for activity change and fatigue. Negative for appetite change, chills and fever.   HENT: Positive for hearing loss. Negative for congestion and rhinorrhea.    Eyes: Positive for visual disturbance.   Respiratory: Positive for shortness of breath.    Cardiovascular: Negative for chest pain, palpitations and leg swelling.   Gastrointestinal: Negative for abdominal distention, abdominal pain, diarrhea, nausea and vomiting.   Genitourinary: Positive for decreased urine volume.   Musculoskeletal: Positive for gait problem.   Skin: Positive for wound (chronic, L heel).   Neurological: Positive for weakness. Negative for dizziness.   Psychiatric/Behavioral: Negative for agitation, behavioral problems and confusion.     Objective:     Vital Signs (Most Recent):  Temp: 97.2 °F (36.2 °C) (10/08/20 1300)  Pulse: 82 (10/08/20 1300)  Resp: 16 (10/08/20 1300)  BP: (!) 105/50 (10/08/20 1300)  SpO2: 95 % (10/08/20 1000) Vital Signs (24h Range):  Temp:  [96.1 °F (35.6 °C)-97.5 °F (36.4 °C)] 97.2 °F (36.2 °C)  Pulse:  [] 82  Resp:  [16-20] 16  SpO2:  [90 %-100 %] 95 %  BP: ()/(36-67) 105/50     Weight: 37.5 kg (82 lb 10.8 oz)  Body mass index is 14.64 kg/m².    Intake/Output Summary (Last 24 hours) at 10/8/2020 1512  Last data filed at 10/8/2020 1300  Gross per 24 hour   Intake 850 ml   Output 2730 ml   Net -1880 ml      Physical Exam  Vitals signs and nursing note reviewed.   HENT:      Nose: No congestion.      Mouth/Throat:      Mouth: Mucous membranes are dry.   Eyes:      Comments: Blind R eye   Cardiovascular:      Rate and Rhythm: Normal rate.   Pulmonary:      Effort: Pulmonary effort is normal. No respiratory distress.      Breath sounds: Wheezing and rales present.   Abdominal:       General: Abdomen is flat. There is no distension.   Musculoskeletal: Normal range of motion.         General: No swelling.      Right lower leg: No edema.      Left lower leg: No edema.   Skin:     General: Skin is warm.      Comments: Chronic L heel wound   Neurological:      General: No focal deficit present.      Mental Status: She is alert and oriented to person, place, and time.   Psychiatric:         Mood and Affect: Mood normal.         Behavior: Behavior normal.         Thought Content: Thought content normal.         Judgment: Judgment normal.         Significant Labs:   CBC:   Recent Labs   Lab 10/07/20  1637 10/07/20  1653 10/08/20  0732   WBC 7.92  --  6.93   HGB 9.5*  --  8.3*   HCT 31.9* 32* 29.5*   *  --  122*     CMP:   Recent Labs   Lab 10/07/20  1754 10/08/20  0452    142   K 4.2 4.5    105   CO2 22* 19*   * 98   BUN 45* 53*   CREATININE 3.5* 4.1*   CALCIUM 9.1 9.5   PROT 6.6  --    ALBUMIN 2.5*  --    BILITOT 0.5  --    ALKPHOS 85  --    AST 16  --    ALT 9*  --    ANIONGAP 13 18*   EGFRNONAA 11.9* 9.8*     Magnesium:   Recent Labs   Lab 10/08/20  0452   MG 2.3     Troponin:   Recent Labs   Lab 10/07/20  1637 10/07/20  2029   TROPONINI 0.056* 0.012       Significant Imaging: I have reviewed all pertinent imaging results/findings within the past 24 hours.

## 2020-10-08 NOTE — NURSING
Patient reports SOB at 2L O2. Oxygen saturation 88%. Increased O2 to 4L with O2 sat at 96%.  Patient reports improved symptoms.

## 2020-10-08 NOTE — NURSING
Received patient to room via stretcher from ED. Awake and oriented. Required total assist with transfer. Can't tolerate head of bed flat. Pulse ox 85% on 3 liters. Up to 90% on 4 liters. Oriented to call system. Patient able to demonstrate understanding of how to use call light.

## 2020-10-08 NOTE — PROGRESS NOTES
[facility] Hospital Medicine  Progress Note    Patient Name: Tea Georges  MRN: [id number]  Patient Class: OP- Observation   Admission Date: 10/7/2020  Length of Stay: 0 days  Attending Physician: Salvador Solorzano MD  Primary Care Provider: Parth Posadas Ii, MD    [facility] Medicine Team: [facility] Maira White PA-C    Subjective:     Principal Problem:Chest pain        HPI:  Ms. Tea Georges is a 80 y.o. female with ESRD on HD and chronic respiratory failure on 2L home oxygen who presents to the ER for evaluation of shortness of breath and chest pain.  She endorsed midepigastric and left sided chest pain that didn't radiate, lasting about 15 minutes with shortness of breath.  She went to HD, and dialysis was stopped early because of her symptoms.  In HD, she was given a breathing treatment and was sent to the ER.  She endorses a cough, but denies any fevers or chills.  Of note, she was just admitted to the hospital end of September for similar symptoms.     Upon arrival to the ER, vitals were temp 97.6F, HR 88 and /62 on her home 2L NC.  Troponin was 0.056 and BNP >4,900.  CXR showed worsening effusions.  She was admitted to Hospital Medicine for HD.    Overview/Hospital Course:  Ms. Metcalf was admitted to observation for SOB. Troponin 0.056>0.012. EKG non-ischemic. Nephrology consulted, patient underwent HD with 1.8L removed. Patient continues to be SOB, suspect due to severe AS.    Interval History: Patient reports continued SOB despite HD. Currently on 4L NC for comfort, satting 95% (home O2 2L). Some abdominal breathing noted. Suspect SOB due to severe AS.     Review of Systems   Constitutional: Positive for activity change and fatigue. Negative for appetite change, chills and fever.   HENT: Positive for hearing loss. Negative for congestion and rhinorrhea.    Eyes: Positive for visual disturbance.   Respiratory: Positive for shortness of breath.    Cardiovascular: Negative  for chest pain, palpitations and leg swelling.   Gastrointestinal: Negative for abdominal distention, abdominal pain, diarrhea, nausea and vomiting.   Genitourinary: Positive for decreased urine volume.   Musculoskeletal: Positive for gait problem.   Skin: Positive for wound (chronic, L heel).   Neurological: Positive for weakness. Negative for dizziness.   Psychiatric/Behavioral: Negative for agitation, behavioral problems and confusion.     Objective:     Vital Signs (Most Recent):  Temp: 97.2 °F (36.2 °C) (10/08/20 1300)  Pulse: 82 (10/08/20 1300)  Resp: 16 (10/08/20 1300)  BP: (!) 105/50 (10/08/20 1300)  SpO2: 95 % (10/08/20 1000) Vital Signs (24h Range):  Temp:  [96.1 °F (35.6 °C)-97.5 °F (36.4 °C)] 97.2 °F (36.2 °C)  Pulse:  [] 82  Resp:  [16-20] 16  SpO2:  [90 %-100 %] 95 %  BP: ()/(36-67) 105/50     Weight: 37.5 kg (82 lb 10.8 oz)  Body mass index is 14.64 kg/m².    Intake/Output Summary (Last 24 hours) at 10/8/2020 1512  Last data filed at 10/8/2020 1300  Gross per 24 hour   Intake 850 ml   Output 2730 ml   Net -1880 ml      Physical Exam  Vitals signs and nursing note reviewed.   HENT:      Nose: No congestion.      Mouth/Throat:      Mouth: Mucous membranes are dry.   Eyes:      Comments: Blind R eye   Cardiovascular:      Rate and Rhythm: Normal rate.   Pulmonary:      Effort: Pulmonary effort is normal. No respiratory distress.      Breath sounds: Wheezing and rales present.   Abdominal:      General: Abdomen is flat. There is no distension.   Musculoskeletal: Normal range of motion.         General: No swelling.      Right lower leg: No edema.      Left lower leg: No edema.   Skin:     General: Skin is warm.      Comments: Chronic L heel wound   Neurological:      General: No focal deficit present.      Mental Status: She is alert and oriented to person, place, and time.   Psychiatric:         Mood and Affect: Mood normal.         Behavior: Behavior normal.         Thought Content: Thought  content normal.         Judgment: Judgment normal.         Significant Labs:   CBC:   Recent Labs   Lab 10/07/20  1637 10/07/20  1653 10/08/20  0732   WBC 7.92  --  6.93   HGB 9.5*  --  8.3*   HCT 31.9* 32* 29.5*   *  --  122*     CMP:   Recent Labs   Lab 10/07/20  1754 10/08/20  0452    142   K 4.2 4.5    105   CO2 22* 19*   * 98   BUN 45* 53*   CREATININE 3.5* 4.1*   CALCIUM 9.1 9.5   PROT 6.6  --    ALBUMIN 2.5*  --    BILITOT 0.5  --    ALKPHOS 85  --    AST 16  --    ALT 9*  --    ANIONGAP 13 18*   EGFRNONAA 11.9* 9.8*     Magnesium:   Recent Labs   Lab 10/08/20  0452   MG 2.3     Troponin:   Recent Labs   Lab 10/07/20  1637 10/07/20  2029   TROPONINI 0.056* 0.012       Significant Imaging: I have reviewed all pertinent imaging results/findings within the past 24 hours.      Assessment/Plan:      * Chest pain  Patient presents from HD for SOB and chest pain. Chest pain resolved without intervention, however continues to be SOB. Suspect SOB is due to severe AS. Will diurese with HD, but high risk of readmittance given severe AS and high risk for intervention  · EKG without STEMI or any acute ST/T wave changes  · troponin 0.056> 0.12  · Recent TTE with severe AS- suspect chest pain due to AS  · Underwent HD with only mild improvement in SOB, will undergo additional HD tomorrow  · Continue Aspirin 81mg PO daily  · Continue Lipitor 40mg PO daily  · Continue tele  · Nitro prn  · No further episodes of chest pain  · Needs to follow up with [facility] intervention[facility] cardiology where she was being worked up for TAVR    Anorexia  · Body mass index is 14.64 kg/m².  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  - last TTE (9/17/20) with EF 25%, grade II diastolic function, severe AS, severe RVE, moderately reduced RV function, pulmonary hypertension  - continue home ARB  - anuric    Thrombocytopenia, unspecified  · Chronic and stable  · Monitor    Pulmonary emphysema  · Chronic issue  · Continue Breo  · Duonebs prn    Coronary artery disease involving native coronary artery of native heart without angina pectoris  · Chronic and stable  · Continue Aspirin 81mg PO daily  · Continue Lipitor 20mg PO daily  · Continue Losartan 50mg PO daily    Severe aortic valve stenosis  - Last TTE with severe/critical aortic valve stenosis. Aortic valve area is 0.40 cm2; peak velocity is 5.03 m/s; mean gradient is 60 mmHg.  - Seen by interventional cardiology during last hospital stay, defer to outpatient cardiologist, though high risk for intervention  - Per pt. Cardiologist at [facility] in 8/2020 note, (see care everywhere) she is currently under evaluation for valvular repair, but she is "inoperable to conventional AVR and high risk for TAVR"  - defering additional intervention to outpatient Cardiologist  - suspect severe AS causing continued SOB, and without intervention patient will continue to present with same symptoms    ESRD on hemodialysis  Anemia in ESRD  - HD on MWF  - anuric  - Hgb 9.5 on admit, at baseline  - continue binders  - Nephrology consulted, appreciate assistance  - underwent HD with 1.8L removed, patient became hypotensive after  - will go to HD tomorrow    Renovascular hypertension  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  · Chronic and stable  · Continue Clonidine 0.1mg PO TID  · Continue Hydralazine 100mg PO q8h  · Continue Losartan 50mg PO daily      VTE Risk Mitigation (From admission, onward)         Ordered     heparin (porcine) injection 5,000 Units  Every 8 hours      10/07/20 2651                Discharge Planning   GORDY: 10/8/2020     Code Status: Full " Code   Is the patient medically ready for discharge?:     Reason for patient still in hospital (select all that apply): Patient trending condition and Treatment  Discharge Plan A: Return to nursing home                  Maira White PA-C  Department of Hospital Medicine   [facility]

## 2020-10-08 NOTE — ED NOTES
Patient identifiers verified and correct for Edison Georges    HENT: Negative for congestion and sore throat.    Eyes: Negative for pain.   Respiratory: Positive for cough and shortness of breath.    Cardiovascular: Positive for chest pain. Negative for palpitations and leg swelling.   Gastrointestinal: Negative for abdominal pain, constipation, diarrhea, nausea and vomiting.   Genitourinary: Negative for difficulty urinating, dysuria and hematuria.   Musculoskeletal: Negative for back pain.   Skin: Negative for rash.   Neurological: Positive for weakness. Negative for light-headedness and headaches.   Hematological: Does not bruise/bleed easily.   Psychiatric/Behavioral: Negative for agitation.

## 2020-10-08 NOTE — ASSESSMENT & PLAN NOTE
- last TTE (9/17/20) with EF 25%, grade II diastolic function, severe AS, severe RVE, moderately reduced RV function, pulmonary hypertension  - continue home ARB  - anuric

## 2020-10-08 NOTE — CONSULTS
"[facility]  Nephrology  Consult Note    Patient Name: Tea Georges  MRN: [id number]  Admission Date: 10/7/2020  Hospital Length of Stay: 0 days  Attending Provider: Salvador Solorzano MD   Primary Care Physician: Parth Posadas Ii, MD  Principal Problem:Chest pain, rule out acute myocardial infarction    Inpatient consult to Nephrology  Consult performed by: Josee Henley DNP, FNP-C  Consult ordered by: Ryan Berry MD  Reason for consult: ESRD Management        Subjective:     HPI: The patient is a 79 yo AAF with a history of ESRD on HD MWF, HTN, severe AS, moderate-severe pHTN, HFpEF, chronic hypoxic respiratory failure 2/2 emphysema (on home O2), CVA, DM2, SDH s/p evacuation 6/2019, seizures, and chronic debility who was recently admitted 9/28-9/29 with CC of shortness of breath. The patient was dialyze and discharged. She presented yesterday with similar complaints of shortness of breath and chest pain during HD. She endorsed non radiating L chest pain (now resolved) and SOB which lasted about 15 minutes. Her dialysis treatment was stopped early and she was sent to the ED for evaluation and treatment. "Upon arrival to the ER, vitals were temp 97.6F, HR 88 and /62 on her home 2L NC." Her troponin was 0.056 on arrival bu has improved to 0.012. CXR significant for worsening effusions. She was admitted to Hospital Medicine for HD. Nephrology consulted for ESRD management.     Past Medical History:   Diagnosis Date    (HFpEF) heart failure with preserved ejection fraction 5/21/2019    Anemia in ESRD (end-stage renal disease) 5/29/2016    Aneurysm of arteriovenous dialysis fistula     Aortic atherosclerosis 11/22/2016    Bilateral low back pain without sciatica 11/17/2015    Blindness of right eye 11/12/2016    Brain compression 6/22/2019    Cataract     Central retinal vein occlusion, right eye 6/3/2014    Chronic diastolic heart failure 1/8/2016    Chronic respiratory " failure with hypoxia 5/29/2016    Coronary artery disease involving native coronary artery of native heart without angina pectoris 12/12/2016    Diverticulosis     Encounter for blood transfusion     Enlarged LA (left atrium) 10/7/2016    Epiretinal membrane 7/17/2012    ESRD on hemodialysis     MWF    History of GI diverticular bleed     5/22/16    Hyperparathyroidism, secondary renal 10/14/2016    Left spastic hemiparesis 11/13/2016    Moderate single current episode of major depressive disorder 2/8/2019    Non-rheumatic tricuspid valve insufficiency 1/15/2017    Peripheral vascular disease, unspecified 11/22/2016    Physical debility 11/17/2016    Pleural effusion on right     Pulmonary emphysema 1/15/2017    Pulmonary hypertension 6/28/2019    Renovascular hypertension 1/8/2016    Seizure 6/24/2019    Severe aortic valve stenosis 2/4/2016    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 11/13/2016    s/p thrombectomy of MCA    Subdural hematoma 05/21/2019    Bilateral R>L    Thrombocytopenia, unspecified 1/15/2017    Type 2 diabetes mellitus with chronic kidney disease on chronic dialysis, without long-term current use of insulin 5/1/2018    Type 2 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, without long-term current use of insulin 3/26/2013    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, without long-term current use of insulin 3/26/2013    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       Past Surgical History:   Procedure Laterality Date    ABDOMINAL SURGERY      BREAST SURGERY      tumor removal x 2    CARDIAC SURGERY  2016    Implantable loop recorder placed    CATARACT EXTRACTION      CEREBRAL ANGIOGRAM N/A 6/24/2019    Procedure: ANGIOGRAM-CEREBRAL;  Surgeon: Kenisha Surgeon;  Location: [facility];  Service: Anesthesiology;  Laterality: N/A;    CHOLECYSTECTOMY      COLONOSCOPY N/A  5/23/2016    Procedure: COLONOSCOPY;  Surgeon: WILLIAM Colvin MD;  Location: [facility] ENDO (2ND FLR);  Service: Endoscopy;  Laterality: N/A;    COLONOSCOPY N/A 5/30/2016    Procedure: COLONOSCOPY;  Surgeon: Sam Davis MD;  Location: [facility] ENDO (2ND FLR);  Service: Endoscopy;  Laterality: N/A;    CRANIOTOMY FOR EVACUATION OF SUBDURAL HEMATOMA Right 6/23/2019    Procedure: KATE HOLES FOR SUBDURAL HEMATOMA EVACUATION;  Surgeon: Trevor Conner MD;  Location: [facility] OR [facility] FLR;  Service: Neurosurgery;  Laterality: Right;    EYE SURGERY      FISTULOGRAM Left 11/28/2018    Procedure: Fistulogram;  Surgeon: NADINE Blum III, MD;  Location: [facility] CATH LAB;  Service: Cardiology;  Laterality: Left;    INTRAMEDULLARY RODDING OF FEMUR Left 10/28/2019    Procedure: INSERTION, INTRAMEDULLARY NELIDA, FEMUR,  Left , synthes, hana table, large C arm clock side;  Surgeon: Torey Aguilar MD;  [facility];  Service: Orthopedics;  Laterality: Left;  general        PLACEMENT OF DUAL-LUMEN VASCULAR CATHETER Right 11/29/2018    Procedure: INSERTION, CATHETER, VASCULAR, DUAL LUMEN;  Surgeon: NADINE Blum III, MD;  [facility];  Service: Peripheral Vascular;  Laterality: Right;  Permacatheter placement     RESECTION OF ANEURYSM Left 11/29/2018    Procedure: EXCISION, ANEURYSM;  Surgeon: NADINE Blum III, MD;  [facility];  Service: Peripheral Vascular;  Laterality: Left;  Excision, L AVF aneurysm    REVISION OF ARTERIOVENOUS FISTULA Left 11/29/2018    Procedure: REVISION, AV FISTULA,;  Surgeon: NADINE Blum III, MD;  [facility];  Service: Peripheral Vascular;  Laterality: Left;  OR 11    UPPER GASTROINTESTINAL ENDOSCOPY         Review of patient's allergies indicates:  No Known Allergies  Current Facility-Administered Medications   Medication Frequency    acetaminophen tablet 650 mg Q8H PRN    albuterol sulfate nebulizer solution 2.5 mg Q4H PRN     artificial tears 0.5 % ophthalmic solution 2 drop QID PRN    aspirin EC tablet 81 mg Daily    atorvastatin tablet 20 mg QHS    benzonatate capsule 100 mg TID PRN    cloNIDine tablet 0.1 mg TID    dextrose 50% injection 12.5 g PRN    dextrose 50% injection 25 g PRN    fluticasone furoate-vilanteroL 200-25 mcg/dose diskus inhaler 1 puff Daily    glucagon (human recombinant) injection 1 mg PRN    glucose chewable tablet 16 g PRN    glucose chewable tablet 24 g PRN    heparin (porcine) injection 5,000 Units Q8H    hydrALAZINE tablet 100 mg Q8H    lidocaine-prilocaine cream PRN    losartan tablet 50 mg Daily    melatonin tablet 6 mg Nightly PRN    ondansetron injection 4 mg Q6H PRN    polyethylene glycol packet 17 g Daily    senna-docusate 8.6-50 mg per tablet 1 tablet Daily PRN    sevelamer carbonate tablet 800 mg TID WM    sodium chloride 0.9% flush 10 mL PRN    traMADoL tablet 50 mg Q8H PRN     Family History     Problem Relation (Age of Onset)    Cancer Sister    Cataracts Mother    Esophageal cancer Sister    Glaucoma Brother, Maternal Aunt    Heart disease Brother    Heart failure Sister    Hypertension Mother, Sister, Brother, Father    No Known Problems Maternal Uncle, Paternal Aunt, Paternal Uncle, Maternal Grandmother, Maternal Grandfather, Paternal Grandmother, Paternal Grandfather    Stroke Mother        Tobacco Use    Smoking status: Never Smoker    Smokeless tobacco: Never Used   Substance and Sexual Activity    Alcohol use: No     Comment: Reports occasional 1-2 drinks     Drug use: No    Sexual activity: Not Currently     Review of Systems   Constitutional: Positive for activity change and fatigue. Negative for appetite change.   HENT: Positive for hearing loss. Negative for congestion.    Eyes: Positive for visual disturbance.   Respiratory: Positive for shortness of breath.    Cardiovascular: Negative for chest pain, palpitations and leg swelling.   Gastrointestinal: Negative  for diarrhea, nausea and vomiting.   Genitourinary: Positive for decreased urine volume.   Musculoskeletal: Positive for gait problem.   Skin: Negative for wound.   Neurological: Positive for weakness.   Psychiatric/Behavioral: Negative for agitation, behavioral problems and confusion.     Objective:     Vital Signs (Most Recent):  Temp: 96.1 °F (35.6 °C) (10/08/20 0707)  Pulse: 67 (10/08/20 0707)  Resp: 16 (10/08/20 0707)  BP: (!) 116/59 (10/08/20 0707)  SpO2: 98 % (10/08/20 0707)  O2 Device (Oxygen Therapy): nasal cannula (10/07/20 2241) Vital Signs (24h Range):  Temp:  [96.1 °F (35.6 °C)-97.6 °F (36.4 °C)] 96.1 °F (35.6 °C)  Pulse:  [63-88] 67  Resp:  [15-20] 16  SpO2:  [90 %-100 %] 98 %  BP: (114-154)/(56-67) 116/59     Weight: 37.5 kg (82 lb 10.8 oz) (10/08/20 0400)  Body mass index is 14.64 kg/m².  Body surface area is 1.29 meters squared.    No intake/output data recorded.    Physical Exam  Vitals signs and nursing note reviewed.   HENT:      Nose: No congestion.      Mouth/Throat:      Mouth: Mucous membranes are dry.   Cardiovascular:      Rate and Rhythm: Normal rate.   Pulmonary:      Effort: Pulmonary effort is normal. No respiratory distress.      Breath sounds: Wheezing and rales present.   Abdominal:      General: Abdomen is flat. There is no distension.   Musculoskeletal: Normal range of motion.         General: No swelling.      Right lower leg: No edema.      Left lower leg: No edema.   Skin:     General: Skin is warm.   Neurological:      General: No focal deficit present.      Mental Status: She is alert and oriented to person, place, and time.   Psychiatric:         Mood and Affect: Mood normal.         Behavior: Behavior normal.         Thought Content: Thought content normal.         Judgment: Judgment normal.         Significant Labs:  CBC:   Recent Labs   Lab 10/07/20  1637 10/07/20  1653   WBC 7.92  --    RBC 3.32*  --    HGB 9.5*  --    HCT 31.9* 32*   *  --    MCV 96  --    MCH  28.6  --    MCHC 29.8*  --      CMP:   Recent Labs   Lab 10/07/20  1754 10/08/20  0452   * 98   CALCIUM 9.1 9.5   ALBUMIN 2.5*  --    PROT 6.6  --     142   K 4.2 4.5   CO2 22* 19*    105   BUN 45* 53*   CREATININE 3.5* 4.1*   ALKPHOS 85  --    ALT 9*  --    AST 16  --    BILITOT 0.5  --      All labs within the past 24 hours have been reviewed.      Assessment/Plan:     * Chest pain, rule out acute myocardial infarction  - management per primary team     Chronic kidney disease-mineral and bone disorder  - Renal Function Panel for electrolytes monitoring  - Recommend renal diet with 1 L fluid restriction    - Novasource with meals  - Already on binders as outpatient.  - Daily renal panel so that phos and albumin is monitored daily.     Anemia in ESRD (end-stage renal disease)  Recent Labs   Lab 10/07/20  1637 10/07/20  1653   WBC 7.92  --    HGB 9.5*  --    HCT 31.9* 32*   *  --        - Hgb today 9.5 on 10/7. Target Hgb 10-11 gm/dL.  - Will request iron studies in AM to assess further needs of supplementation   - Will review chronic care plan from outpatient dialysis center for SATNAM dosing.     ESRD on hemodialysis  The patient is a 81 yo AAF admitted with complaints of shortness of breath and chest pain during hemodialysis on yesterday. CP now resolved, however, patient still endorses mild complaints of shortness of breath. CXR with bilateral effusions.         Out patient HD Rx:     Modality: iHD  Days: MWF  Access: LUE AVF  Unit: [facility]  Nephrologist: Dr Jared Martinez Duration: 3-4  EDW: 50kg  UF : 2-3     - Will provide dialysis for metabolic clearance and volume management.   - Continue to monitor intake and output, daily weights   - Avoid nephrotoxic medication and renal dose medications to GFR  - Will continue to monitor    Thank you for your consult. I will follow-up with patient. Please contact us if you have any additional questions.    Josee Henley DNP,  FNP-C  Nephrology  [facility]

## 2020-10-08 NOTE — HPI
Ms. Tea Georges is a 80 y.o. female with ESRD on HD and chronic respiratory failure on 2L home oxygen who presents to the ER for evaluation of shortness of breath and chest pain.  She endorsed midepigastric and left sided chest pain that didn't radiate, lasting about 15 minutes with shortness of breath.  She went to HD, and dialysis was stopped early because of her symptoms.  In HD, she was given a breathing treatment and was sent to the ER.  She endorses a cough, but denies any fevers or chills.  Of note, she was just admitted to the hospital end of September for similar symptoms.     Upon arrival to the ER, vitals were temp 97.6F, HR 88 and /62 on her home 2L NC.  Troponin was 0.056 and BNP >4,900.  CXR showed worsening effusions.  She was admitted to Hospital Medicine for HD.

## 2020-10-08 NOTE — PLAN OF CARE
SW sent referral to [facility] via  for them to follow patient for return.     SW will continue to coordinate with patient, family, team and insurance to complete patient's discharge plan.       10/08/20 1124   Post-Acute Status   Post-Acute Authorization Placement   Post-Acute Placement Status Referrals Sent   Discharge Plan   Discharge Plan A Return to nursing home     Ora Lorenzo LMSW   - Case Management

## 2020-10-08 NOTE — HOSPITAL COURSE
Ms. Metcalf was admitted to observation for shortness of breath. Troponin 0.056>0.012. EKG non-ischemic. Nephrology consulted, patient underwent HD with 1.8L removed. Patient continues to be SOB, suspect due to critical and severe aortic stenosis. Patient became hypotensive during end of HD, hydralazine reduced from 100 mg to 50 mg TID. Additional HD session with 2L removed with improvement in SOB. Given high risk of recurrent admissions and valvular death, Interventional radiology consulted, patient not a TAVR candidate. Palliative care consulted, appreciate assistance. After further discussion with patient and family about GOC, patient made DNR with no ICU admission per patient's wishes, as she would prefer to pass peacefully when the time comes. Patient also interested in understanding more about hospice, so informational hospice consult placed for NH. LaPost filled out by Dr. Iqbal with palliative. Patient to be sent home with original copy. Patient to start on mirtazapine 7.5 mg for depression. Wound care consulted for chronic right foot ulcer for which patient was already receiving wound care for at NH. Patient discharged back to nursing home. Ambulatory referral to podiatry given for chronic left foot ulcer per wound care recommendations. Patient verbalized understanding. All questions answered.

## 2020-10-08 NOTE — ASSESSMENT & PLAN NOTE
· Endorses shortness of breath but stable oxygen on home 2L  · Continues to be SOB after HD, currently on 3L for comfort, weaning down  · HD as above

## 2020-10-08 NOTE — ASSESSMENT & PLAN NOTE
Patient presents from HD for SOB and chest pain. Chest pain resolved without intervention, however continues to be SOB. Suspect SOB is due to severe AS. Will diurese with HD, but high risk of readmittance given severe AS and high risk for intervention  · EKG without STEMI or any acute ST/T wave changes  · troponin 0.056> 0.12  · Recent TTE with severe AS- suspect chest pain due to AS  · Underwent HD with only mild improvement in SOB, will undergo additional HD tomorrow  · Continue Aspirin 81mg PO daily  · Continue Lipitor 40mg PO daily  · Continue tele  · Nitro prn  · No further episodes of chest pain  · Needs to follow up with [facility] interventional cardiology where she was being worked up for TAVR

## 2020-10-08 NOTE — ASSESSMENT & PLAN NOTE
"- Last TTE with severe/critical aortic valve stenosis. Aortic valve area is 0.40 cm2; peak velocity is 5.03 m/s; mean gradient is 60 mmHg.  - Seen by interventional cardiology during last hospital stay, defer to outpatient cardiologist, though high risk for intervention  - Per pt. Cardiologist at [facility] in 8/2020 note, (see care everywhere) she is currently under evaluation for valvular repair, but she is "inoperable to conventional AVR and high risk for TAVR"  - defering additional intervention to outpatient Cardiologist  - suspect severe AS causing continued SOB, and without intervention patient will continue to present with same symptoms  "

## 2020-10-08 NOTE — ASSESSMENT & PLAN NOTE
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

## 2020-10-08 NOTE — ASSESSMENT & PLAN NOTE
The patient is a 81 yo AAF admitted with complaints of shortness of breath and chest pain during hemodialysis on yesterday. CP now resolved, however, patient still endorses mild complaints of shortness of breath. CXR with bilateral effusions.         Out patient HD Rx:     Modality: iHD  Days: MWF  Access: LUE AVF  Unit: [facility]  Nephrologist: Dr Jared Martinez Duration: 3-4  EDW: 50kg  UF : 2-3     - Will provide dialysis for metabolic clearance and volume management.   - Continue to monitor intake and output, daily weights   - Avoid nephrotoxic medication and renal dose medications to GFR  - Will continue to monitor

## 2020-10-08 NOTE — PROGRESS NOTES
HD completed,3.5hr tx  with net UF of 1880ml and pt tolerated fair.Pt has low BP at times during the tx, recommends holding BP meds before HD tx. Blood returned,needles removed with pressure applied until hemostasis achieved. Report given to the primary nurse Radha.

## 2020-10-08 NOTE — ASSESSMENT & PLAN NOTE
· Chronic and stable  · Continue Aspirin 81mg PO daily  · Continue Lipitor 20mg PO daily  · Continue Losartan 50mg PO daily

## 2020-10-08 NOTE — PLAN OF CARE
Admit Date: 10/7/2020  3:52 PM     Admit DX: SOB (shortness of breath) [R06.02]  Chest pain [R07.9]     SW completed Discharge Planning Assessment via medical record. Patient not present in room - most recent assessment just completed 9/28/20.      SW/CM will arrange for [facility] to provide transportation upon discharge.     Functional status was assisted NH resident ([facility] prior and patient utilized wheelchair and oxygen (2L).     Patient lives in a assisted nursing home ([facility]).      Emergency Contact: Extended Emergency Contact Information  Primary Emergency Contact: Cruz Azar   [place]  Mobile Phone: [phone]  Relation: Daughter  Secondary Emergency Contact: Alesha Traore  Address: [address]  Mobile Phone: [phone]  Relation: Grandchild     PCP: Parth Posadas Ii, MD    Pharmacy:   [facility]/pharmacy #[id number] - [place], [place] - [address]  [address]  [place] [address]  Phone: [phone] Fax: [phone]      Payor: Payor: MEDICARE / Plan: MEDICARE PART A & B / Product Type: [facility] /          10/08/20 1119   Discharge Assessment   Assessment Type Discharge Planning Assessment   Assessment information obtained from? Medical Record   Expected Length of Stay (days) 1   Prior to hospitilization cognitive status: Alert/Oriented   Prior to hospitalization functional status: Assistive Equipment   Current cognitive status: Alert/Oriented   Current Functional Status: Assistive Equipment   Facility Arrived From: ED   Lives With facility resident   Able to Return to Prior Arrangements yes   Is patient able to care for self after discharge? No   Who are your caregiver(s) and their phone number(s)? Cruz Azar (daughter) [phone]   Patient's perception of discharge disposition assisted care facility   Readmission Within the Last 30 Days unable to assess   Patient currently  being followed by outpatient case management? No   Patient currently receives any other outside agency services? No   Equipment Currently Used at Home wheelchair;oxygen   Do you have any problems affording any of your prescribed medications? No   Is the patient taking medications as prescribed? yes   Does the patient have transportation home? No   Dialysis Name and Scheduled days [facility]   Does the patient receive services at the [facility]mad[facility] Clinic? No   Discharge Plan A Return to nursing home   Discharge Plan B Return to Nursing Home   DME Needed Upon Discharge  none   Patient/Family in Agreement with Plan unable to assess         Ora Lorenzo LMSW   - Case Management

## 2020-10-08 NOTE — ASSESSMENT & PLAN NOTE
-- DO NOT REPLY / DO NOT REPLY ALL --  -- Message is from the [facility]--    COVID-19 Universal Screening: Positive    General Patient Message      Reason for Call: patient is requesting a callback regarding a nurse and a therapist, they have not come out yet. Please contact.     Caller Information       Type Contact Phone    04/30/2020 09:52 AM Phone (Incoming) Keyanna Gold (Self) [phone] (M)          Alternative phone number: n/a    Turnaround time given to caller:   \"This message will be sent to [state Provider's name]. The clinical team will fulfill your request as soon as they review your message.\"     · Chronic and stable  · Continue Renvela 800mg PO TID

## 2020-10-08 NOTE — ASSESSMENT & PLAN NOTE
Anemia in ESRD  - HD on MWF  - anuric  - Hgb 9.5 on admit, at baseline  - continue binders  - Nephrology consulted, appreciate assistance  - underwent HD with 1.8L removed, patient became hypotensive after  - will go to HD tomorrow

## 2020-10-08 NOTE — NURSING
Patient anxious after breathing tx, settled down after ultram dose. Patient still at 4L O2 for comfort. Will continue to attempt to wean. No falls or signs of infection.

## 2020-10-08 NOTE — ASSESSMENT & PLAN NOTE
Recent Labs   Lab 10/07/20  1637 10/07/20  1653   WBC 7.92  --    HGB 9.5*  --    HCT 31.9* 32*   *  --        - Hgb today 9.5 on 10/7. Target Hgb 10-11 gm/dL.  - Will request iron studies in AM to assess further needs of supplementation   - Will review chronic care plan from outpatient dialysis center for SATNAM dosing.

## 2020-10-08 NOTE — HPI
"The patient is a 79 yo AAF with a history of ESRD on HD MWF, HTN, severe AS, moderate-severe pHTN, HFpEF, chronic hypoxic respiratory failure 2/2 emphysema (on home O2), CVA, DM2, SDH s/p evacuation 6/2019, seizures, and chronic debility who was recently admitted 9/28-9/29 with CC of shortness of breath. The patient was dialyze and discharged. She presented yesterday with similar complaints of shortness of breath and chest pain during HD. She endorsed non radiating L chest pain (now resolved) and SOB which lasted about 15 minutes. Her dialysis treatment was stopped early and she was sent to the ED for evaluation and treatment. "Upon arrival to the ER, vitals were temp 97.6F, HR 88 and /62 on her home 2L NC." Her troponin was 0.056 on arrival bu has improved to 0.012. CXR significant for worsening effusions. She was admitted to Hospital Medicine for HD. Nephrology consulted for ESRD management.   "

## 2020-10-08 NOTE — H&P
Hospital Medicine  History and Physical  [facility]      Patient Name: Tea Georges  MRN:  [id number]  Hospital Medicine Team: [facility] F Lorraine Stein MD  Date of Admission:  10/7/2020     Principal Problem:  Chest pain, rule out acute myocardial infarction   Primary Care Physician: Parth Posadas Ii, MD       History of Present Illness:    Ms. Tea Georges is a 80 y.o. female with ESRD on HD and chronic respiratory failure on 2L home oxygen who presents to the ER for evaluation of shortness of breath and chest pain.  She endorsed midepigastric and left sided chest pain that didn't radiate, lasting about 15 minutes with shortness of breath.  She went to HD, and dialysis was stopped early because of her symptoms.  In HD, she was given a breathing treatment and was sent to the ER.  She endorses a cough, but denies any fevers or chills.  Of note, she was just admitted to the hospital end of September for similar symptoms.    Upon arrival to the ER, vitals were temp 97.6F, HR 88 and /62 on her home 2L NC.  Troponin was 0.056 and BNP >4,900.  CXR showed worsening effusions.  She was admitted to Hospital Medicine for HD.        Review of Systems:  Constitutional: Negative for chills, fever, fatigue, weakness  HENT: Negative for sore throat, trouble swallowing.    Eyes: Negative for photophobia, visual disturbance.   Respiratory: + cough, shortness of breath.    Cardiovascular: + chest pain  Gastrointestinal: Negative for abdominal pain, nausea, vomiting, diarrhea, constipation  Endocrine: Negative for cold intolerance, heat intolerance.   Genitourinary: Negative for dysuria, frequency.   Musculoskeletal: Negative for arthralgias, myalgias.   Skin: Negative for rash, wound, erythema   Neurological: Negative for numbness, paresthesias  Psychiatric/Behavioral: Negative for confusion, hallucinations, anxiety  All other systems reviewed and are negative.      Past Medical History: Patient  has a past medical history of (HFpEF) heart failure with preserved ejection fraction (5/21/2019), Anemia in ESRD (end-stage renal disease) (5/29/2016), Aneurysm of arteriovenous dialysis fistula, Aortic atherosclerosis (11/22/2016), Bilateral low back pain without sciatica (11/17/2015), Blindness of right eye (11/12/2016), Brain compression (6/22/2019), Cataract, Central retinal vein occlusion, right eye (6/3/2014), Chronic diastolic heart failure (1/8/2016), Chronic respiratory failure with hypoxia (5/29/2016), Coronary artery disease involving native coronary artery of native heart without angina pectoris (12/12/2016), Diverticulosis, Encounter for blood transfusion, Enlarged LA (left atrium) (10/7/2016), Epiretinal membrane (7/17/2012), ESRD on hemodialysis, History of GI diverticular bleed, Hyperparathyroidism, secondary renal (10/14/2016), Left spastic hemiparesis (11/13/2016), Moderate single current episode of major depressive disorder (2/8/2019), Non-rheumatic tricuspid valve insufficiency (1/15/2017), Peripheral vascular disease, unspecified (11/22/2016), Physical debility (11/17/2016), Pleural effusion on right, Pulmonary emphysema (1/15/2017), Pulmonary hypertension (6/28/2019), Renovascular hypertension (1/8/2016), Seizure (6/24/2019), Severe aortic valve stenosis (2/4/2016), 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 (11/13/2016), Subdural hematoma (05/21/2019), Thrombocytopenia, unspecified (1/15/2017), Type 2 diabetes mellitus with chronic kidney disease on chronic dialysis, without long-term current use of insulin (5/1/2018), Type 2 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, without long-term current use of insulin (3/26/2013), 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      Past Surgical History:  Patient has a past surgical history that includes Cataract extraction; Cholecystectomy; Colonoscopy (N/A, 5/23/2016); Colonoscopy (N/A, 5/30/2016); Upper gastrointestinal endoscopy; Breast surgery; Fistulogram (Left, 11/28/2018); Revision of arteriovenous fistula (Left, 11/29/2018); Resection of aneurysm (Left, 11/29/2018); Placement of dual-lumen vascular catheter (Right, 11/29/2018); Abdominal surgery; Eye surgery; Cardiac surgery (2016); Craniotomy for evacuation of subdural hematoma (Right, 6/23/2019); Cerebral angiogram (N/A, 6/24/2019); and Intramedullary rodding of femur (Left, 10/28/2019).      Social History: Patient reports that she has never smoked. She has never used smokeless tobacco. She reports that she does not drink alcohol or use drugs.      Family History: Patient's family history includes Cancer in her sister; Cataracts in her mother; Esophageal cancer in her sister; Glaucoma in her brother and maternal aunt; Heart disease in her brother; Heart failure in her sister; Hypertension in her brother, father, mother, and sister; No Known Problems in her maternal grandfather, maternal grandmother, maternal uncle, paternal aunt, paternal grandfather, paternal grandmother, and paternal uncle; Stroke in her mother.      Medications: Scheduled Meds:   [START ON 10/8/2020] aspirin  81 mg Oral Daily    atorvastatin  20 mg Oral QHS    cloNIDine  0.1 mg Oral TID    [START ON 10/8/2020] fluticasone furoate-vilanteroL  1 puff Inhalation Daily    heparin (porcine)  5,000 Units Subcutaneous Q8H    hydrALAZINE  100 mg Oral Q8H    [START ON 10/8/2020] losartan  50 mg Oral Daily    [START ON 10/8/2020] polyethylene glycol  17 g Oral Daily    [START ON 10/8/2020] sevelamer carbonate  800 mg Oral TID WM     Continuous Infusions:  PRN Meds:.acetaminophen, albuterol sulfate, artificial tears, benzonatate, dextrose 50%, dextrose 50%, glucagon (human recombinant), glucose, glucose, lidocaine-prilocaine, melatonin,  ondansetron, senna-docusate 8.6-50 mg, sodium chloride 0.9%, traMADoL      Allergies: Patient has No Known Allergies.      Physical Exam:    Temp:  [97.6 °F (36.4 °C)]   Pulse:  [68-88]   Resp:  [15]   BP: (114-147)/(56-67)   SpO2:  [97 %-100 %]     Constitutional: Appears cachetic  Head: Normocephalic and atraumatic.   Mouth/Throat: Oropharynx is clear and moist.   Eyes: Blind in right eye  Neck: Normal range of motion. Neck supple.   Cardiovascular: Normal heart rate.  Regular heart rhythm.  No murmur heard.  Pulmonary/Chest: Effort normal. No respiratory distress. No wheezes, rales, or rhonchi  Abdominal: Soft. Bowel sounds are normal.  No distension.  No tenderness  Musculoskeletal: Left foot bandaged  Neurological: Alert and oriented to person, place, and time.   Skin: Skin is warm and dry.   Psychiatric: Normal mood and affect. Behavior is normal.       No intake or output data in the 24 hours ending 10/07/20 1911  Recent Labs   Lab 10/07/20  1637 10/07/20  1653   WBC 7.92  --    HGB 9.5*  --    HCT 31.9* 32*   *  --      Recent Labs   Lab 10/07/20  1754      K 4.2      CO2 22*   BUN 45*   CREATININE 3.5*   *   CALCIUM 9.1     Recent Labs   Lab 10/07/20  1754   ALKPHOS 85   ALT 9*   AST 16   ALBUMIN 2.5*   PROT 6.6   BILITOT 0.5      No results for input(s): LACTATE in the last 72 hours.   Recent Labs     10/07/20  1637   TROPONINI 0.056*         Assessment and Plan:    Ms. Tea Georges is a 80 y.o. female who presented to [facility] on 10/7/2020 with chest pain.    Chest Pain, Rule Out Acute Myocardial Infarction  · EKG without STEMI or any acute ST/T wave changes  · Initial troponin 0.056, will trend until peak  · Continue Aspirin 81mg PO daily  · Continue Lipitor 40mg PO daily  · Continue tele  · Nitro prn    Renovascular HTN  · Chronic and stable  · Continue Clonidine 0.1mg PO TID  · Continue Hydralazine 100mg PO q8h  · Continue Losartan 50mg PO daily    ESRD on HD  · HD  MWF  · Nephro consult    CKD Mineral and Bone Disoder   · Chronic and stable  · Continue Renvela 800mg PO TID    CAD  · Chronic and stable  · Continue Aspirin 81mg PO daily  · Continue Lipitor 20mg PO daily  · Continue Losartan 50mg PO daily    Chronic Diastolic Heart Failure  · Chronic and stable  · Continue Lipitor 20mg PO daily  · Continue Losartan 50mg PO daily    Pulmonary  Emphysema  · Chronic issue  · Continue Breo  · Duonebs prn    Thrombocytopenia  · Chronic and stable  · Monitor    Acute on Chronic Respiratory Failure with Hypoxia  · Endorses shortness of breath but stable oxygen on home 2L  · HD as above    Anorexia  · Body mass index is 16.36 kg/m².  · Boost    Benign Hypertensive Heart and Kidney Disease with HF and ESRD  · As above       Diet:  Renal  VTE PPx:  Heparin  Goals of Care:  Full      Disposition:  Pending respiratory status  Discharge Needs:  TBD      Lorraine Stein MD  [facility]  Medical Director, [facility]  Cell:  [phone]  Spectra:  [phone]

## 2020-10-09 PROBLEM — I70.262 ATHEROSCLEROSIS OF NATIVE ARTERIES OF EXTREMITIES WITH GANGRENE, LEFT LEG: Status: ACTIVE | Noted: 2017-01-15

## 2020-10-09 PROBLEM — Z51.5 PALLIATIVE CARE ENCOUNTER: Status: ACTIVE | Noted: 2020-10-09

## 2020-10-09 LAB
ANION GAP SERPL CALC-SCNC: 13 MMOL/L (ref 8–16)
ANISOCYTOSIS BLD QL SMEAR: SLIGHT
BASO STIPL BLD QL SMEAR: ABNORMAL
BASOPHILS # BLD AUTO: 0.05 K/UL (ref 0–0.2)
BASOPHILS NFR BLD: 0.7 % (ref 0–1.9)
BUN SERPL-MCNC: 28 MG/DL (ref 8–23)
CALCIUM SERPL-MCNC: 8.8 MG/DL (ref 8.7–10.5)
CHLORIDE SERPL-SCNC: 98 MMOL/L (ref 95–110)
CO2 SERPL-SCNC: 28 MMOL/L (ref 23–29)
CREAT SERPL-MCNC: 2.4 MG/DL (ref 0.5–1.4)
DIFFERENTIAL METHOD: ABNORMAL
EOSINOPHIL # BLD AUTO: 0.1 K/UL (ref 0–0.5)
EOSINOPHIL NFR BLD: 1.5 % (ref 0–8)
ERYTHROCYTE [DISTWIDTH] IN BLOOD BY AUTOMATED COUNT: 20.5 % (ref 11.5–14.5)
EST. GFR  (AFRICAN AMERICAN): 21.6 ML/MIN/1.73 M^2
EST. GFR  (NON AFRICAN AMERICAN): 18.8 ML/MIN/1.73 M^2
GLUCOSE SERPL-MCNC: 115 MG/DL (ref 70–110)
HCT VFR BLD AUTO: 28.8 % (ref 37–48.5)
HGB BLD-MCNC: 8.5 G/DL (ref 12–16)
HYPOCHROMIA BLD QL SMEAR: ABNORMAL
IMM GRANULOCYTES # BLD AUTO: 0.04 K/UL (ref 0–0.04)
IMM GRANULOCYTES NFR BLD AUTO: 0.6 % (ref 0–0.5)
LYMPHOCYTES # BLD AUTO: 0.9 K/UL (ref 1–4.8)
LYMPHOCYTES NFR BLD: 13.1 % (ref 18–48)
MAGNESIUM SERPL-MCNC: 2 MG/DL (ref 1.6–2.6)
MCH RBC QN AUTO: 28.8 PG (ref 27–31)
MCHC RBC AUTO-ENTMCNC: 29.5 G/DL (ref 32–36)
MCV RBC AUTO: 98 FL (ref 82–98)
MONOCYTES # BLD AUTO: 0.7 K/UL (ref 0.3–1)
MONOCYTES NFR BLD: 10.9 % (ref 4–15)
NEUTROPHILS # BLD AUTO: 4.9 K/UL (ref 1.8–7.7)
NEUTROPHILS NFR BLD: 73.2 % (ref 38–73)
NRBC BLD-RTO: 0 /100 WBC
OVALOCYTES BLD QL SMEAR: ABNORMAL
PLATELET # BLD AUTO: 118 K/UL (ref 150–350)
PLATELET BLD QL SMEAR: ABNORMAL
PMV BLD AUTO: 13.7 FL (ref 9.2–12.9)
POIKILOCYTOSIS BLD QL SMEAR: SLIGHT
POLYCHROMASIA BLD QL SMEAR: ABNORMAL
POTASSIUM SERPL-SCNC: 3.3 MMOL/L (ref 3.5–5.1)
RBC # BLD AUTO: 2.95 M/UL (ref 4–5.4)
SODIUM SERPL-SCNC: 139 MMOL/L (ref 136–145)
TARGETS BLD QL SMEAR: ABNORMAL
WBC # BLD AUTO: 6.71 K/UL (ref 3.9–12.7)

## 2020-10-09 PROCEDURE — 83735 ASSAY OF MAGNESIUM: CPT

## 2020-10-09 PROCEDURE — 99205 PR OFFICE/OUTPT VISIT, NEW, LEVL V, 60-74 MIN: ICD-10-PCS | Mod: ,,, | Performed by: INTERNAL MEDICINE

## 2020-10-09 PROCEDURE — 25000003 PHARM REV CODE 250: Performed by: PHYSICIAN ASSISTANT

## 2020-10-09 PROCEDURE — 27000221 HC OXYGEN, UP TO 24 HOURS

## 2020-10-09 PROCEDURE — G0378 HOSPITAL OBSERVATION PER HR: HCPCS

## 2020-10-09 PROCEDURE — 99900035 HC TECH TIME PER 15 MIN (STAT)

## 2020-10-09 PROCEDURE — 94761 N-INVAS EAR/PLS OXIMETRY MLT: CPT

## 2020-10-09 PROCEDURE — 85025 COMPLETE CBC W/AUTO DIFF WBC: CPT

## 2020-10-09 PROCEDURE — 36415 COLL VENOUS BLD VENIPUNCTURE: CPT

## 2020-10-09 PROCEDURE — 63600175 PHARM REV CODE 636 W HCPCS: Performed by: HOSPITALIST

## 2020-10-09 PROCEDURE — 94640 AIRWAY INHALATION TREATMENT: CPT

## 2020-10-09 PROCEDURE — 80048 BASIC METABOLIC PNL TOTAL CA: CPT

## 2020-10-09 PROCEDURE — 25000003 PHARM REV CODE 250: Performed by: NURSE PRACTITIONER

## 2020-10-09 PROCEDURE — 25000242 PHARM REV CODE 250 ALT 637 W/ HCPCS: Performed by: HOSPITALIST

## 2020-10-09 PROCEDURE — 99214 OFFICE O/P EST MOD 30 MIN: CPT | Mod: ,,, | Performed by: NURSE PRACTITIONER

## 2020-10-09 PROCEDURE — 99497 ADVNCD CARE PLAN 30 MIN: CPT | Mod: ,,, | Performed by: INTERNAL MEDICINE

## 2020-10-09 PROCEDURE — 25000242 PHARM REV CODE 250 ALT 637 W/ HCPCS: Performed by: PHYSICIAN ASSISTANT

## 2020-10-09 PROCEDURE — 99497 PR ADVNCD CARE PLAN 30 MIN: ICD-10-PCS | Mod: ,,, | Performed by: INTERNAL MEDICINE

## 2020-10-09 PROCEDURE — 99205 OFFICE O/P NEW HI 60 MIN: CPT | Mod: ,,, | Performed by: INTERNAL MEDICINE

## 2020-10-09 PROCEDURE — 99226 PR SUBSEQUENT OBSERVATION CARE,LEVEL III: CPT | Mod: ,,, | Performed by: PHYSICIAN ASSISTANT

## 2020-10-09 PROCEDURE — G0257 UNSCHED DIALYSIS ESRD PT HOS: HCPCS

## 2020-10-09 PROCEDURE — 99214 PR OFFICE/OUTPT VISIT, EST, LEVL IV, 30-39 MIN: ICD-10-PCS | Mod: ,,, | Performed by: NURSE PRACTITIONER

## 2020-10-09 PROCEDURE — 96372 THER/PROPH/DIAG INJ SC/IM: CPT | Mod: 59

## 2020-10-09 PROCEDURE — 99226 PR SUBSEQUENT OBSERVATION CARE,LEVEL III: ICD-10-PCS | Mod: ,,, | Performed by: PHYSICIAN ASSISTANT

## 2020-10-09 PROCEDURE — 25000003 PHARM REV CODE 250: Performed by: HOSPITALIST

## 2020-10-09 RX ORDER — SODIUM CHLORIDE 9 MG/ML
INJECTION, SOLUTION INTRAVENOUS ONCE
Status: COMPLETED | OUTPATIENT
Start: 2020-10-09 | End: 2020-10-09

## 2020-10-09 RX ORDER — HYDRALAZINE HYDROCHLORIDE 50 MG/1
50 TABLET, FILM COATED ORAL EVERY 8 HOURS
Status: DISCONTINUED | OUTPATIENT
Start: 2020-10-09 | End: 2020-10-10 | Stop reason: HOSPADM

## 2020-10-09 RX ORDER — POTASSIUM CHLORIDE 20 MEQ/1
40 TABLET, EXTENDED RELEASE ORAL ONCE
Status: COMPLETED | OUTPATIENT
Start: 2020-10-09 | End: 2020-10-09

## 2020-10-09 RX ORDER — SODIUM CHLORIDE 9 MG/ML
INJECTION, SOLUTION INTRAVENOUS ONCE
Status: CANCELLED | OUTPATIENT
Start: 2020-10-09 | End: 2020-10-09

## 2020-10-09 RX ADMIN — LOSARTAN POTASSIUM 50 MG: 50 TABLET, FILM COATED ORAL at 09:10

## 2020-10-09 RX ADMIN — ASPIRIN 81 MG: 81 TABLET, COATED ORAL at 09:10

## 2020-10-09 RX ADMIN — SODIUM CHLORIDE 300 ML: 0.9 INJECTION, SOLUTION INTRAVENOUS at 04:10

## 2020-10-09 RX ADMIN — CLONIDINE HYDROCHLORIDE 0.1 MG: 0.1 TABLET ORAL at 09:10

## 2020-10-09 RX ADMIN — HEPARIN SODIUM 5000 UNITS: 5000 INJECTION INTRAVENOUS; SUBCUTANEOUS at 05:10

## 2020-10-09 RX ADMIN — ATORVASTATIN CALCIUM 20 MG: 20 TABLET, FILM COATED ORAL at 09:10

## 2020-10-09 RX ADMIN — POTASSIUM CHLORIDE 40 MEQ: 1500 TABLET, EXTENDED RELEASE ORAL at 09:10

## 2020-10-09 RX ADMIN — IPRATROPIUM BROMIDE AND ALBUTEROL SULFATE 3 ML: .5; 2.5 SOLUTION RESPIRATORY (INHALATION) at 01:10

## 2020-10-09 RX ADMIN — HYDRALAZINE HYDROCHLORIDE 100 MG: 50 TABLET, FILM COATED ORAL at 05:10

## 2020-10-09 RX ADMIN — IPRATROPIUM BROMIDE AND ALBUTEROL SULFATE 3 ML: .5; 2.5 SOLUTION RESPIRATORY (INHALATION) at 09:10

## 2020-10-09 RX ADMIN — HYDRALAZINE HYDROCHLORIDE 50 MG: 50 TABLET, FILM COATED ORAL at 09:10

## 2020-10-09 RX ADMIN — FLUTICASONE FUROATE AND VILANTEROL TRIFENATATE 1 PUFF: 200; 25 POWDER RESPIRATORY (INHALATION) at 10:10

## 2020-10-09 RX ADMIN — TRAMADOL HYDROCHLORIDE 50 MG: 50 TABLET, FILM COATED ORAL at 10:10

## 2020-10-09 RX ADMIN — IPRATROPIUM BROMIDE AND ALBUTEROL SULFATE 3 ML: .5; 2.5 SOLUTION RESPIRATORY (INHALATION) at 08:10

## 2020-10-09 RX ADMIN — SEVELAMER CARBONATE 800 MG: 800 TABLET, FILM COATED ORAL at 09:10

## 2020-10-09 NOTE — SUBJECTIVE & OBJECTIVE
Interval History: Patient reports some improvement in SOB prior to HD. Underwent HD and feels improved. Hydralazine decreased from 100 mg to 50 mg TID given hypotension with HD. Interventional cardiology consulted, not a TAVR candidate. Palliative care consulted, appreciate assistance. Family meeting tomorrow to discuss goals of care. Repeat CXR pending.     Review of Systems   Constitutional: Positive for activity change and fatigue. Negative for appetite change, chills and fever.   HENT: Positive for hearing loss. Negative for congestion and rhinorrhea.    Eyes: Positive for visual disturbance.   Respiratory: Positive for shortness of breath.    Cardiovascular: Negative for chest pain, palpitations and leg swelling.   Gastrointestinal: Negative for abdominal distention, abdominal pain, diarrhea, nausea and vomiting.   Genitourinary: Positive for decreased urine volume.   Musculoskeletal: Positive for gait problem.   Skin: Positive for wound (chronic, L heel).   Neurological: Positive for weakness. Negative for dizziness.   Psychiatric/Behavioral: Negative for agitation, behavioral problems and confusion.     Objective:     Vital Signs (Most Recent):  Temp: 97.9 °F (36.6 °C) (10/09/20 1445)  Pulse: 78 (10/09/20 1630)  Resp: 18 (10/09/20 1615)  BP: (!) 114/42 (10/09/20 1630)  SpO2: 96 % (10/09/20 1331) Vital Signs (24h Range):  Temp:  [96 °F (35.6 °C)-98.5 °F (36.9 °C)] 97.9 °F (36.6 °C)  Pulse:  [63-90] 78  Resp:  [16-28] 18  SpO2:  [92 %-100 %] 96 %  BP: ()/(41-58) 114/42     Weight: 37.5 kg (82 lb 10.8 oz)  Body mass index is 14.64 kg/m².  No intake or output data in the 24 hours ending 10/09/20 1720   Physical Exam  Vitals signs and nursing note reviewed.   Constitutional:       Appearance: Normal appearance. She is cachectic.   HENT:      Head: Normocephalic and atraumatic.      Nose: No congestion.      Mouth/Throat:      Mouth: Mucous membranes are dry.   Eyes:      Comments: Cataract R eye   Neck:       Musculoskeletal: Normal range of motion and neck supple.   Cardiovascular:      Rate and Rhythm: Normal rate and regular rhythm.      Pulses: Normal pulses.      Heart sounds: Normal heart sounds.   Pulmonary:      Effort: Pulmonary effort is normal. No respiratory distress.      Breath sounds: Rales (improving) present. No wheezing.   Abdominal:      General: Abdomen is flat. There is no distension.      Palpations: Abdomen is soft.   Musculoskeletal: Normal range of motion.         General: No swelling.      Right lower leg: No edema.      Left lower leg: No edema.   Skin:     General: Skin is warm and dry.      Comments: Chronic L heel wound   Neurological:      General: No focal deficit present.      Mental Status: She is alert and oriented to person, place, and time.   Psychiatric:         Mood and Affect: Mood normal.         Behavior: Behavior normal.         Thought Content: Thought content normal.         Judgment: Judgment normal.         Significant Labs:   CBC:   Recent Labs   Lab 10/08/20  0732 10/09/20  0539   WBC 6.93 6.71   HGB 8.3* 8.5*   HCT 29.5* 28.8*   * 118*     CMP:   Recent Labs   Lab 10/07/20  1754 10/08/20  0452 10/09/20  0539    142 139   K 4.2 4.5 3.3*    105 98   CO2 22* 19* 28   * 98 115*   BUN 45* 53* 28*   CREATININE 3.5* 4.1* 2.4*   CALCIUM 9.1 9.5 8.8   PROT 6.6  --   --    ALBUMIN 2.5*  --   --    BILITOT 0.5  --   --    ALKPHOS 85  --   --    AST 16  --   --    ALT 9*  --   --    ANIONGAP 13 18* 13   EGFRNONAA 11.9* 9.8* 18.8*     Magnesium:   Recent Labs   Lab 10/08/20  0452 10/09/20  0539   MG 2.3 2.0     POCT Glucose: No results for input(s): POCTGLUCOSE in the last 48 hours.    Significant Imaging: I have reviewed all pertinent imaging results/findings within the past 24 hours.

## 2020-10-09 NOTE — SUBJECTIVE & OBJECTIVE
Past Medical History:   Diagnosis Date    (HFpEF) heart failure with preserved ejection fraction 5/21/2019    Anemia in ESRD (end-stage renal disease) 5/29/2016    Aneurysm of arteriovenous dialysis fistula     Aortic atherosclerosis 11/22/2016    Bilateral low back pain without sciatica 11/17/2015    Blindness of right eye 11/12/2016    Brain compression 6/22/2019    Cataract     Central retinal vein occlusion, right eye 6/3/2014    Chronic diastolic heart failure 1/8/2016    Chronic respiratory failure with hypoxia 5/29/2016    Coronary artery disease involving native coronary artery of native heart without angina pectoris 12/12/2016    Diverticulosis     Encounter for blood transfusion     Enlarged LA (left atrium) 10/7/2016    Epiretinal membrane 7/17/2012    ESRD on hemodialysis     MWF    History of GI diverticular bleed     5/22/16    Hyperparathyroidism, secondary renal 10/14/2016    Left spastic hemiparesis 11/13/2016    Moderate single current episode of major depressive disorder 2/8/2019    Non-rheumatic tricuspid valve insufficiency 1/15/2017    Peripheral vascular disease, unspecified 11/22/2016    Physical debility 11/17/2016    Pleural effusion on right     Pulmonary emphysema 1/15/2017    Pulmonary hypertension 6/28/2019    Renovascular hypertension 1/8/2016    Seizure 6/24/2019    Severe aortic valve stenosis 2/4/2016    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 11/13/2016    s/p thrombectomy of MCA    Subdural hematoma 05/21/2019    Bilateral R>L    Thrombocytopenia, unspecified 1/15/2017    Type 2 diabetes mellitus with chronic kidney disease on chronic dialysis, without long-term current use of insulin 5/1/2018    Type 2 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, without long-term current use of insulin 3/26/2013    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,  without long-term current use of insulin 3/26/2013    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       Past Surgical History:   Procedure Laterality Date    ABDOMINAL SURGERY      BREAST SURGERY      tumor removal x 2    CARDIAC SURGERY  2016    Implantable loop recorder placed    CATARACT EXTRACTION      CEREBRAL ANGIOGRAM N/A 6/24/2019    Procedure: ANGIOGRAM-CEREBRAL;  Surgeon: Kenisha Surgeon;  Location: [facility];  Service: Anesthesiology;  Laterality: N/A;    CHOLECYSTECTOMY      COLONOSCOPY N/A 5/23/2016    Procedure: COLONOSCOPY;  Surgeon: WILLIAM Colvin MD;  Location: [facility] ENDO (2ND FLR);  Service: Endoscopy;  Laterality: N/A;    COLONOSCOPY N/A 5/30/2016    Procedure: COLONOSCOPY;  Surgeon: Sam Davis MD;  [facility]  Service: Endoscopy;  Laterality: N/A;    CRANIOTOMY FOR EVACUATION OF SUBDURAL HEMATOMA Right 6/23/2019    Procedure: KATE HOLES FOR SUBDURAL HEMATOMA EVACUATION;  Surgeon: Trevor Conner MD;  Location: [address];  Service: Neurosurgery;  Laterality: Right;    EYE SURGERY      FISTULOGRAM Left 11/28/2018    Procedure: Fistulogram;  Surgeon: NADINE Blum III, MD;  Location: [facility] CATH LAB;  Service: Cardiology;  Laterality: Left;    INTRAMEDULLARY RODDING OF FEMUR Left 10/28/2019    Procedure: INSERTION, INTRAMEDULLARY NELIDA, FEMUR,  Left , synthes, hana table, large C arm clock side;  Surgeon: Torey Aguilar MD;  Location: [address];  Service: Orthopedics;  Laterality: Left;  general        PLACEMENT OF DUAL-LUMEN VASCULAR CATHETER Right 11/29/2018    Procedure: INSERTION, CATHETER, VASCULAR, DUAL LUMEN;  Surgeon: NADINE Blum III, MD;  Location: [facility] OR [address] [place] [address];  Service: Peripheral Vascular;  Laterality: Right;  Permacatheter placement     RESECTION OF ANEURYSM Left 11/29/2018    Procedure: EXCISION, ANEURYSM;  Surgeon: NADINE Blum III, MD;  [facility];  Service: Peripheral Vascular;  Laterality: Left;   Excision, L AVF aneurysm    REVISION OF ARTERIOVENOUS FISTULA Left 11/29/2018    Procedure: REVISION, AV FISTULA,;  Surgeon: NADINE Blum III, MD;  Location: [facility] OR [address] [place] [address];  Service: Peripheral Vascular;  Laterality: Left;  OR 11    UPPER GASTROINTESTINAL ENDOSCOPY         Review of patient's allergies indicates:  No Known Allergies    PTA Medications   Medication Sig    acetaminophen (TYLENOL) 325 MG tablet Take 2 tablets (650 mg total) by mouth every 6 (six) hours as needed for Pain.    albuterol (PROVENTIL/VENTOLIN HFA) 90 mcg/actuation inhaler Inhale 1-2 puffs into the lungs every 6 (six) hours as needed for Wheezing.    artificial tears (ISOPTO TEARS) 0.5 % ophthalmic solution Place 2 drops into both eyes 4 (four) times daily as needed.    aspirin (ECOTRIN) 81 MG EC tablet Take 81 mg by mouth once daily.    atorvastatin (LIPITOR) 20 MG tablet Take 1 tablet (20 mg total) by mouth every evening.    bisacodyl (DULCOLAX) 10 mg Supp Place 1 suppository (10 mg total) rectally daily as needed (Until bowel movement if patient has no bowel movement for 2 days).    BREO ELLIPTA 200-25 mcg/dose DsDv diskus inhaler INHALE 1 PUFF INTO THE LUNGS ONCE DAILY.    cloNIDine (CATAPRES) 0.1 MG tablet Take 1 tablet (0.1 mg total) by mouth 3 (three) times daily.    ergocalciferol (ERGOCALCIFEROL) 50,000 unit Cap Take 1 capsule (50,000 Units total) by mouth every 7 days.    hydrALAZINE (APRESOLINE) 100 MG tablet Take 0.5 tablets (50 mg total) by mouth every 8 (eight) hours.    insulin aspart U-100 (NOVOLOG) 100 unit/mL (3 mL) InPn pen Inject 0-5 Units into the skin before meals and at bedtime as needed (Hyperglycemia).    levETIRAcetam (KEPPRA) 500 MG Tab TAKE 1 TABLET (500 MG TOTAL) BY MOUTH 2 (TWO) TIMES A DAY FOR 30 DAYS.    losartan (COZAAR) 50 MG tablet Take 1 tablet (50 mg total) by mouth once daily.    ondansetron (ZOFRAN-ODT) 8 MG TbDL Take 1 tablet (8 mg total) by mouth every 6 (six) hours as needed  (nausea).    polyethylene glycol (GLYCOLAX) 17 gram PwPk Take 17 g by mouth once daily.    ramelteon (ROZEREM) 8 mg tablet Take 1 tablet (8 mg total) by mouth nightly as needed for Insomnia.    RENVELA 800 mg Tab Take 1 tablet (800 mg total) by mouth 3 (three) times daily with meals.    senna-docusate 8.6-50 mg (PERICOLACE) 8.6-50 mg per tablet Take 1 tablet by mouth daily as needed for Constipation.    traMADol (ULTRAM) 50 mg tablet Take 1 tablet (50 mg total) by mouth every 6 (six) hours as needed.     Family History     Problem Relation (Age of Onset)    Cancer Sister    Cataracts Mother    Esophageal cancer Sister    Glaucoma Brother, Maternal Aunt    Heart disease Brother    Heart failure Sister    Hypertension Mother, Sister, Brother, Father    No Known Problems Maternal Uncle, Paternal Aunt, Paternal Uncle, Maternal Grandmother, Maternal Grandfather, Paternal Grandmother, Paternal Grandfather    Stroke Mother        Tobacco Use    Smoking status: Never Smoker    Smokeless tobacco: Never Used   Substance and Sexual Activity    Alcohol use: No     Comment: Reports occasional 1-2 drinks     Drug use: No    Sexual activity: Not Currently     Review of Systems   Constitution: Negative for chills.   HENT: Negative for congestion.    Eyes: Positive for blurred vision and vision loss in right eye.   Cardiovascular: Negative for chest pain, leg swelling, near-syncope, palpitations and syncope.   Respiratory: Positive for shortness of breath. Negative for cough.    Endocrine: Negative for polyuria.   Skin: Negative for itching and rash.   Musculoskeletal: Positive for back pain and falls.   Gastrointestinal: Negative for abdominal pain, constipation, diarrhea, nausea and vomiting.   Genitourinary: Negative for dysuria.   Neurological: Positive for dizziness. Negative for headaches and light-headedness.   Psychiatric/Behavioral: Positive for altered mental status and depression.     Objective:     Vital Signs  (Most Recent):  Temp: 97.9 °F (36.6 °C) (10/09/20 1445)  Pulse: 78 (10/09/20 1515)  Resp: 18 (10/09/20 1515)  BP: (!) 106/50 (10/09/20 1515)  SpO2: 96 % (10/09/20 1331) Vital Signs (24h Range):  Temp:  [96 °F (35.6 °C)-98.5 °F (36.9 °C)] 97.9 °F (36.6 °C)  Pulse:  [63-90] 78  Resp:  [16-28] 18  SpO2:  [92 %-100 %] 96 %  BP: ()/(41-58) 106/50     Weight: 37.5 kg (82 lb 10.8 oz)  Body mass index is 14.64 kg/m².    SpO2: 96 %  O2 Device (Oxygen Therapy): nasal cannula    No intake or output data in the 24 hours ending 10/09/20 1527    Lines/Drains/Airways     Drain                 Hemodialysis AV Fistula Left upper arm -- days         Hemodialysis AV Fistula Left upper arm -- days          Peripheral Intravenous Line                 Peripheral IV - Single Lumen 09/25/20 1822 22 G Right Antecubital 13 days         Peripheral IV - Single Lumen 10/07/20 2130 20 G Right Hand 1 day                Physical Exam   Constitutional: She is oriented to person, place, and time. She appears cachectic. She has a sickly appearance. She appears ill.   HENT:   Head: Normocephalic and atraumatic.   Eyes: Pupils are equal, round, and reactive to light. Conjunctivae are normal.   Neck: Normal range of motion. Neck supple.   Cardiovascular: Normal rate, regular rhythm, S1 normal and S2 normal. Exam reveals no gallop and no friction rub.   Murmur heard.   Harsh midsystolic murmur is present with a grade of 4/6 at the upper right sternal border radiating to the neck.  Pulses:       Radial pulses are 2+ on the right side and 2+ on the left side.   Pulmonary/Chest: Effort normal and breath sounds normal. No respiratory distress. She has no wheezes. She has no rales. She exhibits no tenderness.   Abdominal: Soft. Bowel sounds are normal. She exhibits no distension and no mass. There is no abdominal tenderness. There is no rebound and no guarding.   Musculoskeletal:         General: No edema.   Neurological: She is alert and oriented to  person, place, and time.   Skin: Skin is warm and dry.   Psychiatric: She has a normal mood and affect.       Significant Labs:   ABG: No results for input(s): PH, PCO2, HCO3, POCSATURATED, BE in the last 48 hours., Blood Culture: No results for input(s): LABBLOO in the last 48 hours., BMP:   Recent Labs   Lab 10/07/20  1754 10/08/20  0452 10/09/20  0539   * 98 115*    142 139   K 4.2 4.5 3.3*    105 98   CO2 22* 19* 28   BUN 45* 53* 28*   CREATININE 3.5* 4.1* 2.4*   CALCIUM 9.1 9.5 8.8   MG  --  2.3 2.0   , CMP   Recent Labs   Lab 10/07/20  1754 10/08/20  0452 10/09/20  0539    142 139   K 4.2 4.5 3.3*    105 98   CO2 22* 19* 28   * 98 115*   BUN 45* 53* 28*   CREATININE 3.5* 4.1* 2.4*   CALCIUM 9.1 9.5 8.8   PROT 6.6  --   --    ALBUMIN 2.5*  --   --    BILITOT 0.5  --   --    ALKPHOS 85  --   --    AST 16  --   --    ALT 9*  --   --    ANIONGAP 13 18* 13   ESTGFRAFRICA 13.7* 11.3* 21.6*   EGFRNONAA 11.9* 9.8* 18.8*   , CBC   Recent Labs   Lab 10/07/20  1637  10/08/20  0732 10/09/20  0539   WBC 7.92  --  6.93 6.71   HGB 9.5*  --  8.3* 8.5*   HCT 31.9*   < > 29.5* 28.8*   *  --  122* 118*    < > = values in this interval not displayed.   , INR No results for input(s): INR, PROTIME in the last 48 hours., Lipid Panel No results for input(s): CHOL, HDL, LDLCALC, TRIG, CHOLHDL in the last 48 hours., Troponin   Recent Labs   Lab 10/07/20  1637 10/07/20  2029   TROPONINI 0.056* 0.012    and All pertinent lab results from the last 24 hours have been reviewed.    Significant Imaging: CT scan: CT ABDOMEN PELVIS WITH CONTRAST: No results found for this visit on 10/07/20. and CT ABDOMEN PELVIS WITHOUT CONTRAST: No results found for this visit on 10/07/20., Echocardiogram:   2D echo with color flow doppler:   Results for orders placed or performed during the hospital encounter of 07/12/16   2D Echo w/ Color Flow Doppler   Result Value Ref Range    QEF 63 55 - 65    Mitral Valve  Regurgitation TRIVIAL     Diastolic Dysfunction Yes (A)     Aortic Valve Regurgitation MILD     Aortic Valve Stenosis SEVERE (A)     Est. PA Systolic Pressure 60.46 (A)     Pericardial Effusion SMALL (A)     Mitral Valve Mobility NORMAL     Tricuspid Valve Regurgitation TRIVIAL TO MILD     Narrative    Date of Procedure: 07/12/2016        TEST DESCRIPTION   Technical Quality: This is a technically adequate study.     Aorta: The aortic root is normal in size, measuring 2.3 cm at sinotubular junction and 2.5 cm at Sinuses of Valsalva. The proximal ascending aorta is normal in size, measuring 3.0 cm across.     Left Atrium: The left atrial volume index is severely enlarged, measuring 76.45 cc/m2.     Left Ventricle: The left ventricle is normal in size, with an end-diastolic diameter of 4.3 cm, and an end-systolic diameter of 2.9 cm. LV wall thickness is normal, with the septum and the posterior wall each measuring 1.0 cm across. Relative wall   thickness was increased at 0.47, and the LV mass index was increased at 105.7 g/m2 consistent with concentric left ventricular hypertrophy. Global left ventricular systolic function appears normal. Visually estimated ejection fraction is 60-65%. The LV   Doppler derived stroke volume equals 89.0 ccs.   There is normal systolic/diastolic flow in the pulmonary vein indicating normal left atrial pressures. The E/e'(lat) is 31, consistent with significant diastolic dysfunction.     Right Atrium: The right atrium is normal in size, measuring 4.2 cm in length and 3.6 cm in width in the apical view.     Right Ventricle: The right ventricle is mildly enlarged measuring 4.9 cm at the base in the apical right ventricle-focused view. Global right ventricular systolic function appears normal. Tricuspid annular plane systolic excursion (TAPSE) is 2.1 cm.   Tissue Doppler-derived tricuspid annular peak systolic velocity (S prime) is 11.0 cm/s. The estimated PA systolic pressure is 60 mmHg.      Aortic Valve:  The aortic valve is markedly sclerotic with moderately restricted leaflet mobility. The aortic valve is tri-leaflet in structure. The peak velocity obtained across the aortic valve is 4.35 m/s, which translates to a peak gradient of 76.0   mmHg. The mean gradient is 40.78 mmHg. Using a left ventricular outflow tract diameter of 2.0 cm, a left ventricular outflow tract velocity time integral of 29 cm, and a peak instantaneous transvalvular velocity time integral of 106 cm, the calculated   aortic valve area is 0.84 cm2, consistent with severe aortic stenosis. Additionally, there is mild aortic regurgitation. There is marked aortic annular calcification.     Mitral Valve:  The mitral valve is mildly sclerotic with normal leaflet mobility. There is trivial mitral regurgitation. There is marked mitral annular calcification.     Tricuspid Valve:  The tricuspid valve is normal in structure with normal leaflet mobility. There is trivial to mild tricuspid regurgitation.     Pulmonary Valve:  The pulmonic valve is normal in structure with normal leaflet mobility. There is trivial to mild pulmonic regurgitation.     Pericardium: There is evidence of a small pericardial effusion.     IVC: IVC is normal in size and collapses > 50% with a sniff, suggesting normal right atrial pressure of 3 mmHg.     Intracavitary: There is no evidence of intracavity mass, thrombi, or vegetation.         CONCLUSIONS     1 - Normal left ventricular systolic function (EF 60-65%).     2 - Concentric hypertrophy.     3 - Left ventricular diastolic dysfunction.     4 - Right ventricular enlargement with normal systolic function.     5 - Severe aortic stenosis, PAULINE = 0.84 cm2, peak velocity = 4.35 m/s, mean gradient = 40.78 mmHg.     6 - Mild aortic regurgitation.     7 - Trivial mitral regurgitation.     8 - Trivial to mild tricuspid regurgitation.     9 - Trivial to mild pulmonic regurgitation.     10 - Small pericardial effusion.      11 - Pulmonary hypertension. The estimated PA systolic pressure is 60 mmHg.             This document has been electronically    SIGNED BY: Herbie Boss MD On: 07/12/2016 11:41    and Transthoracic echo (TTE) complete (Cupid Only):   Results for orders placed or performed during the hospital encounter of 09/16/20   Echo Color Flow Doppler? Yes   Result Value Ref Range    Ascending aorta 2.88 cm    STJ 2.01 cm    AV mean gradient 60 mmHg    Ao peak jared 5.03 m/s    Ao .63 cm    IVRT 65.65 msec    IVS 0.73 0.6 - 1.1 cm    LA size 4.14 cm    Left Atrium Major Axis 6.22 cm    Left Atrium Minor Axis 6.11 cm    LVIDd 4.30 3.5 - 6.0 cm    LVIDs 3.81 2.1 - 4.0 cm    LVOT diameter 1.91 cm    LVOT peak VTI 16.67 cm    Posterior Wall 0.73 0.6 - 1.1 cm    MV Peak A Jared 1.43 m/s    E wave decelartion time 196.70 msec    MV Peak E Jared 1.26 m/s    PV Peak D Jared 0.28 m/s    PV Peak S Jared 0.27 m/s    RA Major Axis 5.10 cm    RA Width 4.30 cm    RVDD 5.26 cm    Sinus 2.37 cm    TAPSE 1.41 cm    TR Max Jared 4.09 m/s    TDI LATERAL 0.06 m/s    TDI SEPTAL 0.03 m/s    LA WIDTH 4.54 cm    MV stenosis pressure 1/2 time 43.68 ms    LV Diastolic Volume 82.91 mL    LV Systolic Volume 62.42 mL    RV S' 8.28 cm/s    LVOT peak jared 0.67 m/s    Mr max jared 0.06 m/s    LV LATERAL E/E' RATIO 21.00 m/s    LV SEPTAL E/E' RATIO 42.00 m/s    FS 11 %    LA volume 98.49 cm3    LV mass 93.45 g    Left Ventricle Relative Wall Thickness 0.34 cm    AV valve area 0.40 cm2    AV Velocity Ratio 0.13     AV index (prosthetic) 0.14     MV valve area p 1/2 method 5.04 cm2    E/A ratio 0.88     Mean e' 0.05 m/s    Pulm vein S/D ratio 0.96     LVOT area 2.9 cm2    LVOT stroke volume 47.74 cm3    AV peak gradient 101 mmHg    E/E' ratio 28.00 m/s    LV Systolic Volume Index 46.4 mL/m2    LV Diastolic Volume Index 61.68 mL/m2    LA Volume Index 73.3 mL/m2    LV Mass Index 70 g/m2    Triscuspid Valve Regurgitation Peak Gradient 67 mmHg    BSA 1.31 m2    Right  Atrial Pressure (from IVC) 15 mmHg    TV rest pulmonary artery pressure 82 mmHg    LA Volume Index (Mod) 70.7 mL/m2    LA volume (mod) 95.00 cm3    Narrative    · Moderately decreased left ventricular systolic function. The estimated   ejection fraction is 25-30%. Global hypokinetic wall motion.  · Grade II (moderate) left ventricular diastolic dysfunction consistent   with pseudonormalization.  · Severe/critical aortic valve stenosis. Aortic valve area is 0.40 cm2;   peak velocity is 5.03 m/s; mean gradient is 60 mmHg.  · Mild-to-moderate aortic regurgitation.  · Mild mitral regurgitation.  · Severe right ventricular enlargement. Mildly to moderately reduced right   ventricular systolic function.  · Mild to moderate tricuspid regurgitation.  · Severe left atrial enlargement.  · There is a large left pleural effusion.  · The estimated PA systolic pressure is 82 mmHg.  · Elevated central venous pressure (15 mmHg).  · Pulmonary hypertension present.       and X-Ray: CXR: X-Ray Chest 1 View (CXR): No results found for this visit on 10/07/20. and X-Ray Chest PA and Lateral (CXR): No results found for this visit on 10/07/20. and KUB: X-Ray Abdomen AP 1 View (KUB): No results found for this visit on 10/07/20.

## 2020-10-09 NOTE — ASSESSMENT & PLAN NOTE
Anemia in ESRD  - HD on MWF  - anuric  - Hgb 9.5 on admit, at baseline  - continue binders  - Nephrology consulted, appreciate assistance  - underwent HD with 1.8L removed, patient became hypotensive after  - additional HD session today with improvement in SOB

## 2020-10-09 NOTE — PLAN OF CARE
Pt A/Ox4, NAD. Pt calm. Pt did not sleep well during the night. Pt repositioned often; pt educated about importance of labs. Will continue to monitor.

## 2020-10-09 NOTE — ASSESSMENT & PLAN NOTE
Recent Labs   Lab 10/07/20  1637 10/07/20  1653 10/08/20  0732 10/09/20  0539   WBC 7.92  --  6.93 6.71   HGB 9.5*  --  8.3* 8.5*   HCT 31.9* 32* 29.5* 28.8*   *  --  122* 118*       - Hgb today 8.5 on 10/9. Target Hgb 10-11 gm/dL.  - Will request iron studies in AM to assess further needs of supplementation

## 2020-10-09 NOTE — PROGRESS NOTES
[facility] Hospital Medicine  Progress Note    Patient Name: Tea Georges  MRN: [id number]  Patient Class: OP- Observation   Admission Date: 10/7/2020  Length of Stay: 0 days  Attending Physician: Salvador Solorzano MD  Primary Care Provider: Parth Posadas Ii, MD    [facility] Medicine Team: [facility] MED F Maira White PA-C    Subjective:     Principal Problem:Severe aortic valve stenosis        HPI:  Ms. Tea Georges is a 80 y.o. female with ESRD on HD and chronic respiratory failure on 2L home oxygen who presents to the ER for evaluation of shortness of breath and chest pain.  She endorsed midepigastric and left sided chest pain that didn't radiate, lasting about 15 minutes with shortness of breath.  She went to HD, and dialysis was stopped early because of her symptoms.  In HD, she was given a breathing treatment and was sent to the ER.  She endorses a cough, but denies any fevers or chills.  Of note, she was just admitted to the hospital end of September for similar symptoms.     Upon arrival to the ER, vitals were temp 97.6F, HR 88 and /62 on her home 2L NC.  Troponin was 0.056 and BNP >4,900.  CXR showed worsening effusions.  She was admitted to Hospital Medicine for HD.    Overview/Hospital Course:  Ms. Metcalf was admitted to observation for shortness of breath. Troponin 0.056>0.012. EKG non-ischemic. Nephrology consulted, patient underwent HD with 1.8L removed. Patient continues to be SOB, suspect due to critical and severe aortic stenosis. Interventional radiology consulted, patient not a TAVR candidate. Palliative care consulted, appreciate assistance.     Interval History: Patient reports some improvement in SOB prior to HD. Underwent HD and feels improved. Hydralazine decreased from 100 mg to 50 mg TID given hypotension with HD. Interventional cardiology consulted, not a TAVR candidate. Palliative care consulted, appreciate assistance. Family meeting tomorrow to discuss goals  of care. Repeat CXR pending.     Review of Systems   Constitutional: Positive for activity change and fatigue. Negative for appetite change, chills and fever.   HENT: Positive for hearing loss. Negative for congestion and rhinorrhea.    Eyes: Positive for visual disturbance.   Respiratory: Positive for shortness of breath.    Cardiovascular: Negative for chest pain, palpitations and leg swelling.   Gastrointestinal: Negative for abdominal distention, abdominal pain, diarrhea, nausea and vomiting.   Genitourinary: Positive for decreased urine volume.   Musculoskeletal: Positive for gait problem.   Skin: Positive for wound (chronic, L heel).   Neurological: Positive for weakness. Negative for dizziness.   Psychiatric/Behavioral: Negative for agitation, behavioral problems and confusion.     Objective:     Vital Signs (Most Recent):  Temp: 97.9 °F (36.6 °C) (10/09/20 1445)  Pulse: 78 (10/09/20 1630)  Resp: 18 (10/09/20 1615)  BP: (!) 114/42 (10/09/20 1630)  SpO2: 96 % (10/09/20 1331) Vital Signs (24h Range):  Temp:  [96 °F (35.6 °C)-98.5 °F (36.9 °C)] 97.9 °F (36.6 °C)  Pulse:  [63-90] 78  Resp:  [16-28] 18  SpO2:  [92 %-100 %] 96 %  BP: ()/(41-58) 114/42     Weight: 37.5 kg (82 lb 10.8 oz)  Body mass index is 14.64 kg/m².  No intake or output data in the 24 hours ending 10/09/20 1720   Physical Exam  Vitals signs and nursing note reviewed.   Constitutional:       Appearance: Normal appearance. She is cachectic.   HENT:      Head: Normocephalic and atraumatic.      Nose: No congestion.      Mouth/Throat:      Mouth: Mucous membranes are dry.   Eyes:      Comments: Cataract R eye   Neck:      Musculoskeletal: Normal range of motion and neck supple.   Cardiovascular:      Rate and Rhythm: Normal rate and regular rhythm.      Pulses: Normal pulses.      Heart sounds: Normal heart sounds.   Pulmonary:      Effort: Pulmonary effort is normal. No respiratory distress.      Breath sounds: Rales (improving) present. No  wheezing.   Abdominal:      General: Abdomen is flat. There is no distension.      Palpations: Abdomen is soft.   Musculoskeletal: Normal range of motion.         General: No swelling.      Right lower leg: No edema.      Left lower leg: No edema.   Skin:     General: Skin is warm and dry.      Comments: Chronic L heel wound   Neurological:      General: No focal deficit present.      Mental Status: She is alert and oriented to person, place, and time.   Psychiatric:         Mood and Affect: Mood normal.         Behavior: Behavior normal.         Thought Content: Thought content normal.         Judgment: Judgment normal.         Significant Labs:   CBC:   Recent Labs   Lab 10/08/20  0732 10/09/20  0539   WBC 6.93 6.71   HGB 8.3* 8.5*   HCT 29.5* 28.8*   * 118*     CMP:   Recent Labs   Lab 10/07/20  1754 10/08/20  0452 10/09/20  0539    142 139   K 4.2 4.5 3.3*    105 98   CO2 22* 19* 28   * 98 115*   BUN 45* 53* 28*   CREATININE 3.5* 4.1* 2.4*   CALCIUM 9.1 9.5 8.8   PROT 6.6  --   --    ALBUMIN 2.5*  --   --    BILITOT 0.5  --   --    ALKPHOS 85  --   --    AST 16  --   --    ALT 9*  --   --    ANIONGAP 13 18* 13   EGFRNONAA 11.9* 9.8* 18.8*     Magnesium:   Recent Labs   Lab 10/08/20  0452 10/09/20  0539   MG 2.3 2.0     POCT Glucose: No results for input(s): POCTGLUCOSE in the last 48 hours.    Significant Imaging: I have reviewed all pertinent imaging results/findings within the past 24 hours.      Assessment/Plan:      * Severe aortic valve stenosis  Suspect severe AS causing continued SOB, and without intervention patient will continue to present with same symptoms.  - Last TTE with severe/critical aortic valve stenosis. Aortic valve area is 0.40 cm2; peak velocity is 5.03 m/s; mean gradient is 60 mmHg.  - Seen by interventional cardiology during last hospital stay, defer to outpatient cardiologist, though high risk for intervention  - Per pt. Cardiologist at [facility] in 8/2020 note, (see  "care everywhere) she is currently under evaluation for valvular repair, but she is "inoperable to conventional AVR and high risk for TAVR"  - Interventional cardiology consulted, patient not a TAVR candidate recommend palliative care consult  - palliative care consulted, appreciate assistance.      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  - ASA, statin    Palliative care encounter  - palliative care consulted, appreciate assistance  - family meeting tomorrow to discuss goals of care    Anorexia  · Body mass index is 14.64 kg/m².  · novasource    Chest pain  Patient presents from HD for SOB and chest pain. Chest pain resolved without intervention, however continues to be SOB. Suspect SOB is due to severe AS. Will diurese with HD, but high risk of readmittance given severe AS and high risk for intervention  · EKG without STEMI or any acute ST/T wave changes  · troponin 0.056> 0.12  · Recent TTE with severe AS- suspect chest pain due to AS  · Underwent HD with only mild improvement in SOB, will undergo additional HD tomorrow  · Continue Aspirin 81mg PO daily  · Continue Lipitor 40mg PO daily  · Continue tele  · Nitro prn  · No further episodes of chest pain  · See severe AS    Chronic kidney disease-mineral and bone disorder  · Chronic and stable  · Continue Renvela 800mg PO TID    Acute on chronic respiratory failure with hypoxia  · Endorses shortness of breath but stable oxygen on home 2L  · HD as above    Chronic combined systolic and diastolic heart failure  - last TTE (9/17/20) with EF 25%, grade II diastolic function, severe AS, severe RVE, moderately reduced RV function, pulmonary hypertension  - continue home ARB  - anuric    Thrombocytopenia, unspecified  · Chronic and stable  · Monitor    Pulmonary emphysema  · Chronic issue  · Continue Breo  · Duonebs prn    Coronary artery disease involving native coronary artery of native heart without angina pectoris  · Chronic and stable  · Continue " Aspirin 81mg PO daily  · Continue Lipitor 20mg PO daily  · Continue Losartan 50mg PO daily    ESRD on hemodialysis  Anemia in ESRD  - HD on MWF  - anuric  - Hgb 9.5 on admit, at baseline  - continue binders  - Nephrology consulted, appreciate assistance  - underwent HD with 1.8L removed, patient became hypotensive after  - additional HD session today with improvement in SOB    Renovascular hypertension  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  · Chronic and stable  · Continue Clonidine 0.1mg PO TID  · decrease Hydralazine 100mg to 50mg PO q8h  · Continue Losartan 50mg PO daily      VTE Risk Mitigation (From admission, onward)         Ordered     Place sequential compression device  Until discontinued      10/09/20 8959                Discharge Planning   GORDY: 10/10/2020     Code Status: Full Code   Is the patient medically ready for discharge?:     Reason for patient still in hospital (select all that apply): Treatment and Consult recommendations  Discharge Plan A: Return to nursing home                  Maira White PA-C  Department of Hospital Medicine   [facility]

## 2020-10-09 NOTE — PROGRESS NOTES
Dialysis completed. Needles removed from left upper arm graft with pressure held to sites for 10 minutes each with hemostasis achieved. Gauze and tape applied. Patient dialyzed for 3 hours with fluid removal of 2 liters.Tolerated well with stable vital signs. Patient returned to her room via bed by hospital transport.

## 2020-10-09 NOTE — SUBJECTIVE & OBJECTIVE
Interval History:     Past Medical History:   Diagnosis Date    (HFpEF) heart failure with preserved ejection fraction 5/21/2019    Anemia in ESRD (end-stage renal disease) 5/29/2016    Aneurysm of arteriovenous dialysis fistula     Aortic atherosclerosis 11/22/2016    Bilateral low back pain without sciatica 11/17/2015    Blindness of right eye 11/12/2016    Brain compression 6/22/2019    Cataract     Central retinal vein occlusion, right eye 6/3/2014    Chronic diastolic heart failure 1/8/2016    Chronic respiratory failure with hypoxia 5/29/2016    Coronary artery disease involving native coronary artery of native heart without angina pectoris 12/12/2016    Diverticulosis     Encounter for blood transfusion     Enlarged LA (left atrium) 10/7/2016    Epiretinal membrane 7/17/2012    ESRD on hemodialysis     MWF    History of GI diverticular bleed     5/22/16    Hyperparathyroidism, secondary renal 10/14/2016    Left spastic hemiparesis 11/13/2016    Moderate single current episode of major depressive disorder 2/8/2019    Non-rheumatic tricuspid valve insufficiency 1/15/2017    Peripheral vascular disease, unspecified 11/22/2016    Physical debility 11/17/2016    Pleural effusion on right     Pulmonary emphysema 1/15/2017    Pulmonary hypertension 6/28/2019    Renovascular hypertension 1/8/2016    Seizure 6/24/2019    Severe aortic valve stenosis 2/4/2016    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 11/13/2016    s/p thrombectomy of MCA    Subdural hematoma 05/21/2019    Bilateral R>L    Thrombocytopenia, unspecified 1/15/2017    Type 2 diabetes mellitus with chronic kidney disease on chronic dialysis, without long-term current use of insulin 5/1/2018    Type 2 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, without long-term current use of insulin 3/26/2013    Type 2 diabetes mellitus with severe nonproliferative  retinopathy of right eye, without long-term current use of insulin 3/26/2013    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       Past Surgical History:   Procedure Laterality Date    ABDOMINAL SURGERY      BREAST SURGERY      tumor removal x 2    CARDIAC SURGERY  2016    Implantable loop recorder placed    CATARACT EXTRACTION      CEREBRAL ANGIOGRAM N/A 6/24/2019    Procedure: ANGIOGRAM-CEREBRAL;  Surgeon: Kenisha Surgeon;  Location: [facility];  Service: Anesthesiology;  Laterality: N/A;    CHOLECYSTECTOMY      COLONOSCOPY N/A 5/23/2016    Procedure: COLONOSCOPY;  Surgeon: WILLIAM Colvin MD;  Location: [facility] ENDO (2ND FLR);  Service: Endoscopy;  Laterality: N/A;    COLONOSCOPY N/A 5/30/2016    Procedure: COLONOSCOPY;  Surgeon: Sma Davis MD;  [facility]  Service: Endoscopy;  Laterality: N/A;    CRANIOTOMY FOR EVACUATION OF SUBDURAL HEMATOMA Right 6/23/2019    Procedure: KATE HOLES FOR SUBDURAL HEMATOMA EVACUATION;  Surgeon: Trevor Conner MD;  Location: [facility] OR [address] [place] [address];  Service: Neurosurgery;  Laterality: Right;    EYE SURGERY      FISTULOGRAM Left 11/28/2018    Procedure: Fistulogram;  Surgeon: NADINE Blum III, MD;  Location: [facility] CATH LAB;  Service: Cardiology;  Laterality: Left;    INTRAMEDULLARY RODDING OF FEMUR Left 10/28/2019    Procedure: INSERTION, INTRAMEDULLARY NELIDA, FEMUR,  Left , synthes, hana table, large C arm clock side;  Surgeon: Torey Aguilar MD;  Location: [address];  Service: Orthopedics;  Laterality: Left;  general        PLACEMENT OF DUAL-LUMEN VASCULAR CATHETER Right 11/29/2018    Procedure: INSERTION, CATHETER, VASCULAR, DUAL LUMEN;  Surgeon: NADINE Blum III, MD;  Location: [facility] OR [address] [place] [address];  Service: Peripheral Vascular;  Laterality: Right;  Permacatheter placement     RESECTION OF ANEURYSM Left 11/29/2018    Procedure: EXCISION, ANEURYSM;  Surgeon: NADINE Blum III, MD;  [facility];  Service: Peripheral  Vascular;  Laterality: Left;  Excision, L AVF aneurysm    REVISION OF ARTERIOVENOUS FISTULA Left 11/29/2018    Procedure: REVISION, AV FISTULA,;  Surgeon: NADINE Blum III, MD;  Location: [facility] OR [address] [place] [address];  Service: Peripheral Vascular;  Laterality: Left;  OR 11    UPPER GASTROINTESTINAL ENDOSCOPY         Review of patient's allergies indicates:  No Known Allergies    Medications:  Continuous Infusions:  Scheduled Meds:   sodium chloride 0.9%   Intravenous Once    albuterol-ipratropium  3 mL Nebulization Q4H WAKE    aspirin  81 mg Oral Daily    atorvastatin  20 mg Oral QHS    cloNIDine  0.1 mg Oral TID    fluticasone furoate-vilanteroL  1 puff Inhalation Daily    hydrALAZINE  50 mg Oral Q8H    losartan  50 mg Oral Daily    polyethylene glycol  17 g Oral Daily    sevelamer carbonate  800 mg Oral TID WM     PRN Meds:acetaminophen, albuterol sulfate, artificial tears, benzonatate, dextrose 50%, dextrose 50%, glucagon (human recombinant), glucose, glucose, lidocaine-prilocaine, melatonin, ondansetron, senna-docusate 8.6-50 mg, sodium chloride 0.9%, traMADoL    Family History     Problem Relation (Age of Onset)    Cancer Sister    Cataracts Mother    Esophageal cancer Sister    Glaucoma Brother, Maternal Aunt    Heart disease Brother    Heart failure Sister    Hypertension Mother, Sister, Brother, Father    No Known Problems Maternal Uncle, Paternal Aunt, Paternal Uncle, Maternal Grandmother, Maternal Grandfather, Paternal Grandmother, Paternal Grandfather    Stroke Mother        Tobacco Use    Smoking status: Never Smoker    Smokeless tobacco: Never Used   Substance and Sexual Activity    Alcohol use: No     Comment: Reports occasional 1-2 drinks     Drug use: No    Sexual activity: Not Currently       Review of Systems   Constitutional: Positive for fatigue and unexpected weight change.   HENT: Negative for congestion and dental problem.    Eyes: Positive for itching. Negative for pain.    Respiratory: Positive for cough, chest tightness and shortness of breath.    Cardiovascular: Positive for chest pain. Negative for leg swelling.   Gastrointestinal: Negative for abdominal distention and abdominal pain.   Genitourinary: Negative for flank pain and pelvic pain.   Musculoskeletal: Negative for arthralgias and back pain.   Neurological: Negative for dizziness and headaches.   Psychiatric/Behavioral: Negative for agitation and behavioral problems.     Objective:     Vital Signs (Most Recent):  Temp: 97.1 °F (36.2 °C) (10/09/20 1116)  Pulse: 77 (10/09/20 1331)  Resp: 16 (10/09/20 1331)  BP: (!) 108/52 (10/09/20 1116)  SpO2: 96 % (10/09/20 1331) Vital Signs (24h Range):  Temp:  [96 °F (35.6 °C)-98.5 °F (36.9 °C)] 97.1 °F (36.2 °C)  Pulse:  [63-90] 77  Resp:  [16-28] 16  SpO2:  [92 %-100 %] 96 %  BP: ()/(51-58) 108/52     Weight: 37.5 kg (82 lb 10.8 oz)  Body mass index is 14.64 kg/m².    Physical Exam  Vitals signs and nursing note reviewed.   Constitutional:       General: She is not in acute distress.  HENT:      Head: Normocephalic and atraumatic.   Eyes:      General: No scleral icterus.     Conjunctiva/sclera: Conjunctivae normal.      Comments: Cataract right eye   Neck:      Musculoskeletal: Neck supple.   Cardiovascular:      Rate and Rhythm: Normal rate and regular rhythm.      Heart sounds: Murmur present.   Pulmonary:      Effort: Pulmonary effort is normal.      Breath sounds: Normal breath sounds.   Abdominal:      General: Abdomen is flat. There is no distension.   Musculoskeletal:         General: No deformity or signs of injury.      Comments: Muscle wasting. Left heel wound, dressing applied.    Lymphadenopathy:      Cervical: No cervical adenopathy.   Skin:     General: Skin is warm and dry.   Neurological:      General: No focal deficit present.      Mental Status: She is alert.      Cranial Nerves: No cranial nerve deficit.         Review of Symptoms    Symptom Assessment (ESAS  0-10 Scale)     CAM / Delirium:  Negative  Constipation:  Negative  Diarrhea:  Negative    Bowel Management Plan (BMP):  No            Living Arrangements:  Lives in nursing home    Psychosocial/Cultural: Names her siblings (brother and sister) and her granddaughter next of kin.   Lives in a home.   Family is close, especially her grandson and great grand children.     Spiritual:  F - Kinjal and Belief:  Has been praying for healing and good health.   I - Importance:  Very  C - Community:  Yes  A - Address in Care:  Yes     Time-Based Charting:  No      Advance Care Planning   Advance Directives:   Living Will: No    LaPOST: No    Do Not Resuscitate Status: No    Medical Power of : No      Decision Making:  Patient answered questions         Significant Labs: All pertinent labs within the past 24 hours have been reviewed.  CBC:   Recent Labs   Lab 10/09/20  0539   WBC 6.71   HGB 8.5*   HCT 28.8*   MCV 98   *     BMP:  Recent Labs   Lab 10/09/20  0539   *      K 3.3*   CL 98   CO2 28   BUN 28*   CREATININE 2.4*   CALCIUM 8.8   MG 2.0     LFT:  Lab Results   Component Value Date    AST 16 10/07/2020    ALKPHOS 85 10/07/2020    BILITOT 0.5 10/07/2020     Albumin:   Albumin   Date Value Ref Range Status   10/07/2020 2.5 (L) 3.5 - 5.2 g/dL Final     Protein:   Total Protein   Date Value Ref Range Status   10/07/2020 6.6 6.0 - 8.4 g/dL Final     Lactic acid:   Lab Results   Component Value Date    LACTATE 0.7 09/25/2020    LACTATE 0.6 11/27/2018       Significant Imaging: CXR from 10/7 reviewed. Echo 9/17 reviewed.

## 2020-10-09 NOTE — HPI
Ms. Georges is a 80-year-old female that presented shortness of breath.  Her comorbidities include CKD stage 5/ESRD on dialysis, hypertension, coronary artery disease-RCA 70% on coronary angiogram at [facility] 07/27/2020, COPD, diabetes, peripheral vascular disease, coccyx wound, thrombocytopenia she has known critical severe aortic stenosis.  She has had severe AS since 2018.  She was worked up at [facility] for severe AS and was declined.  She is admitted here for shortness of breath and for consideration for a 2nd opinion regarding aortic stenosis she currently resides in a nursing home secondary to immobility and falls.  She has a history of hip fractures she currently is alert and oriented but is unable to provide any other medical history.  She is unsure what medications she takes or who is managing her medically.  Patient was evaluated in the dialysis unit and noted improvement of her dyspnea 1 starting dialysis but not yet resolved.  She met with palliative care earlier today and is considering changing code status to DNR/DNI.     Tea Georges is a 80 y.o. female referred by Dr Salvador Oconnor for evaluation of severe AS (NYHA Class 4 sx).    She has undergone the following TAVR work-up:    ECHO (Date 9/17/20): PAULINE= 0.4 cm2, MG= 60mmHg, Peak Jared= 5.0 m/s, EF= 25-30%.    [facility] (Date 7/27/20): 70% RCA lesion @ [facility]    STS: 22.5%    Frailty: 4/4    Iliacs - CT not yet performed   LVOT area -CT not yet performed   Incidental Findings on CT: CT not yet performed   CT Surgery Risk Assessment: CTS not yet consulted   Rhythm Issues: none   PFTs: no PFTs. Known COPD.    Co-Morbidities: ESRD on dialysis, DM, coccyx wound, COPD, PVD, debility       ROS  Physical Exam

## 2020-10-09 NOTE — ASSESSMENT & PLAN NOTE
Continue medical therapy of aspirin, Lipitor.  Patient is not a candidate for interventional revascularization.

## 2020-10-09 NOTE — ASSESSMENT & PLAN NOTE
The patient is a 81 yo AAF admitted with complaints of shortness of breath and chest pain during hemodialysis on yesterday. CP now resolved, however, patient still endorses mild complaints of shortness of breath. CXR with bilateral effusions.         Out patient HD Rx:     Modality: iHD  Days: MWF  Access: LUE AVF  Unit: [facility]  Nephrologist: Dr Jared Martinez Duration: 3-4  EDW: 50kg  UF : 2-3     - Will provide dialysis for volume management only today.   - consider pulmonology consult for large R pleural effusion  - Continue to monitor intake and output, daily weights   - Avoid nephrotoxic medication and renal dose medications to GFR  - Will continue to monitor

## 2020-10-09 NOTE — PROGRESS NOTES
Patient received in dialysis via bed. Maintenance dialysis began via 15 gauge fistula needles to left upper arm graft .

## 2020-10-09 NOTE — ASSESSMENT & PLAN NOTE
- palliative care consulted, appreciate assistance  - family meeting tomorrow to discuss goals of care

## 2020-10-09 NOTE — ASSESSMENT & PLAN NOTE
Patient has known coronary artery disease but her coronary artery disease a is out of proportion to her level of congestive heart failure.  Likely a nonischemic cardiomyopathy  - Management per primary team

## 2020-10-09 NOTE — ASSESSMENT & PLAN NOTE
- patient is on aspirin and statin  Patient had angiogram at [facility] 7/27/20.  Follow-up at [facility] for consideration of revascularization if indicated

## 2020-10-09 NOTE — CONSULTS
[facility]  Interventional Cardiology  Consult Note    Patient Name: Tea Georges  MRN: [id number]  Admission Date: 10/7/2020  Hospital Length of Stay: 0 days  Code Status: Full Code   Attending Provider: Salvador Solorzano MD  Consulting Provider: Leon Araujo MD  Primary Care Physician: Parth Posadas Ii, MD  Principal Problem:Chest pain    Patient information was obtained from patient, past medical records and ER records.     Inpatient consult to Interventional Cardiology  Consult performed by: Leon Araujo MD  Consult ordered by: Salvador Solorzano MD        Subjective:     Chief Complaint:  dyspnea     HPI:  Ms. Georges is a 80-year-old female that presented shortness of breath.  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 07/27/2020, COPD, diabetes, peripheral vascular disease, coccyx wound, thrombocytopenia she has known critical severe aortic stenosis.  She has had severe AS since 2018.  She was worked up at [facility] for severe AS and was declined.  She is admitted here for shortness of breath and for consideration for a 2nd opinion regarding aortic stenosis she currently resides in a nursing home secondary to immobility and falls.  She has a history of hip fractures she currently is alert and oriented but is unable to provide any other medical history.  She is unsure what medications she takes or who is managing her medically.  Patient was evaluated in the dialysis unit and noted improvement of her dyspnea 1 starting dialysis but not yet resolved.  She met with palliative care earlier today and is considering changing code status to DNR/DNI.     Tea Georges is a 80 y.o. female referred by Dr Salvador Oconnor for evaluation of severe AS (NYHA Class 4 sx).    She has undergone the following TAVR work-up:    ECHO (Date 9/17/20): PAULINE= 0.4 cm2, MG= 60mmHg, Peak Jared= 5.0 m/s, EF= 25-30%.    C (Date  7/27/20): 70% RCA lesion @ [facility]    STS: 22.5%    Frailty: 4/4    Iliacs - CT not yet performed   LVOT area -CT not yet performed   Incidental Findings on CT: CT not yet performed   CT Surgery Risk Assessment: CTS not yet consulted   Rhythm Issues: none   PFTs: no PFTs. Known COPD.    Co-Morbidities: ESRD on dialysis, DM, coccyx wound, COPD, PVD, debility       ROS  Physical Exam          Past Medical History:   Diagnosis Date    (HFpEF) heart failure with preserved ejection fraction 5/21/2019    Anemia in ESRD (end-stage renal disease) 5/29/2016    Aneurysm of arteriovenous dialysis fistula     Aortic atherosclerosis 11/22/2016    Bilateral low back pain without sciatica 11/17/2015    Blindness of right eye 11/12/2016    Brain compression 6/22/2019    Cataract     Central retinal vein occlusion, right eye 6/3/2014    Chronic diastolic heart failure 1/8/2016    Chronic respiratory failure with hypoxia 5/29/2016    Coronary artery disease involving native coronary artery of native heart without angina pectoris 12/12/2016    Diverticulosis     Encounter for blood transfusion     Enlarged LA (left atrium) 10/7/2016    Epiretinal membrane 7/17/2012    ESRD on hemodialysis     MWF    History of GI diverticular bleed     5/22/16    Hyperparathyroidism, secondary renal 10/14/2016    Left spastic hemiparesis 11/13/2016    Moderate single current episode of major depressive disorder 2/8/2019    Non-rheumatic tricuspid valve insufficiency 1/15/2017    Peripheral vascular disease, unspecified 11/22/2016    Physical debility 11/17/2016    Pleural effusion on right     Pulmonary emphysema 1/15/2017    Pulmonary hypertension 6/28/2019    Renovascular hypertension 1/8/2016    Seizure 6/24/2019    Severe aortic valve stenosis 2/4/2016    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 11/13/2016    s/p thrombectomy of MCA    Subdural hematoma 05/21/2019    Bilateral R>L     Thrombocytopenia, unspecified 1/15/2017    Type 2 diabetes mellitus with chronic kidney disease on chronic dialysis, without long-term current use of insulin 5/1/2018    Type 2 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, without long-term current use of insulin 3/26/2013    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, without long-term current use of insulin 3/26/2013    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       Past Surgical History:   Procedure Laterality Date    ABDOMINAL SURGERY      BREAST SURGERY      tumor removal x 2    CARDIAC SURGERY  2016    Implantable loop recorder placed    CATARACT EXTRACTION      CEREBRAL ANGIOGRAM N/A 6/24/2019    Procedure: ANGIOGRAM-CEREBRAL;  Surgeon: Kenisha Surgeon;  Location: [facility];  Service: Anesthesiology;  Laterality: N/A;    CHOLECYSTECTOMY      COLONOSCOPY N/A 5/23/2016    Procedure: COLONOSCOPY;  Surgeon: WILLIAM Colvin MD;  Location: [facility] ENDO (2ND FLR);  Service: Endoscopy;  Laterality: N/A;    COLONOSCOPY N/A 5/30/2016    Procedure: COLONOSCOPY;  Surgeon: Sam Davis MD;  Location: [facility] ENDO (2ND FLR);  Service: Endoscopy;  Laterality: N/A;    CRANIOTOMY FOR EVACUATION OF SUBDURAL HEMATOMA Right 6/23/2019    Procedure: KATE HOLES FOR SUBDURAL HEMATOMA EVACUATION;  Surgeon: Trevor Conner MD;  [facility];  Service: Neurosurgery;  Laterality: Right;    EYE SURGERY      FISTULOGRAM Left 11/28/2018    Procedure: Fistulogram;  Surgeon: NADINE Blum III, MD;  Location: [facility] CATH LAB;  Service: Cardiology;  Laterality: Left;    INTRAMEDULLARY RODDING OF FEMUR Left 10/28/2019    Procedure: INSERTION, INTRAMEDULLARY NELIDA, FEMUR,  Left , synthes, hana table, large C arm clock side;  Surgeon: Torey Aguilar MD;  Location: [facility] OR 2ND FLR;  Service: Orthopedics;  Laterality: Left;  general        PLACEMENT OF DUAL-LUMEN VASCULAR  CATHETER Right 11/29/2018    Procedure: INSERTION, CATHETER, VASCULAR, DUAL LUMEN;  Surgeon: NADINE Blum III, MD;  Location: [facility] OR 2ND FLR;  Service: Peripheral Vascular;  Laterality: Right;  Permacatheter placement     RESECTION OF ANEURYSM Left 11/29/2018    Procedure: EXCISION, ANEURYSM;  Surgeon: NADINE Blum III, MD;  Location: [facility] OR 2ND FLR;  Service: Peripheral Vascular;  Laterality: Left;  Excision, L AVF aneurysm    REVISION OF ARTERIOVENOUS FISTULA Left 11/29/2018    Procedure: REVISION, AV FISTULA,;  Surgeon: NADINE Blum III, MD;  Location: [facility] OR 2ND FLR;  Service: Peripheral Vascular;  Laterality: Left;  OR 11    UPPER GASTROINTESTINAL ENDOSCOPY         Review of patient's allergies indicates:  No Known Allergies    PTA Medications   Medication Sig    acetaminophen (TYLENOL) 325 MG tablet Take 2 tablets (650 mg total) by mouth every 6 (six) hours as needed for Pain.    albuterol (PROVENTIL/VENTOLIN HFA) 90 mcg/actuation inhaler Inhale 1-2 puffs into the lungs every 6 (six) hours as needed for Wheezing.    artificial tears (ISOPTO TEARS) 0.5 % ophthalmic solution Place 2 drops into both eyes 4 (four) times daily as needed.    aspirin (ECOTRIN) 81 MG EC tablet Take 81 mg by mouth once daily.    atorvastatin (LIPITOR) 20 MG tablet Take 1 tablet (20 mg total) by mouth every evening.    bisacodyl (DULCOLAX) 10 mg Supp Place 1 suppository (10 mg total) rectally daily as needed (Until bowel movement if patient has no bowel movement for 2 days).    BREO ELLIPTA 200-25 mcg/dose DsDv diskus inhaler INHALE 1 PUFF INTO THE LUNGS ONCE DAILY.    cloNIDine (CATAPRES) 0.1 MG tablet Take 1 tablet (0.1 mg total) by mouth 3 (three) times daily.    ergocalciferol (ERGOCALCIFEROL) 50,000 unit Cap Take 1 capsule (50,000 Units total) by mouth every 7 days.    hydrALAZINE (APRESOLINE) 100 MG tablet Take 0.5 tablets (50 mg total) by mouth every 8 (eight) hours.    insulin aspart U-100 (NOVOLOG) 100  unit/mL (3 mL) InPn pen Inject 0-5 Units into the skin before meals and at bedtime as needed (Hyperglycemia).    levETIRAcetam (KEPPRA) 500 MG Tab TAKE 1 TABLET (500 MG TOTAL) BY MOUTH 2 (TWO) TIMES A DAY FOR 30 DAYS.    losartan (COZAAR) 50 MG tablet Take 1 tablet (50 mg total) by mouth once daily.    ondansetron (ZOFRAN-ODT) 8 MG TbDL Take 1 tablet (8 mg total) by mouth every 6 (six) hours as needed (nausea).    polyethylene glycol (GLYCOLAX) 17 gram PwPk Take 17 g by mouth once daily.    ramelteon (ROZEREM) 8 mg tablet Take 1 tablet (8 mg total) by mouth nightly as needed for Insomnia.    RENVELA 800 mg Tab Take 1 tablet (800 mg total) by mouth 3 (three) times daily with meals.    senna-docusate 8.6-50 mg (PERICOLACE) 8.6-50 mg per tablet Take 1 tablet by mouth daily as needed for Constipation.    traMADol (ULTRAM) 50 mg tablet Take 1 tablet (50 mg total) by mouth every 6 (six) hours as needed.     Family History     Problem Relation (Age of Onset)    Cancer Sister    Cataracts Mother    Esophageal cancer Sister    Glaucoma Brother, Maternal Aunt    Heart disease Brother    Heart failure Sister    Hypertension Mother, Sister, Brother, Father    No Known Problems Maternal Uncle, Paternal Aunt, Paternal Uncle, Maternal Grandmother, Maternal Grandfather, Paternal Grandmother, Paternal Grandfather    Stroke Mother        Tobacco Use    Smoking status: Never Smoker    Smokeless tobacco: Never Used   Substance and Sexual Activity    Alcohol use: No     Comment: Reports occasional 1-2 drinks     Drug use: No    Sexual activity: Not Currently     Review of Systems   Constitution: Negative for chills.   HENT: Negative for congestion.    Eyes: Positive for blurred vision and vision loss in right eye.   Cardiovascular: Negative for chest pain, leg swelling, near-syncope, palpitations and syncope.   Respiratory: Positive for shortness of breath. Negative for cough.    Endocrine: Negative for polyuria.   Skin:  Negative for itching and rash.   Musculoskeletal: Positive for back pain and falls.   Gastrointestinal: Negative for abdominal pain, constipation, diarrhea, nausea and vomiting.   Genitourinary: Negative for dysuria.   Neurological: Positive for dizziness. Negative for headaches and light-headedness.   Psychiatric/Behavioral: Positive for altered mental status and depression.     Objective:     Vital Signs (Most Recent):  Temp: 97.9 °F (36.6 °C) (10/09/20 1445)  Pulse: 78 (10/09/20 1515)  Resp: 18 (10/09/20 1515)  BP: (!) 106/50 (10/09/20 1515)  SpO2: 96 % (10/09/20 1331) Vital Signs (24h Range):  Temp:  [96 °F (35.6 °C)-98.5 °F (36.9 °C)] 97.9 °F (36.6 °C)  Pulse:  [63-90] 78  Resp:  [16-28] 18  SpO2:  [92 %-100 %] 96 %  BP: ()/(41-58) 106/50     Weight: 37.5 kg (82 lb 10.8 oz)  Body mass index is 14.64 kg/m².    SpO2: 96 %  O2 Device (Oxygen Therapy): nasal cannula    No intake or output data in the 24 hours ending 10/09/20 1527    Lines/Drains/Airways     Drain                 Hemodialysis AV Fistula Left upper arm -- days         Hemodialysis AV Fistula Left upper arm -- days          Peripheral Intravenous Line                 Peripheral IV - Single Lumen 09/25/20 1822 22 G Right Antecubital 13 days         Peripheral IV - Single Lumen 10/07/20 2130 20 G Right Hand 1 day                Physical Exam   Constitutional: She is oriented to person, place, and time. She appears cachectic. She has a sickly appearance. She appears ill.   HENT:   Head: Normocephalic and atraumatic.   Eyes: Pupils are equal, round, and reactive to light. Conjunctivae are normal.   Neck: Normal range of motion. Neck supple.   Cardiovascular: Normal rate, regular rhythm, S1 normal and S2 normal. Exam reveals no gallop and no friction rub.   Murmur heard.   Harsh midsystolic murmur is present with a grade of 4/6 at the upper right sternal border radiating to the neck.  Pulses:       Radial pulses are 2+ on the right side and 2+ on the  left side.   Pulmonary/Chest: Effort normal and breath sounds normal. No respiratory distress. She has no wheezes. She has no rales. She exhibits no tenderness.   Abdominal: Soft. Bowel sounds are normal. She exhibits no distension and no mass. There is no abdominal tenderness. There is no rebound and no guarding.   Musculoskeletal:         General: No edema.   Neurological: She is alert and oriented to person, place, and time.   Skin: Skin is warm and dry.   Psychiatric: She has a normal mood and affect.       Significant Labs:   ABG: No results for input(s): PH, PCO2, HCO3, POCSATURATED, BE in the last 48 hours., Blood Culture: No results for input(s): LABBLOO in the last 48 hours., BMP:   Recent Labs   Lab 10/07/20  1754 10/08/20  0452 10/09/20  0539   * 98 115*    142 139   K 4.2 4.5 3.3*    105 98   CO2 22* 19* 28   BUN 45* 53* 28*   CREATININE 3.5* 4.1* 2.4*   CALCIUM 9.1 9.5 8.8   MG  --  2.3 2.0   , CMP   Recent Labs   Lab 10/07/20  1754 10/08/20  0452 10/09/20  0539    142 139   K 4.2 4.5 3.3*    105 98   CO2 22* 19* 28   * 98 115*   BUN 45* 53* 28*   CREATININE 3.5* 4.1* 2.4*   CALCIUM 9.1 9.5 8.8   PROT 6.6  --   --    ALBUMIN 2.5*  --   --    BILITOT 0.5  --   --    ALKPHOS 85  --   --    AST 16  --   --    ALT 9*  --   --    ANIONGAP 13 18* 13   ESTGFRAFRICA 13.7* 11.3* 21.6*   EGFRNONAA 11.9* 9.8* 18.8*   , CBC   Recent Labs   Lab 10/07/20  1637  10/08/20  0732 10/09/20  0539   WBC 7.92  --  6.93 6.71   HGB 9.5*  --  8.3* 8.5*   HCT 31.9*   < > 29.5* 28.8*   *  --  122* 118*    < > = values in this interval not displayed.   , INR No results for input(s): INR, PROTIME in the last 48 hours., Lipid Panel No results for input(s): CHOL, HDL, LDLCALC, TRIG, CHOLHDL in the last 48 hours., Troponin   Recent Labs   Lab 10/07/20  1637 10/07/20  2029   TROPONINI 0.056* 0.012    and All pertinent lab results from the last 24 hours have been reviewed.    Significant  Imaging: CT scan: CT ABDOMEN PELVIS WITH CONTRAST: No results found for this visit on 10/07/20. and CT ABDOMEN PELVIS WITHOUT CONTRAST: No results found for this visit on 10/07/20., Echocardiogram:   2D echo with color flow doppler:   Results for orders placed or performed during the hospital encounter of 07/12/16   2D Echo w/ Color Flow Doppler   Result Value Ref Range    QEF 63 55 - 65    Mitral Valve Regurgitation TRIVIAL     Diastolic Dysfunction Yes (A)     Aortic Valve Regurgitation MILD     Aortic Valve Stenosis SEVERE (A)     Est. PA Systolic Pressure 60.46 (A)     Pericardial Effusion SMALL (A)     Mitral Valve Mobility NORMAL     Tricuspid Valve Regurgitation TRIVIAL TO MILD     Narrative    Date of Procedure: 07/12/2016        TEST DESCRIPTION   Technical Quality: This is a technically adequate study.     Aorta: The aortic root is normal in size, measuring 2.3 cm at sinotubular junction and 2.5 cm at Sinuses of Valsalva. The proximal ascending aorta is normal in size, measuring 3.0 cm across.     Left Atrium: The left atrial volume index is severely enlarged, measuring 76.45 cc/m2.     Left Ventricle: The left ventricle is normal in size, with an end-diastolic diameter of 4.3 cm, and an end-systolic diameter of 2.9 cm. LV wall thickness is normal, with the septum and the posterior wall each measuring 1.0 cm across. Relative wall   thickness was increased at 0.47, and the LV mass index was increased at 105.7 g/m2 consistent with concentric left ventricular hypertrophy. Global left ventricular systolic function appears normal. Visually estimated ejection fraction is 60-65%. The LV   Doppler derived stroke volume equals 89.0 ccs.   There is normal systolic/diastolic flow in the pulmonary vein indicating normal left atrial pressures. The E/e'(lat) is 31, consistent with significant diastolic dysfunction.     Right Atrium: The right atrium is normal in size, measuring 4.2 cm in length and 3.6 cm in width in  the apical view.     Right Ventricle: The right ventricle is mildly enlarged measuring 4.9 cm at the base in the apical right ventricle-focused view. Global right ventricular systolic function appears normal. Tricuspid annular plane systolic excursion (TAPSE) is 2.1 cm.   Tissue Doppler-derived tricuspid annular peak systolic velocity (S prime) is 11.0 cm/s. The estimated PA systolic pressure is 60 mmHg.     Aortic Valve:  The aortic valve is markedly sclerotic with moderately restricted leaflet mobility. The aortic valve is tri-leaflet in structure. The peak velocity obtained across the aortic valve is 4.35 m/s, which translates to a peak gradient of 76.0   mmHg. The mean gradient is 40.78 mmHg. Using a left ventricular outflow tract diameter of 2.0 cm, a left ventricular outflow tract velocity time integral of 29 cm, and a peak instantaneous transvalvular velocity time integral of 106 cm, the calculated   aortic valve area is 0.84 cm2, consistent with severe aortic stenosis. Additionally, there is mild aortic regurgitation. There is marked aortic annular calcification.     Mitral Valve:  The mitral valve is mildly sclerotic with normal leaflet mobility. There is trivial mitral regurgitation. There is marked mitral annular calcification.     Tricuspid Valve:  The tricuspid valve is normal in structure with normal leaflet mobility. There is trivial to mild tricuspid regurgitation.     Pulmonary Valve:  The pulmonic valve is normal in structure with normal leaflet mobility. There is trivial to mild pulmonic regurgitation.     Pericardium: There is evidence of a small pericardial effusion.     IVC: IVC is normal in size and collapses > 50% with a sniff, suggesting normal right atrial pressure of 3 mmHg.     Intracavitary: There is no evidence of intracavity mass, thrombi, or vegetation.         CONCLUSIONS     1 - Normal left ventricular systolic function (EF 60-65%).     2 - Concentric hypertrophy.     3 - Left  ventricular diastolic dysfunction.     4 - Right ventricular enlargement with normal systolic function.     5 - Severe aortic stenosis, PAULINE = 0.84 cm2, peak velocity = 4.35 m/s, mean gradient = 40.78 mmHg.     6 - Mild aortic regurgitation.     7 - Trivial mitral regurgitation.     8 - Trivial to mild tricuspid regurgitation.     9 - Trivial to mild pulmonic regurgitation.     10 - Small pericardial effusion.     11 - Pulmonary hypertension. The estimated PA systolic pressure is 60 mmHg.             This document has been electronically    SIGNED BY: Herbie Boss MD On: 07/12/2016 11:41    and Transthoracic echo (TTE) complete (Cupid Only):   Results for orders placed or performed during the hospital encounter of 09/16/20   Echo Color Flow Doppler? Yes   Result Value Ref Range    Ascending aorta 2.88 cm    STJ 2.01 cm    AV mean gradient 60 mmHg    Ao peak jared 5.03 m/s    Ao .63 cm    IVRT 65.65 msec    IVS 0.73 0.6 - 1.1 cm    LA size 4.14 cm    Left Atrium Major Axis 6.22 cm    Left Atrium Minor Axis 6.11 cm    LVIDd 4.30 3.5 - 6.0 cm    LVIDs 3.81 2.1 - 4.0 cm    LVOT diameter 1.91 cm    LVOT peak VTI 16.67 cm    Posterior Wall 0.73 0.6 - 1.1 cm    MV Peak A Jared 1.43 m/s    E wave decelartion time 196.70 msec    MV Peak E Jared 1.26 m/s    PV Peak D Jared 0.28 m/s    PV Peak S Jared 0.27 m/s    RA Major Axis 5.10 cm    RA Width 4.30 cm    RVDD 5.26 cm    Sinus 2.37 cm    TAPSE 1.41 cm    TR Max Jared 4.09 m/s    TDI LATERAL 0.06 m/s    TDI SEPTAL 0.03 m/s    LA WIDTH 4.54 cm    MV stenosis pressure 1/2 time 43.68 ms    LV Diastolic Volume 82.91 mL    LV Systolic Volume 62.42 mL    RV S' 8.28 cm/s    LVOT peak jared 0.67 m/s    Mr max jared 0.06 m/s    LV LATERAL E/E' RATIO 21.00 m/s    LV SEPTAL E/E' RATIO 42.00 m/s    FS 11 %    LA volume 98.49 cm3    LV mass 93.45 g    Left Ventricle Relative Wall Thickness 0.34 cm    AV valve area 0.40 cm2    AV Velocity Ratio 0.13     AV index (prosthetic) 0.14     MV valve area p  1/2 method 5.04 cm2    E/A ratio 0.88     Mean e' 0.05 m/s    Pulm vein S/D ratio 0.96     LVOT area 2.9 cm2    LVOT stroke volume 47.74 cm3    AV peak gradient 101 mmHg    E/E' ratio 28.00 m/s    LV Systolic Volume Index 46.4 mL/m2    LV Diastolic Volume Index 61.68 mL/m2    LA Volume Index 73.3 mL/m2    LV Mass Index 70 g/m2    Triscuspid Valve Regurgitation Peak Gradient 67 mmHg    BSA 1.31 m2    Right Atrial Pressure (from IVC) 15 mmHg    TV rest pulmonary artery pressure 82 mmHg    LA Volume Index (Mod) 70.7 mL/m2    LA volume (mod) 95.00 cm3    Narrative    · Moderately decreased left ventricular systolic function. The estimated   ejection fraction is 25-30%. Global hypokinetic wall motion.  · Grade II (moderate) left ventricular diastolic dysfunction consistent   with pseudonormalization.  · Severe/critical aortic valve stenosis. Aortic valve area is 0.40 cm2;   peak velocity is 5.03 m/s; mean gradient is 60 mmHg.  · Mild-to-moderate aortic regurgitation.  · Mild mitral regurgitation.  · Severe right ventricular enlargement. Mildly to moderately reduced right   ventricular systolic function.  · Mild to moderate tricuspid regurgitation.  · Severe left atrial enlargement.  · There is a large left pleural effusion.  · The estimated PA systolic pressure is 82 mmHg.  · Elevated central venous pressure (15 mmHg).  · Pulmonary hypertension present.       and X-Ray: CXR: X-Ray Chest 1 View (CXR): No results found for this visit on 10/07/20. and X-Ray Chest PA and Lateral (CXR): No results found for this visit on 10/07/20. and KUB: X-Ray Abdomen AP 1 View (KUB): No results found for this visit on 10/07/20.    Assessment and Plan:     Severe aortic valve stenosis  -NYHA 4  -TTE reviewed above and with staff  She has undergone the following TAVR work-up:    ECHO (Date 9/17/20): PAULINE= 0.4 cm2, MG= 60mmHg, Peak Jared= 5.0 m/s, EF= 25-30%.    [facility] (Date 7/27/20): 70% RCA [facility]    STS: 22.5%    Frailty: 4/4     Iliacs - CT not yet performed   LVOT area -CT not yet performed   Incidental Findings on CT: CT not yet performed   CT Surgery Risk Assessment: CTS not yet consulted   Rhythm Issues: none   PFTs: no PFTs. Known COPD.    Co-Morbidities: ESRD on dialysis, DM, coccyx wound, COPD, PVD, debility   -Patient is not a TAVR candidate for critical aortic stenosis secondary to significant comorbidities including significant debility with inability to ambulate, low BMI, ESRD on dialysis, thrombocytopenia, hypertension, coronary artery disease, COPD, diabetes, peripheral vascular disease with gangrenous left foot, coccyx wound, severe protein malnutrition.  -Palliative Care has been consulted and per their note, patient is amenable to DNR/DNI status.  -Recommend medical management.            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  - Management per primary team  Currently on aspirin, statin    Anorexia  Severe protein calorie malnutrition with BMI 14.6    Acute on chronic respiratory failure with hypoxia  Likely secondary to volume overload due to her ESRD.  Dialysis per Nephrology.    Chronic combined systolic and diastolic heart failure  Patient has known coronary artery disease but her coronary artery disease a is out of proportion to her level of congestive heart failure.  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  Patient is not a candidate for interventional revascularization.    Pulmonary emphysema  - Management per primary team      Coronary artery disease involving native coronary artery of native heart without angina pectoris  - patient is on aspirin and statin  Patient had angiogram at [facility] 7/27/20.  Follow-up at [facility] for consideration of revascularization if indicated    Anemia in ESRD (end-stage renal disease)  - Management per primary  team    ESRD on hemodialysis  -- Management per primary team and nephrology      Renovascular hypertension  - Management per primary team  - Currently on Clonidine, hydralazine      VTE Risk Mitigation (From admission, onward)    None          Thank you for your consult. I will sign off. Please contact us if you have any additional questions.    Leon Araujo MD  Interventional Cardiology   [facility]

## 2020-10-09 NOTE — ASSESSMENT & PLAN NOTE
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

## 2020-10-09 NOTE — HPI
Tea Georges is a very nice 79 yo lady with a history of ESRD on HD, Severe/Critical AS, not felt to be a TAVR candidate, chronic respiratory failure on home O2 who has presented on several occasions lately with chest pain and dyspnea. She has been troubled by poor adherence with dialysis, in part due to feeling bad, as well as dietary non adherence (fried chicken).   She is admitted for dyspnea and chest pain which started during HD sessions. HD was stopped early due to sx. And pt. Was admitted.     I am being asked to discuss goals of care with he pt.     Met with pt. At the bedside. She is able to discuss her sx. Adequately.   No family present.     Case discussed with Dr. Solorzano.

## 2020-10-09 NOTE — ASSESSMENT & PLAN NOTE
"Suspect severe AS causing continued SOB, and without intervention patient will continue to present with same symptoms.  - Last TTE with severe/critical aortic valve stenosis. Aortic valve area is 0.40 cm2; peak velocity is 5.03 m/s; mean gradient is 60 mmHg.  - Seen by interventional cardiology during last hospital stay, defer to outpatient cardiologist, though high risk for intervention  - Per pt. Cardiologist at [facility] in 8/2020 note, (see care everywhere) she is currently under evaluation for valvular repair, but she is "inoperable to conventional AVR and high risk for TAVR"  - Interventional cardiology consulted, patient not a TAVR candidate recommend palliative care consult  - palliative care consulted, appreciate assistance.    "

## 2020-10-09 NOTE — PROGRESS NOTES
[facility]  Nephrology  Progress Note    Patient Name: Tea Georges  MRN: [id number]  Admission Date: 10/7/2020  Hospital Length of Stay: 0 days  Attending Provider: Salvador Solorzano MD   Primary Care Physician: Parth Posadas Ii, MD  Principal Problem:Chest pain    Subjective:     Interval History:   HD performed yesterday with 1.8 L removed. Patient continues to endorse complaints shortness of breath on exam (suspect d/t severe AS). + tachypneic. CXR with right pleural effusion.     Review of patient's allergies indicates:  No Known Allergies  Current Facility-Administered Medications   Medication Frequency    0.9%  NaCl infusion Once    acetaminophen tablet 650 mg Q8H PRN    albuterol sulfate nebulizer solution 2.5 mg Q4H PRN    albuterol-ipratropium 2.5 mg-0.5 mg/3 mL nebulizer solution 3 mL Q4H WAKE    artificial tears 0.5 % ophthalmic solution 2 drop QID PRN    aspirin EC tablet 81 mg Daily    atorvastatin tablet 20 mg QHS    benzonatate capsule 100 mg TID PRN    cloNIDine tablet 0.1 mg TID    dextrose 50% injection 12.5 g PRN    dextrose 50% injection 25 g PRN    fluticasone furoate-vilanteroL 200-25 mcg/dose diskus inhaler 1 puff Daily    glucagon (human recombinant) injection 1 mg PRN    glucose chewable tablet 16 g PRN    glucose chewable tablet 24 g PRN    heparin (porcine) injection 5,000 Units Q8H    hydrALAZINE tablet 100 mg Q8H    lidocaine-prilocaine cream PRN    losartan tablet 50 mg Daily    melatonin tablet 6 mg Nightly PRN    ondansetron injection 4 mg Q6H PRN    polyethylene glycol packet 17 g Daily    senna-docusate 8.6-50 mg per tablet 1 tablet Daily PRN    sevelamer carbonate tablet 800 mg TID WM    sodium chloride 0.9% flush 10 mL PRN    traMADoL tablet 50 mg Q8H PRN       Objective:     Vital Signs (Most Recent):  Temp: 96 °F (35.6 °C) (10/09/20 0511)  Pulse: 90 (10/09/20 0923)  Resp: 17 (10/09/20 0923)  BP: (!) 99/53 (10/09/20 0716)  SpO2: 97 %  (10/09/20 0923)  O2 Device (Oxygen Therapy): nasal cannula (10/09/20 0923) Vital Signs (24h Range):  Temp:  [96 °F (35.6 °C)-98.5 °F (36.9 °C)] 96 °F (35.6 °C)  Pulse:  [] 90  Resp:  [16-28] 17  SpO2:  [92 %-100 %] 97 %  BP: ()/(36-63) 99/53     Weight: 37.5 kg (82 lb 10.8 oz) (10/08/20 0400)  Body mass index is 14.64 kg/m².  Body surface area is 1.29 meters squared.    I/O last 3 completed shifts:  In: 850 [Other:850]  Out: 2730 [Other:2730]    Physical Exam  Vitals signs and nursing note reviewed.   Constitutional:       Appearance: She is cachectic.   HENT:      Nose: No congestion.      Mouth/Throat:      Mouth: Mucous membranes are dry.   Cardiovascular:      Rate and Rhythm: Normal rate.   Pulmonary:      Effort: Tachypnea and accessory muscle usage present. No respiratory distress.      Breath sounds: Rales present. No wheezing.   Abdominal:      General: Abdomen is flat. There is no distension.   Musculoskeletal: Normal range of motion.         General: No swelling.      Right lower leg: No edema.      Left lower leg: No edema.   Skin:     General: Skin is dry.   Neurological:      General: No focal deficit present.      Mental Status: She is alert and oriented to person, place, and time.   Psychiatric:         Mood and Affect: Mood normal.         Behavior: Behavior normal.         Thought Content: Thought content normal.         Judgment: Judgment normal.         Significant Labs:  CBC:   Recent Labs   Lab 10/09/20  0539   WBC 6.71   RBC 2.95*   HGB 8.5*   HCT 28.8*   *   MCV 98   MCH 28.8   MCHC 29.5*     CMP:   Recent Labs   Lab 10/07/20  1754  10/09/20  0539   *   < > 115*   CALCIUM 9.1   < > 8.8   ALBUMIN 2.5*  --   --    PROT 6.6  --   --       < > 139   K 4.2   < > 3.3*   CO2 22*   < > 28      < > 98   BUN 45*   < > 28*   CREATININE 3.5*   < > 2.4*   ALKPHOS 85  --   --    ALT 9*  --   --    AST 16  --   --    BILITOT 0.5  --   --     < > = values in this  interval not displayed.     All labs within the past 24 hours have been reviewed.       Assessment/Plan:     * Chest pain  - management per primary team (resolved)    Chronic kidney disease-mineral and bone disorder  - Renal Function Panel for electrolytes monitoring  - Recommend renal diet with 1 L fluid restriction    - Novasource with meals  - Already on binders as outpatient.  - Daily renal panel so that phos and albumin is monitored daily.     Anemia in ESRD (end-stage renal disease)  Recent Labs   Lab 10/07/20  1637 10/07/20  1653 10/08/20  0732 10/09/20  0539   WBC 7.92  --  6.93 6.71   HGB 9.5*  --  8.3* 8.5*   HCT 31.9* 32* 29.5* 28.8*   *  --  122* 118*       - Hgb today 8.5 on 10/9. Target Hgb 10-11 gm/dL.  - Will request iron studies in AM to assess further needs of supplementation     Severe aortic valve stenosis  - management per primary team     ESRD on hemodialysis  The patient is a 81 yo AAF admitted with complaints of shortness of breath and chest pain during hemodialysis on yesterday. CP now resolved, however, patient still endorses mild complaints of shortness of breath. CXR with bilateral effusions.         Out patient HD Rx:     Modality: iHD  Days: MWF  Access: LUE AVF  Unit: [facility]  Nephrologist: Dr Jared Martinez Duration: 3-4  EDW: 50kg  UF : 2-3     - Will provide dialysis for volume management only today.   - consider pulmonology consult for large R pleural effusion  - Continue to monitor intake and output, daily weights   - Avoid nephrotoxic medication and renal dose medications to GFR  - Will continue to monitor        Thank you for your consult. I will follow-up with patient. Please contact us if you have any additional questions.    Josee Henley DNP, FNP-C  Nephrology  [facility]

## 2020-10-09 NOTE — ASSESSMENT & PLAN NOTE
-NYHA 4  -TTE reviewed above and with staff  She has undergone the following TAVR work-up:    ECHO (Date 9/17/20): PAULINE= 0.4 cm2, MG= 60mmHg, Peak Jared= 5.0 m/s, EF= 25-30%.    LHC (Date 7/27/20): 70% RCA lesion @ [facility]    STS: 22.5%    Frailty: 4/4    Iliacs - CT not yet performed   LVOT area -CT not yet performed   Incidental Findings on CT: CT not yet performed   CT Surgery Risk Assessment: CTS not yet consulted   Rhythm Issues: none   PFTs: no PFTs. Known COPD.    Co-Morbidities: ESRD on dialysis, DM, coccyx wound, COPD, PVD, debility   -Patient is not a TAVR candidate for critical aortic stenosis secondary to significant comorbidities including significant debility with inability to ambulate, low BMI, ESRD on dialysis, thrombocytopenia, hypertension, coronary artery disease, COPD, diabetes, peripheral vascular disease with gangrenous left foot, coccyx wound, severe protein malnutrition.  -Palliative Care has been consulted and per their note, patient is amenable to DNR/DNI status.  -Recommend medical management.

## 2020-10-09 NOTE — CONSULTS
Wound care follow up for consult to left heel.    Received pt lying in bed with eyes closed with respirations even and unlabored. Pt opens eyes easily when spoken to. Waffle overlay and heel lift boots not in use. Pillow in use to help alleviate pressure off bed surface.    Upon assessment of right heel (present upon admission): ulceration measuring 1 cm x 1 cm x 0.5 cm with moist yellow  slough and pale pink tissue with calloused edges and moderate serosanguinous drainage. Cleansed wound with sterile normal saline with aquacel ag applied to wound bed with abd pad to cover, secured with roll gauze and tape. Due to pt's PMHx of diabetes wound is concerning for a diabetic foot ulcer.    Right heel        Pt may benefit from seeing Podiatry outpatient post discharge for continued treatment of her heel. However, depending on pt's circulatory status she may be a poor candidate for surgical debridement. Wound care nurse to place another order in for waffle overlay and heel lift boots.    Recommendations:  -Nursing to cleanse right heel wound with sterile normal saline with aquacel ag to wound bed with an abd pad to cover, secured with roll gauze and tape. Dressing to be changed 2 x a week and PRN if soiled or dislodged. Please float pt's bilateral heels with pillow while pt is lying in bed or use heel lift boots to help alleviate pressure off bed surface. Aquacel Ag is a silver impregnated that dressing kills wound bacteria held in the dressing and absorbs high amounts of wound fluid and bacteria.  -Nursing to continue pressure prevention interventions as directed. Pressure injury prevention interventions to include nursing to assist pt with turning and repositioning with pillow/wedge pillow, heel protectors and waffle cushion overlay.    Plan of care discussed with pt and with Dr. Solorzano via secure chat. Wound care will continue to follow pt PRN. [id number]

## 2020-10-09 NOTE — ASSESSMENT & PLAN NOTE
"Palliative Care Encounter / Goals of care discussion:     Narrative:   Tea Georges is a 80 y.o. female patient with severe /  Critical AS, ESRD on HD, cachexia and chronic respiratory failure. She has been re-admitted on several recent occasions recently.      1: Psychosocial :   - Marital status:   - Children: daughter  - POA: not assigned    2: Medicolegal:    - Advance directive: not on file   - Surrogate Decision Maker: pt. Names granddaughter Ole as well as her siblings.     3: Support System:  - Spiritual: yes, prays a lot  - Family: supportive     4: Prognostication: very poor prognosis.     5: Goals of care Decisions / Symptom Management / Recommendations / Plan:     1: Encounter for Palliative Care  - Code Status: Full code (we discussed intubation on this visit and pt. [place] that she would not want "machines" if it could not make her better).     - Initial Assessment: Ms. Metcalf is a pleasant lady. She tells me that he has been in and out of the hospital and that she knows her heart valve is bad. She is able to voice her concerns about the future and has thought about dying a lot. Many of close family members have  recently, some suddenly, some expected.    - Insight in Disease and Illness trajectory: Able to discuss her medical situation with me. Tells me that she feels that she needs to go to dialysis regularly and feel that she got short of breath because she skipped the last dialysis session.     - Above information was gathered from: Patient    - Goals of Care: We spoke about hopes and worries going forward.   Ms. Metcalf tells me her grandchildren are most important to her. She has one girl on the way but is not sure when delivery is. She wants to be able to see them often and see them grow.   Also tells me that she has thought about dying. She is worried about her family as they are the ones grieving.   She has not talked to her family about her thoughts as she feels it may upset them. "   Feels it important to maybe discuss her worries with them as it causes her anxiety.     Talked about her dying and Ms. Metcalf wants to focus on comfort and die peacefully if it was her time.   We discussed that some patients end up on machines when they get really sick. She states that she would not want machines if they could not make her better. Especially if she could not talk to her family any more.     - Approach to treatment: Plan to discuss plan of care further with patient's family present. Hope to have a family meeting over the weekend and discuss wishes with pt. In more detail.   In speaking with team, DNR felt appropriate, poss. Even hospice care if in line with pt. Goals.   Getting cardiology's evaluation regarding palliative options for severe AS    2: Symptom Management:   - Pain: denies pain currently  - Dyspnea: at times, none currently  - Anxiety: gets anxious when short of breath.     3: Severe AS  - critical aortic stenosis  - evaluating treatment options but anticipate no intervention being offered  - likely culprit of frequent admissions    4: ESRD on HD  - has been non adherent with HD schedule and renal diet  - fluid OL exacerbates sx. From severe AS    5: Chronic respiratory failure  - is on home oxygen  - currently not SOB  - frequent exacerbations    6: Summary and Recommendation:   - introduction made   - plans for family discussion regarding goals of care and to set limits to aggressiveness.    - will attempt to establish care plan going forward with pt. And family.      - Recommendations were discussed with Dr. Solorzano    7: Disposition:  - ongoing hospital based care.     6: Follow up plans:    Daily.     Thank you for your consult. Please call [phone] with questions.       A total of 20 min was spent on advance care planning, goals of care discussion, emotional support, formulating and communicating prognosis and goals of care, exploring burden/benefit of various approaches of  treatment.

## 2020-10-09 NOTE — SUBJECTIVE & OBJECTIVE
Interval History:   HD performed yesterday with 1.8 L removed. Patient continues to endorse complaints shortness of breath on exam (suspect d/t severe AS). + tachypneic. CXR with right pleural effusion.     Review of patient's allergies indicates:  No Known Allergies  Current Facility-Administered Medications   Medication Frequency    0.9%  NaCl infusion Once    acetaminophen tablet 650 mg Q8H PRN    albuterol sulfate nebulizer solution 2.5 mg Q4H PRN    albuterol-ipratropium 2.5 mg-0.5 mg/3 mL nebulizer solution 3 mL Q4H WAKE    artificial tears 0.5 % ophthalmic solution 2 drop QID PRN    aspirin EC tablet 81 mg Daily    atorvastatin tablet 20 mg QHS    benzonatate capsule 100 mg TID PRN    cloNIDine tablet 0.1 mg TID    dextrose 50% injection 12.5 g PRN    dextrose 50% injection 25 g PRN    fluticasone furoate-vilanteroL 200-25 mcg/dose diskus inhaler 1 puff Daily    glucagon (human recombinant) injection 1 mg PRN    glucose chewable tablet 16 g PRN    glucose chewable tablet 24 g PRN    heparin (porcine) injection 5,000 Units Q8H    hydrALAZINE tablet 100 mg Q8H    lidocaine-prilocaine cream PRN    losartan tablet 50 mg Daily    melatonin tablet 6 mg Nightly PRN    ondansetron injection 4 mg Q6H PRN    polyethylene glycol packet 17 g Daily    senna-docusate 8.6-50 mg per tablet 1 tablet Daily PRN    sevelamer carbonate tablet 800 mg TID WM    sodium chloride 0.9% flush 10 mL PRN    traMADoL tablet 50 mg Q8H PRN       Objective:     Vital Signs (Most Recent):  Temp: 96 °F (35.6 °C) (10/09/20 0511)  Pulse: 90 (10/09/20 0923)  Resp: 17 (10/09/20 0923)  BP: (!) 99/53 (10/09/20 0716)  SpO2: 97 % (10/09/20 0923)  O2 Device (Oxygen Therapy): nasal cannula (10/09/20 0923) Vital Signs (24h Range):  Temp:  [96 °F (35.6 °C)-98.5 °F (36.9 °C)] 96 °F (35.6 °C)  Pulse:  [] 90  Resp:  [16-28] 17  SpO2:  [92 %-100 %] 97 %  BP: ()/(36-63) 99/53     Weight: 37.5 kg (82 lb 10.8 oz) (10/08/20  0400)  Body mass index is 14.64 kg/m².  Body surface area is 1.29 meters squared.    I/O last 3 completed shifts:  In: 850 [Other:850]  Out: 2730 [Other:2730]    Physical Exam  Vitals signs and nursing note reviewed.   Constitutional:       Appearance: She is cachectic.   HENT:      Nose: No congestion.      Mouth/Throat:      Mouth: Mucous membranes are dry.   Cardiovascular:      Rate and Rhythm: Normal rate.   Pulmonary:      Effort: Tachypnea and accessory muscle usage present. No respiratory distress.      Breath sounds: Rales present. No wheezing.   Abdominal:      General: Abdomen is flat. There is no distension.   Musculoskeletal: Normal range of motion.         General: No swelling.      Right lower leg: No edema.      Left lower leg: No edema.   Skin:     General: Skin is dry.   Neurological:      General: No focal deficit present.      Mental Status: She is alert and oriented to person, place, and time.   Psychiatric:         Mood and Affect: Mood normal.         Behavior: Behavior normal.         Thought Content: Thought content normal.         Judgment: Judgment normal.         Significant Labs:  CBC:   Recent Labs   Lab 10/09/20  0539   WBC 6.71   RBC 2.95*   HGB 8.5*   HCT 28.8*   *   MCV 98   MCH 28.8   MCHC 29.5*     CMP:   Recent Labs   Lab 10/07/20  1754  10/09/20  0539   *   < > 115*   CALCIUM 9.1   < > 8.8   ALBUMIN 2.5*  --   --    PROT 6.6  --   --       < > 139   K 4.2   < > 3.3*   CO2 22*   < > 28      < > 98   BUN 45*   < > 28*   CREATININE 3.5*   < > 2.4*   ALKPHOS 85  --   --    ALT 9*  --   --    AST 16  --   --    BILITOT 0.5  --   --     < > = values in this interval not displayed.     All labs within the past 24 hours have been reviewed.

## 2020-10-09 NOTE — ASSESSMENT & PLAN NOTE
Patient presents from HD for SOB and chest pain. Chest pain resolved without intervention, however continues to be SOB. Suspect SOB is due to severe AS. Will diurese with HD, but high risk of readmittance given severe AS and high risk for intervention  · EKG without STEMI or any acute ST/T wave changes  · troponin 0.056> 0.12  · Recent TTE with severe AS- suspect chest pain due to AS  · Underwent HD with only mild improvement in SOB, will undergo additional HD tomorrow  · Continue Aspirin 81mg PO daily  · Continue Lipitor 40mg PO daily  · Continue tele  · Nitro prn  · No further episodes of chest pain  · See severe AS

## 2020-10-10 VITALS
HEIGHT: 63 IN | RESPIRATION RATE: 18 BRPM | BODY MASS INDEX: 14.3 KG/M2 | DIASTOLIC BLOOD PRESSURE: 54 MMHG | WEIGHT: 80.69 LBS | TEMPERATURE: 98 F | SYSTOLIC BLOOD PRESSURE: 117 MMHG | HEART RATE: 64 BPM | OXYGEN SATURATION: 98 %

## 2020-10-10 PROBLEM — L97.529 ULCER OF LEFT FOOT: Status: ACTIVE | Noted: 2020-10-10

## 2020-10-10 LAB
ANION GAP SERPL CALC-SCNC: 10 MMOL/L (ref 8–16)
BASOPHILS # BLD AUTO: 0.04 K/UL (ref 0–0.2)
BASOPHILS NFR BLD: 0.6 % (ref 0–1.9)
BUN SERPL-MCNC: 24 MG/DL (ref 8–23)
CALCIUM SERPL-MCNC: 9.8 MG/DL (ref 8.7–10.5)
CHLORIDE SERPL-SCNC: 107 MMOL/L (ref 95–110)
CO2 SERPL-SCNC: 23 MMOL/L (ref 23–29)
CREAT SERPL-MCNC: 1.8 MG/DL (ref 0.5–1.4)
DIFFERENTIAL METHOD: ABNORMAL
EOSINOPHIL # BLD AUTO: 0.1 K/UL (ref 0–0.5)
EOSINOPHIL NFR BLD: 1.1 % (ref 0–8)
ERYTHROCYTE [DISTWIDTH] IN BLOOD BY AUTOMATED COUNT: 20.5 % (ref 11.5–14.5)
EST. GFR  (AFRICAN AMERICAN): 30.6 ML/MIN/1.73 M^2
EST. GFR  (NON AFRICAN AMERICAN): 26.6 ML/MIN/1.73 M^2
GLUCOSE SERPL-MCNC: 182 MG/DL (ref 70–110)
HCT VFR BLD AUTO: 30.1 % (ref 37–48.5)
HGB BLD-MCNC: 8.5 G/DL (ref 12–16)
IMM GRANULOCYTES # BLD AUTO: 0.03 K/UL (ref 0–0.04)
IMM GRANULOCYTES NFR BLD AUTO: 0.4 % (ref 0–0.5)
LYMPHOCYTES # BLD AUTO: 0.7 K/UL (ref 1–4.8)
LYMPHOCYTES NFR BLD: 10.1 % (ref 18–48)
MAGNESIUM SERPL-MCNC: 2.1 MG/DL (ref 1.6–2.6)
MCH RBC QN AUTO: 28.3 PG (ref 27–31)
MCHC RBC AUTO-ENTMCNC: 28.2 G/DL (ref 32–36)
MCV RBC AUTO: 100 FL (ref 82–98)
MONOCYTES # BLD AUTO: 0.8 K/UL (ref 0.3–1)
MONOCYTES NFR BLD: 11 % (ref 4–15)
NEUTROPHILS # BLD AUTO: 5.5 K/UL (ref 1.8–7.7)
NEUTROPHILS NFR BLD: 76.8 % (ref 38–73)
NRBC BLD-RTO: 0 /100 WBC
PLATELET # BLD AUTO: 113 K/UL (ref 150–350)
PMV BLD AUTO: 12.4 FL (ref 9.2–12.9)
POTASSIUM SERPL-SCNC: 4.3 MMOL/L (ref 3.5–5.1)
RBC # BLD AUTO: 3 M/UL (ref 4–5.4)
SARS-COV-2 RNA RESP QL NAA+PROBE: NOT DETECTED
SODIUM SERPL-SCNC: 140 MMOL/L (ref 136–145)
WBC # BLD AUTO: 7.15 K/UL (ref 3.9–12.7)

## 2020-10-10 PROCEDURE — 94640 AIRWAY INHALATION TREATMENT: CPT

## 2020-10-10 PROCEDURE — U0003 INFECTIOUS AGENT DETECTION BY NUCLEIC ACID (DNA OR RNA); SEVERE ACUTE RESPIRATORY SYNDROME CORONAVIRUS 2 (SARS-COV-2) (CORONAVIRUS DISEASE [COVID-19]), AMPLIFIED PROBE TECHNIQUE, MAKING USE OF HIGH THROUGHPUT TECHNOLOGIES AS DESCRIBED BY CMS-2020-01-R: HCPCS

## 2020-10-10 PROCEDURE — 25000242 PHARM REV CODE 250 ALT 637 W/ HCPCS: Performed by: PHYSICIAN ASSISTANT

## 2020-10-10 PROCEDURE — 36415 COLL VENOUS BLD VENIPUNCTURE: CPT

## 2020-10-10 PROCEDURE — 25000003 PHARM REV CODE 250: Performed by: HOSPITALIST

## 2020-10-10 PROCEDURE — 85025 COMPLETE CBC W/AUTO DIFF WBC: CPT

## 2020-10-10 PROCEDURE — 99497 ADVNCD CARE PLAN 30 MIN: CPT | Mod: ,,, | Performed by: INTERNAL MEDICINE

## 2020-10-10 PROCEDURE — 83735 ASSAY OF MAGNESIUM: CPT

## 2020-10-10 PROCEDURE — 27000221 HC OXYGEN, UP TO 24 HOURS

## 2020-10-10 PROCEDURE — 99497 PR ADVNCD CARE PLAN 30 MIN: ICD-10-PCS | Mod: ,,, | Performed by: INTERNAL MEDICINE

## 2020-10-10 PROCEDURE — 25000003 PHARM REV CODE 250: Performed by: PHYSICIAN ASSISTANT

## 2020-10-10 PROCEDURE — 80048 BASIC METABOLIC PNL TOTAL CA: CPT

## 2020-10-10 PROCEDURE — 25000242 PHARM REV CODE 250 ALT 637 W/ HCPCS: Performed by: HOSPITALIST

## 2020-10-10 PROCEDURE — 94761 N-INVAS EAR/PLS OXIMETRY MLT: CPT

## 2020-10-10 PROCEDURE — G0378 HOSPITAL OBSERVATION PER HR: HCPCS

## 2020-10-10 PROCEDURE — 99217 PR OBSERVATION CARE DISCHARGE: ICD-10-PCS | Mod: ,,, | Performed by: PHYSICIAN ASSISTANT

## 2020-10-10 PROCEDURE — 99215 OFFICE O/P EST HI 40 MIN: CPT | Mod: ,,, | Performed by: INTERNAL MEDICINE

## 2020-10-10 PROCEDURE — 99217 PR OBSERVATION CARE DISCHARGE: CPT | Mod: ,,, | Performed by: PHYSICIAN ASSISTANT

## 2020-10-10 PROCEDURE — 99900035 HC TECH TIME PER 15 MIN (STAT)

## 2020-10-10 PROCEDURE — 99215 PR OFFICE/OUTPT VISIT, EST, LEVL V, 40-54 MIN: ICD-10-PCS | Mod: ,,, | Performed by: INTERNAL MEDICINE

## 2020-10-10 RX ORDER — MIRTAZAPINE 7.5 MG/1
7.5 TABLET, FILM COATED ORAL NIGHTLY
Status: DISCONTINUED | OUTPATIENT
Start: 2020-10-10 | End: 2020-10-10 | Stop reason: HOSPADM

## 2020-10-10 RX ORDER — MIRTAZAPINE 7.5 MG/1
7.5 TABLET, FILM COATED ORAL NIGHTLY
Qty: 30 TABLET | Refills: 11 | Status: SHIPPED | OUTPATIENT
Start: 2020-10-10 | End: 2021-10-10

## 2020-10-10 RX ORDER — BENZONATATE 100 MG/1
100 CAPSULE ORAL 3 TIMES DAILY PRN
Start: 2020-10-10 | End: 2020-10-20

## 2020-10-10 RX ORDER — HYDROXYZINE HYDROCHLORIDE 25 MG/1
25 TABLET, FILM COATED ORAL ONCE
Status: COMPLETED | OUTPATIENT
Start: 2020-10-10 | End: 2020-10-10

## 2020-10-10 RX ADMIN — ASPIRIN 81 MG: 81 TABLET, COATED ORAL at 09:10

## 2020-10-10 RX ADMIN — SEVELAMER CARBONATE 800 MG: 800 TABLET, FILM COATED ORAL at 01:10

## 2020-10-10 RX ADMIN — HYDRALAZINE HYDROCHLORIDE 50 MG: 50 TABLET, FILM COATED ORAL at 03:10

## 2020-10-10 RX ADMIN — HYDROXYZINE HYDROCHLORIDE 25 MG: 25 TABLET, FILM COATED ORAL at 01:10

## 2020-10-10 RX ADMIN — CLONIDINE HYDROCHLORIDE 0.1 MG: 0.1 TABLET ORAL at 03:10

## 2020-10-10 RX ADMIN — IPRATROPIUM BROMIDE AND ALBUTEROL SULFATE 3 ML: .5; 2.5 SOLUTION RESPIRATORY (INHALATION) at 01:10

## 2020-10-10 RX ADMIN — IPRATROPIUM BROMIDE AND ALBUTEROL SULFATE 3 ML: .5; 2.5 SOLUTION RESPIRATORY (INHALATION) at 09:10

## 2020-10-10 RX ADMIN — TRAMADOL HYDROCHLORIDE 50 MG: 50 TABLET, FILM COATED ORAL at 05:10

## 2020-10-10 RX ADMIN — SEVELAMER CARBONATE 800 MG: 800 TABLET, FILM COATED ORAL at 08:10

## 2020-10-10 RX ADMIN — POLYETHYLENE GLYCOL 3350 17 G: 17 POWDER, FOR SOLUTION ORAL at 09:10

## 2020-10-10 RX ADMIN — FLUTICASONE FUROATE AND VILANTEROL TRIFENATATE 1 PUFF: 200; 25 POWDER RESPIRATORY (INHALATION) at 09:10

## 2020-10-10 RX ADMIN — ACETAMINOPHEN 650 MG: 325 TABLET ORAL at 03:10

## 2020-10-10 RX ADMIN — CLONIDINE HYDROCHLORIDE 0.1 MG: 0.1 TABLET ORAL at 09:10

## 2020-10-10 RX ADMIN — SEVELAMER CARBONATE 800 MG: 800 TABLET, FILM COATED ORAL at 05:10

## 2020-10-10 RX ADMIN — TRAMADOL HYDROCHLORIDE 50 MG: 50 TABLET, FILM COATED ORAL at 09:10

## 2020-10-10 RX ADMIN — LOSARTAN POTASSIUM 50 MG: 50 TABLET, FILM COATED ORAL at 09:10

## 2020-10-10 RX ADMIN — HYDRALAZINE HYDROCHLORIDE 50 MG: 50 TABLET, FILM COATED ORAL at 06:10

## 2020-10-10 NOTE — SUBJECTIVE & OBJECTIVE
Interval History: Evaluated by Interventional Cards. Not a TAVR candidate. Medical management.   Granddaughter at the bedside today.     Past Medical History:   Diagnosis Date    (HFpEF) heart failure with preserved ejection fraction 5/21/2019    Anemia in ESRD (end-stage renal disease) 5/29/2016    Aneurysm of arteriovenous dialysis fistula     Aortic atherosclerosis 11/22/2016    Bilateral low back pain without sciatica 11/17/2015    Blindness of right eye 11/12/2016    Brain compression 6/22/2019    Cataract     Central retinal vein occlusion, right eye 6/3/2014    Chronic diastolic heart failure 1/8/2016    Chronic respiratory failure with hypoxia 5/29/2016    Coronary artery disease involving native coronary artery of native heart without angina pectoris 12/12/2016    Diverticulosis     Encounter for blood transfusion     Enlarged LA (left atrium) 10/7/2016    Epiretinal membrane 7/17/2012    ESRD on hemodialysis     MWF    History of GI diverticular bleed     5/22/16    Hyperparathyroidism, secondary renal 10/14/2016    Left spastic hemiparesis 11/13/2016    Moderate single current episode of major depressive disorder 2/8/2019    Non-rheumatic tricuspid valve insufficiency 1/15/2017    Peripheral vascular disease, unspecified 11/22/2016    Physical debility 11/17/2016    Pleural effusion on right     Pulmonary emphysema 1/15/2017    Pulmonary hypertension 6/28/2019    Renovascular hypertension 1/8/2016    Seizure 6/24/2019    Severe aortic valve stenosis 2/4/2016    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 11/13/2016    s/p thrombectomy of MCA    Subdural hematoma 05/21/2019    Bilateral R>L    Thrombocytopenia, unspecified 1/15/2017    Type 2 diabetes mellitus with chronic kidney disease on chronic dialysis, without long-term current use of insulin 5/1/2018    Type 2 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,  without long-term current use of insulin 3/26/2013    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, without long-term current use of insulin 3/26/2013    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       Past Surgical History:   Procedure Laterality Date    ABDOMINAL SURGERY      BREAST SURGERY      tumor removal x 2    CARDIAC SURGERY  2016    Implantable loop recorder placed    CATARACT EXTRACTION      CEREBRAL ANGIOGRAM N/A 6/24/2019    Procedure: ANGIOGRAM-CEREBRAL;  Surgeon: Kenisha Surgeon;  Location: [facility];  Service: Anesthesiology;  Laterality: N/A;    CHOLECYSTECTOMY      COLONOSCOPY N/A 5/23/2016    Procedure: COLONOSCOPY;  Surgeon: WILLIAM Colvin MD;  Location: [facility] ENDO (2ND FLR);  Service: Endoscopy;  Laterality: N/A;    COLONOSCOPY N/A 5/30/2016    Procedure: COLONOSCOPY;  Surgeon: Sam Davis MD;  Location: [facility] ENDO (2ND FLR);  Service: Endoscopy;  Laterality: N/A;    CRANIOTOMY FOR EVACUATION OF SUBDURAL HEMATOMA Right 6/23/2019    Procedure: KATE HOLES FOR SUBDURAL HEMATOMA EVACUATION;  Surgeon: Trevor Conner MD;  [facility];  Service: Neurosurgery;  Laterality: Right;    EYE SURGERY      FISTULOGRAM Left 11/28/2018    Procedure: Fistulogram;  Surgeon: NADINE Blum III, MD;  Location: [facility] CATH LAB;  Service: Cardiology;  Laterality: Left;    INTRAMEDULLARY RODDING OF FEMUR Left 10/28/2019    Procedure: INSERTION, INTRAMEDULLARY NELIDA, FEMUR,  Left , synthes, hana table, large C arm clock side;  Surgeon: Torey Aguilar MD;  [facility];  Service: Orthopedics;  Laterality: Left;  general        PLACEMENT OF DUAL-LUMEN VASCULAR CATHETER Right 11/29/2018    Procedure: INSERTION, CATHETER, VASCULAR, DUAL LUMEN;  Surgeon: NADINE Blum III, MD;  [facility];  Service: Peripheral Vascular;  Laterality: Right;  Permacatheter placement     RESECTION OF ANEURYSM Left 11/29/2018     Procedure: EXCISION, ANEURYSM;  Surgeon: NADINE Blum III, MD;  Location: [facility] OR 2ND FLR;  Service: Peripheral Vascular;  Laterality: Left;  Excision, L AVF aneurysm    REVISION OF ARTERIOVENOUS FISTULA Left 11/29/2018    Procedure: REVISION, AV FISTULA,;  Surgeon: NADINE Blum III, MD;  Location: [facility] OR 2ND FLR;  Service: Peripheral Vascular;  Laterality: Left;  OR 11    UPPER GASTROINTESTINAL ENDOSCOPY         Review of patient's allergies indicates:  No Known Allergies    Medications:  Continuous Infusions:  Scheduled Meds:   albuterol-ipratropium  3 mL Nebulization Q4H WAKE    aspirin  81 mg Oral Daily    atorvastatin  20 mg Oral QHS    cloNIDine  0.1 mg Oral TID    fluticasone furoate-vilanteroL  1 puff Inhalation Daily    hydrALAZINE  50 mg Oral Q8H    losartan  50 mg Oral Daily    mirtazapine  7.5 mg Oral QHS    polyethylene glycol  17 g Oral Daily    sevelamer carbonate  800 mg Oral TID WM     PRN Meds:acetaminophen, albuterol sulfate, artificial tears, benzonatate, dextrose 50%, dextrose 50%, glucagon (human recombinant), glucose, glucose, lidocaine-prilocaine, melatonin, ondansetron, senna-docusate 8.6-50 mg, sodium chloride 0.9%, traMADoL    Family History     Problem Relation (Age of Onset)    Cancer Sister    Cataracts Mother    Esophageal cancer Sister    Glaucoma Brother, Maternal Aunt    Heart disease Brother    Heart failure Sister    Hypertension Mother, Sister, Brother, Father    No Known Problems Maternal Uncle, Paternal Aunt, Paternal Uncle, Maternal Grandmother, Maternal Grandfather, Paternal Grandmother, Paternal Grandfather    Stroke Mother        Tobacco Use    Smoking status: Never Smoker    Smokeless tobacco: Never Used   Substance and Sexual Activity    Alcohol use: No     Comment: Reports occasional 1-2 drinks     Drug use: No    Sexual activity: Not Currently       Review of Systems   Constitutional: Positive for fatigue and unexpected weight change.   HENT:  Negative for congestion and dental problem.    Eyes: Positive for itching. Negative for pain.   Respiratory: Positive for cough, chest tightness and shortness of breath.    Cardiovascular: Positive for chest pain. Negative for leg swelling.   Gastrointestinal: Negative for abdominal distention and abdominal pain.   Genitourinary: Negative for flank pain and pelvic pain.   Musculoskeletal: Negative for arthralgias and back pain.   Neurological: Negative for dizziness and headaches.   Psychiatric/Behavioral: Negative for agitation and behavioral problems.     Objective:     Vital Signs (Most Recent):  Temp: 98.2 °F (36.8 °C) (10/10/20 1245)  Pulse: 79 (10/10/20 1311)  Resp: 18 (10/10/20 1311)  BP: (!) 115/48 (10/10/20 1245)  SpO2: 100 % (10/10/20 1311) Vital Signs (24h Range):  Temp:  [97.2 °F (36.2 °C)-98.7 °F (37.1 °C)] 98.2 °F (36.8 °C)  Pulse:  [60-81] 79  Resp:  [16-20] 18  SpO2:  [97 %-100 %] 100 %  BP: (103-123)/(42-57) 115/48     Weight: 36.6 kg (80 lb 11 oz)  Body mass index is 14.29 kg/m².    Physical Exam  Vitals signs and nursing note reviewed.   Constitutional:       General: She is not in acute distress.  HENT:      Head: Normocephalic and atraumatic.   Eyes:      General: No scleral icterus.     Conjunctiva/sclera: Conjunctivae normal.      Comments: Cataract right eye   Neck:      Musculoskeletal: Neck supple.   Cardiovascular:      Rate and Rhythm: Normal rate and regular rhythm.      Heart sounds: Murmur present.   Pulmonary:      Effort: Pulmonary effort is normal.      Breath sounds: Normal breath sounds.   Abdominal:      General: Abdomen is flat. There is no distension.   Musculoskeletal:         General: No deformity or signs of injury.      Comments: Muscle wasting. Left heel wound, dressing applied.    Lymphadenopathy:      Cervical: No cervical adenopathy.   Skin:     General: Skin is warm and dry.   Neurological:      General: No focal deficit present.      Mental Status: She is alert.       Cranial Nerves: No cranial nerve deficit.         Review of Symptoms    Symptom Assessment (ESAS 0-10 Scale)     CAM / Delirium:  Negative  Constipation:  Negative  Diarrhea:  Negative    Bowel Management Plan (BMP):  No            Living Arrangements:  Lives in nursing home    Psychosocial/Cultural: Names her siblings (brother and sister) and her granddaughter next of kin.   Lives in a home.   Family is close, especially her grandson and great grand children.     Spiritual:  F - Kin[place] and Belief:  Has been praying for healing and good health.   I - Importance:  Very  C - Community:  Yes  A - Address in Care:  Yes     Time-Based Charting:  No      Advance Care Planning   Advance Directives:   Living Will: No    LaPOST: No    Do Not Resuscitate Status: No    Medical Power of : No      Decision Making:  Patient answered questions         Significant Labs: All pertinent labs within the past 24 hours have been reviewed.  CBC:   Recent Labs   Lab 10/10/20  0337   WBC 7.15   HGB 8.5*   HCT 30.1*   *   *     BMP:  Recent Labs   Lab 10/10/20  0337   *      K 4.3      CO2 23   BUN 24*   CREATININE 1.8*   CALCIUM 9.8   MG 2.1     LFT:  Lab Results   Component Value Date    AST 16 10/07/2020    ALKPHOS 85 10/07/2020    BILITOT 0.5 10/07/2020     Albumin:   Albumin   Date Value Ref Range Status   10/07/2020 2.5 (L) 3.5 - 5.2 g/dL Final     Protein:   Total Protein   Date Value Ref Range Status   10/07/2020 6.6 6.0 - 8.4 g/dL Final     Lactic acid:   Lab Results   Component Value Date    LACTATE 0.7 09/25/2020    LACTATE 0.6 11/27/2018       Significant Imaging: CXR 10/9 reviewed

## 2020-10-10 NOTE — ASSESSMENT & PLAN NOTE
· Patient presents from HD for SOB and chest pain. Chest pain resolved without intervention, however continues to be SOB. Suspect SOB is due to severe AS. Will diurese with HD, but high risk of readmittance given severe AS and high risk for intervention. No further episodes of chest pain during stay.   · EKG without STEMI or any acute ST/T wave changes  · troponin 0.056> 0.12  · Recent TTE with severe AS- suspect chest pain due to AS  · Underwent HD with improvement  · Continue Aspirin 81mg PO daily  · Continue Lipitor 40mg PO daily  · Continue tele  · Nitro prn  · No further episodes of chest pain  · See severe AS

## 2020-10-10 NOTE — NURSING
Patient report called to Ana at [facility]. Had to page on-call CM to fax COVID results to facility x2 prior to allowing transport to take patient.  Cleared at 1730. IV discontinued. Pain managed, no falls or new signs of infection.

## 2020-10-10 NOTE — PROGRESS NOTES
[facility]  Palliative Medicine  Progress Note    Patient Name: Tea Georges  MRN: [id number]  Admission Date: 10/7/2020  Hospital Length of Stay: 0 days  Code Status: Full Code   Attending Provider: Salvador Solorzano MD  Consulting Provider: Anderson Iqbal MD  Primary Care Physician: Parth Posadas Ii, MD  Principal Problem:Severe aortic valve stenosis    Patient information was obtained from patient, relative(s) and past medical records.      Assessment/Plan:     Palliative care encounter  Palliative Care Encounter / Goals of care discussion:     Narrative:   Tea Georges is a 80 y.o. female patient with severe /  Critical AS, ESRD on HD, cachexia and chronic respiratory failure. She has been re-admitted on several recent occasions recently.      1: Psychosocial :   - Marital status:   - Children: daughter  - POA: not assigned    2: Medicolegal:    - Advance directive: not on file   - Surrogate Decision Maker: pt. Names granddaughter Ole as well as her siblings.     3: Support System:  - Spiritual: yes, prays a lot  - Family: supportive     4: Prognostication: very poor prognosis.     5: Goals of care Decisions / Symptom Management / Recommendations / Plan:     1: Encounter for Palliative Care      - Code Status: DNR 10/10/20    10/10/20  - Met with pt. And granddaughter Ole Traore today.   - we reviewed the current situation as the granddaughter was little informed.   - expressed that given the critical aortic stenosis without good treatment options, ESRD, cachexia and advanced age, now with frequent re-admissions for CHF exacerbation certainly in part caused by critical AS and fluid shifts, the situation is very concerning overall.   - we were able to discuss preferences in care. Weighed options of continuing current care and best medical management with the concern that  Frequent re-admissions may result.   - also discussed the option of comfort oriented care, with or  without stopping HD, but avoiding re-admission and manage sx. At the NH and allow a natural death.     - pt. Feels that at this point, re-admissions, even if frequent, will get her feeling better and will give her more meaningful days. She wishes to continue current care.   - If however care becomes non beneficial or is not providing additional good days, she knows that a transition to hospice at that point is possible and likely best care.   - we plan on scheduling an informational visit with a hospice agency at the Nursing home.     We also spoke about worst case scenarios and severe illness.   Weight pro and con of aggressive care in severe illness, using life support machines (vent) and aggressive medications etc.   Pt. [place] that ICU level of care was not going to be beneficial and I agree.   Advance Care Planning       Completed [place] Post outlining her wishes (hospital based care: yes, ICU care/Intubation, prossors: no, DNR)   Completed POA form (her granddaughter Ole Traore [phone], is named POA             - above discussed with Dr. Solorzano      - Initial Assessment: Ms. Metcalf is a pleasant lady. She tells me that he has been in and out of the hospital and that she knows her heart valve is bad. She is able to voice her concerns about the future and has thou  ght about dying a lot. Many of close family members have  recently, some suddenly, some expected.    - Insight in Disease and Illness trajectory: Able to discuss her medical situation with me. Tells me that she feels that she needs to go to dialysis regularly and feel that she got short of breath because she skipped the last dialysis session.     - Above information was gathered from: Patient    - Goals of Care: We spoke about hopes and worries going forward.   Ms. Metcalf tells me her grandchildren are most important to her. She has one girl on the way but is not sure when delivery is. She wants to be able to see them often and see them grow.    Also tells me that she has thought about dying. She is worried about her family as they are the ones grieving.   She has not talked to her family about her thoughts as she feels it may upset them.   Feels it important to maybe discuss her worries with them as it causes her anxiety.     Talked about her dying and Ms. Metcalf wants to focus on comfort and die peacefully if it was her time.   We discussed that some patients end up on machines when they get really sick. She states that she would not want machines if they could not make her better. Especially if she could not talk to her family any more.     - Approach to treatment: Plan to discuss plan of care further with patient's family present. Hope to have a family meeting over the weekend and discuss wishes with pt. In more detail.   In speaking with team, DNR felt appropriate, poss. Even hospice care if in line with pt. Goals.   Getting cardiology's evaluation regarding palliative options for severe AS    2: Symptom Management:   - Pain: denies pain currently  - Dyspnea: at times, none currently  - Anxiety: gets anxious when short of breath.     3: Severe AS  - critical aortic stenosis  - evaluating treatment options but anticipate no intervention being offered  - likely culprit of frequent admissions    4: ESRD on HD  - has been non adherent with HD schedule and renal diet  - fluid OL exacerbates sx. From severe AS    5: Chronic respiratory failure  - is on home oxygen  - currently not SOB  - frequent exacerbations    6: Summary and Recommendation:   - introduction made   - plans for family discussion regarding goals of care and to set limits to aggressiveness.    - will attempt to establish care plan going forward with pt. And family.      - Recommendations were discussed with Dr. Solorzano    7: Disposition:  - ongoing hospital based care.     6: Follow up plans:    Daily.     Thank you for your consult. Please call [phone] with questions.       A total  of 40 min was spent on advance care planning, goals of care discussion, emotional support, formulating and communicating prognosis and goals of care, exploring burden/benefit of various approaches of treatment.                 I will follow-up with patient. Please contact us if you have any additional questions.    Subjective:     Chief Complaint:   Chief Complaint   Patient presents with    Chest Pain     symptoms started 4 hours ago    Shortness of Breath    Anxiety       HPI:   Tea Georges is a very nice 81 yo lady with a history of ESRD on HD, Severe/Critical AS, not felt to be a TAVR candidate, chronic respiratory failure on home O2 who has presented on several occasions lately with chest pain and dyspnea. She has been troubled by poor adherence with dialysis, in part due to feeling bad, as well as dietary non adherence (fried chicken).   She is admitted for dyspnea and chest pain which started during HD sessions. HD was stopped early due to sx. And pt. Was admitted.     I am being asked to discuss goals of care with he pt.     Met with pt. At the bedside. She is able to discuss her sx. Adequately.   No family present.     Case discussed with Dr. Solorzano.     Hospital Course:  No notes on file    Interval History: Evaluated by Interventional Cards. Not a TAVR candidate. Medical management.   Granddaughter at the bedside today.     Past Medical History:   Diagnosis Date    (HFpEF) heart failure with preserved ejection fraction 5/21/2019    Anemia in ESRD (end-stage renal disease) 5/29/2016    Aneurysm of arteriovenous dialysis fistula     Aortic atherosclerosis 11/22/2016    Bilateral low back pain without sciatica 11/17/2015    Blindness of right eye 11/12/2016    Brain compression 6/22/2019    Cataract     Central retinal vein occlusion, right eye 6/3/2014    Chronic diastolic heart failure 1/8/2016    Chronic respiratory failure with hypoxia 5/29/2016    Coronary artery disease involving  native coronary artery of native heart without angina pectoris 12/12/2016    Diverticulosis     Encounter for blood transfusion     Enlarged LA (left atrium) 10/7/2016    Epiretinal membrane 7/17/2012    ESRD on hemodialysis     MWF    History of GI diverticular bleed     5/22/16    Hyperparathyroidism, secondary renal 10/14/2016    Left spastic hemiparesis 11/13/2016    Moderate single current episode of major depressive disorder 2/8/2019    Non-rheumatic tricuspid valve insufficiency 1/15/2017    Peripheral vascular disease, unspecified 11/22/2016    Physical debility 11/17/2016    Pleural effusion on right     Pulmonary emphysema 1/15/2017    Pulmonary hypertension 6/28/2019    Renovascular hypertension 1/8/2016    Seizure 6/24/2019    Severe aortic valve stenosis 2/4/2016    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 11/13/2016    s/p thrombectomy of MCA    Subdural hematoma 05/21/2019    Bilateral R>L    Thrombocytopenia, unspecified 1/15/2017    Type 2 diabetes mellitus with chronic kidney disease on chronic dialysis, without long-term current use of insulin 5/1/2018    Type 2 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, without long-term current use of insulin 3/26/2013    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, without long-term current use of insulin 3/26/2013    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       Past Surgical History:   Procedure Laterality Date    ABDOMINAL SURGERY      BREAST SURGERY      tumor removal x 2    CARDIAC SURGERY  2016    Implantable loop recorder placed    CATARACT EXTRACTION      CEREBRAL ANGIOGRAM N/A 6/24/2019    Procedure: ANGIOGRAM-CEREBRAL;  Surgeon: Kenisha Surgeon;  [facility];  Service: Anesthesiology;  Laterality: N/A;    CHOLECYSTECTOMY      COLONOSCOPY N/A 5/23/2016    Procedure: COLONOSCOPY;  Surgeon: WILLIAM Colvin MD;   Location: [facility] ENDO (2ND FLR);  Service: Endoscopy;  Laterality: N/A;    COLONOSCOPY N/A 5/30/2016    Procedure: COLONOSCOPY;  Surgeon: Sam Davis MD;  Location: [facility] ENDO (2ND FLR);  Service: Endoscopy;  Laterality: N/A;    CRANIOTOMY FOR EVACUATION OF SUBDURAL HEMATOMA Right 6/23/2019    Procedure: KATE HOLES FOR SUBDURAL HEMATOMA EVACUATION;  Surgeon: Trevor oCnner MD;  [facility];  Service: Neurosurgery;  Laterality: Right;    EYE SURGERY      FISTULOGRAM Left 11/28/2018    Procedure: Fistulogram;  Surgeon: NADINE Blum III, MD;  Location: [facility] CATH LAB;  Service: Cardiology;  Laterality: Left;    INTRAMEDULLARY RODDING OF FEMUR Left 10/28/2019    Procedure: INSERTION, INTRAMEDULLARY NELIDA, FEMUR,  Left , synthes, hana table, large C arm clock side;  Surgeon: Torey Aguilar MD;  [facility];  Service: Orthopedics;  Laterality: Left;  general        PLACEMENT OF DUAL-LUMEN VASCULAR CATHETER Right 11/29/2018    Procedure: INSERTION, CATHETER, VASCULAR, DUAL LUMEN;  Surgeon: NADINE Blum III, MD;  [facility];  Service: Peripheral Vascular;  Laterality: Right;  Permacatheter placement     RESECTION OF ANEURYSM Left 11/29/2018    Procedure: EXCISION, ANEURYSM;  Surgeon: NADINE Blum III, MD;  [facility];  Service: Peripheral Vascular;  Laterality: Left;  Excision, L AVF aneurysm    REVISION OF ARTERIOVENOUS FISTULA Left 11/29/2018    Procedure: REVISION, AV FISTULA,;  Surgeon: NADINE Blum III, MD;  [facility];  Service: Peripheral Vascular;  Laterality: Left;  OR 11    UPPER GASTROINTESTINAL ENDOSCOPY         Review of patient's allergies indicates:  No Known Allergies    Medications:  Continuous Infusions:  Scheduled Meds:   albuterol-ipratropium  3 mL Nebulization Q4H WAKE    aspirin  81 mg Oral Daily    atorvastatin  20 mg Oral QHS    cloNIDine  0.1 mg Oral TID    fluticasone furoate-vilanteroL  1  puff Inhalation Daily    hydrALAZINE  50 mg Oral Q8H    losartan  50 mg Oral Daily    mirtazapine  7.5 mg Oral QHS    polyethylene glycol  17 g Oral Daily    sevelamer carbonate  800 mg Oral TID WM     PRN Meds:acetaminophen, albuterol sulfate, artificial tears, benzonatate, dextrose 50%, dextrose 50%, glucagon (human recombinant), glucose, glucose, lidocaine-prilocaine, melatonin, ondansetron, senna-docusate 8.6-50 mg, sodium chloride 0.9%, traMADoL    Family History     Problem Relation (Age of Onset)    Cancer Sister    Cataracts Mother    Esophageal cancer Sister    Glaucoma Brother, Maternal Aunt    Heart disease Brother    Heart failure Sister    Hypertension Mother, Sister, Brother, Father    No Known Problems Maternal Uncle, Paternal Aunt, Paternal Uncle, Maternal Grandmother, Maternal Grandfather, Paternal Grandmother, Paternal Grandfather    Stroke Mother        Tobacco Use    Smoking status: Never Smoker    Smokeless tobacco: Never Used   Substance and Sexual Activity    Alcohol use: No     Comment: Reports occasional 1-2 drinks     Drug use: No    Sexual activity: Not Currently       Review of Systems   Constitutional: Positive for fatigue and unexpected weight change.   HENT: Negative for congestion and dental problem.    Eyes: Positive for itching. Negative for pain.   Respiratory: Positive for cough, chest tightness and shortness of breath.    Cardiovascular: Positive for chest pain. Negative for leg swelling.   Gastrointestinal: Negative for abdominal distention and abdominal pain.   Genitourinary: Negative for flank pain and pelvic pain.   Musculoskeletal: Negative for arthralgias and back pain.   Neurological: Negative for dizziness and headaches.   Psychiatric/Behavioral: Negative for agitation and behavioral problems.     Objective:     Vital Signs (Most Recent):  Temp: 98.2 °F (36.8 °C) (10/10/20 1245)  Pulse: 79 (10/10/20 1311)  Resp: 18 (10/10/20 1311)  BP: (!) 115/48 (10/10/20  1245)  SpO2: 100 % (10/10/20 1311) Vital Signs (24h Range):  Temp:  [97.2 °F (36.2 °C)-98.7 °F (37.1 °C)] 98.2 °F (36.8 °C)  Pulse:  [60-81] 79  Resp:  [16-20] 18  SpO2:  [97 %-100 %] 100 %  BP: (103-123)/(42-57) 115/48     Weight: 36.6 kg (80 lb 11 oz)  Body mass index is 14.29 kg/m².    Physical Exam  Vitals signs and nursing note reviewed.   Constitutional:       General: She is not in acute distress.  HENT:      Head: Normocephalic and atraumatic.   Eyes:      General: No scleral icterus.     Conjunctiva/sclera: Conjunctivae normal.      Comments: Cataract right eye   Neck:      Musculoskeletal: Neck supple.   Cardiovascular:      Rate and Rhythm: Normal rate and regular rhythm.      Heart sounds: Murmur present.   Pulmonary:      Effort: Pulmonary effort is normal.      Breath sounds: Normal breath sounds.   Abdominal:      General: Abdomen is flat. There is no distension.   Musculoskeletal:         General: No deformity or signs of injury.      Comments: Muscle wasting. Left heel wound, dressing applied.    Lymphadenopathy:      Cervical: No cervical adenopathy.   Skin:     General: Skin is warm and dry.   Neurological:      General: No focal deficit present.      Mental Status: She is alert.      Cranial Nerves: No cranial nerve deficit.         Review of Symptoms    Symptom Assessment (ESAS 0-10 Scale)     CAM / Delirium:  Negative  Constipation:  Negative  Diarrhea:  Negative    Bowel Management Plan (BMP):  No            Living Arrangements:  Lives in nursing home    Psychosocial/Cultural: Names her siblings (brother and sister) and her granddaughter next of kin.   Lives in a home.   Family is close, especially her grandson and great grand children.     Spiritual:  F - [place] and Belief:  Has been praying for healing and good health.   I - Importance:  Very  C - Community:  Yes  A - Address in Care:  Yes     Time-Based Charting:  No      Advance Care Planning   Advance Directives:   Living Will: No     LaPOST: No    Do Not Resuscitate Status: No    Medical Power of : No      Decision Making:  Patient answered questions         Significant Labs: All pertinent labs within the past 24 hours have been reviewed.  CBC:   Recent Labs   Lab 10/10/20  0337   WBC 7.15   HGB 8.5*   HCT 30.1*   *   *     BMP:  Recent Labs   Lab 10/10/20  0337   *      K 4.3      CO2 23   BUN 24*   CREATININE 1.8*   CALCIUM 9.8   MG 2.1     LFT:  Lab Results   Component Value Date    AST 16 10/07/2020    ALKPHOS 85 10/07/2020    BILITOT 0.5 10/07/2020     Albumin:   Albumin   Date Value Ref Range Status   10/07/2020 2.5 (L) 3.5 - 5.2 g/dL Final     Protein:   Total Protein   Date Value Ref Range Status   10/07/2020 6.6 6.0 - 8.4 g/dL Final     Lactic acid:   Lab Results   Component Value Date    LACTATE 0.7 09/25/2020    LACTATE 0.6 11/27/2018       Significant Imaging: CXR 10/9 reviewed          Anderson Iqbal MD  Palliative Medicine  [facility]

## 2020-10-10 NOTE — PLAN OF CARE
[facility]     Department of Hospital Medicine     [address] [place] [address]     [phone] after hours  [phone] fax       NURSING HOME ORDERS    10/10/2020    Admit to Nursing Home:  Regular Bed       Diagnoses:  Active Hospital Problems    Diagnosis  POA    *Severe aortic valve stenosis [I35.0]  Yes    Palliative care encounter [Z51.5]  Not Applicable    Chest pain [R07.9]  Yes    Benign hypertensive heart and kidney disease with HF and ESRD [I13.2, N18.6]  Yes    Anorexia [R63.0]  Yes    Chronic kidney disease-mineral and bone disorder [N18.9, E83.9, M89.9]  Yes    Acute on chronic respiratory failure with hypoxia [J96.21]  Yes    Chronic combined systolic and diastolic heart failure [I50.42]  Yes    Thrombocytopenia, unspecified [D69.6]  Yes    Pulmonary emphysema [J43.9]  Yes    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, left leg [I70.262]  Yes    Coronary artery disease involving native coronary artery of native heart without angina pectoris [I25.10]  Yes    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 [I63.411]  Yes     s/p thrombectomy of MCA      Anemia in ESRD (end-stage renal disease) [N18.6, D63.1]  Yes     Chronic    ESRD on hemodialysis [N18.6, Z99.2]  Not Applicable    Renovascular hypertension [I15.0]  Yes      Resolved Hospital Problems   No resolved problems to display.       Patient is homebound due to:  Severe aortic valve stenosis    Allergies:Review of patient's allergies indicates:  No Known Allergies    Vitals:       Once weekly      Diet:  Renal diet, diabetic diet 1L fluid restriction    Supplement:  1 can every three times a day with meals                         Type:  Novasource       Acitivities:    - Up in a chair each morning as tolerated   - Ambulate with assistance to bathroom   - Scheduled walks once each shift (every 8 hours)  - May use walker, cane, or self-propelled  wheelchair      LABS:  Per facility protocol    Nursing Precautions:   - Aspiration precautions:              -  Upright 90 degrees befor during and after meals             -  Suction at bedside          - Fall precautions per nursing home protocol   - Seizure precaution per longterm protocol   - Decubitus precautions:        -  for positioning   - Pressure reducing foam mattress   - Turn patient every two hours. Use wedge pillows to anchor patient    CONSULTS:      Informational Hospice consult     Physical Therapy to evaluate and treat     Occupational Therapy to evaluate and treat     Nutrition to evaluate and recommend diet       MISCELLANEOUS CARE:       Routine Skin for Bedridden Patients:  Apply moisture barrier cream to all    skin folds and wet areas in perineal area daily and after baths and                           all bowel movements.    WOUND CARE:  Wound spray or saline for wound cleaning with all dressing changes.    All wounds to be measured with first dressing changes and every week.      Foot Ulcer   Location: right heel           -Nursing to cleanse right heel wound with sterile normal saline with aquacel ag to wound bed with an abd pad to cover, secured with roll gauze and tape. Dressing to be changed 2 x a week and PRN if soiled or dislodged. Please float pt's bilateral heels with pillow while pt is lying in bed or use heel lift boots to help alleviate pressure off bed surface. Aquacel Ag is a silver impregnated that dressing kills wound bacteria held in the dressing and absorbs high amounts of wound fluid and bacteria.  -Nursing to continue pressure prevention interventions as directed. Pressure injury prevention interventions to include nursing to assist pt with turning and repositioning with pillow/wedge pillow, heel protectors and waffle cushion overlay.                   DIABETES CARE:     Check blood sugar:      Fingerstick blood sugar AC and HS    Report CBG < 60 or > 400 to  physician.                                          Insulin Sliding Scale          Glucose  Novolog Insulin Subcutaneous        0 - 60   Orange juice or glucose tablet, hold insulin      No insulin   201-250  2 units   251-300  4 units   301-350  6 units   351-400  8 units   >400   10 units then call physician      Medications: Discontinue all previous medication orders, if any. See new list below.     Tea Georges   Home Medication Instructions RADHA:[id number]    Printed on:10/10/20 1146   Medication Information                      acetaminophen (TYLENOL) 325 MG tablet  Take 2 tablets (650 mg total) by mouth every 6 (six) hours as needed for Pain.             albuterol (PROVENTIL/VENTOLIN HFA) 90 mcg/actuation inhaler  Inhale 1-2 puffs into the lungs every 6 (six) hours as needed for Wheezing.             artificial tears (ISOPTO TEARS) 0.5 % ophthalmic solution  Place 2 drops into both eyes 4 (four) times daily as needed.             aspirin (ECOTRIN) 81 MG EC tablet  Take 81 mg by mouth once daily.             atorvastatin (LIPITOR) 20 MG tablet  Take 1 tablet (20 mg total) by mouth every evening.             benzonatate (TESSALON) 100 MG capsule  Take 1 capsule (100 mg total) by mouth 3 (three) times daily as needed for Cough.             bisacodyl (DULCOLAX) 10 mg Supp  Place 1 suppository (10 mg total) rectally daily as needed (Until bowel movement if patient has no bowel movement for 2 days).             BREO ELLIPTA 200-25 mcg/dose DsDv diskus inhaler  INHALE 1 PUFF INTO THE LUNGS ONCE DAILY.             cloNIDine (CATAPRES) 0.1 MG tablet  Take 1 tablet (0.1 mg total) by mouth 3 (three) times daily.             ergocalciferol (ERGOCALCIFEROL) 50,000 unit Cap  Take 1 capsule (50,000 Units total) by mouth every 7 days.             hydrALAZINE (APRESOLINE) 100 MG tablet  Take 0.5 tablets (50 mg total) by mouth every 8 (eight) hours.             insulin aspart U-100 (NOVOLOG) 100 unit/mL (3 mL) InPn  pen  Inject 0-5 Units into the skin before meals and at bedtime as needed (Hyperglycemia).             losartan (COZAAR) 50 MG tablet  Take 1 tablet (50 mg total) by mouth once daily.             Mirtazapine (remeron) 7.5 mg tablet  Take 1 tablet (7.5 mg total) by mouth once nightly.  Start date: 10/12/20           ondansetron (ZOFRAN-ODT) 8 MG TbDL  Take 1 tablet (8 mg total) by mouth every 6 (six) hours as needed (nausea).             polyethylene glycol (GLYCOLAX) 17 gram PwPk  Take 17 g by mouth once daily.             ramelteon (ROZEREM) 8 mg tablet  Take 1 tablet (8 mg total) by mouth nightly as needed for Insomnia.             RENVELA 800 mg Tab  Take 1 tablet (800 mg total) by mouth 3 (three) times daily with meals.             senna-docusate 8.6-50 mg (PERICOLACE) 8.6-50 mg per tablet  Take 1 tablet by mouth daily as needed for Constipation.             traMADol (ULTRAM) 50 mg tablet  Take 1 tablet (50 mg total) by mouth every 6 (six) hours as needed.                       _________________________________  Maira White PA-C  10/10/2020

## 2020-10-10 NOTE — PLAN OF CARE
10/10/20 1329   Post-Acute Status   Post-Acute Authorization Placement   Post-Acute Placement Status Pending Post-Acute Clinical Review     CM called [facility] and left a voicemail with Jil. Paperwork pending facility review.     2nd Covid screening pending. CM spoke with Lab department, who are running it as a STAT, but the time is still expected to take atleast 3 hrs. CM will continue to follow.     CM updated Jil, previous attempts to send records have failed. New Fax number obtained. Referral faxed- [phone]   Jil says they have no cut off time, but they will need the second Covid result prior to accepting the patient to return. Ana will be the nurse getting report.     1608: 2nd Covid screen faxed to [facility]. CM spoke with Jil. Ordered received. CM will arrange transportation.       Taniya Merchant RN Case Manager   #[phone]

## 2020-10-10 NOTE — ASSESSMENT & PLAN NOTE
- chronic issues  - follows with wound care as outpatient  - wound care consulted to continue care while inpatient  - podiatry consult given per recs

## 2020-10-10 NOTE — NURSING
Report given to CASSIDY Gerber in Dialysis. Patient off OBS floor with transport via pt's bed, TOYINS, AAO. 2LNC continued.Renal Diet 1L fluid restrictions in place and maintained throughout shift.

## 2020-10-10 NOTE — CARE UPDATE
10/10/20 1311   PRE-TX-O2   O2 Device (Oxygen Therapy) nasal cannula   $ Is the patient on Low Flow Oxygen? Yes   Flow (L/min) 2   SpO2 100 %     Titrated to 1 lpm. Will continue to monitor.

## 2020-10-10 NOTE — ASSESSMENT & PLAN NOTE
Palliative Care Encounter / Goals of care discussion:     Narrative:   Tea Georges is a 80 y.o. female patient with severe /  Critical AS, ESRD on HD, cachexia and chronic respiratory failure. She has been re-admitted on several recent occasions recently.      1: Psychosocial :   - Marital status:   - Children: daughter  - POA: not assigned    2: Medicolegal:    - Advance directive: not on file   - Surrogate Decision Maker: pt. Names granddaughter Ole as well as her siblings.     3: Support System:  - Spiritual: yes, prays a lot  - Family: supportive     4: Prognostication: very poor prognosis.     5: Goals of care Decisions / Symptom Management / Recommendations / Plan:     1: Encounter for Palliative Care      - Code Status: DNR 10/10/20    10/10/20  - Met with pt. And granddaughter Ole Traore today.   - we reviewed the current situation as the granddaughter was little informed.   - expressed that given the critical aortic stenosis without good treatment options, ESRD, cachexia and advanced age, now with frequent re-admissions for CHF exacerbation certainly in part caused by critical AS and fluid shifts, the situation is very concerning overall.   - we were able to discuss preferences in care. Weighed options of continuing current care and best medical management with the concern that  Frequent re-admissions may result.   - also discussed the option of comfort oriented care, with or without stopping HD, but avoiding re-admission and manage sx. At the NH and allow a natural death.     - pt. Feels that at this point, re-admissions, even if frequent, will get her feeling better and will give her more meaningful days. She wishes to continue current care.   - If however care becomes non beneficial or is not providing additional good days, she knows that a transition to hospice at that point is possible and likely best care.   - we plan on scheduling an informational visit with a hospice agency at  the Nursing home.     We also spoke about worst case scenarios and severe illness.   Weight pro and con of aggressive care in severe illness, using life support machines (vent) and aggressive medications etc.   Pt. Charlo that ICU level of care was not going to be beneficial and I agree.   Advance Care Planning       Completed [place] Post outlining her wishes (hospital based care: yes, ICU care/Intubation, prossors: no, DNR)   Completed POA form (her granddaughter Ole Traore [phone], is named POA              - above discussed with Dr. Solorzano      - Initial Assessment: Ms. Metcalf is a pleasant lady. She tells me that he has been in and out of the hospital and that she knows her heart valve is bad. She is able to voice her concerns about the future and has thou  ght about dying a lot. Many of close family members have  recently, some suddenly, some expected.    - Insight in Disease and Illness trajectory: Able to discuss her medical situation with me. Tells me that she feels that she needs to go to dialysis regularly and feel that she got short of breath because she skipped the last dialysis session.     - Above information was gathered from: Patient    - Goals of Care: We spoke about hopes and worries going forward.   Ms. Metcalf tells me her grandchildren are most important to her. She has one girl on the way but is not sure when delivery is. She wants to be able to see them often and see them grow.   Also tells me that she has thought about dying. She is worried about her family as they are the ones grieving.   She has not talked to her family about her thoughts as she feels it may upset them.   Feels it important to maybe discuss her worries with them as it causes her anxiety.     Talked about her dying and Ms. Metcalf wants to focus on comfort and die peacefully if it was her time.   We discussed that some patients end up on machines when they get really sick. She states that she would not want machines if  they could not make her better. Especially if she could not talk to her family any more.     - Approach to treatment: Plan to discuss plan of care further with patient's family present. Hope to have a family meeting over the weekend and discuss wishes with pt. In more detail.   In speaking with team, DNR felt appropriate, poss. Even hospice care if in line with pt. Goals.   Getting cardiology's evaluation regarding palliative options for severe AS    2: Symptom Management:   - Pain: denies pain currently  - Dyspnea: at times, none currently  - Anxiety: gets anxious when short of breath.     3: Severe AS  - critical aortic stenosis  - evaluating treatment options but anticipate no intervention being offered  - likely culprit of frequent admissions    4: ESRD on HD  - has been non adherent with HD schedule and renal diet  - fluid OL exacerbates sx. From severe AS    5: Chronic respiratory failure  - is on home oxygen  - currently not SOB  - frequent exacerbations    6: Summary and Recommendation:   - introduction made   - plans for family discussion regarding goals of care and to set limits to aggressiveness.    - will attempt to establish care plan going forward with pt. And family.      - Recommendations were discussed with Dr. Solorzano    7: Disposition:  - ongoing hospital based care.     6: Follow up plans:    Daily.     Thank you for your consult. Please call [phone] with questions.       A total of 20 min was spent on advance care planning, goals of care discussion, emotional support, formulating and communicating prognosis and goals of care, exploring burden/benefit of various approaches of treatment.

## 2020-10-10 NOTE — ASSESSMENT & PLAN NOTE
· Body mass index is 14.64 kg/m².  · novasource  · Will start on mirtazapine for depression which may help with appetite stimulation

## 2020-10-10 NOTE — ASSESSMENT & PLAN NOTE
"Patient with severe and symptomatic AS causing continued SOB. Dialysis providing symptom relief, but patient at high risk for recurrence of symptoms and valvular death.   - Last TTE with severe/critical aortic valve stenosis. Aortic valve area is 0.40 cm2; peak velocity is 5.03 m/s; mean gradient is 60 mmHg.  - Seen by interventional cardiology during last hospital stay, patient at high risk for intervention, but defered to [facility] interventional who was working patient up for TAVR  - Per pt. Cardiologist at [facility] in 8/2020 note, (see care everywhere) she is currently under evaluation for valvular repair, but she is "inoperable to conventional AVR and high risk for TAVR". Per patient she is not a candidate.   - Interventional cardiology consulted, patient not a TAVR candidate recommend palliative care consult  - palliative care consulted, appreciate assistance.  - continue HD for symptom management    "

## 2020-10-10 NOTE — ASSESSMENT & PLAN NOTE
- palliative care consulted, appreciate assistance  - family meeting tomorrow to discuss goals of care  - after further discussion, patient made DNR with no ICU admits per patient's wishes as she would prefer to pass peacefully when her time comes  - [place] filled out by Dr. Iqbal, original copy will be sent with patient  - informational hospice consult given

## 2020-10-10 NOTE — PLAN OF CARE
"Pt VSS, NAD. Pt refused 4am vitals signs saying "I can't get any rest in here, every few minutes someone is coming in here waking me up. Leave me alone." Pt left alone to get some rest. Pt was calm and cooperative up until 4am lab and vital signs. POC reviewed with pt; will continue to monitor.  "

## 2020-10-10 NOTE — PLAN OF CARE
Patient's VSS, AAOx3, tolerated Renal diet with 1L fluid restriction throughout shift. Pt off floor for Dialysis, removed 2L and tolerated well, returned to OBS floor, report received from CASSIDY Gerber. Patient returned to pt's room in pt's bed during shift change, report given to PM RN.

## 2020-10-10 NOTE — ASSESSMENT & PLAN NOTE
Anemia in ESRD  - HD on MWF  - anuric  - Hgb 9.5 on admit, at baseline  - continue binders  - Nephrology consulted, appreciate assistance  - underwent HD with 1.8L removed, patient became hypotensive after  - additional HD session today with 2L removed and improvement in SOB

## 2020-10-10 NOTE — NURSING
Placed heel booties and waffle mattress for the pt @ 2200 10/09. Pt called at 0050 to remove the heel booties. Pt refuses to wear them. Feet elevated on pillow instead. Will continue to monitor.

## 2020-10-10 NOTE — PLAN OF CARE
Pt will discharge to [facility]. Nurse can call report to [facility] at [phone]. Nurse Radha Notified.     [facility] stretcher transportation requested for NOW. Requested time is not a guarantee of arrival time. Nurse can assess scheduled time by accessing Chart Review--> Intake tab . This allows Nurse to review accurate information of scheduled transportation time. If transportation is delayed outside of that window, on call  staff should be contacted to assess the transportation delay. On Call Pager: [phone]       10/10/20 1615   Final Note   Assessment Type Final Discharge Note   Anticipated Discharge Disposition detention [facility]   What phone number can be called within the next 1-3 days to see how you are doing after discharge? [phone]   Discharge plans and expectations educations in teach back method with documentation complete? Yes   Right Care Referral Info   Post Acute Recommendation Other   Referral Type detention Nursing Home   [facility]   Post-Acute Status   Post-Acute Authorization Placement   Post-Acute Placement Status Set-up Complete

## 2020-10-10 NOTE — PLAN OF CARE
10/10/20 0905   Post-Acute Status   Post-Acute Authorization Placement   Post-Acute Placement Status Awaiting Internal Medical Clearance     CM called to [facility] and spoke with Jil the Weekend Supervisor, they are able to accept their patient back this weekend, as long as her level of care doesn't need prior authorization. Provider pending Palliative final recs.     Jil called back to say that they patient would need 2 negative covid Screens. 2nd Covid ordered. CM will follow for results.     Taniya Merchant RN Case Manager   #[id number]

## 2020-10-10 NOTE — DISCHARGE SUMMARY
[facility] Hospital Medicine  Discharge Summary      Patient Name: Tea Georges  MRN: [id number]  Admission Date: 10/7/2020  Hospital Length of Stay: 0 days  Discharge Date and Time: 10/10/2020  6:25 PM  Attending Physician: Salvador Solorzano MD   Discharging Provider: Maira White PA-C  Primary Care Provider: Parth Posadas Ii, MD  [facility] Medicine Team: [facility] Maira White PA-C    HPI:   Ms. Tea Georges is a 80 y.o. female with ESRD on HD and chronic respiratory failure on 2L home oxygen who presents to the ER for evaluation of shortness of breath and chest pain.  She endorsed midepigastric and left sided chest pain that didn't radiate, lasting about 15 minutes with shortness of breath.  She went to HD, and dialysis was stopped early because of her symptoms.  In HD, she was given a breathing treatment and was sent to the ER.  She endorses a cough, but denies any fevers or chills.  Of note, she was just admitted to the hospital end of September for similar symptoms.     Upon arrival to the ER, vitals were temp 97.6F, HR 88 and /62 on her home 2L NC.  Troponin was 0.056 and BNP >4,900.  CXR showed worsening effusions.  She was admitted to Hospital Medicine for HD.    * No surgery found *      Hospital Course:   Ms. Metcalf was admitted to observation for shortness of breath. Troponin 0.056>0.012. EKG non-ischemic. Nephrology consulted, patient underwent HD with 1.8L removed. Patient continues to be SOB, suspect due to critical and severe aortic stenosis. Patient became hypotensive during end of HD, hydralazine reduced from 100 mg to 50 mg TID. Additional HD session with 2L removed with improvement in SOB. Given high risk of recurrent admissions and valvular death, Interventional radiology consulted, patient not a TAVR candidate. Palliative care consulted, appreciate assistance. After further discussion with patient and family about GOC, patient made DNR with no ICU  "admission per patient's wishes, as she would prefer to pass peacefully when the time comes. Patient also interested in understanding more about hospice, so informational hospice consult placed for NH. LaPost filled out by Dr. Iqbal with palliative. Patient to be sent home with original copy. Patient to start on mirtazapine 7.5 mg for depression. Wound care consulted for chronic right foot ulcer for which patient was already receiving wound care for at NH. Patient discharged back to nursing home. Ambulatory referral to podiatry given for chronic left foot ulcer per wound care recommendations. Patient verbalized understanding. All questions answered.      Consults:   Consults (From admission, onward)        Status Ordering Provider     Inpatient consult to Interventional Cardiology  Once     Provider:  (Not yet assigned)    Completed SHAY POOLE     Inpatient consult to Nephrology  Once     Provider:  (Not yet assigned)    Completed AMINA ANGELO     Inpatient consult to Palliative Care  Once     Provider:  (Not yet assigned)    Completed JENNIFER CANADA          * Severe aortic valve stenosis  Patient with severe and symptomatic AS causing continued SOB. Dialysis providing symptom relief, but patient at high risk for recurrence of symptoms and valvular death.   - Last TTE with severe/critical aortic valve stenosis. Aortic valve area is 0.40 cm2; peak velocity is 5.03 m/s; mean gradient is 60 mmHg.  - Seen by interventional cardiology during last hospital stay, patient at high risk for intervention, but defered to [facility] interventional who was working patient up for TAVR  - Per pt. Cardiologist at [facility] in 8/2020 note, (see care everywhere) she is currently under evaluation for valvular repair, but she is "inoperable to conventional AVR and high risk for TAVR". Per patient she is not a candidate.   - Interventional cardiology consulted, patient not a TAVR candidate recommend palliative care consult  - " palliative care consulted, appreciate assistance.  - continue HD for symptom management      Ulcer of left foot  - chronic issues  - follows with wound care as outpatient  - wound care consulted to continue care while inpatient  - podiatry consult given per recs    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  - ASA, statin    Palliative care encounter  - palliative care consulted, appreciate assistance  - family meeting tomorrow to discuss goals of care  - after further discussion, patient made DNR with no ICU admits per patient's wishes as she would prefer to pass peacefully when her time comes  - LaPost filled out by Dr. Iqbal, original copy will be sent with patient  - informational hospice consult given    Anorexia  · Body mass index is 14.64 kg/m².  · novasource  · Will start on mirtazapine for depression which may help with appetite stimulation    Chest pain  · Patient presents from HD for SOB and chest pain. Chest pain resolved without intervention, however continues to be SOB. Suspect SOB is due to severe AS. Will diurese with HD, but high risk of readmittance given severe AS and high risk for intervention. No further episodes of chest pain during stay.   · EKG without STEMI or any acute ST/T wave changes  · troponin 0.056> 0.12  · Recent TTE with severe AS- suspect chest pain due to AS  · Underwent HD with improvement  · Continue Aspirin 81mg PO daily  · Continue Lipitor 40mg PO daily  · Continue tele  · Nitro prn  · No further episodes of chest pain  · See severe AS    Chronic kidney disease-mineral and bone disorder  · Chronic and stable  · Continue Renvela 800mg PO TID    Acute on chronic respiratory failure with hypoxia  · Endorses shortness of breath but stable oxygen on home 2L  · HD as above    Chronic combined systolic and diastolic heart failure  - last TTE (9/17/20) with EF 25%, grade II diastolic function, severe AS, severe RVE, moderately reduced RV function, pulmonary  hypertension  - continue home ARB  - anuric    Thrombocytopenia, unspecified  · Chronic and stable  · Monitor    Pulmonary emphysema  · Chronic issue  · Continue Breo  · Duonebs prn    ESRD on hemodialysis  Anemia in ESRD  - HD on MWF  - anuric  - Hgb 9.5 on admit, at baseline  - continue binders  - Nephrology consulted, appreciate assistance  - underwent HD with 1.8L removed, patient became hypotensive after  - additional HD session today with 2L removed and improvement in SOB    Renovascular hypertension  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  · Chronic and stable  · Continue Clonidine 0.1mg PO TID  · decrease Hydralazine 100mg to 50mg PO q8h  · Continue Losartan 50mg PO daily      Final Active Diagnoses:    Diagnosis Date Noted POA    PRINCIPAL PROBLEM:  Severe aortic valve stenosis [I35.0] 02/04/2016 Yes    Ulcer of left foot [L97.529] 10/10/2020 Yes    Palliative care encounter [Z51.5] 10/09/2020 Not Applicable    Chest pain [R07.9] 10/07/2020 Yes    Benign hypertensive heart and kidney disease with HF and ESRD [I13.2, N18.6] 10/07/2020 Yes    Anorexia [R63.0] 10/07/2020 Yes    Chronic kidney disease-mineral and bone disorder [N18.9, E83.9, M89.9] 09/17/2020 Yes    Acute on chronic respiratory failure with hypoxia [J96.21] 06/22/2019 Yes    Chronic combined systolic and diastolic heart failure [I50.42] 05/21/2019 Yes    Thrombocytopenia, unspecified [D69.6] 01/15/2017 Yes    Pulmonary emphysema [J43.9] 01/15/2017 Yes    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, left leg [I70.262] 01/15/2017 Yes    Coronary artery disease involving native coronary artery of native heart without angina pectoris [I25.10] 12/12/2016 Yes    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 [I63.411] 11/13/2016 Yes    Anemia in ESRD (end-stage renal disease) [N18.6, D63.1] 05/29/2016 Yes     Chronic    ESRD on hemodialysis [N18.6, Z99.2]  Not Applicable     Renovascular hypertension [I15.0] 01/08/2016 Yes      Problems Resolved During this Admission:       Discharged Condition: stable    Disposition: Skilled Nursing Facility    Follow Up:  Follow-up Information     Parth Posadas Ii, MD In 1 week.    Specialty: Internal Medicine  Contact information:  [address]  [facility] [address]  [phone]                 Patient Instructions:      Ambulatory referral/consult to Podiatry   Standing Status: Future   Referral Priority: Routine Referral Type: Consultation   Referral Reason: Specialty Services Required   Requested Specialty: Podiatry   Number of Visits Requested: 1     Diet renal     Diet Cardiac     Notify your health care provider if you experience any of the following:  temperature >100.4     Notify your health care provider if you experience any of the following:  severe uncontrolled pain     Notify your health care provider if you experience any of the following:  persistent dizziness, light-headedness, or visual disturbances     Notify your health care provider if you experience any of the following:  increased confusion or weakness     Notify your health care provider if you experience any of the following:  difficulty breathing or increased cough     Activity as tolerated       Significant Diagnostic Studies: Labs:   CMP   Recent Labs   Lab 10/09/20  0539 10/10/20  0337    140   K 3.3* 4.3   CL 98 107   CO2 28 23   * 182*   BUN 28* 24*   CREATININE 2.4* 1.8*   CALCIUM 8.8 9.8   ANIONGAP 13 10   ESTGFRAFRICA 21.6* 30.6*   EGFRNONAA 18.8* 26.6*   , CBC   Recent Labs   Lab 10/09/20  0539 10/10/20  0337   WBC 6.71 7.15   HGB 8.5* 8.5*   HCT 28.8* 30.1*   * 113*    and Troponin   Recent Labs   Lab 10/07/20  1637 10/07/20  2029   TROPONINI 0.056* 0.012         Pending Diagnostic Studies:     Procedure Component Value Units Date/Time    COVID-19 Routine Screening Extended Care Placement [[id number]] Collected: 10/10/20 0947    Order  Status: Sent Lab Status: In process Updated: 10/10/20 3138    Specimen: Nasopharyngeal          Medications:  Reconciled Home Medications:      Medication List      START taking these medications    benzonatate 100 MG capsule  Commonly known as: TESSALON  Take 1 capsule (100 mg total) by mouth 3 (three) times daily as needed for Cough.     mirtazapine 7.5 MG Tab  Commonly known as: REMERON  Take 1 tablet (7.5 mg total) by mouth every evening.        CONTINUE taking these medications    acetaminophen 325 MG tablet  Commonly known as: TYLENOL  Take 2 tablets (650 mg total) by mouth every 6 (six) hours as needed for Pain.     albuterol 90 mcg/actuation inhaler  Commonly known as: PROVENTIL/VENTOLIN HFA  Inhale 1-2 puffs into the lungs every 6 (six) hours as needed for Wheezing.     artificial tears 0.5 % ophthalmic solution  Commonly known as: ISOPTO TEARS  Place 2 drops into both eyes 4 (four) times daily as needed.     aspirin 81 MG EC tablet  Commonly known as: ECOTRIN  Take 81 mg by mouth once daily.     atorvastatin 20 MG tablet  Commonly known as: LIPITOR  Take 1 tablet (20 mg total) by mouth every evening.     bisacodyL 10 mg Supp  Commonly known as: DULCOLAX  Place 1 suppository (10 mg total) rectally daily as needed (Until bowel movement if patient has no bowel movement for 2 days).     BREO ELLIPTA 200-25 mcg/dose Dsdv diskus inhaler  Generic drug: fluticasone furoate-vilanteroL  INHALE 1 PUFF INTO THE LUNGS ONCE DAILY.     cloNIDine 0.1 MG tablet  Commonly known as: CATAPRES  Take 1 tablet (0.1 mg total) by mouth 3 (three) times daily.     ergocalciferol 50,000 unit Cap  Commonly known as: ERGOCALCIFEROL  Take 1 capsule (50,000 Units total) by mouth every 7 days.     hydrALAZINE 100 MG tablet  Commonly known as: APRESOLINE  Take 0.5 tablets (50 mg total) by mouth every 8 (eight) hours.     insulin aspart U-100 100 unit/mL (3 mL) Inpn pen  Commonly known as: NovoLOG  Inject 0-5 Units into the skin before  meals and at bedtime as needed (Hyperglycemia).     losartan 50 MG tablet  Commonly known as: COZAAR  Take 1 tablet (50 mg total) by mouth once daily.     ondansetron 8 MG Tbdl  Commonly known as: ZOFRAN-ODT  Take 1 tablet (8 mg total) by mouth every 6 (six) hours as needed (nausea).     polyethylene glycol 17 gram Pwpk  Commonly known as: GLYCOLAX  Take 17 g by mouth once daily.     ramelteon 8 mg tablet  Commonly known as: ROZEREM  Take 1 tablet (8 mg total) by mouth nightly as needed for Insomnia.     RENVELA 800 mg Tab  Generic drug: sevelamer carbonate  Take 1 tablet (800 mg total) by mouth 3 (three) times daily with meals.     senna-docusate 8.6-50 mg 8.6-50 mg per tablet  Commonly known as: PERICOLACE  Take 1 tablet by mouth daily as needed for Constipation.     traMADoL 50 mg tablet  Commonly known as: ULTRAM  Take 1 tablet (50 mg total) by mouth every 6 (six) hours as needed.        STOP taking these medications    levETIRAcetam 500 MG Tab  Commonly known as: KEPPRA            Indwelling Lines/Drains at time of discharge:   Lines/Drains/Airways     Drain                 Hemodialysis AV Fistula Left upper arm -- days         Hemodialysis AV Fistula Left upper arm -- days                Time spent on the discharge of patient: 45 minutes  Patient was seen and examined on the date of discharge and determined to be suitable for discharge.         Maira White PA-C  Department of Hospital Medicine  [facility]

## 2021-01-13 ENCOUNTER — HOSPITAL ENCOUNTER (EMERGENCY)
Facility: HOSPITAL | Age: 81
Discharge: NURSING, REHAB OR LONG-TERM CARE | End: 2021-01-13
Attending: EMERGENCY MEDICINE
Payer: MEDICARE

## 2021-01-13 VITALS
WEIGHT: 95 LBS | HEART RATE: 81 BPM | BODY MASS INDEX: 16.83 KG/M2 | TEMPERATURE: 98 F | HEIGHT: 63 IN | OXYGEN SATURATION: 98 % | DIASTOLIC BLOOD PRESSURE: 52 MMHG | RESPIRATION RATE: 18 BRPM | SYSTOLIC BLOOD PRESSURE: 140 MMHG

## 2021-01-13 DIAGNOSIS — I20.89 ANGINAL EQUIVALENT: ICD-10-CM

## 2021-01-13 DIAGNOSIS — J90 PLEURAL EFFUSION: ICD-10-CM

## 2021-01-13 LAB
ALBUMIN SERPL BCP-MCNC: 3.1 G/DL (ref 3.5–5.2)
ALP SERPL-CCNC: 195 U/L (ref 55–135)
ALT SERPL W/O P-5'-P-CCNC: 10 U/L (ref 10–44)
ANION GAP SERPL CALC-SCNC: 18 MMOL/L (ref 8–16)
AST SERPL-CCNC: 17 U/L (ref 10–40)
BASOPHILS # BLD AUTO: 0.03 K/UL (ref 0–0.2)
BASOPHILS NFR BLD: 0.4 % (ref 0–1.9)
BILIRUB SERPL-MCNC: 0.6 MG/DL (ref 0.1–1)
BUN SERPL-MCNC: 37 MG/DL (ref 8–23)
CALCIUM SERPL-MCNC: 9.2 MG/DL (ref 8.7–10.5)
CHLORIDE SERPL-SCNC: 104 MMOL/L (ref 95–110)
CO2 SERPL-SCNC: 18 MMOL/L (ref 23–29)
CREAT SERPL-MCNC: 2.3 MG/DL (ref 0.5–1.4)
CTP QC/QA: YES
DIFFERENTIAL METHOD: ABNORMAL
EOSINOPHIL # BLD AUTO: 0 K/UL (ref 0–0.5)
EOSINOPHIL NFR BLD: 0.4 % (ref 0–8)
ERYTHROCYTE [DISTWIDTH] IN BLOOD BY AUTOMATED COUNT: 20 % (ref 11.5–14.5)
EST. GFR  (AFRICAN AMERICAN): 22.6 ML/MIN/1.73 M^2
EST. GFR  (NON AFRICAN AMERICAN): 19.6 ML/MIN/1.73 M^2
GLUCOSE SERPL-MCNC: 199 MG/DL (ref 70–110)
HCT VFR BLD AUTO: 33.9 % (ref 37–48.5)
HGB BLD-MCNC: 9.9 G/DL (ref 12–16)
IMM GRANULOCYTES # BLD AUTO: 0.03 K/UL (ref 0–0.04)
IMM GRANULOCYTES NFR BLD AUTO: 0.4 % (ref 0–0.5)
LYMPHOCYTES # BLD AUTO: 0.3 K/UL (ref 1–4.8)
LYMPHOCYTES NFR BLD: 3.2 % (ref 18–48)
MCH RBC QN AUTO: 26.6 PG (ref 27–31)
MCHC RBC AUTO-ENTMCNC: 29.2 G/DL (ref 32–36)
MCV RBC AUTO: 91 FL (ref 82–98)
MONOCYTES # BLD AUTO: 0.4 K/UL (ref 0.3–1)
MONOCYTES NFR BLD: 4.3 % (ref 4–15)
NEUTROPHILS # BLD AUTO: 7.7 K/UL (ref 1.8–7.7)
NEUTROPHILS NFR BLD: 91.3 % (ref 38–73)
NRBC BLD-RTO: 0 /100 WBC
PLATELET # BLD AUTO: 110 K/UL (ref 150–350)
PMV BLD AUTO: 12.9 FL (ref 9.2–12.9)
POTASSIUM SERPL-SCNC: 3.8 MMOL/L (ref 3.5–5.1)
PROT SERPL-MCNC: 8.2 G/DL (ref 6–8.4)
RBC # BLD AUTO: 3.72 M/UL (ref 4–5.4)
SARS-COV-2 RDRP RESP QL NAA+PROBE: NEGATIVE
SODIUM SERPL-SCNC: 140 MMOL/L (ref 136–145)
TROPONIN I SERPL DL<=0.01 NG/ML-MCNC: 0.01 NG/ML (ref 0–0.03)
WBC # BLD AUTO: 8.45 K/UL (ref 3.9–12.7)

## 2021-01-13 PROCEDURE — 80053 COMPREHEN METABOLIC PANEL: CPT

## 2021-01-13 PROCEDURE — 99285 PR EMERGENCY DEPT VISIT,LEVEL V: ICD-10-PCS | Mod: GC,CS,, | Performed by: EMERGENCY MEDICINE

## 2021-01-13 PROCEDURE — 93010 ELECTROCARDIOGRAM REPORT: CPT | Mod: ,,, | Performed by: INTERNAL MEDICINE

## 2021-01-13 PROCEDURE — 85025 COMPLETE CBC W/AUTO DIFF WBC: CPT

## 2021-01-13 PROCEDURE — 99285 EMERGENCY DEPT VISIT HI MDM: CPT | Mod: GC,CS,, | Performed by: EMERGENCY MEDICINE

## 2021-01-13 PROCEDURE — 94761 N-INVAS EAR/PLS OXIMETRY MLT: CPT

## 2021-01-13 PROCEDURE — U0002 COVID-19 LAB TEST NON-CDC: HCPCS | Performed by: STUDENT IN AN ORGANIZED HEALTH CARE EDUCATION/TRAINING PROGRAM

## 2021-01-13 PROCEDURE — 99284 EMERGENCY DEPT VISIT MOD MDM: CPT | Mod: 25

## 2021-01-13 PROCEDURE — 93005 ELECTROCARDIOGRAM TRACING: CPT

## 2021-01-13 PROCEDURE — 93010 EKG 12-LEAD: ICD-10-PCS | Mod: ,,, | Performed by: INTERNAL MEDICINE

## 2021-01-13 PROCEDURE — 84484 ASSAY OF TROPONIN QUANT: CPT

## 2021-03-08 ENCOUNTER — HOSPITAL ENCOUNTER (OUTPATIENT)
Facility: HOSPITAL | Age: 81
Discharge: NURSING, REHAB OR LONG-TERM CARE | End: 2021-03-10
Attending: EMERGENCY MEDICINE | Admitting: EMERGENCY MEDICINE
Payer: MEDICARE

## 2021-03-08 DIAGNOSIS — E87.5 HYPERKALEMIA: ICD-10-CM

## 2021-03-08 DIAGNOSIS — N18.6 ESRD ON HEMODIALYSIS: ICD-10-CM

## 2021-03-08 DIAGNOSIS — N19 UREMIA: Primary | ICD-10-CM

## 2021-03-08 DIAGNOSIS — Z99.2 ESRD ON HEMODIALYSIS: ICD-10-CM

## 2021-03-08 DIAGNOSIS — M89.9 CHRONIC KIDNEY DISEASE-MINERAL AND BONE DISORDER: ICD-10-CM

## 2021-03-08 DIAGNOSIS — I63.9 STROKE: ICD-10-CM

## 2021-03-08 DIAGNOSIS — E83.9 CHRONIC KIDNEY DISEASE-MINERAL AND BONE DISORDER: ICD-10-CM

## 2021-03-08 DIAGNOSIS — L97.421 HEEL ULCERATION, LEFT, LIMITED TO BREAKDOWN OF SKIN: ICD-10-CM

## 2021-03-08 DIAGNOSIS — A49.8 CLOSTRIDIUM DIFFICILE INFECTION: ICD-10-CM

## 2021-03-08 DIAGNOSIS — R07.9 CHEST PAIN: ICD-10-CM

## 2021-03-08 DIAGNOSIS — R47.81 SLURRED SPEECH: ICD-10-CM

## 2021-03-08 DIAGNOSIS — H10.9 CONJUNCTIVITIS OF RIGHT EYE, UNSPECIFIED CONJUNCTIVITIS TYPE: ICD-10-CM

## 2021-03-08 DIAGNOSIS — N18.9 CHRONIC KIDNEY DISEASE-MINERAL AND BONE DISORDER: ICD-10-CM

## 2021-03-08 LAB
ALBUMIN SERPL BCP-MCNC: 3.4 G/DL (ref 3.5–5.2)
ALP SERPL-CCNC: 152 U/L (ref 55–135)
ALT SERPL W/O P-5'-P-CCNC: 8 U/L (ref 10–44)
ANION GAP SERPL CALC-SCNC: 18 MMOL/L (ref 8–16)
AST SERPL-CCNC: 12 U/L (ref 10–40)
BASOPHILS # BLD AUTO: 0.03 K/UL (ref 0–0.2)
BASOPHILS NFR BLD: 0.6 % (ref 0–1.9)
BILIRUB SERPL-MCNC: 0.8 MG/DL (ref 0.1–1)
BUN SERPL-MCNC: 71 MG/DL (ref 6–30)
BUN SERPL-MCNC: 81 MG/DL (ref 8–23)
CALCIUM SERPL-MCNC: 9.1 MG/DL (ref 8.7–10.5)
CHLORIDE SERPL-SCNC: 106 MMOL/L (ref 95–110)
CHLORIDE SERPL-SCNC: 112 MMOL/L (ref 95–110)
CHOLEST SERPL-MCNC: 110 MG/DL (ref 120–199)
CHOLEST/HDLC SERPL: 2.7 {RATIO} (ref 2–5)
CO2 SERPL-SCNC: 16 MMOL/L (ref 23–29)
CREAT SERPL-MCNC: 5.6 MG/DL (ref 0.5–1.4)
CREAT SERPL-MCNC: 6.1 MG/DL (ref 0.5–1.4)
CTP QC/QA: YES
DIFFERENTIAL METHOD: ABNORMAL
EOSINOPHIL # BLD AUTO: 0.1 K/UL (ref 0–0.5)
EOSINOPHIL NFR BLD: 1.5 % (ref 0–8)
ERYTHROCYTE [DISTWIDTH] IN BLOOD BY AUTOMATED COUNT: 21.6 % (ref 11.5–14.5)
EST. GFR  (AFRICAN AMERICAN): 7.7 ML/MIN/1.73 M^2
EST. GFR  (NON AFRICAN AMERICAN): 6.7 ML/MIN/1.73 M^2
GLUCOSE SERPL-MCNC: 109 MG/DL (ref 70–110)
GLUCOSE SERPL-MCNC: 109 MG/DL (ref 70–110)
HCT VFR BLD AUTO: 37.3 % (ref 37–48.5)
HCT VFR BLD CALC: 38 %PCV (ref 36–54)
HDLC SERPL-MCNC: 41 MG/DL (ref 40–75)
HDLC SERPL: 37.3 % (ref 20–50)
HGB BLD-MCNC: 11 G/DL (ref 12–16)
IMM GRANULOCYTES # BLD AUTO: 0.02 K/UL (ref 0–0.04)
IMM GRANULOCYTES NFR BLD AUTO: 0.4 % (ref 0–0.5)
INR PPP: 1.1 (ref 0.8–1.2)
LDLC SERPL CALC-MCNC: 56 MG/DL (ref 63–159)
LYMPHOCYTES # BLD AUTO: 0.7 K/UL (ref 1–4.8)
LYMPHOCYTES NFR BLD: 12.7 % (ref 18–48)
MCH RBC QN AUTO: 26.6 PG (ref 27–31)
MCHC RBC AUTO-ENTMCNC: 29.5 G/DL (ref 32–36)
MCV RBC AUTO: 90 FL (ref 82–98)
MONOCYTES # BLD AUTO: 0.4 K/UL (ref 0.3–1)
MONOCYTES NFR BLD: 7.9 % (ref 4–15)
NEUTROPHILS # BLD AUTO: 4 K/UL (ref 1.8–7.7)
NEUTROPHILS NFR BLD: 76.9 % (ref 38–73)
NONHDLC SERPL-MCNC: 69 MG/DL
NRBC BLD-RTO: 0 /100 WBC
PLATELET # BLD AUTO: 80 K/UL (ref 150–350)
PMV BLD AUTO: ABNORMAL FL (ref 9.2–12.9)
POC IONIZED CALCIUM: 1.12 MMOL/L (ref 1.06–1.42)
POC TCO2 (MEASURED): 20 MMOL/L (ref 23–29)
POCT GLUCOSE: 132 MG/DL (ref 70–110)
POTASSIUM BLD-SCNC: 5.2 MMOL/L (ref 3.5–5.1)
POTASSIUM SERPL-SCNC: 5.2 MMOL/L (ref 3.5–5.1)
PROT SERPL-MCNC: 7.9 G/DL (ref 6–8.4)
PROTHROMBIN TIME: 12 SEC (ref 9–12.5)
RBC # BLD AUTO: 4.14 M/UL (ref 4–5.4)
SAMPLE: ABNORMAL
SARS-COV-2 RDRP RESP QL NAA+PROBE: NEGATIVE
SODIUM BLD-SCNC: 141 MMOL/L (ref 136–145)
SODIUM SERPL-SCNC: 140 MMOL/L (ref 136–145)
TRIGL SERPL-MCNC: 65 MG/DL (ref 30–150)
TROPONIN I SERPL DL<=0.01 NG/ML-MCNC: 0.01 NG/ML (ref 0–0.03)
TSH SERPL DL<=0.005 MIU/L-ACNC: 1.05 UIU/ML (ref 0.4–4)
WBC # BLD AUTO: 5.18 K/UL (ref 3.9–12.7)

## 2021-03-08 PROCEDURE — 93010 ELECTROCARDIOGRAM REPORT: CPT | Mod: ,,, | Performed by: INTERNAL MEDICINE

## 2021-03-08 PROCEDURE — G0378 HOSPITAL OBSERVATION PER HR: HCPCS

## 2021-03-08 PROCEDURE — 99291 CRITICAL CARE FIRST HOUR: CPT | Mod: CS,,, | Performed by: EMERGENCY MEDICINE

## 2021-03-08 PROCEDURE — 84443 ASSAY THYROID STIM HORMONE: CPT | Performed by: EMERGENCY MEDICINE

## 2021-03-08 PROCEDURE — 96374 THER/PROPH/DIAG INJ IV PUSH: CPT

## 2021-03-08 PROCEDURE — 25000003 PHARM REV CODE 250: Performed by: PHYSICIAN ASSISTANT

## 2021-03-08 PROCEDURE — 99291 PR CRITICAL CARE, E/M 30-74 MINUTES: ICD-10-PCS | Mod: CS,,, | Performed by: EMERGENCY MEDICINE

## 2021-03-08 PROCEDURE — 80047 BASIC METABLC PNL IONIZED CA: CPT

## 2021-03-08 PROCEDURE — 99285 EMERGENCY DEPT VISIT HI MDM: CPT | Mod: 25

## 2021-03-08 PROCEDURE — 80053 COMPREHEN METABOLIC PANEL: CPT | Performed by: EMERGENCY MEDICINE

## 2021-03-08 PROCEDURE — 84484 ASSAY OF TROPONIN QUANT: CPT | Performed by: EMERGENCY MEDICINE

## 2021-03-08 PROCEDURE — 80061 LIPID PANEL: CPT | Performed by: EMERGENCY MEDICINE

## 2021-03-08 PROCEDURE — 93010 EKG 12-LEAD: ICD-10-PCS | Mod: ,,, | Performed by: INTERNAL MEDICINE

## 2021-03-08 PROCEDURE — 85610 PROTHROMBIN TIME: CPT | Performed by: EMERGENCY MEDICINE

## 2021-03-08 PROCEDURE — 85025 COMPLETE CBC W/AUTO DIFF WBC: CPT | Performed by: EMERGENCY MEDICINE

## 2021-03-08 PROCEDURE — U0002 COVID-19 LAB TEST NON-CDC: HCPCS | Performed by: EMERGENCY MEDICINE

## 2021-03-08 PROCEDURE — 93005 ELECTROCARDIOGRAM TRACING: CPT

## 2021-03-08 RX ORDER — BISACODYL 10 MG
10 SUPPOSITORY, RECTAL RECTAL DAILY PRN
Status: DISCONTINUED | OUTPATIENT
Start: 2021-03-08 | End: 2021-03-10 | Stop reason: HOSPADM

## 2021-03-08 RX ORDER — FLUTICASONE FUROATE AND VILANTEROL 200; 25 UG/1; UG/1
1 POWDER RESPIRATORY (INHALATION) DAILY
Status: DISCONTINUED | OUTPATIENT
Start: 2021-03-09 | End: 2021-03-10 | Stop reason: HOSPADM

## 2021-03-08 RX ORDER — ERYTHROMYCIN 5 MG/G
OINTMENT OPHTHALMIC EVERY 6 HOURS
Status: DISCONTINUED | OUTPATIENT
Start: 2021-03-09 | End: 2021-03-10 | Stop reason: HOSPADM

## 2021-03-08 RX ORDER — ONDANSETRON 8 MG/1
8 TABLET, ORALLY DISINTEGRATING ORAL EVERY 8 HOURS PRN
Status: DISCONTINUED | OUTPATIENT
Start: 2021-03-08 | End: 2021-03-10 | Stop reason: HOSPADM

## 2021-03-08 RX ORDER — ACETAMINOPHEN 325 MG/1
650 TABLET ORAL EVERY 8 HOURS PRN
Status: DISCONTINUED | OUTPATIENT
Start: 2021-03-08 | End: 2021-03-08

## 2021-03-08 RX ORDER — LABETALOL HCL 20 MG/4 ML
10 SYRINGE (ML) INTRAVENOUS EVERY 4 HOURS
Status: DISCONTINUED | OUTPATIENT
Start: 2021-03-08 | End: 2021-03-10

## 2021-03-08 RX ORDER — POLYETHYLENE GLYCOL 3350 17 G/17G
17 POWDER, FOR SOLUTION ORAL DAILY
Status: DISCONTINUED | OUTPATIENT
Start: 2021-03-09 | End: 2021-03-08

## 2021-03-08 RX ORDER — IBUPROFEN 200 MG
16 TABLET ORAL
Status: DISCONTINUED | OUTPATIENT
Start: 2021-03-08 | End: 2021-03-10 | Stop reason: HOSPADM

## 2021-03-08 RX ORDER — GLUCAGON 1 MG
1 KIT INJECTION
Status: DISCONTINUED | OUTPATIENT
Start: 2021-03-08 | End: 2021-03-10 | Stop reason: HOSPADM

## 2021-03-08 RX ORDER — IBUPROFEN 200 MG
24 TABLET ORAL
Status: DISCONTINUED | OUTPATIENT
Start: 2021-03-08 | End: 2021-03-10 | Stop reason: HOSPADM

## 2021-03-08 RX ORDER — IPRATROPIUM BROMIDE AND ALBUTEROL SULFATE 2.5; .5 MG/3ML; MG/3ML
3 SOLUTION RESPIRATORY (INHALATION) EVERY 4 HOURS PRN
Status: DISCONTINUED | OUTPATIENT
Start: 2021-03-08 | End: 2021-03-10 | Stop reason: HOSPADM

## 2021-03-08 RX ADMIN — Medication 10 MG: at 09:03

## 2021-03-09 LAB
ANION GAP SERPL CALC-SCNC: 18 MMOL/L (ref 8–16)
BASOPHILS # BLD AUTO: 0.03 K/UL (ref 0–0.2)
BASOPHILS NFR BLD: 0.6 % (ref 0–1.9)
BUN SERPL-MCNC: 88 MG/DL (ref 8–23)
CALCIUM SERPL-MCNC: 8.9 MG/DL (ref 8.7–10.5)
CHLORIDE SERPL-SCNC: 107 MMOL/L (ref 95–110)
CO2 SERPL-SCNC: 15 MMOL/L (ref 23–29)
CREAT SERPL-MCNC: 6.1 MG/DL (ref 0.5–1.4)
DIFFERENTIAL METHOD: ABNORMAL
EOSINOPHIL # BLD AUTO: 0.1 K/UL (ref 0–0.5)
EOSINOPHIL NFR BLD: 1.4 % (ref 0–8)
ERYTHROCYTE [DISTWIDTH] IN BLOOD BY AUTOMATED COUNT: 21.5 % (ref 11.5–14.5)
EST. GFR  (AFRICAN AMERICAN): 7 ML/MIN/1.73 M^2
EST. GFR  (NON AFRICAN AMERICAN): 6 ML/MIN/1.73 M^2
ESTIMATED AVG GLUCOSE: 97 MG/DL (ref 68–131)
GLUCOSE SERPL-MCNC: 104 MG/DL (ref 70–110)
HBA1C MFR BLD: 5 % (ref 4–5.6)
HCT VFR BLD AUTO: 38.1 % (ref 37–48.5)
HGB BLD-MCNC: 11.2 G/DL (ref 12–16)
IMM GRANULOCYTES # BLD AUTO: 0.01 K/UL (ref 0–0.04)
IMM GRANULOCYTES NFR BLD AUTO: 0.2 % (ref 0–0.5)
LYMPHOCYTES # BLD AUTO: 0.6 K/UL (ref 1–4.8)
LYMPHOCYTES NFR BLD: 11.6 % (ref 18–48)
MAGNESIUM SERPL-MCNC: 2.3 MG/DL (ref 1.6–2.6)
MCH RBC QN AUTO: 27.3 PG (ref 27–31)
MCHC RBC AUTO-ENTMCNC: 29.4 G/DL (ref 32–36)
MCV RBC AUTO: 93 FL (ref 82–98)
MONOCYTES # BLD AUTO: 0.5 K/UL (ref 0.3–1)
MONOCYTES NFR BLD: 10.6 % (ref 4–15)
NEUTROPHILS # BLD AUTO: 3.8 K/UL (ref 1.8–7.7)
NEUTROPHILS NFR BLD: 75.6 % (ref 38–73)
NRBC BLD-RTO: 0 /100 WBC
PHOSPHATE SERPL-MCNC: 7.1 MG/DL (ref 2.7–4.5)
PLATELET # BLD AUTO: 68 K/UL (ref 150–350)
PMV BLD AUTO: ABNORMAL FL (ref 9.2–12.9)
POCT GLUCOSE: 130 MG/DL (ref 70–110)
POCT GLUCOSE: 80 MG/DL (ref 70–110)
POCT GLUCOSE: 80 MG/DL (ref 70–110)
POCT GLUCOSE: 92 MG/DL (ref 70–110)
POCT GLUCOSE: 99 MG/DL (ref 70–110)
POTASSIUM SERPL-SCNC: 5.8 MMOL/L (ref 3.5–5.1)
RBC # BLD AUTO: 4.11 M/UL (ref 4–5.4)
SODIUM SERPL-SCNC: 140 MMOL/L (ref 136–145)
WBC # BLD AUTO: 4.99 K/UL (ref 3.9–12.7)

## 2021-03-09 PROCEDURE — 97166 OT EVAL MOD COMPLEX 45 MIN: CPT

## 2021-03-09 PROCEDURE — 83036 HEMOGLOBIN GLYCOSYLATED A1C: CPT | Performed by: PHYSICIAN ASSISTANT

## 2021-03-09 PROCEDURE — 83735 ASSAY OF MAGNESIUM: CPT | Performed by: PHYSICIAN ASSISTANT

## 2021-03-09 PROCEDURE — 25000003 PHARM REV CODE 250: Performed by: NURSE PRACTITIONER

## 2021-03-09 PROCEDURE — 87493 C DIFF AMPLIFIED PROBE: CPT | Performed by: PHYSICIAN ASSISTANT

## 2021-03-09 PROCEDURE — 96376 TX/PRO/DX INJ SAME DRUG ADON: CPT | Mod: 59

## 2021-03-09 PROCEDURE — G0378 HOSPITAL OBSERVATION PER HR: HCPCS

## 2021-03-09 PROCEDURE — 97535 SELF CARE MNGMENT TRAINING: CPT | Mod: 59

## 2021-03-09 PROCEDURE — 84100 ASSAY OF PHOSPHORUS: CPT | Performed by: PHYSICIAN ASSISTANT

## 2021-03-09 PROCEDURE — 87324 CLOSTRIDIUM AG IA: CPT | Performed by: PHYSICIAN ASSISTANT

## 2021-03-09 PROCEDURE — 99220 PR INITIAL OBSERVATION CARE,LEVL III: ICD-10-PCS | Mod: ,,, | Performed by: PHYSICIAN ASSISTANT

## 2021-03-09 PROCEDURE — 87449 NOS EACH ORGANISM AG IA: CPT | Performed by: PHYSICIAN ASSISTANT

## 2021-03-09 PROCEDURE — 80048 BASIC METABOLIC PNL TOTAL CA: CPT | Performed by: PHYSICIAN ASSISTANT

## 2021-03-09 PROCEDURE — 99214 OFFICE O/P EST MOD 30 MIN: CPT | Mod: ,,, | Performed by: NURSE PRACTITIONER

## 2021-03-09 PROCEDURE — 97161 PT EVAL LOW COMPLEX 20 MIN: CPT

## 2021-03-09 PROCEDURE — 25000003 PHARM REV CODE 250: Performed by: INTERNAL MEDICINE

## 2021-03-09 PROCEDURE — 25000003 PHARM REV CODE 250: Performed by: PHYSICIAN ASSISTANT

## 2021-03-09 PROCEDURE — 25000242 PHARM REV CODE 250 ALT 637 W/ HCPCS: Performed by: PHYSICIAN ASSISTANT

## 2021-03-09 PROCEDURE — 85025 COMPLETE CBC W/AUTO DIFF WBC: CPT | Performed by: PHYSICIAN ASSISTANT

## 2021-03-09 PROCEDURE — 92610 EVALUATE SWALLOWING FUNCTION: CPT

## 2021-03-09 PROCEDURE — 36415 COLL VENOUS BLD VENIPUNCTURE: CPT | Performed by: PHYSICIAN ASSISTANT

## 2021-03-09 PROCEDURE — 99220 PR INITIAL OBSERVATION CARE,LEVL III: CPT | Mod: ,,, | Performed by: PHYSICIAN ASSISTANT

## 2021-03-09 PROCEDURE — 97530 THERAPEUTIC ACTIVITIES: CPT

## 2021-03-09 PROCEDURE — 99214 PR OFFICE/OUTPT VISIT, EST, LEVL IV, 30-39 MIN: ICD-10-PCS | Mod: ,,, | Performed by: NURSE PRACTITIONER

## 2021-03-09 PROCEDURE — G0257 UNSCHED DIALYSIS ESRD PT HOS: HCPCS

## 2021-03-09 RX ORDER — CLONIDINE 0.3 MG/24H
1 PATCH, EXTENDED RELEASE TRANSDERMAL
Status: DISCONTINUED | OUTPATIENT
Start: 2021-03-09 | End: 2021-03-10 | Stop reason: HOSPADM

## 2021-03-09 RX ORDER — SODIUM CHLORIDE 9 MG/ML
INJECTION, SOLUTION INTRAVENOUS ONCE
Status: COMPLETED | OUTPATIENT
Start: 2021-03-09 | End: 2021-03-09

## 2021-03-09 RX ADMIN — SODIUM CHLORIDE: 0.9 INJECTION, SOLUTION INTRAVENOUS at 08:03

## 2021-03-09 RX ADMIN — CLONIDINE 1 PATCH: 0.3 PATCH TRANSDERMAL at 01:03

## 2021-03-09 RX ADMIN — Medication 10 MG: at 11:03

## 2021-03-09 RX ADMIN — HYPROMELLOSE 2910 2 DROP: 5 SOLUTION OPHTHALMIC at 02:03

## 2021-03-09 RX ADMIN — Medication 10 MG: at 06:03

## 2021-03-09 RX ADMIN — Medication 10 MG: at 02:03

## 2021-03-09 RX ADMIN — Medication 10 MG: at 01:03

## 2021-03-09 RX ADMIN — ERYTHROMYCIN: 5 OINTMENT OPHTHALMIC at 02:03

## 2021-03-09 RX ADMIN — FLUTICASONE FUROATE AND VILANTEROL TRIFENATATE 1 PUFF: 200; 25 POWDER RESPIRATORY (INHALATION) at 02:03

## 2021-03-09 RX ADMIN — ERYTHROMYCIN 1 INCH: 5 OINTMENT OPHTHALMIC at 06:03

## 2021-03-09 RX ADMIN — MICONAZOLE NITRATE: 20 OINTMENT TOPICAL at 09:03

## 2021-03-09 RX ADMIN — Medication 10 MG: at 09:03

## 2021-03-10 VITALS
SYSTOLIC BLOOD PRESSURE: 154 MMHG | TEMPERATURE: 98 F | OXYGEN SATURATION: 98 % | HEART RATE: 53 BPM | WEIGHT: 103.63 LBS | HEIGHT: 63 IN | DIASTOLIC BLOOD PRESSURE: 78 MMHG | RESPIRATION RATE: 19 BRPM | BODY MASS INDEX: 18.36 KG/M2

## 2021-03-10 PROBLEM — R47.81 SLURRED SPEECH: Status: RESOLVED | Noted: 2021-03-08 | Resolved: 2021-03-10

## 2021-03-10 PROBLEM — A49.8 CLOSTRIDIUM DIFFICILE INFECTION: Status: ACTIVE | Noted: 2021-03-10

## 2021-03-10 PROBLEM — R23.9 ALTERATION IN SKIN INTEGRITY: Status: ACTIVE | Noted: 2021-03-08

## 2021-03-10 LAB
ALBUMIN SERPL BCP-MCNC: 3 G/DL (ref 3.5–5.2)
ALP SERPL-CCNC: 140 U/L (ref 55–135)
ALT SERPL W/O P-5'-P-CCNC: 6 U/L (ref 10–44)
ANION GAP SERPL CALC-SCNC: 15 MMOL/L (ref 8–16)
AST SERPL-CCNC: 14 U/L (ref 10–40)
BASOPHILS # BLD AUTO: 0.04 K/UL (ref 0–0.2)
BASOPHILS NFR BLD: 0.7 % (ref 0–1.9)
BILIRUB SERPL-MCNC: 0.7 MG/DL (ref 0.1–1)
BUN SERPL-MCNC: 43 MG/DL (ref 8–23)
C DIFF GDH STL QL: POSITIVE
C DIFF TOX A+B STL QL IA: NEGATIVE
C DIFF TOX GENS STL QL NAA+PROBE: POSITIVE
CALCIUM SERPL-MCNC: 8.4 MG/DL (ref 8.7–10.5)
CHLORIDE SERPL-SCNC: 100 MMOL/L (ref 95–110)
CO2 SERPL-SCNC: 27 MMOL/L (ref 23–29)
CREAT SERPL-MCNC: 3.8 MG/DL (ref 0.5–1.4)
DIFFERENTIAL METHOD: ABNORMAL
EOSINOPHIL # BLD AUTO: 0.1 K/UL (ref 0–0.5)
EOSINOPHIL NFR BLD: 1.3 % (ref 0–8)
ERYTHROCYTE [DISTWIDTH] IN BLOOD BY AUTOMATED COUNT: 21.2 % (ref 11.5–14.5)
EST. GFR  (AFRICAN AMERICAN): 12.3 ML/MIN/1.73 M^2
EST. GFR  (NON AFRICAN AMERICAN): 10.7 ML/MIN/1.73 M^2
GLUCOSE SERPL-MCNC: 73 MG/DL (ref 70–110)
HBV SURFACE AG SERPL QL IA: NEGATIVE
HCT VFR BLD AUTO: 34.2 % (ref 37–48.5)
HGB BLD-MCNC: 10.2 G/DL (ref 12–16)
IMM GRANULOCYTES # BLD AUTO: 0.01 K/UL (ref 0–0.04)
IMM GRANULOCYTES NFR BLD AUTO: 0.2 % (ref 0–0.5)
LYMPHOCYTES # BLD AUTO: 0.6 K/UL (ref 1–4.8)
LYMPHOCYTES NFR BLD: 10.6 % (ref 18–48)
MAGNESIUM SERPL-MCNC: 2 MG/DL (ref 1.6–2.6)
MCH RBC QN AUTO: 27.1 PG (ref 27–31)
MCHC RBC AUTO-ENTMCNC: 29.8 G/DL (ref 32–36)
MCV RBC AUTO: 91 FL (ref 82–98)
MONOCYTES # BLD AUTO: 0.6 K/UL (ref 0.3–1)
MONOCYTES NFR BLD: 11.3 % (ref 4–15)
NEUTROPHILS # BLD AUTO: 4.2 K/UL (ref 1.8–7.7)
NEUTROPHILS NFR BLD: 75.9 % (ref 38–73)
NRBC BLD-RTO: 0 /100 WBC
PHOSPHATE SERPL-MCNC: 5 MG/DL (ref 2.7–4.5)
PLATELET # BLD AUTO: 65 K/UL (ref 150–350)
PMV BLD AUTO: ABNORMAL FL (ref 9.2–12.9)
POCT GLUCOSE: 78 MG/DL (ref 70–110)
POCT GLUCOSE: 83 MG/DL (ref 70–110)
POTASSIUM SERPL-SCNC: 4.1 MMOL/L (ref 3.5–5.1)
PROT SERPL-MCNC: 7 G/DL (ref 6–8.4)
RBC # BLD AUTO: 3.76 M/UL (ref 4–5.4)
SARS-COV-2 RDRP RESP QL NAA+PROBE: NEGATIVE
SODIUM SERPL-SCNC: 142 MMOL/L (ref 136–145)
WBC # BLD AUTO: 5.58 K/UL (ref 3.9–12.7)

## 2021-03-10 PROCEDURE — G0378 HOSPITAL OBSERVATION PER HR: HCPCS

## 2021-03-10 PROCEDURE — 85025 COMPLETE CBC W/AUTO DIFF WBC: CPT | Performed by: PHYSICIAN ASSISTANT

## 2021-03-10 PROCEDURE — 87340 HEPATITIS B SURFACE AG IA: CPT | Performed by: INTERNAL MEDICINE

## 2021-03-10 PROCEDURE — 96376 TX/PRO/DX INJ SAME DRUG ADON: CPT

## 2021-03-10 PROCEDURE — 99217 PR OBSERVATION CARE DISCHARGE: ICD-10-PCS | Mod: ,,, | Performed by: NURSE PRACTITIONER

## 2021-03-10 PROCEDURE — 84100 ASSAY OF PHOSPHORUS: CPT | Performed by: PHYSICIAN ASSISTANT

## 2021-03-10 PROCEDURE — 36415 COLL VENOUS BLD VENIPUNCTURE: CPT | Performed by: NURSE PRACTITIONER

## 2021-03-10 PROCEDURE — 80053 COMPREHEN METABOLIC PANEL: CPT | Performed by: NURSE PRACTITIONER

## 2021-03-10 PROCEDURE — 99217 PR OBSERVATION CARE DISCHARGE: CPT | Mod: ,,, | Performed by: NURSE PRACTITIONER

## 2021-03-10 PROCEDURE — 25000003 PHARM REV CODE 250: Performed by: PHYSICIAN ASSISTANT

## 2021-03-10 PROCEDURE — 25000003 PHARM REV CODE 250: Performed by: NURSE PRACTITIONER

## 2021-03-10 PROCEDURE — 83735 ASSAY OF MAGNESIUM: CPT | Performed by: PHYSICIAN ASSISTANT

## 2021-03-10 PROCEDURE — 99900035 HC TECH TIME PER 15 MIN (STAT)

## 2021-03-10 PROCEDURE — U0002 COVID-19 LAB TEST NON-CDC: HCPCS | Performed by: NURSE PRACTITIONER

## 2021-03-10 RX ORDER — SODIUM CHLORIDE 9 MG/ML
INJECTION, SOLUTION INTRAVENOUS ONCE
Status: DISCONTINUED | OUTPATIENT
Start: 2021-03-11 | End: 2021-03-10 | Stop reason: HOSPADM

## 2021-03-10 RX ORDER — ERYTHROMYCIN 5 MG/G
OINTMENT OPHTHALMIC EVERY 6 HOURS
Qty: 1 G | Refills: 0 | Status: SHIPPED | OUTPATIENT
Start: 2021-03-10 | End: 2021-03-20

## 2021-03-10 RX ORDER — CLONIDINE 0.3 MG/24H
1 PATCH, EXTENDED RELEASE TRANSDERMAL
Qty: 12 PATCH | Refills: 0 | Status: ON HOLD | OUTPATIENT
Start: 2021-03-16 | End: 2021-05-12 | Stop reason: SDUPTHER

## 2021-03-10 RX ORDER — AMLODIPINE BESYLATE 5 MG/1
5 TABLET ORAL DAILY
Qty: 30 TABLET | Refills: 11 | Status: ON HOLD | OUTPATIENT
Start: 2021-03-11 | End: 2021-05-11 | Stop reason: HOSPADM

## 2021-03-10 RX ORDER — AMLODIPINE BESYLATE 5 MG/1
5 TABLET ORAL DAILY
Status: DISCONTINUED | OUTPATIENT
Start: 2021-03-10 | End: 2021-03-10 | Stop reason: HOSPADM

## 2021-03-10 RX ADMIN — ERYTHROMYCIN: 5 OINTMENT OPHTHALMIC at 06:03

## 2021-03-10 RX ADMIN — MICONAZOLE NITRATE: 20 OINTMENT TOPICAL at 09:03

## 2021-03-10 RX ADMIN — AMLODIPINE BESYLATE 5 MG: 5 TABLET ORAL at 09:03

## 2021-03-10 RX ADMIN — VANCOMYCIN HYDROCHLORIDE 125 MG: KIT at 09:03

## 2021-03-10 RX ADMIN — Medication 10 MG: at 02:03

## 2021-03-10 RX ADMIN — VANCOMYCIN HYDROCHLORIDE 125 MG: KIT at 11:03

## 2021-03-10 RX ADMIN — FLUTICASONE FUROATE AND VILANTEROL TRIFENATATE 1 PUFF: 200; 25 POWDER RESPIRATORY (INHALATION) at 09:03

## 2021-03-10 RX ADMIN — Medication 10 MG: at 06:03

## 2021-03-24 ENCOUNTER — HOSPITAL ENCOUNTER (OUTPATIENT)
Facility: HOSPITAL | Age: 81
Discharge: HOME | End: 2021-03-26
Attending: EMERGENCY MEDICINE | Admitting: INTERNAL MEDICINE
Payer: MEDICARE

## 2021-03-24 DIAGNOSIS — I50.43 ACUTE ON CHRONIC COMBINED SYSTOLIC AND DIASTOLIC HEART FAILURE: ICD-10-CM

## 2021-03-24 DIAGNOSIS — R06.02 SOB (SHORTNESS OF BREATH): ICD-10-CM

## 2021-03-24 DIAGNOSIS — J96.21 ACUTE ON CHRONIC RESPIRATORY FAILURE WITH HYPOXIA: Primary | ICD-10-CM

## 2021-03-24 LAB
ANION GAP SERPL CALC-SCNC: 11 MMOL/L (ref 8–16)
BASOPHILS # BLD AUTO: 0.04 K/UL (ref 0–0.2)
BASOPHILS NFR BLD: 0.6 % (ref 0–1.9)
BNP SERPL-MCNC: >4900 PG/ML (ref 0–99)
BUN SERPL-MCNC: 59 MG/DL (ref 8–23)
CALCIUM SERPL-MCNC: 8.9 MG/DL (ref 8.7–10.5)
CHLORIDE SERPL-SCNC: 106 MMOL/L (ref 95–110)
CO2 SERPL-SCNC: 22 MMOL/L (ref 23–29)
CREAT SERPL-MCNC: 4.1 MG/DL (ref 0.5–1.4)
CTP QC/QA: YES
DIFFERENTIAL METHOD: ABNORMAL
EOSINOPHIL # BLD AUTO: 0 K/UL (ref 0–0.5)
EOSINOPHIL NFR BLD: 0.5 % (ref 0–8)
ERYTHROCYTE [DISTWIDTH] IN BLOOD BY AUTOMATED COUNT: 19.8 % (ref 11.5–14.5)
EST. GFR  (AFRICAN AMERICAN): 11.2 ML/MIN/1.73 M^2
EST. GFR  (NON AFRICAN AMERICAN): 9.8 ML/MIN/1.73 M^2
GLUCOSE SERPL-MCNC: 123 MG/DL (ref 70–110)
HCT VFR BLD AUTO: 33.8 % (ref 37–48.5)
HGB BLD-MCNC: 10.2 G/DL (ref 12–16)
IMM GRANULOCYTES # BLD AUTO: 0.02 K/UL (ref 0–0.04)
IMM GRANULOCYTES NFR BLD AUTO: 0.3 % (ref 0–0.5)
LYMPHOCYTES # BLD AUTO: 0.5 K/UL (ref 1–4.8)
LYMPHOCYTES NFR BLD: 7.7 % (ref 18–48)
MCH RBC QN AUTO: 26.9 PG (ref 27–31)
MCHC RBC AUTO-ENTMCNC: 30.2 G/DL (ref 32–36)
MCV RBC AUTO: 89 FL (ref 82–98)
MONOCYTES # BLD AUTO: 0.5 K/UL (ref 0.3–1)
MONOCYTES NFR BLD: 7.2 % (ref 4–15)
NEUTROPHILS # BLD AUTO: 5.3 K/UL (ref 1.8–7.7)
NEUTROPHILS NFR BLD: 83.7 % (ref 38–73)
NRBC BLD-RTO: 0 /100 WBC
PLATELET # BLD AUTO: 84 K/UL (ref 150–350)
PLATELET BLD QL SMEAR: ABNORMAL
PMV BLD AUTO: ABNORMAL FL (ref 9.2–12.9)
POTASSIUM SERPL-SCNC: 5.2 MMOL/L (ref 3.5–5.1)
RBC # BLD AUTO: 3.79 M/UL (ref 4–5.4)
SARS-COV-2 RDRP RESP QL NAA+PROBE: NEGATIVE
SODIUM SERPL-SCNC: 139 MMOL/L (ref 136–145)
TROPONIN I SERPL DL<=0.01 NG/ML-MCNC: 0.02 NG/ML (ref 0–0.03)
WBC # BLD AUTO: 6.37 K/UL (ref 3.9–12.7)

## 2021-03-24 PROCEDURE — G0378 HOSPITAL OBSERVATION PER HR: HCPCS

## 2021-03-24 PROCEDURE — 99285 PR EMERGENCY DEPT VISIT,LEVEL V: ICD-10-PCS | Mod: CS,,, | Performed by: EMERGENCY MEDICINE

## 2021-03-24 PROCEDURE — 99220 PR INITIAL OBSERVATION CARE,LEVL III: CPT | Mod: ,,, | Performed by: PHYSICIAN ASSISTANT

## 2021-03-24 PROCEDURE — 99285 EMERGENCY DEPT VISIT HI MDM: CPT | Mod: CS,,, | Performed by: EMERGENCY MEDICINE

## 2021-03-24 PROCEDURE — 80048 BASIC METABOLIC PNL TOTAL CA: CPT | Performed by: EMERGENCY MEDICINE

## 2021-03-24 PROCEDURE — 84484 ASSAY OF TROPONIN QUANT: CPT | Performed by: EMERGENCY MEDICINE

## 2021-03-24 PROCEDURE — 25000003 PHARM REV CODE 250: Performed by: PHYSICIAN ASSISTANT

## 2021-03-24 PROCEDURE — 99285 EMERGENCY DEPT VISIT HI MDM: CPT | Mod: 25

## 2021-03-24 PROCEDURE — U0002 COVID-19 LAB TEST NON-CDC: HCPCS | Performed by: EMERGENCY MEDICINE

## 2021-03-24 PROCEDURE — 99220 PR INITIAL OBSERVATION CARE,LEVL III: ICD-10-PCS | Mod: ,,, | Performed by: PHYSICIAN ASSISTANT

## 2021-03-24 PROCEDURE — 86803 HEPATITIS C AB TEST: CPT | Performed by: EMERGENCY MEDICINE

## 2021-03-24 PROCEDURE — 85025 COMPLETE CBC W/AUTO DIFF WBC: CPT | Performed by: EMERGENCY MEDICINE

## 2021-03-24 PROCEDURE — 83880 ASSAY OF NATRIURETIC PEPTIDE: CPT | Performed by: EMERGENCY MEDICINE

## 2021-03-24 RX ORDER — IBUPROFEN 200 MG
16 TABLET ORAL
Status: DISCONTINUED | OUTPATIENT
Start: 2021-03-24 | End: 2021-03-26 | Stop reason: HOSPADM

## 2021-03-24 RX ORDER — HYDRALAZINE HYDROCHLORIDE 50 MG/1
50 TABLET, FILM COATED ORAL EVERY 8 HOURS
Status: DISCONTINUED | OUTPATIENT
Start: 2021-03-24 | End: 2021-03-26 | Stop reason: HOSPADM

## 2021-03-24 RX ORDER — IPRATROPIUM BROMIDE AND ALBUTEROL SULFATE 2.5; .5 MG/3ML; MG/3ML
3 SOLUTION RESPIRATORY (INHALATION) EVERY 4 HOURS PRN
Status: DISCONTINUED | OUTPATIENT
Start: 2021-03-24 | End: 2021-03-26 | Stop reason: HOSPADM

## 2021-03-24 RX ORDER — SEVELAMER CARBONATE 800 MG/1
800 TABLET, FILM COATED ORAL
Status: DISCONTINUED | OUTPATIENT
Start: 2021-03-25 | End: 2021-03-26 | Stop reason: HOSPADM

## 2021-03-24 RX ORDER — MIRTAZAPINE 7.5 MG/1
7.5 TABLET, FILM COATED ORAL NIGHTLY
Status: DISCONTINUED | OUTPATIENT
Start: 2021-03-24 | End: 2021-03-26 | Stop reason: HOSPADM

## 2021-03-24 RX ORDER — ONDANSETRON 8 MG/1
8 TABLET, ORALLY DISINTEGRATING ORAL EVERY 8 HOURS PRN
Status: DISCONTINUED | OUTPATIENT
Start: 2021-03-24 | End: 2021-03-26 | Stop reason: HOSPADM

## 2021-03-24 RX ORDER — CLONIDINE 0.3 MG/24H
1 PATCH, EXTENDED RELEASE TRANSDERMAL
Status: DISCONTINUED | OUTPATIENT
Start: 2021-03-25 | End: 2021-03-26 | Stop reason: HOSPADM

## 2021-03-24 RX ORDER — TALC
6 POWDER (GRAM) TOPICAL NIGHTLY PRN
Status: DISCONTINUED | OUTPATIENT
Start: 2021-03-24 | End: 2021-03-26 | Stop reason: HOSPADM

## 2021-03-24 RX ORDER — SODIUM CHLORIDE 0.9 % (FLUSH) 0.9 %
10 SYRINGE (ML) INJECTION
Status: DISCONTINUED | OUTPATIENT
Start: 2021-03-24 | End: 2021-03-26 | Stop reason: HOSPADM

## 2021-03-24 RX ORDER — INSULIN ASPART 100 [IU]/ML
0-5 INJECTION, SOLUTION INTRAVENOUS; SUBCUTANEOUS
Status: DISCONTINUED | OUTPATIENT
Start: 2021-03-24 | End: 2021-03-26 | Stop reason: HOSPADM

## 2021-03-24 RX ORDER — ASPIRIN 81 MG/1
81 TABLET ORAL DAILY
Status: DISCONTINUED | OUTPATIENT
Start: 2021-03-25 | End: 2021-03-26 | Stop reason: HOSPADM

## 2021-03-24 RX ORDER — ATORVASTATIN CALCIUM 20 MG/1
20 TABLET, FILM COATED ORAL NIGHTLY
Status: DISCONTINUED | OUTPATIENT
Start: 2021-03-24 | End: 2021-03-26 | Stop reason: HOSPADM

## 2021-03-24 RX ORDER — GLUCAGON 1 MG
1 KIT INJECTION
Status: DISCONTINUED | OUTPATIENT
Start: 2021-03-24 | End: 2021-03-26 | Stop reason: HOSPADM

## 2021-03-24 RX ORDER — FLUTICASONE FUROATE AND VILANTEROL 200; 25 UG/1; UG/1
1 POWDER RESPIRATORY (INHALATION) DAILY
Status: DISCONTINUED | OUTPATIENT
Start: 2021-03-25 | End: 2021-03-26 | Stop reason: HOSPADM

## 2021-03-24 RX ORDER — ACETAMINOPHEN 325 MG/1
650 TABLET ORAL EVERY 4 HOURS PRN
Status: DISCONTINUED | OUTPATIENT
Start: 2021-03-24 | End: 2021-03-26 | Stop reason: HOSPADM

## 2021-03-24 RX ORDER — IBUPROFEN 200 MG
24 TABLET ORAL
Status: DISCONTINUED | OUTPATIENT
Start: 2021-03-24 | End: 2021-03-26 | Stop reason: HOSPADM

## 2021-03-24 RX ORDER — AMLODIPINE BESYLATE 5 MG/1
5 TABLET ORAL DAILY
Status: DISCONTINUED | OUTPATIENT
Start: 2021-03-25 | End: 2021-03-26 | Stop reason: HOSPADM

## 2021-03-24 RX ORDER — POLYETHYLENE GLYCOL 3350 17 G/17G
17 POWDER, FOR SOLUTION ORAL DAILY
Status: DISCONTINUED | OUTPATIENT
Start: 2021-03-25 | End: 2021-03-26 | Stop reason: HOSPADM

## 2021-03-24 RX ADMIN — MIRTAZAPINE 7.5 MG: 7.5 TABLET ORAL at 10:03

## 2021-03-24 RX ADMIN — ATORVASTATIN CALCIUM 20 MG: 20 TABLET, FILM COATED ORAL at 10:03

## 2021-03-24 RX ADMIN — HYDRALAZINE HYDROCHLORIDE 50 MG: 50 TABLET, FILM COATED ORAL at 10:03

## 2021-03-25 ENCOUNTER — TELEMEDICINE (OUTPATIENT)
Dept: INTERNAL MEDICINE | Facility: CLINIC | Age: 81
End: 2021-03-25

## 2021-03-25 DIAGNOSIS — Z99.2 ESRD (END STAGE RENAL DISEASE) ON DIALYSIS: Primary | ICD-10-CM

## 2021-03-25 DIAGNOSIS — R54 FRAIL ELDERLY: ICD-10-CM

## 2021-03-25 DIAGNOSIS — N18.6 ESRD (END STAGE RENAL DISEASE) ON DIALYSIS: Primary | ICD-10-CM

## 2021-03-25 LAB
ANION GAP SERPL CALC-SCNC: 11 MMOL/L (ref 8–16)
BASOPHILS # BLD AUTO: 0.05 K/UL (ref 0–0.2)
BASOPHILS NFR BLD: 1 % (ref 0–1.9)
BUN SERPL-MCNC: 67 MG/DL (ref 8–23)
CALCIUM SERPL-MCNC: 8.7 MG/DL (ref 8.7–10.5)
CHLORIDE SERPL-SCNC: 107 MMOL/L (ref 95–110)
CO2 SERPL-SCNC: 22 MMOL/L (ref 23–29)
CREAT SERPL-MCNC: 4.9 MG/DL (ref 0.5–1.4)
DIFFERENTIAL METHOD: ABNORMAL
EOSINOPHIL # BLD AUTO: 0 K/UL (ref 0–0.5)
EOSINOPHIL NFR BLD: 0.8 % (ref 0–8)
ERYTHROCYTE [DISTWIDTH] IN BLOOD BY AUTOMATED COUNT: 19.9 % (ref 11.5–14.5)
EST. GFR  (AFRICAN AMERICAN): 9.1 ML/MIN/1.73 M^2
EST. GFR  (NON AFRICAN AMERICAN): 7.9 ML/MIN/1.73 M^2
GLUCOSE SERPL-MCNC: 91 MG/DL (ref 70–110)
HCT VFR BLD AUTO: 33 % (ref 37–48.5)
HCV AB SERPL QL IA: NEGATIVE
HGB BLD-MCNC: 9.7 G/DL (ref 12–16)
IMM GRANULOCYTES # BLD AUTO: 0.01 K/UL (ref 0–0.04)
IMM GRANULOCYTES NFR BLD AUTO: 0.2 % (ref 0–0.5)
LYMPHOCYTES # BLD AUTO: 0.4 K/UL (ref 1–4.8)
LYMPHOCYTES NFR BLD: 8.2 % (ref 18–48)
MAGNESIUM SERPL-MCNC: 2.5 MG/DL (ref 1.6–2.6)
MCH RBC QN AUTO: 26.5 PG (ref 27–31)
MCHC RBC AUTO-ENTMCNC: 29.4 G/DL (ref 32–36)
MCV RBC AUTO: 90 FL (ref 82–98)
MONOCYTES # BLD AUTO: 0.5 K/UL (ref 0.3–1)
MONOCYTES NFR BLD: 11 % (ref 4–15)
NEUTROPHILS # BLD AUTO: 3.9 K/UL (ref 1.8–7.7)
NEUTROPHILS NFR BLD: 78.8 % (ref 38–73)
NRBC BLD-RTO: 0 /100 WBC
PHOSPHATE SERPL-MCNC: 5.3 MG/DL (ref 2.7–4.5)
PLATELET # BLD AUTO: 85 K/UL (ref 150–350)
PMV BLD AUTO: ABNORMAL FL (ref 9.2–12.9)
POCT GLUCOSE: 102 MG/DL (ref 70–110)
POCT GLUCOSE: 103 MG/DL (ref 70–110)
POCT GLUCOSE: 70 MG/DL (ref 70–110)
POTASSIUM SERPL-SCNC: 4 MMOL/L (ref 3.5–5.1)
POTASSIUM SERPL-SCNC: 4.3 MMOL/L (ref 3.5–5.1)
POTASSIUM SERPL-SCNC: 5.6 MMOL/L (ref 3.5–5.1)
RBC # BLD AUTO: 3.66 M/UL (ref 4–5.4)
SODIUM SERPL-SCNC: 140 MMOL/L (ref 136–145)
WBC # BLD AUTO: 4.89 K/UL (ref 3.9–12.7)

## 2021-03-25 PROCEDURE — 99226 PR SUBSEQUENT OBSERVATION CARE,LEVEL III: CPT | Mod: ,,, | Performed by: PHYSICIAN ASSISTANT

## 2021-03-25 PROCEDURE — 80048 BASIC METABOLIC PNL TOTAL CA: CPT | Performed by: PHYSICIAN ASSISTANT

## 2021-03-25 PROCEDURE — G0257 UNSCHED DIALYSIS ESRD PT HOS: HCPCS

## 2021-03-25 PROCEDURE — 25000003 PHARM REV CODE 250: Performed by: GENERAL PRACTICE

## 2021-03-25 PROCEDURE — 83735 ASSAY OF MAGNESIUM: CPT | Performed by: PHYSICIAN ASSISTANT

## 2021-03-25 PROCEDURE — 36415 COLL VENOUS BLD VENIPUNCTURE: CPT | Performed by: PHYSICIAN ASSISTANT

## 2021-03-25 PROCEDURE — 84132 ASSAY OF SERUM POTASSIUM: CPT | Performed by: PHYSICIAN ASSISTANT

## 2021-03-25 PROCEDURE — G0378 HOSPITAL OBSERVATION PER HR: HCPCS

## 2021-03-25 PROCEDURE — 25000003 PHARM REV CODE 250: Performed by: PHYSICIAN ASSISTANT

## 2021-03-25 PROCEDURE — 99226 PR SUBSEQUENT OBSERVATION CARE,LEVEL III: ICD-10-PCS | Mod: ,,, | Performed by: PHYSICIAN ASSISTANT

## 2021-03-25 PROCEDURE — 85025 COMPLETE CBC W/AUTO DIFF WBC: CPT | Performed by: PHYSICIAN ASSISTANT

## 2021-03-25 PROCEDURE — 25000242 PHARM REV CODE 250 ALT 637 W/ HCPCS: Performed by: PHYSICIAN ASSISTANT

## 2021-03-25 PROCEDURE — 84100 ASSAY OF PHOSPHORUS: CPT | Performed by: PHYSICIAN ASSISTANT

## 2021-03-25 RX ORDER — SODIUM CHLORIDE 9 MG/ML
INJECTION, SOLUTION INTRAVENOUS ONCE
Status: COMPLETED | OUTPATIENT
Start: 2021-03-25 | End: 2021-03-25

## 2021-03-25 RX ADMIN — ATORVASTATIN CALCIUM 20 MG: 20 TABLET, FILM COATED ORAL at 08:03

## 2021-03-25 RX ADMIN — CLONIDINE 1 PATCH: 0.3 PATCH TRANSDERMAL at 03:03

## 2021-03-25 RX ADMIN — SEVELAMER CARBONATE 800 MG: 800 TABLET, FILM COATED ORAL at 05:03

## 2021-03-25 RX ADMIN — SEVELAMER CARBONATE 800 MG: 800 TABLET, FILM COATED ORAL at 10:03

## 2021-03-25 RX ADMIN — MIRTAZAPINE 7.5 MG: 7.5 TABLET ORAL at 08:03

## 2021-03-25 RX ADMIN — FLUTICASONE FUROATE AND VILANTEROL TRIFENATATE 1 PUFF: 200; 25 POWDER RESPIRATORY (INHALATION) at 01:03

## 2021-03-25 RX ADMIN — HYDRALAZINE HYDROCHLORIDE 50 MG: 50 TABLET, FILM COATED ORAL at 08:03

## 2021-03-25 RX ADMIN — SODIUM CHLORIDE: 0.9 INJECTION, SOLUTION INTRAVENOUS at 08:03

## 2021-03-25 RX ADMIN — HYDRALAZINE HYDROCHLORIDE 50 MG: 50 TABLET, FILM COATED ORAL at 06:03

## 2021-03-26 VITALS
TEMPERATURE: 99 F | OXYGEN SATURATION: 92 % | DIASTOLIC BLOOD PRESSURE: 64 MMHG | BODY MASS INDEX: 19.88 KG/M2 | RESPIRATION RATE: 16 BRPM | HEART RATE: 68 BPM | HEIGHT: 62 IN | SYSTOLIC BLOOD PRESSURE: 142 MMHG | WEIGHT: 108 LBS

## 2021-03-26 LAB
ANION GAP SERPL CALC-SCNC: 12 MMOL/L (ref 8–16)
BASOPHILS # BLD AUTO: 0.05 K/UL (ref 0–0.2)
BASOPHILS NFR BLD: 1.2 % (ref 0–1.9)
BUN SERPL-MCNC: 42 MG/DL (ref 8–23)
CALCIUM SERPL-MCNC: 8.9 MG/DL (ref 8.7–10.5)
CHLORIDE SERPL-SCNC: 106 MMOL/L (ref 95–110)
CO2 SERPL-SCNC: 21 MMOL/L (ref 23–29)
CREAT SERPL-MCNC: 3.8 MG/DL (ref 0.5–1.4)
DIFFERENTIAL METHOD: ABNORMAL
EOSINOPHIL # BLD AUTO: 0.1 K/UL (ref 0–0.5)
EOSINOPHIL NFR BLD: 1.6 % (ref 0–8)
ERYTHROCYTE [DISTWIDTH] IN BLOOD BY AUTOMATED COUNT: 19.8 % (ref 11.5–14.5)
EST. GFR  (AFRICAN AMERICAN): 12.3 ML/MIN/1.73 M^2
EST. GFR  (NON AFRICAN AMERICAN): 10.7 ML/MIN/1.73 M^2
GLUCOSE SERPL-MCNC: 67 MG/DL (ref 70–110)
HCT VFR BLD AUTO: 34 % (ref 37–48.5)
HGB BLD-MCNC: 10 G/DL (ref 12–16)
IMM GRANULOCYTES # BLD AUTO: 0.01 K/UL (ref 0–0.04)
IMM GRANULOCYTES NFR BLD AUTO: 0.2 % (ref 0–0.5)
LYMPHOCYTES # BLD AUTO: 0.6 K/UL (ref 1–4.8)
LYMPHOCYTES NFR BLD: 14.4 % (ref 18–48)
MAGNESIUM SERPL-MCNC: 2.3 MG/DL (ref 1.6–2.6)
MCH RBC QN AUTO: 26.8 PG (ref 27–31)
MCHC RBC AUTO-ENTMCNC: 29.4 G/DL (ref 32–36)
MCV RBC AUTO: 91 FL (ref 82–98)
MONOCYTES # BLD AUTO: 0.7 K/UL (ref 0.3–1)
MONOCYTES NFR BLD: 15.5 % (ref 4–15)
NEUTROPHILS # BLD AUTO: 2.9 K/UL (ref 1.8–7.7)
NEUTROPHILS NFR BLD: 67.1 % (ref 38–73)
NRBC BLD-RTO: 0 /100 WBC
PHOSPHATE SERPL-MCNC: 5.6 MG/DL (ref 2.7–4.5)
PLATELET # BLD AUTO: 87 K/UL (ref 150–350)
PMV BLD AUTO: ABNORMAL FL (ref 9.2–12.9)
POCT GLUCOSE: 103 MG/DL (ref 70–110)
POCT GLUCOSE: 76 MG/DL (ref 70–110)
POCT GLUCOSE: 85 MG/DL (ref 70–110)
POTASSIUM SERPL-SCNC: 5 MMOL/L (ref 3.5–5.1)
POTASSIUM SERPL-SCNC: 5.1 MMOL/L (ref 3.5–5.1)
POTASSIUM SERPL-SCNC: 5.1 MMOL/L (ref 3.5–5.1)
POTASSIUM SERPL-SCNC: 5.3 MMOL/L (ref 3.5–5.1)
RBC # BLD AUTO: 3.73 M/UL (ref 4–5.4)
SODIUM SERPL-SCNC: 139 MMOL/L (ref 136–145)
WBC # BLD AUTO: 4.32 K/UL (ref 3.9–12.7)

## 2021-03-26 PROCEDURE — 27000221 HC OXYGEN, UP TO 24 HOURS

## 2021-03-26 PROCEDURE — 25000242 PHARM REV CODE 250 ALT 637 W/ HCPCS: Performed by: PHYSICIAN ASSISTANT

## 2021-03-26 PROCEDURE — 99217 PR OBSERVATION CARE DISCHARGE: CPT | Mod: ,,, | Performed by: PHYSICIAN ASSISTANT

## 2021-03-26 PROCEDURE — 36415 COLL VENOUS BLD VENIPUNCTURE: CPT | Performed by: PHYSICIAN ASSISTANT

## 2021-03-26 PROCEDURE — 85025 COMPLETE CBC W/AUTO DIFF WBC: CPT | Performed by: PHYSICIAN ASSISTANT

## 2021-03-26 PROCEDURE — 99217 PR OBSERVATION CARE DISCHARGE: ICD-10-PCS | Mod: ,,, | Performed by: PHYSICIAN ASSISTANT

## 2021-03-26 PROCEDURE — 80048 BASIC METABOLIC PNL TOTAL CA: CPT | Performed by: PHYSICIAN ASSISTANT

## 2021-03-26 PROCEDURE — 25000003 PHARM REV CODE 250: Performed by: PHYSICIAN ASSISTANT

## 2021-03-26 PROCEDURE — 84132 ASSAY OF SERUM POTASSIUM: CPT | Performed by: PHYSICIAN ASSISTANT

## 2021-03-26 PROCEDURE — G0378 HOSPITAL OBSERVATION PER HR: HCPCS

## 2021-03-26 PROCEDURE — 84100 ASSAY OF PHOSPHORUS: CPT | Performed by: PHYSICIAN ASSISTANT

## 2021-03-26 PROCEDURE — 99215 OFFICE O/P EST HI 40 MIN: CPT | Mod: GC,,, | Performed by: INTERNAL MEDICINE

## 2021-03-26 PROCEDURE — 99215 PR OFFICE/OUTPT VISIT, EST, LEVL V, 40-54 MIN: ICD-10-PCS | Mod: GC,,, | Performed by: INTERNAL MEDICINE

## 2021-03-26 PROCEDURE — 83735 ASSAY OF MAGNESIUM: CPT | Performed by: PHYSICIAN ASSISTANT

## 2021-03-26 RX ORDER — TRAMADOL HYDROCHLORIDE 50 MG/1
50 TABLET ORAL EVERY 8 HOURS PRN
Qty: 15 TABLET | Refills: 0 | Status: ON HOLD | OUTPATIENT
Start: 2021-03-26 | End: 2021-06-23 | Stop reason: HOSPADM

## 2021-03-26 RX ADMIN — AMLODIPINE BESYLATE 5 MG: 5 TABLET ORAL at 09:03

## 2021-03-26 RX ADMIN — SEVELAMER CARBONATE 800 MG: 800 TABLET, FILM COATED ORAL at 11:03

## 2021-03-26 RX ADMIN — HYDRALAZINE HYDROCHLORIDE 50 MG: 50 TABLET, FILM COATED ORAL at 06:03

## 2021-03-26 RX ADMIN — FLUTICASONE FUROATE AND VILANTEROL TRIFENATATE 1 PUFF: 200; 25 POWDER RESPIRATORY (INHALATION) at 11:03

## 2021-03-26 RX ADMIN — SEVELAMER CARBONATE 800 MG: 800 TABLET, FILM COATED ORAL at 08:03

## 2021-03-26 RX ADMIN — ASPIRIN 81 MG: 81 TABLET, COATED ORAL at 09:03

## 2021-03-26 RX ADMIN — HYDRALAZINE HYDROCHLORIDE 50 MG: 50 TABLET, FILM COATED ORAL at 01:03

## 2021-03-26 RX ADMIN — POLYETHYLENE GLYCOL 3350 17 G: 17 POWDER, FOR SOLUTION ORAL at 09:03

## 2021-03-26 RX ADMIN — SEVELAMER CARBONATE 800 MG: 800 TABLET, FILM COATED ORAL at 04:03

## 2021-05-03 ENCOUNTER — TELEPHONE (OUTPATIENT)
Dept: VASCULAR SURGERY | Facility: CLINIC | Age: 81
End: 2021-05-03

## 2021-05-03 DIAGNOSIS — Z99.2 ESRD (END STAGE RENAL DISEASE) ON DIALYSIS: Primary | ICD-10-CM

## 2021-05-03 DIAGNOSIS — N18.6 ESRD (END STAGE RENAL DISEASE) ON DIALYSIS: Primary | ICD-10-CM

## 2021-05-05 ENCOUNTER — TELEPHONE (OUTPATIENT)
Dept: VASCULAR SURGERY | Facility: CLINIC | Age: 81
End: 2021-05-05

## 2021-05-07 ENCOUNTER — HOSPITAL ENCOUNTER (INPATIENT)
Facility: HOSPITAL | Age: 81
LOS: 5 days | Discharge: NURSING, REHAB OR LONG-TERM CARE | DRG: 314 | End: 2021-05-14
Attending: EMERGENCY MEDICINE | Admitting: INTERNAL MEDICINE
Payer: MEDICARE

## 2021-05-07 DIAGNOSIS — L97.529 ULCER OF LEFT FOOT, UNSPECIFIED ULCER STAGE: ICD-10-CM

## 2021-05-07 DIAGNOSIS — I49.9 ARRHYTHMIA: ICD-10-CM

## 2021-05-07 DIAGNOSIS — N18.6 ESRD ON HEMODIALYSIS: ICD-10-CM

## 2021-05-07 DIAGNOSIS — R06.02 SOB (SHORTNESS OF BREATH): ICD-10-CM

## 2021-05-07 DIAGNOSIS — Z99.2 ESRD ON HEMODIALYSIS: ICD-10-CM

## 2021-05-07 DIAGNOSIS — R07.9 CHEST PAIN: ICD-10-CM

## 2021-05-07 DIAGNOSIS — T82.868A THROMBOSIS OF ARTERIOVENOUS FISTULA, INITIAL ENCOUNTER: Primary | ICD-10-CM

## 2021-05-07 DIAGNOSIS — T82.590A DIALYSIS AV FISTULA MALFUNCTION, INITIAL ENCOUNTER: ICD-10-CM

## 2021-05-07 DIAGNOSIS — N18.9 CHRONIC KIDNEY DISEASE-MINERAL AND BONE DISORDER: ICD-10-CM

## 2021-05-07 DIAGNOSIS — M89.9 CHRONIC KIDNEY DISEASE-MINERAL AND BONE DISORDER: ICD-10-CM

## 2021-05-07 DIAGNOSIS — T82.868A: ICD-10-CM

## 2021-05-07 DIAGNOSIS — E87.20 METABOLIC ACIDOSIS: ICD-10-CM

## 2021-05-07 DIAGNOSIS — E83.9 CHRONIC KIDNEY DISEASE-MINERAL AND BONE DISORDER: ICD-10-CM

## 2021-05-07 DIAGNOSIS — I47.29 NONSUSTAINED VENTRICULAR TACHYCARDIA: ICD-10-CM

## 2021-05-07 DIAGNOSIS — E87.5 HYPERKALEMIA: ICD-10-CM

## 2021-05-07 DIAGNOSIS — E83.39 HYPERPHOSPHATEMIA: ICD-10-CM

## 2021-05-07 PROBLEM — Z78.9 PROBLEM WITH VASCULAR ACCESS: Status: ACTIVE | Noted: 2021-05-07

## 2021-05-07 LAB
ANION GAP SERPL CALC-SCNC: 14 MMOL/L (ref 8–16)
BASOPHILS # BLD AUTO: 0.04 K/UL (ref 0–0.2)
BASOPHILS NFR BLD: 0.7 % (ref 0–1.9)
BUN SERPL-MCNC: 106 MG/DL (ref 8–23)
BUN SERPL-MCNC: 99 MG/DL (ref 6–30)
CALCIUM SERPL-MCNC: 8 MG/DL (ref 8.7–10.5)
CHLORIDE SERPL-SCNC: 108 MMOL/L (ref 95–110)
CHLORIDE SERPL-SCNC: 108 MMOL/L (ref 95–110)
CO2 SERPL-SCNC: 18 MMOL/L (ref 23–29)
CREAT SERPL-MCNC: 5.6 MG/DL (ref 0.5–1.4)
CREAT SERPL-MCNC: 5.9 MG/DL (ref 0.5–1.4)
CTP QC/QA: YES
DIFFERENTIAL METHOD: ABNORMAL
EOSINOPHIL # BLD AUTO: 0.2 K/UL (ref 0–0.5)
EOSINOPHIL NFR BLD: 2.6 % (ref 0–8)
ERYTHROCYTE [DISTWIDTH] IN BLOOD BY AUTOMATED COUNT: 19.9 % (ref 11.5–14.5)
EST. GFR  (AFRICAN AMERICAN): 7.7 ML/MIN/1.73 M^2
EST. GFR  (NON AFRICAN AMERICAN): 6.7 ML/MIN/1.73 M^2
GLUCOSE SERPL-MCNC: 101 MG/DL (ref 70–110)
GLUCOSE SERPL-MCNC: 90 MG/DL (ref 70–110)
HCT VFR BLD AUTO: 32.9 % (ref 37–48.5)
HCT VFR BLD CALC: 34 %PCV (ref 36–54)
HGB BLD-MCNC: 9.8 G/DL (ref 12–16)
IMM GRANULOCYTES # BLD AUTO: 0.02 K/UL (ref 0–0.04)
IMM GRANULOCYTES NFR BLD AUTO: 0.4 % (ref 0–0.5)
LYMPHOCYTES # BLD AUTO: 0.4 K/UL (ref 1–4.8)
LYMPHOCYTES NFR BLD: 7.4 % (ref 18–48)
MCH RBC QN AUTO: 27.4 PG (ref 27–31)
MCHC RBC AUTO-ENTMCNC: 29.8 G/DL (ref 32–36)
MCV RBC AUTO: 92 FL (ref 82–98)
MONOCYTES # BLD AUTO: 0.6 K/UL (ref 0.3–1)
MONOCYTES NFR BLD: 11 % (ref 4–15)
NEUTROPHILS # BLD AUTO: 4.5 K/UL (ref 1.8–7.7)
NEUTROPHILS NFR BLD: 77.9 % (ref 38–73)
NRBC BLD-RTO: 0 /100 WBC
PLATELET # BLD AUTO: 76 K/UL (ref 150–450)
PMV BLD AUTO: ABNORMAL FL (ref 9.2–12.9)
POC IONIZED CALCIUM: 1.04 MMOL/L (ref 1.06–1.42)
POC TCO2 (MEASURED): 22 MMOL/L (ref 23–29)
POTASSIUM BLD-SCNC: 5 MMOL/L (ref 3.5–5.1)
POTASSIUM SERPL-SCNC: 5.3 MMOL/L (ref 3.5–5.1)
RBC # BLD AUTO: 3.58 M/UL (ref 4–5.4)
SAMPLE: ABNORMAL
SARS-COV-2 RDRP RESP QL NAA+PROBE: NEGATIVE
SODIUM BLD-SCNC: 139 MMOL/L (ref 136–145)
SODIUM SERPL-SCNC: 140 MMOL/L (ref 136–145)
WBC # BLD AUTO: 5.71 K/UL (ref 3.9–12.7)

## 2021-05-07 PROCEDURE — 99285 EMERGENCY DEPT VISIT HI MDM: CPT | Mod: GC,CS,, | Performed by: EMERGENCY MEDICINE

## 2021-05-07 PROCEDURE — 93010 EKG 12-LEAD: ICD-10-PCS | Mod: ,,, | Performed by: INTERNAL MEDICINE

## 2021-05-07 PROCEDURE — 99285 PR EMERGENCY DEPT VISIT,LEVEL V: ICD-10-PCS | Mod: GC,CS,, | Performed by: EMERGENCY MEDICINE

## 2021-05-07 PROCEDURE — G0378 HOSPITAL OBSERVATION PER HR: HCPCS

## 2021-05-07 PROCEDURE — 63600175 PHARM REV CODE 636 W HCPCS: Performed by: EMERGENCY MEDICINE

## 2021-05-07 PROCEDURE — 96374 THER/PROPH/DIAG INJ IV PUSH: CPT

## 2021-05-07 PROCEDURE — 99220 PR INITIAL OBSERVATION CARE,LEVL III: CPT | Mod: ,,, | Performed by: PHYSICIAN ASSISTANT

## 2021-05-07 PROCEDURE — 80047 BASIC METABLC PNL IONIZED CA: CPT

## 2021-05-07 PROCEDURE — 99285 EMERGENCY DEPT VISIT HI MDM: CPT | Mod: 25

## 2021-05-07 PROCEDURE — U0002 COVID-19 LAB TEST NON-CDC: HCPCS | Performed by: STUDENT IN AN ORGANIZED HEALTH CARE EDUCATION/TRAINING PROGRAM

## 2021-05-07 PROCEDURE — 85025 COMPLETE CBC W/AUTO DIFF WBC: CPT | Performed by: PHYSICIAN ASSISTANT

## 2021-05-07 PROCEDURE — 93005 ELECTROCARDIOGRAM TRACING: CPT

## 2021-05-07 PROCEDURE — 99220 PR INITIAL OBSERVATION CARE,LEVL III: ICD-10-PCS | Mod: ,,, | Performed by: PHYSICIAN ASSISTANT

## 2021-05-07 PROCEDURE — 80048 BASIC METABOLIC PNL TOTAL CA: CPT | Performed by: PHYSICIAN ASSISTANT

## 2021-05-07 PROCEDURE — 93010 ELECTROCARDIOGRAM REPORT: CPT | Mod: ,,, | Performed by: INTERNAL MEDICINE

## 2021-05-07 PROCEDURE — 25000003 PHARM REV CODE 250: Performed by: PHYSICIAN ASSISTANT

## 2021-05-07 RX ORDER — ONDANSETRON 2 MG/ML
4 INJECTION INTRAMUSCULAR; INTRAVENOUS
Status: DISCONTINUED | OUTPATIENT
Start: 2021-05-07 | End: 2021-05-07

## 2021-05-07 RX ORDER — HYDROXYZINE HYDROCHLORIDE 25 MG/1
25 TABLET, FILM COATED ORAL 3 TIMES DAILY PRN
Status: DISCONTINUED | OUTPATIENT
Start: 2021-05-08 | End: 2021-05-14 | Stop reason: HOSPADM

## 2021-05-07 RX ORDER — ONDANSETRON 8 MG/1
8 TABLET, ORALLY DISINTEGRATING ORAL EVERY 8 HOURS PRN
Status: DISCONTINUED | OUTPATIENT
Start: 2021-05-07 | End: 2021-05-14 | Stop reason: HOSPADM

## 2021-05-07 RX ORDER — HEPARIN SODIUM 5000 [USP'U]/ML
5000 INJECTION, SOLUTION INTRAVENOUS; SUBCUTANEOUS EVERY 8 HOURS
Status: DISCONTINUED | OUTPATIENT
Start: 2021-05-07 | End: 2021-05-07

## 2021-05-07 RX ORDER — ACETAMINOPHEN 325 MG/1
650 TABLET ORAL EVERY 4 HOURS PRN
Status: DISCONTINUED | OUTPATIENT
Start: 2021-05-07 | End: 2021-05-14 | Stop reason: HOSPADM

## 2021-05-07 RX ORDER — HYDRALAZINE HYDROCHLORIDE 50 MG/1
50 TABLET, FILM COATED ORAL EVERY 8 HOURS
Status: DISCONTINUED | OUTPATIENT
Start: 2021-05-07 | End: 2021-05-14 | Stop reason: HOSPADM

## 2021-05-07 RX ORDER — IBUPROFEN 200 MG
24 TABLET ORAL
Status: DISCONTINUED | OUTPATIENT
Start: 2021-05-07 | End: 2021-05-14 | Stop reason: HOSPADM

## 2021-05-07 RX ORDER — AMLODIPINE BESYLATE 5 MG/1
5 TABLET ORAL DAILY
Status: DISCONTINUED | OUTPATIENT
Start: 2021-05-08 | End: 2021-05-10

## 2021-05-07 RX ORDER — ONDANSETRON 2 MG/ML
4 INJECTION INTRAMUSCULAR; INTRAVENOUS ONCE
Status: COMPLETED | OUTPATIENT
Start: 2021-05-07 | End: 2021-05-07

## 2021-05-07 RX ORDER — FLUTICASONE FUROATE AND VILANTEROL 200; 25 UG/1; UG/1
1 POWDER RESPIRATORY (INHALATION) DAILY
Status: DISCONTINUED | OUTPATIENT
Start: 2021-05-08 | End: 2021-05-14 | Stop reason: HOSPADM

## 2021-05-07 RX ORDER — POLYETHYLENE GLYCOL 3350 17 G/17G
17 POWDER, FOR SOLUTION ORAL DAILY
Status: DISCONTINUED | OUTPATIENT
Start: 2021-05-08 | End: 2021-05-14 | Stop reason: HOSPADM

## 2021-05-07 RX ORDER — ASPIRIN 81 MG/1
81 TABLET ORAL DAILY
Status: DISCONTINUED | OUTPATIENT
Start: 2021-05-08 | End: 2021-05-14 | Stop reason: HOSPADM

## 2021-05-07 RX ORDER — SEVELAMER CARBONATE 800 MG/1
800 TABLET, FILM COATED ORAL
Status: DISCONTINUED | OUTPATIENT
Start: 2021-05-08 | End: 2021-05-14 | Stop reason: HOSPADM

## 2021-05-07 RX ORDER — BISACODYL 10 MG
10 SUPPOSITORY, RECTAL RECTAL DAILY PRN
Status: DISCONTINUED | OUTPATIENT
Start: 2021-05-07 | End: 2021-05-14 | Stop reason: HOSPADM

## 2021-05-07 RX ORDER — IBUPROFEN 200 MG
16 TABLET ORAL
Status: DISCONTINUED | OUTPATIENT
Start: 2021-05-07 | End: 2021-05-14 | Stop reason: HOSPADM

## 2021-05-07 RX ORDER — ONDANSETRON 2 MG/ML
INJECTION INTRAMUSCULAR; INTRAVENOUS
Status: DISPENSED
Start: 2021-05-07 | End: 2021-05-08

## 2021-05-07 RX ORDER — MIRTAZAPINE 7.5 MG/1
7.5 TABLET, FILM COATED ORAL NIGHTLY
Status: DISCONTINUED | OUTPATIENT
Start: 2021-05-07 | End: 2021-05-14 | Stop reason: HOSPADM

## 2021-05-07 RX ORDER — ATORVASTATIN CALCIUM 20 MG/1
20 TABLET, FILM COATED ORAL NIGHTLY
Status: DISCONTINUED | OUTPATIENT
Start: 2021-05-07 | End: 2021-05-14 | Stop reason: HOSPADM

## 2021-05-07 RX ORDER — TALC
6 POWDER (GRAM) TOPICAL NIGHTLY PRN
Status: DISCONTINUED | OUTPATIENT
Start: 2021-05-07 | End: 2021-05-14 | Stop reason: HOSPADM

## 2021-05-07 RX ORDER — IPRATROPIUM BROMIDE AND ALBUTEROL SULFATE 2.5; .5 MG/3ML; MG/3ML
3 SOLUTION RESPIRATORY (INHALATION) EVERY 4 HOURS PRN
Status: DISCONTINUED | OUTPATIENT
Start: 2021-05-07 | End: 2021-05-14 | Stop reason: HOSPADM

## 2021-05-07 RX ORDER — GLUCAGON 1 MG
1 KIT INJECTION
Status: DISCONTINUED | OUTPATIENT
Start: 2021-05-07 | End: 2021-05-14 | Stop reason: HOSPADM

## 2021-05-07 RX ORDER — TRAMADOL HYDROCHLORIDE 50 MG/1
50 TABLET ORAL EVERY 8 HOURS PRN
Status: DISCONTINUED | OUTPATIENT
Start: 2021-05-07 | End: 2021-05-14 | Stop reason: HOSPADM

## 2021-05-07 RX ADMIN — ATORVASTATIN CALCIUM 20 MG: 20 TABLET, FILM COATED ORAL at 10:05

## 2021-05-07 RX ADMIN — MIRTAZAPINE 7.5 MG: 7.5 TABLET ORAL at 10:05

## 2021-05-07 RX ADMIN — ONDANSETRON 4 MG: 2 INJECTION INTRAMUSCULAR; INTRAVENOUS at 06:05

## 2021-05-07 RX ADMIN — HYDRALAZINE HYDROCHLORIDE 50 MG: 50 TABLET, FILM COATED ORAL at 10:05

## 2021-05-08 PROBLEM — E87.20 METABOLIC ACIDOSIS: Status: ACTIVE | Noted: 2021-05-08

## 2021-05-08 PROBLEM — E83.39 HYPERPHOSPHATEMIA: Status: ACTIVE | Noted: 2021-05-08

## 2021-05-08 PROBLEM — T82.868A ARTERIOVENOUS FISTULA THROMBOSIS: Status: ACTIVE | Noted: 2021-05-07

## 2021-05-08 LAB
ANION GAP SERPL CALC-SCNC: 15 MMOL/L (ref 8–16)
ANISOCYTOSIS BLD QL SMEAR: SLIGHT
BASOPHILS # BLD AUTO: 0.06 K/UL (ref 0–0.2)
BASOPHILS NFR BLD: 1.1 % (ref 0–1.9)
BUN SERPL-MCNC: 111 MG/DL (ref 8–23)
CALCIUM SERPL-MCNC: 7.7 MG/DL (ref 8.7–10.5)
CHLORIDE SERPL-SCNC: 106 MMOL/L (ref 95–110)
CO2 SERPL-SCNC: 19 MMOL/L (ref 23–29)
CREAT SERPL-MCNC: 6 MG/DL (ref 0.5–1.4)
DIFFERENTIAL METHOD: ABNORMAL
EOSINOPHIL # BLD AUTO: 0.2 K/UL (ref 0–0.5)
EOSINOPHIL NFR BLD: 4.1 % (ref 0–8)
ERYTHROCYTE [DISTWIDTH] IN BLOOD BY AUTOMATED COUNT: 19.9 % (ref 11.5–14.5)
EST. GFR  (AFRICAN AMERICAN): 7.1 ML/MIN/1.73 M^2
EST. GFR  (NON AFRICAN AMERICAN): 6.2 ML/MIN/1.73 M^2
ESTIMATED AVG GLUCOSE: 117 MG/DL (ref 68–131)
GLUCOSE SERPL-MCNC: 75 MG/DL (ref 70–110)
HBA1C MFR BLD: 5.7 % (ref 4–5.6)
HCT VFR BLD AUTO: 33.8 % (ref 37–48.5)
HGB BLD-MCNC: 10.1 G/DL (ref 12–16)
HYPOCHROMIA BLD QL SMEAR: ABNORMAL
IMM GRANULOCYTES # BLD AUTO: 0.02 K/UL (ref 0–0.04)
IMM GRANULOCYTES NFR BLD AUTO: 0.4 % (ref 0–0.5)
LYMPHOCYTES # BLD AUTO: 0.6 K/UL (ref 1–4.8)
LYMPHOCYTES NFR BLD: 11.3 % (ref 18–48)
MAGNESIUM SERPL-MCNC: 2.2 MG/DL (ref 1.6–2.6)
MCH RBC QN AUTO: 26.9 PG (ref 27–31)
MCHC RBC AUTO-ENTMCNC: 29.9 G/DL (ref 32–36)
MCV RBC AUTO: 90 FL (ref 82–98)
MONOCYTES # BLD AUTO: 0.5 K/UL (ref 0.3–1)
MONOCYTES NFR BLD: 9.6 % (ref 4–15)
NEUTROPHILS # BLD AUTO: 4 K/UL (ref 1.8–7.7)
NEUTROPHILS NFR BLD: 73.5 % (ref 38–73)
NRBC BLD-RTO: 0 /100 WBC
PHOSPHATE SERPL-MCNC: 7 MG/DL (ref 2.7–4.5)
PLATELET # BLD AUTO: 74 K/UL (ref 150–450)
PLATELET BLD QL SMEAR: ABNORMAL
PMV BLD AUTO: ABNORMAL FL (ref 9.2–12.9)
POCT GLUCOSE: 127 MG/DL (ref 70–110)
POCT GLUCOSE: 140 MG/DL (ref 70–110)
POCT GLUCOSE: 91 MG/DL (ref 70–110)
POCT GLUCOSE: 91 MG/DL (ref 70–110)
POTASSIUM SERPL-SCNC: 5.4 MMOL/L (ref 3.5–5.1)
RBC # BLD AUTO: 3.75 M/UL (ref 4–5.4)
SODIUM SERPL-SCNC: 140 MMOL/L (ref 136–145)
WBC # BLD AUTO: 5.39 K/UL (ref 3.9–12.7)

## 2021-05-08 PROCEDURE — 25000242 PHARM REV CODE 250 ALT 637 W/ HCPCS: Performed by: PHYSICIAN ASSISTANT

## 2021-05-08 PROCEDURE — 85025 COMPLETE CBC W/AUTO DIFF WBC: CPT | Performed by: PHYSICIAN ASSISTANT

## 2021-05-08 PROCEDURE — G0378 HOSPITAL OBSERVATION PER HR: HCPCS

## 2021-05-08 PROCEDURE — 99215 OFFICE O/P EST HI 40 MIN: CPT | Mod: ,,, | Performed by: NURSE PRACTITIONER

## 2021-05-08 PROCEDURE — 99215 PR OFFICE/OUTPT VISIT, EST, LEVL V, 40-54 MIN: ICD-10-PCS | Mod: ,,, | Performed by: NURSE PRACTITIONER

## 2021-05-08 PROCEDURE — 94761 N-INVAS EAR/PLS OXIMETRY MLT: CPT

## 2021-05-08 PROCEDURE — 25000003 PHARM REV CODE 250: Performed by: PHYSICIAN ASSISTANT

## 2021-05-08 PROCEDURE — 83735 ASSAY OF MAGNESIUM: CPT | Performed by: PHYSICIAN ASSISTANT

## 2021-05-08 PROCEDURE — 36415 COLL VENOUS BLD VENIPUNCTURE: CPT | Performed by: PHYSICIAN ASSISTANT

## 2021-05-08 PROCEDURE — 94640 AIRWAY INHALATION TREATMENT: CPT

## 2021-05-08 PROCEDURE — 99226 PR SUBSEQUENT OBSERVATION CARE,LEVEL III: CPT | Mod: ,,, | Performed by: PHYSICIAN ASSISTANT

## 2021-05-08 PROCEDURE — 80048 BASIC METABOLIC PNL TOTAL CA: CPT | Performed by: PHYSICIAN ASSISTANT

## 2021-05-08 PROCEDURE — 83036 HEMOGLOBIN GLYCOSYLATED A1C: CPT | Performed by: PHYSICIAN ASSISTANT

## 2021-05-08 PROCEDURE — 84100 ASSAY OF PHOSPHORUS: CPT | Performed by: PHYSICIAN ASSISTANT

## 2021-05-08 PROCEDURE — 99226 PR SUBSEQUENT OBSERVATION CARE,LEVEL III: ICD-10-PCS | Mod: ,,, | Performed by: PHYSICIAN ASSISTANT

## 2021-05-08 RX ORDER — LIDOCAINE HYDROCHLORIDE 20 MG/ML
JELLY TOPICAL ONCE
Status: COMPLETED | OUTPATIENT
Start: 2021-05-08 | End: 2021-05-08

## 2021-05-08 RX ORDER — SODIUM BICARBONATE 650 MG/1
650 TABLET ORAL 2 TIMES DAILY
Status: DISCONTINUED | OUTPATIENT
Start: 2021-05-08 | End: 2021-05-14 | Stop reason: HOSPADM

## 2021-05-08 RX ORDER — PSEUDOEPHEDRINE/ACETAMINOPHEN 30MG-500MG
100 TABLET ORAL ONCE
Status: DISCONTINUED | OUTPATIENT
Start: 2021-05-08 | End: 2021-05-09

## 2021-05-08 RX ORDER — SENNOSIDES 8.6 MG/1
8.6 TABLET ORAL 2 TIMES DAILY
Status: DISCONTINUED | OUTPATIENT
Start: 2021-05-08 | End: 2021-05-14 | Stop reason: HOSPADM

## 2021-05-08 RX ORDER — SYRING-NEEDL,DISP,INSUL,0.3 ML 29 G X1/2"
296 SYRINGE, EMPTY DISPOSABLE MISCELLANEOUS ONCE
Status: DISCONTINUED | OUTPATIENT
Start: 2021-05-08 | End: 2021-05-09

## 2021-05-08 RX ADMIN — POLYETHYLENE GLYCOL 3350 17 G: 17 POWDER, FOR SOLUTION ORAL at 11:05

## 2021-05-08 RX ADMIN — ASPIRIN 81 MG: 81 TABLET, COATED ORAL at 08:05

## 2021-05-08 RX ADMIN — ATORVASTATIN CALCIUM 20 MG: 20 TABLET, FILM COATED ORAL at 09:05

## 2021-05-08 RX ADMIN — MIRTAZAPINE 7.5 MG: 7.5 TABLET ORAL at 09:05

## 2021-05-08 RX ADMIN — FLUTICASONE FUROATE AND VILANTEROL TRIFENATATE 1 PUFF: 200; 25 POWDER RESPIRATORY (INHALATION) at 02:05

## 2021-05-08 RX ADMIN — SODIUM BICARBONATE 650 MG TABLET 650 MG: at 09:05

## 2021-05-08 RX ADMIN — HYDRALAZINE HYDROCHLORIDE 50 MG: 50 TABLET, FILM COATED ORAL at 09:05

## 2021-05-08 RX ADMIN — SODIUM BICARBONATE 650 MG TABLET 650 MG: at 01:05

## 2021-05-08 RX ADMIN — LIDOCAINE HYDROCHLORIDE: 20 JELLY TOPICAL at 04:05

## 2021-05-08 RX ADMIN — HYDRALAZINE HYDROCHLORIDE 50 MG: 50 TABLET, FILM COATED ORAL at 01:05

## 2021-05-08 RX ADMIN — PSYLLIUM HUSK 1 PACKET: 3.4 POWDER ORAL at 11:05

## 2021-05-08 RX ADMIN — HYDRALAZINE HYDROCHLORIDE 50 MG: 50 TABLET, FILM COATED ORAL at 06:05

## 2021-05-08 RX ADMIN — STANDARDIZED SENNA CONCENTRATE 8.6 MG: 8.6 TABLET ORAL at 01:05

## 2021-05-08 RX ADMIN — AMLODIPINE BESYLATE 5 MG: 5 TABLET ORAL at 08:05

## 2021-05-09 PROBLEM — T82.590A DIALYSIS AV FISTULA MALFUNCTION, INITIAL ENCOUNTER: Status: ACTIVE | Noted: 2021-05-09

## 2021-05-09 LAB
ANION GAP SERPL CALC-SCNC: 15 MMOL/L (ref 8–16)
BASOPHILS # BLD AUTO: 0.04 K/UL (ref 0–0.2)
BASOPHILS NFR BLD: 0.7 % (ref 0–1.9)
BUN SERPL-MCNC: 113 MG/DL (ref 8–23)
CALCIUM SERPL-MCNC: 7.5 MG/DL (ref 8.7–10.5)
CHLORIDE SERPL-SCNC: 105 MMOL/L (ref 95–110)
CO2 SERPL-SCNC: 18 MMOL/L (ref 23–29)
CREAT SERPL-MCNC: 6.8 MG/DL (ref 0.5–1.4)
DIFFERENTIAL METHOD: ABNORMAL
EOSINOPHIL # BLD AUTO: 0.2 K/UL (ref 0–0.5)
EOSINOPHIL NFR BLD: 2.8 % (ref 0–8)
ERYTHROCYTE [DISTWIDTH] IN BLOOD BY AUTOMATED COUNT: 19.9 % (ref 11.5–14.5)
EST. GFR  (AFRICAN AMERICAN): 6.1 ML/MIN/1.73 M^2
EST. GFR  (NON AFRICAN AMERICAN): 5.3 ML/MIN/1.73 M^2
GLUCOSE SERPL-MCNC: 90 MG/DL (ref 70–110)
HCT VFR BLD AUTO: 33.6 % (ref 37–48.5)
HGB BLD-MCNC: 10.1 G/DL (ref 12–16)
IMM GRANULOCYTES # BLD AUTO: 0.02 K/UL (ref 0–0.04)
IMM GRANULOCYTES NFR BLD AUTO: 0.4 % (ref 0–0.5)
LYMPHOCYTES # BLD AUTO: 0.4 K/UL (ref 1–4.8)
LYMPHOCYTES NFR BLD: 8.1 % (ref 18–48)
MAGNESIUM SERPL-MCNC: 2.3 MG/DL (ref 1.6–2.6)
MCH RBC QN AUTO: 27.2 PG (ref 27–31)
MCHC RBC AUTO-ENTMCNC: 30.1 G/DL (ref 32–36)
MCV RBC AUTO: 91 FL (ref 82–98)
MONOCYTES # BLD AUTO: 0.5 K/UL (ref 0.3–1)
MONOCYTES NFR BLD: 9.5 % (ref 4–15)
NEUTROPHILS # BLD AUTO: 4.3 K/UL (ref 1.8–7.7)
NEUTROPHILS NFR BLD: 78.5 % (ref 38–73)
NRBC BLD-RTO: 0 /100 WBC
PHOSPHATE SERPL-MCNC: 7.4 MG/DL (ref 2.7–4.5)
PLATELET # BLD AUTO: 80 K/UL (ref 150–450)
PMV BLD AUTO: ABNORMAL FL (ref 9.2–12.9)
POCT GLUCOSE: 102 MG/DL (ref 70–110)
POCT GLUCOSE: 107 MG/DL (ref 70–110)
POCT GLUCOSE: 143 MG/DL (ref 70–110)
POCT GLUCOSE: 95 MG/DL (ref 70–110)
POTASSIUM SERPL-SCNC: 5.6 MMOL/L (ref 3.5–5.1)
RBC # BLD AUTO: 3.71 M/UL (ref 4–5.4)
SARS-COV-2 RNA RESP QL NAA+PROBE: NOT DETECTED
SODIUM SERPL-SCNC: 138 MMOL/L (ref 136–145)
WBC # BLD AUTO: 5.45 K/UL (ref 3.9–12.7)

## 2021-05-09 PROCEDURE — 25000242 PHARM REV CODE 250 ALT 637 W/ HCPCS: Performed by: PHYSICIAN ASSISTANT

## 2021-05-09 PROCEDURE — 94640 AIRWAY INHALATION TREATMENT: CPT

## 2021-05-09 PROCEDURE — 27000221 HC OXYGEN, UP TO 24 HOURS

## 2021-05-09 PROCEDURE — 25000003 PHARM REV CODE 250: Performed by: PHYSICIAN ASSISTANT

## 2021-05-09 PROCEDURE — 83735 ASSAY OF MAGNESIUM: CPT | Performed by: PHYSICIAN ASSISTANT

## 2021-05-09 PROCEDURE — 99226 PR SUBSEQUENT OBSERVATION CARE,LEVEL III: ICD-10-PCS | Mod: ,,, | Performed by: PHYSICIAN ASSISTANT

## 2021-05-09 PROCEDURE — 36415 COLL VENOUS BLD VENIPUNCTURE: CPT | Performed by: PHYSICIAN ASSISTANT

## 2021-05-09 PROCEDURE — U0005 INFEC AGEN DETEC AMPLI PROBE: HCPCS | Performed by: STUDENT IN AN ORGANIZED HEALTH CARE EDUCATION/TRAINING PROGRAM

## 2021-05-09 PROCEDURE — 25000003 PHARM REV CODE 250: Performed by: INTERNAL MEDICINE

## 2021-05-09 PROCEDURE — 80048 BASIC METABOLIC PNL TOTAL CA: CPT | Performed by: PHYSICIAN ASSISTANT

## 2021-05-09 PROCEDURE — 11000001 HC ACUTE MED/SURG PRIVATE ROOM

## 2021-05-09 PROCEDURE — 85025 COMPLETE CBC W/AUTO DIFF WBC: CPT | Performed by: PHYSICIAN ASSISTANT

## 2021-05-09 PROCEDURE — 99226 PR SUBSEQUENT OBSERVATION CARE,LEVEL III: CPT | Mod: ,,, | Performed by: PHYSICIAN ASSISTANT

## 2021-05-09 PROCEDURE — 94761 N-INVAS EAR/PLS OXIMETRY MLT: CPT

## 2021-05-09 PROCEDURE — 84100 ASSAY OF PHOSPHORUS: CPT | Performed by: PHYSICIAN ASSISTANT

## 2021-05-09 PROCEDURE — U0003 INFECTIOUS AGENT DETECTION BY NUCLEIC ACID (DNA OR RNA); SEVERE ACUTE RESPIRATORY SYNDROME CORONAVIRUS 2 (SARS-COV-2) (CORONAVIRUS DISEASE [COVID-19]), AMPLIFIED PROBE TECHNIQUE, MAKING USE OF HIGH THROUGHPUT TECHNOLOGIES AS DESCRIBED BY CMS-2020-01-R: HCPCS | Performed by: STUDENT IN AN ORGANIZED HEALTH CARE EDUCATION/TRAINING PROGRAM

## 2021-05-09 RX ORDER — CLONIDINE 0.3 MG/24H
1 PATCH, EXTENDED RELEASE TRANSDERMAL
Status: DISCONTINUED | OUTPATIENT
Start: 2021-05-09 | End: 2021-05-14 | Stop reason: HOSPADM

## 2021-05-09 RX ADMIN — SEVELAMER CARBONATE 800 MG: 800 TABLET, FILM COATED ORAL at 12:05

## 2021-05-09 RX ADMIN — CLONIDINE 1 PATCH: 0.3 PATCH TRANSDERMAL at 03:05

## 2021-05-09 RX ADMIN — SODIUM ZIRCONIUM CYCLOSILICATE 5 G: 5 POWDER, FOR SUSPENSION ORAL at 11:05

## 2021-05-09 RX ADMIN — SEVELAMER CARBONATE 800 MG: 800 TABLET, FILM COATED ORAL at 05:05

## 2021-05-09 RX ADMIN — HYDRALAZINE HYDROCHLORIDE 50 MG: 50 TABLET, FILM COATED ORAL at 02:05

## 2021-05-09 RX ADMIN — STANDARDIZED SENNA CONCENTRATE 8.6 MG: 8.6 TABLET ORAL at 11:05

## 2021-05-09 RX ADMIN — SODIUM BICARBONATE 650 MG TABLET 650 MG: at 09:05

## 2021-05-09 RX ADMIN — SODIUM BICARBONATE 650 MG TABLET 650 MG: at 08:05

## 2021-05-09 RX ADMIN — POLYETHYLENE GLYCOL 3350 17 G: 17 POWDER, FOR SOLUTION ORAL at 08:05

## 2021-05-09 RX ADMIN — SEVELAMER CARBONATE 800 MG: 800 TABLET, FILM COATED ORAL at 08:05

## 2021-05-09 RX ADMIN — PSYLLIUM HUSK 1 PACKET: 3.4 POWDER ORAL at 03:05

## 2021-05-09 RX ADMIN — MIRTAZAPINE 7.5 MG: 7.5 TABLET ORAL at 09:05

## 2021-05-09 RX ADMIN — FLUTICASONE FUROATE AND VILANTEROL TRIFENATATE 1 PUFF: 200; 25 POWDER RESPIRATORY (INHALATION) at 09:05

## 2021-05-09 RX ADMIN — HYDRALAZINE HYDROCHLORIDE 50 MG: 50 TABLET, FILM COATED ORAL at 09:05

## 2021-05-09 RX ADMIN — ATORVASTATIN CALCIUM 20 MG: 20 TABLET, FILM COATED ORAL at 09:05

## 2021-05-09 RX ADMIN — ASPIRIN 81 MG: 81 TABLET, COATED ORAL at 08:05

## 2021-05-09 RX ADMIN — AMLODIPINE BESYLATE 5 MG: 5 TABLET ORAL at 08:05

## 2021-05-09 RX ADMIN — HYDRALAZINE HYDROCHLORIDE 50 MG: 50 TABLET, FILM COATED ORAL at 06:05

## 2021-05-10 PROBLEM — L97.529 ULCER OF LEFT FOOT: Status: RESOLVED | Noted: 2020-10-10 | Resolved: 2021-05-10

## 2021-05-10 LAB
ANION GAP SERPL CALC-SCNC: 17 MMOL/L (ref 8–16)
BASOPHILS # BLD AUTO: 0.04 K/UL (ref 0–0.2)
BASOPHILS NFR BLD: 0.6 % (ref 0–1.9)
BUN SERPL-MCNC: 118 MG/DL (ref 8–23)
CALCIUM SERPL-MCNC: 8 MG/DL (ref 8.7–10.5)
CHLORIDE SERPL-SCNC: 107 MMOL/L (ref 95–110)
CO2 SERPL-SCNC: 17 MMOL/L (ref 23–29)
CREAT SERPL-MCNC: 7.2 MG/DL (ref 0.5–1.4)
DIFFERENTIAL METHOD: ABNORMAL
EOSINOPHIL # BLD AUTO: 0.2 K/UL (ref 0–0.5)
EOSINOPHIL NFR BLD: 2.6 % (ref 0–8)
ERYTHROCYTE [DISTWIDTH] IN BLOOD BY AUTOMATED COUNT: 19.6 % (ref 11.5–14.5)
EST. GFR  (AFRICAN AMERICAN): 5.7 ML/MIN/1.73 M^2
EST. GFR  (NON AFRICAN AMERICAN): 4.9 ML/MIN/1.73 M^2
GLUCOSE SERPL-MCNC: 102 MG/DL (ref 70–110)
HCT VFR BLD AUTO: 32.2 % (ref 37–48.5)
HGB BLD-MCNC: 9.7 G/DL (ref 12–16)
IMM GRANULOCYTES # BLD AUTO: 0.02 K/UL (ref 0–0.04)
IMM GRANULOCYTES NFR BLD AUTO: 0.3 % (ref 0–0.5)
LYMPHOCYTES # BLD AUTO: 0.5 K/UL (ref 1–4.8)
LYMPHOCYTES NFR BLD: 7.7 % (ref 18–48)
MAGNESIUM SERPL-MCNC: 2.3 MG/DL (ref 1.6–2.6)
MCH RBC QN AUTO: 26.9 PG (ref 27–31)
MCHC RBC AUTO-ENTMCNC: 30.1 G/DL (ref 32–36)
MCV RBC AUTO: 89 FL (ref 82–98)
MONOCYTES # BLD AUTO: 0.7 K/UL (ref 0.3–1)
MONOCYTES NFR BLD: 10.5 % (ref 4–15)
NEUTROPHILS # BLD AUTO: 4.9 K/UL (ref 1.8–7.7)
NEUTROPHILS NFR BLD: 78.3 % (ref 38–73)
NRBC BLD-RTO: 0 /100 WBC
PHOSPHATE SERPL-MCNC: 7.7 MG/DL (ref 2.7–4.5)
PLATELET # BLD AUTO: 84 K/UL (ref 150–450)
PMV BLD AUTO: ABNORMAL FL (ref 9.2–12.9)
POCT GLUCOSE: 235 MG/DL (ref 70–110)
POCT GLUCOSE: 89 MG/DL (ref 70–110)
POCT GLUCOSE: 90 MG/DL (ref 70–110)
POTASSIUM SERPL-SCNC: 5.9 MMOL/L (ref 3.5–5.1)
RBC # BLD AUTO: 3.61 M/UL (ref 4–5.4)
SODIUM SERPL-SCNC: 141 MMOL/L (ref 136–145)
WBC # BLD AUTO: 6.2 K/UL (ref 3.9–12.7)

## 2021-05-10 PROCEDURE — 93010 EKG 12-LEAD: ICD-10-PCS | Mod: ,,, | Performed by: INTERNAL MEDICINE

## 2021-05-10 PROCEDURE — 84100 ASSAY OF PHOSPHORUS: CPT | Performed by: PHYSICIAN ASSISTANT

## 2021-05-10 PROCEDURE — 80048 BASIC METABOLIC PNL TOTAL CA: CPT | Performed by: PHYSICIAN ASSISTANT

## 2021-05-10 PROCEDURE — 99233 PR SUBSEQUENT HOSPITAL CARE,LEVL III: ICD-10-PCS | Mod: ,,, | Performed by: PHYSICIAN ASSISTANT

## 2021-05-10 PROCEDURE — 99233 PR SUBSEQUENT HOSPITAL CARE,LEVL III: ICD-10-PCS | Mod: 25,GC,ICN, | Performed by: SURGERY

## 2021-05-10 PROCEDURE — 94761 N-INVAS EAR/PLS OXIMETRY MLT: CPT

## 2021-05-10 PROCEDURE — 27000221 HC OXYGEN, UP TO 24 HOURS

## 2021-05-10 PROCEDURE — 80100014 HC HEMODIALYSIS 1:1

## 2021-05-10 PROCEDURE — 25000003 PHARM REV CODE 250: Performed by: PHYSICIAN ASSISTANT

## 2021-05-10 PROCEDURE — 63600175 PHARM REV CODE 636 W HCPCS: Performed by: SURGERY

## 2021-05-10 PROCEDURE — 99152 MOD SED SAME PHYS/QHP 5/>YRS: CPT | Performed by: SURGERY

## 2021-05-10 PROCEDURE — C1750 CATH, HEMODIALYSIS,LONG-TERM: HCPCS | Performed by: SURGERY

## 2021-05-10 PROCEDURE — C1769 GUIDE WIRE: HCPCS | Performed by: SURGERY

## 2021-05-10 PROCEDURE — 11000001 HC ACUTE MED/SURG PRIVATE ROOM

## 2021-05-10 PROCEDURE — 99152 PR MOD CONSCIOUS SEDATION, SAME PHYS, 5+ YRS, FIRST 15 MIN: ICD-10-PCS | Mod: ,,, | Performed by: SURGERY

## 2021-05-10 PROCEDURE — 80100016 HC MAINTENANCE HEMODIALYSIS

## 2021-05-10 PROCEDURE — 77001 FLUOROGUIDE FOR VEIN DEVICE: CPT | Performed by: SURGERY

## 2021-05-10 PROCEDURE — 77001 FLUOROGUIDE FOR VEIN DEVICE: CPT | Mod: 26,GC,, | Performed by: SURGERY

## 2021-05-10 PROCEDURE — 85025 COMPLETE CBC W/AUTO DIFF WBC: CPT | Performed by: PHYSICIAN ASSISTANT

## 2021-05-10 PROCEDURE — 93010 ELECTROCARDIOGRAM REPORT: CPT | Mod: ,,, | Performed by: INTERNAL MEDICINE

## 2021-05-10 PROCEDURE — 99152 MOD SED SAME PHYS/QHP 5/>YRS: CPT | Mod: ,,, | Performed by: SURGERY

## 2021-05-10 PROCEDURE — 93005 ELECTROCARDIOGRAM TRACING: CPT

## 2021-05-10 PROCEDURE — 99233 SBSQ HOSP IP/OBS HIGH 50: CPT | Mod: 25,GC,ICN, | Performed by: SURGERY

## 2021-05-10 PROCEDURE — 36415 COLL VENOUS BLD VENIPUNCTURE: CPT | Performed by: PHYSICIAN ASSISTANT

## 2021-05-10 PROCEDURE — 77001 CHG FLUOROGUIDE CNTRL VEN ACCESS,PLACE,REPLACE,REMOVE: ICD-10-PCS | Mod: 26,GC,, | Performed by: SURGERY

## 2021-05-10 PROCEDURE — 36558 PR INSERT TUNNELED CV CATH W/O PORT OR PUMP: ICD-10-PCS | Mod: LT,GC,, | Performed by: SURGERY

## 2021-05-10 PROCEDURE — 99153 MOD SED SAME PHYS/QHP EA: CPT | Performed by: SURGERY

## 2021-05-10 PROCEDURE — C1894 INTRO/SHEATH, NON-LASER: HCPCS | Performed by: SURGERY

## 2021-05-10 PROCEDURE — 36558 INSERT TUNNELED CV CATH: CPT | Performed by: SURGERY

## 2021-05-10 PROCEDURE — 36558 INSERT TUNNELED CV CATH: CPT | Mod: LT,GC,, | Performed by: SURGERY

## 2021-05-10 PROCEDURE — 94640 AIRWAY INHALATION TREATMENT: CPT

## 2021-05-10 PROCEDURE — 83735 ASSAY OF MAGNESIUM: CPT | Performed by: PHYSICIAN ASSISTANT

## 2021-05-10 PROCEDURE — 25000003 PHARM REV CODE 250: Performed by: SURGERY

## 2021-05-10 PROCEDURE — 99233 SBSQ HOSP IP/OBS HIGH 50: CPT | Mod: ,,, | Performed by: PHYSICIAN ASSISTANT

## 2021-05-10 DEVICE — GLIDEPATH HEMODIALYSIS CATH, ST, DL, 14.5 FR. 31CM
Type: IMPLANTABLE DEVICE | Site: LEG | Status: FUNCTIONAL
Brand: GLIDEPATH LONG-TERM HEMODIALYSIS CATHETER WITH PRELOADED STYLET

## 2021-05-10 RX ORDER — SODIUM CHLORIDE 9 MG/ML
INJECTION, SOLUTION INTRAVENOUS ONCE
Status: DISCONTINUED | OUTPATIENT
Start: 2021-05-10 | End: 2021-05-14 | Stop reason: HOSPADM

## 2021-05-10 RX ORDER — FENTANYL CITRATE 50 UG/ML
INJECTION, SOLUTION INTRAMUSCULAR; INTRAVENOUS
Status: DISCONTINUED | OUTPATIENT
Start: 2021-05-10 | End: 2021-05-10 | Stop reason: HOSPADM

## 2021-05-10 RX ORDER — HEPARIN SODIUM 1000 [USP'U]/ML
1000 INJECTION, SOLUTION INTRAVENOUS; SUBCUTANEOUS
Status: DISCONTINUED | OUTPATIENT
Start: 2021-05-10 | End: 2021-05-14 | Stop reason: HOSPADM

## 2021-05-10 RX ORDER — HEPARIN SOD,PORCINE/0.9 % NACL 1000/500ML
INTRAVENOUS SOLUTION INTRAVENOUS
Status: DISCONTINUED | OUTPATIENT
Start: 2021-05-10 | End: 2021-05-10 | Stop reason: HOSPADM

## 2021-05-10 RX ORDER — MUPIROCIN 20 MG/G
OINTMENT TOPICAL 2 TIMES DAILY
Status: DISCONTINUED | OUTPATIENT
Start: 2021-05-10 | End: 2021-05-14 | Stop reason: HOSPADM

## 2021-05-10 RX ORDER — AMLODIPINE BESYLATE 10 MG/1
10 TABLET ORAL DAILY
Status: DISCONTINUED | OUTPATIENT
Start: 2021-05-10 | End: 2021-05-14 | Stop reason: HOSPADM

## 2021-05-10 RX ORDER — MIDAZOLAM HYDROCHLORIDE 1 MG/ML
INJECTION, SOLUTION INTRAMUSCULAR; INTRAVENOUS
Status: DISCONTINUED | OUTPATIENT
Start: 2021-05-10 | End: 2021-05-10 | Stop reason: HOSPADM

## 2021-05-10 RX ORDER — LIDOCAINE HYDROCHLORIDE 20 MG/ML
INJECTION, SOLUTION EPIDURAL; INFILTRATION; INTRACAUDAL; PERINEURAL
Status: DISCONTINUED | OUTPATIENT
Start: 2021-05-10 | End: 2021-05-10 | Stop reason: HOSPADM

## 2021-05-10 RX ADMIN — SODIUM ZIRCONIUM CYCLOSILICATE 10 G: 10 POWDER, FOR SUSPENSION ORAL at 08:05

## 2021-05-10 RX ADMIN — FLUTICASONE FUROATE AND VILANTEROL TRIFENATATE 1 PUFF: 200; 25 POWDER RESPIRATORY (INHALATION) at 08:05

## 2021-05-10 RX ADMIN — AMLODIPINE BESYLATE 10 MG: 10 TABLET ORAL at 08:05

## 2021-05-10 RX ADMIN — HYDRALAZINE HYDROCHLORIDE 50 MG: 50 TABLET, FILM COATED ORAL at 01:05

## 2021-05-10 RX ADMIN — SODIUM BICARBONATE 650 MG TABLET 650 MG: at 08:05

## 2021-05-10 RX ADMIN — SEVELAMER CARBONATE 800 MG: 800 TABLET, FILM COATED ORAL at 05:05

## 2021-05-10 RX ADMIN — HYDRALAZINE HYDROCHLORIDE 50 MG: 50 TABLET, FILM COATED ORAL at 06:05

## 2021-05-11 LAB
ALBUMIN SERPL BCP-MCNC: 2.8 G/DL (ref 3.5–5.2)
ALP SERPL-CCNC: 148 U/L (ref 55–135)
ALT SERPL W/O P-5'-P-CCNC: 5 U/L (ref 10–44)
ANION GAP SERPL CALC-SCNC: 15 MMOL/L (ref 8–16)
ANION GAP SERPL CALC-SCNC: 17 MMOL/L (ref 8–16)
AST SERPL-CCNC: 17 U/L (ref 10–40)
BASOPHILS # BLD AUTO: 0.04 K/UL (ref 0–0.2)
BASOPHILS # BLD AUTO: 0.04 K/UL (ref 0–0.2)
BASOPHILS NFR BLD: 0.6 % (ref 0–1.9)
BASOPHILS NFR BLD: 0.6 % (ref 0–1.9)
BILIRUB SERPL-MCNC: 0.6 MG/DL (ref 0.1–1)
BUN SERPL-MCNC: 58 MG/DL (ref 8–23)
BUN SERPL-MCNC: 59 MG/DL (ref 8–23)
CALCIUM SERPL-MCNC: 8.1 MG/DL (ref 8.7–10.5)
CALCIUM SERPL-MCNC: 8.4 MG/DL (ref 8.7–10.5)
CHLORIDE SERPL-SCNC: 102 MMOL/L (ref 95–110)
CHLORIDE SERPL-SCNC: 103 MMOL/L (ref 95–110)
CO2 SERPL-SCNC: 19 MMOL/L (ref 23–29)
CO2 SERPL-SCNC: 20 MMOL/L (ref 23–29)
CREAT SERPL-MCNC: 4.3 MG/DL (ref 0.5–1.4)
CREAT SERPL-MCNC: 4.4 MG/DL (ref 0.5–1.4)
DIFFERENTIAL METHOD: ABNORMAL
DIFFERENTIAL METHOD: ABNORMAL
EOSINOPHIL # BLD AUTO: 0 K/UL (ref 0–0.5)
EOSINOPHIL # BLD AUTO: 0.1 K/UL (ref 0–0.5)
EOSINOPHIL NFR BLD: 0.6 % (ref 0–8)
EOSINOPHIL NFR BLD: 0.9 % (ref 0–8)
ERYTHROCYTE [DISTWIDTH] IN BLOOD BY AUTOMATED COUNT: 19.8 % (ref 11.5–14.5)
ERYTHROCYTE [DISTWIDTH] IN BLOOD BY AUTOMATED COUNT: 19.9 % (ref 11.5–14.5)
EST. GFR  (AFRICAN AMERICAN): 10.3 ML/MIN/1.73 M^2
EST. GFR  (AFRICAN AMERICAN): 10.6 ML/MIN/1.73 M^2
EST. GFR  (NON AFRICAN AMERICAN): 9 ML/MIN/1.73 M^2
EST. GFR  (NON AFRICAN AMERICAN): 9.2 ML/MIN/1.73 M^2
GLUCOSE SERPL-MCNC: 93 MG/DL (ref 70–110)
GLUCOSE SERPL-MCNC: 93 MG/DL (ref 70–110)
HCT VFR BLD AUTO: 27.6 % (ref 37–48.5)
HCT VFR BLD AUTO: 28.4 % (ref 37–48.5)
HGB BLD-MCNC: 8.6 G/DL (ref 12–16)
HGB BLD-MCNC: 8.7 G/DL (ref 12–16)
IMM GRANULOCYTES # BLD AUTO: 0.02 K/UL (ref 0–0.04)
IMM GRANULOCYTES # BLD AUTO: 0.04 K/UL (ref 0–0.04)
IMM GRANULOCYTES NFR BLD AUTO: 0.3 % (ref 0–0.5)
IMM GRANULOCYTES NFR BLD AUTO: 0.6 % (ref 0–0.5)
LACTATE SERPL-SCNC: 0.9 MMOL/L (ref 0.5–2.2)
LYMPHOCYTES # BLD AUTO: 0.2 K/UL (ref 1–4.8)
LYMPHOCYTES # BLD AUTO: 0.2 K/UL (ref 1–4.8)
LYMPHOCYTES NFR BLD: 3.3 % (ref 18–48)
LYMPHOCYTES NFR BLD: 3.6 % (ref 18–48)
MAGNESIUM SERPL-MCNC: 2 MG/DL (ref 1.6–2.6)
MAGNESIUM SERPL-MCNC: 2 MG/DL (ref 1.6–2.6)
MCH RBC QN AUTO: 27 PG (ref 27–31)
MCH RBC QN AUTO: 27.3 PG (ref 27–31)
MCHC RBC AUTO-ENTMCNC: 30.6 G/DL (ref 32–36)
MCHC RBC AUTO-ENTMCNC: 31.2 G/DL (ref 32–36)
MCV RBC AUTO: 87 FL (ref 82–98)
MCV RBC AUTO: 89 FL (ref 82–98)
MONOCYTES # BLD AUTO: 0.6 K/UL (ref 0.3–1)
MONOCYTES # BLD AUTO: 0.6 K/UL (ref 0.3–1)
MONOCYTES NFR BLD: 8.5 % (ref 4–15)
MONOCYTES NFR BLD: 8.7 % (ref 4–15)
NEUTROPHILS # BLD AUTO: 5.5 K/UL (ref 1.8–7.7)
NEUTROPHILS # BLD AUTO: 5.7 K/UL (ref 1.8–7.7)
NEUTROPHILS NFR BLD: 85.6 % (ref 38–73)
NEUTROPHILS NFR BLD: 86.7 % (ref 38–73)
NRBC BLD-RTO: 0 /100 WBC
NRBC BLD-RTO: 0 /100 WBC
PHOSPHATE SERPL-MCNC: 4.7 MG/DL (ref 2.7–4.5)
PLATELET # BLD AUTO: 82 K/UL (ref 150–450)
PLATELET # BLD AUTO: 85 K/UL (ref 150–450)
PMV BLD AUTO: ABNORMAL FL (ref 9.2–12.9)
PMV BLD AUTO: ABNORMAL FL (ref 9.2–12.9)
POCT GLUCOSE: 101 MG/DL (ref 70–110)
POCT GLUCOSE: 147 MG/DL (ref 70–110)
POCT GLUCOSE: 147 MG/DL (ref 70–110)
POCT GLUCOSE: 148 MG/DL (ref 70–110)
POTASSIUM SERPL-SCNC: 3.9 MMOL/L (ref 3.5–5.1)
POTASSIUM SERPL-SCNC: 4 MMOL/L (ref 3.5–5.1)
PROCALCITONIN SERPL IA-MCNC: 0.69 NG/ML
PROT SERPL-MCNC: 6.9 G/DL (ref 6–8.4)
RBC # BLD AUTO: 3.18 M/UL (ref 4–5.4)
RBC # BLD AUTO: 3.19 M/UL (ref 4–5.4)
SODIUM SERPL-SCNC: 137 MMOL/L (ref 136–145)
SODIUM SERPL-SCNC: 139 MMOL/L (ref 136–145)
TROPONIN I SERPL DL<=0.01 NG/ML-MCNC: 0.02 NG/ML (ref 0–0.03)
TROPONIN I SERPL DL<=0.01 NG/ML-MCNC: 0.02 NG/ML (ref 0–0.03)
WBC # BLD AUTO: 6.46 K/UL (ref 3.9–12.7)
WBC # BLD AUTO: 6.61 K/UL (ref 3.9–12.7)

## 2021-05-11 PROCEDURE — 94761 N-INVAS EAR/PLS OXIMETRY MLT: CPT

## 2021-05-11 PROCEDURE — 99233 PR SUBSEQUENT HOSPITAL CARE,LEVL III: ICD-10-PCS | Mod: ,,, | Performed by: PHYSICIAN ASSISTANT

## 2021-05-11 PROCEDURE — 25000242 PHARM REV CODE 250 ALT 637 W/ HCPCS: Performed by: PHYSICIAN ASSISTANT

## 2021-05-11 PROCEDURE — 93010 ELECTROCARDIOGRAM REPORT: CPT | Mod: ,,, | Performed by: INTERNAL MEDICINE

## 2021-05-11 PROCEDURE — 83735 ASSAY OF MAGNESIUM: CPT | Mod: 91 | Performed by: PHYSICIAN ASSISTANT

## 2021-05-11 PROCEDURE — 84484 ASSAY OF TROPONIN QUANT: CPT | Performed by: PHYSICIAN ASSISTANT

## 2021-05-11 PROCEDURE — 11000001 HC ACUTE MED/SURG PRIVATE ROOM

## 2021-05-11 PROCEDURE — 63600175 PHARM REV CODE 636 W HCPCS: Performed by: PHYSICIAN ASSISTANT

## 2021-05-11 PROCEDURE — 25000003 PHARM REV CODE 250: Performed by: PHYSICIAN ASSISTANT

## 2021-05-11 PROCEDURE — 94640 AIRWAY INHALATION TREATMENT: CPT

## 2021-05-11 PROCEDURE — 25000003 PHARM REV CODE 250: Performed by: STUDENT IN AN ORGANIZED HEALTH CARE EDUCATION/TRAINING PROGRAM

## 2021-05-11 PROCEDURE — 83605 ASSAY OF LACTIC ACID: CPT | Performed by: PHYSICIAN ASSISTANT

## 2021-05-11 PROCEDURE — 36415 COLL VENOUS BLD VENIPUNCTURE: CPT | Performed by: PHYSICIAN ASSISTANT

## 2021-05-11 PROCEDURE — 85025 COMPLETE CBC W/AUTO DIFF WBC: CPT | Performed by: PHYSICIAN ASSISTANT

## 2021-05-11 PROCEDURE — 84100 ASSAY OF PHOSPHORUS: CPT | Performed by: PHYSICIAN ASSISTANT

## 2021-05-11 PROCEDURE — 93010 EKG 12-LEAD: ICD-10-PCS | Mod: ,,, | Performed by: INTERNAL MEDICINE

## 2021-05-11 PROCEDURE — 80048 BASIC METABOLIC PNL TOTAL CA: CPT | Performed by: PHYSICIAN ASSISTANT

## 2021-05-11 PROCEDURE — 99233 SBSQ HOSP IP/OBS HIGH 50: CPT | Mod: ,,, | Performed by: PHYSICIAN ASSISTANT

## 2021-05-11 PROCEDURE — 27000221 HC OXYGEN, UP TO 24 HOURS

## 2021-05-11 PROCEDURE — 99900035 HC TECH TIME PER 15 MIN (STAT)

## 2021-05-11 PROCEDURE — 99233 SBSQ HOSP IP/OBS HIGH 50: CPT | Mod: GC,ICN,, | Performed by: SURGERY

## 2021-05-11 PROCEDURE — 83735 ASSAY OF MAGNESIUM: CPT | Performed by: PHYSICIAN ASSISTANT

## 2021-05-11 PROCEDURE — 93005 ELECTROCARDIOGRAM TRACING: CPT

## 2021-05-11 PROCEDURE — 80053 COMPREHEN METABOLIC PANEL: CPT | Performed by: PHYSICIAN ASSISTANT

## 2021-05-11 PROCEDURE — 99233 PR SUBSEQUENT HOSPITAL CARE,LEVL III: ICD-10-PCS | Mod: GC,ICN,, | Performed by: SURGERY

## 2021-05-11 PROCEDURE — 85025 COMPLETE CBC W/AUTO DIFF WBC: CPT | Mod: 91 | Performed by: PHYSICIAN ASSISTANT

## 2021-05-11 PROCEDURE — 84145 PROCALCITONIN (PCT): CPT | Performed by: PHYSICIAN ASSISTANT

## 2021-05-11 RX ORDER — FUROSEMIDE 10 MG/ML
120 INJECTION INTRAMUSCULAR; INTRAVENOUS ONCE
Status: COMPLETED | OUTPATIENT
Start: 2021-05-11 | End: 2021-05-11

## 2021-05-11 RX ORDER — SENNOSIDES 8.6 MG/1
1 TABLET ORAL DAILY
Status: ON HOLD
Start: 2021-05-11 | End: 2021-07-26 | Stop reason: HOSPADM

## 2021-05-11 RX ORDER — GABAPENTIN 300 MG/1
300 CAPSULE ORAL ONCE
Status: DISCONTINUED | OUTPATIENT
Start: 2021-05-11 | End: 2021-05-14 | Stop reason: HOSPADM

## 2021-05-11 RX ORDER — AMLODIPINE BESYLATE 10 MG/1
10 TABLET ORAL DAILY
Qty: 30 TABLET | Refills: 11 | Status: ON HOLD | OUTPATIENT
Start: 2021-05-12 | End: 2021-06-23 | Stop reason: HOSPADM

## 2021-05-11 RX ORDER — LIDOCAINE 50 MG/G
2 PATCH TOPICAL DAILY PRN
Qty: 30 PATCH | Refills: 0
Start: 2021-05-11

## 2021-05-11 RX ORDER — LIDOCAINE 50 MG/G
2 PATCH TOPICAL
Status: DISCONTINUED | OUTPATIENT
Start: 2021-05-11 | End: 2021-05-14 | Stop reason: HOSPADM

## 2021-05-11 RX ADMIN — MIRTAZAPINE 7.5 MG: 7.5 TABLET ORAL at 09:05

## 2021-05-11 RX ADMIN — IPRATROPIUM BROMIDE AND ALBUTEROL SULFATE 3 ML: .5; 2.5 SOLUTION RESPIRATORY (INHALATION) at 03:05

## 2021-05-11 RX ADMIN — SODIUM BICARBONATE 650 MG TABLET 650 MG: at 09:05

## 2021-05-11 RX ADMIN — IPRATROPIUM BROMIDE AND ALBUTEROL SULFATE 3 ML: .5; 2.5 SOLUTION RESPIRATORY (INHALATION) at 10:05

## 2021-05-11 RX ADMIN — AMLODIPINE BESYLATE 10 MG: 10 TABLET ORAL at 09:05

## 2021-05-11 RX ADMIN — MUPIROCIN: 20 OINTMENT TOPICAL at 09:05

## 2021-05-11 RX ADMIN — LIDOCAINE 1 PATCH: 50 PATCH TOPICAL at 01:05

## 2021-05-11 RX ADMIN — FUROSEMIDE 120 MG: 10 INJECTION, SOLUTION INTRAMUSCULAR; INTRAVENOUS at 02:05

## 2021-05-11 RX ADMIN — SEVELAMER CARBONATE 800 MG: 800 TABLET, FILM COATED ORAL at 12:05

## 2021-05-11 RX ADMIN — POLYETHYLENE GLYCOL 3350 17 G: 17 POWDER, FOR SOLUTION ORAL at 09:05

## 2021-05-11 RX ADMIN — PSYLLIUM HUSK 1 PACKET: 3.4 POWDER ORAL at 09:05

## 2021-05-11 RX ADMIN — STANDARDIZED SENNA CONCENTRATE 8.6 MG: 8.6 TABLET ORAL at 09:05

## 2021-05-11 RX ADMIN — HYDROXYZINE HYDROCHLORIDE 25 MG: 25 TABLET, FILM COATED ORAL at 01:05

## 2021-05-11 RX ADMIN — HYDRALAZINE HYDROCHLORIDE 50 MG: 50 TABLET, FILM COATED ORAL at 02:05

## 2021-05-11 RX ADMIN — ACETAMINOPHEN 650 MG: 325 TABLET ORAL at 02:05

## 2021-05-11 RX ADMIN — ASPIRIN 81 MG: 81 TABLET, COATED ORAL at 09:05

## 2021-05-11 RX ADMIN — ATORVASTATIN CALCIUM 20 MG: 20 TABLET, FILM COATED ORAL at 09:05

## 2021-05-11 RX ADMIN — HYDRALAZINE HYDROCHLORIDE 50 MG: 50 TABLET, FILM COATED ORAL at 09:05

## 2021-05-11 RX ADMIN — ONDANSETRON 8 MG: 8 TABLET, ORALLY DISINTEGRATING ORAL at 12:05

## 2021-05-11 RX ADMIN — FLUTICASONE FUROATE AND VILANTEROL TRIFENATATE 1 PUFF: 200; 25 POWDER RESPIRATORY (INHALATION) at 10:05

## 2021-05-11 RX ADMIN — HYDRALAZINE HYDROCHLORIDE 50 MG: 50 TABLET, FILM COATED ORAL at 05:05

## 2021-05-12 LAB
ANION GAP SERPL CALC-SCNC: 12 MMOL/L (ref 8–16)
BASOPHILS # BLD AUTO: 0.06 K/UL (ref 0–0.2)
BASOPHILS NFR BLD: 1.2 % (ref 0–1.9)
BUN SERPL-MCNC: 67 MG/DL (ref 8–23)
CALCIUM SERPL-MCNC: 8.4 MG/DL (ref 8.7–10.5)
CHLORIDE SERPL-SCNC: 101 MMOL/L (ref 95–110)
CO2 SERPL-SCNC: 25 MMOL/L (ref 23–29)
CREAT SERPL-MCNC: 4.8 MG/DL (ref 0.5–1.4)
DIFFERENTIAL METHOD: ABNORMAL
EOSINOPHIL # BLD AUTO: 0.1 K/UL (ref 0–0.5)
EOSINOPHIL NFR BLD: 1.7 % (ref 0–8)
ERYTHROCYTE [DISTWIDTH] IN BLOOD BY AUTOMATED COUNT: 19.9 % (ref 11.5–14.5)
EST. GFR  (AFRICAN AMERICAN): 9.3 ML/MIN/1.73 M^2
EST. GFR  (NON AFRICAN AMERICAN): 8.1 ML/MIN/1.73 M^2
GLUCOSE SERPL-MCNC: 129 MG/DL (ref 70–110)
HCT VFR BLD AUTO: 27.4 % (ref 37–48.5)
HGB BLD-MCNC: 8.2 G/DL (ref 12–16)
IMM GRANULOCYTES # BLD AUTO: 0.01 K/UL (ref 0–0.04)
IMM GRANULOCYTES NFR BLD AUTO: 0.2 % (ref 0–0.5)
LYMPHOCYTES # BLD AUTO: 0.6 K/UL (ref 1–4.8)
LYMPHOCYTES NFR BLD: 11.1 % (ref 18–48)
MAGNESIUM SERPL-MCNC: 2 MG/DL (ref 1.6–2.6)
MCH RBC QN AUTO: 26.7 PG (ref 27–31)
MCHC RBC AUTO-ENTMCNC: 29.9 G/DL (ref 32–36)
MCV RBC AUTO: 89 FL (ref 82–98)
MONOCYTES # BLD AUTO: 0.7 K/UL (ref 0.3–1)
MONOCYTES NFR BLD: 13.4 % (ref 4–15)
NEUTROPHILS # BLD AUTO: 3.8 K/UL (ref 1.8–7.7)
NEUTROPHILS NFR BLD: 72.4 % (ref 38–73)
NRBC BLD-RTO: 0 /100 WBC
PHOSPHATE SERPL-MCNC: 6.4 MG/DL (ref 2.7–4.5)
PLATELET # BLD AUTO: 76 K/UL (ref 150–450)
PLATELET BLD QL SMEAR: ABNORMAL
PMV BLD AUTO: ABNORMAL FL (ref 9.2–12.9)
POCT GLUCOSE: 108 MG/DL (ref 70–110)
POCT GLUCOSE: 110 MG/DL (ref 70–110)
POCT GLUCOSE: 116 MG/DL (ref 70–110)
POCT GLUCOSE: 146 MG/DL (ref 70–110)
POTASSIUM SERPL-SCNC: 4.4 MMOL/L (ref 3.5–5.1)
RBC # BLD AUTO: 3.07 M/UL (ref 4–5.4)
SODIUM SERPL-SCNC: 138 MMOL/L (ref 136–145)
WBC # BLD AUTO: 5.21 K/UL (ref 3.9–12.7)

## 2021-05-12 PROCEDURE — 25000003 PHARM REV CODE 250: Performed by: PHYSICIAN ASSISTANT

## 2021-05-12 PROCEDURE — 99232 PR SUBSEQUENT HOSPITAL CARE,LEVL II: ICD-10-PCS | Mod: GC,,, | Performed by: INTERNAL MEDICINE

## 2021-05-12 PROCEDURE — 84100 ASSAY OF PHOSPHORUS: CPT | Performed by: INTERNAL MEDICINE

## 2021-05-12 PROCEDURE — 80100016 HC MAINTENANCE HEMODIALYSIS

## 2021-05-12 PROCEDURE — G0257 UNSCHED DIALYSIS ESRD PT HOS: HCPCS

## 2021-05-12 PROCEDURE — 99233 PR SUBSEQUENT HOSPITAL CARE,LEVL III: ICD-10-PCS | Mod: ,,, | Performed by: INTERNAL MEDICINE

## 2021-05-12 PROCEDURE — 11000001 HC ACUTE MED/SURG PRIVATE ROOM

## 2021-05-12 PROCEDURE — 25000003 PHARM REV CODE 250: Performed by: STUDENT IN AN ORGANIZED HEALTH CARE EDUCATION/TRAINING PROGRAM

## 2021-05-12 PROCEDURE — 99233 SBSQ HOSP IP/OBS HIGH 50: CPT | Mod: ,,, | Performed by: INTERNAL MEDICINE

## 2021-05-12 PROCEDURE — 80048 BASIC METABOLIC PNL TOTAL CA: CPT | Performed by: INTERNAL MEDICINE

## 2021-05-12 PROCEDURE — 63600175 PHARM REV CODE 636 W HCPCS: Performed by: STUDENT IN AN ORGANIZED HEALTH CARE EDUCATION/TRAINING PROGRAM

## 2021-05-12 PROCEDURE — 85025 COMPLETE CBC W/AUTO DIFF WBC: CPT | Performed by: INTERNAL MEDICINE

## 2021-05-12 PROCEDURE — 83735 ASSAY OF MAGNESIUM: CPT | Performed by: INTERNAL MEDICINE

## 2021-05-12 PROCEDURE — 99232 SBSQ HOSP IP/OBS MODERATE 35: CPT | Mod: GC,,, | Performed by: INTERNAL MEDICINE

## 2021-05-12 RX ORDER — CINACALCET 60 MG/1
60 TABLET, FILM COATED ORAL 2 TIMES DAILY WITH MEALS
Qty: 60 TABLET | Refills: 11 | Status: ON HOLD
Start: 2021-05-12 | End: 2021-06-23 | Stop reason: HOSPADM

## 2021-05-12 RX ORDER — CLONIDINE 0.3 MG/24H
1 PATCH, EXTENDED RELEASE TRANSDERMAL
Qty: 12 PATCH | Refills: 9 | Status: ON HOLD
Start: 2021-05-12 | End: 2021-06-15 | Stop reason: HOSPADM

## 2021-05-12 RX ORDER — HEPARIN SODIUM 1000 [USP'U]/ML
1000 INJECTION, SOLUTION INTRAVENOUS; SUBCUTANEOUS
Status: DISCONTINUED | OUTPATIENT
Start: 2021-05-12 | End: 2021-05-14 | Stop reason: HOSPADM

## 2021-05-12 RX ORDER — SODIUM CHLORIDE 9 MG/ML
INJECTION, SOLUTION INTRAVENOUS ONCE
Status: COMPLETED | OUTPATIENT
Start: 2021-05-12 | End: 2021-05-12

## 2021-05-12 RX ORDER — SODIUM CHLORIDE 9 MG/ML
INJECTION, SOLUTION INTRAVENOUS
Status: DISCONTINUED | OUTPATIENT
Start: 2021-05-12 | End: 2021-05-14 | Stop reason: HOSPADM

## 2021-05-12 RX ADMIN — HYDRALAZINE HYDROCHLORIDE 50 MG: 50 TABLET, FILM COATED ORAL at 01:05

## 2021-05-12 RX ADMIN — MIRTAZAPINE 7.5 MG: 7.5 TABLET ORAL at 09:05

## 2021-05-12 RX ADMIN — ASPIRIN 81 MG: 81 TABLET, COATED ORAL at 01:05

## 2021-05-12 RX ADMIN — HYDRALAZINE HYDROCHLORIDE 50 MG: 50 TABLET, FILM COATED ORAL at 09:05

## 2021-05-12 RX ADMIN — IRON SUCROSE 100 MG: 20 INJECTION, SOLUTION INTRAVENOUS at 01:05

## 2021-05-12 RX ADMIN — HYDRALAZINE HYDROCHLORIDE 50 MG: 50 TABLET, FILM COATED ORAL at 06:05

## 2021-05-12 RX ADMIN — SODIUM BICARBONATE 650 MG TABLET 650 MG: at 01:05

## 2021-05-12 RX ADMIN — FLUTICASONE FUROATE AND VILANTEROL TRIFENATATE 1 PUFF: 200; 25 POWDER RESPIRATORY (INHALATION) at 01:05

## 2021-05-12 RX ADMIN — HEPARIN SODIUM 1000 UNITS: 1000 INJECTION, SOLUTION INTRAVENOUS; SUBCUTANEOUS at 11:05

## 2021-05-12 RX ADMIN — SODIUM BICARBONATE 650 MG TABLET 650 MG: at 09:05

## 2021-05-12 RX ADMIN — ATORVASTATIN CALCIUM 20 MG: 20 TABLET, FILM COATED ORAL at 09:05

## 2021-05-12 RX ADMIN — EPOETIN ALFA-EPBX 2520 UNITS: 3000 INJECTION, SOLUTION INTRAVENOUS; SUBCUTANEOUS at 11:05

## 2021-05-12 RX ADMIN — TRAMADOL HYDROCHLORIDE 50 MG: 50 TABLET ORAL at 10:05

## 2021-05-12 RX ADMIN — AMLODIPINE BESYLATE 10 MG: 10 TABLET ORAL at 01:05

## 2021-05-12 RX ADMIN — SODIUM CHLORIDE 100 ML/HR: 0.9 INJECTION, SOLUTION INTRAVENOUS at 08:05

## 2021-05-13 PROBLEM — I48.0 PAROXYSMAL A-FIB: Status: ACTIVE | Noted: 2021-05-13

## 2021-05-13 PROBLEM — I47.29 NONSUSTAINED VENTRICULAR TACHYCARDIA: Status: ACTIVE | Noted: 2021-05-13

## 2021-05-13 LAB
POCT GLUCOSE: 100 MG/DL (ref 70–110)
POCT GLUCOSE: 102 MG/DL (ref 70–110)
POCT GLUCOSE: 166 MG/DL (ref 70–110)
POCT GLUCOSE: 170 MG/DL (ref 70–110)
SARS-COV-2 RNA RESP QL NAA+PROBE: NOT DETECTED

## 2021-05-13 PROCEDURE — 25000003 PHARM REV CODE 250: Performed by: PHYSICIAN ASSISTANT

## 2021-05-13 PROCEDURE — U0005 INFEC AGEN DETEC AMPLI PROBE: HCPCS | Performed by: INTERNAL MEDICINE

## 2021-05-13 PROCEDURE — 11000001 HC ACUTE MED/SURG PRIVATE ROOM

## 2021-05-13 PROCEDURE — U0003 INFECTIOUS AGENT DETECTION BY NUCLEIC ACID (DNA OR RNA); SEVERE ACUTE RESPIRATORY SYNDROME CORONAVIRUS 2 (SARS-COV-2) (CORONAVIRUS DISEASE [COVID-19]), AMPLIFIED PROBE TECHNIQUE, MAKING USE OF HIGH THROUGHPUT TECHNOLOGIES AS DESCRIBED BY CMS-2020-01-R: HCPCS | Performed by: INTERNAL MEDICINE

## 2021-05-13 PROCEDURE — 94640 AIRWAY INHALATION TREATMENT: CPT

## 2021-05-13 PROCEDURE — 99233 SBSQ HOSP IP/OBS HIGH 50: CPT | Mod: ,,, | Performed by: INTERNAL MEDICINE

## 2021-05-13 PROCEDURE — 93005 ELECTROCARDIOGRAM TRACING: CPT

## 2021-05-13 PROCEDURE — 99233 PR SUBSEQUENT HOSPITAL CARE,LEVL III: ICD-10-PCS | Mod: ,,, | Performed by: INTERNAL MEDICINE

## 2021-05-13 PROCEDURE — 99900035 HC TECH TIME PER 15 MIN (STAT)

## 2021-05-13 PROCEDURE — 93010 ELECTROCARDIOGRAM REPORT: CPT | Mod: ,,, | Performed by: INTERNAL MEDICINE

## 2021-05-13 PROCEDURE — 94761 N-INVAS EAR/PLS OXIMETRY MLT: CPT

## 2021-05-13 PROCEDURE — 93010 EKG 12-LEAD: ICD-10-PCS | Mod: ,,, | Performed by: INTERNAL MEDICINE

## 2021-05-13 PROCEDURE — 27000221 HC OXYGEN, UP TO 24 HOURS

## 2021-05-13 RX ORDER — SODIUM CHLORIDE 9 MG/ML
INJECTION, SOLUTION INTRAVENOUS ONCE
Status: COMPLETED | OUTPATIENT
Start: 2021-05-14 | End: 2021-05-14

## 2021-05-13 RX ORDER — HEPARIN SODIUM 1000 [USP'U]/ML
1000 INJECTION, SOLUTION INTRAVENOUS; SUBCUTANEOUS
Status: DISCONTINUED | OUTPATIENT
Start: 2021-05-14 | End: 2021-05-14 | Stop reason: HOSPADM

## 2021-05-13 RX ADMIN — HYDRALAZINE HYDROCHLORIDE 50 MG: 50 TABLET, FILM COATED ORAL at 08:05

## 2021-05-13 RX ADMIN — MIRTAZAPINE 7.5 MG: 7.5 TABLET ORAL at 08:05

## 2021-05-13 RX ADMIN — SODIUM BICARBONATE 650 MG TABLET 650 MG: at 09:05

## 2021-05-13 RX ADMIN — FLUTICASONE FUROATE AND VILANTEROL TRIFENATATE 1 PUFF: 200; 25 POWDER RESPIRATORY (INHALATION) at 08:05

## 2021-05-13 RX ADMIN — ASPIRIN 81 MG: 81 TABLET, COATED ORAL at 09:05

## 2021-05-13 RX ADMIN — ATORVASTATIN CALCIUM 20 MG: 20 TABLET, FILM COATED ORAL at 08:05

## 2021-05-13 RX ADMIN — HYDRALAZINE HYDROCHLORIDE 50 MG: 50 TABLET, FILM COATED ORAL at 03:05

## 2021-05-13 RX ADMIN — SODIUM BICARBONATE 650 MG TABLET 650 MG: at 08:05

## 2021-05-13 RX ADMIN — HYDRALAZINE HYDROCHLORIDE 50 MG: 50 TABLET, FILM COATED ORAL at 06:05

## 2021-05-13 RX ADMIN — AMLODIPINE BESYLATE 10 MG: 10 TABLET ORAL at 09:05

## 2021-05-14 VITALS
WEIGHT: 111.31 LBS | BODY MASS INDEX: 19.72 KG/M2 | OXYGEN SATURATION: 98 % | RESPIRATION RATE: 16 BRPM | TEMPERATURE: 98 F | DIASTOLIC BLOOD PRESSURE: 70 MMHG | HEART RATE: 71 BPM | SYSTOLIC BLOOD PRESSURE: 155 MMHG | HEIGHT: 63 IN

## 2021-05-14 LAB
POCT GLUCOSE: 105 MG/DL (ref 70–110)
POCT GLUCOSE: 153 MG/DL (ref 70–110)
POCT GLUCOSE: 90 MG/DL (ref 70–110)
POCT GLUCOSE: 91 MG/DL (ref 70–110)

## 2021-05-14 PROCEDURE — 99233 SBSQ HOSP IP/OBS HIGH 50: CPT | Mod: GC,,, | Performed by: INTERNAL MEDICINE

## 2021-05-14 PROCEDURE — 99233 PR SUBSEQUENT HOSPITAL CARE,LEVL III: ICD-10-PCS | Mod: GC,,, | Performed by: INTERNAL MEDICINE

## 2021-05-14 PROCEDURE — 80100016 HC MAINTENANCE HEMODIALYSIS

## 2021-05-14 PROCEDURE — 25000003 PHARM REV CODE 250: Performed by: PHYSICIAN ASSISTANT

## 2021-05-14 PROCEDURE — 25000003 PHARM REV CODE 250: Performed by: STUDENT IN AN ORGANIZED HEALTH CARE EDUCATION/TRAINING PROGRAM

## 2021-05-14 PROCEDURE — 25000242 PHARM REV CODE 250 ALT 637 W/ HCPCS: Performed by: PHYSICIAN ASSISTANT

## 2021-05-14 PROCEDURE — 99239 PR HOSPITAL DISCHARGE DAY,>30 MIN: ICD-10-PCS | Mod: ,,, | Performed by: INTERNAL MEDICINE

## 2021-05-14 PROCEDURE — 99239 HOSP IP/OBS DSCHRG MGMT >30: CPT | Mod: ,,, | Performed by: INTERNAL MEDICINE

## 2021-05-14 PROCEDURE — 63600175 PHARM REV CODE 636 W HCPCS: Performed by: STUDENT IN AN ORGANIZED HEALTH CARE EDUCATION/TRAINING PROGRAM

## 2021-05-14 PROCEDURE — 99900035 HC TECH TIME PER 15 MIN (STAT)

## 2021-05-14 PROCEDURE — 27000221 HC OXYGEN, UP TO 24 HOURS

## 2021-05-14 RX ADMIN — AMLODIPINE BESYLATE 10 MG: 10 TABLET ORAL at 01:05

## 2021-05-14 RX ADMIN — FLUTICASONE FUROATE AND VILANTEROL TRIFENATATE 1 PUFF: 200; 25 POWDER RESPIRATORY (INHALATION) at 01:05

## 2021-05-14 RX ADMIN — SEVELAMER CARBONATE 800 MG: 800 TABLET, FILM COATED ORAL at 01:05

## 2021-05-14 RX ADMIN — PSYLLIUM HUSK 1 PACKET: 3.4 POWDER ORAL at 01:05

## 2021-05-14 RX ADMIN — POLYETHYLENE GLYCOL 3350 17 G: 17 POWDER, FOR SOLUTION ORAL at 01:05

## 2021-05-14 RX ADMIN — HYDRALAZINE HYDROCHLORIDE 50 MG: 50 TABLET, FILM COATED ORAL at 06:05

## 2021-05-14 RX ADMIN — ASPIRIN 81 MG: 81 TABLET, COATED ORAL at 01:05

## 2021-05-14 RX ADMIN — SODIUM CHLORIDE: 0.9 INJECTION, SOLUTION INTRAVENOUS at 08:05

## 2021-05-14 RX ADMIN — HEPARIN SODIUM 1000 UNITS: 1000 INJECTION, SOLUTION INTRAVENOUS; SUBCUTANEOUS at 12:05

## 2021-05-14 RX ADMIN — HYDRALAZINE HYDROCHLORIDE 50 MG: 50 TABLET, FILM COATED ORAL at 03:05

## 2021-06-02 ENCOUNTER — TELEPHONE (OUTPATIENT)
Dept: VASCULAR SURGERY | Facility: CLINIC | Age: 81
End: 2021-06-02

## 2021-06-02 DIAGNOSIS — Z01.818 PRE-OP EVALUATION: Primary | ICD-10-CM

## 2021-06-03 ENCOUNTER — TELEPHONE (OUTPATIENT)
Dept: VASCULAR SURGERY | Facility: CLINIC | Age: 81
End: 2021-06-03

## 2021-06-08 ENCOUNTER — TELEPHONE (OUTPATIENT)
Dept: VASCULAR SURGERY | Facility: CLINIC | Age: 81
End: 2021-06-08

## 2021-06-11 ENCOUNTER — HOSPITAL ENCOUNTER (INPATIENT)
Facility: HOSPITAL | Age: 81
LOS: 3 days | Discharge: HOME | DRG: 377 | End: 2021-06-15
Attending: EMERGENCY MEDICINE | Admitting: HOSPITALIST
Payer: MEDICARE

## 2021-06-11 DIAGNOSIS — Z51.5 PALLIATIVE CARE ENCOUNTER: ICD-10-CM

## 2021-06-11 DIAGNOSIS — Z99.2 ESRD (END STAGE RENAL DISEASE) ON DIALYSIS: ICD-10-CM

## 2021-06-11 DIAGNOSIS — R63.0 ANOREXIA: ICD-10-CM

## 2021-06-11 DIAGNOSIS — N18.6 ESRD (END STAGE RENAL DISEASE) ON DIALYSIS: ICD-10-CM

## 2021-06-11 DIAGNOSIS — R07.9 CHEST PAIN: ICD-10-CM

## 2021-06-11 DIAGNOSIS — K92.2 LOWER GI BLEED: Primary | ICD-10-CM

## 2021-06-11 PROCEDURE — 86920 COMPATIBILITY TEST SPIN: CPT | Performed by: HOSPITALIST

## 2021-06-11 PROCEDURE — 99285 EMERGENCY DEPT VISIT HI MDM: CPT | Mod: 25

## 2021-06-11 PROCEDURE — 93010 EKG 12-LEAD: ICD-10-PCS | Mod: ,,, | Performed by: INTERNAL MEDICINE

## 2021-06-11 PROCEDURE — 80053 COMPREHEN METABOLIC PANEL: CPT | Performed by: PHYSICIAN ASSISTANT

## 2021-06-11 PROCEDURE — 86900 BLOOD TYPING SEROLOGIC ABO: CPT | Performed by: PHYSICIAN ASSISTANT

## 2021-06-11 PROCEDURE — 86920 COMPATIBILITY TEST SPIN: CPT | Performed by: INTERNAL MEDICINE

## 2021-06-11 PROCEDURE — 93005 ELECTROCARDIOGRAM TRACING: CPT

## 2021-06-11 PROCEDURE — 99285 EMERGENCY DEPT VISIT HI MDM: CPT | Mod: ,,, | Performed by: PHYSICIAN ASSISTANT

## 2021-06-11 PROCEDURE — 93010 ELECTROCARDIOGRAM REPORT: CPT | Mod: ,,, | Performed by: INTERNAL MEDICINE

## 2021-06-11 PROCEDURE — 85025 COMPLETE CBC W/AUTO DIFF WBC: CPT | Performed by: PHYSICIAN ASSISTANT

## 2021-06-11 PROCEDURE — 80047 BASIC METABLC PNL IONIZED CA: CPT

## 2021-06-11 PROCEDURE — 99285 PR EMERGENCY DEPT VISIT,LEVEL V: ICD-10-PCS | Mod: ,,, | Performed by: PHYSICIAN ASSISTANT

## 2021-06-12 ENCOUNTER — ANESTHESIA (OUTPATIENT)
Dept: INTERVENTIONAL RADIOLOGY/VASCULAR | Facility: HOSPITAL | Age: 81
DRG: 377 | End: 2021-06-12
Payer: MEDICARE

## 2021-06-12 PROBLEM — K92.2 LOWER GI BLEED: Status: ACTIVE | Noted: 2021-06-12

## 2021-06-12 LAB
ABO + RH BLD: NORMAL
ALBUMIN SERPL BCP-MCNC: 2.4 G/DL (ref 3.5–5.2)
ALBUMIN SERPL BCP-MCNC: 2.4 G/DL (ref 3.5–5.2)
ALP SERPL-CCNC: 135 U/L (ref 55–135)
ALP SERPL-CCNC: 138 U/L (ref 55–135)
ALT SERPL W/O P-5'-P-CCNC: 6 U/L (ref 10–44)
ALT SERPL W/O P-5'-P-CCNC: 6 U/L (ref 10–44)
ANION GAP SERPL CALC-SCNC: 12 MMOL/L (ref 8–16)
ANION GAP SERPL CALC-SCNC: 9 MMOL/L (ref 8–16)
ANISOCYTOSIS BLD QL SMEAR: SLIGHT
AST SERPL-CCNC: 15 U/L (ref 10–40)
AST SERPL-CCNC: 17 U/L (ref 10–40)
BASOPHILS # BLD AUTO: 0.06 K/UL (ref 0–0.2)
BASOPHILS # BLD AUTO: 0.07 K/UL (ref 0–0.2)
BASOPHILS NFR BLD: 0.5 % (ref 0–1.9)
BASOPHILS NFR BLD: 0.7 % (ref 0–1.9)
BASOPHILS NFR BLD: 0.8 % (ref 0–1.9)
BASOPHILS NFR BLD: 0.8 % (ref 0–1.9)
BILIRUB SERPL-MCNC: 0.4 MG/DL (ref 0.1–1)
BILIRUB SERPL-MCNC: 0.4 MG/DL (ref 0.1–1)
BLD GP AB SCN CELLS X3 SERPL QL: NORMAL
BLD PROD TYP BPU: NORMAL
BLOOD UNIT EXPIRATION DATE: NORMAL
BLOOD UNIT TYPE CODE: 6200
BLOOD UNIT TYPE: NORMAL
BUN SERPL-MCNC: 21 MG/DL (ref 8–23)
BUN SERPL-MCNC: 22 MG/DL (ref 8–23)
CALCIUM SERPL-MCNC: 7.2 MG/DL (ref 8.7–10.5)
CALCIUM SERPL-MCNC: 7.3 MG/DL (ref 8.7–10.5)
CHLORIDE SERPL-SCNC: 107 MMOL/L (ref 95–110)
CHLORIDE SERPL-SCNC: 107 MMOL/L (ref 95–110)
CO2 SERPL-SCNC: 24 MMOL/L (ref 23–29)
CO2 SERPL-SCNC: 26 MMOL/L (ref 23–29)
CODING SYSTEM: NORMAL
CREAT SERPL-MCNC: 2.3 MG/DL (ref 0.5–1.4)
CREAT SERPL-MCNC: 2.4 MG/DL (ref 0.5–1.4)
DIFFERENTIAL METHOD: ABNORMAL
DISPENSE STATUS: NORMAL
EOSINOPHIL # BLD AUTO: 0.1 K/UL (ref 0–0.5)
EOSINOPHIL # BLD AUTO: 0.2 K/UL (ref 0–0.5)
EOSINOPHIL NFR BLD: 0.5 % (ref 0–8)
EOSINOPHIL NFR BLD: 1.3 % (ref 0–8)
EOSINOPHIL NFR BLD: 1.7 % (ref 0–8)
EOSINOPHIL NFR BLD: 2 % (ref 0–8)
ERYTHROCYTE [DISTWIDTH] IN BLOOD BY AUTOMATED COUNT: 18.9 % (ref 11.5–14.5)
ERYTHROCYTE [DISTWIDTH] IN BLOOD BY AUTOMATED COUNT: 19.9 % (ref 11.5–14.5)
ERYTHROCYTE [DISTWIDTH] IN BLOOD BY AUTOMATED COUNT: 20.1 % (ref 11.5–14.5)
ERYTHROCYTE [DISTWIDTH] IN BLOOD BY AUTOMATED COUNT: 20.4 % (ref 11.5–14.5)
EST. GFR  (AFRICAN AMERICAN): 21.5 ML/MIN/1.73 M^2
EST. GFR  (AFRICAN AMERICAN): 22.6 ML/MIN/1.73 M^2
EST. GFR  (NON AFRICAN AMERICAN): 18.6 ML/MIN/1.73 M^2
EST. GFR  (NON AFRICAN AMERICAN): 19.6 ML/MIN/1.73 M^2
FERRITIN SERPL-MCNC: 490 NG/ML (ref 20–300)
GLUCOSE SERPL-MCNC: 85 MG/DL (ref 70–110)
GLUCOSE SERPL-MCNC: 90 MG/DL (ref 70–110)
HCT VFR BLD AUTO: 19.7 % (ref 37–48.5)
HCT VFR BLD AUTO: 24.7 % (ref 37–48.5)
HCT VFR BLD AUTO: 27.2 % (ref 37–48.5)
HCT VFR BLD AUTO: 27.6 % (ref 37–48.5)
HGB BLD-MCNC: 5.5 G/DL (ref 12–16)
HGB BLD-MCNC: 7.1 G/DL (ref 12–16)
HGB BLD-MCNC: 7.8 G/DL (ref 12–16)
HGB BLD-MCNC: 8.6 G/DL (ref 12–16)
HYPOCHROMIA BLD QL SMEAR: ABNORMAL
IMM GRANULOCYTES # BLD AUTO: 0.03 K/UL (ref 0–0.04)
IMM GRANULOCYTES # BLD AUTO: 0.04 K/UL (ref 0–0.04)
IMM GRANULOCYTES # BLD AUTO: 0.05 K/UL (ref 0–0.04)
IMM GRANULOCYTES # BLD AUTO: 0.07 K/UL (ref 0–0.04)
IMM GRANULOCYTES NFR BLD AUTO: 0.4 % (ref 0–0.5)
IMM GRANULOCYTES NFR BLD AUTO: 0.5 % (ref 0–0.5)
IMM GRANULOCYTES NFR BLD AUTO: 0.6 % (ref 0–0.5)
IMM GRANULOCYTES NFR BLD AUTO: 0.6 % (ref 0–0.5)
INR PPP: 1.1 (ref 0.8–1.2)
IRON SERPL-MCNC: 33 UG/DL (ref 30–160)
LYMPHOCYTES # BLD AUTO: 0.6 K/UL (ref 1–4.8)
LYMPHOCYTES # BLD AUTO: 0.7 K/UL (ref 1–4.8)
LYMPHOCYTES # BLD AUTO: 0.7 K/UL (ref 1–4.8)
LYMPHOCYTES # BLD AUTO: 0.8 K/UL (ref 1–4.8)
LYMPHOCYTES NFR BLD: 6.2 % (ref 18–48)
LYMPHOCYTES NFR BLD: 7.5 % (ref 18–48)
LYMPHOCYTES NFR BLD: 7.8 % (ref 18–48)
LYMPHOCYTES NFR BLD: 8.9 % (ref 18–48)
MAGNESIUM SERPL-MCNC: 1.7 MG/DL (ref 1.6–2.6)
MAGNESIUM SERPL-MCNC: 1.8 MG/DL (ref 1.6–2.6)
MCH RBC QN AUTO: 28.2 PG (ref 27–31)
MCH RBC QN AUTO: 28.4 PG (ref 27–31)
MCH RBC QN AUTO: 28.4 PG (ref 27–31)
MCH RBC QN AUTO: 29 PG (ref 27–31)
MCHC RBC AUTO-ENTMCNC: 27.9 G/DL (ref 32–36)
MCHC RBC AUTO-ENTMCNC: 28.7 G/DL (ref 32–36)
MCHC RBC AUTO-ENTMCNC: 28.7 G/DL (ref 32–36)
MCHC RBC AUTO-ENTMCNC: 31.2 G/DL (ref 32–36)
MCV RBC AUTO: 101 FL (ref 82–98)
MCV RBC AUTO: 102 FL (ref 82–98)
MCV RBC AUTO: 91 FL (ref 82–98)
MCV RBC AUTO: 99 FL (ref 82–98)
MONOCYTES # BLD AUTO: 0.6 K/UL (ref 0.3–1)
MONOCYTES # BLD AUTO: 0.8 K/UL (ref 0.3–1)
MONOCYTES NFR BLD: 5.4 % (ref 4–15)
MONOCYTES NFR BLD: 7.3 % (ref 4–15)
MONOCYTES NFR BLD: 7.9 % (ref 4–15)
MONOCYTES NFR BLD: 8.5 % (ref 4–15)
NEUTROPHILS # BLD AUTO: 6.1 K/UL (ref 1.8–7.7)
NEUTROPHILS # BLD AUTO: 6.8 K/UL (ref 1.8–7.7)
NEUTROPHILS # BLD AUTO: 7.1 K/UL (ref 1.8–7.7)
NEUTROPHILS # BLD AUTO: 9.6 K/UL (ref 1.8–7.7)
NEUTROPHILS NFR BLD: 80.5 % (ref 38–73)
NEUTROPHILS NFR BLD: 81.1 % (ref 38–73)
NEUTROPHILS NFR BLD: 81.7 % (ref 38–73)
NEUTROPHILS NFR BLD: 86.8 % (ref 38–73)
NRBC BLD-RTO: 0 /100 WBC
NUM UNITS TRANS FFP: NORMAL
NUM UNITS TRANS PACKED RBC: NORMAL
OVALOCYTES BLD QL SMEAR: ABNORMAL
PHOSPHATE SERPL-MCNC: 1.8 MG/DL (ref 2.7–4.5)
PHOSPHATE SERPL-MCNC: 2 MG/DL (ref 2.7–4.5)
PHOSPHATE SERPL-MCNC: 2 MG/DL (ref 2.7–4.5)
PHOSPHATE SERPL-MCNC: 5.5 MG/DL (ref 2.7–4.5)
PLATELET # BLD AUTO: 71 K/UL (ref 150–450)
PLATELET # BLD AUTO: 82 K/UL (ref 150–450)
PLATELET # BLD AUTO: 90 K/UL (ref 150–450)
PLATELET # BLD AUTO: 97 K/UL (ref 150–450)
PLATELET BLD QL SMEAR: ABNORMAL
PMV BLD AUTO: 12.1 FL (ref 9.2–12.9)
PMV BLD AUTO: 12.1 FL (ref 9.2–12.9)
PMV BLD AUTO: 13 FL (ref 9.2–12.9)
PMV BLD AUTO: 13.9 FL (ref 9.2–12.9)
POCT GLUCOSE: 113 MG/DL (ref 70–110)
POCT GLUCOSE: 98 MG/DL (ref 70–110)
POIKILOCYTOSIS BLD QL SMEAR: SLIGHT
POTASSIUM SERPL-SCNC: 3.1 MMOL/L (ref 3.5–5.1)
POTASSIUM SERPL-SCNC: 3.2 MMOL/L (ref 3.5–5.1)
PROT SERPL-MCNC: 5.9 G/DL (ref 6–8.4)
PROT SERPL-MCNC: 6 G/DL (ref 6–8.4)
PROTHROMBIN TIME: 12.3 SEC (ref 9–12.5)
RBC # BLD AUTO: 1.94 M/UL (ref 4–5.4)
RBC # BLD AUTO: 2.45 M/UL (ref 4–5.4)
RBC # BLD AUTO: 2.75 M/UL (ref 4–5.4)
RBC # BLD AUTO: 3.05 M/UL (ref 4–5.4)
RETICS/RBC NFR AUTO: 4.6 % (ref 0.5–2.5)
SATURATED IRON: 23 % (ref 20–50)
SODIUM SERPL-SCNC: 142 MMOL/L (ref 136–145)
SODIUM SERPL-SCNC: 143 MMOL/L (ref 136–145)
TOTAL IRON BINDING CAPACITY: 142 UG/DL (ref 250–450)
TRANSFERRIN SERPL-MCNC: 96 MG/DL (ref 200–375)
WBC # BLD AUTO: 11 K/UL (ref 3.9–12.7)
WBC # BLD AUTO: 7.51 K/UL (ref 3.9–12.7)
WBC # BLD AUTO: 8.46 K/UL (ref 3.9–12.7)
WBC # BLD AUTO: 8.8 K/UL (ref 3.9–12.7)

## 2021-06-12 PROCEDURE — D9220A PRA ANESTHESIA: ICD-10-PCS | Mod: CRNA,,, | Performed by: NURSE ANESTHETIST, CERTIFIED REGISTERED

## 2021-06-12 PROCEDURE — P9016 RBC LEUKOCYTES REDUCED: HCPCS | Performed by: INTERNAL MEDICINE

## 2021-06-12 PROCEDURE — 85025 COMPLETE CBC W/AUTO DIFF WBC: CPT | Mod: 91 | Performed by: INTERNAL MEDICINE

## 2021-06-12 PROCEDURE — 99291 PR CRITICAL CARE, E/M 30-74 MINUTES: ICD-10-PCS | Mod: GC,,, | Performed by: INTERNAL MEDICINE

## 2021-06-12 PROCEDURE — 83540 ASSAY OF IRON: CPT | Performed by: INTERNAL MEDICINE

## 2021-06-12 PROCEDURE — 99223 PR INITIAL HOSPITAL CARE,LEVL III: ICD-10-PCS | Mod: AI,GC,, | Performed by: HOSPITALIST

## 2021-06-12 PROCEDURE — 85610 PROTHROMBIN TIME: CPT | Performed by: INTERNAL MEDICINE

## 2021-06-12 PROCEDURE — 20000000 HC ICU ROOM

## 2021-06-12 PROCEDURE — 99223 PR INITIAL HOSPITAL CARE,LEVL III: ICD-10-PCS | Mod: GC,,, | Performed by: INTERNAL MEDICINE

## 2021-06-12 PROCEDURE — 25000003 PHARM REV CODE 250: Performed by: INTERNAL MEDICINE

## 2021-06-12 PROCEDURE — 27000221 HC OXYGEN, UP TO 24 HOURS

## 2021-06-12 PROCEDURE — 25000003 PHARM REV CODE 250: Performed by: NURSE ANESTHETIST, CERTIFIED REGISTERED

## 2021-06-12 PROCEDURE — 83735 ASSAY OF MAGNESIUM: CPT | Performed by: INTERNAL MEDICINE

## 2021-06-12 PROCEDURE — 99291 CRITICAL CARE FIRST HOUR: CPT | Mod: GC,,, | Performed by: INTERNAL MEDICINE

## 2021-06-12 PROCEDURE — D9220A PRA ANESTHESIA: Mod: ANES,,, | Performed by: ANESTHESIOLOGY

## 2021-06-12 PROCEDURE — 25500020 PHARM REV CODE 255: Performed by: HOSPITALIST

## 2021-06-12 PROCEDURE — P9016 RBC LEUKOCYTES REDUCED: HCPCS | Performed by: HOSPITALIST

## 2021-06-12 PROCEDURE — 99223 1ST HOSP IP/OBS HIGH 75: CPT | Mod: GC,,, | Performed by: INTERNAL MEDICINE

## 2021-06-12 PROCEDURE — P9017 PLASMA 1 DONOR FRZ W/IN 8 HR: HCPCS | Performed by: INTERNAL MEDICINE

## 2021-06-12 PROCEDURE — D9220A PRA ANESTHESIA: ICD-10-PCS | Mod: ANES,,, | Performed by: ANESTHESIOLOGY

## 2021-06-12 PROCEDURE — 36620 INSERTION CATHETER ARTERY: CPT | Mod: 59,,, | Performed by: ANESTHESIOLOGY

## 2021-06-12 PROCEDURE — 36620 PR INSERT CATH,ART,PERCUT,SHORTTERM: ICD-10-PCS | Mod: 59,,, | Performed by: ANESTHESIOLOGY

## 2021-06-12 PROCEDURE — 99223 1ST HOSP IP/OBS HIGH 75: CPT | Mod: ,,, | Performed by: NURSE PRACTITIONER

## 2021-06-12 PROCEDURE — C9113 INJ PANTOPRAZOLE SODIUM, VIA: HCPCS | Performed by: INTERNAL MEDICINE

## 2021-06-12 PROCEDURE — 82728 ASSAY OF FERRITIN: CPT | Performed by: INTERNAL MEDICINE

## 2021-06-12 PROCEDURE — 63600175 PHARM REV CODE 636 W HCPCS: Performed by: NURSE ANESTHETIST, CERTIFIED REGISTERED

## 2021-06-12 PROCEDURE — 85025 COMPLETE CBC W/AUTO DIFF WBC: CPT | Mod: 91 | Performed by: PHYSICIAN ASSISTANT

## 2021-06-12 PROCEDURE — 83735 ASSAY OF MAGNESIUM: CPT | Mod: 91 | Performed by: INTERNAL MEDICINE

## 2021-06-12 PROCEDURE — 99223 PR INITIAL HOSPITAL CARE,LEVL III: ICD-10-PCS | Mod: ,,, | Performed by: NURSE PRACTITIONER

## 2021-06-12 PROCEDURE — 63600175 PHARM REV CODE 636 W HCPCS: Performed by: STUDENT IN AN ORGANIZED HEALTH CARE EDUCATION/TRAINING PROGRAM

## 2021-06-12 PROCEDURE — D9220A PRA ANESTHESIA: Mod: CRNA,,, | Performed by: NURSE ANESTHETIST, CERTIFIED REGISTERED

## 2021-06-12 PROCEDURE — 85045 AUTOMATED RETICULOCYTE COUNT: CPT | Performed by: INTERNAL MEDICINE

## 2021-06-12 PROCEDURE — 25000003 PHARM REV CODE 250: Performed by: RADIOLOGY

## 2021-06-12 PROCEDURE — 80053 COMPREHEN METABOLIC PANEL: CPT | Performed by: INTERNAL MEDICINE

## 2021-06-12 PROCEDURE — 25000003 PHARM REV CODE 250: Performed by: HOSPITALIST

## 2021-06-12 PROCEDURE — 94761 N-INVAS EAR/PLS OXIMETRY MLT: CPT

## 2021-06-12 PROCEDURE — 63600175 PHARM REV CODE 636 W HCPCS: Performed by: INTERNAL MEDICINE

## 2021-06-12 PROCEDURE — 99223 1ST HOSP IP/OBS HIGH 75: CPT | Mod: AI,GC,, | Performed by: HOSPITALIST

## 2021-06-12 PROCEDURE — 84100 ASSAY OF PHOSPHORUS: CPT | Mod: 91 | Performed by: INTERNAL MEDICINE

## 2021-06-12 PROCEDURE — 36415 COLL VENOUS BLD VENIPUNCTURE: CPT | Performed by: INTERNAL MEDICINE

## 2021-06-12 PROCEDURE — 84100 ASSAY OF PHOSPHORUS: CPT | Performed by: INTERNAL MEDICINE

## 2021-06-12 PROCEDURE — 25000003 PHARM REV CODE 250: Performed by: STUDENT IN AN ORGANIZED HEALTH CARE EDUCATION/TRAINING PROGRAM

## 2021-06-12 RX ORDER — SODIUM CHLORIDE 0.9 % (FLUSH) 0.9 %
10 SYRINGE (ML) INJECTION
Status: DISCONTINUED | OUTPATIENT
Start: 2021-06-12 | End: 2021-06-15 | Stop reason: HOSPADM

## 2021-06-12 RX ORDER — ONDANSETRON 2 MG/ML
INJECTION INTRAMUSCULAR; INTRAVENOUS
Status: DISCONTINUED | OUTPATIENT
Start: 2021-06-12 | End: 2021-06-12

## 2021-06-12 RX ORDER — VASOPRESSIN 20 [USP'U]/ML
INJECTION, SOLUTION INTRAMUSCULAR; SUBCUTANEOUS
Status: DISCONTINUED | OUTPATIENT
Start: 2021-06-12 | End: 2021-06-12

## 2021-06-12 RX ORDER — IPRATROPIUM BROMIDE AND ALBUTEROL SULFATE 2.5; .5 MG/3ML; MG/3ML
3 SOLUTION RESPIRATORY (INHALATION) EVERY 4 HOURS PRN
Status: DISCONTINUED | OUTPATIENT
Start: 2021-06-12 | End: 2021-06-15 | Stop reason: HOSPADM

## 2021-06-12 RX ORDER — ATORVASTATIN CALCIUM 20 MG/1
20 TABLET, FILM COATED ORAL NIGHTLY
Status: DISCONTINUED | OUTPATIENT
Start: 2021-06-12 | End: 2021-06-15 | Stop reason: HOSPADM

## 2021-06-12 RX ORDER — SEVELAMER CARBONATE 800 MG/1
800 TABLET, FILM COATED ORAL
Status: DISCONTINUED | OUTPATIENT
Start: 2021-06-12 | End: 2021-06-15 | Stop reason: HOSPADM

## 2021-06-12 RX ORDER — HYDROCODONE BITARTRATE AND ACETAMINOPHEN 500; 5 MG/1; MG/1
TABLET ORAL
Status: DISCONTINUED | OUTPATIENT
Start: 2021-06-12 | End: 2021-06-15 | Stop reason: HOSPADM

## 2021-06-12 RX ORDER — MAGNESIUM SULFATE HEPTAHYDRATE 40 MG/ML
2 INJECTION, SOLUTION INTRAVENOUS ONCE
Status: COMPLETED | OUTPATIENT
Start: 2021-06-12 | End: 2021-06-12

## 2021-06-12 RX ORDER — TRAMADOL HYDROCHLORIDE 50 MG/1
50 TABLET ORAL EVERY 8 HOURS PRN
Status: DISCONTINUED | OUTPATIENT
Start: 2021-06-12 | End: 2021-06-15 | Stop reason: HOSPADM

## 2021-06-12 RX ORDER — POTASSIUM CHLORIDE 7.45 MG/ML
10 INJECTION INTRAVENOUS
Status: ACTIVE | OUTPATIENT
Start: 2021-06-12 | End: 2021-06-12

## 2021-06-12 RX ORDER — POTASSIUM CHLORIDE 20 MEQ/1
40 TABLET, EXTENDED RELEASE ORAL ONCE
Status: COMPLETED | OUTPATIENT
Start: 2021-06-12 | End: 2021-06-12

## 2021-06-12 RX ORDER — IBUPROFEN 200 MG
24 TABLET ORAL
Status: DISCONTINUED | OUTPATIENT
Start: 2021-06-12 | End: 2021-06-15 | Stop reason: HOSPADM

## 2021-06-12 RX ORDER — IBUPROFEN 200 MG
16 TABLET ORAL
Status: DISCONTINUED | OUTPATIENT
Start: 2021-06-12 | End: 2021-06-15 | Stop reason: HOSPADM

## 2021-06-12 RX ORDER — TALC
6 POWDER (GRAM) TOPICAL NIGHTLY PRN
Status: DISCONTINUED | OUTPATIENT
Start: 2021-06-12 | End: 2021-06-15 | Stop reason: HOSPADM

## 2021-06-12 RX ORDER — CINACALCET 30 MG/1
60 TABLET, FILM COATED ORAL 2 TIMES DAILY WITH MEALS
Status: DISCONTINUED | OUTPATIENT
Start: 2021-06-12 | End: 2021-06-15 | Stop reason: HOSPADM

## 2021-06-12 RX ORDER — PANTOPRAZOLE SODIUM 40 MG/10ML
40 INJECTION, POWDER, LYOPHILIZED, FOR SOLUTION INTRAVENOUS 2 TIMES DAILY
Status: DISCONTINUED | OUTPATIENT
Start: 2021-06-12 | End: 2021-06-15

## 2021-06-12 RX ORDER — GLUCAGON 1 MG
1 KIT INJECTION
Status: DISCONTINUED | OUTPATIENT
Start: 2021-06-12 | End: 2021-06-15 | Stop reason: HOSPADM

## 2021-06-12 RX ORDER — LIDOCAINE HYDROCHLORIDE 10 MG/ML
INJECTION INFILTRATION; PERINEURAL CODE/TRAUMA/SEDATION MEDICATION
Status: COMPLETED | OUTPATIENT
Start: 2021-06-12 | End: 2021-06-12

## 2021-06-12 RX ORDER — MUPIROCIN 20 MG/G
OINTMENT TOPICAL 2 TIMES DAILY
Status: DISCONTINUED | OUTPATIENT
Start: 2021-06-12 | End: 2021-06-15 | Stop reason: HOSPADM

## 2021-06-12 RX ORDER — ONDANSETRON 2 MG/ML
4 INJECTION INTRAMUSCULAR; INTRAVENOUS EVERY 6 HOURS PRN
Status: DISCONTINUED | OUTPATIENT
Start: 2021-06-12 | End: 2021-06-15 | Stop reason: HOSPADM

## 2021-06-12 RX ORDER — MIRTAZAPINE 7.5 MG/1
7.5 TABLET, FILM COATED ORAL NIGHTLY
Status: DISCONTINUED | OUTPATIENT
Start: 2021-06-12 | End: 2021-06-15 | Stop reason: HOSPADM

## 2021-06-12 RX ORDER — LIDOCAINE 50 MG/G
2 PATCH TOPICAL DAILY PRN
Status: DISCONTINUED | OUTPATIENT
Start: 2021-06-12 | End: 2021-06-15 | Stop reason: HOSPADM

## 2021-06-12 RX ADMIN — MAGNESIUM SULFATE 2 G: 2 INJECTION INTRAVENOUS at 03:06

## 2021-06-12 RX ADMIN — PANTOPRAZOLE SODIUM 40 MG: 40 INJECTION, POWDER, FOR SOLUTION INTRAVENOUS at 09:06

## 2021-06-12 RX ADMIN — VASOPRESSIN 1 UNITS: 20 INJECTION INTRAVENOUS at 10:06

## 2021-06-12 RX ADMIN — MUPIROCIN: 20 OINTMENT TOPICAL at 09:06

## 2021-06-12 RX ADMIN — ONDANSETRON 4 MG: 2 INJECTION INTRAMUSCULAR; INTRAVENOUS at 10:06

## 2021-06-12 RX ADMIN — CALCIUM CHLORIDE 250 MG: 100 INJECTION, SOLUTION INTRAVENOUS at 10:06

## 2021-06-12 RX ADMIN — CALCIUM CHLORIDE 250 MG: 100 INJECTION, SOLUTION INTRAVENOUS at 11:06

## 2021-06-12 RX ADMIN — LIDOCAINE HYDROCHLORIDE 5 ML: 10 INJECTION, SOLUTION INFILTRATION; PERINEURAL at 10:06

## 2021-06-12 RX ADMIN — IOHEXOL 75 ML: 350 INJECTION, SOLUTION INTRAVENOUS at 06:06

## 2021-06-12 RX ADMIN — MIRTAZAPINE 7.5 MG: 7.5 TABLET ORAL at 09:06

## 2021-06-12 RX ADMIN — ATORVASTATIN CALCIUM 20 MG: 20 TABLET, FILM COATED ORAL at 09:06

## 2021-06-12 RX ADMIN — POTASSIUM PHOSPHATE, MONOBASIC AND POTASSIUM PHOSPHATE, DIBASIC 20 MMOL: 224; 236 INJECTION, SOLUTION, CONCENTRATE INTRAVENOUS at 01:06

## 2021-06-12 RX ADMIN — POTASSIUM CHLORIDE 40 MEQ: 1500 TABLET, EXTENDED RELEASE ORAL at 02:06

## 2021-06-13 LAB
ALBUMIN SERPL BCP-MCNC: 2.3 G/DL (ref 3.5–5.2)
ALP SERPL-CCNC: 106 U/L (ref 55–135)
ALT SERPL W/O P-5'-P-CCNC: 5 U/L (ref 10–44)
ANION GAP SERPL CALC-SCNC: 12 MMOL/L (ref 8–16)
AST SERPL-CCNC: 17 U/L (ref 10–40)
BASOPHILS # BLD AUTO: 0.06 K/UL (ref 0–0.2)
BASOPHILS # BLD AUTO: 0.06 K/UL (ref 0–0.2)
BASOPHILS # BLD AUTO: 0.07 K/UL (ref 0–0.2)
BASOPHILS # BLD AUTO: 0.07 K/UL (ref 0–0.2)
BASOPHILS NFR BLD: 0.6 % (ref 0–1.9)
BASOPHILS NFR BLD: 0.7 % (ref 0–1.9)
BASOPHILS NFR BLD: 0.8 % (ref 0–1.9)
BASOPHILS NFR BLD: 0.9 % (ref 0–1.9)
BILIRUB SERPL-MCNC: 0.5 MG/DL (ref 0.1–1)
BUN SERPL-MCNC: 27 MG/DL (ref 8–23)
CALCIUM SERPL-MCNC: 6.7 MG/DL (ref 8.7–10.5)
CHLORIDE SERPL-SCNC: 108 MMOL/L (ref 95–110)
CO2 SERPL-SCNC: 19 MMOL/L (ref 23–29)
CREAT SERPL-MCNC: 2.8 MG/DL (ref 0.5–1.4)
DIFFERENTIAL METHOD: ABNORMAL
EOSINOPHIL # BLD AUTO: 0.1 K/UL (ref 0–0.5)
EOSINOPHIL NFR BLD: 0.7 % (ref 0–8)
EOSINOPHIL NFR BLD: 1.3 % (ref 0–8)
EOSINOPHIL NFR BLD: 1.5 % (ref 0–8)
EOSINOPHIL NFR BLD: 1.5 % (ref 0–8)
ERYTHROCYTE [DISTWIDTH] IN BLOOD BY AUTOMATED COUNT: 19.2 % (ref 11.5–14.5)
ERYTHROCYTE [DISTWIDTH] IN BLOOD BY AUTOMATED COUNT: 19.7 % (ref 11.5–14.5)
ERYTHROCYTE [DISTWIDTH] IN BLOOD BY AUTOMATED COUNT: 19.9 % (ref 11.5–14.5)
ERYTHROCYTE [DISTWIDTH] IN BLOOD BY AUTOMATED COUNT: 20 % (ref 11.5–14.5)
EST. GFR  (AFRICAN AMERICAN): 17.8 ML/MIN/1.73 M^2
EST. GFR  (NON AFRICAN AMERICAN): 15.5 ML/MIN/1.73 M^2
GLUCOSE SERPL-MCNC: 64 MG/DL (ref 70–110)
HCT VFR BLD AUTO: 25 % (ref 37–48.5)
HCT VFR BLD AUTO: 25.5 % (ref 37–48.5)
HCT VFR BLD AUTO: 26.7 % (ref 37–48.5)
HCT VFR BLD AUTO: 27.6 % (ref 37–48.5)
HGB BLD-MCNC: 7.9 G/DL (ref 12–16)
HGB BLD-MCNC: 8 G/DL (ref 12–16)
HGB BLD-MCNC: 8.2 G/DL (ref 12–16)
HGB BLD-MCNC: 8.6 G/DL (ref 12–16)
IMM GRANULOCYTES # BLD AUTO: 0.03 K/UL (ref 0–0.04)
IMM GRANULOCYTES # BLD AUTO: 0.04 K/UL (ref 0–0.04)
IMM GRANULOCYTES # BLD AUTO: 0.05 K/UL (ref 0–0.04)
IMM GRANULOCYTES # BLD AUTO: 0.07 K/UL (ref 0–0.04)
IMM GRANULOCYTES NFR BLD AUTO: 0.4 % (ref 0–0.5)
IMM GRANULOCYTES NFR BLD AUTO: 0.4 % (ref 0–0.5)
IMM GRANULOCYTES NFR BLD AUTO: 0.6 % (ref 0–0.5)
IMM GRANULOCYTES NFR BLD AUTO: 0.8 % (ref 0–0.5)
INR PPP: 1.1 (ref 0.8–1.2)
LYMPHOCYTES # BLD AUTO: 0.6 K/UL (ref 1–4.8)
LYMPHOCYTES NFR BLD: 6.2 % (ref 18–48)
LYMPHOCYTES NFR BLD: 6.8 % (ref 18–48)
LYMPHOCYTES NFR BLD: 7.4 % (ref 18–48)
LYMPHOCYTES NFR BLD: 7.5 % (ref 18–48)
MAGNESIUM SERPL-MCNC: 2.3 MG/DL (ref 1.6–2.6)
MCH RBC QN AUTO: 28.3 PG (ref 27–31)
MCH RBC QN AUTO: 28.3 PG (ref 27–31)
MCH RBC QN AUTO: 28.6 PG (ref 27–31)
MCH RBC QN AUTO: 28.9 PG (ref 27–31)
MCHC RBC AUTO-ENTMCNC: 30.7 G/DL (ref 32–36)
MCHC RBC AUTO-ENTMCNC: 31.2 G/DL (ref 32–36)
MCHC RBC AUTO-ENTMCNC: 31.4 G/DL (ref 32–36)
MCHC RBC AUTO-ENTMCNC: 31.6 G/DL (ref 32–36)
MCV RBC AUTO: 90 FL (ref 82–98)
MCV RBC AUTO: 91 FL (ref 82–98)
MCV RBC AUTO: 92 FL (ref 82–98)
MCV RBC AUTO: 93 FL (ref 82–98)
MONOCYTES # BLD AUTO: 0.5 K/UL (ref 0.3–1)
MONOCYTES # BLD AUTO: 0.7 K/UL (ref 0.3–1)
MONOCYTES NFR BLD: 5.4 % (ref 4–15)
MONOCYTES NFR BLD: 6.3 % (ref 4–15)
MONOCYTES NFR BLD: 6.3 % (ref 4–15)
MONOCYTES NFR BLD: 7 % (ref 4–15)
NEUTROPHILS # BLD AUTO: 6.8 K/UL (ref 1.8–7.7)
NEUTROPHILS # BLD AUTO: 6.9 K/UL (ref 1.8–7.7)
NEUTROPHILS # BLD AUTO: 7.3 K/UL (ref 1.8–7.7)
NEUTROPHILS # BLD AUTO: 7.8 K/UL (ref 1.8–7.7)
NEUTROPHILS NFR BLD: 83.3 % (ref 38–73)
NEUTROPHILS NFR BLD: 84 % (ref 38–73)
NEUTROPHILS NFR BLD: 84.3 % (ref 38–73)
NEUTROPHILS NFR BLD: 85.3 % (ref 38–73)
NRBC BLD-RTO: 0 /100 WBC
PHOSPHATE SERPL-MCNC: 5.1 MG/DL (ref 2.7–4.5)
PLATELET # BLD AUTO: 70 K/UL (ref 150–450)
PLATELET # BLD AUTO: 74 K/UL (ref 150–450)
PLATELET # BLD AUTO: 81 K/UL (ref 150–450)
PLATELET # BLD AUTO: 88 K/UL (ref 150–450)
PMV BLD AUTO: 12.4 FL (ref 9.2–12.9)
PMV BLD AUTO: 13.1 FL (ref 9.2–12.9)
PMV BLD AUTO: 13.2 FL (ref 9.2–12.9)
PMV BLD AUTO: 13.7 FL (ref 9.2–12.9)
POCT GLUCOSE: 66 MG/DL (ref 70–110)
POCT GLUCOSE: 73 MG/DL (ref 70–110)
POTASSIUM SERPL-SCNC: 4.1 MMOL/L (ref 3.5–5.1)
PROT SERPL-MCNC: 5.1 G/DL (ref 6–8.4)
PROTHROMBIN TIME: 12.4 SEC (ref 9–12.5)
RBC # BLD AUTO: 2.76 M/UL (ref 4–5.4)
RBC # BLD AUTO: 2.83 M/UL (ref 4–5.4)
RBC # BLD AUTO: 2.9 M/UL (ref 4–5.4)
RBC # BLD AUTO: 2.98 M/UL (ref 4–5.4)
SODIUM SERPL-SCNC: 139 MMOL/L (ref 136–145)
WBC # BLD AUTO: 8.14 K/UL (ref 3.9–12.7)
WBC # BLD AUTO: 8.27 K/UL (ref 3.9–12.7)
WBC # BLD AUTO: 8.5 K/UL (ref 3.9–12.7)
WBC # BLD AUTO: 9.3 K/UL (ref 3.9–12.7)

## 2021-06-13 PROCEDURE — 85025 COMPLETE CBC W/AUTO DIFF WBC: CPT | Performed by: PHYSICIAN ASSISTANT

## 2021-06-13 PROCEDURE — 99232 SBSQ HOSP IP/OBS MODERATE 35: CPT | Mod: GC,,, | Performed by: INTERNAL MEDICINE

## 2021-06-13 PROCEDURE — 83735 ASSAY OF MAGNESIUM: CPT | Performed by: INTERNAL MEDICINE

## 2021-06-13 PROCEDURE — C9113 INJ PANTOPRAZOLE SODIUM, VIA: HCPCS | Performed by: INTERNAL MEDICINE

## 2021-06-13 PROCEDURE — 99232 PR SUBSEQUENT HOSPITAL CARE,LEVL II: ICD-10-PCS | Mod: GC,,, | Performed by: INTERNAL MEDICINE

## 2021-06-13 PROCEDURE — 63600175 PHARM REV CODE 636 W HCPCS: Performed by: INTERNAL MEDICINE

## 2021-06-13 PROCEDURE — 80053 COMPREHEN METABOLIC PANEL: CPT | Performed by: INTERNAL MEDICINE

## 2021-06-13 PROCEDURE — 25000003 PHARM REV CODE 250: Performed by: INTERNAL MEDICINE

## 2021-06-13 PROCEDURE — 25000003 PHARM REV CODE 250: Performed by: HOSPITALIST

## 2021-06-13 PROCEDURE — 84100 ASSAY OF PHOSPHORUS: CPT | Performed by: INTERNAL MEDICINE

## 2021-06-13 PROCEDURE — 85610 PROTHROMBIN TIME: CPT | Performed by: STUDENT IN AN ORGANIZED HEALTH CARE EDUCATION/TRAINING PROGRAM

## 2021-06-13 PROCEDURE — 36415 COLL VENOUS BLD VENIPUNCTURE: CPT | Performed by: PHYSICIAN ASSISTANT

## 2021-06-13 PROCEDURE — 11000001 HC ACUTE MED/SURG PRIVATE ROOM

## 2021-06-13 RX ORDER — POLYETHYLENE GLYCOL 3350, SODIUM SULFATE ANHYDROUS, SODIUM BICARBONATE, SODIUM CHLORIDE, POTASSIUM CHLORIDE 236; 22.74; 6.74; 5.86; 2.97 G/4L; G/4L; G/4L; G/4L; G/4L
4000 POWDER, FOR SOLUTION ORAL ONCE
Status: COMPLETED | OUTPATIENT
Start: 2021-06-13 | End: 2021-06-13

## 2021-06-13 RX ORDER — SODIUM CHLORIDE 9 MG/ML
INJECTION, SOLUTION INTRAVENOUS CONTINUOUS
Status: DISCONTINUED | OUTPATIENT
Start: 2021-06-13 | End: 2021-06-15 | Stop reason: HOSPADM

## 2021-06-13 RX ORDER — POLYETHYLENE GLYCOL 3350, SODIUM SULFATE, SODIUM CHLORIDE, POTASSIUM CHLORIDE, SODIUM ASCORBATE, AND ASCORBIC ACID 7.5-2.691G
2000 KIT ORAL ONCE
Status: DISCONTINUED | OUTPATIENT
Start: 2021-06-13 | End: 2021-06-13 | Stop reason: SDUPTHER

## 2021-06-13 RX ORDER — SODIUM CHLORIDE 9 MG/ML
INJECTION, SOLUTION INTRAVENOUS ONCE
Status: COMPLETED | OUTPATIENT
Start: 2021-06-14 | End: 2021-06-14

## 2021-06-13 RX ORDER — HEPARIN SODIUM 1000 [USP'U]/ML
1000 INJECTION, SOLUTION INTRAVENOUS; SUBCUTANEOUS
Status: DISCONTINUED | OUTPATIENT
Start: 2021-06-14 | End: 2021-06-15 | Stop reason: HOSPADM

## 2021-06-13 RX ORDER — POLYETHYLENE GLYCOL 3350, SODIUM SULFATE, SODIUM CHLORIDE, POTASSIUM CHLORIDE, SODIUM ASCORBATE, AND ASCORBIC ACID 7.5-2.691G
2000 KIT ORAL ONCE
Status: DISCONTINUED | OUTPATIENT
Start: 2021-06-13 | End: 2021-06-13 | Stop reason: RX

## 2021-06-13 RX ADMIN — CINACALCET 60 MG: 30 TABLET, FILM COATED ORAL at 06:06

## 2021-06-13 RX ADMIN — PANTOPRAZOLE SODIUM 40 MG: 40 INJECTION, POWDER, FOR SOLUTION INTRAVENOUS at 09:06

## 2021-06-13 RX ADMIN — DEXTROSE MONOHYDRATE 12.5 G: 25 INJECTION, SOLUTION INTRAVENOUS at 06:06

## 2021-06-13 RX ADMIN — MUPIROCIN: 20 OINTMENT TOPICAL at 08:06

## 2021-06-13 RX ADMIN — MUPIROCIN: 20 OINTMENT TOPICAL at 10:06

## 2021-06-13 RX ADMIN — SEVELAMER CARBONATE 800 MG: 800 TABLET, FILM COATED ORAL at 12:06

## 2021-06-13 RX ADMIN — SODIUM CHLORIDE: 0.9 INJECTION, SOLUTION INTRAVENOUS at 08:06

## 2021-06-13 RX ADMIN — MIRTAZAPINE 7.5 MG: 7.5 TABLET ORAL at 09:06

## 2021-06-13 RX ADMIN — SEVELAMER CARBONATE 800 MG: 800 TABLET, FILM COATED ORAL at 06:06

## 2021-06-13 RX ADMIN — ATORVASTATIN CALCIUM 20 MG: 20 TABLET, FILM COATED ORAL at 09:06

## 2021-06-13 RX ADMIN — POLYETHYLENE GLYCOL 3350, SODIUM SULFATE ANHYDROUS, SODIUM BICARBONATE, SODIUM CHLORIDE, POTASSIUM CHLORIDE 4000 ML: 236; 22.74; 6.74; 5.86; 2.97 POWDER, FOR SOLUTION ORAL at 06:06

## 2021-06-13 RX ADMIN — MELATONIN TAB 3 MG 6 MG: 3 TAB at 09:06

## 2021-06-14 ENCOUNTER — TELEPHONE (OUTPATIENT)
Dept: GASTROENTEROLOGY | Facility: HOSPITAL | Age: 81
End: 2021-06-14

## 2021-06-14 ENCOUNTER — ANESTHESIA (OUTPATIENT)
Dept: ENDOSCOPY | Facility: HOSPITAL | Age: 81
DRG: 377 | End: 2021-06-14
Payer: MEDICARE

## 2021-06-14 ENCOUNTER — ANESTHESIA EVENT (OUTPATIENT)
Dept: ENDOSCOPY | Facility: HOSPITAL | Age: 81
DRG: 377 | End: 2021-06-14
Payer: MEDICARE

## 2021-06-14 LAB
ALBUMIN SERPL BCP-MCNC: 2.6 G/DL (ref 3.5–5.2)
ALP SERPL-CCNC: 136 U/L (ref 55–135)
ALT SERPL W/O P-5'-P-CCNC: 9 U/L (ref 10–44)
ANION GAP SERPL CALC-SCNC: 14 MMOL/L (ref 8–16)
AST SERPL-CCNC: 32 U/L (ref 10–40)
BASOPHILS # BLD AUTO: 0.05 K/UL (ref 0–0.2)
BASOPHILS # BLD AUTO: 0.06 K/UL (ref 0–0.2)
BASOPHILS # BLD AUTO: 0.07 K/UL (ref 0–0.2)
BASOPHILS NFR BLD: 0.6 % (ref 0–1.9)
BASOPHILS NFR BLD: 0.8 % (ref 0–1.9)
BASOPHILS NFR BLD: 0.9 % (ref 0–1.9)
BILIRUB SERPL-MCNC: 0.5 MG/DL (ref 0.1–1)
BUN SERPL-MCNC: 21 MG/DL (ref 6–30)
BUN SERPL-MCNC: 8 MG/DL (ref 8–23)
CALCIUM SERPL-MCNC: 7.2 MG/DL (ref 8.7–10.5)
CHLORIDE SERPL-SCNC: 103 MMOL/L (ref 95–110)
CHLORIDE SERPL-SCNC: 106 MMOL/L (ref 95–110)
CO2 SERPL-SCNC: 20 MMOL/L (ref 23–29)
CREAT SERPL-MCNC: 1.1 MG/DL (ref 0.5–1.4)
CREAT SERPL-MCNC: 2.5 MG/DL (ref 0.5–1.4)
DIFFERENTIAL METHOD: ABNORMAL
EOSINOPHIL # BLD AUTO: 0.1 K/UL (ref 0–0.5)
EOSINOPHIL # BLD AUTO: 0.1 K/UL (ref 0–0.5)
EOSINOPHIL # BLD AUTO: 0.2 K/UL (ref 0–0.5)
EOSINOPHIL NFR BLD: 1.2 % (ref 0–8)
EOSINOPHIL NFR BLD: 1.3 % (ref 0–8)
EOSINOPHIL NFR BLD: 2 % (ref 0–8)
ERYTHROCYTE [DISTWIDTH] IN BLOOD BY AUTOMATED COUNT: 20.1 % (ref 11.5–14.5)
ERYTHROCYTE [DISTWIDTH] IN BLOOD BY AUTOMATED COUNT: 20.3 % (ref 11.5–14.5)
ERYTHROCYTE [DISTWIDTH] IN BLOOD BY AUTOMATED COUNT: 20.4 % (ref 11.5–14.5)
EST. GFR  (AFRICAN AMERICAN): 55.2 ML/MIN/1.73 M^2
EST. GFR  (NON AFRICAN AMERICAN): 47.9 ML/MIN/1.73 M^2
GLUCOSE SERPL-MCNC: 45 MG/DL (ref 70–110)
GLUCOSE SERPL-MCNC: 94 MG/DL (ref 70–110)
GLUCOSE SERPL-MCNC: 97 MG/DL (ref 70–110)
HCO3 UR-SCNC: 24.3 MMOL/L (ref 24–28)
HCT VFR BLD AUTO: 26.6 % (ref 37–48.5)
HCT VFR BLD AUTO: 27.1 % (ref 37–48.5)
HCT VFR BLD AUTO: 27.2 % (ref 37–48.5)
HCT VFR BLD CALC: 20 %PCV (ref 36–54)
HCT VFR BLD CALC: 27 %PCV (ref 36–54)
HGB BLD-MCNC: 8.1 G/DL (ref 12–16)
HGB BLD-MCNC: 8.1 G/DL (ref 12–16)
HGB BLD-MCNC: 8.5 G/DL (ref 12–16)
IMM GRANULOCYTES # BLD AUTO: 0.02 K/UL (ref 0–0.04)
IMM GRANULOCYTES # BLD AUTO: 0.03 K/UL (ref 0–0.04)
IMM GRANULOCYTES # BLD AUTO: 0.03 K/UL (ref 0–0.04)
IMM GRANULOCYTES NFR BLD AUTO: 0.3 % (ref 0–0.5)
IMM GRANULOCYTES NFR BLD AUTO: 0.3 % (ref 0–0.5)
IMM GRANULOCYTES NFR BLD AUTO: 0.4 % (ref 0–0.5)
INR PPP: 1.1 (ref 0.8–1.2)
LYMPHOCYTES # BLD AUTO: 0.5 K/UL (ref 1–4.8)
LYMPHOCYTES # BLD AUTO: 0.6 K/UL (ref 1–4.8)
LYMPHOCYTES # BLD AUTO: 0.6 K/UL (ref 1–4.8)
LYMPHOCYTES NFR BLD: 5.1 % (ref 18–48)
LYMPHOCYTES NFR BLD: 8.2 % (ref 18–48)
LYMPHOCYTES NFR BLD: 9.4 % (ref 18–48)
MAGNESIUM SERPL-MCNC: 1.9 MG/DL (ref 1.6–2.6)
MCH RBC QN AUTO: 28.3 PG (ref 27–31)
MCH RBC QN AUTO: 28.4 PG (ref 27–31)
MCH RBC QN AUTO: 28.8 PG (ref 27–31)
MCHC RBC AUTO-ENTMCNC: 29.8 G/DL (ref 32–36)
MCHC RBC AUTO-ENTMCNC: 30.5 G/DL (ref 32–36)
MCHC RBC AUTO-ENTMCNC: 31.4 G/DL (ref 32–36)
MCV RBC AUTO: 92 FL (ref 82–98)
MCV RBC AUTO: 93 FL (ref 82–98)
MCV RBC AUTO: 95 FL (ref 82–98)
MONOCYTES # BLD AUTO: 0.3 K/UL (ref 0.3–1)
MONOCYTES # BLD AUTO: 0.5 K/UL (ref 0.3–1)
MONOCYTES # BLD AUTO: 0.7 K/UL (ref 0.3–1)
MONOCYTES NFR BLD: 5.6 % (ref 4–15)
MONOCYTES NFR BLD: 6.3 % (ref 4–15)
MONOCYTES NFR BLD: 6.8 % (ref 4–15)
NEUTROPHILS # BLD AUTO: 5 K/UL (ref 1.8–7.7)
NEUTROPHILS # BLD AUTO: 6.1 K/UL (ref 1.8–7.7)
NEUTROPHILS # BLD AUTO: 8.8 K/UL (ref 1.8–7.7)
NEUTROPHILS NFR BLD: 82.2 % (ref 38–73)
NEUTROPHILS NFR BLD: 82.6 % (ref 38–73)
NEUTROPHILS NFR BLD: 86 % (ref 38–73)
NRBC BLD-RTO: 0 /100 WBC
NRBC BLD-RTO: 0 /100 WBC
NRBC BLD-RTO: 1 /100 WBC
PCO2 BLDA: 46.5 MMHG (ref 35–45)
PH SMN: 7.33 [PH] (ref 7.35–7.45)
PHOSPHATE SERPL-MCNC: 2.2 MG/DL (ref 2.7–4.5)
PLATELET # BLD AUTO: 80 K/UL (ref 150–450)
PLATELET # BLD AUTO: 80 K/UL (ref 150–450)
PLATELET # BLD AUTO: 83 K/UL (ref 150–450)
PMV BLD AUTO: 13 FL (ref 9.2–12.9)
PMV BLD AUTO: 13.6 FL (ref 9.2–12.9)
PMV BLD AUTO: 13.7 FL (ref 9.2–12.9)
PO2 BLDA: 160 MMHG (ref 80–100)
POC BE: -2 MMOL/L
POC IONIZED CALCIUM: 1.01 MMOL/L (ref 1.06–1.42)
POC IONIZED CALCIUM: 1.03 MMOL/L (ref 1.06–1.42)
POC SATURATED O2: 99 % (ref 95–100)
POC TCO2 (MEASURED): 26 MMOL/L (ref 23–29)
POC TCO2: 26 MMOL/L (ref 23–27)
POCT GLUCOSE: 144 MG/DL (ref 70–110)
POCT GLUCOSE: 174 MG/DL (ref 70–110)
POCT GLUCOSE: 41 MG/DL (ref 70–110)
POCT GLUCOSE: 67 MG/DL (ref 70–110)
POTASSIUM BLD-SCNC: 3 MMOL/L (ref 3.5–5.1)
POTASSIUM BLD-SCNC: 3.5 MMOL/L (ref 3.5–5.1)
POTASSIUM SERPL-SCNC: 3.4 MMOL/L (ref 3.5–5.1)
PROT SERPL-MCNC: 6.2 G/DL (ref 6–8.4)
PROTHROMBIN TIME: 12.1 SEC (ref 9–12.5)
RBC # BLD AUTO: 2.85 M/UL (ref 4–5.4)
RBC # BLD AUTO: 2.86 M/UL (ref 4–5.4)
RBC # BLD AUTO: 2.95 M/UL (ref 4–5.4)
SAMPLE: ABNORMAL
SAMPLE: ABNORMAL
SODIUM BLD-SCNC: 142 MMOL/L (ref 136–145)
SODIUM BLD-SCNC: 145 MMOL/L (ref 136–145)
SODIUM SERPL-SCNC: 140 MMOL/L (ref 136–145)
WBC # BLD AUTO: 10.22 K/UL (ref 3.9–12.7)
WBC # BLD AUTO: 6.07 K/UL (ref 3.9–12.7)
WBC # BLD AUTO: 7.44 K/UL (ref 3.9–12.7)

## 2021-06-14 PROCEDURE — 83735 ASSAY OF MAGNESIUM: CPT | Performed by: INTERNAL MEDICINE

## 2021-06-14 PROCEDURE — D9220A PRA ANESTHESIA: ICD-10-PCS | Mod: CRNA,,, | Performed by: NURSE ANESTHETIST, CERTIFIED REGISTERED

## 2021-06-14 PROCEDURE — 25000003 PHARM REV CODE 250: Performed by: STUDENT IN AN ORGANIZED HEALTH CARE EDUCATION/TRAINING PROGRAM

## 2021-06-14 PROCEDURE — 45378 DIAGNOSTIC COLONOSCOPY: CPT | Performed by: INTERNAL MEDICINE

## 2021-06-14 PROCEDURE — 85025 COMPLETE CBC W/AUTO DIFF WBC: CPT | Mod: 91 | Performed by: STUDENT IN AN ORGANIZED HEALTH CARE EDUCATION/TRAINING PROGRAM

## 2021-06-14 PROCEDURE — D9220A PRA ANESTHESIA: Mod: CRNA,,, | Performed by: NURSE ANESTHETIST, CERTIFIED REGISTERED

## 2021-06-14 PROCEDURE — 99232 PR SUBSEQUENT HOSPITAL CARE,LEVL II: ICD-10-PCS | Mod: GC,,, | Performed by: HOSPITALIST

## 2021-06-14 PROCEDURE — 36415 COLL VENOUS BLD VENIPUNCTURE: CPT | Performed by: PHYSICIAN ASSISTANT

## 2021-06-14 PROCEDURE — 36415 COLL VENOUS BLD VENIPUNCTURE: CPT | Performed by: STUDENT IN AN ORGANIZED HEALTH CARE EDUCATION/TRAINING PROGRAM

## 2021-06-14 PROCEDURE — 45378 PR COLONOSCOPY,DIAGNOSTIC: ICD-10-PCS | Mod: 53,,, | Performed by: INTERNAL MEDICINE

## 2021-06-14 PROCEDURE — 85025 COMPLETE CBC W/AUTO DIFF WBC: CPT | Mod: 91 | Performed by: PHYSICIAN ASSISTANT

## 2021-06-14 PROCEDURE — 37000009 HC ANESTHESIA EA ADD 15 MINS: Performed by: INTERNAL MEDICINE

## 2021-06-14 PROCEDURE — 25000003 PHARM REV CODE 250: Performed by: INTERNAL MEDICINE

## 2021-06-14 PROCEDURE — 37000008 HC ANESTHESIA 1ST 15 MINUTES: Performed by: INTERNAL MEDICINE

## 2021-06-14 PROCEDURE — 63600175 PHARM REV CODE 636 W HCPCS: Performed by: INTERNAL MEDICINE

## 2021-06-14 PROCEDURE — 11000001 HC ACUTE MED/SURG PRIVATE ROOM

## 2021-06-14 PROCEDURE — 94761 N-INVAS EAR/PLS OXIMETRY MLT: CPT

## 2021-06-14 PROCEDURE — 63600175 PHARM REV CODE 636 W HCPCS: Performed by: STUDENT IN AN ORGANIZED HEALTH CARE EDUCATION/TRAINING PROGRAM

## 2021-06-14 PROCEDURE — A4216 STERILE WATER/SALINE, 10 ML: HCPCS | Performed by: NURSE ANESTHETIST, CERTIFIED REGISTERED

## 2021-06-14 PROCEDURE — D9220A PRA ANESTHESIA: Mod: ANES,,, | Performed by: ANESTHESIOLOGY

## 2021-06-14 PROCEDURE — 82962 GLUCOSE BLOOD TEST: CPT | Performed by: INTERNAL MEDICINE

## 2021-06-14 PROCEDURE — 85610 PROTHROMBIN TIME: CPT | Performed by: STUDENT IN AN ORGANIZED HEALTH CARE EDUCATION/TRAINING PROGRAM

## 2021-06-14 PROCEDURE — D9220A PRA ANESTHESIA: ICD-10-PCS | Mod: ANES,,, | Performed by: ANESTHESIOLOGY

## 2021-06-14 PROCEDURE — C9113 INJ PANTOPRAZOLE SODIUM, VIA: HCPCS | Performed by: INTERNAL MEDICINE

## 2021-06-14 PROCEDURE — 80053 COMPREHEN METABOLIC PANEL: CPT | Performed by: INTERNAL MEDICINE

## 2021-06-14 PROCEDURE — 25000003 PHARM REV CODE 250: Performed by: NURSE ANESTHETIST, CERTIFIED REGISTERED

## 2021-06-14 PROCEDURE — 80100014 HC HEMODIALYSIS 1:1

## 2021-06-14 PROCEDURE — 45378 DIAGNOSTIC COLONOSCOPY: CPT | Mod: 53,,, | Performed by: INTERNAL MEDICINE

## 2021-06-14 PROCEDURE — 99232 SBSQ HOSP IP/OBS MODERATE 35: CPT | Mod: GC,,, | Performed by: HOSPITALIST

## 2021-06-14 PROCEDURE — 84100 ASSAY OF PHOSPHORUS: CPT | Performed by: INTERNAL MEDICINE

## 2021-06-14 RX ORDER — LIDOCAINE HYDROCHLORIDE 20 MG/ML
INJECTION INTRAVENOUS
Status: DISCONTINUED | OUTPATIENT
Start: 2021-06-14 | End: 2021-06-14

## 2021-06-14 RX ORDER — ETOMIDATE 2 MG/ML
INJECTION INTRAVENOUS
Status: DISCONTINUED | OUTPATIENT
Start: 2021-06-14 | End: 2021-06-14

## 2021-06-14 RX ORDER — VASOPRESSIN 20 [USP'U]/ML
INJECTION, SOLUTION INTRAMUSCULAR; SUBCUTANEOUS
Status: DISCONTINUED | OUTPATIENT
Start: 2021-06-14 | End: 2021-06-14

## 2021-06-14 RX ORDER — PSEUDOEPHEDRINE/ACETAMINOPHEN 30MG-500MG
100 TABLET ORAL
Status: DISPENSED | OUTPATIENT
Start: 2021-06-14 | End: 2021-06-14

## 2021-06-14 RX ORDER — SYRING-NEEDL,DISP,INSUL,0.3 ML 29 G X1/2"
296 SYRINGE, EMPTY DISPOSABLE MISCELLANEOUS
Status: DISPENSED | OUTPATIENT
Start: 2021-06-14 | End: 2021-06-14

## 2021-06-14 RX ADMIN — PANTOPRAZOLE SODIUM 40 MG: 40 INJECTION, POWDER, FOR SOLUTION INTRAVENOUS at 09:06

## 2021-06-14 RX ADMIN — SODIUM CHLORIDE 0.5 MCG/KG/HR: 9 INJECTION, SOLUTION INTRAMUSCULAR; INTRAVENOUS; SUBCUTANEOUS at 02:06

## 2021-06-14 RX ADMIN — MUPIROCIN: 20 OINTMENT TOPICAL at 09:06

## 2021-06-14 RX ADMIN — ETOMIDATE 4 MG: 2 INJECTION, SOLUTION INTRAVENOUS at 02:06

## 2021-06-14 RX ADMIN — SODIUM CHLORIDE: 0.9 INJECTION, SOLUTION INTRAVENOUS at 02:06

## 2021-06-14 RX ADMIN — SEVELAMER CARBONATE 800 MG: 800 TABLET, FILM COATED ORAL at 06:06

## 2021-06-14 RX ADMIN — VASOPRESSIN 0.5 UNITS: 20 INJECTION, SOLUTION INTRAMUSCULAR; SUBCUTANEOUS at 02:06

## 2021-06-14 RX ADMIN — HEPARIN SODIUM 1000 UNITS: 1000 INJECTION, SOLUTION INTRAVENOUS; SUBCUTANEOUS at 05:06

## 2021-06-14 RX ADMIN — MUPIROCIN: 20 OINTMENT TOPICAL at 08:06

## 2021-06-14 RX ADMIN — MIRTAZAPINE 7.5 MG: 7.5 TABLET ORAL at 08:06

## 2021-06-14 RX ADMIN — SODIUM CHLORIDE: 9 INJECTION, SOLUTION INTRAVENOUS at 02:06

## 2021-06-14 RX ADMIN — CINACALCET 60 MG: 30 TABLET, FILM COATED ORAL at 06:06

## 2021-06-14 RX ADMIN — ATORVASTATIN CALCIUM 20 MG: 20 TABLET, FILM COATED ORAL at 08:06

## 2021-06-14 RX ADMIN — DEXTROSE MONOHYDRATE 25 G: 25 INJECTION, SOLUTION INTRAVENOUS at 12:06

## 2021-06-14 RX ADMIN — ETOMIDATE 2 MG: 2 INJECTION, SOLUTION INTRAVENOUS at 02:06

## 2021-06-14 RX ADMIN — PANTOPRAZOLE SODIUM 40 MG: 40 INJECTION, POWDER, FOR SOLUTION INTRAVENOUS at 08:06

## 2021-06-14 RX ADMIN — DEXTROSE MONOHYDRATE 12.5 G: 25 INJECTION, SOLUTION INTRAVENOUS at 03:06

## 2021-06-14 RX ADMIN — EPOETIN ALFA-EPBX 2250 UNITS: 3000 INJECTION, SOLUTION INTRAVENOUS; SUBCUTANEOUS at 05:06

## 2021-06-14 RX ADMIN — LIDOCAINE HYDROCHLORIDE 60 MG: 20 INJECTION, SOLUTION INTRAVENOUS at 02:06

## 2021-06-14 RX ADMIN — IRON SUCROSE 100 MG: 20 INJECTION, SOLUTION INTRAVENOUS at 09:06

## 2021-06-15 ENCOUNTER — TELEPHONE (OUTPATIENT)
Dept: ENDOSCOPY | Facility: HOSPITAL | Age: 81
End: 2021-06-15

## 2021-06-15 VITALS
HEIGHT: 63 IN | RESPIRATION RATE: 14 BRPM | TEMPERATURE: 98 F | SYSTOLIC BLOOD PRESSURE: 135 MMHG | WEIGHT: 99.44 LBS | OXYGEN SATURATION: 99 % | HEART RATE: 59 BPM | BODY MASS INDEX: 17.62 KG/M2 | DIASTOLIC BLOOD PRESSURE: 75 MMHG

## 2021-06-15 LAB
ALBUMIN SERPL BCP-MCNC: 2.3 G/DL (ref 3.5–5.2)
ALP SERPL-CCNC: 120 U/L (ref 55–135)
ALT SERPL W/O P-5'-P-CCNC: 8 U/L (ref 10–44)
ANION GAP SERPL CALC-SCNC: 11 MMOL/L (ref 8–16)
AST SERPL-CCNC: 20 U/L (ref 10–40)
BASOPHILS # BLD AUTO: 0.05 K/UL (ref 0–0.2)
BASOPHILS NFR BLD: 0.9 % (ref 0–1.9)
BILIRUB SERPL-MCNC: 0.4 MG/DL (ref 0.1–1)
BUN SERPL-MCNC: 14 MG/DL (ref 8–23)
CALCIUM SERPL-MCNC: 6.6 MG/DL (ref 8.7–10.5)
CHLORIDE SERPL-SCNC: 108 MMOL/L (ref 95–110)
CO2 SERPL-SCNC: 23 MMOL/L (ref 23–29)
CREAT SERPL-MCNC: 2.1 MG/DL (ref 0.5–1.4)
DIFFERENTIAL METHOD: ABNORMAL
EOSINOPHIL # BLD AUTO: 0.1 K/UL (ref 0–0.5)
EOSINOPHIL NFR BLD: 2.4 % (ref 0–8)
ERYTHROCYTE [DISTWIDTH] IN BLOOD BY AUTOMATED COUNT: 20.3 % (ref 11.5–14.5)
EST. GFR  (AFRICAN AMERICAN): 25.2 ML/MIN/1.73 M^2
EST. GFR  (NON AFRICAN AMERICAN): 21.9 ML/MIN/1.73 M^2
GLUCOSE SERPL-MCNC: 62 MG/DL (ref 70–110)
HCT VFR BLD AUTO: 24.9 % (ref 37–48.5)
HGB BLD-MCNC: 7.4 G/DL (ref 12–16)
IMM GRANULOCYTES # BLD AUTO: 0.01 K/UL (ref 0–0.04)
IMM GRANULOCYTES NFR BLD AUTO: 0.2 % (ref 0–0.5)
INR PPP: 1.1 (ref 0.8–1.2)
LYMPHOCYTES # BLD AUTO: 0.4 K/UL (ref 1–4.8)
LYMPHOCYTES NFR BLD: 7.9 % (ref 18–48)
MAGNESIUM SERPL-MCNC: 2 MG/DL (ref 1.6–2.6)
MCH RBC QN AUTO: 28.4 PG (ref 27–31)
MCHC RBC AUTO-ENTMCNC: 29.7 G/DL (ref 32–36)
MCV RBC AUTO: 95 FL (ref 82–98)
MONOCYTES # BLD AUTO: 0.5 K/UL (ref 0.3–1)
MONOCYTES NFR BLD: 9 % (ref 4–15)
NEUTROPHILS # BLD AUTO: 4.4 K/UL (ref 1.8–7.7)
NEUTROPHILS NFR BLD: 79.6 % (ref 38–73)
NRBC BLD-RTO: 0 /100 WBC
PHOSPHATE SERPL-MCNC: 3.6 MG/DL (ref 2.7–4.5)
PLATELET # BLD AUTO: 66 K/UL (ref 150–450)
PMV BLD AUTO: 12.8 FL (ref 9.2–12.9)
POTASSIUM SERPL-SCNC: 4 MMOL/L (ref 3.5–5.1)
PROT SERPL-MCNC: 5.4 G/DL (ref 6–8.4)
PROTHROMBIN TIME: 12.1 SEC (ref 9–12.5)
RBC # BLD AUTO: 2.61 M/UL (ref 4–5.4)
SODIUM SERPL-SCNC: 142 MMOL/L (ref 136–145)
WBC # BLD AUTO: 5.46 K/UL (ref 3.9–12.7)

## 2021-06-15 PROCEDURE — 63600175 PHARM REV CODE 636 W HCPCS: Performed by: INTERNAL MEDICINE

## 2021-06-15 PROCEDURE — 36415 COLL VENOUS BLD VENIPUNCTURE: CPT | Performed by: STUDENT IN AN ORGANIZED HEALTH CARE EDUCATION/TRAINING PROGRAM

## 2021-06-15 PROCEDURE — 99239 PR HOSPITAL DISCHARGE DAY,>30 MIN: ICD-10-PCS | Mod: GC,,, | Performed by: HOSPITALIST

## 2021-06-15 PROCEDURE — 83735 ASSAY OF MAGNESIUM: CPT | Performed by: INTERNAL MEDICINE

## 2021-06-15 PROCEDURE — 25000003 PHARM REV CODE 250: Performed by: INTERNAL MEDICINE

## 2021-06-15 PROCEDURE — 80053 COMPREHEN METABOLIC PANEL: CPT | Performed by: INTERNAL MEDICINE

## 2021-06-15 PROCEDURE — 99239 HOSP IP/OBS DSCHRG MGMT >30: CPT | Mod: GC,,, | Performed by: HOSPITALIST

## 2021-06-15 PROCEDURE — 85610 PROTHROMBIN TIME: CPT | Performed by: STUDENT IN AN ORGANIZED HEALTH CARE EDUCATION/TRAINING PROGRAM

## 2021-06-15 PROCEDURE — 84100 ASSAY OF PHOSPHORUS: CPT | Performed by: INTERNAL MEDICINE

## 2021-06-15 PROCEDURE — 85025 COMPLETE CBC W/AUTO DIFF WBC: CPT | Performed by: STUDENT IN AN ORGANIZED HEALTH CARE EDUCATION/TRAINING PROGRAM

## 2021-06-15 PROCEDURE — C9113 INJ PANTOPRAZOLE SODIUM, VIA: HCPCS | Performed by: INTERNAL MEDICINE

## 2021-06-15 RX ORDER — PANTOPRAZOLE SODIUM 40 MG/1
40 TABLET, DELAYED RELEASE ORAL DAILY
Status: DISCONTINUED | OUTPATIENT
Start: 2021-06-16 | End: 2021-06-15 | Stop reason: HOSPADM

## 2021-06-15 RX ORDER — PANTOPRAZOLE SODIUM 40 MG/1
40 TABLET, DELAYED RELEASE ORAL DAILY
Qty: 30 TABLET | Refills: 2 | Status: ON HOLD | OUTPATIENT
Start: 2021-06-16 | End: 2021-06-23 | Stop reason: HOSPADM

## 2021-06-15 RX ORDER — SODIUM CHLORIDE 9 MG/ML
INJECTION, SOLUTION INTRAVENOUS ONCE
Status: DISCONTINUED | OUTPATIENT
Start: 2021-06-16 | End: 2021-06-15 | Stop reason: HOSPADM

## 2021-06-15 RX ORDER — CLONIDINE 0.2 MG/24H
1 PATCH, EXTENDED RELEASE TRANSDERMAL
Qty: 4 PATCH | Refills: 1 | Status: SHIPPED | OUTPATIENT
Start: 2021-06-15 | End: 2021-06-19

## 2021-06-15 RX ADMIN — SEVELAMER CARBONATE 800 MG: 800 TABLET, FILM COATED ORAL at 12:06

## 2021-06-15 RX ADMIN — CINACALCET 60 MG: 30 TABLET, FILM COATED ORAL at 09:06

## 2021-06-15 RX ADMIN — PANTOPRAZOLE SODIUM 40 MG: 40 INJECTION, POWDER, FOR SOLUTION INTRAVENOUS at 09:06

## 2021-06-15 RX ADMIN — MUPIROCIN: 20 OINTMENT TOPICAL at 09:06

## 2021-06-15 RX ADMIN — SEVELAMER CARBONATE 800 MG: 800 TABLET, FILM COATED ORAL at 09:06

## 2021-06-16 PROBLEM — I95.9 HYPOTENSION: Status: ACTIVE | Noted: 2021-06-16

## 2021-06-19 ENCOUNTER — HOSPITAL ENCOUNTER (INPATIENT)
Facility: HOSPITAL | Age: 81
LOS: 12 days | Discharge: NURSING, REHAB OR LONG-TERM CARE | DRG: 356 | End: 2021-07-01
Attending: EMERGENCY MEDICINE | Admitting: INTERNAL MEDICINE
Payer: MEDICARE

## 2021-06-19 DIAGNOSIS — K92.2 GI BLEED: ICD-10-CM

## 2021-06-19 DIAGNOSIS — M89.9 CHRONIC KIDNEY DISEASE-MINERAL AND BONE DISORDER: ICD-10-CM

## 2021-06-19 DIAGNOSIS — I48.91 ATRIAL FIBRILLATION: ICD-10-CM

## 2021-06-19 DIAGNOSIS — A49.8 CLOSTRIDIUM DIFFICILE INFECTION: ICD-10-CM

## 2021-06-19 DIAGNOSIS — I35.0 SEVERE AORTIC STENOSIS: Primary | ICD-10-CM

## 2021-06-19 DIAGNOSIS — N18.6 ESRD ON HEMODIALYSIS: ICD-10-CM

## 2021-06-19 DIAGNOSIS — Z99.2 ESRD ON HEMODIALYSIS: ICD-10-CM

## 2021-06-19 DIAGNOSIS — K92.2 LOWER GI BLEED: ICD-10-CM

## 2021-06-19 DIAGNOSIS — N18.9 CHRONIC KIDNEY DISEASE-MINERAL AND BONE DISORDER: ICD-10-CM

## 2021-06-19 DIAGNOSIS — E83.9 CHRONIC KIDNEY DISEASE-MINERAL AND BONE DISORDER: ICD-10-CM

## 2021-06-19 LAB
ABO + RH BLD: NORMAL
ALBUMIN SERPL BCP-MCNC: 2.5 G/DL (ref 3.5–5.2)
ALP SERPL-CCNC: 137 U/L (ref 55–135)
ALT SERPL W/O P-5'-P-CCNC: 9 U/L (ref 10–44)
ANION GAP SERPL CALC-SCNC: 12 MMOL/L (ref 8–16)
AST SERPL-CCNC: 15 U/L (ref 10–40)
BASOPHILS # BLD AUTO: 0.04 K/UL (ref 0–0.2)
BASOPHILS # BLD AUTO: 0.05 K/UL (ref 0–0.2)
BASOPHILS NFR BLD: 0.5 % (ref 0–1.9)
BASOPHILS NFR BLD: 0.7 % (ref 0–1.9)
BILIRUB SERPL-MCNC: 0.5 MG/DL (ref 0.1–1)
BLD GP AB SCN CELLS X3 SERPL QL: NORMAL
BNP SERPL-MCNC: 3367 PG/ML (ref 0–99)
BUN SERPL-MCNC: 34 MG/DL (ref 6–30)
BUN SERPL-MCNC: 36 MG/DL (ref 8–23)
CALCIUM SERPL-MCNC: 6.8 MG/DL (ref 8.7–10.5)
CHLORIDE SERPL-SCNC: 108 MMOL/L (ref 95–110)
CHLORIDE SERPL-SCNC: 111 MMOL/L (ref 95–110)
CO2 SERPL-SCNC: 21 MMOL/L (ref 23–29)
CREAT SERPL-MCNC: 3.1 MG/DL (ref 0.5–1.4)
CREAT SERPL-MCNC: 3.5 MG/DL (ref 0.5–1.4)
DIFFERENTIAL METHOD: ABNORMAL
DIFFERENTIAL METHOD: ABNORMAL
EOSINOPHIL # BLD AUTO: 0.1 K/UL (ref 0–0.5)
EOSINOPHIL # BLD AUTO: 0.1 K/UL (ref 0–0.5)
EOSINOPHIL NFR BLD: 1.4 % (ref 0–8)
EOSINOPHIL NFR BLD: 1.7 % (ref 0–8)
ERYTHROCYTE [DISTWIDTH] IN BLOOD BY AUTOMATED COUNT: 19.2 % (ref 11.5–14.5)
ERYTHROCYTE [DISTWIDTH] IN BLOOD BY AUTOMATED COUNT: 19.3 % (ref 11.5–14.5)
EST. GFR  (AFRICAN AMERICAN): 15.8 ML/MIN/1.73 M^2
EST. GFR  (NON AFRICAN AMERICAN): 13.7 ML/MIN/1.73 M^2
GLUCOSE SERPL-MCNC: 102 MG/DL (ref 70–110)
GLUCOSE SERPL-MCNC: 93 MG/DL (ref 70–110)
HCT VFR BLD AUTO: 21.4 % (ref 37–48.5)
HCT VFR BLD AUTO: 25.3 % (ref 37–48.5)
HCT VFR BLD CALC: 23 %PCV (ref 36–54)
HGB BLD-MCNC: 6.3 G/DL (ref 12–16)
HGB BLD-MCNC: 7.5 G/DL (ref 12–16)
IMM GRANULOCYTES # BLD AUTO: 0.03 K/UL (ref 0–0.04)
IMM GRANULOCYTES # BLD AUTO: 0.04 K/UL (ref 0–0.04)
IMM GRANULOCYTES NFR BLD AUTO: 0.4 % (ref 0–0.5)
IMM GRANULOCYTES NFR BLD AUTO: 0.5 % (ref 0–0.5)
LACTATE SERPL-SCNC: 1 MMOL/L (ref 0.5–2.2)
LYMPHOCYTES # BLD AUTO: 0.5 K/UL (ref 1–4.8)
LYMPHOCYTES # BLD AUTO: 0.6 K/UL (ref 1–4.8)
LYMPHOCYTES NFR BLD: 6.4 % (ref 18–48)
LYMPHOCYTES NFR BLD: 8.2 % (ref 18–48)
MAGNESIUM SERPL-MCNC: 2 MG/DL (ref 1.6–2.6)
MCH RBC QN AUTO: 28.9 PG (ref 27–31)
MCH RBC QN AUTO: 29.3 PG (ref 27–31)
MCHC RBC AUTO-ENTMCNC: 29.4 G/DL (ref 32–36)
MCHC RBC AUTO-ENTMCNC: 29.6 G/DL (ref 32–36)
MCV RBC AUTO: 98 FL (ref 82–98)
MCV RBC AUTO: 99 FL (ref 82–98)
MONOCYTES # BLD AUTO: 0.7 K/UL (ref 0.3–1)
MONOCYTES # BLD AUTO: 0.7 K/UL (ref 0.3–1)
MONOCYTES NFR BLD: 9.1 % (ref 4–15)
MONOCYTES NFR BLD: 9.2 % (ref 4–15)
NEUTROPHILS # BLD AUTO: 5.7 K/UL (ref 1.8–7.7)
NEUTROPHILS # BLD AUTO: 6.1 K/UL (ref 1.8–7.7)
NEUTROPHILS NFR BLD: 80.1 % (ref 38–73)
NEUTROPHILS NFR BLD: 81.8 % (ref 38–73)
NRBC BLD-RTO: 0 /100 WBC
NRBC BLD-RTO: 0 /100 WBC
PHOSPHATE SERPL-MCNC: 3.4 MG/DL (ref 2.7–4.5)
PLATELET # BLD AUTO: 64 K/UL (ref 150–450)
PLATELET # BLD AUTO: 71 K/UL (ref 150–450)
PMV BLD AUTO: 12.6 FL (ref 9.2–12.9)
PMV BLD AUTO: ABNORMAL FL (ref 9.2–12.9)
POC IONIZED CALCIUM: 0.9 MMOL/L (ref 1.06–1.42)
POC TCO2 (MEASURED): 23 MMOL/L (ref 23–29)
POCT GLUCOSE: 73 MG/DL (ref 70–110)
POTASSIUM BLD-SCNC: 4.4 MMOL/L (ref 3.5–5.1)
POTASSIUM SERPL-SCNC: 4.5 MMOL/L (ref 3.5–5.1)
PROT SERPL-MCNC: 6 G/DL (ref 6–8.4)
RBC # BLD AUTO: 2.18 M/UL (ref 4–5.4)
RBC # BLD AUTO: 2.56 M/UL (ref 4–5.4)
SAMPLE: ABNORMAL
SODIUM BLD-SCNC: 144 MMOL/L (ref 136–145)
SODIUM SERPL-SCNC: 144 MMOL/L (ref 136–145)
TROPONIN I SERPL DL<=0.01 NG/ML-MCNC: 0.05 NG/ML (ref 0–0.03)
WBC # BLD AUTO: 7.08 K/UL (ref 3.9–12.7)
WBC # BLD AUTO: 7.49 K/UL (ref 3.9–12.7)

## 2021-06-19 PROCEDURE — 86920 COMPATIBILITY TEST SPIN: CPT | Performed by: HOSPITALIST

## 2021-06-19 PROCEDURE — 84484 ASSAY OF TROPONIN QUANT: CPT | Performed by: EMERGENCY MEDICINE

## 2021-06-19 PROCEDURE — 25500020 PHARM REV CODE 255: Performed by: EMERGENCY MEDICINE

## 2021-06-19 PROCEDURE — 99291 PR CRITICAL CARE, E/M 30-74 MINUTES: ICD-10-PCS | Mod: ,,, | Performed by: EMERGENCY MEDICINE

## 2021-06-19 PROCEDURE — 27201040 HC RC 50 FILTER: Performed by: EMERGENCY MEDICINE

## 2021-06-19 PROCEDURE — 99291 CRITICAL CARE FIRST HOUR: CPT | Mod: ,,, | Performed by: EMERGENCY MEDICINE

## 2021-06-19 PROCEDURE — 86900 BLOOD TYPING SEROLOGIC ABO: CPT | Performed by: EMERGENCY MEDICINE

## 2021-06-19 PROCEDURE — 84100 ASSAY OF PHOSPHORUS: CPT | Performed by: EMERGENCY MEDICINE

## 2021-06-19 PROCEDURE — P9022 WASHED RED BLOOD CELLS UNIT: HCPCS | Performed by: EMERGENCY MEDICINE

## 2021-06-19 PROCEDURE — 99291 CRITICAL CARE FIRST HOUR: CPT | Mod: 25

## 2021-06-19 PROCEDURE — 86920 COMPATIBILITY TEST SPIN: CPT | Performed by: EMERGENCY MEDICINE

## 2021-06-19 PROCEDURE — 83605 ASSAY OF LACTIC ACID: CPT | Performed by: EMERGENCY MEDICINE

## 2021-06-19 PROCEDURE — 83735 ASSAY OF MAGNESIUM: CPT | Performed by: EMERGENCY MEDICINE

## 2021-06-19 PROCEDURE — 20000000 HC ICU ROOM

## 2021-06-19 PROCEDURE — 80047 BASIC METABLC PNL IONIZED CA: CPT

## 2021-06-19 PROCEDURE — 36430 TRANSFUSION BLD/BLD COMPNT: CPT

## 2021-06-19 PROCEDURE — 80053 COMPREHEN METABOLIC PANEL: CPT | Performed by: EMERGENCY MEDICINE

## 2021-06-19 PROCEDURE — 12000002 HC ACUTE/MED SURGE SEMI-PRIVATE ROOM

## 2021-06-19 PROCEDURE — 83880 ASSAY OF NATRIURETIC PEPTIDE: CPT | Performed by: EMERGENCY MEDICINE

## 2021-06-19 PROCEDURE — 85025 COMPLETE CBC W/AUTO DIFF WBC: CPT | Performed by: EMERGENCY MEDICINE

## 2021-06-19 RX ORDER — PANTOPRAZOLE SODIUM 40 MG/1
40 TABLET, DELAYED RELEASE ORAL DAILY
Status: DISCONTINUED | OUTPATIENT
Start: 2021-06-20 | End: 2021-06-19

## 2021-06-19 RX ORDER — MIRTAZAPINE 7.5 MG/1
7.5 TABLET, FILM COATED ORAL NIGHTLY
Status: DISCONTINUED | OUTPATIENT
Start: 2021-06-19 | End: 2021-07-01 | Stop reason: HOSPADM

## 2021-06-19 RX ORDER — MUPIROCIN 20 MG/G
OINTMENT TOPICAL 2 TIMES DAILY
Status: DISCONTINUED | OUTPATIENT
Start: 2021-06-19 | End: 2021-06-24

## 2021-06-19 RX ORDER — TALC
6 POWDER (GRAM) TOPICAL NIGHTLY PRN
Status: DISCONTINUED | OUTPATIENT
Start: 2021-06-19 | End: 2021-07-01 | Stop reason: HOSPADM

## 2021-06-19 RX ORDER — FLUTICASONE FUROATE AND VILANTEROL 200; 25 UG/1; UG/1
1 POWDER RESPIRATORY (INHALATION) DAILY
Status: DISCONTINUED | OUTPATIENT
Start: 2021-06-20 | End: 2021-07-01 | Stop reason: HOSPADM

## 2021-06-19 RX ORDER — SEVELAMER CARBONATE 800 MG/1
800 TABLET, FILM COATED ORAL
Status: DISCONTINUED | OUTPATIENT
Start: 2021-06-20 | End: 2021-06-23

## 2021-06-19 RX ORDER — PANTOPRAZOLE SODIUM 40 MG/10ML
40 INJECTION, POWDER, LYOPHILIZED, FOR SOLUTION INTRAVENOUS DAILY
Status: DISCONTINUED | OUTPATIENT
Start: 2021-06-19 | End: 2021-06-21

## 2021-06-19 RX ORDER — POLYETHYLENE GLYCOL 3350 17 G/17G
17 POWDER, FOR SOLUTION ORAL DAILY
Status: DISCONTINUED | OUTPATIENT
Start: 2021-06-20 | End: 2021-06-25

## 2021-06-19 RX ORDER — CINACALCET 30 MG/1
60 TABLET, FILM COATED ORAL 2 TIMES DAILY WITH MEALS
Status: DISCONTINUED | OUTPATIENT
Start: 2021-06-20 | End: 2021-06-20

## 2021-06-19 RX ORDER — ACETAMINOPHEN 325 MG/1
650 TABLET ORAL EVERY 4 HOURS PRN
Status: DISCONTINUED | OUTPATIENT
Start: 2021-06-20 | End: 2021-07-01 | Stop reason: HOSPADM

## 2021-06-19 RX ORDER — ATORVASTATIN CALCIUM 20 MG/1
20 TABLET, FILM COATED ORAL NIGHTLY
Status: DISCONTINUED | OUTPATIENT
Start: 2021-06-19 | End: 2021-07-01 | Stop reason: HOSPADM

## 2021-06-19 RX ORDER — SENNOSIDES 8.6 MG/1
1 TABLET ORAL DAILY
Status: DISCONTINUED | OUTPATIENT
Start: 2021-06-20 | End: 2021-06-25

## 2021-06-19 RX ORDER — SODIUM CHLORIDE 0.9 % (FLUSH) 0.9 %
10 SYRINGE (ML) INJECTION
Status: DISCONTINUED | OUTPATIENT
Start: 2021-06-19 | End: 2021-07-01 | Stop reason: HOSPADM

## 2021-06-19 RX ORDER — HYDROCODONE BITARTRATE AND ACETAMINOPHEN 500; 5 MG/1; MG/1
TABLET ORAL
Status: DISCONTINUED | OUTPATIENT
Start: 2021-06-19 | End: 2021-06-22

## 2021-06-19 RX ADMIN — IOHEXOL 75 ML: 350 INJECTION, SOLUTION INTRAVENOUS at 06:06

## 2021-06-20 PROBLEM — T81.30XA WOUND DEHISCENCE: Status: ACTIVE | Noted: 2021-06-20

## 2021-06-20 LAB
ALBUMIN SERPL BCP-MCNC: 2.2 G/DL (ref 3.5–5.2)
ALP SERPL-CCNC: 126 U/L (ref 55–135)
ALT SERPL W/O P-5'-P-CCNC: 7 U/L (ref 10–44)
ANION GAP SERPL CALC-SCNC: 11 MMOL/L (ref 8–16)
AST SERPL-CCNC: 19 U/L (ref 10–40)
BASOPHILS # BLD AUTO: 0.05 K/UL (ref 0–0.2)
BASOPHILS # BLD AUTO: 0.05 K/UL (ref 0–0.2)
BASOPHILS # BLD AUTO: 0.07 K/UL (ref 0–0.2)
BASOPHILS NFR BLD: 0.7 % (ref 0–1.9)
BASOPHILS NFR BLD: 0.8 % (ref 0–1.9)
BASOPHILS NFR BLD: 1 % (ref 0–1.9)
BILIRUB SERPL-MCNC: 1 MG/DL (ref 0.1–1)
BUN SERPL-MCNC: 42 MG/DL (ref 8–23)
CALCIUM SERPL-MCNC: 6 MG/DL (ref 8.7–10.5)
CHLORIDE SERPL-SCNC: 112 MMOL/L (ref 95–110)
CO2 SERPL-SCNC: 21 MMOL/L (ref 23–29)
CREAT SERPL-MCNC: 3.2 MG/DL (ref 0.5–1.4)
DIFFERENTIAL METHOD: ABNORMAL
EOSINOPHIL # BLD AUTO: 0.1 K/UL (ref 0–0.5)
EOSINOPHIL NFR BLD: 1.5 % (ref 0–8)
EOSINOPHIL NFR BLD: 1.7 % (ref 0–8)
EOSINOPHIL NFR BLD: 1.7 % (ref 0–8)
ERYTHROCYTE [DISTWIDTH] IN BLOOD BY AUTOMATED COUNT: 18 % (ref 11.5–14.5)
ERYTHROCYTE [DISTWIDTH] IN BLOOD BY AUTOMATED COUNT: 18.2 % (ref 11.5–14.5)
ERYTHROCYTE [DISTWIDTH] IN BLOOD BY AUTOMATED COUNT: 18.9 % (ref 11.5–14.5)
ERYTHROCYTE [DISTWIDTH] IN BLOOD BY AUTOMATED COUNT: 19 % (ref 11.5–14.5)
EST. GFR  (AFRICAN AMERICAN): 15.2 ML/MIN/1.73 M^2
EST. GFR  (NON AFRICAN AMERICAN): 13.2 ML/MIN/1.73 M^2
GLUCOSE SERPL-MCNC: 74 MG/DL (ref 70–110)
HCT VFR BLD AUTO: 23.5 % (ref 37–48.5)
HCT VFR BLD AUTO: 24.8 % (ref 37–48.5)
HCT VFR BLD AUTO: 24.9 % (ref 37–48.5)
HCT VFR BLD AUTO: 25.5 % (ref 37–48.5)
HGB BLD-MCNC: 7.1 G/DL (ref 12–16)
HGB BLD-MCNC: 7.3 G/DL (ref 12–16)
HGB BLD-MCNC: 7.3 G/DL (ref 12–16)
HGB BLD-MCNC: 7.5 G/DL (ref 12–16)
IMM GRANULOCYTES # BLD AUTO: 0.04 K/UL (ref 0–0.04)
IMM GRANULOCYTES # BLD AUTO: 0.04 K/UL (ref 0–0.04)
IMM GRANULOCYTES # BLD AUTO: 0.06 K/UL (ref 0–0.04)
IMM GRANULOCYTES NFR BLD AUTO: 0.5 % (ref 0–0.5)
IMM GRANULOCYTES NFR BLD AUTO: 0.6 % (ref 0–0.5)
IMM GRANULOCYTES NFR BLD AUTO: 0.8 % (ref 0–0.5)
INR PPP: 1.2 (ref 0.8–1.2)
LYMPHOCYTES # BLD AUTO: 0.5 K/UL (ref 1–4.8)
LYMPHOCYTES # BLD AUTO: 0.5 K/UL (ref 1–4.8)
LYMPHOCYTES # BLD AUTO: 0.6 K/UL (ref 1–4.8)
LYMPHOCYTES NFR BLD: 6.6 % (ref 18–48)
LYMPHOCYTES NFR BLD: 7.6 % (ref 18–48)
LYMPHOCYTES NFR BLD: 8.1 % (ref 18–48)
MAGNESIUM SERPL-MCNC: 1.9 MG/DL (ref 1.6–2.6)
MCH RBC QN AUTO: 28.5 PG (ref 27–31)
MCH RBC QN AUTO: 29.3 PG (ref 27–31)
MCH RBC QN AUTO: 29.3 PG (ref 27–31)
MCH RBC QN AUTO: 29.4 PG (ref 27–31)
MCHC RBC AUTO-ENTMCNC: 29.3 G/DL (ref 32–36)
MCHC RBC AUTO-ENTMCNC: 29.4 G/DL (ref 32–36)
MCHC RBC AUTO-ENTMCNC: 29.4 G/DL (ref 32–36)
MCHC RBC AUTO-ENTMCNC: 30.2 G/DL (ref 32–36)
MCV RBC AUTO: 100 FL (ref 82–98)
MCV RBC AUTO: 100 FL (ref 82–98)
MCV RBC AUTO: 97 FL (ref 82–98)
MCV RBC AUTO: 97 FL (ref 82–98)
MONOCYTES # BLD AUTO: 0.5 K/UL (ref 0.3–1)
MONOCYTES # BLD AUTO: 0.6 K/UL (ref 0.3–1)
MONOCYTES # BLD AUTO: 0.6 K/UL (ref 0.3–1)
MONOCYTES NFR BLD: 7.4 % (ref 4–15)
MONOCYTES NFR BLD: 7.8 % (ref 4–15)
MONOCYTES NFR BLD: 8.6 % (ref 4–15)
NEUTROPHILS # BLD AUTO: 5.4 K/UL (ref 1.8–7.7)
NEUTROPHILS # BLD AUTO: 5.8 K/UL (ref 1.8–7.7)
NEUTROPHILS # BLD AUTO: 6.1 K/UL (ref 1.8–7.7)
NEUTROPHILS NFR BLD: 80.8 % (ref 38–73)
NEUTROPHILS NFR BLD: 81.9 % (ref 38–73)
NEUTROPHILS NFR BLD: 81.9 % (ref 38–73)
NRBC BLD-RTO: 0 /100 WBC
PHOSPHATE SERPL-MCNC: 3.8 MG/DL (ref 2.7–4.5)
PLATELET # BLD AUTO: 67 K/UL (ref 150–450)
PLATELET # BLD AUTO: 70 K/UL (ref 150–450)
PLATELET # BLD AUTO: 71 K/UL (ref 150–450)
PLATELET # BLD AUTO: 73 K/UL (ref 150–450)
PMV BLD AUTO: 12.7 FL (ref 9.2–12.9)
PMV BLD AUTO: 13 FL (ref 9.2–12.9)
PMV BLD AUTO: 13.8 FL (ref 9.2–12.9)
PMV BLD AUTO: ABNORMAL FL (ref 9.2–12.9)
POCT GLUCOSE: 104 MG/DL (ref 70–110)
POCT GLUCOSE: 65 MG/DL (ref 70–110)
POTASSIUM SERPL-SCNC: 4.2 MMOL/L (ref 3.5–5.1)
PROT SERPL-MCNC: 5.5 G/DL (ref 6–8.4)
PROTHROMBIN TIME: 12.7 SEC (ref 9–12.5)
RBC # BLD AUTO: 2.42 M/UL (ref 4–5.4)
RBC # BLD AUTO: 2.49 M/UL (ref 4–5.4)
RBC # BLD AUTO: 2.55 M/UL (ref 4–5.4)
RBC # BLD AUTO: 2.56 M/UL (ref 4–5.4)
SODIUM SERPL-SCNC: 144 MMOL/L (ref 136–145)
WBC # BLD AUTO: 6.61 K/UL (ref 3.9–12.7)
WBC # BLD AUTO: 7.19 K/UL (ref 3.9–12.7)
WBC # BLD AUTO: 7.45 K/UL (ref 3.9–12.7)
WBC # BLD AUTO: 9.33 K/UL (ref 3.9–12.7)

## 2021-06-20 PROCEDURE — 97530 THERAPEUTIC ACTIVITIES: CPT

## 2021-06-20 PROCEDURE — 25000003 PHARM REV CODE 250: Performed by: STUDENT IN AN ORGANIZED HEALTH CARE EDUCATION/TRAINING PROGRAM

## 2021-06-20 PROCEDURE — 85610 PROTHROMBIN TIME: CPT | Performed by: STUDENT IN AN ORGANIZED HEALTH CARE EDUCATION/TRAINING PROGRAM

## 2021-06-20 PROCEDURE — 80053 COMPREHEN METABOLIC PANEL: CPT | Performed by: STUDENT IN AN ORGANIZED HEALTH CARE EDUCATION/TRAINING PROGRAM

## 2021-06-20 PROCEDURE — 25000003 PHARM REV CODE 250: Performed by: EMERGENCY MEDICINE

## 2021-06-20 PROCEDURE — 85025 COMPLETE CBC W/AUTO DIFF WBC: CPT | Mod: 91 | Performed by: STUDENT IN AN ORGANIZED HEALTH CARE EDUCATION/TRAINING PROGRAM

## 2021-06-20 PROCEDURE — 25000003 PHARM REV CODE 250: Performed by: INTERNAL MEDICINE

## 2021-06-20 PROCEDURE — 99223 PR INITIAL HOSPITAL CARE,LEVL III: ICD-10-PCS | Mod: GC,,, | Performed by: INTERNAL MEDICINE

## 2021-06-20 PROCEDURE — 84100 ASSAY OF PHOSPHORUS: CPT | Performed by: STUDENT IN AN ORGANIZED HEALTH CARE EDUCATION/TRAINING PROGRAM

## 2021-06-20 PROCEDURE — 97161 PT EVAL LOW COMPLEX 20 MIN: CPT

## 2021-06-20 PROCEDURE — 99291 PR CRITICAL CARE, E/M 30-74 MINUTES: ICD-10-PCS | Mod: GC,,, | Performed by: INTERNAL MEDICINE

## 2021-06-20 PROCEDURE — 36415 COLL VENOUS BLD VENIPUNCTURE: CPT | Performed by: STUDENT IN AN ORGANIZED HEALTH CARE EDUCATION/TRAINING PROGRAM

## 2021-06-20 PROCEDURE — 97165 OT EVAL LOW COMPLEX 30 MIN: CPT

## 2021-06-20 PROCEDURE — 99291 CRITICAL CARE FIRST HOUR: CPT | Mod: GC,,, | Performed by: INTERNAL MEDICINE

## 2021-06-20 PROCEDURE — 63600175 PHARM REV CODE 636 W HCPCS: Performed by: STUDENT IN AN ORGANIZED HEALTH CARE EDUCATION/TRAINING PROGRAM

## 2021-06-20 PROCEDURE — 85027 COMPLETE CBC AUTOMATED: CPT | Performed by: STUDENT IN AN ORGANIZED HEALTH CARE EDUCATION/TRAINING PROGRAM

## 2021-06-20 PROCEDURE — 20600001 HC STEP DOWN PRIVATE ROOM

## 2021-06-20 PROCEDURE — 27000221 HC OXYGEN, UP TO 24 HOURS

## 2021-06-20 PROCEDURE — 99223 1ST HOSP IP/OBS HIGH 75: CPT | Mod: GC,,, | Performed by: INTERNAL MEDICINE

## 2021-06-20 PROCEDURE — 83735 ASSAY OF MAGNESIUM: CPT | Performed by: STUDENT IN AN ORGANIZED HEALTH CARE EDUCATION/TRAINING PROGRAM

## 2021-06-20 PROCEDURE — 97535 SELF CARE MNGMENT TRAINING: CPT

## 2021-06-20 PROCEDURE — 94761 N-INVAS EAR/PLS OXIMETRY MLT: CPT

## 2021-06-20 PROCEDURE — C9113 INJ PANTOPRAZOLE SODIUM, VIA: HCPCS | Performed by: STUDENT IN AN ORGANIZED HEALTH CARE EDUCATION/TRAINING PROGRAM

## 2021-06-20 RX ORDER — POLYETHYLENE GLYCOL 3350, SODIUM SULFATE, SODIUM CHLORIDE, POTASSIUM CHLORIDE, SODIUM ASCORBATE, AND ASCORBIC ACID 7.5-2.691G
2000 KIT ORAL ONCE
Status: COMPLETED | OUTPATIENT
Start: 2021-06-20 | End: 2021-06-20

## 2021-06-20 RX ORDER — MAGNESIUM SULFATE HEPTAHYDRATE 40 MG/ML
2 INJECTION, SOLUTION INTRAVENOUS ONCE
Status: DISCONTINUED | OUTPATIENT
Start: 2021-06-20 | End: 2021-06-20

## 2021-06-20 RX ORDER — INSULIN ASPART 100 [IU]/ML
0-5 INJECTION, SOLUTION INTRAVENOUS; SUBCUTANEOUS EVERY 6 HOURS PRN
Status: DISCONTINUED | OUTPATIENT
Start: 2021-06-20 | End: 2021-07-01 | Stop reason: HOSPADM

## 2021-06-20 RX ORDER — GLUCAGON 1 MG
1 KIT INJECTION
Status: DISCONTINUED | OUTPATIENT
Start: 2021-06-20 | End: 2021-07-01 | Stop reason: HOSPADM

## 2021-06-20 RX ADMIN — PANTOPRAZOLE SODIUM 40 MG: 40 INJECTION, POWDER, FOR SOLUTION INTRAVENOUS at 08:06

## 2021-06-20 RX ADMIN — MUPIROCIN: 20 OINTMENT TOPICAL at 12:06

## 2021-06-20 RX ADMIN — MIRTAZAPINE 7.5 MG: 7.5 TABLET ORAL at 08:06

## 2021-06-20 RX ADMIN — ATORVASTATIN CALCIUM 20 MG: 20 TABLET, FILM COATED ORAL at 08:06

## 2021-06-20 RX ADMIN — MIRTAZAPINE 7.5 MG: 7.5 TABLET ORAL at 12:06

## 2021-06-20 RX ADMIN — ATORVASTATIN CALCIUM 20 MG: 20 TABLET, FILM COATED ORAL at 12:06

## 2021-06-20 RX ADMIN — PANTOPRAZOLE SODIUM 40 MG: 40 INJECTION, POWDER, FOR SOLUTION INTRAVENOUS at 12:06

## 2021-06-20 RX ADMIN — POLYETHYLENE GLYCOL 3350, SODIUM SULFATE, SODIUM CHLORIDE, POTASSIUM CHLORIDE, ASCORBIC ACID, SODIUM ASCORBATE 2000 ML: KIT at 06:06

## 2021-06-20 RX ADMIN — MUPIROCIN: 20 OINTMENT TOPICAL at 08:06

## 2021-06-21 ENCOUNTER — ANESTHESIA (OUTPATIENT)
Dept: ENDOSCOPY | Facility: HOSPITAL | Age: 81
DRG: 356 | End: 2021-06-21
Payer: MEDICARE

## 2021-06-21 ENCOUNTER — ANESTHESIA EVENT (OUTPATIENT)
Dept: ENDOSCOPY | Facility: HOSPITAL | Age: 81
DRG: 356 | End: 2021-06-21
Payer: MEDICARE

## 2021-06-21 LAB
ALBUMIN SERPL BCP-MCNC: 2.5 G/DL (ref 3.5–5.2)
ALP SERPL-CCNC: 139 U/L (ref 55–135)
ALT SERPL W/O P-5'-P-CCNC: 9 U/L (ref 10–44)
ANION GAP SERPL CALC-SCNC: 13 MMOL/L (ref 8–16)
AST SERPL-CCNC: 22 U/L (ref 10–40)
BASOPHILS # BLD AUTO: 0.05 K/UL (ref 0–0.2)
BASOPHILS # BLD AUTO: 0.05 K/UL (ref 0–0.2)
BASOPHILS NFR BLD: 0.7 % (ref 0–1.9)
BASOPHILS NFR BLD: 0.7 % (ref 0–1.9)
BILIRUB SERPL-MCNC: 0.5 MG/DL (ref 0.1–1)
BLD PROD TYP BPU: NORMAL
BLD PROD TYP BPU: NORMAL
BLOOD UNIT EXPIRATION DATE: NORMAL
BLOOD UNIT EXPIRATION DATE: NORMAL
BLOOD UNIT TYPE CODE: 600
BLOOD UNIT TYPE CODE: 6200
BLOOD UNIT TYPE: NORMAL
BLOOD UNIT TYPE: NORMAL
BUN SERPL-MCNC: 42 MG/DL (ref 8–23)
CALCIUM SERPL-MCNC: 6.4 MG/DL (ref 8.7–10.5)
CHLORIDE SERPL-SCNC: 114 MMOL/L (ref 95–110)
CO2 SERPL-SCNC: 15 MMOL/L (ref 23–29)
CODING SYSTEM: NORMAL
CODING SYSTEM: NORMAL
CREAT SERPL-MCNC: 3.7 MG/DL (ref 0.5–1.4)
DIFFERENTIAL METHOD: ABNORMAL
DIFFERENTIAL METHOD: ABNORMAL
DISPENSE STATUS: NORMAL
DISPENSE STATUS: NORMAL
EOSINOPHIL # BLD AUTO: 0.1 K/UL (ref 0–0.5)
EOSINOPHIL # BLD AUTO: 0.2 K/UL (ref 0–0.5)
EOSINOPHIL NFR BLD: 1.6 % (ref 0–8)
EOSINOPHIL NFR BLD: 2.2 % (ref 0–8)
ERYTHROCYTE [DISTWIDTH] IN BLOOD BY AUTOMATED COUNT: 19.1 % (ref 11.5–14.5)
ERYTHROCYTE [DISTWIDTH] IN BLOOD BY AUTOMATED COUNT: 19.1 % (ref 11.5–14.5)
EST. GFR  (AFRICAN AMERICAN): 12.7 ML/MIN/1.73 M^2
EST. GFR  (NON AFRICAN AMERICAN): 11 ML/MIN/1.73 M^2
GLUCOSE SERPL-MCNC: 69 MG/DL (ref 70–110)
HCT VFR BLD AUTO: 25.2 % (ref 37–48.5)
HCT VFR BLD AUTO: 25.6 % (ref 37–48.5)
HGB BLD-MCNC: 7.3 G/DL (ref 12–16)
HGB BLD-MCNC: 7.6 G/DL (ref 12–16)
IMM GRANULOCYTES # BLD AUTO: 0.06 K/UL (ref 0–0.04)
IMM GRANULOCYTES # BLD AUTO: 0.06 K/UL (ref 0–0.04)
IMM GRANULOCYTES NFR BLD AUTO: 0.8 % (ref 0–0.5)
IMM GRANULOCYTES NFR BLD AUTO: 0.9 % (ref 0–0.5)
LYMPHOCYTES # BLD AUTO: 0.5 K/UL (ref 1–4.8)
LYMPHOCYTES # BLD AUTO: 0.7 K/UL (ref 1–4.8)
LYMPHOCYTES NFR BLD: 7.1 % (ref 18–48)
LYMPHOCYTES NFR BLD: 9.4 % (ref 18–48)
MAGNESIUM SERPL-MCNC: 1.9 MG/DL (ref 1.6–2.6)
MCH RBC QN AUTO: 29.1 PG (ref 27–31)
MCH RBC QN AUTO: 29.6 PG (ref 27–31)
MCHC RBC AUTO-ENTMCNC: 29 G/DL (ref 32–36)
MCHC RBC AUTO-ENTMCNC: 29.7 G/DL (ref 32–36)
MCV RBC AUTO: 100 FL (ref 82–98)
MCV RBC AUTO: 100 FL (ref 82–98)
MONOCYTES # BLD AUTO: 0.4 K/UL (ref 0.3–1)
MONOCYTES # BLD AUTO: 0.5 K/UL (ref 0.3–1)
MONOCYTES NFR BLD: 6.2 % (ref 4–15)
MONOCYTES NFR BLD: 7 % (ref 4–15)
NEUTROPHILS # BLD AUTO: 5.8 K/UL (ref 1.8–7.7)
NEUTROPHILS # BLD AUTO: 5.9 K/UL (ref 1.8–7.7)
NEUTROPHILS NFR BLD: 79.9 % (ref 38–73)
NEUTROPHILS NFR BLD: 83.5 % (ref 38–73)
NRBC BLD-RTO: 0 /100 WBC
NRBC BLD-RTO: 0 /100 WBC
NUM UNITS TRANS PACKED RBC: NORMAL
NUM UNITS TRANS PACKED RBC: NORMAL
PHOSPHATE SERPL-MCNC: 4.2 MG/DL (ref 2.7–4.5)
PLATELET # BLD AUTO: 69 K/UL (ref 150–450)
PLATELET # BLD AUTO: 99 K/UL (ref 150–450)
PMV BLD AUTO: 12 FL (ref 9.2–12.9)
PMV BLD AUTO: 13.9 FL (ref 9.2–12.9)
POCT GLUCOSE: 126 MG/DL (ref 70–110)
POCT GLUCOSE: 154 MG/DL (ref 70–110)
POCT GLUCOSE: 61 MG/DL (ref 70–110)
POCT GLUCOSE: 82 MG/DL (ref 70–110)
POTASSIUM SERPL-SCNC: 5 MMOL/L (ref 3.5–5.1)
PROT SERPL-MCNC: 6.1 G/DL (ref 6–8.4)
RBC # BLD AUTO: 2.51 M/UL (ref 4–5.4)
RBC # BLD AUTO: 2.57 M/UL (ref 4–5.4)
SODIUM SERPL-SCNC: 142 MMOL/L (ref 136–145)
WBC # BLD AUTO: 6.93 K/UL (ref 3.9–12.7)
WBC # BLD AUTO: 7.43 K/UL (ref 3.9–12.7)

## 2021-06-21 PROCEDURE — C9113 INJ PANTOPRAZOLE SODIUM, VIA: HCPCS | Performed by: STUDENT IN AN ORGANIZED HEALTH CARE EDUCATION/TRAINING PROGRAM

## 2021-06-21 PROCEDURE — 43235 EGD DIAGNOSTIC BRUSH WASH: CPT | Mod: 51,,, | Performed by: INTERNAL MEDICINE

## 2021-06-21 PROCEDURE — 85025 COMPLETE CBC W/AUTO DIFF WBC: CPT | Performed by: STUDENT IN AN ORGANIZED HEALTH CARE EDUCATION/TRAINING PROGRAM

## 2021-06-21 PROCEDURE — D9220A PRA ANESTHESIA: ICD-10-PCS | Mod: CRNA,,, | Performed by: NURSE ANESTHETIST, CERTIFIED REGISTERED

## 2021-06-21 PROCEDURE — 37000008 HC ANESTHESIA 1ST 15 MINUTES: Performed by: INTERNAL MEDICINE

## 2021-06-21 PROCEDURE — 27000221 HC OXYGEN, UP TO 24 HOURS

## 2021-06-21 PROCEDURE — 80053 COMPREHEN METABOLIC PANEL: CPT | Performed by: STUDENT IN AN ORGANIZED HEALTH CARE EDUCATION/TRAINING PROGRAM

## 2021-06-21 PROCEDURE — 99233 SBSQ HOSP IP/OBS HIGH 50: CPT | Mod: ,,, | Performed by: HOSPITALIST

## 2021-06-21 PROCEDURE — 84100 ASSAY OF PHOSPHORUS: CPT | Performed by: STUDENT IN AN ORGANIZED HEALTH CARE EDUCATION/TRAINING PROGRAM

## 2021-06-21 PROCEDURE — 25000003 PHARM REV CODE 250: Performed by: STUDENT IN AN ORGANIZED HEALTH CARE EDUCATION/TRAINING PROGRAM

## 2021-06-21 PROCEDURE — 43235 PR EGD, FLEX, DIAGNOSTIC: ICD-10-PCS | Mod: 51,,, | Performed by: INTERNAL MEDICINE

## 2021-06-21 PROCEDURE — 83735 ASSAY OF MAGNESIUM: CPT | Performed by: STUDENT IN AN ORGANIZED HEALTH CARE EDUCATION/TRAINING PROGRAM

## 2021-06-21 PROCEDURE — 63600175 PHARM REV CODE 636 W HCPCS: Performed by: STUDENT IN AN ORGANIZED HEALTH CARE EDUCATION/TRAINING PROGRAM

## 2021-06-21 PROCEDURE — P9016 RBC LEUKOCYTES REDUCED: HCPCS | Performed by: EMERGENCY MEDICINE

## 2021-06-21 PROCEDURE — 80100014 HC HEMODIALYSIS 1:1

## 2021-06-21 PROCEDURE — D9220A PRA ANESTHESIA: ICD-10-PCS | Mod: ANES,,, | Performed by: ANESTHESIOLOGY

## 2021-06-21 PROCEDURE — 99223 1ST HOSP IP/OBS HIGH 75: CPT | Mod: ,,, | Performed by: INTERNAL MEDICINE

## 2021-06-21 PROCEDURE — 99223 PR INITIAL HOSPITAL CARE,LEVL III: ICD-10-PCS | Mod: ,,, | Performed by: INTERNAL MEDICINE

## 2021-06-21 PROCEDURE — 45378 DIAGNOSTIC COLONOSCOPY: CPT | Mod: 22,,, | Performed by: INTERNAL MEDICINE

## 2021-06-21 PROCEDURE — 99900035 HC TECH TIME PER 15 MIN (STAT)

## 2021-06-21 PROCEDURE — 45378 DIAGNOSTIC COLONOSCOPY: CPT | Performed by: INTERNAL MEDICINE

## 2021-06-21 PROCEDURE — 99233 PR SUBSEQUENT HOSPITAL CARE,LEVL III: ICD-10-PCS | Mod: ,,, | Performed by: HOSPITALIST

## 2021-06-21 PROCEDURE — 82962 GLUCOSE BLOOD TEST: CPT | Performed by: INTERNAL MEDICINE

## 2021-06-21 PROCEDURE — 36415 COLL VENOUS BLD VENIPUNCTURE: CPT | Performed by: STUDENT IN AN ORGANIZED HEALTH CARE EDUCATION/TRAINING PROGRAM

## 2021-06-21 PROCEDURE — 25000003 PHARM REV CODE 250: Performed by: REGISTERED NURSE

## 2021-06-21 PROCEDURE — D9220A PRA ANESTHESIA: Mod: ANES,,, | Performed by: ANESTHESIOLOGY

## 2021-06-21 PROCEDURE — 45378 PR COLONOSCOPY,DIAGNOSTIC: ICD-10-PCS | Mod: 22,,, | Performed by: INTERNAL MEDICINE

## 2021-06-21 PROCEDURE — 20600001 HC STEP DOWN PRIVATE ROOM

## 2021-06-21 PROCEDURE — 25000242 PHARM REV CODE 250 ALT 637 W/ HCPCS: Performed by: STUDENT IN AN ORGANIZED HEALTH CARE EDUCATION/TRAINING PROGRAM

## 2021-06-21 PROCEDURE — D9220A PRA ANESTHESIA: Mod: CRNA,,, | Performed by: NURSE ANESTHETIST, CERTIFIED REGISTERED

## 2021-06-21 PROCEDURE — 94640 AIRWAY INHALATION TREATMENT: CPT

## 2021-06-21 PROCEDURE — 43235 EGD DIAGNOSTIC BRUSH WASH: CPT | Performed by: INTERNAL MEDICINE

## 2021-06-21 PROCEDURE — 25000003 PHARM REV CODE 250: Performed by: NURSE ANESTHETIST, CERTIFIED REGISTERED

## 2021-06-21 PROCEDURE — 94761 N-INVAS EAR/PLS OXIMETRY MLT: CPT

## 2021-06-21 PROCEDURE — 37000009 HC ANESTHESIA EA ADD 15 MINS: Performed by: INTERNAL MEDICINE

## 2021-06-21 RX ORDER — ETOMIDATE 2 MG/ML
INJECTION INTRAVENOUS
Status: DISCONTINUED | OUTPATIENT
Start: 2021-06-21 | End: 2021-06-21

## 2021-06-21 RX ORDER — LIDOCAINE HYDROCHLORIDE 20 MG/ML
INJECTION INTRAVENOUS
Status: DISCONTINUED | OUTPATIENT
Start: 2021-06-21 | End: 2021-06-21

## 2021-06-21 RX ORDER — DEXMEDETOMIDINE HYDROCHLORIDE 100 UG/ML
INJECTION, SOLUTION INTRAVENOUS CONTINUOUS PRN
Status: DISCONTINUED | OUTPATIENT
Start: 2021-06-21 | End: 2021-06-21

## 2021-06-21 RX ORDER — ONDANSETRON 2 MG/ML
INJECTION INTRAMUSCULAR; INTRAVENOUS
Status: DISCONTINUED | OUTPATIENT
Start: 2021-06-21 | End: 2021-06-21

## 2021-06-21 RX ORDER — SODIUM CHLORIDE 9 MG/ML
INJECTION, SOLUTION INTRAVENOUS ONCE
Status: DISCONTINUED | OUTPATIENT
Start: 2021-06-21 | End: 2021-06-22

## 2021-06-21 RX ORDER — HYDROCODONE BITARTRATE AND ACETAMINOPHEN 500; 5 MG/1; MG/1
TABLET ORAL
Status: DISCONTINUED | OUTPATIENT
Start: 2021-06-21 | End: 2021-06-22

## 2021-06-21 RX ORDER — FENTANYL CITRATE 50 UG/ML
25 INJECTION, SOLUTION INTRAMUSCULAR; INTRAVENOUS EVERY 5 MIN PRN
Status: DISCONTINUED | OUTPATIENT
Start: 2021-06-21 | End: 2021-06-22

## 2021-06-21 RX ORDER — VASOPRESSIN 20 [USP'U]/ML
INJECTION, SOLUTION INTRAMUSCULAR; SUBCUTANEOUS
Status: DISCONTINUED | OUTPATIENT
Start: 2021-06-21 | End: 2021-06-21

## 2021-06-21 RX ORDER — OXYCODONE HYDROCHLORIDE 5 MG/1
5 TABLET ORAL
Status: DISCONTINUED | OUTPATIENT
Start: 2021-06-21 | End: 2021-06-23

## 2021-06-21 RX ORDER — EPHEDRINE SULFATE 50 MG/ML
INJECTION, SOLUTION INTRAVENOUS
Status: DISCONTINUED | OUTPATIENT
Start: 2021-06-21 | End: 2021-06-21

## 2021-06-21 RX ADMIN — ETOMIDATE 4 MG: 2 INJECTION, SOLUTION INTRAVENOUS at 02:06

## 2021-06-21 RX ADMIN — ETOMIDATE 4 MG: 2 INJECTION, SOLUTION INTRAVENOUS at 03:06

## 2021-06-21 RX ADMIN — ETOMIDATE 6 MG: 2 INJECTION, SOLUTION INTRAVENOUS at 03:06

## 2021-06-21 RX ADMIN — MIRTAZAPINE 7.5 MG: 7.5 TABLET ORAL at 09:06

## 2021-06-21 RX ADMIN — EPHEDRINE SULFATE 5 MG: 50 INJECTION INTRAVENOUS at 03:06

## 2021-06-21 RX ADMIN — VASOPRESSIN 0.8 UNITS: 20 INJECTION, SOLUTION INTRAMUSCULAR; SUBCUTANEOUS at 02:06

## 2021-06-21 RX ADMIN — PANTOPRAZOLE SODIUM 40 MG: 40 INJECTION, POWDER, FOR SOLUTION INTRAVENOUS at 09:06

## 2021-06-21 RX ADMIN — ETOMIDATE 6 MG: 2 INJECTION, SOLUTION INTRAVENOUS at 02:06

## 2021-06-21 RX ADMIN — ATORVASTATIN CALCIUM 20 MG: 20 TABLET, FILM COATED ORAL at 09:06

## 2021-06-21 RX ADMIN — VASOPRESSIN 0.4 UNITS: 20 INJECTION, SOLUTION INTRAMUSCULAR; SUBCUTANEOUS at 02:06

## 2021-06-21 RX ADMIN — SODIUM CHLORIDE: 0.9 INJECTION, SOLUTION INTRAVENOUS at 01:06

## 2021-06-21 RX ADMIN — MUPIROCIN: 20 OINTMENT TOPICAL at 09:06

## 2021-06-21 RX ADMIN — DEXTROSE MONOHYDRATE 12.5 G: 25 INJECTION, SOLUTION INTRAVENOUS at 11:06

## 2021-06-21 RX ADMIN — FLUTICASONE FUROATE AND VILANTEROL TRIFENATATE 1 PUFF: 200; 25 POWDER RESPIRATORY (INHALATION) at 08:06

## 2021-06-21 RX ADMIN — LIDOCAINE HYDROCHLORIDE 100 MG: 20 INJECTION, SOLUTION INTRAVENOUS at 02:06

## 2021-06-21 RX ADMIN — DEXMEDETOMIDINE HYDROCHLORIDE 0.5 MCG/KG/HR: 100 INJECTION, SOLUTION INTRAVENOUS at 02:06

## 2021-06-21 RX ADMIN — CALCIUM CHLORIDE INJECTION 0.5 G: 100 INJECTION, SOLUTION INTRAVENOUS at 02:06

## 2021-06-22 ENCOUNTER — ANESTHESIA (OUTPATIENT)
Dept: INTERVENTIONAL RADIOLOGY/VASCULAR | Facility: HOSPITAL | Age: 81
DRG: 356 | End: 2021-06-22
Payer: MEDICARE

## 2021-06-22 LAB
ALBUMIN SERPL BCP-MCNC: 2.1 G/DL (ref 3.5–5.2)
ALP SERPL-CCNC: 111 U/L (ref 55–135)
ALT SERPL W/O P-5'-P-CCNC: 6 U/L (ref 10–44)
ANION GAP SERPL CALC-SCNC: 9 MMOL/L (ref 8–16)
ANISOCYTOSIS BLD QL SMEAR: SLIGHT
AST SERPL-CCNC: 12 U/L (ref 10–40)
BASOPHILS # BLD AUTO: 0.04 K/UL (ref 0–0.2)
BASOPHILS # BLD AUTO: 0.05 K/UL (ref 0–0.2)
BASOPHILS # BLD AUTO: 0.06 K/UL (ref 0–0.2)
BASOPHILS NFR BLD: 0.5 % (ref 0–1.9)
BASOPHILS NFR BLD: 0.6 % (ref 0–1.9)
BASOPHILS NFR BLD: 0.8 % (ref 0–1.9)
BILIRUB SERPL-MCNC: 0.6 MG/DL (ref 0.1–1)
BLD PROD TYP BPU: NORMAL
BLOOD UNIT EXPIRATION DATE: NORMAL
BLOOD UNIT TYPE CODE: 6200
BLOOD UNIT TYPE: NORMAL
BUN SERPL-MCNC: 20 MG/DL (ref 8–23)
BURR CELLS BLD QL SMEAR: ABNORMAL
CALCIUM SERPL-MCNC: 6.8 MG/DL (ref 8.7–10.5)
CHLORIDE SERPL-SCNC: 108 MMOL/L (ref 95–110)
CO2 SERPL-SCNC: 25 MMOL/L (ref 23–29)
CODING SYSTEM: NORMAL
CREAT SERPL-MCNC: 2.3 MG/DL (ref 0.5–1.4)
DACRYOCYTES BLD QL SMEAR: ABNORMAL
DIFFERENTIAL METHOD: ABNORMAL
DISPENSE STATUS: NORMAL
EOSINOPHIL # BLD AUTO: 0 K/UL (ref 0–0.5)
EOSINOPHIL # BLD AUTO: 0.1 K/UL (ref 0–0.5)
EOSINOPHIL # BLD AUTO: 0.1 K/UL (ref 0–0.5)
EOSINOPHIL NFR BLD: 0.5 % (ref 0–8)
EOSINOPHIL NFR BLD: 1.7 % (ref 0–8)
EOSINOPHIL NFR BLD: 2 % (ref 0–8)
ERYTHROCYTE [DISTWIDTH] IN BLOOD BY AUTOMATED COUNT: 18.9 % (ref 11.5–14.5)
ERYTHROCYTE [DISTWIDTH] IN BLOOD BY AUTOMATED COUNT: 19.8 % (ref 11.5–14.5)
ERYTHROCYTE [DISTWIDTH] IN BLOOD BY AUTOMATED COUNT: 20.5 % (ref 11.5–14.5)
EST. GFR  (AFRICAN AMERICAN): 22.6 ML/MIN/1.73 M^2
EST. GFR  (NON AFRICAN AMERICAN): 19.6 ML/MIN/1.73 M^2
GLUCOSE SERPL-MCNC: 71 MG/DL (ref 70–110)
HCT VFR BLD AUTO: 22.1 % (ref 37–48.5)
HCT VFR BLD AUTO: 24.8 % (ref 37–48.5)
HCT VFR BLD AUTO: 29.2 % (ref 37–48.5)
HGB BLD-MCNC: 7 G/DL (ref 12–16)
HGB BLD-MCNC: 7.7 G/DL (ref 12–16)
HGB BLD-MCNC: 9.6 G/DL (ref 12–16)
HYPOCHROMIA BLD QL SMEAR: ABNORMAL
IMM GRANULOCYTES # BLD AUTO: 0.04 K/UL (ref 0–0.04)
IMM GRANULOCYTES # BLD AUTO: 0.05 K/UL (ref 0–0.04)
IMM GRANULOCYTES # BLD AUTO: 0.05 K/UL (ref 0–0.04)
IMM GRANULOCYTES NFR BLD AUTO: 0.6 % (ref 0–0.5)
LYMPHOCYTES # BLD AUTO: 0.3 K/UL (ref 1–4.8)
LYMPHOCYTES # BLD AUTO: 0.7 K/UL (ref 1–4.8)
LYMPHOCYTES # BLD AUTO: 0.7 K/UL (ref 1–4.8)
LYMPHOCYTES NFR BLD: 10 % (ref 18–48)
LYMPHOCYTES NFR BLD: 3.1 % (ref 18–48)
LYMPHOCYTES NFR BLD: 8.6 % (ref 18–48)
MAGNESIUM SERPL-MCNC: 1.7 MG/DL (ref 1.6–2.6)
MCH RBC QN AUTO: 29.5 PG (ref 27–31)
MCH RBC QN AUTO: 29.5 PG (ref 27–31)
MCH RBC QN AUTO: 29.7 PG (ref 27–31)
MCHC RBC AUTO-ENTMCNC: 31 G/DL (ref 32–36)
MCHC RBC AUTO-ENTMCNC: 31.7 G/DL (ref 32–36)
MCHC RBC AUTO-ENTMCNC: 32.9 G/DL (ref 32–36)
MCV RBC AUTO: 90 FL (ref 82–98)
MCV RBC AUTO: 93 FL (ref 82–98)
MCV RBC AUTO: 95 FL (ref 82–98)
MONOCYTES # BLD AUTO: 0.2 K/UL (ref 0.3–1)
MONOCYTES # BLD AUTO: 0.6 K/UL (ref 0.3–1)
MONOCYTES # BLD AUTO: 0.6 K/UL (ref 0.3–1)
MONOCYTES NFR BLD: 2.5 % (ref 4–15)
MONOCYTES NFR BLD: 7.9 % (ref 4–15)
MONOCYTES NFR BLD: 8.3 % (ref 4–15)
NEUTROPHILS # BLD AUTO: 5.6 K/UL (ref 1.8–7.7)
NEUTROPHILS # BLD AUTO: 6.6 K/UL (ref 1.8–7.7)
NEUTROPHILS # BLD AUTO: 7.9 K/UL (ref 1.8–7.7)
NEUTROPHILS NFR BLD: 78.3 % (ref 38–73)
NEUTROPHILS NFR BLD: 80.6 % (ref 38–73)
NEUTROPHILS NFR BLD: 92.8 % (ref 38–73)
NRBC BLD-RTO: 0 /100 WBC
NUM UNITS TRANS PACKED RBC: NORMAL
OVALOCYTES BLD QL SMEAR: ABNORMAL
PHOSPHATE SERPL-MCNC: 2.4 MG/DL (ref 2.7–4.5)
PLATELET # BLD AUTO: 69 K/UL (ref 150–450)
PLATELET # BLD AUTO: 73 K/UL (ref 150–450)
PLATELET # BLD AUTO: 82 K/UL (ref 150–450)
PLATELET BLD QL SMEAR: ABNORMAL
PMV BLD AUTO: 13 FL (ref 9.2–12.9)
PMV BLD AUTO: 13.5 FL (ref 9.2–12.9)
PMV BLD AUTO: 13.7 FL (ref 9.2–12.9)
POCT GLUCOSE: 84 MG/DL (ref 70–110)
POCT GLUCOSE: 95 MG/DL (ref 70–110)
POIKILOCYTOSIS BLD QL SMEAR: SLIGHT
POTASSIUM SERPL-SCNC: 3.5 MMOL/L (ref 3.5–5.1)
PROT SERPL-MCNC: 4.9 G/DL (ref 6–8.4)
RBC # BLD AUTO: 2.37 M/UL (ref 4–5.4)
RBC # BLD AUTO: 2.61 M/UL (ref 4–5.4)
RBC # BLD AUTO: 3.23 M/UL (ref 4–5.4)
SCHISTOCYTES BLD QL SMEAR: ABNORMAL
SODIUM SERPL-SCNC: 142 MMOL/L (ref 136–145)
WBC # BLD AUTO: 7.12 K/UL (ref 3.9–12.7)
WBC # BLD AUTO: 8.14 K/UL (ref 3.9–12.7)
WBC # BLD AUTO: 8.51 K/UL (ref 3.9–12.7)

## 2021-06-22 PROCEDURE — 94640 AIRWAY INHALATION TREATMENT: CPT

## 2021-06-22 PROCEDURE — P9016 RBC LEUKOCYTES REDUCED: HCPCS | Performed by: HOSPITALIST

## 2021-06-22 PROCEDURE — 25000003 PHARM REV CODE 250: Performed by: STUDENT IN AN ORGANIZED HEALTH CARE EDUCATION/TRAINING PROGRAM

## 2021-06-22 PROCEDURE — 25500020 PHARM REV CODE 255: Performed by: HOSPITALIST

## 2021-06-22 PROCEDURE — 36620 ARTERIAL: ICD-10-PCS | Mod: 59,,, | Performed by: ANESTHESIOLOGY

## 2021-06-22 PROCEDURE — 20600001 HC STEP DOWN PRIVATE ROOM

## 2021-06-22 PROCEDURE — D9220A PRA ANESTHESIA: ICD-10-PCS | Mod: CRNA,,, | Performed by: NURSE ANESTHETIST, CERTIFIED REGISTERED

## 2021-06-22 PROCEDURE — 80053 COMPREHEN METABOLIC PANEL: CPT | Performed by: STUDENT IN AN ORGANIZED HEALTH CARE EDUCATION/TRAINING PROGRAM

## 2021-06-22 PROCEDURE — 85025 COMPLETE CBC W/AUTO DIFF WBC: CPT | Mod: 91 | Performed by: STUDENT IN AN ORGANIZED HEALTH CARE EDUCATION/TRAINING PROGRAM

## 2021-06-22 PROCEDURE — D9220A PRA ANESTHESIA: Mod: ANES,,, | Performed by: ANESTHESIOLOGY

## 2021-06-22 PROCEDURE — D9220A PRA ANESTHESIA: ICD-10-PCS | Mod: ANES,,, | Performed by: ANESTHESIOLOGY

## 2021-06-22 PROCEDURE — 63600175 PHARM REV CODE 636 W HCPCS: Performed by: HOSPITALIST

## 2021-06-22 PROCEDURE — 63600175 PHARM REV CODE 636 W HCPCS: Performed by: NURSE ANESTHETIST, CERTIFIED REGISTERED

## 2021-06-22 PROCEDURE — 90935 PR HEMODIALYSIS, ONE EVALUATION: ICD-10-PCS | Mod: ,,, | Performed by: INTERNAL MEDICINE

## 2021-06-22 PROCEDURE — 99291 PR CRITICAL CARE, E/M 30-74 MINUTES: ICD-10-PCS | Mod: ,,, | Performed by: HOSPITALIST

## 2021-06-22 PROCEDURE — 27201037 HC PRESSURE MONITORING SET UP

## 2021-06-22 PROCEDURE — 25000003 PHARM REV CODE 250: Performed by: NURSE ANESTHETIST, CERTIFIED REGISTERED

## 2021-06-22 PROCEDURE — 99222 PR INITIAL HOSPITAL CARE,LEVL II: ICD-10-PCS | Mod: ,,, | Performed by: COLON & RECTAL SURGERY

## 2021-06-22 PROCEDURE — 90935 HEMODIALYSIS ONE EVALUATION: CPT | Mod: ,,, | Performed by: INTERNAL MEDICINE

## 2021-06-22 PROCEDURE — 36620 INSERTION CATHETER ARTERY: CPT | Mod: 59,,, | Performed by: ANESTHESIOLOGY

## 2021-06-22 PROCEDURE — 36415 COLL VENOUS BLD VENIPUNCTURE: CPT | Performed by: STUDENT IN AN ORGANIZED HEALTH CARE EDUCATION/TRAINING PROGRAM

## 2021-06-22 PROCEDURE — 25000003 PHARM REV CODE 250: Performed by: NURSE PRACTITIONER

## 2021-06-22 PROCEDURE — 99222 1ST HOSP IP/OBS MODERATE 55: CPT | Mod: ,,, | Performed by: COLON & RECTAL SURGERY

## 2021-06-22 PROCEDURE — 90935 HEMODIALYSIS ONE EVALUATION: CPT

## 2021-06-22 PROCEDURE — 36430 TRANSFUSION BLD/BLD COMPNT: CPT

## 2021-06-22 PROCEDURE — 27000221 HC OXYGEN, UP TO 24 HOURS

## 2021-06-22 PROCEDURE — 83735 ASSAY OF MAGNESIUM: CPT | Performed by: STUDENT IN AN ORGANIZED HEALTH CARE EDUCATION/TRAINING PROGRAM

## 2021-06-22 PROCEDURE — 94761 N-INVAS EAR/PLS OXIMETRY MLT: CPT

## 2021-06-22 PROCEDURE — 84100 ASSAY OF PHOSPHORUS: CPT | Performed by: STUDENT IN AN ORGANIZED HEALTH CARE EDUCATION/TRAINING PROGRAM

## 2021-06-22 PROCEDURE — 99291 CRITICAL CARE FIRST HOUR: CPT | Mod: ,,, | Performed by: HOSPITALIST

## 2021-06-22 PROCEDURE — D9220A PRA ANESTHESIA: Mod: CRNA,,, | Performed by: NURSE ANESTHETIST, CERTIFIED REGISTERED

## 2021-06-22 PROCEDURE — 82962 GLUCOSE BLOOD TEST: CPT

## 2021-06-22 RX ORDER — NEOSTIGMINE METHYLSULFATE 0.5 MG/ML
INJECTION, SOLUTION INTRAVENOUS
Status: DISCONTINUED | OUTPATIENT
Start: 2021-06-22 | End: 2021-06-23

## 2021-06-22 RX ORDER — VASOPRESSIN 20 [USP'U]/ML
INJECTION, SOLUTION INTRAMUSCULAR; SUBCUTANEOUS
Status: DISCONTINUED | OUTPATIENT
Start: 2021-06-22 | End: 2021-06-23

## 2021-06-22 RX ORDER — SODIUM CHLORIDE 0.9 % (FLUSH) 0.9 %
10 SYRINGE (ML) INJECTION
Status: DISCONTINUED | OUTPATIENT
Start: 2021-06-22 | End: 2021-07-01 | Stop reason: HOSPADM

## 2021-06-22 RX ORDER — PHENYLEPHRINE HYDROCHLORIDE 10 MG/ML
INJECTION INTRAVENOUS
Status: DISCONTINUED | OUTPATIENT
Start: 2021-06-22 | End: 2021-06-23

## 2021-06-22 RX ORDER — ONDANSETRON 2 MG/ML
4 INJECTION INTRAMUSCULAR; INTRAVENOUS EVERY 8 HOURS PRN
Status: DISCONTINUED | OUTPATIENT
Start: 2021-06-22 | End: 2021-07-01 | Stop reason: HOSPADM

## 2021-06-22 RX ORDER — FENTANYL CITRATE 50 UG/ML
25 INJECTION, SOLUTION INTRAMUSCULAR; INTRAVENOUS EVERY 5 MIN PRN
Status: DISCONTINUED | OUTPATIENT
Start: 2021-06-22 | End: 2021-06-23

## 2021-06-22 RX ORDER — MIDODRINE HYDROCHLORIDE 5 MG/1
10 TABLET ORAL ONCE
Status: COMPLETED | OUTPATIENT
Start: 2021-06-22 | End: 2021-06-22

## 2021-06-22 RX ORDER — LIDOCAINE HYDROCHLORIDE 20 MG/ML
INJECTION INTRAVENOUS
Status: DISCONTINUED | OUTPATIENT
Start: 2021-06-22 | End: 2021-06-23

## 2021-06-22 RX ORDER — SODIUM CHLORIDE 9 MG/ML
INJECTION, SOLUTION INTRAVENOUS ONCE
Status: DISCONTINUED | OUTPATIENT
Start: 2021-06-23 | End: 2021-06-22

## 2021-06-22 RX ORDER — ROCURONIUM BROMIDE 10 MG/ML
INJECTION, SOLUTION INTRAVENOUS
Status: DISCONTINUED | OUTPATIENT
Start: 2021-06-22 | End: 2021-06-23

## 2021-06-22 RX ORDER — SODIUM CHLORIDE 9 MG/ML
INJECTION, SOLUTION INTRAVENOUS ONCE
Status: COMPLETED | OUTPATIENT
Start: 2021-06-22 | End: 2021-06-22

## 2021-06-22 RX ORDER — ETOMIDATE 2 MG/ML
INJECTION INTRAVENOUS
Status: DISCONTINUED | OUTPATIENT
Start: 2021-06-22 | End: 2021-06-23

## 2021-06-22 RX ORDER — HYDROCODONE BITARTRATE AND ACETAMINOPHEN 500; 5 MG/1; MG/1
TABLET ORAL
Status: DISCONTINUED | OUTPATIENT
Start: 2021-06-22 | End: 2021-06-23

## 2021-06-22 RX ORDER — DEXAMETHASONE SODIUM PHOSPHATE 4 MG/ML
INJECTION, SOLUTION INTRA-ARTICULAR; INTRALESIONAL; INTRAMUSCULAR; INTRAVENOUS; SOFT TISSUE
Status: DISCONTINUED | OUTPATIENT
Start: 2021-06-22 | End: 2021-06-23

## 2021-06-22 RX ORDER — ONDANSETRON 2 MG/ML
INJECTION INTRAMUSCULAR; INTRAVENOUS
Status: DISCONTINUED | OUTPATIENT
Start: 2021-06-22 | End: 2021-06-23

## 2021-06-22 RX ORDER — FENTANYL CITRATE 50 UG/ML
INJECTION, SOLUTION INTRAMUSCULAR; INTRAVENOUS
Status: DISCONTINUED | OUTPATIENT
Start: 2021-06-22 | End: 2021-06-23

## 2021-06-22 RX ADMIN — ETOMIDATE 4 MG: 2 INJECTION, SOLUTION INTRAVENOUS at 02:06

## 2021-06-22 RX ADMIN — FENTANYL CITRATE 25 MCG: 50 INJECTION, SOLUTION INTRAMUSCULAR; INTRAVENOUS at 02:06

## 2021-06-22 RX ADMIN — ONDANSETRON 4 MG: 2 INJECTION INTRAMUSCULAR; INTRAVENOUS at 03:06

## 2021-06-22 RX ADMIN — FLUTICASONE FUROATE AND VILANTEROL TRIFENATATE 1 PUFF: 200; 25 POWDER RESPIRATORY (INHALATION) at 08:06

## 2021-06-22 RX ADMIN — VASOPRESSIN 1 UNITS: 20 INJECTION INTRAVENOUS at 02:06

## 2021-06-22 RX ADMIN — PHENYLEPHRINE HYDROCHLORIDE 100 MCG: 10 INJECTION INTRAVENOUS at 01:06

## 2021-06-22 RX ADMIN — ETOMIDATE 6 MG: 2 INJECTION, SOLUTION INTRAVENOUS at 02:06

## 2021-06-22 RX ADMIN — SEVELAMER CARBONATE 800 MG: 800 TABLET, FILM COATED ORAL at 08:06

## 2021-06-22 RX ADMIN — MIDODRINE HYDROCHLORIDE 10 MG: 5 TABLET ORAL at 11:06

## 2021-06-22 RX ADMIN — SODIUM CHLORIDE: 0.9 INJECTION, SOLUTION INTRAVENOUS at 11:06

## 2021-06-22 RX ADMIN — IOHEXOL 75 ML: 350 INJECTION, SOLUTION INTRAVENOUS at 10:06

## 2021-06-22 RX ADMIN — ATORVASTATIN CALCIUM 20 MG: 20 TABLET, FILM COATED ORAL at 08:06

## 2021-06-22 RX ADMIN — MUPIROCIN: 20 OINTMENT TOPICAL at 08:06

## 2021-06-22 RX ADMIN — ONDANSETRON 4 MG: 2 INJECTION INTRAMUSCULAR; INTRAVENOUS at 07:06

## 2021-06-22 RX ADMIN — MUPIROCIN: 20 OINTMENT TOPICAL at 09:06

## 2021-06-22 RX ADMIN — ROCURONIUM BROMIDE 20 MG: 10 INJECTION, SOLUTION INTRAVENOUS at 02:06

## 2021-06-22 RX ADMIN — MIRTAZAPINE 7.5 MG: 7.5 TABLET ORAL at 08:06

## 2021-06-22 RX ADMIN — NEOSTIGMINE METHYLSULFATE 3 MG: 0.5 INJECTION INTRAVENOUS at 03:06

## 2021-06-22 RX ADMIN — GLYCOPYRROLATE 0.4 MG: 0.2 INJECTION, SOLUTION INTRAMUSCULAR; INTRAVENOUS at 03:06

## 2021-06-22 RX ADMIN — LIDOCAINE HYDROCHLORIDE 40 MG: 20 INJECTION, SOLUTION INTRAVENOUS at 02:06

## 2021-06-22 RX ADMIN — DEXAMETHASONE SODIUM PHOSPHATE 4 MG: 4 INJECTION, SOLUTION INTRAMUSCULAR; INTRAVENOUS at 02:06

## 2021-06-22 RX ADMIN — IOHEXOL 75 ML: 300 INJECTION, SOLUTION INTRAVENOUS at 03:06

## 2021-06-23 LAB
ALBUMIN SERPL BCP-MCNC: 2.1 G/DL (ref 3.5–5.2)
ALP SERPL-CCNC: 116 U/L (ref 55–135)
ALT SERPL W/O P-5'-P-CCNC: <5 U/L (ref 10–44)
ANION GAP SERPL CALC-SCNC: 12 MMOL/L (ref 8–16)
AST SERPL-CCNC: 14 U/L (ref 10–40)
BASOPHILS # BLD AUTO: 0.02 K/UL (ref 0–0.2)
BASOPHILS # BLD AUTO: 0.04 K/UL (ref 0–0.2)
BASOPHILS # BLD AUTO: 0.05 K/UL (ref 0–0.2)
BASOPHILS NFR BLD: 0.3 % (ref 0–1.9)
BASOPHILS NFR BLD: 0.4 % (ref 0–1.9)
BASOPHILS NFR BLD: 0.4 % (ref 0–1.9)
BILIRUB SERPL-MCNC: 0.4 MG/DL (ref 0.1–1)
BUN SERPL-MCNC: 27 MG/DL (ref 8–23)
CALCIUM SERPL-MCNC: 6.4 MG/DL (ref 8.7–10.5)
CHLORIDE SERPL-SCNC: 108 MMOL/L (ref 95–110)
CO2 SERPL-SCNC: 22 MMOL/L (ref 23–29)
CREAT SERPL-MCNC: 2.9 MG/DL (ref 0.5–1.4)
DIFFERENTIAL METHOD: ABNORMAL
EOSINOPHIL # BLD AUTO: 0 K/UL (ref 0–0.5)
EOSINOPHIL NFR BLD: 0 % (ref 0–8)
EOSINOPHIL NFR BLD: 0 % (ref 0–8)
EOSINOPHIL NFR BLD: 0.2 % (ref 0–8)
ERYTHROCYTE [DISTWIDTH] IN BLOOD BY AUTOMATED COUNT: 19.4 % (ref 11.5–14.5)
ERYTHROCYTE [DISTWIDTH] IN BLOOD BY AUTOMATED COUNT: 19.6 % (ref 11.5–14.5)
ERYTHROCYTE [DISTWIDTH] IN BLOOD BY AUTOMATED COUNT: 19.8 % (ref 11.5–14.5)
EST. GFR  (AFRICAN AMERICAN): 17.1 ML/MIN/1.73 M^2
EST. GFR  (NON AFRICAN AMERICAN): 14.8 ML/MIN/1.73 M^2
GLUCOSE SERPL-MCNC: 84 MG/DL (ref 70–110)
HCT VFR BLD AUTO: 27.8 % (ref 37–48.5)
HCT VFR BLD AUTO: 28.1 % (ref 37–48.5)
HCT VFR BLD AUTO: 28.4 % (ref 37–48.5)
HGB BLD-MCNC: 8.8 G/DL (ref 12–16)
HGB BLD-MCNC: 8.9 G/DL (ref 12–16)
HGB BLD-MCNC: 9.4 G/DL (ref 12–16)
IMM GRANULOCYTES # BLD AUTO: 0.04 K/UL (ref 0–0.04)
IMM GRANULOCYTES # BLD AUTO: 0.06 K/UL (ref 0–0.04)
IMM GRANULOCYTES # BLD AUTO: 0.07 K/UL (ref 0–0.04)
IMM GRANULOCYTES NFR BLD AUTO: 0.4 % (ref 0–0.5)
IMM GRANULOCYTES NFR BLD AUTO: 0.5 % (ref 0–0.5)
IMM GRANULOCYTES NFR BLD AUTO: 1 % (ref 0–0.5)
LYMPHOCYTES # BLD AUTO: 0.3 K/UL (ref 1–4.8)
LYMPHOCYTES # BLD AUTO: 0.5 K/UL (ref 1–4.8)
LYMPHOCYTES # BLD AUTO: 0.6 K/UL (ref 1–4.8)
LYMPHOCYTES NFR BLD: 4.5 % (ref 18–48)
LYMPHOCYTES NFR BLD: 4.7 % (ref 18–48)
LYMPHOCYTES NFR BLD: 5.1 % (ref 18–48)
MAGNESIUM SERPL-MCNC: 1.7 MG/DL (ref 1.6–2.6)
MCH RBC QN AUTO: 28.8 PG (ref 27–31)
MCH RBC QN AUTO: 29.3 PG (ref 27–31)
MCH RBC QN AUTO: 30.1 PG (ref 27–31)
MCHC RBC AUTO-ENTMCNC: 31.7 G/DL (ref 32–36)
MCHC RBC AUTO-ENTMCNC: 31.7 G/DL (ref 32–36)
MCHC RBC AUTO-ENTMCNC: 33.1 G/DL (ref 32–36)
MCV RBC AUTO: 91 FL (ref 82–98)
MCV RBC AUTO: 91 FL (ref 82–98)
MCV RBC AUTO: 92 FL (ref 82–98)
MONOCYTES # BLD AUTO: 0.1 K/UL (ref 0.3–1)
MONOCYTES # BLD AUTO: 0.8 K/UL (ref 0.3–1)
MONOCYTES # BLD AUTO: 1.2 K/UL (ref 0.3–1)
MONOCYTES NFR BLD: 1.8 % (ref 4–15)
MONOCYTES NFR BLD: 7.1 % (ref 4–15)
MONOCYTES NFR BLD: 9.2 % (ref 4–15)
NEUTROPHILS # BLD AUTO: 11 K/UL (ref 1.8–7.7)
NEUTROPHILS # BLD AUTO: 5.8 K/UL (ref 1.8–7.7)
NEUTROPHILS # BLD AUTO: 9.3 K/UL (ref 1.8–7.7)
NEUTROPHILS NFR BLD: 85 % (ref 38–73)
NEUTROPHILS NFR BLD: 87 % (ref 38–73)
NEUTROPHILS NFR BLD: 92.4 % (ref 38–73)
NRBC BLD-RTO: 0 /100 WBC
PHOSPHATE SERPL-MCNC: 4.4 MG/DL (ref 2.7–4.5)
PLATELET # BLD AUTO: 150 K/UL (ref 150–450)
PLATELET # BLD AUTO: 88 K/UL (ref 150–450)
PLATELET # BLD AUTO: 89 K/UL (ref 150–450)
PMV BLD AUTO: 12.5 FL (ref 9.2–12.9)
PMV BLD AUTO: 13 FL (ref 9.2–12.9)
PMV BLD AUTO: 13.6 FL (ref 9.2–12.9)
POCT GLUCOSE: 110 MG/DL (ref 70–110)
POCT GLUCOSE: 163 MG/DL (ref 70–110)
POCT GLUCOSE: 79 MG/DL (ref 70–110)
POCT GLUCOSE: 87 MG/DL (ref 70–110)
POCT GLUCOSE: 88 MG/DL (ref 70–110)
POTASSIUM SERPL-SCNC: 3.9 MMOL/L (ref 3.5–5.1)
PROT SERPL-MCNC: 5.3 G/DL (ref 6–8.4)
RBC # BLD AUTO: 3.04 M/UL (ref 4–5.4)
RBC # BLD AUTO: 3.06 M/UL (ref 4–5.4)
RBC # BLD AUTO: 3.12 M/UL (ref 4–5.4)
SODIUM SERPL-SCNC: 142 MMOL/L (ref 136–145)
WBC # BLD AUTO: 10.63 K/UL (ref 3.9–12.7)
WBC # BLD AUTO: 12.92 K/UL (ref 3.9–12.7)
WBC # BLD AUTO: 6.27 K/UL (ref 3.9–12.7)

## 2021-06-23 PROCEDURE — 90935 PR HEMODIALYSIS, ONE EVALUATION: ICD-10-PCS | Mod: ,,, | Performed by: NURSE PRACTITIONER

## 2021-06-23 PROCEDURE — 90935 HEMODIALYSIS ONE EVALUATION: CPT | Mod: ,,, | Performed by: NURSE PRACTITIONER

## 2021-06-23 PROCEDURE — 99233 SBSQ HOSP IP/OBS HIGH 50: CPT | Mod: ,,, | Performed by: HOSPITALIST

## 2021-06-23 PROCEDURE — 99232 PR SUBSEQUENT HOSPITAL CARE,LEVL II: ICD-10-PCS | Mod: ,,, | Performed by: FAMILY MEDICINE

## 2021-06-23 PROCEDURE — 20600001 HC STEP DOWN PRIVATE ROOM

## 2021-06-23 PROCEDURE — 25000003 PHARM REV CODE 250: Performed by: STUDENT IN AN ORGANIZED HEALTH CARE EDUCATION/TRAINING PROGRAM

## 2021-06-23 PROCEDURE — 84100 ASSAY OF PHOSPHORUS: CPT | Performed by: STUDENT IN AN ORGANIZED HEALTH CARE EDUCATION/TRAINING PROGRAM

## 2021-06-23 PROCEDURE — 80074 ACUTE HEPATITIS PANEL: CPT | Performed by: HOSPITALIST

## 2021-06-23 PROCEDURE — 80100014 HC HEMODIALYSIS 1:1

## 2021-06-23 PROCEDURE — 63600175 PHARM REV CODE 636 W HCPCS: Performed by: HOSPITALIST

## 2021-06-23 PROCEDURE — 36415 COLL VENOUS BLD VENIPUNCTURE: CPT | Performed by: STUDENT IN AN ORGANIZED HEALTH CARE EDUCATION/TRAINING PROGRAM

## 2021-06-23 PROCEDURE — 94640 AIRWAY INHALATION TREATMENT: CPT

## 2021-06-23 PROCEDURE — 99232 SBSQ HOSP IP/OBS MODERATE 35: CPT | Mod: ,,, | Performed by: FAMILY MEDICINE

## 2021-06-23 PROCEDURE — 99900035 HC TECH TIME PER 15 MIN (STAT)

## 2021-06-23 PROCEDURE — 83735 ASSAY OF MAGNESIUM: CPT | Performed by: STUDENT IN AN ORGANIZED HEALTH CARE EDUCATION/TRAINING PROGRAM

## 2021-06-23 PROCEDURE — 94761 N-INVAS EAR/PLS OXIMETRY MLT: CPT

## 2021-06-23 PROCEDURE — 99233 PR SUBSEQUENT HOSPITAL CARE,LEVL III: ICD-10-PCS | Mod: ,,, | Performed by: HOSPITALIST

## 2021-06-23 PROCEDURE — 85025 COMPLETE CBC W/AUTO DIFF WBC: CPT | Performed by: STUDENT IN AN ORGANIZED HEALTH CARE EDUCATION/TRAINING PROGRAM

## 2021-06-23 PROCEDURE — 80053 COMPREHEN METABOLIC PANEL: CPT | Performed by: STUDENT IN AN ORGANIZED HEALTH CARE EDUCATION/TRAINING PROGRAM

## 2021-06-23 PROCEDURE — 63600175 PHARM REV CODE 636 W HCPCS: Mod: TB | Performed by: NURSE PRACTITIONER

## 2021-06-23 RX ORDER — SODIUM CHLORIDE 9 MG/ML
INJECTION, SOLUTION INTRAVENOUS ONCE
Status: DISCONTINUED | OUTPATIENT
Start: 2021-06-23 | End: 2021-06-24

## 2021-06-23 RX ADMIN — EPOETIN ALFA-EPBX 3000 UNITS: 3000 INJECTION, SOLUTION INTRAVENOUS; SUBCUTANEOUS at 02:06

## 2021-06-23 RX ADMIN — MELATONIN TAB 3 MG 6 MG: 3 TAB at 09:06

## 2021-06-23 RX ADMIN — ATORVASTATIN CALCIUM 20 MG: 20 TABLET, FILM COATED ORAL at 09:06

## 2021-06-23 RX ADMIN — MIRTAZAPINE 7.5 MG: 7.5 TABLET ORAL at 09:06

## 2021-06-23 RX ADMIN — ONDANSETRON 4 MG: 2 INJECTION INTRAMUSCULAR; INTRAVENOUS at 02:06

## 2021-06-23 RX ADMIN — ONDANSETRON 4 MG: 2 INJECTION INTRAMUSCULAR; INTRAVENOUS at 06:06

## 2021-06-23 RX ADMIN — FLUTICASONE FUROATE AND VILANTEROL TRIFENATATE 1 PUFF: 200; 25 POWDER RESPIRATORY (INHALATION) at 08:06

## 2021-06-24 LAB
ALBUMIN SERPL BCP-MCNC: 2.2 G/DL (ref 3.5–5.2)
ALP SERPL-CCNC: 124 U/L (ref 55–135)
ALT SERPL W/O P-5'-P-CCNC: <5 U/L (ref 10–44)
ANION GAP SERPL CALC-SCNC: 10 MMOL/L (ref 8–16)
AST SERPL-CCNC: 12 U/L (ref 10–40)
BASOPHILS # BLD AUTO: 0.08 K/UL (ref 0–0.2)
BASOPHILS NFR BLD: 0.6 % (ref 0–1.9)
BILIRUB SERPL-MCNC: 0.5 MG/DL (ref 0.1–1)
BUN SERPL-MCNC: 16 MG/DL (ref 8–23)
CALCIUM SERPL-MCNC: 7.4 MG/DL (ref 8.7–10.5)
CHLORIDE SERPL-SCNC: 109 MMOL/L (ref 95–110)
CO2 SERPL-SCNC: 21 MMOL/L (ref 23–29)
CREAT SERPL-MCNC: 2.3 MG/DL (ref 0.5–1.4)
DIFFERENTIAL METHOD: ABNORMAL
EOSINOPHIL # BLD AUTO: 0.1 K/UL (ref 0–0.5)
EOSINOPHIL NFR BLD: 0.4 % (ref 0–8)
ERYTHROCYTE [DISTWIDTH] IN BLOOD BY AUTOMATED COUNT: 20.1 % (ref 11.5–14.5)
EST. GFR  (AFRICAN AMERICAN): 22.6 ML/MIN/1.73 M^2
EST. GFR  (NON AFRICAN AMERICAN): 19.6 ML/MIN/1.73 M^2
GLUCOSE SERPL-MCNC: 65 MG/DL (ref 70–110)
HAV IGM SERPL QL IA: NEGATIVE
HBV CORE IGM SERPL QL IA: NEGATIVE
HBV SURFACE AG SERPL QL IA: NEGATIVE
HCT VFR BLD AUTO: 29.3 % (ref 37–48.5)
HCV AB SERPL QL IA: NEGATIVE
HGB BLD-MCNC: 9 G/DL (ref 12–16)
IMM GRANULOCYTES # BLD AUTO: 0.07 K/UL (ref 0–0.04)
IMM GRANULOCYTES NFR BLD AUTO: 0.5 % (ref 0–0.5)
LYMPHOCYTES # BLD AUTO: 0.7 K/UL (ref 1–4.8)
LYMPHOCYTES NFR BLD: 5.3 % (ref 18–48)
MAGNESIUM SERPL-MCNC: 1.8 MG/DL (ref 1.6–2.6)
MCH RBC QN AUTO: 29.5 PG (ref 27–31)
MCHC RBC AUTO-ENTMCNC: 30.7 G/DL (ref 32–36)
MCV RBC AUTO: 96 FL (ref 82–98)
MONOCYTES # BLD AUTO: 1 K/UL (ref 0.3–1)
MONOCYTES NFR BLD: 7.3 % (ref 4–15)
NEUTROPHILS # BLD AUTO: 11.8 K/UL (ref 1.8–7.7)
NEUTROPHILS NFR BLD: 85.9 % (ref 38–73)
NRBC BLD-RTO: 0 /100 WBC
PHOSPHATE SERPL-MCNC: 3.3 MG/DL (ref 2.7–4.5)
PLATELET # BLD AUTO: 93 K/UL (ref 150–450)
PMV BLD AUTO: 12.8 FL (ref 9.2–12.9)
POCT GLUCOSE: 100 MG/DL (ref 70–110)
POCT GLUCOSE: 83 MG/DL (ref 70–110)
POCT GLUCOSE: 85 MG/DL (ref 70–110)
POTASSIUM SERPL-SCNC: 3.4 MMOL/L (ref 3.5–5.1)
PROT SERPL-MCNC: 5.7 G/DL (ref 6–8.4)
RBC # BLD AUTO: 3.05 M/UL (ref 4–5.4)
SODIUM SERPL-SCNC: 140 MMOL/L (ref 136–145)
WBC # BLD AUTO: 13.71 K/UL (ref 3.9–12.7)

## 2021-06-24 PROCEDURE — 85025 COMPLETE CBC W/AUTO DIFF WBC: CPT | Performed by: STUDENT IN AN ORGANIZED HEALTH CARE EDUCATION/TRAINING PROGRAM

## 2021-06-24 PROCEDURE — 80053 COMPREHEN METABOLIC PANEL: CPT | Performed by: STUDENT IN AN ORGANIZED HEALTH CARE EDUCATION/TRAINING PROGRAM

## 2021-06-24 PROCEDURE — 25000003 PHARM REV CODE 250: Performed by: STUDENT IN AN ORGANIZED HEALTH CARE EDUCATION/TRAINING PROGRAM

## 2021-06-24 PROCEDURE — 99232 PR SUBSEQUENT HOSPITAL CARE,LEVL II: ICD-10-PCS | Mod: ,,, | Performed by: HOSPITALIST

## 2021-06-24 PROCEDURE — 25000003 PHARM REV CODE 250: Performed by: HOSPITALIST

## 2021-06-24 PROCEDURE — 20600001 HC STEP DOWN PRIVATE ROOM

## 2021-06-24 PROCEDURE — 36415 COLL VENOUS BLD VENIPUNCTURE: CPT | Performed by: STUDENT IN AN ORGANIZED HEALTH CARE EDUCATION/TRAINING PROGRAM

## 2021-06-24 PROCEDURE — 94640 AIRWAY INHALATION TREATMENT: CPT

## 2021-06-24 PROCEDURE — 83735 ASSAY OF MAGNESIUM: CPT | Performed by: STUDENT IN AN ORGANIZED HEALTH CARE EDUCATION/TRAINING PROGRAM

## 2021-06-24 PROCEDURE — 84100 ASSAY OF PHOSPHORUS: CPT | Performed by: STUDENT IN AN ORGANIZED HEALTH CARE EDUCATION/TRAINING PROGRAM

## 2021-06-24 PROCEDURE — 99232 SBSQ HOSP IP/OBS MODERATE 35: CPT | Mod: ,,, | Performed by: HOSPITALIST

## 2021-06-24 RX ORDER — METOPROLOL TARTRATE 1 MG/ML
5 INJECTION, SOLUTION INTRAVENOUS ONCE
Status: DISCONTINUED | OUTPATIENT
Start: 2021-06-24 | End: 2021-06-24

## 2021-06-24 RX ADMIN — FLUTICASONE FUROATE AND VILANTEROL TRIFENATATE 1 PUFF: 200; 25 POWDER RESPIRATORY (INHALATION) at 08:06

## 2021-06-24 RX ADMIN — MIRTAZAPINE 7.5 MG: 7.5 TABLET ORAL at 09:06

## 2021-06-24 RX ADMIN — METOPROLOL TARTRATE 12.5 MG: 25 TABLET ORAL at 10:06

## 2021-06-24 RX ADMIN — MICONAZOLE NITRATE: 20 OINTMENT TOPICAL at 08:06

## 2021-06-24 RX ADMIN — MUPIROCIN: 20 OINTMENT TOPICAL at 08:06

## 2021-06-24 RX ADMIN — METOPROLOL TARTRATE 12.5 MG: 25 TABLET ORAL at 09:06

## 2021-06-24 RX ADMIN — ATORVASTATIN CALCIUM 20 MG: 20 TABLET, FILM COATED ORAL at 09:06

## 2021-06-25 LAB
ALBUMIN SERPL BCP-MCNC: 2.1 G/DL (ref 3.5–5.2)
ALP SERPL-CCNC: 118 U/L (ref 55–135)
ALT SERPL W/O P-5'-P-CCNC: <5 U/L (ref 10–44)
ANION GAP SERPL CALC-SCNC: 11 MMOL/L (ref 8–16)
AST SERPL-CCNC: 14 U/L (ref 10–40)
BASOPHILS # BLD AUTO: 0.06 K/UL (ref 0–0.2)
BASOPHILS # BLD AUTO: 0.08 K/UL (ref 0–0.2)
BASOPHILS NFR BLD: 0.4 % (ref 0–1.9)
BASOPHILS NFR BLD: 0.6 % (ref 0–1.9)
BILIRUB SERPL-MCNC: 0.4 MG/DL (ref 0.1–1)
BUN SERPL-MCNC: 23 MG/DL (ref 8–23)
CALCIUM SERPL-MCNC: 7.2 MG/DL (ref 8.7–10.5)
CHLORIDE SERPL-SCNC: 110 MMOL/L (ref 95–110)
CO2 SERPL-SCNC: 18 MMOL/L (ref 23–29)
CREAT SERPL-MCNC: 2.9 MG/DL (ref 0.5–1.4)
DIFFERENTIAL METHOD: ABNORMAL
DIFFERENTIAL METHOD: ABNORMAL
EOSINOPHIL # BLD AUTO: 0.1 K/UL (ref 0–0.5)
EOSINOPHIL # BLD AUTO: 0.1 K/UL (ref 0–0.5)
EOSINOPHIL NFR BLD: 0.4 % (ref 0–8)
EOSINOPHIL NFR BLD: 0.6 % (ref 0–8)
ERYTHROCYTE [DISTWIDTH] IN BLOOD BY AUTOMATED COUNT: 18.7 % (ref 11.5–14.5)
ERYTHROCYTE [DISTWIDTH] IN BLOOD BY AUTOMATED COUNT: 19.6 % (ref 11.5–14.5)
EST. GFR  (AFRICAN AMERICAN): 17.1 ML/MIN/1.73 M^2
EST. GFR  (NON AFRICAN AMERICAN): 14.8 ML/MIN/1.73 M^2
GLUCOSE SERPL-MCNC: 61 MG/DL (ref 70–110)
HCT VFR BLD AUTO: 28 % (ref 37–48.5)
HCT VFR BLD AUTO: 29.1 % (ref 37–48.5)
HGB BLD-MCNC: 8.8 G/DL (ref 12–16)
HGB BLD-MCNC: 9 G/DL (ref 12–16)
IMM GRANULOCYTES # BLD AUTO: 0.08 K/UL (ref 0–0.04)
IMM GRANULOCYTES # BLD AUTO: 0.08 K/UL (ref 0–0.04)
IMM GRANULOCYTES NFR BLD AUTO: 0.6 % (ref 0–0.5)
IMM GRANULOCYTES NFR BLD AUTO: 0.6 % (ref 0–0.5)
LYMPHOCYTES # BLD AUTO: 0.5 K/UL (ref 1–4.8)
LYMPHOCYTES # BLD AUTO: 0.7 K/UL (ref 1–4.8)
LYMPHOCYTES NFR BLD: 3.6 % (ref 18–48)
LYMPHOCYTES NFR BLD: 5.1 % (ref 18–48)
MAGNESIUM SERPL-MCNC: 1.8 MG/DL (ref 1.6–2.6)
MCH RBC QN AUTO: 29.9 PG (ref 27–31)
MCH RBC QN AUTO: 30.6 PG (ref 27–31)
MCHC RBC AUTO-ENTMCNC: 30.9 G/DL (ref 32–36)
MCHC RBC AUTO-ENTMCNC: 31.4 G/DL (ref 32–36)
MCV RBC AUTO: 95 FL (ref 82–98)
MCV RBC AUTO: 99 FL (ref 82–98)
MONOCYTES # BLD AUTO: 0.8 K/UL (ref 0.3–1)
MONOCYTES # BLD AUTO: 1 K/UL (ref 0.3–1)
MONOCYTES NFR BLD: 6 % (ref 4–15)
MONOCYTES NFR BLD: 7.1 % (ref 4–15)
NEUTROPHILS # BLD AUTO: 12 K/UL (ref 1.8–7.7)
NEUTROPHILS # BLD AUTO: 12.2 K/UL (ref 1.8–7.7)
NEUTROPHILS NFR BLD: 86 % (ref 38–73)
NEUTROPHILS NFR BLD: 89 % (ref 38–73)
NRBC BLD-RTO: 0 /100 WBC
NRBC BLD-RTO: 0 /100 WBC
PHOSPHATE SERPL-MCNC: 4.1 MG/DL (ref 2.7–4.5)
PLATELET # BLD AUTO: 76 K/UL (ref 150–450)
PLATELET # BLD AUTO: 89 K/UL (ref 150–450)
PLATELET BLD QL SMEAR: ABNORMAL
PMV BLD AUTO: 13.2 FL (ref 9.2–12.9)
PMV BLD AUTO: ABNORMAL FL (ref 9.2–12.9)
POCT GLUCOSE: 121 MG/DL (ref 70–110)
POCT GLUCOSE: 126 MG/DL (ref 70–110)
POCT GLUCOSE: 73 MG/DL (ref 70–110)
POCT GLUCOSE: 84 MG/DL (ref 70–110)
POTASSIUM SERPL-SCNC: 3.8 MMOL/L (ref 3.5–5.1)
PROT SERPL-MCNC: 5.5 G/DL (ref 6–8.4)
RBC # BLD AUTO: 2.94 M/UL (ref 4–5.4)
RBC # BLD AUTO: 2.94 M/UL (ref 4–5.4)
SODIUM SERPL-SCNC: 139 MMOL/L (ref 136–145)
WBC # BLD AUTO: 13.75 K/UL (ref 3.9–12.7)
WBC # BLD AUTO: 13.89 K/UL (ref 3.9–12.7)

## 2021-06-25 PROCEDURE — 83735 ASSAY OF MAGNESIUM: CPT | Performed by: STUDENT IN AN ORGANIZED HEALTH CARE EDUCATION/TRAINING PROGRAM

## 2021-06-25 PROCEDURE — 99232 PR SUBSEQUENT HOSPITAL CARE,LEVL II: ICD-10-PCS | Mod: ,,, | Performed by: HOSPITALIST

## 2021-06-25 PROCEDURE — 99232 SBSQ HOSP IP/OBS MODERATE 35: CPT | Mod: ,,, | Performed by: HOSPITALIST

## 2021-06-25 PROCEDURE — 80053 COMPREHEN METABOLIC PANEL: CPT | Performed by: STUDENT IN AN ORGANIZED HEALTH CARE EDUCATION/TRAINING PROGRAM

## 2021-06-25 PROCEDURE — 25000003 PHARM REV CODE 250: Performed by: HOSPITALIST

## 2021-06-25 PROCEDURE — 25000003 PHARM REV CODE 250: Performed by: STUDENT IN AN ORGANIZED HEALTH CARE EDUCATION/TRAINING PROGRAM

## 2021-06-25 PROCEDURE — 20600001 HC STEP DOWN PRIVATE ROOM

## 2021-06-25 PROCEDURE — 85025 COMPLETE CBC W/AUTO DIFF WBC: CPT | Mod: 91 | Performed by: STUDENT IN AN ORGANIZED HEALTH CARE EDUCATION/TRAINING PROGRAM

## 2021-06-25 PROCEDURE — 90935 HEMODIALYSIS ONE EVALUATION: CPT | Mod: ,,, | Performed by: NURSE PRACTITIONER

## 2021-06-25 PROCEDURE — 94640 AIRWAY INHALATION TREATMENT: CPT

## 2021-06-25 PROCEDURE — 36415 COLL VENOUS BLD VENIPUNCTURE: CPT | Performed by: STUDENT IN AN ORGANIZED HEALTH CARE EDUCATION/TRAINING PROGRAM

## 2021-06-25 PROCEDURE — 84100 ASSAY OF PHOSPHORUS: CPT | Performed by: STUDENT IN AN ORGANIZED HEALTH CARE EDUCATION/TRAINING PROGRAM

## 2021-06-25 PROCEDURE — 25000003 PHARM REV CODE 250: Performed by: NURSE PRACTITIONER

## 2021-06-25 PROCEDURE — 63600175 PHARM REV CODE 636 W HCPCS: Performed by: NURSE PRACTITIONER

## 2021-06-25 PROCEDURE — 90935 PR HEMODIALYSIS, ONE EVALUATION: ICD-10-PCS | Mod: ,,, | Performed by: NURSE PRACTITIONER

## 2021-06-25 PROCEDURE — 80100016 HC MAINTENANCE HEMODIALYSIS

## 2021-06-25 RX ORDER — HYDROXYZINE HYDROCHLORIDE 10 MG/1
10 TABLET, FILM COATED ORAL 3 TIMES DAILY PRN
Status: DISCONTINUED | OUTPATIENT
Start: 2021-06-25 | End: 2021-07-01 | Stop reason: HOSPADM

## 2021-06-25 RX ORDER — SODIUM CHLORIDE 9 MG/ML
INJECTION, SOLUTION INTRAVENOUS ONCE
Status: COMPLETED | OUTPATIENT
Start: 2021-06-25 | End: 2021-06-25

## 2021-06-25 RX ORDER — HYDROXYZINE HYDROCHLORIDE 10 MG/1
10 TABLET, FILM COATED ORAL ONCE
Status: COMPLETED | OUTPATIENT
Start: 2021-06-25 | End: 2021-06-25

## 2021-06-25 RX ORDER — HEPARIN SODIUM 1000 [USP'U]/ML
1000 INJECTION, SOLUTION INTRAVENOUS; SUBCUTANEOUS
Status: DISCONTINUED | OUTPATIENT
Start: 2021-06-25 | End: 2021-07-01 | Stop reason: HOSPADM

## 2021-06-25 RX ADMIN — FLUTICASONE FUROATE AND VILANTEROL TRIFENATATE 1 PUFF: 200; 25 POWDER RESPIRATORY (INHALATION) at 01:06

## 2021-06-25 RX ADMIN — MICONAZOLE NITRATE: 20 OINTMENT TOPICAL at 01:06

## 2021-06-25 RX ADMIN — EPOETIN ALFA-EPBX 3000 UNITS: 3000 INJECTION, SOLUTION INTRAVENOUS; SUBCUTANEOUS at 10:06

## 2021-06-25 RX ADMIN — ATORVASTATIN CALCIUM 20 MG: 20 TABLET, FILM COATED ORAL at 11:06

## 2021-06-25 RX ADMIN — HYDROXYZINE HYDROCHLORIDE 10 MG: 10 TABLET ORAL at 11:06

## 2021-06-25 RX ADMIN — MIRTAZAPINE 7.5 MG: 7.5 TABLET ORAL at 11:06

## 2021-06-25 RX ADMIN — SODIUM CHLORIDE 100 ML/HR: 0.9 INJECTION, SOLUTION INTRAVENOUS at 09:06

## 2021-06-25 RX ADMIN — METOPROLOL TARTRATE 12.5 MG: 25 TABLET ORAL at 11:06

## 2021-06-25 RX ADMIN — HEPARIN SODIUM 1000 UNITS: 1000 INJECTION, SOLUTION INTRAVENOUS; SUBCUTANEOUS at 12:06

## 2021-06-25 RX ADMIN — MICONAZOLE NITRATE: 20 OINTMENT TOPICAL at 09:06

## 2021-06-26 LAB
ALBUMIN SERPL BCP-MCNC: 2 G/DL (ref 3.5–5.2)
ALP SERPL-CCNC: 119 U/L (ref 55–135)
ALT SERPL W/O P-5'-P-CCNC: <5 U/L (ref 10–44)
ANION GAP SERPL CALC-SCNC: 11 MMOL/L (ref 8–16)
AST SERPL-CCNC: 13 U/L (ref 10–40)
BASOPHILS # BLD AUTO: 0.06 K/UL (ref 0–0.2)
BASOPHILS # BLD AUTO: 0.06 K/UL (ref 0–0.2)
BASOPHILS NFR BLD: 0.5 % (ref 0–1.9)
BASOPHILS NFR BLD: 0.5 % (ref 0–1.9)
BILIRUB SERPL-MCNC: 0.4 MG/DL (ref 0.1–1)
BUN SERPL-MCNC: 19 MG/DL (ref 8–23)
C DIFF GDH STL QL: POSITIVE
C DIFF TOX A+B STL QL IA: POSITIVE
CALCIUM SERPL-MCNC: 7 MG/DL (ref 8.7–10.5)
CHLORIDE SERPL-SCNC: 105 MMOL/L (ref 95–110)
CO2 SERPL-SCNC: 24 MMOL/L (ref 23–29)
CREAT SERPL-MCNC: 2 MG/DL (ref 0.5–1.4)
DIFFERENTIAL METHOD: ABNORMAL
DIFFERENTIAL METHOD: ABNORMAL
EOSINOPHIL # BLD AUTO: 0 K/UL (ref 0–0.5)
EOSINOPHIL # BLD AUTO: 0.1 K/UL (ref 0–0.5)
EOSINOPHIL NFR BLD: 0.4 % (ref 0–8)
EOSINOPHIL NFR BLD: 0.4 % (ref 0–8)
ERYTHROCYTE [DISTWIDTH] IN BLOOD BY AUTOMATED COUNT: 18.7 % (ref 11.5–14.5)
ERYTHROCYTE [DISTWIDTH] IN BLOOD BY AUTOMATED COUNT: 19.3 % (ref 11.5–14.5)
EST. GFR  (AFRICAN AMERICAN): 26.8 ML/MIN/1.73 M^2
EST. GFR  (NON AFRICAN AMERICAN): 23.2 ML/MIN/1.73 M^2
GLUCOSE SERPL-MCNC: 100 MG/DL (ref 70–110)
HCT VFR BLD AUTO: 27.2 % (ref 37–48.5)
HCT VFR BLD AUTO: 28.3 % (ref 37–48.5)
HGB BLD-MCNC: 8.3 G/DL (ref 12–16)
HGB BLD-MCNC: 8.8 G/DL (ref 12–16)
IMM GRANULOCYTES # BLD AUTO: 0.04 K/UL (ref 0–0.04)
IMM GRANULOCYTES # BLD AUTO: 0.06 K/UL (ref 0–0.04)
IMM GRANULOCYTES NFR BLD AUTO: 0.4 % (ref 0–0.5)
IMM GRANULOCYTES NFR BLD AUTO: 0.5 % (ref 0–0.5)
LYMPHOCYTES # BLD AUTO: 0.5 K/UL (ref 1–4.8)
LYMPHOCYTES # BLD AUTO: 0.5 K/UL (ref 1–4.8)
LYMPHOCYTES NFR BLD: 4.2 % (ref 18–48)
LYMPHOCYTES NFR BLD: 4.7 % (ref 18–48)
MAGNESIUM SERPL-MCNC: 1.7 MG/DL (ref 1.6–2.6)
MCH RBC QN AUTO: 29 PG (ref 27–31)
MCH RBC QN AUTO: 30 PG (ref 27–31)
MCHC RBC AUTO-ENTMCNC: 30.5 G/DL (ref 32–36)
MCHC RBC AUTO-ENTMCNC: 31.1 G/DL (ref 32–36)
MCV RBC AUTO: 95 FL (ref 82–98)
MCV RBC AUTO: 97 FL (ref 82–98)
MONOCYTES # BLD AUTO: 0.8 K/UL (ref 0.3–1)
MONOCYTES # BLD AUTO: 0.8 K/UL (ref 0.3–1)
MONOCYTES NFR BLD: 6.4 % (ref 4–15)
MONOCYTES NFR BLD: 7.6 % (ref 4–15)
NEUTROPHILS # BLD AUTO: 10.6 K/UL (ref 1.8–7.7)
NEUTROPHILS # BLD AUTO: 9.5 K/UL (ref 1.8–7.7)
NEUTROPHILS NFR BLD: 86.4 % (ref 38–73)
NEUTROPHILS NFR BLD: 88 % (ref 38–73)
NRBC BLD-RTO: 0 /100 WBC
NRBC BLD-RTO: 0 /100 WBC
PHOSPHATE SERPL-MCNC: 3.4 MG/DL (ref 2.7–4.5)
PLATELET # BLD AUTO: 83 K/UL (ref 150–450)
PLATELET # BLD AUTO: 94 K/UL (ref 150–450)
PMV BLD AUTO: 12.9 FL (ref 9.2–12.9)
PMV BLD AUTO: 13 FL (ref 9.2–12.9)
POCT GLUCOSE: 106 MG/DL (ref 70–110)
POCT GLUCOSE: 89 MG/DL (ref 70–110)
POCT GLUCOSE: 91 MG/DL (ref 70–110)
POTASSIUM SERPL-SCNC: 3.4 MMOL/L (ref 3.5–5.1)
PROT SERPL-MCNC: 5.4 G/DL (ref 6–8.4)
RBC # BLD AUTO: 2.86 M/UL (ref 4–5.4)
RBC # BLD AUTO: 2.93 M/UL (ref 4–5.4)
SODIUM SERPL-SCNC: 140 MMOL/L (ref 136–145)
WBC # BLD AUTO: 11.02 K/UL (ref 3.9–12.7)
WBC # BLD AUTO: 12.03 K/UL (ref 3.9–12.7)

## 2021-06-26 PROCEDURE — 25000003 PHARM REV CODE 250: Performed by: STUDENT IN AN ORGANIZED HEALTH CARE EDUCATION/TRAINING PROGRAM

## 2021-06-26 PROCEDURE — 20600001 HC STEP DOWN PRIVATE ROOM

## 2021-06-26 PROCEDURE — 80053 COMPREHEN METABOLIC PANEL: CPT | Performed by: STUDENT IN AN ORGANIZED HEALTH CARE EDUCATION/TRAINING PROGRAM

## 2021-06-26 PROCEDURE — 25000003 PHARM REV CODE 250: Performed by: HOSPITALIST

## 2021-06-26 PROCEDURE — 87324 CLOSTRIDIUM AG IA: CPT | Performed by: HOSPITALIST

## 2021-06-26 PROCEDURE — 94640 AIRWAY INHALATION TREATMENT: CPT

## 2021-06-26 PROCEDURE — 94761 N-INVAS EAR/PLS OXIMETRY MLT: CPT

## 2021-06-26 PROCEDURE — 27000221 HC OXYGEN, UP TO 24 HOURS

## 2021-06-26 PROCEDURE — 83735 ASSAY OF MAGNESIUM: CPT | Performed by: STUDENT IN AN ORGANIZED HEALTH CARE EDUCATION/TRAINING PROGRAM

## 2021-06-26 PROCEDURE — 99233 SBSQ HOSP IP/OBS HIGH 50: CPT | Mod: ,,, | Performed by: HOSPITALIST

## 2021-06-26 PROCEDURE — 84100 ASSAY OF PHOSPHORUS: CPT | Performed by: STUDENT IN AN ORGANIZED HEALTH CARE EDUCATION/TRAINING PROGRAM

## 2021-06-26 PROCEDURE — 87449 NOS EACH ORGANISM AG IA: CPT | Performed by: HOSPITALIST

## 2021-06-26 PROCEDURE — 99233 PR SUBSEQUENT HOSPITAL CARE,LEVL III: ICD-10-PCS | Mod: ,,, | Performed by: HOSPITALIST

## 2021-06-26 PROCEDURE — 85025 COMPLETE CBC W/AUTO DIFF WBC: CPT | Performed by: STUDENT IN AN ORGANIZED HEALTH CARE EDUCATION/TRAINING PROGRAM

## 2021-06-26 RX ORDER — SODIUM CHLORIDE 9 MG/ML
INJECTION, SOLUTION INTRAVENOUS ONCE
Status: DISCONTINUED | OUTPATIENT
Start: 2021-06-28 | End: 2021-06-29

## 2021-06-26 RX ADMIN — MIRTAZAPINE 7.5 MG: 7.5 TABLET ORAL at 09:06

## 2021-06-26 RX ADMIN — ATORVASTATIN CALCIUM 20 MG: 20 TABLET, FILM COATED ORAL at 09:06

## 2021-06-26 RX ADMIN — METOPROLOL TARTRATE 12.5 MG: 25 TABLET ORAL at 10:06

## 2021-06-26 RX ADMIN — FLUTICASONE FUROATE AND VILANTEROL TRIFENATATE 1 PUFF: 200; 25 POWDER RESPIRATORY (INHALATION) at 11:06

## 2021-06-26 RX ADMIN — MICONAZOLE NITRATE: 20 OINTMENT TOPICAL at 10:06

## 2021-06-26 RX ADMIN — METOPROLOL TARTRATE 12.5 MG: 25 TABLET ORAL at 09:06

## 2021-06-26 RX ADMIN — MICONAZOLE NITRATE: 20 OINTMENT TOPICAL at 09:06

## 2021-06-26 RX ADMIN — VANCOMYCIN HYDROCHLORIDE 125 MG: KIT at 09:06

## 2021-06-27 LAB
ALBUMIN SERPL BCP-MCNC: 1.9 G/DL (ref 3.5–5.2)
ALP SERPL-CCNC: 112 U/L (ref 55–135)
ALT SERPL W/O P-5'-P-CCNC: <5 U/L (ref 10–44)
ANION GAP SERPL CALC-SCNC: 11 MMOL/L (ref 8–16)
AST SERPL-CCNC: 11 U/L (ref 10–40)
BASOPHILS # BLD AUTO: 0.05 K/UL (ref 0–0.2)
BASOPHILS NFR BLD: 0.5 % (ref 0–1.9)
BILIRUB SERPL-MCNC: 0.4 MG/DL (ref 0.1–1)
BUN SERPL-MCNC: 31 MG/DL (ref 8–23)
CA-I BLDV-SCNC: 1 MMOL/L (ref 1.06–1.42)
CALCIUM SERPL-MCNC: 6.9 MG/DL (ref 8.7–10.5)
CHLORIDE SERPL-SCNC: 108 MMOL/L (ref 95–110)
CO2 SERPL-SCNC: 22 MMOL/L (ref 23–29)
CREAT SERPL-MCNC: 3 MG/DL (ref 0.5–1.4)
DIFFERENTIAL METHOD: ABNORMAL
EOSINOPHIL # BLD AUTO: 0.1 K/UL (ref 0–0.5)
EOSINOPHIL NFR BLD: 0.6 % (ref 0–8)
ERYTHROCYTE [DISTWIDTH] IN BLOOD BY AUTOMATED COUNT: 18.4 % (ref 11.5–14.5)
EST. GFR  (AFRICAN AMERICAN): 16.4 ML/MIN/1.73 M^2
EST. GFR  (NON AFRICAN AMERICAN): 14.2 ML/MIN/1.73 M^2
GLUCOSE SERPL-MCNC: 94 MG/DL (ref 70–110)
HCT VFR BLD AUTO: 27.5 % (ref 37–48.5)
HGB BLD-MCNC: 8.7 G/DL (ref 12–16)
IMM GRANULOCYTES # BLD AUTO: 0.05 K/UL (ref 0–0.04)
IMM GRANULOCYTES NFR BLD AUTO: 0.5 % (ref 0–0.5)
LYMPHOCYTES # BLD AUTO: 0.5 K/UL (ref 1–4.8)
LYMPHOCYTES NFR BLD: 5 % (ref 18–48)
MAGNESIUM SERPL-MCNC: 1.7 MG/DL (ref 1.6–2.6)
MCH RBC QN AUTO: 30.1 PG (ref 27–31)
MCHC RBC AUTO-ENTMCNC: 31.6 G/DL (ref 32–36)
MCV RBC AUTO: 95 FL (ref 82–98)
MONOCYTES # BLD AUTO: 0.7 K/UL (ref 0.3–1)
MONOCYTES NFR BLD: 6.7 % (ref 4–15)
NEUTROPHILS # BLD AUTO: 8.8 K/UL (ref 1.8–7.7)
NEUTROPHILS NFR BLD: 86.7 % (ref 38–73)
NRBC BLD-RTO: 0 /100 WBC
PHOSPHATE SERPL-MCNC: 3.6 MG/DL (ref 2.7–4.5)
PLATELET # BLD AUTO: 86 K/UL (ref 150–450)
PMV BLD AUTO: 12.5 FL (ref 9.2–12.9)
POCT GLUCOSE: 117 MG/DL (ref 70–110)
POCT GLUCOSE: 128 MG/DL (ref 70–110)
POCT GLUCOSE: 141 MG/DL (ref 70–110)
POTASSIUM SERPL-SCNC: 3.3 MMOL/L (ref 3.5–5.1)
PROT SERPL-MCNC: 5.4 G/DL (ref 6–8.4)
PTH-INTACT SERPL-MCNC: 1022 PG/ML (ref 9–77)
RBC # BLD AUTO: 2.89 M/UL (ref 4–5.4)
SODIUM SERPL-SCNC: 141 MMOL/L (ref 136–145)
WBC # BLD AUTO: 10.13 K/UL (ref 3.9–12.7)

## 2021-06-27 PROCEDURE — 25000003 PHARM REV CODE 250: Performed by: STUDENT IN AN ORGANIZED HEALTH CARE EDUCATION/TRAINING PROGRAM

## 2021-06-27 PROCEDURE — 99233 SBSQ HOSP IP/OBS HIGH 50: CPT | Mod: ,,, | Performed by: HOSPITALIST

## 2021-06-27 PROCEDURE — 20600001 HC STEP DOWN PRIVATE ROOM

## 2021-06-27 PROCEDURE — 83970 ASSAY OF PARATHORMONE: CPT | Performed by: HOSPITALIST

## 2021-06-27 PROCEDURE — 82330 ASSAY OF CALCIUM: CPT | Performed by: HOSPITALIST

## 2021-06-27 PROCEDURE — 80053 COMPREHEN METABOLIC PANEL: CPT | Performed by: STUDENT IN AN ORGANIZED HEALTH CARE EDUCATION/TRAINING PROGRAM

## 2021-06-27 PROCEDURE — 85025 COMPLETE CBC W/AUTO DIFF WBC: CPT | Performed by: HOSPITALIST

## 2021-06-27 PROCEDURE — 99233 PR SUBSEQUENT HOSPITAL CARE,LEVL III: ICD-10-PCS | Mod: ,,, | Performed by: HOSPITALIST

## 2021-06-27 PROCEDURE — 25000003 PHARM REV CODE 250: Performed by: HOSPITALIST

## 2021-06-27 PROCEDURE — 63600175 PHARM REV CODE 636 W HCPCS: Performed by: HOSPITALIST

## 2021-06-27 PROCEDURE — 84100 ASSAY OF PHOSPHORUS: CPT | Performed by: STUDENT IN AN ORGANIZED HEALTH CARE EDUCATION/TRAINING PROGRAM

## 2021-06-27 PROCEDURE — 83735 ASSAY OF MAGNESIUM: CPT | Performed by: STUDENT IN AN ORGANIZED HEALTH CARE EDUCATION/TRAINING PROGRAM

## 2021-06-27 RX ADMIN — CALCIUM GLUCONATE 1000 MG: 98 INJECTION, SOLUTION INTRAVENOUS at 10:06

## 2021-06-27 RX ADMIN — MICONAZOLE NITRATE: 20 OINTMENT TOPICAL at 08:06

## 2021-06-27 RX ADMIN — VANCOMYCIN HYDROCHLORIDE 125 MG: KIT at 05:06

## 2021-06-27 RX ADMIN — METOPROLOL TARTRATE 12.5 MG: 25 TABLET ORAL at 08:06

## 2021-06-27 RX ADMIN — MIRTAZAPINE 7.5 MG: 7.5 TABLET ORAL at 08:06

## 2021-06-27 RX ADMIN — VANCOMYCIN HYDROCHLORIDE 125 MG: KIT at 06:06

## 2021-06-27 RX ADMIN — VANCOMYCIN HYDROCHLORIDE 125 MG: KIT at 11:06

## 2021-06-27 RX ADMIN — VANCOMYCIN HYDROCHLORIDE 125 MG: KIT at 12:06

## 2021-06-27 RX ADMIN — ATORVASTATIN CALCIUM 20 MG: 20 TABLET, FILM COATED ORAL at 08:06

## 2021-06-27 RX ADMIN — FLUTICASONE FUROATE AND VILANTEROL TRIFENATATE 1 PUFF: 200; 25 POWDER RESPIRATORY (INHALATION) at 08:06

## 2021-06-27 RX ADMIN — ACETAMINOPHEN 650 MG: 325 TABLET ORAL at 08:06

## 2021-06-28 PROBLEM — E83.51 HYPOCALCEMIA: Status: ACTIVE | Noted: 2021-06-28

## 2021-06-28 LAB
25(OH)D3+25(OH)D2 SERPL-MCNC: 16 NG/ML (ref 30–96)
ALBUMIN SERPL BCP-MCNC: 2.2 G/DL (ref 3.5–5.2)
ALP SERPL-CCNC: 118 U/L (ref 55–135)
ALT SERPL W/O P-5'-P-CCNC: <5 U/L (ref 10–44)
ANION GAP SERPL CALC-SCNC: 10 MMOL/L (ref 8–16)
AST SERPL-CCNC: 18 U/L (ref 10–40)
BASOPHILS # BLD AUTO: 0.04 K/UL (ref 0–0.2)
BASOPHILS NFR BLD: 0.4 % (ref 0–1.9)
BILIRUB SERPL-MCNC: 0.5 MG/DL (ref 0.1–1)
BUN SERPL-MCNC: 12 MG/DL (ref 8–23)
CA-I BLDV-SCNC: 1.04 MMOL/L (ref 1.06–1.42)
CALCIUM SERPL-MCNC: 7.5 MG/DL (ref 8.7–10.5)
CHLORIDE SERPL-SCNC: 108 MMOL/L (ref 95–110)
CO2 SERPL-SCNC: 23 MMOL/L (ref 23–29)
CREAT SERPL-MCNC: 1.6 MG/DL (ref 0.5–1.4)
DIFFERENTIAL METHOD: ABNORMAL
EOSINOPHIL # BLD AUTO: 0.1 K/UL (ref 0–0.5)
EOSINOPHIL NFR BLD: 1.1 % (ref 0–8)
ERYTHROCYTE [DISTWIDTH] IN BLOOD BY AUTOMATED COUNT: 18.2 % (ref 11.5–14.5)
EST. GFR  (AFRICAN AMERICAN): 35.1 ML/MIN/1.73 M^2
EST. GFR  (NON AFRICAN AMERICAN): 30.4 ML/MIN/1.73 M^2
GLUCOSE SERPL-MCNC: 81 MG/DL (ref 70–110)
HCT VFR BLD AUTO: 29.6 % (ref 37–48.5)
HGB BLD-MCNC: 9.2 G/DL (ref 12–16)
IMM GRANULOCYTES # BLD AUTO: 0.05 K/UL (ref 0–0.04)
IMM GRANULOCYTES NFR BLD AUTO: 0.5 % (ref 0–0.5)
LYMPHOCYTES # BLD AUTO: 0.5 K/UL (ref 1–4.8)
LYMPHOCYTES NFR BLD: 4.8 % (ref 18–48)
MAGNESIUM SERPL-MCNC: 1.7 MG/DL (ref 1.6–2.6)
MCH RBC QN AUTO: 29.7 PG (ref 27–31)
MCHC RBC AUTO-ENTMCNC: 31.1 G/DL (ref 32–36)
MCV RBC AUTO: 96 FL (ref 82–98)
MONOCYTES # BLD AUTO: 0.7 K/UL (ref 0.3–1)
MONOCYTES NFR BLD: 7 % (ref 4–15)
NEUTROPHILS # BLD AUTO: 8 K/UL (ref 1.8–7.7)
NEUTROPHILS NFR BLD: 86.2 % (ref 38–73)
NRBC BLD-RTO: 0 /100 WBC
PHOSPHATE SERPL-MCNC: 2.2 MG/DL (ref 2.7–4.5)
PLATELET # BLD AUTO: 94 K/UL (ref 150–450)
PMV BLD AUTO: 12.2 FL (ref 9.2–12.9)
POCT GLUCOSE: 117 MG/DL (ref 70–110)
POCT GLUCOSE: 167 MG/DL (ref 70–110)
POCT GLUCOSE: 96 MG/DL (ref 70–110)
POTASSIUM SERPL-SCNC: 4 MMOL/L (ref 3.5–5.1)
PROT SERPL-MCNC: 5.9 G/DL (ref 6–8.4)
RBC # BLD AUTO: 3.1 M/UL (ref 4–5.4)
SODIUM SERPL-SCNC: 141 MMOL/L (ref 136–145)
WBC # BLD AUTO: 9.29 K/UL (ref 3.9–12.7)

## 2021-06-28 PROCEDURE — 80053 COMPREHEN METABOLIC PANEL: CPT | Performed by: STUDENT IN AN ORGANIZED HEALTH CARE EDUCATION/TRAINING PROGRAM

## 2021-06-28 PROCEDURE — 25000003 PHARM REV CODE 250: Performed by: HOSPITALIST

## 2021-06-28 PROCEDURE — 20600001 HC STEP DOWN PRIVATE ROOM

## 2021-06-28 PROCEDURE — 94640 AIRWAY INHALATION TREATMENT: CPT

## 2021-06-28 PROCEDURE — 25000003 PHARM REV CODE 250: Performed by: NURSE PRACTITIONER

## 2021-06-28 PROCEDURE — 80100014 HC HEMODIALYSIS 1:1

## 2021-06-28 PROCEDURE — 99232 PR SUBSEQUENT HOSPITAL CARE,LEVL II: ICD-10-PCS | Mod: ,,, | Performed by: NURSE PRACTITIONER

## 2021-06-28 PROCEDURE — 82330 ASSAY OF CALCIUM: CPT | Performed by: HOSPITALIST

## 2021-06-28 PROCEDURE — 83735 ASSAY OF MAGNESIUM: CPT | Performed by: STUDENT IN AN ORGANIZED HEALTH CARE EDUCATION/TRAINING PROGRAM

## 2021-06-28 PROCEDURE — 84100 ASSAY OF PHOSPHORUS: CPT | Performed by: STUDENT IN AN ORGANIZED HEALTH CARE EDUCATION/TRAINING PROGRAM

## 2021-06-28 PROCEDURE — 99232 SBSQ HOSP IP/OBS MODERATE 35: CPT | Mod: ,,, | Performed by: NURSE PRACTITIONER

## 2021-06-28 PROCEDURE — 25000003 PHARM REV CODE 250: Performed by: STUDENT IN AN ORGANIZED HEALTH CARE EDUCATION/TRAINING PROGRAM

## 2021-06-28 PROCEDURE — 85025 COMPLETE CBC W/AUTO DIFF WBC: CPT | Performed by: HOSPITALIST

## 2021-06-28 PROCEDURE — 99232 SBSQ HOSP IP/OBS MODERATE 35: CPT | Mod: ,,, | Performed by: HOSPITALIST

## 2021-06-28 PROCEDURE — 36415 COLL VENOUS BLD VENIPUNCTURE: CPT | Performed by: STUDENT IN AN ORGANIZED HEALTH CARE EDUCATION/TRAINING PROGRAM

## 2021-06-28 PROCEDURE — 82306 VITAMIN D 25 HYDROXY: CPT | Performed by: HOSPITALIST

## 2021-06-28 PROCEDURE — 99232 PR SUBSEQUENT HOSPITAL CARE,LEVL II: ICD-10-PCS | Mod: ,,, | Performed by: HOSPITALIST

## 2021-06-28 PROCEDURE — 82652 VIT D 1 25-DIHYDROXY: CPT | Performed by: HOSPITALIST

## 2021-06-28 PROCEDURE — 94761 N-INVAS EAR/PLS OXIMETRY MLT: CPT

## 2021-06-28 RX ORDER — CALCITRIOL 0.25 UG/1
0.25 CAPSULE ORAL DAILY
Status: DISCONTINUED | OUTPATIENT
Start: 2021-06-28 | End: 2021-06-28

## 2021-06-28 RX ORDER — DEXTROMETHORPHAN/PSEUDOEPHED 2.5-7.5/.8
40 DROPS ORAL 4 TIMES DAILY
Status: DISCONTINUED | OUTPATIENT
Start: 2021-06-28 | End: 2021-07-01 | Stop reason: HOSPADM

## 2021-06-28 RX ORDER — ASPIRIN 81 MG/1
81 TABLET ORAL DAILY
Status: DISCONTINUED | OUTPATIENT
Start: 2021-06-30 | End: 2021-07-01 | Stop reason: HOSPADM

## 2021-06-28 RX ORDER — CALCITRIOL 0.25 UG/1
0.25 CAPSULE ORAL DAILY
Status: DISCONTINUED | OUTPATIENT
Start: 2021-06-28 | End: 2021-07-01 | Stop reason: HOSPADM

## 2021-06-28 RX ADMIN — METOPROLOL TARTRATE 12.5 MG: 25 TABLET ORAL at 08:06

## 2021-06-28 RX ADMIN — FLUTICASONE FUROATE AND VILANTEROL TRIFENATATE 1 PUFF: 200; 25 POWDER RESPIRATORY (INHALATION) at 09:06

## 2021-06-28 RX ADMIN — VANCOMYCIN HYDROCHLORIDE 125 MG: KIT at 12:06

## 2021-06-28 RX ADMIN — VANCOMYCIN HYDROCHLORIDE 125 MG: KIT at 06:06

## 2021-06-28 RX ADMIN — MICONAZOLE NITRATE: 20 OINTMENT TOPICAL at 09:06

## 2021-06-28 RX ADMIN — SIMETHICONE 40 MG: 20 SUSPENSION/ DROPS ORAL at 09:06

## 2021-06-28 RX ADMIN — CALCITRIOL CAPSULES 0.25 MCG 0.25 MCG: 0.25 CAPSULE ORAL at 12:06

## 2021-06-28 RX ADMIN — ATORVASTATIN CALCIUM 20 MG: 20 TABLET, FILM COATED ORAL at 09:06

## 2021-06-28 RX ADMIN — MIRTAZAPINE 7.5 MG: 7.5 TABLET ORAL at 09:06

## 2021-06-28 RX ADMIN — VANCOMYCIN HYDROCHLORIDE 125 MG: KIT at 11:06

## 2021-06-28 RX ADMIN — MICONAZOLE NITRATE: 20 OINTMENT TOPICAL at 08:06

## 2021-06-28 RX ADMIN — VANCOMYCIN HYDROCHLORIDE 125 MG: KIT at 05:06

## 2021-06-28 RX ADMIN — METOPROLOL TARTRATE 12.5 MG: 25 TABLET ORAL at 09:06

## 2021-06-29 LAB
POCT GLUCOSE: 104 MG/DL (ref 70–110)
POCT GLUCOSE: 180 MG/DL (ref 70–110)
POCT GLUCOSE: 79 MG/DL (ref 70–110)
POCT GLUCOSE: 94 MG/DL (ref 70–110)

## 2021-06-29 PROCEDURE — 25000003 PHARM REV CODE 250: Performed by: NURSE PRACTITIONER

## 2021-06-29 PROCEDURE — 20600001 HC STEP DOWN PRIVATE ROOM

## 2021-06-29 PROCEDURE — 99232 PR SUBSEQUENT HOSPITAL CARE,LEVL II: ICD-10-PCS | Mod: ,,, | Performed by: HOSPITALIST

## 2021-06-29 PROCEDURE — 25000003 PHARM REV CODE 250: Performed by: HOSPITALIST

## 2021-06-29 PROCEDURE — 99232 SBSQ HOSP IP/OBS MODERATE 35: CPT | Mod: ,,, | Performed by: HOSPITALIST

## 2021-06-29 PROCEDURE — 25000003 PHARM REV CODE 250: Performed by: STUDENT IN AN ORGANIZED HEALTH CARE EDUCATION/TRAINING PROGRAM

## 2021-06-29 RX ORDER — SODIUM CHLORIDE 9 MG/ML
INJECTION, SOLUTION INTRAVENOUS ONCE
Status: DISCONTINUED | OUTPATIENT
Start: 2021-06-30 | End: 2021-07-01

## 2021-06-29 RX ADMIN — ATORVASTATIN CALCIUM 20 MG: 20 TABLET, FILM COATED ORAL at 09:06

## 2021-06-29 RX ADMIN — METOPROLOL TARTRATE 12.5 MG: 25 TABLET ORAL at 09:06

## 2021-06-29 RX ADMIN — VANCOMYCIN HYDROCHLORIDE 125 MG: KIT at 06:06

## 2021-06-29 RX ADMIN — MIRTAZAPINE 7.5 MG: 7.5 TABLET ORAL at 09:06

## 2021-06-29 RX ADMIN — CALCITRIOL CAPSULES 0.25 MCG 0.25 MCG: 0.25 CAPSULE ORAL at 09:06

## 2021-06-29 RX ADMIN — SIMETHICONE 40 MG: 20 SUSPENSION/ DROPS ORAL at 09:06

## 2021-06-29 RX ADMIN — MICONAZOLE NITRATE: 20 OINTMENT TOPICAL at 09:06

## 2021-06-29 RX ADMIN — SIMETHICONE 40 MG: 20 SUSPENSION/ DROPS ORAL at 01:06

## 2021-06-29 RX ADMIN — SIMETHICONE 40 MG: 20 SUSPENSION/ DROPS ORAL at 05:06

## 2021-06-29 RX ADMIN — FLUTICASONE FUROATE AND VILANTEROL TRIFENATATE 1 PUFF: 200; 25 POWDER RESPIRATORY (INHALATION) at 09:06

## 2021-06-29 RX ADMIN — MELATONIN TAB 3 MG 6 MG: 3 TAB at 09:06

## 2021-06-30 LAB
1,25(OH)2D3 SERPL-MCNC: 20 PG/ML (ref 20–79)
ALBUMIN SERPL BCP-MCNC: 2.3 G/DL (ref 3.5–5.2)
ALP SERPL-CCNC: 123 U/L (ref 55–135)
ALT SERPL W/O P-5'-P-CCNC: 7 U/L (ref 10–44)
ANION GAP SERPL CALC-SCNC: 10 MMOL/L (ref 8–16)
AST SERPL-CCNC: 25 U/L (ref 10–40)
BASOPHILS # BLD AUTO: 0.05 K/UL (ref 0–0.2)
BASOPHILS NFR BLD: 0.5 % (ref 0–1.9)
BILIRUB SERPL-MCNC: 0.4 MG/DL (ref 0.1–1)
BUN SERPL-MCNC: 23 MG/DL (ref 8–23)
CA-I BLDV-SCNC: 1.21 MMOL/L (ref 1.06–1.42)
CALCIUM SERPL-MCNC: 8 MG/DL (ref 8.7–10.5)
CHLORIDE SERPL-SCNC: 108 MMOL/L (ref 95–110)
CO2 SERPL-SCNC: 22 MMOL/L (ref 23–29)
CREAT SERPL-MCNC: 2.1 MG/DL (ref 0.5–1.4)
DIFFERENTIAL METHOD: ABNORMAL
EOSINOPHIL # BLD AUTO: 0.1 K/UL (ref 0–0.5)
EOSINOPHIL NFR BLD: 0.8 % (ref 0–8)
ERYTHROCYTE [DISTWIDTH] IN BLOOD BY AUTOMATED COUNT: 17.5 % (ref 11.5–14.5)
EST. GFR  (AFRICAN AMERICAN): 25.2 ML/MIN/1.73 M^2
EST. GFR  (NON AFRICAN AMERICAN): 21.9 ML/MIN/1.73 M^2
GLUCOSE SERPL-MCNC: 100 MG/DL (ref 70–110)
HCT VFR BLD AUTO: 28 % (ref 37–48.5)
HGB BLD-MCNC: 8.6 G/DL (ref 12–16)
IMM GRANULOCYTES # BLD AUTO: 0.04 K/UL (ref 0–0.04)
IMM GRANULOCYTES NFR BLD AUTO: 0.4 % (ref 0–0.5)
LYMPHOCYTES # BLD AUTO: 0.6 K/UL (ref 1–4.8)
LYMPHOCYTES NFR BLD: 6.3 % (ref 18–48)
MAGNESIUM SERPL-MCNC: 1.7 MG/DL (ref 1.6–2.6)
MCH RBC QN AUTO: 30.3 PG (ref 27–31)
MCHC RBC AUTO-ENTMCNC: 30.7 G/DL (ref 32–36)
MCV RBC AUTO: 99 FL (ref 82–98)
MONOCYTES # BLD AUTO: 0.6 K/UL (ref 0.3–1)
MONOCYTES NFR BLD: 6.8 % (ref 4–15)
NEUTROPHILS # BLD AUTO: 7.9 K/UL (ref 1.8–7.7)
NEUTROPHILS NFR BLD: 85.2 % (ref 38–73)
NRBC BLD-RTO: 0 /100 WBC
PHOSPHATE SERPL-MCNC: 2.7 MG/DL (ref 2.7–4.5)
PLATELET # BLD AUTO: 85 K/UL (ref 150–450)
PMV BLD AUTO: 12.7 FL (ref 9.2–12.9)
POCT GLUCOSE: 150 MG/DL (ref 70–110)
POCT GLUCOSE: 152 MG/DL (ref 70–110)
POCT GLUCOSE: 156 MG/DL (ref 70–110)
POCT GLUCOSE: 161 MG/DL (ref 70–110)
POTASSIUM SERPL-SCNC: 4.1 MMOL/L (ref 3.5–5.1)
PROT SERPL-MCNC: 6.2 G/DL (ref 6–8.4)
RBC # BLD AUTO: 2.84 M/UL (ref 4–5.4)
SODIUM SERPL-SCNC: 140 MMOL/L (ref 136–145)
WBC # BLD AUTO: 9.27 K/UL (ref 3.9–12.7)

## 2021-06-30 PROCEDURE — 25000003 PHARM REV CODE 250: Performed by: HOSPITALIST

## 2021-06-30 PROCEDURE — 25000003 PHARM REV CODE 250: Performed by: STUDENT IN AN ORGANIZED HEALTH CARE EDUCATION/TRAINING PROGRAM

## 2021-06-30 PROCEDURE — 99232 PR SUBSEQUENT HOSPITAL CARE,LEVL II: ICD-10-PCS | Mod: ,,, | Performed by: HOSPITALIST

## 2021-06-30 PROCEDURE — 99232 SBSQ HOSP IP/OBS MODERATE 35: CPT | Mod: ,,, | Performed by: NURSE PRACTITIONER

## 2021-06-30 PROCEDURE — 83735 ASSAY OF MAGNESIUM: CPT | Performed by: STUDENT IN AN ORGANIZED HEALTH CARE EDUCATION/TRAINING PROGRAM

## 2021-06-30 PROCEDURE — 99232 PR SUBSEQUENT HOSPITAL CARE,LEVL II: ICD-10-PCS | Mod: ,,, | Performed by: NURSE PRACTITIONER

## 2021-06-30 PROCEDURE — 85025 COMPLETE CBC W/AUTO DIFF WBC: CPT | Performed by: HOSPITALIST

## 2021-06-30 PROCEDURE — 25000003 PHARM REV CODE 250: Performed by: NURSE PRACTITIONER

## 2021-06-30 PROCEDURE — 84100 ASSAY OF PHOSPHORUS: CPT | Performed by: STUDENT IN AN ORGANIZED HEALTH CARE EDUCATION/TRAINING PROGRAM

## 2021-06-30 PROCEDURE — 80053 COMPREHEN METABOLIC PANEL: CPT | Performed by: STUDENT IN AN ORGANIZED HEALTH CARE EDUCATION/TRAINING PROGRAM

## 2021-06-30 PROCEDURE — 80100014 HC HEMODIALYSIS 1:1

## 2021-06-30 PROCEDURE — 63600175 PHARM REV CODE 636 W HCPCS: Performed by: HOSPITALIST

## 2021-06-30 PROCEDURE — 20600001 HC STEP DOWN PRIVATE ROOM

## 2021-06-30 PROCEDURE — 36415 COLL VENOUS BLD VENIPUNCTURE: CPT | Performed by: STUDENT IN AN ORGANIZED HEALTH CARE EDUCATION/TRAINING PROGRAM

## 2021-06-30 PROCEDURE — 82330 ASSAY OF CALCIUM: CPT | Performed by: HOSPITALIST

## 2021-06-30 PROCEDURE — 99232 SBSQ HOSP IP/OBS MODERATE 35: CPT | Mod: ,,, | Performed by: HOSPITALIST

## 2021-06-30 RX ORDER — HEPARIN SODIUM 5000 [USP'U]/ML
5000 INJECTION, SOLUTION INTRAVENOUS; SUBCUTANEOUS EVERY 8 HOURS
Status: DISCONTINUED | OUTPATIENT
Start: 2021-06-30 | End: 2021-06-30

## 2021-06-30 RX ORDER — HEPARIN SODIUM 5000 [USP'U]/ML
5000 INJECTION, SOLUTION INTRAVENOUS; SUBCUTANEOUS EVERY 12 HOURS
Status: DISCONTINUED | OUTPATIENT
Start: 2021-06-30 | End: 2021-07-01 | Stop reason: HOSPADM

## 2021-06-30 RX ADMIN — SIMETHICONE 40 MG: 20 SUSPENSION/ DROPS ORAL at 09:06

## 2021-06-30 RX ADMIN — ASPIRIN 81 MG: 81 TABLET, COATED ORAL at 08:06

## 2021-06-30 RX ADMIN — CALCITRIOL CAPSULES 0.25 MCG 0.25 MCG: 0.25 CAPSULE ORAL at 08:06

## 2021-06-30 RX ADMIN — VANCOMYCIN HYDROCHLORIDE 125 MG: KIT at 06:06

## 2021-06-30 RX ADMIN — VANCOMYCIN HYDROCHLORIDE 125 MG: KIT at 01:06

## 2021-06-30 RX ADMIN — MIRTAZAPINE 7.5 MG: 7.5 TABLET ORAL at 09:06

## 2021-06-30 RX ADMIN — FLUTICASONE FUROATE AND VILANTEROL TRIFENATATE 1 PUFF: 200; 25 POWDER RESPIRATORY (INHALATION) at 08:06

## 2021-06-30 RX ADMIN — METOPROLOL TARTRATE 12.5 MG: 25 TABLET ORAL at 08:06

## 2021-06-30 RX ADMIN — SIMETHICONE 40 MG: 20 SUSPENSION/ DROPS ORAL at 12:06

## 2021-06-30 RX ADMIN — VANCOMYCIN HYDROCHLORIDE 125 MG: KIT at 12:06

## 2021-06-30 RX ADMIN — METOPROLOL TARTRATE 12.5 MG: 25 TABLET ORAL at 09:06

## 2021-06-30 RX ADMIN — MICONAZOLE NITRATE: 20 OINTMENT TOPICAL at 09:06

## 2021-06-30 RX ADMIN — SIMETHICONE 40 MG: 20 SUSPENSION/ DROPS ORAL at 08:06

## 2021-06-30 RX ADMIN — HEPARIN SODIUM 5000 UNITS: 5000 INJECTION INTRAVENOUS; SUBCUTANEOUS at 09:06

## 2021-06-30 RX ADMIN — MICONAZOLE NITRATE: 20 OINTMENT TOPICAL at 08:06

## 2021-06-30 RX ADMIN — ATORVASTATIN CALCIUM 20 MG: 20 TABLET, FILM COATED ORAL at 09:06

## 2021-07-01 VITALS
OXYGEN SATURATION: 94 % | DIASTOLIC BLOOD PRESSURE: 71 MMHG | WEIGHT: 119.06 LBS | SYSTOLIC BLOOD PRESSURE: 162 MMHG | HEART RATE: 62 BPM | BODY MASS INDEX: 21.09 KG/M2 | RESPIRATION RATE: 18 BRPM | HEIGHT: 63 IN | TEMPERATURE: 98 F

## 2021-07-01 PROBLEM — K92.2 GI BLEED: Status: RESOLVED | Noted: 2021-06-19 | Resolved: 2021-07-01

## 2021-07-01 PROBLEM — K92.2 LOWER GI BLEED: Status: RESOLVED | Noted: 2021-06-12 | Resolved: 2021-07-01

## 2021-07-01 LAB
ALBUMIN SERPL BCP-MCNC: 2.2 G/DL (ref 3.5–5.2)
ALP SERPL-CCNC: 117 U/L (ref 55–135)
ALT SERPL W/O P-5'-P-CCNC: 12 U/L (ref 10–44)
ANION GAP SERPL CALC-SCNC: 11 MMOL/L (ref 8–16)
AST SERPL-CCNC: 28 U/L (ref 10–40)
BASOPHILS # BLD AUTO: 0.06 K/UL (ref 0–0.2)
BASOPHILS NFR BLD: 0.8 % (ref 0–1.9)
BILIRUB SERPL-MCNC: 0.5 MG/DL (ref 0.1–1)
BUN SERPL-MCNC: 34 MG/DL (ref 8–23)
CA-I BLDV-SCNC: 1 MMOL/L (ref 1.06–1.42)
CALCIUM SERPL-MCNC: 8.1 MG/DL (ref 8.7–10.5)
CHLORIDE SERPL-SCNC: 113 MMOL/L (ref 95–110)
CO2 SERPL-SCNC: 20 MMOL/L (ref 23–29)
CREAT SERPL-MCNC: 2.9 MG/DL (ref 0.5–1.4)
DIFFERENTIAL METHOD: ABNORMAL
EOSINOPHIL # BLD AUTO: 0.1 K/UL (ref 0–0.5)
EOSINOPHIL NFR BLD: 1.1 % (ref 0–8)
ERYTHROCYTE [DISTWIDTH] IN BLOOD BY AUTOMATED COUNT: 17.4 % (ref 11.5–14.5)
EST. GFR  (AFRICAN AMERICAN): 17.1 ML/MIN/1.73 M^2
EST. GFR  (NON AFRICAN AMERICAN): 14.8 ML/MIN/1.73 M^2
GLUCOSE SERPL-MCNC: 112 MG/DL (ref 70–110)
HCT VFR BLD AUTO: 29.2 % (ref 37–48.5)
HGB BLD-MCNC: 8.8 G/DL (ref 12–16)
IMM GRANULOCYTES # BLD AUTO: 0.03 K/UL (ref 0–0.04)
IMM GRANULOCYTES NFR BLD AUTO: 0.4 % (ref 0–0.5)
LYMPHOCYTES # BLD AUTO: 0.6 K/UL (ref 1–4.8)
LYMPHOCYTES NFR BLD: 7.7 % (ref 18–48)
MAGNESIUM SERPL-MCNC: 1.7 MG/DL (ref 1.6–2.6)
MCH RBC QN AUTO: 29.7 PG (ref 27–31)
MCHC RBC AUTO-ENTMCNC: 30.1 G/DL (ref 32–36)
MCV RBC AUTO: 99 FL (ref 82–98)
MONOCYTES # BLD AUTO: 0.6 K/UL (ref 0.3–1)
MONOCYTES NFR BLD: 8.6 % (ref 4–15)
NEUTROPHILS # BLD AUTO: 6.1 K/UL (ref 1.8–7.7)
NEUTROPHILS NFR BLD: 81.4 % (ref 38–73)
NRBC BLD-RTO: 0 /100 WBC
PHOSPHATE SERPL-MCNC: 4.2 MG/DL (ref 2.7–4.5)
PLATELET # BLD AUTO: 92 K/UL (ref 150–450)
PMV BLD AUTO: 12.7 FL (ref 9.2–12.9)
POCT GLUCOSE: 126 MG/DL (ref 70–110)
POCT GLUCOSE: 136 MG/DL (ref 70–110)
POCT GLUCOSE: 84 MG/DL (ref 70–110)
POTASSIUM SERPL-SCNC: 4.4 MMOL/L (ref 3.5–5.1)
PROT SERPL-MCNC: 6 G/DL (ref 6–8.4)
RBC # BLD AUTO: 2.96 M/UL (ref 4–5.4)
SODIUM SERPL-SCNC: 144 MMOL/L (ref 136–145)
WBC # BLD AUTO: 7.44 K/UL (ref 3.9–12.7)

## 2021-07-01 PROCEDURE — 99239 HOSP IP/OBS DSCHRG MGMT >30: CPT | Mod: ,,, | Performed by: STUDENT IN AN ORGANIZED HEALTH CARE EDUCATION/TRAINING PROGRAM

## 2021-07-01 PROCEDURE — 63600175 PHARM REV CODE 636 W HCPCS: Performed by: HOSPITALIST

## 2021-07-01 PROCEDURE — 36415 COLL VENOUS BLD VENIPUNCTURE: CPT | Performed by: STUDENT IN AN ORGANIZED HEALTH CARE EDUCATION/TRAINING PROGRAM

## 2021-07-01 PROCEDURE — 25000003 PHARM REV CODE 250: Performed by: NURSE PRACTITIONER

## 2021-07-01 PROCEDURE — 25000003 PHARM REV CODE 250: Performed by: HOSPITALIST

## 2021-07-01 PROCEDURE — 80053 COMPREHEN METABOLIC PANEL: CPT | Performed by: STUDENT IN AN ORGANIZED HEALTH CARE EDUCATION/TRAINING PROGRAM

## 2021-07-01 PROCEDURE — 82330 ASSAY OF CALCIUM: CPT | Performed by: HOSPITALIST

## 2021-07-01 PROCEDURE — 99900035 HC TECH TIME PER 15 MIN (STAT)

## 2021-07-01 PROCEDURE — 85025 COMPLETE CBC W/AUTO DIFF WBC: CPT | Performed by: HOSPITALIST

## 2021-07-01 PROCEDURE — 83735 ASSAY OF MAGNESIUM: CPT | Performed by: STUDENT IN AN ORGANIZED HEALTH CARE EDUCATION/TRAINING PROGRAM

## 2021-07-01 PROCEDURE — 99239 PR HOSPITAL DISCHARGE DAY,>30 MIN: ICD-10-PCS | Mod: ,,, | Performed by: STUDENT IN AN ORGANIZED HEALTH CARE EDUCATION/TRAINING PROGRAM

## 2021-07-01 PROCEDURE — 84100 ASSAY OF PHOSPHORUS: CPT | Performed by: STUDENT IN AN ORGANIZED HEALTH CARE EDUCATION/TRAINING PROGRAM

## 2021-07-01 RX ORDER — ASPIRIN 81 MG/1
81 TABLET ORAL DAILY
Refills: 0 | Status: ON HOLD
Start: 2021-07-02 | End: 2021-07-26 | Stop reason: HOSPADM

## 2021-07-01 RX ORDER — CALCITRIOL 0.25 UG/1
0.25 CAPSULE ORAL DAILY
Status: ON HOLD
Start: 2021-07-02 | End: 2021-07-26 | Stop reason: HOSPADM

## 2021-07-01 RX ORDER — METOPROLOL SUCCINATE 25 MG/1
25 TABLET, EXTENDED RELEASE ORAL NIGHTLY
Qty: 30 TABLET | Refills: 11 | Status: ON HOLD
Start: 2021-07-01 | End: 2021-07-26 | Stop reason: HOSPADM

## 2021-07-01 RX ADMIN — SIMETHICONE 40 MG: 20 SUSPENSION/ DROPS ORAL at 08:07

## 2021-07-01 RX ADMIN — VANCOMYCIN HYDROCHLORIDE 125 MG: KIT at 06:07

## 2021-07-01 RX ADMIN — METOPROLOL TARTRATE 12.5 MG: 25 TABLET ORAL at 08:07

## 2021-07-01 RX ADMIN — SIMETHICONE 40 MG: 20 SUSPENSION/ DROPS ORAL at 01:07

## 2021-07-01 RX ADMIN — VANCOMYCIN HYDROCHLORIDE 125 MG: KIT at 12:07

## 2021-07-01 RX ADMIN — MICONAZOLE NITRATE: 20 OINTMENT TOPICAL at 08:07

## 2021-07-01 RX ADMIN — VANCOMYCIN HYDROCHLORIDE 125 MG: KIT at 01:07

## 2021-07-01 RX ADMIN — HEPARIN SODIUM 5000 UNITS: 5000 INJECTION INTRAVENOUS; SUBCUTANEOUS at 08:07

## 2021-07-01 RX ADMIN — ASPIRIN 81 MG: 81 TABLET, COATED ORAL at 08:07

## 2021-07-01 RX ADMIN — CALCITRIOL CAPSULES 0.25 MCG 0.25 MCG: 0.25 CAPSULE ORAL at 08:07

## 2021-07-01 RX ADMIN — FLUTICASONE FUROATE AND VILANTEROL TRIFENATATE 1 PUFF: 200; 25 POWDER RESPIRATORY (INHALATION) at 08:07

## 2021-07-17 ENCOUNTER — HOSPITAL ENCOUNTER (INPATIENT)
Facility: HOSPITAL | Age: 81
LOS: 3 days | Discharge: NURSING, REHAB OR LONG-TERM CARE | DRG: 871 | End: 2021-07-20
Attending: EMERGENCY MEDICINE | Admitting: INTERNAL MEDICINE
Payer: MEDICARE

## 2021-07-17 DIAGNOSIS — R06.00 DYSPNEA: ICD-10-CM

## 2021-07-17 DIAGNOSIS — A41.9 SEPSIS, DUE TO UNSPECIFIED ORGANISM, UNSPECIFIED WHETHER ACUTE ORGAN DYSFUNCTION PRESENT: ICD-10-CM

## 2021-07-17 DIAGNOSIS — R06.02 SHORTNESS OF BREATH: ICD-10-CM

## 2021-07-17 DIAGNOSIS — L89.90 PRESSURE ULCER: ICD-10-CM

## 2021-07-17 DIAGNOSIS — J18.9 PNEUMONIA OF RIGHT LUNG DUE TO INFECTIOUS ORGANISM, UNSPECIFIED PART OF LUNG: Primary | ICD-10-CM

## 2021-07-17 DIAGNOSIS — J18.9 PNEUMONIA OF RIGHT LUNG DUE TO INFECTIOUS ORGANISM: ICD-10-CM

## 2021-07-17 PROBLEM — J96.11 CHRONIC RESPIRATORY FAILURE WITH HYPOXIA: Status: ACTIVE | Noted: 2021-07-17

## 2021-07-17 LAB
ALBUMIN SERPL BCP-MCNC: 2.5 G/DL (ref 3.5–5.2)
ALP SERPL-CCNC: 128 U/L (ref 55–135)
ALT SERPL W/O P-5'-P-CCNC: 9 U/L (ref 10–44)
ANION GAP SERPL CALC-SCNC: 11 MMOL/L (ref 8–16)
AST SERPL-CCNC: 17 U/L (ref 10–40)
BASOPHILS # BLD AUTO: 0.05 K/UL (ref 0–0.2)
BASOPHILS NFR BLD: 0.3 % (ref 0–1.9)
BILIRUB SERPL-MCNC: 0.6 MG/DL (ref 0.1–1)
BUN SERPL-MCNC: 35 MG/DL (ref 8–23)
CALCIUM SERPL-MCNC: 9.1 MG/DL (ref 8.7–10.5)
CHLORIDE SERPL-SCNC: 115 MMOL/L (ref 95–110)
CO2 SERPL-SCNC: 17 MMOL/L (ref 23–29)
CREAT SERPL-MCNC: 3 MG/DL (ref 0.5–1.4)
CTP QC/QA: YES
DIFFERENTIAL METHOD: ABNORMAL
EOSINOPHIL # BLD AUTO: 0 K/UL (ref 0–0.5)
EOSINOPHIL NFR BLD: 0.1 % (ref 0–8)
ERYTHROCYTE [DISTWIDTH] IN BLOOD BY AUTOMATED COUNT: 20 % (ref 11.5–14.5)
EST. GFR  (AFRICAN AMERICAN): 16.4 ML/MIN/1.73 M^2
EST. GFR  (NON AFRICAN AMERICAN): 14.2 ML/MIN/1.73 M^2
GLUCOSE SERPL-MCNC: 132 MG/DL (ref 70–110)
HCT VFR BLD AUTO: 34.1 % (ref 37–48.5)
HGB BLD-MCNC: 10.5 G/DL (ref 12–16)
IMM GRANULOCYTES # BLD AUTO: 0.1 K/UL (ref 0–0.04)
IMM GRANULOCYTES NFR BLD AUTO: 0.5 % (ref 0–0.5)
LACTATE SERPL-SCNC: 1.4 MMOL/L (ref 0.5–2.2)
LACTATE SERPL-SCNC: 2 MMOL/L (ref 0.5–2.2)
LYMPHOCYTES # BLD AUTO: 0.2 K/UL (ref 1–4.8)
LYMPHOCYTES NFR BLD: 1.2 % (ref 18–48)
MAGNESIUM SERPL-MCNC: 2 MG/DL (ref 1.6–2.6)
MCH RBC QN AUTO: 30.3 PG (ref 27–31)
MCHC RBC AUTO-ENTMCNC: 30.8 G/DL (ref 32–36)
MCV RBC AUTO: 98 FL (ref 82–98)
MONOCYTES # BLD AUTO: 1.1 K/UL (ref 0.3–1)
MONOCYTES NFR BLD: 5.8 % (ref 4–15)
NEUTROPHILS # BLD AUTO: 17.3 K/UL (ref 1.8–7.7)
NEUTROPHILS NFR BLD: 92.1 % (ref 38–73)
NRBC BLD-RTO: 0 /100 WBC
PHOSPHATE SERPL-MCNC: 2.5 MG/DL (ref 2.7–4.5)
PLATELET # BLD AUTO: 83 K/UL (ref 150–450)
PMV BLD AUTO: ABNORMAL FL (ref 9.2–12.9)
POCT GLUCOSE: 117 MG/DL (ref 70–110)
POTASSIUM SERPL-SCNC: 3.9 MMOL/L (ref 3.5–5.1)
PROCALCITONIN SERPL IA-MCNC: 7.45 NG/ML
PROT SERPL-MCNC: 6.7 G/DL (ref 6–8.4)
RBC # BLD AUTO: 3.47 M/UL (ref 4–5.4)
SARS-COV-2 RDRP RESP QL NAA+PROBE: NEGATIVE
SODIUM SERPL-SCNC: 143 MMOL/L (ref 136–145)
TROPONIN I SERPL DL<=0.01 NG/ML-MCNC: 0.01 NG/ML (ref 0–0.03)
TROPONIN I SERPL DL<=0.01 NG/ML-MCNC: 0.02 NG/ML (ref 0–0.03)
WBC # BLD AUTO: 18.81 K/UL (ref 3.9–12.7)

## 2021-07-17 PROCEDURE — 99223 1ST HOSP IP/OBS HIGH 75: CPT | Mod: AI,,, | Performed by: INTERNAL MEDICINE

## 2021-07-17 PROCEDURE — 25000003 PHARM REV CODE 250: Performed by: INTERNAL MEDICINE

## 2021-07-17 PROCEDURE — 93010 EKG 12-LEAD: ICD-10-PCS | Mod: ,,, | Performed by: INTERNAL MEDICINE

## 2021-07-17 PROCEDURE — 11000001 HC ACUTE MED/SURG PRIVATE ROOM

## 2021-07-17 PROCEDURE — 80053 COMPREHEN METABOLIC PANEL: CPT | Performed by: EMERGENCY MEDICINE

## 2021-07-17 PROCEDURE — 83735 ASSAY OF MAGNESIUM: CPT | Performed by: EMERGENCY MEDICINE

## 2021-07-17 PROCEDURE — 25000003 PHARM REV CODE 250: Performed by: PHYSICIAN ASSISTANT

## 2021-07-17 PROCEDURE — 99223 PR INITIAL HOSPITAL CARE,LEVL III: ICD-10-PCS | Mod: AI,,, | Performed by: INTERNAL MEDICINE

## 2021-07-17 PROCEDURE — 99285 EMERGENCY DEPT VISIT HI MDM: CPT | Mod: 25

## 2021-07-17 PROCEDURE — 96365 THER/PROPH/DIAG IV INF INIT: CPT

## 2021-07-17 PROCEDURE — 25000003 PHARM REV CODE 250: Performed by: EMERGENCY MEDICINE

## 2021-07-17 PROCEDURE — 63600175 PHARM REV CODE 636 W HCPCS: Performed by: INTERNAL MEDICINE

## 2021-07-17 PROCEDURE — 83605 ASSAY OF LACTIC ACID: CPT | Performed by: PHYSICIAN ASSISTANT

## 2021-07-17 PROCEDURE — 87449 NOS EACH ORGANISM AG IA: CPT | Performed by: INTERNAL MEDICINE

## 2021-07-17 PROCEDURE — 85025 COMPLETE CBC W/AUTO DIFF WBC: CPT | Performed by: PHYSICIAN ASSISTANT

## 2021-07-17 PROCEDURE — 84100 ASSAY OF PHOSPHORUS: CPT | Performed by: EMERGENCY MEDICINE

## 2021-07-17 PROCEDURE — 93005 ELECTROCARDIOGRAM TRACING: CPT

## 2021-07-17 PROCEDURE — U0002 COVID-19 LAB TEST NON-CDC: HCPCS | Performed by: PHYSICIAN ASSISTANT

## 2021-07-17 PROCEDURE — 25000242 PHARM REV CODE 250 ALT 637 W/ HCPCS: Performed by: INTERNAL MEDICINE

## 2021-07-17 PROCEDURE — 36415 COLL VENOUS BLD VENIPUNCTURE: CPT | Performed by: INTERNAL MEDICINE

## 2021-07-17 PROCEDURE — 84484 ASSAY OF TROPONIN QUANT: CPT | Performed by: INTERNAL MEDICINE

## 2021-07-17 PROCEDURE — 87324 CLOSTRIDIUM AG IA: CPT | Performed by: INTERNAL MEDICINE

## 2021-07-17 PROCEDURE — 93010 ELECTROCARDIOGRAM REPORT: CPT | Mod: ,,, | Performed by: INTERNAL MEDICINE

## 2021-07-17 PROCEDURE — 63600175 PHARM REV CODE 636 W HCPCS: Performed by: PHYSICIAN ASSISTANT

## 2021-07-17 PROCEDURE — 84145 PROCALCITONIN (PCT): CPT | Performed by: PHYSICIAN ASSISTANT

## 2021-07-17 PROCEDURE — 87040 BLOOD CULTURE FOR BACTERIA: CPT | Mod: 59 | Performed by: PHYSICIAN ASSISTANT

## 2021-07-17 PROCEDURE — 99285 PR EMERGENCY DEPT VISIT,LEVEL V: ICD-10-PCS | Mod: CR,CS,, | Performed by: PHYSICIAN ASSISTANT

## 2021-07-17 PROCEDURE — 83605 ASSAY OF LACTIC ACID: CPT | Mod: 91 | Performed by: INTERNAL MEDICINE

## 2021-07-17 PROCEDURE — 99285 EMERGENCY DEPT VISIT HI MDM: CPT | Mod: CR,CS,, | Performed by: PHYSICIAN ASSISTANT

## 2021-07-17 RX ORDER — MUPIROCIN 20 MG/G
OINTMENT TOPICAL 2 TIMES DAILY
Status: DISCONTINUED | OUTPATIENT
Start: 2021-07-17 | End: 2021-07-20 | Stop reason: HOSPADM

## 2021-07-17 RX ORDER — ACETAMINOPHEN 500 MG
1000 TABLET ORAL
Status: COMPLETED | OUTPATIENT
Start: 2021-07-17 | End: 2021-07-17

## 2021-07-17 RX ORDER — VANCOMYCIN HCL IN 5 % DEXTROSE 1G/250ML
20 PLASTIC BAG, INJECTION (ML) INTRAVENOUS
Status: COMPLETED | OUTPATIENT
Start: 2021-07-17 | End: 2021-07-17

## 2021-07-17 RX ORDER — IBUPROFEN 200 MG
16 TABLET ORAL
Status: DISCONTINUED | OUTPATIENT
Start: 2021-07-17 | End: 2021-07-20 | Stop reason: HOSPADM

## 2021-07-17 RX ORDER — SODIUM CHLORIDE 0.9 % (FLUSH) 0.9 %
10 SYRINGE (ML) INJECTION
Status: DISCONTINUED | OUTPATIENT
Start: 2021-07-17 | End: 2021-07-20 | Stop reason: HOSPADM

## 2021-07-17 RX ORDER — IPRATROPIUM BROMIDE AND ALBUTEROL SULFATE 2.5; .5 MG/3ML; MG/3ML
3 SOLUTION RESPIRATORY (INHALATION) EVERY 4 HOURS PRN
Status: DISCONTINUED | OUTPATIENT
Start: 2021-07-17 | End: 2021-07-20 | Stop reason: HOSPADM

## 2021-07-17 RX ORDER — ASPIRIN 81 MG/1
81 TABLET ORAL DAILY
Status: DISCONTINUED | OUTPATIENT
Start: 2021-07-18 | End: 2021-07-20 | Stop reason: HOSPADM

## 2021-07-17 RX ORDER — IBUPROFEN 200 MG
24 TABLET ORAL
Status: DISCONTINUED | OUTPATIENT
Start: 2021-07-17 | End: 2021-07-20 | Stop reason: HOSPADM

## 2021-07-17 RX ORDER — ACETAMINOPHEN 325 MG/1
650 TABLET ORAL EVERY 6 HOURS PRN
Status: DISCONTINUED | OUTPATIENT
Start: 2021-07-17 | End: 2021-07-20 | Stop reason: HOSPADM

## 2021-07-17 RX ORDER — MIRTAZAPINE 7.5 MG/1
7.5 TABLET, FILM COATED ORAL NIGHTLY
Status: DISCONTINUED | OUTPATIENT
Start: 2021-07-17 | End: 2021-07-20 | Stop reason: HOSPADM

## 2021-07-17 RX ORDER — FLUTICASONE FUROATE AND VILANTEROL 200; 25 UG/1; UG/1
1 POWDER RESPIRATORY (INHALATION) DAILY
Status: DISCONTINUED | OUTPATIENT
Start: 2021-07-17 | End: 2021-07-20 | Stop reason: HOSPADM

## 2021-07-17 RX ORDER — ONDANSETRON 8 MG/1
8 TABLET, ORALLY DISINTEGRATING ORAL EVERY 8 HOURS PRN
Status: DISCONTINUED | OUTPATIENT
Start: 2021-07-17 | End: 2021-07-20 | Stop reason: HOSPADM

## 2021-07-17 RX ORDER — GLUCAGON 1 MG
1 KIT INJECTION
Status: DISCONTINUED | OUTPATIENT
Start: 2021-07-17 | End: 2021-07-20 | Stop reason: HOSPADM

## 2021-07-17 RX ORDER — ATORVASTATIN CALCIUM 20 MG/1
20 TABLET, FILM COATED ORAL NIGHTLY
Status: DISCONTINUED | OUTPATIENT
Start: 2021-07-17 | End: 2021-07-20 | Stop reason: HOSPADM

## 2021-07-17 RX ORDER — CALCITRIOL 0.25 UG/1
0.25 CAPSULE ORAL DAILY
Status: DISCONTINUED | OUTPATIENT
Start: 2021-07-17 | End: 2021-07-20 | Stop reason: HOSPADM

## 2021-07-17 RX ORDER — INSULIN ASPART 100 [IU]/ML
0-5 INJECTION, SOLUTION INTRAVENOUS; SUBCUTANEOUS
Status: DISCONTINUED | OUTPATIENT
Start: 2021-07-17 | End: 2021-07-20 | Stop reason: HOSPADM

## 2021-07-17 RX ADMIN — ATORVASTATIN CALCIUM 20 MG: 20 TABLET, FILM COATED ORAL at 10:07

## 2021-07-17 RX ADMIN — MUPIROCIN: 20 OINTMENT TOPICAL at 12:07

## 2021-07-17 RX ADMIN — PIPERACILLIN SODIUM AND TAZOBACTAM SODIUM 4.5 G: 4; .5 INJECTION, POWDER, FOR SOLUTION INTRAVENOUS at 10:07

## 2021-07-17 RX ADMIN — CALCITRIOL CAPSULES 0.25 MCG 0.25 MCG: 0.25 CAPSULE ORAL at 12:07

## 2021-07-17 RX ADMIN — PIPERACILLIN SODIUM AND TAZOBACTAM SODIUM 4.5 G: 4; .5 INJECTION, POWDER, FOR SOLUTION INTRAVENOUS at 09:07

## 2021-07-17 RX ADMIN — VANCOMYCIN HYDROCHLORIDE 500 MG: KIT at 10:07

## 2021-07-17 RX ADMIN — MIRTAZAPINE 7.5 MG: 7.5 TABLET ORAL at 10:07

## 2021-07-17 RX ADMIN — VANCOMYCIN HYDROCHLORIDE 1000 MG: 1 INJECTION, POWDER, LYOPHILIZED, FOR SOLUTION INTRAVENOUS at 11:07

## 2021-07-17 RX ADMIN — MUPIROCIN: 20 OINTMENT TOPICAL at 10:07

## 2021-07-17 RX ADMIN — ACETAMINOPHEN 1000 MG: 500 TABLET ORAL at 09:07

## 2021-07-17 RX ADMIN — VANCOMYCIN HYDROCHLORIDE 500 MG: KIT at 02:07

## 2021-07-17 RX ADMIN — FLUTICASONE FUROATE AND VILANTEROL TRIFENATATE 1 PUFF: 200; 25 POWDER RESPIRATORY (INHALATION) at 12:07

## 2021-07-18 LAB
ANION GAP SERPL CALC-SCNC: 11 MMOL/L (ref 8–16)
ASCENDING AORTA: 2.67 CM
AV INDEX (PROSTH): 0.13
AV MEAN GRADIENT: 47 MMHG
AV PEAK GRADIENT: 80 MMHG
AV VALVE AREA: 0.36 CM2
AV VELOCITY RATIO: 0.15
BASOPHILS # BLD AUTO: 0.05 K/UL (ref 0–0.2)
BASOPHILS NFR BLD: 0.3 % (ref 0–1.9)
BSA FOR ECHO PROCEDURE: 1.41 M2
BUN SERPL-MCNC: 47 MG/DL (ref 8–23)
C DIFF GDH STL QL: NEGATIVE
C DIFF TOX A+B STL QL IA: NEGATIVE
CALCIUM SERPL-MCNC: 8.8 MG/DL (ref 8.7–10.5)
CHLORIDE SERPL-SCNC: 115 MMOL/L (ref 95–110)
CO2 SERPL-SCNC: 16 MMOL/L (ref 23–29)
CREAT SERPL-MCNC: 3.6 MG/DL (ref 0.5–1.4)
CV ECHO LV RWT: 0.49 CM
DIFFERENTIAL METHOD: ABNORMAL
DOP CALC AO PEAK VEL: 4.47 M/S
DOP CALC AO VTI: 109.91 CM
DOP CALC LVOT AREA: 2.9 CM2
DOP CALC LVOT DIAMETER: 1.91 CM
DOP CALC LVOT PEAK VEL: 0.69 M/S
DOP CALC LVOT STROKE VOLUME: 40.06 CM3
DOP CALCLVOT PEAK VEL VTI: 13.99 CM
E WAVE DECELERATION TIME: 193.03 MSEC
E/A RATIO: 0.77
E/E' RATIO: 36 M/S
ECHO LV POSTERIOR WALL: 0.95 CM (ref 0.6–1.1)
EJECTION FRACTION: 45 %
EOSINOPHIL # BLD AUTO: 0 K/UL (ref 0–0.5)
EOSINOPHIL NFR BLD: 0.3 % (ref 0–8)
ERYTHROCYTE [DISTWIDTH] IN BLOOD BY AUTOMATED COUNT: 18.6 % (ref 11.5–14.5)
EST. GFR  (AFRICAN AMERICAN): 13.2 ML/MIN/1.73 M^2
EST. GFR  (NON AFRICAN AMERICAN): 11.4 ML/MIN/1.73 M^2
FRACTIONAL SHORTENING: 20 % (ref 28–44)
GLUCOSE SERPL-MCNC: 75 MG/DL (ref 70–110)
HCT VFR BLD AUTO: 35.1 % (ref 37–48.5)
HGB BLD-MCNC: 10.5 G/DL (ref 12–16)
HR MV ECHO: 66 BPM
IMM GRANULOCYTES # BLD AUTO: 0.05 K/UL (ref 0–0.04)
IMM GRANULOCYTES NFR BLD AUTO: 0.3 % (ref 0–0.5)
INTERVENTRICULAR SEPTUM: 0.9 CM (ref 0.6–1.1)
IVRT: 154.14 MSEC
LA MAJOR: 6.04 CM
LA MINOR: 6.04 CM
LA WIDTH: 3.69 CM
LEFT ATRIUM SIZE: 3.58 CM
LEFT ATRIUM VOLUME INDEX MOD: 38.9 ML/M2
LEFT ATRIUM VOLUME INDEX: 47.4 ML/M2
LEFT ATRIUM VOLUME MOD: 55.63 CM3
LEFT ATRIUM VOLUME: 67.82 CM3
LEFT INTERNAL DIMENSION IN SYSTOLE: 3.12 CM (ref 2.1–4)
LEFT VENTRICLE DIASTOLIC VOLUME INDEX: 45.97 ML/M2
LEFT VENTRICLE DIASTOLIC VOLUME: 65.74 ML
LEFT VENTRICLE MASS INDEX: 77 G/M2
LEFT VENTRICLE SYSTOLIC VOLUME INDEX: 27 ML/M2
LEFT VENTRICLE SYSTOLIC VOLUME: 38.58 ML
LEFT VENTRICULAR INTERNAL DIMENSION IN DIASTOLE: 3.9 CM (ref 3.5–6)
LEFT VENTRICULAR MASS: 109.42 G
LV LATERAL E/E' RATIO: 27 M/S
LV SEPTAL E/E' RATIO: 54 M/S
LYMPHOCYTES # BLD AUTO: 0.6 K/UL (ref 1–4.8)
LYMPHOCYTES NFR BLD: 4.1 % (ref 18–48)
MAGNESIUM SERPL-MCNC: 2.1 MG/DL (ref 1.6–2.6)
MCH RBC QN AUTO: 30 PG (ref 27–31)
MCHC RBC AUTO-ENTMCNC: 29.9 G/DL (ref 32–36)
MCV RBC AUTO: 100 FL (ref 82–98)
MONOCYTES # BLD AUTO: 0.8 K/UL (ref 0.3–1)
MONOCYTES NFR BLD: 5.5 % (ref 4–15)
MV MEAN GRADIENT: 6 MMHG
MV PEAK A VEL: 1.4 M/S
MV PEAK E VEL: 1.08 M/S
MV STENOSIS PRESSURE HALF TIME: 55.98 MS
MV VALVE AREA P 1/2 METHOD: 3.93 CM2
NEUTROPHILS # BLD AUTO: 13.4 K/UL (ref 1.8–7.7)
NEUTROPHILS NFR BLD: 89.5 % (ref 38–73)
NRBC BLD-RTO: 0 /100 WBC
PISA TR MAX VEL: 3.85 M/S
PLATELET # BLD AUTO: 74 K/UL (ref 150–450)
PMV BLD AUTO: ABNORMAL FL (ref 9.2–12.9)
POCT GLUCOSE: 86 MG/DL (ref 70–110)
POCT GLUCOSE: 94 MG/DL (ref 70–110)
POTASSIUM SERPL-SCNC: 3.8 MMOL/L (ref 3.5–5.1)
QEF: 44 %
RA MAJOR: 4.39 CM
RA PRESSURE: 15 MMHG
RA WIDTH: 4.43 CM
RBC # BLD AUTO: 3.5 M/UL (ref 4–5.4)
RIGHT VENTRICULAR END-DIASTOLIC DIMENSION: 4.45 CM
RV TISSUE DOPPLER FREE WALL SYSTOLIC VELOCITY 1 (APICAL 4 CHAMBER VIEW): 5.53 CM/S
SINUS: 2.41 CM
SODIUM SERPL-SCNC: 142 MMOL/L (ref 136–145)
STJ: 2.14 CM
TDI LATERAL: 0.04 M/S
TDI SEPTAL: 0.02 M/S
TDI: 0.03 M/S
TR MAX PG: 59 MMHG
TRICUSPID ANNULAR PLANE SYSTOLIC EXCURSION: 1.13 CM
TROPONIN I SERPL DL<=0.01 NG/ML-MCNC: 0.02 NG/ML (ref 0–0.03)
TV REST PULMONARY ARTERY PRESSURE: 74 MMHG
WBC # BLD AUTO: 14.92 K/UL (ref 3.9–12.7)

## 2021-07-18 PROCEDURE — 36415 COLL VENOUS BLD VENIPUNCTURE: CPT | Performed by: INTERNAL MEDICINE

## 2021-07-18 PROCEDURE — 94640 AIRWAY INHALATION TREATMENT: CPT

## 2021-07-18 PROCEDURE — 25000242 PHARM REV CODE 250 ALT 637 W/ HCPCS: Performed by: INTERNAL MEDICINE

## 2021-07-18 PROCEDURE — 99900035 HC TECH TIME PER 15 MIN (STAT)

## 2021-07-18 PROCEDURE — 11000001 HC ACUTE MED/SURG PRIVATE ROOM

## 2021-07-18 PROCEDURE — 99232 SBSQ HOSP IP/OBS MODERATE 35: CPT | Mod: ,,, | Performed by: INTERNAL MEDICINE

## 2021-07-18 PROCEDURE — 99232 PR SUBSEQUENT HOSPITAL CARE,LEVL II: ICD-10-PCS | Mod: ,,, | Performed by: INTERNAL MEDICINE

## 2021-07-18 PROCEDURE — 80048 BASIC METABOLIC PNL TOTAL CA: CPT | Performed by: INTERNAL MEDICINE

## 2021-07-18 PROCEDURE — 99233 SBSQ HOSP IP/OBS HIGH 50: CPT | Mod: ,,, | Performed by: INTERNAL MEDICINE

## 2021-07-18 PROCEDURE — 97530 THERAPEUTIC ACTIVITIES: CPT

## 2021-07-18 PROCEDURE — 83735 ASSAY OF MAGNESIUM: CPT | Performed by: INTERNAL MEDICINE

## 2021-07-18 PROCEDURE — 84484 ASSAY OF TROPONIN QUANT: CPT | Performed by: INTERNAL MEDICINE

## 2021-07-18 PROCEDURE — 27000221 HC OXYGEN, UP TO 24 HOURS

## 2021-07-18 PROCEDURE — 85025 COMPLETE CBC W/AUTO DIFF WBC: CPT | Performed by: INTERNAL MEDICINE

## 2021-07-18 PROCEDURE — 99233 PR SUBSEQUENT HOSPITAL CARE,LEVL III: ICD-10-PCS | Mod: ,,, | Performed by: INTERNAL MEDICINE

## 2021-07-18 PROCEDURE — 97165 OT EVAL LOW COMPLEX 30 MIN: CPT

## 2021-07-18 PROCEDURE — 97161 PT EVAL LOW COMPLEX 20 MIN: CPT

## 2021-07-18 PROCEDURE — 97535 SELF CARE MNGMENT TRAINING: CPT

## 2021-07-18 PROCEDURE — 25000003 PHARM REV CODE 250: Performed by: INTERNAL MEDICINE

## 2021-07-18 PROCEDURE — 63600175 PHARM REV CODE 636 W HCPCS: Performed by: INTERNAL MEDICINE

## 2021-07-18 RX ADMIN — MUPIROCIN: 20 OINTMENT TOPICAL at 09:07

## 2021-07-18 RX ADMIN — VANCOMYCIN HYDROCHLORIDE 500 MG: KIT at 06:07

## 2021-07-18 RX ADMIN — ASPIRIN 81 MG: 81 TABLET, COATED ORAL at 09:07

## 2021-07-18 RX ADMIN — FLUTICASONE FUROATE AND VILANTEROL TRIFENATATE 1 PUFF: 200; 25 POWDER RESPIRATORY (INHALATION) at 10:07

## 2021-07-18 RX ADMIN — PIPERACILLIN SODIUM AND TAZOBACTAM SODIUM 4.5 G: 4; .5 INJECTION, POWDER, FOR SOLUTION INTRAVENOUS at 09:07

## 2021-07-18 RX ADMIN — CALCITRIOL CAPSULES 0.25 MCG 0.25 MCG: 0.25 CAPSULE ORAL at 09:07

## 2021-07-18 RX ADMIN — VANCOMYCIN HYDROCHLORIDE 500 MG: KIT at 01:07

## 2021-07-18 RX ADMIN — MIRTAZAPINE 7.5 MG: 7.5 TABLET ORAL at 09:07

## 2021-07-18 RX ADMIN — ATORVASTATIN CALCIUM 20 MG: 20 TABLET, FILM COATED ORAL at 09:07

## 2021-07-19 PROBLEM — D63.1 ANEMIA DUE TO END STAGE RENAL DISEASE: Status: ACTIVE | Noted: 2021-07-19

## 2021-07-19 PROBLEM — N18.6 ANEMIA DUE TO END STAGE RENAL DISEASE: Status: ACTIVE | Noted: 2021-07-19

## 2021-07-19 LAB
ANION GAP SERPL CALC-SCNC: 14 MMOL/L (ref 8–16)
BASOPHILS # BLD AUTO: 0.05 K/UL (ref 0–0.2)
BASOPHILS NFR BLD: 0.4 % (ref 0–1.9)
BUN SERPL-MCNC: 51 MG/DL (ref 8–23)
CALCIUM SERPL-MCNC: 8.9 MG/DL (ref 8.7–10.5)
CHLORIDE SERPL-SCNC: 118 MMOL/L (ref 95–110)
CO2 SERPL-SCNC: 15 MMOL/L (ref 23–29)
CREAT SERPL-MCNC: 4.3 MG/DL (ref 0.5–1.4)
DIFFERENTIAL METHOD: ABNORMAL
EOSINOPHIL # BLD AUTO: 0.1 K/UL (ref 0–0.5)
EOSINOPHIL NFR BLD: 0.6 % (ref 0–8)
ERYTHROCYTE [DISTWIDTH] IN BLOOD BY AUTOMATED COUNT: 18.4 % (ref 11.5–14.5)
EST. GFR  (AFRICAN AMERICAN): 10.6 ML/MIN/1.73 M^2
EST. GFR  (NON AFRICAN AMERICAN): 9.2 ML/MIN/1.73 M^2
GLUCOSE SERPL-MCNC: 65 MG/DL (ref 70–110)
HCT VFR BLD AUTO: 35.5 % (ref 37–48.5)
HGB BLD-MCNC: 10.6 G/DL (ref 12–16)
IMM GRANULOCYTES # BLD AUTO: 0.07 K/UL (ref 0–0.04)
IMM GRANULOCYTES NFR BLD AUTO: 0.5 % (ref 0–0.5)
LYMPHOCYTES # BLD AUTO: 0.5 K/UL (ref 1–4.8)
LYMPHOCYTES NFR BLD: 3.6 % (ref 18–48)
MAGNESIUM SERPL-MCNC: 2.3 MG/DL (ref 1.6–2.6)
MCH RBC QN AUTO: 29.9 PG (ref 27–31)
MCHC RBC AUTO-ENTMCNC: 29.9 G/DL (ref 32–36)
MCV RBC AUTO: 100 FL (ref 82–98)
MONOCYTES # BLD AUTO: 0.7 K/UL (ref 0.3–1)
MONOCYTES NFR BLD: 5 % (ref 4–15)
NEUTROPHILS # BLD AUTO: 11.8 K/UL (ref 1.8–7.7)
NEUTROPHILS NFR BLD: 89.9 % (ref 38–73)
NRBC BLD-RTO: 0 /100 WBC
PLATELET # BLD AUTO: 80 K/UL (ref 150–450)
PMV BLD AUTO: ABNORMAL FL (ref 9.2–12.9)
POCT GLUCOSE: 100 MG/DL (ref 70–110)
POCT GLUCOSE: 103 MG/DL (ref 70–110)
POCT GLUCOSE: 69 MG/DL (ref 70–110)
POCT GLUCOSE: 82 MG/DL (ref 70–110)
POCT GLUCOSE: 97 MG/DL (ref 70–110)
POTASSIUM SERPL-SCNC: 4 MMOL/L (ref 3.5–5.1)
RBC # BLD AUTO: 3.55 M/UL (ref 4–5.4)
SODIUM SERPL-SCNC: 147 MMOL/L (ref 136–145)
VANCOMYCIN SERPL-MCNC: 10.2 UG/ML
WBC # BLD AUTO: 13.18 K/UL (ref 3.9–12.7)

## 2021-07-19 PROCEDURE — 90935 PR HEMODIALYSIS, ONE EVALUATION: ICD-10-PCS | Mod: GC,,, | Performed by: INTERNAL MEDICINE

## 2021-07-19 PROCEDURE — 25000003 PHARM REV CODE 250: Performed by: STUDENT IN AN ORGANIZED HEALTH CARE EDUCATION/TRAINING PROGRAM

## 2021-07-19 PROCEDURE — 85025 COMPLETE CBC W/AUTO DIFF WBC: CPT | Performed by: INTERNAL MEDICINE

## 2021-07-19 PROCEDURE — 80100016 HC MAINTENANCE HEMODIALYSIS

## 2021-07-19 PROCEDURE — 80202 ASSAY OF VANCOMYCIN: CPT | Performed by: INTERNAL MEDICINE

## 2021-07-19 PROCEDURE — 99233 SBSQ HOSP IP/OBS HIGH 50: CPT | Mod: ,,, | Performed by: STUDENT IN AN ORGANIZED HEALTH CARE EDUCATION/TRAINING PROGRAM

## 2021-07-19 PROCEDURE — 90935 HEMODIALYSIS ONE EVALUATION: CPT | Mod: GC,,, | Performed by: INTERNAL MEDICINE

## 2021-07-19 PROCEDURE — 27000221 HC OXYGEN, UP TO 24 HOURS

## 2021-07-19 PROCEDURE — 11000001 HC ACUTE MED/SURG PRIVATE ROOM

## 2021-07-19 PROCEDURE — 36415 COLL VENOUS BLD VENIPUNCTURE: CPT | Performed by: INTERNAL MEDICINE

## 2021-07-19 PROCEDURE — 80048 BASIC METABOLIC PNL TOTAL CA: CPT | Performed by: INTERNAL MEDICINE

## 2021-07-19 PROCEDURE — 63600175 PHARM REV CODE 636 W HCPCS: Performed by: INTERNAL MEDICINE

## 2021-07-19 PROCEDURE — 83735 ASSAY OF MAGNESIUM: CPT | Performed by: INTERNAL MEDICINE

## 2021-07-19 PROCEDURE — 99233 PR SUBSEQUENT HOSPITAL CARE,LEVL III: ICD-10-PCS | Mod: ,,, | Performed by: STUDENT IN AN ORGANIZED HEALTH CARE EDUCATION/TRAINING PROGRAM

## 2021-07-19 PROCEDURE — 25000003 PHARM REV CODE 250: Performed by: INTERNAL MEDICINE

## 2021-07-19 RX ORDER — DOXYCYCLINE HYCLATE 100 MG
100 TABLET ORAL EVERY 12 HOURS
Status: DISCONTINUED | OUTPATIENT
Start: 2021-07-19 | End: 2021-07-20 | Stop reason: HOSPADM

## 2021-07-19 RX ORDER — LOPERAMIDE HYDROCHLORIDE 2 MG/1
2 CAPSULE ORAL 4 TIMES DAILY PRN
Status: DISCONTINUED | OUTPATIENT
Start: 2021-07-19 | End: 2021-07-20 | Stop reason: HOSPADM

## 2021-07-19 RX ORDER — SODIUM CHLORIDE 9 MG/ML
INJECTION, SOLUTION INTRAVENOUS ONCE
Status: DISCONTINUED | OUTPATIENT
Start: 2021-07-19 | End: 2021-07-20

## 2021-07-19 RX ORDER — HEPARIN SODIUM 1000 [USP'U]/ML
1000 INJECTION, SOLUTION INTRAVENOUS; SUBCUTANEOUS
Status: DISCONTINUED | OUTPATIENT
Start: 2021-07-19 | End: 2021-07-20 | Stop reason: HOSPADM

## 2021-07-19 RX ADMIN — LOPERAMIDE HYDROCHLORIDE 2 MG: 2 CAPSULE ORAL at 08:07

## 2021-07-19 RX ADMIN — DOXYCYCLINE HYCLATE 100 MG: 100 TABLET, COATED ORAL at 08:07

## 2021-07-19 RX ADMIN — MUPIROCIN: 20 OINTMENT TOPICAL at 09:07

## 2021-07-19 RX ADMIN — MICONAZOLE NITRATE: 20 OINTMENT TOPICAL at 08:07

## 2021-07-19 RX ADMIN — MIRTAZAPINE 7.5 MG: 7.5 TABLET ORAL at 08:07

## 2021-07-19 RX ADMIN — VANCOMYCIN HYDROCHLORIDE 500 MG: KIT at 01:07

## 2021-07-19 RX ADMIN — VANCOMYCIN HYDROCHLORIDE 500 MG: KIT at 06:07

## 2021-07-19 RX ADMIN — HEPARIN SODIUM 1000 UNITS: 1000 INJECTION INTRAVENOUS; SUBCUTANEOUS at 05:07

## 2021-07-19 RX ADMIN — MUPIROCIN: 20 OINTMENT TOPICAL at 08:07

## 2021-07-19 RX ADMIN — DOXYCYCLINE HYCLATE 100 MG: 100 TABLET, COATED ORAL at 09:07

## 2021-07-19 RX ADMIN — FLUTICASONE FUROATE AND VILANTEROL TRIFENATATE 1 PUFF: 200; 25 POWDER RESPIRATORY (INHALATION) at 09:07

## 2021-07-19 RX ADMIN — ASPIRIN 81 MG: 81 TABLET, COATED ORAL at 09:07

## 2021-07-19 RX ADMIN — CALCITRIOL CAPSULES 0.25 MCG 0.25 MCG: 0.25 CAPSULE ORAL at 09:07

## 2021-07-19 RX ADMIN — ATORVASTATIN CALCIUM 20 MG: 20 TABLET, FILM COATED ORAL at 08:07

## 2021-07-19 RX ADMIN — LOPERAMIDE HYDROCHLORIDE 2 MG: 2 CAPSULE ORAL at 12:07

## 2021-07-20 VITALS
TEMPERATURE: 98 F | HEART RATE: 77 BPM | WEIGHT: 99 LBS | DIASTOLIC BLOOD PRESSURE: 80 MMHG | HEIGHT: 63 IN | SYSTOLIC BLOOD PRESSURE: 155 MMHG | RESPIRATION RATE: 18 BRPM | BODY MASS INDEX: 17.54 KG/M2 | OXYGEN SATURATION: 97 %

## 2021-07-20 LAB
ANION GAP SERPL CALC-SCNC: 14 MMOL/L (ref 8–16)
BASOPHILS # BLD AUTO: 0.05 K/UL (ref 0–0.2)
BASOPHILS NFR BLD: 0.5 % (ref 0–1.9)
BUN SERPL-MCNC: 41 MG/DL (ref 8–23)
CALCIUM SERPL-MCNC: 8.2 MG/DL (ref 8.7–10.5)
CHLORIDE SERPL-SCNC: 111 MMOL/L (ref 95–110)
CO2 SERPL-SCNC: 20 MMOL/L (ref 23–29)
CREAT SERPL-MCNC: 3.9 MG/DL (ref 0.5–1.4)
DIFFERENTIAL METHOD: ABNORMAL
EOSINOPHIL # BLD AUTO: 0.1 K/UL (ref 0–0.5)
EOSINOPHIL NFR BLD: 1.2 % (ref 0–8)
ERYTHROCYTE [DISTWIDTH] IN BLOOD BY AUTOMATED COUNT: 18.2 % (ref 11.5–14.5)
EST. GFR  (AFRICAN AMERICAN): 11.9 ML/MIN/1.73 M^2
EST. GFR  (NON AFRICAN AMERICAN): 10.4 ML/MIN/1.73 M^2
GLUCOSE SERPL-MCNC: 160 MG/DL (ref 70–110)
HCT VFR BLD AUTO: 32.7 % (ref 37–48.5)
HGB BLD-MCNC: 9.8 G/DL (ref 12–16)
IMM GRANULOCYTES # BLD AUTO: 0.03 K/UL (ref 0–0.04)
IMM GRANULOCYTES NFR BLD AUTO: 0.3 % (ref 0–0.5)
LYMPHOCYTES # BLD AUTO: 0.6 K/UL (ref 1–4.8)
LYMPHOCYTES NFR BLD: 6.6 % (ref 18–48)
MAGNESIUM SERPL-MCNC: 1.8 MG/DL (ref 1.6–2.6)
MCH RBC QN AUTO: 29.6 PG (ref 27–31)
MCHC RBC AUTO-ENTMCNC: 30 G/DL (ref 32–36)
MCV RBC AUTO: 99 FL (ref 82–98)
MONOCYTES # BLD AUTO: 0.5 K/UL (ref 0.3–1)
MONOCYTES NFR BLD: 5.5 % (ref 4–15)
NEUTROPHILS # BLD AUTO: 7.9 K/UL (ref 1.8–7.7)
NEUTROPHILS NFR BLD: 85.9 % (ref 38–73)
NRBC BLD-RTO: 0 /100 WBC
PLATELET # BLD AUTO: 79 K/UL (ref 150–450)
PMV BLD AUTO: ABNORMAL FL (ref 9.2–12.9)
POCT GLUCOSE: 149 MG/DL (ref 70–110)
POCT GLUCOSE: 86 MG/DL (ref 70–110)
POTASSIUM SERPL-SCNC: 3.6 MMOL/L (ref 3.5–5.1)
RBC # BLD AUTO: 3.31 M/UL (ref 4–5.4)
SODIUM SERPL-SCNC: 145 MMOL/L (ref 136–145)
WBC # BLD AUTO: 9.23 K/UL (ref 3.9–12.7)

## 2021-07-20 PROCEDURE — 27000221 HC OXYGEN, UP TO 24 HOURS

## 2021-07-20 PROCEDURE — 80048 BASIC METABOLIC PNL TOTAL CA: CPT | Performed by: INTERNAL MEDICINE

## 2021-07-20 PROCEDURE — 25000003 PHARM REV CODE 250: Performed by: EMERGENCY MEDICINE

## 2021-07-20 PROCEDURE — 99239 PR HOSPITAL DISCHARGE DAY,>30 MIN: ICD-10-PCS | Mod: ,,, | Performed by: STUDENT IN AN ORGANIZED HEALTH CARE EDUCATION/TRAINING PROGRAM

## 2021-07-20 PROCEDURE — 99239 HOSP IP/OBS DSCHRG MGMT >30: CPT | Mod: ,,, | Performed by: STUDENT IN AN ORGANIZED HEALTH CARE EDUCATION/TRAINING PROGRAM

## 2021-07-20 PROCEDURE — 25000003 PHARM REV CODE 250: Performed by: STUDENT IN AN ORGANIZED HEALTH CARE EDUCATION/TRAINING PROGRAM

## 2021-07-20 PROCEDURE — 83735 ASSAY OF MAGNESIUM: CPT | Performed by: INTERNAL MEDICINE

## 2021-07-20 PROCEDURE — 36415 COLL VENOUS BLD VENIPUNCTURE: CPT | Performed by: INTERNAL MEDICINE

## 2021-07-20 PROCEDURE — 94761 N-INVAS EAR/PLS OXIMETRY MLT: CPT

## 2021-07-20 PROCEDURE — 85025 COMPLETE CBC W/AUTO DIFF WBC: CPT | Performed by: INTERNAL MEDICINE

## 2021-07-20 PROCEDURE — 25000003 PHARM REV CODE 250: Performed by: INTERNAL MEDICINE

## 2021-07-20 PROCEDURE — 94640 AIRWAY INHALATION TREATMENT: CPT

## 2021-07-20 RX ORDER — DOXYCYCLINE HYCLATE 100 MG
100 TABLET ORAL EVERY 12 HOURS
Qty: 6 TABLET | Refills: 0 | Status: ON HOLD | OUTPATIENT
Start: 2021-07-20 | End: 2021-07-26 | Stop reason: HOSPADM

## 2021-07-20 RX ORDER — HEPARIN SODIUM 1000 [USP'U]/ML
1000 INJECTION, SOLUTION INTRAVENOUS; SUBCUTANEOUS
Status: DISCONTINUED | OUTPATIENT
Start: 2021-07-21 | End: 2021-07-20 | Stop reason: HOSPADM

## 2021-07-20 RX ORDER — SODIUM CHLORIDE 9 MG/ML
INJECTION, SOLUTION INTRAVENOUS
Status: DISCONTINUED | OUTPATIENT
Start: 2021-07-21 | End: 2021-07-20 | Stop reason: HOSPADM

## 2021-07-20 RX ADMIN — DOXYCYCLINE HYCLATE 100 MG: 100 TABLET, COATED ORAL at 08:07

## 2021-07-20 RX ADMIN — CALCITRIOL CAPSULES 0.25 MCG 0.25 MCG: 0.25 CAPSULE ORAL at 08:07

## 2021-07-20 RX ADMIN — FLUTICASONE FUROATE AND VILANTEROL TRIFENATATE 1 PUFF: 200; 25 POWDER RESPIRATORY (INHALATION) at 08:07

## 2021-07-20 RX ADMIN — MUPIROCIN: 20 OINTMENT TOPICAL at 08:07

## 2021-07-20 RX ADMIN — ASPIRIN 81 MG: 81 TABLET, COATED ORAL at 08:07

## 2021-07-20 RX ADMIN — MICONAZOLE NITRATE: 20 OINTMENT TOPICAL at 08:07

## 2021-07-22 LAB
BACTERIA BLD CULT: NORMAL
BACTERIA BLD CULT: NORMAL

## 2021-07-23 ENCOUNTER — HOSPITAL ENCOUNTER (INPATIENT)
Facility: HOSPITAL | Age: 81
LOS: 3 days | Discharge: HOSPICE | DRG: 193 | End: 2021-07-26
Attending: EMERGENCY MEDICINE | Admitting: INTERNAL MEDICINE
Payer: MEDICARE

## 2021-07-23 DIAGNOSIS — R09.02 HYPOXIA: ICD-10-CM

## 2021-07-23 DIAGNOSIS — R07.9 CHEST PAIN: ICD-10-CM

## 2021-07-23 DIAGNOSIS — Z99.2 ESRD ON HEMODIALYSIS: ICD-10-CM

## 2021-07-23 DIAGNOSIS — N18.6 ESRD ON HEMODIALYSIS: ICD-10-CM

## 2021-07-23 DIAGNOSIS — R41.82 ALTERED MENTAL STATUS: ICD-10-CM

## 2021-07-23 DIAGNOSIS — R06.02 SOB (SHORTNESS OF BREATH): ICD-10-CM

## 2021-07-23 DIAGNOSIS — J18.9 PNEUMONIA DUE TO INFECTIOUS ORGANISM, UNSPECIFIED LATERALITY, UNSPECIFIED PART OF LUNG: Primary | ICD-10-CM

## 2021-07-23 DIAGNOSIS — J18.9 PNEUMONIA DUE TO INFECTIOUS ORGANISM: ICD-10-CM

## 2021-07-23 LAB
ADENOVIRUS: NOT DETECTED
ALBUMIN SERPL BCP-MCNC: 2.5 G/DL (ref 3.5–5.2)
ALLENS TEST: ABNORMAL
ALP SERPL-CCNC: 124 U/L (ref 55–135)
ALT SERPL W/O P-5'-P-CCNC: 8 U/L (ref 10–44)
ANION GAP SERPL CALC-SCNC: 16 MMOL/L (ref 8–16)
ANION GAP SERPL CALC-SCNC: 17 MMOL/L (ref 8–16)
AST SERPL-CCNC: 14 U/L (ref 10–40)
BASOPHILS # BLD AUTO: 0.06 K/UL (ref 0–0.2)
BASOPHILS NFR BLD: 0.7 % (ref 0–1.9)
BILIRUB SERPL-MCNC: 0.3 MG/DL (ref 0.1–1)
BNP SERPL-MCNC: >4900 PG/ML (ref 0–99)
BORDETELLA PARAPERTUSSIS (IS1001): NOT DETECTED
BORDETELLA PERTUSSIS (PTXP): NOT DETECTED
BUN SERPL-MCNC: 69 MG/DL (ref 8–23)
BUN SERPL-MCNC: 73 MG/DL (ref 8–23)
C DIFF GDH STL QL: NEGATIVE
C DIFF TOX A+B STL QL IA: NEGATIVE
CALCIUM SERPL-MCNC: 7.5 MG/DL (ref 8.7–10.5)
CALCIUM SERPL-MCNC: 7.6 MG/DL (ref 8.7–10.5)
CHLAMYDIA PNEUMONIAE: NOT DETECTED
CHLORIDE SERPL-SCNC: 114 MMOL/L (ref 95–110)
CHLORIDE SERPL-SCNC: 115 MMOL/L (ref 95–110)
CO2 SERPL-SCNC: 16 MMOL/L (ref 23–29)
CO2 SERPL-SCNC: 17 MMOL/L (ref 23–29)
CORONAVIRUS 229E, COMMON COLD VIRUS: NOT DETECTED
CORONAVIRUS HKU1, COMMON COLD VIRUS: NOT DETECTED
CORONAVIRUS NL63, COMMON COLD VIRUS: NOT DETECTED
CORONAVIRUS OC43, COMMON COLD VIRUS: NOT DETECTED
CREAT SERPL-MCNC: 5.7 MG/DL (ref 0.5–1.4)
CREAT SERPL-MCNC: 5.9 MG/DL (ref 0.5–1.4)
CTP QC/QA: YES
DIFFERENTIAL METHOD: ABNORMAL
EOSINOPHIL # BLD AUTO: 0.1 K/UL (ref 0–0.5)
EOSINOPHIL NFR BLD: 0.8 % (ref 0–8)
ERYTHROCYTE [DISTWIDTH] IN BLOOD BY AUTOMATED COUNT: 18 % (ref 11.5–14.5)
EST. GFR  (AFRICAN AMERICAN): 7.2 ML/MIN/1.73 M^2
EST. GFR  (AFRICAN AMERICAN): 7.6 ML/MIN/1.73 M^2
EST. GFR  (NON AFRICAN AMERICAN): 6.3 ML/MIN/1.73 M^2
EST. GFR  (NON AFRICAN AMERICAN): 6.5 ML/MIN/1.73 M^2
FLUBV RNA NPH QL NAA+NON-PROBE: NOT DETECTED
GLUCOSE SERPL-MCNC: 106 MG/DL (ref 70–110)
GLUCOSE SERPL-MCNC: 120 MG/DL (ref 70–110)
HCO3 UR-SCNC: 16.9 MMOL/L (ref 24–28)
HCT VFR BLD AUTO: 36.4 % (ref 37–48.5)
HGB BLD-MCNC: 10.9 G/DL (ref 12–16)
HPIV1 RNA NPH QL NAA+NON-PROBE: NOT DETECTED
HPIV2 RNA NPH QL NAA+NON-PROBE: NOT DETECTED
HPIV3 RNA NPH QL NAA+NON-PROBE: NOT DETECTED
HPIV4 RNA NPH QL NAA+NON-PROBE: NOT DETECTED
HUMAN METAPNEUMOVIRUS: NOT DETECTED
IMM GRANULOCYTES # BLD AUTO: 0.05 K/UL (ref 0–0.04)
IMM GRANULOCYTES NFR BLD AUTO: 0.6 % (ref 0–0.5)
INFLUENZA A (SUBTYPES H1,H1-2009,H3): NOT DETECTED
INFLUENZA A, MOLECULAR: NEGATIVE
INFLUENZA B, MOLECULAR: NEGATIVE
LYMPHOCYTES # BLD AUTO: 0.6 K/UL (ref 1–4.8)
LYMPHOCYTES NFR BLD: 6.9 % (ref 18–48)
MCH RBC QN AUTO: 29.3 PG (ref 27–31)
MCHC RBC AUTO-ENTMCNC: 29.9 G/DL (ref 32–36)
MCV RBC AUTO: 98 FL (ref 82–98)
MONOCYTES # BLD AUTO: 0.5 K/UL (ref 0.3–1)
MONOCYTES NFR BLD: 5.9 % (ref 4–15)
MYCOPLASMA PNEUMONIAE: NOT DETECTED
NEUTROPHILS # BLD AUTO: 7.3 K/UL (ref 1.8–7.7)
NEUTROPHILS NFR BLD: 85.1 % (ref 38–73)
NRBC BLD-RTO: 0 /100 WBC
PCO2 BLDA: 36.2 MMHG (ref 35–45)
PH SMN: 7.28 [PH] (ref 7.35–7.45)
PHOSPHATE SERPL-MCNC: 6.4 MG/DL (ref 2.7–4.5)
PLATELET # BLD AUTO: 87 K/UL (ref 150–450)
PMV BLD AUTO: 13.9 FL (ref 9.2–12.9)
PO2 BLDA: 77 MMHG (ref 40–60)
POC BE: -10 MMOL/L
POC SATURATED O2: 93 % (ref 95–100)
POC TCO2: 18 MMOL/L (ref 24–29)
POCT GLUCOSE: 111 MG/DL (ref 70–110)
POCT GLUCOSE: 99 MG/DL (ref 70–110)
POTASSIUM SERPL-SCNC: 4.1 MMOL/L (ref 3.5–5.1)
POTASSIUM SERPL-SCNC: 4.1 MMOL/L (ref 3.5–5.1)
PROT SERPL-MCNC: 7 G/DL (ref 6–8.4)
RBC # BLD AUTO: 3.72 M/UL (ref 4–5.4)
RESPIRATORY INFECTION PANEL SOURCE: NORMAL
RSV RNA NPH QL NAA+NON-PROBE: NOT DETECTED
RV+EV RNA NPH QL NAA+NON-PROBE: NOT DETECTED
SAMPLE: ABNORMAL
SARS-COV-2 RDRP RESP QL NAA+PROBE: NEGATIVE
SARS-COV-2 RNA RESP QL NAA+PROBE: NOT DETECTED
SITE: ABNORMAL
SODIUM SERPL-SCNC: 147 MMOL/L (ref 136–145)
SODIUM SERPL-SCNC: 148 MMOL/L (ref 136–145)
SPECIMEN SOURCE: NORMAL
TROPONIN I SERPL DL<=0.01 NG/ML-MCNC: 0.02 NG/ML (ref 0–0.03)
TROPONIN I SERPL DL<=0.01 NG/ML-MCNC: 0.02 NG/ML (ref 0–0.03)
WBC # BLD AUTO: 8.59 K/UL (ref 3.9–12.7)

## 2021-07-23 PROCEDURE — 96374 THER/PROPH/DIAG INJ IV PUSH: CPT

## 2021-07-23 PROCEDURE — 93010 EKG 12-LEAD: ICD-10-PCS | Mod: ,,, | Performed by: INTERNAL MEDICINE

## 2021-07-23 PROCEDURE — 99223 PR INITIAL HOSPITAL CARE,LEVL III: ICD-10-PCS | Mod: ,,, | Performed by: NURSE PRACTITIONER

## 2021-07-23 PROCEDURE — 87449 NOS EACH ORGANISM AG IA: CPT | Performed by: STUDENT IN AN ORGANIZED HEALTH CARE EDUCATION/TRAINING PROGRAM

## 2021-07-23 PROCEDURE — 84484 ASSAY OF TROPONIN QUANT: CPT | Performed by: STUDENT IN AN ORGANIZED HEALTH CARE EDUCATION/TRAINING PROGRAM

## 2021-07-23 PROCEDURE — 99223 1ST HOSP IP/OBS HIGH 75: CPT | Mod: ,,, | Performed by: NURSE PRACTITIONER

## 2021-07-23 PROCEDURE — 82803 BLOOD GASES ANY COMBINATION: CPT

## 2021-07-23 PROCEDURE — 25000003 PHARM REV CODE 250: Performed by: STUDENT IN AN ORGANIZED HEALTH CARE EDUCATION/TRAINING PROGRAM

## 2021-07-23 PROCEDURE — U0005 INFEC AGEN DETEC AMPLI PROBE: HCPCS | Performed by: STUDENT IN AN ORGANIZED HEALTH CARE EDUCATION/TRAINING PROGRAM

## 2021-07-23 PROCEDURE — 99285 PR EMERGENCY DEPT VISIT,LEVEL V: ICD-10-PCS | Mod: CS,,, | Performed by: PHYSICIAN ASSISTANT

## 2021-07-23 PROCEDURE — 87502 INFLUENZA DNA AMP PROBE: CPT | Performed by: STUDENT IN AN ORGANIZED HEALTH CARE EDUCATION/TRAINING PROGRAM

## 2021-07-23 PROCEDURE — 84484 ASSAY OF TROPONIN QUANT: CPT | Mod: 91 | Performed by: PHYSICIAN ASSISTANT

## 2021-07-23 PROCEDURE — 85025 COMPLETE CBC W/AUTO DIFF WBC: CPT | Performed by: PHYSICIAN ASSISTANT

## 2021-07-23 PROCEDURE — 99285 EMERGENCY DEPT VISIT HI MDM: CPT | Mod: 25

## 2021-07-23 PROCEDURE — 87324 CLOSTRIDIUM AG IA: CPT | Performed by: STUDENT IN AN ORGANIZED HEALTH CARE EDUCATION/TRAINING PROGRAM

## 2021-07-23 PROCEDURE — U0002 COVID-19 LAB TEST NON-CDC: HCPCS | Performed by: PHYSICIAN ASSISTANT

## 2021-07-23 PROCEDURE — 99900035 HC TECH TIME PER 15 MIN (STAT)

## 2021-07-23 PROCEDURE — 83880 ASSAY OF NATRIURETIC PEPTIDE: CPT | Performed by: PHYSICIAN ASSISTANT

## 2021-07-23 PROCEDURE — 99223 PR INITIAL HOSPITAL CARE,LEVL III: ICD-10-PCS | Mod: AI,,, | Performed by: INTERNAL MEDICINE

## 2021-07-23 PROCEDURE — 93010 ELECTROCARDIOGRAM REPORT: CPT | Mod: ,,, | Performed by: INTERNAL MEDICINE

## 2021-07-23 PROCEDURE — 80053 COMPREHEN METABOLIC PANEL: CPT | Performed by: PHYSICIAN ASSISTANT

## 2021-07-23 PROCEDURE — 87633 RESP VIRUS 12-25 TARGETS: CPT | Performed by: STUDENT IN AN ORGANIZED HEALTH CARE EDUCATION/TRAINING PROGRAM

## 2021-07-23 PROCEDURE — 63600175 PHARM REV CODE 636 W HCPCS: Performed by: STUDENT IN AN ORGANIZED HEALTH CARE EDUCATION/TRAINING PROGRAM

## 2021-07-23 PROCEDURE — 99285 EMERGENCY DEPT VISIT HI MDM: CPT | Mod: CS,,, | Performed by: PHYSICIAN ASSISTANT

## 2021-07-23 PROCEDURE — 11000001 HC ACUTE MED/SURG PRIVATE ROOM

## 2021-07-23 PROCEDURE — 25000003 PHARM REV CODE 250: Performed by: INTERNAL MEDICINE

## 2021-07-23 PROCEDURE — 84100 ASSAY OF PHOSPHORUS: CPT | Performed by: STUDENT IN AN ORGANIZED HEALTH CARE EDUCATION/TRAINING PROGRAM

## 2021-07-23 PROCEDURE — 99223 1ST HOSP IP/OBS HIGH 75: CPT | Mod: AI,,, | Performed by: INTERNAL MEDICINE

## 2021-07-23 PROCEDURE — 63600175 PHARM REV CODE 636 W HCPCS: Performed by: PHYSICIAN ASSISTANT

## 2021-07-23 PROCEDURE — 93005 ELECTROCARDIOGRAM TRACING: CPT

## 2021-07-23 PROCEDURE — 80048 BASIC METABOLIC PNL TOTAL CA: CPT | Performed by: STUDENT IN AN ORGANIZED HEALTH CARE EDUCATION/TRAINING PROGRAM

## 2021-07-23 PROCEDURE — U0003 INFECTIOUS AGENT DETECTION BY NUCLEIC ACID (DNA OR RNA); SEVERE ACUTE RESPIRATORY SYNDROME CORONAVIRUS 2 (SARS-COV-2) (CORONAVIRUS DISEASE [COVID-19]), AMPLIFIED PROBE TECHNIQUE, MAKING USE OF HIGH THROUGHPUT TECHNOLOGIES AS DESCRIBED BY CMS-2020-01-R: HCPCS | Performed by: STUDENT IN AN ORGANIZED HEALTH CARE EDUCATION/TRAINING PROGRAM

## 2021-07-23 PROCEDURE — 27201247 HC HEMODIALYSIS, SET-UP & CANCEL

## 2021-07-23 PROCEDURE — 36593 DECLOT VASCULAR DEVICE: CPT

## 2021-07-23 PROCEDURE — 63600175 PHARM REV CODE 636 W HCPCS: Mod: JG | Performed by: INTERNAL MEDICINE

## 2021-07-23 RX ORDER — HYDRALAZINE HYDROCHLORIDE 50 MG/1
50 TABLET, FILM COATED ORAL EVERY 8 HOURS
Status: DISCONTINUED | OUTPATIENT
Start: 2021-07-23 | End: 2021-07-23

## 2021-07-23 RX ORDER — CLONIDINE HYDROCHLORIDE 0.1 MG/1
0.1 TABLET ORAL 2 TIMES DAILY
Status: DISCONTINUED | OUTPATIENT
Start: 2021-07-23 | End: 2021-07-24

## 2021-07-23 RX ORDER — AMLODIPINE BESYLATE 5 MG/1
5 TABLET ORAL DAILY
Status: DISCONTINUED | OUTPATIENT
Start: 2021-07-23 | End: 2021-07-23

## 2021-07-23 RX ORDER — CINACALCET 30 MG/1
60 TABLET, FILM COATED ORAL 2 TIMES DAILY WITH MEALS
Status: DISCONTINUED | OUTPATIENT
Start: 2021-07-23 | End: 2021-07-26 | Stop reason: HOSPADM

## 2021-07-23 RX ORDER — ATORVASTATIN CALCIUM 20 MG/1
20 TABLET, FILM COATED ORAL NIGHTLY
Status: DISCONTINUED | OUTPATIENT
Start: 2021-07-23 | End: 2021-07-25

## 2021-07-23 RX ORDER — CALCITRIOL 0.25 UG/1
0.25 CAPSULE ORAL DAILY
Status: DISCONTINUED | OUTPATIENT
Start: 2021-07-23 | End: 2021-07-26 | Stop reason: HOSPADM

## 2021-07-23 RX ORDER — SODIUM CHLORIDE 9 MG/ML
INJECTION, SOLUTION INTRAVENOUS ONCE
Status: DISCONTINUED | OUTPATIENT
Start: 2021-07-23 | End: 2021-07-26 | Stop reason: HOSPADM

## 2021-07-23 RX ORDER — SODIUM CHLORIDE 0.9 % (FLUSH) 0.9 %
10 SYRINGE (ML) INJECTION
Status: CANCELLED | OUTPATIENT
Start: 2021-07-23

## 2021-07-23 RX ORDER — IBUPROFEN 200 MG
24 TABLET ORAL
Status: DISCONTINUED | OUTPATIENT
Start: 2021-07-23 | End: 2021-07-26 | Stop reason: HOSPADM

## 2021-07-23 RX ORDER — SODIUM CHLORIDE 0.9 % (FLUSH) 0.9 %
10 SYRINGE (ML) INJECTION
Status: DISCONTINUED | OUTPATIENT
Start: 2021-07-23 | End: 2021-07-26 | Stop reason: HOSPADM

## 2021-07-23 RX ORDER — GLUCAGON 1 MG
1 KIT INJECTION
Status: DISCONTINUED | OUTPATIENT
Start: 2021-07-23 | End: 2021-07-26 | Stop reason: HOSPADM

## 2021-07-23 RX ORDER — METOPROLOL SUCCINATE 25 MG/1
25 TABLET, EXTENDED RELEASE ORAL NIGHTLY
Status: DISCONTINUED | OUTPATIENT
Start: 2021-07-23 | End: 2021-07-26 | Stop reason: HOSPADM

## 2021-07-23 RX ORDER — MUPIROCIN 20 MG/G
OINTMENT TOPICAL 2 TIMES DAILY
Status: DISCONTINUED | OUTPATIENT
Start: 2021-07-23 | End: 2021-07-26 | Stop reason: HOSPADM

## 2021-07-23 RX ORDER — HYDRALAZINE HYDROCHLORIDE 25 MG/1
100 TABLET, FILM COATED ORAL EVERY 8 HOURS
Status: DISCONTINUED | OUTPATIENT
Start: 2021-07-23 | End: 2021-07-23

## 2021-07-23 RX ORDER — AMLODIPINE BESYLATE 10 MG/1
10 TABLET ORAL DAILY
Status: DISCONTINUED | OUTPATIENT
Start: 2021-07-24 | End: 2021-07-26 | Stop reason: HOSPADM

## 2021-07-23 RX ORDER — TALC
6 POWDER (GRAM) TOPICAL NIGHTLY PRN
Status: DISCONTINUED | OUTPATIENT
Start: 2021-07-23 | End: 2021-07-26 | Stop reason: HOSPADM

## 2021-07-23 RX ORDER — ASPIRIN 81 MG/1
81 TABLET ORAL DAILY
Status: DISCONTINUED | OUTPATIENT
Start: 2021-07-23 | End: 2021-07-26 | Stop reason: HOSPADM

## 2021-07-23 RX ORDER — INSULIN ASPART 100 [IU]/ML
0-5 INJECTION, SOLUTION INTRAVENOUS; SUBCUTANEOUS
Status: DISCONTINUED | OUTPATIENT
Start: 2021-07-23 | End: 2021-07-24

## 2021-07-23 RX ORDER — IBUPROFEN 200 MG
16 TABLET ORAL
Status: DISCONTINUED | OUTPATIENT
Start: 2021-07-23 | End: 2021-07-26 | Stop reason: HOSPADM

## 2021-07-23 RX ORDER — TALC
6 POWDER (GRAM) TOPICAL NIGHTLY PRN
Status: CANCELLED | OUTPATIENT
Start: 2021-07-23

## 2021-07-23 RX ORDER — MIRTAZAPINE 7.5 MG/1
7.5 TABLET, FILM COATED ORAL NIGHTLY
Status: DISCONTINUED | OUTPATIENT
Start: 2021-07-23 | End: 2021-07-26 | Stop reason: HOSPADM

## 2021-07-23 RX ORDER — VANCOMYCIN HCL IN 5 % DEXTROSE 1G/250ML
20 PLASTIC BAG, INJECTION (ML) INTRAVENOUS ONCE
Status: DISCONTINUED | OUTPATIENT
Start: 2021-07-23 | End: 2021-07-23

## 2021-07-23 RX ORDER — FUROSEMIDE 10 MG/ML
80 INJECTION INTRAMUSCULAR; INTRAVENOUS
Status: COMPLETED | OUTPATIENT
Start: 2021-07-23 | End: 2021-07-23

## 2021-07-23 RX ORDER — FLUTICASONE FUROATE AND VILANTEROL 200; 25 UG/1; UG/1
1 POWDER RESPIRATORY (INHALATION) DAILY
Status: DISCONTINUED | OUTPATIENT
Start: 2021-07-23 | End: 2021-07-26 | Stop reason: HOSPADM

## 2021-07-23 RX ORDER — IPRATROPIUM BROMIDE AND ALBUTEROL SULFATE 2.5; .5 MG/3ML; MG/3ML
3 SOLUTION RESPIRATORY (INHALATION)
Status: ACTIVE | OUTPATIENT
Start: 2021-07-23 | End: 2021-07-23

## 2021-07-23 RX ADMIN — PIPERACILLIN SODIUM AND TAZOBACTAM SODIUM 4.5 G: 4; .5 INJECTION, POWDER, FOR SOLUTION INTRAVENOUS at 03:07

## 2021-07-23 RX ADMIN — MIRTAZAPINE 7.5 MG: 7.5 TABLET ORAL at 10:07

## 2021-07-23 RX ADMIN — METOPROLOL SUCCINATE 25 MG: 25 TABLET, EXTENDED RELEASE ORAL at 10:07

## 2021-07-23 RX ADMIN — AMLODIPINE BESYLATE 5 MG: 5 TABLET ORAL at 03:07

## 2021-07-23 RX ADMIN — HYDRALAZINE HYDROCHLORIDE 75 MG: 50 TABLET ORAL at 10:07

## 2021-07-23 RX ADMIN — ALTEPLASE 2 MG: 2.2 INJECTION, POWDER, LYOPHILIZED, FOR SOLUTION INTRAVENOUS at 05:07

## 2021-07-23 RX ADMIN — ASPIRIN 81 MG: 81 TABLET, COATED ORAL at 12:07

## 2021-07-23 RX ADMIN — MUPIROCIN: 20 OINTMENT TOPICAL at 10:07

## 2021-07-23 RX ADMIN — FUROSEMIDE 80 MG: 10 INJECTION, SOLUTION INTRAMUSCULAR; INTRAVENOUS at 06:07

## 2021-07-24 PROBLEM — E43 SEVERE MALNUTRITION: Status: ACTIVE | Noted: 2021-07-24

## 2021-07-24 PROBLEM — E16.2 HYPOGLYCEMIA: Status: ACTIVE | Noted: 2021-07-24

## 2021-07-24 LAB
ALBUMIN SERPL BCP-MCNC: 2.5 G/DL (ref 3.5–5.2)
ALBUMIN SERPL BCP-MCNC: 2.7 G/DL (ref 3.5–5.2)
ALP SERPL-CCNC: 126 U/L (ref 55–135)
ALP SERPL-CCNC: 134 U/L (ref 55–135)
ALT SERPL W/O P-5'-P-CCNC: 9 U/L (ref 10–44)
ALT SERPL W/O P-5'-P-CCNC: 9 U/L (ref 10–44)
ANION GAP SERPL CALC-SCNC: 16 MMOL/L (ref 8–16)
ANION GAP SERPL CALC-SCNC: 21 MMOL/L (ref 8–16)
AST SERPL-CCNC: 18 U/L (ref 10–40)
AST SERPL-CCNC: 20 U/L (ref 10–40)
BASOPHILS # BLD AUTO: 0.05 K/UL (ref 0–0.2)
BASOPHILS NFR BLD: 0.5 % (ref 0–1.9)
BILIRUB SERPL-MCNC: 0.4 MG/DL (ref 0.1–1)
BILIRUB SERPL-MCNC: 0.6 MG/DL (ref 0.1–1)
BUN SERPL-MCNC: 32 MG/DL (ref 8–23)
BUN SERPL-MCNC: 73 MG/DL (ref 8–23)
CALCIUM SERPL-MCNC: 7.7 MG/DL (ref 8.7–10.5)
CALCIUM SERPL-MCNC: 7.9 MG/DL (ref 8.7–10.5)
CHLORIDE SERPL-SCNC: 107 MMOL/L (ref 95–110)
CHLORIDE SERPL-SCNC: 117 MMOL/L (ref 95–110)
CO2 SERPL-SCNC: 11 MMOL/L (ref 23–29)
CO2 SERPL-SCNC: 24 MMOL/L (ref 23–29)
CREAT SERPL-MCNC: 3.4 MG/DL (ref 0.5–1.4)
CREAT SERPL-MCNC: 6.3 MG/DL (ref 0.5–1.4)
DIFFERENTIAL METHOD: ABNORMAL
EOSINOPHIL # BLD AUTO: 0 K/UL (ref 0–0.5)
EOSINOPHIL NFR BLD: 0.4 % (ref 0–8)
ERYTHROCYTE [DISTWIDTH] IN BLOOD BY AUTOMATED COUNT: 18.3 % (ref 11.5–14.5)
EST. GFR  (AFRICAN AMERICAN): 14.1 ML/MIN/1.73 M^2
EST. GFR  (AFRICAN AMERICAN): 6.7 ML/MIN/1.73 M^2
EST. GFR  (NON AFRICAN AMERICAN): 12.2 ML/MIN/1.73 M^2
EST. GFR  (NON AFRICAN AMERICAN): 5.8 ML/MIN/1.73 M^2
GLUCOSE SERPL-MCNC: 60 MG/DL (ref 70–110)
GLUCOSE SERPL-MCNC: 63 MG/DL (ref 70–110)
HCT VFR BLD AUTO: 35.6 % (ref 37–48.5)
HGB BLD-MCNC: 10.6 G/DL (ref 12–16)
IMM GRANULOCYTES # BLD AUTO: 0.03 K/UL (ref 0–0.04)
IMM GRANULOCYTES NFR BLD AUTO: 0.3 % (ref 0–0.5)
LACTATE SERPL-SCNC: 1 MMOL/L (ref 0.5–2.2)
LYMPHOCYTES # BLD AUTO: 0.5 K/UL (ref 1–4.8)
LYMPHOCYTES NFR BLD: 4.9 % (ref 18–48)
MAGNESIUM SERPL-MCNC: 1.8 MG/DL (ref 1.6–2.6)
MAGNESIUM SERPL-MCNC: 2 MG/DL (ref 1.6–2.6)
MCH RBC QN AUTO: 29.1 PG (ref 27–31)
MCHC RBC AUTO-ENTMCNC: 29.8 G/DL (ref 32–36)
MCV RBC AUTO: 98 FL (ref 82–98)
MONOCYTES # BLD AUTO: 0.6 K/UL (ref 0.3–1)
MONOCYTES NFR BLD: 5.7 % (ref 4–15)
NEUTROPHILS # BLD AUTO: 8.9 K/UL (ref 1.8–7.7)
NEUTROPHILS NFR BLD: 88.2 % (ref 38–73)
NRBC BLD-RTO: 0 /100 WBC
PHOSPHATE SERPL-MCNC: 3.7 MG/DL (ref 2.7–4.5)
PHOSPHATE SERPL-MCNC: 7 MG/DL (ref 2.7–4.5)
PLATELET # BLD AUTO: 85 K/UL (ref 150–450)
PMV BLD AUTO: 13.2 FL (ref 9.2–12.9)
POCT GLUCOSE: 60 MG/DL (ref 70–110)
POCT GLUCOSE: 66 MG/DL (ref 70–110)
POCT GLUCOSE: 69 MG/DL (ref 70–110)
POCT GLUCOSE: 77 MG/DL (ref 70–110)
POCT GLUCOSE: 83 MG/DL (ref 70–110)
POCT GLUCOSE: 91 MG/DL (ref 70–110)
POCT GLUCOSE: 92 MG/DL (ref 70–110)
POTASSIUM SERPL-SCNC: 3.4 MMOL/L (ref 3.5–5.1)
POTASSIUM SERPL-SCNC: 4.3 MMOL/L (ref 3.5–5.1)
PROT SERPL-MCNC: 6.8 G/DL (ref 6–8.4)
PROT SERPL-MCNC: 7.1 G/DL (ref 6–8.4)
RBC # BLD AUTO: 3.64 M/UL (ref 4–5.4)
SODIUM SERPL-SCNC: 147 MMOL/L (ref 136–145)
SODIUM SERPL-SCNC: 149 MMOL/L (ref 136–145)
WBC # BLD AUTO: 10.1 K/UL (ref 3.9–12.7)

## 2021-07-24 PROCEDURE — 93005 ELECTROCARDIOGRAM TRACING: CPT

## 2021-07-24 PROCEDURE — 83735 ASSAY OF MAGNESIUM: CPT | Performed by: STUDENT IN AN ORGANIZED HEALTH CARE EDUCATION/TRAINING PROGRAM

## 2021-07-24 PROCEDURE — 99232 SBSQ HOSP IP/OBS MODERATE 35: CPT | Mod: ,,, | Performed by: NURSE PRACTITIONER

## 2021-07-24 PROCEDURE — 93010 EKG 12-LEAD: ICD-10-PCS | Mod: ,,, | Performed by: INTERNAL MEDICINE

## 2021-07-24 PROCEDURE — 25000003 PHARM REV CODE 250

## 2021-07-24 PROCEDURE — 84100 ASSAY OF PHOSPHORUS: CPT | Performed by: STUDENT IN AN ORGANIZED HEALTH CARE EDUCATION/TRAINING PROGRAM

## 2021-07-24 PROCEDURE — 63600175 PHARM REV CODE 636 W HCPCS: Performed by: STUDENT IN AN ORGANIZED HEALTH CARE EDUCATION/TRAINING PROGRAM

## 2021-07-24 PROCEDURE — 83605 ASSAY OF LACTIC ACID: CPT | Performed by: HOSPITALIST

## 2021-07-24 PROCEDURE — 84100 ASSAY OF PHOSPHORUS: CPT | Mod: 91 | Performed by: HOSPITALIST

## 2021-07-24 PROCEDURE — 25000242 PHARM REV CODE 250 ALT 637 W/ HCPCS: Performed by: STUDENT IN AN ORGANIZED HEALTH CARE EDUCATION/TRAINING PROGRAM

## 2021-07-24 PROCEDURE — 11000001 HC ACUTE MED/SURG PRIVATE ROOM

## 2021-07-24 PROCEDURE — 83735 ASSAY OF MAGNESIUM: CPT | Mod: 91 | Performed by: HOSPITALIST

## 2021-07-24 PROCEDURE — 80053 COMPREHEN METABOLIC PANEL: CPT | Performed by: STUDENT IN AN ORGANIZED HEALTH CARE EDUCATION/TRAINING PROGRAM

## 2021-07-24 PROCEDURE — 99233 SBSQ HOSP IP/OBS HIGH 50: CPT | Mod: GC,,, | Performed by: HOSPITALIST

## 2021-07-24 PROCEDURE — 36415 COLL VENOUS BLD VENIPUNCTURE: CPT | Performed by: STUDENT IN AN ORGANIZED HEALTH CARE EDUCATION/TRAINING PROGRAM

## 2021-07-24 PROCEDURE — 80100014 HC HEMODIALYSIS 1:1

## 2021-07-24 PROCEDURE — 99233 PR SUBSEQUENT HOSPITAL CARE,LEVL III: ICD-10-PCS | Mod: GC,,, | Performed by: HOSPITALIST

## 2021-07-24 PROCEDURE — 25000003 PHARM REV CODE 250: Performed by: STUDENT IN AN ORGANIZED HEALTH CARE EDUCATION/TRAINING PROGRAM

## 2021-07-24 PROCEDURE — 99232 PR SUBSEQUENT HOSPITAL CARE,LEVL II: ICD-10-PCS | Mod: ,,, | Performed by: NURSE PRACTITIONER

## 2021-07-24 PROCEDURE — 85025 COMPLETE CBC W/AUTO DIFF WBC: CPT | Performed by: STUDENT IN AN ORGANIZED HEALTH CARE EDUCATION/TRAINING PROGRAM

## 2021-07-24 PROCEDURE — 80053 COMPREHEN METABOLIC PANEL: CPT | Mod: 91 | Performed by: HOSPITALIST

## 2021-07-24 PROCEDURE — 36415 COLL VENOUS BLD VENIPUNCTURE: CPT | Performed by: HOSPITALIST

## 2021-07-24 PROCEDURE — 93010 ELECTROCARDIOGRAM REPORT: CPT | Mod: ,,, | Performed by: INTERNAL MEDICINE

## 2021-07-24 RX ORDER — CLONIDINE 0.1 MG/24H
1 PATCH, EXTENDED RELEASE TRANSDERMAL
Status: CANCELLED | OUTPATIENT
Start: 2021-07-25

## 2021-07-24 RX ORDER — CLONIDINE 0.2 MG/24H
1 PATCH, EXTENDED RELEASE TRANSDERMAL
Status: DISCONTINUED | OUTPATIENT
Start: 2021-07-24 | End: 2021-07-26 | Stop reason: HOSPADM

## 2021-07-24 RX ORDER — DEXTROSE MONOHYDRATE 50 MG/ML
INJECTION, SOLUTION INTRAVENOUS CONTINUOUS
Status: DISCONTINUED | OUTPATIENT
Start: 2021-07-24 | End: 2021-07-25

## 2021-07-24 RX ORDER — HYDROXYZINE HYDROCHLORIDE 25 MG/1
25 TABLET, FILM COATED ORAL 3 TIMES DAILY PRN
Status: DISCONTINUED | OUTPATIENT
Start: 2021-07-24 | End: 2021-07-26 | Stop reason: HOSPADM

## 2021-07-24 RX ORDER — HYDRALAZINE HYDROCHLORIDE 20 MG/ML
2 INJECTION INTRAMUSCULAR; INTRAVENOUS EVERY 8 HOURS PRN
Status: DISCONTINUED | OUTPATIENT
Start: 2021-07-24 | End: 2021-07-26 | Stop reason: HOSPADM

## 2021-07-24 RX ORDER — POTASSIUM CHLORIDE 7.45 MG/ML
10 INJECTION INTRAVENOUS
Status: DISPENSED | OUTPATIENT
Start: 2021-07-24 | End: 2021-07-24

## 2021-07-24 RX ADMIN — DEXTROSE: 5 SOLUTION INTRAVENOUS at 01:07

## 2021-07-24 RX ADMIN — CLONIDINE 1 PATCH: 0.2 PATCH TRANSDERMAL at 10:07

## 2021-07-24 RX ADMIN — DEXTROSE MONOHYDRATE 12.5 G: 25 INJECTION, SOLUTION INTRAVENOUS at 09:07

## 2021-07-24 RX ADMIN — POTASSIUM CHLORIDE 10 MEQ: 7.46 INJECTION, SOLUTION INTRAVENOUS at 07:07

## 2021-07-24 RX ADMIN — DEXTROSE MONOHYDRATE 12.5 G: 25 INJECTION, SOLUTION INTRAVENOUS at 11:07

## 2021-07-24 RX ADMIN — FLUTICASONE FUROATE AND VILANTEROL TRIFENATATE 1 PUFF: 200; 25 POWDER RESPIRATORY (INHALATION) at 08:07

## 2021-07-24 RX ADMIN — POTASSIUM CHLORIDE 10 MEQ: 7.46 INJECTION, SOLUTION INTRAVENOUS at 03:07

## 2021-07-25 LAB
ALBUMIN SERPL BCP-MCNC: 2.5 G/DL (ref 3.5–5.2)
ALP SERPL-CCNC: 120 U/L (ref 55–135)
ALT SERPL W/O P-5'-P-CCNC: 7 U/L (ref 10–44)
ANION GAP SERPL CALC-SCNC: 14 MMOL/L (ref 8–16)
AST SERPL-CCNC: 13 U/L (ref 10–40)
BASOPHILS # BLD AUTO: 0.04 K/UL (ref 0–0.2)
BASOPHILS NFR BLD: 0.4 % (ref 0–1.9)
BILIRUB SERPL-MCNC: 0.6 MG/DL (ref 0.1–1)
BUN SERPL-MCNC: 40 MG/DL (ref 8–23)
CALCIUM SERPL-MCNC: 8.3 MG/DL (ref 8.7–10.5)
CHLORIDE SERPL-SCNC: 108 MMOL/L (ref 95–110)
CO2 SERPL-SCNC: 22 MMOL/L (ref 23–29)
CREAT SERPL-MCNC: 4.4 MG/DL (ref 0.5–1.4)
DIFFERENTIAL METHOD: ABNORMAL
EOSINOPHIL # BLD AUTO: 0 K/UL (ref 0–0.5)
EOSINOPHIL NFR BLD: 0.4 % (ref 0–8)
ERYTHROCYTE [DISTWIDTH] IN BLOOD BY AUTOMATED COUNT: 18.5 % (ref 11.5–14.5)
EST. GFR  (AFRICAN AMERICAN): 10.3 ML/MIN/1.73 M^2
EST. GFR  (NON AFRICAN AMERICAN): 9 ML/MIN/1.73 M^2
GLUCOSE SERPL-MCNC: 75 MG/DL (ref 70–110)
HCT VFR BLD AUTO: 34.4 % (ref 37–48.5)
HGB BLD-MCNC: 10.4 G/DL (ref 12–16)
IMM GRANULOCYTES # BLD AUTO: 0.04 K/UL (ref 0–0.04)
IMM GRANULOCYTES NFR BLD AUTO: 0.4 % (ref 0–0.5)
LYMPHOCYTES # BLD AUTO: 0.5 K/UL (ref 1–4.8)
LYMPHOCYTES NFR BLD: 5.4 % (ref 18–48)
MAGNESIUM SERPL-MCNC: 1.7 MG/DL (ref 1.6–2.6)
MCH RBC QN AUTO: 28.7 PG (ref 27–31)
MCHC RBC AUTO-ENTMCNC: 30.2 G/DL (ref 32–36)
MCV RBC AUTO: 95 FL (ref 82–98)
MONOCYTES # BLD AUTO: 0.6 K/UL (ref 0.3–1)
MONOCYTES NFR BLD: 6.1 % (ref 4–15)
NEUTROPHILS # BLD AUTO: 8 K/UL (ref 1.8–7.7)
NEUTROPHILS NFR BLD: 87.3 % (ref 38–73)
NRBC BLD-RTO: 0 /100 WBC
PHOSPHATE SERPL-MCNC: 4.6 MG/DL (ref 2.7–4.5)
PLATELET # BLD AUTO: 81 K/UL (ref 150–450)
PMV BLD AUTO: 12 FL (ref 9.2–12.9)
POCT GLUCOSE: 104 MG/DL (ref 70–110)
POCT GLUCOSE: 66 MG/DL (ref 70–110)
POCT GLUCOSE: 68 MG/DL (ref 70–110)
POCT GLUCOSE: 86 MG/DL (ref 70–110)
POCT GLUCOSE: 96 MG/DL (ref 70–110)
POTASSIUM SERPL-SCNC: 3.9 MMOL/L (ref 3.5–5.1)
PROT SERPL-MCNC: 6.6 G/DL (ref 6–8.4)
RBC # BLD AUTO: 3.62 M/UL (ref 4–5.4)
SODIUM SERPL-SCNC: 144 MMOL/L (ref 136–145)
WBC # BLD AUTO: 9.21 K/UL (ref 3.9–12.7)

## 2021-07-25 PROCEDURE — 25000003 PHARM REV CODE 250: Performed by: STUDENT IN AN ORGANIZED HEALTH CARE EDUCATION/TRAINING PROGRAM

## 2021-07-25 PROCEDURE — 63600175 PHARM REV CODE 636 W HCPCS: Performed by: STUDENT IN AN ORGANIZED HEALTH CARE EDUCATION/TRAINING PROGRAM

## 2021-07-25 PROCEDURE — 99232 PR SUBSEQUENT HOSPITAL CARE,LEVL II: ICD-10-PCS | Mod: GC,,, | Performed by: HOSPITALIST

## 2021-07-25 PROCEDURE — 85025 COMPLETE CBC W/AUTO DIFF WBC: CPT | Performed by: STUDENT IN AN ORGANIZED HEALTH CARE EDUCATION/TRAINING PROGRAM

## 2021-07-25 PROCEDURE — 27000221 HC OXYGEN, UP TO 24 HOURS

## 2021-07-25 PROCEDURE — 99232 SBSQ HOSP IP/OBS MODERATE 35: CPT | Mod: GC,,, | Performed by: HOSPITALIST

## 2021-07-25 PROCEDURE — 94640 AIRWAY INHALATION TREATMENT: CPT

## 2021-07-25 PROCEDURE — 84100 ASSAY OF PHOSPHORUS: CPT | Performed by: STUDENT IN AN ORGANIZED HEALTH CARE EDUCATION/TRAINING PROGRAM

## 2021-07-25 PROCEDURE — 25000242 PHARM REV CODE 250 ALT 637 W/ HCPCS: Performed by: STUDENT IN AN ORGANIZED HEALTH CARE EDUCATION/TRAINING PROGRAM

## 2021-07-25 PROCEDURE — 83735 ASSAY OF MAGNESIUM: CPT | Performed by: STUDENT IN AN ORGANIZED HEALTH CARE EDUCATION/TRAINING PROGRAM

## 2021-07-25 PROCEDURE — 97535 SELF CARE MNGMENT TRAINING: CPT

## 2021-07-25 PROCEDURE — 11000001 HC ACUTE MED/SURG PRIVATE ROOM

## 2021-07-25 PROCEDURE — 36415 COLL VENOUS BLD VENIPUNCTURE: CPT | Performed by: STUDENT IN AN ORGANIZED HEALTH CARE EDUCATION/TRAINING PROGRAM

## 2021-07-25 PROCEDURE — 92610 EVALUATE SWALLOWING FUNCTION: CPT

## 2021-07-25 PROCEDURE — 80053 COMPREHEN METABOLIC PANEL: CPT | Performed by: STUDENT IN AN ORGANIZED HEALTH CARE EDUCATION/TRAINING PROGRAM

## 2021-07-25 PROCEDURE — 94761 N-INVAS EAR/PLS OXIMETRY MLT: CPT

## 2021-07-25 RX ORDER — DEXTROSE MONOHYDRATE 100 MG/ML
INJECTION, SOLUTION INTRAVENOUS CONTINUOUS
Status: DISCONTINUED | OUTPATIENT
Start: 2021-07-25 | End: 2021-07-26 | Stop reason: HOSPADM

## 2021-07-25 RX ADMIN — FLUTICASONE FUROATE AND VILANTEROL TRIFENATATE 1 PUFF: 200; 25 POWDER RESPIRATORY (INHALATION) at 09:07

## 2021-07-25 RX ADMIN — PIPERACILLIN SODIUM AND TAZOBACTAM SODIUM 4.5 G: 4; .5 INJECTION, POWDER, FOR SOLUTION INTRAVENOUS at 12:07

## 2021-07-25 RX ADMIN — DEXTROSE: 10 SOLUTION INTRAVENOUS at 10:07

## 2021-07-25 RX ADMIN — DEXTROSE MONOHYDRATE 12.5 G: 25 INJECTION, SOLUTION INTRAVENOUS at 12:07

## 2021-07-25 RX ADMIN — HYDRALAZINE HYDROCHLORIDE 2 MG: 20 INJECTION, SOLUTION INTRAMUSCULAR; INTRAVENOUS at 02:07

## 2021-07-25 RX ADMIN — MUPIROCIN: 20 OINTMENT TOPICAL at 11:07

## 2021-07-25 RX ADMIN — DEXTROSE MONOHYDRATE 12.5 G: 25 INJECTION, SOLUTION INTRAVENOUS at 07:07

## 2021-07-25 RX ADMIN — MUPIROCIN: 20 OINTMENT TOPICAL at 10:07

## 2021-07-25 RX ADMIN — PIPERACILLIN SODIUM AND TAZOBACTAM SODIUM 4.5 G: 4; .5 INJECTION, POWDER, FOR SOLUTION INTRAVENOUS at 01:07

## 2021-07-26 VITALS
BODY MASS INDEX: 16.4 KG/M2 | TEMPERATURE: 98 F | WEIGHT: 92.56 LBS | RESPIRATION RATE: 16 BRPM | HEART RATE: 65 BPM | HEIGHT: 63 IN | SYSTOLIC BLOOD PRESSURE: 194 MMHG | OXYGEN SATURATION: 99 % | DIASTOLIC BLOOD PRESSURE: 86 MMHG

## 2021-07-26 LAB
ALBUMIN SERPL BCP-MCNC: 2.2 G/DL (ref 3.5–5.2)
ANION GAP SERPL CALC-SCNC: 14 MMOL/L (ref 8–16)
BUN SERPL-MCNC: 43 MG/DL (ref 8–23)
CALCIUM SERPL-MCNC: 8.1 MG/DL (ref 8.7–10.5)
CHLORIDE SERPL-SCNC: 106 MMOL/L (ref 95–110)
CO2 SERPL-SCNC: 22 MMOL/L (ref 23–29)
CREAT SERPL-MCNC: 5 MG/DL (ref 0.5–1.4)
EST. GFR  (AFRICAN AMERICAN): 8.8 ML/MIN/1.73 M^2
EST. GFR  (NON AFRICAN AMERICAN): 7.7 ML/MIN/1.73 M^2
GLUCOSE SERPL-MCNC: 70 MG/DL (ref 70–110)
PHOSPHATE SERPL-MCNC: 6.8 MG/DL (ref 2.7–4.5)
POCT GLUCOSE: 78 MG/DL (ref 70–110)
POCT GLUCOSE: 83 MG/DL (ref 70–110)
POTASSIUM SERPL-SCNC: 3.8 MMOL/L (ref 3.5–5.1)
SODIUM SERPL-SCNC: 142 MMOL/L (ref 136–145)

## 2021-07-26 PROCEDURE — 94761 N-INVAS EAR/PLS OXIMETRY MLT: CPT

## 2021-07-26 PROCEDURE — 94640 AIRWAY INHALATION TREATMENT: CPT

## 2021-07-26 PROCEDURE — 25000003 PHARM REV CODE 250: Performed by: STUDENT IN AN ORGANIZED HEALTH CARE EDUCATION/TRAINING PROGRAM

## 2021-07-26 PROCEDURE — 25000003 PHARM REV CODE 250: Performed by: NURSE PRACTITIONER

## 2021-07-26 PROCEDURE — 99238 PR HOSPITAL DISCHARGE DAY,<30 MIN: ICD-10-PCS | Mod: GC,,, | Performed by: HOSPITALIST

## 2021-07-26 PROCEDURE — 99238 HOSP IP/OBS DSCHRG MGMT 30/<: CPT | Mod: GC,,, | Performed by: HOSPITALIST

## 2021-07-26 PROCEDURE — 27000221 HC OXYGEN, UP TO 24 HOURS

## 2021-07-26 PROCEDURE — 63600175 PHARM REV CODE 636 W HCPCS: Performed by: NURSE PRACTITIONER

## 2021-07-26 PROCEDURE — 80100016 HC MAINTENANCE HEMODIALYSIS

## 2021-07-26 PROCEDURE — 63600175 PHARM REV CODE 636 W HCPCS: Performed by: STUDENT IN AN ORGANIZED HEALTH CARE EDUCATION/TRAINING PROGRAM

## 2021-07-26 PROCEDURE — 80069 RENAL FUNCTION PANEL: CPT | Performed by: HOSPITALIST

## 2021-07-26 PROCEDURE — 90935 PR HEMODIALYSIS, ONE EVALUATION: ICD-10-PCS | Mod: ,,, | Performed by: NURSE PRACTITIONER

## 2021-07-26 PROCEDURE — 90935 HEMODIALYSIS ONE EVALUATION: CPT | Mod: ,,, | Performed by: NURSE PRACTITIONER

## 2021-07-26 RX ORDER — SODIUM CHLORIDE 9 MG/ML
INJECTION, SOLUTION INTRAVENOUS ONCE
Status: COMPLETED | OUTPATIENT
Start: 2021-07-26 | End: 2021-07-26

## 2021-07-26 RX ORDER — GENTAMICIN SULFATE 40 MG/ML
80 INJECTION, SOLUTION INTRAMUSCULAR; INTRAVENOUS
Status: DISCONTINUED | OUTPATIENT
Start: 2021-07-26 | End: 2021-07-26 | Stop reason: HOSPADM

## 2021-07-26 RX ADMIN — HYDRALAZINE HYDROCHLORIDE 75 MG: 50 TABLET ORAL at 04:07

## 2021-07-26 RX ADMIN — PIPERACILLIN SODIUM AND TAZOBACTAM SODIUM 4.5 G: 4; .5 INJECTION, POWDER, FOR SOLUTION INTRAVENOUS at 01:07

## 2021-07-26 RX ADMIN — FLUTICASONE FUROATE AND VILANTEROL TRIFENATATE 1 PUFF: 200; 25 POWDER RESPIRATORY (INHALATION) at 09:07

## 2021-07-26 RX ADMIN — GENTAMICIN SULFATE 80 MG: 40 INJECTION, SOLUTION INTRAMUSCULAR; INTRAVENOUS at 02:07

## 2021-07-26 RX ADMIN — CINACALCET HYDROCHLORIDE 60 MG: 60 TABLET, COATED ORAL at 04:07

## 2021-07-26 RX ADMIN — SODIUM CHLORIDE: 0.9 INJECTION, SOLUTION INTRAVENOUS at 12:07

## 2021-09-13 ENCOUNTER — HOSPITAL ENCOUNTER (EMERGENCY)
Facility: HOSPITAL | Age: 81
Discharge: HOME | End: 2021-09-14
Attending: EMERGENCY MEDICINE
Payer: MEDICARE

## 2021-09-13 VITALS
RESPIRATION RATE: 16 BRPM | DIASTOLIC BLOOD PRESSURE: 67 MMHG | OXYGEN SATURATION: 96 % | TEMPERATURE: 98 F | HEART RATE: 59 BPM | SYSTOLIC BLOOD PRESSURE: 150 MMHG

## 2021-09-13 DIAGNOSIS — R10.9 ABDOMINAL PAIN: ICD-10-CM

## 2021-09-13 LAB
ALBUMIN SERPL BCP-MCNC: 2.6 G/DL (ref 3.5–5.2)
ALP SERPL-CCNC: 125 U/L (ref 55–135)
ALT SERPL W/O P-5'-P-CCNC: 8 U/L (ref 10–44)
ANION GAP SERPL CALC-SCNC: 12 MMOL/L (ref 8–16)
AST SERPL-CCNC: 10 U/L (ref 10–40)
BASOPHILS # BLD AUTO: 0.04 K/UL (ref 0–0.2)
BASOPHILS NFR BLD: 0.6 % (ref 0–1.9)
BILIRUB SERPL-MCNC: 0.4 MG/DL (ref 0.1–1)
BNP SERPL-MCNC: >4900 PG/ML (ref 0–99)
BUN SERPL-MCNC: 49 MG/DL (ref 8–23)
CALCIUM SERPL-MCNC: 8.7 MG/DL (ref 8.7–10.5)
CHLORIDE SERPL-SCNC: 111 MMOL/L (ref 95–110)
CO2 SERPL-SCNC: 19 MMOL/L (ref 23–29)
CREAT SERPL-MCNC: 4.4 MG/DL (ref 0.5–1.4)
DIFFERENTIAL METHOD: ABNORMAL
EOSINOPHIL # BLD AUTO: 0.1 K/UL (ref 0–0.5)
EOSINOPHIL NFR BLD: 1.3 % (ref 0–8)
ERYTHROCYTE [DISTWIDTH] IN BLOOD BY AUTOMATED COUNT: 17.1 % (ref 11.5–14.5)
EST. GFR  (AFRICAN AMERICAN): 10.3 ML/MIN/1.73 M^2
EST. GFR  (NON AFRICAN AMERICAN): 9 ML/MIN/1.73 M^2
GLUCOSE SERPL-MCNC: 124 MG/DL (ref 70–110)
HCT VFR BLD AUTO: 36 % (ref 37–48.5)
HGB BLD-MCNC: 10.9 G/DL (ref 12–16)
IMM GRANULOCYTES # BLD AUTO: 0.02 K/UL (ref 0–0.04)
IMM GRANULOCYTES NFR BLD AUTO: 0.3 % (ref 0–0.5)
LACTATE SERPL-SCNC: 1 MMOL/L (ref 0.5–2.2)
LIPASE SERPL-CCNC: 15 U/L (ref 4–60)
LYMPHOCYTES # BLD AUTO: 0.5 K/UL (ref 1–4.8)
LYMPHOCYTES NFR BLD: 7.1 % (ref 18–48)
MAGNESIUM SERPL-MCNC: 1.8 MG/DL (ref 1.6–2.6)
MCH RBC QN AUTO: 28.8 PG (ref 27–31)
MCHC RBC AUTO-ENTMCNC: 30.3 G/DL (ref 32–36)
MCV RBC AUTO: 95 FL (ref 82–98)
MONOCYTES # BLD AUTO: 0.6 K/UL (ref 0.3–1)
MONOCYTES NFR BLD: 8.6 % (ref 4–15)
NEUTROPHILS # BLD AUTO: 5.2 K/UL (ref 1.8–7.7)
NEUTROPHILS NFR BLD: 82.1 % (ref 38–73)
NRBC BLD-RTO: 0 /100 WBC
PHOSPHATE SERPL-MCNC: 5.8 MG/DL (ref 2.7–4.5)
PLATELET # BLD AUTO: 58 K/UL (ref 150–450)
PMV BLD AUTO: ABNORMAL FL (ref 9.2–12.9)
POTASSIUM SERPL-SCNC: 4.7 MMOL/L (ref 3.5–5.1)
PROT SERPL-MCNC: 6.8 G/DL (ref 6–8.4)
RBC # BLD AUTO: 3.78 M/UL (ref 4–5.4)
SODIUM SERPL-SCNC: 142 MMOL/L (ref 136–145)
TROPONIN I SERPL DL<=0.01 NG/ML-MCNC: 0.03 NG/ML (ref 0–0.03)
TROPONIN I SERPL DL<=0.01 NG/ML-MCNC: 0.03 NG/ML (ref 0–0.03)
TSH SERPL DL<=0.005 MIU/L-ACNC: 0.92 UIU/ML (ref 0.4–4)
WBC # BLD AUTO: 6.36 K/UL (ref 3.9–12.7)

## 2021-09-13 PROCEDURE — 25500020 PHARM REV CODE 255: Performed by: EMERGENCY MEDICINE

## 2021-09-13 PROCEDURE — 80053 COMPREHEN METABOLIC PANEL: CPT | Performed by: EMERGENCY MEDICINE

## 2021-09-13 PROCEDURE — 99285 EMERGENCY DEPT VISIT HI MDM: CPT | Mod: 25

## 2021-09-13 PROCEDURE — 99284 EMERGENCY DEPT VISIT MOD MDM: CPT | Mod: ,,, | Performed by: EMERGENCY MEDICINE

## 2021-09-13 PROCEDURE — 83605 ASSAY OF LACTIC ACID: CPT | Performed by: EMERGENCY MEDICINE

## 2021-09-13 PROCEDURE — 83735 ASSAY OF MAGNESIUM: CPT | Performed by: EMERGENCY MEDICINE

## 2021-09-13 PROCEDURE — 99284 PR EMERGENCY DEPT VISIT,LEVEL IV: ICD-10-PCS | Mod: ,,, | Performed by: EMERGENCY MEDICINE

## 2021-09-13 PROCEDURE — 83690 ASSAY OF LIPASE: CPT | Performed by: EMERGENCY MEDICINE

## 2021-09-13 PROCEDURE — 83880 ASSAY OF NATRIURETIC PEPTIDE: CPT | Performed by: EMERGENCY MEDICINE

## 2021-09-13 PROCEDURE — 84484 ASSAY OF TROPONIN QUANT: CPT | Mod: 91 | Performed by: EMERGENCY MEDICINE

## 2021-09-13 PROCEDURE — 85025 COMPLETE CBC W/AUTO DIFF WBC: CPT | Performed by: EMERGENCY MEDICINE

## 2021-09-13 PROCEDURE — 84100 ASSAY OF PHOSPHORUS: CPT | Performed by: EMERGENCY MEDICINE

## 2021-09-13 PROCEDURE — 84443 ASSAY THYROID STIM HORMONE: CPT | Performed by: EMERGENCY MEDICINE

## 2021-09-13 PROCEDURE — 84484 ASSAY OF TROPONIN QUANT: CPT | Performed by: EMERGENCY MEDICINE

## 2021-09-13 RX ORDER — ONDANSETRON 2 MG/ML
4 INJECTION INTRAMUSCULAR; INTRAVENOUS
COMMUNITY
Start: 2021-03-12 | End: 2022-03-11

## 2021-09-13 RX ORDER — DOXERCALCIFEROL 0.5 UG/1
3 CAPSULE ORAL
COMMUNITY
Start: 2021-06-07 | End: 2022-06-06

## 2021-09-13 RX ADMIN — IOHEXOL 75 ML: 350 INJECTION, SOLUTION INTRAVENOUS at 06:09

## 2021-10-08 ENCOUNTER — HOSPITAL ENCOUNTER (INPATIENT)
Facility: HOSPITAL | Age: 81
LOS: 8 days | Discharge: HOME | DRG: 871 | End: 2021-10-18
Attending: EMERGENCY MEDICINE | Admitting: INTERNAL MEDICINE
Payer: MEDICARE

## 2021-10-08 DIAGNOSIS — A49.8 CLOSTRIDIUM DIFFICILE INFECTION: ICD-10-CM

## 2021-10-08 DIAGNOSIS — R65.20 SEVERE SEPSIS: Primary | ICD-10-CM

## 2021-10-08 DIAGNOSIS — R47.81 SLURRED SPEECH: ICD-10-CM

## 2021-10-08 DIAGNOSIS — A41.9 SEVERE SEPSIS: Primary | ICD-10-CM

## 2021-10-08 DIAGNOSIS — R07.9 CHEST PAIN: ICD-10-CM

## 2021-10-08 DIAGNOSIS — N18.6 ESRD ON HEMODIALYSIS: ICD-10-CM

## 2021-10-08 DIAGNOSIS — Z78.9 PROBLEM WITH VASCULAR ACCESS: ICD-10-CM

## 2021-10-08 DIAGNOSIS — N18.6 ESRD ON DIALYSIS: ICD-10-CM

## 2021-10-08 DIAGNOSIS — Z99.2 ESRD ON HEMODIALYSIS: ICD-10-CM

## 2021-10-08 DIAGNOSIS — R00.0 TACHYCARDIA: ICD-10-CM

## 2021-10-08 DIAGNOSIS — T82.898A PROBLEM WITH DIALYSIS ACCESS: ICD-10-CM

## 2021-10-08 DIAGNOSIS — R04.0 EPISTAXIS: ICD-10-CM

## 2021-10-08 DIAGNOSIS — Z99.2 ESRD ON DIALYSIS: ICD-10-CM

## 2021-10-08 LAB
ALBUMIN SERPL BCP-MCNC: 2.4 G/DL (ref 3.5–5.2)
ALP SERPL-CCNC: 120 U/L (ref 55–135)
ALT SERPL W/O P-5'-P-CCNC: 6 U/L (ref 10–44)
ANION GAP SERPL CALC-SCNC: 17 MMOL/L (ref 8–16)
ANISOCYTOSIS BLD QL SMEAR: SLIGHT
AST SERPL-CCNC: 13 U/L (ref 10–40)
BASOPHILS # BLD AUTO: 0.04 K/UL (ref 0–0.2)
BASOPHILS NFR BLD: 0.2 % (ref 0–1.9)
BILIRUB SERPL-MCNC: 0.6 MG/DL (ref 0.1–1)
BUN SERPL-MCNC: 45 MG/DL (ref 6–30)
BUN SERPL-MCNC: 46 MG/DL (ref 8–23)
CALCIUM SERPL-MCNC: 8.9 MG/DL (ref 8.7–10.5)
CHLORIDE SERPL-SCNC: 112 MMOL/L (ref 95–110)
CHLORIDE SERPL-SCNC: 116 MMOL/L (ref 95–110)
CO2 SERPL-SCNC: 15 MMOL/L (ref 23–29)
CREAT SERPL-MCNC: 3.4 MG/DL (ref 0.5–1.4)
CREAT SERPL-MCNC: 3.7 MG/DL (ref 0.5–1.4)
CTP QC/QA: YES
DIFFERENTIAL METHOD: ABNORMAL
EOSINOPHIL # BLD AUTO: 0 K/UL (ref 0–0.5)
EOSINOPHIL NFR BLD: 0 % (ref 0–8)
ERYTHROCYTE [DISTWIDTH] IN BLOOD BY AUTOMATED COUNT: 17.4 % (ref 11.5–14.5)
EST. GFR  (AFRICAN AMERICAN): 14.1 ML/MIN/1.73 M^2
EST. GFR  (NON AFRICAN AMERICAN): 12.2 ML/MIN/1.73 M^2
GLUCOSE SERPL-MCNC: 45 MG/DL (ref 70–110)
GLUCOSE SERPL-MCNC: 50 MG/DL (ref 70–110)
HCT VFR BLD AUTO: 38.3 % (ref 37–48.5)
HCT VFR BLD CALC: 35 %PCV (ref 36–54)
HGB BLD-MCNC: 10.7 G/DL (ref 12–16)
HYPOCHROMIA BLD QL SMEAR: ABNORMAL
IMM GRANULOCYTES # BLD AUTO: 0.24 K/UL (ref 0–0.04)
IMM GRANULOCYTES NFR BLD AUTO: 1.1 % (ref 0–0.5)
LACTATE SERPL-SCNC: 2.2 MMOL/L (ref 0.5–2.2)
LYMPHOCYTES # BLD AUTO: 0.3 K/UL (ref 1–4.8)
LYMPHOCYTES NFR BLD: 1.4 % (ref 18–48)
MAGNESIUM SERPL-MCNC: 2 MG/DL (ref 1.6–2.6)
MCH RBC QN AUTO: 29.2 PG (ref 27–31)
MCHC RBC AUTO-ENTMCNC: 27.9 G/DL (ref 32–36)
MCV RBC AUTO: 104 FL (ref 82–98)
MONOCYTES # BLD AUTO: 1.2 K/UL (ref 0.3–1)
MONOCYTES NFR BLD: 5.4 % (ref 4–15)
NEUTROPHILS # BLD AUTO: 20.4 K/UL (ref 1.8–7.7)
NEUTROPHILS NFR BLD: 91.9 % (ref 38–73)
NRBC BLD-RTO: 0 /100 WBC
OVALOCYTES BLD QL SMEAR: ABNORMAL
PHOSPHATE SERPL-MCNC: 4.6 MG/DL (ref 2.7–4.5)
PLATELET # BLD AUTO: 68 K/UL (ref 150–450)
PLATELET BLD QL SMEAR: ABNORMAL
PMV BLD AUTO: ABNORMAL FL (ref 9.2–12.9)
POC IONIZED CALCIUM: 1.03 MMOL/L (ref 1.06–1.42)
POC TCO2 (MEASURED): 16 MMOL/L (ref 23–29)
POCT GLUCOSE: 62 MG/DL (ref 70–110)
POIKILOCYTOSIS BLD QL SMEAR: SLIGHT
POLYCHROMASIA BLD QL SMEAR: ABNORMAL
POTASSIUM BLD-SCNC: 2.9 MMOL/L (ref 3.5–5.1)
POTASSIUM SERPL-SCNC: 3 MMOL/L (ref 3.5–5.1)
PROCALCITONIN SERPL IA-MCNC: 25.92 NG/ML
PROT SERPL-MCNC: 6.7 G/DL (ref 6–8.4)
RBC # BLD AUTO: 3.67 M/UL (ref 4–5.4)
SAMPLE: ABNORMAL
SARS-COV-2 RDRP RESP QL NAA+PROBE: NEGATIVE
SCHISTOCYTES BLD QL SMEAR: ABNORMAL
SODIUM BLD-SCNC: 145 MMOL/L (ref 136–145)
SODIUM SERPL-SCNC: 144 MMOL/L (ref 136–145)
TOXIC GRANULES BLD QL SMEAR: PRESENT
WBC # BLD AUTO: 22.15 K/UL (ref 3.9–12.7)

## 2021-10-08 PROCEDURE — 84100 ASSAY OF PHOSPHORUS: CPT | Mod: HCNC | Performed by: NURSE PRACTITIONER

## 2021-10-08 PROCEDURE — 80053 COMPREHEN METABOLIC PANEL: CPT | Mod: HCNC | Performed by: NURSE PRACTITIONER

## 2021-10-08 PROCEDURE — 25000003 PHARM REV CODE 250: Performed by: NURSE PRACTITIONER

## 2021-10-08 PROCEDURE — 99285 EMERGENCY DEPT VISIT HI MDM: CPT | Mod: 25,HCNC

## 2021-10-08 PROCEDURE — U0002 COVID-19 LAB TEST NON-CDC: HCPCS | Performed by: PHYSICIAN ASSISTANT

## 2021-10-08 PROCEDURE — G0378 HOSPITAL OBSERVATION PER HR: HCPCS

## 2021-10-08 PROCEDURE — 63600175 PHARM REV CODE 636 W HCPCS: Mod: JG | Performed by: NURSE PRACTITIONER

## 2021-10-08 PROCEDURE — 87040 BLOOD CULTURE FOR BACTERIA: CPT | Mod: 59,HCNC | Performed by: NURSE PRACTITIONER

## 2021-10-08 PROCEDURE — 80100014 HC HEMODIALYSIS 1:1

## 2021-10-08 PROCEDURE — 83605 ASSAY OF LACTIC ACID: CPT | Mod: HCNC | Performed by: NURSE PRACTITIONER

## 2021-10-08 PROCEDURE — 93010 ELECTROCARDIOGRAM REPORT: CPT | Mod: HCNC,,, | Performed by: INTERNAL MEDICINE

## 2021-10-08 PROCEDURE — 85025 COMPLETE CBC W/AUTO DIFF WBC: CPT | Mod: HCNC | Performed by: PHYSICIAN ASSISTANT

## 2021-10-08 PROCEDURE — 83735 ASSAY OF MAGNESIUM: CPT | Mod: HCNC | Performed by: NURSE PRACTITIONER

## 2021-10-08 PROCEDURE — 99285 PR EMERGENCY DEPT VISIT,LEVEL V: ICD-10-PCS | Mod: CS,,, | Performed by: PHYSICIAN ASSISTANT

## 2021-10-08 PROCEDURE — 99220 PR INITIAL OBSERVATION CARE,LEVL III: ICD-10-PCS | Mod: ,,, | Performed by: NURSE PRACTITIONER

## 2021-10-08 PROCEDURE — 99220 PR INITIAL OBSERVATION CARE,LEVL III: CPT | Mod: ,,, | Performed by: NURSE PRACTITIONER

## 2021-10-08 PROCEDURE — 36593 DECLOT VASCULAR DEVICE: CPT

## 2021-10-08 PROCEDURE — 99285 EMERGENCY DEPT VISIT HI MDM: CPT | Mod: CS,,, | Performed by: PHYSICIAN ASSISTANT

## 2021-10-08 PROCEDURE — 93010 EKG 12-LEAD: ICD-10-PCS | Mod: HCNC,,, | Performed by: INTERNAL MEDICINE

## 2021-10-08 PROCEDURE — 84145 PROCALCITONIN (PCT): CPT | Mod: HCNC | Performed by: EMERGENCY MEDICINE

## 2021-10-08 PROCEDURE — 80047 BASIC METABLC PNL IONIZED CA: CPT | Mod: HCNC

## 2021-10-08 PROCEDURE — 93005 ELECTROCARDIOGRAM TRACING: CPT

## 2021-10-08 RX ORDER — SODIUM CHLORIDE 0.9 % (FLUSH) 0.9 %
10 SYRINGE (ML) INJECTION EVERY 12 HOURS PRN
Status: DISCONTINUED | OUTPATIENT
Start: 2021-10-08 | End: 2021-10-18 | Stop reason: HOSPADM

## 2021-10-08 RX ORDER — HEPARIN 100 UNIT/ML
100 SYRINGE INTRAVENOUS
Status: DISPENSED | OUTPATIENT
Start: 2021-10-08 | End: 2021-10-09

## 2021-10-08 RX ORDER — GLUCAGON 1 MG
1 KIT INJECTION
Status: DISCONTINUED | OUTPATIENT
Start: 2021-10-08 | End: 2021-10-18 | Stop reason: HOSPADM

## 2021-10-08 RX ORDER — ALBUTEROL SULFATE 90 UG/1
2 AEROSOL, METERED RESPIRATORY (INHALATION) EVERY 6 HOURS PRN
Status: DISCONTINUED | OUTPATIENT
Start: 2021-10-08 | End: 2021-10-09

## 2021-10-08 RX ORDER — ONDANSETRON 2 MG/ML
4 INJECTION INTRAMUSCULAR; INTRAVENOUS EVERY 8 HOURS PRN
Status: DISCONTINUED | OUTPATIENT
Start: 2021-10-08 | End: 2021-10-18 | Stop reason: HOSPADM

## 2021-10-08 RX ORDER — SODIUM CHLORIDE 9 MG/ML
INJECTION, SOLUTION INTRAVENOUS ONCE
Status: DISCONTINUED | OUTPATIENT
Start: 2021-10-08 | End: 2021-10-12

## 2021-10-08 RX ORDER — HEPARIN SODIUM 1000 [USP'U]/ML
1000 INJECTION, SOLUTION INTRAVENOUS; SUBCUTANEOUS
Status: DISCONTINUED | OUTPATIENT
Start: 2021-10-08 | End: 2021-10-18 | Stop reason: HOSPADM

## 2021-10-08 RX ORDER — IBUPROFEN 200 MG
16 TABLET ORAL
Status: DISCONTINUED | OUTPATIENT
Start: 2021-10-08 | End: 2021-10-18 | Stop reason: HOSPADM

## 2021-10-08 RX ORDER — FLUTICASONE FUROATE AND VILANTEROL 200; 25 UG/1; UG/1
1 POWDER RESPIRATORY (INHALATION) DAILY
Status: DISCONTINUED | OUTPATIENT
Start: 2021-10-09 | End: 2021-10-18 | Stop reason: HOSPADM

## 2021-10-08 RX ORDER — LIDOCAINE 50 MG/G
2 PATCH TOPICAL DAILY
Status: DISCONTINUED | OUTPATIENT
Start: 2021-10-09 | End: 2021-10-11

## 2021-10-08 RX ORDER — ACETAMINOPHEN 325 MG/1
650 TABLET ORAL EVERY 6 HOURS PRN
Status: DISCONTINUED | OUTPATIENT
Start: 2021-10-08 | End: 2021-10-18 | Stop reason: HOSPADM

## 2021-10-08 RX ORDER — IBUPROFEN 200 MG
24 TABLET ORAL
Status: DISCONTINUED | OUTPATIENT
Start: 2021-10-08 | End: 2021-10-18 | Stop reason: HOSPADM

## 2021-10-08 RX ADMIN — DEXTROSE MONOHYDRATE 12.5 G: 25 INJECTION, SOLUTION INTRAVENOUS at 07:10

## 2021-10-08 RX ADMIN — ALTEPLASE 6 MG: 2.2 INJECTION, POWDER, LYOPHILIZED, FOR SOLUTION INTRAVENOUS at 06:10

## 2021-10-09 PROBLEM — E87.6 HYPOKALEMIA: Status: ACTIVE | Noted: 2021-10-09

## 2021-10-09 LAB
ALBUMIN SERPL BCP-MCNC: 2.3 G/DL (ref 3.5–5.2)
ALP SERPL-CCNC: 123 U/L (ref 55–135)
ALT SERPL W/O P-5'-P-CCNC: 7 U/L (ref 10–44)
ANION GAP SERPL CALC-SCNC: 12 MMOL/L (ref 8–16)
ANION GAP SERPL CALC-SCNC: 13 MMOL/L (ref 8–16)
AST SERPL-CCNC: 13 U/L (ref 10–40)
BASOPHILS # BLD AUTO: 0.03 K/UL (ref 0–0.2)
BASOPHILS NFR BLD: 0.2 % (ref 0–1.9)
BILIRUB SERPL-MCNC: 0.8 MG/DL (ref 0.1–1)
BUN SERPL-MCNC: 19 MG/DL (ref 8–23)
BUN SERPL-MCNC: 22 MG/DL (ref 8–23)
C DIFF GDH STL QL: POSITIVE
C DIFF TOX A+B STL QL IA: NEGATIVE
C DIFF TOX GENS STL QL NAA+PROBE: POSITIVE
CA-I BLDV-SCNC: 1.04 MMOL/L (ref 1.06–1.42)
CALCIUM SERPL-MCNC: 8.4 MG/DL (ref 8.7–10.5)
CALCIUM SERPL-MCNC: 8.4 MG/DL (ref 8.7–10.5)
CHLORIDE SERPL-SCNC: 104 MMOL/L (ref 95–110)
CHLORIDE SERPL-SCNC: 104 MMOL/L (ref 95–110)
CO2 SERPL-SCNC: 20 MMOL/L (ref 23–29)
CO2 SERPL-SCNC: 20 MMOL/L (ref 23–29)
CREAT SERPL-MCNC: 1.7 MG/DL (ref 0.5–1.4)
CREAT SERPL-MCNC: 1.9 MG/DL (ref 0.5–1.4)
DIFFERENTIAL METHOD: ABNORMAL
EOSINOPHIL # BLD AUTO: 0.1 K/UL (ref 0–0.5)
EOSINOPHIL NFR BLD: 0.4 % (ref 0–8)
ERYTHROCYTE [DISTWIDTH] IN BLOOD BY AUTOMATED COUNT: 17.6 % (ref 11.5–14.5)
EST. GFR  (AFRICAN AMERICAN): 28.5 ML/MIN/1.73 M^2
EST. GFR  (AFRICAN AMERICAN): 32.6 ML/MIN/1.73 M^2
EST. GFR  (NON AFRICAN AMERICAN): 24.7 ML/MIN/1.73 M^2
EST. GFR  (NON AFRICAN AMERICAN): 28.3 ML/MIN/1.73 M^2
GLUCOSE SERPL-MCNC: 104 MG/DL (ref 70–110)
GLUCOSE SERPL-MCNC: 94 MG/DL (ref 70–110)
HCT VFR BLD AUTO: 32.8 % (ref 37–48.5)
HGB BLD-MCNC: 9.7 G/DL (ref 12–16)
IMM GRANULOCYTES # BLD AUTO: 0.08 K/UL (ref 0–0.04)
IMM GRANULOCYTES NFR BLD AUTO: 0.5 % (ref 0–0.5)
LYMPHOCYTES # BLD AUTO: 0.4 K/UL (ref 1–4.8)
LYMPHOCYTES NFR BLD: 2.4 % (ref 18–48)
MAGNESIUM SERPL-MCNC: 1.8 MG/DL (ref 1.6–2.6)
MCH RBC QN AUTO: 28.3 PG (ref 27–31)
MCHC RBC AUTO-ENTMCNC: 29.6 G/DL (ref 32–36)
MCV RBC AUTO: 96 FL (ref 82–98)
MONOCYTES # BLD AUTO: 0.8 K/UL (ref 0.3–1)
MONOCYTES NFR BLD: 4.7 % (ref 4–15)
NEUTROPHILS # BLD AUTO: 15.6 K/UL (ref 1.8–7.7)
NEUTROPHILS NFR BLD: 91.8 % (ref 38–73)
NRBC BLD-RTO: 0 /100 WBC
PHOSPHATE SERPL-MCNC: 2.4 MG/DL (ref 2.7–4.5)
PLATELET # BLD AUTO: 74 K/UL (ref 150–450)
PMV BLD AUTO: 14.3 FL (ref 9.2–12.9)
POCT GLUCOSE: 160 MG/DL (ref 70–110)
POCT GLUCOSE: 67 MG/DL (ref 70–110)
POCT GLUCOSE: 74 MG/DL (ref 70–110)
POCT GLUCOSE: 78 MG/DL (ref 70–110)
POCT GLUCOSE: 86 MG/DL (ref 70–110)
POTASSIUM SERPL-SCNC: 2.6 MMOL/L (ref 3.5–5.1)
POTASSIUM SERPL-SCNC: 3.5 MMOL/L (ref 3.5–5.1)
PROT SERPL-MCNC: 6.6 G/DL (ref 6–8.4)
RBC # BLD AUTO: 3.43 M/UL (ref 4–5.4)
SODIUM SERPL-SCNC: 136 MMOL/L (ref 136–145)
SODIUM SERPL-SCNC: 137 MMOL/L (ref 136–145)
WBC # BLD AUTO: 16.98 K/UL (ref 3.9–12.7)

## 2021-10-09 PROCEDURE — 25000003 PHARM REV CODE 250: Mod: HCNC | Performed by: NURSE PRACTITIONER

## 2021-10-09 PROCEDURE — 87449 NOS EACH ORGANISM AG IA: CPT | Mod: HCNC | Performed by: PHYSICIAN ASSISTANT

## 2021-10-09 PROCEDURE — 93005 ELECTROCARDIOGRAM TRACING: CPT | Mod: HCNC

## 2021-10-09 PROCEDURE — 63600175 PHARM REV CODE 636 W HCPCS: Performed by: INTERNAL MEDICINE

## 2021-10-09 PROCEDURE — 63600175 PHARM REV CODE 636 W HCPCS: Mod: HCNC | Performed by: NURSE PRACTITIONER

## 2021-10-09 PROCEDURE — 25000003 PHARM REV CODE 250: Mod: HCNC | Performed by: INTERNAL MEDICINE

## 2021-10-09 PROCEDURE — 80048 BASIC METABOLIC PNL TOTAL CA: CPT | Mod: HCNC | Performed by: PHYSICIAN ASSISTANT

## 2021-10-09 PROCEDURE — 63600175 PHARM REV CODE 636 W HCPCS: Mod: HCNC | Performed by: PHYSICIAN ASSISTANT

## 2021-10-09 PROCEDURE — 36415 COLL VENOUS BLD VENIPUNCTURE: CPT | Mod: HCNC | Performed by: NURSE PRACTITIONER

## 2021-10-09 PROCEDURE — 36415 COLL VENOUS BLD VENIPUNCTURE: CPT | Mod: HCNC | Performed by: PHYSICIAN ASSISTANT

## 2021-10-09 PROCEDURE — 99226 PR SUBSEQUENT OBSERVATION CARE,LEVEL III: CPT | Mod: HCNC,,, | Performed by: PHYSICIAN ASSISTANT

## 2021-10-09 PROCEDURE — 93010 EKG 12-LEAD: ICD-10-PCS | Mod: HCNC,,, | Performed by: INTERNAL MEDICINE

## 2021-10-09 PROCEDURE — 80053 COMPREHEN METABOLIC PANEL: CPT | Mod: HCNC | Performed by: NURSE PRACTITIONER

## 2021-10-09 PROCEDURE — 63600175 PHARM REV CODE 636 W HCPCS: Mod: HCNC | Performed by: INTERNAL MEDICINE

## 2021-10-09 PROCEDURE — 93010 ELECTROCARDIOGRAM REPORT: CPT | Mod: HCNC,,, | Performed by: INTERNAL MEDICINE

## 2021-10-09 PROCEDURE — G0378 HOSPITAL OBSERVATION PER HR: HCPCS | Mod: HCNC

## 2021-10-09 PROCEDURE — 87493 C DIFF AMPLIFIED PROBE: CPT | Mod: HCNC | Performed by: PHYSICIAN ASSISTANT

## 2021-10-09 PROCEDURE — 99214 OFFICE O/P EST MOD 30 MIN: CPT | Mod: ,,, | Performed by: NURSE PRACTITIONER

## 2021-10-09 PROCEDURE — 92610 EVALUATE SWALLOWING FUNCTION: CPT | Mod: HCNC

## 2021-10-09 PROCEDURE — 84100 ASSAY OF PHOSPHORUS: CPT | Mod: HCNC | Performed by: NURSE PRACTITIONER

## 2021-10-09 PROCEDURE — 87324 CLOSTRIDIUM AG IA: CPT | Mod: HCNC | Performed by: PHYSICIAN ASSISTANT

## 2021-10-09 PROCEDURE — 25000003 PHARM REV CODE 250: Mod: HCNC | Performed by: PHYSICIAN ASSISTANT

## 2021-10-09 PROCEDURE — 99226 PR SUBSEQUENT OBSERVATION CARE,LEVEL III: ICD-10-PCS | Mod: HCNC,,, | Performed by: PHYSICIAN ASSISTANT

## 2021-10-09 PROCEDURE — 82330 ASSAY OF CALCIUM: CPT | Mod: HCNC | Performed by: NURSE PRACTITIONER

## 2021-10-09 PROCEDURE — 83735 ASSAY OF MAGNESIUM: CPT | Mod: HCNC | Performed by: NURSE PRACTITIONER

## 2021-10-09 PROCEDURE — 25000003 PHARM REV CODE 250: Performed by: INTERNAL MEDICINE

## 2021-10-09 PROCEDURE — 85025 COMPLETE CBC W/AUTO DIFF WBC: CPT | Mod: HCNC | Performed by: NURSE PRACTITIONER

## 2021-10-09 PROCEDURE — 99214 PR OFFICE/OUTPT VISIT, EST, LEVL IV, 30-39 MIN: ICD-10-PCS | Mod: ,,, | Performed by: NURSE PRACTITIONER

## 2021-10-09 RX ORDER — BENZONATATE 100 MG/1
100 CAPSULE ORAL 3 TIMES DAILY PRN
Status: DISCONTINUED | OUTPATIENT
Start: 2021-10-09 | End: 2021-10-18 | Stop reason: HOSPADM

## 2021-10-09 RX ORDER — CALCITRIOL 0.25 UG/1
0.25 CAPSULE ORAL DAILY
Status: DISCONTINUED | OUTPATIENT
Start: 2021-10-09 | End: 2021-10-18 | Stop reason: HOSPADM

## 2021-10-09 RX ORDER — IPRATROPIUM BROMIDE AND ALBUTEROL SULFATE 2.5; .5 MG/3ML; MG/3ML
3 SOLUTION RESPIRATORY (INHALATION) EVERY 4 HOURS PRN
Status: DISCONTINUED | OUTPATIENT
Start: 2021-10-09 | End: 2021-10-18 | Stop reason: HOSPADM

## 2021-10-09 RX ORDER — SODIUM CHLORIDE, SODIUM LACTATE, POTASSIUM CHLORIDE, CALCIUM CHLORIDE 600; 310; 30; 20 MG/100ML; MG/100ML; MG/100ML; MG/100ML
INJECTION, SOLUTION INTRAVENOUS
Status: DISCONTINUED | OUTPATIENT
Start: 2021-10-09 | End: 2021-10-18 | Stop reason: HOSPADM

## 2021-10-09 RX ORDER — METOPROLOL SUCCINATE 25 MG/1
25 TABLET, EXTENDED RELEASE ORAL DAILY
Status: DISCONTINUED | OUTPATIENT
Start: 2021-10-09 | End: 2021-10-10

## 2021-10-09 RX ORDER — POTASSIUM CHLORIDE 20 MEQ/1
40 TABLET, EXTENDED RELEASE ORAL ONCE
Status: COMPLETED | OUTPATIENT
Start: 2021-10-09 | End: 2021-10-09

## 2021-10-09 RX ORDER — MIRTAZAPINE 7.5 MG/1
7.5 TABLET, FILM COATED ORAL NIGHTLY
Status: DISCONTINUED | OUTPATIENT
Start: 2021-10-09 | End: 2021-10-18 | Stop reason: HOSPADM

## 2021-10-09 RX ORDER — POTASSIUM CHLORIDE 7.45 MG/ML
10 INJECTION INTRAVENOUS
Status: DISPENSED | OUTPATIENT
Start: 2021-10-09 | End: 2021-10-09

## 2021-10-09 RX ORDER — ASPIRIN 81 MG/1
81 TABLET ORAL DAILY
Status: DISCONTINUED | OUTPATIENT
Start: 2021-10-09 | End: 2021-10-15

## 2021-10-09 RX ORDER — ATORVASTATIN CALCIUM 20 MG/1
20 TABLET, FILM COATED ORAL DAILY
Status: DISCONTINUED | OUTPATIENT
Start: 2021-10-09 | End: 2021-10-18 | Stop reason: HOSPADM

## 2021-10-09 RX ADMIN — GUAIFENESIN AND DEXTROMETHORPHAN HYDROBROMIDE 1 TABLET: 600; 30 TABLET, EXTENDED RELEASE ORAL at 09:10

## 2021-10-09 RX ADMIN — POTASSIUM CHLORIDE 10 MEQ: 7.46 INJECTION, SOLUTION INTRAVENOUS at 02:10

## 2021-10-09 RX ADMIN — ATORVASTATIN CALCIUM 20 MG: 20 TABLET, FILM COATED ORAL at 09:10

## 2021-10-09 RX ADMIN — POTASSIUM CHLORIDE 10 MEQ: 7.46 INJECTION, SOLUTION INTRAVENOUS at 12:10

## 2021-10-09 RX ADMIN — ACETAMINOPHEN 650 MG: 325 TABLET ORAL at 01:10

## 2021-10-09 RX ADMIN — METOPROLOL SUCCINATE 25 MG: 25 TABLET, EXTENDED RELEASE ORAL at 09:10

## 2021-10-09 RX ADMIN — ACETAMINOPHEN 650 MG: 325 TABLET ORAL at 08:10

## 2021-10-09 RX ADMIN — CALCITRIOL CAPSULES 0.25 MCG 0.25 MCG: 0.25 CAPSULE ORAL at 09:10

## 2021-10-09 RX ADMIN — Medication 1 CAPSULE: at 10:10

## 2021-10-09 RX ADMIN — VANCOMYCIN HYDROCHLORIDE 125 MG: KIT at 05:10

## 2021-10-09 RX ADMIN — MIRTAZAPINE 7.5 MG: 7.5 TABLET ORAL at 09:10

## 2021-10-09 RX ADMIN — PIPERACILLIN SODIUM AND TAZOBACTAM SODIUM 3.38 G: 3; .375 INJECTION, POWDER, FOR SOLUTION INTRAVENOUS at 10:10

## 2021-10-09 RX ADMIN — PIPERACILLIN AND TAZOBACTAM 2.25 G: 4; .5 INJECTION, POWDER, LYOPHILIZED, FOR SOLUTION INTRAVENOUS; PARENTERAL at 05:10

## 2021-10-09 RX ADMIN — PIPERACILLIN SODIUM AND TAZOBACTAM SODIUM 3.38 G: 3; .375 INJECTION, POWDER, FOR SOLUTION INTRAVENOUS at 02:10

## 2021-10-09 RX ADMIN — POTASSIUM CHLORIDE 10 MEQ: 7.46 INJECTION, SOLUTION INTRAVENOUS at 10:10

## 2021-10-09 RX ADMIN — ONDANSETRON 4 MG: 2 INJECTION INTRAMUSCULAR; INTRAVENOUS at 01:10

## 2021-10-09 RX ADMIN — POTASSIUM CHLORIDE 40 MEQ: 1500 TABLET, EXTENDED RELEASE ORAL at 10:10

## 2021-10-09 RX ADMIN — LIDOCAINE 2 PATCH: 50 PATCH CUTANEOUS at 09:10

## 2021-10-09 RX ADMIN — VANCOMYCIN HYDROCHLORIDE 750 MG: 750 INJECTION, POWDER, LYOPHILIZED, FOR SOLUTION INTRAVENOUS at 01:10

## 2021-10-09 RX ADMIN — ASPIRIN 81 MG: 81 TABLET, DELAYED RELEASE ORAL at 09:10

## 2021-10-09 NOTE — PROGRESS NOTES
[facility]  Vascular Surgery  Progress Note    Patient Name: Tea Georges  MRN: [id number]  Admission Date: 11/26/2018  Primary Care Provider: Parth Posadas Ii, MD    Subjective:     Interval History: NAEON. Pt doing well this am.    Post-Op Info:  Procedure(s) (LRB):  Fistulogram (Left)  PTA, Fistula   1 Day Post-Op       Medications:  Continuous Infusions:  Scheduled Meds:   amLODIPine  10 mg Oral Daily    aspirin  81 mg Oral Daily    atorvastatin  40 mg Oral Daily    carvedilol  12.5 mg Oral BID    clopidogrel  75 mg Oral Daily    erythromycin   Right Eye Q8H    fluticasone-vilanterol  1 puff Inhalation Daily    hydrALAZINE  25 mg Oral Q8H    isosorbide mononitrate  30 mg Oral Daily     PRN Meds:sodium chloride 0.9%, acetaminophen, albuterol-ipratropium, dextrose 50%, ondansetron     Objective:     Vital Signs (Most Recent):  Temp: 98.1 °F (36.7 °C) (11/29/18 0349)  Pulse: (!) 50 (11/29/18 0700)  Resp: (!) 31 (11/29/18 0700)  BP: (!) 115/59 (11/29/18 0700)  SpO2: 100 % (11/29/18 0700) Vital Signs (24h Range):  Temp:  [98.1 °F (36.7 °C)-98.5 °F (36.9 °C)] 98.1 °F (36.7 °C)  Pulse:  [50-66] 50  Resp:  [17-31] 31  SpO2:  [96 %-100 %] 100 %  BP: ()/(45-78) 115/59          Physical Exam   Constitutional: She is oriented to person, place, and time. She appears well-developed and well-nourished.   HENT:   Head: Normocephalic and atraumatic.   Cardiovascular: Normal rate, regular rhythm and normal heart sounds.   Pulmonary/Chest: Effort normal and breath sounds normal. No respiratory distress.   Abdominal: Soft. She exhibits no distension. There is no tenderness.   Musculoskeletal: She exhibits no edema or deformity.   Left AVF with aneurysmal changes, ulceration and scab over proximal aneurysm   Neurological: She is alert and oriented to person, place, and time.   Skin: Skin is warm and dry. No rash noted.   Psychiatric: She has a normal mood and affect. Her behavior is normal.   Vitals  Patient alert and oriented, answers questions appropriately.  See assessment.
Voiding adequate amounts, urine strained.  No c/o urinary burning, frequency
or hesitancy.  No other c/o at this time. reviewed.      Significant Labs:  CBC:   Recent Labs   Lab 11/28/18  0705   WBC 4.18   RBC 3.32*   HGB 9.7*   HCT 31.8*   PLT 86*   MCV 96   MCH 29.2   MCHC 30.5*     CMP:   Recent Labs   Lab 11/28/18  0705   GLU 87   CALCIUM 8.0*   ALBUMIN 2.9*   PROT 6.4   *   K 4.6   CO2 21*      BUN 49*   CREATININE 5.9*   ALKPHOS 107   ALT 8*   AST 11   BILITOT 0.4     Assessment/Plan:     Aneurysm of arteriovenous dialysis fistula    77yo F with left BC AVF with aneurysmal changes and ulceration    - Plan for OR today for revision of AVF  - NPO  - Consent in chart         Carlos Sandoval MD  Vascular Surgery  [facility]

## 2021-10-10 PROBLEM — R65.20 SEVERE SEPSIS: Status: ACTIVE | Noted: 2018-10-29

## 2021-10-10 LAB
ALBUMIN SERPL BCP-MCNC: 2.3 G/DL (ref 3.5–5.2)
ALP SERPL-CCNC: 127 U/L (ref 55–135)
ALT SERPL W/O P-5'-P-CCNC: 5 U/L (ref 10–44)
ANION GAP SERPL CALC-SCNC: 14 MMOL/L (ref 8–16)
AST SERPL-CCNC: 10 U/L (ref 10–40)
BASOPHILS # BLD AUTO: 0.02 K/UL (ref 0–0.2)
BASOPHILS NFR BLD: 0.2 % (ref 0–1.9)
BILIRUB SERPL-MCNC: 0.7 MG/DL (ref 0.1–1)
BUN SERPL-MCNC: 31 MG/DL (ref 8–23)
CALCIUM SERPL-MCNC: 8.8 MG/DL (ref 8.7–10.5)
CHLORIDE SERPL-SCNC: 108 MMOL/L (ref 95–110)
CO2 SERPL-SCNC: 19 MMOL/L (ref 23–29)
CREAT SERPL-MCNC: 2.6 MG/DL (ref 0.5–1.4)
DIFFERENTIAL METHOD: ABNORMAL
EOSINOPHIL # BLD AUTO: 0.1 K/UL (ref 0–0.5)
EOSINOPHIL NFR BLD: 0.6 % (ref 0–8)
ERYTHROCYTE [DISTWIDTH] IN BLOOD BY AUTOMATED COUNT: 17.7 % (ref 11.5–14.5)
EST. GFR  (AFRICAN AMERICAN): 19.5 ML/MIN/1.73 M^2
EST. GFR  (NON AFRICAN AMERICAN): 16.9 ML/MIN/1.73 M^2
GLUCOSE SERPL-MCNC: 77 MG/DL (ref 70–110)
HCT VFR BLD AUTO: 32.9 % (ref 37–48.5)
HGB BLD-MCNC: 9.5 G/DL (ref 12–16)
IMM GRANULOCYTES # BLD AUTO: 0.06 K/UL (ref 0–0.04)
IMM GRANULOCYTES NFR BLD AUTO: 0.6 % (ref 0–0.5)
LYMPHOCYTES # BLD AUTO: 0.3 K/UL (ref 1–4.8)
LYMPHOCYTES NFR BLD: 2.8 % (ref 18–48)
MAGNESIUM SERPL-MCNC: 1.9 MG/DL (ref 1.6–2.6)
MCH RBC QN AUTO: 28.4 PG (ref 27–31)
MCHC RBC AUTO-ENTMCNC: 28.9 G/DL (ref 32–36)
MCV RBC AUTO: 99 FL (ref 82–98)
MONOCYTES # BLD AUTO: 0.6 K/UL (ref 0.3–1)
MONOCYTES NFR BLD: 5.7 % (ref 4–15)
NEUTROPHILS # BLD AUTO: 9.8 K/UL (ref 1.8–7.7)
NEUTROPHILS NFR BLD: 90.1 % (ref 38–73)
NRBC BLD-RTO: 0 /100 WBC
PHOSPHATE SERPL-MCNC: 3.4 MG/DL (ref 2.7–4.5)
PLATELET # BLD AUTO: 54 K/UL (ref 150–450)
PMV BLD AUTO: ABNORMAL FL (ref 9.2–12.9)
POCT GLUCOSE: 68 MG/DL (ref 70–110)
POCT GLUCOSE: 79 MG/DL (ref 70–110)
POCT GLUCOSE: 86 MG/DL (ref 70–110)
POCT GLUCOSE: 89 MG/DL (ref 70–110)
POCT GLUCOSE: 91 MG/DL (ref 70–110)
POTASSIUM SERPL-SCNC: 3.4 MMOL/L (ref 3.5–5.1)
PROT SERPL-MCNC: 6.6 G/DL (ref 6–8.4)
RBC # BLD AUTO: 3.34 M/UL (ref 4–5.4)
SODIUM SERPL-SCNC: 141 MMOL/L (ref 136–145)
WBC # BLD AUTO: 10.82 K/UL (ref 3.9–12.7)

## 2021-10-10 PROCEDURE — 27000207 HC ISOLATION: Mod: HCNC

## 2021-10-10 PROCEDURE — 80053 COMPREHEN METABOLIC PANEL: CPT | Mod: HCNC | Performed by: NURSE PRACTITIONER

## 2021-10-10 PROCEDURE — 20600001 HC STEP DOWN PRIVATE ROOM: Mod: HCNC

## 2021-10-10 PROCEDURE — 36415 COLL VENOUS BLD VENIPUNCTURE: CPT | Mod: HCNC | Performed by: NURSE PRACTITIONER

## 2021-10-10 PROCEDURE — 99226 PR SUBSEQUENT OBSERVATION CARE,LEVEL III: CPT | Mod: HCNC,,, | Performed by: PHYSICIAN ASSISTANT

## 2021-10-10 PROCEDURE — 85025 COMPLETE CBC W/AUTO DIFF WBC: CPT | Mod: HCNC | Performed by: NURSE PRACTITIONER

## 2021-10-10 PROCEDURE — G0378 HOSPITAL OBSERVATION PER HR: HCPCS | Mod: HCNC

## 2021-10-10 PROCEDURE — 84100 ASSAY OF PHOSPHORUS: CPT | Mod: HCNC | Performed by: NURSE PRACTITIONER

## 2021-10-10 PROCEDURE — 63600175 PHARM REV CODE 636 W HCPCS: Mod: HCNC | Performed by: INTERNAL MEDICINE

## 2021-10-10 PROCEDURE — 99226 PR SUBSEQUENT OBSERVATION CARE,LEVEL III: ICD-10-PCS | Mod: HCNC,,, | Performed by: PHYSICIAN ASSISTANT

## 2021-10-10 PROCEDURE — 25000003 PHARM REV CODE 250: Mod: HCNC | Performed by: INTERNAL MEDICINE

## 2021-10-10 PROCEDURE — 83735 ASSAY OF MAGNESIUM: CPT | Mod: HCNC | Performed by: NURSE PRACTITIONER

## 2021-10-10 PROCEDURE — 25000003 PHARM REV CODE 250: Mod: HCNC | Performed by: NURSE PRACTITIONER

## 2021-10-10 PROCEDURE — 25000003 PHARM REV CODE 250: Mod: HCNC | Performed by: PHYSICIAN ASSISTANT

## 2021-10-10 RX ORDER — HYDROXYZINE HYDROCHLORIDE 10 MG/1
10 TABLET, FILM COATED ORAL 3 TIMES DAILY PRN
Status: DISCONTINUED | OUTPATIENT
Start: 2021-10-10 | End: 2021-10-18 | Stop reason: HOSPADM

## 2021-10-10 RX ADMIN — GUAIFENESIN AND DEXTROMETHORPHAN HYDROBROMIDE 1 TABLET: 600; 30 TABLET, EXTENDED RELEASE ORAL at 09:10

## 2021-10-10 RX ADMIN — PIPERACILLIN AND TAZOBACTAM 2.25 G: 4; .5 INJECTION, POWDER, LYOPHILIZED, FOR SOLUTION INTRAVENOUS; PARENTERAL at 03:10

## 2021-10-10 RX ADMIN — ASPIRIN 81 MG: 81 TABLET, DELAYED RELEASE ORAL at 09:10

## 2021-10-10 RX ADMIN — GUAIFENESIN AND DEXTROMETHORPHAN HYDROBROMIDE 1 TABLET: 600; 30 TABLET, EXTENDED RELEASE ORAL at 08:10

## 2021-10-10 RX ADMIN — MIRTAZAPINE 7.5 MG: 7.5 TABLET ORAL at 08:10

## 2021-10-10 RX ADMIN — Medication 1 CAPSULE: at 09:10

## 2021-10-10 RX ADMIN — ATORVASTATIN CALCIUM 20 MG: 20 TABLET, FILM COATED ORAL at 09:10

## 2021-10-10 RX ADMIN — VANCOMYCIN HYDROCHLORIDE 125 MG: KIT at 12:10

## 2021-10-10 RX ADMIN — CALCITRIOL CAPSULES 0.25 MCG 0.25 MCG: 0.25 CAPSULE ORAL at 09:10

## 2021-10-10 RX ADMIN — PIPERACILLIN AND TAZOBACTAM 2.25 G: 4; .5 INJECTION, POWDER, LYOPHILIZED, FOR SOLUTION INTRAVENOUS; PARENTERAL at 05:10

## 2021-10-10 RX ADMIN — VANCOMYCIN HYDROCHLORIDE 125 MG: KIT at 05:10

## 2021-10-10 RX ADMIN — PIPERACILLIN AND TAZOBACTAM 2.25 G: 4; .5 INJECTION, POWDER, LYOPHILIZED, FOR SOLUTION INTRAVENOUS; PARENTERAL at 12:10

## 2021-10-11 PROBLEM — R78.81 POSITIVE BLOOD CULTURE: Status: ACTIVE | Noted: 2021-10-11

## 2021-10-11 LAB
ALBUMIN SERPL BCP-MCNC: 2.5 G/DL (ref 3.5–5.2)
ALP SERPL-CCNC: 131 U/L (ref 55–135)
ALT SERPL W/O P-5'-P-CCNC: 7 U/L (ref 10–44)
ANION GAP SERPL CALC-SCNC: 16 MMOL/L (ref 8–16)
AST SERPL-CCNC: 13 U/L (ref 10–40)
BASOPHILS # BLD AUTO: 0.02 K/UL (ref 0–0.2)
BASOPHILS NFR BLD: 0.2 % (ref 0–1.9)
BILIRUB SERPL-MCNC: 0.7 MG/DL (ref 0.1–1)
BUN SERPL-MCNC: 40 MG/DL (ref 8–23)
CALCIUM SERPL-MCNC: 9.3 MG/DL (ref 8.7–10.5)
CHLORIDE SERPL-SCNC: 107 MMOL/L (ref 95–110)
CO2 SERPL-SCNC: 17 MMOL/L (ref 23–29)
CREAT SERPL-MCNC: 3 MG/DL (ref 0.5–1.4)
DIFFERENTIAL METHOD: ABNORMAL
EOSINOPHIL # BLD AUTO: 0.1 K/UL (ref 0–0.5)
EOSINOPHIL NFR BLD: 0.5 % (ref 0–8)
ERYTHROCYTE [DISTWIDTH] IN BLOOD BY AUTOMATED COUNT: 17.6 % (ref 11.5–14.5)
EST. GFR  (AFRICAN AMERICAN): 16.4 ML/MIN/1.73 M^2
EST. GFR  (NON AFRICAN AMERICAN): 14.2 ML/MIN/1.73 M^2
GLUCOSE SERPL-MCNC: 83 MG/DL (ref 70–110)
HCT VFR BLD AUTO: 34.7 % (ref 37–48.5)
HGB BLD-MCNC: 9.8 G/DL (ref 12–16)
IMM GRANULOCYTES # BLD AUTO: 0.07 K/UL (ref 0–0.04)
IMM GRANULOCYTES NFR BLD AUTO: 0.6 % (ref 0–0.5)
LYMPHOCYTES # BLD AUTO: 0.3 K/UL (ref 1–4.8)
LYMPHOCYTES NFR BLD: 2.4 % (ref 18–48)
MAGNESIUM SERPL-MCNC: 1.9 MG/DL (ref 1.6–2.6)
MCH RBC QN AUTO: 28.1 PG (ref 27–31)
MCHC RBC AUTO-ENTMCNC: 28.2 G/DL (ref 32–36)
MCV RBC AUTO: 99 FL (ref 82–98)
MONOCYTES # BLD AUTO: 0.4 K/UL (ref 0.3–1)
MONOCYTES NFR BLD: 3.2 % (ref 4–15)
NEUTROPHILS # BLD AUTO: 10.3 K/UL (ref 1.8–7.7)
NEUTROPHILS NFR BLD: 93.1 % (ref 38–73)
NRBC BLD-RTO: 0 /100 WBC
PHOSPHATE SERPL-MCNC: 4 MG/DL (ref 2.7–4.5)
PLATELET # BLD AUTO: 55 K/UL (ref 150–450)
PMV BLD AUTO: ABNORMAL FL (ref 9.2–12.9)
POCT GLUCOSE: 121 MG/DL (ref 70–110)
POCT GLUCOSE: 78 MG/DL (ref 70–110)
POCT GLUCOSE: 78 MG/DL (ref 70–110)
POCT GLUCOSE: 92 MG/DL (ref 70–110)
POTASSIUM SERPL-SCNC: 3.4 MMOL/L (ref 3.5–5.1)
PROT SERPL-MCNC: 7.2 G/DL (ref 6–8.4)
RBC # BLD AUTO: 3.49 M/UL (ref 4–5.4)
SODIUM SERPL-SCNC: 140 MMOL/L (ref 136–145)
VANCOMYCIN SERPL-MCNC: 17.3 UG/ML
WBC # BLD AUTO: 11.05 K/UL (ref 3.9–12.7)

## 2021-10-11 PROCEDURE — 87040 BLOOD CULTURE FOR BACTERIA: CPT | Mod: 59,HCNC | Performed by: PHYSICIAN ASSISTANT

## 2021-10-11 PROCEDURE — 84100 ASSAY OF PHOSPHORUS: CPT | Mod: HCNC | Performed by: NURSE PRACTITIONER

## 2021-10-11 PROCEDURE — 25000242 PHARM REV CODE 250 ALT 637 W/ HCPCS: Mod: HCNC | Performed by: NURSE PRACTITIONER

## 2021-10-11 PROCEDURE — 83735 ASSAY OF MAGNESIUM: CPT | Mod: HCNC | Performed by: NURSE PRACTITIONER

## 2021-10-11 PROCEDURE — 99900035 HC TECH TIME PER 15 MIN (STAT): Mod: HCNC

## 2021-10-11 PROCEDURE — 80053 COMPREHEN METABOLIC PANEL: CPT | Mod: HCNC | Performed by: NURSE PRACTITIONER

## 2021-10-11 PROCEDURE — 25000003 PHARM REV CODE 250: Mod: HCNC | Performed by: INTERNAL MEDICINE

## 2021-10-11 PROCEDURE — 90935 PR HEMODIALYSIS, ONE EVALUATION: ICD-10-PCS | Mod: HCNC,,, | Performed by: NURSE PRACTITIONER

## 2021-10-11 PROCEDURE — 27000207 HC ISOLATION: Mod: HCNC

## 2021-10-11 PROCEDURE — 94640 AIRWAY INHALATION TREATMENT: CPT | Mod: HCNC

## 2021-10-11 PROCEDURE — 63600175 PHARM REV CODE 636 W HCPCS: Mod: HCNC | Performed by: NURSE PRACTITIONER

## 2021-10-11 PROCEDURE — 63600175 PHARM REV CODE 636 W HCPCS: Mod: HCNC | Performed by: INTERNAL MEDICINE

## 2021-10-11 PROCEDURE — 80100014 HC HEMODIALYSIS 1:1: Mod: HCNC

## 2021-10-11 PROCEDURE — 36415 COLL VENOUS BLD VENIPUNCTURE: CPT | Mod: HCNC | Performed by: NURSE PRACTITIONER

## 2021-10-11 PROCEDURE — 80202 ASSAY OF VANCOMYCIN: CPT | Mod: HCNC | Performed by: INTERNAL MEDICINE

## 2021-10-11 PROCEDURE — 25000003 PHARM REV CODE 250: Mod: HCNC | Performed by: PHYSICIAN ASSISTANT

## 2021-10-11 PROCEDURE — 90935 HEMODIALYSIS ONE EVALUATION: CPT | Mod: HCNC,,, | Performed by: NURSE PRACTITIONER

## 2021-10-11 PROCEDURE — 85025 COMPLETE CBC W/AUTO DIFF WBC: CPT | Mod: HCNC | Performed by: NURSE PRACTITIONER

## 2021-10-11 PROCEDURE — 25000003 PHARM REV CODE 250: Mod: HCNC | Performed by: NURSE PRACTITIONER

## 2021-10-11 PROCEDURE — 63600175 PHARM REV CODE 636 W HCPCS: Mod: HCNC | Performed by: HOSPITALIST

## 2021-10-11 PROCEDURE — 99233 PR SUBSEQUENT HOSPITAL CARE,LEVL III: ICD-10-PCS | Mod: HCNC,,, | Performed by: PHYSICIAN ASSISTANT

## 2021-10-11 PROCEDURE — 25000003 PHARM REV CODE 250: Mod: HCNC | Performed by: HOSPITALIST

## 2021-10-11 PROCEDURE — 20600001 HC STEP DOWN PRIVATE ROOM: Mod: HCNC

## 2021-10-11 PROCEDURE — 99233 SBSQ HOSP IP/OBS HIGH 50: CPT | Mod: HCNC,,, | Performed by: PHYSICIAN ASSISTANT

## 2021-10-11 RX ORDER — LIDOCAINE 50 MG/G
2 PATCH TOPICAL DAILY
Status: DISCONTINUED | OUTPATIENT
Start: 2021-10-11 | End: 2021-10-18 | Stop reason: HOSPADM

## 2021-10-11 RX ORDER — MUPIROCIN 20 MG/G
OINTMENT TOPICAL 2 TIMES DAILY
Status: COMPLETED | OUTPATIENT
Start: 2021-10-11 | End: 2021-10-15

## 2021-10-11 RX ORDER — SODIUM CHLORIDE 9 MG/ML
INJECTION, SOLUTION INTRAVENOUS ONCE
Status: COMPLETED | OUTPATIENT
Start: 2021-10-11 | End: 2021-10-11

## 2021-10-11 RX ADMIN — LIDOCAINE 2 PATCH: 50 PATCH CUTANEOUS at 05:10

## 2021-10-11 RX ADMIN — VANCOMYCIN HYDROCHLORIDE 750 MG: 750 INJECTION, POWDER, LYOPHILIZED, FOR SOLUTION INTRAVENOUS at 05:10

## 2021-10-11 RX ADMIN — HEPARIN SODIUM 1000 UNITS: 1000 INJECTION, SOLUTION INTRAVENOUS; SUBCUTANEOUS at 04:10

## 2021-10-11 RX ADMIN — CALCITRIOL CAPSULES 0.25 MCG 0.25 MCG: 0.25 CAPSULE ORAL at 11:10

## 2021-10-11 RX ADMIN — GUAIFENESIN AND DEXTROMETHORPHAN HYDROBROMIDE 1 TABLET: 600; 30 TABLET, EXTENDED RELEASE ORAL at 08:10

## 2021-10-11 RX ADMIN — ATORVASTATIN CALCIUM 20 MG: 20 TABLET, FILM COATED ORAL at 11:10

## 2021-10-11 RX ADMIN — MUPIROCIN: 20 OINTMENT TOPICAL at 09:10

## 2021-10-11 RX ADMIN — LIDOCAINE 2 PATCH: 50 PATCH CUTANEOUS at 09:10

## 2021-10-11 RX ADMIN — ONDANSETRON 4 MG: 2 INJECTION INTRAMUSCULAR; INTRAVENOUS at 02:10

## 2021-10-11 RX ADMIN — SODIUM CHLORIDE 250 ML: 0.9 INJECTION, SOLUTION INTRAVENOUS at 03:10

## 2021-10-11 RX ADMIN — GUAIFENESIN AND DEXTROMETHORPHAN HYDROBROMIDE 1 TABLET: 600; 30 TABLET, EXTENDED RELEASE ORAL at 11:10

## 2021-10-11 RX ADMIN — FLUTICASONE FUROATE AND VILANTEROL TRIFENATATE 1 PUFF: 200; 25 POWDER RESPIRATORY (INHALATION) at 09:10

## 2021-10-11 RX ADMIN — SODIUM CHLORIDE: 0.9 INJECTION, SOLUTION INTRAVENOUS at 01:10

## 2021-10-11 RX ADMIN — Medication 1 CAPSULE: at 11:10

## 2021-10-11 RX ADMIN — VANCOMYCIN HYDROCHLORIDE 125 MG: KIT at 05:10

## 2021-10-11 RX ADMIN — ASPIRIN 81 MG: 81 TABLET, DELAYED RELEASE ORAL at 11:10

## 2021-10-11 RX ADMIN — PIPERACILLIN AND TAZOBACTAM 2.25 G: 4; .5 INJECTION, POWDER, LYOPHILIZED, FOR SOLUTION INTRAVENOUS; PARENTERAL at 03:10

## 2021-10-11 RX ADMIN — MIRTAZAPINE 7.5 MG: 7.5 TABLET ORAL at 08:10

## 2021-10-11 RX ADMIN — PIPERACILLIN AND TAZOBACTAM 4.5 G: 4; .5 INJECTION, POWDER, LYOPHILIZED, FOR SOLUTION INTRAVENOUS; PARENTERAL at 10:10

## 2021-10-11 RX ADMIN — MUPIROCIN: 20 OINTMENT TOPICAL at 08:10

## 2021-10-11 RX ADMIN — PIPERACILLIN AND TAZOBACTAM 4.5 G: 4; .5 INJECTION, POWDER, LYOPHILIZED, FOR SOLUTION INTRAVENOUS; PARENTERAL at 09:10

## 2021-10-12 LAB
ALBUMIN SERPL BCP-MCNC: 2.3 G/DL (ref 3.5–5.2)
ALP SERPL-CCNC: 134 U/L (ref 55–135)
ALT SERPL W/O P-5'-P-CCNC: 7 U/L (ref 10–44)
ANION GAP SERPL CALC-SCNC: 13 MMOL/L (ref 8–16)
AST SERPL-CCNC: 12 U/L (ref 10–40)
BASOPHILS # BLD AUTO: 0.03 K/UL (ref 0–0.2)
BASOPHILS NFR BLD: 0.4 % (ref 0–1.9)
BILIRUB SERPL-MCNC: 0.6 MG/DL (ref 0.1–1)
BUN SERPL-MCNC: 15 MG/DL (ref 8–23)
CALCIUM SERPL-MCNC: 8.5 MG/DL (ref 8.7–10.5)
CHLORIDE SERPL-SCNC: 105 MMOL/L (ref 95–110)
CO2 SERPL-SCNC: 23 MMOL/L (ref 23–29)
CREAT SERPL-MCNC: 1.7 MG/DL (ref 0.5–1.4)
DIFFERENTIAL METHOD: ABNORMAL
EOSINOPHIL # BLD AUTO: 0.1 K/UL (ref 0–0.5)
EOSINOPHIL NFR BLD: 1.4 % (ref 0–8)
ERYTHROCYTE [DISTWIDTH] IN BLOOD BY AUTOMATED COUNT: 17.8 % (ref 11.5–14.5)
EST. GFR  (AFRICAN AMERICAN): 32.6 ML/MIN/1.73 M^2
EST. GFR  (NON AFRICAN AMERICAN): 28.3 ML/MIN/1.73 M^2
GLUCOSE SERPL-MCNC: 87 MG/DL (ref 70–110)
HCT VFR BLD AUTO: 32.3 % (ref 37–48.5)
HGB BLD-MCNC: 9.3 G/DL (ref 12–16)
IMM GRANULOCYTES # BLD AUTO: 0.01 K/UL (ref 0–0.04)
IMM GRANULOCYTES NFR BLD AUTO: 0.1 % (ref 0–0.5)
LYMPHOCYTES # BLD AUTO: 0.4 K/UL (ref 1–4.8)
LYMPHOCYTES NFR BLD: 6.3 % (ref 18–48)
MAGNESIUM SERPL-MCNC: 1.9 MG/DL (ref 1.6–2.6)
MCH RBC QN AUTO: 28.4 PG (ref 27–31)
MCHC RBC AUTO-ENTMCNC: 28.8 G/DL (ref 32–36)
MCV RBC AUTO: 99 FL (ref 82–98)
MONOCYTES # BLD AUTO: 0.6 K/UL (ref 0.3–1)
MONOCYTES NFR BLD: 8.4 % (ref 4–15)
NEUTROPHILS # BLD AUTO: 5.8 K/UL (ref 1.8–7.7)
NEUTROPHILS NFR BLD: 83.4 % (ref 38–73)
NRBC BLD-RTO: 0 /100 WBC
PHOSPHATE SERPL-MCNC: 2.4 MG/DL (ref 2.7–4.5)
PLATELET # BLD AUTO: 50 K/UL (ref 150–450)
PMV BLD AUTO: 14.2 FL (ref 9.2–12.9)
POCT GLUCOSE: 103 MG/DL (ref 70–110)
POCT GLUCOSE: 117 MG/DL (ref 70–110)
POCT GLUCOSE: 170 MG/DL (ref 70–110)
POCT GLUCOSE: 94 MG/DL (ref 70–110)
POTASSIUM SERPL-SCNC: 3.4 MMOL/L (ref 3.5–5.1)
PROT SERPL-MCNC: 6.8 G/DL (ref 6–8.4)
RBC # BLD AUTO: 3.28 M/UL (ref 4–5.4)
SODIUM SERPL-SCNC: 141 MMOL/L (ref 136–145)
WBC # BLD AUTO: 6.93 K/UL (ref 3.9–12.7)

## 2021-10-12 PROCEDURE — 27000207 HC ISOLATION: Mod: HCNC

## 2021-10-12 PROCEDURE — 63600175 PHARM REV CODE 636 W HCPCS: Mod: HCNC | Performed by: HOSPITALIST

## 2021-10-12 PROCEDURE — 25000242 PHARM REV CODE 250 ALT 637 W/ HCPCS: Mod: HCNC | Performed by: NURSE PRACTITIONER

## 2021-10-12 PROCEDURE — 99233 SBSQ HOSP IP/OBS HIGH 50: CPT | Mod: HCNC,,, | Performed by: PHYSICIAN ASSISTANT

## 2021-10-12 PROCEDURE — 80053 COMPREHEN METABOLIC PANEL: CPT | Mod: HCNC | Performed by: NURSE PRACTITIONER

## 2021-10-12 PROCEDURE — 94640 AIRWAY INHALATION TREATMENT: CPT | Mod: HCNC

## 2021-10-12 PROCEDURE — 92526 ORAL FUNCTION THERAPY: CPT | Mod: HCNC

## 2021-10-12 PROCEDURE — 85025 COMPLETE CBC W/AUTO DIFF WBC: CPT | Mod: HCNC | Performed by: NURSE PRACTITIONER

## 2021-10-12 PROCEDURE — 25000003 PHARM REV CODE 250: Mod: HCNC | Performed by: NURSE PRACTITIONER

## 2021-10-12 PROCEDURE — 20600001 HC STEP DOWN PRIVATE ROOM: Mod: HCNC

## 2021-10-12 PROCEDURE — 36415 COLL VENOUS BLD VENIPUNCTURE: CPT | Mod: HCNC | Performed by: NURSE PRACTITIONER

## 2021-10-12 PROCEDURE — 25000003 PHARM REV CODE 250: Mod: HCNC | Performed by: PHYSICIAN ASSISTANT

## 2021-10-12 PROCEDURE — 99233 PR SUBSEQUENT HOSPITAL CARE,LEVL III: ICD-10-PCS | Mod: HCNC,,, | Performed by: PHYSICIAN ASSISTANT

## 2021-10-12 PROCEDURE — 25000003 PHARM REV CODE 250: Mod: HCNC | Performed by: HOSPITALIST

## 2021-10-12 PROCEDURE — 84100 ASSAY OF PHOSPHORUS: CPT | Mod: HCNC | Performed by: NURSE PRACTITIONER

## 2021-10-12 PROCEDURE — 25000003 PHARM REV CODE 250: Mod: HCNC | Performed by: INTERNAL MEDICINE

## 2021-10-12 PROCEDURE — 83735 ASSAY OF MAGNESIUM: CPT | Mod: HCNC | Performed by: NURSE PRACTITIONER

## 2021-10-12 RX ORDER — POTASSIUM CHLORIDE 20 MEQ/1
40 TABLET, EXTENDED RELEASE ORAL ONCE
Status: COMPLETED | OUTPATIENT
Start: 2021-10-12 | End: 2021-10-12

## 2021-10-12 RX ORDER — SODIUM CHLORIDE 9 MG/ML
INJECTION, SOLUTION INTRAVENOUS ONCE
Status: COMPLETED | OUTPATIENT
Start: 2021-10-13 | End: 2021-10-13

## 2021-10-12 RX ADMIN — FLUTICASONE FUROATE AND VILANTEROL TRIFENATATE 1 PUFF: 200; 25 POWDER RESPIRATORY (INHALATION) at 09:10

## 2021-10-12 RX ADMIN — VANCOMYCIN HYDROCHLORIDE 125 MG: KIT at 10:10

## 2021-10-12 RX ADMIN — Medication 1 CAPSULE: at 09:10

## 2021-10-12 RX ADMIN — VANCOMYCIN HYDROCHLORIDE 125 MG: KIT at 12:10

## 2021-10-12 RX ADMIN — LIDOCAINE 2 PATCH: 50 PATCH CUTANEOUS at 09:10

## 2021-10-12 RX ADMIN — VANCOMYCIN HYDROCHLORIDE 125 MG: KIT at 05:10

## 2021-10-12 RX ADMIN — ACETAMINOPHEN 650 MG: 325 TABLET ORAL at 09:10

## 2021-10-12 RX ADMIN — CALCITRIOL CAPSULES 0.25 MCG 0.25 MCG: 0.25 CAPSULE ORAL at 09:10

## 2021-10-12 RX ADMIN — ASPIRIN 81 MG: 81 TABLET, DELAYED RELEASE ORAL at 09:10

## 2021-10-12 RX ADMIN — ACETAMINOPHEN 650 MG: 325 TABLET ORAL at 01:10

## 2021-10-12 RX ADMIN — MUPIROCIN: 20 OINTMENT TOPICAL at 10:10

## 2021-10-12 RX ADMIN — GUAIFENESIN AND DEXTROMETHORPHAN HYDROBROMIDE 1 TABLET: 600; 30 TABLET, EXTENDED RELEASE ORAL at 10:10

## 2021-10-12 RX ADMIN — ATORVASTATIN CALCIUM 20 MG: 20 TABLET, FILM COATED ORAL at 09:10

## 2021-10-12 RX ADMIN — MUPIROCIN: 20 OINTMENT TOPICAL at 09:10

## 2021-10-12 RX ADMIN — PIPERACILLIN AND TAZOBACTAM 4.5 G: 4; .5 INJECTION, POWDER, LYOPHILIZED, FOR SOLUTION INTRAVENOUS; PARENTERAL at 10:10

## 2021-10-12 RX ADMIN — POTASSIUM CHLORIDE 40 MEQ: 1500 TABLET, EXTENDED RELEASE ORAL at 01:10

## 2021-10-12 RX ADMIN — GUAIFENESIN AND DEXTROMETHORPHAN HYDROBROMIDE 1 TABLET: 600; 30 TABLET, EXTENDED RELEASE ORAL at 09:10

## 2021-10-12 RX ADMIN — MIRTAZAPINE 7.5 MG: 7.5 TABLET ORAL at 10:10

## 2021-10-12 RX ADMIN — PIPERACILLIN AND TAZOBACTAM 4.5 G: 4; .5 INJECTION, POWDER, LYOPHILIZED, FOR SOLUTION INTRAVENOUS; PARENTERAL at 09:10

## 2021-10-13 LAB
ALBUMIN SERPL BCP-MCNC: 2.1 G/DL (ref 3.5–5.2)
ALP SERPL-CCNC: 132 U/L (ref 55–135)
ALT SERPL W/O P-5'-P-CCNC: 6 U/L (ref 10–44)
ANION GAP SERPL CALC-SCNC: 18 MMOL/L (ref 8–16)
AST SERPL-CCNC: 8 U/L (ref 10–40)
BACTERIA BLD CULT: ABNORMAL
BACTERIA BLD CULT: NORMAL
BASOPHILS # BLD AUTO: 0.02 K/UL (ref 0–0.2)
BASOPHILS NFR BLD: 0.3 % (ref 0–1.9)
BILIRUB SERPL-MCNC: 0.4 MG/DL (ref 0.1–1)
BUN SERPL-MCNC: 35 MG/DL (ref 8–23)
CALCIUM SERPL-MCNC: 9.3 MG/DL (ref 8.7–10.5)
CHLORIDE SERPL-SCNC: 104 MMOL/L (ref 95–110)
CO2 SERPL-SCNC: 19 MMOL/L (ref 23–29)
CREAT SERPL-MCNC: 2.3 MG/DL (ref 0.5–1.4)
DIFFERENTIAL METHOD: ABNORMAL
EOSINOPHIL # BLD AUTO: 0.1 K/UL (ref 0–0.5)
EOSINOPHIL NFR BLD: 1.7 % (ref 0–8)
ERYTHROCYTE [DISTWIDTH] IN BLOOD BY AUTOMATED COUNT: 17.6 % (ref 11.5–14.5)
EST. GFR  (AFRICAN AMERICAN): 22.6 ML/MIN/1.73 M^2
EST. GFR  (NON AFRICAN AMERICAN): 19.6 ML/MIN/1.73 M^2
GLUCOSE SERPL-MCNC: 168 MG/DL (ref 70–110)
HCT VFR BLD AUTO: 33.3 % (ref 37–48.5)
HGB BLD-MCNC: 9.2 G/DL (ref 12–16)
IMM GRANULOCYTES # BLD AUTO: 0.03 K/UL (ref 0–0.04)
IMM GRANULOCYTES NFR BLD AUTO: 0.4 % (ref 0–0.5)
LYMPHOCYTES # BLD AUTO: 0.4 K/UL (ref 1–4.8)
LYMPHOCYTES NFR BLD: 5.7 % (ref 18–48)
MAGNESIUM SERPL-MCNC: 1.9 MG/DL (ref 1.6–2.6)
MCH RBC QN AUTO: 27.8 PG (ref 27–31)
MCHC RBC AUTO-ENTMCNC: 27.6 G/DL (ref 32–36)
MCV RBC AUTO: 101 FL (ref 82–98)
MONOCYTES # BLD AUTO: 0.6 K/UL (ref 0.3–1)
MONOCYTES NFR BLD: 8.3 % (ref 4–15)
NEUTROPHILS # BLD AUTO: 5.9 K/UL (ref 1.8–7.7)
NEUTROPHILS NFR BLD: 83.6 % (ref 38–73)
NRBC BLD-RTO: 0 /100 WBC
PHOSPHATE SERPL-MCNC: 3.4 MG/DL (ref 2.7–4.5)
PLATELET # BLD AUTO: 53 K/UL (ref 150–450)
PMV BLD AUTO: ABNORMAL FL (ref 9.2–12.9)
POCT GLUCOSE: 127 MG/DL (ref 70–110)
POCT GLUCOSE: 143 MG/DL (ref 70–110)
POTASSIUM SERPL-SCNC: 3.2 MMOL/L (ref 3.5–5.1)
PROT SERPL-MCNC: 6.6 G/DL (ref 6–8.4)
RBC # BLD AUTO: 3.31 M/UL (ref 4–5.4)
SODIUM SERPL-SCNC: 141 MMOL/L (ref 136–145)
WBC # BLD AUTO: 7.03 K/UL (ref 3.9–12.7)

## 2021-10-13 PROCEDURE — 25000003 PHARM REV CODE 250: Mod: HCNC | Performed by: NURSE PRACTITIONER

## 2021-10-13 PROCEDURE — 80100014 HC HEMODIALYSIS 1:1: Mod: HCNC

## 2021-10-13 PROCEDURE — 25000003 PHARM REV CODE 250: Mod: HCNC | Performed by: INTERNAL MEDICINE

## 2021-10-13 PROCEDURE — 27000207 HC ISOLATION: Mod: HCNC

## 2021-10-13 PROCEDURE — 25000242 PHARM REV CODE 250 ALT 637 W/ HCPCS: Mod: HCNC | Performed by: NURSE PRACTITIONER

## 2021-10-13 PROCEDURE — 63600175 PHARM REV CODE 636 W HCPCS: Mod: HCNC | Performed by: HOSPITALIST

## 2021-10-13 PROCEDURE — 99233 SBSQ HOSP IP/OBS HIGH 50: CPT | Mod: HCNC,,, | Performed by: PHYSICIAN ASSISTANT

## 2021-10-13 PROCEDURE — 85025 COMPLETE CBC W/AUTO DIFF WBC: CPT | Mod: HCNC | Performed by: NURSE PRACTITIONER

## 2021-10-13 PROCEDURE — 84100 ASSAY OF PHOSPHORUS: CPT | Mod: HCNC | Performed by: NURSE PRACTITIONER

## 2021-10-13 PROCEDURE — 83735 ASSAY OF MAGNESIUM: CPT | Mod: HCNC | Performed by: NURSE PRACTITIONER

## 2021-10-13 PROCEDURE — 25000003 PHARM REV CODE 250: Mod: HCNC | Performed by: HOSPITALIST

## 2021-10-13 PROCEDURE — 99233 PR SUBSEQUENT HOSPITAL CARE,LEVL III: ICD-10-PCS | Mod: HCNC,,, | Performed by: PHYSICIAN ASSISTANT

## 2021-10-13 PROCEDURE — 20600001 HC STEP DOWN PRIVATE ROOM: Mod: HCNC

## 2021-10-13 PROCEDURE — 90935 HEMODIALYSIS ONE EVALUATION: CPT | Mod: HCNC,,, | Performed by: NURSE PRACTITIONER

## 2021-10-13 PROCEDURE — 94640 AIRWAY INHALATION TREATMENT: CPT | Mod: HCNC

## 2021-10-13 PROCEDURE — 90935 PR HEMODIALYSIS, ONE EVALUATION: ICD-10-PCS | Mod: HCNC,,, | Performed by: NURSE PRACTITIONER

## 2021-10-13 PROCEDURE — 36415 COLL VENOUS BLD VENIPUNCTURE: CPT | Mod: HCNC | Performed by: NURSE PRACTITIONER

## 2021-10-13 PROCEDURE — 80053 COMPREHEN METABOLIC PANEL: CPT | Mod: HCNC | Performed by: NURSE PRACTITIONER

## 2021-10-13 PROCEDURE — 63600175 PHARM REV CODE 636 W HCPCS: Mod: HCNC | Performed by: NURSE PRACTITIONER

## 2021-10-13 PROCEDURE — 25000003 PHARM REV CODE 250: Mod: HCNC | Performed by: PHYSICIAN ASSISTANT

## 2021-10-13 RX ORDER — POTASSIUM CHLORIDE 20 MEQ/1
40 TABLET, EXTENDED RELEASE ORAL ONCE
Status: DISCONTINUED | OUTPATIENT
Start: 2021-10-13 | End: 2021-10-13

## 2021-10-13 RX ORDER — POTASSIUM CHLORIDE 20 MEQ/1
40 TABLET, EXTENDED RELEASE ORAL ONCE
Status: COMPLETED | OUTPATIENT
Start: 2021-10-13 | End: 2021-10-13

## 2021-10-13 RX ADMIN — VANCOMYCIN HYDROCHLORIDE 125 MG: KIT at 12:10

## 2021-10-13 RX ADMIN — VANCOMYCIN HYDROCHLORIDE 125 MG: KIT at 05:10

## 2021-10-13 RX ADMIN — HEPARIN SODIUM 1000 UNITS: 1000 INJECTION, SOLUTION INTRAVENOUS; SUBCUTANEOUS at 06:10

## 2021-10-13 RX ADMIN — LIDOCAINE 2 PATCH: 50 PATCH CUTANEOUS at 08:10

## 2021-10-13 RX ADMIN — Medication 1 CAPSULE: at 08:10

## 2021-10-13 RX ADMIN — GUAIFENESIN AND DEXTROMETHORPHAN HYDROBROMIDE 1 TABLET: 600; 30 TABLET, EXTENDED RELEASE ORAL at 08:10

## 2021-10-13 RX ADMIN — MIRTAZAPINE 7.5 MG: 7.5 TABLET ORAL at 08:10

## 2021-10-13 RX ADMIN — SODIUM CHLORIDE: 0.9 INJECTION, SOLUTION INTRAVENOUS at 03:10

## 2021-10-13 RX ADMIN — PIPERACILLIN AND TAZOBACTAM 4.5 G: 4; .5 INJECTION, POWDER, LYOPHILIZED, FOR SOLUTION INTRAVENOUS; PARENTERAL at 09:10

## 2021-10-13 RX ADMIN — IPRATROPIUM BROMIDE AND ALBUTEROL SULFATE 3 ML: .5; 2.5 SOLUTION RESPIRATORY (INHALATION) at 11:10

## 2021-10-13 RX ADMIN — MUPIROCIN: 20 OINTMENT TOPICAL at 08:10

## 2021-10-13 RX ADMIN — VANCOMYCIN HYDROCHLORIDE 125 MG: KIT at 08:10

## 2021-10-13 RX ADMIN — ATORVASTATIN CALCIUM 20 MG: 20 TABLET, FILM COATED ORAL at 08:10

## 2021-10-13 RX ADMIN — POTASSIUM CHLORIDE 40 MEQ: 1500 TABLET, EXTENDED RELEASE ORAL at 10:10

## 2021-10-13 RX ADMIN — PIPERACILLIN AND TAZOBACTAM 4.5 G: 4; .5 INJECTION, POWDER, LYOPHILIZED, FOR SOLUTION INTRAVENOUS; PARENTERAL at 10:10

## 2021-10-13 RX ADMIN — SALINE NASAL SPRAY 2 SPRAY: 1.5 SOLUTION NASAL at 12:10

## 2021-10-13 RX ADMIN — CALCITRIOL CAPSULES 0.25 MCG 0.25 MCG: 0.25 CAPSULE ORAL at 08:10

## 2021-10-13 RX ADMIN — FLUTICASONE FUROATE AND VILANTEROL TRIFENATATE 1 PUFF: 200; 25 POWDER RESPIRATORY (INHALATION) at 08:10

## 2021-10-13 RX ADMIN — ASPIRIN 81 MG: 81 TABLET, DELAYED RELEASE ORAL at 08:10

## 2021-10-13 RX ADMIN — SALINE NASAL SPRAY 2 SPRAY: 1.5 SOLUTION NASAL at 08:10

## 2021-10-14 LAB
ALBUMIN SERPL BCP-MCNC: 2.1 G/DL (ref 3.5–5.2)
ALP SERPL-CCNC: 109 U/L (ref 55–135)
ALT SERPL W/O P-5'-P-CCNC: 7 U/L (ref 10–44)
ANION GAP SERPL CALC-SCNC: 10 MMOL/L (ref 8–16)
AST SERPL-CCNC: 10 U/L (ref 10–40)
BASOPHILS # BLD AUTO: 0.03 K/UL (ref 0–0.2)
BASOPHILS NFR BLD: 0.5 % (ref 0–1.9)
BILIRUB SERPL-MCNC: 0.5 MG/DL (ref 0.1–1)
BUN SERPL-MCNC: 17 MG/DL (ref 8–23)
CALCIUM SERPL-MCNC: 8.8 MG/DL (ref 8.7–10.5)
CHLORIDE SERPL-SCNC: 107 MMOL/L (ref 95–110)
CO2 SERPL-SCNC: 23 MMOL/L (ref 23–29)
CREAT SERPL-MCNC: 1.5 MG/DL (ref 0.5–1.4)
DIFFERENTIAL METHOD: ABNORMAL
EOSINOPHIL # BLD AUTO: 0.1 K/UL (ref 0–0.5)
EOSINOPHIL NFR BLD: 1 % (ref 0–8)
ERYTHROCYTE [DISTWIDTH] IN BLOOD BY AUTOMATED COUNT: 17.7 % (ref 11.5–14.5)
EST. GFR  (AFRICAN AMERICAN): 37.9 ML/MIN/1.73 M^2
EST. GFR  (NON AFRICAN AMERICAN): 32.9 ML/MIN/1.73 M^2
GLUCOSE SERPL-MCNC: 88 MG/DL (ref 70–110)
HCT VFR BLD AUTO: 33.8 % (ref 37–48.5)
HGB BLD-MCNC: 9.2 G/DL (ref 12–16)
IMM GRANULOCYTES # BLD AUTO: 0.06 K/UL (ref 0–0.04)
IMM GRANULOCYTES NFR BLD AUTO: 1 % (ref 0–0.5)
LYMPHOCYTES # BLD AUTO: 0.5 K/UL (ref 1–4.8)
LYMPHOCYTES NFR BLD: 7.4 % (ref 18–48)
MAGNESIUM SERPL-MCNC: 1.7 MG/DL (ref 1.6–2.6)
MCH RBC QN AUTO: 28.5 PG (ref 27–31)
MCHC RBC AUTO-ENTMCNC: 27.2 G/DL (ref 32–36)
MCV RBC AUTO: 105 FL (ref 82–98)
MONOCYTES # BLD AUTO: 0.6 K/UL (ref 0.3–1)
MONOCYTES NFR BLD: 9.2 % (ref 4–15)
NEUTROPHILS # BLD AUTO: 5 K/UL (ref 1.8–7.7)
NEUTROPHILS NFR BLD: 80.9 % (ref 38–73)
NRBC BLD-RTO: 0 /100 WBC
PHOSPHATE SERPL-MCNC: 2 MG/DL (ref 2.7–4.5)
PLATELET # BLD AUTO: 50 K/UL (ref 150–450)
PMV BLD AUTO: ABNORMAL FL (ref 9.2–12.9)
POCT GLUCOSE: 136 MG/DL (ref 70–110)
POCT GLUCOSE: 152 MG/DL (ref 70–110)
POCT GLUCOSE: 152 MG/DL (ref 70–110)
POCT GLUCOSE: 79 MG/DL (ref 70–110)
POTASSIUM SERPL-SCNC: 3.8 MMOL/L (ref 3.5–5.1)
PROT SERPL-MCNC: 6.6 G/DL (ref 6–8.4)
RBC # BLD AUTO: 3.23 M/UL (ref 4–5.4)
SODIUM SERPL-SCNC: 140 MMOL/L (ref 136–145)
WBC # BLD AUTO: 6.22 K/UL (ref 3.9–12.7)

## 2021-10-14 PROCEDURE — 85025 COMPLETE CBC W/AUTO DIFF WBC: CPT | Mod: HCNC | Performed by: NURSE PRACTITIONER

## 2021-10-14 PROCEDURE — 92526 ORAL FUNCTION THERAPY: CPT | Mod: HCNC

## 2021-10-14 PROCEDURE — 25000003 PHARM REV CODE 250: Mod: HCNC | Performed by: PHYSICIAN ASSISTANT

## 2021-10-14 PROCEDURE — 36415 COLL VENOUS BLD VENIPUNCTURE: CPT | Mod: HCNC | Performed by: NURSE PRACTITIONER

## 2021-10-14 PROCEDURE — 20600001 HC STEP DOWN PRIVATE ROOM: Mod: HCNC

## 2021-10-14 PROCEDURE — 99233 SBSQ HOSP IP/OBS HIGH 50: CPT | Mod: HCNC,,, | Performed by: PHYSICIAN ASSISTANT

## 2021-10-14 PROCEDURE — 83735 ASSAY OF MAGNESIUM: CPT | Mod: HCNC | Performed by: NURSE PRACTITIONER

## 2021-10-14 PROCEDURE — 25000003 PHARM REV CODE 250: Mod: HCNC | Performed by: HOSPITALIST

## 2021-10-14 PROCEDURE — 99233 PR SUBSEQUENT HOSPITAL CARE,LEVL III: ICD-10-PCS | Mod: HCNC,,, | Performed by: PHYSICIAN ASSISTANT

## 2021-10-14 PROCEDURE — 63600175 PHARM REV CODE 636 W HCPCS: Mod: HCNC | Performed by: HOSPITALIST

## 2021-10-14 PROCEDURE — 80053 COMPREHEN METABOLIC PANEL: CPT | Mod: HCNC | Performed by: NURSE PRACTITIONER

## 2021-10-14 PROCEDURE — 27000207 HC ISOLATION: Mod: HCNC

## 2021-10-14 PROCEDURE — 97535 SELF CARE MNGMENT TRAINING: CPT | Mod: HCNC

## 2021-10-14 PROCEDURE — 25000003 PHARM REV CODE 250: Mod: HCNC | Performed by: INTERNAL MEDICINE

## 2021-10-14 PROCEDURE — 84100 ASSAY OF PHOSPHORUS: CPT | Mod: HCNC | Performed by: NURSE PRACTITIONER

## 2021-10-14 PROCEDURE — 94640 AIRWAY INHALATION TREATMENT: CPT | Mod: HCNC

## 2021-10-14 PROCEDURE — 25000003 PHARM REV CODE 250: Mod: HCNC | Performed by: NURSE PRACTITIONER

## 2021-10-14 RX ADMIN — SALINE NASAL SPRAY 2 SPRAY: 1.5 SOLUTION NASAL at 09:10

## 2021-10-14 RX ADMIN — ASPIRIN 81 MG: 81 TABLET, DELAYED RELEASE ORAL at 09:10

## 2021-10-14 RX ADMIN — VANCOMYCIN HYDROCHLORIDE 125 MG: KIT at 12:10

## 2021-10-14 RX ADMIN — CALCITRIOL CAPSULES 0.25 MCG 0.25 MCG: 0.25 CAPSULE ORAL at 09:10

## 2021-10-14 RX ADMIN — FLUTICASONE FUROATE AND VILANTEROL TRIFENATATE 1 PUFF: 200; 25 POWDER RESPIRATORY (INHALATION) at 09:10

## 2021-10-14 RX ADMIN — LIDOCAINE 2 PATCH: 50 PATCH CUTANEOUS at 09:10

## 2021-10-14 RX ADMIN — SALINE NASAL SPRAY 2 SPRAY: 1.5 SOLUTION NASAL at 01:10

## 2021-10-14 RX ADMIN — GUAIFENESIN AND DEXTROMETHORPHAN HYDROBROMIDE 1 TABLET: 600; 30 TABLET, EXTENDED RELEASE ORAL at 09:10

## 2021-10-14 RX ADMIN — MIRTAZAPINE 7.5 MG: 7.5 TABLET ORAL at 09:10

## 2021-10-14 RX ADMIN — VANCOMYCIN HYDROCHLORIDE 125 MG: KIT at 06:10

## 2021-10-14 RX ADMIN — SALINE NASAL SPRAY 2 SPRAY: 1.5 SOLUTION NASAL at 06:10

## 2021-10-14 RX ADMIN — MUPIROCIN: 20 OINTMENT TOPICAL at 09:10

## 2021-10-14 RX ADMIN — PIPERACILLIN AND TAZOBACTAM 4.5 G: 4; .5 INJECTION, POWDER, LYOPHILIZED, FOR SOLUTION INTRAVENOUS; PARENTERAL at 09:10

## 2021-10-14 RX ADMIN — PIPERACILLIN AND TAZOBACTAM 4.5 G: 4; .5 INJECTION, POWDER, LYOPHILIZED, FOR SOLUTION INTRAVENOUS; PARENTERAL at 10:10

## 2021-10-14 RX ADMIN — ATORVASTATIN CALCIUM 20 MG: 20 TABLET, FILM COATED ORAL at 09:10

## 2021-10-14 RX ADMIN — MICONAZOLE NITRATE: 20 OINTMENT TOPICAL at 09:10

## 2021-10-14 RX ADMIN — Medication 1 CAPSULE: at 09:10

## 2021-10-15 LAB
ALBUMIN SERPL BCP-MCNC: 2.2 G/DL (ref 3.5–5.2)
ALP SERPL-CCNC: 127 U/L (ref 55–135)
ALT SERPL W/O P-5'-P-CCNC: 9 U/L (ref 10–44)
ANION GAP SERPL CALC-SCNC: 17 MMOL/L (ref 8–16)
AST SERPL-CCNC: 12 U/L (ref 10–40)
BASOPHILS # BLD AUTO: 0.04 K/UL (ref 0–0.2)
BASOPHILS NFR BLD: 0.5 % (ref 0–1.9)
BILIRUB SERPL-MCNC: 0.4 MG/DL (ref 0.1–1)
BUN SERPL-MCNC: 32 MG/DL (ref 8–23)
CALCIUM SERPL-MCNC: 9.4 MG/DL (ref 8.7–10.5)
CHLORIDE SERPL-SCNC: 108 MMOL/L (ref 95–110)
CO2 SERPL-SCNC: 19 MMOL/L (ref 23–29)
CREAT SERPL-MCNC: 2.2 MG/DL (ref 0.5–1.4)
DIFFERENTIAL METHOD: ABNORMAL
EOSINOPHIL # BLD AUTO: 0.1 K/UL (ref 0–0.5)
EOSINOPHIL NFR BLD: 1.2 % (ref 0–8)
ERYTHROCYTE [DISTWIDTH] IN BLOOD BY AUTOMATED COUNT: 17.8 % (ref 11.5–14.5)
EST. GFR  (AFRICAN AMERICAN): 23.9 ML/MIN/1.73 M^2
EST. GFR  (NON AFRICAN AMERICAN): 20.7 ML/MIN/1.73 M^2
GLUCOSE SERPL-MCNC: 109 MG/DL (ref 70–110)
HCT VFR BLD AUTO: 33.9 % (ref 37–48.5)
HGB BLD-MCNC: 9.6 G/DL (ref 12–16)
IMM GRANULOCYTES # BLD AUTO: 0.07 K/UL (ref 0–0.04)
IMM GRANULOCYTES NFR BLD AUTO: 0.9 % (ref 0–0.5)
LYMPHOCYTES # BLD AUTO: 0.5 K/UL (ref 1–4.8)
LYMPHOCYTES NFR BLD: 6.8 % (ref 18–48)
MAGNESIUM SERPL-MCNC: 1.9 MG/DL (ref 1.6–2.6)
MCH RBC QN AUTO: 28.1 PG (ref 27–31)
MCHC RBC AUTO-ENTMCNC: 28.3 G/DL (ref 32–36)
MCV RBC AUTO: 99 FL (ref 82–98)
MONOCYTES # BLD AUTO: 0.6 K/UL (ref 0.3–1)
MONOCYTES NFR BLD: 7.6 % (ref 4–15)
NEUTROPHILS # BLD AUTO: 6.2 K/UL (ref 1.8–7.7)
NEUTROPHILS NFR BLD: 83 % (ref 38–73)
NRBC BLD-RTO: 0 /100 WBC
PHOSPHATE SERPL-MCNC: 2.9 MG/DL (ref 2.7–4.5)
PLATELET # BLD AUTO: 53 K/UL (ref 150–450)
PMV BLD AUTO: ABNORMAL FL (ref 9.2–12.9)
POCT GLUCOSE: 111 MG/DL (ref 70–110)
POCT GLUCOSE: 128 MG/DL (ref 70–110)
POCT GLUCOSE: 129 MG/DL (ref 70–110)
POCT GLUCOSE: 130 MG/DL (ref 70–110)
POCT GLUCOSE: 150 MG/DL (ref 70–110)
POCT GLUCOSE: 164 MG/DL (ref 70–110)
POTASSIUM SERPL-SCNC: 3.9 MMOL/L (ref 3.5–5.1)
PROT SERPL-MCNC: 7.1 G/DL (ref 6–8.4)
RBC # BLD AUTO: 3.42 M/UL (ref 4–5.4)
SODIUM SERPL-SCNC: 144 MMOL/L (ref 136–145)
WBC # BLD AUTO: 7.46 K/UL (ref 3.9–12.7)

## 2021-10-15 PROCEDURE — 25000003 PHARM REV CODE 250: Mod: HCNC | Performed by: PHYSICIAN ASSISTANT

## 2021-10-15 PROCEDURE — 99233 PR SUBSEQUENT HOSPITAL CARE,LEVL III: ICD-10-PCS | Mod: HCNC,,, | Performed by: PHYSICIAN ASSISTANT

## 2021-10-15 PROCEDURE — 99232 SBSQ HOSP IP/OBS MODERATE 35: CPT | Mod: HCNC,,, | Performed by: NURSE PRACTITIONER

## 2021-10-15 PROCEDURE — 85025 COMPLETE CBC W/AUTO DIFF WBC: CPT | Mod: HCNC | Performed by: NURSE PRACTITIONER

## 2021-10-15 PROCEDURE — 25000003 PHARM REV CODE 250: Mod: HCNC | Performed by: HOSPITALIST

## 2021-10-15 PROCEDURE — 63600175 PHARM REV CODE 636 W HCPCS: Mod: HCNC | Performed by: HOSPITALIST

## 2021-10-15 PROCEDURE — 36415 COLL VENOUS BLD VENIPUNCTURE: CPT | Mod: HCNC | Performed by: NURSE PRACTITIONER

## 2021-10-15 PROCEDURE — 99233 SBSQ HOSP IP/OBS HIGH 50: CPT | Mod: HCNC,,, | Performed by: PHYSICIAN ASSISTANT

## 2021-10-15 PROCEDURE — 27000207 HC ISOLATION: Mod: HCNC

## 2021-10-15 PROCEDURE — 80053 COMPREHEN METABOLIC PANEL: CPT | Mod: HCNC | Performed by: NURSE PRACTITIONER

## 2021-10-15 PROCEDURE — 25000003 PHARM REV CODE 250: Mod: HCNC | Performed by: NURSE PRACTITIONER

## 2021-10-15 PROCEDURE — 99221 PR INITIAL HOSPITAL CARE,LEVL I: ICD-10-PCS | Mod: HCNC,,, | Performed by: OTOLARYNGOLOGY

## 2021-10-15 PROCEDURE — 99232 PR SUBSEQUENT HOSPITAL CARE,LEVL II: ICD-10-PCS | Mod: HCNC,,, | Performed by: NURSE PRACTITIONER

## 2021-10-15 PROCEDURE — 25000003 PHARM REV CODE 250: Mod: HCNC | Performed by: STUDENT IN AN ORGANIZED HEALTH CARE EDUCATION/TRAINING PROGRAM

## 2021-10-15 PROCEDURE — 99221 1ST HOSP IP/OBS SF/LOW 40: CPT | Mod: HCNC,,, | Performed by: OTOLARYNGOLOGY

## 2021-10-15 PROCEDURE — 83735 ASSAY OF MAGNESIUM: CPT | Mod: HCNC | Performed by: NURSE PRACTITIONER

## 2021-10-15 PROCEDURE — 25000003 PHARM REV CODE 250: Mod: HCNC | Performed by: INTERNAL MEDICINE

## 2021-10-15 PROCEDURE — 84100 ASSAY OF PHOSPHORUS: CPT | Mod: HCNC | Performed by: NURSE PRACTITIONER

## 2021-10-15 PROCEDURE — 20600001 HC STEP DOWN PRIVATE ROOM: Mod: HCNC

## 2021-10-15 RX ORDER — OXYMETAZOLINE HCL 0.05 %
2 SPRAY, NON-AEROSOL (ML) NASAL 3 TIMES DAILY
Status: DISCONTINUED | OUTPATIENT
Start: 2021-10-15 | End: 2021-10-18 | Stop reason: HOSPADM

## 2021-10-15 RX ORDER — HYDRALAZINE HYDROCHLORIDE 25 MG/1
25 TABLET, FILM COATED ORAL EVERY 8 HOURS PRN
Status: DISCONTINUED | OUTPATIENT
Start: 2021-10-15 | End: 2021-10-18 | Stop reason: HOSPADM

## 2021-10-15 RX ORDER — HYDROCODONE BITARTRATE AND ACETAMINOPHEN 500; 5 MG/1; MG/1
TABLET ORAL
Status: CANCELLED | OUTPATIENT
Start: 2021-10-15

## 2021-10-15 RX ORDER — OXYMETAZOLINE HCL 0.05 %
2 SPRAY, NON-AEROSOL (ML) NASAL 2 TIMES DAILY PRN
Status: DISCONTINUED | OUTPATIENT
Start: 2021-10-15 | End: 2021-10-15

## 2021-10-15 RX ADMIN — VANCOMYCIN HYDROCHLORIDE 125 MG: KIT at 06:10

## 2021-10-15 RX ADMIN — SALINE NASAL SPRAY 2 SPRAY: 1.5 SOLUTION NASAL at 11:10

## 2021-10-15 RX ADMIN — Medication 1 CAPSULE: at 08:10

## 2021-10-15 RX ADMIN — VANCOMYCIN HYDROCHLORIDE 125 MG: KIT at 01:10

## 2021-10-15 RX ADMIN — MICONAZOLE NITRATE: 20 OINTMENT TOPICAL at 09:10

## 2021-10-15 RX ADMIN — GUAIFENESIN AND DEXTROMETHORPHAN HYDROBROMIDE 1 TABLET: 600; 30 TABLET, EXTENDED RELEASE ORAL at 08:10

## 2021-10-15 RX ADMIN — MICONAZOLE NITRATE: 20 OINTMENT TOPICAL at 01:10

## 2021-10-15 RX ADMIN — LIDOCAINE 2 PATCH: 50 PATCH CUTANEOUS at 08:10

## 2021-10-15 RX ADMIN — BENZONATATE 100 MG: 100 CAPSULE ORAL at 11:10

## 2021-10-15 RX ADMIN — VANCOMYCIN HYDROCHLORIDE 125 MG: KIT at 11:10

## 2021-10-15 RX ADMIN — PIPERACILLIN AND TAZOBACTAM 4.5 G: 4; .5 INJECTION, POWDER, LYOPHILIZED, FOR SOLUTION INTRAVENOUS; PARENTERAL at 09:10

## 2021-10-15 RX ADMIN — HYDRALAZINE HYDROCHLORIDE 25 MG: 25 TABLET, FILM COATED ORAL at 11:10

## 2021-10-15 RX ADMIN — MIRTAZAPINE 7.5 MG: 7.5 TABLET ORAL at 09:10

## 2021-10-15 RX ADMIN — ATORVASTATIN CALCIUM 20 MG: 20 TABLET, FILM COATED ORAL at 08:10

## 2021-10-15 RX ADMIN — CALCITRIOL CAPSULES 0.25 MCG 0.25 MCG: 0.25 CAPSULE ORAL at 08:10

## 2021-10-15 RX ADMIN — FLUTICASONE FUROATE AND VILANTEROL TRIFENATATE 1 PUFF: 200; 25 POWDER RESPIRATORY (INHALATION) at 09:10

## 2021-10-15 RX ADMIN — GUAIFENESIN AND DEXTROMETHORPHAN HYDROBROMIDE 1 TABLET: 600; 30 TABLET, EXTENDED RELEASE ORAL at 09:10

## 2021-10-15 RX ADMIN — MUPIROCIN: 20 OINTMENT TOPICAL at 09:10

## 2021-10-15 RX ADMIN — PIPERACILLIN AND TAZOBACTAM 4.5 G: 4; .5 INJECTION, POWDER, LYOPHILIZED, FOR SOLUTION INTRAVENOUS; PARENTERAL at 11:10

## 2021-10-15 RX ADMIN — VANCOMYCIN HYDROCHLORIDE 125 MG: KIT at 12:10

## 2021-10-15 RX ADMIN — MUPIROCIN: 20 OINTMENT TOPICAL at 11:10

## 2021-10-15 RX ADMIN — Medication 2 SPRAY: at 09:10

## 2021-10-15 RX ADMIN — OXYMETAZOLINE HYDROCHLORIDE 2 SPRAY: 0.05 SPRAY NASAL at 10:10

## 2021-10-16 LAB
ALBUMIN SERPL BCP-MCNC: 2 G/DL (ref 3.5–5.2)
ALP SERPL-CCNC: 105 U/L (ref 55–135)
ALT SERPL W/O P-5'-P-CCNC: 8 U/L (ref 10–44)
ANION GAP SERPL CALC-SCNC: 13 MMOL/L (ref 8–16)
AST SERPL-CCNC: 10 U/L (ref 10–40)
BACTERIA BLD CULT: NORMAL
BACTERIA BLD CULT: NORMAL
BASOPHILS # BLD AUTO: 0.05 K/UL (ref 0–0.2)
BASOPHILS NFR BLD: 0.6 % (ref 0–1.9)
BILIRUB SERPL-MCNC: 0.4 MG/DL (ref 0.1–1)
BUN SERPL-MCNC: 43 MG/DL (ref 8–23)
CALCIUM SERPL-MCNC: 9.5 MG/DL (ref 8.7–10.5)
CHLORIDE SERPL-SCNC: 112 MMOL/L (ref 95–110)
CO2 SERPL-SCNC: 20 MMOL/L (ref 23–29)
CREAT SERPL-MCNC: 2.7 MG/DL (ref 0.5–1.4)
DIFFERENTIAL METHOD: ABNORMAL
EOSINOPHIL # BLD AUTO: 0.1 K/UL (ref 0–0.5)
EOSINOPHIL NFR BLD: 1.5 % (ref 0–8)
ERYTHROCYTE [DISTWIDTH] IN BLOOD BY AUTOMATED COUNT: 17.7 % (ref 11.5–14.5)
EST. GFR  (AFRICAN AMERICAN): 18.6 ML/MIN/1.73 M^2
EST. GFR  (NON AFRICAN AMERICAN): 16.2 ML/MIN/1.73 M^2
GLUCOSE SERPL-MCNC: 117 MG/DL (ref 70–110)
HCT VFR BLD AUTO: 29 % (ref 37–48.5)
HGB BLD-MCNC: 8.2 G/DL (ref 12–16)
IMM GRANULOCYTES # BLD AUTO: 0.05 K/UL (ref 0–0.04)
IMM GRANULOCYTES NFR BLD AUTO: 0.6 % (ref 0–0.5)
LYMPHOCYTES # BLD AUTO: 0.5 K/UL (ref 1–4.8)
LYMPHOCYTES NFR BLD: 5.5 % (ref 18–48)
MAGNESIUM SERPL-MCNC: 2 MG/DL (ref 1.6–2.6)
MCH RBC QN AUTO: 27.6 PG (ref 27–31)
MCHC RBC AUTO-ENTMCNC: 28.3 G/DL (ref 32–36)
MCV RBC AUTO: 98 FL (ref 82–98)
MONOCYTES # BLD AUTO: 0.5 K/UL (ref 0.3–1)
MONOCYTES NFR BLD: 5.9 % (ref 4–15)
NEUTROPHILS # BLD AUTO: 7.2 K/UL (ref 1.8–7.7)
NEUTROPHILS NFR BLD: 85.9 % (ref 38–73)
NRBC BLD-RTO: 0 /100 WBC
PHOSPHATE SERPL-MCNC: 4 MG/DL (ref 2.7–4.5)
PLATELET # BLD AUTO: 54 K/UL (ref 150–450)
PMV BLD AUTO: ABNORMAL FL (ref 9.2–12.9)
POCT GLUCOSE: 100 MG/DL (ref 70–110)
POCT GLUCOSE: 126 MG/DL (ref 70–110)
POCT GLUCOSE: 261 MG/DL (ref 70–110)
POCT GLUCOSE: 61 MG/DL (ref 70–110)
POTASSIUM SERPL-SCNC: 3.6 MMOL/L (ref 3.5–5.1)
PROT SERPL-MCNC: 6.4 G/DL (ref 6–8.4)
RBC # BLD AUTO: 2.97 M/UL (ref 4–5.4)
SODIUM SERPL-SCNC: 145 MMOL/L (ref 136–145)
WBC # BLD AUTO: 8.42 K/UL (ref 3.9–12.7)

## 2021-10-16 PROCEDURE — 25000003 PHARM REV CODE 250: Mod: HCNC | Performed by: PHYSICIAN ASSISTANT

## 2021-10-16 PROCEDURE — 20600001 HC STEP DOWN PRIVATE ROOM: Mod: HCNC

## 2021-10-16 PROCEDURE — 85025 COMPLETE CBC W/AUTO DIFF WBC: CPT | Mod: HCNC | Performed by: NURSE PRACTITIONER

## 2021-10-16 PROCEDURE — 25000003 PHARM REV CODE 250: Mod: HCNC | Performed by: HOSPITALIST

## 2021-10-16 PROCEDURE — 83735 ASSAY OF MAGNESIUM: CPT | Mod: HCNC | Performed by: NURSE PRACTITIONER

## 2021-10-16 PROCEDURE — 25000003 PHARM REV CODE 250: Mod: HCNC | Performed by: INTERNAL MEDICINE

## 2021-10-16 PROCEDURE — 25000003 PHARM REV CODE 250: Mod: HCNC | Performed by: STUDENT IN AN ORGANIZED HEALTH CARE EDUCATION/TRAINING PROGRAM

## 2021-10-16 PROCEDURE — 36415 COLL VENOUS BLD VENIPUNCTURE: CPT | Mod: HCNC | Performed by: NURSE PRACTITIONER

## 2021-10-16 PROCEDURE — 63600175 PHARM REV CODE 636 W HCPCS: Mod: HCNC | Performed by: NURSE PRACTITIONER

## 2021-10-16 PROCEDURE — 27000207 HC ISOLATION: Mod: HCNC

## 2021-10-16 PROCEDURE — 84100 ASSAY OF PHOSPHORUS: CPT | Mod: HCNC | Performed by: NURSE PRACTITIONER

## 2021-10-16 PROCEDURE — 63600175 PHARM REV CODE 636 W HCPCS: Mod: HCNC | Performed by: HOSPITALIST

## 2021-10-16 PROCEDURE — 99233 PR SUBSEQUENT HOSPITAL CARE,LEVL III: ICD-10-PCS | Mod: HCNC,,, | Performed by: PHYSICIAN ASSISTANT

## 2021-10-16 PROCEDURE — 99233 SBSQ HOSP IP/OBS HIGH 50: CPT | Mod: HCNC,,, | Performed by: PHYSICIAN ASSISTANT

## 2021-10-16 PROCEDURE — 90935 HEMODIALYSIS ONE EVALUATION: CPT | Mod: HCNC,,, | Performed by: NURSE PRACTITIONER

## 2021-10-16 PROCEDURE — 80100014 HC HEMODIALYSIS 1:1: Mod: HCNC

## 2021-10-16 PROCEDURE — 90935 PR HEMODIALYSIS, ONE EVALUATION: ICD-10-PCS | Mod: HCNC,,, | Performed by: NURSE PRACTITIONER

## 2021-10-16 PROCEDURE — 80053 COMPREHEN METABOLIC PANEL: CPT | Mod: HCNC | Performed by: NURSE PRACTITIONER

## 2021-10-16 PROCEDURE — 25000003 PHARM REV CODE 250: Mod: HCNC | Performed by: NURSE PRACTITIONER

## 2021-10-16 RX ORDER — FLUCONAZOLE 150 MG/1
150 TABLET ORAL ONCE
Status: COMPLETED | OUTPATIENT
Start: 2021-10-16 | End: 2021-10-16

## 2021-10-16 RX ORDER — NYSTATIN AND TRIAMCINOLONE ACETONIDE 100000; 1 [USP'U]/G; MG/G
CREAM TOPICAL 2 TIMES DAILY
Status: DISCONTINUED | OUTPATIENT
Start: 2021-10-16 | End: 2021-10-18 | Stop reason: HOSPADM

## 2021-10-16 RX ADMIN — PIPERACILLIN AND TAZOBACTAM 4.5 G: 4; .5 INJECTION, POWDER, LYOPHILIZED, FOR SOLUTION INTRAVENOUS; PARENTERAL at 02:10

## 2021-10-16 RX ADMIN — Medication 2 SPRAY: at 06:10

## 2021-10-16 RX ADMIN — Medication 2 SPRAY: at 02:10

## 2021-10-16 RX ADMIN — Medication 1 CAPSULE: at 02:10

## 2021-10-16 RX ADMIN — OXYMETAZOLINE HYDROCHLORIDE 2 SPRAY: 0.05 SPRAY NASAL at 09:10

## 2021-10-16 RX ADMIN — ACETAMINOPHEN 650 MG: 325 TABLET ORAL at 02:10

## 2021-10-16 RX ADMIN — PIPERACILLIN AND TAZOBACTAM 4.5 G: 4; .5 INJECTION, POWDER, LYOPHILIZED, FOR SOLUTION INTRAVENOUS; PARENTERAL at 10:10

## 2021-10-16 RX ADMIN — BENZONATATE 100 MG: 100 CAPSULE ORAL at 02:10

## 2021-10-16 RX ADMIN — OXYMETAZOLINE HYDROCHLORIDE 2 SPRAY: 0.05 SPRAY NASAL at 10:10

## 2021-10-16 RX ADMIN — NYSTATIN AND TRIAMCINOLONE ACETONIDE: 100000; 1 CREAM TOPICAL at 10:10

## 2021-10-16 RX ADMIN — ATORVASTATIN CALCIUM 20 MG: 20 TABLET, FILM COATED ORAL at 02:10

## 2021-10-16 RX ADMIN — MICONAZOLE NITRATE: 20 OINTMENT TOPICAL at 09:10

## 2021-10-16 RX ADMIN — Medication 16 G: at 04:10

## 2021-10-16 RX ADMIN — VANCOMYCIN HYDROCHLORIDE 125 MG: KIT at 06:10

## 2021-10-16 RX ADMIN — MIRTAZAPINE 7.5 MG: 7.5 TABLET ORAL at 10:10

## 2021-10-16 RX ADMIN — FLUCONAZOLE 150 MG: 150 TABLET ORAL at 06:10

## 2021-10-16 RX ADMIN — CALCITRIOL CAPSULES 0.25 MCG 0.25 MCG: 0.25 CAPSULE ORAL at 02:10

## 2021-10-16 RX ADMIN — VANCOMYCIN HYDROCHLORIDE 125 MG: KIT at 11:10

## 2021-10-16 RX ADMIN — MICONAZOLE NITRATE: 20 OINTMENT TOPICAL at 10:10

## 2021-10-16 RX ADMIN — FLUTICASONE FUROATE AND VILANTEROL TRIFENATATE 1 PUFF: 200; 25 POWDER RESPIRATORY (INHALATION) at 08:10

## 2021-10-16 RX ADMIN — GUAIFENESIN AND DEXTROMETHORPHAN HYDROBROMIDE 1 TABLET: 600; 30 TABLET, EXTENDED RELEASE ORAL at 10:10

## 2021-10-16 RX ADMIN — HEPARIN SODIUM 1000 UNITS: 1000 INJECTION, SOLUTION INTRAVENOUS; SUBCUTANEOUS at 12:10

## 2021-10-16 RX ADMIN — OXYMETAZOLINE HYDROCHLORIDE 2 SPRAY: 0.05 SPRAY NASAL at 02:10

## 2021-10-16 RX ADMIN — VANCOMYCIN HYDROCHLORIDE 125 MG: KIT at 02:10

## 2021-10-16 RX ADMIN — Medication 2 SPRAY: at 10:10

## 2021-10-17 LAB
ALBUMIN SERPL BCP-MCNC: 2.1 G/DL (ref 3.5–5.2)
ALP SERPL-CCNC: 117 U/L (ref 55–135)
ALT SERPL W/O P-5'-P-CCNC: 9 U/L (ref 10–44)
ANION GAP SERPL CALC-SCNC: 13 MMOL/L (ref 8–16)
AST SERPL-CCNC: 13 U/L (ref 10–40)
BASOPHILS # BLD AUTO: 0.02 K/UL (ref 0–0.2)
BASOPHILS NFR BLD: 0.2 % (ref 0–1.9)
BILIRUB SERPL-MCNC: 0.4 MG/DL (ref 0.1–1)
BUN SERPL-MCNC: 26 MG/DL (ref 8–23)
CALCIUM SERPL-MCNC: 8.9 MG/DL (ref 8.7–10.5)
CHLORIDE SERPL-SCNC: 100 MMOL/L (ref 95–110)
CO2 SERPL-SCNC: 23 MMOL/L (ref 23–29)
CREAT SERPL-MCNC: 1.6 MG/DL (ref 0.5–1.4)
DIFFERENTIAL METHOD: ABNORMAL
EOSINOPHIL # BLD AUTO: 0.2 K/UL (ref 0–0.5)
EOSINOPHIL NFR BLD: 1.6 % (ref 0–8)
ERYTHROCYTE [DISTWIDTH] IN BLOOD BY AUTOMATED COUNT: 17.5 % (ref 11.5–14.5)
EST. GFR  (AFRICAN AMERICAN): 35.1 ML/MIN/1.73 M^2
EST. GFR  (NON AFRICAN AMERICAN): 30.4 ML/MIN/1.73 M^2
GLUCOSE SERPL-MCNC: 123 MG/DL (ref 70–110)
HCT VFR BLD AUTO: 29.3 % (ref 37–48.5)
HGB BLD-MCNC: 8.4 G/DL (ref 12–16)
IMM GRANULOCYTES # BLD AUTO: 0.07 K/UL (ref 0–0.04)
IMM GRANULOCYTES NFR BLD AUTO: 0.8 % (ref 0–0.5)
LYMPHOCYTES # BLD AUTO: 0.7 K/UL (ref 1–4.8)
LYMPHOCYTES NFR BLD: 7.9 % (ref 18–48)
MAGNESIUM SERPL-MCNC: 1.8 MG/DL (ref 1.6–2.6)
MCH RBC QN AUTO: 28.3 PG (ref 27–31)
MCHC RBC AUTO-ENTMCNC: 28.7 G/DL (ref 32–36)
MCV RBC AUTO: 99 FL (ref 82–98)
MONOCYTES # BLD AUTO: 0.7 K/UL (ref 0.3–1)
MONOCYTES NFR BLD: 7.5 % (ref 4–15)
NEUTROPHILS # BLD AUTO: 7.5 K/UL (ref 1.8–7.7)
NEUTROPHILS NFR BLD: 82 % (ref 38–73)
NRBC BLD-RTO: 0 /100 WBC
PHOSPHATE SERPL-MCNC: 3 MG/DL (ref 2.7–4.5)
PLATELET # BLD AUTO: 67 K/UL (ref 150–450)
PMV BLD AUTO: ABNORMAL FL (ref 9.2–12.9)
POCT GLUCOSE: 110 MG/DL (ref 70–110)
POCT GLUCOSE: 115 MG/DL (ref 70–110)
POCT GLUCOSE: 170 MG/DL (ref 70–110)
POCT GLUCOSE: 205 MG/DL (ref 70–110)
POTASSIUM SERPL-SCNC: 3.2 MMOL/L (ref 3.5–5.1)
PROT SERPL-MCNC: 6.8 G/DL (ref 6–8.4)
RBC # BLD AUTO: 2.97 M/UL (ref 4–5.4)
SODIUM SERPL-SCNC: 136 MMOL/L (ref 136–145)
WBC # BLD AUTO: 9.2 K/UL (ref 3.9–12.7)

## 2021-10-17 PROCEDURE — 84100 ASSAY OF PHOSPHORUS: CPT | Mod: HCNC | Performed by: NURSE PRACTITIONER

## 2021-10-17 PROCEDURE — 25000003 PHARM REV CODE 250: Mod: HCNC | Performed by: PHYSICIAN ASSISTANT

## 2021-10-17 PROCEDURE — 99233 PR SUBSEQUENT HOSPITAL CARE,LEVL III: ICD-10-PCS | Mod: HCNC,,, | Performed by: PHYSICIAN ASSISTANT

## 2021-10-17 PROCEDURE — 63600175 PHARM REV CODE 636 W HCPCS: Mod: HCNC | Performed by: HOSPITALIST

## 2021-10-17 PROCEDURE — 25000003 PHARM REV CODE 250: Mod: HCNC | Performed by: STUDENT IN AN ORGANIZED HEALTH CARE EDUCATION/TRAINING PROGRAM

## 2021-10-17 PROCEDURE — 63600175 PHARM REV CODE 636 W HCPCS: Mod: HCNC | Performed by: NURSE PRACTITIONER

## 2021-10-17 PROCEDURE — 85025 COMPLETE CBC W/AUTO DIFF WBC: CPT | Mod: HCNC | Performed by: NURSE PRACTITIONER

## 2021-10-17 PROCEDURE — 25000003 PHARM REV CODE 250: Mod: HCNC | Performed by: HOSPITALIST

## 2021-10-17 PROCEDURE — 25000003 PHARM REV CODE 250: Mod: HCNC | Performed by: INTERNAL MEDICINE

## 2021-10-17 PROCEDURE — 27000207 HC ISOLATION: Mod: HCNC

## 2021-10-17 PROCEDURE — 83735 ASSAY OF MAGNESIUM: CPT | Mod: HCNC | Performed by: NURSE PRACTITIONER

## 2021-10-17 PROCEDURE — 80053 COMPREHEN METABOLIC PANEL: CPT | Mod: HCNC | Performed by: NURSE PRACTITIONER

## 2021-10-17 PROCEDURE — 20600001 HC STEP DOWN PRIVATE ROOM: Mod: HCNC

## 2021-10-17 PROCEDURE — 36415 COLL VENOUS BLD VENIPUNCTURE: CPT | Mod: HCNC | Performed by: NURSE PRACTITIONER

## 2021-10-17 PROCEDURE — 25000003 PHARM REV CODE 250: Mod: HCNC | Performed by: NURSE PRACTITIONER

## 2021-10-17 PROCEDURE — 99233 SBSQ HOSP IP/OBS HIGH 50: CPT | Mod: HCNC,,, | Performed by: PHYSICIAN ASSISTANT

## 2021-10-17 RX ADMIN — Medication 2 SPRAY: at 05:10

## 2021-10-17 RX ADMIN — Medication 2 SPRAY: at 09:10

## 2021-10-17 RX ADMIN — MIRTAZAPINE 7.5 MG: 7.5 TABLET ORAL at 08:10

## 2021-10-17 RX ADMIN — MICONAZOLE NITRATE: 20 OINTMENT TOPICAL at 08:10

## 2021-10-17 RX ADMIN — OXYMETAZOLINE HYDROCHLORIDE 2 SPRAY: 0.05 SPRAY NASAL at 09:10

## 2021-10-17 RX ADMIN — Medication 2 SPRAY: at 02:10

## 2021-10-17 RX ADMIN — OXYMETAZOLINE HYDROCHLORIDE 2 SPRAY: 0.05 SPRAY NASAL at 08:10

## 2021-10-17 RX ADMIN — VANCOMYCIN HYDROCHLORIDE 125 MG: KIT at 11:10

## 2021-10-17 RX ADMIN — VANCOMYCIN HYDROCHLORIDE 125 MG: KIT at 05:10

## 2021-10-17 RX ADMIN — GUAIFENESIN AND DEXTROMETHORPHAN HYDROBROMIDE 1 TABLET: 600; 30 TABLET, EXTENDED RELEASE ORAL at 09:10

## 2021-10-17 RX ADMIN — CALCITRIOL CAPSULES 0.25 MCG 0.25 MCG: 0.25 CAPSULE ORAL at 09:10

## 2021-10-17 RX ADMIN — Medication 2 SPRAY: at 06:10

## 2021-10-17 RX ADMIN — MICONAZOLE NITRATE: 20 OINTMENT TOPICAL at 09:10

## 2021-10-17 RX ADMIN — PIPERACILLIN AND TAZOBACTAM 4.5 G: 4; .5 INJECTION, POWDER, LYOPHILIZED, FOR SOLUTION INTRAVENOUS; PARENTERAL at 10:10

## 2021-10-17 RX ADMIN — FLUTICASONE FUROATE AND VILANTEROL TRIFENATATE 1 PUFF: 200; 25 POWDER RESPIRATORY (INHALATION) at 09:10

## 2021-10-17 RX ADMIN — NYSTATIN AND TRIAMCINOLONE ACETONIDE: 100000; 1 CREAM TOPICAL at 09:10

## 2021-10-17 RX ADMIN — Medication 1 CAPSULE: at 09:10

## 2021-10-17 RX ADMIN — OXYMETAZOLINE HYDROCHLORIDE 2 SPRAY: 0.05 SPRAY NASAL at 02:10

## 2021-10-17 RX ADMIN — NYSTATIN AND TRIAMCINOLONE ACETONIDE: 100000; 1 CREAM TOPICAL at 08:10

## 2021-10-17 RX ADMIN — ATORVASTATIN CALCIUM 20 MG: 20 TABLET, FILM COATED ORAL at 09:10

## 2021-10-17 RX ADMIN — GUAIFENESIN AND DEXTROMETHORPHAN HYDROBROMIDE 1 TABLET: 600; 30 TABLET, EXTENDED RELEASE ORAL at 08:10

## 2021-10-17 RX ADMIN — VANCOMYCIN HYDROCHLORIDE 125 MG: KIT at 06:10

## 2021-10-17 RX ADMIN — ONDANSETRON 4 MG: 2 INJECTION INTRAMUSCULAR; INTRAVENOUS at 09:10

## 2021-10-18 VITALS
OXYGEN SATURATION: 95 % | SYSTOLIC BLOOD PRESSURE: 168 MMHG | TEMPERATURE: 99 F | DIASTOLIC BLOOD PRESSURE: 73 MMHG | RESPIRATION RATE: 18 BRPM | WEIGHT: 82.44 LBS | HEART RATE: 60 BPM | HEIGHT: 63 IN | BODY MASS INDEX: 14.61 KG/M2

## 2021-10-18 LAB
ALBUMIN SERPL BCP-MCNC: 1.9 G/DL (ref 3.5–5.2)
ALP SERPL-CCNC: 128 U/L (ref 55–135)
ALT SERPL W/O P-5'-P-CCNC: 9 U/L (ref 10–44)
ANION GAP SERPL CALC-SCNC: 15 MMOL/L (ref 8–16)
AST SERPL-CCNC: 13 U/L (ref 10–40)
BASOPHILS # BLD AUTO: 0.04 K/UL (ref 0–0.2)
BASOPHILS NFR BLD: 0.5 % (ref 0–1.9)
BILIRUB SERPL-MCNC: 0.4 MG/DL (ref 0.1–1)
BUN SERPL-MCNC: 42 MG/DL (ref 8–23)
CALCIUM SERPL-MCNC: 9.5 MG/DL (ref 8.7–10.5)
CHLORIDE SERPL-SCNC: 100 MMOL/L (ref 95–110)
CO2 SERPL-SCNC: 21 MMOL/L (ref 23–29)
CREAT SERPL-MCNC: 2.1 MG/DL (ref 0.5–1.4)
DIFFERENTIAL METHOD: ABNORMAL
EOSINOPHIL # BLD AUTO: 0.1 K/UL (ref 0–0.5)
EOSINOPHIL NFR BLD: 1.4 % (ref 0–8)
ERYTHROCYTE [DISTWIDTH] IN BLOOD BY AUTOMATED COUNT: 17.5 % (ref 11.5–14.5)
EST. GFR  (AFRICAN AMERICAN): 25.2 ML/MIN/1.73 M^2
EST. GFR  (NON AFRICAN AMERICAN): 21.9 ML/MIN/1.73 M^2
GLUCOSE SERPL-MCNC: 166 MG/DL (ref 70–110)
HCT VFR BLD AUTO: 28 % (ref 37–48.5)
HGB BLD-MCNC: 7.9 G/DL (ref 12–16)
IMM GRANULOCYTES # BLD AUTO: 0.06 K/UL (ref 0–0.04)
IMM GRANULOCYTES NFR BLD AUTO: 0.7 % (ref 0–0.5)
LYMPHOCYTES # BLD AUTO: 0.5 K/UL (ref 1–4.8)
LYMPHOCYTES NFR BLD: 6.2 % (ref 18–48)
MAGNESIUM SERPL-MCNC: 2 MG/DL (ref 1.6–2.6)
MCH RBC QN AUTO: 27.9 PG (ref 27–31)
MCHC RBC AUTO-ENTMCNC: 28.2 G/DL (ref 32–36)
MCV RBC AUTO: 99 FL (ref 82–98)
MONOCYTES # BLD AUTO: 0.6 K/UL (ref 0.3–1)
MONOCYTES NFR BLD: 7.6 % (ref 4–15)
NEUTROPHILS # BLD AUTO: 7 K/UL (ref 1.8–7.7)
NEUTROPHILS NFR BLD: 83.6 % (ref 38–73)
NRBC BLD-RTO: 0 /100 WBC
PHOSPHATE SERPL-MCNC: 4.1 MG/DL (ref 2.7–4.5)
PLATELET # BLD AUTO: 69 K/UL (ref 150–450)
PMV BLD AUTO: ABNORMAL FL (ref 9.2–12.9)
POCT GLUCOSE: 111 MG/DL (ref 70–110)
POCT GLUCOSE: 124 MG/DL (ref 70–110)
POCT GLUCOSE: 163 MG/DL (ref 70–110)
POTASSIUM SERPL-SCNC: 3.2 MMOL/L (ref 3.5–5.1)
PROT SERPL-MCNC: 6.4 G/DL (ref 6–8.4)
RBC # BLD AUTO: 2.83 M/UL (ref 4–5.4)
SODIUM SERPL-SCNC: 136 MMOL/L (ref 136–145)
WBC # BLD AUTO: 8.34 K/UL (ref 3.9–12.7)

## 2021-10-18 PROCEDURE — 25000003 PHARM REV CODE 250: Mod: HCNC | Performed by: INTERNAL MEDICINE

## 2021-10-18 PROCEDURE — 25000003 PHARM REV CODE 250: Mod: HCNC | Performed by: STUDENT IN AN ORGANIZED HEALTH CARE EDUCATION/TRAINING PROGRAM

## 2021-10-18 PROCEDURE — 83735 ASSAY OF MAGNESIUM: CPT | Mod: HCNC | Performed by: NURSE PRACTITIONER

## 2021-10-18 PROCEDURE — 99900035 HC TECH TIME PER 15 MIN (STAT): Mod: HCNC

## 2021-10-18 PROCEDURE — 99233 PR SUBSEQUENT HOSPITAL CARE,LEVL III: ICD-10-PCS | Mod: HCNC,,, | Performed by: PHYSICIAN ASSISTANT

## 2021-10-18 PROCEDURE — 27000221 HC OXYGEN, UP TO 24 HOURS: Mod: HCNC

## 2021-10-18 PROCEDURE — 25000003 PHARM REV CODE 250: Mod: HCNC | Performed by: PHYSICIAN ASSISTANT

## 2021-10-18 PROCEDURE — 80053 COMPREHEN METABOLIC PANEL: CPT | Mod: HCNC | Performed by: NURSE PRACTITIONER

## 2021-10-18 PROCEDURE — 36415 COLL VENOUS BLD VENIPUNCTURE: CPT | Mod: HCNC | Performed by: NURSE PRACTITIONER

## 2021-10-18 PROCEDURE — 99233 SBSQ HOSP IP/OBS HIGH 50: CPT | Mod: HCNC,,, | Performed by: PHYSICIAN ASSISTANT

## 2021-10-18 PROCEDURE — 84100 ASSAY OF PHOSPHORUS: CPT | Mod: HCNC | Performed by: NURSE PRACTITIONER

## 2021-10-18 PROCEDURE — 25000003 PHARM REV CODE 250: Mod: HCNC | Performed by: NURSE PRACTITIONER

## 2021-10-18 PROCEDURE — 85025 COMPLETE CBC W/AUTO DIFF WBC: CPT | Mod: HCNC | Performed by: NURSE PRACTITIONER

## 2021-10-18 RX ORDER — NYSTATIN AND TRIAMCINOLONE ACETONIDE 100000; 1 [USP'U]/G; MG/G
CREAM TOPICAL 2 TIMES DAILY
Start: 2021-10-18

## 2021-10-18 RX ORDER — ATORVASTATIN CALCIUM 20 MG/1
20 TABLET, FILM COATED ORAL DAILY
Qty: 90 TABLET | Refills: 3 | Status: SHIPPED | OUTPATIENT
Start: 2021-10-18 | End: 2022-10-18

## 2021-10-18 RX ORDER — VANCOMYCIN HYDROCHLORIDE 125 MG/1
125 CAPSULE ORAL 4 TIMES DAILY
Qty: 20 CAPSULE | Refills: 0
Start: 2021-10-18 | End: 2021-10-23

## 2021-10-18 RX ORDER — POTASSIUM CHLORIDE 20 MEQ/1
20 TABLET, EXTENDED RELEASE ORAL ONCE
Status: COMPLETED | OUTPATIENT
Start: 2021-10-18 | End: 2021-10-18

## 2021-10-18 RX ORDER — SODIUM CHLORIDE 9 MG/ML
INJECTION, SOLUTION INTRAVENOUS ONCE
Status: DISCONTINUED | OUTPATIENT
Start: 2021-10-19 | End: 2021-10-18 | Stop reason: HOSPADM

## 2021-10-18 RX ADMIN — POTASSIUM CHLORIDE 20 MEQ: 1500 TABLET, EXTENDED RELEASE ORAL at 02:10

## 2021-10-18 RX ADMIN — VANCOMYCIN HYDROCHLORIDE 125 MG: KIT at 12:10

## 2021-10-18 RX ADMIN — CALCITRIOL CAPSULES 0.25 MCG 0.25 MCG: 0.25 CAPSULE ORAL at 10:10

## 2021-10-18 RX ADMIN — GUAIFENESIN AND DEXTROMETHORPHAN HYDROBROMIDE 1 TABLET: 600; 30 TABLET, EXTENDED RELEASE ORAL at 10:10

## 2021-10-18 RX ADMIN — Medication 2 SPRAY: at 10:10

## 2021-10-18 RX ADMIN — NYSTATIN AND TRIAMCINOLONE ACETONIDE: 100000; 1 CREAM TOPICAL at 10:10

## 2021-10-18 RX ADMIN — FLUTICASONE FUROATE AND VILANTEROL TRIFENATATE 1 PUFF: 200; 25 POWDER RESPIRATORY (INHALATION) at 10:10

## 2021-10-18 RX ADMIN — MICONAZOLE NITRATE: 20 OINTMENT TOPICAL at 10:10

## 2021-10-18 RX ADMIN — Medication 1 CAPSULE: at 10:10

## 2021-10-18 RX ADMIN — Medication 2 SPRAY: at 02:10

## 2021-10-18 RX ADMIN — VANCOMYCIN HYDROCHLORIDE 125 MG: KIT at 06:10

## 2021-10-18 RX ADMIN — ATORVASTATIN CALCIUM 20 MG: 20 TABLET, FILM COATED ORAL at 10:10

## 2023-08-18 NOTE — PROGRESS NOTES
"[facility] Hospital Medicine  Progress Note    Patient Name: Tea Georges  MRN: [id number]  Patient Class: IP- Inpatient   Admission Date: 6/21/2019  Length of Stay: 5 days  Attending Physician: Salvador Ayala MD  Primary Care Provider: Parth Posadas Ii, MD    Hospital Medicine Team: [facility] 3 Carmen Ross MD    Subjective:     Principal Problem:Subdural hemorrhage      HPI:  The patient is a 79 year old F with PMHx of ESRD (on HD M/W/F), HFpEF, COPD (2L O2 NC @ home), h/o SDH (w/ recent admission to [facility] on 5/21/19-w/bilateral SDH R>L s/p unwitnessed fall on plavix, no neurosurgical intervention) and right MCA stroke s/p thrombectomy in 2016 admitted to [facility] with increased R SDH.  After initial admission to [facility] on (5/21) pt was stepdown to hospital medicine, discharged to inpatient rehab/SNF and and then to home, however she failed to make it to her follow up appointment. Patient was admitted 06/21/19 to the hospital to hospital medicine with chief complaint of "not feeling right". Medicine team performed CT Head on 6/22 which demonstrated increase in size and chronicification of R convexity SDH with mass effect. Pt stated she had a fall 2 weeks prior to presentation w/LOC, not on any anticoagulants and had intermittent headaches since then however she denies any headaches at time of this presentation. 06/23/2019 s/p craniotomy by neurosurgery, SD placed. 6/25 Pt had MMA embolization. 6/27/19 SD drain removed, CT head was stable and patient stepped down to Hospital Medicine 3.     Overview/Hospital Course:  NSGY signed off with plan to follow up outpatient for staple removal and patient awaiting rehab placement.    Interval History: Patient doing well, without complaints.     Review of Systems   Constitutional: Negative for diaphoresis, fatigue and fever.   HENT: Negative.    Eyes: Negative.    Respiratory: Negative for shortness of breath.    Cardiovascular: Negative for chest pain. " Anesthesia Start and Stop Event Times     Date Time Event    8/18/2023 0835 Ready for Procedure     0857 Anesthesia Start     0953 Anesthesia Stop        Responsible Staff  08/18/23    Name Role Begin End    Steve López D.O. Anesth 0857 0953        Overtime Reason:  no overtime (within assigned shift)    Comments:                                                         Gastrointestinal: Negative for abdominal distention, nausea and vomiting.   Genitourinary: Negative.    Musculoskeletal: Negative.      Objective:     Vital Signs (Most Recent):  Temp: 98.6 °F (37 °C) (06/29/19 0723)  Pulse: 75 (06/29/19 0844)  Resp: 16 (06/29/19 0844)  BP: 126/60 (06/29/19 0723)  SpO2: 98 % (06/29/19 0723) Vital Signs (24h Range):  Temp:  [96.5 °F (35.8 °C)-98.6 °F (37 °C)] 98.6 °F (37 °C)  Pulse:  [50-75] 75  Resp:  [14-20] 16  SpO2:  [96 %-100 %] 98 %  BP: (110-151)/(42-65) 126/60     Weight: 58.7 kg (129 lb 6.6 oz)  Body mass index is 22.92 kg/m².    Intake/Output Summary (Last 24 hours) at 6/29/2019 0931  Last data filed at 6/29/2019 0100  Gross per 24 hour   Intake 600 ml   Output 1900 ml   Net -1300 ml      Physical Exam   Constitutional: She is oriented to person, place, and time. She appears well-developed and well-nourished. No distress.   HENT:   Head: Normocephalic.   R side craniotomy staples, clean and dry.   Eyes:   R sided cataract, complete opacification of pupil   Cardiovascular: Normal rate, regular rhythm and normal heart sounds.   II/VI RAMIRO, best heard at RSB   Pulmonary/Chest: Effort normal and breath sounds normal.   Abdominal: Soft. She exhibits no distension. There is no tenderness.   Musculoskeletal: She exhibits no edema.   Neurological: She is alert and oriented to person, place, and time.   L sided weakness, UE>LE   Skin: She is not diaphoretic.   Psychiatric: She has a normal mood and affect. Her behavior is normal.   Nursing note and vitals reviewed.      Significant Labs: All pertinent labs within the past 24 hours have been reviewed.    Significant Imaging: I have reviewed all pertinent imaging results/findings within the past 24 hours.      Assessment/Plan:      * Subdural hemorrhage  H/o recent bilateral acute SDH, R>L with right to left midline shift on plavix and reversed with platelets. No surgical intervention prformed per Nsgy recs. Appears plavix and ASA  held since this event. Re-presented for malaise found to have enlarged SDH. 06/23/2019 s/p craniotomy by neurosurgery, SD placed. 6/25 Pt had MMA embolization. 6/27/19 SD drain removed, CT head was stable, stepped down to medicine 06/29/2019.    - NSGY signed off, will will need placement, likely rehab  - PT/OT/Rehab consult placed    Type 2 diabetes mellitus with chronic kidney disease on chronic dialysis, without long-term current use of insulin  Place on LDSS      (HFpEF) heart failure with preserved ejection fraction  Nonrheumatic aortic valve stenosis  Non-rheumatic tricuspid valve insufficiency    - TTE 5/22 shows EF 53, grade II DD, severe LAE, moderate TR and MR, PASP 77, CVP 15 mm  - continue GDMT, as tolerated  - decreased carvedilol dose as patient normotensive without BB and hydralazine  - will DC hydralazine and monitor for use    ESRD on hemodialysis  Hyperparathyroidism, secondary renal  Anemia in ESRD (end-stage renal disease)  HD MWF via LUE AVF  - nephrology consulted for HD while admitted  - H/H stable; EPO with dialysis, PRN  - strict I/O, daily weights    Pulmonary hypertension        Chronic respiratory failure with hypoxia                                     Moderate malnutrition  - c/w beneprotein supplements    Subdural hematoma  See SDH      Non-rheumatic tricuspid valve insufficiency                          Pulmonary emphysema  Chronic respiratory failure with hypoxia  - c/w ICS/LABA, PRN duonebs  - c/w 2L NC O2    CAD (coronary artery disease)        Physical debility  PT/OT consulted  appears there are social issues and home and family can no longer care for patient who requires 24 hour care.     Blindness of right eye        Left spastic hemiparesis        Hyperparathyroidism, secondary renal        Anemia in ESRD (end-stage renal disease)            Nonrheumatic aortic valve stenosis        Renovascular hypertension  Continue home antihypertensives      Pleural effusion on right  CXR  reveals chronic R moderate pleural effusion (has been present since at least 2016), now appears to be more symptomatic than baseline. Stable on home 2L NC          VTE Risk Mitigation (From admission, onward)        Ordered     IP VTE HIGH RISK PATIENT  Once      06/22/19 1515     Place sequential compression device  Until discontinued      06/22/19 0017                Carmen Ross MD  Department of Hospital Medicine   [facility]

## 2023-12-20 NOTE — PLAN OF CARE
Problem: SLP Goal  Goal: SLP Goal  Speech Language Pathology Goals  Goals expected to be met by 6/6:  1. Pt will recall 3/3 facts or words given min-mod cues following a 2 minute delay.   2. Pt will complete 4 step sequencing tasks with 70% accuracy given mod cues.  3. Pt will complete functional math/time/money management tasks with 80% accuracy given min-mod cues.  4. Pt will tolerate least restrictive diet without s/s of aspiration.          Outcome: Ongoing (interventions implemented as appropriate)  Increased difficulty with memory tasks noted today. Will continue to target memory. Cont POC.   BRITTON Novak, CCC-SLP  Speech Language Pathologist  [phone]  6/4/2019         unable to assess immunization status...

## 2024-09-22 NOTE — SUBJECTIVE & OBJECTIVE
H/O alcohol dependence  States he has cut down but still 2-3 shots , 3 times a week  Last drink before coming into the hospital although he states he threw it all up  [facility] ordered  Thiamine/ folic acid   Interval History: Patient evaluated bedside, stable vital signs.  Night uneventful.  Scheduled for HD session today.    Review of patient's allergies indicates:  No Known Allergies  Current Facility-Administered Medications   Medication Frequency    0.9%  NaCl infusion PRN    0.9%  NaCl infusion Once    acetaminophen tablet 650 mg Q4H PRN    albuterol-ipratropium 2.5 mg-0.5 mg/3 mL nebulizer solution 3 mL Q4H PRN    amLODIPine tablet 10 mg Daily    carvedilol tablet 25 mg BID    ceFAZolin injection 1 g Q24H    cloNIDine tablet 0.1 mg TID    dextrose 10% (D10W) Bolus PRN    dextrose 10% (D10W) Bolus PRN    fluticasone furoate-vilanterol 200-25 mcg/dose diskus inhaler 1 puff Daily    glucagon (human recombinant) injection 1 mg PRN    glucose chewable tablet 16 g PRN    glucose chewable tablet 24 g PRN    hydrALAZINE injection 20 mg Q4H PRN    hydrALAZINE tablet 100 mg Q8H    insulin aspart U-100 pen 1-10 Units Q6H PRN    labetalol 20 mg/4 mL (5 mg/mL) IV syring Q4H PRN    levETIRAcetam tablet 500 mg BID    lidocaine-prilocaine cream Once    losartan tablet 100 mg QHS    ondansetron disintegrating tablet 4 mg Q8H PRN    oxyCODONE immediate release tablet 5 mg Q6H PRN    polyethylene glycol packet 17 g Daily    senna-docusate 8.6-50 mg per tablet 1 tablet BID PRN    sodium chloride 0.9% flush 10 mL PRN    sodium chloride 0.9% flush 5 mL PRN       Objective:     Vital Signs (Most Recent):  Temp: 97.5 °F (36.4 °C) (06/26/19 1501)  Pulse: (!) 51 (06/26/19 1501)  Resp: 19 (06/26/19 1501)  BP: (!) 151/68 (06/26/19 1501)  SpO2: 99 % (06/26/19 1501)  O2 Device (Oxygen Therapy): nasal cannula (06/26/19 1501) Vital Signs (24h Range):  Temp:  [97.5 °F (36.4 °C)-98.6 °F (37 °C)] 97.5 °F (36.4 °C)  Pulse:  [51-63] 51  Resp:  [17-53] 19  SpO2:  [95 %-100 %] 99 %  BP: (138-184)/() 151/68  Arterial Line BP: (117-166)/(30-44) 142/40     Weight: 51.7 kg (113 lb 15.7 oz) (06/25/19 0400)  Body mass index  is 20.19 kg/m².  Body surface area is 1.52 meters squared.    I/O last 3 completed shifts:  In: 830 [P.O.:80; Other:500; IV Piggyback:250]  Out: 1565 [Drains:65; Other:1500]    Physical Exam   Constitutional: She is oriented to person, place, and time. She appears well-developed and well-nourished. No distress.   HENT:   Head gauze covered around wound.   Eyes: Pupils are equal, round, and reactive to light.   Cardiovascular: Normal rate.   Pulmonary/Chest: Effort normal. No respiratory distress.   Abdominal: Soft. Bowel sounds are normal. She exhibits no distension.   Musculoskeletal: She exhibits no edema.   Neurological: She is alert and oriented to person, place, and time.   Skin: Skin is warm.   Nursing note and vitals reviewed.      Significant Labs:  CBC:   Recent Labs   Lab 06/26/19 0242   WBC 5.12   RBC 2.88*   HGB 8.0*   HCT 27.0*   *   MCV 94   MCH 27.8   MCHC 29.6*     CMP:   Recent Labs   Lab 06/26/19 0242 06/26/19  0825   GLU 86  --    CALCIUM 9.2  --    ALBUMIN 2.8*  --    PROT 6.3  --    * 136   K 3.9  --    CO2 22*  --      --    BUN 22  --    CREATININE 4.2*  --    ALKPHOS 80  --    ALT <5*  --    AST 11  --    BILITOT 0.4  --      All labs within the past 24 hours have been reviewed.

## (undated) DEVICE — SUT PROLENE 6-0 24 BV-1

## (undated) DEVICE — DRAPE PLASTIC U 60X72

## (undated) DEVICE — APPLICATOR CHLORAPREP ORN 26ML

## (undated) DEVICE — CLIP MED TICALL

## (undated) DEVICE — DRAPE C-ARMOR EQUIPMENT COVER

## (undated) DEVICE — SUT VICRYL PLUS 3-0 SH 18IN

## (undated) DEVICE — SUT ETHILON 3/0 18IN PS-1

## (undated) DEVICE — REAMER STEPPED DHS DCS

## (undated) DEVICE — KIT MICROINTRO 4F .018X40X7CM

## (undated) DEVICE — DRESSING SURGICAL 1X3

## (undated) DEVICE — GOWN SMART IMP BREATHABLE XXLG

## (undated) DEVICE — CARTRIDGE OIL

## (undated) DEVICE — BLADE SURG CARBON STEEL #10

## (undated) DEVICE — SET DECANTER MEDICHOICE

## (undated) DEVICE — COVER LIGHT HANDLE 80/CA

## (undated) DEVICE — SUT 2-0 12-18IN SILK

## (undated) DEVICE — DRESSING TEGADERM IV 3.5 X 4.5

## (undated) DEVICE — SYR ONLY LUER LOCK 20CC

## (undated) DEVICE — SUT SILK 2-0 SH 18IN BLACK

## (undated) DEVICE — BLADE SURG CARBON STEEL SZ11

## (undated) DEVICE — STAPLER SKIN PROXIMATE WIDE

## (undated) DEVICE — KIT EVACUATOR FULL PERF 100CC

## (undated) DEVICE — SUT 7/0 24IN PROLENE BL MO

## (undated) DEVICE — PROTECTION STATION PLUS

## (undated) DEVICE — WIRE GUIDE 3.2MM 400MM
Type: IMPLANTABLE DEVICE | Site: FEMUR | Status: NON-FUNCTIONAL
Removed: 2019-10-28

## (undated) DEVICE — DRAPE OPTIMA MAJOR PEDIATRIC

## (undated) DEVICE — SHEATH PINNACLE 6FRX6CM

## (undated) DEVICE — SEE MEDLINE ITEM 146292

## (undated) DEVICE — DRESSING TRANS 4X4 TEGADERM

## (undated) DEVICE — AV VASCULAR ACCESS PACK

## (undated) DEVICE — DRESSING ADH FILM TELFA 2X3IN

## (undated) DEVICE — OMNIPAQUE 350 200ML

## (undated) DEVICE — STOPCOCK 3-WAY

## (undated) DEVICE — SEE MEDLINE ITEM 157103

## (undated) DEVICE — DRAPE C ARM 42 X 120 10/BX

## (undated) DEVICE — DRAPE STERI U-SHAPED 47X51IN

## (undated) DEVICE — SUT SILK 2-0 PS 18IN BLACK

## (undated) DEVICE — SEE MEDLINE ITEM 146313

## (undated) DEVICE — SUT MCRYL PLUS 4-0 PS2 27IN

## (undated) DEVICE — GOWN SURGICAL X-LARGE

## (undated) DEVICE — SEE MEDLINE ITEM 156894

## (undated) DEVICE — SUT MONOCRYL 3-0 SH U/D

## (undated) DEVICE — TAPE MEDIPORE 4IN X 2YDS

## (undated) DEVICE — SPRAY MASTISOL

## (undated) DEVICE — DIFFUSER

## (undated) DEVICE — DRESSING SURGICAL 3/4X3/4

## (undated) DEVICE — SEE MEDLINE ITEM 157173

## (undated) DEVICE — ELECTRODE BLADE INSULATED 1 IN

## (undated) DEVICE — ADHESIVE MASTISOL VIAL 48/BX

## (undated) DEVICE — ELECTRODE REM PLYHSV RETURN 9

## (undated) DEVICE — CATH GLIDE ANGLED 5FR 65CM

## (undated) DEVICE — TAPE UMBILICAL 1/8X36IN WHITE

## (undated) DEVICE — GUIDEWIRE ANG STF .035INX18CM

## (undated) DEVICE — GUIDEWIRE STF .035X180CM ANG

## (undated) DEVICE — LOOP VESSEL BLUE MAXI

## (undated) DEVICE — DURAPREP SURG SCRUB 26ML

## (undated) DEVICE — SUT 3-0 12-18IN SILK

## (undated) DEVICE — SEE MEDLINE ITEM 152622

## (undated) DEVICE — DRESSING AQUACEL AG ADV 3.5X6

## (undated) DEVICE — INFLATOR ENCORE 26 BLLN INFL

## (undated) DEVICE — DRESSING TRANS 2X2 TEGADERM

## (undated) DEVICE — DRAPE IOBAN 2 STERI

## (undated) DEVICE — DRESSING TELFA STRL 4X3 LF

## (undated) DEVICE — GLIDEPATH HEMODIALYSIS CATH, ST, DL 14.5 FR. 19CM
Type: IMPLANTABLE DEVICE | Site: NECK | Status: NON-FUNCTIONAL
Brand: GLIDEPATH LONG-TERM HEMODIALYSIS CATHETER WITH PRELOADED STYLET

## (undated) DEVICE — DRESSING SURGICAL 1/2X1/2

## (undated) DEVICE — SUT 4-0 12-18IN SILK BLACK

## (undated) DEVICE — GAUZE SPONGE 4X4 12PLY

## (undated) DEVICE — TRAY MINOR ORTHO

## (undated) DEVICE — SEE MEDLINE ITEM 156905

## (undated) DEVICE — Device

## (undated) DEVICE — HOOK LONE STAR BLUNT 12MM

## (undated) DEVICE — STOCKINET 4INX48

## (undated) DEVICE — MARKER SKIN STND TIP BLUE BARR

## (undated) DEVICE — BIT DRILL QC 3FLUTED 4.2X145 S

## (undated) DEVICE — CORD BIPOLAR 12 FOOT

## (undated) DEVICE — CATH BLLN DORADO 8-4

## (undated) DEVICE — WARMER DRAPE STERILE LF

## (undated) DEVICE — SEE MEDLINE ITEM 152529

## (undated) DEVICE — SUT 4/0 18IN NUROLON BLK B

## (undated) DEVICE — SUT LIGACLIP SMALL XTRA

## (undated) DEVICE — KIT PT CARE HANA PROFX SSXT

## (undated) DEVICE — BOOT SUTURE AID

## (undated) DEVICE — SUT 0 VICRYL / CT-1

## (undated) DEVICE — CATH DORADO 10X4

## (undated) DEVICE — TUBE FRAZIER 5MM 2FT SOFT TIP

## (undated) DEVICE — SEE MEDLINE ITEM 153688

## (undated) DEVICE — BIT PERFORATOR LARGE

## (undated) DEVICE — BUR BONE CUT MICRO TPS 3X3.8MM

## (undated) DEVICE — SEE MEDLINE ITEM 157150

## (undated) DEVICE — TAPE SURG DURAPORE 2 X10YD

## (undated) DEVICE — HEMOSTAT SURGICEL 2X14IN